# Patient Record
Sex: MALE | Race: WHITE | Employment: OTHER | URBAN - METROPOLITAN AREA
[De-identification: names, ages, dates, MRNs, and addresses within clinical notes are randomized per-mention and may not be internally consistent; named-entity substitution may affect disease eponyms.]

---

## 2017-01-18 PROBLEM — F31.60 BIPOLAR DISORDER, MIXED (HCC): Status: ACTIVE | Noted: 2017-01-18

## 2017-02-06 PROBLEM — H72.92 PERFORATION OF LEFT TYMPANIC MEMBRANE: Status: ACTIVE | Noted: 2017-02-06

## 2017-03-20 PROBLEM — N52.9 ERECTILE DYSFUNCTION: Status: ACTIVE | Noted: 2017-03-20

## 2017-08-15 ENCOUNTER — HOSPITAL ENCOUNTER (INPATIENT)
Age: 67
LOS: 6 days | Discharge: HOME | DRG: 885 | End: 2017-08-21
Attending: EMERGENCY MEDICINE | Admitting: PSYCHIATRY & NEUROLOGY
Payer: MEDICARE

## 2017-08-15 DIAGNOSIS — R45.851 SUICIDAL IDEATIONS: Primary | ICD-10-CM

## 2017-08-15 PROCEDURE — 99285 EMERGENCY DEPT VISIT HI MDM: CPT

## 2017-08-15 PROCEDURE — 1240000000 HC EMOTIONAL WELLNESS R&B

## 2017-08-16 LAB
-: ABNORMAL
AMORPHOUS: ABNORMAL
AMPHETAMINE SCREEN URINE: NEGATIVE
BACTERIA: ABNORMAL
BARBITURATE SCREEN URINE: NEGATIVE
BENZODIAZEPINE SCREEN, URINE: NEGATIVE
BILIRUBIN URINE: NEGATIVE
BUPRENORPHINE URINE: NORMAL
CANNABINOID SCREEN URINE: NEGATIVE
CASTS UA: ABNORMAL /LPF
COCAINE METABOLITE, URINE: NEGATIVE
COLOR: YELLOW
COMMENT UA: ABNORMAL
CRYSTALS, UA: ABNORMAL /HPF
EPITHELIAL CELLS UA: ABNORMAL /HPF
GLUCOSE URINE: ABNORMAL
KETONES, URINE: NEGATIVE
LEUKOCYTE ESTERASE, URINE: NEGATIVE
MDMA URINE: NORMAL
METHADONE SCREEN, URINE: NEGATIVE
METHAMPHETAMINE, URINE: NORMAL
MUCUS: ABNORMAL
NITRITE, URINE: NEGATIVE
OPIATES, URINE: NEGATIVE
OTHER OBSERVATIONS UA: ABNORMAL
OXYCODONE SCREEN URINE: NEGATIVE
PH UA: 6.5 (ref 5–8)
PHENCYCLIDINE, URINE: NEGATIVE
PROPOXYPHENE, URINE: NORMAL
PROTEIN UA: ABNORMAL
RBC UA: ABNORMAL /HPF
RENAL EPITHELIAL, UA: ABNORMAL /HPF
SPECIFIC GRAVITY UA: 1.02 (ref 1–1.03)
TEST INFORMATION: NORMAL
TRICHOMONAS: ABNORMAL
TRICYCLIC ANTIDEPRESSANTS, UR: NORMAL
TURBIDITY: CLEAR
URINE HGB: NEGATIVE
UROBILINOGEN, URINE: NORMAL
WBC UA: ABNORMAL /HPF
YEAST: ABNORMAL

## 2017-08-16 PROCEDURE — 81001 URINALYSIS AUTO W/SCOPE: CPT

## 2017-08-16 PROCEDURE — 6370000000 HC RX 637 (ALT 250 FOR IP): Performed by: PSYCHIATRY & NEUROLOGY

## 2017-08-16 PROCEDURE — 94664 DEMO&/EVAL PT USE INHALER: CPT

## 2017-08-16 PROCEDURE — 80307 DRUG TEST PRSMV CHEM ANLYZR: CPT

## 2017-08-16 PROCEDURE — 1240000000 HC EMOTIONAL WELLNESS R&B

## 2017-08-16 RX ORDER — ALBUTEROL SULFATE 90 UG/1
2 AEROSOL, METERED RESPIRATORY (INHALATION) EVERY 6 HOURS PRN
Status: DISCONTINUED | OUTPATIENT
Start: 2017-08-16 | End: 2017-08-21 | Stop reason: HOSPADM

## 2017-08-16 RX ORDER — TRAZODONE HYDROCHLORIDE 50 MG/1
50 TABLET ORAL NIGHTLY PRN
Status: DISCONTINUED | OUTPATIENT
Start: 2017-08-16 | End: 2017-08-17

## 2017-08-16 RX ORDER — NAPROXEN 500 MG/1
500 TABLET ORAL 2 TIMES DAILY PRN
Status: DISCONTINUED | OUTPATIENT
Start: 2017-08-16 | End: 2017-08-21 | Stop reason: HOSPADM

## 2017-08-16 RX ORDER — NAPROXEN 500 MG/1
500 TABLET ORAL 2 TIMES DAILY PRN
Status: ON HOLD | COMMUNITY
End: 2018-04-13 | Stop reason: HOSPADM

## 2017-08-16 RX ORDER — AMLODIPINE BESYLATE 10 MG/1
10 TABLET ORAL DAILY
Status: DISCONTINUED | OUTPATIENT
Start: 2017-08-16 | End: 2017-08-21 | Stop reason: HOSPADM

## 2017-08-16 RX ORDER — ATORVASTATIN CALCIUM 20 MG/1
40 TABLET, FILM COATED ORAL NIGHTLY
Status: DISCONTINUED | OUTPATIENT
Start: 2017-08-16 | End: 2017-08-21 | Stop reason: HOSPADM

## 2017-08-16 RX ORDER — DIVALPROEX SODIUM 500 MG/1
500 TABLET, EXTENDED RELEASE ORAL 2 TIMES DAILY
Status: DISCONTINUED | OUTPATIENT
Start: 2017-08-16 | End: 2017-08-21 | Stop reason: HOSPADM

## 2017-08-16 RX ORDER — BENZTROPINE MESYLATE 1 MG/ML
2 INJECTION INTRAMUSCULAR; INTRAVENOUS DAILY PRN
Status: DISCONTINUED | OUTPATIENT
Start: 2017-08-16 | End: 2017-08-21 | Stop reason: HOSPADM

## 2017-08-16 RX ORDER — MAGNESIUM OXIDE 420 MG
420 TABLET ORAL EVERY MORNING
Status: DISCONTINUED | OUTPATIENT
Start: 2017-08-17 | End: 2017-08-16

## 2017-08-16 RX ORDER — TRAZODONE HYDROCHLORIDE 50 MG/1
50 TABLET ORAL NIGHTLY PRN
Status: ON HOLD | COMMUNITY
End: 2017-08-21 | Stop reason: HOSPADM

## 2017-08-16 RX ORDER — ASPIRIN 81 MG/1
81 TABLET ORAL EVERY MORNING
Status: DISCONTINUED | OUTPATIENT
Start: 2017-08-17 | End: 2017-08-21 | Stop reason: HOSPADM

## 2017-08-16 RX ORDER — CARVEDILOL 25 MG/1
50 TABLET ORAL 2 TIMES DAILY WITH MEALS
Status: DISCONTINUED | OUTPATIENT
Start: 2017-08-16 | End: 2017-08-21 | Stop reason: HOSPADM

## 2017-08-16 RX ORDER — HYDROXYZINE HYDROCHLORIDE 25 MG/1
25 TABLET, FILM COATED ORAL 3 TIMES DAILY PRN
Status: DISCONTINUED | OUTPATIENT
Start: 2017-08-16 | End: 2017-08-21 | Stop reason: HOSPADM

## 2017-08-16 RX ORDER — CITALOPRAM 10 MG/1
10 TABLET ORAL EVERY MORNING
Status: DISCONTINUED | OUTPATIENT
Start: 2017-08-17 | End: 2017-08-17

## 2017-08-16 RX ORDER — ACETAMINOPHEN 325 MG/1
650 TABLET ORAL EVERY 4 HOURS PRN
Status: DISCONTINUED | OUTPATIENT
Start: 2017-08-16 | End: 2017-08-21 | Stop reason: HOSPADM

## 2017-08-16 RX ORDER — PANTOPRAZOLE SODIUM 40 MG/1
40 TABLET, DELAYED RELEASE ORAL
Status: DISCONTINUED | OUTPATIENT
Start: 2017-08-17 | End: 2017-08-19

## 2017-08-16 RX ORDER — CETIRIZINE HYDROCHLORIDE 10 MG/1
10 TABLET ORAL DAILY PRN
Status: DISCONTINUED | OUTPATIENT
Start: 2017-08-16 | End: 2017-08-21 | Stop reason: HOSPADM

## 2017-08-16 RX ADMIN — METFORMIN HYDROCHLORIDE 1000 MG: 1000 TABLET, FILM COATED ORAL at 18:13

## 2017-08-16 RX ADMIN — AMLODIPINE BESYLATE 10 MG: 10 TABLET ORAL at 17:59

## 2017-08-16 RX ADMIN — ATORVASTATIN CALCIUM 40 MG: 20 TABLET, FILM COATED ORAL at 21:41

## 2017-08-16 RX ADMIN — CARVEDILOL 50 MG: 25 TABLET, FILM COATED ORAL at 17:57

## 2017-08-16 RX ADMIN — HYDROXYZINE HYDROCHLORIDE 25 MG: 25 TABLET, FILM COATED ORAL at 21:41

## 2017-08-16 RX ADMIN — TRAZODONE HYDROCHLORIDE 50 MG: 50 TABLET ORAL at 21:41

## 2017-08-16 RX ADMIN — NAPROXEN 500 MG: 500 TABLET ORAL at 17:59

## 2017-08-16 RX ADMIN — ACETAMINOPHEN 650 MG: 325 TABLET ORAL at 21:41

## 2017-08-16 RX ADMIN — DIVALPROEX SODIUM 500 MG: 500 TABLET, EXTENDED RELEASE ORAL at 21:41

## 2017-08-16 ASSESSMENT — PAIN DESCRIPTION - FREQUENCY: FREQUENCY: INTERMITTENT

## 2017-08-16 ASSESSMENT — PAIN DESCRIPTION - DESCRIPTORS: DESCRIPTORS: ACHING;DISCOMFORT

## 2017-08-16 ASSESSMENT — SLEEP AND FATIGUE QUESTIONNAIRES
DO YOU HAVE DIFFICULTY SLEEPING: NO
DIFFICULTY ARISING: NO
DO YOU USE A SLEEP AID: YES
DIFFICULTY STAYING ASLEEP: NO
RESTFUL SLEEP: YES
DIFFICULTY FALLING ASLEEP: YES
AVERAGE NUMBER OF SLEEP HOURS: 6
SLEEP PATTERN: RESTLESSNESS

## 2017-08-16 ASSESSMENT — PAIN SCALES - GENERAL
PAINLEVEL_OUTOF10: 8
PAINLEVEL_OUTOF10: 0
PAINLEVEL_OUTOF10: 8
PAINLEVEL_OUTOF10: 7

## 2017-08-16 ASSESSMENT — PAIN DESCRIPTION - LOCATION: LOCATION: OTHER (COMMENT)

## 2017-08-16 ASSESSMENT — PATIENT HEALTH QUESTIONNAIRE - PHQ9: SUM OF ALL RESPONSES TO PHQ QUESTIONS 1-9: 7

## 2017-08-16 ASSESSMENT — LIFESTYLE VARIABLES: HISTORY_ALCOHOL_USE: NO

## 2017-08-16 ASSESSMENT — PAIN DESCRIPTION - PAIN TYPE: TYPE: ACUTE PAIN

## 2017-08-17 LAB
ABSOLUTE EOS #: 0.3 K/UL (ref 0–0.4)
ABSOLUTE LYMPH #: 2.1 K/UL (ref 1–4.8)
ABSOLUTE MONO #: 0.6 K/UL (ref 0.1–1.3)
ALBUMIN SERPL-MCNC: 4.2 G/DL (ref 3.5–5.2)
ALBUMIN/GLOBULIN RATIO: ABNORMAL (ref 1–2.5)
ALP BLD-CCNC: 66 U/L (ref 40–129)
ALT SERPL-CCNC: 23 U/L (ref 5–41)
ANION GAP SERPL CALCULATED.3IONS-SCNC: 11 MMOL/L (ref 9–17)
AST SERPL-CCNC: 23 U/L
BASOPHILS # BLD: 1 %
BASOPHILS ABSOLUTE: 0.1 K/UL (ref 0–0.2)
BILIRUB SERPL-MCNC: 0.43 MG/DL (ref 0.3–1.2)
BUN BLDV-MCNC: 20 MG/DL (ref 8–23)
BUN/CREAT BLD: ABNORMAL (ref 9–20)
CALCIUM SERPL-MCNC: 9.6 MG/DL (ref 8.6–10.4)
CHLORIDE BLD-SCNC: 100 MMOL/L (ref 98–107)
CO2: 28 MMOL/L (ref 20–31)
CREAT SERPL-MCNC: 0.52 MG/DL (ref 0.7–1.2)
DIFFERENTIAL TYPE: ABNORMAL
EOSINOPHILS RELATIVE PERCENT: 4 %
GFR AFRICAN AMERICAN: >60 ML/MIN
GFR NON-AFRICAN AMERICAN: >60 ML/MIN
GFR SERPL CREATININE-BSD FRML MDRD: ABNORMAL ML/MIN/{1.73_M2}
GFR SERPL CREATININE-BSD FRML MDRD: ABNORMAL ML/MIN/{1.73_M2}
GLUCOSE BLD-MCNC: 127 MG/DL (ref 70–99)
HCT VFR BLD CALC: 40.6 % (ref 41–53)
HEMOGLOBIN: 14.1 G/DL (ref 13.5–17.5)
LYMPHOCYTES # BLD: 30 %
MCH RBC QN AUTO: 33 PG (ref 26–34)
MCHC RBC AUTO-ENTMCNC: 34.8 G/DL (ref 31–37)
MCV RBC AUTO: 94.9 FL (ref 80–100)
MONOCYTES # BLD: 9 %
PDW BLD-RTO: 13.4 % (ref 11.5–14.9)
PLATELET # BLD: 252 K/UL (ref 150–450)
PLATELET ESTIMATE: ABNORMAL
PMV BLD AUTO: 7.4 FL (ref 6–12)
POTASSIUM SERPL-SCNC: 4.3 MMOL/L (ref 3.7–5.3)
RBC # BLD: 4.28 M/UL (ref 4.5–5.9)
RBC # BLD: ABNORMAL 10*6/UL
SEG NEUTROPHILS: 56 %
SEGMENTED NEUTROPHILS ABSOLUTE COUNT: 3.9 K/UL (ref 1.3–9.1)
SODIUM BLD-SCNC: 139 MMOL/L (ref 135–144)
TOTAL PROTEIN: 6.9 G/DL (ref 6.4–8.3)
WBC # BLD: 7 K/UL (ref 3.5–11)
WBC # BLD: ABNORMAL 10*3/UL

## 2017-08-17 PROCEDURE — 1240000000 HC EMOTIONAL WELLNESS R&B

## 2017-08-17 PROCEDURE — 6370000000 HC RX 637 (ALT 250 FOR IP): Performed by: PSYCHIATRY & NEUROLOGY

## 2017-08-17 PROCEDURE — 36415 COLL VENOUS BLD VENIPUNCTURE: CPT

## 2017-08-17 PROCEDURE — 80053 COMPREHEN METABOLIC PANEL: CPT

## 2017-08-17 PROCEDURE — 85025 COMPLETE CBC W/AUTO DIFF WBC: CPT

## 2017-08-17 RX ORDER — TRAZODONE HYDROCHLORIDE 100 MG/1
100 TABLET ORAL NIGHTLY PRN
Status: DISCONTINUED | OUTPATIENT
Start: 2017-08-17 | End: 2017-08-21 | Stop reason: HOSPADM

## 2017-08-17 RX ORDER — CITALOPRAM 20 MG/1
20 TABLET ORAL EVERY MORNING
Status: DISCONTINUED | OUTPATIENT
Start: 2017-08-18 | End: 2017-08-21 | Stop reason: HOSPADM

## 2017-08-17 RX ADMIN — CARVEDILOL 50 MG: 25 TABLET, FILM COATED ORAL at 17:32

## 2017-08-17 RX ADMIN — METFORMIN HYDROCHLORIDE 1000 MG: 1000 TABLET, FILM COATED ORAL at 10:40

## 2017-08-17 RX ADMIN — Medication 400 MG: at 10:40

## 2017-08-17 RX ADMIN — TRAZODONE HYDROCHLORIDE 100 MG: 100 TABLET ORAL at 22:37

## 2017-08-17 RX ADMIN — NAPROXEN 500 MG: 500 TABLET ORAL at 22:36

## 2017-08-17 RX ADMIN — PANTOPRAZOLE SODIUM 40 MG: 40 TABLET, DELAYED RELEASE ORAL at 06:33

## 2017-08-17 RX ADMIN — ATORVASTATIN CALCIUM 40 MG: 20 TABLET, FILM COATED ORAL at 22:36

## 2017-08-17 RX ADMIN — CITALOPRAM HYDROBROMIDE 10 MG: 10 TABLET ORAL at 10:40

## 2017-08-17 RX ADMIN — HYDROXYZINE HYDROCHLORIDE 25 MG: 25 TABLET, FILM COATED ORAL at 22:40

## 2017-08-17 RX ADMIN — DIVALPROEX SODIUM 500 MG: 500 TABLET, EXTENDED RELEASE ORAL at 10:41

## 2017-08-17 RX ADMIN — NAPROXEN 500 MG: 500 TABLET ORAL at 11:00

## 2017-08-17 RX ADMIN — DIVALPROEX SODIUM 500 MG: 500 TABLET, EXTENDED RELEASE ORAL at 22:36

## 2017-08-17 RX ADMIN — METFORMIN HYDROCHLORIDE 1000 MG: 1000 TABLET, FILM COATED ORAL at 17:32

## 2017-08-17 RX ADMIN — CARVEDILOL 50 MG: 25 TABLET, FILM COATED ORAL at 10:40

## 2017-08-17 RX ADMIN — ASPIRIN 81 MG: 81 TABLET, COATED ORAL at 10:40

## 2017-08-17 RX ADMIN — AMLODIPINE BESYLATE 10 MG: 10 TABLET ORAL at 10:40

## 2017-08-17 ASSESSMENT — PAIN DESCRIPTION - LOCATION
LOCATION: EAR
LOCATION: EAR

## 2017-08-17 ASSESSMENT — PAIN SCALES - GENERAL
PAINLEVEL_OUTOF10: 7
PAINLEVEL_OUTOF10: 6
PAINLEVEL_OUTOF10: 7
PAINLEVEL_OUTOF10: 6

## 2017-08-18 LAB
VALPROIC ACID LEVEL: 35 UG/ML (ref 50–100)
VALPROIC DATE LAST DOSE: ABNORMAL
VALPROIC DOSE AMOUNT: ABNORMAL
VALPROIC TIME LAST DOSE: 2236

## 2017-08-18 PROCEDURE — 80164 ASSAY DIPROPYLACETIC ACD TOT: CPT

## 2017-08-18 PROCEDURE — 6370000000 HC RX 637 (ALT 250 FOR IP): Performed by: PSYCHIATRY & NEUROLOGY

## 2017-08-18 PROCEDURE — 1240000000 HC EMOTIONAL WELLNESS R&B

## 2017-08-18 PROCEDURE — 36415 COLL VENOUS BLD VENIPUNCTURE: CPT

## 2017-08-18 RX ADMIN — AMLODIPINE BESYLATE 10 MG: 10 TABLET ORAL at 08:26

## 2017-08-18 RX ADMIN — METFORMIN HYDROCHLORIDE 1000 MG: 1000 TABLET, FILM COATED ORAL at 08:24

## 2017-08-18 RX ADMIN — NAPROXEN 500 MG: 500 TABLET ORAL at 22:49

## 2017-08-18 RX ADMIN — CARVEDILOL 50 MG: 25 TABLET, FILM COATED ORAL at 18:41

## 2017-08-18 RX ADMIN — ATORVASTATIN CALCIUM 40 MG: 20 TABLET, FILM COATED ORAL at 22:46

## 2017-08-18 RX ADMIN — Medication 400 MG: at 08:26

## 2017-08-18 RX ADMIN — DIVALPROEX SODIUM 500 MG: 500 TABLET, EXTENDED RELEASE ORAL at 22:46

## 2017-08-18 RX ADMIN — CARVEDILOL 50 MG: 25 TABLET, FILM COATED ORAL at 08:24

## 2017-08-18 RX ADMIN — CITALOPRAM HYDROBROMIDE 20 MG: 20 TABLET ORAL at 08:26

## 2017-08-18 RX ADMIN — PANTOPRAZOLE SODIUM 40 MG: 40 TABLET, DELAYED RELEASE ORAL at 06:29

## 2017-08-18 RX ADMIN — ASPIRIN 81 MG: 81 TABLET, COATED ORAL at 08:26

## 2017-08-18 RX ADMIN — TRAZODONE HYDROCHLORIDE 100 MG: 100 TABLET ORAL at 22:46

## 2017-08-18 RX ADMIN — DIVALPROEX SODIUM 500 MG: 500 TABLET, EXTENDED RELEASE ORAL at 08:26

## 2017-08-18 RX ADMIN — METFORMIN HYDROCHLORIDE 1000 MG: 1000 TABLET, FILM COATED ORAL at 18:41

## 2017-08-18 ASSESSMENT — PAIN SCALES - GENERAL
PAINLEVEL_OUTOF10: 6
PAINLEVEL_OUTOF10: 6

## 2017-08-18 ASSESSMENT — PAIN DESCRIPTION - PAIN TYPE: TYPE: ACUTE PAIN

## 2017-08-18 ASSESSMENT — PAIN DESCRIPTION - LOCATION: LOCATION: EAR

## 2017-08-19 PROCEDURE — 6370000000 HC RX 637 (ALT 250 FOR IP): Performed by: PSYCHIATRY & NEUROLOGY

## 2017-08-19 PROCEDURE — 1240000000 HC EMOTIONAL WELLNESS R&B

## 2017-08-19 RX ORDER — PANTOPRAZOLE SODIUM 40 MG/1
40 TABLET, DELAYED RELEASE ORAL
Status: DISCONTINUED | OUTPATIENT
Start: 2017-08-19 | End: 2017-08-21 | Stop reason: HOSPADM

## 2017-08-19 RX ADMIN — NAPROXEN 500 MG: 500 TABLET ORAL at 22:55

## 2017-08-19 RX ADMIN — DIVALPROEX SODIUM 500 MG: 500 TABLET, EXTENDED RELEASE ORAL at 22:54

## 2017-08-19 RX ADMIN — CARVEDILOL 50 MG: 25 TABLET, FILM COATED ORAL at 18:16

## 2017-08-19 RX ADMIN — NAPROXEN 500 MG: 500 TABLET ORAL at 09:53

## 2017-08-19 RX ADMIN — HYDROXYZINE HYDROCHLORIDE 25 MG: 25 TABLET, FILM COATED ORAL at 22:53

## 2017-08-19 RX ADMIN — CARVEDILOL 50 MG: 25 TABLET, FILM COATED ORAL at 09:42

## 2017-08-19 RX ADMIN — DIVALPROEX SODIUM 500 MG: 500 TABLET, EXTENDED RELEASE ORAL at 09:42

## 2017-08-19 RX ADMIN — ATORVASTATIN CALCIUM 40 MG: 20 TABLET, FILM COATED ORAL at 22:53

## 2017-08-19 RX ADMIN — ACETAMINOPHEN 650 MG: 325 TABLET ORAL at 18:20

## 2017-08-19 RX ADMIN — ASPIRIN 81 MG: 81 TABLET, COATED ORAL at 09:43

## 2017-08-19 RX ADMIN — PANTOPRAZOLE SODIUM 40 MG: 40 TABLET, DELAYED RELEASE ORAL at 09:42

## 2017-08-19 RX ADMIN — Medication 400 MG: at 09:42

## 2017-08-19 RX ADMIN — METFORMIN HYDROCHLORIDE 1000 MG: 1000 TABLET, FILM COATED ORAL at 09:42

## 2017-08-19 RX ADMIN — TRAZODONE HYDROCHLORIDE 100 MG: 100 TABLET ORAL at 22:54

## 2017-08-19 RX ADMIN — METFORMIN HYDROCHLORIDE 1000 MG: 1000 TABLET, FILM COATED ORAL at 18:16

## 2017-08-19 RX ADMIN — CITALOPRAM HYDROBROMIDE 20 MG: 20 TABLET ORAL at 09:43

## 2017-08-19 RX ADMIN — AMLODIPINE BESYLATE 10 MG: 10 TABLET ORAL at 09:42

## 2017-08-19 ASSESSMENT — PAIN DESCRIPTION - LOCATION: LOCATION: EAR

## 2017-08-19 ASSESSMENT — PAIN SCALES - GENERAL
PAINLEVEL_OUTOF10: 6
PAINLEVEL_OUTOF10: 3
PAINLEVEL_OUTOF10: 7
PAINLEVEL_OUTOF10: 6
PAINLEVEL_OUTOF10: 6

## 2017-08-19 ASSESSMENT — PAIN DESCRIPTION - ORIENTATION: ORIENTATION: LEFT

## 2017-08-20 PROCEDURE — 6370000000 HC RX 637 (ALT 250 FOR IP): Performed by: PSYCHIATRY & NEUROLOGY

## 2017-08-20 PROCEDURE — 1240000000 HC EMOTIONAL WELLNESS R&B

## 2017-08-20 RX ADMIN — DIVALPROEX SODIUM 500 MG: 500 TABLET, EXTENDED RELEASE ORAL at 09:09

## 2017-08-20 RX ADMIN — AMLODIPINE BESYLATE 10 MG: 10 TABLET ORAL at 09:09

## 2017-08-20 RX ADMIN — CARVEDILOL 50 MG: 25 TABLET, FILM COATED ORAL at 17:58

## 2017-08-20 RX ADMIN — METFORMIN HYDROCHLORIDE 1000 MG: 1000 TABLET, FILM COATED ORAL at 17:58

## 2017-08-20 RX ADMIN — PANTOPRAZOLE SODIUM 40 MG: 40 TABLET, DELAYED RELEASE ORAL at 09:09

## 2017-08-20 RX ADMIN — ACETAMINOPHEN 650 MG: 325 TABLET ORAL at 23:04

## 2017-08-20 RX ADMIN — DIVALPROEX SODIUM 500 MG: 500 TABLET, EXTENDED RELEASE ORAL at 23:01

## 2017-08-20 RX ADMIN — METFORMIN HYDROCHLORIDE 1000 MG: 1000 TABLET, FILM COATED ORAL at 09:09

## 2017-08-20 RX ADMIN — HYDROXYZINE HYDROCHLORIDE 25 MG: 25 TABLET, FILM COATED ORAL at 23:02

## 2017-08-20 RX ADMIN — NAPROXEN 500 MG: 500 TABLET ORAL at 18:01

## 2017-08-20 RX ADMIN — Medication 400 MG: at 09:09

## 2017-08-20 RX ADMIN — CARVEDILOL 50 MG: 25 TABLET, FILM COATED ORAL at 09:09

## 2017-08-20 RX ADMIN — ATORVASTATIN CALCIUM 40 MG: 20 TABLET, FILM COATED ORAL at 23:01

## 2017-08-20 RX ADMIN — HYDROXYZINE HYDROCHLORIDE 25 MG: 25 TABLET, FILM COATED ORAL at 09:09

## 2017-08-20 RX ADMIN — TRAZODONE HYDROCHLORIDE 100 MG: 100 TABLET ORAL at 23:02

## 2017-08-20 RX ADMIN — ASPIRIN 81 MG: 81 TABLET, COATED ORAL at 09:10

## 2017-08-20 RX ADMIN — CITALOPRAM HYDROBROMIDE 20 MG: 20 TABLET ORAL at 09:09

## 2017-08-20 ASSESSMENT — PAIN SCALES - GENERAL
PAINLEVEL_OUTOF10: 5
PAINLEVEL_OUTOF10: 7
PAINLEVEL_OUTOF10: 5
PAINLEVEL_OUTOF10: 0
PAINLEVEL_OUTOF10: 5

## 2017-08-20 ASSESSMENT — PAIN DESCRIPTION - ORIENTATION
ORIENTATION: LEFT
ORIENTATION: LEFT

## 2017-08-20 ASSESSMENT — PAIN DESCRIPTION - LOCATION
LOCATION: EAR;SHOULDER
LOCATION: EAR;SHOULDER

## 2017-08-20 ASSESSMENT — PAIN DESCRIPTION - PAIN TYPE: TYPE: ACUTE PAIN

## 2017-08-21 VITALS
RESPIRATION RATE: 14 BRPM | BODY MASS INDEX: 27.52 KG/M2 | SYSTOLIC BLOOD PRESSURE: 158 MMHG | OXYGEN SATURATION: 100 % | HEART RATE: 77 BPM | DIASTOLIC BLOOD PRESSURE: 91 MMHG | WEIGHT: 155.31 LBS | HEIGHT: 63 IN | TEMPERATURE: 98.1 F

## 2017-08-21 PROCEDURE — 6370000000 HC RX 637 (ALT 250 FOR IP): Performed by: PSYCHIATRY & NEUROLOGY

## 2017-08-21 RX ORDER — TRAZODONE HYDROCHLORIDE 100 MG/1
100 TABLET ORAL NIGHTLY PRN
Qty: 30 TABLET | Refills: 0 | Status: ON HOLD | OUTPATIENT
Start: 2017-08-21 | End: 2018-04-19

## 2017-08-21 RX ORDER — CITALOPRAM 20 MG/1
20 TABLET ORAL EVERY MORNING
Qty: 30 TABLET | Refills: 0 | Status: SHIPPED | OUTPATIENT
Start: 2017-08-21 | End: 2018-04-18 | Stop reason: ALTCHOICE

## 2017-08-21 RX ORDER — DIVALPROEX SODIUM 500 MG/1
500 TABLET, EXTENDED RELEASE ORAL 2 TIMES DAILY
Qty: 60 TABLET | Refills: 0 | Status: ON HOLD | OUTPATIENT
Start: 2017-08-21 | End: 2018-04-19

## 2017-08-21 RX ADMIN — METFORMIN HYDROCHLORIDE 1000 MG: 1000 TABLET, FILM COATED ORAL at 09:27

## 2017-08-21 RX ADMIN — NAPROXEN 500 MG: 500 TABLET ORAL at 09:45

## 2017-08-21 RX ADMIN — AMLODIPINE BESYLATE 10 MG: 10 TABLET ORAL at 09:27

## 2017-08-21 RX ADMIN — CARVEDILOL 50 MG: 25 TABLET, FILM COATED ORAL at 09:27

## 2017-08-21 RX ADMIN — PANTOPRAZOLE SODIUM 40 MG: 40 TABLET, DELAYED RELEASE ORAL at 09:27

## 2017-08-21 RX ADMIN — DIVALPROEX SODIUM 500 MG: 500 TABLET, EXTENDED RELEASE ORAL at 09:27

## 2017-08-21 RX ADMIN — CITALOPRAM HYDROBROMIDE 20 MG: 20 TABLET ORAL at 09:27

## 2017-08-21 RX ADMIN — ASPIRIN 81 MG: 81 TABLET, COATED ORAL at 09:27

## 2017-08-21 RX ADMIN — Medication 400 MG: at 09:27

## 2017-08-21 ASSESSMENT — PAIN SCALES - GENERAL: PAINLEVEL_OUTOF10: 6

## 2018-04-11 ENCOUNTER — APPOINTMENT (OUTPATIENT)
Dept: GENERAL RADIOLOGY | Age: 68
End: 2018-04-11
Payer: MEDICARE

## 2018-04-11 ENCOUNTER — APPOINTMENT (OUTPATIENT)
Dept: CT IMAGING | Age: 68
End: 2018-04-11
Payer: MEDICARE

## 2018-04-11 ENCOUNTER — HOSPITAL ENCOUNTER (EMERGENCY)
Age: 68
Discharge: ANOTHER HOSPITAL | End: 2018-04-12
Attending: EMERGENCY MEDICINE
Payer: MEDICARE

## 2018-04-11 ENCOUNTER — APPOINTMENT (OUTPATIENT)
Dept: MRI IMAGING | Age: 68
End: 2018-04-11
Payer: MEDICARE

## 2018-04-11 DIAGNOSIS — R07.9 CHEST PAIN, UNSPECIFIED TYPE: ICD-10-CM

## 2018-04-11 DIAGNOSIS — M48.00 CENTRAL STENOSIS OF SPINAL CANAL: Primary | ICD-10-CM

## 2018-04-11 DIAGNOSIS — R29.898 LEFT ARM WEAKNESS: ICD-10-CM

## 2018-04-11 LAB
ABSOLUTE EOS #: 0.3 K/UL (ref 0–0.4)
ABSOLUTE IMMATURE GRANULOCYTE: ABNORMAL K/UL (ref 0–0.3)
ABSOLUTE LYMPH #: 1.9 K/UL (ref 1–4.8)
ABSOLUTE MONO #: 0.7 K/UL (ref 0.1–1.3)
ANION GAP SERPL CALCULATED.3IONS-SCNC: 13 MMOL/L (ref 9–17)
BASOPHILS # BLD: 1 % (ref 0–2)
BASOPHILS ABSOLUTE: 0.1 K/UL (ref 0–0.2)
BUN BLDV-MCNC: 22 MG/DL (ref 8–23)
BUN/CREAT BLD: ABNORMAL (ref 9–20)
CALCIUM SERPL-MCNC: 9.1 MG/DL (ref 8.6–10.4)
CHLORIDE BLD-SCNC: 99 MMOL/L (ref 98–107)
CO2: 25 MMOL/L (ref 20–31)
CREAT SERPL-MCNC: 0.8 MG/DL (ref 0.7–1.2)
DIFFERENTIAL TYPE: ABNORMAL
EKG ATRIAL RATE: 77 BPM
EKG P AXIS: 44 DEGREES
EKG P-R INTERVAL: 190 MS
EKG Q-T INTERVAL: 378 MS
EKG QRS DURATION: 98 MS
EKG QTC CALCULATION (BAZETT): 427 MS
EKG R AXIS: -20 DEGREES
EKG T AXIS: 11 DEGREES
EKG VENTRICULAR RATE: 77 BPM
EOSINOPHILS RELATIVE PERCENT: 4 % (ref 0–4)
GFR AFRICAN AMERICAN: >60 ML/MIN
GFR NON-AFRICAN AMERICAN: >60 ML/MIN
GFR SERPL CREATININE-BSD FRML MDRD: ABNORMAL ML/MIN/{1.73_M2}
GFR SERPL CREATININE-BSD FRML MDRD: ABNORMAL ML/MIN/{1.73_M2}
GLUCOSE BLD-MCNC: 118 MG/DL (ref 70–99)
HCT VFR BLD CALC: 35.9 % (ref 41–53)
HEMOGLOBIN: 12.3 G/DL (ref 13.5–17.5)
IMMATURE GRANULOCYTES: ABNORMAL %
LYMPHOCYTES # BLD: 26 % (ref 24–44)
MCH RBC QN AUTO: 31.7 PG (ref 26–34)
MCHC RBC AUTO-ENTMCNC: 34.4 G/DL (ref 31–37)
MCV RBC AUTO: 92.2 FL (ref 80–100)
MONOCYTES # BLD: 10 % (ref 1–7)
NRBC AUTOMATED: ABNORMAL PER 100 WBC
PDW BLD-RTO: 13.5 % (ref 11.5–14.9)
PLATELET # BLD: 265 K/UL (ref 150–450)
PLATELET ESTIMATE: ABNORMAL
PMV BLD AUTO: 8.2 FL (ref 6–12)
POTASSIUM SERPL-SCNC: 3.7 MMOL/L (ref 3.7–5.3)
RBC # BLD: 3.89 M/UL (ref 4.5–5.9)
RBC # BLD: ABNORMAL 10*6/UL
SEG NEUTROPHILS: 59 % (ref 36–66)
SEGMENTED NEUTROPHILS ABSOLUTE COUNT: 4.4 K/UL (ref 1.3–9.1)
SODIUM BLD-SCNC: 137 MMOL/L (ref 135–144)
TROPONIN INTERP: NORMAL
TROPONIN INTERP: NORMAL
TROPONIN T: <0.03 NG/ML
TROPONIN T: <0.03 NG/ML
WBC # BLD: 7.4 K/UL (ref 3.5–11)
WBC # BLD: ABNORMAL 10*3/UL

## 2018-04-11 PROCEDURE — 93005 ELECTROCARDIOGRAM TRACING: CPT

## 2018-04-11 PROCEDURE — 84484 ASSAY OF TROPONIN QUANT: CPT

## 2018-04-11 PROCEDURE — 72125 CT NECK SPINE W/O DYE: CPT

## 2018-04-11 PROCEDURE — 99285 EMERGENCY DEPT VISIT HI MDM: CPT

## 2018-04-11 PROCEDURE — 70250 X-RAY EXAM OF SKULL: CPT

## 2018-04-11 PROCEDURE — 36415 COLL VENOUS BLD VENIPUNCTURE: CPT

## 2018-04-11 PROCEDURE — 6360000002 HC RX W HCPCS: Performed by: EMERGENCY MEDICINE

## 2018-04-11 PROCEDURE — 72141 MRI NECK SPINE W/O DYE: CPT

## 2018-04-11 PROCEDURE — 80048 BASIC METABOLIC PNL TOTAL CA: CPT

## 2018-04-11 PROCEDURE — 85025 COMPLETE CBC W/AUTO DIFF WBC: CPT

## 2018-04-11 PROCEDURE — 96374 THER/PROPH/DIAG INJ IV PUSH: CPT

## 2018-04-11 PROCEDURE — 71046 X-RAY EXAM CHEST 2 VIEWS: CPT

## 2018-04-11 RX ORDER — LORAZEPAM 2 MG/ML
1 INJECTION INTRAMUSCULAR ONCE
Status: COMPLETED | OUTPATIENT
Start: 2018-04-11 | End: 2018-04-11

## 2018-04-11 RX ADMIN — LORAZEPAM 1 MG: 2 INJECTION INTRAMUSCULAR; INTRAVENOUS at 20:37

## 2018-04-11 ASSESSMENT — ENCOUNTER SYMPTOMS
VOMITING: 0
WHEEZING: 0
SHORTNESS OF BREATH: 0
DIARRHEA: 0
CONSTIPATION: 0
ABDOMINAL PAIN: 0
BACK PAIN: 0
NAUSEA: 0
COLOR CHANGE: 0
COUGH: 0

## 2018-04-11 ASSESSMENT — PAIN DESCRIPTION - PAIN TYPE
TYPE_2: CHRONIC PAIN
TYPE: ACUTE PAIN

## 2018-04-11 ASSESSMENT — PAIN DESCRIPTION - LOCATION: LOCATION_2: SHOULDER

## 2018-04-11 ASSESSMENT — HEART SCORE: ECG: 0

## 2018-04-11 ASSESSMENT — PAIN DESCRIPTION - DESCRIPTORS
DESCRIPTORS_2: SHARP
DESCRIPTORS: TIGHTNESS

## 2018-04-11 ASSESSMENT — PAIN SCALES - GENERAL: PAINLEVEL_OUTOF10: 7

## 2018-04-12 ENCOUNTER — ANESTHESIA EVENT (OUTPATIENT)
Dept: OPERATING ROOM | Age: 68
DRG: 472 | End: 2018-04-12
Payer: MEDICARE

## 2018-04-12 ENCOUNTER — APPOINTMENT (OUTPATIENT)
Dept: MRI IMAGING | Age: 68
DRG: 552 | End: 2018-04-12
Payer: MEDICARE

## 2018-04-12 ENCOUNTER — HOSPITAL ENCOUNTER (INPATIENT)
Age: 68
LOS: 1 days | Discharge: HOME | DRG: 552 | End: 2018-04-13
Attending: EMERGENCY MEDICINE | Admitting: INTERNAL MEDICINE
Payer: MEDICARE

## 2018-04-12 VITALS
DIASTOLIC BLOOD PRESSURE: 82 MMHG | BODY MASS INDEX: 27.46 KG/M2 | HEART RATE: 71 BPM | OXYGEN SATURATION: 95 % | HEIGHT: 63 IN | TEMPERATURE: 98.2 F | RESPIRATION RATE: 16 BRPM | WEIGHT: 155 LBS | SYSTOLIC BLOOD PRESSURE: 130 MMHG

## 2018-04-12 DIAGNOSIS — M48.02 CERVICAL STENOSIS OF SPINE: Primary | ICD-10-CM

## 2018-04-12 PROBLEM — G99.2 STENOSIS OF CERVICAL SPINE WITH MYELOPATHY (HCC): Status: ACTIVE | Noted: 2018-04-12

## 2018-04-12 LAB
BLOOD BANK SPECIMEN: NORMAL
CHOLESTEROL/HDL RATIO: 2.5
CHOLESTEROL: 104 MG/DL
COLLAGEN ADENOSINE-5'-DIPHOSPHATE (ADP) TIME: 96 SEC (ref 67–112)
COLLAGEN EPINEPHRINE TIME: 143 SEC (ref 85–172)
EKG ATRIAL RATE: 65 BPM
EKG P AXIS: 36 DEGREES
EKG P-R INTERVAL: 200 MS
EKG Q-T INTERVAL: 402 MS
EKG QRS DURATION: 102 MS
EKG QTC CALCULATION (BAZETT): 418 MS
EKG R AXIS: -23 DEGREES
EKG T AXIS: -4 DEGREES
EKG VENTRICULAR RATE: 65 BPM
ESTIMATED AVERAGE GLUCOSE: 117 MG/DL
ESTIMATED AVERAGE GLUCOSE: 117 MG/DL
GLUCOSE BLD-MCNC: 215 MG/DL (ref 75–110)
GLUCOSE BLD-MCNC: 348 MG/DL (ref 75–110)
GLUCOSE BLD-MCNC: 75 MG/DL (ref 75–110)
GLUCOSE BLD-MCNC: 91 MG/DL (ref 75–110)
HBA1C MFR BLD: 5.7 % (ref 4–6)
HBA1C MFR BLD: 5.7 % (ref 4–6)
HDLC SERPL-MCNC: 42 MG/DL
INR BLD: 1
LDL CHOLESTEROL: 44 MG/DL (ref 0–130)
LV EF: 55 %
LVEF MODALITY: NORMAL
PARTIAL THROMBOPLASTIN TIME: 24.6 SEC (ref 20.5–30.5)
PLATELET FUNCTION INTERP: NORMAL
PROTHROMBIN TIME: 10.5 SEC (ref 9–12)
TRIGL SERPL-MCNC: 92 MG/DL
TROPONIN INTERP: NORMAL
TROPONIN INTERP: NORMAL
TROPONIN T: <0.03 NG/ML
TROPONIN T: <0.03 NG/ML
VLDLC SERPL CALC-MCNC: NORMAL MG/DL (ref 1–30)

## 2018-04-12 PROCEDURE — 83036 HEMOGLOBIN GLYCOSYLATED A1C: CPT

## 2018-04-12 PROCEDURE — 94762 N-INVAS EAR/PLS OXIMTRY CONT: CPT

## 2018-04-12 PROCEDURE — 84484 ASSAY OF TROPONIN QUANT: CPT

## 2018-04-12 PROCEDURE — 1200000000 HC SEMI PRIVATE

## 2018-04-12 PROCEDURE — 6360000002 HC RX W HCPCS: Performed by: EMERGENCY MEDICINE

## 2018-04-12 PROCEDURE — 85576 BLOOD PLATELET AGGREGATION: CPT

## 2018-04-12 PROCEDURE — 99285 EMERGENCY DEPT VISIT HI MDM: CPT

## 2018-04-12 PROCEDURE — 85610 PROTHROMBIN TIME: CPT

## 2018-04-12 PROCEDURE — 2580000003 HC RX 258: Performed by: STUDENT IN AN ORGANIZED HEALTH CARE EDUCATION/TRAINING PROGRAM

## 2018-04-12 PROCEDURE — 86900 BLOOD TYPING SEROLOGIC ABO: CPT

## 2018-04-12 PROCEDURE — 99223 1ST HOSP IP/OBS HIGH 75: CPT | Performed by: INTERNAL MEDICINE

## 2018-04-12 PROCEDURE — 6370000000 HC RX 637 (ALT 250 FOR IP): Performed by: INTERNAL MEDICINE

## 2018-04-12 PROCEDURE — 86901 BLOOD TYPING SEROLOGIC RH(D): CPT

## 2018-04-12 PROCEDURE — 2580000003 HC RX 258: Performed by: INTERNAL MEDICINE

## 2018-04-12 PROCEDURE — 6370000000 HC RX 637 (ALT 250 FOR IP): Performed by: STUDENT IN AN ORGANIZED HEALTH CARE EDUCATION/TRAINING PROGRAM

## 2018-04-12 PROCEDURE — 36415 COLL VENOUS BLD VENIPUNCTURE: CPT

## 2018-04-12 PROCEDURE — 85730 THROMBOPLASTIN TIME PARTIAL: CPT

## 2018-04-12 PROCEDURE — 86920 COMPATIBILITY TEST SPIN: CPT

## 2018-04-12 PROCEDURE — 72141 MRI NECK SPINE W/O DYE: CPT

## 2018-04-12 PROCEDURE — 82947 ASSAY GLUCOSE BLOOD QUANT: CPT

## 2018-04-12 PROCEDURE — 80061 LIPID PANEL: CPT

## 2018-04-12 PROCEDURE — 93005 ELECTROCARDIOGRAM TRACING: CPT

## 2018-04-12 PROCEDURE — 93306 TTE W/DOPPLER COMPLETE: CPT

## 2018-04-12 PROCEDURE — 86850 RBC ANTIBODY SCREEN: CPT

## 2018-04-12 PROCEDURE — 99222 1ST HOSP IP/OBS MODERATE 55: CPT | Performed by: NEUROLOGICAL SURGERY

## 2018-04-12 RX ORDER — CARVEDILOL 25 MG/1
25 TABLET ORAL 2 TIMES DAILY WITH MEALS
Status: DISCONTINUED | OUTPATIENT
Start: 2018-04-12 | End: 2018-04-13 | Stop reason: HOSPADM

## 2018-04-12 RX ORDER — ONDANSETRON 2 MG/ML
4 INJECTION INTRAMUSCULAR; INTRAVENOUS EVERY 6 HOURS PRN
Status: DISCONTINUED | OUTPATIENT
Start: 2018-04-12 | End: 2018-04-13 | Stop reason: HOSPADM

## 2018-04-12 RX ORDER — SODIUM CHLORIDE 0.9 % (FLUSH) 0.9 %
10 SYRINGE (ML) INJECTION PRN
Status: DISCONTINUED | OUTPATIENT
Start: 2018-04-12 | End: 2018-04-13 | Stop reason: HOSPADM

## 2018-04-12 RX ORDER — GABAPENTIN 300 MG/1
300 CAPSULE ORAL 3 TIMES DAILY
Status: DISCONTINUED | OUTPATIENT
Start: 2018-04-12 | End: 2018-04-13 | Stop reason: HOSPADM

## 2018-04-12 RX ORDER — DIVALPROEX SODIUM 500 MG/1
500 TABLET, EXTENDED RELEASE ORAL 2 TIMES DAILY
Status: DISCONTINUED | OUTPATIENT
Start: 2018-04-12 | End: 2018-04-13 | Stop reason: HOSPADM

## 2018-04-12 RX ORDER — POTASSIUM CHLORIDE 7.45 MG/ML
10 INJECTION INTRAVENOUS PRN
Status: DISCONTINUED | OUTPATIENT
Start: 2018-04-12 | End: 2018-04-13 | Stop reason: HOSPADM

## 2018-04-12 RX ORDER — AMLODIPINE BESYLATE 10 MG/1
10 TABLET ORAL DAILY
Status: DISCONTINUED | OUTPATIENT
Start: 2018-04-12 | End: 2018-04-13 | Stop reason: HOSPADM

## 2018-04-12 RX ORDER — DEXTROSE MONOHYDRATE 25 G/50ML
12.5 INJECTION, SOLUTION INTRAVENOUS PRN
Status: CANCELLED | OUTPATIENT
Start: 2018-04-12

## 2018-04-12 RX ORDER — DEXTROSE MONOHYDRATE 50 MG/ML
100 INJECTION, SOLUTION INTRAVENOUS PRN
Status: CANCELLED | OUTPATIENT
Start: 2018-04-12

## 2018-04-12 RX ORDER — NICOTINE POLACRILEX 4 MG
15 LOZENGE BUCCAL PRN
Status: CANCELLED | OUTPATIENT
Start: 2018-04-12

## 2018-04-12 RX ORDER — FAMOTIDINE 20 MG/1
20 TABLET, FILM COATED ORAL 2 TIMES DAILY
Status: DISCONTINUED | OUTPATIENT
Start: 2018-04-12 | End: 2018-04-13 | Stop reason: HOSPADM

## 2018-04-12 RX ORDER — ALBUTEROL SULFATE 90 UG/1
2 AEROSOL, METERED RESPIRATORY (INHALATION) EVERY 6 HOURS PRN
Status: DISCONTINUED | OUTPATIENT
Start: 2018-04-12 | End: 2018-04-13 | Stop reason: HOSPADM

## 2018-04-12 RX ORDER — ACETAMINOPHEN 325 MG/1
650 TABLET ORAL EVERY 4 HOURS PRN
Status: DISCONTINUED | OUTPATIENT
Start: 2018-04-12 | End: 2018-04-13 | Stop reason: HOSPADM

## 2018-04-12 RX ORDER — CITALOPRAM 20 MG/1
20 TABLET ORAL EVERY MORNING
Status: DISCONTINUED | OUTPATIENT
Start: 2018-04-12 | End: 2018-04-12

## 2018-04-12 RX ORDER — DEXAMETHASONE SODIUM PHOSPHATE 4 MG/ML
4 INJECTION, SOLUTION INTRA-ARTICULAR; INTRALESIONAL; INTRAMUSCULAR; INTRAVENOUS; SOFT TISSUE EVERY 6 HOURS
Status: DISCONTINUED | OUTPATIENT
Start: 2018-04-12 | End: 2018-04-13 | Stop reason: HOSPADM

## 2018-04-12 RX ORDER — SODIUM CHLORIDE 0.9 % (FLUSH) 0.9 %
10 SYRINGE (ML) INJECTION EVERY 12 HOURS SCHEDULED
Status: DISCONTINUED | OUTPATIENT
Start: 2018-04-12 | End: 2018-04-13 | Stop reason: HOSPADM

## 2018-04-12 RX ORDER — ATORVASTATIN CALCIUM 40 MG/1
40 TABLET, FILM COATED ORAL NIGHTLY
Status: DISCONTINUED | OUTPATIENT
Start: 2018-04-12 | End: 2018-04-13 | Stop reason: HOSPADM

## 2018-04-12 RX ORDER — CARVEDILOL 25 MG/1
50 TABLET ORAL 2 TIMES DAILY WITH MEALS
Status: DISCONTINUED | OUTPATIENT
Start: 2018-04-12 | End: 2018-04-12

## 2018-04-12 RX ORDER — CETIRIZINE HYDROCHLORIDE 10 MG/1
10 TABLET ORAL DAILY PRN
Status: DISCONTINUED | OUTPATIENT
Start: 2018-04-12 | End: 2018-04-13 | Stop reason: HOSPADM

## 2018-04-12 RX ORDER — TRAZODONE HYDROCHLORIDE 100 MG/1
100 TABLET ORAL NIGHTLY PRN
Status: DISCONTINUED | OUTPATIENT
Start: 2018-04-12 | End: 2018-04-13 | Stop reason: HOSPADM

## 2018-04-12 RX ORDER — POLYVINYL ALCOHOL 14 MG/ML
1 SOLUTION/ DROPS OPHTHALMIC PRN
Status: DISCONTINUED | OUTPATIENT
Start: 2018-04-12 | End: 2018-04-13 | Stop reason: HOSPADM

## 2018-04-12 RX ORDER — 0.9 % SODIUM CHLORIDE 0.9 %
250 INTRAVENOUS SOLUTION INTRAVENOUS ONCE
Status: DISCONTINUED | OUTPATIENT
Start: 2018-04-12 | End: 2018-04-13 | Stop reason: HOSPADM

## 2018-04-12 RX ORDER — POTASSIUM CHLORIDE 20 MEQ/1
40 TABLET, EXTENDED RELEASE ORAL PRN
Status: DISCONTINUED | OUTPATIENT
Start: 2018-04-12 | End: 2018-04-13 | Stop reason: HOSPADM

## 2018-04-12 RX ORDER — POTASSIUM CHLORIDE 20MEQ/15ML
40 LIQUID (ML) ORAL PRN
Status: DISCONTINUED | OUTPATIENT
Start: 2018-04-12 | End: 2018-04-13 | Stop reason: HOSPADM

## 2018-04-12 RX ADMIN — DEXAMETHASONE SODIUM PHOSPHATE 4 MG: 4 INJECTION, SOLUTION INTRAMUSCULAR; INTRAVENOUS at 21:59

## 2018-04-12 RX ADMIN — DESMOPRESSIN ACETATE 40 MG: 0.2 TABLET ORAL at 21:58

## 2018-04-12 RX ADMIN — Medication 10 ML: at 09:21

## 2018-04-12 RX ADMIN — FAMOTIDINE 20 MG: 20 TABLET, FILM COATED ORAL at 09:19

## 2018-04-12 RX ADMIN — GABAPENTIN 300 MG: 300 CAPSULE ORAL at 13:33

## 2018-04-12 RX ADMIN — POLYVINYL ALCOHOL 1 DROP: 14 SOLUTION/ DROPS OPHTHALMIC at 16:36

## 2018-04-12 RX ADMIN — DEXAMETHASONE SODIUM PHOSPHATE 4 MG: 4 INJECTION, SOLUTION INTRAMUSCULAR; INTRAVENOUS at 16:28

## 2018-04-12 RX ADMIN — Medication 10 ML: at 22:07

## 2018-04-12 RX ADMIN — DEXAMETHASONE SODIUM PHOSPHATE 4 MG: 4 INJECTION, SOLUTION INTRAMUSCULAR; INTRAVENOUS at 13:30

## 2018-04-12 RX ADMIN — INSULIN LISPRO 2 UNITS: 100 INJECTION, SOLUTION INTRAVENOUS; SUBCUTANEOUS at 16:29

## 2018-04-12 RX ADMIN — CARVEDILOL 25 MG: 25 TABLET, FILM COATED ORAL at 16:28

## 2018-04-12 RX ADMIN — GABAPENTIN 300 MG: 300 CAPSULE ORAL at 09:19

## 2018-04-12 RX ADMIN — FAMOTIDINE 20 MG: 20 TABLET, FILM COATED ORAL at 21:58

## 2018-04-12 RX ADMIN — DIVALPROEX SODIUM 500 MG: 500 TABLET, EXTENDED RELEASE ORAL at 21:58

## 2018-04-12 RX ADMIN — INSULIN LISPRO 3 UNITS: 100 INJECTION, SOLUTION INTRAVENOUS; SUBCUTANEOUS at 21:59

## 2018-04-12 RX ADMIN — DIVALPROEX SODIUM 500 MG: 500 TABLET, EXTENDED RELEASE ORAL at 09:19

## 2018-04-12 RX ADMIN — GABAPENTIN 300 MG: 300 CAPSULE ORAL at 21:58

## 2018-04-12 ASSESSMENT — PAIN DESCRIPTION - LOCATION
LOCATION: SHOULDER
LOCATION: SHOULDER
LOCATION: SHOULDER;NECK
LOCATION: SHOULDER

## 2018-04-12 ASSESSMENT — PAIN DESCRIPTION - ORIENTATION
ORIENTATION: LEFT

## 2018-04-12 ASSESSMENT — PAIN DESCRIPTION - FREQUENCY: FREQUENCY: CONTINUOUS

## 2018-04-12 ASSESSMENT — PAIN DESCRIPTION - DIRECTION: RADIATING_TOWARDS: ARM

## 2018-04-12 ASSESSMENT — PAIN DESCRIPTION - DESCRIPTORS
DESCRIPTORS: ACHING;STABBING
DESCRIPTORS: NUMBNESS;TINGLING;DISCOMFORT

## 2018-04-12 ASSESSMENT — PAIN SCALES - GENERAL
PAINLEVEL_OUTOF10: 7
PAINLEVEL_OUTOF10: 6
PAINLEVEL_OUTOF10: 6
PAINLEVEL_OUTOF10: 3

## 2018-04-12 ASSESSMENT — PAIN DESCRIPTION - PAIN TYPE
TYPE: ACUTE PAIN

## 2018-04-12 ASSESSMENT — LIFESTYLE VARIABLES: SMOKING_STATUS: 0

## 2018-04-12 ASSESSMENT — PAIN DESCRIPTION - ONSET: ONSET: ON-GOING

## 2018-04-13 ENCOUNTER — ANESTHESIA (OUTPATIENT)
Dept: OPERATING ROOM | Age: 68
DRG: 472 | End: 2018-04-13
Payer: MEDICARE

## 2018-04-13 VITALS
DIASTOLIC BLOOD PRESSURE: 87 MMHG | BODY MASS INDEX: 28 KG/M2 | OXYGEN SATURATION: 96 % | RESPIRATION RATE: 14 BRPM | TEMPERATURE: 97.5 F | SYSTOLIC BLOOD PRESSURE: 139 MMHG | HEIGHT: 63 IN | HEART RATE: 93 BPM | WEIGHT: 158 LBS

## 2018-04-13 LAB
ABO/RH: NORMAL
ABSOLUTE EOS #: <0.03 K/UL (ref 0–0.44)
ABSOLUTE IMMATURE GRANULOCYTE: 0.05 K/UL (ref 0–0.3)
ABSOLUTE LYMPH #: 1.1 K/UL (ref 1.1–3.7)
ABSOLUTE MONO #: 0.27 K/UL (ref 0.1–1.2)
ALBUMIN SERPL-MCNC: 3.8 G/DL (ref 3.5–5.2)
ALBUMIN/GLOBULIN RATIO: 1.4 (ref 1–2.5)
ALP BLD-CCNC: 74 U/L (ref 40–129)
ALT SERPL-CCNC: 17 U/L (ref 5–41)
ANION GAP SERPL CALCULATED.3IONS-SCNC: 12 MMOL/L (ref 9–17)
ANTIBODY SCREEN: NEGATIVE
ARM BAND NUMBER: NORMAL
AST SERPL-CCNC: 15 U/L
BASOPHILS # BLD: 0 % (ref 0–2)
BASOPHILS ABSOLUTE: <0.03 K/UL (ref 0–0.2)
BILIRUB SERPL-MCNC: 0.3 MG/DL (ref 0.3–1.2)
BILIRUBIN DIRECT: 0.1 MG/DL
BILIRUBIN, INDIRECT: 0.2 MG/DL (ref 0–1)
BLD PROD TYP BPU: NORMAL
BUN BLDV-MCNC: 23 MG/DL (ref 8–23)
BUN/CREAT BLD: ABNORMAL (ref 9–20)
CALCIUM SERPL-MCNC: 9.3 MG/DL (ref 8.6–10.4)
CHLORIDE BLD-SCNC: 100 MMOL/L (ref 98–107)
CO2: 24 MMOL/L (ref 20–31)
CREAT SERPL-MCNC: 0.57 MG/DL (ref 0.7–1.2)
CROSSMATCH RESULT: NORMAL
DIFFERENTIAL TYPE: ABNORMAL
DISPENSE STATUS BLOOD BANK: NORMAL
EOSINOPHILS RELATIVE PERCENT: 0 % (ref 1–4)
ESTIMATED AVERAGE GLUCOSE: 117 MG/DL
EXPIRATION DATE: NORMAL
GFR AFRICAN AMERICAN: >60 ML/MIN
GFR NON-AFRICAN AMERICAN: >60 ML/MIN
GFR SERPL CREATININE-BSD FRML MDRD: ABNORMAL ML/MIN/{1.73_M2}
GFR SERPL CREATININE-BSD FRML MDRD: ABNORMAL ML/MIN/{1.73_M2}
GLOBULIN: NORMAL G/DL (ref 1.5–3.8)
GLUCOSE BLD-MCNC: 202 MG/DL (ref 70–99)
GLUCOSE BLD-MCNC: 305 MG/DL (ref 75–110)
HBA1C MFR BLD: 5.7 % (ref 4–6)
HCT VFR BLD CALC: 37.4 % (ref 40.7–50.3)
HEMOGLOBIN: 13.2 G/DL (ref 13–17)
IMMATURE GRANULOCYTES: 1 %
LACTIC ACID, WHOLE BLOOD: 2 MMOL/L (ref 0.7–2.1)
LACTIC ACID: NORMAL MMOL/L
LYMPHOCYTES # BLD: 13 % (ref 24–43)
MAGNESIUM: 1.7 MG/DL (ref 1.6–2.6)
MCH RBC QN AUTO: 31.7 PG (ref 25.2–33.5)
MCHC RBC AUTO-ENTMCNC: 35.3 G/DL (ref 28.4–34.8)
MCV RBC AUTO: 89.7 FL (ref 82.6–102.9)
MONOCYTES # BLD: 3 % (ref 3–12)
NRBC AUTOMATED: 0 PER 100 WBC
PDW BLD-RTO: 12.6 % (ref 11.8–14.4)
PLATELET # BLD: 299 K/UL (ref 138–453)
PLATELET ESTIMATE: ABNORMAL
PMV BLD AUTO: 9.8 FL (ref 8.1–13.5)
POTASSIUM SERPL-SCNC: 4.1 MMOL/L (ref 3.7–5.3)
RBC # BLD: 4.17 M/UL (ref 4.21–5.77)
RBC # BLD: ABNORMAL 10*6/UL
SEG NEUTROPHILS: 83 % (ref 36–65)
SEGMENTED NEUTROPHILS ABSOLUTE COUNT: 6.77 K/UL (ref 1.5–8.1)
SODIUM BLD-SCNC: 136 MMOL/L (ref 135–144)
TOTAL PROTEIN: 6.6 G/DL (ref 6.4–8.3)
TRANSFUSION STATUS: NORMAL
UNIT DIVISION: 0
UNIT NUMBER: NORMAL
WBC # BLD: 8.2 K/UL (ref 3.5–11.3)
WBC # BLD: ABNORMAL 10*3/UL

## 2018-04-13 PROCEDURE — 83036 HEMOGLOBIN GLYCOSYLATED A1C: CPT

## 2018-04-13 PROCEDURE — 2580000003 HC RX 258: Performed by: INTERNAL MEDICINE

## 2018-04-13 PROCEDURE — 6360000002 HC RX W HCPCS: Performed by: EMERGENCY MEDICINE

## 2018-04-13 PROCEDURE — 82947 ASSAY GLUCOSE BLOOD QUANT: CPT

## 2018-04-13 PROCEDURE — 36415 COLL VENOUS BLD VENIPUNCTURE: CPT

## 2018-04-13 PROCEDURE — 99239 HOSP IP/OBS DSCHRG MGMT >30: CPT | Performed by: INTERNAL MEDICINE

## 2018-04-13 PROCEDURE — 83605 ASSAY OF LACTIC ACID: CPT

## 2018-04-13 PROCEDURE — 6370000000 HC RX 637 (ALT 250 FOR IP): Performed by: STUDENT IN AN ORGANIZED HEALTH CARE EDUCATION/TRAINING PROGRAM

## 2018-04-13 PROCEDURE — 83735 ASSAY OF MAGNESIUM: CPT

## 2018-04-13 PROCEDURE — 80048 BASIC METABOLIC PNL TOTAL CA: CPT

## 2018-04-13 PROCEDURE — 80076 HEPATIC FUNCTION PANEL: CPT

## 2018-04-13 PROCEDURE — 85025 COMPLETE CBC W/AUTO DIFF WBC: CPT

## 2018-04-13 RX ORDER — LABETALOL HYDROCHLORIDE 5 MG/ML
5 INJECTION, SOLUTION INTRAVENOUS EVERY 6 HOURS PRN
Status: DISCONTINUED | OUTPATIENT
Start: 2018-04-13 | End: 2018-04-13 | Stop reason: HOSPADM

## 2018-04-13 RX ORDER — METHYLPREDNISOLONE 4 MG/1
TABLET ORAL
Qty: 1 KIT | Refills: 0 | Status: ON HOLD | OUTPATIENT
Start: 2018-04-13 | End: 2018-04-19

## 2018-04-13 RX ORDER — HYDRALAZINE HYDROCHLORIDE 20 MG/ML
5 INJECTION INTRAMUSCULAR; INTRAVENOUS EVERY 6 HOURS PRN
Status: DISCONTINUED | OUTPATIENT
Start: 2018-04-13 | End: 2018-04-13 | Stop reason: HOSPADM

## 2018-04-13 RX ORDER — CARVEDILOL 25 MG/1
25 TABLET ORAL 2 TIMES DAILY WITH MEALS
Qty: 60 TABLET | Refills: 3 | Status: ON HOLD | OUTPATIENT
Start: 2018-04-13 | End: 2018-04-19

## 2018-04-13 RX ORDER — GABAPENTIN 300 MG/1
300 CAPSULE ORAL 3 TIMES DAILY
Qty: 90 CAPSULE | Refills: 3 | Status: ON HOLD | OUTPATIENT
Start: 2018-04-13 | End: 2018-04-19

## 2018-04-13 RX ADMIN — DIVALPROEX SODIUM 500 MG: 500 TABLET, EXTENDED RELEASE ORAL at 08:27

## 2018-04-13 RX ADMIN — ACETAMINOPHEN 650 MG: 325 TABLET ORAL at 09:45

## 2018-04-13 RX ADMIN — Medication 10 ML: at 09:46

## 2018-04-13 RX ADMIN — INSULIN LISPRO 2 UNITS: 100 INJECTION, SOLUTION INTRAVENOUS; SUBCUTANEOUS at 08:28

## 2018-04-13 RX ADMIN — FAMOTIDINE 20 MG: 20 TABLET, FILM COATED ORAL at 08:28

## 2018-04-13 RX ADMIN — GABAPENTIN 300 MG: 300 CAPSULE ORAL at 08:28

## 2018-04-13 RX ADMIN — AMLODIPINE BESYLATE 10 MG: 10 TABLET ORAL at 08:28

## 2018-04-13 RX ADMIN — DEXAMETHASONE SODIUM PHOSPHATE 4 MG: 4 INJECTION, SOLUTION INTRAMUSCULAR; INTRAVENOUS at 04:39

## 2018-04-13 RX ADMIN — CARVEDILOL 25 MG: 25 TABLET, FILM COATED ORAL at 08:28

## 2018-04-13 RX ADMIN — DEXAMETHASONE SODIUM PHOSPHATE 4 MG: 4 INJECTION, SOLUTION INTRAMUSCULAR; INTRAVENOUS at 09:46

## 2018-04-13 RX ADMIN — CETIRIZINE HYDROCHLORIDE 10 MG: 10 TABLET ORAL at 10:24

## 2018-04-13 RX ADMIN — INSULIN LISPRO 4 UNITS: 100 INJECTION, SOLUTION INTRAVENOUS; SUBCUTANEOUS at 12:33

## 2018-04-13 ASSESSMENT — PAIN SCALES - GENERAL
PAINLEVEL_OUTOF10: 6
PAINLEVEL_OUTOF10: 3
PAINLEVEL_OUTOF10: 5

## 2018-04-13 ASSESSMENT — PAIN DESCRIPTION - PAIN TYPE: TYPE: ACUTE PAIN

## 2018-04-13 ASSESSMENT — PAIN DESCRIPTION - LOCATION: LOCATION: SHOULDER

## 2018-04-13 ASSESSMENT — PAIN DESCRIPTION - ORIENTATION: ORIENTATION: LEFT

## 2018-04-18 RX ORDER — HYDROCODONE BITARTRATE AND ACETAMINOPHEN 5; 325 MG/1; MG/1
1 TABLET ORAL EVERY 6 HOURS PRN
Status: ON HOLD | COMMUNITY
End: 2018-04-19

## 2018-04-18 RX ORDER — DIPHENHYDRAMINE HCL 25 MG
25 CAPSULE ORAL 2 TIMES DAILY
Status: ON HOLD | COMMUNITY
End: 2018-04-19

## 2018-04-18 RX ORDER — AMOXICILLIN 500 MG/1
500 CAPSULE ORAL 3 TIMES DAILY
Status: ON HOLD | COMMUNITY
End: 2018-04-19

## 2018-04-19 ENCOUNTER — HOSPITAL ENCOUNTER (INPATIENT)
Age: 68
LOS: 11 days | Discharge: HOME | DRG: 472 | End: 2018-04-30
Attending: NEUROLOGICAL SURGERY | Admitting: NEUROLOGICAL SURGERY
Payer: MEDICARE

## 2018-04-19 ENCOUNTER — APPOINTMENT (OUTPATIENT)
Dept: GENERAL RADIOLOGY | Age: 68
DRG: 472 | End: 2018-04-19
Attending: NEUROLOGICAL SURGERY
Payer: MEDICARE

## 2018-04-19 ENCOUNTER — ANESTHESIA (OUTPATIENT)
Dept: OPERATING ROOM | Age: 68
DRG: 472 | End: 2018-04-19
Payer: MEDICARE

## 2018-04-19 ENCOUNTER — ANESTHESIA EVENT (OUTPATIENT)
Dept: OPERATING ROOM | Age: 68
DRG: 472 | End: 2018-04-19
Payer: MEDICARE

## 2018-04-19 VITALS — SYSTOLIC BLOOD PRESSURE: 105 MMHG | TEMPERATURE: 100.4 F | OXYGEN SATURATION: 100 % | DIASTOLIC BLOOD PRESSURE: 60 MMHG

## 2018-04-19 DIAGNOSIS — M48.02 CERVICAL SPINAL STENOSIS: Primary | ICD-10-CM

## 2018-04-19 LAB
ALLEN TEST: ABNORMAL
ALLEN TEST: ABNORMAL
CALCIUM IONIZED: 1.15 MMOL/L (ref 1.13–1.33)
CARBOXYHEMOGLOBIN: 1.4 % (ref 0–5)
CARBOXYHEMOGLOBIN: 1.5 % (ref 0–5)
CHLORIDE, WHOLE BLOOD: 106 MMOL/L (ref 98–110)
CHLORIDE, WHOLE BLOOD: 107 MMOL/L (ref 98–110)
FIO2: ABNORMAL
FIO2: ABNORMAL
GFR NON-AFRICAN AMERICAN: >60 ML/MIN
GFR SERPL CREATININE-BSD FRML MDRD: >60 ML/MIN
GFR SERPL CREATININE-BSD FRML MDRD: NORMAL ML/MIN/{1.73_M2}
GLUCOSE BLD-MCNC: 161 MG/DL (ref 74–100)
GLUCOSE BLD-MCNC: 161 MG/DL (ref 75–110)
GLUCOSE BLD-MCNC: 87 MG/DL (ref 75–110)
GLUCOSE BLD-MCNC: 93 MG/DL (ref 75–110)
GLUCOSE BLD-MCNC: 96 MG/DL (ref 75–110)
GLUCOSE BLD-MCNC: 97 MG/DL (ref 75–110)
HCO3 ARTERIAL: 26.2 MMOL/L (ref 22–27)
HCO3 ARTERIAL: 26.5 MMOL/L (ref 22–27)
HCT VFR BLD CALC: 25 %
HCT VFR BLD CALC: 25.5 %
HEMOGLOBIN: 8 GM/DL
HEMOGLOBIN: 8.2 GM/DL
METHEMOGLOBIN: ABNORMAL % (ref 0–1.5)
METHEMOGLOBIN: ABNORMAL % (ref 0–1.5)
MODE: ABNORMAL
MODE: ABNORMAL
NEGATIVE BASE EXCESS, ART: ABNORMAL MMOL/L (ref 0–2)
NEGATIVE BASE EXCESS, ART: ABNORMAL MMOL/L (ref 0–2)
NOTIFICATION TIME: ABNORMAL
NOTIFICATION TIME: ABNORMAL
NOTIFICATION: ABNORMAL
NOTIFICATION: ABNORMAL
O2 DEVICE/FLOW/%: ABNORMAL
O2 DEVICE/FLOW/%: ABNORMAL
O2 SAT, ARTERIAL: 99.5 % (ref 94–100)
O2 SAT, ARTERIAL: 99.5 % (ref 94–100)
OXYHEMOGLOBIN: ABNORMAL % (ref 95–98)
OXYHEMOGLOBIN: ABNORMAL % (ref 95–98)
PATIENT TEMP: 37
PATIENT TEMP: 38
PCO2 ARTERIAL: 32.5 MMHG (ref 32–45)
PCO2 ARTERIAL: 39.1 MMHG (ref 32–45)
PCO2, ART, TEMP ADJ: 41.1 (ref 32–45)
PCO2, ART, TEMP ADJ: ABNORMAL (ref 32–45)
PEEP/CPAP: ABNORMAL
PEEP/CPAP: ABNORMAL
PH ARTERIAL: 7.45 (ref 7.35–7.45)
PH ARTERIAL: 7.52 (ref 7.35–7.45)
PH, ART, TEMP ADJ: 7.43 (ref 7.35–7.45)
PH, ART, TEMP ADJ: ABNORMAL (ref 7.35–7.45)
PO2 ARTERIAL: 235 MMHG (ref 75–95)
PO2 ARTERIAL: 252 MMHG (ref 75–95)
PO2, ART, TEMP ADJ: 256 MMHG (ref 75–95)
PO2, ART, TEMP ADJ: ABNORMAL MMHG (ref 75–95)
POC CREATININE: 0.83 MG/DL (ref 0.51–1.19)
POSITIVE BASE EXCESS, ART: 2.7 MMOL/L (ref 0–2)
POSITIVE BASE EXCESS, ART: 3.6 MMOL/L (ref 0–2)
POTASSIUM, WHOLE BLOOD: 3.2 MMOL/L (ref 3.6–5)
POTASSIUM, WHOLE BLOOD: 3.3 MMOL/L (ref 3.6–5)
PSV: ABNORMAL
PSV: ABNORMAL
PT. POSITION: ABNORMAL
PT. POSITION: ABNORMAL
RESPIRATORY RATE: ABNORMAL
RESPIRATORY RATE: ABNORMAL
SAMPLE SITE: ABNORMAL
SAMPLE SITE: ABNORMAL
SET RATE: ABNORMAL
SET RATE: ABNORMAL
SODIUM, WHOLE BLOOD: 136 MMOL/L (ref 136–145)
SODIUM, WHOLE BLOOD: 136 MMOL/L (ref 136–145)
TEXT FOR RESPIRATORY: ABNORMAL
TEXT FOR RESPIRATORY: ABNORMAL
TOTAL HB: ABNORMAL G/DL (ref 12–16)
TOTAL HB: ABNORMAL G/DL (ref 12–16)
TOTAL RATE: ABNORMAL
TOTAL RATE: ABNORMAL
VT: ABNORMAL
VT: ABNORMAL

## 2018-04-19 PROCEDURE — 2780000010 HC IMPLANT OTHER: Performed by: NEUROLOGICAL SURGERY

## 2018-04-19 PROCEDURE — 85014 HEMATOCRIT: CPT

## 2018-04-19 PROCEDURE — 82330 ASSAY OF CALCIUM: CPT

## 2018-04-19 PROCEDURE — 3600000014 HC SURGERY LEVEL 4 ADDTL 15MIN: Performed by: NEUROLOGICAL SURGERY

## 2018-04-19 PROCEDURE — 2580000003 HC RX 258: Performed by: SPECIALIST

## 2018-04-19 PROCEDURE — 84132 ASSAY OF SERUM POTASSIUM: CPT

## 2018-04-19 PROCEDURE — 22845 INSERT SPINE FIXATION DEVICE: CPT | Performed by: NEUROLOGICAL SURGERY

## 2018-04-19 PROCEDURE — 2580000003 HC RX 258: Performed by: NEUROLOGICAL SURGERY

## 2018-04-19 PROCEDURE — 85018 HEMOGLOBIN: CPT

## 2018-04-19 PROCEDURE — 1200000000 HC SEMI PRIVATE

## 2018-04-19 PROCEDURE — 00U20KZ SUPPLEMENT DURA MATER WITH NONAUTOLOGOUS TISSUE SUBSTITUTE, OPEN APPROACH: ICD-10-PCS | Performed by: NEUROLOGICAL SURGERY

## 2018-04-19 PROCEDURE — 6370000000 HC RX 637 (ALT 250 FOR IP): Performed by: REGISTERED NURSE

## 2018-04-19 PROCEDURE — 2500000003 HC RX 250 WO HCPCS: Performed by: NEUROLOGICAL SURGERY

## 2018-04-19 PROCEDURE — 6360000002 HC RX W HCPCS: Performed by: REGISTERED NURSE

## 2018-04-19 PROCEDURE — 22551 ARTHRD ANT NTRBDY CERVICAL: CPT | Performed by: NEUROLOGICAL SURGERY

## 2018-04-19 PROCEDURE — 82805 BLOOD GASES W/O2 SATURATION: CPT

## 2018-04-19 PROCEDURE — 2500000003 HC RX 250 WO HCPCS: Performed by: SPECIALIST

## 2018-04-19 PROCEDURE — 3700000001 HC ADD 15 MINUTES (ANESTHESIA): Performed by: NEUROLOGICAL SURGERY

## 2018-04-19 PROCEDURE — 22854 INSJ BIOMECHANICAL DEVICE: CPT | Performed by: NEUROLOGICAL SURGERY

## 2018-04-19 PROCEDURE — 6360000002 HC RX W HCPCS: Performed by: SPECIALIST

## 2018-04-19 PROCEDURE — L8699 PROSTHETIC IMPLANT NOS: HCPCS | Performed by: NEUROLOGICAL SURGERY

## 2018-04-19 PROCEDURE — 82565 ASSAY OF CREATININE: CPT

## 2018-04-19 PROCEDURE — 7100000000 HC PACU RECOVERY - FIRST 15 MIN: Performed by: NEUROLOGICAL SURGERY

## 2018-04-19 PROCEDURE — 6370000000 HC RX 637 (ALT 250 FOR IP): Performed by: NEUROLOGICAL SURGERY

## 2018-04-19 PROCEDURE — 72040 X-RAY EXAM NECK SPINE 2-3 VW: CPT

## 2018-04-19 PROCEDURE — 7100000001 HC PACU RECOVERY - ADDTL 15 MIN: Performed by: NEUROLOGICAL SURGERY

## 2018-04-19 PROCEDURE — 3700000000 HC ANESTHESIA ATTENDED CARE: Performed by: NEUROLOGICAL SURGERY

## 2018-04-19 PROCEDURE — 2580000003 HC RX 258: Performed by: REGISTERED NURSE

## 2018-04-19 PROCEDURE — 63081 REMOVE VERT BODY DCMPRN CRVL: CPT | Performed by: NEUROLOGICAL SURGERY

## 2018-04-19 PROCEDURE — C1713 ANCHOR/SCREW BN/BN,TIS/BN: HCPCS | Performed by: NEUROLOGICAL SURGERY

## 2018-04-19 PROCEDURE — 87086 URINE CULTURE/COLONY COUNT: CPT

## 2018-04-19 PROCEDURE — 6360000002 HC RX W HCPCS

## 2018-04-19 PROCEDURE — 0RG20A0 FUSION OF 2 OR MORE CERVICAL VERTEBRAL JOINTS WITH INTERBODY FUSION DEVICE, ANTERIOR APPROACH, ANTERIOR COLUMN, OPEN APPROACH: ICD-10-PCS | Performed by: NEUROLOGICAL SURGERY

## 2018-04-19 PROCEDURE — 82947 ASSAY GLUCOSE BLOOD QUANT: CPT

## 2018-04-19 PROCEDURE — 2720000010 HC SURG SUPPLY STERILE: Performed by: NEUROLOGICAL SURGERY

## 2018-04-19 PROCEDURE — 0RT30ZZ RESECTION OF CERVICAL VERTEBRAL DISC, OPEN APPROACH: ICD-10-PCS | Performed by: NEUROLOGICAL SURGERY

## 2018-04-19 PROCEDURE — 63082 REMOVE VERTEBRAL BODY ADD-ON: CPT | Performed by: NEUROLOGICAL SURGERY

## 2018-04-19 PROCEDURE — 3600000004 HC SURGERY LEVEL 4 BASE: Performed by: NEUROLOGICAL SURGERY

## 2018-04-19 DEVICE — COLLAGEN DURAL REGENERATION MEMBRANE 1IN X 1IN (2.5CM X 2.5CM)
Type: IMPLANTABLE DEVICE | Status: FUNCTIONAL
Brand: DURAMATRIX-ONLAY PLUS

## 2018-04-19 DEVICE — AGENT HEMOSTATIC SURGIFLOW MATRIX KIT W/THROMBIN: Type: IMPLANTABLE DEVICE | Site: SPINE CERVICAL | Status: FUNCTIONAL

## 2018-04-19 DEVICE — ADD/SMALL VBR, Ø 14 MM, HEIGHT 39-65 MM, ANGLE 6°
Type: IMPLANTABLE DEVICE | Site: SPINE CERVICAL | Status: FUNCTIONAL
Brand: ADD™ ANTERIOR DISTRACTION DEVICE

## 2018-04-19 DEVICE — SCREW SPNL L18MM DIA4MM ANT CERV TI SELF DRL VAR ANG LOK: Type: IMPLANTABLE DEVICE | Site: SPINE CERVICAL | Status: FUNCTIONAL

## 2018-04-19 DEVICE — LOCKING SCREW
Type: IMPLANTABLE DEVICE | Site: SPINE CERVICAL | Status: FUNCTIONAL
Brand: ADDPLUS™, ADD™, VBR™, TOPAZ™

## 2018-04-19 DEVICE — SEALANT HEMSTAT 5ML HUM FIBRIN THROM 2 VI APPL DEV EVICEL: Type: IMPLANTABLE DEVICE | Site: SPINE CERVICAL | Status: FUNCTIONAL

## 2018-04-19 DEVICE — SCREW SPNL L16MM DIA4MM ANT CERV TI SELF DRL VAR ANG FULL: Type: IMPLANTABLE DEVICE | Site: SPINE CERVICAL | Status: FUNCTIONAL

## 2018-04-19 DEVICE — PLATE SPNL L45MM ANT CERV TI LOK 3 LEV SKYLINE: Type: IMPLANTABLE DEVICE | Site: SPINE CERVICAL | Status: FUNCTIONAL

## 2018-04-19 RX ORDER — ROCURONIUM BROMIDE 10 MG/ML
INJECTION, SOLUTION INTRAVENOUS PRN
Status: DISCONTINUED | OUTPATIENT
Start: 2018-04-19 | End: 2018-04-19 | Stop reason: SDUPTHER

## 2018-04-19 RX ORDER — PROPOFOL 10 MG/ML
INJECTION, EMULSION INTRAVENOUS CONTINUOUS PRN
Status: DISCONTINUED | OUTPATIENT
Start: 2018-04-19 | End: 2018-04-19 | Stop reason: SDUPTHER

## 2018-04-19 RX ORDER — ACETAMINOPHEN 500 MG
500 TABLET ORAL EVERY 4 HOURS PRN
Status: ON HOLD | COMMUNITY
End: 2018-04-30 | Stop reason: HOSPADM

## 2018-04-19 RX ORDER — DIPHENHYDRAMINE HCL 25 MG
25 TABLET ORAL EVERY 6 HOURS PRN
Status: ON HOLD | COMMUNITY
End: 2020-06-19

## 2018-04-19 RX ORDER — DIPHENHYDRAMINE HCL 25 MG
25 TABLET ORAL EVERY 6 HOURS PRN
Status: DISCONTINUED | OUTPATIENT
Start: 2018-04-19 | End: 2018-04-30 | Stop reason: HOSPADM

## 2018-04-19 RX ORDER — LIDOCAINE HYDROCHLORIDE AND EPINEPHRINE 10; 10 MG/ML; UG/ML
INJECTION, SOLUTION INFILTRATION; PERINEURAL PRN
Status: DISCONTINUED | OUTPATIENT
Start: 2018-04-19 | End: 2018-04-19 | Stop reason: HOSPADM

## 2018-04-19 RX ORDER — CARVEDILOL 25 MG/1
25 TABLET ORAL 2 TIMES DAILY WITH MEALS
Status: ON HOLD | COMMUNITY
End: 2022-03-24

## 2018-04-19 RX ORDER — SODIUM CHLORIDE, SODIUM LACTATE, POTASSIUM CHLORIDE, CALCIUM CHLORIDE 600; 310; 30; 20 MG/100ML; MG/100ML; MG/100ML; MG/100ML
INJECTION, SOLUTION INTRAVENOUS CONTINUOUS
Status: DISCONTINUED | OUTPATIENT
Start: 2018-04-19 | End: 2018-04-27

## 2018-04-19 RX ORDER — OMEPRAZOLE 20 MG/1
20 CAPSULE, DELAYED RELEASE ORAL EVERY EVENING
Status: ON HOLD | COMMUNITY
End: 2022-04-25 | Stop reason: SDUPTHER

## 2018-04-19 RX ORDER — ACETAMINOPHEN 325 MG/1
650 TABLET ORAL EVERY 4 HOURS PRN
Status: DISCONTINUED | OUTPATIENT
Start: 2018-04-19 | End: 2018-04-30 | Stop reason: HOSPADM

## 2018-04-19 RX ORDER — CEFAZOLIN SODIUM 1 G/3ML
INJECTION, POWDER, FOR SOLUTION INTRAMUSCULAR; INTRAVENOUS PRN
Status: DISCONTINUED | OUTPATIENT
Start: 2018-04-19 | End: 2018-04-19 | Stop reason: SDUPTHER

## 2018-04-19 RX ORDER — GABAPENTIN 300 MG/1
300 CAPSULE ORAL 3 TIMES DAILY
Status: ON HOLD | COMMUNITY
End: 2020-06-19

## 2018-04-19 RX ORDER — TRAZODONE HYDROCHLORIDE 100 MG/1
100 TABLET ORAL NIGHTLY
Status: DISCONTINUED | OUTPATIENT
Start: 2018-04-19 | End: 2018-04-30 | Stop reason: HOSPADM

## 2018-04-19 RX ORDER — CETIRIZINE HYDROCHLORIDE 10 MG/1
10 TABLET ORAL DAILY
Status: DISCONTINUED | OUTPATIENT
Start: 2018-04-19 | End: 2018-04-30 | Stop reason: HOSPADM

## 2018-04-19 RX ORDER — SODIUM FLUORIDE 5 MG/ML
PASTE, DENTIFRICE DENTAL
Status: ON HOLD | COMMUNITY
End: 2020-06-19

## 2018-04-19 RX ORDER — CYCLOBENZAPRINE HCL 10 MG
10 TABLET ORAL 3 TIMES DAILY PRN
Status: DISCONTINUED | OUTPATIENT
Start: 2018-04-19 | End: 2018-04-30 | Stop reason: HOSPADM

## 2018-04-19 RX ORDER — PREDNISOLONE ACETATE 10 MG/ML
1 SUSPENSION/ DROPS OPHTHALMIC 4 TIMES DAILY
Status: DISCONTINUED | OUTPATIENT
Start: 2018-04-19 | End: 2018-04-30 | Stop reason: HOSPADM

## 2018-04-19 RX ORDER — ERGOCALCIFEROL 1.25 MG/1
50000 CAPSULE ORAL WEEKLY
Status: DISCONTINUED | OUTPATIENT
Start: 2018-04-19 | End: 2018-04-30 | Stop reason: HOSPADM

## 2018-04-19 RX ORDER — FENTANYL CITRATE 50 UG/ML
INJECTION, SOLUTION INTRAMUSCULAR; INTRAVENOUS PRN
Status: DISCONTINUED | OUTPATIENT
Start: 2018-04-19 | End: 2018-04-19 | Stop reason: SDUPTHER

## 2018-04-19 RX ORDER — AMLODIPINE BESYLATE 10 MG/1
7.5 TABLET ORAL DAILY
Status: ON HOLD | COMMUNITY
End: 2022-04-18 | Stop reason: HOSPADM

## 2018-04-19 RX ORDER — LIDOCAINE HYDROCHLORIDE 10 MG/ML
INJECTION, SOLUTION EPIDURAL; INFILTRATION; INTRACAUDAL; PERINEURAL PRN
Status: DISCONTINUED | OUTPATIENT
Start: 2018-04-19 | End: 2018-04-19 | Stop reason: SDUPTHER

## 2018-04-19 RX ORDER — DIPHENHYDRAMINE HYDROCHLORIDE 50 MG/ML
12.5 INJECTION INTRAMUSCULAR; INTRAVENOUS
Status: DISCONTINUED | OUTPATIENT
Start: 2018-04-19 | End: 2018-04-19 | Stop reason: HOSPADM

## 2018-04-19 RX ORDER — ATORVASTATIN CALCIUM 80 MG/1
80 TABLET, FILM COATED ORAL DAILY
Status: DISCONTINUED | OUTPATIENT
Start: 2018-04-19 | End: 2018-04-30 | Stop reason: HOSPADM

## 2018-04-19 RX ORDER — TRAZODONE HYDROCHLORIDE 100 MG/1
150 TABLET ORAL NIGHTLY PRN
Status: ON HOLD | COMMUNITY
End: 2022-04-18 | Stop reason: HOSPADM

## 2018-04-19 RX ORDER — DOCUSATE SODIUM 100 MG/1
100 CAPSULE, LIQUID FILLED ORAL 2 TIMES DAILY
Status: DISCONTINUED | OUTPATIENT
Start: 2018-04-19 | End: 2018-04-21

## 2018-04-19 RX ORDER — DIVALPROEX SODIUM 500 MG/1
500 TABLET, EXTENDED RELEASE ORAL DAILY
Status: DISCONTINUED | OUTPATIENT
Start: 2018-04-19 | End: 2018-04-30 | Stop reason: HOSPADM

## 2018-04-19 RX ORDER — DIVALPROEX SODIUM 500 MG/1
500 TABLET, EXTENDED RELEASE ORAL EVERY MORNING
Status: ON HOLD | COMMUNITY
End: 2022-04-18 | Stop reason: HOSPADM

## 2018-04-19 RX ORDER — METHYLPREDNISOLONE 4 MG/1
4 TABLET ORAL SEE ADMIN INSTRUCTIONS
Status: ON HOLD | COMMUNITY
End: 2020-06-19 | Stop reason: ALTCHOICE

## 2018-04-19 RX ORDER — AMOXICILLIN 500 MG/1
500 CAPSULE ORAL 3 TIMES DAILY
Status: ON HOLD | COMMUNITY
End: 2020-06-19 | Stop reason: ALTCHOICE

## 2018-04-19 RX ORDER — AMLODIPINE BESYLATE 10 MG/1
10 TABLET ORAL DAILY
Status: DISCONTINUED | OUTPATIENT
Start: 2018-04-19 | End: 2018-04-30 | Stop reason: HOSPADM

## 2018-04-19 RX ORDER — ONDANSETRON 2 MG/ML
4 INJECTION INTRAMUSCULAR; INTRAVENOUS EVERY 6 HOURS PRN
Status: DISCONTINUED | OUTPATIENT
Start: 2018-04-19 | End: 2018-04-30 | Stop reason: HOSPADM

## 2018-04-19 RX ORDER — PREDNISOLONE ACETATE 10 MG/ML
1 SUSPENSION/ DROPS OPHTHALMIC 4 TIMES DAILY
Status: ON HOLD | COMMUNITY
End: 2018-04-19

## 2018-04-19 RX ORDER — ACETAMINOPHEN 500 MG
500 TABLET ORAL 4 TIMES DAILY PRN
Status: ON HOLD | COMMUNITY
End: 2018-04-19

## 2018-04-19 RX ORDER — SODIUM FLUORIDE 5 MG/ML
PASTE, DENTIFRICE DENTAL DAILY
Status: ON HOLD | COMMUNITY
End: 2018-04-19

## 2018-04-19 RX ORDER — LIDOCAINE 40 MG/G
CREAM TOPICAL PRN
Status: ON HOLD | COMMUNITY
End: 2020-06-19 | Stop reason: DRUGHIGH

## 2018-04-19 RX ORDER — SODIUM CHLORIDE 0.9 % (FLUSH) 0.9 %
10 SYRINGE (ML) INJECTION PRN
Status: DISCONTINUED | OUTPATIENT
Start: 2018-04-19 | End: 2018-04-30 | Stop reason: HOSPADM

## 2018-04-19 RX ORDER — PANTOPRAZOLE SODIUM 40 MG/1
40 TABLET, DELAYED RELEASE ORAL
Status: DISCONTINUED | OUTPATIENT
Start: 2018-04-20 | End: 2018-04-30 | Stop reason: HOSPADM

## 2018-04-19 RX ORDER — OXYCODONE HYDROCHLORIDE AND ACETAMINOPHEN 5; 325 MG/1; MG/1
1 TABLET ORAL EVERY 4 HOURS PRN
Status: DISCONTINUED | OUTPATIENT
Start: 2018-04-19 | End: 2018-04-30 | Stop reason: HOSPADM

## 2018-04-19 RX ORDER — HYDROCODONE BITARTRATE AND ACETAMINOPHEN 5; 325 MG/1; MG/1
1 TABLET ORAL EVERY 6 HOURS PRN
Status: ON HOLD | COMMUNITY
End: 2018-04-30 | Stop reason: HOSPADM

## 2018-04-19 RX ORDER — MAGNESIUM HYDROXIDE 1200 MG/15ML
LIQUID ORAL CONTINUOUS PRN
Status: DISCONTINUED | OUTPATIENT
Start: 2018-04-19 | End: 2018-04-19 | Stop reason: HOSPADM

## 2018-04-19 RX ORDER — ANALGESIC BALM 1.74; 4.06 G/29G; G/29G
OINTMENT TOPICAL DAILY
Status: ON HOLD | COMMUNITY
End: 2018-04-19

## 2018-04-19 RX ORDER — PREDNISOLONE ACETATE 10 MG/ML
1 SUSPENSION/ DROPS OPHTHALMIC 4 TIMES DAILY
Status: ON HOLD | COMMUNITY
End: 2020-06-19 | Stop reason: ALTCHOICE

## 2018-04-19 RX ORDER — CARVEDILOL 25 MG/1
25 TABLET ORAL 2 TIMES DAILY WITH MEALS
Status: DISCONTINUED | OUTPATIENT
Start: 2018-04-19 | End: 2018-04-30 | Stop reason: HOSPADM

## 2018-04-19 RX ORDER — VENLAFAXINE 100 MG/1
100 TABLET ORAL 3 TIMES DAILY
Status: DISCONTINUED | OUTPATIENT
Start: 2018-04-19 | End: 2018-04-30 | Stop reason: HOSPADM

## 2018-04-19 RX ORDER — GABAPENTIN 300 MG/1
300 CAPSULE ORAL 3 TIMES DAILY
Status: DISCONTINUED | OUTPATIENT
Start: 2018-04-19 | End: 2018-04-30 | Stop reason: HOSPADM

## 2018-04-19 RX ORDER — SODIUM CHLORIDE 0.9 % (FLUSH) 0.9 %
10 SYRINGE (ML) INJECTION EVERY 12 HOURS SCHEDULED
Status: DISCONTINUED | OUTPATIENT
Start: 2018-04-19 | End: 2018-04-30 | Stop reason: HOSPADM

## 2018-04-19 RX ORDER — ONDANSETRON 2 MG/ML
INJECTION INTRAMUSCULAR; INTRAVENOUS PRN
Status: DISCONTINUED | OUTPATIENT
Start: 2018-04-19 | End: 2018-04-19 | Stop reason: SDUPTHER

## 2018-04-19 RX ORDER — OXYCODONE HYDROCHLORIDE AND ACETAMINOPHEN 5; 325 MG/1; MG/1
2 TABLET ORAL EVERY 4 HOURS PRN
Status: DISCONTINUED | OUTPATIENT
Start: 2018-04-19 | End: 2018-04-30 | Stop reason: HOSPADM

## 2018-04-19 RX ORDER — CETIRIZINE HYDROCHLORIDE 10 MG/1
10 TABLET ORAL DAILY
Status: ON HOLD | COMMUNITY
End: 2022-05-15 | Stop reason: HOSPADM

## 2018-04-19 RX ORDER — KETOROLAC TROMETHAMINE 5 MG/ML
1 SOLUTION OPHTHALMIC 4 TIMES DAILY
Status: ON HOLD | COMMUNITY
End: 2018-04-19

## 2018-04-19 RX ORDER — VENLAFAXINE 100 MG/1
100 TABLET ORAL DAILY
Status: ON HOLD | COMMUNITY
End: 2020-06-19 | Stop reason: DRUGHIGH

## 2018-04-19 RX ORDER — SODIUM CHLORIDE, SODIUM LACTATE, POTASSIUM CHLORIDE, CALCIUM CHLORIDE 600; 310; 30; 20 MG/100ML; MG/100ML; MG/100ML; MG/100ML
INJECTION, SOLUTION INTRAVENOUS CONTINUOUS PRN
Status: DISCONTINUED | OUTPATIENT
Start: 2018-04-19 | End: 2018-04-19 | Stop reason: SDUPTHER

## 2018-04-19 RX ORDER — SODIUM CHLORIDE 9 MG/ML
INJECTION, SOLUTION INTRAVENOUS CONTINUOUS
Status: DISCONTINUED | OUTPATIENT
Start: 2018-04-19 | End: 2018-04-27

## 2018-04-19 RX ORDER — ATORVASTATIN CALCIUM 80 MG/1
40 TABLET, FILM COATED ORAL DAILY
COMMUNITY
End: 2022-06-14 | Stop reason: SDUPTHER

## 2018-04-19 RX ORDER — SILDENAFIL 100 MG/1
100 TABLET, FILM COATED ORAL PRN
Status: ON HOLD | COMMUNITY
End: 2022-04-18 | Stop reason: HOSPADM

## 2018-04-19 RX ORDER — KETOROLAC TROMETHAMINE 5 MG/ML
1 SOLUTION OPHTHALMIC 4 TIMES DAILY
Status: ON HOLD | COMMUNITY
End: 2020-06-19 | Stop reason: ALTCHOICE

## 2018-04-19 RX ORDER — KETOROLAC TROMETHAMINE 5 MG/ML
1 SOLUTION OPHTHALMIC 4 TIMES DAILY
Status: DISCONTINUED | OUTPATIENT
Start: 2018-04-19 | End: 2018-04-30 | Stop reason: HOSPADM

## 2018-04-19 RX ORDER — LIDOCAINE 40 MG/G
CREAM TOPICAL PRN
Status: ON HOLD | COMMUNITY
End: 2018-04-19

## 2018-04-19 RX ORDER — PROMETHAZINE HYDROCHLORIDE 25 MG/ML
6.25 INJECTION, SOLUTION INTRAMUSCULAR; INTRAVENOUS
Status: DISCONTINUED | OUTPATIENT
Start: 2018-04-19 | End: 2018-04-19 | Stop reason: HOSPADM

## 2018-04-19 RX ORDER — ASPIRIN 81 MG/1
81 TABLET, CHEWABLE ORAL DAILY
COMMUNITY
End: 2022-06-14 | Stop reason: SDUPTHER

## 2018-04-19 RX ORDER — KETOROLAC TROMETHAMINE 4 MG/ML
SOLUTION/ DROPS OPHTHALMIC 4 TIMES DAILY
Status: ON HOLD | COMMUNITY
End: 2018-04-19 | Stop reason: CLARIF

## 2018-04-19 RX ORDER — SENNA AND DOCUSATE SODIUM 50; 8.6 MG/1; MG/1
1 TABLET, FILM COATED ORAL DAILY
Status: DISCONTINUED | OUTPATIENT
Start: 2018-04-19 | End: 2018-04-21

## 2018-04-19 RX ORDER — PROPOFOL 10 MG/ML
INJECTION, EMULSION INTRAVENOUS PRN
Status: DISCONTINUED | OUTPATIENT
Start: 2018-04-19 | End: 2018-04-19 | Stop reason: SDUPTHER

## 2018-04-19 RX ADMIN — REMIFENTANIL HYDROCHLORIDE 0.25 MCG/KG/MIN: 1 INJECTION, POWDER, LYOPHILIZED, FOR SOLUTION INTRAVENOUS at 08:25

## 2018-04-19 RX ADMIN — ATORVASTATIN CALCIUM 80 MG: 80 TABLET, FILM COATED ORAL at 17:30

## 2018-04-19 RX ADMIN — Medication 0.5 MG: at 14:52

## 2018-04-19 RX ADMIN — TRAZODONE HYDROCHLORIDE 100 MG: 100 TABLET ORAL at 20:34

## 2018-04-19 RX ADMIN — OXYCODONE HYDROCHLORIDE AND ACETAMINOPHEN 2 TABLET: 5; 325 TABLET ORAL at 22:45

## 2018-04-19 RX ADMIN — ROCURONIUM BROMIDE 50 MG: 10 INJECTION INTRAVENOUS at 07:57

## 2018-04-19 RX ADMIN — LIDOCAINE HYDROCHLORIDE 50 MG: 10 INJECTION, SOLUTION EPIDURAL; INFILTRATION; INTRACAUDAL; PERINEURAL at 07:57

## 2018-04-19 RX ADMIN — Medication 0.5 MG: at 15:05

## 2018-04-19 RX ADMIN — CETIRIZINE HYDROCHLORIDE 10 MG: 10 TABLET ORAL at 17:30

## 2018-04-19 RX ADMIN — ROCURONIUM BROMIDE 30 MG: 10 INJECTION INTRAVENOUS at 08:39

## 2018-04-19 RX ADMIN — GABAPENTIN 300 MG: 300 CAPSULE ORAL at 17:31

## 2018-04-19 RX ADMIN — KETOROLAC TROMETHAMINE 1 DROP: 5 SOLUTION OPHTHALMIC at 17:33

## 2018-04-19 RX ADMIN — FENTANYL CITRATE 50 MCG: 50 INJECTION INTRAMUSCULAR; INTRAVENOUS at 08:30

## 2018-04-19 RX ADMIN — GABAPENTIN 300 MG: 300 CAPSULE ORAL at 20:34

## 2018-04-19 RX ADMIN — DOCUSATE SODIUM 100 MG: 100 CAPSULE ORAL at 20:34

## 2018-04-19 RX ADMIN — ONDANSETRON 4 MG: 2 INJECTION INTRAMUSCULAR; INTRAVENOUS at 13:50

## 2018-04-19 RX ADMIN — FENTANYL CITRATE 50 MCG: 50 INJECTION INTRAMUSCULAR; INTRAVENOUS at 07:57

## 2018-04-19 RX ADMIN — HYDROMORPHONE HYDROCHLORIDE 0.5 MG: 1 INJECTION, SOLUTION INTRAMUSCULAR; INTRAVENOUS; SUBCUTANEOUS at 15:05

## 2018-04-19 RX ADMIN — METFORMIN HYDROCHLORIDE 1000 MG: 500 TABLET ORAL at 17:30

## 2018-04-19 RX ADMIN — PHENYLEPHRINE HYDROCHLORIDE 40 MCG: 10 INJECTION INTRAVENOUS at 12:41

## 2018-04-19 RX ADMIN — HYDROMORPHONE HYDROCHLORIDE 0.5 MG: 1 INJECTION, SOLUTION INTRAMUSCULAR; INTRAVENOUS; SUBCUTANEOUS at 15:16

## 2018-04-19 RX ADMIN — DOCUSATE SODIUM -SENNOSIDES 1 TABLET: 50; 8.6 TABLET, COATED ORAL at 17:30

## 2018-04-19 RX ADMIN — SODIUM CHLORIDE, POTASSIUM CHLORIDE, SODIUM LACTATE AND CALCIUM CHLORIDE: 600; 310; 30; 20 INJECTION, SOLUTION INTRAVENOUS at 08:11

## 2018-04-19 RX ADMIN — CEFAZOLIN 2000 MG: 1 INJECTION, POWDER, FOR SOLUTION INTRAMUSCULAR; INTRAVENOUS at 08:28

## 2018-04-19 RX ADMIN — ERGOCALCIFEROL 50000 UNITS: 1.25 CAPSULE ORAL at 17:30

## 2018-04-19 RX ADMIN — HYDROMORPHONE HYDROCHLORIDE 0.5 MG: 1 INJECTION, SOLUTION INTRAMUSCULAR; INTRAVENOUS; SUBCUTANEOUS at 14:52

## 2018-04-19 RX ADMIN — SODIUM CHLORIDE: 9 INJECTION, SOLUTION INTRAVENOUS at 16:54

## 2018-04-19 RX ADMIN — PROPOFOL 200 MG: 10 INJECTION, EMULSION INTRAVENOUS at 07:57

## 2018-04-19 RX ADMIN — SODIUM CHLORIDE, POTASSIUM CHLORIDE, SODIUM LACTATE AND CALCIUM CHLORIDE: 600; 310; 30; 20 INJECTION, SOLUTION INTRAVENOUS at 07:52

## 2018-04-19 RX ADMIN — SODIUM CHLORIDE, POTASSIUM CHLORIDE, SODIUM LACTATE AND CALCIUM CHLORIDE: 600; 310; 30; 20 INJECTION, SOLUTION INTRAVENOUS at 08:45

## 2018-04-19 RX ADMIN — KETOROLAC TROMETHAMINE 1 DROP: 5 SOLUTION OPHTHALMIC at 20:34

## 2018-04-19 RX ADMIN — Medication 0.5 MG: at 15:16

## 2018-04-19 RX ADMIN — VENLAFAXINE 100 MG: 100 TABLET ORAL at 20:34

## 2018-04-19 RX ADMIN — Medication 2 G: at 17:32

## 2018-04-19 RX ADMIN — PROPOFOL 25 MCG/KG/MIN: 10 INJECTION, EMULSION INTRAVENOUS at 08:17

## 2018-04-19 RX ADMIN — Medication 10 ML: at 21:00

## 2018-04-19 RX ADMIN — PREDNISOLONE ACETATE 1 DROP: 10 SUSPENSION/ DROPS OPHTHALMIC at 20:34

## 2018-04-19 RX ADMIN — VENLAFAXINE 100 MG: 100 TABLET ORAL at 17:31

## 2018-04-19 RX ADMIN — DIVALPROEX SODIUM 500 MG: 500 TABLET, EXTENDED RELEASE ORAL at 17:30

## 2018-04-19 RX ADMIN — SODIUM CHLORIDE, POTASSIUM CHLORIDE, SODIUM LACTATE AND CALCIUM CHLORIDE: 600; 310; 30; 20 INJECTION, SOLUTION INTRAVENOUS at 12:02

## 2018-04-19 RX ADMIN — OXYCODONE HYDROCHLORIDE AND ACETAMINOPHEN 2 TABLET: 5; 325 TABLET ORAL at 17:37

## 2018-04-19 RX ADMIN — PREDNISOLONE ACETATE 1 DROP: 10 SUSPENSION/ DROPS OPHTHALMIC at 17:37

## 2018-04-19 RX ADMIN — HYDROMORPHONE HYDROCHLORIDE 0.5 MG: 1 INJECTION, SOLUTION INTRAMUSCULAR; INTRAVENOUS; SUBCUTANEOUS at 14:34

## 2018-04-19 RX ADMIN — LIDOCAINE HYDROCHLORIDE 50 MG: 10 INJECTION, SOLUTION EPIDURAL; INFILTRATION; INTRACAUDAL; PERINEURAL at 13:50

## 2018-04-19 RX ADMIN — Medication 0.5 MG: at 14:34

## 2018-04-19 RX ADMIN — SODIUM CHLORIDE, POTASSIUM CHLORIDE, SODIUM LACTATE AND CALCIUM CHLORIDE: 600; 310; 30; 20 INJECTION, SOLUTION INTRAVENOUS at 07:00

## 2018-04-19 ASSESSMENT — PULMONARY FUNCTION TESTS
PIF_VALUE: 20
PIF_VALUE: 21
PIF_VALUE: 0
PIF_VALUE: 16
PIF_VALUE: 16
PIF_VALUE: 20
PIF_VALUE: 21
PIF_VALUE: 21
PIF_VALUE: 22
PIF_VALUE: 21
PIF_VALUE: 22
PIF_VALUE: 21
PIF_VALUE: 21
PIF_VALUE: 22
PIF_VALUE: 17
PIF_VALUE: 20
PIF_VALUE: 21
PIF_VALUE: 21
PIF_VALUE: 22
PIF_VALUE: 21
PIF_VALUE: 17
PIF_VALUE: 20
PIF_VALUE: 21
PIF_VALUE: 21
PIF_VALUE: 20
PIF_VALUE: 17
PIF_VALUE: 22
PIF_VALUE: 20
PIF_VALUE: 16
PIF_VALUE: 1
PIF_VALUE: 21
PIF_VALUE: 22
PIF_VALUE: 21
PIF_VALUE: 21
PIF_VALUE: 20
PIF_VALUE: 21
PIF_VALUE: 22
PIF_VALUE: 21
PIF_VALUE: 18
PIF_VALUE: 21
PIF_VALUE: 21
PIF_VALUE: 22
PIF_VALUE: 3
PIF_VALUE: 21
PIF_VALUE: 27
PIF_VALUE: 20
PIF_VALUE: 22
PIF_VALUE: 22
PIF_VALUE: 21
PIF_VALUE: 21
PIF_VALUE: 18
PIF_VALUE: 20
PIF_VALUE: 22
PIF_VALUE: 20
PIF_VALUE: 21
PIF_VALUE: 24
PIF_VALUE: 21
PIF_VALUE: 20
PIF_VALUE: 21
PIF_VALUE: 22
PIF_VALUE: 22
PIF_VALUE: 3
PIF_VALUE: 22
PIF_VALUE: 20
PIF_VALUE: 21
PIF_VALUE: 18
PIF_VALUE: 21
PIF_VALUE: 21
PIF_VALUE: 22
PIF_VALUE: 21
PIF_VALUE: 21
PIF_VALUE: 18
PIF_VALUE: 21
PIF_VALUE: 22
PIF_VALUE: 21
PIF_VALUE: 3
PIF_VALUE: 21
PIF_VALUE: 18
PIF_VALUE: 21
PIF_VALUE: 21
PIF_VALUE: 22
PIF_VALUE: 21
PIF_VALUE: 21
PIF_VALUE: 22
PIF_VALUE: 16
PIF_VALUE: 22
PIF_VALUE: 22
PIF_VALUE: 21
PIF_VALUE: 20
PIF_VALUE: 1
PIF_VALUE: 21
PIF_VALUE: 21
PIF_VALUE: 22
PIF_VALUE: 21
PIF_VALUE: 22
PIF_VALUE: 21
PIF_VALUE: 22
PIF_VALUE: 21
PIF_VALUE: 18
PIF_VALUE: 21
PIF_VALUE: 21
PIF_VALUE: 17
PIF_VALUE: 22
PIF_VALUE: 21
PIF_VALUE: 20
PIF_VALUE: 16
PIF_VALUE: 16
PIF_VALUE: 21
PIF_VALUE: 18
PIF_VALUE: 20
PIF_VALUE: 21
PIF_VALUE: 20
PIF_VALUE: 21
PIF_VALUE: 17
PIF_VALUE: 21
PIF_VALUE: 20
PIF_VALUE: 21
PIF_VALUE: 22
PIF_VALUE: 22
PIF_VALUE: 21
PIF_VALUE: 25
PIF_VALUE: 21
PIF_VALUE: 17
PIF_VALUE: 21
PIF_VALUE: 20
PIF_VALUE: 18
PIF_VALUE: 21
PIF_VALUE: 21
PIF_VALUE: 18
PIF_VALUE: 21
PIF_VALUE: 21
PIF_VALUE: 20
PIF_VALUE: 21
PIF_VALUE: 20
PIF_VALUE: 21
PIF_VALUE: 18
PIF_VALUE: 21
PIF_VALUE: 18
PIF_VALUE: 21
PIF_VALUE: 21
PIF_VALUE: 18
PIF_VALUE: 21
PIF_VALUE: 22
PIF_VALUE: 16
PIF_VALUE: 21
PIF_VALUE: 3
PIF_VALUE: 21
PIF_VALUE: 17
PIF_VALUE: 20
PIF_VALUE: 21
PIF_VALUE: 19
PIF_VALUE: 21
PIF_VALUE: 18
PIF_VALUE: 20
PIF_VALUE: 18
PIF_VALUE: 21
PIF_VALUE: 17
PIF_VALUE: 22
PIF_VALUE: 22
PIF_VALUE: 21
PIF_VALUE: 3
PIF_VALUE: 21
PIF_VALUE: 19
PIF_VALUE: 21
PIF_VALUE: 16
PIF_VALUE: 21
PIF_VALUE: 22
PIF_VALUE: 20
PIF_VALUE: 20
PIF_VALUE: 21
PIF_VALUE: 20
PIF_VALUE: 21
PIF_VALUE: 21
PIF_VALUE: 20
PIF_VALUE: 19
PIF_VALUE: 20
PIF_VALUE: 20
PIF_VALUE: 22
PIF_VALUE: 21
PIF_VALUE: 21
PIF_VALUE: 22
PIF_VALUE: 21
PIF_VALUE: 1
PIF_VALUE: 21
PIF_VALUE: 16
PIF_VALUE: 20
PIF_VALUE: 21
PIF_VALUE: 22
PIF_VALUE: 21
PIF_VALUE: 21
PIF_VALUE: 22
PIF_VALUE: 20
PIF_VALUE: 18
PIF_VALUE: 22
PIF_VALUE: 21
PIF_VALUE: 21
PIF_VALUE: 18
PIF_VALUE: 21
PIF_VALUE: 18
PIF_VALUE: 17
PIF_VALUE: 21
PIF_VALUE: 16
PIF_VALUE: 21
PIF_VALUE: 21
PIF_VALUE: 0
PIF_VALUE: 22
PIF_VALUE: 22
PIF_VALUE: 21
PIF_VALUE: 21
PIF_VALUE: 20
PIF_VALUE: 21
PIF_VALUE: 17
PIF_VALUE: 21
PIF_VALUE: 21
PIF_VALUE: 22
PIF_VALUE: 21
PIF_VALUE: 19
PIF_VALUE: 17
PIF_VALUE: 21
PIF_VALUE: 21
PIF_VALUE: 17
PIF_VALUE: 20
PIF_VALUE: 18
PIF_VALUE: 21
PIF_VALUE: 22
PIF_VALUE: 21
PIF_VALUE: 20
PIF_VALUE: 19
PIF_VALUE: 21
PIF_VALUE: 21
PIF_VALUE: 20
PIF_VALUE: 20
PIF_VALUE: 21
PIF_VALUE: 21
PIF_VALUE: 18
PIF_VALUE: 21
PIF_VALUE: 1
PIF_VALUE: 21
PIF_VALUE: 4
PIF_VALUE: 5
PIF_VALUE: 22
PIF_VALUE: 16
PIF_VALUE: 21
PIF_VALUE: 21
PIF_VALUE: 16
PIF_VALUE: 20
PIF_VALUE: 18
PIF_VALUE: 20
PIF_VALUE: 3
PIF_VALUE: 16
PIF_VALUE: 21
PIF_VALUE: 1
PIF_VALUE: 20
PIF_VALUE: 11
PIF_VALUE: 21
PIF_VALUE: 22
PIF_VALUE: 21
PIF_VALUE: 22
PIF_VALUE: 18
PIF_VALUE: 21
PIF_VALUE: 21
PIF_VALUE: 5
PIF_VALUE: 22
PIF_VALUE: 21
PIF_VALUE: 22
PIF_VALUE: 21
PIF_VALUE: 0
PIF_VALUE: 22
PIF_VALUE: 21
PIF_VALUE: 16
PIF_VALUE: 21
PIF_VALUE: 22
PIF_VALUE: 20
PIF_VALUE: 21
PIF_VALUE: 20
PIF_VALUE: 1
PIF_VALUE: 22
PIF_VALUE: 22
PIF_VALUE: 21
PIF_VALUE: 22
PIF_VALUE: 17
PIF_VALUE: 3
PIF_VALUE: 20
PIF_VALUE: 21
PIF_VALUE: 20
PIF_VALUE: 18
PIF_VALUE: 21
PIF_VALUE: 20
PIF_VALUE: 21
PIF_VALUE: 21
PIF_VALUE: 20
PIF_VALUE: 18
PIF_VALUE: 21
PIF_VALUE: 18
PIF_VALUE: 21
PIF_VALUE: 22
PIF_VALUE: 4
PIF_VALUE: 21
PIF_VALUE: 16
PIF_VALUE: 18
PIF_VALUE: 21
PIF_VALUE: 20
PIF_VALUE: 21
PIF_VALUE: 11
PIF_VALUE: 30
PIF_VALUE: 2
PIF_VALUE: 21
PIF_VALUE: 21
PIF_VALUE: 17
PIF_VALUE: 21
PIF_VALUE: 21
PIF_VALUE: 16
PIF_VALUE: 21
PIF_VALUE: 21

## 2018-04-19 ASSESSMENT — ENCOUNTER SYMPTOMS
SHORTNESS OF BREATH: 0
SHORTNESS OF BREATH: 0

## 2018-04-19 ASSESSMENT — PAIN DESCRIPTION - DESCRIPTORS
DESCRIPTORS: ACHING
DESCRIPTORS: ACHING

## 2018-04-19 ASSESSMENT — PAIN SCALES - GENERAL
PAINLEVEL_OUTOF10: 6
PAINLEVEL_OUTOF10: 7
PAINLEVEL_OUTOF10: 0
PAINLEVEL_OUTOF10: 8
PAINLEVEL_OUTOF10: 8
PAINLEVEL_OUTOF10: 5
PAINLEVEL_OUTOF10: 8
PAINLEVEL_OUTOF10: 7
PAINLEVEL_OUTOF10: 7
PAINLEVEL_OUTOF10: 0
PAINLEVEL_OUTOF10: 8
PAINLEVEL_OUTOF10: 8
PAINLEVEL_OUTOF10: 0

## 2018-04-19 ASSESSMENT — LIFESTYLE VARIABLES: SMOKING_STATUS: 0

## 2018-04-19 ASSESSMENT — PAIN - FUNCTIONAL ASSESSMENT: PAIN_FUNCTIONAL_ASSESSMENT: 0-10

## 2018-04-19 ASSESSMENT — PAIN DESCRIPTION - ORIENTATION: ORIENTATION: POSTERIOR

## 2018-04-19 ASSESSMENT — PAIN DESCRIPTION - PAIN TYPE
TYPE: SURGICAL PAIN

## 2018-04-19 ASSESSMENT — PAIN DESCRIPTION - LOCATION
LOCATION: NECK

## 2018-04-19 ASSESSMENT — PAIN DESCRIPTION - FREQUENCY: FREQUENCY: CONTINUOUS

## 2018-04-19 ASSESSMENT — PAIN DESCRIPTION - ONSET: ONSET: ON-GOING

## 2018-04-20 ENCOUNTER — APPOINTMENT (OUTPATIENT)
Dept: GENERAL RADIOLOGY | Age: 68
DRG: 472 | End: 2018-04-20
Attending: NEUROLOGICAL SURGERY
Payer: MEDICARE

## 2018-04-20 LAB
ABSOLUTE EOS #: 0 K/UL (ref 0–0.4)
ABSOLUTE IMMATURE GRANULOCYTE: 0 K/UL (ref 0–0.3)
ABSOLUTE LYMPH #: 0.92 K/UL (ref 1–4.8)
ABSOLUTE MONO #: 1.84 K/UL (ref 0.1–0.8)
ANION GAP SERPL CALCULATED.3IONS-SCNC: 12 MMOL/L (ref 9–17)
BASOPHILS # BLD: 0 % (ref 0–2)
BASOPHILS ABSOLUTE: 0 K/UL (ref 0–0.2)
BUN BLDV-MCNC: 9 MG/DL (ref 8–23)
BUN/CREAT BLD: ABNORMAL (ref 9–20)
CALCIUM SERPL-MCNC: 8.1 MG/DL (ref 8.6–10.4)
CHLORIDE BLD-SCNC: 98 MMOL/L (ref 98–107)
CO2: 27 MMOL/L (ref 20–31)
CREAT SERPL-MCNC: 0.5 MG/DL (ref 0.7–1.2)
CULTURE: NO GROWTH
CULTURE: NORMAL
DIFFERENTIAL TYPE: ABNORMAL
EOSINOPHILS RELATIVE PERCENT: 0 % (ref 1–4)
GFR AFRICAN AMERICAN: >60 ML/MIN
GFR NON-AFRICAN AMERICAN: >60 ML/MIN
GFR SERPL CREATININE-BSD FRML MDRD: ABNORMAL ML/MIN/{1.73_M2}
GFR SERPL CREATININE-BSD FRML MDRD: ABNORMAL ML/MIN/{1.73_M2}
GLUCOSE BLD-MCNC: 133 MG/DL (ref 75–110)
GLUCOSE BLD-MCNC: 139 MG/DL (ref 70–99)
HCT VFR BLD CALC: 28.8 % (ref 40.7–50.3)
HEMOGLOBIN: 9.9 G/DL (ref 13–17)
IMMATURE GRANULOCYTES: 0 %
LYMPHOCYTES # BLD: 6 % (ref 24–44)
Lab: NORMAL
MCH RBC QN AUTO: 31.7 PG (ref 25.2–33.5)
MCHC RBC AUTO-ENTMCNC: 34.4 G/DL (ref 28.4–34.8)
MCV RBC AUTO: 92.3 FL (ref 82.6–102.9)
MONOCYTES # BLD: 12 % (ref 1–7)
MORPHOLOGY: NORMAL
NRBC AUTOMATED: 0 PER 100 WBC
PDW BLD-RTO: 13.2 % (ref 11.8–14.4)
PLATELET # BLD: 233 K/UL (ref 138–453)
PLATELET ESTIMATE: ABNORMAL
PMV BLD AUTO: 10.4 FL (ref 8.1–13.5)
POTASSIUM SERPL-SCNC: 3.6 MMOL/L (ref 3.7–5.3)
RBC # BLD: 3.12 M/UL (ref 4.21–5.77)
RBC # BLD: ABNORMAL 10*6/UL
SEG NEUTROPHILS: 82 % (ref 36–66)
SEGMENTED NEUTROPHILS ABSOLUTE COUNT: 12.54 K/UL (ref 1.8–7.7)
SODIUM BLD-SCNC: 137 MMOL/L (ref 135–144)
SPECIMEN DESCRIPTION: NORMAL
STATUS: NORMAL
WBC # BLD: 15.3 K/UL (ref 3.5–11.3)
WBC # BLD: ABNORMAL 10*3/UL

## 2018-04-20 PROCEDURE — 97535 SELF CARE MNGMENT TRAINING: CPT | Performed by: OCCUPATIONAL THERAPIST

## 2018-04-20 PROCEDURE — G8987 SELF CARE CURRENT STATUS: HCPCS | Performed by: OCCUPATIONAL THERAPIST

## 2018-04-20 PROCEDURE — 6360000002 HC RX W HCPCS: Performed by: NEUROLOGICAL SURGERY

## 2018-04-20 PROCEDURE — 94762 N-INVAS EAR/PLS OXIMTRY CONT: CPT

## 2018-04-20 PROCEDURE — G8979 MOBILITY GOAL STATUS: HCPCS

## 2018-04-20 PROCEDURE — 2000000003 HC NEURO ICU R&B

## 2018-04-20 PROCEDURE — 6360000002 HC RX W HCPCS: Performed by: REGISTERED NURSE

## 2018-04-20 PROCEDURE — 85025 COMPLETE CBC W/AUTO DIFF WBC: CPT

## 2018-04-20 PROCEDURE — G8978 MOBILITY CURRENT STATUS: HCPCS

## 2018-04-20 PROCEDURE — G8988 SELF CARE GOAL STATUS: HCPCS | Performed by: OCCUPATIONAL THERAPIST

## 2018-04-20 PROCEDURE — 99024 POSTOP FOLLOW-UP VISIT: CPT | Performed by: REGISTERED NURSE

## 2018-04-20 PROCEDURE — 83036 HEMOGLOBIN GLYCOSYLATED A1C: CPT

## 2018-04-20 PROCEDURE — 97166 OT EVAL MOD COMPLEX 45 MIN: CPT | Performed by: OCCUPATIONAL THERAPIST

## 2018-04-20 PROCEDURE — 72040 X-RAY EXAM NECK SPINE 2-3 VW: CPT

## 2018-04-20 PROCEDURE — 80048 BASIC METABOLIC PNL TOTAL CA: CPT

## 2018-04-20 PROCEDURE — 97162 PT EVAL MOD COMPLEX 30 MIN: CPT

## 2018-04-20 PROCEDURE — 82947 ASSAY GLUCOSE BLOOD QUANT: CPT

## 2018-04-20 PROCEDURE — 97530 THERAPEUTIC ACTIVITIES: CPT

## 2018-04-20 PROCEDURE — 6360000002 HC RX W HCPCS

## 2018-04-20 PROCEDURE — 6370000000 HC RX 637 (ALT 250 FOR IP): Performed by: REGISTERED NURSE

## 2018-04-20 PROCEDURE — 36415 COLL VENOUS BLD VENIPUNCTURE: CPT

## 2018-04-20 RX ORDER — NICOTINE POLACRILEX 4 MG
15 LOZENGE BUCCAL PRN
Status: DISCONTINUED | OUTPATIENT
Start: 2018-04-20 | End: 2018-04-30 | Stop reason: HOSPADM

## 2018-04-20 RX ORDER — BISACODYL 10 MG
10 SUPPOSITORY, RECTAL RECTAL DAILY PRN
Status: DISCONTINUED | OUTPATIENT
Start: 2018-04-20 | End: 2018-04-30 | Stop reason: HOSPADM

## 2018-04-20 RX ORDER — OXYCODONE HYDROCHLORIDE AND ACETAMINOPHEN 5; 325 MG/1; MG/1
1 TABLET ORAL EVERY 4 HOURS PRN
Status: DISCONTINUED | OUTPATIENT
Start: 2018-04-20 | End: 2018-04-20 | Stop reason: SDUPTHER

## 2018-04-20 RX ORDER — FENTANYL CITRATE 50 UG/ML
INJECTION, SOLUTION INTRAMUSCULAR; INTRAVENOUS
Status: COMPLETED
Start: 2018-04-20 | End: 2018-04-20

## 2018-04-20 RX ORDER — DEXTROSE MONOHYDRATE 25 G/50ML
12.5 INJECTION, SOLUTION INTRAVENOUS PRN
Status: DISCONTINUED | OUTPATIENT
Start: 2018-04-20 | End: 2018-04-30 | Stop reason: HOSPADM

## 2018-04-20 RX ORDER — FENTANYL CITRATE 50 UG/ML
100 INJECTION, SOLUTION INTRAMUSCULAR; INTRAVENOUS ONCE
Status: COMPLETED | OUTPATIENT
Start: 2018-04-20 | End: 2018-04-20

## 2018-04-20 RX ORDER — LIDOCAINE HYDROCHLORIDE 10 MG/ML
INJECTION, SOLUTION INFILTRATION; PERINEURAL
Status: DISPENSED
Start: 2018-04-20 | End: 2018-04-20

## 2018-04-20 RX ORDER — OXYCODONE HYDROCHLORIDE AND ACETAMINOPHEN 5; 325 MG/1; MG/1
2 TABLET ORAL EVERY 4 HOURS PRN
Status: DISCONTINUED | OUTPATIENT
Start: 2018-04-20 | End: 2018-04-20 | Stop reason: SDUPTHER

## 2018-04-20 RX ORDER — DEXTROSE MONOHYDRATE 50 MG/ML
100 INJECTION, SOLUTION INTRAVENOUS PRN
Status: DISCONTINUED | OUTPATIENT
Start: 2018-04-20 | End: 2018-04-30 | Stop reason: HOSPADM

## 2018-04-20 RX ADMIN — PREDNISOLONE ACETATE 1 DROP: 10 SUSPENSION/ DROPS OPHTHALMIC at 16:51

## 2018-04-20 RX ADMIN — OXYCODONE HYDROCHLORIDE AND ACETAMINOPHEN 2 TABLET: 5; 325 TABLET ORAL at 14:57

## 2018-04-20 RX ADMIN — FENTANYL CITRATE 100 MCG: 50 INJECTION, SOLUTION INTRAMUSCULAR; INTRAVENOUS at 07:42

## 2018-04-20 RX ADMIN — ATORVASTATIN CALCIUM 80 MG: 80 TABLET, FILM COATED ORAL at 19:56

## 2018-04-20 RX ADMIN — CARVEDILOL 25 MG: 25 TABLET, FILM COATED ORAL at 16:51

## 2018-04-20 RX ADMIN — HYDROMORPHONE HYDROCHLORIDE 0.5 MG: 1 INJECTION, SOLUTION INTRAMUSCULAR; INTRAVENOUS; SUBCUTANEOUS at 00:32

## 2018-04-20 RX ADMIN — CARVEDILOL 25 MG: 25 TABLET, FILM COATED ORAL at 09:51

## 2018-04-20 RX ADMIN — Medication 2 G: at 00:29

## 2018-04-20 RX ADMIN — METFORMIN HYDROCHLORIDE 1000 MG: 500 TABLET ORAL at 16:51

## 2018-04-20 RX ADMIN — VENLAFAXINE 100 MG: 100 TABLET ORAL at 09:50

## 2018-04-20 RX ADMIN — PANTOPRAZOLE SODIUM 40 MG: 40 TABLET, DELAYED RELEASE ORAL at 09:50

## 2018-04-20 RX ADMIN — TRAZODONE HYDROCHLORIDE 100 MG: 100 TABLET ORAL at 19:55

## 2018-04-20 RX ADMIN — KETOROLAC TROMETHAMINE 1 DROP: 5 SOLUTION OPHTHALMIC at 14:55

## 2018-04-20 RX ADMIN — PREDNISOLONE ACETATE 1 DROP: 10 SUSPENSION/ DROPS OPHTHALMIC at 09:51

## 2018-04-20 RX ADMIN — KETOROLAC TROMETHAMINE 1 DROP: 5 SOLUTION OPHTHALMIC at 19:57

## 2018-04-20 RX ADMIN — KETOROLAC TROMETHAMINE 1 DROP: 5 SOLUTION OPHTHALMIC at 09:51

## 2018-04-20 RX ADMIN — OXYCODONE HYDROCHLORIDE AND ACETAMINOPHEN 2 TABLET: 5; 325 TABLET ORAL at 20:03

## 2018-04-20 RX ADMIN — ENOXAPARIN SODIUM 40 MG: 40 INJECTION SUBCUTANEOUS at 19:56

## 2018-04-20 RX ADMIN — METFORMIN HYDROCHLORIDE 1000 MG: 500 TABLET ORAL at 09:50

## 2018-04-20 RX ADMIN — VENLAFAXINE 100 MG: 100 TABLET ORAL at 15:31

## 2018-04-20 RX ADMIN — PREDNISOLONE ACETATE 1 DROP: 10 SUSPENSION/ DROPS OPHTHALMIC at 19:57

## 2018-04-20 RX ADMIN — DOCUSATE SODIUM 100 MG: 100 CAPSULE ORAL at 09:50

## 2018-04-20 RX ADMIN — DOCUSATE SODIUM 100 MG: 100 CAPSULE ORAL at 19:56

## 2018-04-20 RX ADMIN — PREDNISOLONE ACETATE 1 DROP: 10 SUSPENSION/ DROPS OPHTHALMIC at 14:55

## 2018-04-20 RX ADMIN — GABAPENTIN 300 MG: 300 CAPSULE ORAL at 09:50

## 2018-04-20 RX ADMIN — CETIRIZINE HYDROCHLORIDE 10 MG: 10 TABLET ORAL at 09:51

## 2018-04-20 RX ADMIN — KETOROLAC TROMETHAMINE 1 DROP: 5 SOLUTION OPHTHALMIC at 16:52

## 2018-04-20 RX ADMIN — AMLODIPINE BESYLATE 10 MG: 10 TABLET ORAL at 09:51

## 2018-04-20 RX ADMIN — OXYCODONE HYDROCHLORIDE AND ACETAMINOPHEN 2 TABLET: 5; 325 TABLET ORAL at 09:52

## 2018-04-20 RX ADMIN — GABAPENTIN 300 MG: 300 CAPSULE ORAL at 19:55

## 2018-04-20 RX ADMIN — VENLAFAXINE 100 MG: 100 TABLET ORAL at 20:55

## 2018-04-20 RX ADMIN — GABAPENTIN 300 MG: 300 CAPSULE ORAL at 14:55

## 2018-04-20 RX ADMIN — DOCUSATE SODIUM -SENNOSIDES 1 TABLET: 50; 8.6 TABLET, COATED ORAL at 09:50

## 2018-04-20 RX ADMIN — DIVALPROEX SODIUM 500 MG: 500 TABLET, EXTENDED RELEASE ORAL at 09:51

## 2018-04-20 ASSESSMENT — PAIN SCALES - GENERAL
PAINLEVEL_OUTOF10: 10
PAINLEVEL_OUTOF10: 3
PAINLEVEL_OUTOF10: 10
PAINLEVEL_OUTOF10: 7
PAINLEVEL_OUTOF10: 7
PAINLEVEL_OUTOF10: 2
PAINLEVEL_OUTOF10: 7
PAINLEVEL_OUTOF10: 8

## 2018-04-20 ASSESSMENT — PAIN DESCRIPTION - LOCATION
LOCATION: NECK
LOCATION: NECK;SHOULDER
LOCATION: NECK;SHOULDER
LOCATION: NECK

## 2018-04-20 ASSESSMENT — PAIN DESCRIPTION - FREQUENCY
FREQUENCY: CONTINUOUS

## 2018-04-20 ASSESSMENT — PAIN DESCRIPTION - PAIN TYPE
TYPE: ACUTE PAIN;SURGICAL PAIN
TYPE: ACUTE PAIN;SURGICAL PAIN
TYPE: SURGICAL PAIN
TYPE: ACUTE PAIN;SURGICAL PAIN

## 2018-04-20 ASSESSMENT — PAIN DESCRIPTION - DESCRIPTORS
DESCRIPTORS: ACHING
DESCRIPTORS: ACHING;DISCOMFORT
DESCRIPTORS: ACHING;CONSTANT;DISCOMFORT

## 2018-04-20 ASSESSMENT — PAIN DESCRIPTION - ONSET: ONSET: ON-GOING

## 2018-04-20 ASSESSMENT — PAIN DESCRIPTION - ORIENTATION: ORIENTATION: ANTERIOR;POSTERIOR

## 2018-04-21 PROBLEM — D62 ACUTE BLOOD LOSS ANEMIA: Status: ACTIVE | Noted: 2018-04-21

## 2018-04-21 LAB
ABSOLUTE EOS #: 0.25 K/UL (ref 0–0.44)
ABSOLUTE IMMATURE GRANULOCYTE: 0.12 K/UL (ref 0–0.3)
ABSOLUTE LYMPH #: 1.83 K/UL (ref 1.1–3.7)
ABSOLUTE MONO #: 1.83 K/UL (ref 0.1–1.2)
ANION GAP SERPL CALCULATED.3IONS-SCNC: 10 MMOL/L (ref 9–17)
BASOPHILS # BLD: 0 % (ref 0–2)
BASOPHILS ABSOLUTE: 0.03 K/UL (ref 0–0.2)
BUN BLDV-MCNC: 16 MG/DL (ref 8–23)
BUN/CREAT BLD: ABNORMAL (ref 9–20)
CALCIUM SERPL-MCNC: 7.8 MG/DL (ref 8.6–10.4)
CHLORIDE BLD-SCNC: 101 MMOL/L (ref 98–107)
CO2: 27 MMOL/L (ref 20–31)
CREAT SERPL-MCNC: 0.51 MG/DL (ref 0.7–1.2)
DIFFERENTIAL TYPE: ABNORMAL
EOSINOPHILS RELATIVE PERCENT: 2 % (ref 1–4)
FERRITIN: 167 UG/L (ref 30–400)
GFR AFRICAN AMERICAN: >60 ML/MIN
GFR NON-AFRICAN AMERICAN: >60 ML/MIN
GFR SERPL CREATININE-BSD FRML MDRD: ABNORMAL ML/MIN/{1.73_M2}
GFR SERPL CREATININE-BSD FRML MDRD: ABNORMAL ML/MIN/{1.73_M2}
GLUCOSE BLD-MCNC: 101 MG/DL (ref 70–99)
GLUCOSE BLD-MCNC: 132 MG/DL (ref 75–110)
GLUCOSE BLD-MCNC: 138 MG/DL (ref 75–110)
GLUCOSE BLD-MCNC: 97 MG/DL (ref 75–110)
HCT VFR BLD CALC: 23.5 % (ref 40.7–50.3)
HEMOGLOBIN: 8 G/DL (ref 13–17)
IMMATURE GRANULOCYTES: 1 %
IRON SATURATION: 8 % (ref 20–55)
IRON: 16 UG/DL (ref 59–158)
LYMPHOCYTES # BLD: 11 % (ref 24–43)
MAGNESIUM: 1.3 MG/DL (ref 1.6–2.6)
MCH RBC QN AUTO: 31.7 PG (ref 25.2–33.5)
MCHC RBC AUTO-ENTMCNC: 34 G/DL (ref 28.4–34.8)
MCV RBC AUTO: 93.3 FL (ref 82.6–102.9)
MONOCYTES # BLD: 11 % (ref 3–12)
NRBC AUTOMATED: 0 PER 100 WBC
PDW BLD-RTO: 13.3 % (ref 11.8–14.4)
PLATELET # BLD: 174 K/UL (ref 138–453)
PLATELET ESTIMATE: ABNORMAL
PMV BLD AUTO: 10.4 FL (ref 8.1–13.5)
POTASSIUM SERPL-SCNC: 3.4 MMOL/L (ref 3.7–5.3)
RBC # BLD: 2.52 M/UL (ref 4.21–5.77)
RBC # BLD: ABNORMAL 10*6/UL
SEG NEUTROPHILS: 76 % (ref 36–65)
SEGMENTED NEUTROPHILS ABSOLUTE COUNT: 12.79 K/UL (ref 1.5–8.1)
SODIUM BLD-SCNC: 138 MMOL/L (ref 135–144)
TOTAL IRON BINDING CAPACITY: 209 UG/DL (ref 250–450)
UNSATURATED IRON BINDING CAPACITY: 193 UG/DL (ref 112–347)
WBC # BLD: 16.9 K/UL (ref 3.5–11.3)
WBC # BLD: ABNORMAL 10*3/UL

## 2018-04-21 PROCEDURE — 2580000003 HC RX 258: Performed by: REGISTERED NURSE

## 2018-04-21 PROCEDURE — 82947 ASSAY GLUCOSE BLOOD QUANT: CPT

## 2018-04-21 PROCEDURE — 6360000002 HC RX W HCPCS: Performed by: NEUROLOGICAL SURGERY

## 2018-04-21 PROCEDURE — 2580000003 HC RX 258: Performed by: INTERNAL MEDICINE

## 2018-04-21 PROCEDURE — 83540 ASSAY OF IRON: CPT

## 2018-04-21 PROCEDURE — 36415 COLL VENOUS BLD VENIPUNCTURE: CPT

## 2018-04-21 PROCEDURE — 6360000002 HC RX W HCPCS: Performed by: INTERNAL MEDICINE

## 2018-04-21 PROCEDURE — 2000000003 HC NEURO ICU R&B

## 2018-04-21 PROCEDURE — 6370000000 HC RX 637 (ALT 250 FOR IP): Performed by: REGISTERED NURSE

## 2018-04-21 PROCEDURE — 82728 ASSAY OF FERRITIN: CPT

## 2018-04-21 PROCEDURE — 94762 N-INVAS EAR/PLS OXIMTRY CONT: CPT

## 2018-04-21 PROCEDURE — 80048 BASIC METABOLIC PNL TOTAL CA: CPT

## 2018-04-21 PROCEDURE — 83735 ASSAY OF MAGNESIUM: CPT

## 2018-04-21 PROCEDURE — 85025 COMPLETE CBC W/AUTO DIFF WBC: CPT

## 2018-04-21 PROCEDURE — 6370000000 HC RX 637 (ALT 250 FOR IP): Performed by: INTERNAL MEDICINE

## 2018-04-21 PROCEDURE — 83550 IRON BINDING TEST: CPT

## 2018-04-21 PROCEDURE — 99232 SBSQ HOSP IP/OBS MODERATE 35: CPT | Performed by: INTERNAL MEDICINE

## 2018-04-21 RX ORDER — SENNA AND DOCUSATE SODIUM 50; 8.6 MG/1; MG/1
1 TABLET, FILM COATED ORAL 2 TIMES DAILY
Status: DISCONTINUED | OUTPATIENT
Start: 2018-04-21 | End: 2018-04-30 | Stop reason: HOSPADM

## 2018-04-21 RX ORDER — POTASSIUM CHLORIDE 20 MEQ/1
TABLET, EXTENDED RELEASE ORAL
Status: DISPENSED
Start: 2018-04-21 | End: 2018-04-22

## 2018-04-21 RX ORDER — POTASSIUM CHLORIDE 20 MEQ/1
40 TABLET, EXTENDED RELEASE ORAL 2 TIMES DAILY
Status: COMPLETED | OUTPATIENT
Start: 2018-04-21 | End: 2018-04-21

## 2018-04-21 RX ADMIN — OXYCODONE HYDROCHLORIDE AND ACETAMINOPHEN 2 TABLET: 5; 325 TABLET ORAL at 03:00

## 2018-04-21 RX ADMIN — DOCUSATE SODIUM -SENNOSIDES 1 TABLET: 50; 8.6 TABLET, COATED ORAL at 11:01

## 2018-04-21 RX ADMIN — KETOROLAC TROMETHAMINE 1 DROP: 5 SOLUTION OPHTHALMIC at 17:46

## 2018-04-21 RX ADMIN — CETIRIZINE HYDROCHLORIDE 10 MG: 10 TABLET ORAL at 11:01

## 2018-04-21 RX ADMIN — AMLODIPINE BESYLATE 10 MG: 10 TABLET ORAL at 08:48

## 2018-04-21 RX ADMIN — METFORMIN HYDROCHLORIDE 1000 MG: 500 TABLET ORAL at 08:48

## 2018-04-21 RX ADMIN — PANTOPRAZOLE SODIUM 40 MG: 40 TABLET, DELAYED RELEASE ORAL at 08:48

## 2018-04-21 RX ADMIN — DIVALPROEX SODIUM 500 MG: 500 TABLET, EXTENDED RELEASE ORAL at 08:48

## 2018-04-21 RX ADMIN — ATORVASTATIN CALCIUM 80 MG: 80 TABLET, FILM COATED ORAL at 20:41

## 2018-04-21 RX ADMIN — KETOROLAC TROMETHAMINE 1 DROP: 5 SOLUTION OPHTHALMIC at 20:43

## 2018-04-21 RX ADMIN — VENLAFAXINE 100 MG: 100 TABLET ORAL at 08:48

## 2018-04-21 RX ADMIN — PREDNISOLONE ACETATE 1 DROP: 10 SUSPENSION/ DROPS OPHTHALMIC at 20:43

## 2018-04-21 RX ADMIN — Medication 10 ML: at 08:48

## 2018-04-21 RX ADMIN — PREDNISOLONE ACETATE 1 DROP: 10 SUSPENSION/ DROPS OPHTHALMIC at 13:05

## 2018-04-21 RX ADMIN — OXYCODONE HYDROCHLORIDE AND ACETAMINOPHEN 2 TABLET: 5; 325 TABLET ORAL at 06:43

## 2018-04-21 RX ADMIN — CARVEDILOL 25 MG: 25 TABLET, FILM COATED ORAL at 08:48

## 2018-04-21 RX ADMIN — ENOXAPARIN SODIUM 40 MG: 40 INJECTION SUBCUTANEOUS at 08:48

## 2018-04-21 RX ADMIN — POTASSIUM CHLORIDE 40 MEQ: 20 TABLET, EXTENDED RELEASE ORAL at 13:11

## 2018-04-21 RX ADMIN — GABAPENTIN 300 MG: 300 CAPSULE ORAL at 13:12

## 2018-04-21 RX ADMIN — DOCUSATE SODIUM -SENNOSIDES 1 TABLET: 50; 8.6 TABLET, COATED ORAL at 20:44

## 2018-04-21 RX ADMIN — KETOROLAC TROMETHAMINE 1 DROP: 5 SOLUTION OPHTHALMIC at 08:48

## 2018-04-21 RX ADMIN — GABAPENTIN 300 MG: 300 CAPSULE ORAL at 08:48

## 2018-04-21 RX ADMIN — OXYCODONE HYDROCHLORIDE AND ACETAMINOPHEN 2 TABLET: 5; 325 TABLET ORAL at 11:02

## 2018-04-21 RX ADMIN — POTASSIUM CHLORIDE 40 MEQ: 20 TABLET, EXTENDED RELEASE ORAL at 20:42

## 2018-04-21 RX ADMIN — OXYCODONE HYDROCHLORIDE AND ACETAMINOPHEN 2 TABLET: 5; 325 TABLET ORAL at 15:31

## 2018-04-21 RX ADMIN — CALCIUM GLUCONATE 2 G: 98 INJECTION, SOLUTION INTRAVENOUS at 13:41

## 2018-04-21 RX ADMIN — PREDNISOLONE ACETATE 1 DROP: 10 SUSPENSION/ DROPS OPHTHALMIC at 08:48

## 2018-04-21 RX ADMIN — CARVEDILOL 25 MG: 25 TABLET, FILM COATED ORAL at 17:46

## 2018-04-21 RX ADMIN — VENLAFAXINE 100 MG: 100 TABLET ORAL at 15:27

## 2018-04-21 RX ADMIN — KETOROLAC TROMETHAMINE 1 DROP: 5 SOLUTION OPHTHALMIC at 13:06

## 2018-04-21 RX ADMIN — GABAPENTIN 300 MG: 300 CAPSULE ORAL at 20:41

## 2018-04-21 RX ADMIN — VENLAFAXINE 100 MG: 100 TABLET ORAL at 20:41

## 2018-04-21 RX ADMIN — TRAZODONE HYDROCHLORIDE 100 MG: 100 TABLET ORAL at 20:41

## 2018-04-21 RX ADMIN — DOCUSATE SODIUM 100 MG: 100 CAPSULE ORAL at 08:48

## 2018-04-21 RX ADMIN — PREDNISOLONE ACETATE 1 DROP: 10 SUSPENSION/ DROPS OPHTHALMIC at 17:46

## 2018-04-21 ASSESSMENT — PAIN DESCRIPTION - ORIENTATION
ORIENTATION: RIGHT

## 2018-04-21 ASSESSMENT — PAIN DESCRIPTION - LOCATION
LOCATION: NECK;THROAT
LOCATION: BACK
LOCATION: NECK
LOCATION: NECK

## 2018-04-21 ASSESSMENT — PAIN DESCRIPTION - PAIN TYPE
TYPE: ACUTE PAIN
TYPE: ACUTE PAIN
TYPE: ACUTE PAIN;SURGICAL PAIN
TYPE: ACUTE PAIN

## 2018-04-21 ASSESSMENT — PAIN SCALES - GENERAL
PAINLEVEL_OUTOF10: 2
PAINLEVEL_OUTOF10: 8
PAINLEVEL_OUTOF10: 5
PAINLEVEL_OUTOF10: 8

## 2018-04-21 ASSESSMENT — PAIN DESCRIPTION - DESCRIPTORS
DESCRIPTORS: ACHING;DISCOMFORT

## 2018-04-21 ASSESSMENT — PAIN DESCRIPTION - ONSET: ONSET: ON-GOING

## 2018-04-21 ASSESSMENT — PAIN DESCRIPTION - FREQUENCY: FREQUENCY: INTERMITTENT

## 2018-04-22 LAB
ABSOLUTE EOS #: 0.25 K/UL (ref 0–0.44)
ABSOLUTE IMMATURE GRANULOCYTE: 0.1 K/UL (ref 0–0.3)
ABSOLUTE LYMPH #: 1.85 K/UL (ref 1.1–3.7)
ABSOLUTE MONO #: 1.21 K/UL (ref 0.1–1.2)
BASOPHILS # BLD: 0 % (ref 0–2)
BASOPHILS ABSOLUTE: <0.03 K/UL (ref 0–0.2)
DIFFERENTIAL TYPE: ABNORMAL
EOSINOPHILS RELATIVE PERCENT: 2 % (ref 1–4)
ESTIMATED AVERAGE GLUCOSE: 111 MG/DL
GLUCOSE BLD-MCNC: 106 MG/DL (ref 75–110)
GLUCOSE BLD-MCNC: 112 MG/DL (ref 75–110)
GLUCOSE BLD-MCNC: 112 MG/DL (ref 75–110)
GLUCOSE BLD-MCNC: 91 MG/DL (ref 75–110)
HBA1C MFR BLD: 5.5 % (ref 4–6)
HCT VFR BLD CALC: 23.7 % (ref 40.7–50.3)
HEMOGLOBIN: 8.1 G/DL (ref 13–17)
IMMATURE GRANULOCYTES: 1 %
LYMPHOCYTES # BLD: 14 % (ref 24–43)
MCH RBC QN AUTO: 32 PG (ref 25.2–33.5)
MCHC RBC AUTO-ENTMCNC: 34.2 G/DL (ref 28.4–34.8)
MCV RBC AUTO: 93.7 FL (ref 82.6–102.9)
MONOCYTES # BLD: 9 % (ref 3–12)
NRBC AUTOMATED: 0 PER 100 WBC
PDW BLD-RTO: 13.2 % (ref 11.8–14.4)
PLATELET # BLD: 185 K/UL (ref 138–453)
PLATELET ESTIMATE: ABNORMAL
PMV BLD AUTO: 10.9 FL (ref 8.1–13.5)
RBC # BLD: 2.53 M/UL (ref 4.21–5.77)
RBC # BLD: ABNORMAL 10*6/UL
SEG NEUTROPHILS: 74 % (ref 36–65)
SEGMENTED NEUTROPHILS ABSOLUTE COUNT: 10.27 K/UL (ref 1.5–8.1)
WBC # BLD: 13.7 K/UL (ref 3.5–11.3)
WBC # BLD: ABNORMAL 10*3/UL

## 2018-04-22 PROCEDURE — 85025 COMPLETE CBC W/AUTO DIFF WBC: CPT

## 2018-04-22 PROCEDURE — 99232 SBSQ HOSP IP/OBS MODERATE 35: CPT | Performed by: INTERNAL MEDICINE

## 2018-04-22 PROCEDURE — 36415 COLL VENOUS BLD VENIPUNCTURE: CPT

## 2018-04-22 PROCEDURE — 6370000000 HC RX 637 (ALT 250 FOR IP): Performed by: REGISTERED NURSE

## 2018-04-22 PROCEDURE — 2580000003 HC RX 258: Performed by: REGISTERED NURSE

## 2018-04-22 PROCEDURE — 94762 N-INVAS EAR/PLS OXIMTRY CONT: CPT

## 2018-04-22 PROCEDURE — 2000000003 HC NEURO ICU R&B

## 2018-04-22 PROCEDURE — 6370000000 HC RX 637 (ALT 250 FOR IP): Performed by: INTERNAL MEDICINE

## 2018-04-22 PROCEDURE — 82947 ASSAY GLUCOSE BLOOD QUANT: CPT

## 2018-04-22 PROCEDURE — 6360000002 HC RX W HCPCS: Performed by: STUDENT IN AN ORGANIZED HEALTH CARE EDUCATION/TRAINING PROGRAM

## 2018-04-22 PROCEDURE — 6360000002 HC RX W HCPCS: Performed by: REGISTERED NURSE

## 2018-04-22 PROCEDURE — 6360000002 HC RX W HCPCS: Performed by: NEUROLOGICAL SURGERY

## 2018-04-22 RX ORDER — MAGNESIUM SULFATE 1 G/100ML
1 INJECTION INTRAVENOUS
Status: COMPLETED | OUTPATIENT
Start: 2018-04-22 | End: 2018-04-22

## 2018-04-22 RX ADMIN — HYDROMORPHONE HYDROCHLORIDE 0.5 MG: 1 INJECTION, SOLUTION INTRAMUSCULAR; INTRAVENOUS; SUBCUTANEOUS at 21:32

## 2018-04-22 RX ADMIN — CETIRIZINE HYDROCHLORIDE 10 MG: 10 TABLET ORAL at 09:41

## 2018-04-22 RX ADMIN — MAGNESIUM SULFATE HEPTAHYDRATE 1 G: 1 INJECTION, SOLUTION INTRAVENOUS at 10:58

## 2018-04-22 RX ADMIN — MAGNESIUM SULFATE HEPTAHYDRATE 1 G: 1 INJECTION, SOLUTION INTRAVENOUS at 13:25

## 2018-04-22 RX ADMIN — CARVEDILOL 25 MG: 25 TABLET, FILM COATED ORAL at 09:39

## 2018-04-22 RX ADMIN — PREDNISOLONE ACETATE 1 DROP: 10 SUSPENSION/ DROPS OPHTHALMIC at 12:22

## 2018-04-22 RX ADMIN — GABAPENTIN 300 MG: 300 CAPSULE ORAL at 21:11

## 2018-04-22 RX ADMIN — MAGNESIUM SULFATE HEPTAHYDRATE 1 G: 1 INJECTION, SOLUTION INTRAVENOUS at 09:49

## 2018-04-22 RX ADMIN — OXYCODONE HYDROCHLORIDE AND ACETAMINOPHEN 1 TABLET: 5; 325 TABLET ORAL at 17:03

## 2018-04-22 RX ADMIN — VENLAFAXINE 100 MG: 100 TABLET ORAL at 13:24

## 2018-04-22 RX ADMIN — TRAZODONE HYDROCHLORIDE 100 MG: 100 TABLET ORAL at 21:11

## 2018-04-22 RX ADMIN — DOCUSATE SODIUM -SENNOSIDES 1 TABLET: 50; 8.6 TABLET, COATED ORAL at 21:11

## 2018-04-22 RX ADMIN — GABAPENTIN 300 MG: 300 CAPSULE ORAL at 09:38

## 2018-04-22 RX ADMIN — GABAPENTIN 300 MG: 300 CAPSULE ORAL at 13:24

## 2018-04-22 RX ADMIN — Medication 10 ML: at 21:00

## 2018-04-22 RX ADMIN — PREDNISOLONE ACETATE 1 DROP: 10 SUSPENSION/ DROPS OPHTHALMIC at 21:12

## 2018-04-22 RX ADMIN — PREDNISOLONE ACETATE 1 DROP: 10 SUSPENSION/ DROPS OPHTHALMIC at 09:40

## 2018-04-22 RX ADMIN — DIVALPROEX SODIUM 500 MG: 500 TABLET, EXTENDED RELEASE ORAL at 09:41

## 2018-04-22 RX ADMIN — ENOXAPARIN SODIUM 40 MG: 40 INJECTION SUBCUTANEOUS at 09:39

## 2018-04-22 RX ADMIN — KETOROLAC TROMETHAMINE 1 DROP: 5 SOLUTION OPHTHALMIC at 12:22

## 2018-04-22 RX ADMIN — OXYCODONE HYDROCHLORIDE AND ACETAMINOPHEN 2 TABLET: 5; 325 TABLET ORAL at 09:54

## 2018-04-22 RX ADMIN — ATORVASTATIN CALCIUM 80 MG: 80 TABLET, FILM COATED ORAL at 21:11

## 2018-04-22 RX ADMIN — VENLAFAXINE 100 MG: 100 TABLET ORAL at 09:41

## 2018-04-22 RX ADMIN — VENLAFAXINE 100 MG: 100 TABLET ORAL at 21:11

## 2018-04-22 RX ADMIN — OXYCODONE HYDROCHLORIDE AND ACETAMINOPHEN 2 TABLET: 5; 325 TABLET ORAL at 21:17

## 2018-04-22 RX ADMIN — OXYCODONE HYDROCHLORIDE AND ACETAMINOPHEN 2 TABLET: 5; 325 TABLET ORAL at 04:40

## 2018-04-22 RX ADMIN — CYCLOBENZAPRINE HYDROCHLORIDE 10 MG: 10 TABLET, FILM COATED ORAL at 17:03

## 2018-04-22 RX ADMIN — Medication 10 ML: at 09:00

## 2018-04-22 RX ADMIN — KETOROLAC TROMETHAMINE 1 DROP: 5 SOLUTION OPHTHALMIC at 17:04

## 2018-04-22 RX ADMIN — OXYCODONE HYDROCHLORIDE AND ACETAMINOPHEN 2 TABLET: 5; 325 TABLET ORAL at 00:05

## 2018-04-22 RX ADMIN — MAGNESIUM SULFATE HEPTAHYDRATE 1 G: 1 INJECTION, SOLUTION INTRAVENOUS at 12:14

## 2018-04-22 RX ADMIN — AMLODIPINE BESYLATE 10 MG: 10 TABLET ORAL at 09:39

## 2018-04-22 RX ADMIN — DOCUSATE SODIUM -SENNOSIDES 1 TABLET: 50; 8.6 TABLET, COATED ORAL at 09:39

## 2018-04-22 RX ADMIN — PANTOPRAZOLE SODIUM 40 MG: 40 TABLET, DELAYED RELEASE ORAL at 09:38

## 2018-04-22 RX ADMIN — CARVEDILOL 25 MG: 25 TABLET, FILM COATED ORAL at 17:03

## 2018-04-22 RX ADMIN — KETOROLAC TROMETHAMINE 1 DROP: 5 SOLUTION OPHTHALMIC at 21:12

## 2018-04-22 RX ADMIN — PREDNISOLONE ACETATE 1 DROP: 10 SUSPENSION/ DROPS OPHTHALMIC at 17:04

## 2018-04-22 RX ADMIN — KETOROLAC TROMETHAMINE 1 DROP: 5 SOLUTION OPHTHALMIC at 09:40

## 2018-04-22 ASSESSMENT — PAIN SCALES - GENERAL
PAINLEVEL_OUTOF10: 8
PAINLEVEL_OUTOF10: 9
PAINLEVEL_OUTOF10: 8
PAINLEVEL_OUTOF10: 5

## 2018-04-23 ENCOUNTER — APPOINTMENT (OUTPATIENT)
Dept: CT IMAGING | Age: 68
DRG: 472 | End: 2018-04-23
Attending: NEUROLOGICAL SURGERY
Payer: MEDICARE

## 2018-04-23 LAB
-: NORMAL
ABSOLUTE EOS #: 0.1 K/UL (ref 0–0.44)
ABSOLUTE IMMATURE GRANULOCYTE: 0.08 K/UL (ref 0–0.3)
ABSOLUTE LYMPH #: 1.17 K/UL (ref 1.1–3.7)
ABSOLUTE MONO #: 1.13 K/UL (ref 0.1–1.2)
AMORPHOUS: NORMAL
BACTERIA: NORMAL
BASOPHILS # BLD: 0 % (ref 0–2)
BASOPHILS ABSOLUTE: <0.03 K/UL (ref 0–0.2)
BILIRUBIN URINE: NEGATIVE
CASTS UA: NORMAL /LPF (ref 0–8)
COLOR: YELLOW
COMMENT UA: ABNORMAL
CRYSTALS, UA: NORMAL /HPF
DIFFERENTIAL TYPE: ABNORMAL
EOSINOPHILS RELATIVE PERCENT: 1 % (ref 1–4)
EPITHELIAL CELLS UA: NORMAL /HPF (ref 0–5)
GLUCOSE BLD-MCNC: 110 MG/DL (ref 75–110)
GLUCOSE BLD-MCNC: 139 MG/DL (ref 75–110)
GLUCOSE BLD-MCNC: 88 MG/DL (ref 75–110)
GLUCOSE BLD-MCNC: 89 MG/DL (ref 75–110)
GLUCOSE URINE: NEGATIVE
HCT VFR BLD CALC: 24.2 % (ref 40.7–50.3)
HEMOGLOBIN: 8.4 G/DL (ref 13–17)
IMMATURE GRANULOCYTES: 1 %
KETONES, URINE: ABNORMAL
LEUKOCYTE ESTERASE, URINE: NEGATIVE
LYMPHOCYTES # BLD: 9 % (ref 24–43)
MCH RBC QN AUTO: 31.6 PG (ref 25.2–33.5)
MCHC RBC AUTO-ENTMCNC: 34.7 G/DL (ref 28.4–34.8)
MCV RBC AUTO: 91 FL (ref 82.6–102.9)
MONOCYTES # BLD: 9 % (ref 3–12)
MUCUS: NORMAL
NITRITE, URINE: NEGATIVE
NRBC AUTOMATED: 0 PER 100 WBC
OTHER OBSERVATIONS UA: NORMAL
PDW BLD-RTO: 13.2 % (ref 11.8–14.4)
PH UA: 7 (ref 5–8)
PLATELET # BLD: 221 K/UL (ref 138–453)
PLATELET ESTIMATE: ABNORMAL
PMV BLD AUTO: 10 FL (ref 8.1–13.5)
PROTEIN UA: ABNORMAL
RBC # BLD: 2.66 M/UL (ref 4.21–5.77)
RBC # BLD: ABNORMAL 10*6/UL
RBC UA: NORMAL /HPF (ref 0–4)
RENAL EPITHELIAL, UA: NORMAL /HPF
SEG NEUTROPHILS: 80 % (ref 36–65)
SEGMENTED NEUTROPHILS ABSOLUTE COUNT: 10.19 K/UL (ref 1.5–8.1)
SPECIFIC GRAVITY UA: 1.01 (ref 1–1.03)
TRICHOMONAS: NORMAL
TURBIDITY: CLEAR
URINE HGB: NEGATIVE
UROBILINOGEN, URINE: NORMAL
WBC # BLD: 12.7 K/UL (ref 3.5–11.3)
WBC # BLD: ABNORMAL 10*3/UL
WBC UA: NORMAL /HPF (ref 0–5)
YEAST: NORMAL

## 2018-04-23 PROCEDURE — 6360000002 HC RX W HCPCS: Performed by: EMERGENCY MEDICINE

## 2018-04-23 PROCEDURE — 94762 N-INVAS EAR/PLS OXIMTRY CONT: CPT

## 2018-04-23 PROCEDURE — 85025 COMPLETE CBC W/AUTO DIFF WBC: CPT

## 2018-04-23 PROCEDURE — 2500000003 HC RX 250 WO HCPCS: Performed by: STUDENT IN AN ORGANIZED HEALTH CARE EDUCATION/TRAINING PROGRAM

## 2018-04-23 PROCEDURE — 70450 CT HEAD/BRAIN W/O DYE: CPT

## 2018-04-23 PROCEDURE — 6360000002 HC RX W HCPCS

## 2018-04-23 PROCEDURE — G8996 SWALLOW CURRENT STATUS: HCPCS

## 2018-04-23 PROCEDURE — 99233 SBSQ HOSP IP/OBS HIGH 50: CPT | Performed by: INTERNAL MEDICINE

## 2018-04-23 PROCEDURE — 81001 URINALYSIS AUTO W/SCOPE: CPT

## 2018-04-23 PROCEDURE — 87040 BLOOD CULTURE FOR BACTERIA: CPT

## 2018-04-23 PROCEDURE — 92611 MOTION FLUOROSCOPY/SWALLOW: CPT

## 2018-04-23 PROCEDURE — 2500000003 HC RX 250 WO HCPCS: Performed by: EMERGENCY MEDICINE

## 2018-04-23 PROCEDURE — G8997 SWALLOW GOAL STATUS: HCPCS

## 2018-04-23 PROCEDURE — 97535 SELF CARE MNGMENT TRAINING: CPT

## 2018-04-23 PROCEDURE — 36415 COLL VENOUS BLD VENIPUNCTURE: CPT

## 2018-04-23 PROCEDURE — 2580000003 HC RX 258: Performed by: REGISTERED NURSE

## 2018-04-23 PROCEDURE — 92610 EVALUATE SWALLOWING FUNCTION: CPT

## 2018-04-23 PROCEDURE — 86335 IMMUNFIX E-PHORSIS/URINE/CSF: CPT

## 2018-04-23 PROCEDURE — 6370000000 HC RX 637 (ALT 250 FOR IP): Performed by: REGISTERED NURSE

## 2018-04-23 PROCEDURE — 82947 ASSAY GLUCOSE BLOOD QUANT: CPT

## 2018-04-23 PROCEDURE — 6360000002 HC RX W HCPCS: Performed by: NEUROLOGICAL SURGERY

## 2018-04-23 PROCEDURE — 6360000002 HC RX W HCPCS: Performed by: REGISTERED NURSE

## 2018-04-23 PROCEDURE — 2000000003 HC NEURO ICU R&B

## 2018-04-23 RX ORDER — LIDOCAINE HYDROCHLORIDE 10 MG/ML
20 INJECTION, SOLUTION INFILTRATION; PERINEURAL ONCE
Status: COMPLETED | OUTPATIENT
Start: 2018-04-23 | End: 2018-04-23

## 2018-04-23 RX ORDER — UREA 10 %
3 LOTION (ML) TOPICAL NIGHTLY PRN
Status: DISCONTINUED | OUTPATIENT
Start: 2018-04-23 | End: 2018-04-30 | Stop reason: HOSPADM

## 2018-04-23 RX ORDER — FENTANYL CITRATE 50 UG/ML
50 INJECTION, SOLUTION INTRAMUSCULAR; INTRAVENOUS ONCE
Status: COMPLETED | OUTPATIENT
Start: 2018-04-23 | End: 2018-04-23

## 2018-04-23 RX ORDER — DEXAMETHASONE SODIUM PHOSPHATE 4 MG/ML
4 INJECTION, SOLUTION INTRA-ARTICULAR; INTRALESIONAL; INTRAMUSCULAR; INTRAVENOUS; SOFT TISSUE EVERY 6 HOURS
Status: COMPLETED | OUTPATIENT
Start: 2018-04-23 | End: 2018-04-25

## 2018-04-23 RX ORDER — FENTANYL CITRATE 50 UG/ML
INJECTION, SOLUTION INTRAMUSCULAR; INTRAVENOUS
Status: COMPLETED
Start: 2018-04-23 | End: 2018-04-23

## 2018-04-23 RX ORDER — DEXAMETHASONE SODIUM PHOSPHATE 4 MG/ML
4 INJECTION, SOLUTION INTRA-ARTICULAR; INTRALESIONAL; INTRAMUSCULAR; INTRAVENOUS; SOFT TISSUE EVERY 6 HOURS
Status: DISCONTINUED | OUTPATIENT
Start: 2018-04-23 | End: 2018-04-23

## 2018-04-23 RX ORDER — FENTANYL CITRATE 50 UG/ML
50 INJECTION, SOLUTION INTRAMUSCULAR; INTRAVENOUS
Status: DISCONTINUED | OUTPATIENT
Start: 2018-04-23 | End: 2018-04-30 | Stop reason: HOSPADM

## 2018-04-23 RX ORDER — LABETALOL HYDROCHLORIDE 5 MG/ML
10 INJECTION, SOLUTION INTRAVENOUS
Status: DISCONTINUED | OUTPATIENT
Start: 2018-04-23 | End: 2018-04-25

## 2018-04-23 RX ORDER — FENTANYL CITRATE 50 UG/ML
25 INJECTION, SOLUTION INTRAMUSCULAR; INTRAVENOUS
Status: DISCONTINUED | OUTPATIENT
Start: 2018-04-23 | End: 2018-04-30 | Stop reason: HOSPADM

## 2018-04-23 RX ADMIN — FENTANYL CITRATE 50 MCG: 50 INJECTION, SOLUTION INTRAMUSCULAR; INTRAVENOUS at 12:58

## 2018-04-23 RX ADMIN — FENTANYL CITRATE 50 MCG: 50 INJECTION, SOLUTION INTRAMUSCULAR; INTRAVENOUS at 20:23

## 2018-04-23 RX ADMIN — PREDNISOLONE ACETATE 1 DROP: 10 SUSPENSION/ DROPS OPHTHALMIC at 12:26

## 2018-04-23 RX ADMIN — DEXAMETHASONE SODIUM PHOSPHATE 4 MG: 4 INJECTION, SOLUTION INTRAMUSCULAR; INTRAVENOUS at 12:26

## 2018-04-23 RX ADMIN — ENOXAPARIN SODIUM 40 MG: 40 INJECTION SUBCUTANEOUS at 11:28

## 2018-04-23 RX ADMIN — KETOROLAC TROMETHAMINE 1 DROP: 5 SOLUTION OPHTHALMIC at 12:26

## 2018-04-23 RX ADMIN — HYDROMORPHONE HYDROCHLORIDE 0.5 MG: 1 INJECTION, SOLUTION INTRAMUSCULAR; INTRAVENOUS; SUBCUTANEOUS at 01:38

## 2018-04-23 RX ADMIN — Medication 10 ML: at 11:38

## 2018-04-23 RX ADMIN — LIDOCAINE HYDROCHLORIDE 2 ML: 10 INJECTION, SOLUTION INFILTRATION; PERINEURAL at 17:10

## 2018-04-23 RX ADMIN — FENTANYL CITRATE 50 MCG: 50 INJECTION, SOLUTION INTRAMUSCULAR; INTRAVENOUS at 11:28

## 2018-04-23 RX ADMIN — PREDNISOLONE ACETATE 1 DROP: 10 SUSPENSION/ DROPS OPHTHALMIC at 20:25

## 2018-04-23 RX ADMIN — LABETALOL HYDROCHLORIDE 10 MG: 5 INJECTION, SOLUTION INTRAVENOUS at 10:58

## 2018-04-23 RX ADMIN — LABETALOL HYDROCHLORIDE 10 MG: 5 INJECTION, SOLUTION INTRAVENOUS at 15:56

## 2018-04-23 RX ADMIN — KETOROLAC TROMETHAMINE 1 DROP: 5 SOLUTION OPHTHALMIC at 20:26

## 2018-04-23 RX ADMIN — FENTANYL CITRATE 50 MCG: 50 INJECTION, SOLUTION INTRAMUSCULAR; INTRAVENOUS at 15:05

## 2018-04-23 RX ADMIN — KETOROLAC TROMETHAMINE 1 DROP: 5 SOLUTION OPHTHALMIC at 17:09

## 2018-04-23 RX ADMIN — DEXAMETHASONE SODIUM PHOSPHATE 4 MG: 4 INJECTION, SOLUTION INTRAMUSCULAR; INTRAVENOUS at 17:15

## 2018-04-23 RX ADMIN — PREDNISOLONE ACETATE 1 DROP: 10 SUSPENSION/ DROPS OPHTHALMIC at 17:09

## 2018-04-23 RX ADMIN — FENTANYL CITRATE 50 MCG: 50 INJECTION INTRAMUSCULAR; INTRAVENOUS at 11:28

## 2018-04-23 RX ADMIN — LABETALOL HYDROCHLORIDE 10 MG: 5 INJECTION, SOLUTION INTRAVENOUS at 18:11

## 2018-04-23 ASSESSMENT — PAIN DESCRIPTION - LOCATION: LOCATION: BACK

## 2018-04-23 ASSESSMENT — PAIN SCALES - GENERAL
PAINLEVEL_OUTOF10: 6
PAINLEVEL_OUTOF10: 7
PAINLEVEL_OUTOF10: 10
PAINLEVEL_OUTOF10: 7
PAINLEVEL_OUTOF10: 8
PAINLEVEL_OUTOF10: 7

## 2018-04-23 ASSESSMENT — PAIN DESCRIPTION - FREQUENCY: FREQUENCY: INTERMITTENT

## 2018-04-23 ASSESSMENT — PAIN DESCRIPTION - PAIN TYPE: TYPE: ACUTE PAIN

## 2018-04-23 ASSESSMENT — PAIN DESCRIPTION - DESCRIPTORS: DESCRIPTORS: ACHING;DISCOMFORT

## 2018-04-23 ASSESSMENT — PAIN DESCRIPTION - ORIENTATION: ORIENTATION: MID

## 2018-04-24 ENCOUNTER — APPOINTMENT (OUTPATIENT)
Dept: GENERAL RADIOLOGY | Age: 68
DRG: 472 | End: 2018-04-24
Attending: NEUROLOGICAL SURGERY
Payer: MEDICARE

## 2018-04-24 LAB
ABSOLUTE EOS #: <0.03 K/UL (ref 0–0.44)
ABSOLUTE EOS #: <0.03 K/UL (ref 0–0.44)
ABSOLUTE IMMATURE GRANULOCYTE: 0.08 K/UL (ref 0–0.3)
ABSOLUTE IMMATURE GRANULOCYTE: 0.11 K/UL (ref 0–0.3)
ABSOLUTE LYMPH #: 0.78 K/UL (ref 1.1–3.7)
ABSOLUTE LYMPH #: 0.87 K/UL (ref 1.1–3.7)
ABSOLUTE MONO #: 0.41 K/UL (ref 0.1–1.2)
ABSOLUTE MONO #: 0.62 K/UL (ref 0.1–1.2)
ANION GAP SERPL CALCULATED.3IONS-SCNC: 18 MMOL/L (ref 9–17)
BASOPHILS # BLD: 0 % (ref 0–2)
BASOPHILS # BLD: 0 % (ref 0–2)
BASOPHILS ABSOLUTE: <0.03 K/UL (ref 0–0.2)
BASOPHILS ABSOLUTE: <0.03 K/UL (ref 0–0.2)
BUN BLDV-MCNC: 23 MG/DL (ref 8–23)
BUN/CREAT BLD: ABNORMAL (ref 9–20)
CALCIUM SERPL-MCNC: 8.7 MG/DL (ref 8.6–10.4)
CHLORIDE BLD-SCNC: 101 MMOL/L (ref 98–107)
CO2: 20 MMOL/L (ref 20–31)
CREAT SERPL-MCNC: 0.48 MG/DL (ref 0.7–1.2)
DIFFERENTIAL TYPE: ABNORMAL
DIFFERENTIAL TYPE: ABNORMAL
EKG ATRIAL RATE: 113 BPM
EKG P AXIS: 66 DEGREES
EKG P-R INTERVAL: 146 MS
EKG Q-T INTERVAL: 336 MS
EKG QRS DURATION: 86 MS
EKG QTC CALCULATION (BAZETT): 460 MS
EKG R AXIS: 2 DEGREES
EKG T AXIS: 10 DEGREES
EKG VENTRICULAR RATE: 113 BPM
EOSINOPHILS RELATIVE PERCENT: 0 % (ref 1–4)
EOSINOPHILS RELATIVE PERCENT: 0 % (ref 1–4)
GFR AFRICAN AMERICAN: >60 ML/MIN
GFR NON-AFRICAN AMERICAN: >60 ML/MIN
GFR SERPL CREATININE-BSD FRML MDRD: ABNORMAL ML/MIN/{1.73_M2}
GFR SERPL CREATININE-BSD FRML MDRD: ABNORMAL ML/MIN/{1.73_M2}
GLUCOSE BLD-MCNC: 175 MG/DL (ref 75–110)
GLUCOSE BLD-MCNC: 183 MG/DL (ref 75–110)
GLUCOSE BLD-MCNC: 186 MG/DL (ref 75–110)
GLUCOSE BLD-MCNC: 192 MG/DL (ref 75–110)
GLUCOSE BLD-MCNC: 196 MG/DL (ref 70–99)
HCT VFR BLD CALC: 28.7 % (ref 40.7–50.3)
HCT VFR BLD CALC: 29 % (ref 40.7–50.3)
HEMOGLOBIN: 10 G/DL (ref 13–17)
HEMOGLOBIN: 9.9 G/DL (ref 13–17)
IMMATURE GRANULOCYTES: 1 %
IMMATURE GRANULOCYTES: 1 %
LYMPHOCYTES # BLD: 8 % (ref 24–43)
LYMPHOCYTES # BLD: 8 % (ref 24–43)
MCH RBC QN AUTO: 31.7 PG (ref 25.2–33.5)
MCH RBC QN AUTO: 31.8 PG (ref 25.2–33.5)
MCHC RBC AUTO-ENTMCNC: 34.5 G/DL (ref 28.4–34.8)
MCHC RBC AUTO-ENTMCNC: 34.5 G/DL (ref 28.4–34.8)
MCV RBC AUTO: 92 FL (ref 82.6–102.9)
MCV RBC AUTO: 92.4 FL (ref 82.6–102.9)
MONOCYTES # BLD: 4 % (ref 3–12)
MONOCYTES # BLD: 6 % (ref 3–12)
NRBC AUTOMATED: 0 PER 100 WBC
NRBC AUTOMATED: 0 PER 100 WBC
PDW BLD-RTO: 13 % (ref 11.8–14.4)
PDW BLD-RTO: 13.2 % (ref 11.8–14.4)
PLATELET # BLD: 268 K/UL (ref 138–453)
PLATELET # BLD: 322 K/UL (ref 138–453)
PLATELET ESTIMATE: ABNORMAL
PLATELET ESTIMATE: ABNORMAL
PMV BLD AUTO: 10.2 FL (ref 8.1–13.5)
PMV BLD AUTO: 9.3 FL (ref 8.1–13.5)
POTASSIUM SERPL-SCNC: 3.8 MMOL/L (ref 3.7–5.3)
RBC # BLD: 3.12 M/UL (ref 4.21–5.77)
RBC # BLD: 3.14 M/UL (ref 4.21–5.77)
RBC # BLD: ABNORMAL 10*6/UL
RBC # BLD: ABNORMAL 10*6/UL
SEG NEUTROPHILS: 85 % (ref 36–65)
SEG NEUTROPHILS: 87 % (ref 36–65)
SEGMENTED NEUTROPHILS ABSOLUTE COUNT: 8.46 K/UL (ref 1.5–8.1)
SEGMENTED NEUTROPHILS ABSOLUTE COUNT: 9.19 K/UL (ref 1.5–8.1)
SODIUM BLD-SCNC: 139 MMOL/L (ref 135–144)
TROPONIN INTERP: NORMAL
TROPONIN INTERP: NORMAL
TROPONIN T: <0.03 NG/ML
TROPONIN T: <0.03 NG/ML
WBC # BLD: 10.8 K/UL (ref 3.5–11.3)
WBC # BLD: 9.7 K/UL (ref 3.5–11.3)
WBC # BLD: ABNORMAL 10*3/UL
WBC # BLD: ABNORMAL 10*3/UL

## 2018-04-24 PROCEDURE — 85025 COMPLETE CBC W/AUTO DIFF WBC: CPT

## 2018-04-24 PROCEDURE — 97116 GAIT TRAINING THERAPY: CPT

## 2018-04-24 PROCEDURE — 2000000003 HC NEURO ICU R&B

## 2018-04-24 PROCEDURE — 6360000002 HC RX W HCPCS

## 2018-04-24 PROCEDURE — 6370000000 HC RX 637 (ALT 250 FOR IP): Performed by: NEUROLOGICAL SURGERY

## 2018-04-24 PROCEDURE — 80048 BASIC METABOLIC PNL TOTAL CA: CPT

## 2018-04-24 PROCEDURE — 84484 ASSAY OF TROPONIN QUANT: CPT

## 2018-04-24 PROCEDURE — 6360000002 HC RX W HCPCS: Performed by: REGISTERED NURSE

## 2018-04-24 PROCEDURE — 97530 THERAPEUTIC ACTIVITIES: CPT

## 2018-04-24 PROCEDURE — 94762 N-INVAS EAR/PLS OXIMTRY CONT: CPT

## 2018-04-24 PROCEDURE — 97110 THERAPEUTIC EXERCISES: CPT

## 2018-04-24 PROCEDURE — 2500000003 HC RX 250 WO HCPCS

## 2018-04-24 PROCEDURE — 2580000003 HC RX 258: Performed by: REGISTERED NURSE

## 2018-04-24 PROCEDURE — 6360000002 HC RX W HCPCS: Performed by: EMERGENCY MEDICINE

## 2018-04-24 PROCEDURE — 71045 X-RAY EXAM CHEST 1 VIEW: CPT

## 2018-04-24 PROCEDURE — 93005 ELECTROCARDIOGRAM TRACING: CPT

## 2018-04-24 PROCEDURE — 99232 SBSQ HOSP IP/OBS MODERATE 35: CPT | Performed by: INTERNAL MEDICINE

## 2018-04-24 PROCEDURE — 99223 1ST HOSP IP/OBS HIGH 75: CPT | Performed by: PSYCHIATRY & NEUROLOGY

## 2018-04-24 PROCEDURE — 36415 COLL VENOUS BLD VENIPUNCTURE: CPT

## 2018-04-24 PROCEDURE — 82947 ASSAY GLUCOSE BLOOD QUANT: CPT

## 2018-04-24 RX ORDER — LIDOCAINE HYDROCHLORIDE 10 MG/ML
INJECTION, SOLUTION INFILTRATION; PERINEURAL
Status: COMPLETED
Start: 2018-04-24 | End: 2018-04-24

## 2018-04-24 RX ORDER — MIDAZOLAM HYDROCHLORIDE 1 MG/ML
1 INJECTION INTRAMUSCULAR; INTRAVENOUS EVERY 4 HOURS PRN
Status: DISCONTINUED | OUTPATIENT
Start: 2018-04-24 | End: 2018-04-30 | Stop reason: HOSPADM

## 2018-04-24 RX ORDER — NITROGLYCERIN 0.4 MG/1
TABLET SUBLINGUAL
Status: DISPENSED
Start: 2018-04-24 | End: 2018-04-25

## 2018-04-24 RX ORDER — MORPHINE SULFATE 4 MG/ML
2 INJECTION, SOLUTION INTRAMUSCULAR; INTRAVENOUS ONCE
Status: COMPLETED | OUTPATIENT
Start: 2018-04-24 | End: 2018-04-24

## 2018-04-24 RX ORDER — MORPHINE SULFATE 4 MG/ML
INJECTION, SOLUTION INTRAMUSCULAR; INTRAVENOUS
Status: COMPLETED
Start: 2018-04-24 | End: 2018-04-24

## 2018-04-24 RX ORDER — HYDRALAZINE HYDROCHLORIDE 20 MG/ML
10 INJECTION INTRAMUSCULAR; INTRAVENOUS EVERY 4 HOURS PRN
Status: DISCONTINUED | OUTPATIENT
Start: 2018-04-24 | End: 2018-04-25

## 2018-04-24 RX ADMIN — KETOROLAC TROMETHAMINE 1 DROP: 5 SOLUTION OPHTHALMIC at 14:21

## 2018-04-24 RX ADMIN — LABETALOL HYDROCHLORIDE 10 MG: 5 INJECTION, SOLUTION INTRAVENOUS at 17:16

## 2018-04-24 RX ADMIN — DEXAMETHASONE SODIUM PHOSPHATE 4 MG: 4 INJECTION, SOLUTION INTRAMUSCULAR; INTRAVENOUS at 00:28

## 2018-04-24 RX ADMIN — DEXAMETHASONE SODIUM PHOSPHATE 4 MG: 4 INJECTION, SOLUTION INTRAMUSCULAR; INTRAVENOUS at 17:28

## 2018-04-24 RX ADMIN — PREDNISOLONE ACETATE 1 DROP: 10 SUSPENSION/ DROPS OPHTHALMIC at 09:08

## 2018-04-24 RX ADMIN — DEXAMETHASONE SODIUM PHOSPHATE 4 MG: 4 INJECTION, SOLUTION INTRAMUSCULAR; INTRAVENOUS at 06:30

## 2018-04-24 RX ADMIN — FENTANYL CITRATE 50 MCG: 50 INJECTION, SOLUTION INTRAMUSCULAR; INTRAVENOUS at 11:51

## 2018-04-24 RX ADMIN — MIDAZOLAM HYDROCHLORIDE 1 MG: 1 INJECTION, SOLUTION INTRAMUSCULAR; INTRAVENOUS at 21:21

## 2018-04-24 RX ADMIN — FENTANYL CITRATE 50 MCG: 50 INJECTION, SOLUTION INTRAMUSCULAR; INTRAVENOUS at 20:26

## 2018-04-24 RX ADMIN — Medication 10 ML: at 09:08

## 2018-04-24 RX ADMIN — HYDRALAZINE HYDROCHLORIDE 10 MG: 20 INJECTION INTRAMUSCULAR; INTRAVENOUS at 17:57

## 2018-04-24 RX ADMIN — MORPHINE SULFATE 2 MG: 4 INJECTION INTRAVENOUS at 17:15

## 2018-04-24 RX ADMIN — FENTANYL CITRATE 50 MCG: 50 INJECTION, SOLUTION INTRAMUSCULAR; INTRAVENOUS at 18:16

## 2018-04-24 RX ADMIN — FENTANYL CITRATE 50 MCG: 50 INJECTION, SOLUTION INTRAMUSCULAR; INTRAVENOUS at 16:05

## 2018-04-24 RX ADMIN — LIDOCAINE HYDROCHLORIDE: 10 INJECTION, SOLUTION INFILTRATION; PERINEURAL at 16:45

## 2018-04-24 RX ADMIN — KETOROLAC TROMETHAMINE 1 DROP: 5 SOLUTION OPHTHALMIC at 17:18

## 2018-04-24 RX ADMIN — PREDNISOLONE ACETATE 1 DROP: 10 SUSPENSION/ DROPS OPHTHALMIC at 14:21

## 2018-04-24 RX ADMIN — KETOROLAC TROMETHAMINE 1 DROP: 5 SOLUTION OPHTHALMIC at 09:08

## 2018-04-24 RX ADMIN — PREDNISOLONE ACETATE 1 DROP: 10 SUSPENSION/ DROPS OPHTHALMIC at 17:17

## 2018-04-24 RX ADMIN — INSULIN LISPRO 1 UNITS: 100 INJECTION, SOLUTION INTRAVENOUS; SUBCUTANEOUS at 20:27

## 2018-04-24 RX ADMIN — INSULIN LISPRO 1 UNITS: 100 INJECTION, SOLUTION INTRAVENOUS; SUBCUTANEOUS at 12:20

## 2018-04-24 RX ADMIN — PREDNISOLONE ACETATE 1 DROP: 10 SUSPENSION/ DROPS OPHTHALMIC at 20:27

## 2018-04-24 RX ADMIN — FENTANYL CITRATE 50 MCG: 50 INJECTION, SOLUTION INTRAMUSCULAR; INTRAVENOUS at 03:07

## 2018-04-24 RX ADMIN — KETOROLAC TROMETHAMINE 1 DROP: 5 SOLUTION OPHTHALMIC at 20:27

## 2018-04-24 RX ADMIN — FENTANYL CITRATE 50 MCG: 50 INJECTION, SOLUTION INTRAMUSCULAR; INTRAVENOUS at 00:18

## 2018-04-24 RX ADMIN — DEXAMETHASONE SODIUM PHOSPHATE 4 MG: 4 INJECTION, SOLUTION INTRAMUSCULAR; INTRAVENOUS at 12:23

## 2018-04-24 RX ADMIN — INSULIN LISPRO 1 UNITS: 100 INJECTION, SOLUTION INTRAVENOUS; SUBCUTANEOUS at 17:27

## 2018-04-24 RX ADMIN — MORPHINE SULFATE 2 MG: 4 INJECTION, SOLUTION INTRAMUSCULAR; INTRAVENOUS at 17:15

## 2018-04-24 ASSESSMENT — PAIN SCALES - GENERAL
PAINLEVEL_OUTOF10: 10
PAINLEVEL_OUTOF10: 3
PAINLEVEL_OUTOF10: 8
PAINLEVEL_OUTOF10: 8
PAINLEVEL_OUTOF10: 7
PAINLEVEL_OUTOF10: 1
PAINLEVEL_OUTOF10: 8
PAINLEVEL_OUTOF10: 9
PAINLEVEL_OUTOF10: 2

## 2018-04-25 ENCOUNTER — APPOINTMENT (OUTPATIENT)
Dept: GENERAL RADIOLOGY | Age: 68
DRG: 472 | End: 2018-04-25
Attending: NEUROLOGICAL SURGERY
Payer: MEDICARE

## 2018-04-25 LAB
ABSOLUTE EOS #: <0.03 K/UL (ref 0–0.44)
ABSOLUTE IMMATURE GRANULOCYTE: 0.12 K/UL (ref 0–0.3)
ABSOLUTE LYMPH #: 0.85 K/UL (ref 1.1–3.7)
ABSOLUTE MONO #: 0.66 K/UL (ref 0.1–1.2)
ANION GAP SERPL CALCULATED.3IONS-SCNC: 15 MMOL/L (ref 9–17)
BASOPHILS # BLD: 0 % (ref 0–2)
BASOPHILS ABSOLUTE: <0.03 K/UL (ref 0–0.2)
BUN BLDV-MCNC: 23 MG/DL (ref 8–23)
BUN/CREAT BLD: ABNORMAL (ref 9–20)
CALCIUM SERPL-MCNC: 8.4 MG/DL (ref 8.6–10.4)
CHLORIDE BLD-SCNC: 101 MMOL/L (ref 98–107)
CO2: 21 MMOL/L (ref 20–31)
CREAT SERPL-MCNC: 0.43 MG/DL (ref 0.7–1.2)
DIFFERENTIAL TYPE: ABNORMAL
EOSINOPHILS RELATIVE PERCENT: 0 % (ref 1–4)
GFR AFRICAN AMERICAN: >60 ML/MIN
GFR NON-AFRICAN AMERICAN: >60 ML/MIN
GFR SERPL CREATININE-BSD FRML MDRD: ABNORMAL ML/MIN/{1.73_M2}
GFR SERPL CREATININE-BSD FRML MDRD: ABNORMAL ML/MIN/{1.73_M2}
GLUCOSE BLD-MCNC: 179 MG/DL (ref 75–110)
GLUCOSE BLD-MCNC: 201 MG/DL (ref 70–99)
GLUCOSE BLD-MCNC: 204 MG/DL (ref 75–110)
GLUCOSE BLD-MCNC: 234 MG/DL (ref 75–110)
GLUCOSE BLD-MCNC: 277 MG/DL (ref 75–110)
HCT VFR BLD CALC: 29.9 % (ref 40.7–50.3)
HEMOGLOBIN: 10.3 G/DL (ref 13–17)
IMMATURE GRANULOCYTES: 1 %
LYMPHOCYTES # BLD: 8 % (ref 24–43)
MCH RBC QN AUTO: 31.9 PG (ref 25.2–33.5)
MCHC RBC AUTO-ENTMCNC: 34.4 G/DL (ref 28.4–34.8)
MCV RBC AUTO: 92.6 FL (ref 82.6–102.9)
MONOCYTES # BLD: 6 % (ref 3–12)
NRBC AUTOMATED: 0 PER 100 WBC
PDW BLD-RTO: 13.2 % (ref 11.8–14.4)
PLATELET # BLD: 298 K/UL (ref 138–453)
PLATELET ESTIMATE: ABNORMAL
PMV BLD AUTO: 9.5 FL (ref 8.1–13.5)
POTASSIUM SERPL-SCNC: 4.1 MMOL/L (ref 3.7–5.3)
RBC # BLD: 3.23 M/UL (ref 4.21–5.77)
RBC # BLD: ABNORMAL 10*6/UL
SEG NEUTROPHILS: 85 % (ref 36–65)
SEGMENTED NEUTROPHILS ABSOLUTE COUNT: 8.97 K/UL (ref 1.5–8.1)
SODIUM BLD-SCNC: 137 MMOL/L (ref 135–144)
WBC # BLD: 10.6 K/UL (ref 3.5–11.3)
WBC # BLD: ABNORMAL 10*3/UL

## 2018-04-25 PROCEDURE — 36415 COLL VENOUS BLD VENIPUNCTURE: CPT

## 2018-04-25 PROCEDURE — 85025 COMPLETE CBC W/AUTO DIFF WBC: CPT

## 2018-04-25 PROCEDURE — 82947 ASSAY GLUCOSE BLOOD QUANT: CPT

## 2018-04-25 PROCEDURE — 6360000002 HC RX W HCPCS: Performed by: EMERGENCY MEDICINE

## 2018-04-25 PROCEDURE — 97116 GAIT TRAINING THERAPY: CPT

## 2018-04-25 PROCEDURE — 97530 THERAPEUTIC ACTIVITIES: CPT

## 2018-04-25 PROCEDURE — 74230 X-RAY XM SWLNG FUNCJ C+: CPT

## 2018-04-25 PROCEDURE — 99233 SBSQ HOSP IP/OBS HIGH 50: CPT | Performed by: INTERNAL MEDICINE

## 2018-04-25 PROCEDURE — 92611 MOTION FLUOROSCOPY/SWALLOW: CPT

## 2018-04-25 PROCEDURE — 6370000000 HC RX 637 (ALT 250 FOR IP): Performed by: REGISTERED NURSE

## 2018-04-25 PROCEDURE — G8997 SWALLOW GOAL STATUS: HCPCS

## 2018-04-25 PROCEDURE — 97535 SELF CARE MNGMENT TRAINING: CPT

## 2018-04-25 PROCEDURE — 6370000000 HC RX 637 (ALT 250 FOR IP): Performed by: NEUROLOGICAL SURGERY

## 2018-04-25 PROCEDURE — 99232 SBSQ HOSP IP/OBS MODERATE 35: CPT | Performed by: PSYCHIATRY & NEUROLOGY

## 2018-04-25 PROCEDURE — 2500000003 HC RX 250 WO HCPCS

## 2018-04-25 PROCEDURE — G8996 SWALLOW CURRENT STATUS: HCPCS

## 2018-04-25 PROCEDURE — 97110 THERAPEUTIC EXERCISES: CPT

## 2018-04-25 PROCEDURE — 2000000003 HC NEURO ICU R&B

## 2018-04-25 PROCEDURE — 94762 N-INVAS EAR/PLS OXIMTRY CONT: CPT

## 2018-04-25 PROCEDURE — 6360000002 HC RX W HCPCS: Performed by: NEUROLOGICAL SURGERY

## 2018-04-25 PROCEDURE — 80048 BASIC METABOLIC PNL TOTAL CA: CPT

## 2018-04-25 RX ORDER — LABETALOL HYDROCHLORIDE 5 MG/ML
10 INJECTION, SOLUTION INTRAVENOUS
Status: DISCONTINUED | OUTPATIENT
Start: 2018-04-25 | End: 2018-04-30 | Stop reason: HOSPADM

## 2018-04-25 RX ORDER — LIDOCAINE HYDROCHLORIDE 10 MG/ML
INJECTION, SOLUTION INFILTRATION; PERINEURAL
Status: COMPLETED
Start: 2018-04-25 | End: 2018-04-25

## 2018-04-25 RX ORDER — HYDRALAZINE HYDROCHLORIDE 20 MG/ML
10 INJECTION INTRAMUSCULAR; INTRAVENOUS EVERY 4 HOURS PRN
Status: DISCONTINUED | OUTPATIENT
Start: 2018-04-25 | End: 2018-04-30 | Stop reason: HOSPADM

## 2018-04-25 RX ADMIN — PREDNISOLONE ACETATE 1 DROP: 10 SUSPENSION/ DROPS OPHTHALMIC at 13:16

## 2018-04-25 RX ADMIN — CARVEDILOL 25 MG: 25 TABLET, FILM COATED ORAL at 17:12

## 2018-04-25 RX ADMIN — DEXAMETHASONE SODIUM PHOSPHATE 4 MG: 4 INJECTION, SOLUTION INTRAMUSCULAR; INTRAVENOUS at 00:32

## 2018-04-25 RX ADMIN — ATORVASTATIN CALCIUM 80 MG: 80 TABLET, FILM COATED ORAL at 20:25

## 2018-04-25 RX ADMIN — FENTANYL CITRATE 50 MCG: 50 INJECTION, SOLUTION INTRAMUSCULAR; INTRAVENOUS at 05:49

## 2018-04-25 RX ADMIN — VENLAFAXINE 100 MG: 100 TABLET ORAL at 14:47

## 2018-04-25 RX ADMIN — FENTANYL CITRATE 50 MCG: 50 INJECTION, SOLUTION INTRAMUSCULAR; INTRAVENOUS at 13:14

## 2018-04-25 RX ADMIN — INSULIN LISPRO 2 UNITS: 100 INJECTION, SOLUTION INTRAVENOUS; SUBCUTANEOUS at 14:47

## 2018-04-25 RX ADMIN — GABAPENTIN 300 MG: 300 CAPSULE ORAL at 20:25

## 2018-04-25 RX ADMIN — LIDOCAINE HYDROCHLORIDE: 10 INJECTION, SOLUTION INFILTRATION; PERINEURAL at 08:30

## 2018-04-25 RX ADMIN — KETOROLAC TROMETHAMINE 1 DROP: 5 SOLUTION OPHTHALMIC at 09:25

## 2018-04-25 RX ADMIN — HYDRALAZINE HYDROCHLORIDE 10 MG: 20 INJECTION INTRAMUSCULAR; INTRAVENOUS at 10:04

## 2018-04-25 RX ADMIN — VENLAFAXINE 100 MG: 100 TABLET ORAL at 20:25

## 2018-04-25 RX ADMIN — PREDNISOLONE ACETATE 1 DROP: 10 SUSPENSION/ DROPS OPHTHALMIC at 09:25

## 2018-04-25 RX ADMIN — TRAZODONE HYDROCHLORIDE 100 MG: 100 TABLET ORAL at 20:25

## 2018-04-25 RX ADMIN — INSULIN LISPRO 2 UNITS: 100 INJECTION, SOLUTION INTRAVENOUS; SUBCUTANEOUS at 20:43

## 2018-04-25 RX ADMIN — DEXAMETHASONE SODIUM PHOSPHATE 4 MG: 4 INJECTION, SOLUTION INTRAMUSCULAR; INTRAVENOUS at 05:50

## 2018-04-25 RX ADMIN — FENTANYL CITRATE 50 MCG: 50 INJECTION, SOLUTION INTRAMUSCULAR; INTRAVENOUS at 08:30

## 2018-04-25 RX ADMIN — FENTANYL CITRATE 50 MCG: 50 INJECTION, SOLUTION INTRAMUSCULAR; INTRAVENOUS at 04:16

## 2018-04-25 RX ADMIN — KETOROLAC TROMETHAMINE 1 DROP: 5 SOLUTION OPHTHALMIC at 13:16

## 2018-04-25 RX ADMIN — KETOROLAC TROMETHAMINE 1 DROP: 5 SOLUTION OPHTHALMIC at 17:08

## 2018-04-25 RX ADMIN — INSULIN LISPRO 2 UNITS: 100 INJECTION, SOLUTION INTRAVENOUS; SUBCUTANEOUS at 17:42

## 2018-04-25 RX ADMIN — FENTANYL CITRATE 50 MCG: 50 INJECTION, SOLUTION INTRAMUSCULAR; INTRAVENOUS at 00:33

## 2018-04-25 RX ADMIN — PREDNISOLONE ACETATE 1 DROP: 10 SUSPENSION/ DROPS OPHTHALMIC at 20:25

## 2018-04-25 RX ADMIN — PREDNISOLONE ACETATE 1 DROP: 10 SUSPENSION/ DROPS OPHTHALMIC at 17:09

## 2018-04-25 RX ADMIN — ENOXAPARIN SODIUM 40 MG: 40 INJECTION SUBCUTANEOUS at 09:25

## 2018-04-25 RX ADMIN — GABAPENTIN 300 MG: 300 CAPSULE ORAL at 14:46

## 2018-04-25 RX ADMIN — KETOROLAC TROMETHAMINE 1 DROP: 5 SOLUTION OPHTHALMIC at 20:25

## 2018-04-25 ASSESSMENT — PAIN SCALES - GENERAL
PAINLEVEL_OUTOF10: 7
PAINLEVEL_OUTOF10: 5
PAINLEVEL_OUTOF10: 4
PAINLEVEL_OUTOF10: 8
PAINLEVEL_OUTOF10: 3
PAINLEVEL_OUTOF10: 7

## 2018-04-25 ASSESSMENT — PAIN DESCRIPTION - PAIN TYPE
TYPE: ACUTE PAIN
TYPE: ACUTE PAIN

## 2018-04-25 ASSESSMENT — PAIN DESCRIPTION - PROGRESSION: CLINICAL_PROGRESSION: NOT CHANGED

## 2018-04-25 ASSESSMENT — PAIN DESCRIPTION - ONSET: ONSET: ON-GOING

## 2018-04-25 ASSESSMENT — PAIN DESCRIPTION - ORIENTATION
ORIENTATION: RIGHT;LEFT
ORIENTATION: UPPER;MID

## 2018-04-25 ASSESSMENT — PAIN DESCRIPTION - FREQUENCY
FREQUENCY: INTERMITTENT
FREQUENCY: INTERMITTENT

## 2018-04-25 ASSESSMENT — PAIN DESCRIPTION - DESCRIPTORS
DESCRIPTORS: ACHING;DISCOMFORT
DESCRIPTORS: ACHING;DISCOMFORT

## 2018-04-25 ASSESSMENT — PAIN DESCRIPTION - LOCATION
LOCATION: BACK;NECK
LOCATION: SHOULDER

## 2018-04-25 ASSESSMENT — PAIN SCALES - WONG BAKER: WONGBAKER_NUMERICALRESPONSE: 2

## 2018-04-26 LAB
ABSOLUTE EOS #: <0.03 K/UL (ref 0–0.44)
ABSOLUTE IMMATURE GRANULOCYTE: 0.08 K/UL (ref 0–0.3)
ABSOLUTE LYMPH #: 1.71 K/UL (ref 1.1–3.7)
ABSOLUTE MONO #: 1.2 K/UL (ref 0.1–1.2)
ANION GAP SERPL CALCULATED.3IONS-SCNC: 12 MMOL/L (ref 9–17)
BASOPHILS # BLD: 0 % (ref 0–2)
BASOPHILS ABSOLUTE: <0.03 K/UL (ref 0–0.2)
BETA-2 TRANSFERRIN FLUID: NOT DETECTED
BUN BLDV-MCNC: 26 MG/DL (ref 8–23)
BUN/CREAT BLD: ABNORMAL (ref 9–20)
CALCIUM SERPL-MCNC: 8.2 MG/DL (ref 8.6–10.4)
CHLORIDE BLD-SCNC: 103 MMOL/L (ref 98–107)
CO2: 24 MMOL/L (ref 20–31)
CREAT SERPL-MCNC: 0.44 MG/DL (ref 0.7–1.2)
DIFFERENTIAL TYPE: ABNORMAL
EOSINOPHILS RELATIVE PERCENT: 0 % (ref 1–4)
GFR AFRICAN AMERICAN: >60 ML/MIN
GFR NON-AFRICAN AMERICAN: >60 ML/MIN
GFR SERPL CREATININE-BSD FRML MDRD: ABNORMAL ML/MIN/{1.73_M2}
GFR SERPL CREATININE-BSD FRML MDRD: ABNORMAL ML/MIN/{1.73_M2}
GLUCOSE BLD-MCNC: 142 MG/DL (ref 75–110)
GLUCOSE BLD-MCNC: 145 MG/DL (ref 70–99)
GLUCOSE BLD-MCNC: 153 MG/DL (ref 75–110)
GLUCOSE BLD-MCNC: 199 MG/DL (ref 75–110)
GLUCOSE BLD-MCNC: 201 MG/DL (ref 75–110)
HCT VFR BLD CALC: 26.5 % (ref 40.7–50.3)
HEMOGLOBIN: 9.2 G/DL (ref 13–17)
IMMATURE GRANULOCYTES: 1 %
LYMPHOCYTES # BLD: 19 % (ref 24–43)
MCH RBC QN AUTO: 32.1 PG (ref 25.2–33.5)
MCHC RBC AUTO-ENTMCNC: 34.7 G/DL (ref 28.4–34.8)
MCV RBC AUTO: 92.3 FL (ref 82.6–102.9)
MONOCYTES # BLD: 13 % (ref 3–12)
NRBC AUTOMATED: 0 PER 100 WBC
PDW BLD-RTO: 13.2 % (ref 11.8–14.4)
PLATELET # BLD: 309 K/UL (ref 138–453)
PLATELET ESTIMATE: ABNORMAL
PMV BLD AUTO: 8.8 FL (ref 8.1–13.5)
POTASSIUM SERPL-SCNC: 3.6 MMOL/L (ref 3.7–5.3)
RBC # BLD: 2.87 M/UL (ref 4.21–5.77)
RBC # BLD: ABNORMAL 10*6/UL
SEG NEUTROPHILS: 67 % (ref 36–65)
SEGMENTED NEUTROPHILS ABSOLUTE COUNT: 6.07 K/UL (ref 1.5–8.1)
SODIUM BLD-SCNC: 139 MMOL/L (ref 135–144)
WBC # BLD: 9.1 K/UL (ref 3.5–11.3)
WBC # BLD: ABNORMAL 10*3/UL

## 2018-04-26 PROCEDURE — 99221 1ST HOSP IP/OBS SF/LOW 40: CPT | Performed by: PHYSICAL MEDICINE & REHABILITATION

## 2018-04-26 PROCEDURE — 6370000000 HC RX 637 (ALT 250 FOR IP): Performed by: INTERNAL MEDICINE

## 2018-04-26 PROCEDURE — 6370000000 HC RX 637 (ALT 250 FOR IP): Performed by: REGISTERED NURSE

## 2018-04-26 PROCEDURE — 2580000003 HC RX 258: Performed by: REGISTERED NURSE

## 2018-04-26 PROCEDURE — 97530 THERAPEUTIC ACTIVITIES: CPT

## 2018-04-26 PROCEDURE — 6370000000 HC RX 637 (ALT 250 FOR IP): Performed by: NEUROLOGICAL SURGERY

## 2018-04-26 PROCEDURE — 85025 COMPLETE CBC W/AUTO DIFF WBC: CPT

## 2018-04-26 PROCEDURE — 82947 ASSAY GLUCOSE BLOOD QUANT: CPT

## 2018-04-26 PROCEDURE — 94762 N-INVAS EAR/PLS OXIMTRY CONT: CPT

## 2018-04-26 PROCEDURE — 99233 SBSQ HOSP IP/OBS HIGH 50: CPT | Performed by: INTERNAL MEDICINE

## 2018-04-26 PROCEDURE — 6360000002 HC RX W HCPCS: Performed by: NEUROLOGICAL SURGERY

## 2018-04-26 PROCEDURE — 80048 BASIC METABOLIC PNL TOTAL CA: CPT

## 2018-04-26 PROCEDURE — 2000000003 HC NEURO ICU R&B

## 2018-04-26 PROCEDURE — 36415 COLL VENOUS BLD VENIPUNCTURE: CPT

## 2018-04-26 RX ADMIN — ATORVASTATIN CALCIUM 80 MG: 80 TABLET, FILM COATED ORAL at 20:14

## 2018-04-26 RX ADMIN — VENLAFAXINE 100 MG: 100 TABLET ORAL at 20:14

## 2018-04-26 RX ADMIN — INSULIN LISPRO 1 UNITS: 100 INJECTION, SOLUTION INTRAVENOUS; SUBCUTANEOUS at 12:10

## 2018-04-26 RX ADMIN — DOCUSATE SODIUM -SENNOSIDES 1 TABLET: 50; 8.6 TABLET, COATED ORAL at 09:08

## 2018-04-26 RX ADMIN — Medication 10 ML: at 09:52

## 2018-04-26 RX ADMIN — KETOROLAC TROMETHAMINE 1 DROP: 5 SOLUTION OPHTHALMIC at 20:33

## 2018-04-26 RX ADMIN — OXYCODONE HYDROCHLORIDE AND ACETAMINOPHEN 1 TABLET: 5; 325 TABLET ORAL at 09:12

## 2018-04-26 RX ADMIN — PREDNISOLONE ACETATE 1 DROP: 10 SUSPENSION/ DROPS OPHTHALMIC at 20:32

## 2018-04-26 RX ADMIN — KETOROLAC TROMETHAMINE 1 DROP: 5 SOLUTION OPHTHALMIC at 13:03

## 2018-04-26 RX ADMIN — INSULIN LISPRO 2 UNITS: 100 INJECTION, SOLUTION INTRAVENOUS; SUBCUTANEOUS at 16:51

## 2018-04-26 RX ADMIN — ERGOCALCIFEROL 50000 UNITS: 1.25 CAPSULE ORAL at 09:08

## 2018-04-26 RX ADMIN — CETIRIZINE HYDROCHLORIDE 10 MG: 10 TABLET ORAL at 09:08

## 2018-04-26 RX ADMIN — Medication 10 ML: at 20:14

## 2018-04-26 RX ADMIN — GABAPENTIN 300 MG: 300 CAPSULE ORAL at 09:08

## 2018-04-26 RX ADMIN — VENLAFAXINE 100 MG: 100 TABLET ORAL at 09:08

## 2018-04-26 RX ADMIN — MAGNESIUM HYDROXIDE 30 ML: 400 SUSPENSION ORAL at 13:06

## 2018-04-26 RX ADMIN — PREDNISOLONE ACETATE 1 DROP: 10 SUSPENSION/ DROPS OPHTHALMIC at 09:34

## 2018-04-26 RX ADMIN — PREDNISOLONE ACETATE 1 DROP: 10 SUSPENSION/ DROPS OPHTHALMIC at 13:03

## 2018-04-26 RX ADMIN — GABAPENTIN 300 MG: 300 CAPSULE ORAL at 20:14

## 2018-04-26 RX ADMIN — GABAPENTIN 300 MG: 300 CAPSULE ORAL at 13:05

## 2018-04-26 RX ADMIN — KETOROLAC TROMETHAMINE 1 DROP: 5 SOLUTION OPHTHALMIC at 09:34

## 2018-04-26 RX ADMIN — INSULIN LISPRO 1 UNITS: 100 INJECTION, SOLUTION INTRAVENOUS; SUBCUTANEOUS at 08:02

## 2018-04-26 RX ADMIN — VENLAFAXINE 100 MG: 100 TABLET ORAL at 14:18

## 2018-04-26 RX ADMIN — TRAZODONE HYDROCHLORIDE 100 MG: 100 TABLET ORAL at 20:14

## 2018-04-26 RX ADMIN — ENOXAPARIN SODIUM 40 MG: 40 INJECTION SUBCUTANEOUS at 09:09

## 2018-04-26 RX ADMIN — CARVEDILOL 25 MG: 25 TABLET, FILM COATED ORAL at 17:33

## 2018-04-26 RX ADMIN — CARVEDILOL 25 MG: 25 TABLET, FILM COATED ORAL at 08:02

## 2018-04-26 RX ADMIN — INSULIN LISPRO 1 UNITS: 100 INJECTION, SOLUTION INTRAVENOUS; SUBCUTANEOUS at 20:35

## 2018-04-26 RX ADMIN — AMLODIPINE BESYLATE 10 MG: 10 TABLET ORAL at 09:08

## 2018-04-26 RX ADMIN — PANTOPRAZOLE SODIUM 40 MG: 40 TABLET, DELAYED RELEASE ORAL at 08:02

## 2018-04-26 RX ADMIN — DIVALPROEX SODIUM 500 MG: 500 TABLET, EXTENDED RELEASE ORAL at 09:29

## 2018-04-26 RX ADMIN — KETOROLAC TROMETHAMINE 1 DROP: 5 SOLUTION OPHTHALMIC at 16:51

## 2018-04-26 RX ADMIN — OXYCODONE HYDROCHLORIDE AND ACETAMINOPHEN 2 TABLET: 5; 325 TABLET ORAL at 13:24

## 2018-04-26 RX ADMIN — DOCUSATE SODIUM -SENNOSIDES 1 TABLET: 50; 8.6 TABLET, COATED ORAL at 20:14

## 2018-04-26 RX ADMIN — PREDNISOLONE ACETATE 1 DROP: 10 SUSPENSION/ DROPS OPHTHALMIC at 16:51

## 2018-04-26 RX ADMIN — OXYCODONE HYDROCHLORIDE AND ACETAMINOPHEN 1 TABLET: 5; 325 TABLET ORAL at 20:13

## 2018-04-26 ASSESSMENT — PAIN SCALES - GENERAL
PAINLEVEL_OUTOF10: 9
PAINLEVEL_OUTOF10: 5
PAINLEVEL_OUTOF10: 2
PAINLEVEL_OUTOF10: 4
PAINLEVEL_OUTOF10: 5
PAINLEVEL_OUTOF10: 3

## 2018-04-26 ASSESSMENT — PAIN DESCRIPTION - ORIENTATION
ORIENTATION: RIGHT;LEFT;MID
ORIENTATION: RIGHT;LEFT;MID

## 2018-04-26 ASSESSMENT — PAIN DESCRIPTION - LOCATION
LOCATION: SHOULDER
LOCATION: ARM;BACK;SHOULDER
LOCATION: ARM;BACK;SHOULDER
LOCATION: ARM;BACK

## 2018-04-26 ASSESSMENT — PAIN DESCRIPTION - PAIN TYPE
TYPE: ACUTE PAIN
TYPE: ACUTE PAIN

## 2018-04-27 LAB
ABSOLUTE EOS #: 0.14 K/UL (ref 0–0.44)
ABSOLUTE IMMATURE GRANULOCYTE: 0.1 K/UL (ref 0–0.3)
ABSOLUTE LYMPH #: 1.9 K/UL (ref 1.1–3.7)
ABSOLUTE MONO #: 1.03 K/UL (ref 0.1–1.2)
ANION GAP SERPL CALCULATED.3IONS-SCNC: 10 MMOL/L (ref 9–17)
BASOPHILS # BLD: 0 % (ref 0–2)
BASOPHILS ABSOLUTE: <0.03 K/UL (ref 0–0.2)
BUN BLDV-MCNC: 21 MG/DL (ref 8–23)
BUN/CREAT BLD: ABNORMAL (ref 9–20)
CALCIUM SERPL-MCNC: 8.1 MG/DL (ref 8.6–10.4)
CHLORIDE BLD-SCNC: 97 MMOL/L (ref 98–107)
CO2: 26 MMOL/L (ref 20–31)
CREAT SERPL-MCNC: 0.51 MG/DL (ref 0.7–1.2)
DIFFERENTIAL TYPE: ABNORMAL
EOSINOPHILS RELATIVE PERCENT: 2 % (ref 1–4)
GFR AFRICAN AMERICAN: >60 ML/MIN
GFR NON-AFRICAN AMERICAN: >60 ML/MIN
GFR SERPL CREATININE-BSD FRML MDRD: ABNORMAL ML/MIN/{1.73_M2}
GFR SERPL CREATININE-BSD FRML MDRD: ABNORMAL ML/MIN/{1.73_M2}
GLUCOSE BLD-MCNC: 168 MG/DL (ref 75–110)
GLUCOSE BLD-MCNC: 222 MG/DL (ref 75–110)
GLUCOSE BLD-MCNC: 230 MG/DL (ref 70–99)
GLUCOSE BLD-MCNC: 243 MG/DL (ref 75–110)
HCT VFR BLD CALC: 27.2 % (ref 40.7–50.3)
HEMOGLOBIN: 9.2 G/DL (ref 13–17)
IMMATURE GRANULOCYTES: 1 %
LYMPHOCYTES # BLD: 26 % (ref 24–43)
MCH RBC QN AUTO: 31.4 PG (ref 25.2–33.5)
MCHC RBC AUTO-ENTMCNC: 33.8 G/DL (ref 28.4–34.8)
MCV RBC AUTO: 92.8 FL (ref 82.6–102.9)
MONOCYTES # BLD: 14 % (ref 3–12)
NRBC AUTOMATED: 0 PER 100 WBC
PDW BLD-RTO: 13.2 % (ref 11.8–14.4)
PLATELET # BLD: 306 K/UL (ref 138–453)
PLATELET ESTIMATE: ABNORMAL
PMV BLD AUTO: 9.3 FL (ref 8.1–13.5)
POTASSIUM SERPL-SCNC: 4 MMOL/L (ref 3.7–5.3)
RBC # BLD: 2.93 M/UL (ref 4.21–5.77)
RBC # BLD: ABNORMAL 10*6/UL
SEG NEUTROPHILS: 57 % (ref 36–65)
SEGMENTED NEUTROPHILS ABSOLUTE COUNT: 4.27 K/UL (ref 1.5–8.1)
SODIUM BLD-SCNC: 133 MMOL/L (ref 135–144)
WBC # BLD: 7.5 K/UL (ref 3.5–11.3)
WBC # BLD: ABNORMAL 10*3/UL

## 2018-04-27 PROCEDURE — 92526 ORAL FUNCTION THERAPY: CPT

## 2018-04-27 PROCEDURE — 6360000002 HC RX W HCPCS: Performed by: NEUROLOGICAL SURGERY

## 2018-04-27 PROCEDURE — 6370000000 HC RX 637 (ALT 250 FOR IP): Performed by: INTERNAL MEDICINE

## 2018-04-27 PROCEDURE — 99232 SBSQ HOSP IP/OBS MODERATE 35: CPT | Performed by: INTERNAL MEDICINE

## 2018-04-27 PROCEDURE — 6370000000 HC RX 637 (ALT 250 FOR IP): Performed by: NEUROLOGICAL SURGERY

## 2018-04-27 PROCEDURE — 2000000003 HC NEURO ICU R&B

## 2018-04-27 PROCEDURE — 97116 GAIT TRAINING THERAPY: CPT

## 2018-04-27 PROCEDURE — 80048 BASIC METABOLIC PNL TOTAL CA: CPT

## 2018-04-27 PROCEDURE — 85025 COMPLETE CBC W/AUTO DIFF WBC: CPT

## 2018-04-27 PROCEDURE — 82947 ASSAY GLUCOSE BLOOD QUANT: CPT

## 2018-04-27 PROCEDURE — 2580000003 HC RX 258: Performed by: REGISTERED NURSE

## 2018-04-27 PROCEDURE — 94762 N-INVAS EAR/PLS OXIMTRY CONT: CPT

## 2018-04-27 PROCEDURE — 6370000000 HC RX 637 (ALT 250 FOR IP): Performed by: REGISTERED NURSE

## 2018-04-27 PROCEDURE — 36415 COLL VENOUS BLD VENIPUNCTURE: CPT

## 2018-04-27 PROCEDURE — 97530 THERAPEUTIC ACTIVITIES: CPT

## 2018-04-27 PROCEDURE — 97110 THERAPEUTIC EXERCISES: CPT

## 2018-04-27 RX ADMIN — INSULIN LISPRO 2 UNITS: 100 INJECTION, SOLUTION INTRAVENOUS; SUBCUTANEOUS at 08:17

## 2018-04-27 RX ADMIN — CARVEDILOL 25 MG: 25 TABLET, FILM COATED ORAL at 16:48

## 2018-04-27 RX ADMIN — PREDNISOLONE ACETATE 1 DROP: 10 SUSPENSION/ DROPS OPHTHALMIC at 12:18

## 2018-04-27 RX ADMIN — OXYCODONE HYDROCHLORIDE AND ACETAMINOPHEN 2 TABLET: 5; 325 TABLET ORAL at 16:48

## 2018-04-27 RX ADMIN — BISACODYL 10 MG: 10 SUPPOSITORY RECTAL at 09:49

## 2018-04-27 RX ADMIN — AMLODIPINE BESYLATE 10 MG: 10 TABLET ORAL at 08:13

## 2018-04-27 RX ADMIN — KETOROLAC TROMETHAMINE 1 DROP: 5 SOLUTION OPHTHALMIC at 08:13

## 2018-04-27 RX ADMIN — Medication 10 ML: at 08:14

## 2018-04-27 RX ADMIN — CETIRIZINE HYDROCHLORIDE 10 MG: 10 TABLET ORAL at 08:13

## 2018-04-27 RX ADMIN — PREDNISOLONE ACETATE 1 DROP: 10 SUSPENSION/ DROPS OPHTHALMIC at 22:20

## 2018-04-27 RX ADMIN — VENLAFAXINE 100 MG: 100 TABLET ORAL at 15:09

## 2018-04-27 RX ADMIN — DOCUSATE SODIUM -SENNOSIDES 1 TABLET: 50; 8.6 TABLET, COATED ORAL at 22:22

## 2018-04-27 RX ADMIN — OXYCODONE HYDROCHLORIDE AND ACETAMINOPHEN 2 TABLET: 5; 325 TABLET ORAL at 01:17

## 2018-04-27 RX ADMIN — GABAPENTIN 300 MG: 300 CAPSULE ORAL at 08:12

## 2018-04-27 RX ADMIN — VENLAFAXINE 100 MG: 100 TABLET ORAL at 22:22

## 2018-04-27 RX ADMIN — DIVALPROEX SODIUM 500 MG: 500 TABLET, EXTENDED RELEASE ORAL at 08:12

## 2018-04-27 RX ADMIN — PREDNISOLONE ACETATE 1 DROP: 10 SUSPENSION/ DROPS OPHTHALMIC at 16:48

## 2018-04-27 RX ADMIN — GABAPENTIN 300 MG: 300 CAPSULE ORAL at 15:09

## 2018-04-27 RX ADMIN — GABAPENTIN 300 MG: 300 CAPSULE ORAL at 22:22

## 2018-04-27 RX ADMIN — KETOROLAC TROMETHAMINE 1 DROP: 5 SOLUTION OPHTHALMIC at 12:18

## 2018-04-27 RX ADMIN — CARVEDILOL 25 MG: 25 TABLET, FILM COATED ORAL at 08:13

## 2018-04-27 RX ADMIN — OXYCODONE HYDROCHLORIDE AND ACETAMINOPHEN 2 TABLET: 5; 325 TABLET ORAL at 22:22

## 2018-04-27 RX ADMIN — ENOXAPARIN SODIUM 40 MG: 40 INJECTION SUBCUTANEOUS at 08:13

## 2018-04-27 RX ADMIN — KETOROLAC TROMETHAMINE 1 DROP: 5 SOLUTION OPHTHALMIC at 22:21

## 2018-04-27 RX ADMIN — PANTOPRAZOLE SODIUM 40 MG: 40 TABLET, DELAYED RELEASE ORAL at 08:12

## 2018-04-27 RX ADMIN — INSULIN LISPRO 2 UNITS: 100 INJECTION, SOLUTION INTRAVENOUS; SUBCUTANEOUS at 16:48

## 2018-04-27 RX ADMIN — DOCUSATE SODIUM -SENNOSIDES 1 TABLET: 50; 8.6 TABLET, COATED ORAL at 08:12

## 2018-04-27 RX ADMIN — VENLAFAXINE 100 MG: 100 TABLET ORAL at 08:12

## 2018-04-27 RX ADMIN — INSULIN LISPRO 1 UNITS: 100 INJECTION, SOLUTION INTRAVENOUS; SUBCUTANEOUS at 11:25

## 2018-04-27 RX ADMIN — ATORVASTATIN CALCIUM 80 MG: 80 TABLET, FILM COATED ORAL at 22:22

## 2018-04-27 RX ADMIN — PREDNISOLONE ACETATE 1 DROP: 10 SUSPENSION/ DROPS OPHTHALMIC at 08:13

## 2018-04-27 RX ADMIN — INSULIN LISPRO 1 UNITS: 100 INJECTION, SOLUTION INTRAVENOUS; SUBCUTANEOUS at 22:20

## 2018-04-27 RX ADMIN — KETOROLAC TROMETHAMINE 1 DROP: 5 SOLUTION OPHTHALMIC at 16:49

## 2018-04-27 RX ADMIN — OXYCODONE HYDROCHLORIDE AND ACETAMINOPHEN 2 TABLET: 5; 325 TABLET ORAL at 08:13

## 2018-04-27 RX ADMIN — Medication 10 ML: at 22:22

## 2018-04-27 RX ADMIN — TRAZODONE HYDROCHLORIDE 100 MG: 100 TABLET ORAL at 22:22

## 2018-04-27 RX ADMIN — OXYCODONE HYDROCHLORIDE AND ACETAMINOPHEN 2 TABLET: 5; 325 TABLET ORAL at 12:17

## 2018-04-27 ASSESSMENT — PAIN SCALES - GENERAL
PAINLEVEL_OUTOF10: 2
PAINLEVEL_OUTOF10: 3
PAINLEVEL_OUTOF10: 5
PAINLEVEL_OUTOF10: 6
PAINLEVEL_OUTOF10: 7
PAINLEVEL_OUTOF10: 7
PAINLEVEL_OUTOF10: 8
PAINLEVEL_OUTOF10: 7

## 2018-04-27 ASSESSMENT — PAIN DESCRIPTION - LOCATION
LOCATION: ARM
LOCATION: ARM
LOCATION: SHOULDER
LOCATION: ARM
LOCATION: ARM
LOCATION: SHOULDER

## 2018-04-27 ASSESSMENT — PAIN DESCRIPTION - PAIN TYPE
TYPE: ACUTE PAIN;SURGICAL PAIN
TYPE: SURGICAL PAIN;ACUTE PAIN
TYPE: SURGICAL PAIN
TYPE: ACUTE PAIN;SURGICAL PAIN

## 2018-04-27 ASSESSMENT — PAIN DESCRIPTION - DESCRIPTORS
DESCRIPTORS: ACHING

## 2018-04-27 ASSESSMENT — PAIN DESCRIPTION - ORIENTATION
ORIENTATION: LEFT
ORIENTATION: RIGHT;LEFT

## 2018-04-27 ASSESSMENT — PAIN DESCRIPTION - FREQUENCY
FREQUENCY: CONTINUOUS

## 2018-04-27 ASSESSMENT — PAIN DESCRIPTION - PROGRESSION
CLINICAL_PROGRESSION: NOT CHANGED

## 2018-04-27 ASSESSMENT — PAIN DESCRIPTION - ONSET
ONSET: ON-GOING

## 2018-04-28 LAB
ABSOLUTE EOS #: 0.32 K/UL (ref 0–0.44)
ABSOLUTE IMMATURE GRANULOCYTE: 0.15 K/UL (ref 0–0.3)
ABSOLUTE LYMPH #: 2.26 K/UL (ref 1.1–3.7)
ABSOLUTE MONO #: 0.84 K/UL (ref 0.1–1.2)
ANION GAP SERPL CALCULATED.3IONS-SCNC: 14 MMOL/L (ref 9–17)
BASOPHILS # BLD: 0 % (ref 0–2)
BASOPHILS ABSOLUTE: <0.03 K/UL (ref 0–0.2)
BUN BLDV-MCNC: 20 MG/DL (ref 8–23)
BUN/CREAT BLD: ABNORMAL (ref 9–20)
CALCIUM SERPL-MCNC: 9 MG/DL (ref 8.6–10.4)
CHLORIDE BLD-SCNC: 98 MMOL/L (ref 98–107)
CO2: 24 MMOL/L (ref 20–31)
CREAT SERPL-MCNC: 0.59 MG/DL (ref 0.7–1.2)
DIFFERENTIAL TYPE: ABNORMAL
EOSINOPHILS RELATIVE PERCENT: 4 % (ref 1–4)
GFR AFRICAN AMERICAN: >60 ML/MIN
GFR NON-AFRICAN AMERICAN: >60 ML/MIN
GFR SERPL CREATININE-BSD FRML MDRD: ABNORMAL ML/MIN/{1.73_M2}
GFR SERPL CREATININE-BSD FRML MDRD: ABNORMAL ML/MIN/{1.73_M2}
GLUCOSE BLD-MCNC: 132 MG/DL (ref 70–99)
GLUCOSE BLD-MCNC: 145 MG/DL (ref 75–110)
GLUCOSE BLD-MCNC: 167 MG/DL (ref 75–110)
GLUCOSE BLD-MCNC: 191 MG/DL (ref 75–110)
GLUCOSE BLD-MCNC: 214 MG/DL (ref 75–110)
HCT VFR BLD CALC: 30.6 % (ref 40.7–50.3)
HEMOGLOBIN: 10.1 G/DL (ref 13–17)
IMMATURE GRANULOCYTES: 2 %
LYMPHOCYTES # BLD: 30 % (ref 24–43)
MCH RBC QN AUTO: 31.5 PG (ref 25.2–33.5)
MCHC RBC AUTO-ENTMCNC: 33 G/DL (ref 28.4–34.8)
MCV RBC AUTO: 95.3 FL (ref 82.6–102.9)
MONOCYTES # BLD: 11 % (ref 3–12)
NRBC AUTOMATED: 0 PER 100 WBC
PDW BLD-RTO: 13.3 % (ref 11.8–14.4)
PLATELET # BLD: 337 K/UL (ref 138–453)
PLATELET ESTIMATE: ABNORMAL
PMV BLD AUTO: 9.5 FL (ref 8.1–13.5)
POTASSIUM SERPL-SCNC: 4.4 MMOL/L (ref 3.7–5.3)
RBC # BLD: 3.21 M/UL (ref 4.21–5.77)
RBC # BLD: ABNORMAL 10*6/UL
SEG NEUTROPHILS: 53 % (ref 36–65)
SEGMENTED NEUTROPHILS ABSOLUTE COUNT: 3.96 K/UL (ref 1.5–8.1)
SODIUM BLD-SCNC: 136 MMOL/L (ref 135–144)
WBC # BLD: 7.5 K/UL (ref 3.5–11.3)
WBC # BLD: ABNORMAL 10*3/UL

## 2018-04-28 PROCEDURE — 6370000000 HC RX 637 (ALT 250 FOR IP): Performed by: NEUROLOGICAL SURGERY

## 2018-04-28 PROCEDURE — 85025 COMPLETE CBC W/AUTO DIFF WBC: CPT

## 2018-04-28 PROCEDURE — 2580000003 HC RX 258: Performed by: REGISTERED NURSE

## 2018-04-28 PROCEDURE — 2000000003 HC NEURO ICU R&B

## 2018-04-28 PROCEDURE — 94762 N-INVAS EAR/PLS OXIMTRY CONT: CPT

## 2018-04-28 PROCEDURE — 80048 BASIC METABOLIC PNL TOTAL CA: CPT

## 2018-04-28 PROCEDURE — 6370000000 HC RX 637 (ALT 250 FOR IP): Performed by: INTERNAL MEDICINE

## 2018-04-28 PROCEDURE — 82947 ASSAY GLUCOSE BLOOD QUANT: CPT

## 2018-04-28 PROCEDURE — 6370000000 HC RX 637 (ALT 250 FOR IP): Performed by: REGISTERED NURSE

## 2018-04-28 PROCEDURE — 36415 COLL VENOUS BLD VENIPUNCTURE: CPT

## 2018-04-28 PROCEDURE — 6360000002 HC RX W HCPCS: Performed by: NEUROLOGICAL SURGERY

## 2018-04-28 RX ADMIN — PANTOPRAZOLE SODIUM 40 MG: 40 TABLET, DELAYED RELEASE ORAL at 07:57

## 2018-04-28 RX ADMIN — AMLODIPINE BESYLATE 10 MG: 10 TABLET ORAL at 07:57

## 2018-04-28 RX ADMIN — ATORVASTATIN CALCIUM 80 MG: 80 TABLET, FILM COATED ORAL at 20:15

## 2018-04-28 RX ADMIN — CARVEDILOL 25 MG: 25 TABLET, FILM COATED ORAL at 07:58

## 2018-04-28 RX ADMIN — GABAPENTIN 300 MG: 300 CAPSULE ORAL at 20:20

## 2018-04-28 RX ADMIN — DIVALPROEX SODIUM 500 MG: 500 TABLET, EXTENDED RELEASE ORAL at 07:58

## 2018-04-28 RX ADMIN — PREDNISOLONE ACETATE 1 DROP: 10 SUSPENSION/ DROPS OPHTHALMIC at 12:10

## 2018-04-28 RX ADMIN — GABAPENTIN 300 MG: 300 CAPSULE ORAL at 14:07

## 2018-04-28 RX ADMIN — INSULIN LISPRO 1 UNITS: 100 INJECTION, SOLUTION INTRAVENOUS; SUBCUTANEOUS at 20:15

## 2018-04-28 RX ADMIN — Medication 10 ML: at 07:59

## 2018-04-28 RX ADMIN — INSULIN LISPRO 1 UNITS: 100 INJECTION, SOLUTION INTRAVENOUS; SUBCUTANEOUS at 16:18

## 2018-04-28 RX ADMIN — KETOROLAC TROMETHAMINE 1 DROP: 5 SOLUTION OPHTHALMIC at 16:17

## 2018-04-28 RX ADMIN — KETOROLAC TROMETHAMINE 1 DROP: 5 SOLUTION OPHTHALMIC at 12:10

## 2018-04-28 RX ADMIN — ENOXAPARIN SODIUM 40 MG: 40 INJECTION SUBCUTANEOUS at 07:57

## 2018-04-28 RX ADMIN — CARVEDILOL 25 MG: 25 TABLET, FILM COATED ORAL at 16:17

## 2018-04-28 RX ADMIN — KETOROLAC TROMETHAMINE 1 DROP: 5 SOLUTION OPHTHALMIC at 07:58

## 2018-04-28 RX ADMIN — OXYCODONE HYDROCHLORIDE AND ACETAMINOPHEN 2 TABLET: 5; 325 TABLET ORAL at 22:43

## 2018-04-28 RX ADMIN — TRAZODONE HYDROCHLORIDE 100 MG: 100 TABLET ORAL at 20:15

## 2018-04-28 RX ADMIN — INSULIN LISPRO 1 UNITS: 100 INJECTION, SOLUTION INTRAVENOUS; SUBCUTANEOUS at 08:01

## 2018-04-28 RX ADMIN — PREDNISOLONE ACETATE 1 DROP: 10 SUSPENSION/ DROPS OPHTHALMIC at 16:17

## 2018-04-28 RX ADMIN — VENLAFAXINE 100 MG: 100 TABLET ORAL at 07:59

## 2018-04-28 RX ADMIN — VENLAFAXINE 100 MG: 100 TABLET ORAL at 14:07

## 2018-04-28 RX ADMIN — GABAPENTIN 300 MG: 300 CAPSULE ORAL at 07:58

## 2018-04-28 RX ADMIN — DOCUSATE SODIUM -SENNOSIDES 1 TABLET: 50; 8.6 TABLET, COATED ORAL at 07:57

## 2018-04-28 RX ADMIN — PREDNISOLONE ACETATE 1 DROP: 10 SUSPENSION/ DROPS OPHTHALMIC at 20:15

## 2018-04-28 RX ADMIN — PREDNISOLONE ACETATE 1 DROP: 10 SUSPENSION/ DROPS OPHTHALMIC at 07:58

## 2018-04-28 RX ADMIN — CETIRIZINE HYDROCHLORIDE 10 MG: 10 TABLET ORAL at 07:57

## 2018-04-28 RX ADMIN — VENLAFAXINE 100 MG: 100 TABLET ORAL at 20:15

## 2018-04-28 RX ADMIN — INSULIN LISPRO 2 UNITS: 100 INJECTION, SOLUTION INTRAVENOUS; SUBCUTANEOUS at 12:11

## 2018-04-28 RX ADMIN — KETOROLAC TROMETHAMINE 1 DROP: 5 SOLUTION OPHTHALMIC at 20:15

## 2018-04-28 ASSESSMENT — PAIN SCALES - GENERAL: PAINLEVEL_OUTOF10: 7

## 2018-04-29 LAB
ABSOLUTE EOS #: 0.39 K/UL (ref 0–0.44)
ABSOLUTE IMMATURE GRANULOCYTE: 0.27 K/UL (ref 0–0.3)
ABSOLUTE LYMPH #: 2.39 K/UL (ref 1.1–3.7)
ABSOLUTE MONO #: 0.79 K/UL (ref 0.1–1.2)
ANION GAP SERPL CALCULATED.3IONS-SCNC: 9 MMOL/L (ref 9–17)
BASOPHILS # BLD: 0 % (ref 0–2)
BASOPHILS ABSOLUTE: 0.03 K/UL (ref 0–0.2)
BUN BLDV-MCNC: 24 MG/DL (ref 8–23)
BUN/CREAT BLD: ABNORMAL (ref 9–20)
CALCIUM SERPL-MCNC: 8.4 MG/DL (ref 8.6–10.4)
CHLORIDE BLD-SCNC: 99 MMOL/L (ref 98–107)
CO2: 28 MMOL/L (ref 20–31)
CREAT SERPL-MCNC: 0.51 MG/DL (ref 0.7–1.2)
CULTURE: NORMAL
DIFFERENTIAL TYPE: ABNORMAL
EOSINOPHILS RELATIVE PERCENT: 5 % (ref 1–4)
GFR AFRICAN AMERICAN: >60 ML/MIN
GFR NON-AFRICAN AMERICAN: >60 ML/MIN
GFR SERPL CREATININE-BSD FRML MDRD: ABNORMAL ML/MIN/{1.73_M2}
GFR SERPL CREATININE-BSD FRML MDRD: ABNORMAL ML/MIN/{1.73_M2}
GLUCOSE BLD-MCNC: 173 MG/DL (ref 75–110)
GLUCOSE BLD-MCNC: 193 MG/DL (ref 75–110)
GLUCOSE BLD-MCNC: 194 MG/DL (ref 75–110)
GLUCOSE BLD-MCNC: 203 MG/DL (ref 70–99)
GLUCOSE BLD-MCNC: 237 MG/DL (ref 75–110)
HCT VFR BLD CALC: 26.2 % (ref 40.7–50.3)
HEMOGLOBIN: 9 G/DL (ref 13–17)
IMMATURE GRANULOCYTES: 3 %
LYMPHOCYTES # BLD: 30 % (ref 24–43)
Lab: NORMAL
Lab: NORMAL
MCH RBC QN AUTO: 32 PG (ref 25.2–33.5)
MCHC RBC AUTO-ENTMCNC: 34.4 G/DL (ref 28.4–34.8)
MCV RBC AUTO: 93.2 FL (ref 82.6–102.9)
MONOCYTES # BLD: 10 % (ref 3–12)
NRBC AUTOMATED: 0 PER 100 WBC
PDW BLD-RTO: 13.2 % (ref 11.8–14.4)
PLATELET # BLD: 304 K/UL (ref 138–453)
PLATELET ESTIMATE: ABNORMAL
PMV BLD AUTO: 9.6 FL (ref 8.1–13.5)
POTASSIUM SERPL-SCNC: 4.4 MMOL/L (ref 3.7–5.3)
RBC # BLD: 2.81 M/UL (ref 4.21–5.77)
RBC # BLD: ABNORMAL 10*6/UL
SEG NEUTROPHILS: 52 % (ref 36–65)
SEGMENTED NEUTROPHILS ABSOLUTE COUNT: 4.02 K/UL (ref 1.5–8.1)
SODIUM BLD-SCNC: 136 MMOL/L (ref 135–144)
SPECIMEN DESCRIPTION: NORMAL
SPECIMEN DESCRIPTION: NORMAL
STATUS: NORMAL
STATUS: NORMAL
WBC # BLD: 7.9 K/UL (ref 3.5–11.3)
WBC # BLD: ABNORMAL 10*3/UL

## 2018-04-29 PROCEDURE — 6370000000 HC RX 637 (ALT 250 FOR IP): Performed by: REGISTERED NURSE

## 2018-04-29 PROCEDURE — 94762 N-INVAS EAR/PLS OXIMTRY CONT: CPT

## 2018-04-29 PROCEDURE — 36415 COLL VENOUS BLD VENIPUNCTURE: CPT

## 2018-04-29 PROCEDURE — 97110 THERAPEUTIC EXERCISES: CPT

## 2018-04-29 PROCEDURE — 85025 COMPLETE CBC W/AUTO DIFF WBC: CPT

## 2018-04-29 PROCEDURE — 97116 GAIT TRAINING THERAPY: CPT

## 2018-04-29 PROCEDURE — 2580000003 HC RX 258: Performed by: REGISTERED NURSE

## 2018-04-29 PROCEDURE — 2060000000 HC ICU INTERMEDIATE R&B

## 2018-04-29 PROCEDURE — 6370000000 HC RX 637 (ALT 250 FOR IP): Performed by: NEUROLOGICAL SURGERY

## 2018-04-29 PROCEDURE — 6360000002 HC RX W HCPCS: Performed by: EMERGENCY MEDICINE

## 2018-04-29 PROCEDURE — 80048 BASIC METABOLIC PNL TOTAL CA: CPT

## 2018-04-29 PROCEDURE — 6370000000 HC RX 637 (ALT 250 FOR IP): Performed by: INTERNAL MEDICINE

## 2018-04-29 PROCEDURE — 82947 ASSAY GLUCOSE BLOOD QUANT: CPT

## 2018-04-29 PROCEDURE — 6360000002 HC RX W HCPCS: Performed by: NEUROLOGICAL SURGERY

## 2018-04-29 RX ADMIN — INSULIN LISPRO 2 UNITS: 100 INJECTION, SOLUTION INTRAVENOUS; SUBCUTANEOUS at 08:32

## 2018-04-29 RX ADMIN — KETOROLAC TROMETHAMINE 1 DROP: 5 SOLUTION OPHTHALMIC at 17:34

## 2018-04-29 RX ADMIN — AMLODIPINE BESYLATE 10 MG: 10 TABLET ORAL at 08:21

## 2018-04-29 RX ADMIN — CARVEDILOL 25 MG: 25 TABLET, FILM COATED ORAL at 17:33

## 2018-04-29 RX ADMIN — VENLAFAXINE 100 MG: 100 TABLET ORAL at 15:32

## 2018-04-29 RX ADMIN — GABAPENTIN 300 MG: 300 CAPSULE ORAL at 08:21

## 2018-04-29 RX ADMIN — PANTOPRAZOLE SODIUM 40 MG: 40 TABLET, DELAYED RELEASE ORAL at 08:21

## 2018-04-29 RX ADMIN — PREDNISOLONE ACETATE 1 DROP: 10 SUSPENSION/ DROPS OPHTHALMIC at 17:33

## 2018-04-29 RX ADMIN — DIVALPROEX SODIUM 500 MG: 500 TABLET, EXTENDED RELEASE ORAL at 08:21

## 2018-04-29 RX ADMIN — VENLAFAXINE 100 MG: 100 TABLET ORAL at 20:01

## 2018-04-29 RX ADMIN — GABAPENTIN 300 MG: 300 CAPSULE ORAL at 20:01

## 2018-04-29 RX ADMIN — VENLAFAXINE 100 MG: 100 TABLET ORAL at 08:20

## 2018-04-29 RX ADMIN — KETOROLAC TROMETHAMINE 1 DROP: 5 SOLUTION OPHTHALMIC at 12:42

## 2018-04-29 RX ADMIN — ENOXAPARIN SODIUM 40 MG: 40 INJECTION SUBCUTANEOUS at 08:21

## 2018-04-29 RX ADMIN — PREDNISOLONE ACETATE 1 DROP: 10 SUSPENSION/ DROPS OPHTHALMIC at 12:42

## 2018-04-29 RX ADMIN — GABAPENTIN 300 MG: 300 CAPSULE ORAL at 14:57

## 2018-04-29 RX ADMIN — CETIRIZINE HYDROCHLORIDE 10 MG: 10 TABLET ORAL at 08:21

## 2018-04-29 RX ADMIN — DOCUSATE SODIUM -SENNOSIDES 1 TABLET: 50; 8.6 TABLET, COATED ORAL at 08:20

## 2018-04-29 RX ADMIN — PREDNISOLONE ACETATE 1 DROP: 10 SUSPENSION/ DROPS OPHTHALMIC at 08:22

## 2018-04-29 RX ADMIN — TRAZODONE HYDROCHLORIDE 100 MG: 100 TABLET ORAL at 20:01

## 2018-04-29 RX ADMIN — Medication 10 ML: at 08:21

## 2018-04-29 RX ADMIN — KETOROLAC TROMETHAMINE 1 DROP: 5 SOLUTION OPHTHALMIC at 20:02

## 2018-04-29 RX ADMIN — KETOROLAC TROMETHAMINE 1 DROP: 5 SOLUTION OPHTHALMIC at 08:22

## 2018-04-29 RX ADMIN — CARVEDILOL 25 MG: 25 TABLET, FILM COATED ORAL at 08:21

## 2018-04-29 RX ADMIN — INSULIN LISPRO 1 UNITS: 100 INJECTION, SOLUTION INTRAVENOUS; SUBCUTANEOUS at 20:35

## 2018-04-29 RX ADMIN — FENTANYL CITRATE 50 MCG: 50 INJECTION, SOLUTION INTRAMUSCULAR; INTRAVENOUS at 08:32

## 2018-04-29 RX ADMIN — FENTANYL CITRATE 50 MCG: 50 INJECTION, SOLUTION INTRAMUSCULAR; INTRAVENOUS at 05:05

## 2018-04-29 RX ADMIN — PREDNISOLONE ACETATE 1 DROP: 10 SUSPENSION/ DROPS OPHTHALMIC at 20:01

## 2018-04-29 RX ADMIN — INSULIN LISPRO 1 UNITS: 100 INJECTION, SOLUTION INTRAVENOUS; SUBCUTANEOUS at 17:33

## 2018-04-29 RX ADMIN — ATORVASTATIN CALCIUM 80 MG: 80 TABLET, FILM COATED ORAL at 20:01

## 2018-04-29 RX ADMIN — INSULIN LISPRO 1 UNITS: 100 INJECTION, SOLUTION INTRAVENOUS; SUBCUTANEOUS at 12:47

## 2018-04-29 RX ADMIN — OXYCODONE HYDROCHLORIDE AND ACETAMINOPHEN 2 TABLET: 5; 325 TABLET ORAL at 20:06

## 2018-04-29 ASSESSMENT — PAIN DESCRIPTION - DIRECTION: RADIATING_TOWARDS: SHOULDERS

## 2018-04-29 ASSESSMENT — PAIN DESCRIPTION - ORIENTATION: ORIENTATION: MID

## 2018-04-29 ASSESSMENT — PAIN SCALES - GENERAL
PAINLEVEL_OUTOF10: 8
PAINLEVEL_OUTOF10: 3
PAINLEVEL_OUTOF10: 5
PAINLEVEL_OUTOF10: 7

## 2018-04-29 ASSESSMENT — PAIN DESCRIPTION - PAIN TYPE: TYPE: ACUTE PAIN;SURGICAL PAIN

## 2018-04-29 ASSESSMENT — PAIN DESCRIPTION - LOCATION: LOCATION: NECK;SHOULDER;BACK

## 2018-04-29 ASSESSMENT — PAIN DESCRIPTION - DESCRIPTORS: DESCRIPTORS: ACHING;TIGHTNESS

## 2018-04-29 ASSESSMENT — PAIN DESCRIPTION - ONSET: ONSET: GRADUAL

## 2018-04-29 ASSESSMENT — PAIN DESCRIPTION - FREQUENCY: FREQUENCY: INTERMITTENT

## 2018-04-30 VITALS
BODY MASS INDEX: 27.46 KG/M2 | WEIGHT: 155 LBS | OXYGEN SATURATION: 98 % | RESPIRATION RATE: 17 BRPM | HEIGHT: 63 IN | SYSTOLIC BLOOD PRESSURE: 110 MMHG | HEART RATE: 80 BPM | TEMPERATURE: 98 F | DIASTOLIC BLOOD PRESSURE: 60 MMHG

## 2018-04-30 LAB
ABSOLUTE EOS #: 0.34 K/UL (ref 0–0.4)
ABSOLUTE IMMATURE GRANULOCYTE: 0.34 K/UL (ref 0–0.3)
ABSOLUTE LYMPH #: 2.41 K/UL (ref 1–4.8)
ABSOLUTE MONO #: 0.69 K/UL (ref 0.1–0.8)
ANION GAP SERPL CALCULATED.3IONS-SCNC: 9 MMOL/L (ref 9–17)
BASOPHILS # BLD: 0 % (ref 0–2)
BASOPHILS ABSOLUTE: 0 K/UL (ref 0–0.2)
BUN BLDV-MCNC: 18 MG/DL (ref 8–23)
BUN/CREAT BLD: ABNORMAL (ref 9–20)
CALCIUM SERPL-MCNC: 8.5 MG/DL (ref 8.6–10.4)
CHLORIDE BLD-SCNC: 102 MMOL/L (ref 98–107)
CO2: 25 MMOL/L (ref 20–31)
CREAT SERPL-MCNC: 0.56 MG/DL (ref 0.7–1.2)
DIFFERENTIAL TYPE: ABNORMAL
EOSINOPHILS RELATIVE PERCENT: 4 % (ref 1–4)
GFR AFRICAN AMERICAN: >60 ML/MIN
GFR NON-AFRICAN AMERICAN: >60 ML/MIN
GFR SERPL CREATININE-BSD FRML MDRD: ABNORMAL ML/MIN/{1.73_M2}
GFR SERPL CREATININE-BSD FRML MDRD: ABNORMAL ML/MIN/{1.73_M2}
GLUCOSE BLD-MCNC: 159 MG/DL (ref 70–99)
GLUCOSE BLD-MCNC: 165 MG/DL (ref 75–110)
GLUCOSE BLD-MCNC: 262 MG/DL (ref 75–110)
HCT VFR BLD CALC: 26.1 % (ref 40.7–50.3)
HEMOGLOBIN: 9.3 G/DL (ref 13–17)
IMMATURE GRANULOCYTES: 4 %
LYMPHOCYTES # BLD: 28 % (ref 24–44)
MCH RBC QN AUTO: 31.7 PG (ref 25.2–33.5)
MCHC RBC AUTO-ENTMCNC: 35.6 G/DL (ref 28.4–34.8)
MCV RBC AUTO: 89.1 FL (ref 82.6–102.9)
MONOCYTES # BLD: 8 % (ref 1–7)
MORPHOLOGY: NORMAL
NRBC AUTOMATED: 0 PER 100 WBC
PDW BLD-RTO: 13.5 % (ref 11.8–14.4)
PLATELET # BLD: 267 K/UL (ref 138–453)
PLATELET ESTIMATE: ABNORMAL
PMV BLD AUTO: 10.2 FL (ref 8.1–13.5)
POTASSIUM SERPL-SCNC: 4.6 MMOL/L (ref 3.7–5.3)
POTASSIUM SERPL-SCNC: 5.1 MMOL/L (ref 3.7–5.3)
RBC # BLD: 2.93 M/UL (ref 4.21–5.77)
RBC # BLD: ABNORMAL 10*6/UL
SEG NEUTROPHILS: 56 % (ref 36–66)
SEGMENTED NEUTROPHILS ABSOLUTE COUNT: 4.82 K/UL (ref 1.8–7.7)
SODIUM BLD-SCNC: 136 MMOL/L (ref 135–144)
WBC # BLD: 8.6 K/UL (ref 3.5–11.3)
WBC # BLD: ABNORMAL 10*3/UL

## 2018-04-30 PROCEDURE — 84132 ASSAY OF SERUM POTASSIUM: CPT

## 2018-04-30 PROCEDURE — 82947 ASSAY GLUCOSE BLOOD QUANT: CPT

## 2018-04-30 PROCEDURE — 6370000000 HC RX 637 (ALT 250 FOR IP): Performed by: EMERGENCY MEDICINE

## 2018-04-30 PROCEDURE — 6370000000 HC RX 637 (ALT 250 FOR IP): Performed by: REGISTERED NURSE

## 2018-04-30 PROCEDURE — 6370000000 HC RX 637 (ALT 250 FOR IP): Performed by: INTERNAL MEDICINE

## 2018-04-30 PROCEDURE — 94762 N-INVAS EAR/PLS OXIMTRY CONT: CPT

## 2018-04-30 PROCEDURE — 97110 THERAPEUTIC EXERCISES: CPT

## 2018-04-30 PROCEDURE — 6360000002 HC RX W HCPCS: Performed by: NEUROLOGICAL SURGERY

## 2018-04-30 PROCEDURE — 80048 BASIC METABOLIC PNL TOTAL CA: CPT

## 2018-04-30 PROCEDURE — 6370000000 HC RX 637 (ALT 250 FOR IP): Performed by: NEUROLOGICAL SURGERY

## 2018-04-30 PROCEDURE — 97116 GAIT TRAINING THERAPY: CPT

## 2018-04-30 PROCEDURE — 36415 COLL VENOUS BLD VENIPUNCTURE: CPT

## 2018-04-30 PROCEDURE — 97530 THERAPEUTIC ACTIVITIES: CPT

## 2018-04-30 PROCEDURE — 85025 COMPLETE CBC W/AUTO DIFF WBC: CPT

## 2018-04-30 PROCEDURE — 2580000003 HC RX 258: Performed by: REGISTERED NURSE

## 2018-04-30 RX ORDER — SODIUM POLYSTYRENE SULFONATE 15 G/60ML
15 SUSPENSION ORAL; RECTAL ONCE
Status: COMPLETED | OUTPATIENT
Start: 2018-04-30 | End: 2018-04-30

## 2018-04-30 RX ORDER — ERGOCALCIFEROL (VITAMIN D2) 1250 MCG
50000 CAPSULE ORAL WEEKLY
Qty: 10 CAPSULE | Refills: 0 | Status: SHIPPED | OUTPATIENT
Start: 2018-05-03 | End: 2018-06-20 | Stop reason: SDUPTHER

## 2018-04-30 RX ORDER — SENNA AND DOCUSATE SODIUM 50; 8.6 MG/1; MG/1
1 TABLET, FILM COATED ORAL 2 TIMES DAILY
Qty: 30 TABLET | Refills: 0 | Status: ON HOLD | OUTPATIENT
Start: 2018-04-30 | End: 2020-06-19

## 2018-04-30 RX ORDER — OXYCODONE HYDROCHLORIDE AND ACETAMINOPHEN 5; 325 MG/1; MG/1
1 TABLET ORAL EVERY 6 HOURS PRN
Qty: 28 TABLET | Refills: 0 | Status: SHIPPED | OUTPATIENT
Start: 2018-04-30 | End: 2018-05-07

## 2018-04-30 RX ORDER — CYCLOBENZAPRINE HCL 10 MG
10 TABLET ORAL 3 TIMES DAILY PRN
Qty: 20 TABLET | Refills: 0 | Status: SHIPPED | OUTPATIENT
Start: 2018-04-30 | End: 2018-05-09 | Stop reason: SDUPTHER

## 2018-04-30 RX ADMIN — VENLAFAXINE 100 MG: 100 TABLET ORAL at 12:03

## 2018-04-30 RX ADMIN — INSULIN LISPRO 3 UNITS: 100 INJECTION, SOLUTION INTRAVENOUS; SUBCUTANEOUS at 12:03

## 2018-04-30 RX ADMIN — DOCUSATE SODIUM -SENNOSIDES 1 TABLET: 50; 8.6 TABLET, COATED ORAL at 08:24

## 2018-04-30 RX ADMIN — PANTOPRAZOLE SODIUM 40 MG: 40 TABLET, DELAYED RELEASE ORAL at 08:24

## 2018-04-30 RX ADMIN — AMLODIPINE BESYLATE 10 MG: 10 TABLET ORAL at 08:25

## 2018-04-30 RX ADMIN — Medication 15 G: at 12:02

## 2018-04-30 RX ADMIN — ENOXAPARIN SODIUM 40 MG: 40 INJECTION SUBCUTANEOUS at 08:25

## 2018-04-30 RX ADMIN — GABAPENTIN 300 MG: 300 CAPSULE ORAL at 08:24

## 2018-04-30 RX ADMIN — CETIRIZINE HYDROCHLORIDE 10 MG: 10 TABLET ORAL at 08:24

## 2018-04-30 RX ADMIN — INSULIN LISPRO 1 UNITS: 100 INJECTION, SOLUTION INTRAVENOUS; SUBCUTANEOUS at 08:23

## 2018-04-30 RX ADMIN — KETOROLAC TROMETHAMINE 1 DROP: 5 SOLUTION OPHTHALMIC at 08:32

## 2018-04-30 RX ADMIN — Medication 10 ML: at 08:26

## 2018-04-30 RX ADMIN — CARVEDILOL 25 MG: 25 TABLET, FILM COATED ORAL at 08:24

## 2018-04-30 RX ADMIN — VENLAFAXINE 100 MG: 100 TABLET ORAL at 08:25

## 2018-04-30 RX ADMIN — DIVALPROEX SODIUM 500 MG: 500 TABLET, EXTENDED RELEASE ORAL at 08:25

## 2018-04-30 RX ADMIN — PREDNISOLONE ACETATE 1 DROP: 10 SUSPENSION/ DROPS OPHTHALMIC at 08:23

## 2018-04-30 ASSESSMENT — PAIN SCALES - GENERAL
PAINLEVEL_OUTOF10: 0
PAINLEVEL_OUTOF10: 0

## 2018-05-09 ENCOUNTER — NURSE ONLY (OUTPATIENT)
Dept: NEUROSURGERY | Age: 68
End: 2018-05-09

## 2018-05-09 VITALS
HEIGHT: 63 IN | HEART RATE: 84 BPM | SYSTOLIC BLOOD PRESSURE: 114 MMHG | DIASTOLIC BLOOD PRESSURE: 73 MMHG | WEIGHT: 152 LBS | BODY MASS INDEX: 26.93 KG/M2

## 2018-05-09 DIAGNOSIS — M43.22 FUSION OF SPINE OF CERVICAL REGION: Primary | ICD-10-CM

## 2018-05-09 DIAGNOSIS — M48.02 CERVICAL SPINAL STENOSIS: Primary | ICD-10-CM

## 2018-05-09 RX ORDER — CYCLOBENZAPRINE HCL 10 MG
10 TABLET ORAL 3 TIMES DAILY PRN
Qty: 60 TABLET | Refills: 0 | Status: SHIPPED | OUTPATIENT
Start: 2018-05-09 | End: 2018-05-29

## 2018-05-09 RX ORDER — OXYCODONE HYDROCHLORIDE AND ACETAMINOPHEN 5; 325 MG/1; MG/1
1 TABLET ORAL EVERY 6 HOURS PRN
Qty: 56 TABLET | Refills: 0 | Status: SHIPPED | OUTPATIENT
Start: 2018-05-09 | End: 2018-05-23

## 2018-06-18 ENCOUNTER — TELEPHONE (OUTPATIENT)
Dept: NEUROSURGERY | Age: 68
End: 2018-06-18

## 2018-06-18 DIAGNOSIS — Z98.1 S/P CERVICAL SPINAL FUSION: Primary | ICD-10-CM

## 2018-06-20 ENCOUNTER — OFFICE VISIT (OUTPATIENT)
Dept: NEUROSURGERY | Age: 68
End: 2018-06-20

## 2018-06-20 ENCOUNTER — HOSPITAL ENCOUNTER (OUTPATIENT)
Age: 68
Discharge: HOME | End: 2018-06-22
Payer: MEDICARE

## 2018-06-20 ENCOUNTER — HOSPITAL ENCOUNTER (OUTPATIENT)
Age: 68
Discharge: HOME | End: 2018-06-20
Payer: MEDICARE

## 2018-06-20 ENCOUNTER — HOSPITAL ENCOUNTER (OUTPATIENT)
Dept: GENERAL RADIOLOGY | Age: 68
Discharge: HOME | End: 2018-06-22
Payer: MEDICARE

## 2018-06-20 VITALS
HEART RATE: 73 BPM | WEIGHT: 156 LBS | SYSTOLIC BLOOD PRESSURE: 141 MMHG | DIASTOLIC BLOOD PRESSURE: 90 MMHG | BODY MASS INDEX: 27.63 KG/M2

## 2018-06-20 DIAGNOSIS — Z98.1 S/P CERVICAL SPINAL FUSION: ICD-10-CM

## 2018-06-20 DIAGNOSIS — M85.88 OSTEOPENIA OF SPINE: ICD-10-CM

## 2018-06-20 DIAGNOSIS — G95.9 CERVICAL MYELOPATHY (HCC): Primary | ICD-10-CM

## 2018-06-20 DIAGNOSIS — R53.81 DEBILITY: ICD-10-CM

## 2018-06-20 DIAGNOSIS — M54.12 CERVICAL RADICULOPATHY: ICD-10-CM

## 2018-06-20 LAB — VITAMIN D 25-HYDROXY: 43.1 NG/ML (ref 30–100)

## 2018-06-20 PROCEDURE — 72040 X-RAY EXAM NECK SPINE 2-3 VW: CPT

## 2018-06-20 PROCEDURE — 82306 VITAMIN D 25 HYDROXY: CPT

## 2018-06-20 PROCEDURE — 99024 POSTOP FOLLOW-UP VISIT: CPT | Performed by: NEUROLOGICAL SURGERY

## 2018-06-20 PROCEDURE — 36415 COLL VENOUS BLD VENIPUNCTURE: CPT

## 2018-06-20 RX ORDER — ERGOCALCIFEROL (VITAMIN D2) 1250 MCG
50000 CAPSULE ORAL WEEKLY
Qty: 12 CAPSULE | Refills: 0 | Status: ON HOLD | OUTPATIENT
Start: 2018-06-20 | End: 2020-06-19

## 2018-06-20 RX ORDER — OXYCODONE HYDROCHLORIDE AND ACETAMINOPHEN 5; 325 MG/1; MG/1
1 TABLET ORAL DAILY PRN
Qty: 14 TABLET | Refills: 0 | Status: SHIPPED | OUTPATIENT
Start: 2018-06-20 | End: 2018-07-04

## 2018-08-13 ENCOUNTER — TELEPHONE (OUTPATIENT)
Dept: NEUROSURGERY | Age: 68
End: 2018-08-13

## 2018-08-13 NOTE — TELEPHONE ENCOUNTER
Jaime Clay called wanting to know when he can take his cervical collar off at last office visit (6/20/18) he was advised to continue to wear.   Surgery was in April

## 2018-09-19 ENCOUNTER — HOSPITAL ENCOUNTER (OUTPATIENT)
Age: 68
Discharge: HOME | End: 2018-09-21
Payer: MEDICARE

## 2018-09-19 ENCOUNTER — OFFICE VISIT (OUTPATIENT)
Dept: NEUROSURGERY | Age: 68
End: 2018-09-19
Payer: MEDICARE

## 2018-09-19 ENCOUNTER — HOSPITAL ENCOUNTER (OUTPATIENT)
Dept: GENERAL RADIOLOGY | Age: 68
Discharge: HOME | End: 2018-09-21
Payer: MEDICARE

## 2018-09-19 VITALS
SYSTOLIC BLOOD PRESSURE: 134 MMHG | WEIGHT: 161 LBS | DIASTOLIC BLOOD PRESSURE: 83 MMHG | BODY MASS INDEX: 28.52 KG/M2 | HEART RATE: 76 BPM

## 2018-09-19 DIAGNOSIS — M48.02 STENOSIS OF CERVICAL SPINE WITH MYELOPATHY (HCC): Primary | ICD-10-CM

## 2018-09-19 DIAGNOSIS — M54.12 CERVICAL RADICULOPATHY: ICD-10-CM

## 2018-09-19 DIAGNOSIS — G95.9 CERVICAL MYELOPATHY (HCC): ICD-10-CM

## 2018-09-19 DIAGNOSIS — G99.2 STENOSIS OF CERVICAL SPINE WITH MYELOPATHY (HCC): Primary | ICD-10-CM

## 2018-09-19 DIAGNOSIS — R53.81 DEBILITY: ICD-10-CM

## 2018-09-19 PROCEDURE — 72040 X-RAY EXAM NECK SPINE 2-3 VW: CPT

## 2018-09-19 PROCEDURE — 1036F TOBACCO NON-USER: CPT | Performed by: NEUROLOGICAL SURGERY

## 2018-09-19 PROCEDURE — 1101F PT FALLS ASSESS-DOCD LE1/YR: CPT | Performed by: NEUROLOGICAL SURGERY

## 2018-09-19 PROCEDURE — G8427 DOCREV CUR MEDS BY ELIG CLIN: HCPCS | Performed by: NEUROLOGICAL SURGERY

## 2018-09-19 PROCEDURE — G8419 CALC BMI OUT NRM PARAM NOF/U: HCPCS | Performed by: NEUROLOGICAL SURGERY

## 2018-09-19 PROCEDURE — 4040F PNEUMOC VAC/ADMIN/RCVD: CPT | Performed by: NEUROLOGICAL SURGERY

## 2018-09-19 PROCEDURE — 99213 OFFICE O/P EST LOW 20 MIN: CPT | Performed by: NEUROLOGICAL SURGERY

## 2018-09-19 PROCEDURE — 1123F ACP DISCUSS/DSCN MKR DOCD: CPT | Performed by: NEUROLOGICAL SURGERY

## 2018-09-19 PROCEDURE — 3017F COLORECTAL CA SCREEN DOC REV: CPT | Performed by: NEUROLOGICAL SURGERY

## 2018-09-19 RX ORDER — FUROSEMIDE 20 MG/1
20 TABLET ORAL DAILY
Status: ON HOLD | COMMUNITY
End: 2022-03-23

## 2018-09-19 NOTE — PROGRESS NOTES
[address]  [address] [id number]  Dept: [phone]    Patient:  Kushal Chacon  YOB: 1950  Date: 9/19/18    The patient is a 76 y.o. male who presents today for consult of the following problems:     Chief Complaint   Patient presents with    Follow-up             HPI:     Kushal Chacon is a 76 y.o. male on whom neurosurgical consultation was requested by Josh Sandoval MD for management of Cervical stenosis with myelopathy status post decompression fixation. Patient states that he has had marginal improvement in his left upper actually function but still has significant difficulty with shoulder abduction and biceps. He has really no significant neck pain is able to mobilize and function otherwise with the exception of left arm weakness. Fredi Villar       History:     Past Medical History:   Diagnosis Date    Depression 2017    ON RX    Diabetes mellitus ([address] 2013    NIDDM    Difficult intravenous access     Hyperlipidemia 2008    ON RX    Hypertension 2008    ON RX    PTSD (post-traumatic stress disorder) 01/2018    ON RX    Sleep apnea 01/2018    UNALBE TO USE TO CLEARED BY ENT (CPAP)    Teeth missing     BACK TEETH UPPER AND LOWER TO BE FITTED FOR PARTIALS AT LATER DATE    Wears glasses      Past Surgical History:   Procedure Laterality Date    CARDIAC CATHETERIZATION  02/2010    no stents     CARPAL TUNNEL RELEASE Left 01/09/2002    CERVICAL FUSION  04/19/2018    anterior cervical fusion C5-6    EYE SURGERY Left 2018    CATARACT EXTRACTION WITH IOL    HYDROCELE EXCISION  1951    MIDDLE EAR SURGERY  01/11/2018    MIDDLE EAR REBUILT AND EAR DRUM REPLACED    MOUTH SURGERY  04/16/2018    5 TEETH REMOVED    OTHER SURGICAL HISTORY  04/19/2018    : ANTERIOR CERVICAL CORRPECTOMY C5-6, SYNTHES, DEPUY, REG TABLE, SUPINE,     [place] OFFICE/OUTPT VISIT,PROCEDURE ONLY N/A 4/19/2018    ANTERIOR CERVICAL CORRPECTOMY AND FUSION C5-6 performed by Monica Brand DO at [facility]  12/1983     Family History   Problem Relation Age of Onset   [place] Dementia Mother     Coronary Art Dis Mother     Stroke Mother     Diabetes Mother     Asthma Mother     Other Mother         BRAIN ANEURISM    High Blood Pressure Mother     Heart Disease Father     Diabetes Sister     Diabetes Brother     High Blood Pressure Brother     High Cholesterol Brother     Heart Disease Brother     Diabetes Sister     Other Sister         NEUROPATHY     Current Outpatient Prescriptions on File Prior to Visit   Medication Sig Dispense Refill    ergocalciferol (DRISDOL) 04705 units capsule Take 1 capsule by mouth once a week 12 capsule 0    sennosides-docusate sodium (SENOKOT-S) 8.6-50 MG tablet Take 1 tablet by mouth 2 times daily 30 tablet 0    aspirin 81 MG chewable tablet Take 81 mg by mouth daily      divalproex (DEPAKOTE ER) 500 MG extended release tablet Take 500 mg by mouth 2 times daily       traZODone (DESYREL) 100 MG tablet Take 100 mg by mouth nightly      venlafaxine (EFFEXOR) 100 MG tablet Take 100 mg by mouth daily       cetirizine (ZYRTEC) 10 MG tablet Take 10 mg by mouth daily      diphenhydrAMINE (BENADRYL) 25 MG tablet Take 25 mg by mouth every 6 hours as needed for Itching      atorvastatin (LIPITOR) 80 MG tablet Take 80 mg by mouth daily      carvedilol (COREG) 25 MG tablet Take 25 mg by mouth 2 times daily (with meals)      amLODIPine (NORVASC) 10 MG tablet Take 10 mg by mouth daily      sildenafil (VIAGRA) 100 MG tablet Take 100 mg by mouth as needed for Erectile Dysfunction      methylPREDNISolone (MEDROL DOSEPACK) 4 MG tablet Take 4 mg by mouth See Admin Instructions Take by mouth.       sodium chloride (OCEAN, BABY AYR) 0.65 % nasal spray 1 spray by Nasal route as needed for Congestion      lidocaine (LMX) 4 % cream Apply topically as needed for Pain Apply topically Genitourinary: Negative for frequency and flank pain. Musculoskeletal: Negative for myalgias and joint swelling. Skin: Negative for rash and wound. Allergic/Immunologic: Negative for environmental allergies and food allergies. Hematological: Negative for adenopathy. Does not bruise/bleed easily. Psychiatric/Behavioral: Negative for self-injury. The patient is not nervous/anxious. Physical Exam:      /83 (Site: Right Upper Arm, Position: Sitting, Cuff Size: Small Adult)   Pulse 76   Wt 161 lb (73 kg)   BMI 28.52 kg/m²   Estimated body mass index is 28.52 kg/m² as calculated from the following:    Height as of 5/9/18: 5' 3\" (1.6 m). Weight as of this encounter: 161 lb (73 kg). General:  Dyana Silveira is a 76y.o. year old male who appears his stated age. HEENT: Normocephalic atraumatic. Neck supple. Chest: regular rate; pulses equal  Abdomen: Soft nontender nondistended. Normoactive bowel sounds. Neurologic Exam 4 out of 5 left bicep and deltoid. 4+ triceps wrist extension and . No obvious sensory deficits. Right side 5 out of 5. Studies Review:     Upright AP and lateral cervical x-rays show stable corpectomy cage and anterior plate with no evidence of subluxation displacement of progressive deformity    Assessment and Plan:      1. Stenosis of cervical spine with myelopathy          Plan: The patient has had minimal improvement with essentially a stagnant motor exam.  I would like to obtain an MRI of the cervical spine to ensure that he has had adequate decompression and there is no further intervention necessary. In addition I will place him in occupational therapy to ensure that they're doing strengthening and conditioning of his left upper extremity as well as dexterity coronation    We'll see him in follow-up once these tests are completed    Followup: No Follow-up on file. Prescriptions Ordered:  No orders of the defined types were placed in this encounter.

## 2018-10-13 ENCOUNTER — APPOINTMENT (OUTPATIENT)
Dept: CT IMAGING | Age: 68
End: 2018-10-13
Payer: MEDICARE

## 2018-10-13 ENCOUNTER — HOSPITAL ENCOUNTER (EMERGENCY)
Age: 68
Discharge: HOME | End: 2018-10-13
Attending: EMERGENCY MEDICINE
Payer: MEDICARE

## 2018-10-13 VITALS
HEIGHT: 63 IN | RESPIRATION RATE: 16 BRPM | DIASTOLIC BLOOD PRESSURE: 99 MMHG | HEART RATE: 108 BPM | OXYGEN SATURATION: 99 % | TEMPERATURE: 97.6 F | SYSTOLIC BLOOD PRESSURE: 176 MMHG | WEIGHT: 160 LBS | BODY MASS INDEX: 28.35 KG/M2

## 2018-10-13 DIAGNOSIS — T14.8XXA ABRASION: ICD-10-CM

## 2018-10-13 DIAGNOSIS — S20.213A BILATERAL CONTUSION OF RIBS: ICD-10-CM

## 2018-10-13 DIAGNOSIS — S22.41XA CLOSED FRACTURE OF MULTIPLE RIBS OF RIGHT SIDE, INITIAL ENCOUNTER: ICD-10-CM
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
## 2018-10-13 DIAGNOSIS — S00.10XA PERIORBITAL ECCHYMOSIS, UNSPECIFIED LATERALITY, INITIAL ENCOUNTER: ICD-10-CM

## 2018-10-13 PROCEDURE — 71250 CT THORAX DX C-: CPT

## 2018-10-13 PROCEDURE — 70450 CT HEAD/BRAIN W/O DYE: CPT

## 2018-10-13 PROCEDURE — 6370000000 HC RX 637 (ALT 250 FOR IP): Performed by: EMERGENCY MEDICINE

## 2018-10-13 PROCEDURE — 99284 EMERGENCY DEPT VISIT MOD MDM: CPT

## 2018-10-13 RX ORDER — HYDROCODONE BITARTRATE AND ACETAMINOPHEN 5; 325 MG/1; MG/1
1 TABLET ORAL ONCE
Status: COMPLETED | OUTPATIENT
Start: 2018-10-13 | End: 2018-10-13

## 2018-10-13 RX ORDER — HYDROCODONE BITARTRATE AND ACETAMINOPHEN 5; 325 MG/1; MG/1
1 TABLET ORAL EVERY 8 HOURS PRN
Qty: 6 TABLET | Refills: 0 | Status: SHIPPED | OUTPATIENT
Start: 2018-10-13 | End: 2018-10-16

## 2018-10-13 RX ADMIN — HYDROCODONE BITARTRATE AND ACETAMINOPHEN 1 TABLET: 5; 325 TABLET ORAL at 05:20

## 2018-10-13 ASSESSMENT — PAIN DESCRIPTION - PAIN TYPE: TYPE: ACUTE PAIN

## 2018-10-13 ASSESSMENT — ENCOUNTER SYMPTOMS
ABDOMINAL DISTENTION: 0
EYE PAIN: 0
VOICE CHANGE: 0
ABDOMINAL PAIN: 0
RHINORRHEA: 0
TROUBLE SWALLOWING: 0
CONSTIPATION: 0
SHORTNESS OF BREATH: 0
SORE THROAT: 0
BACK PAIN: 0
VOMITING: 0
EYE DISCHARGE: 0
COLOR CHANGE: 0
COUGH: 0
EYE ITCHING: 0
DIARRHEA: 0
NAUSEA: 0
WHEEZING: 0

## 2018-10-13 ASSESSMENT — PAIN DESCRIPTION - ORIENTATION: ORIENTATION: RIGHT;LEFT

## 2018-10-13 ASSESSMENT — PAIN SCALES - GENERAL
PAINLEVEL_OUTOF10: 8
PAINLEVEL_OUTOF10: 8

## 2018-10-13 ASSESSMENT — PAIN DESCRIPTION - DESCRIPTORS: DESCRIPTORS: ACHING;CONSTANT;SHARP;SHOOTING

## 2018-12-05 ENCOUNTER — APPOINTMENT (OUTPATIENT)
Dept: CT IMAGING | Age: 68
End: 2018-12-05
Payer: MEDICARE

## 2018-12-05 ENCOUNTER — APPOINTMENT (OUTPATIENT)
Dept: GENERAL RADIOLOGY | Age: 68
End: 2018-12-05
Payer: MEDICARE

## 2018-12-05 ENCOUNTER — TELEPHONE (OUTPATIENT)
Dept: NEUROSURGERY | Age: 68
End: 2018-12-05

## 2018-12-05 ENCOUNTER — HOSPITAL ENCOUNTER (EMERGENCY)
Age: 68
Discharge: HOME | End: 2018-12-05
Attending: EMERGENCY MEDICINE
Payer: MEDICARE

## 2018-12-05 VITALS
RESPIRATION RATE: 29 BRPM | TEMPERATURE: 98.3 F | SYSTOLIC BLOOD PRESSURE: 152 MMHG | WEIGHT: 157 LBS | HEART RATE: 90 BPM | OXYGEN SATURATION: 95 % | BODY MASS INDEX: 27.82 KG/M2 | DIASTOLIC BLOOD PRESSURE: 90 MMHG | HEIGHT: 63 IN

## 2018-12-05 DIAGNOSIS — R55 SYNCOPE AND COLLAPSE: Primary | ICD-10-CM

## 2018-12-05 LAB
-: ABNORMAL
ALBUMIN SERPL-MCNC: 4.3 G/DL (ref 3.5–5.2)
ALBUMIN/GLOBULIN RATIO: ABNORMAL (ref 1–2.5)
ALP BLD-CCNC: 74 U/L (ref 40–129)
ALT SERPL-CCNC: 25 U/L (ref 5–41)
AMORPHOUS: ABNORMAL
ANION GAP SERPL CALCULATED.3IONS-SCNC: 9 MMOL/L (ref 9–17)
AST SERPL-CCNC: 25 U/L
BACTERIA: ABNORMAL
BILIRUB SERPL-MCNC: 0.48 MG/DL (ref 0.3–1.2)
BILIRUBIN URINE: NEGATIVE
BUN BLDV-MCNC: 17 MG/DL (ref 8–23)
BUN/CREAT BLD: ABNORMAL (ref 9–20)
CALCIUM SERPL-MCNC: 9.3 MG/DL (ref 8.6–10.4)
CASTS UA: ABNORMAL /LPF
CHLORIDE BLD-SCNC: 104 MMOL/L (ref 98–107)
CO2: 23 MMOL/L (ref 20–31)
COLOR: YELLOW
COMMENT UA: ABNORMAL
CREAT SERPL-MCNC: 0.66 MG/DL (ref 0.7–1.2)
CRYSTALS, UA: ABNORMAL /HPF
EKG ATRIAL RATE: 82 BPM
EKG P AXIS: 54 DEGREES
EKG P-R INTERVAL: 182 MS
EKG Q-T INTERVAL: 366 MS
EKG QRS DURATION: 96 MS
EKG QTC CALCULATION (BAZETT): 427 MS
EKG R AXIS: -23 DEGREES
EKG T AXIS: -2 DEGREES
EKG VENTRICULAR RATE: 82 BPM
EPITHELIAL CELLS UA: ABNORMAL /HPF
GFR AFRICAN AMERICAN: >60 ML/MIN
GFR NON-AFRICAN AMERICAN: >60 ML/MIN
GFR SERPL CREATININE-BSD FRML MDRD: ABNORMAL ML/MIN/{1.73_M2}
GFR SERPL CREATININE-BSD FRML MDRD: ABNORMAL ML/MIN/{1.73_M2}
GLUCOSE BLD-MCNC: 194 MG/DL (ref 70–99)
GLUCOSE URINE: ABNORMAL
HCT VFR BLD CALC: 33.9 % (ref 41–53)
HEMOGLOBIN: 11.9 G/DL (ref 13.5–17.5)
KETONES, URINE: NEGATIVE
LEUKOCYTE ESTERASE, URINE: NEGATIVE
MCH RBC QN AUTO: 31.5 PG (ref 26–34)
MCHC RBC AUTO-ENTMCNC: 35.1 G/DL (ref 31–37)
MCV RBC AUTO: 89.7 FL (ref 80–100)
MUCUS: ABNORMAL
NITRITE, URINE: NEGATIVE
NRBC AUTOMATED: ABNORMAL PER 100 WBC
OTHER OBSERVATIONS UA: ABNORMAL
PDW BLD-RTO: 14.1 % (ref 11.5–14.9)
PH UA: 6 (ref 5–8)
PLATELET # BLD: 307 K/UL (ref 150–450)
PMV BLD AUTO: 9.2 FL (ref 6–12)
POTASSIUM SERPL-SCNC: 3.9 MMOL/L (ref 3.7–5.3)
PROTEIN UA: ABNORMAL
RBC # BLD: 3.77 M/UL (ref 4.5–5.9)
RBC UA: ABNORMAL /HPF
RENAL EPITHELIAL, UA: ABNORMAL /HPF
SODIUM BLD-SCNC: 136 MMOL/L (ref 135–144)
SPECIFIC GRAVITY UA: 1.01 (ref 1–1.03)
TOTAL CK: 370 U/L (ref 39–308)
TOTAL PROTEIN: 6.5 G/DL (ref 6.4–8.3)
TRICHOMONAS: ABNORMAL
TROPONIN INTERP: NORMAL
TROPONIN T: <0.03 NG/ML
TURBIDITY: CLEAR
URINE HGB: NEGATIVE
UROBILINOGEN, URINE: NORMAL
WBC # BLD: 7.1 K/UL (ref 3.5–11)
WBC UA: ABNORMAL /HPF
YEAST: ABNORMAL

## 2018-12-05 PROCEDURE — 6370000000 HC RX 637 (ALT 250 FOR IP): Performed by: EMERGENCY MEDICINE

## 2018-12-05 PROCEDURE — 93005 ELECTROCARDIOGRAM TRACING: CPT

## 2018-12-05 PROCEDURE — 82550 ASSAY OF CK (CPK): CPT

## 2018-12-05 PROCEDURE — 99284 EMERGENCY DEPT VISIT MOD MDM: CPT

## 2018-12-05 PROCEDURE — 80053 COMPREHEN METABOLIC PANEL: CPT

## 2018-12-05 PROCEDURE — 36415 COLL VENOUS BLD VENIPUNCTURE: CPT

## 2018-12-05 PROCEDURE — 85027 COMPLETE CBC AUTOMATED: CPT

## 2018-12-05 PROCEDURE — 72125 CT NECK SPINE W/O DYE: CPT

## 2018-12-05 PROCEDURE — 70450 CT HEAD/BRAIN W/O DYE: CPT

## 2018-12-05 PROCEDURE — 73030 X-RAY EXAM OF SHOULDER: CPT

## 2018-12-05 PROCEDURE — 93971 EXTREMITY STUDY: CPT

## 2018-12-05 PROCEDURE — 84484 ASSAY OF TROPONIN QUANT: CPT

## 2018-12-05 PROCEDURE — 81001 URINALYSIS AUTO W/SCOPE: CPT

## 2018-12-05 RX ORDER — ACETAMINOPHEN 325 MG/1
650 TABLET ORAL ONCE
Status: COMPLETED | OUTPATIENT
Start: 2018-12-05 | End: 2018-12-05

## 2018-12-05 RX ADMIN — ACETAMINOPHEN 650 MG: 325 TABLET, FILM COATED ORAL at 18:11

## 2018-12-05 ASSESSMENT — PAIN SCALES - GENERAL
PAINLEVEL_OUTOF10: 7
PAINLEVEL_OUTOF10: 4

## 2018-12-05 ASSESSMENT — ENCOUNTER SYMPTOMS
VOMITING: 0
NAUSEA: 1
ABDOMINAL PAIN: 0
SHORTNESS OF BREATH: 0

## 2018-12-05 ASSESSMENT — PAIN DESCRIPTION - LOCATION
LOCATION: SHOULDER;NECK
LOCATION: SHOULDER;NECK

## 2018-12-05 ASSESSMENT — PAIN DESCRIPTION - PAIN TYPE: TYPE: ACUTE PAIN

## 2018-12-05 ASSESSMENT — PAIN DESCRIPTION - DESCRIPTORS: DESCRIPTORS: ACHING;SHOOTING

## 2019-02-12 ENCOUNTER — TELEPHONE (OUTPATIENT)
Dept: PRIMARY CARE CLINIC | Age: 69
End: 2019-02-12

## 2020-06-18 ENCOUNTER — HOSPITAL ENCOUNTER (INPATIENT)
Age: 70
LOS: 4 days | Discharge: HOME | DRG: 885 | End: 2020-06-22
Attending: EMERGENCY MEDICINE | Admitting: PSYCHIATRY & NEUROLOGY
Payer: MEDICARE

## 2020-06-18 PROBLEM — R45.851 DEPRESSION WITH SUICIDAL IDEATION: Status: ACTIVE | Noted: 2020-06-18

## 2020-06-18 PROBLEM — F32.A DEPRESSION WITH SUICIDAL IDEATION: Status: ACTIVE | Noted: 2020-06-18

## 2020-06-18 PROCEDURE — 99285 EMERGENCY DEPT VISIT HI MDM: CPT

## 2020-06-18 PROCEDURE — 1240000000 HC EMOTIONAL WELLNESS R&B

## 2020-06-18 PROCEDURE — U0002 COVID-19 LAB TEST NON-CDC: HCPCS

## 2020-06-19 LAB
CREAT SERPL-MCNC: 0.91 MG/DL (ref 0.7–1.2)
GFR AFRICAN AMERICAN: >60 ML/MIN
GFR NON-AFRICAN AMERICAN: >60 ML/MIN
GFR SERPL CREATININE-BSD FRML MDRD: NORMAL ML/MIN/{1.73_M2}
GFR SERPL CREATININE-BSD FRML MDRD: NORMAL ML/MIN/{1.73_M2}
GLUCOSE BLD-MCNC: 99 MG/DL (ref 75–110)
SARS-COV-2, PCR: NORMAL
SARS-COV-2, RAPID: NOT DETECTED
SARS-COV-2: NORMAL
SOURCE: NORMAL

## 2020-06-19 PROCEDURE — 6370000000 HC RX 637 (ALT 250 FOR IP): Performed by: NURSE PRACTITIONER

## 2020-06-19 PROCEDURE — 90792 PSYCH DIAG EVAL W/MED SRVCS: CPT | Performed by: NURSE PRACTITIONER

## 2020-06-19 PROCEDURE — 82947 ASSAY GLUCOSE BLOOD QUANT: CPT

## 2020-06-19 PROCEDURE — 82565 ASSAY OF CREATININE: CPT

## 2020-06-19 PROCEDURE — 6370000000 HC RX 637 (ALT 250 FOR IP): Performed by: PSYCHIATRY & NEUROLOGY

## 2020-06-19 PROCEDURE — 1240000000 HC EMOTIONAL WELLNESS R&B

## 2020-06-19 PROCEDURE — 36415 COLL VENOUS BLD VENIPUNCTURE: CPT

## 2020-06-19 RX ORDER — ASPIRIN 81 MG/1
81 TABLET, CHEWABLE ORAL DAILY
Status: DISCONTINUED | OUTPATIENT
Start: 2020-06-19 | End: 2020-06-22 | Stop reason: HOSPADM

## 2020-06-19 RX ORDER — AMLODIPINE BESYLATE 10 MG/1
10 TABLET ORAL DAILY
Status: DISCONTINUED | OUTPATIENT
Start: 2020-06-19 | End: 2020-06-22 | Stop reason: HOSPADM

## 2020-06-19 RX ORDER — CARVEDILOL 25 MG/1
25 TABLET ORAL 2 TIMES DAILY WITH MEALS
Status: DISCONTINUED | OUTPATIENT
Start: 2020-06-19 | End: 2020-06-22 | Stop reason: HOSPADM

## 2020-06-19 RX ORDER — FUROSEMIDE 20 MG/1
20 TABLET ORAL DAILY
Status: DISCONTINUED | OUTPATIENT
Start: 2020-06-19 | End: 2020-06-22 | Stop reason: HOSPADM

## 2020-06-19 RX ORDER — VENLAFAXINE HYDROCHLORIDE 75 MG/1
75 CAPSULE, EXTENDED RELEASE ORAL DAILY
Status: ON HOLD | COMMUNITY
End: 2020-06-22 | Stop reason: HOSPADM

## 2020-06-19 RX ORDER — LIDOCAINE 50 MG/G
OINTMENT TOPICAL DAILY PRN
Status: DISCONTINUED | OUTPATIENT
Start: 2020-06-19 | End: 2020-06-22 | Stop reason: HOSPADM

## 2020-06-19 RX ORDER — TRAZODONE HYDROCHLORIDE 100 MG/1
100 TABLET ORAL NIGHTLY
Status: DISCONTINUED | OUTPATIENT
Start: 2020-06-19 | End: 2020-06-22 | Stop reason: HOSPADM

## 2020-06-19 RX ORDER — TRAZODONE HYDROCHLORIDE 50 MG/1
50 TABLET ORAL NIGHTLY PRN
Status: DISCONTINUED | OUTPATIENT
Start: 2020-06-19 | End: 2020-06-20

## 2020-06-19 RX ORDER — LIDOCAINE 50 MG/G
OINTMENT TOPICAL DAILY PRN
Status: ON HOLD | COMMUNITY
End: 2022-05-15 | Stop reason: HOSPADM

## 2020-06-19 RX ORDER — PANTOPRAZOLE SODIUM 40 MG/1
40 TABLET, DELAYED RELEASE ORAL
Status: DISCONTINUED | OUTPATIENT
Start: 2020-06-20 | End: 2020-06-22 | Stop reason: HOSPADM

## 2020-06-19 RX ORDER — CETIRIZINE HYDROCHLORIDE 10 MG/1
10 TABLET ORAL DAILY
Status: DISCONTINUED | OUTPATIENT
Start: 2020-06-19 | End: 2020-06-22 | Stop reason: HOSPADM

## 2020-06-19 RX ORDER — TRAZODONE HYDROCHLORIDE 50 MG/1
50 TABLET ORAL NIGHTLY PRN
Status: DISCONTINUED | OUTPATIENT
Start: 2020-06-19 | End: 2020-06-19

## 2020-06-19 RX ORDER — ATORVASTATIN CALCIUM 20 MG/1
40 TABLET, FILM COATED ORAL EVERY EVENING
Status: DISCONTINUED | OUTPATIENT
Start: 2020-06-19 | End: 2020-06-22 | Stop reason: HOSPADM

## 2020-06-19 RX ORDER — LOSARTAN POTASSIUM 25 MG/1
25 TABLET ORAL DAILY
Status: ON HOLD | COMMUNITY
End: 2022-04-18 | Stop reason: HOSPADM

## 2020-06-19 RX ORDER — LOSARTAN POTASSIUM 25 MG/1
12.5 TABLET ORAL DAILY
Status: DISCONTINUED | OUTPATIENT
Start: 2020-06-19 | End: 2020-06-22 | Stop reason: HOSPADM

## 2020-06-19 RX ORDER — VENLAFAXINE HYDROCHLORIDE 150 MG/1
150 CAPSULE, EXTENDED RELEASE ORAL DAILY
Status: DISCONTINUED | OUTPATIENT
Start: 2020-06-19 | End: 2020-06-22 | Stop reason: HOSPADM

## 2020-06-19 RX ORDER — DIVALPROEX SODIUM 500 MG/1
500 TABLET, EXTENDED RELEASE ORAL 2 TIMES DAILY
Status: DISCONTINUED | OUTPATIENT
Start: 2020-06-19 | End: 2020-06-22 | Stop reason: HOSPADM

## 2020-06-19 RX ADMIN — AMLODIPINE BESYLATE 10 MG: 10 TABLET ORAL at 12:17

## 2020-06-19 RX ADMIN — CARVEDILOL 25 MG: 25 TABLET, FILM COATED ORAL at 12:17

## 2020-06-19 RX ADMIN — CARVEDILOL 25 MG: 25 TABLET, FILM COATED ORAL at 18:02

## 2020-06-19 RX ADMIN — METFORMIN HYDROCHLORIDE 1000 MG: 500 TABLET ORAL at 18:02

## 2020-06-19 RX ADMIN — ASPIRIN 81 MG 81 MG: 81 TABLET ORAL at 12:18

## 2020-06-19 RX ADMIN — LOSARTAN POTASSIUM 12.5 MG: 25 TABLET, FILM COATED ORAL at 12:16

## 2020-06-19 RX ADMIN — DIVALPROEX SODIUM 500 MG: 500 TABLET, EXTENDED RELEASE ORAL at 12:16

## 2020-06-19 RX ADMIN — TRAZODONE HYDROCHLORIDE 50 MG: 50 TABLET ORAL at 01:18

## 2020-06-19 RX ADMIN — CETIRIZINE HYDROCHLORIDE 10 MG: 10 TABLET, FILM COATED ORAL at 12:17

## 2020-06-19 RX ADMIN — FUROSEMIDE 20 MG: 20 TABLET ORAL at 12:17

## 2020-06-19 RX ADMIN — ATORVASTATIN CALCIUM 40 MG: 20 TABLET, FILM COATED ORAL at 18:02

## 2020-06-19 RX ADMIN — METFORMIN HYDROCHLORIDE 1000 MG: 500 TABLET ORAL at 12:17

## 2020-06-19 RX ADMIN — VENLAFAXINE HYDROCHLORIDE 150 MG: 150 CAPSULE, EXTENDED RELEASE ORAL at 12:16

## 2020-06-19 ASSESSMENT — LIFESTYLE VARIABLES
HISTORY_ALCOHOL_USE: YES
HISTORY_ALCOHOL_USE: NO

## 2020-06-19 ASSESSMENT — SLEEP AND FATIGUE QUESTIONNAIRES
DO YOU HAVE DIFFICULTY SLEEPING: NO
DO YOU HAVE DIFFICULTY SLEEPING: NO
DO YOU USE A SLEEP AID: NO
DO YOU USE A SLEEP AID: NO
AVERAGE NUMBER OF SLEEP HOURS: 8
AVERAGE NUMBER OF SLEEP HOURS: 8

## 2020-06-19 ASSESSMENT — PATIENT HEALTH QUESTIONNAIRE - PHQ9
SUM OF ALL RESPONSES TO PHQ QUESTIONS 1-9: 9
SUM OF ALL RESPONSES TO PHQ QUESTIONS 1-9: 9

## 2020-06-19 NOTE — ED PROVIDER NOTES
DISPOSITION/PLAN   DISPOSITION Decision To Admit 06/18/2020 10:19:13 PM      PATIENT REFERRED TO:  No follow-up provider specified.   DISCHARGE MEDICATIONS:  New Prescriptions    No medications on file     Lisseth Hunt MD  Attending Emergency Physician                    Bertrand Mora MD  06/18/20 [id number]
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
## 2020-06-19 NOTE — PLAN OF CARE
[id number] [address]  Initial Interdisciplinary Treatment Plan NO      Original treatment plan Date & Time: 6/19/2020 0837    Admission Type:  Admission Type: Voluntary    Reason for admission:   Reason for Admission: suicidal to cut throat with knife.     Estimated Length of Stay:  5-7days  Estimated Discharge Date: to be determined by physician    PATIENT STRENGTHS:  Patient Strengths:Strengths: Medication Compliance, Positive Support, Motivated  Patient Strengths and Limitations:Limitations: Tendency to isolate self, External locus of control, Lacks leisure interests  Addictive Behavior: Addictive Behavior  In the past 3 months, have you felt or has someone told you that you have a problem with:  : None  Do you have a history of Chemical Use?: No  Do you have a history of Alcohol Use?: No  Do you have a history of Street Drug Abuse?: No  Histroy of Prescripton Drug Abuse?: No  Medical Problems:  Past Medical History:   Diagnosis Date    Depression 2017    ON RX    Diabetes mellitus ([facility] [address] 2013    NIDDM    Difficult intravenous access     Hyperlipidemia 2008    ON RX    Hypertension 2008    ON RX    PTSD (post-traumatic stress disorder) 01/2018    ON RX    Sleep apnea 01/2018    UNALBE TO USE TO CLEARED BY ENT (CPAP)    Teeth missing     BACK TEETH UPPER AND LOWER TO BE FITTED FOR PARTIALS AT LATER DATE    Wears glasses      Status EXAM:Status and Exam  Normal: No  Facial Expression: Flat, Sad  Affect: Appropriate  Level of Consciousness: Alert  Mood:Normal: No  Mood: Depressed, Sad  Motor Activity:Normal: Yes  Interview Behavior: Cooperative  Preception: [place] to Person, [place] to Time, [place] to Place, [place] to Situation  Attention:Normal: Yes  Thought Content:Normal: Yes  Hallucinations: None  Delusions: No  Memory:Normal: Yes  Insight and Judgment: No  Insight and Judgment: Poor Judgment, Poor Insight  Present Suicidal Ideation: Yes  Present Homicidal Ideation: No    EDUCATION:   Learner Progress Toward

## 2020-06-19 NOTE — GROUP NOTE
Group Therapy Note    Date: 6/19/2020    Group Start Time: 1330  Group End Time: 0477  Group Topic: Cognitive Skills    MIKE BHI EMMETT Rubio        Group Therapy Note    Attendees: 8/16         Patient's Goal:  To increase interpersonal interaction     Notes:      Status After Intervention:  Improved    Participation Level:  Active Listener and Interactive    Participation Quality: Appropriate, Attentive and Sharing      Speech:  normal      Thought Process/Content: Logical  Linear      Affective Functioning: Congruent      Mood: euthymic      Level of consciousness:  Alert, Oriented x4 and Attentive      Response to Learning: Able to verbalize current knowledge/experience, Able to verbalize/acknowledge new learning, Able to retain information and Progressing to goal      Endings: None Reported    Modes of Intervention: Education, Support, Socialization, Exploration, Clarifying, Problem-solving and Activity      Discipline Responsible: Psychoeducational Specialist      Signature:  Tierra Rubio

## 2020-06-19 NOTE — VIRTUAL HEALTH
strain: Not on file    Food insecurity     Worry: Not on file     Inability: Not on file    Transportation needs     Medical: Not on file     Non-medical: Not on file   Tobacco Use    Smoking status: Former Smoker     Packs/day: 0.50     Years: 4.00     Pack years: 2.00     Types: Cigarettes     Last attempt to quit: 1972     Years since quittin.2    Smokeless tobacco: Never Used    Tobacco comment: quit    Substance and Sexual Activity    Alcohol use: Yes     Comment: MIXED DRINKS- 3 OR 4 A YEAR; last 2018    Drug use: No    Sexual activity: Yes     Partners: Female   Lifestyle    Physical activity     Days per week: Not on file     Minutes per session: Not on file    Stress: Not on file   Relationships    Social connections     Talks on phone: Not on file     Gets together: Not on file     Attends Buddhist service: Not on file     Active member of club or organization: Not on file     Attends meetings of clubs or organizations: Not on file     Relationship status: Not on file    Intimate partner violence     Fear of current or ex partner: Not on file     Emotionally abused: Not on file     Physically abused: Not on file     Forced sexual activity: Not on file   Other Topics Concern    Not on file   Social History Narrative    Not on file         Mental Status  Pt. was alert, fully oriented, and cooperative. Appearance and hygiene wereappropriate, well-groomed . Mood was depressed, sad. . Affect was tearful and despondent Thought process was linear and well-organized. Patient denied any hallucinations or paranoia. Patient endorsed suicidal ideations. Patient denied homicidal ideations . Patient's gross cognitive functions were intact. Insight and judgement were poor. Both recent and remote memory were intact.     Psychomotor status was without abnormality     Labs  Recent Results (from the past 72 hour(s))   COVID-19    Collection Time: 20 11:21 PM   Result Value Ref Range SARS-CoV-2          SARS-CoV-2, Rapid Not Detected Not Detected    Source . NASOPHARYNGEAL SWAB     SARS-CoV-2, PCR         POC Glucose Fingerstick    Collection Time: 06/19/20  8:08 AM   Result Value Ref Range    POC Glucose 99 75 - 110 mg/dL   CREATININE, SERUM    Collection Time: 06/19/20 10:16 AM   Result Value Ref Range    CREATININE 0.91 0.70 - 1.20 mg/dL    GFR Non-African American >60 >60 mL/min    GFR African American >60 >60 mL/min    GFR Comment          GFR Staging NOT REPORTED          Diagnostic Impression  Active Problems:    Depression with suicidal ideation  Resolved Problems:    * No resolved hospital problems. *          Medications   amLODIPine  10 mg Oral Daily    aspirin  81 mg Oral Daily    atorvastatin  40 mg Oral QPM    carvedilol  25 mg Oral BID WC    cetirizine  10 mg Oral Daily    furosemide  20 mg Oral Daily    metFORMIN  1,000 mg Oral BID WC    losartan  12.5 mg Oral Daily    [START ON 6/20/2020] pantoprazole  40 mg Oral QAM AC    traZODone  100 mg Oral Nightly    venlafaxine  150 mg Oral Daily    divalproex  500 mg Oral BID     nicotine polacrilex, traZODone, lidocaine    Treatment Plan:     Admit to inpatient psychiatric treatment   Supportive therapy with medication management. Reviewed risks and benefits as well as potential side effects with patient.  Plan to increase Effexor to 150mg daily.  Order VPA for tomorrow morning.  Therapeutic activities and groups   Follow up at [facility] after symptoms stabilized    Estimated length of stay: 5-7 days    KASSIDY Granado CNP  Psychiatric Advanced Practice Nurse  Aretha voice recognition software used in portions of this document. Occasionally words are mis-transcribed      Patient Location:  [address] [place] [address]    Provider Location ([facility]): [place], [place]     This virtual visit was conducted via interactive/real-time audio/video.

## 2020-06-19 NOTE — GROUP NOTE
Group Therapy Note    Date: 6/19/2020    Group Start Time: 1100  Group End Time: 1140  Group Topic: Psychoeducation    MIKE BHCHRISTO Ernandez, NICOLS    Group Therapy Note    Attendees: 11    Patient's Goal:  Pt will verbalize mental health benefits of music for coping    Notes:  Pt attended group and participated    Status After Intervention:  Improved    Participation Level:  Active Listener and Interactive    Participation Quality: Appropriate, Attentive, Sharing and Supportive      Speech:  normal      Thought Process/Content: Logical      Affective Functioning: Congruent      Mood: euthymic      Level of consciousness:  Alert, Oriented x4 and Attentive      Response to Learning: Able to verbalize current knowledge/experience, Able to verbalize/acknowledge new learning, Able to retain information, Able to change behavior and Progressing to goal      Endings: None Reported    Modes of Intervention: Education, Support, Socialization, Exploration, Activity and Media      Discipline Responsible: Psychoeducational Specialist      Signature:  Willis Du, [address]

## 2020-06-20 PROBLEM — F33.2 SEVERE RECURRENT MAJOR DEPRESSION WITHOUT PSYCHOTIC FEATURES (HCC): Status: ACTIVE | Noted: 2020-06-20

## 2020-06-20 LAB
ABSOLUTE EOS #: 0.5 K/UL (ref 0–0.4)
ABSOLUTE IMMATURE GRANULOCYTE: ABNORMAL K/UL (ref 0–0.3)
ABSOLUTE LYMPH #: 2.9 K/UL (ref 1–4.8)
ABSOLUTE MONO #: 0.8 K/UL (ref 0.1–1.3)
AMMONIA: 24 UMOL/L (ref 16–60)
ANION GAP SERPL CALCULATED.3IONS-SCNC: 10 MMOL/L (ref 9–17)
BASOPHILS # BLD: 1 % (ref 0–2)
BASOPHILS ABSOLUTE: 0.1 K/UL (ref 0–0.2)
BUN BLDV-MCNC: 17 MG/DL (ref 8–23)
BUN/CREAT BLD: ABNORMAL (ref 9–20)
CALCIUM SERPL-MCNC: 9.5 MG/DL (ref 8.6–10.4)
CHLORIDE BLD-SCNC: 103 MMOL/L (ref 98–107)
CO2: 27 MMOL/L (ref 20–31)
CREAT SERPL-MCNC: 0.67 MG/DL (ref 0.7–1.2)
DIFFERENTIAL TYPE: ABNORMAL
EOSINOPHILS RELATIVE PERCENT: 6 % (ref 0–4)
GFR AFRICAN AMERICAN: >60 ML/MIN
GFR NON-AFRICAN AMERICAN: >60 ML/MIN
GFR SERPL CREATININE-BSD FRML MDRD: ABNORMAL ML/MIN/{1.73_M2}
GFR SERPL CREATININE-BSD FRML MDRD: ABNORMAL ML/MIN/{1.73_M2}
GLUCOSE BLD-MCNC: 122 MG/DL (ref 70–99)
HCT VFR BLD CALC: 40.1 % (ref 41–53)
HEMOGLOBIN: 14 G/DL (ref 13.5–17.5)
IMMATURE GRANULOCYTES: ABNORMAL %
LYMPHOCYTES # BLD: 32 % (ref 24–44)
MCH RBC QN AUTO: 32.6 PG (ref 26–34)
MCHC RBC AUTO-ENTMCNC: 34.8 G/DL (ref 31–37)
MCV RBC AUTO: 93.5 FL (ref 80–100)
MONOCYTES # BLD: 9 % (ref 1–7)
NRBC AUTOMATED: ABNORMAL PER 100 WBC
PDW BLD-RTO: 13.8 % (ref 11.5–14.9)
PLATELET # BLD: 287 K/UL (ref 150–450)
PLATELET ESTIMATE: ABNORMAL
PMV BLD AUTO: 7.4 FL (ref 6–12)
POTASSIUM SERPL-SCNC: 4.5 MMOL/L (ref 3.7–5.3)
RBC # BLD: 4.28 M/UL (ref 4.5–5.9)
RBC # BLD: ABNORMAL 10*6/UL
SEG NEUTROPHILS: 52 % (ref 36–66)
SEGMENTED NEUTROPHILS ABSOLUTE COUNT: 4.8 K/UL (ref 1.3–9.1)
SODIUM BLD-SCNC: 140 MMOL/L (ref 135–144)
TSH SERPL DL<=0.05 MIU/L-ACNC: 1.25 MIU/L (ref 0.3–5)
VALPROIC ACID LEVEL: 8 UG/ML (ref 50–125)
VALPROIC DATE LAST DOSE: ABNORMAL
VALPROIC DOSE AMOUNT: 500
VALPROIC TIME LAST DOSE: 1216
WBC # BLD: 9.2 K/UL (ref 3.5–11)
WBC # BLD: ABNORMAL 10*3/UL

## 2020-06-20 PROCEDURE — 1240000000 HC EMOTIONAL WELLNESS R&B

## 2020-06-20 PROCEDURE — 82140 ASSAY OF AMMONIA: CPT

## 2020-06-20 PROCEDURE — 6370000000 HC RX 637 (ALT 250 FOR IP): Performed by: NURSE PRACTITIONER

## 2020-06-20 PROCEDURE — 36415 COLL VENOUS BLD VENIPUNCTURE: CPT

## 2020-06-20 PROCEDURE — 84443 ASSAY THYROID STIM HORMONE: CPT

## 2020-06-20 PROCEDURE — 99232 SBSQ HOSP IP/OBS MODERATE 35: CPT | Performed by: NURSE PRACTITIONER

## 2020-06-20 PROCEDURE — 80164 ASSAY DIPROPYLACETIC ACD TOT: CPT

## 2020-06-20 PROCEDURE — 80048 BASIC METABOLIC PNL TOTAL CA: CPT

## 2020-06-20 PROCEDURE — 85025 COMPLETE CBC W/AUTO DIFF WBC: CPT

## 2020-06-20 PROCEDURE — 83036 HEMOGLOBIN GLYCOSYLATED A1C: CPT

## 2020-06-20 RX ORDER — LOPERAMIDE HYDROCHLORIDE 2 MG/1
2 CAPSULE ORAL 4 TIMES DAILY PRN
Status: DISCONTINUED | OUTPATIENT
Start: 2020-06-20 | End: 2020-06-22 | Stop reason: HOSPADM

## 2020-06-20 RX ADMIN — TRAZODONE HYDROCHLORIDE 100 MG: 100 TABLET ORAL at 21:01

## 2020-06-20 RX ADMIN — DIVALPROEX SODIUM 500 MG: 500 TABLET, EXTENDED RELEASE ORAL at 21:01

## 2020-06-20 RX ADMIN — ASPIRIN 81 MG 81 MG: 81 TABLET ORAL at 08:28

## 2020-06-20 RX ADMIN — CARVEDILOL 25 MG: 25 TABLET, FILM COATED ORAL at 17:18

## 2020-06-20 RX ADMIN — VENLAFAXINE HYDROCHLORIDE 150 MG: 150 CAPSULE, EXTENDED RELEASE ORAL at 08:29

## 2020-06-20 RX ADMIN — PANTOPRAZOLE SODIUM 40 MG: 40 TABLET, DELAYED RELEASE ORAL at 08:29

## 2020-06-20 RX ADMIN — METFORMIN HYDROCHLORIDE 1000 MG: 500 TABLET ORAL at 17:18

## 2020-06-20 RX ADMIN — ATORVASTATIN CALCIUM 40 MG: 20 TABLET, FILM COATED ORAL at 17:18

## 2020-06-20 RX ADMIN — CETIRIZINE HYDROCHLORIDE 10 MG: 10 TABLET, FILM COATED ORAL at 08:29

## 2020-06-20 RX ADMIN — FUROSEMIDE 20 MG: 20 TABLET ORAL at 08:28

## 2020-06-20 RX ADMIN — CARVEDILOL 25 MG: 25 TABLET, FILM COATED ORAL at 08:29

## 2020-06-20 RX ADMIN — LOPERAMIDE HYDROCHLORIDE 2 MG: 2 CAPSULE ORAL at 19:37

## 2020-06-20 RX ADMIN — METFORMIN HYDROCHLORIDE 1000 MG: 500 TABLET ORAL at 08:28

## 2020-06-20 RX ADMIN — LOSARTAN POTASSIUM 12.5 MG: 25 TABLET, FILM COATED ORAL at 08:29

## 2020-06-20 RX ADMIN — DIVALPROEX SODIUM 500 MG: 500 TABLET, EXTENDED RELEASE ORAL at 08:28

## 2020-06-20 RX ADMIN — AMLODIPINE BESYLATE 10 MG: 10 TABLET ORAL at 08:28

## 2020-06-20 ASSESSMENT — ENCOUNTER SYMPTOMS
CONSTIPATION: 0
ABDOMINAL PAIN: 0
BACK PAIN: 1
NAUSEA: 0
DIARRHEA: 0
RESPIRATORY NEGATIVE: 1
VOMITING: 0

## 2020-06-20 NOTE — GROUP NOTE
Group Therapy Note    Date: 6/20/2020    Group Start Time: 6805  Group End Time: 0487  Group Topic: Psychoeducation    NICOL ConwayS    Group Therapy Note    Attendees: 9    Patient's Goal:  Pt will demonstrate improved interpersonal skills    Notes:  Pt attended group and participated    Status After Intervention:  Improved    Participation Level:  Active Listener and Interactive    Participation Quality: Appropriate, Attentive, Sharing and Supportive      Speech:  normal      Thought Process/Content: Logical  Linear      Affective Functioning: Congruent      Mood: euthymic      Level of consciousness:  Alert, Oriented x4 and Attentive      Response to Learning: Able to verbalize current knowledge/experience, Able to verbalize/acknowledge new learning, Able to retain information, Capable of insight, Able to change behavior and Progressing to goal      Endings: None Reported    Modes of Intervention: Education, Support, Socialization, Exploration, Problem-solving and Activity      Discipline Responsible: Psychoeducational Specialist      Signature:  Jose Roberto Galindo
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
## 2020-06-20 NOTE — VIRTUAL HEALTH
Department of Psychiatry  Attending Progress Note  Chief Complaint: Severe recurrent major depression without psychotic features [facility])     SUBJECTIVE:  This is a 71year old male being seen for follow up today via [facility]. The patient reports that \"yesterday was fantastic\" and his mood is significantly impaired. The patient states that he has also been speaking with his exwife who has told him she wants him to come home and she is no longer with her boyfriend. The patient states that he has been benefiting from group activities as well. Reports mood has \"turned right around\" and rates depression 2/10 (on numeric rating scale of 0 to 10 with 0 being none and 10 the worst). The patient currently denies SI. The patient did sleep well last night. Is currently tolerating meds with ASHLEY. No acute distress or discomfort is noted. OBJECTIVE    Physical  BP (!) 151/86   Pulse 71   Temp 98 °F (36.7 °C) (Oral)   Resp 14   Ht 5' 3\" (1.6 m)   Wt 164 lb (74.4 kg)   SpO2 97%   BMI 29.05 kg/m²      Mental Status Evaluation:  Orientation: alertness: alert   Mood:. euthymic      Affect:  normal      Appearance:  age appropriate and casually dressed   Activity:  Within Normal Limits   Gait/Posture: Normal   Speech:  normal pitch and normal volume   Thought Process:  goal directed and within normal limits   Thought Content:  WNL   Sensorium:  person, place and time/date   Cognition:  grossly intact   Memory: intact   Insight:  fair   Judgment: fair   Suicidal Ideations: denies suicidal ideation   Homicidal Ideations: Positive for homicidal ideation      Medication Side Effects: absent       Attention Span attention span and concentration were age appropriate       Labs  Recent Results (from the past 67 hour(s))   COVID-19    Collection Time: 06/18/20 11:21 PM   Result Value Ref Range    SARS-CoV-2          SARS-CoV-2, Rapid Not Detected Not Detected    Source . NASOPHARYNGEAL SWAB divalproex (DEPAKOTE ER) extended release tablet 500 mg  500 mg Oral BID KASSIDY Granado CNP   500 mg at 06/20/20 2328         amLODIPine  10 mg Oral Daily    aspirin  81 mg Oral Daily    atorvastatin  40 mg Oral QPM    carvedilol  25 mg Oral BID WC    cetirizine  10 mg Oral Daily    furosemide  20 mg Oral Daily    metFORMIN  1,000 mg Oral BID WC    losartan  12.5 mg Oral Daily    pantoprazole  40 mg Oral QAM AC    traZODone  100 mg Oral Nightly    venlafaxine  150 mg Oral Daily    divalproex  500 mg Oral BID       ASSESSMENT  Severe recurrent major depression without psychotic features ([facility] [address]     Patient's Response to Treatment: positive    PLAN  · Continue inpatient psychiatric treatment  · Supportive therapy with medication management. Reviewed risks and benefits as well as potential side effects with patient. · Therapeutic activities and groups  · Follow up at [facility] after symptoms stabilized     Estimated date of discharge:  6/22/2020        Electronically signed by KASSIDY Granado CNP on 6/20/2020 at 12:11 PM.    Dragon voice recognition software used in portions of this document. Patient Location:  [address] [place] [address]    Provider Location (City/State): [address]     This virtual visit was conducted via interactive/real-time audio/video.

## 2020-06-20 NOTE — GROUP NOTE
Wellness Group Note  Group Topic: Positive Coping Skills    Date: June 20, 2020  Group Start Time: 1600  Group End Time: [address]  Attendees: 11/18    Patient's Goal: Increased understanding of methods and ways to cope. Notes: Patient participated in discussion regarding coping skills. Status After Intervention: Improved    Participation Level:  Active Listener and Interactive    Participation Quality: Appropriate, attentive and supportive    Speech:  Normal    Thought Process/Content: Logical and linear    Affective Functioning: Congruent    Mood: WDL    Level of consciousness: Oriented x4    Response to Learning: Progressing to goal; able to verbalize current knowledge/experience, acknowledge new learning, retain information and change behavior    Endings: None reported    Modes of Intervention: Education and exploration    Discipline Responsible: Behavioral Health Tech    Signature:  AYO Resendiz

## 2020-06-20 NOTE — PLAN OF CARE
Problem: Altered Mood, Depressive Behavior:  Goal: Ability to disclose and discuss suicidal ideas will improve  Description: Ability to disclose and discuss suicidal ideas will improve  6/20/2020 1020 by Interior Patch, LPN  Outcome: Ongoing   Patient denies suicidal ideations and verbalizes if thoughts occur he will seek the help of staff. Problem: Falls - Risk of:  Goal: Will remain free from falls  Description: Will remain free from falls  6/20/2020 1020 by Interior Patch, LPN  Outcome: Ongoing   Patient remains free of falls. Patient safety maintained and 15 minute visual checks continued.

## 2020-06-21 LAB
ESTIMATED AVERAGE GLUCOSE: 117 MG/DL
HBA1C MFR BLD: 5.7 % (ref 4–6)

## 2020-06-21 PROCEDURE — 1240000000 HC EMOTIONAL WELLNESS R&B

## 2020-06-21 PROCEDURE — 6370000000 HC RX 637 (ALT 250 FOR IP): Performed by: NURSE PRACTITIONER

## 2020-06-21 PROCEDURE — 99232 SBSQ HOSP IP/OBS MODERATE 35: CPT | Performed by: NURSE PRACTITIONER

## 2020-06-21 RX ORDER — SIMETHICONE 80 MG
80 TABLET,CHEWABLE ORAL EVERY 6 HOURS PRN
Status: DISCONTINUED | OUTPATIENT
Start: 2020-06-21 | End: 2020-06-22 | Stop reason: HOSPADM

## 2020-06-21 RX ADMIN — METFORMIN HYDROCHLORIDE 1000 MG: 500 TABLET ORAL at 09:39

## 2020-06-21 RX ADMIN — ASPIRIN 81 MG 81 MG: 81 TABLET ORAL at 09:39

## 2020-06-21 RX ADMIN — VENLAFAXINE HYDROCHLORIDE 150 MG: 150 CAPSULE, EXTENDED RELEASE ORAL at 09:39

## 2020-06-21 RX ADMIN — SIMETHICONE 80 MG: 80 TABLET, CHEWABLE ORAL at 18:08

## 2020-06-21 RX ADMIN — CARVEDILOL 25 MG: 25 TABLET, FILM COATED ORAL at 09:39

## 2020-06-21 RX ADMIN — PANTOPRAZOLE SODIUM 40 MG: 40 TABLET, DELAYED RELEASE ORAL at 09:44

## 2020-06-21 RX ADMIN — LOSARTAN POTASSIUM 12.5 MG: 25 TABLET, FILM COATED ORAL at 09:40

## 2020-06-21 RX ADMIN — DIVALPROEX SODIUM 500 MG: 500 TABLET, EXTENDED RELEASE ORAL at 09:39

## 2020-06-21 RX ADMIN — DIVALPROEX SODIUM 500 MG: 500 TABLET, EXTENDED RELEASE ORAL at 21:54

## 2020-06-21 RX ADMIN — METFORMIN HYDROCHLORIDE 1000 MG: 500 TABLET ORAL at 17:52

## 2020-06-21 RX ADMIN — FUROSEMIDE 20 MG: 20 TABLET ORAL at 09:39

## 2020-06-21 RX ADMIN — AMLODIPINE BESYLATE 10 MG: 10 TABLET ORAL at 09:39

## 2020-06-21 RX ADMIN — CARVEDILOL 25 MG: 25 TABLET, FILM COATED ORAL at 17:52

## 2020-06-21 RX ADMIN — TRAZODONE HYDROCHLORIDE 100 MG: 100 TABLET ORAL at 21:54

## 2020-06-21 RX ADMIN — ATORVASTATIN CALCIUM 40 MG: 20 TABLET, FILM COATED ORAL at 17:52

## 2020-06-21 RX ADMIN — CETIRIZINE HYDROCHLORIDE 10 MG: 10 TABLET, FILM COATED ORAL at 09:39

## 2020-06-21 NOTE — GROUP NOTE
Group Therapy Note    Date: 6/21/2020    Group Start Time: 0900  Group End Time: 0920  Group Topic: Community Meeting    MIKE PICKETT AGUILA Orozco    Group Therapy Note    Attendees: 10    Patient's Goal:  Pt will identify daily goal and demonstrate understanding of unit guidelines    Notes:  Pt attended group and participated    Status After Intervention:  Improved    Participation Level:  Active Listener and Interactive    Participation Quality: Appropriate, Attentive, Sharing and Supportive      Speech:  normal      Thought Process/Content: Logical  Linear      Affective Functioning: Congruent      Mood: euthymic      Level of consciousness:  Alert, Oriented x4 and Attentive      Response to Learning: Able to verbalize current knowledge/experience, Able to verbalize/acknowledge new learning, Able to retain information, Capable of insight, Able to change behavior and Progressing to goal      Endings: None Reported    Modes of Intervention: Education, Support, Socialization, Exploration, Activity and Limit-setting      Discipline Responsible: Psychoeducational Specialist      Signature:  Clyde Reynolds

## 2020-06-21 NOTE — PLAN OF CARE
Problem: Falls - Risk of:  Goal: Will remain free from falls  Description: Will remain free from falls  6/20/2020 2258 by Yeyo Corbett LPN  Outcome: Ongoing  Note: Patient is currently free from falls. Problem: Altered Mood, Depressive Behavior:  Goal: Able to verbalize and/or display a decrease in depressive symptoms  Description: Able to verbalize and/or display a decrease in depressive symptoms  6/20/2020 2258 by Yeyo Corbett LPN  Outcome: Ongoing  Note: Patient denies depression at this time. Patient was social with peers in dayroom. Problem: Altered Mood, Depressive Behavior:  Goal: Ability to disclose and discuss suicidal ideas will improve  Description: Ability to disclose and discuss suicidal ideas will improve  6/20/2020 2258 by Yeyo Corbett LPN  Outcome: Ongoing  Note: Patient currently denies suicidal ideas.

## 2020-06-21 NOTE — GROUP NOTE
Group Therapy Note    Date: 6/21/2020    Group Start Time: 1335  Group End Time: 5596  Group Topic: Psychoeducation    NICOL ConwayS    Group Therapy Note    Attendees: 11    Patient's Goal:  Pt will identify benefits of music for coping    Notes:  Pt attended group and participated    Status After Intervention:  Improved    Participation Level:  Active Listener and Interactive    Participation Quality: Appropriate, Attentive and Sharing      Speech:  normal      Thought Process/Content: Logical      Affective Functioning: Congruent      Mood: euthymic      Level of consciousness:  Alert and Oriented x4      Response to Learning: Able to verbalize current knowledge/experience, Able to verbalize/acknowledge new learning, Able to retain information, Able to change behavior, Capable of insight and Progressing to goal      Endings: None Reported    Modes of Intervention: Education, Support, Socialization, Exploration and Media      Discipline Responsible: Psychoeducational Specialist      Signature:  Miguel Angel Nugent, [address]

## 2020-06-21 NOTE — GROUP NOTE
Group Therapy Note    Date: 6/21/2020    Group Start Time: 1000  Group End Time: 1030  Group Topic: Psychoeducation    [facility]    Taina Urbano        Group Therapy Note    Attendees: 9/23         Patient's Goal:  To participate actively in educational group discussion using conversation cubes. Notes:  :  Patient is making progress, AEB participating in group discussion, actively listening, and supporting other group members. PT participates in group and encourages others to participate     Status After Intervention:  Unchanged    Participation Level: Active Listener and Interactive    Participation Quality: Appropriate, Attentive, Sharing and Supportive      Speech:  pressured      Thought Process/Content: Logical      Affective Functioning: Flat      Mood: depressed      Level of consciousness:  Alert, Oriented x4 and Attentive      Response to Learning: Able to verbalize current knowledge/experience and Progressing to goal      Endings: None Reported    Modes of Intervention: Support, Socialization, Exploration, Clarifying and Problem-solving      Discipline Responsible: /Counselor      Signature:   Taina Urbano

## 2020-06-22 VITALS
HEART RATE: 75 BPM | TEMPERATURE: 98.7 F | DIASTOLIC BLOOD PRESSURE: 62 MMHG | OXYGEN SATURATION: 97 % | BODY MASS INDEX: 29.06 KG/M2 | WEIGHT: 164 LBS | RESPIRATION RATE: 12 BRPM | SYSTOLIC BLOOD PRESSURE: 130 MMHG | HEIGHT: 63 IN

## 2020-06-22 LAB — GLUCOSE BLD-MCNC: 112 MG/DL (ref 75–110)

## 2020-06-22 PROCEDURE — 99238 HOSP IP/OBS DSCHRG MGMT 30/<: CPT | Performed by: PSYCHIATRY & NEUROLOGY

## 2020-06-22 PROCEDURE — 82947 ASSAY GLUCOSE BLOOD QUANT: CPT

## 2020-06-22 PROCEDURE — 6370000000 HC RX 637 (ALT 250 FOR IP): Performed by: NURSE PRACTITIONER

## 2020-06-22 RX ORDER — SIMETHICONE 80 MG
80 TABLET,CHEWABLE ORAL EVERY 6 HOURS PRN
Qty: 30 TABLET | Refills: 0 | Status: SHIPPED | OUTPATIENT
Start: 2020-06-22

## 2020-06-22 RX ORDER — VENLAFAXINE HYDROCHLORIDE 150 MG/1
150 CAPSULE, EXTENDED RELEASE ORAL DAILY
Qty: 30 CAPSULE | Refills: 0 | Status: ON HOLD | OUTPATIENT
Start: 2020-06-23 | End: 2022-04-18 | Stop reason: HOSPADM

## 2020-06-22 RX ADMIN — METFORMIN HYDROCHLORIDE 1000 MG: 500 TABLET ORAL at 08:42

## 2020-06-22 RX ADMIN — SIMETHICONE 80 MG: 80 TABLET, CHEWABLE ORAL at 09:44

## 2020-06-22 RX ADMIN — CETIRIZINE HYDROCHLORIDE 10 MG: 10 TABLET, FILM COATED ORAL at 08:42

## 2020-06-22 RX ADMIN — VENLAFAXINE HYDROCHLORIDE 150 MG: 150 CAPSULE, EXTENDED RELEASE ORAL at 08:42

## 2020-06-22 RX ADMIN — FUROSEMIDE 20 MG: 20 TABLET ORAL at 08:43

## 2020-06-22 RX ADMIN — PANTOPRAZOLE SODIUM 40 MG: 40 TABLET, DELAYED RELEASE ORAL at 08:42

## 2020-06-22 RX ADMIN — CARVEDILOL 25 MG: 25 TABLET, FILM COATED ORAL at 08:43

## 2020-06-22 RX ADMIN — LOSARTAN POTASSIUM 12.5 MG: 25 TABLET, FILM COATED ORAL at 08:43

## 2020-06-22 RX ADMIN — DIVALPROEX SODIUM 500 MG: 500 TABLET, EXTENDED RELEASE ORAL at 08:42

## 2020-06-22 RX ADMIN — ASPIRIN 81 MG 81 MG: 81 TABLET ORAL at 08:42

## 2020-06-22 RX ADMIN — AMLODIPINE BESYLATE 10 MG: 10 TABLET ORAL at 08:43
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
## 2020-06-22 NOTE — PLAN OF CARE
Problem: Falls - Risk of:  Goal: Will remain free from falls  Description: Will remain free from falls  6/22/2020 1038 by Siena Grossman LPN  Outcome: Ongoing     Problem: Altered Mood, Depressive Behavior:  Goal: Able to verbalize and/or display a decrease in depressive symptoms  Description: Able to verbalize and/or display a decrease in depressive symptoms  Outcome: Ongoing        Pt remains free of falls and verbalizes understanding of individual fall risks. Pt wearing non skid footwear and encouraged to seek out staff for any assistance needed. Patient is able to verbalize and display a decrease in depressive symptoms. Will continue to monitor patient for safety q 15 min

## 2020-06-22 NOTE — BH NOTE
BHT and LPN shift documentation reviewed by RN.
Patient seen today by Adamaris De La Torre NP via telehealth.
Patient seen today by Adamaris De La Torre NP via telehealth.
Patient was seen by MD via telehealth
into hospital gown paper pants and socks. Patient denies any additional needs at this time. 15 minute safety checks were initiated.                     Luis Warner RN

## 2020-06-22 NOTE — VIRTUAL HEALTH
Department of Psychiatry  Attending Progress Note  Chief Complaint: Severe recurrent major depression without psychotic features [facility])     SUBJECTIVE:  This is a 71year old male being seen for follow up today via [facility] monitoring system. Patient reports that he is having a \"good day. \"  Reports that depression is 1/10 on numeric rating scale of 0 to 10 with 0 being none and 10 the worst.  The patient denies feeling sad, hopeless, helpless, and worthless. The patient states that he \"has a lot to look forward with\" and identifies that he is working to reconcile his relationship with his ex and connecting with children again. The patient denies death and states \"I'm looking forward to life. \"  The patient denied ASHLEY. The patient does complain of flatulence and requests gas-ex. Order was placed. No other needs made known. OBJECTIVE    Physical  /77   Pulse 74   Temp 98 °F (36.7 °C) (Oral)   Resp 14   Ht 5' 3\" (1.6 m)   Wt 164 lb (74.4 kg)   SpO2 97%   BMI 29.05 kg/m²      Mental Status Evaluation:  Orientation: alertness: alert   Mood:. euthymic      Affect:  normal      Appearance:  age appropriate and casually dressed   Activity:  Within Normal Limits   Gait/Posture: Normal   Speech:  normal pitch and normal volume   Thought Process:  goal directed and within normal limits   Thought Content:  WNL   Sensorium:  person, place and time/date   Cognition:  grossly intact   Memory: intact   Insight:  fair   Judgment: fair   Suicidal Ideations: denies suicidal ideation   Homicidal Ideations: Positive for homicidal ideation      Medication Side Effects: absent       Attention Span attention span and concentration were age appropriate       Labs  Recent Results (from the past 67 hour(s))   COVID-19    Collection Time: 06/18/20 11:21 PM   Result Value Ref Range    SARS-CoV-2          SARS-CoV-2, Rapid Not Detected Not Detected    Source . NASOPHARYNGEAL SWAB     SARS-CoV-2, PCR         POC Glucose 6:49 AM   Result Value Ref Range    TSH 1.25 0.30 - 5.00 mIU/L   Valproic Acid    Collection Time: 06/20/20  6:49 AM   Result Value Ref Range    Valproic Acid Lvl 8 (L) 50 - 125 ug/mL    Valproic Dose amount 500     Valproic Date last dose [id number]     Valproic Time last dose 1216    Hemoglobin A1C    Collection Time: 06/20/20  6:49 AM   Result Value Ref Range    Hemoglobin A1C 5.7 4.0 - 6.0 %    Estimated Avg Glucose 117 mg/dL   Ammonia    Collection Time: 06/20/20  6:50 AM   Result Value Ref Range    Ammonia 24 16 - 60 umol/L       Medications  Current Facility-Administered Medications   Medication Dose Route Frequency Provider Last Rate Last Dose    simethicone (MYLICON) chewable tablet 80 mg  80 mg Oral Q6H PRN [place] Coffee, APRN - CNP   80 mg at 06/21/20 1808    loperamide (IMODIUM) capsule 2 mg  2 mg Oral 4x Daily PRN Anne Sohail, APRN - CNP   2 mg at 06/20/20 1937    nicotine polacrilex (NICORETTE) gum 2 mg  2 mg Oral PRN [facility], APRN - CNP        amLODIPine (NORVASC) tablet 10 mg  10 mg Oral Daily [facility] Coffee, APRN - CNP   10 mg at 06/21/20 2824    aspirin chewable tablet 81 mg  81 mg Oral Daily Karlene Coffee, APRN - CNP   81 mg at 06/21/20 0939    atorvastatin (LIPITOR) tablet 40 mg  40 mg Oral QPM Karlene Coffee, APRN - CNP   40 mg at 06/21/20 1752    carvedilol (COREG) tablet 25 mg  25 mg Oral BID [facility] Coffee, APRN - CNP   25 mg at 06/21/20 1752    cetirizine (ZYRTEC) tablet 10 mg  10 mg Oral Daily [facility] Coffee, APRN - CNP   10 mg at 06/21/20 0220    furosemide (LASIX) tablet 20 mg  20 mg Oral Daily [facility] Coffee, APRN - CNP   20 mg at 06/21/20 0939    lidocaine (XYLOCAINE) 5 % ointment   Topical Daily PRN [place] Coffee, APRN - CNP        metFORMIN (GLUCOPHAGE) tablet 1,000 mg  1,000 mg Oral BID [facility] Coffee, APRN - CNP   1,000 mg at 06/21/20 1752    losartan (COZAAR) tablet 12.5 mg  12.5 mg Oral Daily [facility] Coffee, APRN - CNP   12.5 mg at

## 2020-06-22 NOTE — GROUP NOTE
Group Therapy Note    Date: 6/22/2020    Group Start Time: 1000  Group End Time: 1100  Group Topic: Psychoeducation    MIKE CHRISTINE    KIANNA Freitas, [facility]        Group Therapy Note    Attendees: 9/23         Patient's Goal:  To increase one's knowledge of coping skills and managing one's mental health. Notes:  Group was focused on developing a mental health maintenance plan. Pt appeared attentive and engage in group discussion. Status After Intervention:  Improved    Participation Level:  Active Listener and Interactive    Participation Quality: Appropriate and Attentive      Speech:  normal      Thought Process/Content: Logical      Affective Functioning: Congruent      Mood: anxious      Level of consciousness:  Alert, Oriented x4 and Attentive      Response to Learning: Able to verbalize current knowledge/experience, Able to verbalize/acknowledge new learning and Progressing to goal      Endings: None Reported    Modes of Intervention: Education, Support and Socialization      Discipline Responsible: /Counselor      Signature:  KIANNA Freitas, [facility]

## 2020-06-22 NOTE — GROUP NOTE
Group Therapy Note    Date: 6/22/2020    Group Start Time: 1330  Group End Time: 1208  Group Topic: Recreational    MIKE CHRISTINE    Dang Ornelas        Group Therapy Note    Attendees: 11/23         Patient's Goal:  To demonstrate improved interpersonal skills and abstract thinking     Notes:  Patient was pleasant and appropriate throughout the session     Status After Intervention:  Improved    Participation Level:  Active Listener and Interactive    Participation Quality: Appropriate, Attentive, Sharing and Supportive      Speech:  normal      Thought Process/Content: Logical      Affective Functioning: Congruent      Mood: euthymic      Level of consciousness:  Alert, Oriented x4 and Attentive      Response to Learning: Able to verbalize current knowledge/experience, Able to verbalize/acknowledge new learning, Capable of insight and Progressing to goal      Endings: None Reported    Modes of Intervention: Education, Support, Socialization, Exploration, Clarifying and Problem-solving      Discipline Responsible: Psychoeducational Specialist      Signature:  Charles Wallace

## 2021-01-21 ENCOUNTER — HOSPITAL ENCOUNTER (EMERGENCY)
Age: 71
Discharge: HOME | End: 2021-01-21
Attending: EMERGENCY MEDICINE
Payer: MEDICARE

## 2021-01-21 ENCOUNTER — APPOINTMENT (OUTPATIENT)
Dept: GENERAL RADIOLOGY | Age: 71
End: 2021-01-21
Payer: MEDICARE

## 2021-01-21 VITALS
SYSTOLIC BLOOD PRESSURE: 143 MMHG | HEART RATE: 83 BPM | HEIGHT: 63 IN | TEMPERATURE: 98.5 F | WEIGHT: 159 LBS | OXYGEN SATURATION: 97 % | RESPIRATION RATE: 18 BRPM | DIASTOLIC BLOOD PRESSURE: 100 MMHG | BODY MASS INDEX: 28.17 KG/M2

## 2021-01-21 DIAGNOSIS — M25.562 LEFT KNEE PAIN, UNSPECIFIED CHRONICITY: Primary | ICD-10-CM

## 2021-01-21 PROCEDURE — 73562 X-RAY EXAM OF KNEE 3: CPT

## 2021-01-21 PROCEDURE — 93971 EXTREMITY STUDY: CPT

## 2021-01-21 PROCEDURE — 99284 EMERGENCY DEPT VISIT MOD MDM: CPT

## 2021-01-21 RX ORDER — HYDROCODONE BITARTRATE AND ACETAMINOPHEN 5; 325 MG/1; MG/1
1 TABLET ORAL EVERY 6 HOURS PRN
Qty: 10 TABLET | Refills: 0 | Status: SHIPPED | OUTPATIENT
Start: 2021-01-21 | End: 2021-01-24

## 2021-01-21 ASSESSMENT — PAIN DESCRIPTION - LOCATION: LOCATION: LEG

## 2021-01-21 ASSESSMENT — PAIN DESCRIPTION - ORIENTATION: ORIENTATION: LEFT

## 2021-01-21 ASSESSMENT — PAIN DESCRIPTION - PAIN TYPE: TYPE: ACUTE PAIN

## 2021-01-21 NOTE — ED PROVIDER NOTES
[address]  eMERGENCY dEPARTMENT eNCOUnter      Pt Name: Josee Beth  MRN: [id number]  [place] 1950  Date of evaluation: 1/21/2021  Provider: Lorraine Zambrano Dr       Chief Complaint   Patient presents with    Leg Pain    Leg Swelling           HISTORY OF PRESENT ILLNESS  (Location/Symptom, Timing/Onset, Context/Setting, Quality, Duration, Modifying Factors, Severity.)   Josee Beth is a 79 y.o. male who presents to the emergency department with complaints of leg pain and swelling. Pt states for the past 2 weeks he has noticed swelling, erythema, and pain over the posteromedial aspect of knee. Denies injury. No numbness or tingling. Pt denies hx of DVT or PE. No recent surgeries/ traveling/ immobilization. No cough, chest pain, sob, hemoptysis. Pt is not on blood thinners. No other complaints. Nursing Notes were reviewed. REVIEW OF SYSTEMS    (2-9 systems for level 4, 10 or more for level 5)     Review of Systems   Leg pain  Leg swelling  Erythema   Warmth       Except as noted above the remainder of the review of systems was reviewed and negative.        PAST MEDICAL HISTORY     Past Medical History:   Diagnosis Date    Depression 2017    ON RX    Diabetes mellitus ([facility] [address] 2013    NIDDM    Difficult intravenous access     Hyperlipidemia 2008    ON RX    Hypertension 2008    ON RX    Migraines     PTSD (post-traumatic stress disorder) 01/2018    ON RX    Sleep apnea 01/2018    UNALBE TO USE TO CLEARED BY ENT (CPAP)    Teeth missing     BACK TEETH UPPER AND LOWER TO BE FITTED FOR PARTIALS AT LATER DATE    Wears glasses      None otherwise stated in nurses notes    SURGICAL HISTORY       Past Surgical History:   Procedure Laterality Date    CARDIAC CATHETERIZATION  02/2010    no stents     CARPAL TUNNEL RELEASE Left 01/09/2002    CATARACT REMOVAL      CERVICAL FUSION  04/19/2018    anterior cervical fusion C5-6    EYE SURGERY Left 2018    CATARACT EXTRACTION WITH IOL    HYDROCELE EXCISION  1951    MIDDLE EAR SURGERY  01/11/2018    MIDDLE EAR REBUILT AND EAR DRUM REPLACED    MOUTH SURGERY  04/16/2018    5 TEETH REMOVED    OTHER SURGICAL HISTORY  04/19/2018    : ANTERIOR CERVICAL CORRPECTOMY C5-6, SYNTHES, DEPUY, REG TABLE, SUPINE,     [place] OFFICE/OUTPT VISIT,PROCEDURE ONLY N/A 4/19/2018    ANTERIOR CERVICAL CORRPECTOMY AND FUSION C5-6 performed by Christianne Carroll DO at [facility]  12/1983     None otherwise stated in nurses notes    CURRENT MEDICATIONS       Discharge Medication List as of 1/21/2021  9:07 PM      CONTINUE these medications which have NOT CHANGED    Details   venlafaxine (EFFEXOR XR) 150 MG extended release capsule Take 1 capsule by mouth daily, Disp-30 capsule, R-0Normal      simethicone (MYLICON) 80 MG chewable tablet Take 1 tablet by mouth every 6 hours as needed for Flatulence, Disp-30 tablet, R-0Normal      lidocaine (XYLOCAINE) 5 % ointment Apply topically daily as needed for Pain Apply topically as needed.   LF 4/20/20 (30 day supply), Topical, DAILY PRN, Historical Med      metFORMIN (GLUCOPHAGE) 1000 MG tablet Take 1,000 mg by mouth 2 times daily (with meals) LF 4/27/20 (90 day supply)Historical Med      losartan (COZAAR) 25 MG tablet Take 12.5 mg by mouth daily LF 5/29/20 (90 day supply)Historical Med      furosemide (LASIX) 20 MG tablet Take 20 mg by mouth daily LF 5/27/20 (90 day supply)Historical Med      aspirin 81 MG chewable tablet Take 81 mg by mouth dailyHistorical Med      divalproex (DEPAKOTE ER) 500 MG extended release tablet Take 500 mg by mouth 2 times daily LF 5/1/20 (30 day supply)Historical Med      traZODone (DESYREL) 100 MG tablet Take 100 mg by mouth nightly LF 5/1/20 (30 day supply)Historical Med      cetirizine (ZYRTEC) 10 MG tablet Take 10 mg by mouth daily LF 4/6/20 (90 day supply)Historical Med      atorvastatin (LIPITOR) 80 MG tablet Take 40 mg by mouth daily Take 1/2 tablet (40 mg) daily  LF 20 (90 day supply)Historical Med      carvedilol (COREG) 25 MG tablet Take 25 mg by mouth 2 times daily (with meals) LF 20 (90 day supply)Historical Med      amLODIPine (NORVASC) 10 MG tablet Take 10 mg by mouth daily LF 20 (90 day supply)Historical Med      sildenafil (VIAGRA) 100 MG tablet Take 100 mg by mouth as needed for Erectile Dysfunction LF 20 (30 day supply)Historical Med      omeprazole (PRILOSEC) 20 MG delayed release capsule Take 20 mg by mouth every evening LF 20 (90 day supply)Historical Med             ALLERGIES     Latex, Bee venom, Lisinopril, Niacin and related, Sulfa antibiotics, and Terazosin    FAMILY HISTORY           Problem Relation Age of Onset    Dementia Mother     Coronary Art Dis Mother     Stroke Mother     Diabetes Mother     Asthma Mother     Other Mother         BRAIN ANEURISM    High Blood Pressure Mother     Heart Disease Father     Diabetes Sister     Diabetes Brother     High Blood Pressure Brother     High Cholesterol Brother     Heart Disease Brother     Diabetes Sister     Other Sister         NEUROPATHY     Family Status   Relation Name Status    Mother YVONNE     Father Brooklyn Moya     Sister URBANO Alive    Brother Maya Joseph Alive    MGM      MGF      PGM      PGF      Brother FARZANA Alive    Sister Thaddeus Khan Alive    Sister MARY LOU Alive      None otherwise stated in nurses notes    SOCIAL HISTORY      reports that he quit smoking about 48 years ago. His smoking use included cigarettes. He has a 2.00 pack-year smoking history. He has never used smokeless tobacco. He reports current alcohol use. He reports that he does not use drugs.    lives at home with others     PHYSICAL EXAM    (up to 7 for level 4, 8 or more for level 5)     ED Triage Vitals [21 1534]   BP Temp Temp Source Pulse Resp SpO2 Height Weight   (!) 142/93 99 °F (37.2 °C) Oral 92 -- 97 % 5' 3\" (1.6 m) 159 lb (72.1 kg)       Physical Exam   Nursing note and vitals reviewed. Constitutional: Oriented to person, place, and time and well-developed, well-nourished. Head: Normocephalic and atraumatic. Ear: External ears normal.   Nose: Nose normal and midline. Eyes: Conjunctivae and EOM are normal. Pupils are equal, round, and reactive to light. Neck: Normal range of motion. Neck supple. Throat: Posterior pharynx is without erythema or exudates, airway is patent, no swelling  Cardiovascular: Normal rate, regular rhythm, normal heart sounds and intact distal pulses. Pulmonary/Chest: Effort normal and breath sounds normal. No respiratory distress. No wheezes. No rales. No chest tenderness. Abdominal: Soft. Bowel sounds are normal. No distension and no mass. There is no tenderness. There is no rebound and no guarding. Musculoskeletal: Examination of left leg reveals swelling, erythema, warmth and tenderness over the posteromedial aspect of knee/ distal femur. There are no abrasions. No streaking. No bony tenderness. No calf tenderness or swelling. FROM of leg. Brisk cap refill. Distal sensation intact. 2/2 dp and pt pulses. Ambulatory. Neurological: Alert and oriented to person, place, and time. GCS score is 15. Skin: Skin is warm and dry. No rash noted. No erythema. No pallor. Psychiatric: Mood, memory, affect and judgment normal.           DIAGNOSTIC RESULTS     EKG: All EKG's are interpreted by the Emergency Department Physician who either signs or Co-signs this chart in the absence of a cardiologist.        RADIOLOGY:   All plain film, CT, MRI, and formal ultrasound images (except ED bedside ultrasound) are read by the radiologist, see reports below, unless otherwise noted in MDM or here.    VASCULAR REPORT   Final Result      VL Lower Extremity Venous Left   Final Result      XR KNEE LEFT (3 VIEWS)   Final Result   Medial edema without acute fracture or knee effusion. Findings suggestive of hydroxyapatite deposition near the fibular head. Mild tricompartmental degenerative change. Mild anterior soft tissue swelling and thickening in the region of the   patellar tendon. Query chronic tendinosis. No results found. LABS:  Labs Reviewed - No data to display    All other labs were within normal range or not returned as of this dictation. EMERGENCY DEPARTMENT COURSE and DIFFERENTIAL DIAGNOSIS/MDM:   Vitals:    Vitals:    01/21/21 1534 01/21/21 2056   BP: (!) 142/93 (!) 143/100   Pulse: 92 83   Resp:  18   Temp: 99 °F (37.2 °C) 98.5 °F (36.9 °C)   TempSrc: Oral Oral   SpO2: 97% 97%   Weight: 159 lb (72.1 kg)    Height: 5' 3\" (1.6 m)          Patient instructed to return to the emergency room if symptoms worsen, return, or any other concern right away which is agreed by the patient    ED MEDS:  Orders Placed This Encounter   Medications    HYDROcodone-acetaminophen (NORCO) 5-325 MG per tablet     Sig: Take 1 tablet by mouth every 6 hours as needed for Pain for up to 3 days. Intended supply: 3 days. Take lowest dose possible to manage pain     Dispense:  10 tablet     Refill:  0         CONSULTS:  None    PROCEDURES:  None      FINAL IMPRESSION      1.  Left knee pain, unspecified chronicity          DISPOSITION/PLAN   DISPOSITION     PATIENT REFERRED TO:  Alina Morales  [address] [place] [address] [address] [phone]    Schedule an appointment as soon as possible for a visit       [facility] ED  [address] [address] [phone]    As needed, If symptoms worsen    Hung De Leon MD  [address] [place] [address] [phone]    Schedule an appointment as soon as possible for a visit         DISCHARGE MEDICATIONS:  Discharge Medication List as of 1/21/2021  9:07 PM      START taking these medications    Details   HYDROcodone-acetaminophen (NORCO) 5-325 MG per tablet Take 1 tablet by mouth every 6 hours as needed for Pain for up to 3 days. Intended supply: 3 days. Take lowest dose possible to manage pain, Disp-10 tablet, R-0Print               Summation      Patient Course:      Tenderness over the posteromedial aspect of left knee/ distal femur. There is erythema, warmth. No streaking. No breaks in the skin. No bony tenderness. Neurovascularly intact. Will get xray of knee, venous doppler. Xray shows no bony abnormality. Medial edema without acute fracture or knee effusion. Findings suggestive of hydroxyapatite deposition near the fibular head. Mild tricompartmental degenerative change. Mild anterior soft tissue swelling and thickening in the region of the   patellar tendon.  Query chronic tendinosis. Venous doppler is pending. Consider cellulitis if negative. Case signed out to Dr. Sheldon Velarde. ED Medications administered this visit:  Medications - No data to display    New Prescriptions from this visit:    Discharge Medication List as of 1/21/2021  9:07 PM      START taking these medications    Details   HYDROcodone-acetaminophen (NORCO) 5-325 MG per tablet Take 1 tablet by mouth every 6 hours as needed for Pain for up to 3 days. Intended supply: 3 days. Take lowest dose possible to manage pain, Disp-10 tablet, R-0Print             Follow-up:  Na Kopci     Schedule an appointment as soon as possible for a visit       [facility] ED  [address]  [address] [place] [address]  [phone]    As needed, If symptoms worsen    Bhavik Dumont MD  [address] [place] [address].  [phone]    Schedule an appointment as soon as possible for a visit           Final Impression:   1.  Left knee pain, unspecified chronicity               (Please note that portions of this note were completed with a voice recognition program.  Efforts were made to edit the dictations but occasionally words are mis-transcribed.)      (Please note that portions of this note were completed with a voice recognition program.  Efforts were made to edit the dictations but occasionally words are mis-transcribed.)    Nico Sutton [address]  01/21/21 1300 [address] Iggy Joshi PA-C  01/22/21 2544

## 2021-01-22 NOTE — ED PROVIDER NOTES
EMERGENCY DEPARTMENT ENCOUNTER   ATTENDING ATTESTATION     Pt Name: Duke Fabian  MRN: [id number]  [place] 1950  Date of evaluation: 1/21/21   Duke Fabian is a 79 y.o. male with CC: Leg Pain and Leg Swelling    MDM: 80-year-old male present for left knee pain and swelling. Patient was signed out to me pending completion of Doppler    Doppler was reviewed negative for DVT    Also discussed with the patient, discussed findings of the x-ray as well, discussed need for follow-up with PCP, also provide orthopedic follow-up as well for further evaluation of the left knee pain. Patient voiced understanding is comfortable discharge home. Patient/Guardian was informed of their diagnosis and told to follow up with PCP & orthopedic surgery in 1-3 days. Patient demonstrates understanding and agreement with the plan. They were given the opportunity to ask questions and those questions were answered to the best of our ability with the available information. Patient/Guardian told to return to the ED for any new, worsening, changing or persistent symptoms. This dictation was prepared using F[employer] voice recognition software. As a result, errors may have occurred. When identified, these errors have been corrected. While every attempt is made to correct errors in dictation, errors may still exist.          CRITICAL CARE:       EKG: All EKG's are interpreted by the Emergency Department Physician who either signs or Co-signs this chart in the absence of a cardiologist.      RADIOLOGY:All plain film, CT, MRI, and formal ultrasound images (except ED bedside ultrasound) are read by the radiologist, see reports below, unless otherwise noted in MDM or here. VASCULAR REPORT   Final Result      VL Lower Extremity Venous Left   Final Result      XR KNEE LEFT (3 VIEWS)   Final Result   Medial edema without acute fracture or knee effusion. Findings suggestive of hydroxyapatite deposition near the fibular head. Mild tricompartmental degenerative change. Mild anterior soft tissue swelling and thickening in the region of the   patellar tendon. Query chronic tendinosis. LABS: All lab results were reviewed by myself, and all abnormals are listed below. Labs Reviewed - No data to display  CONSULTS:  None  FINAL IMPRESSION      1.  Left knee pain, unspecified chronicity            PASTMEDICAL HISTORY     Past Medical History:   Diagnosis Date    Depression 2017    ON RX    Diabetes mellitus ([address] 2013    NIDDM    Difficult intravenous access     Hyperlipidemia 2008    ON RX    Hypertension 2008    ON RX    Migraines     PTSD (post-traumatic stress disorder) 01/2018    ON RX    Sleep apnea 01/2018    UNALBE TO USE TO CLEARED BY ENT (CPAP)    Teeth missing     BACK TEETH UPPER AND LOWER TO BE FITTED FOR PARTIALS AT LATER DATE    Wears glasses      SURGICAL HISTORY       Past Surgical History:   Procedure Laterality Date    CARDIAC CATHETERIZATION  02/2010    no stents     CARPAL TUNNEL RELEASE Left 01/09/2002    CATARACT REMOVAL      CERVICAL FUSION  04/19/2018    anterior cervical fusion C5-6    EYE SURGERY Left 2018    CATARACT EXTRACTION WITH IOL    HYDROCELE EXCISION  1951    MIDDLE EAR SURGERY  01/11/2018    MIDDLE EAR REBUILT AND EAR DRUM REPLACED    MOUTH SURGERY  04/16/2018    5 TEETH REMOVED    OTHER SURGICAL HISTORY  04/19/2018    : ANTERIOR CERVICAL CORRPECTOMY C5-6, SYNTHES, DEPUY, REG TABLE, SUPINE,     [place] OFFICE/OUTPT VISIT,PROCEDURE ONLY N/A 4/19/2018    ANTERIOR CERVICAL CORRPECTOMY AND FUSION C5-6 performed by Harley Leroy DO at [address] [place] [address]  12/1983     CURRENT MEDICATIONS       Discharge Medication List as of 1/21/2021  9:07 PM      CONTINUE these medications which have NOT CHANGED    Details   venlafaxine (EFFEXOR XR) 150 MG extended release capsule Take 1 capsule by mouth daily, Disp-30 capsule, R-0Normal simethicone (MYLICON) 80 MG chewable tablet Take 1 tablet by mouth every 6 hours as needed for Flatulence, Disp-30 tablet, R-0Normal      lidocaine (XYLOCAINE) 5 % ointment Apply topically daily as needed for Pain Apply topically as needed.  20 (30 day supply), Topical, DAILY PRN, Historical Med      metFORMIN (GLUCOPHAGE) 1000 MG tablet Take 1,000 mg by mouth 2 times daily (with meals)  20 ( day supply)Historical Med      losartan (COZAAR) 25 MG tablet Take 12.5 mg by mouth daily LF 20 ( day supply)Historical Med      furosemide (LASIX) 20 MG tablet Take 20 mg by mouth daily LF 20 ( day supply)Historical Med      aspirin 81 MG chewable tablet Take 81 mg by mouth dailyHistorical Med      divalproex (DEPAKOTE ER) 500 MG extended release tablet Take 500 mg by mouth 2 times daily  20 (30 day supply)Historical Med      traZODone (DESYREL) 100 MG tablet Take 100 mg by mouth nightly LF 20 (30 day supply)Historical Med      cetirizine (ZYRTEC) 10 MG tablet Take 10 mg by mouth daily LF 20 (90 day supply)Historical Med      atorvastatin (LIPITOR) 80 MG tablet Take 40 mg by mouth daily Take 1/2 tablet (40 mg) daily  LF 20 (90 day supply)Historical Med      carvedilol (COREG) 25 MG tablet Take 25 mg by mouth 2 times daily (with meals) LF 20 ( day supply)Historical Med      amLODIPine (NORVASC) 10 MG tablet Take 10 mg by mouth daily  20 (90 day supply)Historical Med      sildenafil (VIAGRA) 100 MG tablet Take 100 mg by mouth as needed for Erectile Dysfunction LF 20 (30 day supply)Historical Med      omeprazole (PRILOSEC) 20 MG delayed release capsule Take 20 mg by mouth every evening LF 20 (90 day supply)Historical Med           ALLERGIES     is allergic to latex; bee venom; lisinopril; niacin and related; sulfa antibiotics; and terazosin. FAMILY HISTORY     He indicated that his mother is . He indicated that his father is .  He indicated

## 2021-05-17 LAB
AVERAGE GLUCOSE: NORMAL
HBA1C MFR BLD: 5.8 %
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
## 2021-09-17 ENCOUNTER — HOSPITAL ENCOUNTER (EMERGENCY)
Age: 71
Discharge: HOME | End: 2021-09-17
Attending: EMERGENCY MEDICINE
Payer: MEDICARE

## 2021-09-17 VITALS
OXYGEN SATURATION: 97 % | SYSTOLIC BLOOD PRESSURE: 153 MMHG | HEIGHT: 63 IN | HEART RATE: 106 BPM | RESPIRATION RATE: 18 BRPM | BODY MASS INDEX: 30.12 KG/M2 | DIASTOLIC BLOOD PRESSURE: 93 MMHG | WEIGHT: 170 LBS | TEMPERATURE: 97.8 F

## 2021-09-17 DIAGNOSIS — R41.3 MEMORY LOSS: Primary | ICD-10-CM

## 2021-09-17 PROCEDURE — 99285 EMERGENCY DEPT VISIT HI MDM: CPT

## 2021-09-17 ASSESSMENT — PAIN DESCRIPTION - LOCATION: LOCATION: GENERALIZED

## 2021-09-17 ASSESSMENT — PAIN DESCRIPTION - PAIN TYPE: TYPE: CHRONIC PAIN

## 2021-09-17 ASSESSMENT — PAIN DESCRIPTION - FREQUENCY: FREQUENCY: CONTINUOUS

## 2021-09-17 ASSESSMENT — PAIN DESCRIPTION - DESCRIPTORS: DESCRIPTORS: DISCOMFORT

## 2021-09-17 NOTE — ED PROVIDER NOTES
[address]  eMERGENCY dEPARTMENT eNCOUnter    Pt Name: Welton Kayser  MRN: [id number]  [place] 1950  Date of evaluation: 9/17/21  CHIEF COMPLAINT       Chief Complaint   Patient presents with    Memory Loss     HISTORY OF PRESENT ILLNESS   HPI   Patient reports forgetfulness since the beginning of this year which is progressive in severity. As I began my evaluation of the patient he sates \"my girlfriend called me and told me I should go to the [facility] on Monday so I'm going to do that. \"     REVIEW OF SYSTEMS     Review of Systems  PASTMEDICAL HISTORY     Past Medical History:   Diagnosis Date    Depression 2017    ON RX    Diabetes mellitus ([address] 2013    NIDDM    Difficult intravenous access     Hyperlipidemia 2008    ON RX    Hypertension 2008    ON RX    Migraines     PTSD (post-traumatic stress disorder) 01/2018    ON RX    Sleep apnea 01/2018    UNALBE TO USE TO CLEARED BY ENT (CPAP)    Teeth missing     BACK TEETH UPPER AND LOWER TO BE FITTED FOR PARTIALS AT LATER DATE    Wears glasses      SURGICAL HISTORY       Past Surgical History:   Procedure Laterality Date    CARDIAC CATHETERIZATION  02/2010    no stents     CARPAL TUNNEL RELEASE Left 01/09/2002    CATARACT REMOVAL      CERVICAL FUSION  04/19/2018    anterior cervical fusion C5-6    EYE SURGERY Left 2018    CATARACT EXTRACTION WITH IOL    HYDROCELE EXCISION  1951    MIDDLE EAR SURGERY  01/11/2018    MIDDLE EAR REBUILT AND EAR DRUM REPLACED    MOUTH SURGERY  04/16/2018    5 TEETH REMOVED    OTHER SURGICAL HISTORY  04/19/2018    : ANTERIOR CERVICAL CORRPECTOMY C5-6, SYNTHES, DEPUY, REG TABLE, SUPINE,     [place] OFFICE/OUTPT VISIT,PROCEDURE ONLY N/A 4/19/2018    ANTERIOR CERVICAL CORRPECTOMY AND FUSION C5-6 performed by Arnold Riggs DO at [address] [place] [address]  12/1983     CURRENT MEDICATIONS       Discharge Medication List as of 9/17/2021  6:15 PM      CONTINUE these medications which have NOT CHANGED    Details   venlafaxine (EFFEXOR XR) 150 MG extended release capsule Take 1 capsule by mouth daily, Disp-30 capsule, R-0Normal      simethicone (MYLICON) 80 MG chewable tablet Take 1 tablet by mouth every 6 hours as needed for Flatulence, Disp-30 tablet, R-0Normal      lidocaine (XYLOCAINE) 5 % ointment Apply topically daily as needed for Pain Apply topically as needed.    4/20/20 (30 day supply), Topical, DAILY PRN, Historical Med      metFORMIN (GLUCOPHAGE) 1000 MG tablet Take 1,000 mg by mouth 2 times daily (with meals)  4/27/20 (90 day supply)Historical Med      losartan (COZAAR) 25 MG tablet Take 12.5 mg by mouth daily LF 5/29/20 (90 day supply)Historical Med      furosemide (LASIX) 20 MG tablet Take 20 mg by mouth daily  5/27/20 (90 day supply)Historical Med      aspirin 81 MG chewable tablet Take 81 mg by mouth dailyHistorical Med      divalproex (DEPAKOTE ER) 500 MG extended release tablet Take 500 mg by mouth 2 times daily  5/1/20 (30 day supply)Historical Med      traZODone (DESYREL) 100 MG tablet Take 100 mg by mouth nightly LF 5/1/20 (30 day supply)Historical Med      cetirizine (ZYRTEC) 10 MG tablet Take 10 mg by mouth daily  4/6/20 (90 day supply)Historical Med      atorvastatin (LIPITOR) 80 MG tablet Take 40 mg by mouth daily Take 1/2 tablet (40 mg) daily  LF 4/22/20 (90 day supply)Historical Med      carvedilol (COREG) 25 MG tablet Take 25 mg by mouth 2 times daily (with meals) LF 5/12/20 (90 day supply)Historical Med      amLODIPine (NORVASC) 10 MG tablet Take 10 mg by mouth daily LF 4/6/20 (90 day supply)Historical Med      sildenafil (VIAGRA) 100 MG tablet Take 100 mg by mouth as needed for Erectile Dysfunction LF 5/12/20 (30 day supply)Historical Med      omeprazole (PRILOSEC) 20 MG delayed release capsule Take 20 mg by mouth every evening LF 4/21/20 (90 day supply)Historical Med           ALLERGIES     is allergic to latex, bee venom, lisinopril, niacin and the emergency department:  No orders of the defined types were placed in this encounter. CONSULTS:  None    FINAL IMPRESSION      1. Memory loss          DISPOSITION/PLAN   DISPOSITION Decision To Discharge 09/17/2021 06:07:29 PM      PATIENT REFERRED TO:  No follow-up provider specified.   DISCHARGE MEDICATIONS:  Discharge Medication List as of 9/17/2021  6:15 PM        Dary Victor MD  AttendingEmerMethodist Behavioral Hospitalcy Physician                        Malcolm Dey MD  09/17/21 [id number]

## 2022-03-20 ENCOUNTER — APPOINTMENT (OUTPATIENT)
Dept: CT IMAGING | Age: 72
DRG: 552 | End: 2022-03-20
Payer: MEDICARE

## 2022-03-20 ENCOUNTER — HOSPITAL ENCOUNTER (INPATIENT)
Age: 72
LOS: 3 days | Discharge: ANOTHER HOSPITAL | DRG: 552 | End: 2022-03-23
Attending: EMERGENCY MEDICINE | Admitting: INTERNAL MEDICINE
Payer: MEDICARE

## 2022-03-20 DIAGNOSIS — Z78.9 UNABLE TO CARE FOR SELF: ICD-10-CM

## 2022-03-20 DIAGNOSIS — R29.6 FREQUENT FALLS: Primary | ICD-10-CM

## 2022-03-20 LAB
ABSOLUTE EOS #: 0.3 K/UL (ref 0–0.4)
ABSOLUTE LYMPH #: 1.1 K/UL (ref 1–4.8)
ABSOLUTE MONO #: 0.6 K/UL (ref 0.1–1.3)
ANION GAP SERPL CALCULATED.3IONS-SCNC: 10 MMOL/L (ref 9–17)
BASOPHILS # BLD: 1 % (ref 0–2)
BASOPHILS ABSOLUTE: 0 K/UL (ref 0–0.2)
BUN BLDV-MCNC: 28 MG/DL (ref 8–23)
CALCIUM SERPL-MCNC: 9.2 MG/DL (ref 8.6–10.4)
CHLORIDE BLD-SCNC: 100 MMOL/L (ref 98–107)
CO2: 24 MMOL/L (ref 20–31)
CREAT SERPL-MCNC: 0.91 MG/DL (ref 0.7–1.2)
EOSINOPHILS RELATIVE PERCENT: 5 % (ref 0–4)
GFR AFRICAN AMERICAN: >60 ML/MIN
GFR NON-AFRICAN AMERICAN: >60 ML/MIN
GFR SERPL CREATININE-BSD FRML MDRD: ABNORMAL ML/MIN/{1.73_M2}
GLUCOSE BLD-MCNC: 151 MG/DL (ref 70–99)
HCT VFR BLD CALC: 34.3 % (ref 41–53)
HEMOGLOBIN: 12.1 G/DL (ref 13.5–17.5)
INR BLD: 1
LYMPHOCYTES # BLD: 18 % (ref 24–44)
MCH RBC QN AUTO: 32.6 PG (ref 26–34)
MCHC RBC AUTO-ENTMCNC: 35.4 G/DL (ref 31–37)
MCV RBC AUTO: 92 FL (ref 80–100)
MONOCYTES # BLD: 9 % (ref 1–7)
PARTIAL THROMBOPLASTIN TIME: 31.3 SEC (ref 24–36)
PDW BLD-RTO: 13.8 % (ref 11.5–14.9)
PLATELET # BLD: 287 K/UL (ref 150–450)
PMV BLD AUTO: 7.2 FL (ref 6–12)
POTASSIUM SERPL-SCNC: 4.4 MMOL/L (ref 3.7–5.3)
PROTHROMBIN TIME: 13.1 SEC (ref 11.8–14.6)
RBC # BLD: 3.73 M/UL (ref 4.5–5.9)
SEG NEUTROPHILS: 67 % (ref 36–66)
SEGMENTED NEUTROPHILS ABSOLUTE COUNT: 4.2 K/UL (ref 1.3–9.1)
SODIUM BLD-SCNC: 134 MMOL/L (ref 135–144)
WBC # BLD: 6.3 K/UL (ref 3.5–11)

## 2022-03-20 PROCEDURE — 36415 COLL VENOUS BLD VENIPUNCTURE: CPT

## 2022-03-20 PROCEDURE — 1200000000 HC SEMI PRIVATE

## 2022-03-20 PROCEDURE — 99284 EMERGENCY DEPT VISIT MOD MDM: CPT

## 2022-03-20 PROCEDURE — 72125 CT NECK SPINE W/O DYE: CPT

## 2022-03-20 PROCEDURE — 72131 CT LUMBAR SPINE W/O DYE: CPT

## 2022-03-20 PROCEDURE — 85610 PROTHROMBIN TIME: CPT

## 2022-03-20 PROCEDURE — 85025 COMPLETE CBC W/AUTO DIFF WBC: CPT

## 2022-03-20 PROCEDURE — 80048 BASIC METABOLIC PNL TOTAL CA: CPT

## 2022-03-20 PROCEDURE — 85730 THROMBOPLASTIN TIME PARTIAL: CPT

## 2022-03-20 PROCEDURE — 6370000000 HC RX 637 (ALT 250 FOR IP): Performed by: INTERNAL MEDICINE

## 2022-03-20 PROCEDURE — 72128 CT CHEST SPINE W/O DYE: CPT

## 2022-03-20 PROCEDURE — 70450 CT HEAD/BRAIN W/O DYE: CPT

## 2022-03-20 RX ORDER — AMLODIPINE BESYLATE 5 MG/1
10 TABLET ORAL DAILY
Status: DISCONTINUED | OUTPATIENT
Start: 2022-03-21 | End: 2022-03-23 | Stop reason: HOSPADM

## 2022-03-20 RX ORDER — ACETAMINOPHEN 650 MG/1
650 SUPPOSITORY RECTAL EVERY 6 HOURS PRN
Status: DISCONTINUED | OUTPATIENT
Start: 2022-03-20 | End: 2022-03-23 | Stop reason: HOSPADM

## 2022-03-20 RX ORDER — CETIRIZINE HYDROCHLORIDE 10 MG/1
10 TABLET ORAL DAILY
Status: DISCONTINUED | OUTPATIENT
Start: 2022-03-21 | End: 2022-03-23 | Stop reason: HOSPADM

## 2022-03-20 RX ORDER — TRAZODONE HYDROCHLORIDE 50 MG/1
100 TABLET ORAL NIGHTLY
Status: DISCONTINUED | OUTPATIENT
Start: 2022-03-20 | End: 2022-03-23 | Stop reason: HOSPADM

## 2022-03-20 RX ORDER — SODIUM CHLORIDE 0.9 % (FLUSH) 0.9 %
5-40 SYRINGE (ML) INJECTION EVERY 12 HOURS SCHEDULED
Status: DISCONTINUED | OUTPATIENT
Start: 2022-03-20 | End: 2022-03-23 | Stop reason: HOSPADM

## 2022-03-20 RX ORDER — PANTOPRAZOLE SODIUM 40 MG/1
40 TABLET, DELAYED RELEASE ORAL
Status: DISCONTINUED | OUTPATIENT
Start: 2022-03-21 | End: 2022-03-23 | Stop reason: HOSPADM

## 2022-03-20 RX ORDER — VENLAFAXINE HYDROCHLORIDE 150 MG/1
150 CAPSULE, EXTENDED RELEASE ORAL DAILY
Status: DISCONTINUED | OUTPATIENT
Start: 2022-03-21 | End: 2022-03-23 | Stop reason: HOSPADM

## 2022-03-20 RX ORDER — ACETAMINOPHEN 325 MG/1
650 TABLET ORAL EVERY 6 HOURS PRN
Status: DISCONTINUED | OUTPATIENT
Start: 2022-03-20 | End: 2022-03-23 | Stop reason: HOSPADM

## 2022-03-20 RX ORDER — SODIUM CHLORIDE 0.9 % (FLUSH) 0.9 %
5-40 SYRINGE (ML) INJECTION PRN
Status: DISCONTINUED | OUTPATIENT
Start: 2022-03-20 | End: 2022-03-23 | Stop reason: HOSPADM

## 2022-03-20 RX ORDER — ASPIRIN 81 MG/1
81 TABLET, CHEWABLE ORAL DAILY
Status: DISCONTINUED | OUTPATIENT
Start: 2022-03-21 | End: 2022-03-23 | Stop reason: HOSPADM

## 2022-03-20 RX ORDER — SODIUM CHLORIDE 9 MG/ML
25 INJECTION, SOLUTION INTRAVENOUS PRN
Status: DISCONTINUED | OUTPATIENT
Start: 2022-03-20 | End: 2022-03-23 | Stop reason: HOSPADM

## 2022-03-20 RX ORDER — ATORVASTATIN CALCIUM 40 MG/1
40 TABLET, FILM COATED ORAL DAILY
Status: DISCONTINUED | OUTPATIENT
Start: 2022-03-21 | End: 2022-03-23 | Stop reason: HOSPADM

## 2022-03-20 RX ORDER — ONDANSETRON 4 MG/1
4 TABLET, ORALLY DISINTEGRATING ORAL EVERY 8 HOURS PRN
Status: DISCONTINUED | OUTPATIENT
Start: 2022-03-20 | End: 2022-03-23 | Stop reason: HOSPADM

## 2022-03-20 RX ORDER — ONDANSETRON 2 MG/ML
4 INJECTION INTRAMUSCULAR; INTRAVENOUS EVERY 6 HOURS PRN
Status: DISCONTINUED | OUTPATIENT
Start: 2022-03-20 | End: 2022-03-23 | Stop reason: HOSPADM

## 2022-03-20 RX ORDER — LOSARTAN POTASSIUM 25 MG/1
12.5 TABLET ORAL DAILY
Status: DISCONTINUED | OUTPATIENT
Start: 2022-03-21 | End: 2022-03-23 | Stop reason: HOSPADM

## 2022-03-20 RX ORDER — POLYETHYLENE GLYCOL 3350 17 G/17G
17 POWDER, FOR SOLUTION ORAL DAILY PRN
Status: DISCONTINUED | OUTPATIENT
Start: 2022-03-20 | End: 2022-03-23 | Stop reason: HOSPADM

## 2022-03-20 RX ORDER — DIVALPROEX SODIUM 500 MG/1
500 TABLET, EXTENDED RELEASE ORAL 2 TIMES DAILY
Status: DISCONTINUED | OUTPATIENT
Start: 2022-03-20 | End: 2022-03-23 | Stop reason: HOSPADM

## 2022-03-20 RX ORDER — CARVEDILOL 12.5 MG/1
25 TABLET ORAL 2 TIMES DAILY WITH MEALS
Status: DISCONTINUED | OUTPATIENT
Start: 2022-03-21 | End: 2022-03-23 | Stop reason: HOSPADM

## 2022-03-20 RX ADMIN — TRAZODONE HYDROCHLORIDE 100 MG: 50 TABLET ORAL at 23:25

## 2022-03-20 ASSESSMENT — ENCOUNTER SYMPTOMS
DIARRHEA: 0
VOMITING: 0
ABDOMINAL PAIN: 0
SORE THROAT: 0
CONSTIPATION: 0
COLOR CHANGE: 0
BACK PAIN: 1
TROUBLE SWALLOWING: 0
BLOOD IN STOOL: 0
COUGH: 0
SHORTNESS OF BREATH: 0
NAUSEA: 0

## 2022-03-20 ASSESSMENT — PAIN DESCRIPTION - LOCATION
LOCATION: BACK;NECK
LOCATION: NECK;SHOULDER

## 2022-03-20 ASSESSMENT — PAIN DESCRIPTION - PROGRESSION: CLINICAL_PROGRESSION: GRADUALLY WORSENING

## 2022-03-20 ASSESSMENT — PAIN DESCRIPTION - DESCRIPTORS: DESCRIPTORS: OTHER (COMMENT)

## 2022-03-20 ASSESSMENT — PAIN DESCRIPTION - PAIN TYPE: TYPE: ACUTE PAIN

## 2022-03-20 ASSESSMENT — PAIN DESCRIPTION - FREQUENCY: FREQUENCY: INTERMITTENT

## 2022-03-20 ASSESSMENT — PAIN SCALES - GENERAL
PAINLEVEL_OUTOF10: 5
PAINLEVEL_OUTOF10: 7

## 2022-03-20 ASSESSMENT — PAIN DESCRIPTION - ONSET: ONSET: GRADUAL

## 2022-03-20 ASSESSMENT — PAIN DESCRIPTION - ORIENTATION: ORIENTATION: RIGHT;LEFT

## 2022-03-20 NOTE — ED NOTES
Report given to Jovanni Kraft from [employer]. Report method in person   The following was reviewed with receiving RN:   Current vital signs:  BP (!) 144/85   Pulse 84   Temp 97.8 °F (36.6 °C)   Resp 20   Ht 5' 3\" (1.6 m)   Wt 160 lb (72.6 kg)   SpO2 97%   BMI 28.34 kg/m²                MEWS Score: 1     Any medication or safety alerts were reviewed. Any pending diagnostics and notifications were also reviewed, as well as any safety concerns or issues, abnormal labs, abnormal imaging, and abnormal assessment findings. Questions were answered.           Cherelle Oakley, LORENZO  03/20/22 [id number]

## 2022-03-20 NOTE — FLOWSHEET NOTE
Mode of arrival (squad #, walk in, police, etc) : [address] [place] [address] EMS        Chief complaint(s): [employer] Note (brief scenario, treatment PTA, etc). : Patient presents to ED for fall that occurred today due to patient slipping in some water in bathroom. In process of fall patient hit back of his head on some of the toilet and broke the toilet. Patient denies of loss of conscious. Patient was placed in C-Collar Per patient he had major back surgery and complications as a result of the surgery back in 2018. Patient states his strength has continuously decreased and had multiple falls over the course of the last 7 days. Patient denies any other complaints at this time. Patient also denies being on blood thinners. C= \"Have you ever felt that you should Cut down on your drinking? \"  No  A= \"Have people Annoyed you by criticizing your drinking? \"  No  G= \"Have you ever felt bad or Guilty about your drinking? \"  No  E= \"Have you ever had a drink as an Eye-opener first thing in the morning to steady your nerves or to help a hangover? \"  No      Deferred []      Reason for deferring: N/A    *If yes to two or more: probable alcohol abuse. *

## 2022-03-20 NOTE — ED PROVIDER NOTES
confusion. All other systems reviewed and are negative. Negative in 10 essential Systems except as mentioned above and in the HPI. PAST MEDICAL HISTORY     Past Medical History:   Diagnosis Date    Depression 2017    ON RX    Diabetes mellitus ([address] 2013    NIDDM    Difficult intravenous access     Hyperlipidemia 2008    ON RX    Hypertension 2008    ON RX    Migraines     PTSD (post-traumatic stress disorder) 01/2018    ON RX    Sleep apnea 01/2018    UNALBE TO USE TO CLEARED BY ENT (CPAP)    Teeth missing     BACK TEETH UPPER AND LOWER TO BE FITTED FOR PARTIALS AT LATER DATE    Wears glasses          SURGICAL HISTORY      has a past surgical history that includes Cardiac catheterization (02/2010); Carpal tunnel release (Left, 01/09/2002); Vasectomy (12/1983); Tonsillectomy and adenoidectomy (1960); Hydrocele surgery (1951); Middle ear surgery (01/11/2018); eye surgery (Left, 2018);  Mouth surgery (04/16/2018); other surgical history (04/19/2018); cervical fusion (04/19/2018); pr office/outpt visit,procedure only (N/A, 4/19/2018); and Cataract removal.      CURRENT MEDICATIONS       Previous Medications    AMLODIPINE (NORVASC) 10 MG TABLET    Take 10 mg by mouth daily LF 4/6/20 (90 day supply)    ASPIRIN 81 MG CHEWABLE TABLET    Take 81 mg by mouth daily    ATORVASTATIN (LIPITOR) 80 MG TABLET    Take 40 mg by mouth daily Take 1/2 tablet (40 mg) daily  LF 4/22/20 (90 day supply)    CARVEDILOL (COREG) 25 MG TABLET    Take 25 mg by mouth 2 times daily (with meals) LF 5/12/20 (90 day supply)    CETIRIZINE (ZYRTEC) 10 MG TABLET    Take 10 mg by mouth daily LF 4/6/20 (90 day supply)    DIVALPROEX (DEPAKOTE ER) 500 MG EXTENDED RELEASE TABLET    Take 500 mg by mouth 2 times daily LF 5/1/20 (30 day supply)    FUROSEMIDE (LASIX) 20 MG TABLET    Take 20 mg by mouth daily LF 5/27/20 (90 day supply)    LIDOCAINE (XYLOCAINE) 5 % OINTMENT    Apply topically daily as needed for Pain Apply topically as needed.  20 (30 day supply)    LOSARTAN (COZAAR) 25 MG TABLET    Take 12.5 mg by mouth daily  20 ( day supply)    METFORMIN (GLUCOPHAGE) 1000 MG TABLET    Take 1,000 mg by mouth 2 times daily (with meals)  20 (90 day supply)    OMEPRAZOLE (PRILOSEC) 20 MG DELAYED RELEASE CAPSULE    Take 20 mg by mouth every evening  20 ( day supply)    SILDENAFIL (VIAGRA) 100 MG TABLET    Take 100 mg by mouth as needed for Erectile Dysfunction  20 (30 day supply)    SIMETHICONE (MYLICON) 80 MG CHEWABLE TABLET    Take 1 tablet by mouth every 6 hours as needed for Flatulence    TRAZODONE (DESYREL) 100 MG TABLET    Take 100 mg by mouth nightly  20 (30 day supply)    VENLAFAXINE (EFFEXOR XR) 150 MG EXTENDED RELEASE CAPSULE    Take 1 capsule by mouth daily       ALLERGIES     is allergic to latex, bee venom, lisinopril, niacin and related, sulfa antibiotics, and terazosin. FAMILY HISTORY     He indicated that his mother is . He indicated that his father is . He indicated that all of his three sisters are alive. He indicated that both of his brothers are alive. He indicated that his maternal grandmother is . He indicated that his maternal grandfather is . He indicated that his paternal grandmother is . He indicated that his paternal grandfather is . family history includes Asthma in his mother; Coronary Art Dis in his mother; Dementia in his mother; Diabetes in his brother, mother, sister, and sister; Heart Disease in his brother and father; High Blood Pressure in his brother and mother; High Cholesterol in his brother; Other in his mother and sister; Stroke in his mother. SOCIAL HISTORY      reports that he quit smoking about 49 years ago. His smoking use included cigarettes. He has a 2.00 pack-year smoking history. He has never used smokeless tobacco. He reports current alcohol use. He reports current drug use.  Drug: Marijuana Laura Felix). PHYSICAL EXAM     INITIAL VITALS:  height is 5' 3\" (1.6 m) and weight is 160 lb (72.6 kg). His temperature is 97.8 °F (36.6 °C). His blood pressure is 138/89 and his pulse is 89. His respiration is 20 and oxygen saturation is 97%. Physical Exam  Vitals and nursing note reviewed. Constitutional:       General: He is not in acute distress. HENT:      Head: Normocephalic and atraumatic. Eyes:      Conjunctiva/sclera: Conjunctivae normal.      Pupils: Pupils are equal, round, and reactive to light. Neck:      Comments: Cervical collar in place  Cardiovascular:      Rate and Rhythm: Normal rate and regular rhythm. Heart sounds: Normal heart sounds. No murmur heard. Pulmonary:      Effort: Pulmonary effort is normal. No respiratory distress. Breath sounds: Normal breath sounds. Abdominal:      General: Bowel sounds are normal. There is no distension. Palpations: Abdomen is soft. Tenderness: There is no abdominal tenderness. Musculoskeletal:      Cervical back: Tenderness present. Thoracic back: Tenderness present. Lumbar back: Tenderness present. Skin:     General: Skin is warm and dry. Findings: Bruising (Scattered all extremities, mostly old. ) present. No rash. Neurological:      Mental Status: He is alert and oriented to person, place, and time. Motor: Weakness (Globally all extremities) present. Psychiatric:         Judgment: Judgment normal.           DIFFERENTIAL DIAGNOSIS/MDM:   66-year-old male presents after mechanical fall while trying to be helped to the bathroom. He is afebrile, nontoxic, normal vital signs. No acute distress. On exam he is alert, oriented x4. GCS is 15. He is not in any acute distress.     He does have tenderness throughout his cervical, thoracic and upper lumbar spine on exam.    He is weak in all extremities however this is a chronic thing for him and he does not feel any weaker than normal.    Due to his history I am going to get a head CT, cervical spine CT, thoracic and lumbar spine CTs. DIAGNOSTIC RESULTS     EKG: All EKG's are interpreted by the Emergency Department Physician who either signs or Co-signs this chart in the absence of a cardiologist.        RADIOLOGY:   I directly visualized the following  images and reviewed the radiologist interpretations:  CT LUMBAR SPINE WO CONTRAST   Final Result   Multilevel degenerative changes with no acute fracture or traumatic   malalignment of the thoracolumbar spine. RECOMMENDATIONS:   Unavailable         CT THORACIC SPINE WO CONTRAST   Final Result   Multilevel degenerative changes with no acute fracture or traumatic   malalignment of the thoracolumbar spine. RECOMMENDATIONS:   Unavailable         CT CERVICAL SPINE WO CONTRAST   Final Result   Stable cervical spine CT examination status post corpectomy and anterior   fusion from C4-C7. No acute fracture or traumatic malalignment. RECOMMENDATIONS:   Unavailable         CT HEAD WO CONTRAST   Final Result   No acute intracranial abnormality.       RECOMMENDATIONS:   Unavailable                 ED BEDSIDE ULTRASOUND:      LABS:  Labs Reviewed   CBC WITH AUTO DIFFERENTIAL - Abnormal; Notable for the following components:       Result Value    RBC 3.73 (*)     Hemoglobin 12.1 (*)     Hematocrit 34.3 (*)     Seg Neutrophils 67 (*)     Lymphocytes 18 (*)     Monocytes 9 (*)     Eosinophils % 5 (*)     All other components within normal limits   BASIC METABOLIC PANEL - Abnormal; Notable for the following components:    Glucose 151 (*)     BUN 28 (*)     Sodium 134 (*)     All other components within normal limits   PROTIME-INR   APTT         EMERGENCY DEPARTMENT COURSE:   Vitals:    Vitals:    03/20/22 1832 03/20/22 1900 03/20/22 2013   BP: 126/77 (!) 144/85 138/89   Pulse: 82 84 89   Resp: 17 20 20   Temp: 97.8 °F (36.6 °C)     SpO2: 95% 97% 97%   Weight: 160 lb (72.6 kg)     Height: 5' 3\" (1.6 m) 8:41 PM EDT  Trauma work-up here is unremarkable. His CT scans are all normal.  I do not think he needs any further work-up or evaluation from a trauma aspect. I spoke with patient and his daughter extensively at bedside about admission versus discharge. They are concerned that his ex-wife who is his primary caretaker is unable to care for him at home. He does not have a wheelchair at home like he supposed to. He only has a walker with wheels that he sits on to get pushed around the house. I offered him hospital admission for possible placement in a nursing facility and he is agreeable with this. I spoke with Dr. Seymour Carlson who accepted admission. Will consult PT, OT, social work. CRITICALCARE:      CONSULTS:  IP CONSULT TO INTERNAL MEDICINE  IP CONSULT TO SOCIAL WORK      PROCEDURES:      FINAL IMPRESSION      1. Frequent falls    2. Unable to care for self            DISPOSITION/PLAN   DISPOSITION Decision To Admit 03/20/2022 08:19:50 PM          PATIENT REFERRED TO:  No follow-up provider specified. DISCHARGE MEDICATIONS:  New Prescriptions    No medications on file       The care is provided during an unprecedented national emergency due to the novel coronavirus, COVID-19.     (Please note that portions ofthis note were completed with a voice recognition program.  Efforts were made to edit the dictations but occasionally words are mis-transcribed.)    Syed Royal DO  Attending Emergency Physician          Syed Royal DO  03/20/22 2042

## 2022-03-20 NOTE — ED TRIAGE NOTES
Patient presents to ED for fall that occurred today due to patient slipping in some water in bathroom. In process of fall patient hit back of his head on some of the toilet and broke the toilet. Patient denies of loss of conscious. Patient was placed in C-Collar Per patient he had major back surgery and complications as a result of the surgery back in 2018. Patient states his strength has continuously decreased and had multiple falls over the course of the last 7 days. Patient denies any other complaints at this time. Patient also denies being on blood thinners.

## 2022-03-21 ENCOUNTER — APPOINTMENT (OUTPATIENT)
Dept: MRI IMAGING | Age: 72
DRG: 552 | End: 2022-03-21
Payer: MEDICARE

## 2022-03-21 PROBLEM — E87.1 HYPONATREMIA: Status: ACTIVE | Noted: 2022-03-21

## 2022-03-21 PROBLEM — G95.20 CERVICAL SPINAL CORD COMPRESSION (HCC): Status: ACTIVE | Noted: 2022-03-21

## 2022-03-21 PROCEDURE — 97530 THERAPEUTIC ACTIVITIES: CPT

## 2022-03-21 PROCEDURE — 1200000000 HC SEMI PRIVATE

## 2022-03-21 PROCEDURE — 97110 THERAPEUTIC EXERCISES: CPT

## 2022-03-21 PROCEDURE — 6370000000 HC RX 637 (ALT 250 FOR IP): Performed by: INTERNAL MEDICINE

## 2022-03-21 PROCEDURE — 70551 MRI BRAIN STEM W/O DYE: CPT

## 2022-03-21 PROCEDURE — 2580000003 HC RX 258: Performed by: INTERNAL MEDICINE

## 2022-03-21 PROCEDURE — 97162 PT EVAL MOD COMPLEX 30 MIN: CPT

## 2022-03-21 PROCEDURE — 99223 1ST HOSP IP/OBS HIGH 75: CPT | Performed by: PSYCHIATRY & NEUROLOGY

## 2022-03-21 PROCEDURE — 97167 OT EVAL HIGH COMPLEX 60 MIN: CPT

## 2022-03-21 PROCEDURE — 72141 MRI NECK SPINE W/O DYE: CPT

## 2022-03-21 PROCEDURE — 99223 1ST HOSP IP/OBS HIGH 75: CPT | Performed by: INTERNAL MEDICINE

## 2022-03-21 RX ADMIN — PANTOPRAZOLE SODIUM 40 MG: 40 TABLET, DELAYED RELEASE ORAL at 05:46

## 2022-03-21 RX ADMIN — LOSARTAN POTASSIUM 12.5 MG: 25 TABLET, FILM COATED ORAL at 07:50

## 2022-03-21 RX ADMIN — SODIUM CHLORIDE, PRESERVATIVE FREE 10 ML: 5 INJECTION INTRAVENOUS at 20:02

## 2022-03-21 RX ADMIN — SODIUM CHLORIDE, PRESERVATIVE FREE 10 ML: 5 INJECTION INTRAVENOUS at 05:46

## 2022-03-21 RX ADMIN — AMLODIPINE BESYLATE 10 MG: 5 TABLET ORAL at 07:50

## 2022-03-21 RX ADMIN — VENLAFAXINE HYDROCHLORIDE 150 MG: 150 CAPSULE, EXTENDED RELEASE ORAL at 07:50

## 2022-03-21 RX ADMIN — TRAZODONE HYDROCHLORIDE 100 MG: 50 TABLET ORAL at 19:59

## 2022-03-21 RX ADMIN — ATORVASTATIN CALCIUM 40 MG: 40 TABLET, FILM COATED ORAL at 07:46

## 2022-03-21 RX ADMIN — CARVEDILOL 25 MG: 12.5 TABLET, FILM COATED ORAL at 16:40

## 2022-03-21 RX ADMIN — ASPIRIN 81 MG: 81 TABLET, CHEWABLE ORAL at 07:45

## 2022-03-21 RX ADMIN — SODIUM CHLORIDE, PRESERVATIVE FREE 10 ML: 5 INJECTION INTRAVENOUS at 07:52

## 2022-03-21 RX ADMIN — DIVALPROEX SODIUM 500 MG: 500 TABLET, EXTENDED RELEASE ORAL at 19:59

## 2022-03-21 RX ADMIN — CETIRIZINE HYDROCHLORIDE 10 MG: 10 TABLET, FILM COATED ORAL at 07:50

## 2022-03-21 ASSESSMENT — PAIN DESCRIPTION - LOCATION: LOCATION: NECK;SHOULDER

## 2022-03-21 ASSESSMENT — PAIN DESCRIPTION - ONSET: ONSET: ON-GOING

## 2022-03-21 ASSESSMENT — PAIN DESCRIPTION - PAIN TYPE
TYPE: ACUTE PAIN
TYPE: ACUTE PAIN

## 2022-03-21 ASSESSMENT — PAIN SCALES - GENERAL
PAINLEVEL_OUTOF10: 8
PAINLEVEL_OUTOF10: 8

## 2022-03-21 ASSESSMENT — PAIN DESCRIPTION - PROGRESSION: CLINICAL_PROGRESSION: GRADUALLY WORSENING

## 2022-03-21 ASSESSMENT — PAIN DESCRIPTION - DESCRIPTORS: DESCRIPTORS: SHARP

## 2022-03-21 ASSESSMENT — PAIN DESCRIPTION - FREQUENCY: FREQUENCY: CONTINUOUS

## 2022-03-21 ASSESSMENT — PAIN DESCRIPTION - ORIENTATION: ORIENTATION: RIGHT;LEFT

## 2022-03-21 NOTE — CARE COORDINATION
CASE MANAGEMENT NOTE:    Admission Date:  3/20/2022 Carl Wolfe is a 70 y.o.  male    Admitted for : Frequent falls [R29.6]  Unable to care for self [Z78.9]    Met with:  Patient    PCP:  Ismael Durant                                Insurance:  MEDICARE      Is patient alert and oriented at time of discussion:  Yes    Current Residence/ Living Arrangements:  at home dependent on family care             Current Services PTA:  No    Does patient go to outpatient dialysis: No  If yes, location and chair time: NA    Is patient agreeable to VNS: No    Freedom of choice provided:  No    List of [address] provided: No    VNS chosen:  No    DME:  straight cane, shower chair and other rollator    Home Oxygen: No    Nebulizer: No    CPAP/BIPAP: No    Supplier: N/A    Potential Assistance Needed: No    SNF needed: Yes    Freedom of choice and list provided: Yes    Pharmacy:  [facility]       Does Patient want to use MEDS to BEDS? No    Is patient currently receiving oral anticoagulation therapy? No    Is the Patient an [facility] with Readmission Risk Score greater than 14%? No  If yes, pt needs a follow up appointment made within 7 days. Family Members/Caregivers that pt would like involved in their care:    Yes    If yes, list name here:  Ex Wife Lisa Henley and son Tonio Serrano    Transportation Provider Family             Discharge Plan:  3/21 MEDICARE  From home with Ex Wife Winifred Boo Pt lives in one story home. Frequent falls on last 30 days. DME: rollator, cane,SC. VNS: none. Ptfall and hit toilet last night. PT/OT eval. MRI ordered. Neuro and Ortho consulted. SW following for SNF at discharge. Pastoral care consulted to assist with Advanced directives. Pt also stated he wished to be a [facility] . Dr Aline Manzano notified of this . YANA NEEDS TO BE SIGNED/COMPLETED.  Following for needs//JF           Electronically signed by: Cleotis Brittle, RN on 3/21/2022 at 11:44 AM

## 2022-03-21 NOTE — CONSULTS
[facility] Neurology   IN-PATIENT SERVICE      NEUROLOGY CONSULT  NOTE            Date:   3/21/2022  Patient name:  Ciara Simons  Date of admission:  3/20/2022  YOB: 1950      Chief Complaint:     Chief Complaint   Patient presents with    Fall       Reason for Consult:      Weakness of arms and legs    History of Present Illness: The patient is a 70 y.o. male who presents with Fall  . The patient was seen and examined and the chart was reviewed. Patient is left handed. This patient tells me that he is fallen 7 times in the past 1 week. He has a previously identified cervical spinal cord injury. On April 19, 2018 he underwent surgical decompression for herniated disc and osteoarthritic change. He tells me that his right side of the spinal cord was nicked. He states that he has had difficulties ever since. He apparently has had the ability to drive as recently as about 1 to 2 weeks ago. He then began having significant falls. And he presents now with almost complete inability to move arms or legs. ED evaluation rate 20 2022 6:23 PM indicates there he was being helped to the bathroom and he went to sit down\" the rug slipped out from under him. He fell down onto the toilet and his head and back hit the back of the toilet. No loss of consciousness. Is complaining mainly of pain at the lower end of his neck extending down his back. The ED note goes on to say he does have an extensive history of cervical surgery causing difficulty walking. He indicated that he did not feel like his legs were any weaker than normal after the fall. He denied dizziness or lightheadedness. Does not take blood thinners. He indicated no chest pain or difficulty breathing. No abdominal pain. When I asked him about his ability to breathe he indicated that he has a tendency to be short of breath.   When I asked about abdominal distention he indicates that recently he has had some significant abdominal distention. He does not indicate difficulty controlling urine. He did have a CT of head, C-spine, thoracic spine and lumbar spine. I have reviewed the images and agree with radiology report. The most significant issue includes a stent in the body of the report under degenerative changes that is as follows:    DEGENERATIVE CHANGES: Prominent residual cervical spondylosis projecting into   the spinal canal on the right at C5-C6, unchanged.      Impression   Stable cervical spine CT examination status post corpectomy and anterior   fusion from C4-C7.       No acute fracture or traumatic malalignment.         Past Medical History:     Past Medical History:   Diagnosis Date    Depression 2017    ON RX    Diabetes mellitus ([facility] [address] 2013    NIDDM    Difficult intravenous access     Hyperlipidemia 2008    ON RX    Hypertension 2008    ON RX    Migraines     PTSD (post-traumatic stress disorder) 01/2018    ON RX    Sleep apnea 01/2018    UNALBE TO USE TO CLEARED BY ENT (CPAP)    Teeth missing     BACK TEETH UPPER AND LOWER TO BE FITTED FOR PARTIALS AT LATER DATE    Wears glasses         Past Surgical History:     Past Surgical History:   Procedure Laterality Date    CARDIAC CATHETERIZATION  02/2010    no stents     CARPAL TUNNEL RELEASE Left 01/09/2002    CATARACT REMOVAL      CERVICAL FUSION  04/19/2018    anterior cervical fusion C5-6    EYE SURGERY Left 2018    CATARACT EXTRACTION WITH IOL    HYDROCELE EXCISION  1951    MIDDLE EAR SURGERY  01/11/2018    MIDDLE EAR REBUILT AND EAR DRUM REPLACED    MOUTH SURGERY  04/16/2018    5 TEETH REMOVED    OTHER SURGICAL HISTORY  04/19/2018    : ANTERIOR CERVICAL CORRPECTOMY C5-6, SYNTHES, DEPUY, REG TABLE, SUPINE,     IA OFFICE/OUTPT VISIT,PROCEDURE ONLY N/A 4/19/2018    ANTERIOR CERVICAL CORRPECTOMY AND FUSION C5-6 performed by Willi Paez DO at [address] [place] [address]  12/1983        Medications Prior to Admission:     Prior to Admission medications    Medication Sig Start Date End Date Taking? Authorizing Provider   venlafaxine (EFFEXOR XR) 150 MG extended release capsule Take 1 capsule by mouth daily 6/23/20   Shaw Acevedo MD   simethicone (MYLICON) 80 MG chewable tablet Take 1 tablet by mouth every 6 hours as needed for Flatulence 6/22/20   Shaw Aceevdo MD   lidocaine (XYLOCAINE) 5 % ointment Apply topically daily as needed for Pain Apply topically as needed.   LF 4/20/20 (30 day supply)    Historical Provider, MD   metFORMIN (GLUCOPHAGE) 1000 MG tablet Take 1,000 mg by mouth 2 times daily (with meals) LF 4/27/20 (90 day supply)  Patient not taking: Reported on 3/21/2022    Historical Provider, MD   losartan (COZAAR) 25 MG tablet Take 12.5 mg by mouth daily LF 5/29/20 (90 day supply)    Historical Provider, MD   furosemide (LASIX) 20 MG tablet Take 20 mg by mouth daily LF 5/27/20 (90 day supply)  Patient not taking: Reported on 3/21/2022    Historical Provider, MD   aspirin 81 MG chewable tablet Take 81 mg by mouth daily  Patient not taking: Reported on 3/21/2022    Historical Provider, MD   divalproex (DEPAKOTE ER) 500 MG extended release tablet Take 500 mg by mouth 2 times daily LF 5/1/20 (30 day supply)    Historical Provider, MD   traZODone (DESYREL) 100 MG tablet Take 100 mg by mouth nightly LF 5/1/20 (30 day supply)    Historical Provider, MD   cetirizine (ZYRTEC) 10 MG tablet Take 10 mg by mouth daily LF 4/6/20 (90 day supply)  Patient not taking: Reported on 3/21/2022    Historical Provider, MD   atorvastatin (LIPITOR) 80 MG tablet Take 40 mg by mouth daily Take 1/2 tablet (40 mg) daily  LF 4/22/20 (90 day supply)  Patient not taking: Reported on 3/21/2022    Historical Provider, MD   carvedilol (COREG) 25 MG tablet Take 25 mg by mouth 2 times daily (with meals) LF 5/12/20 (90 day supply)  Patient not taking: Reported on 3/21/2022    Historical Provider, MD   amLODIPine (NORVASC) 10 MG tablet Take 10 mg by mouth daily  4/6/20 (90 day supply)    Historical Provider, MD   sildenafil (VIAGRA) 100 MG tablet Take 100 mg by mouth as needed for Erectile Dysfunction LF 5/12/20 (30 day supply)    Historical Provider, MD   omeprazole (PRILOSEC) 20 MG delayed release capsule Take 20 mg by mouth every evening LF 4/21/20 (90 day supply)    Historical Provider, MD        Allergies:     Latex, Bee venom, Lisinopril, Niacin and related, Sulfa antibiotics, and Terazosin    Social History:     Tobacco:    reports that he quit smoking about 49 years ago. His smoking use included cigarettes. He has a 2.00 pack-year smoking history. He has never used smokeless tobacco.  Alcohol:      reports current alcohol use. Drug Use:  reports current drug use. Drug: Marijuana Fredick Copping). Family History:     Family History   Problem Relation Age of Onset   [address] Dementia Mother     Coronary Art Dis Mother     Stroke Mother     Diabetes Mother     Asthma Mother     Other Mother         BRAIN ANEURISM    High Blood Pressure Mother     Heart Disease Father     Diabetes Sister     Diabetes Brother     High Blood Pressure Brother     High Cholesterol Brother     Heart Disease Brother     Diabetes Sister     Other Sister         NEUROPATHY       Review of Systems:       Constitutional Negative for fever and chills   HEENT Negative for ear discharge, ear pain, nosebleed. Negative for photophobia, headache. Musculoskeletal Negative for joint pain, negative for myalgia, he does have cervical pain on palpation and with movement of his neck (which he does himself). He has pain in all 4 extremities because he is unable to move them and he has stiffness of the arms especially the right shoulder and of his legs. Respiratory Negative for cough, dyspnea. Negative for hemoptysis and sputum. Tendency for shortness of breath   Cardiovascular Negative for palpitations, chest pain. Negative for orthopnea, claudication.     Gastrointestinal Negative for nausea, vomiting. Negative for abdominal pain, diarrhea, blood in stool. Positive for abdominal distention   Genitourinary  Negative for dysuria, hematuria. Negative for suprapubic pain. Negative for bladder incontinence. Skin Negative for rash or itching   Hematology Negative for ecchymosis, anemia   Psychiatric Negative for anxiety, depression. Negative for suicidal ideation, hallucinations         Physical Exam:   /73   Pulse 95   Temp 99.5 °F (37.5 °C)   Resp 18   Ht 5' 3\" (1.6 m)   Wt 160 lb (72.6 kg)   SpO2 93%   BMI 28.34 kg/m²   Temp (24hrs), Av.5 °F (36.9 °C), Min:97.8 °F (36.6 °C), Max:99.5 °F (37.5 °C)    General examination:      General Appearance:  alert,  acute distress unable to move  HEENT: Normocephalic, atraumatic. No larios sign no raccoon's eyes  Neck: Marked limitation in movement of the neck which produces pain  Lungs: Shallow respirations unlabored, chest wall no deformity  Cardiovascular: normal rate, regular rhythm  Abdomen: Distended tympanic  Skin: Bruises on knees and right arm  Extremities:  peripheral pulses palpable, no cyanosis, clubbing or edema  Psych: Anxiety      Neurological examination:    Mental status   Alert and oriented x 3; following all commands; speech is fluent, no dysarthria, aphasia. Memory was 0/3 but I think he was anxious and distracted. His overall cognition is quite good. He has normal language reception expression. He is concerned about his current status. Cranial nerves   II - visual fields intact to confrontation; pupils reactive. Pupil 4mm  III, IV, VI  extraocular muscles intact; no DILIP; no nystagmus; no ptosis   V - normal facial sensation                                                               VII - normal facial symmetry                                                             VIII - intact hearing                                                                             IX, X -normal phonation. There is a statement by the nurses that he was able to eat breakfast and lunch without difficulty                           XI -Marked difficulty moving his head in any direction. I did not attempt to move his head                                                    XII - midline tongue      Motor function   arm and legs in extension. Increased tone. Causes pain to passively move at the shoulder or elbow. Causes pain to movement of hip or knee. He does have some movement of feet. There is increased tone in all 4 extremities. His greatest impairment seems to be around the right shoulder         Sensory function  decreased to touch, vibration in arms and legs in comparison of the face. The change appears to be relatively small however there is a more pronounced loss of vibratory sense right lower extremity     Cerebellar  not testable due to loss of motor function     Reflex function 1/4 symmetric throughout . Upgoing plantar response bilaterally right greater than left (judging his reflexes in the arms and at the knees was somewhat difficult and less than what I expected)   Gait                   nonambulatory             Diagnostics:      Laboratory Testing:  CBC:   Recent Labs     03/20/22  1902   WBC 6.3   HGB 12.1*        BMP:    Recent Labs     03/20/22  1902   *   K 4.4      CO2 24   BUN 28*   CREATININE 0.91   GLUCOSE 151*         Lab Results   Component Value Date    CHOL 104 04/12/2018    LDLCHOLESTEROL 44 04/12/2018    HDL 42 04/12/2018    TRIG 92 04/12/2018    ALT 25 12/05/2018    AST 25 12/05/2018    TSH 1.25 06/20/2020    INR 1.0 03/20/2022    LABA1C 5.8 05/17/2021       Lab Results   Component Value Date    VALPROATE 8 (L) 06/20/2020       Impression:      1. Acute trauma related to fall in bathroom  2. Pre-existing cervical lesion which is significant  3. Change in neurologic status apparently since admission  4.  CT scan of the neck is notable as recorded above    Plan:  Placed on monitor for pulse and oximetry   MRI of cervical spine stat   Patient may need transfer to the neurosurgical service depending on results of MRI of C-spine   MRI of brain, thoracic and lumbar spine may not be obtained at this point because of scheduling but they are relatively elective in comparison to MRI of the cervical spine       Thank you for this very interesting consultation.       Electronically signed by Maria G Posadas MD on 3/21/2022 at 2:59 PM      Maria G Posadas MD  [facility]  Neurology

## 2022-03-21 NOTE — FLOWSHEET NOTE
Writer visited with patient and his son. Patient believes he has a living will and possibly HPOA; he has asked his ex-wife to look for them. He has 2 sons who would be his next of kin and legally responsible for making decisions on his behalf if he couldn't. We talked about the importance of patient sharing his wishes with his sons so they know his wishes. Patient will be transferred to [facility]. V's and is hopeful for successful resolution so he can \"just enjoy family life again. \" He noted his  had just been up and prayed for him. Writer provided listening presence and advance directive info.      03/21/22 1945   Encounter Summary   Services provided to: Patient and family together   Referral/Consult From: Other disciplines   Continue Visiting   (3-21-22)   Complexity of Encounter Moderate   Length of Encounter 15 minutes   Spiritual Assessment Completed Yes   Spiritual/Yarsanism   Type Spiritual support   Assessment Calm; Approachable   Intervention Active listening;Explored feelings, thoughts, concerns;Explored coping resources;Sustaining presence/ Ministry of presence; Discussed illness/injury and it's impact; Discussed meaning/purpose   Outcome Expressed gratitude;Engaged in conversation;Expressed feelings/needs/concerns; Hopeful

## 2022-03-21 NOTE — PLAN OF CARE
Problem: Skin Integrity:  Goal: Will show no infection signs and symptoms  Description: Will show no infection signs and symptoms  Outcome: Ongoing  Note: No s/sx of infection noted this shift.   Goal: Absence of new skin breakdown  Description: Absence of new skin breakdown  Outcome: Ongoing     Problem: Pain:  Goal: Pain level will decrease  Description: Pain level will decrease  3/21/2022 1551 by Ge Ambriz RN  Outcome: Ongoing  Note: No pain or discomfort noted this shift.  3/21/2022 0317 by Samson Lazar RN  Outcome: Ongoing  Goal: Control of acute pain  Description: Control of acute pain  3/21/2022 1551 by Ge Ambriz RN  Outcome: Ongoing  3/21/2022 0317 by Samson Lazar RN  Outcome: Ongoing  Goal: Control of chronic pain  Description: Control of chronic pain  Outcome: Ongoing  Goal: Patient's pain/discomfort is manageable  Description: Patient's pain/discomfort is manageable  Outcome: Ongoing     Problem: Pain:  Goal: Control of acute pain  Description: Control of acute pain  3/21/2022 1551 by Ge Ambriz RN  Outcome: Ongoing  3/21/2022 0317 by Samson Lazar RN  Outcome: Ongoing     Problem: Safety:  Goal: Free from accidental physical injury  Description: Free from accidental physical injury  3/21/2022 1551 by Ge Ambriz RN  Outcome: Ongoing  3/21/2022 0317 by Samson Lazar RN  Outcome: Ongoing  Goal: Free from intentional harm  Description: Free from intentional harm  Outcome: Ongoing     Problem: Daily Care:  Goal: Daily care needs are met  Description: Daily care needs are met  3/21/2022 1551 by Ge Ambriz RN  Outcome: Ongoing  3/21/2022 0317 by Samson Lazar RN  Outcome: Ongoing     Problem: Skin Integrity:  Goal: Skin integrity will stabilize  Description: Skin integrity will stabilize  3/21/2022 1551 by Ge Ambriz RN  Outcome: Ongoing  3/21/2022 0317 by Samson Lazar RN  Outcome: Ongoing     Problem: Discharge Planning:  Goal: Patients continuum of care needs are met  Description: Patients continuum of care needs are met  3/21/2022 1551 by Kuldip May RN  Outcome: Ongoing  3/21/2022 0317 by David Echevarria RN  Outcome: Ongoing     Problem: Musculor/Skeletal Functional Status  Goal: Highest potential functional level  Outcome: Ongoing  Goal: Absence of falls  Outcome: Ongoing  Note: No falls noted this shift. Bed in lowest position, call light in reach, and increased frequency of rounds.

## 2022-03-21 NOTE — PROGRESS NOTES
MRI orders changed to STAT per Dr. Joselin Guallpa. MRI called, stating \"We cannot get to these all at one time, we do not have this in the schedule. \"   Writer explained the neurologist is currently on the floor, and has concerns that the patient may have a spinal cord injury. Writer spoke with Dr. Joselin Guallpa, he is requesting that at least the MRI of cervical spine be done STAT if they cannot get them all completed right now. Writer relayed information to MRI department.

## 2022-03-21 NOTE — PROGRESS NOTES
Physical Therapy    Facility/Department: [facility] MED SURG  Initial Assessment    NAME: Timbo Perez  : 1950  MRN: [id number]    Date of Service: 3/21/2022    Discharge Recommendations:  Further therapy recommended at discharge. The patient should be able to tolerate at least 3 hours of therapy per day over 5 days or 15 hours over 7 days. This patient may benefit from a Physical Medicine and Rehab consult. Patient would benefit from continued therapy after discharge        Assessment   Body structures, Functions, Activity limitations: Decreased functional mobility ; Decreased ROM; Decreased strength;Decreased endurance;Decreased balance; Increased pain;Decreased posture  Assessment: Pt falling frequently at home in last week. Per pt, his fucntion has gradually declining since 2022. Prior to 2022, pt was ambulating with st cane, was felix to drive also. Lately unabel to walk at all , abd gets up with assist.  Pt presetn with quadraparesis, B UE> B LE, B LE spastic. Pt required 2 person assist to dangle at EOB and tostand with elbert stedy at bed side. Pt's weakness and severe pain limiting mobility at this time. Neuro and ortho consulted too by attending physician. Treatment Diagnosis: Impaired fucntion. Specific instructions for Next Treatment: co-tx with OTR/MANUEL  Prognosis: Fair  Decision Making: Medium Complexity  REQUIRES PT FOLLOW UP: Yes  Activity Tolerance  Activity Tolerance: Patient limited by fatigue;Patient limited by endurance       Patient Diagnosis(es): The primary encounter diagnosis was Frequent falls. A diagnosis of Unable to care for self was also pertinent to this visit. has a past medical history of Depression, Diabetes mellitus ([address] Difficult intravenous access, Hyperlipidemia, Hypertension, Migraines, PTSD (post-traumatic stress disorder), Sleep apnea, Teeth missing, and Wears glasses. has a past surgical history that includes Cardiac catheterization (2010);  Carpal tunnel release (Left, 01/09/2002); Vasectomy (12/1983); Tonsillectomy and adenoidectomy (1960); Hydrocele surgery (1951); Middle ear surgery (01/11/2018); eye surgery (Left, 2018); Mouth surgery (04/16/2018); other surgical history (04/19/2018); cervical fusion (04/19/2018); pr office/outpt visit,procedure only (N/A, 4/19/2018); and Cataract removal.    Restrictions  Restrictions/Precautions  Restrictions/Precautions: Fall Risk  Implants present? : Metal implants (C-spine surgery)  Vision/Hearing  Vision: Impaired  Vision Exceptions: Wears glasses at all times  Hearing: Exceptions to [facility]  Hearing Exceptions: Left hearing aid     Subjective  General  Patient assessed for rehabilitation services?: Yes  Additional Pertinent Hx: Pt admitted to the hospital for the management of Frequent falls  Referral Date : 03/20/22  Diagnosis: Frequent falls  Follows Commands: Within Functional Limits  Subjective  Subjective: \"I Have fallen atleast 7 times in last week. \"  Pain Screening  Patient Currently in Pain: Yes  Pain Assessment  Pain Assessment: 0-10  Pain Level: 8  Pain Type: Acute pain  Pain Location: Finger (Comment which one); Neck; Shoulder  Pain Orientation: Right;Left  Pain Radiating Towards: arm/elbow/finger tips  Pain Descriptors: Sharp  Pain Frequency: Continuous  Pain Onset: On-going  Clinical Progression: Gradually worsening  Vital Signs  Patient Currently in Pain: Yes       Orientation  Orientation  Overall Orientation Status: Within Functional Limits  Social/Functional History  Social/Functional History  Lives With:  (ex-wife)  Type of Home: House  Home Layout: One level  Home Access: Stairs to enter without rails (Threshold)  Entrance Stairs - Number of Steps: one steps  Bathroom Shower/Tub: Tub/Shower unit,Shower chair with back,Curtain  Bathroom Toilet: Handicap height  Bathroom Equipment: Hand-held shower  Bathroom Accessibility: Accessible  Home Equipment: 4 wheeled walker,Reacher,Cane  Receives Help From: Family (ex-wifr helps)  ADL Assistance: Needs assistance  Homemaking Assistance:  (Assist for UBD/LBD)  Homemaking Responsibilities: No  Ambulation Assistance: Needs assistance (Jan\"22 was walking st cane, lately difficult)  Transfer Assistance: Needs assistance (Ind until Feb'22, laterly needs Assist)  Active : No (Was driving until end of Feb '22)  Patient's  Info: Ex-wife, son and daughter in law  Mode of Transportation: Car  Additional Comments: Above home situation is ex-wife's home, its not pt's home. Lately ex-wife was performing all home making chores. Cognition        Objective          AROM RLE (degrees)  RLE General AROM: AAROM WFL-spastic legs  PROM LLE (degrees)  LLE General PROM: AAROM -Spastic legs  PROM RUE (degrees)  RUE General PROM: See OT eval  PROM LUE (degrees)  LUE General PROM: See OT eval  Strength RLE  Comment: DF 3/5, Knee extension 3-/5, Knee flexion 2/5, Hip Abd/Add 2-/5, hip flexors 1+ to 2-/5  Strength LLE  Comment: DF 3-/5, Knee extension 2/5, knee flexion 2-/5, Hip Abd/Add 2 to 2+/5, Hip flexors 2-/5     Sensation  Overall Sensation Status: Impaired  Additional Comments: Numbness all brian way from neck to fingers, back to toes, bilatreally. Bed mobility  Supine to Sit: Moderate assistance;2 Person assistance  Sit to Supine: Moderate assistance;2 Person assistance  Comment: Pt needs asist for Trunk/upper torso> than B LE for supine to sit. Pt with severe posterior/left side lean initially, improved with time, sitting balance fluctuates from min to max a at EOB. Poor trunk stability notes whiel dangling. Transfers  Sit to Stand: Moderate Assistance;2 Person Assistance  Stand to sit: Moderate Assistance;2 Person Assistance  Comment: Sit to stand performed in elbert stedy, pt felix to  elbert stedy bar to assist with standing balance, slight left side lean noted with standing. Pt stood ~ 2minutes 1st attempt, 10 secs 2nd attempt.  No knee buckling noted, pt uanbel to perfrom marching in [address] [place] [address] yet. Pt needs cuesto relax and work on breathing, pt reports severe neck/back/shoulder/arm pain with mobility. Pt assisted back to supine after therapy session. Ambulation  Ambulation?: No     Balance  Posture: Poor  Sitting - Static: Poor;+  Sitting - Dynamic: Poor;-  Standing - Static: Poor  Comments: Standing with elbert stedy        Plan   Plan  Times per week: 5 to 6 x/week  Specific instructions for Next Treatment: co-tx with OTR/MANUEL  Current Treatment Recommendations: Strengthening,ROM,Balance Training,Functional Mobility Training,Transfer Training,Endurance Training,Patient/Caregiver Education & Training,Safety Education & Training,Equipment Evaluation, Education, & procurement,Positioning  Safety Devices  Type of devices: Left in bed,Gait belt,Call light within reach    G-Code       OutComes Score                                                  AM-PAC Score  AM-PAC Inpatient Mobility Raw Score : 7 (03/21/22 1015)  AM-PAC Inpatient T-Scale Score : 26.42 (03/21/22 1015)  Mobility Inpatient CMS 0-100% Score: 92.36 (03/21/22 1015)  Mobility Inpatient CMS G-Code Modifier : CM (03/21/22 1015)          Goals  Short term goals  Time Frame for Short term goals: 10 to 12 visits  Short term goal 1: Pt able to roll side to side in bed at max a  Short term goal 2: Pt able to perform supine<>sit at max a  Short term goal 3: Pt able to tolerate sitting at [facility] for 20 + minutes, sitting baalnce maintained at min A   Short term goal 4: Pt able to perfrom sit>stand from bed height at max A , mod A x 2 from chair height  Short term goal 5: Pt to improve [facility]  [facility] to 5 [facility] with intermittent breaks, and work on weight shift and marching in place  Short term goal 6: Pt to tolerate sitting up in a chiar for 2 to 3 hours for meals. (Transfers with elbert stedy  Short term goal 7: Progress pt to stand with rolling walker from Edge of bed  with 2 person assist as able.   Patient Goals   Patient goals :  \"Figure out why I am falling, I want to move better\"       Therapy Time        03/21/22 0910 03/21/22 1016   PT Individual Minutes   Time In 0910 1015   Time Out 0948 1030   Minutes [id number] [facility] Seth, PT

## 2022-03-21 NOTE — PROGRESS NOTES
SW discussed SNF for discharge plans with pt. Pt requested to stay in this area for SNF as it is close to his family. SW listed facilities in the area and pt was agreeable to [facility] [address] [facility], and [facility]. Pt requested SW call his son, Trell Martinez to discuss options. EDGARD intern Yuly left voicemail with call back number for Trell Martinez. Addendum: Pt accepted at [facility] at this time. [facility] come to complete an on-site,but pt was at [facility].

## 2022-03-21 NOTE — PROGRESS NOTES
Dr. Ernestine Miles reviewed MRI. Concern patient has cervical cord compression at C2, C3, and C4, acute on chronic, with deterioration of clinical findings. Dr. Olegario Infante notified.

## 2022-03-21 NOTE — H&P
[facility] Internal Medicine  Troy Santoro MD; Carlita Vasquez MD; Henrietta Councilman, MD; Glenda Kussmaul, MD Minette Spain, MD; MD KRIS Garcia [facility] Internal Medicine   [facility]    HISTORY AND PHYSICAL EXAMINATION            Date:   3/21/2022  Patient name:  Stacey Mcfarland  Date of admission:  3/20/2022  6:25 PM  MRN:   [id number]  Account:  [de-identified]  YOB: 1950  PCP:    Damián Epstein  Room:   2050/2050-01  Code Status:    Full Code    Chief Complaint:     Chief Complaint   Patient presents with    Fall   Generalized weakness not able to stand or walk    History Obtained From:     patient    History of Present Illness:     Stacey Mcfarland is a 70 y.o. Non- / non  male who presents with Fall   and is admitted to the hospital for the management of Frequent falls. 68-year-old gentleman who was able to walk and drive and eat well 6 weeks back, started deteriorating neurologically, mentally seems reasonable, generalized weakness of the lower and upper extremity, has history of cervical spine surgery few years back, postoperative complications, went to the rehab, was driving and doing all his ADLs 2 months back.   Brought in for multiple falls not able to cope up at home,  Neurology and neurosurgery will be needed, MRI of the brain and spine ordered    Past Medical History:     Past Medical History:   Diagnosis Date    Depression 2017    ON RX    Diabetes mellitus ([facility] [address] 2013    NIDDM    Difficult intravenous access     Hyperlipidemia 2008    ON RX    Hypertension 2008    ON RX    Migraines     PTSD (post-traumatic stress disorder) 01/2018    ON RX    Sleep apnea 01/2018    UNALBE TO USE TO CLEARED BY ENT (CPAP)    Teeth missing     BACK TEETH UPPER AND LOWER TO BE FITTED FOR PARTIALS AT LATER DATE    Wears glasses         Past Surgical History:     Past Surgical History:   Procedure Laterality Date    CARDIAC CATHETERIZATION  02/2010    no stents     CARPAL TUNNEL RELEASE Left 01/09/2002    CATARACT REMOVAL      CERVICAL FUSION  04/19/2018    anterior cervical fusion C5-6    EYE SURGERY Left 2018    CATARACT EXTRACTION WITH IOL    HYDROCELE EXCISION  1951    MIDDLE EAR SURGERY  01/11/2018    MIDDLE EAR REBUILT AND EAR DRUM REPLACED    MOUTH SURGERY  04/16/2018    5 TEETH REMOVED    OTHER SURGICAL HISTORY  04/19/2018    : ANTERIOR CERVICAL CORRPECTOMY C5-6, SYNTHES, DEPUY, REG TABLE, SUPINE,     MN OFFICE/OUTPT VISIT,PROCEDURE ONLY N/A 4/19/2018    ANTERIOR CERVICAL CORRPECTOMY AND FUSION C5-6 performed by Braden Benitez DO at [address] [place] [address]  12/1983        Medications Prior to Admission:     Prior to Admission medications    Medication Sig Start Date End Date Taking? Authorizing Provider   venlafaxine (EFFEXOR XR) 150 MG extended release capsule Take 1 capsule by mouth daily 6/23/20   Senia Galvez MD   simethicone (MYLICON) 80 MG chewable tablet Take 1 tablet by mouth every 6 hours as needed for Flatulence 6/22/20   Senia Galvez MD   lidocaine (XYLOCAINE) 5 % ointment Apply topically daily as needed for Pain Apply topically as needed.   LF 4/20/20 (30 day supply)    Historical Provider, MD   metFORMIN (GLUCOPHAGE) 1000 MG tablet Take 1,000 mg by mouth 2 times daily (with meals) LF 4/27/20 (90 day supply)  Patient not taking: Reported on 3/21/2022    Historical Provider, MD   losartan (COZAAR) 25 MG tablet Take 12.5 mg by mouth daily LF 5/29/20 (90 day supply)    Historical Provider, MD   furosemide (LASIX) 20 MG tablet Take 20 mg by mouth daily LF 5/27/20 (90 day supply)  Patient not taking: Reported on 3/21/2022    Historical Provider, MD   aspirin 81 MG chewable tablet Take 81 mg by mouth daily  Patient not taking: Reported on 3/21/2022    Historical Provider, MD   divalproex (DEPAKOTE ER) 500 MG extended release tablet Take 500 mg by mouth 2 times daily LF 5/1/20 (30 day supply)    Historical Provider, MD   traZODone (DESYREL) 100 MG tablet Take 100 mg by mouth nightly LF 5/1/20 (30 day supply)    Historical Provider, MD   cetirizine (ZYRTEC) 10 MG tablet Take 10 mg by mouth daily LF 4/6/20 (90 day supply)  Patient not taking: Reported on 3/21/2022    Historical Provider, MD   atorvastatin (LIPITOR) 80 MG tablet Take 40 mg by mouth daily Take 1/2 tablet (40 mg) daily  LF 4/22/20 (90 day supply)  Patient not taking: Reported on 3/21/2022    Historical Provider, MD   carvedilol (COREG) 25 MG tablet Take 25 mg by mouth 2 times daily (with meals) LF 5/12/20 (90 day supply)  Patient not taking: Reported on 3/21/2022    Historical Provider, MD   amLODIPine (NORVASC) 10 MG tablet Take 10 mg by mouth daily LF 4/6/20 (90 day supply)    Historical Provider, MD   sildenafil (VIAGRA) 100 MG tablet Take 100 mg by mouth as needed for Erectile Dysfunction LF 5/12/20 (30 day supply)    Historical Provider, MD   omeprazole (PRILOSEC) 20 MG delayed release capsule Take 20 mg by mouth every evening LF 4/21/20 (90 day supply)    Historical Provider, MD        Allergies:     Latex, Bee venom, Lisinopril, Niacin and related, Sulfa antibiotics, and Terazosin    Social History:     Tobacco:    reports that he quit smoking about 49 years ago. His smoking use included cigarettes. He has a 2.00 pack-year smoking history. He has never used smokeless tobacco.  Alcohol:      reports current alcohol use. Drug Use:  reports current drug use. Drug: Marijuana Dolph Matt).     Family History:     Family History   Problem Relation Age of Onset   [employer] Dementia Mother     Coronary Art Dis Mother     Stroke Mother     Diabetes Mother     Asthma Mother     Other Mother         BRAIN ANEURISM    High Blood Pressure Mother     Heart Disease Father     Diabetes Sister     Diabetes Brother     High Blood Pressure Brother     High Cholesterol Brother     Heart Disease Brother     Diabetes Sister     Other Sister         NEUROPATHY       Review of Systems:     Positive and Negative as described in HPI. CONSTITUTIONAL:  negative for fevers, chills, sweats, fatigue, weight loss  HEENT:  negative for vision, hearing changes, runny nose, throat pain  RESPIRATORY:  negative for shortness of breath, cough, congestion, wheezing  CARDIOVASCULAR:  negative for chest pain, palpitations  GASTROINTESTINAL:  negative for nausea, vomiting, diarrhea, constipation, change in bowel habits, abdominal pain   GENITOURINARY:  negative for difficulty of urination, burning with urination, frequency   INTEGUMENT:  negative for rash, skin lesions, easy bruising   HEMATOLOGIC/LYMPHATIC:  negative for swelling/edema   ALLERGIC/IMMUNOLOGIC:  negative for urticaria , itching  ENDOCRINE:  negative increase in drinking, increase in urination, hot or cold intolerance  MUSCULOSKELETAL:  negative joint pains, muscle aches, swelling of joints  NEUROLOGICAL: Bilateral upper and lower extremity tremors and weakness  BEHAVIOR/PSYCH:  negative for depression, anxiety    Physical Exam:   BP (!) 152/79   Pulse 87   Temp 98.4 °F (36.9 °C)   Resp 18   Ht 5' 3\" (1.6 m)   Wt 160 lb (72.6 kg)   SpO2 96%   BMI 28.34 kg/m²   Temp (24hrs), Av.2 °F (36.8 °C), Min:97.8 °F (36.6 °C), Max:98.4 °F (36.9 °C)    No results for input(s): POCGLU in the last 72 hours.     Intake/Output Summary (Last 24 hours) at 3/21/2022 1024  Last data filed at 3/21/2022 0556  Gross per 24 hour   Intake    Output 1100 ml   Net -1100 ml       General Appearance: alert, well appearing, and in no acute distress  Mental status: oriented to person, place, and time  Head: normocephalic, atraumatic  Eye: no icterus, redness, pupils equal and reactive, extraocular eye movements intact, conjunctiva clear  Ear: normal external ear, no discharge, hearing intact  Nose: no drainage noted  Mouth: mucous membranes moist  Neck: supple, no carotid bruits, thyroid not palpable  Lungs: Bilateral equal air entry, clear to ausculation, no wheezing, rales or rhonchi, normal effort  Cardiovascular: normal rate, regular rhythm, no murmur, gallop, rub  Abdomen: Soft, nontender, nondistended, normal bowel sounds, no hepatomegaly or splenomegaly  Neurologic: UE/LE weakness   Skin: No gross lesions, rashes, bruising or bleeding on exposed skin area  Extremities: peripheral pulses palpable, no pedal edema or calf pain with palpation  Psych: normal affect    Investigations:      Laboratory Testing:  Recent Results (from the past 24 hour(s))   CBC with Auto Differential    Collection Time: 03/20/22  7:02 PM   Result Value Ref Range    WBC 6.3 3.5 - 11.0 k/uL    RBC 3.73 (L) 4.5 - 5.9 m/uL    Hemoglobin 12.1 (L) 13.5 - 17.5 g/dL    Hematocrit 34.3 (L) 41 - 53 %    MCV 92.0 80 - 100 fL    MCH 32.6 26 - 34 pg    MCHC 35.4 31 - 37 g/dL    RDW 13.8 11.5 - 14.9 %    Platelets 341 472 - 432 k/uL    MPV 7.2 6.0 - 12.0 fL    Seg Neutrophils 67 (H) 36 - 66 %    Lymphocytes 18 (L) 24 - 44 %    Monocytes 9 (H) 1 - 7 %    Eosinophils % 5 (H) 0 - 4 %    Basophils 1 0 - 2 %    Segs Absolute 4.20 1.3 - 9.1 k/uL    Absolute Lymph # 1.10 1.0 - 4.8 k/uL    Absolute Mono # 0.60 0.1 - 1.3 k/uL    Absolute Eos # 0.30 0.0 - 0.4 k/uL    Basophils Absolute 0.00 0.0 - 0.2 k/uL   Basic Metabolic Panel    Collection Time: 03/20/22  7:02 PM   Result Value Ref Range    Glucose 151 (H) 70 - 99 mg/dL    BUN 28 (H) 8 - 23 mg/dL    CREATININE 0.91 0.70 - 1.20 mg/dL    Calcium 9.2 8.6 - 10.4 mg/dL    Sodium 134 (L) 135 - 144 mmol/L    Potassium 4.4 3.7 - 5.3 mmol/L    Chloride 100 98 - 107 mmol/L    CO2 24 20 - 31 mmol/L    Anion Gap 10 9 - 17 mmol/L    GFR Non-African American >60 >60 mL/min    GFR African American >60 >60 mL/min    GFR Comment         Protime-INR    Collection Time: 03/20/22  7:02 PM   Result Value Ref Range    Protime 13.1 11.8 - 14.6 sec    INR 1.0    APTT    Collection Time: 03/20/22  7:02 PM   Result Patient is admitted as inpatient status because of co-morbidities listed above, severity of signs and symptoms as outlined, requirement for current medical therapies and most importantly because of direct risk to patient if care not provided in a hospital setting. Expected length of stay > 48 hours. Phan Bucio MD  3/21/2022  10:24 AM    Copy sent to Dr. Naveen Alejo    Please note that this chart was generated using voice recognition Dragon dictation software. Although every effort was made to ensure the accuracy of this automated transcription, some errors in transcription may have occurred.

## 2022-03-21 NOTE — PROGRESS NOTES
Patient arrived to unit. Bed in lowest position, wheels locked and call light within reach. Patient oriented to room.

## 2022-03-21 NOTE — PROGRESS NOTES
[address] called to initiate transfer to [facility]. Transfer initiated. Writer informed that they are discharge dependent.

## 2022-03-21 NOTE — PROGRESS NOTES
[facility]: KIRSTIN JIMENEZ   Occupational Therapy Evaluation  Date: 3/21/22  Patient Name: Walter Saldivar       Room: 2781/0384-17  MRN: [id number]  Account: [de-identified]   : 1950  (75 y.o.) Gender: male     This patient would benefit from a Physical Medicine and Rehab Consult. Discharge Recommendations: The patient would benefit from an intensive level of therapy after discharge from the facility. They should be able to tolerate 3-hours of Combined OT/PT/ST over 5 days/week or at least 900 minutes of  Combined Therapy over 7 days. He may benefit for Intensive OT/PT to determine functional status and potential interventions/expected  Outcomes, but may need an assisted living setting for eventual discharge. OT Equipment Recommendations  Equipment Needed: Yes  Mobility Devices: ADL Assistive Devices  ADL Assistive Devices: Long-handled [place]    Referring Practitioner: Dr Ajith Alvarez  Diagnosis: Inability to care for self - multiple falls including hitting back of head on toilet tank  Additional Pertinent Hx: Progrssive qudraparesis with falls and weakness, inabilityto care for self    Treatment Diagnosis: Impaired ability to care for self  Past Medical History:  has a past medical history of Depression, Diabetes mellitus ([address] Difficult intravenous access, Hyperlipidemia, Hypertension, Migraines, PTSD (post-traumatic stress disorder), Sleep apnea, Teeth missing, and Wears glasses. Past Surgical History:   has a past surgical history that includes Cardiac catheterization (2010); Carpal tunnel release (Left, 2002); Vasectomy (1983); Tonsillectomy and adenoidectomy (); Hydrocele surgery (); Middle ear surgery (2018); eye surgery (Left, );  Mouth surgery (2018); other surgical history (2018); cervical fusion (2018); pr office/outpt visit,procedure only (N/A, 2018); and Cataract removal.    Restrictions  Restrictions/Precautions: Fall Risk,General Precautions  Spinal Precautions: Poor Sitting balance, Bilateral Upper  and Lower Body muscle weakness, Assist with standing in GERA Falk     Vitals  Temp: 99.5 °F (37.5 °C)  Pulse: 95  Resp: 18  BP: 122/73  Height: 5' 3\" (160 cm)  Weight: 160 lb (72.6 kg)  BMI (Calculated): 28.4  Oxygen Therapy  SpO2: 93 %  Pulse Oximeter Device Mode: Intermittent  O2 Device: None (Room air)  Level of Consciousness: Alert (0)    Subjective  Subjective: Alert and cooperative  Comments: Reports that in January 2022 he was still able to care for himself including self cares, mobiltiy and driving  Overall Orientation Status: Within Normal Limits  Vision  Vision: Impaired  Vision Exceptions: Wears glasses at all times  Hearing  Hearing: Exceptions to [facility]  Hearing Exceptions: Left hearing aid  Social/Functional History  Lives With: Other (comment) (Ex-Spouse at her house)  Type of Home: House  Home Layout: One level  Home Access: Stairs to enter without rails  Entrance Stairs - Number of Steps: one step at entrance  Bathroom Shower/Tub: Tub/Shower unit,Shower chair with back,Curtain  Bathroom Toilet: Handicap height  Bathroom Equipment: Hand-held shower  Bathroom Accessibility: Walker accessible  Home Equipment: 4 wheeled walker,Reacher,Cane  Receives Help From: Family  ADL Assistance: Needs assistance (Currently needs assistanc efor all care and mobility - was Mod-I in January)  Homemaking Assistance: Needs assistance  Homemaking Responsibilities: No (Not able to complete since Feb-2022)  Ambulation Assistance: Needs assistance (Since February has needed assist to roll sitting on 4-wheeled walker and Stand-Pivot transfers)  Transfer Assistance:  (Since February has needed assist to roll sitting on 4-wheeled walker and Stand-Pivot transfers)  Active : Yes (Was driving until end of Feb '22)  Patient's  Info: Ex-wife, son and daughter in law are currently assisting with transportation - Was driving until end of Jan/Feb '22  Mode of Transportation: Car  Occupation: Retired  Additional Comments: Above house setting is his ex-wife's home where he has been staying, its not pt's home. Lately ex-wife was performing all home making chores. Pain Assessment  Pain Level: 8  Patient's Stated Pain Goal: No pain  Pain Type: Acute pain  Pain Location: Neck,Shoulder    Objective      Cognition  Overall Cognitive Status: WFL       ADL  Feeding: Maximum assistance,Beverage management (Can Drink via a straw  and chew food placed in his mouth)  Grooming: Dependent/Total  UE Bathing: Dependent/Total  LE Bathing: Dependent/Total  UE Dressing: Dependent/Total  LE Dressing: Dependent/Total  Toileting: Dependent/Total    UE Function  Hand Dominance  Hand Dominance: Right        LUE Strength  L Hand General: 1/5     LUE Tone: Hypotonic  LUE PROM (degrees)  LUE PROM: WFL     Left Hand PROM (degrees)  Left Hand PROM: WFL     RUE Strength  R Hand General: 1/5  RUE Strength Comment: More  movement at 3rd-5th digits      RUE Tone: Hypotonic  RUE PROM (degrees)  RUE PROM: WFL     Right Hand PROM (degrees)  Right Hand PROM: [facility]       Fine Motor Skills  Coordination  Movements Are Fluid And Coordinated: No  Coordination and Movement description: Fine motor impairments,Gross motor impairments,Right UE,Left UE,Decreased speed,Decreased accuracy                           Mobility  Supine to Sit: Moderate assistance,2 Person assistance  Sit to Supine: Moderate assistance,2 Person assistance       Balance  Sitting Balance: Moderate assistance  Standing Balance: Dependent/Total (2 person)        Bed mobility  Rolling to Left: 2 Person assistance; Moderate assistance  Supine to Sit: Moderate assistance;2 Person assistance  Sit to Supine: Moderate assistance;2 Person assistance  Comment: Pt needs Moderate assist for trunk/upper torso stability more than  B LE for supine to sit.  Pt with severe posterior/left side lean initially, improved with time, sitting in care tasks  Short term goal 3: D/V understanding of available mobility, transfer and housing modifications to support access to care  Short term goal 4: Maintain P/AA/A ROM in Upper extremities    Plan  Safety Devices  Safety Devices in place: Yes  Type of devices: Call light within reach,Left in bed,Nurse notified (Quad/Paddle nurse call button placed under his left hand)     Plan  Times per week: 4-6 sessions  Times per day: Daily  Current Treatment Recommendations: [address] [place] [address] Education & Training,Patient/Caregiver Education & Training,Positioning,Self-Care / ADL  OT Education  OT Education: OT Role,Plan of Care,Precautions,ADL Adaptive Strategies,Transfer Training,Equipment    OT Equipment Recommendations  Equipment Needed: Yes  Mobility Devices: ADL Assistive Devices  ADL Assistive Devices: Long-handled Sponge,Feeding Devices,Reacher  OT Individual Minutes  Time In: 1733  Time Out: 1100  Minutes: 20    Electronically signed by Nataliia Singh OT on 3/21/22 at 2:33 PM EDT

## 2022-03-21 NOTE — PLAN OF CARE
Spoke with Junior Gracia, requested screening form. RN to have MRI spine orders corrected to without studies due to diagnosis. Explained that we may not get to him today because of the previous house patients. Including this patient, we currently have 12 IP studies. Any questions please call [phone]. Thank you.  - pas

## 2022-03-21 NOTE — PLAN OF CARE
Problem: Pain:  Goal: Pain level will decrease  Description: Pain level will decrease  Outcome: Ongoing  Goal: Control of acute pain  Description: Control of acute pain  Outcome: Ongoing     Problem: Safety:  Goal: Free from accidental physical injury  Description: Free from accidental physical injury  Outcome: Ongoing     Problem: Daily Care:  Goal: Daily care needs are met  Description: Daily care needs are met  Outcome: Ongoing     Problem: Skin Integrity:  Goal: Skin integrity will stabilize  Description: Skin integrity will stabilize  Outcome: Ongoing     Problem: Discharge Planning:  Goal: Patients continuum of care needs are met  Description: Patients continuum of care needs are met  Outcome: Ongoing

## 2022-03-21 NOTE — PROGRESS NOTES
Nutrition Assessment     Type and Reason for Visit: Positive Nutrition Screen (decreased appetite, poor dentition, wounds, wt loss)    Nutrition Recommendations/Plan: Will add 5 carbohydrate restriction and monitor intake. as length of stay. Nutrition Assessment:  Pt was admitted due to falls. He states he is eating all food provided with no difficulty and can eat anything despite missing teeth. Wounds are not nutritionally related. review of wt history shows all wts are stated. Malnutrition Assessment:  Malnutrition Status:  No malnutrition    Current Nutrition Therapies:    ADULT DIET;  Regular    Anthropometric Measures:  · Height: 5' 3\" (160 cm)  · Current Body Wt: 160 lb (72.6 kg)   · BMI: 28.4    Nutrition Diagnosis:   · No nutrition diagnosis at this time related to   as evidenced by      Nutrition Interventions:   Food and/or Nutrient Delivery:  Modify Current Diet  Nutrition Education/Counseling:  No recommendation at this time   Food/Nutrient Intake Outcomes:  Food and Nutrient Intake    Discharge Planning:    Continue current diet     Electronically signed by Kristian Hung RD, LD on 3/21/22 at 12:42 PM EDT    Contact: [phone]

## 2022-03-21 NOTE — PROGRESS NOTES
[address] called to change admitting physician to neurosurgery at [facility]. Daron

## 2022-03-22 ENCOUNTER — APPOINTMENT (OUTPATIENT)
Dept: MRI IMAGING | Age: 72
DRG: 552 | End: 2022-03-22
Payer: MEDICARE

## 2022-03-22 PROCEDURE — 6370000000 HC RX 637 (ALT 250 FOR IP): Performed by: INTERNAL MEDICINE

## 2022-03-22 PROCEDURE — 94761 N-INVAS EAR/PLS OXIMETRY MLT: CPT

## 2022-03-22 PROCEDURE — 2580000003 HC RX 258: Performed by: INTERNAL MEDICINE

## 2022-03-22 PROCEDURE — 1200000000 HC SEMI PRIVATE

## 2022-03-22 PROCEDURE — 99232 SBSQ HOSP IP/OBS MODERATE 35: CPT | Performed by: INTERNAL MEDICINE

## 2022-03-22 PROCEDURE — 72146 MRI CHEST SPINE W/O DYE: CPT

## 2022-03-22 PROCEDURE — 72148 MRI LUMBAR SPINE W/O DYE: CPT

## 2022-03-22 RX ADMIN — SODIUM CHLORIDE, PRESERVATIVE FREE 10 ML: 5 INJECTION INTRAVENOUS at 22:13

## 2022-03-22 RX ADMIN — SODIUM CHLORIDE, PRESERVATIVE FREE 10 ML: 5 INJECTION INTRAVENOUS at 09:31

## 2022-03-22 RX ADMIN — AMLODIPINE BESYLATE 10 MG: 5 TABLET ORAL at 08:06

## 2022-03-22 RX ADMIN — DIVALPROEX SODIUM 500 MG: 500 TABLET, EXTENDED RELEASE ORAL at 22:12

## 2022-03-22 RX ADMIN — VENLAFAXINE HYDROCHLORIDE 150 MG: 150 CAPSULE, EXTENDED RELEASE ORAL at 08:01

## 2022-03-22 RX ADMIN — LOSARTAN POTASSIUM 12.5 MG: 25 TABLET, FILM COATED ORAL at 08:06

## 2022-03-22 RX ADMIN — TRAZODONE HYDROCHLORIDE 100 MG: 50 TABLET ORAL at 22:11

## 2022-03-22 RX ADMIN — PANTOPRAZOLE SODIUM 40 MG: 40 TABLET, DELAYED RELEASE ORAL at 06:21

## 2022-03-22 RX ADMIN — DIVALPROEX SODIUM 500 MG: 500 TABLET, EXTENDED RELEASE ORAL at 08:06

## 2022-03-22 RX ADMIN — CETIRIZINE HYDROCHLORIDE 10 MG: 10 TABLET, FILM COATED ORAL at 08:06

## 2022-03-22 NOTE — PROGRESS NOTES
Writer perfect served Neuro to update him in regards to the hold up on the tx to [facility]. Writer let him know we still do not have a bed.  He states, \"ok\"

## 2022-03-22 NOTE — PROGRESS NOTES
Physical Therapy Cancel Note      DATE: 3/22/2022    NAME: Jessica Fuentes  MRN: [id number]   : 1950      Patient not seen this date for Physical Therapy due to:     Other: report from PT - pt is going to the [facility]       Electronically signed by Tu Haque PTA on 3/22/2022 at 2:25 PM

## 2022-03-22 NOTE — PLAN OF CARE
Problem: Skin Integrity:  Goal: Will show no infection signs and symptoms  Description: Will show no infection signs and symptoms  3/22/2022 1713 by Melva Nuñez RN  Outcome: Ongoing  3/22/2022 0407 by Myranda Phillips RN  Outcome: Ongoing  Goal: Absence of new skin breakdown  Description: Absence of new skin breakdown  3/22/2022 1713 by Melva Nuñez RN  Outcome: Ongoing  3/22/2022 0407 by Myranda Phillips RN  Outcome: Ongoing  Note: Assess the overall condition of the skin. Check on bony prominences such as the sacrum, trochanters, scapulae, elbows, heels, inner and outer malleolus, inner and outer knees, back of head). Reinforce the importance of turning, mobility, and ambulation. Problem: Pain:  Goal: Pain level will decrease  Description: Pain level will decrease  3/22/2022 1713 by Melva Nuñez RN  Outcome: Ongoing  3/22/2022 0407 by Myranda Phillips RN  Outcome: Ongoing  Goal: Control of acute pain  Description: Control of acute pain  3/22/2022 1713 by Melva Nuñez RN  Outcome: Ongoing  3/22/2022 0407 by Myranda Phillips RN  Outcome: Ongoing  Note: Assess the location, characteristics, onset, duration, frequency, quality, and severity of pain. Encourage immediate report of pain. Use appropriate pain scale to rate pain. Manage pain using nonpharmacologic/pharmacologic interventions.    Goal: Control of chronic pain  Description: Control of chronic pain  3/22/2022 1713 by Melva Nuñez RN  Outcome: Ongoing  3/22/2022 0407 by Myranda Phillips RN  Outcome: Ongoing  Goal: Patient's pain/discomfort is manageable  Description: Patient's pain/discomfort is manageable  3/22/2022 1713 by Melva Nuñez RN  Outcome: Ongoing  3/22/2022 0407 by Myranda Phillips RN  Outcome: Ongoing     Problem: Safety:  Goal: Free from accidental physical injury  Description: Free from accidental physical injury  3/22/2022 1713 by Melva Nuñez RN  Outcome: Ongoing  3/22/2022 0407 by Myranda Phillips RN  Outcome: Ongoing  Goal: Free from intentional harm  Description: Free from intentional harm  3/22/2022 1713 by Danica Tsang RN  Outcome: Ongoing  3/22/2022 0407 by Ladarius Niar RN  Outcome: Ongoing     Problem: Daily Care:  Goal: Daily care needs are met  Description: Daily care needs are met  3/22/2022 1713 by Danica Tsang RN  Outcome: Ongoing  3/22/2022 0407 by Ladarius Nair RN  Outcome: Ongoing     Problem: Skin Integrity:  Goal: Skin integrity will stabilize  Description: Skin integrity will stabilize  3/22/2022 1713 by Danica Tsang RN  Outcome: Ongoing  3/22/2022 0407 by Ladarius Nair RN  Outcome: Ongoing     Problem: Discharge Planning:  Goal: Patients continuum of care needs are met  Description: Patients continuum of care needs are met  3/22/2022 1713 by Danica Tsang RN  Outcome: Ongoing  3/22/2022 0407 by Ladarius Nair RN  Outcome: Ongoing     Problem: Musculor/Skeletal Functional Status  Goal: Highest potential functional level  3/22/2022 1713 by Danica Tsang RN  Outcome: Ongoing  3/22/2022 0407 by Ladarius Nair RN  Outcome: Ongoing  Goal: Absence of falls  3/22/2022 1713 by Danica Tsang RN  Outcome: Ongoing  3/22/2022 0407 by Ladarius Nair RN  Outcome: Ongoing

## 2022-03-22 NOTE — CARE COORDINATION
ONGOING DISCHARGE PLAN:    Patient is alert and oriented x4. Spoke with patient regarding discharge plan and patient confirms that plan is still to discharge to home with no needs  Patient is waiting on a bed for transfer to [address] continue to follow for additional discharge needs.     Electronically signed by Aga Felix RN on 3/22/2022 at 2:18 PM

## 2022-03-22 NOTE — PLAN OF CARE
Problem: Skin Integrity:  Goal: Will show no infection signs and symptoms  Description: Will show no infection signs and symptoms  3/22/2022 0407 by Jimy Daugherty RN  Outcome: Ongoing  3/21/2022 1551 by Bhupendra Du RN  Outcome: Ongoing  Note: No s/sx of infection noted this shift. Goal: Absence of new skin breakdown  Description: Absence of new skin breakdown  3/22/2022 0407 by Jimy Daugherty RN  Outcome: Ongoing  Note: Assess the overall condition of the skin. Check on bony prominences such as the sacrum, trochanters, scapulae, elbows, heels, inner and outer malleolus, inner and outer knees, back of head). Reinforce the importance of turning, mobility, and ambulation. 3/21/2022 1551 by Bhupendra Du RN  Outcome: Ongoing     Problem: Pain:  Goal: Pain level will decrease  Description: Pain level will decrease  3/22/2022 0407 by Jimy Daugherty RN  Outcome: Ongoing  3/21/2022 1551 by Bhupendra Du RN  Outcome: Ongoing  Note: No pain or discomfort noted this shift. Goal: Control of acute pain  Description: Control of acute pain  3/22/2022 0407 by Jimy Daugherty RN  Outcome: Ongoing  Note: Assess the location, characteristics, onset, duration, frequency, quality, and severity of pain. Encourage immediate report of pain. Use appropriate pain scale to rate pain. Manage pain using nonpharmacologic/pharmacologic interventions.    3/21/2022 1551 by Bhupendra Du RN  Outcome: Ongoing  Goal: Control of chronic pain  Description: Control of chronic pain  3/22/2022 0407 by Jimy Daugherty RN  Outcome: Ongoing  3/21/2022 1551 by Bhupendra Du RN  Outcome: Ongoing  Goal: Patient's pain/discomfort is manageable  Description: Patient's pain/discomfort is manageable  3/22/2022 0407 by Jimy Daugherty RN  Outcome: Ongoing  3/21/2022 1551 by Bhupendra Du RN  Outcome: Ongoing     Problem: Safety:  Goal: Free from accidental physical injury  Description: Free from accidental physical injury  3/22/2022 0407 by Melany Ng RN  Outcome: Ongoing  3/21/2022 1551 by Lorene Obrien RN  Outcome: Ongoing  Goal: Free from intentional harm  Description: Free from intentional harm  3/22/2022 0407 by Melany Ng RN  Outcome: Ongoing  3/21/2022 1551 by Lorene Obrien RN  Outcome: Ongoing     Problem: Daily Care:  Goal: Daily care needs are met  Description: Daily care needs are met  3/22/2022 0407 by Melany Ng RN  Outcome: Ongoing  3/21/2022 1551 by Lorene Obrien RN  Outcome: Ongoing     Problem: Skin Integrity:  Goal: Skin integrity will stabilize  Description: Skin integrity will stabilize  3/22/2022 0407 by Melany Ng RN  Outcome: Ongoing  3/21/2022 1551 by Lorene Obrien RN  Outcome: Ongoing     Problem: Discharge Planning:  Goal: Patients continuum of care needs are met  Description: Patients continuum of care needs are met  3/22/2022 0407 by Melany Ng RN  Outcome: Ongoing  3/21/2022 1551 by Lorene Obrien RN  Outcome: Ongoing     Problem: Musculor/Skeletal Functional Status  Goal: Highest potential functional level  3/22/2022 0407 by Melany Ng RN  Outcome: Ongoing  3/21/2022 1551 by Lorene Obrien RN  Outcome: Ongoing  Goal: Absence of falls  3/22/2022 0407 by Melany Ng RN  Outcome: Ongoing  3/21/2022 1551 by Lorene Obrien RN  Outcome: Ongoing  Note: No falls noted this shift. Bed in lowest position, call light in reach, and increased frequency of rounds.

## 2022-03-22 NOTE — DISCHARGE INSTR - COC
Continuity of Care Form    Patient Name: Villa Guillaume   :  1950  MRN:  [id number]    Admit date:  3/20/2022  Discharge date:  ***    Code Status Order: DNR-CCA   Advance Directives:      Admitting Physician:  Sidney Domínguez MD  PCP: Joanne Duran    Discharging Nurse: [facility] Unit/Room#:   Discharging Unit Phone Number: ***    Emergency Contact:   Extended Emergency Contact Information  Primary Emergency Contact: Codie Banegas [facility] Phone: [phone]  Mobile Phone: [phone]  Relation: Unknown  Secondary Emergency Contact: [address] [place] [address] Phone: [phone]  Relation: Child    Past Surgical History:  Past Surgical History:   Procedure Laterality Date    CARDIAC CATHETERIZATION  2010    no stents     CARPAL TUNNEL RELEASE Left 2002    CATARACT REMOVAL      CERVICAL FUSION  2018    anterior cervical fusion C5-6    EYE SURGERY Left 2018    CATARACT EXTRACTION [address]  2018    MIDDLE EAR REBUILT AND EAR DRUM REPLACED    MOUTH SURGERY  2018    5 TEETH REMOVED    OTHER SURGICAL HISTORY  2018    : ANTERIOR CERVICAL CORRPECTOMY C5-6, SYNTHES, DEPUY, REG TABLE, SUPINE,     SC OFFICE/OUTPT VISIT,PROCEDURE ONLY N/A 2018    ANTERIOR CERVICAL CORRPECTOMY AND FUSION C5-6 performed by Macy Lafleur DO at [facility]. 72  1983       Immunization History:   Immunization History   Administered Date(s) Administered    Influenza Vaccine, unspecified formulation 10/26/2011, 2012, 2014, 2016    Tdap (Boostrix, Adacel) 2007, 2017    Zoster Live (Zostavax) 2016       Active Problems:  Patient Active Problem List   Diagnosis Code    Hypertension I10    Hyperlipidemia E78.5    Diabetes mellitus ([address] E11.9    Contusion of right shoulder S40.011A    Bipolar disorder, mixed ([facility] F31.60    First known suicide attempt ([facility] T14. 91XA    Perforation of left tympanic membrane H72.92    Erectile dysfunction N52.9    Cervical stenosis of spinal canal M48.02    Incomplete spinal cord lesion at C5-C7 level ([facility]) S14.155A    Cervical spinal stenosis M48.02    Cervical spondylosis with radiculopathy M47.22    Acute blood loss anemia D62    Chest pain R07.9    Depression with suicidal ideation F32. A, R45.851    Severe recurrent major depression without psychotic features ([facility]) F33.2    Frequent falls R29.6    Hyponatremia E87.1    Cervical spinal cord compression ([facility]) G95.20       Isolation/Infection:   Isolation            No Isolation          Patient Infection Status       None to display            Nurse Assessment:  Last Vital Signs: BP (!) 155/89   Pulse 68   Temp 97.3 °F (36.3 °C)   Resp 18   Ht 5' 3\" (1.6 m)   Wt 160 lb (72.6 kg)   SpO2 97%   BMI 28.34 kg/m²     Last documented pain score (0-10 scale): Pain Level: 8  Last Weight:   Wt Readings from Last 1 Encounters:   03/20/22 160 lb (72.6 kg)     Mental Status:  {IP PT MENTAL STATUS:20030}    IV Access:  { YANA IV ACCESS:795508214}    Nursing Mobility/ADLs:  Walking   {[facility] DME PVNE:966715333}  Transfer  {[facility] DME GHUA:597268268}  Bathing  {[facility] DME PWKI:376415257}  Dressing  {[facility] DME JNXE:496577264}  Toileting  {[facility] DME YPSJ:450803546}  Feeding  {[facility] DME PVKB:537381068}  Med Admin  {[facility] DME KFOW:620207468}  Med Delivery   { YANA MED Delivery:661351143}    Wound Care Documentation and Therapy:  Incision 04/19/18 Neck (Active)   Number of days: 1432       Incision 04/29/18 Back Mid;Lower (Active)   Number of days: 1423       Wound 03/20/22 Arm Left (Active)   Dressing/Treatment Adhesive bandage; Other (comment) 03/21/22 0035   Number of days: 1        Elimination:  Continence:    Bowel: {YES / UV:86138}  Bladder: {YES / BT:56315}  Urinary Catheter: {Urinary Catheter:121861687}   Colostomy/Ileostomy/Ileal Conduit: {YES / HT:33526}       Date of Last BM: ***    Intake/Output Summary (Last 24 hours) at 3/22/2022 0834  Last data filed at 3/22/2022 3606  Gross per 24 hour   Intake --   Output 675 ml   Net -675 ml     I/O last 3 completed shifts:  In: -   Out: 1961 [Urine:1575]    Safety Concerns:     [address] Safety Concerns:237257869}    Impairments/Disabilities:      [address] Impairments/Disabilities:226488081}    Nutrition Therapy:  Current Nutrition Therapy:   [address] YANA Diet List:324659644}    Routes of Feeding: {CHP DME Other Feedings:099852878}  Liquids: {Slp liquid thickness:80912}  Daily Fluid Restriction: {CHP DME Yes amt example:197825969}  Last Modified Barium Swallow with Video (Video Swallowing Test): {Done Not Done XNGS:287848925}    Treatments at the Time of Hospital Discharge:   Respiratory Treatments: ***  Oxygen Therapy:  {Therapy; copd oxygen:41538}  Ventilator:    {MH CC Vent LHXR:907856006}    Rehab Therapies: Physical Therapy and Occupational Therapy  Weight Bearing Status/Restrictions: Other Medical Equipment (for information only, NOT a DME order):     Other Treatments: skilled nursing assessment per protocol medication education     Patient's personal belongings (please select all that are sent with patient):  {P DME Belongings:514007372}    RN SIGNATURE:  {Esignature:758392885}    CASE MANAGEMENT/SOCIAL WORK SECTION    Inpatient Status Date: 3/20/22    Readmission Risk Assessment Score:  Readmission Risk              Risk of Unplanned Readmission:  13           Discharging to Facility/ Agency         Dialysis Facility (if applicable)   Name:  Address:  Dialysis Schedule:  Phone:  Fax:    / signature: Electronically signed by Rowan Humphrey RN on 3/22/22 at 8:35 AM EDT    PHYSICIAN SECTION    Prognosis: {Prognosis:3099759714}    Condition at Discharge: [address] Patient Condition:277430350}    Rehab Potential (if transferring to Rehab): {Prognosis:3382406054}    Recommended Labs or Other Treatments After Discharge: ***    Physician Certification: I certify the above information and transfer of Ahmet Pan  is necessary for the continuing treatment of the diagnosis listed and that he requires {Admit to Appropriate Level of Care:95551} for {GREATER/LESS:041662748} 30 days.      Update Admission H&P: {CHP DME Changes in [facility]}    PHYSICIAN SIGNATURE:  {Esignature:176607976}

## 2022-03-22 NOTE — PROGRESS NOTES
[facility] Internal Medicine  Troy Santoro MD; Carlita Vasquez MD; Henrietta Councilman, MD; Glenda Kussmaul, MD Minette Spain, MD; MD KRIS Garcia [facility] Internal Medicine   [facility]    Progress note            Date:   3/22/2022  Patient name:  Stacey Mcfarland  Date of admission:  3/20/2022  6:25 PM  MRN:   [id number]  Account:  [de-identified]  YOB: 1950  PCP:    Damián Epstein  Room:   2050/2050-01  Code Status:    DNR-CCA    Chief Complaint:     Chief Complaint   Patient presents with    Fall   Generalized weakness not able to stand or walk    History Obtained From:     patient    History of Present Illness:     Stacey Mcfarland is a 70 y.o. Non- / non  male who presents with Fall   and is admitted to the hospital for the management of Frequent falls. 68-year-old gentleman who was able to walk and drive and eat well 6 weeks back, started deteriorating neurologically, mentally seems reasonable, generalized weakness of the lower and upper extremity, has history of cervical spine surgery few years back, postoperative complications, went to the rehab, was driving and doing all his ADLs 2 months back.   Brought in for multiple falls not able to cope up at home,  Neurology and neurosurgery will be needed, MRI of the brain and spine ordered    Past Medical History:     Past Medical History:   Diagnosis Date    Depression 2017    ON RX    Diabetes mellitus ([address] 2013    NIDDM    Difficult intravenous access     Hyperlipidemia 2008    ON RX    Hypertension 2008    ON RX    Migraines     PTSD (post-traumatic stress disorder) 01/2018    ON RX    Sleep apnea 01/2018    UNALBE TO USE TO CLEARED BY ENT (CPAP)    Teeth missing     BACK TEETH UPPER AND LOWER TO BE FITTED FOR PARTIALS AT LATER DATE    Wears glasses         Past Surgical History:     Past Surgical History:   Procedure Laterality Date    CARDIAC CATHETERIZATION 02/2010    no stents     CARPAL TUNNEL RELEASE Left 01/09/2002    CATARACT REMOVAL      CERVICAL FUSION  04/19/2018    anterior cervical fusion C5-6    EYE SURGERY Left 2018    CATARACT EXTRACTION WITH IOL    HYDROCELE EXCISION  1951    MIDDLE EAR SURGERY  01/11/2018    MIDDLE EAR REBUILT AND EAR DRUM REPLACED    MOUTH SURGERY  04/16/2018    5 TEETH REMOVED    OTHER SURGICAL HISTORY  04/19/2018    : ANTERIOR CERVICAL CORRPECTOMY C5-6, SYNTHES, DEPUY, REG TABLE, SUPINE,     VA OFFICE/OUTPT VISIT,PROCEDURE ONLY N/A 4/19/2018    ANTERIOR CERVICAL CORRPECTOMY AND FUSION C5-6 performed by Alessandro Loomis DO at [address] [place] [address]  12/1983        Medications Prior to Admission:     Prior to Admission medications    Medication Sig Start Date End Date Taking? Authorizing Provider   venlafaxine (EFFEXOR XR) 150 MG extended release capsule Take 1 capsule by mouth daily 6/23/20   Mick Fam MD   simethicone (MYLICON) 80 MG chewable tablet Take 1 tablet by mouth every 6 hours as needed for Flatulence 6/22/20   Mick Fam MD   lidocaine (XYLOCAINE) 5 % ointment Apply topically daily as needed for Pain Apply topically as needed.   LF 4/20/20 (30 day supply)    Historical Provider, MD   metFORMIN (GLUCOPHAGE) 1000 MG tablet Take 1,000 mg by mouth 2 times daily (with meals) LF 4/27/20 (90 day supply)  Patient not taking: Reported on 3/21/2022    Historical Provider, MD   losartan (COZAAR) 25 MG tablet Take 12.5 mg by mouth daily LF 5/29/20 (90 day supply)    Historical Provider, MD   furosemide (LASIX) 20 MG tablet Take 20 mg by mouth daily LF 5/27/20 (90 day supply)  Patient not taking: Reported on 3/21/2022    Historical Provider, MD   aspirin 81 MG chewable tablet Take 81 mg by mouth daily  Patient not taking: Reported on 3/21/2022    Historical Provider, MD   divalproex (DEPAKOTE ER) 500 MG extended release tablet Take 500 mg by mouth 2 times daily LF 5/1/20 (30 day supply)    Historical Provider, MD   traZODone (DESYREL) 100 MG tablet Take 100 mg by mouth nightly LF 5/1/20 (30 day supply)    Historical Provider, MD   cetirizine (ZYRTEC) 10 MG tablet Take 10 mg by mouth daily LF 4/6/20 (90 day supply)  Patient not taking: Reported on 3/21/2022    Historical Provider, MD   atorvastatin (LIPITOR) 80 MG tablet Take 40 mg by mouth daily Take 1/2 tablet (40 mg) daily  LF 4/22/20 (90 day supply)  Patient not taking: Reported on 3/21/2022    Historical Provider, MD   carvedilol (COREG) 25 MG tablet Take 25 mg by mouth 2 times daily (with meals) LF 5/12/20 (90 day supply)  Patient not taking: Reported on 3/21/2022    Historical Provider, MD   amLODIPine (NORVASC) 10 MG tablet Take 10 mg by mouth daily LF 4/6/20 (90 day supply)    Historical Provider, MD   sildenafil (VIAGRA) 100 MG tablet Take 100 mg by mouth as needed for Erectile Dysfunction LF 5/12/20 (30 day supply)    Historical Provider, MD   omeprazole (PRILOSEC) 20 MG delayed release capsule Take 20 mg by mouth every evening LF 4/21/20 (90 day supply)    Historical Provider, MD        Allergies:     Latex, Bee venom, Lisinopril, Niacin and related, Sulfa antibiotics, and Terazosin    Social History:     Tobacco:    reports that he quit smoking about 49 years ago. His smoking use included cigarettes. He has a 2.00 pack-year smoking history. He has never used smokeless tobacco.  Alcohol:      reports current alcohol use. Drug Use:  reports current drug use. Drug: Marijuana [place]).     Family History:     Family History   Problem Relation Age of Onset   Bonilla Dementia Mother     Coronary Art Dis Mother     Stroke Mother     Diabetes Mother     Asthma Mother     Other Mother         BRAIN ANEURISM    High Blood Pressure Mother     Heart Disease Father     Diabetes Sister     Diabetes Brother     High Blood Pressure Brother     High Cholesterol Brother     Heart Disease Brother     Diabetes Sister     Other Sister NEUROPATHY       Review of Systems:     Positive and Negative as described in HPI. CONSTITUTIONAL:  negative for fevers, chills, sweats, fatigue, weight loss  HEENT:  negative for vision, hearing changes, runny nose, throat pain  RESPIRATORY:  negative for shortness of breath, cough, congestion, wheezing  CARDIOVASCULAR:  negative for chest pain, palpitations  GASTROINTESTINAL:  negative for nausea, vomiting, diarrhea, constipation, change in bowel habits, abdominal pain   GENITOURINARY:  negative for difficulty of urination, burning with urination, frequency   INTEGUMENT:  negative for rash, skin lesions, easy bruising   HEMATOLOGIC/LYMPHATIC:  negative for swelling/edema   ALLERGIC/IMMUNOLOGIC:  negative for urticaria , itching  ENDOCRINE:  negative increase in drinking, increase in urination, hot or cold intolerance  MUSCULOSKELETAL:  negative joint pains, muscle aches, swelling of joints  NEUROLOGICAL: Bilateral upper and lower extremity tremors and weakness  BEHAVIOR/PSYCH:  negative for depression, anxiety    Physical Exam:   BP (!) 137/93   Pulse 84   Temp 97.9 °F (36.6 °C) (Oral)   Resp 18   Ht 5' 3\" (1.6 m)   Wt 160 lb (72.6 kg)   SpO2 96%   BMI 28.34 kg/m²   Temp (24hrs), Av.8 °F (36.6 °C), Min:97.3 °F (36.3 °C), Max:98.2 °F (36.8 °C)    No results for input(s): POCGLU in the last 72 hours.     Intake/Output Summary (Last 24 hours) at 3/22/2022 1545  Last data filed at 3/22/2022 6100  Gross per 24 hour   Intake    Output 400 ml   Net -400 ml       General Appearance: alert, well appearing, and in no acute distress  Mental status: oriented to person, place, and time  Head: normocephalic, atraumatic  Eye: no icterus, redness, pupils equal and reactive, extraocular eye movements intact, conjunctiva clear  Ear: normal external ear, no discharge, hearing intact  Nose: no drainage noted  Mouth: mucous membranes moist  Neck: supple, no carotid bruits, thyroid not palpable  Lungs: Bilateral equal air entry, clear to ausculation, no wheezing, rales or rhonchi, normal effort  Cardiovascular: normal rate, regular rhythm, no murmur, gallop, rub  Abdomen: Soft, nontender, nondistended, normal bowel sounds, no hepatomegaly or splenomegaly  Neurologic: UE/LE weakness   Skin: No gross lesions, rashes, bruising or bleeding on exposed skin area  Extremities: peripheral pulses palpable, no pedal edema or calf pain with palpation  Psych: normal affect    Investigations:      Laboratory Testing:  No results found for this or any previous visit (from the past 24 hour(s)). Imaging/Diagnostics:  CT HEAD WO CONTRAST    Result Date: 3/20/2022  No acute intracranial abnormality. RECOMMENDATIONS: Unavailable     CT CERVICAL SPINE WO CONTRAST    Result Date: 3/20/2022  Stable cervical spine CT examination status post corpectomy and anterior fusion from C4-C7. No acute fracture or traumatic malalignment. RECOMMENDATIONS: Unavailable     CT THORACIC SPINE WO CONTRAST    Result Date: 3/20/2022  Multilevel degenerative changes with no acute fracture or traumatic malalignment of the thoracolumbar spine. RECOMMENDATIONS: Unavailable     CT LUMBAR SPINE WO CONTRAST    Result Date: 3/20/2022  Multilevel degenerative changes with no acute fracture or traumatic malalignment of the thoracolumbar spine. RECOMMENDATIONS: Unavailable       Assessment :      Hospital Problems           Last Modified POA    * (Principal) Frequent falls 3/20/2022 Yes    Hypertension 3/21/2022 Yes    Hyperlipidemia 3/21/2022 Yes    Diabetes mellitus ([address] 3/21/2022 Yes    Bipolar disorder, mixed ([address] 3/21/2022 Yes    Cervical spondylosis with radiculopathy 3/21/2022 Yes    Severe recurrent major depression without psychotic features ([address] 3/21/2022 Yes    Hyponatremia 3/21/2022 Yes          Plan:     Patient status inpatient in the Med/Surge    1.  Frequent falls, upper and lower extremity weakness, MRI of the brain, lumbar spine, cervical spine, thoracic spine, get spine surgery and neurology on board,  2. Essential hypertension, continue to monitor blood pressure, continue home medications,  3. Diabetes mellitus, sugars before meals and at bedtime,  4. Hyperlipidemia, continue home medications,  5. History of severe depression bipolar, continue home medications,  6. Hyponatremia, monitor sodium,  7. DVT prophylaxis with Lovenox,  8. Full CODE STATUS,  9. PT OT    March 22,  Frequent falls, upper and lower extremity weakness,  Severe spinal stenosis, MRI reviewed,  Appreciate neurology help,  I discussed with neurosurgery at [facility],  Will be transferred soon    Consultations:   [address] CONSULT TO [address] [place] [address] [facility] CONSULT TO [facility]     Patient is admitted as inpatient status because of co-morbidities listed above, severity of signs and symptoms as outlined, requirement for current medical therapies and most importantly because of direct risk to patient if care not provided in a hospital setting. Expected length of stay > 48 hours. Sidney Domínguez MD  3/22/2022  3:45 PM    Copy sent to Dr. Joanne Duran    Please note that this chart was generated using voice recognition Dragon dictation software. Although every effort was made to ensure the accuracy of this automated transcription, some errors in transcription may have occurred.

## 2022-03-22 NOTE — PROGRESS NOTES
House Supervisor Lynette Mederos spoke with the house supervisor at [facility]. V's and they will likely be able to transfer patient tonight.

## 2022-03-22 NOTE — PROGRESS NOTES
Dr. Reno Abdi rounds, notified that [facility] does not have a bed available. He would like patient to be transferred asap.  [address] called to initiate transfer to [facility] or [address] [place] [address]

## 2022-03-23 ENCOUNTER — HOSPITAL ENCOUNTER (INPATIENT)
Age: 72
LOS: 8 days | Discharge: NURSING, REHAB OR LONG-TERM CARE | DRG: 471 | End: 2022-03-31
Attending: STUDENT IN AN ORGANIZED HEALTH CARE EDUCATION/TRAINING PROGRAM | Admitting: FAMILY MEDICINE
Payer: OTHER GOVERNMENT

## 2022-03-23 VITALS
RESPIRATION RATE: 16 BRPM | SYSTOLIC BLOOD PRESSURE: 148 MMHG | HEIGHT: 63 IN | HEART RATE: 92 BPM | WEIGHT: 160 LBS | TEMPERATURE: 98.6 F | DIASTOLIC BLOOD PRESSURE: 92 MMHG | OXYGEN SATURATION: 96 % | BODY MASS INDEX: 28.35 KG/M2

## 2022-03-23 PROBLEM — H72.92 PERFORATION OF LEFT TYMPANIC MEMBRANE: Status: RESOLVED | Noted: 2017-02-06 | Resolved: 2022-03-23

## 2022-03-23 PROBLEM — G95.20 CORD COMPRESSION (HCC): Status: ACTIVE | Noted: 2022-03-23

## 2022-03-23 LAB
ABO/RH: NORMAL
ANTIBODY SCREEN: NEGATIVE
ARM BAND NUMBER: NORMAL
EXPIRATION DATE: NORMAL
GLUCOSE BLD-MCNC: 140 MG/DL (ref 75–110)

## 2022-03-23 PROCEDURE — 2580000003 HC RX 258: Performed by: NURSE PRACTITIONER

## 2022-03-23 PROCEDURE — 36415 COLL VENOUS BLD VENIPUNCTURE: CPT

## 2022-03-23 PROCEDURE — 82947 ASSAY GLUCOSE BLOOD QUANT: CPT

## 2022-03-23 PROCEDURE — 86850 RBC ANTIBODY SCREEN: CPT

## 2022-03-23 PROCEDURE — 97110 THERAPEUTIC EXERCISES: CPT

## 2022-03-23 PROCEDURE — 86900 BLOOD TYPING SEROLOGIC ABO: CPT

## 2022-03-23 PROCEDURE — 6370000000 HC RX 637 (ALT 250 FOR IP): Performed by: INTERNAL MEDICINE

## 2022-03-23 PROCEDURE — 99231 SBSQ HOSP IP/OBS SF/LOW 25: CPT | Performed by: PSYCHIATRY & NEUROLOGY

## 2022-03-23 PROCEDURE — 86901 BLOOD TYPING SEROLOGIC RH(D): CPT

## 2022-03-23 PROCEDURE — 99222 1ST HOSP IP/OBS MODERATE 55: CPT | Performed by: NURSE PRACTITIONER

## 2022-03-23 PROCEDURE — 6370000000 HC RX 637 (ALT 250 FOR IP): Performed by: NURSE PRACTITIONER

## 2022-03-23 PROCEDURE — 1200000000 HC SEMI PRIVATE

## 2022-03-23 PROCEDURE — 99239 HOSP IP/OBS DSCHRG MGMT >30: CPT | Performed by: INTERNAL MEDICINE

## 2022-03-23 RX ORDER — DEXTROSE MONOHYDRATE 50 MG/ML
100 INJECTION, SOLUTION INTRAVENOUS PRN
Status: DISCONTINUED | OUTPATIENT
Start: 2022-03-23 | End: 2022-03-31 | Stop reason: HOSPADM

## 2022-03-23 RX ORDER — POLYETHYLENE GLYCOL 3350 17 G/17G
17 POWDER, FOR SOLUTION ORAL DAILY PRN
Status: DISCONTINUED | OUTPATIENT
Start: 2022-03-23 | End: 2022-03-31 | Stop reason: HOSPADM

## 2022-03-23 RX ORDER — POTASSIUM CHLORIDE 20 MEQ/1
40 TABLET, EXTENDED RELEASE ORAL PRN
Status: DISCONTINUED | OUTPATIENT
Start: 2022-03-23 | End: 2022-03-31 | Stop reason: HOSPADM

## 2022-03-23 RX ORDER — DEXTROSE MONOHYDRATE 25 G/50ML
12.5 INJECTION, SOLUTION INTRAVENOUS PRN
Status: DISCONTINUED | OUTPATIENT
Start: 2022-03-23 | End: 2022-03-31 | Stop reason: HOSPADM

## 2022-03-23 RX ORDER — ACETAMINOPHEN 650 MG/1
650 SUPPOSITORY RECTAL EVERY 6 HOURS PRN
Status: DISCONTINUED | OUTPATIENT
Start: 2022-03-23 | End: 2022-03-31 | Stop reason: HOSPADM

## 2022-03-23 RX ORDER — LOSARTAN POTASSIUM 25 MG/1
12.5 TABLET ORAL DAILY
Status: DISCONTINUED | OUTPATIENT
Start: 2022-03-24 | End: 2022-03-29

## 2022-03-23 RX ORDER — MAGNESIUM SULFATE 1 G/100ML
1000 INJECTION INTRAVENOUS PRN
Status: DISCONTINUED | OUTPATIENT
Start: 2022-03-23 | End: 2022-03-31 | Stop reason: HOSPADM

## 2022-03-23 RX ORDER — POTASSIUM CHLORIDE 7.45 MG/ML
10 INJECTION INTRAVENOUS PRN
Status: DISCONTINUED | OUTPATIENT
Start: 2022-03-23 | End: 2022-03-31 | Stop reason: HOSPADM

## 2022-03-23 RX ORDER — ONDANSETRON 2 MG/ML
4 INJECTION INTRAMUSCULAR; INTRAVENOUS EVERY 6 HOURS PRN
Status: DISCONTINUED | OUTPATIENT
Start: 2022-03-23 | End: 2022-03-31 | Stop reason: HOSPADM

## 2022-03-23 RX ORDER — SODIUM CHLORIDE 0.9 % (FLUSH) 0.9 %
10 SYRINGE (ML) INJECTION PRN
Status: DISCONTINUED | OUTPATIENT
Start: 2022-03-23 | End: 2022-03-31 | Stop reason: HOSPADM

## 2022-03-23 RX ORDER — ACETAMINOPHEN 325 MG/1
650 TABLET ORAL EVERY 6 HOURS PRN
Status: DISCONTINUED | OUTPATIENT
Start: 2022-03-23 | End: 2022-03-31 | Stop reason: HOSPADM

## 2022-03-23 RX ORDER — FUROSEMIDE 20 MG/1
20 TABLET ORAL DAILY
Status: ON HOLD | COMMUNITY
End: 2022-04-18 | Stop reason: HOSPADM

## 2022-03-23 RX ORDER — SODIUM CHLORIDE 9 MG/ML
25 INJECTION, SOLUTION INTRAVENOUS PRN
Status: DISCONTINUED | OUTPATIENT
Start: 2022-03-23 | End: 2022-03-31 | Stop reason: HOSPADM

## 2022-03-23 RX ORDER — VENLAFAXINE HYDROCHLORIDE 150 MG/1
150 CAPSULE, EXTENDED RELEASE ORAL DAILY
Status: DISCONTINUED | OUTPATIENT
Start: 2022-03-24 | End: 2022-03-24

## 2022-03-23 RX ORDER — NICOTINE POLACRILEX 4 MG
15 LOZENGE BUCCAL PRN
Status: DISCONTINUED | OUTPATIENT
Start: 2022-03-23 | End: 2022-03-31 | Stop reason: HOSPADM

## 2022-03-23 RX ORDER — ONDANSETRON 4 MG/1
4 TABLET, ORALLY DISINTEGRATING ORAL EVERY 8 HOURS PRN
Status: DISCONTINUED | OUTPATIENT
Start: 2022-03-23 | End: 2022-03-31 | Stop reason: HOSPADM

## 2022-03-23 RX ORDER — DIVALPROEX SODIUM 250 MG/1
750 TABLET, EXTENDED RELEASE ORAL NIGHTLY
Status: ON HOLD | COMMUNITY
End: 2022-04-18 | Stop reason: HOSPADM

## 2022-03-23 RX ORDER — SODIUM CHLORIDE 0.9 % (FLUSH) 0.9 %
5-40 SYRINGE (ML) INJECTION EVERY 12 HOURS SCHEDULED
Status: DISCONTINUED | OUTPATIENT
Start: 2022-03-23 | End: 2022-03-31 | Stop reason: HOSPADM

## 2022-03-23 RX ADMIN — TRAZODONE HYDROCHLORIDE 150 MG: 100 TABLET ORAL at 23:33

## 2022-03-23 RX ADMIN — PANTOPRAZOLE SODIUM 40 MG: 40 TABLET, DELAYED RELEASE ORAL at 05:22

## 2022-03-23 RX ADMIN — CETIRIZINE HYDROCHLORIDE 10 MG: 10 TABLET, FILM COATED ORAL at 08:40

## 2022-03-23 RX ADMIN — VENLAFAXINE HYDROCHLORIDE 150 MG: 150 CAPSULE, EXTENDED RELEASE ORAL at 08:39

## 2022-03-23 RX ADMIN — LOSARTAN POTASSIUM 12.5 MG: 25 TABLET, FILM COATED ORAL at 08:39

## 2022-03-23 RX ADMIN — AMLODIPINE BESYLATE 10 MG: 5 TABLET ORAL at 08:38

## 2022-03-23 RX ADMIN — DIVALPROEX SODIUM 500 MG: 500 TABLET, EXTENDED RELEASE ORAL at 08:40

## 2022-03-23 RX ADMIN — SODIUM CHLORIDE, PRESERVATIVE FREE 10 ML: 5 INJECTION INTRAVENOUS at 23:34

## 2022-03-23 RX ADMIN — ACETAMINOPHEN 650 MG: 325 TABLET, FILM COATED ORAL at 04:51

## 2022-03-23 ASSESSMENT — PAIN SCALES - GENERAL
PAINLEVEL_OUTOF10: 3
PAINLEVEL_OUTOF10: 7

## 2022-03-23 ASSESSMENT — PAIN DESCRIPTION - LOCATION
LOCATION: NECK;KNEE
LOCATION: NECK;SHOULDER
LOCATION: NECK;SHOULDER

## 2022-03-23 ASSESSMENT — ENCOUNTER SYMPTOMS
SHORTNESS OF BREATH: 0
COUGH: 0
BLOOD IN STOOL: 0
NAUSEA: 0
VOMITING: 0
BACK PAIN: 1
ABDOMINAL PAIN: 0
CONSTIPATION: 1
WHEEZING: 0
STRIDOR: 0
DIARRHEA: 0

## 2022-03-23 ASSESSMENT — PAIN DESCRIPTION - PAIN TYPE
TYPE: ACUTE PAIN
TYPE: ACUTE PAIN

## 2022-03-23 ASSESSMENT — PAIN DESCRIPTION - ORIENTATION: ORIENTATION: RIGHT;LEFT

## 2022-03-23 ASSESSMENT — PAIN DESCRIPTION - DESCRIPTORS: DESCRIPTORS: ACHING;THROBBING;STABBING

## 2022-03-23 ASSESSMENT — PAIN DESCRIPTION - PROGRESSION: CLINICAL_PROGRESSION: GRADUALLY WORSENING

## 2022-03-23 NOTE — PROGRESS NOTES
Writer called report to RN via telephone at [facility]. V's. All questions answered and vital signs provided as well. Awaiting  at this time.

## 2022-03-23 NOTE — PROGRESS NOTES
[address]   Physical Therapy Progress Note    Date: 3/23/22  Patient Name: Dillon Holt       Room: 0348/5441-51  MRN: [id number]   Account: [de-identified]   : 1950  (75 y.o.)   Gender: male     Discharge Recommendations   Patient would benefit from continued therapy after discharge       Referring Practitioner: Dr Rosario Rodriguez  Diagnosis: Inability to care for self - multiple falls including hitting back of head on toilet tank  Restrictions/Precautions: Fall Risk,General Precautions  Implants present? : Metal implants (C-spine surgery)  Spinal Precautions: Poor Sitting balance, Bilateral Upper  and Lower Body muscle weakness, Assist with standing in GERA Stedy   Past Medical History:  has a past medical history of Depression, Diabetes mellitus ([facility] [address] Difficult intravenous access, Hyperlipidemia, Hypertension, Migraines, PTSD (post-traumatic stress disorder), Sleep apnea, Teeth missing, and Wears glasses. Past Surgical History:   has a past surgical history that includes Cardiac catheterization (2010); Carpal tunnel release (Left, 2002); Vasectomy (1983); Tonsillectomy and adenoidectomy (); Hydrocele surgery (); Middle ear surgery (2018); eye surgery (Left, ); Mouth surgery (2018); other surgical history (2018); cervical fusion (2018); pr office/outpt visit,procedure only (N/A, 2018); and Cataract removal.  Additional Pertinent Hx: Pt admitted to the hospital for the management of Frequent falls    Overall Orientation Status: Within Functional Limits  Restrictions/Precautions  Restrictions/Precautions: Fall Risk;General Precautions  Implants present? : Metal implants (C-spine surgery)  Position Activity Restriction  Spinal Precautions: Poor Sitting balance, Bilateral Upper  and Lower Body muscle weakness, Assist with standing in GERA Stedy    Subjective: Pt reports waiting for that transfer to [facility].   Comments: Pt is pleasant and cooperative with PT.  RN Aminta Ortiz reports waiting on a bed for the transfer. Vital Signs  Patient Currently in Pain: Yes  Pain Assessment: 0-10  Pain Level: 7  Pain Type: Acute pain  Pain Location: Neck;Knee  Pain Orientation: Right;Left  Pain Radiating Towards: Neck to finger tips, knees down to toes bilaterally  Pain Descriptors: Aching; Throbbing;Stabbing  Clinical Progression: Gradually worsening  Non-Pharmaceutical Pain Intervention(s): Distraction;Repositioned  Response to Pain Intervention: Patient Satisfied                Bed Mobility: (Not at this time, awaiting transfer to [facility]. V's)       Transfers: (Not at this time, awaiting transfer to [facility]. V's)                                                                                            Other exercises?: Yes  Other exercises 1: Supine AAROM bilat LE exercises, x10-15 reps  Other exercises 2: Repositioned for improved stability and prevent bony prominence protected. Other Activities  Comment: Pt awaiting transfer to [facility]. V's for Neurosurgery. Activity Tolerance: Patient Tolerated treatment well          Assessment  Activity Tolerance: Patient Tolerated treatment well   Body structures, Functions, Activity limitations: Decreased functional mobility ; Decreased ROM; Decreased strength;Decreased endurance;Decreased balance; Increased pain;Decreased posture  Specific instructions for Next Treatment: co-tx with OTR/MANUEL  Prognosis: Fair  Discharge Recommendations: Patient would benefit from continued therapy after discharge     Type of devices: Left in bed;Gait belt;Call light within reach     Plan  Times per week: 5 to 6 x/week  Current Treatment Recommendations: Strengthening,ROM,Balance Training,Functional Mobility Training,Transfer [facility] Training,Patient/Caregiver Education & Training,Safety Education & Training,Equipment Evaluation, Education, & procurement,Positioning    Patient Education  New Education Provided:  Plan of Care  Learner:patient  Method: demonstration and explanation       Outcome: needs reinforcement     Goals  Short term goals  Time Frame for Short term goals: 10 to 12 visits  Short term goal 1: Pt able to roll side to side in bed at max a  Short term goal 2: Pt able to perform supine<>sit at max a  Short term goal 3: Pt able to tolerate sitting at EOB for 20 + minutes, sitting baalnce maintained at min A   Short term goal 4: Pt able to perform sit>stand from bed height at max A , mod A x 2 from chair height  Short term goal 5: Pt to improve standing  tolerance in elbert stedy for 3 to 5 minutes with intermittent breaks, and work on weight shift and marching in place  Short term goal 6: Pt to tolerate sitting up in a chiar for 2 to 3 hours for meals. (Transfers with elbert stedy  Short term goal 7: Progress pt to stand with rolling walker from Edge of bed  with 2 person assist as able.     PT Individual Minutes  Time In: 0940  Time Out: 5917  Minutes: 32    Electronically signed by Kylie Beal PTA on 3/23/22 at 10:39 AM EDT

## 2022-03-23 NOTE — PROGRESS NOTES
Neurology follow-up note    3/23/2022      Chief complaint inability move arms and legs    HPI    This 79-year-old left-handed man had suffered a fall at home striking the back of his head and neck against the edge of the toilet when he slipped on a rug. 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 He has a fusion from C4-C7 documented by CT. Attempts have been made to transfer him to [facility]. [facility] declined. [facility] also declined. Those institutions did not have beds available    [facility] also does not have a bed available but is trying to make an accommodation. It is my intention to have neurosurgery see this gentleman soon as possible. Patient tells me that he is improved some and that he can bend his knees now. Neuro exam    Blood pressure (!) 147/90, pulse 75, temperature 98.1 °F (36.7 °C), temperature source Oral, resp. rate 16, height 5' 3\" (1.6 m), weight 160 lb (72.6 kg), SpO2 96 %. Mental status: Awake alert oriented. Language capabilities intact. Seems to be in good spirits    CN II through XII: Pupils are equal and round. EOMI. Motor and sensory function face normal.  Hearing swallowing phonation and tongue normal.    Motor: He has limited ability to lift his arms off the bed and maintain them in the air. He can bend his legs at the knees but cannot lift his heels off the bed. This appears to be a genuine deficit. Cerebellar: No tremors or ataxia. Reflexes: Reflexes appear to be suppressed at biceps and knees. Sensory function shows decreased touch and vibration in general but in particular there is loss of vibration right upper extremity. Gait he appears nonambulatory to me. I am reluctant to attempt to get him out of bed considering the potential neck injury. Impression  1.   Patient with what appears to be cervical cord compromise due to high-grade stenoses and possible contusion or concussion. No fracture noted by imaging. 2.  Pre-existing cervical stenosis status post multilevel anterior fusion. 3.  Prior spinal cord injury noted related to prior a IDC. Recommendations  1. Continue bedrest and conservative therapy. 2.  Nursing staff continues to arrange transfer.     Electronically signed by Barbara Caro MD on 3/23/2022 at 11:24 AM

## 2022-03-23 NOTE — PLAN OF CARE
Problem: Skin Integrity:  Goal: Will show no infection signs and symptoms  Description: Will show no infection signs and symptoms  3/23/2022 0841 by Fartun Mark RN  Outcome: Ongoing  3/22/2022 1713 by Echo Vega RN  Outcome: Ongoing  Goal: Absence of new skin breakdown  Description: Absence of new skin breakdown  3/23/2022 0632 by Fartun Mark RN  Outcome: Ongoing  Note: Assess the overall condition of the skin. Check on bony prominences such as the sacrum, trochanters, scapulae, elbows, heels, inner and outer malleolus, inner and outer knees, back of head). Reinforce the importance of turning, mobility, and ambulation. 3/22/2022 1713 by Echo Vega RN  Outcome: Ongoing     Problem: Pain:  Goal: Pain level will decrease  Description: Pain level will decrease  3/23/2022 5213 by Fartun Mark RN  Outcome: Ongoing  3/22/2022 1713 by Echo Vega RN  Outcome: Ongoing  Goal: Control of acute pain  Description: Control of acute pain  3/23/2022 0632 by Fartun Mark RN  Outcome: Ongoing  Note: Assess the location, characteristics, onset, duration, frequency, quality, and severity of pain. Encourage immediate report of pain. Use appropriate pain scale to rate pain. Manage pain using nonpharmacologic/pharmacologic interventions.    3/22/2022 1713 by Echo Vega RN  Outcome: Ongoing  Goal: Control of chronic pain  Description: Control of chronic pain  3/23/2022 0632 by Fartun Mark RN  Outcome: Ongoing  3/22/2022 1713 by Echo Vega RN  Outcome: Ongoing  Goal: Patient's pain/discomfort is manageable  Description: Patient's pain/discomfort is manageable  3/23/2022 1359 by Fartun Mark RN  Outcome: Ongoing  3/22/2022 1713 by Echo Vega RN  Outcome: Ongoing     Problem: Safety:  Goal: Free from accidental physical injury  Description: Free from accidental physical injury  3/23/2022 1586 by Fartun Mark RN  Outcome: Ongoing  3/22/2022 1713 by Echo Vega RN  Outcome: Ongoing  Goal: Free from intentional harm  Description: Free from intentional harm  3/23/2022 0437 by Kianna Anthony RN  Outcome: Ongoing  3/22/2022 1713 by Kym Raines RN  Outcome: Ongoing     Problem: Daily Care:  Goal: Daily care needs are met  Description: Daily care needs are met  3/23/2022 1199 by Kianna Anthony RN  Outcome: Ongoing  3/22/2022 1713 by Kym Ranies RN  Outcome: Ongoing     Problem: Skin Integrity:  Goal: Skin integrity will stabilize  Description: Skin integrity will stabilize  3/23/2022 3855 by Kianna Anthony RN  Outcome: Ongoing  3/22/2022 1713 by Kym Raines RN  Outcome: Ongoing     Problem: Discharge Planning:  Goal: Patients continuum of care needs are met  Description: Patients continuum of care needs are met  3/23/2022 9182 by Kianna Anthony RN  Outcome: Ongoing  3/22/2022 1713 by Kym Raines RN  Outcome: Ongoing     Problem: Musculor/Skeletal Functional Status  Goal: Highest potential functional level  3/23/2022 0632 by Kianna Anthony RN  Outcome: Ongoing  3/22/2022 1713 by Kym Raines RN  Outcome: Ongoing  Goal: Absence of falls  3/23/2022 0632 by Kianna Anthony RN  Outcome: Ongoing  3/22/2022 1713 by Kym Raines RN  Outcome: Ongoing

## 2022-03-23 NOTE — CARE COORDINATION
ONGOING DISCHARGE PLAN:      Patient has Severe spinal stenosis    Waiting on a bed at [facility]     Patient needs to be seen by a Neurosurgereon    Will continue to follow for additional discharge needs.     Electronically signed by Cammie Kerns RN on 3/23/2022 at 12:31 PM

## 2022-03-23 NOTE — PLAN OF CARE
Problem: Skin Integrity:  Goal: Will show no infection signs and symptoms  Description: Will show no infection signs and symptoms  3/23/2022 1635 by Roman Silverman RN  Outcome: Ongoing  3/23/2022 3593 by Yazmin Siddiqui RN  Outcome: Ongoing  Goal: Absence of new skin breakdown  Description: Absence of new skin breakdown  3/23/2022 1635 by Roman Silverman RN  Outcome: Ongoing  3/23/2022 0632 by Yazmin Siddiqui RN  Outcome: Ongoing  Note: Assess the overall condition of the skin. Check on bony prominences such as the sacrum, trochanters, scapulae, elbows, heels, inner and outer malleolus, inner and outer knees, back of head). Reinforce the importance of turning, mobility, and ambulation. Problem: Pain:  Goal: Pain level will decrease  Description: Pain level will decrease  3/23/2022 1635 by Roman Silverman RN  Outcome: Ongoing  3/23/2022 0085 by Yazmin Siddiqui RN  Outcome: Ongoing  Goal: Control of acute pain  Description: Control of acute pain  3/23/2022 1635 by Roman Silverman RN  Outcome: Ongoing  3/23/2022 0632 by Yazmin Siddiqui RN  Outcome: Ongoing  Note: Assess the location, characteristics, onset, duration, frequency, quality, and severity of pain. Encourage immediate report of pain. Use appropriate pain scale to rate pain. Manage pain using nonpharmacologic/pharmacologic interventions.    Goal: Control of chronic pain  Description: Control of chronic pain  3/23/2022 1635 by Roman Silverman RN  Outcome: Ongoing  3/23/2022 6032 by Yazmin Siddiqui RN  Outcome: Ongoing  Goal: Patient's pain/discomfort is manageable  Description: Patient's pain/discomfort is manageable  3/23/2022 1635 by Roman Silverman RN  Outcome: Ongoing  3/23/2022 0632 by Yazmin Siddiqui RN  Outcome: Ongoing     Problem: Safety:  Goal: Free from accidental physical injury  Description: Free from accidental physical injury  3/23/2022 1635 by Roman Silverman RN  Outcome: Ongoing  3/23/2022 0632 by Yazmin Siddiqui RN  Outcome: Ongoing  Goal: Free from intentional harm  Description: Free from intentional harm  3/23/2022 1635 by Sumanth Silva RN  Outcome: Ongoing  3/23/2022 8526 by Etta Silva RN  Outcome: Ongoing     Problem: Daily Care:  Goal: Daily care needs are met  Description: Daily care needs are met  3/23/2022 1635 by Sumanth Silva RN  Outcome: Ongoing  3/23/2022 7089 by Etta Silva RN  Outcome: Ongoing     Problem: Skin Integrity:  Goal: Skin integrity will stabilize  Description: Skin integrity will stabilize  3/23/2022 1635 by Sumanth Silva RN  Outcome: Ongoing  3/23/2022 0632 by Etta Silva RN  Outcome: Ongoing     Problem: Discharge Planning:  Goal: Patients continuum of care needs are met  Description: Patients continuum of care needs are met  3/23/2022 1635 by Sumanth Silva RN  Outcome: Ongoing  3/23/2022 0632 by Etta Silva RN  Outcome: Ongoing     Problem: Musculor/Skeletal Functional Status  Goal: Highest potential functional level  3/23/2022 1635 by Sumanth Silva RN  Outcome: Ongoing  3/23/2022 0632 by Etta Silva RN  Outcome: Ongoing  Goal: Absence of falls  3/23/2022 1635 by Sumanth Silva RN  Outcome: Ongoing  3/23/2022 0632 by Etta Silva RN  Outcome: Ongoing

## 2022-03-24 ENCOUNTER — APPOINTMENT (OUTPATIENT)
Dept: GENERAL RADIOLOGY | Age: 72
DRG: 471 | End: 2022-03-24
Attending: STUDENT IN AN ORGANIZED HEALTH CARE EDUCATION/TRAINING PROGRAM
Payer: OTHER GOVERNMENT

## 2022-03-24 ENCOUNTER — ANESTHESIA EVENT (OUTPATIENT)
Dept: OPERATING ROOM | Age: 72
DRG: 471 | End: 2022-03-24
Payer: OTHER GOVERNMENT

## 2022-03-24 ENCOUNTER — ANESTHESIA (OUTPATIENT)
Dept: OPERATING ROOM | Age: 72
DRG: 471 | End: 2022-03-24
Payer: OTHER GOVERNMENT

## 2022-03-24 VITALS — TEMPERATURE: 96 F | SYSTOLIC BLOOD PRESSURE: 100 MMHG | DIASTOLIC BLOOD PRESSURE: 79 MMHG | OXYGEN SATURATION: 97 %

## 2022-03-24 PROBLEM — G99.2 STENOSIS OF CERVICAL SPINE WITH MYELOPATHY (HCC): Status: ACTIVE | Noted: 2018-04-19

## 2022-03-24 LAB
ANION GAP SERPL CALCULATED.3IONS-SCNC: 15 MMOL/L (ref 9–17)
BUN BLDV-MCNC: 29 MG/DL (ref 8–23)
CALCIUM SERPL-MCNC: 9.4 MG/DL (ref 8.6–10.4)
CHLORIDE BLD-SCNC: 100 MMOL/L (ref 98–107)
CO2: 21 MMOL/L (ref 20–31)
CREAT SERPL-MCNC: 0.84 MG/DL (ref 0.7–1.2)
GFR AFRICAN AMERICAN: >60 ML/MIN
GFR NON-AFRICAN AMERICAN: >60 ML/MIN
GFR SERPL CREATININE-BSD FRML MDRD: ABNORMAL ML/MIN/{1.73_M2}
GLUCOSE BLD-MCNC: 107 MG/DL (ref 70–99)
GLUCOSE BLD-MCNC: 109 MG/DL (ref 75–110)
GLUCOSE BLD-MCNC: 109 MG/DL (ref 75–110)
GLUCOSE BLD-MCNC: 184 MG/DL (ref 75–110)
INR BLD: 1
MAGNESIUM: 1.7 MG/DL (ref 1.6–2.6)
POTASSIUM SERPL-SCNC: 4.1 MMOL/L (ref 3.7–5.3)
POTASSIUM SERPL-SCNC: 4.1 MMOL/L (ref 3.7–5.3)
PRO-BNP: 218 PG/ML
PROTHROMBIN TIME: 10.9 SEC (ref 9.1–12.3)
SARS-COV-2, RAPID: NOT DETECTED
SODIUM BLD-SCNC: 136 MMOL/L (ref 135–144)
SPECIMEN DESCRIPTION: NORMAL

## 2022-03-24 PROCEDURE — 22614 ARTHRD PST TQ 1NTRSPC EA ADD: CPT | Performed by: NEUROLOGICAL SURGERY

## 2022-03-24 PROCEDURE — 2500000003 HC RX 250 WO HCPCS: Performed by: NURSE ANESTHETIST, CERTIFIED REGISTERED

## 2022-03-24 PROCEDURE — 3600000004 HC SURGERY LEVEL 4 BASE: Performed by: NEUROLOGICAL SURGERY

## 2022-03-24 PROCEDURE — APPSS30 APP SPLIT SHARED TIME 16-30 MINUTES: Performed by: PHYSICIAN ASSISTANT

## 2022-03-24 PROCEDURE — 7100000000 HC PACU RECOVERY - FIRST 15 MIN: Performed by: NEUROLOGICAL SURGERY

## 2022-03-24 PROCEDURE — 3600000014 HC SURGERY LEVEL 4 ADDTL 15MIN: Performed by: NEUROLOGICAL SURGERY

## 2022-03-24 PROCEDURE — 2580000003 HC RX 258: Performed by: NEUROLOGICAL SURGERY

## 2022-03-24 PROCEDURE — 2709999900 HC NON-CHARGEABLE SUPPLY: Performed by: NEUROLOGICAL SURGERY

## 2022-03-24 PROCEDURE — 36415 COLL VENOUS BLD VENIPUNCTURE: CPT

## 2022-03-24 PROCEDURE — 6360000002 HC RX W HCPCS: Performed by: NURSE ANESTHETIST, CERTIFIED REGISTERED

## 2022-03-24 PROCEDURE — C1713 ANCHOR/SCREW BN/BN,TIS/BN: HCPCS | Performed by: NEUROLOGICAL SURGERY

## 2022-03-24 PROCEDURE — 6370000000 HC RX 637 (ALT 250 FOR IP): Performed by: NURSE PRACTITIONER

## 2022-03-24 PROCEDURE — 87635 SARS-COV-2 COVID-19 AMP PRB: CPT

## 2022-03-24 PROCEDURE — 2720000010 HC SURG SUPPLY STERILE: Performed by: NEUROLOGICAL SURGERY

## 2022-03-24 PROCEDURE — 0RG2070 FUSION OF 2 OR MORE CERVICAL VERTEBRAL JOINTS WITH AUTOLOGOUS TISSUE SUBSTITUTE, ANTERIOR APPROACH, ANTERIOR COLUMN, OPEN APPROACH: ICD-10-PCS | Performed by: NEUROLOGICAL SURGERY

## 2022-03-24 PROCEDURE — 83735 ASSAY OF MAGNESIUM: CPT

## 2022-03-24 PROCEDURE — A4216 STERILE WATER/SALINE, 10 ML: HCPCS | Performed by: NURSE ANESTHETIST, CERTIFIED REGISTERED

## 2022-03-24 PROCEDURE — 6370000000 HC RX 637 (ALT 250 FOR IP): Performed by: NEUROLOGICAL SURGERY

## 2022-03-24 PROCEDURE — 63015 REMOVE SPINE LAMINA >2 CRVCL: CPT | Performed by: NEUROLOGICAL SURGERY

## 2022-03-24 PROCEDURE — 99223 1ST HOSP IP/OBS HIGH 75: CPT | Performed by: NEUROLOGICAL SURGERY

## 2022-03-24 PROCEDURE — 22600 ARTHRD PST TQ 1NTRSPC CRV: CPT | Performed by: NEUROLOGICAL SURGERY

## 2022-03-24 PROCEDURE — 2580000003 HC RX 258: Performed by: NURSE ANESTHETIST, CERTIFIED REGISTERED

## 2022-03-24 PROCEDURE — 3209999900 FLUORO FOR SURGICAL PROCEDURES

## 2022-03-24 PROCEDURE — 1200000000 HC SEMI PRIVATE

## 2022-03-24 PROCEDURE — 2580000003 HC RX 258: Performed by: NURSE PRACTITIONER

## 2022-03-24 PROCEDURE — 2500000003 HC RX 250 WO HCPCS: Performed by: NEUROLOGICAL SURGERY

## 2022-03-24 PROCEDURE — 3700000001 HC ADD 15 MINUTES (ANESTHESIA): Performed by: NEUROLOGICAL SURGERY

## 2022-03-24 PROCEDURE — 85610 PROTHROMBIN TIME: CPT

## 2022-03-24 PROCEDURE — 82947 ASSAY GLUCOSE BLOOD QUANT: CPT

## 2022-03-24 PROCEDURE — 7100000001 HC PACU RECOVERY - ADDTL 15 MIN: Performed by: NEUROLOGICAL SURGERY

## 2022-03-24 PROCEDURE — 93005 ELECTROCARDIOGRAM TRACING: CPT | Performed by: STUDENT IN AN ORGANIZED HEALTH CARE EDUCATION/TRAINING PROGRAM

## 2022-03-24 PROCEDURE — 3700000000 HC ANESTHESIA ATTENDED CARE: Performed by: NEUROLOGICAL SURGERY

## 2022-03-24 PROCEDURE — 84132 ASSAY OF SERUM POTASSIUM: CPT

## 2022-03-24 PROCEDURE — 80048 BASIC METABOLIC PNL TOTAL CA: CPT

## 2022-03-24 PROCEDURE — 99232 SBSQ HOSP IP/OBS MODERATE 35: CPT | Performed by: STUDENT IN AN ORGANIZED HEALTH CARE EDUCATION/TRAINING PROGRAM

## 2022-03-24 PROCEDURE — 22842 INSERT SPINE FIXATION DEVICE: CPT | Performed by: NEUROLOGICAL SURGERY

## 2022-03-24 PROCEDURE — 6360000002 HC RX W HCPCS: Performed by: NEUROLOGICAL SURGERY

## 2022-03-24 PROCEDURE — 83880 ASSAY OF NATRIURETIC PEPTIDE: CPT

## 2022-03-24 PROCEDURE — C9290 INJ, BUPIVACAINE LIPOSOME: HCPCS | Performed by: NEUROLOGICAL SURGERY

## 2022-03-24 DEVICE — IMPLANTABLE DEVICE: Type: IMPLANTABLE DEVICE | Site: SPINE CERVICAL | Status: FUNCTIONAL

## 2022-03-24 DEVICE — SCREW SPNL 3.5X14MM SYMPHONY: Type: IMPLANTABLE DEVICE | Site: SPINE CERVICAL | Status: FUNCTIONAL

## 2022-03-24 DEVICE — SCREW SPINAL F/SYMPHONY OCT SYSTEM: Type: IMPLANTABLE DEVICE | Site: SPINE CERVICAL | Status: FUNCTIONAL

## 2022-03-24 RX ORDER — SODIUM CHLORIDE 9 MG/ML
25 INJECTION, SOLUTION INTRAVENOUS PRN
Status: DISCONTINUED | OUTPATIENT
Start: 2022-03-24 | End: 2022-03-24 | Stop reason: HOSPADM

## 2022-03-24 RX ORDER — SODIUM CHLORIDE 9 MG/ML
INJECTION INTRAVENOUS PRN
Status: DISCONTINUED | OUTPATIENT
Start: 2022-03-24 | End: 2022-03-24 | Stop reason: SDUPTHER

## 2022-03-24 RX ORDER — LIDOCAINE HYDROCHLORIDE 10 MG/ML
INJECTION, SOLUTION EPIDURAL; INFILTRATION; INTRACAUDAL; PERINEURAL PRN
Status: DISCONTINUED | OUTPATIENT
Start: 2022-03-24 | End: 2022-03-24 | Stop reason: SDUPTHER

## 2022-03-24 RX ORDER — NEOSTIGMINE METHYLSULFATE 5 MG/5 ML
SYRINGE (ML) INTRAVENOUS PRN
Status: DISCONTINUED | OUTPATIENT
Start: 2022-03-24 | End: 2022-03-24 | Stop reason: SDUPTHER

## 2022-03-24 RX ORDER — SODIUM CHLORIDE 0.9 % (FLUSH) 0.9 %
5-40 SYRINGE (ML) INJECTION EVERY 12 HOURS SCHEDULED
Status: DISCONTINUED | OUTPATIENT
Start: 2022-03-24 | End: 2022-03-24 | Stop reason: HOSPADM

## 2022-03-24 RX ORDER — MORPHINE SULFATE 2 MG/ML
2 INJECTION, SOLUTION INTRAMUSCULAR; INTRAVENOUS
Status: DISCONTINUED | OUTPATIENT
Start: 2022-03-24 | End: 2022-03-26

## 2022-03-24 RX ORDER — ROCURONIUM BROMIDE 10 MG/ML
INJECTION, SOLUTION INTRAVENOUS PRN
Status: DISCONTINUED | OUTPATIENT
Start: 2022-03-24 | End: 2022-03-24 | Stop reason: SDUPTHER

## 2022-03-24 RX ORDER — SODIUM CHLORIDE 9 MG/ML
INJECTION INTRAVENOUS PRN
Status: DISCONTINUED | OUTPATIENT
Start: 2022-03-24 | End: 2022-03-24

## 2022-03-24 RX ORDER — FENTANYL CITRATE 50 UG/ML
INJECTION, SOLUTION INTRAMUSCULAR; INTRAVENOUS PRN
Status: DISCONTINUED | OUTPATIENT
Start: 2022-03-24 | End: 2022-03-24 | Stop reason: SDUPTHER

## 2022-03-24 RX ORDER — FUROSEMIDE 20 MG/1
20 TABLET ORAL DAILY
Status: DISCONTINUED | OUTPATIENT
Start: 2022-03-24 | End: 2022-03-31 | Stop reason: HOSPADM

## 2022-03-24 RX ORDER — DEXAMETHASONE SODIUM PHOSPHATE 10 MG/ML
INJECTION INTRAMUSCULAR; INTRAVENOUS PRN
Status: DISCONTINUED | OUTPATIENT
Start: 2022-03-24 | End: 2022-03-24 | Stop reason: SDUPTHER

## 2022-03-24 RX ORDER — ACETAMINOPHEN 650 MG
TABLET, EXTENDED RELEASE ORAL PRN
Status: DISCONTINUED | OUTPATIENT
Start: 2022-03-24 | End: 2022-03-24 | Stop reason: HOSPADM

## 2022-03-24 RX ORDER — FENTANYL CITRATE 50 UG/ML
25 INJECTION, SOLUTION INTRAMUSCULAR; INTRAVENOUS EVERY 5 MIN PRN
Status: DISCONTINUED | OUTPATIENT
Start: 2022-03-24 | End: 2022-03-24 | Stop reason: HOSPADM

## 2022-03-24 RX ORDER — OXYCODONE HYDROCHLORIDE 5 MG/1
5 TABLET ORAL EVERY 4 HOURS PRN
Status: DISCONTINUED | OUTPATIENT
Start: 2022-03-24 | End: 2022-03-26

## 2022-03-24 RX ORDER — PANTOPRAZOLE SODIUM 40 MG/1
40 TABLET, DELAYED RELEASE ORAL
Status: DISCONTINUED | OUTPATIENT
Start: 2022-03-24 | End: 2022-03-31 | Stop reason: HOSPADM

## 2022-03-24 RX ORDER — LIDOCAINE HYDROCHLORIDE AND EPINEPHRINE 10; 10 MG/ML; UG/ML
INJECTION, SOLUTION INFILTRATION; PERINEURAL PRN
Status: DISCONTINUED | OUTPATIENT
Start: 2022-03-24 | End: 2022-03-24 | Stop reason: HOSPADM

## 2022-03-24 RX ORDER — PROPOFOL 10 MG/ML
INJECTION, EMULSION INTRAVENOUS CONTINUOUS PRN
Status: DISCONTINUED | OUTPATIENT
Start: 2022-03-24 | End: 2022-03-24 | Stop reason: SDUPTHER

## 2022-03-24 RX ORDER — SENNOSIDES 8.8 MG/5ML
5 LIQUID ORAL 2 TIMES DAILY PRN
Status: DISCONTINUED | OUTPATIENT
Start: 2022-03-24 | End: 2022-03-31 | Stop reason: HOSPADM

## 2022-03-24 RX ORDER — FENTANYL CITRATE 50 UG/ML
50 INJECTION, SOLUTION INTRAMUSCULAR; INTRAVENOUS EVERY 5 MIN PRN
Status: DISCONTINUED | OUTPATIENT
Start: 2022-03-24 | End: 2022-03-24 | Stop reason: HOSPADM

## 2022-03-24 RX ORDER — SODIUM CHLORIDE 0.9 % (FLUSH) 0.9 %
5-40 SYRINGE (ML) INJECTION EVERY 12 HOURS SCHEDULED
Status: DISCONTINUED | OUTPATIENT
Start: 2022-03-24 | End: 2022-03-31 | Stop reason: HOSPADM

## 2022-03-24 RX ORDER — MORPHINE SULFATE 4 MG/ML
4 INJECTION, SOLUTION INTRAMUSCULAR; INTRAVENOUS
Status: DISCONTINUED | OUTPATIENT
Start: 2022-03-24 | End: 2022-03-26

## 2022-03-24 RX ORDER — ONDANSETRON 2 MG/ML
4 INJECTION INTRAMUSCULAR; INTRAVENOUS
Status: DISCONTINUED | OUTPATIENT
Start: 2022-03-24 | End: 2022-03-24 | Stop reason: HOSPADM

## 2022-03-24 RX ORDER — VENLAFAXINE 75 MG/1
75 TABLET ORAL
Status: DISCONTINUED | OUTPATIENT
Start: 2022-03-24 | End: 2022-03-31 | Stop reason: HOSPADM

## 2022-03-24 RX ORDER — EPHEDRINE SULFATE/0.9% NACL/PF 50 MG/5 ML
SYRINGE (ML) INTRAVENOUS PRN
Status: DISCONTINUED | OUTPATIENT
Start: 2022-03-24 | End: 2022-03-24 | Stop reason: SDUPTHER

## 2022-03-24 RX ORDER — PROPOFOL 10 MG/ML
INJECTION, EMULSION INTRAVENOUS PRN
Status: DISCONTINUED | OUTPATIENT
Start: 2022-03-24 | End: 2022-03-24 | Stop reason: SDUPTHER

## 2022-03-24 RX ORDER — OXYCODONE HYDROCHLORIDE 5 MG/1
10 TABLET ORAL EVERY 4 HOURS PRN
Status: DISCONTINUED | OUTPATIENT
Start: 2022-03-24 | End: 2022-03-26

## 2022-03-24 RX ORDER — SODIUM CHLORIDE 9 MG/ML
25 INJECTION, SOLUTION INTRAVENOUS PRN
Status: DISCONTINUED | OUTPATIENT
Start: 2022-03-24 | End: 2022-03-31 | Stop reason: HOSPADM

## 2022-03-24 RX ORDER — SODIUM CHLORIDE, SODIUM LACTATE, POTASSIUM CHLORIDE, CALCIUM CHLORIDE 600; 310; 30; 20 MG/100ML; MG/100ML; MG/100ML; MG/100ML
INJECTION, SOLUTION INTRAVENOUS CONTINUOUS PRN
Status: DISCONTINUED | OUTPATIENT
Start: 2022-03-24 | End: 2022-03-24 | Stop reason: SDUPTHER

## 2022-03-24 RX ORDER — CYCLOBENZAPRINE HCL 10 MG
10 TABLET ORAL 3 TIMES DAILY PRN
Status: DISCONTINUED | OUTPATIENT
Start: 2022-03-24 | End: 2022-03-26

## 2022-03-24 RX ORDER — MEPERIDINE HYDROCHLORIDE 50 MG/ML
12.5 INJECTION INTRAMUSCULAR; INTRAVENOUS; SUBCUTANEOUS EVERY 5 MIN PRN
Status: DISCONTINUED | OUTPATIENT
Start: 2022-03-24 | End: 2022-03-24 | Stop reason: HOSPADM

## 2022-03-24 RX ORDER — MAGNESIUM HYDROXIDE 1200 MG/15ML
LIQUID ORAL CONTINUOUS PRN
Status: COMPLETED | OUTPATIENT
Start: 2022-03-24 | End: 2022-03-24

## 2022-03-24 RX ORDER — ONDANSETRON 2 MG/ML
INJECTION INTRAMUSCULAR; INTRAVENOUS PRN
Status: DISCONTINUED | OUTPATIENT
Start: 2022-03-24 | End: 2022-03-24 | Stop reason: SDUPTHER

## 2022-03-24 RX ORDER — SODIUM CHLORIDE 0.9 % (FLUSH) 0.9 %
5-40 SYRINGE (ML) INJECTION PRN
Status: DISCONTINUED | OUTPATIENT
Start: 2022-03-24 | End: 2022-03-31 | Stop reason: HOSPADM

## 2022-03-24 RX ORDER — GLYCOPYRROLATE 1 MG/5 ML
SYRINGE (ML) INTRAVENOUS PRN
Status: DISCONTINUED | OUTPATIENT
Start: 2022-03-24 | End: 2022-03-24 | Stop reason: SDUPTHER

## 2022-03-24 RX ORDER — SODIUM CHLORIDE 0.9 % (FLUSH) 0.9 %
5-40 SYRINGE (ML) INJECTION PRN
Status: DISCONTINUED | OUTPATIENT
Start: 2022-03-24 | End: 2022-03-24 | Stop reason: HOSPADM

## 2022-03-24 RX ORDER — CARBOXYMETHYLCELLULOSE SODIUM 10 MG/ML
2 GEL OPHTHALMIC 3 TIMES DAILY
Status: DISCONTINUED | OUTPATIENT
Start: 2022-03-24 | End: 2022-03-31 | Stop reason: HOSPADM

## 2022-03-24 RX ORDER — GINSENG 100 MG
CAPSULE ORAL PRN
Status: DISCONTINUED | OUTPATIENT
Start: 2022-03-24 | End: 2022-03-24 | Stop reason: HOSPADM

## 2022-03-24 RX ORDER — DIPHENHYDRAMINE HCL 25 MG
12.5 TABLET ORAL ONCE
Status: COMPLETED | OUTPATIENT
Start: 2022-03-24 | End: 2022-03-25

## 2022-03-24 RX ORDER — DIVALPROEX SODIUM 500 MG/1
500 TABLET, EXTENDED RELEASE ORAL EVERY MORNING
Status: DISCONTINUED | OUTPATIENT
Start: 2022-03-24 | End: 2022-03-31 | Stop reason: HOSPADM

## 2022-03-24 RX ORDER — SODIUM CHLORIDE 9 MG/ML
INJECTION, SOLUTION INTRAVENOUS CONTINUOUS PRN
Status: DISCONTINUED | OUTPATIENT
Start: 2022-03-24 | End: 2022-03-24 | Stop reason: SDUPTHER

## 2022-03-24 RX ADMIN — ROCURONIUM BROMIDE 20 MG: 10 INJECTION INTRAVENOUS at 14:30

## 2022-03-24 RX ADMIN — ROCURONIUM BROMIDE 30 MG: 10 INJECTION INTRAVENOUS at 14:51

## 2022-03-24 RX ADMIN — Medication 2 MG: at 16:57

## 2022-03-24 RX ADMIN — PROPOFOL 100 MCG/KG/MIN: 10 INJECTION, EMULSION INTRAVENOUS at 13:50

## 2022-03-24 RX ADMIN — DIVALPROEX SODIUM 750 MG: 250 TABLET, FILM COATED, EXTENDED RELEASE ORAL at 02:22

## 2022-03-24 RX ADMIN — Medication 0.2 MG: at 15:09

## 2022-03-24 RX ADMIN — TRAZODONE HYDROCHLORIDE 150 MG: 100 TABLET ORAL at 21:19

## 2022-03-24 RX ADMIN — BISACODYL 5 MG: 5 TABLET, COATED ORAL at 21:16

## 2022-03-24 RX ADMIN — AMLODIPINE BESYLATE 7.5 MG: 5 TABLET ORAL at 08:43

## 2022-03-24 RX ADMIN — PROPOFOL 50 MG: 10 INJECTION, EMULSION INTRAVENOUS at 14:26

## 2022-03-24 RX ADMIN — PROPOFOL 50 MG: 10 INJECTION, EMULSION INTRAVENOUS at 13:40

## 2022-03-24 RX ADMIN — Medication 10 MG: at 15:45

## 2022-03-24 RX ADMIN — LOSARTAN POTASSIUM 12.5 MG: 25 TABLET, FILM COATED ORAL at 08:43

## 2022-03-24 RX ADMIN — LIDOCAINE HYDROCHLORIDE 50 MG: 10 INJECTION, SOLUTION EPIDURAL; INFILTRATION; INTRACAUDAL at 13:29

## 2022-03-24 RX ADMIN — ONDANSETRON 4 MG: 2 INJECTION INTRAMUSCULAR; INTRAVENOUS at 16:59

## 2022-03-24 RX ADMIN — Medication 0.6 MG: at 17:25

## 2022-03-24 RX ADMIN — Medication 10 MG: at 16:53

## 2022-03-24 RX ADMIN — PROPOFOL 50 MG: 10 INJECTION, EMULSION INTRAVENOUS at 14:58

## 2022-03-24 RX ADMIN — PHENYLEPHRINE HYDROCHLORIDE 100 MCG: 10 INJECTION INTRAVENOUS at 15:09

## 2022-03-24 RX ADMIN — SODIUM CHLORIDE, PRESERVATIVE FREE 10 ML: 5 INJECTION INTRAVENOUS at 08:44

## 2022-03-24 RX ADMIN — SODIUM CHLORIDE, POTASSIUM CHLORIDE, SODIUM LACTATE AND CALCIUM CHLORIDE: 600; 310; 30; 20 INJECTION, SOLUTION INTRAVENOUS at 12:50

## 2022-03-24 RX ADMIN — CARBOXYMETHYLCELLULOSE SODIUM 2 DROP: 10 GEL OPHTHALMIC at 20:34

## 2022-03-24 RX ADMIN — PROPOFOL 150 MG: 10 INJECTION, EMULSION INTRAVENOUS at 13:29

## 2022-03-24 RX ADMIN — SODIUM CHLORIDE: 9 INJECTION, SOLUTION INTRAVENOUS at 13:38

## 2022-03-24 RX ADMIN — CEFAZOLIN 2000 MG: 10 INJECTION, POWDER, FOR SOLUTION INTRAVENOUS at 14:30

## 2022-03-24 RX ADMIN — PROPOFOL 50 MG: 10 INJECTION, EMULSION INTRAVENOUS at 14:50

## 2022-03-24 RX ADMIN — FENTANYL CITRATE 50 MCG: 50 INJECTION, SOLUTION INTRAMUSCULAR; INTRAVENOUS at 14:51

## 2022-03-24 RX ADMIN — PHENYLEPHRINE HYDROCHLORIDE 200 MCG: 10 INJECTION INTRAVENOUS at 13:43

## 2022-03-24 RX ADMIN — SODIUM CHLORIDE, SODIUM LACTATE, POTASSIUM CHLORIDE, CALCIUM CHLORIDE: 600; 310; 30; 20 INJECTION, SOLUTION INTRAVENOUS at 13:42

## 2022-03-24 RX ADMIN — Medication 3 MG: at 17:25

## 2022-03-24 RX ADMIN — PHENYLEPHRINE HYDROCHLORIDE 25 MCG/MIN: 10 INJECTION INTRAVENOUS at 13:50

## 2022-03-24 RX ADMIN — DEXAMETHASONE SODIUM PHOSPHATE 10 MG: 10 INJECTION INTRAMUSCULAR; INTRAVENOUS at 14:51

## 2022-03-24 RX ADMIN — SODIUM CHLORIDE, POTASSIUM CHLORIDE, SODIUM LACTATE AND CALCIUM CHLORIDE: 600; 310; 30; 20 INJECTION, SOLUTION INTRAVENOUS at 13:55

## 2022-03-24 RX ADMIN — PHENYLEPHRINE HYDROCHLORIDE 200 MCG: 10 INJECTION INTRAVENOUS at 13:38

## 2022-03-24 RX ADMIN — CARBOXYMETHYLCELLULOSE SODIUM 2 DROP: 10 GEL OPHTHALMIC at 08:43

## 2022-03-24 RX ADMIN — PANTOPRAZOLE SODIUM 40 MG: 40 TABLET, DELAYED RELEASE ORAL at 08:43

## 2022-03-24 RX ADMIN — SODIUM CHLORIDE, PRESERVATIVE FREE 10 ML: 5 INJECTION INTRAVENOUS at 21:20

## 2022-03-24 RX ADMIN — SODIUM CHLORIDE, PRESERVATIVE FREE 10 ML: 5 INJECTION INTRAVENOUS at 21:21

## 2022-03-24 RX ADMIN — FENTANYL CITRATE 50 MCG: 50 INJECTION, SOLUTION INTRAMUSCULAR; INTRAVENOUS at 14:58

## 2022-03-24 RX ADMIN — OXYCODONE HYDROCHLORIDE 10 MG: 5 TABLET ORAL at 21:16

## 2022-03-24 RX ADMIN — ROCURONIUM BROMIDE 30 MG: 10 INJECTION INTRAVENOUS at 13:29

## 2022-03-24 RX ADMIN — FENTANYL CITRATE 100 MCG: 50 INJECTION, SOLUTION INTRAMUSCULAR; INTRAVENOUS at 13:29

## 2022-03-24 RX ADMIN — SODIUM CHLORIDE 9 ML: 9 INJECTION, SOLUTION INTRAMUSCULAR; INTRAVENOUS; SUBCUTANEOUS at 13:38

## 2022-03-24 RX ADMIN — DIVALPROEX SODIUM 750 MG: 250 TABLET, FILM COATED, EXTENDED RELEASE ORAL at 21:19

## 2022-03-24 RX ADMIN — CYCLOBENZAPRINE 10 MG: 10 TABLET, FILM COATED ORAL at 21:16

## 2022-03-24 RX ADMIN — PROPOFOL 50 MG: 10 INJECTION, EMULSION INTRAVENOUS at 13:33

## 2022-03-24 RX ADMIN — Medication 10 MG: at 16:56

## 2022-03-24 ASSESSMENT — PULMONARY FUNCTION TESTS
PIF_VALUE: 20
PIF_VALUE: 14
PIF_VALUE: 16
PIF_VALUE: 21
PIF_VALUE: 20
PIF_VALUE: 22
PIF_VALUE: 18
PIF_VALUE: 16
PIF_VALUE: 20
PIF_VALUE: 16
PIF_VALUE: 20
PIF_VALUE: 17
PIF_VALUE: 20
PIF_VALUE: 17
PIF_VALUE: 21
PIF_VALUE: 20
PIF_VALUE: 21
PIF_VALUE: 18
PIF_VALUE: 20
PIF_VALUE: 17
PIF_VALUE: 16
PIF_VALUE: 18
PIF_VALUE: 2
PIF_VALUE: 21
PIF_VALUE: 20
PIF_VALUE: 20
PIF_VALUE: 16
PIF_VALUE: 20
PIF_VALUE: 20
PIF_VALUE: 16
PIF_VALUE: 16
PIF_VALUE: 3
PIF_VALUE: 20
PIF_VALUE: 17
PIF_VALUE: 19
PIF_VALUE: 21
PIF_VALUE: 17
PIF_VALUE: 16
PIF_VALUE: 20
PIF_VALUE: 20
PIF_VALUE: 16
PIF_VALUE: 8
PIF_VALUE: 18
PIF_VALUE: 16
PIF_VALUE: 21
PIF_VALUE: 21
PIF_VALUE: 20
PIF_VALUE: 20
PIF_VALUE: 21
PIF_VALUE: 20
PIF_VALUE: 21
PIF_VALUE: 20
PIF_VALUE: 20
PIF_VALUE: 16
PIF_VALUE: 17
PIF_VALUE: 16
PIF_VALUE: 3
PIF_VALUE: 20
PIF_VALUE: 21
PIF_VALUE: 20
PIF_VALUE: 19
PIF_VALUE: 16
PIF_VALUE: 20
PIF_VALUE: 21
PIF_VALUE: 16
PIF_VALUE: 20
PIF_VALUE: 20
PIF_VALUE: 10
PIF_VALUE: 16
PIF_VALUE: 20
PIF_VALUE: 20
PIF_VALUE: 16
PIF_VALUE: 20
PIF_VALUE: 20
PIF_VALUE: 21
PIF_VALUE: 16
PIF_VALUE: 21
PIF_VALUE: 20
PIF_VALUE: 2
PIF_VALUE: 16
PIF_VALUE: 16
PIF_VALUE: 21
PIF_VALUE: 20
PIF_VALUE: 21
PIF_VALUE: 20
PIF_VALUE: 16
PIF_VALUE: 23
PIF_VALUE: 15
PIF_VALUE: 17
PIF_VALUE: 20
PIF_VALUE: 18
PIF_VALUE: 20
PIF_VALUE: 17
PIF_VALUE: 20
PIF_VALUE: 20
PIF_VALUE: 16
PIF_VALUE: 21
PIF_VALUE: 20
PIF_VALUE: 16
PIF_VALUE: 20
PIF_VALUE: 15
PIF_VALUE: 20
PIF_VALUE: 1
PIF_VALUE: 4
PIF_VALUE: 20
PIF_VALUE: 20
PIF_VALUE: 16
PIF_VALUE: 17
PIF_VALUE: 20
PIF_VALUE: 18
PIF_VALUE: 16
PIF_VALUE: 19
PIF_VALUE: 22
PIF_VALUE: 20
PIF_VALUE: 20
PIF_VALUE: 17
PIF_VALUE: 1
PIF_VALUE: 21
PIF_VALUE: 21
PIF_VALUE: 20
PIF_VALUE: 15
PIF_VALUE: 16
PIF_VALUE: 20
PIF_VALUE: 17
PIF_VALUE: 20
PIF_VALUE: 16
PIF_VALUE: 16
PIF_VALUE: 18
PIF_VALUE: 20
PIF_VALUE: 22
PIF_VALUE: 16
PIF_VALUE: 17
PIF_VALUE: 20
PIF_VALUE: 21
PIF_VALUE: 17
PIF_VALUE: 17
PIF_VALUE: 20
PIF_VALUE: 16
PIF_VALUE: 18
PIF_VALUE: 16
PIF_VALUE: 20
PIF_VALUE: 1
PIF_VALUE: 16
PIF_VALUE: 21
PIF_VALUE: 20
PIF_VALUE: 17
PIF_VALUE: 20
PIF_VALUE: 20
PIF_VALUE: 16
PIF_VALUE: 20
PIF_VALUE: 21
PIF_VALUE: 20
PIF_VALUE: 16
PIF_VALUE: 20
PIF_VALUE: 17
PIF_VALUE: 21
PIF_VALUE: 20
PIF_VALUE: 16
PIF_VALUE: 20
PIF_VALUE: 19
PIF_VALUE: 18
PIF_VALUE: 18
PIF_VALUE: 20
PIF_VALUE: 20
PIF_VALUE: 18
PIF_VALUE: 20
PIF_VALUE: 16
PIF_VALUE: 18
PIF_VALUE: 20
PIF_VALUE: 20
PIF_VALUE: 27
PIF_VALUE: 16
PIF_VALUE: 21
PIF_VALUE: 22
PIF_VALUE: 20
PIF_VALUE: 17
PIF_VALUE: 20
PIF_VALUE: 17
PIF_VALUE: 20
PIF_VALUE: 19
PIF_VALUE: 21
PIF_VALUE: 20
PIF_VALUE: 20
PIF_VALUE: 21
PIF_VALUE: 20
PIF_VALUE: 17
PIF_VALUE: 19
PIF_VALUE: 1
PIF_VALUE: 20
PIF_VALUE: 12
PIF_VALUE: 20
PIF_VALUE: 20
PIF_VALUE: 21
PIF_VALUE: 20
PIF_VALUE: 20
PIF_VALUE: 19
PIF_VALUE: 3
PIF_VALUE: 20
PIF_VALUE: 16
PIF_VALUE: 17
PIF_VALUE: 19
PIF_VALUE: 21
PIF_VALUE: 20
PIF_VALUE: 16
PIF_VALUE: 20
PIF_VALUE: 21
PIF_VALUE: 20
PIF_VALUE: 19
PIF_VALUE: 17
PIF_VALUE: 20
PIF_VALUE: 22
PIF_VALUE: 2
PIF_VALUE: 21

## 2022-03-24 ASSESSMENT — PAIN DESCRIPTION - ORIENTATION: ORIENTATION: POSTERIOR

## 2022-03-24 ASSESSMENT — PAIN DESCRIPTION - ONSET: ONSET: ON-GOING

## 2022-03-24 ASSESSMENT — PAIN DESCRIPTION - DIRECTION: RADIATING_TOWARDS: SHOULDERS

## 2022-03-24 ASSESSMENT — PAIN - FUNCTIONAL ASSESSMENT: PAIN_FUNCTIONAL_ASSESSMENT: 0-10

## 2022-03-24 ASSESSMENT — PAIN DESCRIPTION - LOCATION
LOCATION: NECK;SHOULDER
LOCATION: NECK

## 2022-03-24 ASSESSMENT — LIFESTYLE VARIABLES: SMOKING_STATUS: 0

## 2022-03-24 ASSESSMENT — PAIN DESCRIPTION - PAIN TYPE
TYPE: SURGICAL PAIN
TYPE: CHRONIC PAIN
TYPE: CHRONIC PAIN
TYPE: ACUTE PAIN

## 2022-03-24 ASSESSMENT — PAIN DESCRIPTION - FREQUENCY: FREQUENCY: CONTINUOUS

## 2022-03-24 ASSESSMENT — PAIN SCALES - GENERAL
PAINLEVEL_OUTOF10: 7
PAINLEVEL_OUTOF10: 9

## 2022-03-24 ASSESSMENT — PAIN DESCRIPTION - DESCRIPTORS
DESCRIPTORS: ACHING;THROBBING;STABBING
DESCRIPTORS: ACHING

## 2022-03-24 ASSESSMENT — ENCOUNTER SYMPTOMS: SHORTNESS OF BREATH: 0

## 2022-03-24 NOTE — ANESTHESIA PROCEDURE NOTES
Arterial Line:    An arterial line was placed using surface landmarks, in the OR for the following indication(s): continuous blood pressure monitoring. A 20 gauge (size), 1 and 3/4 inch (length), Arrow (type) catheter was placed, Seldinger technique used, into the left radial artery, secured by tape and Tegaderm. Anesthesia type: General    Events:  patient tolerated procedure well with no complications. 3/24/2022 1:38 PM  Anesthesiologist: Thais Nieto MD  Other anesthesia staff: Ingrid Parker RN  Performed:  Other anesthesia staff   Preanesthetic Checklist  Completed: patient identified, IV checked, site marked, risks and benefits discussed, surgical consent, monitors and equipment checked, pre-op evaluation, timeout performed, anesthesia consent given, oxygen available and patient being monitored

## 2022-03-24 NOTE — ANESTHESIA POSTPROCEDURE EVALUATION
POST- ANESTHESIA EVALUATION       Pt Name: Adrienne Carnes  MRN: [id number]  YOB: 1950  Date of evaluation: 3/24/2022  Time:  7:51 PM      /73   Pulse 78   Temp 98.1 °F (36.7 °C)   Resp 12   Ht 5' 3\" (1.6 m)   Wt 152 lb (68.9 kg)   SpO2 92%   BMI 26.93 kg/m²      Consciousness Level  Awake  Cardiopulmonary Status  Stable  Pain Adequately Treated YES  Nausea / Vomiting  NO  Adequate Hydration  YES  Anesthesia Related Complications NONE      Electronically signed by Kait Jo MD on 3/24/2022 at 7:51 PM       Department of Anesthesiology  Postprocedure Note    Patient: Adrienne Carnes  MRN: [id number]  YOB: 1950  Date of evaluation: 3/24/2022  Time:  7:51 PM     Procedure Summary     Date: 03/24/22 Room / Location: [address]    Anesthesia Start: 4339 Anesthesia Stop: 8392    Procedure: C2-5 FUSION, C2-4 LAMINECTOMY (N/A Spine Cervical) Diagnosis: (ADD-ON)    Surgeons: Aundrea Chavez DO Responsible Provider: Kait Jo MD    Anesthesia Type: general ASA Status: 3          Anesthesia Type: general    Julian Phase I: Julian Score: 8    Julian Phase II:      Last vitals: Reviewed and per EMR flowsheets.        Anesthesia Post Evaluation

## 2022-03-24 NOTE — ANESTHESIA PRE PROCEDURE
Department of Anesthesiology  Preprocedure Note       Name:  Ciara Simons   Age:  70 y.o.  :  1950                                          MRN:  [id number]         Date:  3/24/2022      Surgeon: Margarita Bruno):  Jules Mcclure DO    Procedure: POSTERIOR CERVICAL DECOMPRESSION, LAMINECTOMY, FUSION C2-5 (DEPUY) (N/A )      Medications prior to admission:   Prior to Admission medications    Medication Sig Start Date End Date Taking? Authorizing Provider   divalproex (DEPAKOTE ER) 250 MG extended release tablet Take 750 mg by mouth at bedtime   Yes Historical Provider, MD   furosemide (LASIX) 20 MG tablet Take 20 mg by mouth daily   Yes Historical Provider, MD   carboxymethylcellulose 1 % ophthalmic solution Place 2 drops into both eyes 3 times daily Pt states \"2 drops in both eyes three times a day\"    Historical Provider, MD   venlafaxine (EFFEXOR XR) 150 MG extended release capsule Take 1 capsule by mouth daily  Patient taking differently: Take 225 mg by mouth daily  20   Slim Martinez MD   simethicone (MYLICON) 80 MG chewable tablet Take 1 tablet by mouth every 6 hours as needed for Flatulence  Patient taking differently: Take 80 mg by mouth every 8 hours as needed for Flatulence  20   Slim Martinez MD   lidocaine (XYLOCAINE) 5 % ointment Apply topically daily as needed for Pain Apply topically as needed.   LF 20 (30 day supply)  Patient not taking: Reported on 3/23/2022    Historical Provider, MD   metFORMIN (GLUCOPHAGE) 1000 MG tablet Take 1,000 mg by mouth 2 times daily (with meals) LF 20 (90 day supply)  Patient not taking: Reported on 3/21/2022    Historical Provider, MD   losartan (COZAAR) 25 MG tablet Take 25 mg by mouth daily     Historical Provider, MD   aspirin 81 MG chewable tablet Take 81 mg by mouth daily  Patient not taking: Reported on 3/21/2022    Historical Provider, MD   divalproex (DEPAKOTE ER) 500 MG extended release tablet Take 500 mg by mouth every morning Historical Provider, MD   traZODone (DESYREL) 100 MG tablet Take 150 mg by mouth nightly as needed LF 5/1/20 (30 day supply)    Historical Provider, MD   cetirizine (ZYRTEC) 10 MG tablet Take 10 mg by mouth daily LF 4/6/20 (90 day supply)  Patient not taking: Reported on 3/21/2022    Historical Provider, MD   atorvastatin (LIPITOR) 80 MG tablet Take 40 mg by mouth daily Take 1/2 tablet (40 mg) daily  LF 4/22/20 (90 day supply)  Patient not taking: Reported on 3/21/2022    Historical Provider, MD   amLODIPine (NORVASC) 10 MG tablet Take 7.5 mg by mouth daily     Historical Provider, MD   sildenafil (VIAGRA) 100 MG tablet Take 100 mg by mouth as needed for Erectile Dysfunction LF 5/12/20 (30 day supply)    Historical Provider, MD   omeprazole (PRILOSEC) 20 MG delayed release capsule Take 20 mg by mouth every evening LF 4/21/20 (90 day supply)    Historical Provider, MD       Current medications:    Current Facility-Administered Medications   Medication Dose Route Frequency Provider Last Rate Last Admin    [MAR Hold] divalproex (DEPAKOTE ER) extended release tablet 750 mg  750 mg Oral Nightly Ulysses Dimmer, APRN - CNP   750 mg at 03/24/22 0222    [MAR Hold] carboxymethylcellulose PF (THERATEARS) 1 % ophthalmic gel 2 drop  2 drop Both Eyes TID Ulysses Dimmer, APRN - CNP   2 drop at 03/24/22 0843    [MAR Hold] amLODIPine (NORVASC) tablet 7.5 mg  7.5 mg Oral Daily Ulysses Dimmer, APRN - CNP   7.5 mg at 03/24/22 0843    [MAR Hold] pantoprazole (PROTONIX) tablet 40 mg  40 mg Oral QAM AC Ulysses Dimmer, APRN - CNP   40 mg at 03/24/22 0843    [MAR Hold] furosemide (LASIX) tablet 20 mg  20 mg Oral Daily Ulysses Dimmer, APRN - CNP        [MAR Hold] venlafaxine [facility]) tablet 75 mg  75 mg Oral TID  Swetha Gaines PA-C        [facility] Hold] divalproex (DEPAKOTE ER) extended release tablet 500 mg  500 mg Oral QAM Swetha Garciaon, PA-C        [facility] Hold] sodium chloride flush 0.9 % injection 5-40 mL  5-40 mL IntraVENous 2 times per day KASSIDY Kelly NP   10 mL at 03/24/22 0844    [MAR Hold] sodium chloride flush 0.9 % injection 10 mL  10 mL IntraVENous PRN KASSIDY Kelly NP       [facility] Hold] 0.9 % sodium chloride infusion  25 mL IntraVENous PRN KASSIDY Kelly NP [facility] Hold] potassium chloride (KLOR-CON M) extended release tablet 40 mEq  40 mEq Oral PRN KASSIDY Kelly NP        Or   [facility] Hold] potassium bicarb-citric acid (EFFER-K) effervescent tablet 40 mEq  40 mEq Oral PRN KASSIDY Kelly NP        Or   [facility] Hold] potassium chloride 10 mEq/100 mL IVPB (Peripheral Line)  10 mEq IntraVENous PRN KASSIDY Kelly NP [facility] Hold] magnesium sulfate 1000 mg in dextrose 5% 100 mL IVPB  1,000 mg IntraVENous PRN KASSIDY Kelly NP        [Held by provider] enoxaparin (LOVENOX) injection 40 mg  40 mg SubCUTAneous Daily KASSIDY Kelly NP        [facility] Hold] ondansetron (ZOFRAN-ODT) disintegrating tablet 4 mg  4 mg Oral Q8H PRN KASSIDY Kelly NP        Or   [facility] Hold] ondansetron [facility]) injection 4 mg  4 mg IntraVENous Q6H PRN KASSIDY Kelly NP        [facility] Hold] polyethylene glycol (GLYCOLAX) packet 17 g  17 g Oral Daily PRN KASSIDY Kelly NP        [facility] Hold] acetaminophen (TYLENOL) tablet 650 mg  650 mg Oral Q6H PRN KASSIDY Kelly NP        Or   [facility] Hold] acetaminophen (TYLENOL) suppository 650 mg  650 mg Rectal Q6H PRN KASSIDY Kelly NP        [facility] Hold] losartan (COZAAR) tablet 12.5 mg  12.5 mg Oral Daily Chase Everette, APRN - CNP   12.5 mg at 03/24/22 0843    [MAR Hold] traZODone (DESYREL) tablet 150 mg  150 mg Oral Nightly KASSIDY Navarrete CNP   150 mg at 03/23/22 2333    [MAR Hold] glucose (GLUTOSE) 40 % oral gel 15 g  15 g Oral PRN KASSIDY Friedman CNP        [MAR Hold] dextrose 50 % IV solution  12.5 g IntraVENous PRN KASSIDY Navarrete CNP        [MAR Hold] glucagon (rDNA) injection 1 mg  1 mg IntraMUSCular PRN KASSIDY Murphy CNP        [MAR Hold] dextrose 5 % solution  100 mL/hr IntraVENous PRN KASSIDY Murphy CNP           Allergies: Allergies   Allergen Reactions    Latex Itching    Bee Venom Swelling    Lisinopril Other (See Comments)     Cough      Niacin And Related Rash    Sulfa Antibiotics Rash    Terazosin Rash       Problem List:    Patient Active Problem List   Diagnosis Code    Hypertension I10    Hyperlipidemia E78.5    Diabetes mellitus ([address] E11.9    Contusion of right shoulder S40.011A    Bipolar disorder, mixed ([address] F31.60    First known suicide attempt [address] [address] T14.91XA    Erectile dysfunction N52.9    Cervical stenosis of spinal canal M48.02    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 A, R45.851    Severe recurrent major depression without psychotic features ([address] F33.2    Frequent falls R29.6    Hyponatremia E87.1    Cervical spinal cord compression (HCC) G95.20    Cord compression (HCC) G95.20       Past Medical History:        Diagnosis Date    Depression 2017    ON RX    Diabetes mellitus ([address] 2013    NIDDM    Difficult intravenous access     Hyperlipidemia 2008    ON RX    Hypertension 2008    ON RX    Migraines     PTSD (post-traumatic stress disorder) 01/2018    ON RX    Sleep apnea 01/2018    UNALBE TO USE TO CLEARED BY ENT (CPAP)    Teeth missing     BACK TEETH UPPER AND LOWER TO BE FITTED FOR PARTIALS AT LATER DATE    Wears glasses        Past Surgical History:        Procedure Laterality Date    CARDIAC CATHETERIZATION  02/2010    no stents     CARPAL TUNNEL RELEASE Left 01/09/2002    CATARACT REMOVAL      CERVICAL FUSION  04/19/2018    anterior cervical fusion C5-6    EYE SURGERY Left 2018    CATARACT EXTRACTION WITH IOL    HYDROCELE EXCISION      MIDDLE EAR SURGERY  2018    MIDDLE EAR REBUILT AND EAR DRUM REPLACED    MOUTH SURGERY  2018    5 TEETH REMOVED    OTHER SURGICAL HISTORY  2018    : ANTERIOR CERVICAL CORRPECTOMY C5-6, SYNTHES, DEPUY, REG TABLE, SUPINE,     NE OFFICE/OUTPT VISIT,PROCEDURE ONLY N/A 2018    ANTERIOR CERVICAL CORRPECTOMY AND FUSION C5-6 performed by Jules Mcclure DO at [facility]  1983       Social History:    Social History     Tobacco Use    Smoking status: Former Smoker     Packs/day: 0.50     Years: 4.00     Pack years: 2.00     Types: Cigarettes     Quit date: 1972     Years since quittin.9    Smokeless tobacco: Never Used    Tobacco comment: quit    Substance Use Topics    Alcohol use: Yes     Comment: MIXED DRINKS- 3 OR 4 A YEAR; last 2018                                Counseling given: Not Answered  Comment: quit       Vital Signs (Current):   Vitals:    22 0805 22 1120 22 1130 22 1206   BP: 139/71 (!) 143/117 (!) 153/94 (!) 155/96   Pulse: 80 80 82 77   Resp: 15 19 18 18   Temp: 98 °F (36.7 °C)   97.5 °F (36.4 °C)   TempSrc: Oral   Temporal   SpO2: 98% 96% 97% 99%   Weight:    152 lb (68.9 kg)   Height:    5' 3\" (1.6 m)                                              BP Readings from Last 3 Encounters:   22 (!) 155/96   22 (!) 148/92   21 (!) 153/93       NPO Status: Time of last liquid consumption:                         Time of last solid consumption: 1730MN                        Date of last liquid consumption: 22                        Date of last solid food consumption: 22    BMI:   Wt Readings from Last 3 Encounters:   22 152 lb (68.9 kg)   22 160 lb (72.6 kg)   21 170 lb (77.1 kg)     Body mass index is 26.93 kg/m².     CBC:   Lab Results   Component Value Date    WBC 6.3 03/20/2022    RBC 3.73 03/20/2022    HGB 12.1 03/20/2022    HCT 34.3 03/20/2022    MCV 92.0 03/20/2022    RDW 13.8 03/20/2022     03/20/2022       CMP:   Lab Results   Component Value Date     03/24/2022    K 4.1 03/24/2022    K 4.1 03/24/2022     03/24/2022    CO2 21 03/24/2022    BUN 29 03/24/2022    CREATININE 0.84 03/24/2022    GFRAA >60 03/24/2022    LABGLOM >60 03/24/2022    GLUCOSE 107 03/24/2022    PROT 6.5 12/05/2018    CALCIUM 9.4 03/24/2022    BILITOT 0.48 12/05/2018    ALKPHOS 74 12/05/2018    AST 25 12/05/2018    ALT 25 12/05/2018       POC Tests:   Recent Labs     03/24/22  1212   POCGLU 109       Coags:   Lab Results   Component Value Date    PROTIME 10.9 03/24/2022    INR 1.0 03/24/2022    APTT 31.3 03/20/2022       HCG (If Applicable): No results found for: PREGTESTUR, PREGSERUM, HCG, HCGQUANT     ABGs:   Lab Results   Component Value Date    PHART 7.445 04/19/2018    PO2ART 252.0 04/19/2018    RSP4ARE 39.1 04/19/2018    BZI6IWY 26.5 04/19/2018    U9YCCYXX 99.5 04/19/2018        Type & Screen (If Applicable):  No results found for: LABABO, LABRH      EKG 3/24/2022  Normal sinus rhythm  Voltage criteria for left ventricular hypertrophy  Abnormal ECG  When compared with ECG of 24-APR-2018 16:13,  No significant change was found      TTE 2018  Left ventricle is normal in size. Global left ventricular systolic function  is normal. Estimated ejection fraction is 55 % . Mild left ventricular hypertrophy. Grade I (mild) left ventricular diastolic dysfunction. Trivial tricuspid regurgitation.       Anesthesia Evaluation  Patient summary reviewed and Nursing notes reviewed no history of anesthetic complications:   Airway: Mallampati: II  TM distance: >3 FB   Neck ROM: limited  Mouth opening: > = 3 FB Dental:    (+) other  Comment: Recent  dental extractions:  two lower wisdom teeth, molars on left and right before wisdom tooth, one other that he doesn't recall    Pulmonary:normal exam breath sounds clear to auscultation  (+) sleep apnea (noncomliant due to ENT issues): on noncompliant,      (-) pneumonia, COPD, asthma, shortness of breath, recent URI and not a current smoker (quit 40 years ago +)                           Cardiovascular:  Exercise tolerance: good (>4 METS),   (+) hypertension:, hyperlipidemia    (-) pacemaker, valvular problems/murmurs, past MI, CAD, CABG/stent, dysrhythmias,  angina,  CHF and no pulmonary hypertension    ECG reviewed  Rhythm: regular  Rate: normal  Echocardiogram reviewed         Beta Blocker:  Dose within 24 Hrs         Neuro/Psych:   (+) seizures (last seizure 1.5 years ago, never have had neurology consult for seizures- attributes to medications he was on at the time): well controlled, neuromuscular disease (numbness of left arm):, headaches: migraine headaches, psychiatric history:   (-) TIA and CVA            ROS comment: Stenosis cervical spine with left sided weakness, spinal cord lesion at C5-C7 GI/Hepatic/Renal:   (+) GERD: well controlled,      (-) hiatal hernia, PUD, hepatitis, liver disease, no renal disease, bowel prep and no morbid obesity       Endo/Other:    (+) DiabetesType II DM, well controlled, , .    (-) hypothyroidism, hyperthyroidism, blood dyscrasia, arthritis                ROS comment: Hx THC use Abdominal:             Vascular: negative vascular ROS. Other Findings: Quadriparetic,  Left worse than right      MRI CERVICAL SPINE  FINDINGS:   BONES/ALIGNMENT: There is normal alignment of the spine. The vertebral body   heights are maintained.  The bone marrow signal appears unremarkable.       SPINAL CORD: No abnormal cord signal is seen.       SOFT TISSUES: No paraspinal mass identified.       C2-C3: There is no significant disc protrusion, spinal canal stenosis or   neural foraminal narrowing.       C3-C4: There is no significant disc protrusion, spinal canal stenosis or   neural foraminal narrowing.  Small posterior disc marginal osteophyte.       C4-C5: Moderate narrowing of the neural foramina bilaterally, left greater   than right due to uncinate spondylosis.  Central canal patent.       C5-C6: Large posterior broad-based disc marginal osteophyte causing severe   narrowing of the central canal.  It measures 4 mm in the midline.  There is   also severe narrowing of the left neural foramen and mild narrowing of the   right.       C6-C7: Moderate narrowing of the neural foramina bilaterally due to uncinate   spondylosis.  Central canal is patent.       C7-T1: Severe narrowing of the neural foramina on the right due to uncinate   spondylosis.  Central canal left neural foramina patent. Anesthesia Plan      general     ASA 3       Induction: intravenous. arterial line  MIPS: Postoperative opioids intended and Prophylactic antiemetics administered. Anesthetic plan and risks discussed with patient. Use of blood products discussed with patient whom consented to blood products. Plan discussed with CRNA.                 Bruce Stevens MD   3/24/2022

## 2022-03-24 NOTE — OP NOTE
Operative Note      Patient: Kevin Quiroga  YOB: 1950  MRN: [id number]    Date of Procedure: 3/24/2022    Pre-Op Diagnosis: Cervical stenosis with myelopathy, incomplete quadriplegia    Post-Op Diagnosis: Same     Indications for procedure. Patient with progressive weakness of the upper and lower extremities due to multiple falls. Resultant MRI showing severe stenosis at C2-3 and C3-4 and patient essentially bedbound with complete quadriplegia. Patient transferred to this hospital for intervention by neurosurgery. Clear-cut evidence of severe stenosis with baseline myelopathy and now incomplete quadriplegia warranting urgent decompression fixation. Seizure  Laminectomy to decompress the spinal cord at C2, C3, C4, partial C5  Nonsegmental fixation with lateral mass screws and rods at C2, C3, C4, C5  Posterior lateral arthrodesis at C2, C3, C4, C5  Use of morselized autograft via same incision  Use of fluoroscopy      Surgeon(s):  Jonathan Gilbert DO    Assistant:   First Assistant: Ngozi Alex RN    Anesthesia: General    Estimated Blood Loss (mL): 916     Complications: None    Specimens:   * No specimens in log *    Implants:  Implant Name Type Inv. Item Serial No.  Lot No. LRB No. Used Action   SCREW SPNL 3.5X14MM SYMPHONY - [id number]  SCREW SPNL 3.5X14MM SYMPHONY  [facility] DEPUY SYNTHES SPINE-WD  N/A 8 Implanted   SCREW SPINAL F/SYMPHONY OCT SYSTEM - [id number]  SCREW SPINAL F/SYMPHONY OCT SYSTEM  [employer] DEPUY SYNTHES SPINE-WD  N/A 8 Implanted   KEITH SPNL LORDTC 4X50 MM TI NS SYM - [id number]  KEITH SPNL LORDTC 4X50 MM TI NS SYM  [employer] DEPUY SYNTHES SPINE-WD  N/A 2 Implanted         Drains:   Closed/Suction Drain Posterior Neck Bulb  (Active)   Site Description Unable to view 03/24/22 1732   Dressing Status Clean;Dry; Intact 03/24/22 1732   Drainage Appearance Bloody 03/24/22 1732   Status To bulb suction 03/24/22 1732       External Urinary Catheter (Active)   Catheter changed  Yes was then used to drill gutters bilaterally medial to the medial facet line from C2 down to inferior C3 loosening the entirety of the posterior elements. Based on identified landmarks drill was then used to drill starting points jail up the lateral mass of C2 approximately jail in. Handrolled was then used to drill the trajectory approximate 45 degrees medialized and cephalad bilaterally. Ball-tipped was used to ensure no breach. In similar fashion starting points were drilled from C3 down to C5 bilaterally at the midpoint of the facets. Cannula was then used to drill trajectories 45 degrees and laterally and cephalad followed by ball-tipped ensure no breach. 14 mm screws were placed at all levels including C2-3-4 and 5. Heparin was appropriately used to orient the heads and rods were placed and affixed with caps and anatomic lordosis. Appropriate bending of the luther was necessary to mimic the anatomic lordosis present. C2 spinous process was stented using a Erika followed by upgoing curette to punch to remove the ligamentum flavum and en bloc removal of the lamina of C2 and C3 was performed. I then used the drill to drill gutters bilaterally at C4 and in similar fashion en bloc removal of C4 and partially of the upper portion of the C5 spinous process and lamina was removed using a punch. After completion of the laminectomy thorough irrigation hemostasis was obtained using bipolar cautery and FloSeal.  Betadine and multiple rounds of saline were used to irrigate. Of note final tightening of all caps have been performed. Posterior lateral arthrodesis of the facet joints as well as lateral to the hardware was performed using a drill followed by morselized autograft and the spinous process was burred down and placed lateral to the hardware. 7 flat CHARLENE drain was tunneled subfascial and secured with drain stitch.   Wound was closed with multiple 0 Vicryl's for the muscle followed by running oh strata fix

## 2022-03-24 NOTE — CONSULTS
[facility] Neurology   IN-PATIENT SERVICE      NEUROSURGERY CONSULT  NOTE            Date:   3/24/2022  Patient name:  Rodell Meckel  Date of admission:  3/23/2022  YOB: 1950      Chief Complaint:     Quadriplegia    Reason for Consult:      Quadriplegia    History of Present Illness:     70-year-old left-handed man had suffered a fall at home striking the back of his head and neck against the edge of the toilet when he slipped on a rug, he has a known high-grade cervical stenosis and a pre or cervical cord injury that occurred during an AICD procedure, he has a fusion from C4 C7 documented by CT,  -Patient transferred from [facility] to [facility], as direct admission,    Upon encounter patient reported recurrent falls for the past week almost 7 times, patient had progressive upper and lower extremity weakness since January, he stopped from driving patient was using walker, last being reported using the walker was end of February, for the past week patient was using a walker with assistance, patient denies losing consciousness,  -Patient admitted under internal medicine, no acute metabolic disturbances,  Patient reported urinary urgency, sometimes cannot make it to the bathroom, and he also reported lots of constipation to the point he had to disimpacted himself  -Post void residual urine 59 cc    MRI cervical spine of 3/21/2022: Severe spinal canal stenosis at C2-C3 and C3-C4, with complete effacement of the CSF at these levels, question of minimal T2 hyperintensity within the cord at C4-C5 likely represents myelomalacia    Past Medical History:     Past Medical History:   Diagnosis Date    Depression 2017    ON RX    Diabetes mellitus ([facility] [address] 2013    NIDDM    Difficult intravenous access     Hyperlipidemia 2008    ON RX    Hypertension 2008    ON RX    Migraines     PTSD (post-traumatic stress disorder) 01/2018    ON RX    Sleep apnea 01/2018    UNALBE TO USE TO CLEARED BY ENT (CPAP)    Teeth missing     BACK TEETH UPPER AND LOWER TO BE FITTED FOR PARTIALS AT LATER DATE    Wears glasses         Past Surgical History:     Past Surgical History:   Procedure Laterality Date    CARDIAC CATHETERIZATION  02/2010    no stents     CARPAL TUNNEL RELEASE Left 01/09/2002    CATARACT REMOVAL      CERVICAL FUSION  04/19/2018    anterior cervical fusion C5-6    EYE SURGERY Left 2018    CATARACT EXTRACTION WITH IOL    HYDROCELE EXCISION  1951    MIDDLE EAR SURGERY  01/11/2018    MIDDLE EAR REBUILT AND EAR DRUM REPLACED    MOUTH SURGERY  04/16/2018    5 TEETH REMOVED    OTHER SURGICAL HISTORY  04/19/2018    : ANTERIOR CERVICAL CORRPECTOMY C5-6, SYNTHES, DEPUY, REG TABLE, SUPINE,     IA OFFICE/OUTPT VISIT,PROCEDURE ONLY N/A 4/19/2018    ANTERIOR CERVICAL CORRPECTOMY AND FUSION C5-6 performed by Spencer Delvalle DO at [address] [place] [address]  12/1983        Medications Prior to Admission:     Prior to Admission medications    Medication Sig Start Date End Date Taking?  Authorizing Provider   divalproex (DEPAKOTE ER) 250 MG extended release tablet Take 750 mg by mouth at bedtime   Yes Historical Provider, MD   furosemide (LASIX) 20 MG tablet Take 20 mg by mouth daily   Yes Historical Provider, MD   carboxymethylcellulose 1 % ophthalmic solution Place 2 drops into both eyes 3 times daily Pt states \"2 drops in both eyes three times a day\"    Historical Provider, MD   venlafaxine (EFFEXOR XR) 150 MG extended release capsule Take 1 capsule by mouth daily  Patient taking differently: Take 225 mg by mouth daily  6/23/20   Aki Elder MD   simethicone (MYLICON) 80 MG chewable tablet Take 1 tablet by mouth every 6 hours as needed for Flatulence  Patient taking differently: Take 80 mg by mouth every 8 hours as needed for Flatulence  6/22/20   Aki Eldre MD   lidocaine (XYLOCAINE) 5 % ointment Apply topically daily as needed for Pain Apply topically as needed. LF 4/20/20 (30 day supply)  Patient not taking: Reported on 3/23/2022    Historical Provider, MD   metFORMIN (GLUCOPHAGE) 1000 MG tablet Take 1,000 mg by mouth 2 times daily (with meals) LF 4/27/20 (90 day supply)  Patient not taking: Reported on 3/21/2022    Historical Provider, MD   losartan (COZAAR) 25 MG tablet Take 25 mg by mouth daily     Historical Provider, MD   aspirin 81 MG chewable tablet Take 81 mg by mouth daily  Patient not taking: Reported on 3/21/2022    Historical Provider, MD   divalproex (DEPAKOTE ER) 500 MG extended release tablet Take 500 mg by mouth every morning     Historical Provider, MD   traZODone (DESYREL) 100 MG tablet Take 150 mg by mouth nightly as needed LF 5/1/20 (30 day supply)    Historical Provider, MD   cetirizine (ZYRTEC) 10 MG tablet Take 10 mg by mouth daily LF 4/6/20 (90 day supply)  Patient not taking: Reported on 3/21/2022    Historical Provider, MD   atorvastatin (LIPITOR) 80 MG tablet Take 40 mg by mouth daily Take 1/2 tablet (40 mg) daily  LF 4/22/20 (90 day supply)  Patient not taking: Reported on 3/21/2022    Historical Provider, MD   carvedilol (COREG) 25 MG tablet Take 25 mg by mouth 2 times daily (with meals) LF 5/12/20 (90 day supply)  Patient not taking: Reported on 3/21/2022    Historical Provider, MD   amLODIPine (NORVASC) 10 MG tablet Take 7.5 mg by mouth daily     Historical Provider, MD   sildenafil (VIAGRA) 100 MG tablet Take 100 mg by mouth as needed for Erectile Dysfunction LF 5/12/20 (30 day supply)    Historical Provider, MD   omeprazole (PRILOSEC) 20 MG delayed release capsule Take 20 mg by mouth every evening LF 4/21/20 (90 day supply)    Historical Provider, MD        Allergies:     Latex, Bee venom, Lisinopril, Niacin and related, Sulfa antibiotics, and Terazosin    Social History:     Tobacco:    reports that he quit smoking about 49 years ago. His smoking use included cigarettes. He has a 2.00 pack-year smoking history.  He has never used smokeless tobacco.  Alcohol:      reports current alcohol use. Drug Use:  reports current drug use. Drug: Marijuana Juanadariana Bass).     Family History:     Family History   Problem Relation Age of Onset   Dino Crawley Dementia Mother     Coronary Art Dis Mother     Stroke Mother     Diabetes Mother     Asthma Mother     Other Mother         BRAIN ANEURISM    High Blood Pressure Mother     Heart Disease Father     Diabetes Sister     Diabetes Brother     High Blood Pressure Brother     High Cholesterol Brother     Heart Disease Brother     Diabetes Sister     Other Sister         NEUROPATHY       Review of Systems:       Constitutional Negative for fever and chills   HEENT Negative for ear discharge, ear pain, nosebleed   Eyes Negative for photophobia, pain and discharge   Respiratory Negative for hemoptysis and sputum   Cardiovascular Negative for orthopnea, claudication and PND   Gastrointestinal Negative for abdominal pain, diarrhea, blood in stool   Musculoskeletal Negative for joint pain, negative for myalgia   Skin Negative for rash or itching   hematology Negative for ecchymosis, anemia   Psychiatric Negative for suicidal ideation, anxiety, depression, hallucinations       Physical Exam:   BP (!) 154/89   Pulse 82   Temp 98.6 °F (37 °C) (Oral)   Resp 21   Ht 5' 3\" (1.6 m)   Wt 152 lb 6.4 oz (69.1 kg)   BMI 27.00 kg/m²   Temp (24hrs), Av.4 °F (36.9 °C), Min:98.1 °F (36.7 °C), Max:98.6 °F (37 °C)        General examination:      General Appearance:  alert, well appearing, and in no acute distress  HEENT: Normocephalic, atraumatic, moist mucus membranes  Neck: supple, no carotid bruits, (-) nuchal rigidity  Lungs:  Respirations unlabored, chest wall no deformity, BS normal  Cardiovascular: normal rate, regular rhythm  Abdomen: Soft, nontender, nondistended, normal bowel sounds  Skin: No gross lesions, rashes, bruising or bleeding on exposed skin area  Extremities:  peripheral pulses palpable, no cyanosis, clubbing or edema  Psych: normal affect      Neurological examination:      Mental status   Alert and oriented x 3; following all commands;   speech is fluent, no dysarthria, aphasia. Cranial nerves   II - visual fields intact to confrontation; pupils reactive  III, IV, VI - extraocular muscles intact; no DILIP; no nystagmus; no ptosis   V - normal facial sensation                                                               VII - normal facial symmetry                                                             VIII - intact hearing                                                                             IX, X - symmetrical palate elevation                                               XI - symmetrical shoulder shrug                                                       XII - midline tongue without atrophy or fasciculation     Motor function   biceps 3/5R, 2/5L  Triceps 3/5 bilaterally   2/5 bilaterally  Deltoid 2/5 bilaterally    Hip flexion 3/5 bilaterally  Knee flexion 3/5 bilaterally  Dorsiflexion 1/5 bilaterally  Plantarflexion 1/5 bilaterally     Sensory function Intact to touch, pin, vibration, proprioception throughout     Cerebellar Intact finger-nose-finger testing. Intact heel-shin testing. No dysdiadochokinesia present. No tremors                        Reflex function 2/4 symmetric throughout . Downgoing plantar response bilaterally. (-)Cho's sign bilaterally      Gait                   not assessed           Diagnostics:      Laboratory Testing:  CBC: No results for input(s): WBC, HGB, PLT in the last 72 hours. BMP:  No results for input(s): NA, K, CL, CO2, BUN, CREATININE, GLUCOSE in the last 72 hours.       Lab Results   Component Value Date    CHOL 104 04/12/2018    LDLCHOLESTEROL 44 04/12/2018    HDL 42 04/12/2018    TRIG 92 04/12/2018    ALT 25 12/05/2018    AST 25 12/05/2018    TSH 1.25 06/20/2020    INR 1.0 03/20/2022    LABA1C 5.8 05/17/2021       Lab Results   Component Value Date    VALPROATE 8 (L) 06/20/2020         Imaging/Diagnostics:  CT HEAD WO CONTRAST    Result Date: 3/20/2022  EXAMINATION: CT OF THE HEAD WITHOUT CONTRAST  3/20/2022 7:11 pm TECHNIQUE: CT of the head was performed without the administration of intravenous contrast. Dose modulation, iterative reconstruction, and/or weight based adjustment of the mA/kV was utilized to reduce the radiation dose to as low as reasonably achievable. COMPARISON: 12/05/2018 HISTORY: ORDERING SYSTEM PROVIDED HISTORY: Fall TECHNOLOGIST PROVIDED HISTORY: Fall Decision Support Exception - unselect if not a suspected or confirmed emergency medical condition->Emergency Medical Condition (MA) Reason for Exam: fall, hit head on toilet FINDINGS: BRAIN/VENTRICLES: There is no acute intracranial hemorrhage, mass effect or midline shift. No abnormal extra-axial fluid collection. The gray-white differentiation is maintained without evidence of an acute infarct. There is no evidence of hydrocephalus. Unchanged mild prominence of pituitary gland. ORBITS: The visualized portion of the orbits demonstrate no acute abnormality. SINUSES: The visualized paranasal sinuses and mastoid air cells demonstrate no acute abnormality. SOFT TISSUES/SKULL:  No acute abnormality of the visualized skull or soft tissues. No acute intracranial abnormality. RECOMMENDATIONS: Unavailable     CT CERVICAL SPINE WO CONTRAST    Result Date: 3/20/2022  EXAMINATION: CT OF THE CERVICAL SPINE WITHOUT CONTRAST 3/20/2022 7:14 pm TECHNIQUE: CT of the cervical spine was performed without the administration of intravenous contrast. Multiplanar reformatted images are provided for review. Dose modulation, iterative reconstruction, and/or weight based adjustment of the mA/kV was utilized to reduce the radiation dose to as low as reasonably achievable.  COMPARISON: 12/05/2018 HISTORY: ORDERING SYSTEM PROVIDED HISTORY: Fall TECHNOLOGIST PROVIDED HISTORY: Fall Decision Support Exception - unselect if not a suspected or confirmed emergency medical condition->Emergency Medical Condition (MA) Reason for Exam: fall, back pain FINDINGS: BONES/ALIGNMENT: Stable corpectomy and anterior fusion from C4-C7. Vertebral body alignment is normal.  Facet joints are normally aligned. Ankylosis of the left facet at C4-C5. There is no acute fracture or traumatic malalignment. DEGENERATIVE CHANGES: Prominent residual cervical spondylosis projecting into the spinal canal on the right at C5-C6, unchanged. SOFT TISSUES: There is no prevertebral soft tissue swelling. Stable cervical spine CT examination status post corpectomy and anterior fusion from C4-C7. No acute fracture or traumatic malalignment. RECOMMENDATIONS: Unavailable     CT THORACIC SPINE WO CONTRAST    Result Date: 3/20/2022  EXAMINATION: CT OF THE THORACIC SPINE WITHOUT CONTRAST; CT OF THE LUMBAR SPINE WITHOUT CONTRAST 3/20/2022 TECHNIQUE: CT of the thoracic spine was performed without the administration of intravenous contrast. Multiplanar reformatted images are provided for review. Dose modulation, iterative reconstruction, and/or weight based adjustment of the mA/kV was utilized to reduce the radiation dose to as low as reasonably achievable.; CT of the lumbar spine was performed without the administration of intravenous contrast. Multiplanar reformatted images are provided for review. Adjustment of mA and/or kV according to patient size was utilized. Dose modulation, iterative reconstruction, and/or weight based adjustment of the mA/kV was utilized to reduce the radiation dose to as low as reasonably achievable.  COMPARISON: CT chest 03/13/2018 HISTORY: ORDERING SYSTEM PROVIDED HISTORY: Fall TECHNOLOGIST PROVIDED HISTORY: Fall Reason for Exam: fall, back pain; ORDERING SYSTEM PROVIDED HISTORY: Fall TECHNOLOGIST PROVIDED HISTORY: Fall Decision Support Exception - unselect if not a suspected or confirmed emergency medical condition->Emergency Medical Condition (MA) Reason for Exam: fall, back pain FINDINGS: BONES/ALIGNMENT: Thoracic and lumbar vertebral body heights and alignment are normal.  There is no acute fracture or traumatic malalignment. DEGENERATIVE CHANGES: There are multilevel degenerative changes throughout the thoracic and lumbar spine. SOFT TISSUES: No paraspinal mass is seen. Lower cervical corpectomy and fusion. Large left renal cyst incompletely visualized on this study. Multilevel degenerative changes with no acute fracture or traumatic malalignment of the thoracolumbar spine. RECOMMENDATIONS: Unavailable     CT LUMBAR SPINE WO CONTRAST    Result Date: 3/20/2022  EXAMINATION: CT OF THE THORACIC SPINE WITHOUT CONTRAST; CT OF THE LUMBAR SPINE WITHOUT CONTRAST 3/20/2022 TECHNIQUE: CT of the thoracic spine was performed without the administration of intravenous contrast. Multiplanar reformatted images are provided for review. Dose modulation, iterative reconstruction, and/or weight based adjustment of the mA/kV was utilized to reduce the radiation dose to as low as reasonably achievable.; CT of the lumbar spine was performed without the administration of intravenous contrast. Multiplanar reformatted images are provided for review. Adjustment of mA and/or kV according to patient size was utilized. Dose modulation, iterative reconstruction, and/or weight based adjustment of the mA/kV was utilized to reduce the radiation dose to as low as reasonably achievable.  COMPARISON: CT chest 03/13/2018 HISTORY: ORDERING SYSTEM PROVIDED HISTORY: Fall TECHNOLOGIST PROVIDED HISTORY: Fall Reason for Exam: fall, back pain; ORDERING SYSTEM PROVIDED HISTORY: Fall TECHNOLOGIST PROVIDED HISTORY: Fall Decision Support Exception - unselect if not a suspected or confirmed emergency medical condition->Emergency Medical Condition (MA) Reason for Exam: fall, back pain FINDINGS: BONES/ALIGNMENT: Thoracic and lumbar vertebral body heights and alignment are normal.  There is no acute fracture or traumatic malalignment. DEGENERATIVE CHANGES: There are multilevel degenerative changes throughout the thoracic and lumbar spine. SOFT TISSUES: No paraspinal mass is seen. Lower cervical corpectomy and fusion. Large left renal cyst incompletely visualized on this study. Multilevel degenerative changes with no acute fracture or traumatic malalignment of the thoracolumbar spine. RECOMMENDATIONS: Unavailable     MRI CERVICAL SPINE WO CONTRAST    Result Date: 3/21/2022  EXAMINATION: MRI OF THE CERVICAL SPINE WITHOUT CONTRAST 3/21/2022 3:38 pm TECHNIQUE: Multiplanar multisequence MRI of the cervical spine was performed without the administration of intravenous contrast. COMPARISON: None. HISTORY: ORDERING SYSTEM PROVIDED HISTORY: spinal stenosis TECHNOLOGIST PROVIDED HISTORY: spinal stenosis Reason for Exam: Pt having several recent falls due to weakness. Subsequent evaluation. FINDINGS: Is a motion BONES/ALIGNMENT: Postsurgical changes are seen with anterior fusion hardware involving the C4 through C7 levels with partial corpectomy of C5 through C6. The remaining vertebral body heights appear grossly maintained. No significant spondylolisthesis seen. SPINAL CORD: There is question of minimal T2 hyperintensity within the cord at C4-C5 (sagittal T2 series 5, image 7). No abnormal cord signal otherwise seen. SOFT TISSUES: No gross evidence of a paraspinal mass. C2-C3: There is a disc bulge, which appears scratch head there is a disc bulge with a central disc protrusion, which appears to indent on the ventral cord as well as buckling of the ligamentum flavum. There appears to be complete effacement of the CSF. There appears to be severe spinal canal stenosis. Uncovertebral joint and facet arthrosis contributes to moderate right neural foraminal narrowing. No significant left neural foraminal narrowing.  C3-C4: There is a disc bulge with a central disc protrusion, which indents on the ventral aspect of the cervical cord. There is buckling of the ligamentum flavum. There appears to be complete effacement of the CSF. Severe spinal canal stenosis. Uncovertebral joint and facet arthrosis contributes to moderate to severe right and moderate left neural foraminal narrowing. C4-C5: The spinal canal appears surgically decompressed. Uncovertebral joint and facet arthrosis appears to contribute to mild right and moderate left neural foraminal narrowing. C5-C6: There is a right paracentral posterior osteophyte, which appears to contact the right ventral cervical cord. The spinal canal appears surgically decompressed. Uncovertebral joint and facet arthrosis contributes to mild right and moderate left neural foraminal narrowing. C6-C7: There is a posterior osteophyte which contacts the ventral cervical cord. No significant spinal canal stenosis. Uncovertebral joint and facet arthrosis contributes to moderate right and mild left neural foraminal narrowing. C7-T1: There is a posterior disc osteophyte complex indenting on the ventral thecal sac. No significant spinal canal stenosis. Uncovertebral joint and facet arthrosis appears to contribute to severe bilateral neural foraminal narrowing. 1. Patient motion limits evaluation. 2. There is severe spinal canal stenosis at C2-C3 and C3-C4 with complete effacement of the CSF at these levels. 3. No significant spinal canal stenosis at the remaining levels. 4. Multilevel neural foraminal narrowing as above. 5. There is question of minimal T2 hyperintensity within the cord at C4-C5, which likely represents myelomalacia.      MRI THORACIC SPINE WO CONTRAST    Result Date: 3/22/2022  EXAMINATION: MRI OF THE THORACIC SPINE WITHOUT CONTRAST  3/22/2022 9:37 am TECHNIQUE: Multiplanar multisequence MRI of the thoracic spine was performed without the administration of intravenous contrast. COMPARISON: CT thoracic spine 03/20/2022 HISTORY: ORDERING SYSTEM PROVIDED HISTORY: spinal stenosis TECHNOLOGIST PROVIDED HISTORY: spinal stenosis Reason for Exam: Frequent falls FINDINGS: BONES/ALIGNMENT: There is normal alignment of the thoracic spine. There is no acute fracture or listhesis. Bone marrow signal intensity is normal. There is mild multilevel disc desiccation and disc space narrowing within the midthoracic spine. Multilevel anterior osteophyte formation is present. SPINAL CORD: The thoracic spinal cord is normal in size and signal intensities. SOFT TISSUES: There is no paraspinal mass identified. DEGENERATIVE CHANGES: T2-T3: There is a disc bulge present. No significant spinal canal stenosis or neural foraminal narrowing is present. T4-T5: There is a disc bulge present. There is no significant spinal canal stenosis or neural foraminal narrowing. T7-T8: There is a 1 mm right paracentral disc protrusion. There is no significant spinal canal stenosis or neural foraminal narrowing. T8-T9: There is a right paracentral disc protrusion and thickening of the ligamentum flavum mildly narrowing the spinal canal.  There is no neural foraminal narrowing. T9-T10: There is a 2 mm central disc protrusion mildly narrowing the spinal canal.  There is no neural foraminal narrowing. T10-T11: There is a 3 mm left paracentral disc protrusion mildly narrowing the spinal canal.  There is flattening of the ventral surface of the spinal cord. Mild multilevel degenerative changes of the thoracic spine, as described above with mild spinal canal stenosis from T8-9 to T10-11, most pronounced at T10-11.      MRI LUMBAR SPINE WO CONTRAST    Result Date: 3/22/2022  EXAMINATION: MRI OF THE LUMBAR SPINE WITHOUT CONTRAST, 3/22/2022 9:41 am TECHNIQUE: Multiplanar multisequence MRI of the lumbar spine was performed without the administration of intravenous contrast. COMPARISON: 03/20/2022 HISTORY: ORDERING SYSTEM PROVIDED HISTORY: spinal stenosis TECHNOLOGIST PROVIDED COMPARISON: None. HISTORY: ORDERING SYSTEM PROVIDED HISTORY: UE/LE weakness TECHNOLOGIST PROVIDED HISTORY: UE/LE weakness Reason for Exam: Pt having several recent falls due to weakness. Initial evaluation. FINDINGS: Motion degrades images limiting evaluation. INTRACRANIAL STRUCTURES/VENTRICLES: There is no acute infarct. No mass effect or midline shift. No evidence of an acute intracranial hemorrhage. Areas of T2 FLAIR hyperintensity are seen in the periventricular and subcortical white matter, which are nonspecific, but may represent chronic microvascular ischemic change. There is mild global parenchymal volume loss. Otherwise, the ventricles and sulci are normal in size and configuration. The sellar/suprasellar regions appear unremarkable. The normal signal voids within the major intracranial vessels appear maintained. ORBITS: The visualized portion of the orbits demonstrate no acute abnormality. SINUSES: The visualized paranasal sinuses and mastoid air cells demonstrate no acute abnormality. BONES/SOFT TISSUES: The bone marrow signal intensity appears normal. The soft tissues demonstrate no acute abnormality. 1. No acute intracranial abnormality. No acute infarct. 2. Mild global parenchymal volume loss with minimal chronic microvascular ischemic changes.            Impression:      78-year-old male with progressive upper and lower extremity weakness, frequent falls recently, using a walker with assistance, [facility] after a fall incident, patient have multiple bruises on his knees and lower extremities, previous history of Fusion from C4 C7    MRI cervical spine of 3/21/2022: Severe spinal canal stenosis at C2-C3 and C3-C4, with complete effacement of the CSF at these levels, question of minimal T2 hyperintensity within the cord at C4-C5 likely represents myelomalacia    Plan:     - Possible procedure tomorrow  - Okay for DVT prophylaxis  - Hold on aspirin  - N.p.o. after midnight  - Internal medicine cleared the patient for surgery  - Type and cross ordered  - Rapid Covid ordered          Electronically signed by Jemima Montes MD on 3/24/2022 at 1:10 AM      Electronically signed by   Jemima Montes MD  3/24/2022  1:10 AM

## 2022-03-24 NOTE — PROGRESS NOTES
[facility]  Office: [address], DO, Kari Hilliard, DO, Clifford Fernandez, DO, Disha Middleton, DO, Dixon Rodríguez MD, Ron Lazaro MD, Jane Caldwell MD, Debra Francis MD, Aleksandr Reich MD, Reji Marie MD, Yvette Chance MD, Moiz Mujica DO, Louann Casiano DO, Bonita Cardenas MD,  Adan Coronel DO, Jihan Virgen MD, Bc Caban MD, Papi Winchester MD, Rojas Blank DO, Momo Sin MD, Leyla Fonseca MD, Gabo Soliman, [facility], [facility], CNP, Martínez Fernandez, CNP, Johnathan Castellanos, CNS, Lorenzo Austin, CNP, Claude Leep, CNP, Vikash Arellano, CNP, Durga Banda, CNP, Jos Busby, CNP, Jonathon Genao PA-C, Flako Benítez, DNP, Tabitha Madrigal, DNP, Ramona Guajardo, CNP, Jose Benitez, CNP, Akbar Davis, CNP         [facility], located within [facility]    Progress Note    3/24/2022    8:48 AM    Name:   Bethany Posada  MRN:     [id number]     Acct:      [de-identified]   Room:   8813/8846-48   Day:  1  Admit Date:  3/23/2022  7:06 PM    PCP:   Kelton Young  Code Status:  DNR-CCA    Subjective:     C/C: Falls, weakness    Interval History Status: not changed. Pt seen and evaluated this morning. He is resting in bed comfortably. He denying new complaints. He does admit to rather significant weakness in bilateral upper and lower extremities. He is scheduled to go the OR this afternoon with neurosurgery. Brief History:     70year-old male who initially presented to [facility] after multiple falls, generalized upper/lower extremity weakness. Notably, he has a history of cervical spine disease as he underwent decompression on 4/19/18. MRI of the cervical spine suggesting severe spinal canal stenosis at C2-C3 and C3-C4 with complete effacement of the CSF at these levels. Transferred to [facility] [address] for neurosurgery evaluation.      Review of Systems:     Constitutional:  negative for chills, fevers, sweats  Respiratory:  negative for cough, dyspnea on exertion, shortness of breath, wheezing  Cardiovascular:  negative for chest pain, chest pressure/discomfort, lower extremity edema, palpitations  Gastrointestinal:  negative for abdominal pain, constipation, diarrhea, nausea, vomiting  Neurological:  negative for dizziness, headache    Medications: Allergies: Allergies   Allergen Reactions    Latex Itching    Bee Venom Swelling    Lisinopril Other (See Comments)     Cough      Niacin And Related Rash    Sulfa Antibiotics Rash    Terazosin Rash       Current Meds:   Scheduled Meds:    divalproex  750 mg Oral Nightly    carboxymethylcellulose PF  2 drop Both Eyes TID    amLODIPine  7.5 mg Oral Daily    pantoprazole  40 mg Oral QAM AC    furosemide  20 mg Oral Daily    sodium chloride flush  5-40 mL IntraVENous 2 times per day    [Held by provider] enoxaparin  40 mg SubCUTAneous Daily    losartan  12.5 mg Oral Daily    traZODone  150 mg Oral Nightly    venlafaxine  150 mg Oral Daily    insulin lispro  0-6 Units SubCUTAneous TID WC    insulin lispro  0-3 Units SubCUTAneous Nightly     Continuous Infusions:    sodium chloride      dextrose       PRN Meds: sodium chloride flush, sodium chloride, potassium chloride **OR** potassium alternative oral replacement **OR** potassium chloride, magnesium sulfate, ondansetron **OR** ondansetron, polyethylene glycol, acetaminophen **OR** acetaminophen, glucose, dextrose, glucagon (rDNA), dextrose    Data:     Past Medical History:   has a past medical history of Depression, Diabetes mellitus ([address] Difficult intravenous access, Hyperlipidemia, Hypertension, Migraines, PTSD (post-traumatic stress disorder), Sleep apnea, Teeth missing, and Wears glasses. Social History:   reports that he quit smoking about 49 years ago. His smoking use included cigarettes. He has a 2.00 pack-year smoking history. He has never used smokeless tobacco. He reports current alcohol use. He reports current drug use.  Drug: Marijuana ([place]). Family History:   Family History   Problem Relation Age of Onset   Gloriajean Seeds Dementia Mother     Coronary Art Dis Mother     Stroke Mother     Diabetes Mother     Asthma Mother     Other Mother         BRAIN ANEURISM    High Blood Pressure Mother     Heart Disease Father     Diabetes Sister     Diabetes Brother     High Blood Pressure Brother     High Cholesterol Brother     Heart Disease Brother     Diabetes Sister     Other Sister         NEUROPATHY       Vitals:  /71   Pulse 80   Temp 98 °F (36.7 °C) (Oral)   Resp 15   Ht 5' 3\" (1.6 m)   Wt 152 lb 6.4 oz (69.1 kg)   SpO2 98%   BMI 27.00 kg/m²   Temp (24hrs), Av.4 °F (36.9 °C), Min:98 °F (36.7 °C), Max:98.6 °F (37 °C)    Recent Labs     22  0739   POCGLU 140* 109       I/O (24Hr): Intake/Output Summary (Last 24 hours) at 3/24/2022 0848  Last data filed at 3/24/2022 0644  Gross per 24 hour   Intake --   Output 575 ml   Net -575 ml       Labs:  Hematology:  Recent Labs     22  0157   INR 1.0     Chemistry:  Recent Labs     22  0157      K 4.1  4.1      CO2 21   GLUCOSE 107*   BUN 29*   CREATININE 0.84   MG 1.7   ANIONGAP 15   LABGLOM >60   GFRAA >60   CALCIUM 9.4   PROBNP 218     Recent Labs     22  0739   POCGLU 140* 109     ABG:  Lab Results   Component Value Date    PHART 7.445 2018    JKN5GTQ 39.1 2018    PO2ART 252.0 2018    ZPB2CRN 26.5 2018    NBEA NOT REPORTED 2018    PBEA 2.7 2018    R0XDQHZR 99.5 2018    FIO2 97% 2018     Lab Results   Component Value Date/Time    SPECIAL UPPER RT ARM 6ML 2018 07:39 PM     Lab Results   Component Value Date/Time    CULTURE NO GROWTH 6 DAYS 2018 07:39 PM    CULTURE  2018 07:39 PM     [address], [address] [place] [address] [phone]       Radiology:  CT HEAD WO CONTRAST    Result Date: 3/20/2022  No acute intracranial abnormality. clear to auscultation bilaterally, normal effort  Heart:  regular rate and rhythm, no murmur  Abdomen:  soft, nontender, nondistended, normal bowel sounds, no masses, hepatomegaly, splenomegaly  Extremities:  no edema, redness, tenderness in the calves  Skin:  no gross lesions, rashes, induration    Assessment:        Hospital Problems           Last Modified POA    * (Principal) Cord compression ([address] 3/23/2022 Yes    Hypertension 3/23/2022 Yes    Hyperlipidemia 3/23/2022 Yes    Diabetes mellitus ([address] 3/23/2022 Yes    Bipolar disorder, mixed ([address] 3/23/2022 Yes    Severe recurrent major depression without psychotic features ([address] 3/23/2022 Yes    Cervical spinal cord compression ([address] 3/21/2022 Yes          Plan:        #Severe spinal canal stenosis  - consult to neurosurgery, plan for OR this afternoon  - pain control  - cleared medically for surgery as low risk for major adverse cardiac event     #Type 2 DM  - controlled with diet. No longer taking metformin. #HTN  - resume home regimen, continue to monitor    #Bipolar disorder/depressive disorder   - continue depakote, effexor as ordered      On this date 03/24/22 I have spent 20 mins  in direct patient care, reviewing notes, test results and diagnosis and plan of care discussions with the patient, as well as coordination of care.   Plan of care made with MD Kimberly Nicholas PA-C  3/24/2022  8:48 AM

## 2022-03-24 NOTE — H&P
[facility]  Office: [address], DO, Jimbo Andrade, DO, Bro Giles, DO, Gregorio Middleton, DO, Edel Alfaro MD, Charmaine Levine MD, Regan Foster MD, Brenda Charlton MD, Renu Carvalho MD, Marian Mello MD, Pelon Cornelius MD, Jeannie Mcgregor, DO, Meredith Mills, DO, Dottie Kerns MD,  Chriss Mas DO, Vik Calero MD, Damaso Villarreal MD, Erika Estrada MD, Varsha Mejia DO, Juanito Diallo MD, Ashley Guillaume MD, Chidi Freeman, CNP, [facility], CNP, Dave Gustafson, CNP, Carmen Stinson, CNS, Kimberli Gold, CNP, Mariluz Merlos, CNP, Arabella Campbell, CNP, Izabela Garcia, CNP, Herman Wylie, CNP, [place] PA-C, Syeda Byers, JENNIFER, Kailyn Oakley, JENNIFER, Julissa Bennett, CNP, Oralia Mensah, CNP, Sammie Diaz, CNP         [facility]   [address] [facility]    HISTORY AND PHYSICAL EXAMINATION            Date:   3/23/2022  Patient name:  Ninfa Scott  Date of admission:  3/23/2022  7:06 PM  MRN:   [id number]  Account:  [de-identified]  YOB: 1950  PCP:    Jae Felipe  Room:   5983/1391-77  Code Status:    Prior    Chief Complaint:     Generalized weakness with fall  Direct admission from [facility] for cervical cord compromise due to high-grade stenosis   Patient was sent to higher level of care for neurosurgical evaluation. History Obtained From:     patient, electronic medical record    History of Present Illness:     Ninfa Scott is a 70 y.o. Non- / non  male who presents with mechanical Fall and is admitted to the hospital for the management of Cord compression [facility]). Patient has a long standing history of cervical spinal cord injury as he under went decompression 4/19/18 in which he reports the right side of the spinal cord was \" nicked\"     Patient presented to the [facility] Emergency room after sustained fall.   Patient then stated he had onset of pain without new weakness, but with remote generalized upper and lower extremity weakness progressively worsening since January  which has stopped him from driving. In addition, he has suffered 7 falls with in the past week. This recent fall patient was being assisted to the bathroom and as he was sitting on the stool the rug slipped and he fell down on the toilet and fellback striking his back, head and neck on the toilet shattering the tank of the toilet . He denied any LOC, but with acute onset of pain at the distal cervical region extending down the back. The work up at the [facility] showed no acute metabolic disturbances, hemogram with a mild anemia with a hgb of 12.1/hct 34.3  Imaging showed:   CT HEAD WO CONTRAST  · No acute intracranial abnormality.      CT CERVICAL SPINE WO CONTRAST  · Stable cervical spine CT examination status post corpectomy and anterior fusion from C4-C7. No acute fracture or traumatic malalignment.      CT THORACIC SPINE WO CONTRAST  · Multilevel degenerative changes with no acute fracture or traumatic malalignment of the thoracolumbar spine.      CT LUMBAR SPINE WO CONTRAST  · Multilevel degenerative changes with no acute fracture or traumatic malalignment of the thoracolumbar spine. MRI CERVICAL SPINE WO CONTRAST  ·  Patient motion limits evaluation. ·  There is severe spinal canal stenosis at C2-C3 and C3-C4 with complete effacement of the CSF at these levels. · No significant spinal canal stenosis at the remaining levels. ·  Multilevel neural foraminal narrowing as above. 5. There is question of minimal T2 hyperintensity within the cord at C4-C5, which likely represents myelomalacia. MRI THORACIC SPINE WO CONTRAST  · Mild multilevel degenerative changes of the thoracic spine, as described above with mild spinal canal stenosis from T8-9 to T10-11, most pronounced at T10-11. MRI LUMBAR SPINE WO CONTRAST  ·  Moderate spinal canal stenosis from L2-L3 to L4-L5, as described above.    ·  Mild spinal canal stenosis at L1-L2, as described above. ·  Multilevel neural foraminal narrowing, most severe at L2-L3. MRI BRAIN WO CONTRAST    Result Date: 3/21/2022  ·  No acute intracranial abnormality. No acute infarct. · Mild global parenchymal volume loss with minimal chronic microvascular ischemic changes. Past Medical History:     Past Medical History:   Diagnosis Date    Depression 2017    ON RX    Diabetes mellitus ([address] 2013    NIDDM    Difficult intravenous access     Hyperlipidemia 2008    ON RX    Hypertension 2008    ON RX    Migraines     PTSD (post-traumatic stress disorder) 01/2018    ON RX    Sleep apnea 01/2018    UNALBE TO USE TO CLEARED BY ENT (CPAP)    Teeth missing     BACK TEETH UPPER AND LOWER TO BE FITTED FOR PARTIALS AT LATER DATE    Wears glasses         Past Surgical History:     Past Surgical History:   Procedure Laterality Date    CARDIAC CATHETERIZATION  02/2010    no stents     CARPAL TUNNEL RELEASE Left 01/09/2002    CATARACT REMOVAL      CERVICAL FUSION  04/19/2018    anterior cervical fusion C5-6    EYE SURGERY Left 2018    CATARACT EXTRACTION WITH IOL    HYDROCELE EXCISION  1951    MIDDLE EAR SURGERY  01/11/2018    MIDDLE EAR REBUILT AND EAR DRUM REPLACED    MOUTH SURGERY  04/16/2018    5 TEETH REMOVED    OTHER SURGICAL HISTORY  04/19/2018    : ANTERIOR CERVICAL CORRPECTOMY C5-6, SYNTHES, DEPUY, REG TABLE, SUPINE,     RI OFFICE/OUTPT VISIT,PROCEDURE ONLY N/A 4/19/2018    ANTERIOR CERVICAL CORRPECTOMY AND FUSION C5-6 performed by Kalyn Benson DO at [address] [place] [address]  12/1983        Medications Prior to Admission:     Prior to Admission medications    Medication Sig Start Date End Date Taking?  Authorizing Provider   carboxymethylcellulose 1 % ophthalmic solution Place 2 drops into both eyes 3 times daily Pt states \"2 drops in both eyes three times a day\"    Historical Provider, MD   venlafaxine (EFFEXOR XR) 150 MG extended release capsule Take 1 capsule by mouth daily 6/23/20   Jie Ybarra MD   simethicone (MYLICON) 80 MG chewable tablet Take 1 tablet by mouth every 6 hours as needed for Flatulence 6/22/20   Jie Ybarra MD   lidocaine (XYLOCAINE) 5 % ointment Apply topically daily as needed for Pain Apply topically as needed.   LF 4/20/20 (30 day supply)    Historical Provider, MD   metFORMIN (GLUCOPHAGE) 1000 MG tablet Take 1,000 mg by mouth 2 times daily (with meals) LF 4/27/20 (90 day supply)  Patient not taking: Reported on 3/21/2022    Historical Provider, MD   losartan (COZAAR) 25 MG tablet Take 12.5 mg by mouth daily LF 5/29/20 (90 day supply)    Historical Provider, MD   furosemide (LASIX) 20 MG tablet Take 20 mg by mouth daily LF 5/27/20 (90 day supply)  Patient not taking: Reported on 3/21/2022    Historical Provider, MD   aspirin 81 MG chewable tablet Take 81 mg by mouth daily  Patient not taking: Reported on 3/21/2022    Historical Provider, MD   divalproex (DEPAKOTE ER) 500 MG extended release tablet Take 500 mg by mouth 2 times daily LF 5/1/20 (30 day supply)    Historical Provider, MD   traZODone (DESYREL) 100 MG tablet Take 150 mg by mouth nightly as needed LF 5/1/20 (30 day supply)    Historical Provider, MD   cetirizine (ZYRTEC) 10 MG tablet Take 10 mg by mouth daily LF 4/6/20 (90 day supply)  Patient not taking: Reported on 3/21/2022    Historical Provider, MD   atorvastatin (LIPITOR) 80 MG tablet Take 40 mg by mouth daily Take 1/2 tablet (40 mg) daily  LF 4/22/20 (90 day supply)  Patient not taking: Reported on 3/21/2022    Historical Provider, MD   carvedilol (COREG) 25 MG tablet Take 25 mg by mouth 2 times daily (with meals) LF 5/12/20 (90 day supply)  Patient not taking: Reported on 3/21/2022    Historical Provider, MD   amLODIPine (NORVASC) 10 MG tablet Take 7.5 mg by mouth daily LF 4/6/20 (90 day supply)    Historical Provider, MD   sildenafil (VIAGRA) 100 MG tablet Take 100 mg by mouth as needed for Erectile Dysfunction LF 5/12/20 (30 day supply)    Historical Provider, MD   omeprazole (PRILOSEC) 20 MG delayed release capsule Take 20 mg by mouth every evening LF 4/21/20 (90 day supply)    Historical Provider, MD        Allergies:     Latex, Bee venom, Lisinopril, Niacin and related, Sulfa antibiotics, and Terazosin    Social History:     Tobacco:    reports that he quit smoking about 49 years ago. His smoking use included cigarettes. He has a 2.00 pack-year smoking history. He has never used smokeless tobacco.  Alcohol:      reports current alcohol use. Drug Use:  reports current drug use. Drug: Marijuana [facility]). Family History:     Family History   Problem Relation Age of Onset   [de-identified] Dementia Mother     Coronary Art Dis Mother     Stroke Mother     Diabetes Mother     Asthma Mother     Other Mother         BRAIN ANEURISM    High Blood Pressure Mother     Heart Disease Father     Diabetes Sister     Diabetes Brother     High Blood Pressure Brother     High Cholesterol Brother     Heart Disease Brother     Diabetes Sister     Other Sister         NEUROPATHY       Review of Systems:     Positive and Negative as described in HPI. Review of Systems   Constitutional: Negative for chills, diaphoresis and fever. HENT: Negative for congestion and hearing loss. Respiratory: Negative for cough, shortness of breath, wheezing and stridor. Cardiovascular: Negative for chest pain, palpitations and leg swelling. Gastrointestinal: Positive for constipation. Negative for abdominal pain, blood in stool, diarrhea, nausea and vomiting. Genitourinary: Positive for difficulty urinating. Negative for dysuria and frequency. Musculoskeletal: Positive for back pain, gait problem, neck pain and neck stiffness. Negative for myalgias. Skin: Negative for rash. Neurological: Positive for weakness. Negative for dizziness, seizures and headaches. Psychiatric/Behavioral: The patient is not nervous/anxious. Physical Exam:   There were no vitals taken for this visit. Temp (24hrs), Av.4 °F (36.9 °C), Min:98.1 °F (36.7 °C), Max:98.6 °F (37 °C)    No results for input(s): POCGLU in the last 72 hours. No intake or output data in the 24 hours ending 22    Physical Exam  Vitals and nursing note reviewed. Constitutional:       General: He is not in acute distress. Appearance: He is well-developed. He is not diaphoretic. HENT:      Head: Normocephalic and atraumatic. Right Ear: Hearing normal.      Left Ear: Hearing normal.      Nose: Nose normal. No rhinorrhea. Eyes:      General: Lids are normal.      Extraocular Movements:      Right eye: Normal extraocular motion. Left eye: Normal extraocular motion. Conjunctiva/sclera: Conjunctivae normal.      Right eye: Right conjunctiva is not injected. Left eye: Left conjunctiva is not injected. Pupils: Pupils are equal, round, and reactive to light. Pupils are equal.      Right eye: Pupil is reactive. Left eye: Pupil is reactive. Neck:      Thyroid: No thyromegaly. Vascular: No carotid bruit. Trachea: Trachea and phonation normal. No tracheal deviation. Cardiovascular:      Rate and Rhythm: Normal rate and regular rhythm. Pulses: Normal pulses. Heart sounds: Normal heart sounds. No murmur heard. Pulmonary:      Effort: Pulmonary effort is normal. No respiratory distress. Breath sounds: Normal breath sounds. No stridor. No decreased breath sounds, wheezing, rhonchi or rales. Abdominal:      General: Bowel sounds are normal. There is no distension. Palpations: Abdomen is soft. There is no mass. Tenderness: There is no abdominal tenderness. There is no guarding. Genitourinary:     Comments: Condom cath, bladder scan to be done   Musculoskeletal:         General: No tenderness. Cervical back: Neck supple.       Comments: LUE with limited ability to raise off bet   RUE weakness  BLE weakness    Feet:      Comments: Scabbed abrasions to toes   Ppp   Skin:     General: Skin is warm and dry. Findings: Abrasion and ecchymosis present. No erythema, lesion or rash. Neurological:      Mental Status: He is alert and oriented to person, place, and time. He is not disoriented. GCS: GCS eye subscore is 4. GCS verbal subscore is 5. GCS motor subscore is 6. Cranial Nerves: No cranial nerve deficit, dysarthria or facial asymmetry. Sensory: No sensory deficit. Psychiatric:         Attention and Perception: Attention normal.         Mood and Affect: Mood normal.         Speech: Speech normal.         Behavior: Behavior normal. Behavior is cooperative. Investigations:      Laboratory Testing:  No results found for this or any previous visit (from the past 24 hour(s)). Imaging/Diagnostics:  CT HEAD WO CONTRAST    Result Date: 3/20/2022  No acute intracranial abnormality. RECOMMENDATIONS: Unavailable     CT CERVICAL SPINE WO CONTRAST    Result Date: 3/20/2022  Stable cervical spine CT examination status post corpectomy and anterior fusion from C4-C7. No acute fracture or traumatic malalignment. RECOMMENDATIONS: Unavailable     CT THORACIC SPINE WO CONTRAST    Result Date: 3/20/2022  Multilevel degenerative changes with no acute fracture or traumatic malalignment of the thoracolumbar spine. RECOMMENDATIONS: Unavailable     CT LUMBAR SPINE WO CONTRAST    Result Date: 3/20/2022  Multilevel degenerative changes with no acute fracture or traumatic malalignment of the thoracolumbar spine. RECOMMENDATIONS: Unavailable     MRI CERVICAL SPINE WO CONTRAST    Result Date: 3/21/2022  1. Patient motion limits evaluation. 2. There is severe spinal canal stenosis at C2-C3 and C3-C4 with complete effacement of the CSF at these levels. 3. No significant spinal canal stenosis at the remaining levels. 4. Multilevel neural foraminal narrowing as above.  5. There is question of minimal T2 hyperintensity within the cord at C4-C5, which likely represents myelomalacia. MRI THORACIC SPINE WO CONTRAST    Result Date: 3/22/2022  Mild multilevel degenerative changes of the thoracic spine, as described above with mild spinal canal stenosis from T8-9 to T10-11, most pronounced at T10-11. MRI LUMBAR SPINE WO CONTRAST    Result Date: 3/22/2022  1. Moderate spinal canal stenosis from L2-L3 to L4-L5, as described above. 2. Mild spinal canal stenosis at L1-L2, as described above. 3. Multilevel neural foraminal narrowing, most severe at L2-L3. MRI BRAIN WO CONTRAST    Result Date: 3/21/2022  1. No acute intracranial abnormality. No acute infarct. 2. Mild global parenchymal volume loss with minimal chronic microvascular ischemic changes. Assessment :      Hospital Problems           Last Modified POA    * (Principal) Cord compression ([address] 3/23/2022 Yes    Hypertension 3/23/2022 Yes    Hyperlipidemia 3/23/2022 Yes    Diabetes mellitus ([address] 3/23/2022 Yes    Bipolar disorder, mixed ([address] 3/23/2022 Yes    Severe recurrent major depression without psychotic features ([address] 3/23/2022 Yes    Cervical spinal cord compression ([address] 3/21/2022 Yes          Plan:     Patient status inpatient in the Progressive Unit/Step down    1. Neurosurgery consultation  2. In the event patient will need surgical intervention, will obtain EKG, CXR, if not already done. 3. Resume Norvasc and cozaar. Monitor electrolytes and blood pressures. 4. Patient has not been taking metformin for DM, awaiting accurate medication list, will start ISS and hypoglycemia protocol. Resume oral antidiabetics once the decision +/- for surgery is made. 5. Bipolar/depression- monitor mood, medication review once list updated. 6. Code status- DNRCC-A/ LIMITED- wants intubation, wants pressor support. \  7. GI prophylaxis with diet  8. DVT prophylaxis with lovenox, hold if surgery decision is made for following day. Consultations:   IP CONSULT TO NEUROSURGERY     Patient is admitted as inpatient status because of co-morbidities listed above, severity of signs and symptoms as outlined, requirement for current medical therapies and most importantly because of direct risk to patient if care not provided in a hospital setting. Expected length of stay > 48 hours.     KASSIDY Ortiz - [facility]  3/23/2022  8:23 PM    Copy sent to Dr. Leta Quintero

## 2022-03-25 ENCOUNTER — APPOINTMENT (OUTPATIENT)
Dept: GENERAL RADIOLOGY | Age: 72
DRG: 471 | End: 2022-03-25
Attending: STUDENT IN AN ORGANIZED HEALTH CARE EDUCATION/TRAINING PROGRAM
Payer: OTHER GOVERNMENT

## 2022-03-25 LAB
EKG ATRIAL RATE: 81 BPM
EKG P AXIS: 31 DEGREES
EKG P-R INTERVAL: 176 MS
EKG Q-T INTERVAL: 374 MS
EKG QRS DURATION: 94 MS
EKG QTC CALCULATION (BAZETT): 434 MS
EKG R AXIS: -21 DEGREES
EKG T AXIS: 2 DEGREES
EKG VENTRICULAR RATE: 81 BPM

## 2022-03-25 PROCEDURE — 97162 PT EVAL MOD COMPLEX 30 MIN: CPT

## 2022-03-25 PROCEDURE — 6360000002 HC RX W HCPCS: Performed by: NURSE PRACTITIONER

## 2022-03-25 PROCEDURE — 99232 SBSQ HOSP IP/OBS MODERATE 35: CPT | Performed by: INTERNAL MEDICINE

## 2022-03-25 PROCEDURE — APPSS15 APP SPLIT SHARED TIME 0-15 MINUTES: Performed by: NURSE PRACTITIONER

## 2022-03-25 PROCEDURE — 99222 1ST HOSP IP/OBS MODERATE 55: CPT | Performed by: STUDENT IN AN ORGANIZED HEALTH CARE EDUCATION/TRAINING PROGRAM

## 2022-03-25 PROCEDURE — 97530 THERAPEUTIC ACTIVITIES: CPT

## 2022-03-25 PROCEDURE — 1200000000 HC SEMI PRIVATE

## 2022-03-25 PROCEDURE — 6370000000 HC RX 637 (ALT 250 FOR IP): Performed by: NURSE PRACTITIONER

## 2022-03-25 PROCEDURE — 2580000003 HC RX 258: Performed by: NURSE PRACTITIONER

## 2022-03-25 PROCEDURE — 51701 INSERT BLADDER CATHETER: CPT

## 2022-03-25 PROCEDURE — 72040 X-RAY EXAM NECK SPINE 2-3 VW: CPT

## 2022-03-25 PROCEDURE — 6370000000 HC RX 637 (ALT 250 FOR IP): Performed by: STUDENT IN AN ORGANIZED HEALTH CARE EDUCATION/TRAINING PROGRAM

## 2022-03-25 PROCEDURE — 51798 US URINE CAPACITY MEASURE: CPT

## 2022-03-25 RX ORDER — BACLOFEN 10 MG/1
10 TABLET ORAL 3 TIMES DAILY
Status: DISCONTINUED | OUTPATIENT
Start: 2022-03-25 | End: 2022-03-26

## 2022-03-25 RX ORDER — BETHANECHOL CHLORIDE 25 MG/1
25 TABLET ORAL 3 TIMES DAILY
Status: DISCONTINUED | OUTPATIENT
Start: 2022-03-25 | End: 2022-03-29

## 2022-03-25 RX ADMIN — DIVALPROEX SODIUM 750 MG: 250 TABLET, FILM COATED, EXTENDED RELEASE ORAL at 20:51

## 2022-03-25 RX ADMIN — DIPHENHYDRAMINE HCL 12.5 MG: 25 TABLET ORAL at 00:47

## 2022-03-25 RX ADMIN — OXYCODONE HYDROCHLORIDE 10 MG: 5 TABLET ORAL at 16:56

## 2022-03-25 RX ADMIN — OXYCODONE HYDROCHLORIDE 10 MG: 5 TABLET ORAL at 01:42

## 2022-03-25 RX ADMIN — OXYCODONE HYDROCHLORIDE 10 MG: 5 TABLET ORAL at 20:51

## 2022-03-25 RX ADMIN — VENLAFAXINE 75 MG: 75 TABLET ORAL at 11:11

## 2022-03-25 RX ADMIN — OXYCODONE HYDROCHLORIDE 10 MG: 5 TABLET ORAL at 06:11

## 2022-03-25 RX ADMIN — BISACODYL 5 MG: 5 TABLET, COATED ORAL at 11:07

## 2022-03-25 RX ADMIN — FUROSEMIDE 20 MG: 20 TABLET ORAL at 11:06

## 2022-03-25 RX ADMIN — BETHANECHOL CHLORIDE 25 MG: 25 TABLET ORAL at 14:12

## 2022-03-25 RX ADMIN — BACLOFEN 10 MG: 10 TABLET ORAL at 11:26

## 2022-03-25 RX ADMIN — LOSARTAN POTASSIUM 12.5 MG: 25 TABLET, FILM COATED ORAL at 11:05

## 2022-03-25 RX ADMIN — CARBOXYMETHYLCELLULOSE SODIUM 2 DROP: 10 GEL OPHTHALMIC at 14:12

## 2022-03-25 RX ADMIN — MORPHINE SULFATE 4 MG: 4 INJECTION INTRAVENOUS at 03:45

## 2022-03-25 RX ADMIN — CARBOXYMETHYLCELLULOSE SODIUM 2 DROP: 10 GEL OPHTHALMIC at 20:51

## 2022-03-25 RX ADMIN — DIVALPROEX SODIUM 500 MG: 250 TABLET, FILM COATED, EXTENDED RELEASE ORAL at 11:06

## 2022-03-25 RX ADMIN — SODIUM CHLORIDE, PRESERVATIVE FREE 10 ML: 5 INJECTION INTRAVENOUS at 11:28

## 2022-03-25 RX ADMIN — CYCLOBENZAPRINE 10 MG: 10 TABLET, FILM COATED ORAL at 20:51

## 2022-03-25 RX ADMIN — CYCLOBENZAPRINE 10 MG: 10 TABLET, FILM COATED ORAL at 11:26

## 2022-03-25 RX ADMIN — BETHANECHOL CHLORIDE 25 MG: 25 TABLET ORAL at 20:51

## 2022-03-25 RX ADMIN — MORPHINE SULFATE 4 MG: 4 INJECTION INTRAVENOUS at 18:04

## 2022-03-25 RX ADMIN — TRAZODONE HYDROCHLORIDE 150 MG: 100 TABLET ORAL at 20:51

## 2022-03-25 RX ADMIN — Medication 2000 MG: at 06:11

## 2022-03-25 RX ADMIN — SODIUM CHLORIDE, PRESERVATIVE FREE 10 ML: 5 INJECTION INTRAVENOUS at 20:53

## 2022-03-25 RX ADMIN — VENLAFAXINE 75 MG: 75 TABLET ORAL at 17:10

## 2022-03-25 RX ADMIN — AMLODIPINE BESYLATE 7.5 MG: 5 TABLET ORAL at 11:07

## 2022-03-25 RX ADMIN — CARBOXYMETHYLCELLULOSE SODIUM 2 DROP: 10 GEL OPHTHALMIC at 11:07

## 2022-03-25 RX ADMIN — PANTOPRAZOLE SODIUM 40 MG: 40 TABLET, DELAYED RELEASE ORAL at 11:07

## 2022-03-25 RX ADMIN — BETHANECHOL CHLORIDE 25 MG: 25 TABLET ORAL at 11:26

## 2022-03-25 RX ADMIN — BACLOFEN 10 MG: 10 TABLET ORAL at 14:12

## 2022-03-25 RX ADMIN — Medication 2000 MG: at 00:13

## 2022-03-25 RX ADMIN — BACLOFEN 10 MG: 10 TABLET ORAL at 20:51

## 2022-03-25 ASSESSMENT — ENCOUNTER SYMPTOMS
SHORTNESS OF BREATH: 0
DOUBLE VISION: 0
VOMITING: 0
NAUSEA: 0
BLURRED VISION: 0
ABDOMINAL PAIN: 0
SHORTNESS OF BREATH: 1
COUGH: 0

## 2022-03-25 ASSESSMENT — PAIN DESCRIPTION - PAIN TYPE
TYPE: SURGICAL PAIN

## 2022-03-25 ASSESSMENT — PAIN DESCRIPTION - ORIENTATION
ORIENTATION: POSTERIOR
ORIENTATION: LOWER
ORIENTATION: POSTERIOR

## 2022-03-25 ASSESSMENT — PAIN DESCRIPTION - DESCRIPTORS
DESCRIPTORS: ACHING;DISCOMFORT
DESCRIPTORS: ACHING;DISCOMFORT
DESCRIPTORS: ACHING;DISCOMFORT;SORE

## 2022-03-25 ASSESSMENT — PAIN SCALES - GENERAL
PAINLEVEL_OUTOF10: 10
PAINLEVEL_OUTOF10: 9

## 2022-03-25 ASSESSMENT — PAIN DESCRIPTION - LOCATION
LOCATION: NECK;BACK
LOCATION: NECK
LOCATION: NECK;BACK

## 2022-03-25 ASSESSMENT — PAIN DESCRIPTION - PROGRESSION
CLINICAL_PROGRESSION: GRADUALLY IMPROVING
CLINICAL_PROGRESSION: GRADUALLY IMPROVING

## 2022-03-25 ASSESSMENT — PAIN DESCRIPTION - ONSET: ONSET: ON-GOING

## 2022-03-25 ASSESSMENT — PAIN DESCRIPTION - FREQUENCY
FREQUENCY: CONTINUOUS
FREQUENCY: CONTINUOUS

## 2022-03-25 NOTE — FLOWSHEET NOTE
Assessment: Patient is a 70 y.o. male who was admitted to the hospital due to \"cervial cord compression. \" Patient was in hospital bed, with visitor at bedside, when  visited. Intervention:  visited patient per initial rounding visits.  introduced herself to patient and family and inquired about his well-being. Patient was in good spirits and shared that his  has already visited him in the hospital. Patient indicated that he will be staying in the hospital for several more days and will then move to a rehab facility. Patient and family indicated no needs and thanked  for visit. Outcome: Patient was receptive of 's visit and support. Plan: Chaplains can make follow-up visit, per request. Nereida Bridges can be reached 24/7 via [facility]. Veronica Bell     03/25/22 4344   Encounter Summary   Services provided to: Patient and family together   Referral/Consult From: Devon Jim[facility]nez [facility] of [facility]  ([facility])   [employer] Completed  Zeus Moya has already visited, per patient)   Continue Visiting   (3/24/2022)   Complexity of Encounter Low   Length of Encounter 15 minutes   Spiritual Assessment Completed Yes   Routine   Type Initial   Spiritual/Hindu   Type Spiritual support   Assessment Approachable; Hopeful;Coping   Intervention Active listening;Nurtured hope;Sustaining presence/ Ministry of presence; Discussed illness/injury and it's impact   Outcome Expressed gratitude;Coping;Receptive

## 2022-03-25 NOTE — PROGRESS NOTES
Physical Therapy    Facility/Department: [address] STEP DOWN  Initial Assessment    NAME: Ninfa Scott  : 1950  MRN: [id number]  s/p C2-C5 laminectomy and fixation 3/24  Date of Service: 3/25/2022    Discharge Recommendations: Further therapy recommended at discharge. The patient should be able to tolerate at least 3 hours of therapy per day over 5 days or 15 hours over 7 days. This patient may benefit from a Physical Medicine and Rehab consult. Assessment   Body structures, Functions, Activity limitations: Decreased functional mobility ; Decreased ROM; Decreased strength;Decreased endurance;Decreased balance; Increased pain;Decreased posture  Assessment: The pt required Max A x2 for bed mobility and Mod-Max A to maintain sitting EOB this date, limited by significant pain. The pt is very cooperative and motivated to participate in therapy and would benefit from intense PT to progress toward prior level of independence. Prognosis: Fair  Decision Making: Medium Complexity  PT Education: Goals;PT Role;Plan of Care; Functional Mobility Training  REQUIRES PT FOLLOW UP: Yes  Activity Tolerance  Activity Tolerance: Patient limited by pain       Patient Diagnosis(es): There were no encounter diagnoses. has a past medical history of Depression, Diabetes mellitus ([facility] [address] Difficult intravenous access, Hyperlipidemia, Hypertension, Migraines, PTSD (post-traumatic stress disorder), Sleep apnea, Teeth missing, and Wears glasses. has a past surgical history that includes Cardiac catheterization (2010); Carpal tunnel release (Left, 2002); Vasectomy (1983); Tonsillectomy and adenoidectomy (); Hydrocele surgery (); Middle ear surgery (2018); eye surgery (Left, ); Mouth surgery (2018); other surgical history (2018); cervical fusion (2018); pr office/outpt visit,procedure only (N/A, 2018);  Cataract removal; laminectomy (2022); and cervical fusion (N/A, 3/24/2022). Restrictions  Restrictions/Precautions  Restrictions/Precautions: Fall Risk,General Precautions  Required Braces or Orthoses?: Yes  Required Braces or Orthoses  Cervical: c-collar  Position Activity Restriction  Other position/activity restrictions: up with assist, s/p C2-C5 laminectomy and fixation 3/24  Vision/Hearing  Vision: Impaired  Vision Exceptions: Wears glasses at all times  Hearing: Exceptions to [facility]  Hearing Exceptions: Left hearing aid     Subjective  General  Patient assessed for rehabilitation services?: Yes  Response To Previous Treatment: Not applicable  Family / Caregiver Present: No  Follows Commands: Within Functional Limits  Subjective  Subjective: RN and pt agreeable to PT. Pt supine in bed upon arrival, pleasant and cooperative throughout. Pain Screening  Patient Currently in Pain: Yes  Pain Assessment  Pain Assessment: 0-10  Pain Level: 10  Pain Type: Surgical pain  Pain Location: Neck  Pain Orientation: Posterior  Pain Descriptors: Aching;Discomfort; Sore  Pain Frequency: Continuous  Non-Pharmaceutical Pain Intervention(s): Ambulation/Increased Activity;Repositioned  Response to Pain Intervention: Patient Satisfied  Vital Signs  Patient Currently in Pain: Yes       Orientation  Orientation  Overall Orientation Status: Within Functional Limits  Social/Functional History  Social/Functional History  Lives With: Significant other  Type of Home: House  Home Layout: One level  Home Access: Stairs to enter without rails  Entrance Stairs - Number of Steps: one step at entrance  Bathroom Shower/Tub: Tub/Shower unit,Shower chair with back,Curtain  Bathroom Toilet: Handicap height  Bathroom Equipment: Hand-held shower  Bathroom Accessibility: Walker accessible  Home Equipment: 4 wheeled walker,Reacher,Cane  Receives Help From: Family  ADL Assistance: Independent (in January 2022)  Homemaking Assistance: Independent  Homemaking Responsibilities: Yes (January 2022)  Ambulation Assistance: Independent  Transfer Assistance: Independent  Active : Yes  Patient's  Info: Family is currently assisting with transportation - Was driving until end of Jan 2022  Mode of Transportation: Car  Occupation: Retired  Leisure & Hobbies: Maintenance  Additional Comments: Information obtained from prior PT evaluation and confirmed with pt. Pt reports requiring assistance for IADLs, ADLs, ambulation, transfers and driving beginning in Feb 2022.   Cognition   Cognition  Overall Cognitive Status: WFL    Objective          Joint Mobility  Spine: WFL- with cervical collar on, pt is very stiff d/t pain  ROM RLE: PROM WFL, pt demo extensor tone; AAROM hip flexion ~15 deg, knee flexion ~20 deg; ankle WFL  ROM LLE: PROM WFL, pt demo extensor tone; AAROM hip flexion ~15 deg, knee flexion ~20 deg; ankle WFL  ROM RUE: PROM to 20 deg shoulder flexion, AAROM to 15 deg shoulder flexion  ROM LUE: PROM to 10 deg shoulder flexion  Strength RLE  Strength RLE: Exception  Comment: hip flexion 1/5, knee flexion 2-/5, knee extension 2+/5, DF/PF 3+/5  Strength LLE  Strength LLE: Exception  Comment: hip flexion 1/5, knee flexion 2-/5, knee extension 2+/5, DF/PF 3+/5  Strength RUE  Strength RUE: Exception  Comment: shoulder flexion 2+/5, ER/IR 1/5, pronation/supination 0/5, elbow flexion/extension 2-/5 (pt demo increased tone throughout strength assessment)  Strength LUE  Strength LUE: Exception  Comment: shoulder flexion 2+/5, ER/IR 1/5, pronation/supination 0/5, elbow flexion/extension 1/5 (pt demo increased tone throughout strength assessment)  Tone RLE  RLE Tone: Hypertonic  Tone LLE  LLE Tone: Hypertonic  Sensation  Overall Sensation Status: Impaired  Additional Comments: Pt reports numbness beginning posteriorly on the neck and extending distally into all extremities  Bed mobility  Supine to Sit: Maximum assistance;2 Person assistance  Sit to Supine: Maximum assistance;2 Person assistance  Scooting: Maximal assistance  Comment: HOB elevated ~70 degrees, c-collar donned prior to mobility, increased time required  Transfers  Comment: unable to attempt due to poor sitting balance/tolerance  Ambulation  Ambulation?: No  Stairs/Curb  Stairs?: No     Balance  Posture: Fair  Sitting - Static: Poor  Sitting - Dynamic: Poor  Comments: Pt sat EOB ~15 minutes with Mod-Max A throoughout with RUE on bedrail        Plan   Plan  Times per week: 5 to 6 x/week  Current Treatment Recommendations: Strengthening,ROM,Balance Training,Transfer Training,Endurance Training,Patient/Caregiver Education & Training,Safety Education & Training,Equipment Evaluation, Education, & procurement,Positioning,Gait Training,Neuromuscular Re-education,Wheelchair Mobility Training,Functional Mobility Training  Safety Devices  Type of devices: Left in bed,Call light within reach,Nurse notified,Gait belt  Restraints  Initially in place: No             AM-PAC Score  AM-PAC Inpatient Mobility Raw Score : 8 (03/25/22 [employer])  AM-PAC Inpatient T-Scale Score : 28.52 (03/25/22 [employer])  Mobility Inpatient CMS 0-100% Score: 86.62 (03/25/22 [employer])  Mobility Inpatient CMS G-Code Modifier : CM (03/25/22 [employer])          Goals  Short term goals  Time Frame for Short term goals: 14 visits  Short term goal 1: Perform bed mobility with Max A x1  Short term goal 2: Perform sit to stand with Mod A and appropriate AD  Short term goal 3: Ambulate 50ft with appropriate AD and Mod A  Short term goal 4: Propel wheelchair 200ft with SBA  Short term goal 5: Demo Fair- dynamic standing balance to decrease risk of falls       Therapy Time   Individual Concurrent Group Co-treatment   Time In 1321         Time Out 1403         Minutes 42         Timed Code Treatment Minutes: 28 Minutes       Antonina Swann PT

## 2022-03-25 NOTE — CARE COORDINATION
Case Management Initial Discharge Plan  Orion Mcdermott,             Met with:patient to discuss discharge plans. Information verified: address, contacts, phone number, , insurance Yes  Insurance Provider: [facility]    Emergency Contact/Next of Kin name & number: Loyda Byrnes, ex-wife [phone]  Randee Ro [phone]  Who are involved in patient's support system? Ex-wife and son    PCP: Yaniv Cotton  Date of last visit:      Discharge Planning    Living Arrangements:  Spouse/Significant Other     Home has 1 stories  2 stairs to climb to get into front door      Patient able to perform ADL's:Dependent on ex-wife    Current Services (outpatient & in home) None  DME equipment: rollator, cane  DME provider:     Is patient receiving oral anticoagulation therapy? No    If indicated:   Physician managing anticoagulation treatment:   Where does patient obtain lab work for ATC treatment? Potential Assistance Needed: SNF  Patient agreeable to home care: Yes  Freedom of choice provided: Will provide home care list if able to return home    Prior SNF/Rehab Placement and Facility: No  Agreeable to SNF/Rehab: Yes  [place] of choice provided: yes, First choice is [facility]. If unable to go to [facility]. [employer] [facility] [place] is 2nd choice. Annabella Andrade Referral was made while at [facility] and Gustav Bumpers made referral again today to provide updates  third choice is [address] Evaluation: no    Expected Discharge date:  22    Patient expects to be discharged to:   Rehab vs SNF    If home: is the family and/or caregiver wiling & able to provide support at home?yes  Who will be providing this support?  Ex-wife ( now is fiance again)    Follow Up Appointment: Best Day/ Time: Monday AM    Transportation provider: ambulance  Transportation arrangements needed for discharge: Yes    Readmission Risk              Risk of Unplanned Readmission:  17             Does patient have a readmission risk score greater than 14?: Yes  If yes, follow-up appointment must be made within 7 days of discharge. Goals of Care:       Educated pt and ex wife on transitional options, provided freedom of choice and are agreeable with plan      Discharge Plan:!st choice is [facility] rehab. If no bed is available agreeable to 2)[facility] and 3)[facility].           Electronically signed by AKIRA Chan on 3/25/22 at 4:25 PM EDT

## 2022-03-25 NOTE — CONSULTS
Physical Medicine & Rehabilitation  Consult Note      Admitting Physician:  Christo Vance DO    Primary Care Provider:  Suri Ruiz     Reason for Consult:  Acute Inpatient Rehabilitation    Chief Complaint:  Fall    History of Present Illness:  Referring Provider is requesting an evaluation for appropriate placement upon discharge from acute care. History from chart review and patient. Ariana Azar is a 70 y.o. male with history of HTN, HLD, diabetes, GIA, migraines, PTSD, and depression admitted to [facility] on 3/23/2022. He initially presented to [facility] after a fall with subsequent worsening limb weakness and additional falls. MRI brain showed no acute intracranial abnormality. MRI cervical spine showed severe spinal stenosis at C2-C3 and C3-C4 with complete effacement of the CSF. MRI thoracic and lumbar spine were relatively unremarkable except for moderate spinal stenosis from L2-L3 to L4-L5. He was transferred to [facility] for neurosurgical evaluation. He underwent laminectomy and fusion at C2-C5 on 3/24/22 (Dr. Gulshan Crystal). Hospital course has been complicated by urinary retention. He reports continued neck pain with pain in the limbs as well. He also notes numbness/tingling and weakness in all limbs, headache, and urinary retention. Review of Systems:  Review of Systems   Constitutional: Negative for fever. HENT: Negative for hearing loss. Eyes: Negative for blurred vision and double vision. Respiratory: Positive for shortness of breath. Cardiovascular: Positive for chest pain. Gastrointestinal: Negative for abdominal pain. Genitourinary:        +urinary retention   Musculoskeletal: Positive for neck pain. Skin: Negative for rash. Neurological: Positive for sensory change, weakness and headaches.       Premorbid function:  Independent (prior to 1/2022)    Current function:    PT:  Restrictions/Precautions: Fall Risk,General Precautions  Other position/activity restrictions: up with assist, s/p C2-C5 laminectomy and fixation 3/24  Required Braces or Orthoses  Cervical: c-collar   Transfers  Comment: unable to attempt due to poor sitting balance/tolerance       Transfers  Comment: unable to attempt due to poor sitting balance/tolerance  Ambulation  Ambulation?: No       Bed mobility  Supine to Sit: Maximum assistance,2 Person assistance  Sit to Supine: Maximum assistance,2 Person assistance  Scooting: Maximal assistance  Comment: HOB elevated ~70 degrees, c-collar donned prior to mobility, increased time required       OT:                                            SLP:      Past Medical History:        Diagnosis Date    Depression 2017    ON RX    Diabetes mellitus ([facility] [address] 2013    NIDDM    Difficult intravenous access     Hyperlipidemia 2008    ON RX    Hypertension 2008    ON RX    Migraines     PTSD (post-traumatic stress disorder) 01/2018    ON RX    Sleep apnea 01/2018    UNALBE TO USE TO CLEARED BY ENT (CPAP)    Teeth missing     BACK TEETH UPPER AND LOWER TO BE FITTED FOR PARTIALS AT LATER DATE    Wears glasses        Past Surgical History:        Procedure Laterality Date    CARDIAC CATHETERIZATION  02/2010    no stents     CARPAL TUNNEL RELEASE Left 01/09/2002    CATARACT REMOVAL      CERVICAL FUSION  04/19/2018    anterior cervical fusion C5-6    CERVICAL FUSION N/A 3/24/2022    C2-5 FUSION, C2-4 LAMINECTOMY performed by Jenna Townsend DO at [address] Left 2018    CATARACT EXTRACTION WITH IOL    HYDROCELE EXCISION  1951    LAMINECTOMY  03/24/2022    C2-5 FUSION, C2-4 LAMINECTOMY    MIDDLE EAR SURGERY  01/11/2018    MIDDLE EAR REBUILT AND EAR DRUM REPLACED    MOUTH SURGERY  04/16/2018    5 TEETH REMOVED    OTHER SURGICAL HISTORY  04/19/2018    : ANTERIOR CERVICAL CORRPECTOMY C5-6, SYNTHES, DEPUY, REG TABLE, SUPINE,     CO OFFICE/OUTPT VISIT,PROCEDURE ONLY N/A 4/19/2018    ANTERIOR CERVICAL CORRPECTOMY AND FUSION C5-6 performed by Arnold Riggs DO at [facility]  12/1983       Allergies:     Allergies   Allergen Reactions    Latex Itching    Bee Venom Swelling    Lisinopril Other (See Comments)     Cough      Niacin And Related Rash    Sulfa Antibiotics Rash    Terazosin Rash        Current Medications:   Current Facility-Administered Medications: baclofen (LIORESAL) tablet 10 mg, 10 mg, Oral, TID  bethanechol (URECHOLINE) tablet 25 mg, 25 mg, Oral, TID  divalproex (DEPAKOTE ER) extended release tablet 750 mg, 750 mg, Oral, Nightly  carboxymethylcellulose PF (THERATEARS) 1 % ophthalmic gel 2 drop, 2 drop, Both Eyes, TID  amLODIPine (NORVASC) tablet 7.5 mg, 7.5 mg, Oral, Daily  pantoprazole (PROTONIX) tablet 40 mg, 40 mg, Oral, QAM AC  furosemide (LASIX) tablet 20 mg, 20 mg, Oral, Daily  venlafaxine (EFFEXOR) tablet 75 mg, 75 mg, Oral, TID WC  divalproex (DEPAKOTE ER) extended release tablet 500 mg, 500 mg, Oral, QAM  sodium chloride flush 0.9 % injection 5-40 mL, 5-40 mL, IntraVENous, 2 times per day  sodium chloride flush 0.9 % injection 5-40 mL, 5-40 mL, IntraVENous, PRN  0.9 % sodium chloride infusion, 25 mL, IntraVENous, PRN  oxyCODONE (ROXICODONE) immediate release tablet 5 mg, 5 mg, Oral, Q4H PRN **OR** oxyCODONE (ROXICODONE) immediate release tablet 10 mg, 10 mg, Oral, Q4H PRN  morphine (PF) injection 2 mg, 2 mg, IntraVENous, Q2H PRN **OR** morphine injection 4 mg, 4 mg, IntraVENous, Q2H PRN  cyclobenzaprine (FLEXERIL) tablet 10 mg, 10 mg, Oral, TID PRN  bisacodyl (DULCOLAX) EC tablet 5 mg, 5 mg, Oral, Daily  senna (SENOKOT) 8.8 MG/5ML syrup 8.8 mg, 5 mL, Oral, BID PRN  sodium chloride flush 0.9 % injection 5-40 mL, 5-40 mL, IntraVENous, 2 times per day  sodium chloride flush 0.9 % injection 10 mL, 10 mL, IntraVENous, PRN  0.9 % sodium chloride infusion, 25 mL, IntraVENous, PRN  potassium chloride (KLOR-CON M) extended release tablet 40 mEq, 40 mEq, Oral, PRN **OR** potassium bicarb-citric acid (EFFER-K) effervescent tablet 40 mEq, 40 mEq, Oral, PRN **OR** potassium chloride 10 mEq/100 mL IVPB (Peripheral Line), 10 mEq, IntraVENous, PRN  magnesium sulfate 1000 mg in dextrose 5% 100 mL IVPB, 1,000 mg, IntraVENous, PRN  enoxaparin (LOVENOX) injection 40 mg, 40 mg, SubCUTAneous, Daily  ondansetron (ZOFRAN-ODT) disintegrating tablet 4 mg, 4 mg, Oral, Q8H PRN **OR** ondansetron (ZOFRAN) injection 4 mg, 4 mg, IntraVENous, Q6H PRN  polyethylene glycol (GLYCOLAX) packet 17 g, 17 g, Oral, Daily PRN  acetaminophen (TYLENOL) tablet 650 mg, 650 mg, Oral, Q6H PRN **OR** acetaminophen (TYLENOL) suppository 650 mg, 650 mg, Rectal, Q6H PRN  losartan (COZAAR) tablet 12.5 mg, 12.5 mg, Oral, Daily  traZODone (DESYREL) tablet 150 mg, 150 mg, Oral, Nightly  glucose (GLUTOSE) 40 % oral gel 15 g, 15 g, Oral, PRN  dextrose 50 % IV solution, 12.5 g, IntraVENous, PRN  glucagon (rDNA) injection 1 mg, 1 mg, IntraMUSCular, PRN  dextrose 5 % solution, 100 mL/hr, IntraVENous, PRN    Family History:       Problem Relation Age of Onset    Dementia Mother     Coronary Art Dis Mother     Stroke Mother     Diabetes Mother     Asthma Mother     Other Mother         BRAIN ANEURISM    High Blood Pressure Mother     Heart Disease Father     Diabetes Sister     Diabetes Brother     High Blood Pressure Brother     High Cholesterol Brother     Heart Disease Brother     Diabetes Sister     Other Sister         NEUROPATHY       Social History:  Lives With: Significant other  Type of Home: House  Home Layout: One level  Home Access: Stairs to enter without rails  Entrance Stairs - Number of Steps: one step at entrance  Bathroom Shower/Tub: Tub/Shower unit,Shower chair with back,Curtain  Bathroom Toilet: Handicap height  Bathroom Equipment: Hand-held shower  Bathroom Accessibility: Walker accessible  Home Equipment: 4 wheeled walker,Reacher,Cane  Receives Help From: Family  ADL Assistance: Independent (in 2022)  Homemaking Assistance: [address] Responsibilities: Yes (2022)  Ambulation Assistance: Independent  Transfer Assistance: Independent  Active : Yes  Patient's  Info: Family is currently assisting with transportation - Was driving until end of 2022  Mode of Transportation: Car  Occupation: Retired  Leisure & Hobbies: Maintenance  Additional Comments: Information obtained from prior PT evaluation and confirmed with pt. Pt reports requiring assistance for IADLs, ADLs, ambulation, transfers and driving beginning in 2022. Social History     Socioeconomic History    Marital status:      Spouse name: None    Number of children: None    Years of education: None    Highest education level: None   Occupational History    None   Tobacco Use    Smoking status: Former Smoker     Packs/day: 0.50     Years: 4.00     Pack years: 2.00     Types: Cigarettes     Quit date: 1972     Years since quittin.9    Smokeless tobacco: Never Used    Tobacco comment: quit    Vaping Use    Vaping Use: Never used   Substance and Sexual Activity    Alcohol use: Yes     Comment: MIXED DRINKS- 3 OR 4 A YEAR; last 2018    Drug use: Yes     Types: Marijuana Dariela Bridgett)    Sexual activity: Yes     Partners: Female   Other Topics Concern    None   Social History Narrative    None     Social Determinants of Health     Financial Resource Strain:     Difficulty of Paying Living Expenses: Not on file   Food Insecurity:     Worried About Running Out of Food in the Last Year: Not on file    Judah of Food in the Last Year: Not on file   Transportation Needs:     Lack of Transportation (Medical): Not on file    Lack of Transportation (Non-Medical):  Not on file   Physical Activity:     Days of Exercise per Week: Not on file    Minutes of Exercise per Session: Not on file   Stress:     Feeling of Stress : Not on file   Social Connections:     Frequency of Communication with Friends and Family: Not on file    Frequency of Social Gatherings with Friends and Family: Not on file    Attends Oriental orthodox Services: Not on file    Active Member of Clubs or Organizations: Not on file    Attends Club or Organization Meetings: Not on file    Marital Status: Not on file   Intimate Partner Violence:     Fear of Current or Ex-Partner: Not on file    Emotionally Abused: Not on file    Physically Abused: Not on file    Sexually Abused: Not on file   Housing Stability:     Unable to Pay for Housing in the Last Year: Not on file    Number of [place] in the Last Year: Not on file    Unstable Housing in the Last Year: Not on file       Physical Exam:  /88   Pulse 103   Temp 98.2 °F (36.8 °C) (Oral)   Resp 18   Ht 5' 3\" (1.6 m)   Wt 152 lb (68.9 kg)   SpO2 95%   BMI 26.93 kg/m²     GEN: Well developed, well nourished, no acute distress  HEENT: NCAT. EOM grossly intact. Hearing grossly intact. Mucous membranes pink and moist.  RESP: Normal breath sounds with no wheezing, rales, or rhonchi. Respirations WNL and unlabored. CV:  Tachycardic, regular rhythm. No murmurs, rubs, or gallops. ABD: Soft, non-distended, BS+ and equal.  NEURO: Alert but appears tired. Seems to have some intermittent confusion. Speech fluent. Has difficulty with bilateral shoulder shrug due to neck pain. Sensation to light touch decreased in all limbs. MSK:  Muscle bulk is normal bilaterally. Able to flex the right elbow partially against gravity; unable to flex the left elbow against gravity. Able to  with bilateral hands, right stronger than left. Able to dorsiflex the bilateral ankles partially against gravity. LIMBS: Edema present in bilateral upper limbs. SKIN: Warm and dry with good turgor. PSYCH: Mood WNL. Affect WNL. Appropriately interactive. Diagnostics:    CBC: No results for input(s): WBC, RBC, HGB, HCT, MCV, RDW, PLT in the last 72 hours.   BMP:   Recent Labs     03/24/22  0157      K 4.1  4.1      CO2 21   BUN 29*   CREATININE 0.84   GLUCOSE 107*      HbA1c:   Lab Results   Component Value Date    LABA1C 5.8 05/17/2021     BNP: No results for input(s): BNP in the last 72 hours. PT/INR:   Recent Labs     03/24/22  0157   PROTIME 10.9   INR 1.0     APTT: No results for input(s): APTT in the last 72 hours. CARDIAC ENZYMES: No results for input(s): CKMB, CKMBINDEX, TROPONINT in the last 72 hours. Invalid input(s): CKTOTAL;3  FASTING LIPID PANEL:  Lab Results   Component Value Date    CHOL 104 04/12/2018    HDL 42 04/12/2018    TRIG 92 04/12/2018     LIVER PROFILE: No results for input(s): AST, ALT, ALB, BILIDIR, BILITOT, ALKPHOS in the last 72 hours. Radiology:  XR CERVICAL SPINE (2-3 VIEWS)   Final Result   Immediate postoperative changes of laminectomy with posterior fusion at C2   through C4. FLUORO FOR SURGICAL PROCEDURES   Final Result            Impression:    1. Cervical stenosis with myelopathy s/p C2-C5 laminectomy and fusion on 3/24  2. Neck pain  3. Tetraparesis  4. Urinary retention/neurogenic bladder  5. HTN  6. HLD  7. Diabetes  8. GIA  9. Migraines  10. PTSD  11. Depression    Recommendations:    1. Diagnosis:  Cervical stenosis with myelopathy s/p C2-C5 laminectomy and fusion  2. Therapy: Has PT needs, anticipate OT needs but evaluation not completed yet  3. Medical Necessity: As above  4. Support: Lives with significant other  5. Rehab Recommendation:  Will follow up OT evaluation. Will need clarification regarding how much support his significant other can provide. Will follow progress with PT.    6. DVT Prophylaxis: Lovenox    It was my pleasure to evaluate John Dorsey today. Please call with questions.     Rosemary Cowan MD

## 2022-03-25 NOTE — PROGRESS NOTES
[facility]  Office: [address], DO, Minda Smoker, DO, Ricardo [facility], DO, Lucia Smith Blood, DO, Colonel Melinda MD, Kristina Garcia MD, Franci Falk MD, Susanna Harris MD, Gaby aSmpson MD, Jeff Farooq MD, Mariaelena Bernabe MD, Kiersten Jett, DO, Shiva Orourke, DO, Sandrine Damon MD,  Magen Carlson, DO, Apoorva Agustin MD, Keya Ambriz MD, Maria Luisa Brice MD, Rox Maher, DO, Shaista Gifford MD, Lupe Swan MD, Duane Campbell, [facility], [facility] HospitalSIERRA Corbin, CNP, Margarette Chery, CNP, Indiana Banuelos, CNS, Melissa Solomon, CNP, Chacho Jennings, CNP, Philippe Dockery, CNP, Moe Shepard, CNP, Adina Hobbs, CNP, Yuni Medeiros PA-C, Dorene Murguia, [facility], Adrienne [facility]ila [facility], Corine Leon, CNP, Lashonda Grande, CNP, Ning Carrillo, [address]    Progress Note    3/25/2022    11:42 AM    Name:   Ilan Vazquez  MRN:     [id number]     Acct:      [de-identified]   Room:   [address] [place], [place] [address] Day:  2  Admit Date:  3/23/2022  7:06 PM    PCP:   Nafisa Arshad  Code Status:  DNR-CCA    Subjective:     C/C:   Neck pain  Leg weakness  Cervical Stenosis  Quadriplegia     Interval History Status:   POD 1  Lying flat  Looks comfortable  Rating pain at 9/10    Data Base Updates:  [id number] High      ECG  Normal sinus rhythm   Voltage criteria for left ventricular hypertrophy   Abnormal ECG   When compared with ECG of 24-APR-2018 16:13,   No significant change was found    Brief History:     As documented in the medical record:  \"76 year-old male who initially presented to [facility] after multiple falls, generalized upper/lower extremity weakness. Notably, he has a history of cervical spine disease as he underwent decompression on 4/19/18. MRI of the cervical spine suggesting severe spinal canal stenosis at C2-C3 and C3-C4 with complete effacement of the CSF at these levels. Transferred to [place] [address] for neurosurgery evaluation.  \"    The intitial assessment and plan included:  \"  Hospital Problems            Last Modified POA     * (Principal) Cord compression ([address] 3/23/2022 Yes     Hypertension 3/23/2022 Yes     Hyperlipidemia 3/23/2022 Yes     Diabetes mellitus ([address] 3/23/2022 Yes     Bipolar disorder, mixed ([address] 3/23/2022 Yes     Severe recurrent major depression without psychotic features ([address] 3/23/2022 Yes     Cervical spinal cord compression ([address] 3/21/2022 Yes          Plan:  #Severe spinal canal stenosis  - consult to neurosurgery, plan for OR this afternoon  - pain control  - cleared medically for surgery as low risk for major adverse cardiac event     #Type 2 DM  - controlled with diet. No longer taking metformin. #HTN  - resume home regimen, continue to monitor     #Bipolar disorder/depressive disorder   - continue depakote, effexor as ordered\"     On 3/24 the patient underwent:  Laminectomy to decompress the spinal cord at C2, C3, C4, partial C5  Nonsegmental fixation with lateral mass screws and rods at C2, C3, C4, C5  Posterior lateral arthrodesis at C2, C3, C4, C5  Use of morselized autograft via same incision  Use of fluoroscopy      Past Medical History:   has a past medical history of Depression, Diabetes mellitus ([address] Difficult intravenous access, Hyperlipidemia, Hypertension, Migraines, PTSD (post-traumatic stress disorder), Sleep apnea, Teeth missing, and Wears glasses. Social History:   reports that he quit smoking about 49 years ago. His smoking use included cigarettes. He has a 2.00 pack-year smoking history. He has never used smokeless tobacco. He reports current alcohol use. He reports current drug use. Drug: Marijuana [place]).      Family History:   Family History   Problem Relation Age of Onset   Bonilla Dementia Mother     Coronary Art Dis Mother     Stroke Mother     Diabetes Mother     Asthma Mother     Other Mother         BRAIN ANEURISM    High Blood Pressure Mother     Heart Disease Father     Diabetes Sister     Diabetes Brother  High Blood Pressure Brother     High Cholesterol Brother     Heart Disease Brother     Diabetes Sister     Other Sister         NEUROPATHY       Review of Systems:     Review of Systems   Constitutional: Positive for activity change (Decreased) and appetite change (Diminished). Respiratory: Negative for cough and shortness of breath. Cardiovascular: Negative for chest pain and palpitations. Gastrointestinal: Negative for abdominal pain, nausea and vomiting. Genitourinary: Negative for difficulty urinating (No history of neurogenic bladder,), flank pain and hematuria. Musculoskeletal: Positive for gait problem (states he has been non-ambulatory since January ) and neck pain (Incisional pain rated at 9/10). Physical Examination:        Vitals  BP (!) 140/94   Pulse 108   Temp 98.4 °F (36.9 °C) (Oral)   Resp 21   Ht 5' 3\" (1.6 m)   Wt 152 lb (68.9 kg)   SpO2 95%   BMI 26.93 kg/m²   Temp (24hrs), Av.6 °F (35.3 °C), Min:95.2 °F (35.1 °C), Max:98.6 °F (37 °C)    Recent Labs     22  2151 22  0739 22  1212 22  1808   POCGLU 140* 109 109 184*       Physical Exam  Vitals reviewed. Constitutional:       General: He is not in acute distress. Appearance: He is not diaphoretic. HENT:      Head: Normocephalic. Nose: Nose normal.   Eyes:      General: No scleral icterus. Conjunctiva/sclera: Conjunctivae normal.   Neck:      Trachea: No tracheal deviation. Cardiovascular:      Rate and Rhythm: Normal rate and regular rhythm. Pulmonary:      Effort: Pulmonary effort is normal. No respiratory distress. Breath sounds: Normal breath sounds. No wheezing or rales. Chest:      Chest wall: No tenderness. Abdominal:      General: There is no distension. Palpations: Abdomen is soft. Tenderness: There is no abdominal tenderness. Genitourinary:     Comments: External catheter in place  Musculoskeletal:         General: No tenderness. Cervical back: Neck supple. Skin:     General: Skin is warm and dry. Neurological:      Mental Status: He is alert and oriented to person, place, and time. Medications: Allergies: Allergies   Allergen Reactions    Latex Itching    Bee Venom Swelling    Lisinopril Other (See Comments)     Cough      Niacin And Related Rash    Sulfa Antibiotics Rash    Terazosin Rash       Current Meds:   Scheduled Meds:    baclofen  10 mg Oral TID    bethanechol  25 mg Oral TID    divalproex  750 mg Oral Nightly    carboxymethylcellulose PF  2 drop Both Eyes TID    amLODIPine  7.5 mg Oral Daily    pantoprazole  40 mg Oral QAM AC    furosemide  20 mg Oral Daily    venlafaxine  75 mg Oral TID WC    divalproex  500 mg Oral QAM    ceFAZolin  2,000 mg IntraVENous Q8H    sodium chloride flush  5-40 mL IntraVENous 2 times per day    bisacodyl  5 mg Oral Daily    sodium chloride flush  5-40 mL IntraVENous 2 times per day    enoxaparin  40 mg SubCUTAneous Daily    losartan  12.5 mg Oral Daily    traZODone  150 mg Oral Nightly     Continuous Infusions:    sodium chloride      sodium chloride      dextrose       PRN Meds: sodium chloride flush, sodium chloride, oxyCODONE **OR** oxyCODONE, morphine **OR** morphine, cyclobenzaprine, senna, sodium chloride flush, sodium chloride, potassium chloride **OR** potassium alternative oral replacement **OR** potassium chloride, magnesium sulfate, ondansetron **OR** ondansetron, polyethylene glycol, acetaminophen **OR** acetaminophen, glucose, dextrose, glucagon (rDNA), dextrose    Data:     I/O (24Hr):     Intake/Output Summary (Last 24 hours) at 3/25/2022 1142  Last data filed at 3/25/2022 3723  Gross per 24 hour   Intake 1600 ml   Output 1440 ml   Net 160 ml       Labs:  Hematology:  Recent Labs     03/24/22  0157   INR 1.0     Chemistry:  Recent Labs     03/24/22 0157      K 4.1  4.1      CO2 21   GLUCOSE 107*   BUN 29*   CREATININE 0.84   MG 1.7 ANIONGAP 15   LABGLOM >60   GFRAA >60   CALCIUM 9.4   PROBNP 218     Recent Labs     03/23/22  2151 03/24/22  0739 03/24/22  1212 03/24/22  1808   POCGLU 140* 109 109 184*     ABG:  Lab Results   Component Value Date    PHART 7.445 04/19/2018    IYF4VHU 39.1 04/19/2018    PO2ART 252.0 04/19/2018    ZQG3GIO 26.5 04/19/2018    NBEA NOT REPORTED 04/19/2018    PBEA 2.7 04/19/2018    S6FIXVDD 99.5 04/19/2018    FIO2 97% 04/19/2018     Lab Results   Component Value Date/Time    SPECIAL UPPER RT ARM 6ML 04/23/2018 07:39 PM     Lab Results   Component Value Date/Time    CULTURE NO GROWTH 6 DAYS 04/23/2018 07:39 PM    CULTURE  04/23/2018 07:39 PM     [employer] [address] [facility], [address] [place] [address] [phone]       Radiology:  CT HEAD WO CONTRAST    Result Date: 3/20/2022  No acute intracranial abnormality. RECOMMENDATIONS: Unavailable     CT CERVICAL SPINE WO CONTRAST    Result Date: 3/20/2022  Stable cervical spine CT examination status post corpectomy and anterior fusion from C4-C7. No acute fracture or traumatic malalignment. RECOMMENDATIONS: Unavailable     CT THORACIC SPINE WO CONTRAST    Result Date: 3/20/2022  Multilevel degenerative changes with no acute fracture or traumatic malalignment of the thoracolumbar spine. RECOMMENDATIONS: Unavailable     CT LUMBAR SPINE WO CONTRAST    Result Date: 3/20/2022  Multilevel degenerative changes with no acute fracture or traumatic malalignment of the thoracolumbar spine. RECOMMENDATIONS: Unavailable     MRI CERVICAL SPINE WO CONTRAST    Result Date: 3/21/2022  1. Patient motion limits evaluation. 2. There is severe spinal canal stenosis at C2-C3 and C3-C4 with complete effacement of the CSF at these levels. 3. No significant spinal canal stenosis at the remaining levels. 4. Multilevel neural foraminal narrowing as above. 5. There is question of minimal T2 hyperintensity within the cord at C4-C5, which likely represents myelomalacia.      MRI THORACIC SPINE WO CONTRAST    Result Date: 3/22/2022  Mild multilevel degenerative changes of the thoracic spine, as described above with mild spinal canal stenosis from T8-9 to T10-11, most pronounced at T10-11. MRI LUMBAR SPINE WO CONTRAST    Result Date: 3/22/2022  1. Moderate spinal canal stenosis from L2-L3 to L4-L5, as described above. 2. Mild spinal canal stenosis at L1-L2, as described above. 3. Multilevel neural foraminal narrowing, most severe at L2-L3. MRI BRAIN WO CONTRAST    Result Date: 3/21/2022  1. No acute intracranial abnormality. No acute infarct. 2. Mild global parenchymal volume loss with minimal chronic microvascular ischemic changes.        Assessment:        Primary Problem  Cord compression [facility])    Active Hospital Problems    Diagnosis Date Noted    Quadriplegia, C1-C4 incomplete ([address] [G82.52]     Cord compression ([address] [G95.20] 03/23/2022    Cervical spinal cord compression (HCC) [G95.20] 03/21/2022    Severe recurrent major depression without psychotic features ([address] [F33.2] 06/20/2020    Stenosis of cervical spine with myelopathy ([address] [M48.02, G99.2] 04/19/2018    Bipolar disorder, mixed ([address] [F31.60] 01/18/2017    Hypertension [I10]     Hyperlipidemia [E78.5]     Diabetes mellitus ([address] [E11.9]          Plan:        Neurosurgical evaluation and treatment  Pain management  PT/OT  Glycemic contol - Monitor and control blood sugars  Blood Pressure - Monitor and control  Observe for urine retention  DVT prophylaxis    IP CONSULT TO NEUROSURGERY  IP CONSULT TO PHYSICAL MEDICINE Hank Purvis DO  3/25/2022 thanks [address] [place] [address] Road  11:42 AM

## 2022-03-25 NOTE — PROGRESS NOTES
Neurosurgery DARCY/Resident    Daily Progress Note   CC:No chief complaint on file. 3/25/2022  5:46 AM    Chart reviewed. No acute events overnight. No new complaints. Post op day 1 from posterior cervical decompression fusion C2-5, had to be straight cathed over night, afebrile.  IS at bedside    Vitals:    03/24/22 1800 03/24/22 1815 03/24/22 1830 03/24/22 1853   BP: 118/67 133/79 139/83 126/73   Pulse: 73 84 83 78   Resp: 14 21 21 12   Temp:   98.1 °F (36.7 °C) 98.1 °F (36.7 °C)   TempSrc:    Oral   SpO2: 97% 97% 94% 92%   Weight:       Height:           PE:   AOx3   Motor   L deltoid 1/5; R deltoid 1/5  L biceps 2/5; R biceps 4/5  L triceps 4-/5; R triceps 4/5  L wrist extension 2/5; R wrist extension 4/5  4 HG right and 2HG left      right arm spastic   L iliopsoas 4+/5 , R iliopsoas 4-/5  L quadriceps 4-/5; R quadriceps 4+/5  L Dorsiflexion 4/5; R dorsiflexion 4/5  L Plantarflexion 4/5; R plantarflexion 4/5      Sensation: paresthesias BUE and BLE     Drain output:195/24 hours   Incision: clean dry intact       Lab Results   Component Value Date    WBC 6.3 03/20/2022    HGB 12.1 (L) 03/20/2022    HCT 34.3 (L) 03/20/2022     03/20/2022    CHOL 104 04/12/2018    TRIG 92 04/12/2018    HDL 42 04/12/2018    ALT 25 12/05/2018    AST 25 12/05/2018     03/24/2022    K 4.1 03/24/2022    K 4.1 03/24/2022     03/24/2022    CREATININE 0.84 03/24/2022    BUN 29 (H) 03/24/2022    CO2 21 03/24/2022    TSH 1.25 06/20/2020    INR 1.0 03/24/2022    LABA1C 5.8 05/17/2021    .6 (H) 12/08/2016       Radiology   Pending cervical xray       A/P  70 y.o. male who presents with cervical stenosis with myelopathy and incomplete quadriplegia  POD#1 s/p C2-5 laminectomy and fixation       - Neuro checks per floor protocol  - Maintain drain at this time   - encourage IS  - PT and OT- PMR consulted  - bladder scan every 6 hours and straight cath >300  - maintain CHARLENE at this time   - baclofen for spasticity   - urecholine for urinary retention   - ok for DVT prophylaxis         Please contact neurosurgery with any changes in patients neurologic status.        Epifanio Mendez CNP  3/25/22  5:46 AM

## 2022-03-26 ENCOUNTER — APPOINTMENT (OUTPATIENT)
Dept: CT IMAGING | Age: 72
DRG: 471 | End: 2022-03-26
Attending: STUDENT IN AN ORGANIZED HEALTH CARE EDUCATION/TRAINING PROGRAM
Payer: OTHER GOVERNMENT

## 2022-03-26 LAB
-: NORMAL
ALBUMIN SERPL-MCNC: 3.5 G/DL (ref 3.5–5.2)
ALBUMIN/GLOBULIN RATIO: 1.3 (ref 1–2.5)
ALP BLD-CCNC: 118 U/L (ref 40–129)
ALT SERPL-CCNC: 17 U/L (ref 5–41)
ANION GAP SERPL CALCULATED.3IONS-SCNC: 13 MMOL/L (ref 9–17)
AST SERPL-CCNC: 17 U/L
BILIRUB SERPL-MCNC: 0.31 MG/DL (ref 0.3–1.2)
BILIRUBIN URINE: NEGATIVE
BUN BLDV-MCNC: 36 MG/DL (ref 8–23)
CALCIUM SERPL-MCNC: 9.9 MG/DL (ref 8.6–10.4)
CASTS UA: NORMAL /LPF (ref 0–8)
CHLORIDE BLD-SCNC: 100 MMOL/L (ref 98–107)
CO2: 26 MMOL/L (ref 20–31)
COLOR: YELLOW
CREAT SERPL-MCNC: 0.71 MG/DL (ref 0.7–1.2)
EPITHELIAL CELLS UA: NORMAL /HPF (ref 0–5)
GFR AFRICAN AMERICAN: >60 ML/MIN
GFR NON-AFRICAN AMERICAN: >60 ML/MIN
GFR SERPL CREATININE-BSD FRML MDRD: ABNORMAL ML/MIN/{1.73_M2}
GLUCOSE BLD-MCNC: 116 MG/DL (ref 70–99)
GLUCOSE BLD-MCNC: 135 MG/DL (ref 75–110)
GLUCOSE BLD-MCNC: 91 MG/DL (ref 75–110)
GLUCOSE URINE: NEGATIVE
HCT VFR BLD CALC: 31.4 % (ref 40.7–50.3)
HEMOGLOBIN: 10.7 G/DL (ref 13–17)
KETONES, URINE: ABNORMAL
LEUKOCYTE ESTERASE, URINE: NEGATIVE
MCH RBC QN AUTO: 31.8 PG (ref 25.2–33.5)
MCHC RBC AUTO-ENTMCNC: 34.1 G/DL (ref 28.4–34.8)
MCV RBC AUTO: 93.5 FL (ref 82.6–102.9)
NITRITE, URINE: NEGATIVE
NRBC AUTOMATED: 0 PER 100 WBC
PDW BLD-RTO: 12.6 % (ref 11.8–14.4)
PH UA: 6 (ref 5–8)
PLATELET # BLD: 299 K/UL (ref 138–453)
PMV BLD AUTO: 9.8 FL (ref 8.1–13.5)
POTASSIUM SERPL-SCNC: 4.9 MMOL/L (ref 3.7–5.3)
PROTEIN UA: ABNORMAL
RBC # BLD: 3.36 M/UL (ref 4.21–5.77)
RBC UA: NORMAL /HPF (ref 0–4)
SODIUM BLD-SCNC: 139 MMOL/L (ref 135–144)
SPECIFIC GRAVITY UA: 1.02 (ref 1–1.03)
TOTAL PROTEIN: 6.3 G/DL (ref 6.4–8.3)
TURBIDITY: CLEAR
URINE HGB: NEGATIVE
UROBILINOGEN, URINE: NORMAL
VALPROIC ACID % FREE: 12.6 % (ref 5–18.4)
VALPROIC ACID LEVEL: 42 UG/ML (ref 50–125)
VALPROIC ACID, FREE: 5.3 UG/ML (ref 7–23)
WBC # BLD: 12.3 K/UL (ref 3.5–11.3)
WBC UA: NORMAL /HPF (ref 0–5)

## 2022-03-26 PROCEDURE — 6360000002 HC RX W HCPCS: Performed by: NURSE PRACTITIONER

## 2022-03-26 PROCEDURE — 81001 URINALYSIS AUTO W/SCOPE: CPT

## 2022-03-26 PROCEDURE — 51701 INSERT BLADDER CATHETER: CPT

## 2022-03-26 PROCEDURE — 1200000000 HC SEMI PRIVATE

## 2022-03-26 PROCEDURE — 51798 US URINE CAPACITY MEASURE: CPT

## 2022-03-26 PROCEDURE — 85027 COMPLETE CBC AUTOMATED: CPT

## 2022-03-26 PROCEDURE — 70450 CT HEAD/BRAIN W/O DYE: CPT

## 2022-03-26 PROCEDURE — 80053 COMPREHEN METABOLIC PANEL: CPT

## 2022-03-26 PROCEDURE — 36415 COLL VENOUS BLD VENIPUNCTURE: CPT

## 2022-03-26 PROCEDURE — 80165 DIPROPYLACETIC ACID FREE: CPT

## 2022-03-26 PROCEDURE — 2500000003 HC RX 250 WO HCPCS: Performed by: STUDENT IN AN ORGANIZED HEALTH CARE EDUCATION/TRAINING PROGRAM

## 2022-03-26 PROCEDURE — 6370000000 HC RX 637 (ALT 250 FOR IP): Performed by: NURSE PRACTITIONER

## 2022-03-26 PROCEDURE — 99232 SBSQ HOSP IP/OBS MODERATE 35: CPT | Performed by: INTERNAL MEDICINE

## 2022-03-26 PROCEDURE — 80164 ASSAY DIPROPYLACETIC ACD TOT: CPT

## 2022-03-26 PROCEDURE — 2580000003 HC RX 258: Performed by: NURSE PRACTITIONER

## 2022-03-26 PROCEDURE — 2580000003 HC RX 258: Performed by: INTERNAL MEDICINE

## 2022-03-26 PROCEDURE — 87040 BLOOD CULTURE FOR BACTERIA: CPT

## 2022-03-26 PROCEDURE — 82947 ASSAY GLUCOSE BLOOD QUANT: CPT

## 2022-03-26 RX ORDER — SODIUM CHLORIDE 9 MG/ML
INJECTION, SOLUTION INTRAVENOUS CONTINUOUS
Status: DISCONTINUED | OUTPATIENT
Start: 2022-03-26 | End: 2022-03-29

## 2022-03-26 RX ORDER — OXYCODONE HYDROCHLORIDE 5 MG/1
10 TABLET ORAL EVERY 6 HOURS PRN
Status: DISCONTINUED | OUTPATIENT
Start: 2022-03-26 | End: 2022-03-26

## 2022-03-26 RX ORDER — MORPHINE SULFATE 4 MG/ML
4 INJECTION, SOLUTION INTRAMUSCULAR; INTRAVENOUS ONCE
Status: COMPLETED | OUTPATIENT
Start: 2022-03-26 | End: 2022-03-26

## 2022-03-26 RX ORDER — HYDRALAZINE HYDROCHLORIDE 20 MG/ML
10 INJECTION INTRAMUSCULAR; INTRAVENOUS EVERY 6 HOURS PRN
Status: DISCONTINUED | OUTPATIENT
Start: 2022-03-26 | End: 2022-03-31

## 2022-03-26 RX ORDER — BACLOFEN 10 MG/1
10 TABLET ORAL 3 TIMES DAILY PRN
Status: DISCONTINUED | OUTPATIENT
Start: 2022-03-26 | End: 2022-03-27

## 2022-03-26 RX ORDER — NALOXONE HYDROCHLORIDE 0.4 MG/ML
0.4 INJECTION, SOLUTION INTRAMUSCULAR; INTRAVENOUS; SUBCUTANEOUS PRN
Status: DISCONTINUED | OUTPATIENT
Start: 2022-03-26 | End: 2022-03-31 | Stop reason: HOSPADM

## 2022-03-26 RX ORDER — LABETALOL HYDROCHLORIDE 5 MG/ML
10 INJECTION, SOLUTION INTRAVENOUS EVERY 6 HOURS PRN
Status: DISCONTINUED | OUTPATIENT
Start: 2022-03-26 | End: 2022-03-28

## 2022-03-26 RX ORDER — SODIUM CHLORIDE, SODIUM LACTATE, POTASSIUM CHLORIDE, CALCIUM CHLORIDE 600; 310; 30; 20 MG/100ML; MG/100ML; MG/100ML; MG/100ML
INJECTION, SOLUTION INTRAVENOUS CONTINUOUS
Status: DISCONTINUED | OUTPATIENT
Start: 2022-03-26 | End: 2022-03-29

## 2022-03-26 RX ORDER — OXYCODONE HYDROCHLORIDE 5 MG/1
5 TABLET ORAL EVERY 6 HOURS PRN
Status: DISCONTINUED | OUTPATIENT
Start: 2022-03-26 | End: 2022-03-31 | Stop reason: HOSPADM

## 2022-03-26 RX ADMIN — SODIUM CHLORIDE, PRESERVATIVE FREE 10 ML: 5 INJECTION INTRAVENOUS at 09:39

## 2022-03-26 RX ADMIN — SODIUM CHLORIDE, PRESERVATIVE FREE 10 ML: 5 INJECTION INTRAVENOUS at 21:49

## 2022-03-26 RX ADMIN — Medication 10 MG: at 14:41

## 2022-03-26 RX ADMIN — MORPHINE SULFATE 4 MG: 4 INJECTION INTRAVENOUS at 21:45

## 2022-03-26 RX ADMIN — SODIUM CHLORIDE, PRESERVATIVE FREE 10 ML: 5 INJECTION INTRAVENOUS at 21:48

## 2022-03-26 RX ADMIN — ENOXAPARIN SODIUM 40 MG: 40 INJECTION SUBCUTANEOUS at 09:37

## 2022-03-26 RX ADMIN — SODIUM CHLORIDE, PRESERVATIVE FREE 10 ML: 5 INJECTION INTRAVENOUS at 09:51

## 2022-03-26 RX ADMIN — CARBOXYMETHYLCELLULOSE SODIUM 2 DROP: 10 GEL OPHTHALMIC at 14:42

## 2022-03-26 RX ADMIN — CARBOXYMETHYLCELLULOSE SODIUM 2 DROP: 10 GEL OPHTHALMIC at 09:38

## 2022-03-26 RX ADMIN — ACETAMINOPHEN 650 MG: 650 SUPPOSITORY RECTAL at 21:03

## 2022-03-26 RX ADMIN — SODIUM CHLORIDE: 9 INJECTION, SOLUTION INTRAVENOUS at 13:57

## 2022-03-26 RX ADMIN — CARBOXYMETHYLCELLULOSE SODIUM 2 DROP: 10 GEL OPHTHALMIC at 21:51

## 2022-03-26 RX ADMIN — ACETAMINOPHEN 650 MG: 650 SUPPOSITORY RECTAL at 15:51

## 2022-03-26 ASSESSMENT — PAIN SCALES - GENERAL
PAINLEVEL_OUTOF10: 0
PAINLEVEL_OUTOF10: 8
PAINLEVEL_OUTOF10: 0

## 2022-03-26 ASSESSMENT — ENCOUNTER SYMPTOMS
SHORTNESS OF BREATH: 0
VOMITING: 0
NAUSEA: 0
ABDOMINAL PAIN: 0
COUGH: 0

## 2022-03-26 ASSESSMENT — PAIN SCALES - WONG BAKER: WONGBAKER_NUMERICALRESPONSE: 0

## 2022-03-26 NOTE — PROGRESS NOTES
Physical Therapy        Physical Therapy Cancel Note      DATE: 3/26/2022    NAME: Bhavin Cartagena  MRN: [id number]   : 1950      Patient not seen this date for Physical Therapy due to: Other: Pt off the floor for testing.       Electronically signed by Sulma Raphael PT on 3/26/2022 at 4:20 PM

## 2022-03-26 NOTE — PLAN OF CARE
Problem: Pain:  Goal: Pain level will decrease  Description: Pain level will decrease  Outcome: Ongoing     Problem: Skin Integrity:  Goal: Will show no infection signs and symptoms  Description: Will show no infection signs and symptoms  Outcome: Ongoing     Problem: Falls - Risk of:  Goal: Will remain free from falls  Description: Will remain free from falls  Outcome: Ongoing     Problem: Falls - Risk of:  Goal: Absence of physical injury  Description: Absence of physical injury  Outcome: Ongoing

## 2022-03-26 NOTE — FLOWSHEET NOTE
[facility] DEPARTMENT - [address]  PROGRESS NOTE    Shift date: 3/26/22  Shift day: Saturday   Shift # 2    Room # 8331/8591-98   Name: Carla Perry            Age: 70 y.o. Gender: male          Uatsdin: [address] [employer]:     Referral: Routine Visit    Admit Date & Time: 3/23/2022  7:06 PM    PATIENT/EVENT DESCRIPTION:  Carla Perry is a 70 y.o. male         SPIRITUAL ASSESSMENT/INTERVENTION:  Ave Warner welcomed  in room. She engaged in conversation and expressed feelings and concerns which  validated.  offered prayer for spiritual comfort which was accepted. She expressed gratitude for prayer and  visit. SPIRITUAL CARE FOLLOW-UP PLAN:  Chaplains will remain available to offer spiritual and emotional support as needed. Electronically signed by Tamara Sahu Resident, on 3/26/2022 at 2:58 PM.  [facility]  [phone]       03/26/22 2348   Encounter Summary   Services provided to: Significant other   Referral/Consult From: Family   Complexity of Encounter Moderate   Length of Encounter 30 minutes   Spiritual Assessment Completed Yes   Spiritual/Samaritan   Type Spiritual support   Assessment Tearful; Anxious; Coping   Intervention Active listening;Nurtured hope;Prayer;Sustaining presence/ Ministry of presence   Outcome Comfort;Expressed gratitude

## 2022-03-26 NOTE — PROGRESS NOTES
[facility]  Office: [address], DO, Gildardo Boyle, DO, Noel Jones, DO, Ann Landon Blood, DO, Newton Ceballos MD, Mignon Meckel, MD, Cyndi Mcmullen MD, Yamileth Schuler MD, Oswaldo Cadena MD, Vanesa Ramirez MD, Joshua Max MD, Arpita Infante, DO, Dora Tierney, DO, Jeff Ballesteros MD,  Stanton Roy, DO, Lopez Sethi MD, Aby Gore MD, Carolin Kim MD, Suzi Lopez DO, Cammie Heath MD, Itzel Lazcano MD, Abraham Ordonez, [facility], [facility], CNP, Toma Hui, CNP, Jef Kapoor, CNS, Michelet Borjas, CNP, Ele Titus, CNP, Jesse Escobedo, CNP, Ronit Griffiths, CNP, Jeannie Benavidez, CNP, Jose G Rouse PA-C, Cassi Doyle, DNP, Rosalba Pal, DNP, Sue Diaz, CNP, Caryle Poet, CNP, Germain Mo, [address]    Progress Note    3/26/2022    1:25 PM    Name:   Nandini Barton  MRN:     [id number]     Acct:      [de-identified]   Room:   76/6497-39   Day:  3  Admit Date:  3/23/2022  7:06 PM    PCP:   Maritza Calero  Code Status:  DNR-CCA    Subjective:     C/C:   Neck pain  Leg weakness  Cervical Stenosis  Quadriplegia     Interval History Status:   POD 2  Lying flat  Looks comfortable  Somnolent  Reports that his pain is better controlled    Data Base Updates:  Afebrile    O2 sats satisfactory    [id number] High      ECG  Normal sinus rhythm   Voltage criteria for left ventricular hypertrophy   Abnormal ECG   When compared with ECG of 24-APR-2018 16:13,   No significant change was found    Brief History:     As documented in the medical record:  \"76 year-old male who initially presented to [facility] after multiple falls, generalized upper/lower extremity weakness. Notably, he has a history of cervical spine disease as he underwent decompression on 4/19/18. MRI of the cervical spine suggesting severe spinal canal stenosis at C2-C3 and C3-C4 with complete effacement of the CSF at these levels.  Transferred to [place] [address] for neurosurgery evaluation. \"    The intitial assessment and plan included:  \"  Hospital Problems            Last Modified POA     * (Principal) Cord compression ([address] 3/23/2022 Yes     Hypertension 3/23/2022 Yes     Hyperlipidemia 3/23/2022 Yes     Diabetes mellitus ([address] 3/23/2022 Yes     Bipolar disorder, mixed ([address] 3/23/2022 Yes     Severe recurrent major depression without psychotic features ([address] 3/23/2022 Yes     Cervical spinal cord compression ([address] 3/21/2022 Yes          Plan:  #Severe spinal canal stenosis  - consult to neurosurgery, plan for OR this afternoon  - pain control  - cleared medically for surgery as low risk for major adverse cardiac event     #Type 2 DM  - controlled with diet. No longer taking metformin. #HTN  - resume home regimen, continue to monitor     #Bipolar disorder/depressive disorder   - continue depakote, effexor as ordered\"     On 3/24 the patient underwent:  Laminectomy to decompress the spinal cord at C2, C3, C4, partial C5  Nonsegmental fixation with lateral mass screws and rods at C2, C3, C4, C5  Posterior lateral arthrodesis at C2, C3, C4, C5  Use of morselized autograft via same incision  Use of fluoroscopy      Past Medical History:   has a past medical history of Depression, Diabetes mellitus ([address] Difficult intravenous access, Hyperlipidemia, Hypertension, Migraines, PTSD (post-traumatic stress disorder), Sleep apnea, Teeth missing, and Wears glasses. Social History:   reports that he quit smoking about 49 years ago. His smoking use included cigarettes. He has a 2.00 pack-year smoking history. He has never used smokeless tobacco. He reports current alcohol use. He reports current drug use. Drug: Marijuana [employer] [place]).      Family History:   Family History   Problem Relation Age of Onset    Dementia Mother     Coronary Art Dis Mother     Stroke Mother     Diabetes Mother     Asthma Mother     Other Mother         BRAIN ANEURISM    High Blood Pressure Mother     Heart Disease Father     Diabetes Sister     Diabetes Brother     High Blood Pressure Brother     High Cholesterol Brother     Heart Disease Brother     Diabetes Sister     Other Sister         NEUROPATHY       Review of Systems:     Review of Systems   Constitutional: Positive for activity change (Decreased). Respiratory: Negative for cough and shortness of breath. Cardiovascular: Negative for chest pain and palpitations. Gastrointestinal: Negative for abdominal pain, nausea and vomiting. Genitourinary: Negative for difficulty urinating (No history of neurogenic bladder,), flank pain and hematuria. Musculoskeletal: Positive for gait problem (states he has been non-ambulatory since January ) and neck pain (Incisional pain better controlled). Neurological: Positive for weakness (Chronic weakness of the legs). Physical Examination:        Vitals  BP (!) 154/98   Pulse 109   Temp 98.6 °F (37 °C) (Axillary)   Resp 18   Ht 5' 3\" (1.6 m)   Wt 152 lb (68.9 kg)   SpO2 95%   BMI 26.93 kg/m²   Temp (24hrs), Av.1 °F (36.7 °C), Min:97.5 °F (36.4 °C), Max:98.6 °F (37 °C)    Recent Labs     22  0739 22  1212 22  1808 22  0336   POCGLU 109 109 184* 135*       Physical Exam  Vitals reviewed. Constitutional:       General: He is not in acute distress. Appearance: He is not diaphoretic. HENT:      Head: Normocephalic. Nose: Nose normal.   Eyes:      General: No scleral icterus. Conjunctiva/sclera: Conjunctivae normal.   Neck:      Trachea: No tracheal deviation. Cardiovascular:      Rate and Rhythm: Normal rate and regular rhythm. Pulmonary:      Effort: Pulmonary effort is normal. No respiratory distress. Breath sounds: Normal breath sounds. No wheezing or rales. Chest:      Chest wall: No tenderness. Abdominal:      General: There is no distension. Palpations: Abdomen is soft. Tenderness: There is no abdominal tenderness. Musculoskeletal:         General: No tenderness. Cervical back: Neck supple. Skin:     General: Skin is warm and dry. Neurological:      Comments: Somnolent  Having some difficulty with historical data  Moving extremities x4   decreased  Leg paresis unchanged  Speech fluent   equal          Medications: Allergies: Allergies   Allergen Reactions    Latex Itching    Bee Venom Swelling    Lisinopril Other (See Comments)     Cough      Niacin And Related Rash    Sulfa Antibiotics Rash    Terazosin Rash       Current Meds:   Scheduled Meds:    baclofen  10 mg Oral TID    bethanechol  25 mg Oral TID    divalproex  750 mg Oral Nightly    carboxymethylcellulose PF  2 drop Both Eyes TID    amLODIPine  7.5 mg Oral Daily    pantoprazole  40 mg Oral QAM AC    furosemide  20 mg Oral Daily    venlafaxine  75 mg Oral TID WC    divalproex  500 mg Oral QAM    sodium chloride flush  5-40 mL IntraVENous 2 times per day    bisacodyl  5 mg Oral Daily    sodium chloride flush  5-40 mL IntraVENous 2 times per day    enoxaparin  40 mg SubCUTAneous Daily    losartan  12.5 mg Oral Daily    traZODone  150 mg Oral Nightly     Continuous Infusions:    sodium chloride      sodium chloride      dextrose       PRN Meds: oxyCODONE **OR** oxyCODONE, sodium chloride flush, sodium chloride, senna, sodium chloride flush, sodium chloride, potassium chloride **OR** potassium alternative oral replacement **OR** potassium chloride, magnesium sulfate, ondansetron **OR** ondansetron, polyethylene glycol, acetaminophen **OR** acetaminophen, glucose, dextrose, glucagon (rDNA), dextrose    Data:     I/O (24Hr):     Intake/Output Summary (Last 24 hours) at 3/26/2022 1325  Last data filed at 3/26/2022 0329  Gross per 24 hour   Intake --   Output 960 ml   Net -960 ml       Labs:  Hematology:  Recent Labs     03/24/22 0157   INR 1.0     Chemistry:  Recent Labs     03/24/22 0157 03/26/22  0932    139   K 4.1 4.1 4.9    100   CO2 21 26   GLUCOSE 107* 116*   BUN 29* 36*   CREATININE 0.84 0.71   MG 1.7  --    ANIONGAP 15 13   LABGLOM >60 >60   GFRAA >60 >60   CALCIUM 9.4 9.9   PROBNP 218  --      Recent Labs     03/23/22  2151 03/24/22  0739 03/24/22  1212 03/24/22  1808 03/26/22  0336 03/26/22  0932   PROT  --   --   --   --   --  6.3*   LABALBU  --   --   --   --   --  3.5   AST  --   --   --   --   --  17   ALT  --   --   --   --   --  17   ALKPHOS  --   --   --   --   --  118   BILITOT  --   --   --   --   --  0.31   POCGLU 140* 109 109 184* 135*  --      ABG:  Lab Results   Component Value Date    PHART 7.445 04/19/2018    WJJ3FUV 39.1 04/19/2018    PO2ART 252.0 04/19/2018    JPF2YNJ 26.5 04/19/2018    NBEA NOT REPORTED 04/19/2018    PBEA 2.7 04/19/2018    H2INETPJ 99.5 04/19/2018    FIO2 97% 04/19/2018     Lab Results   Component Value Date/Time    SPECIAL UPPER RT ARM 6ML 04/23/2018 07:39 PM     Lab Results   Component Value Date/Time    CULTURE NO GROWTH 6 DAYS 04/23/2018 07:39 PM    CULTURE  04/23/2018 07:39 PM     [employer] [address] [place] [address] [phone]       Radiology:  CT HEAD WO CONTRAST    Result Date: 3/20/2022  No acute intracranial abnormality. RECOMMENDATIONS: Unavailable     CT CERVICAL SPINE WO CONTRAST    Result Date: 3/20/2022  Stable cervical spine CT examination status post corpectomy and anterior fusion from C4-C7. No acute fracture or traumatic malalignment. RECOMMENDATIONS: Unavailable     CT THORACIC SPINE WO CONTRAST    Result Date: 3/20/2022  Multilevel degenerative changes with no acute fracture or traumatic malalignment of the thoracolumbar spine. RECOMMENDATIONS: Unavailable     CT LUMBAR SPINE WO CONTRAST    Result Date: 3/20/2022  Multilevel degenerative changes with no acute fracture or traumatic malalignment of the thoracolumbar spine. RECOMMENDATIONS: Unavailable     MRI CERVICAL SPINE WO CONTRAST    Result Date: 3/21/2022  1.  Patient motion limits evaluation. 2. There is severe spinal canal stenosis at C2-C3 and C3-C4 with complete effacement of the CSF at these levels. 3. No significant spinal canal stenosis at the remaining levels. 4. Multilevel neural foraminal narrowing as above. 5. There is question of minimal T2 hyperintensity within the cord at C4-C5, which likely represents myelomalacia. MRI THORACIC SPINE WO CONTRAST    Result Date: 3/22/2022  Mild multilevel degenerative changes of the thoracic spine, as described above with mild spinal canal stenosis from T8-9 to T10-11, most pronounced at T10-11. MRI LUMBAR SPINE WO CONTRAST    Result Date: 3/22/2022  1. Moderate spinal canal stenosis from L2-L3 to L4-L5, as described above. 2. Mild spinal canal stenosis at L1-L2, as described above. 3. Multilevel neural foraminal narrowing, most severe at L2-L3. MRI BRAIN WO CONTRAST    Result Date: 3/21/2022  1. No acute intracranial abnormality. No acute infarct. 2. Mild global parenchymal volume loss with minimal chronic microvascular ischemic changes.        Assessment:        Primary Problem  Cord compression [facility])    Active Hospital Problems    Diagnosis Date Noted    Quadriplegia, C1-C4 incomplete ([address] [G82.52]     Cord compression ([address] [G95.20] 03/23/2022    Cervical spinal cord compression (HCC) [G95.20] 03/21/2022    Severe recurrent major depression without psychotic features ([address] [F33.2] 06/20/2020    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) [M48.02, G99.2] 04/19/2018    Bipolar disorder, mixed ([address] [F31.60] 01/18/2017    Hypertension [I10]     Hyperlipidemia [E78.5]     Diabetes mellitus ([address] [E11.9]          Plan:        Neurosurgical evaluation and treatment  Pain management  Fentanyl DC'd  Morphine DC'd  Demerol DC'd  Percocet decreased to 1 every 6 hours as needed  Flexeril DC'd  Avoid further Benadryl  Baclofen decreased to 3 times daily as needed  PT/OT  Glycemic contol - Monitor and control blood sugars  Blood Pressure - Monitor and control  Observe for urine retention  DVT prophylaxis    IP CONSULT TO NEUROSURGERY  IP CONSULT TO PHYSICAL MEDICINE Hank Purvis DO  3/26/2022 thanks Vibha  1:25 PM

## 2022-03-26 NOTE — PROGRESS NOTES
Neurosurgery Resident  Daily Progress Note    3/26/2022  6:18 AM    Chart reviewed. No acute events overnight. No new complaints. Somnolent after pain meds and muscle relaxer. Vitals reviewed, afebrile. Vitals:    03/25/22 2045 03/25/22 2051 03/25/22 2335 03/26/22 0329   BP: (!) 131/95  117/71 124/64   Pulse: 100  86 83   Resp: 19  13 15   Temp: 98.1 °F (36.7 °C)  98.1 °F (36.7 °C) 97.5 °F (36.4 °C)   TempSrc: Oral  Axillary Axillary   SpO2:  95% 96% 95%   Weight:       Height:           PE:   Gen: Somnolent, NAD  Cardiovascular: Regular rate, no dependent edema, distal pulses 2+  Respiratory: Chest symmetric, no accessory muscle use, normal respirations   Neuro:  Unable to participate in motor exam, just waking and somnolent  Drain in place 40 out overnight  Dressing clean dry intact      Lab Results   Component Value Date    WBC 6.3 03/20/2022    HGB 12.1 (L) 03/20/2022    HCT 34.3 (L) 03/20/2022     03/20/2022    CHOL 104 04/12/2018    TRIG 92 04/12/2018    HDL 42 04/12/2018    ALT 25 12/05/2018    AST 25 12/05/2018     03/24/2022    K 4.1 03/24/2022    K 4.1 03/24/2022     03/24/2022    CREATININE 0.84 03/24/2022    BUN 29 (H) 03/24/2022    CO2 21 03/24/2022    TSH 1.25 06/20/2020    INR 1.0 03/24/2022    LABA1C 5.8 05/17/2021       A/P  70 y.o. male who presents with cervical stenosis with myelopathy and incomplete quadriplegia  POD#2 s/p C2-5 laminectomy and fixation         - Neuro checks per floor protocol   - Drain pulled 3/26/22   - CMP ordered   - Ok to reinforce dressing per nursing as needed  - Encourage IS  - PT and OT- PMR consulted  - Bladder scan every 6 hours and straight cath >300  - Baclofen for spasticity, hold baclofen and flexeril   - Urecholine for urinary retention   - Medicine team on  - Ok for DVT prophylaxis       Please contact neurosurgery with any changes in patients neurologic status.       Demond Bottom, DO   6:18 AM

## 2022-03-26 NOTE — PLAN OF CARE
Problem: Pain:  Goal: Pain level will decrease  Description: Pain level will decrease  3/26/2022 1755 by Mariela Sauceda RN  Outcome: Ongoing  3/26/2022 0539 by Darío Restrepo RN  Outcome: Ongoing  Goal: Control of acute pain  Description: Control of acute pain  Outcome: Ongoing  Goal: Control of chronic pain  Description: Control of chronic pain  Outcome: Ongoing     Problem: Skin Integrity:  Goal: Will show no infection signs and symptoms  Description: Will show no infection signs and symptoms  3/26/2022 1755 by Mariela Sauceda RN  Outcome: Ongoing  3/26/2022 0539 by Darío Restrepo RN  Outcome: Ongoing  Goal: Absence of new skin breakdown  Description: Absence of new skin breakdown  Outcome: Ongoing     Problem: Falls - Risk of:  Goal: Will remain free from falls  Description: Will remain free from falls  3/26/2022 1755 by Mariela Sauceda RN  Outcome: Ongoing  3/26/2022 0539 by Darío Restrepo RN  Outcome: Ongoing  Goal: Absence of physical injury  Description: Absence of physical injury  3/26/2022 1755 by Mariela Sauceda RN  Outcome: Ongoing  3/26/2022 0539 by Darío Restrepo RN  Outcome: Ongoing

## 2022-03-27 ENCOUNTER — APPOINTMENT (OUTPATIENT)
Dept: GENERAL RADIOLOGY | Age: 72
DRG: 471 | End: 2022-03-27
Attending: STUDENT IN AN ORGANIZED HEALTH CARE EDUCATION/TRAINING PROGRAM
Payer: OTHER GOVERNMENT

## 2022-03-27 PROBLEM — G93.40 ENCEPHALOPATHY: Status: ACTIVE | Noted: 2022-03-27

## 2022-03-27 LAB
AMMONIA: 39 UMOL/L (ref 16–60)
FOLATE: >20 NG/ML
LACTIC ACID, WHOLE BLOOD: 0.8 MMOL/L (ref 0.7–2.1)
PROCALCITONIN: 0.12 NG/ML
TSH SERPL DL<=0.05 MIU/L-ACNC: 0.67 MIU/L (ref 0.3–5)
VITAMIN B-12: 545 PG/ML (ref 232–1245)
VITAMIN D 25-HYDROXY: 12.2 NG/ML

## 2022-03-27 PROCEDURE — 6370000000 HC RX 637 (ALT 250 FOR IP): Performed by: NURSE PRACTITIONER

## 2022-03-27 PROCEDURE — 99232 SBSQ HOSP IP/OBS MODERATE 35: CPT | Performed by: INTERNAL MEDICINE

## 2022-03-27 PROCEDURE — 2580000003 HC RX 258: Performed by: INTERNAL MEDICINE

## 2022-03-27 PROCEDURE — 51798 US URINE CAPACITY MEASURE: CPT

## 2022-03-27 PROCEDURE — 82607 VITAMIN B-12: CPT

## 2022-03-27 PROCEDURE — 99221 1ST HOSP IP/OBS SF/LOW 40: CPT | Performed by: NURSE PRACTITIONER

## 2022-03-27 PROCEDURE — 2580000003 HC RX 258: Performed by: NURSE PRACTITIONER

## 2022-03-27 PROCEDURE — 71045 X-RAY EXAM CHEST 1 VIEW: CPT

## 2022-03-27 PROCEDURE — 84443 ASSAY THYROID STIM HORMONE: CPT

## 2022-03-27 PROCEDURE — 82306 VITAMIN D 25 HYDROXY: CPT

## 2022-03-27 PROCEDURE — 6360000002 HC RX W HCPCS: Performed by: NURSE PRACTITIONER

## 2022-03-27 PROCEDURE — 1200000000 HC SEMI PRIVATE

## 2022-03-27 PROCEDURE — 6370000000 HC RX 637 (ALT 250 FOR IP): Performed by: NEUROLOGICAL SURGERY

## 2022-03-27 PROCEDURE — 83605 ASSAY OF LACTIC ACID: CPT

## 2022-03-27 PROCEDURE — 84145 PROCALCITONIN (PCT): CPT

## 2022-03-27 PROCEDURE — 82140 ASSAY OF AMMONIA: CPT

## 2022-03-27 PROCEDURE — 36415 COLL VENOUS BLD VENIPUNCTURE: CPT

## 2022-03-27 PROCEDURE — 82746 ASSAY OF FOLIC ACID SERUM: CPT

## 2022-03-27 RX ADMIN — SODIUM CHLORIDE: 9 INJECTION, SOLUTION INTRAVENOUS at 09:49

## 2022-03-27 RX ADMIN — DIVALPROEX SODIUM 750 MG: 250 TABLET, FILM COATED, EXTENDED RELEASE ORAL at 21:04

## 2022-03-27 RX ADMIN — TRAZODONE HYDROCHLORIDE 150 MG: 100 TABLET ORAL at 02:10

## 2022-03-27 RX ADMIN — SODIUM CHLORIDE: 9 INJECTION, SOLUTION INTRAVENOUS at 00:09

## 2022-03-27 RX ADMIN — CARBOXYMETHYLCELLULOSE SODIUM 2 DROP: 10 GEL OPHTHALMIC at 09:00

## 2022-03-27 RX ADMIN — CARBOXYMETHYLCELLULOSE SODIUM 2 DROP: 10 GEL OPHTHALMIC at 13:40

## 2022-03-27 RX ADMIN — ACETAMINOPHEN 650 MG: 650 SUPPOSITORY RECTAL at 13:40

## 2022-03-27 RX ADMIN — TRAZODONE HYDROCHLORIDE 150 MG: 100 TABLET ORAL at 21:04

## 2022-03-27 RX ADMIN — BETHANECHOL CHLORIDE 25 MG: 25 TABLET ORAL at 21:04

## 2022-03-27 RX ADMIN — OXYCODONE HYDROCHLORIDE 5 MG: 5 TABLET ORAL at 21:03

## 2022-03-27 RX ADMIN — DIVALPROEX SODIUM 750 MG: 250 TABLET, FILM COATED, EXTENDED RELEASE ORAL at 02:10

## 2022-03-27 RX ADMIN — ENOXAPARIN SODIUM 40 MG: 40 INJECTION SUBCUTANEOUS at 09:00

## 2022-03-27 RX ADMIN — CARBOXYMETHYLCELLULOSE SODIUM 2 DROP: 10 GEL OPHTHALMIC at 21:04

## 2022-03-27 RX ADMIN — SODIUM CHLORIDE, PRESERVATIVE FREE 10 ML: 5 INJECTION INTRAVENOUS at 08:59

## 2022-03-27 ASSESSMENT — ENCOUNTER SYMPTOMS
SHORTNESS OF BREATH: 0
COUGH: 0
VOMITING: 0
NAUSEA: 0
APNEA: 1

## 2022-03-27 ASSESSMENT — PAIN SCALES - GENERAL
PAINLEVEL_OUTOF10: 10
PAINLEVEL_OUTOF10: 5
PAINLEVEL_OUTOF10: 10
PAINLEVEL_OUTOF10: 2
PAINLEVEL_OUTOF10: 2

## 2022-03-27 ASSESSMENT — PAIN SCALES - WONG BAKER
WONGBAKER_NUMERICALRESPONSE: 0
WONGBAKER_NUMERICALRESPONSE: 2

## 2022-03-27 NOTE — PROGRESS NOTES
Neurosurgery Resident  Daily Progress Note    3/27/2022      Chart reviewed. No acute events overnight. No new complaints. Vitals reviewed, 101.1 overnight. Tachy yesterday responded to labetalol. Vitals:    03/27/22 0400   BP: 124/76   Pulse: 99   Resp: 19   Temp: 97.9 °F (36.6 °C)   SpO2: 98%       PE:   Gen: Somnolent, NAD, A&Ox1  Cardiovascular: Regular rate, no dependent edema, distal pulses 2+  Respiratory: Chest symmetric, no accessory muscle use, normal respirations   Neuro:  Unable to participate in motor exam, just waking and somnolent, tender pain with shoulder ROM R>L  Dressing clean dry intact      Recent Labs     03/26/22  0932 03/26/22  1415   WBC  --  12.3*   HGB  --  10.7*   HCT  --  31.4*   PLT  --  299     --    K 4.9  --    BUN 36*  --    CREATININE 0.71  --    GLUCOSE 116*  --         A/P  70 y.o. male who presents with cervical stenosis with myelopathy and incomplete quadriplegia  POD#3 s/p C2-5 laminectomy and fixation   - Altered mental status        - Neuro checks per floor protocol   - Neurology consulted   - Dopplers LE ordered   - UA negative, CXR reviewed  - Bladder scan every 6 hours and straight cath >300  - Baclofen for spasticity, hold baclofen and flexeril   - Hold IV pain meds  - Urecholine for urinary retention    - Medicine team primary   - Drain pulled 3/26/22   - Ok to reinforce dressing per nursing as needed  - Encourage [address] for DVT prophylaxis    - PT and OT- PMR consulted      Please contact neurosurgery with any changes in patients neurologic status.       Amrita Renee DO

## 2022-03-27 NOTE — PLAN OF CARE
Problem: Pain:  Goal: Control of acute pain  Description: Control of acute pain  3/27/2022 0232 by Tam Sanchez RN  Outcome: Ongoing  3/26/2022 1755 by Nathaniel Puri RN  Outcome: Ongoing     Problem: Pain:  Goal: Pain level will decrease  Description: Pain level will decrease  3/27/2022 0232 by Tam Sanchez RN  Outcome: Ongoing  3/26/2022 1755 by Nathaniel Puri RN  Outcome: Ongoing  Goal: Control of acute pain  Description: Control of acute pain  3/27/2022 0232 by Tam Sanchez RN  Outcome: Ongoing  3/26/2022 1755 by Nathaniel Puri RN  Outcome: Ongoing  Goal: Control of chronic pain  Description: Control of chronic pain  3/27/2022 0232 by Tam Sanchez RN  Outcome: Ongoing  3/26/2022 1755 by Nathaniel Puri RN  Outcome: Ongoing     Problem: Skin Integrity:  Goal: Will show no infection signs and symptoms  Description: Will show no infection signs and symptoms  3/27/2022 0232 by Tam Sanchez RN  Outcome: Ongoing  3/26/2022 1755 by Nathaniel Puri RN  Outcome: Ongoing  Goal: Absence of new skin breakdown  Description: Absence of new skin breakdown  3/27/2022 0232 by Tam Sanchez RN  Outcome: Ongoing  3/26/2022 1755 by Nathaniel Puri RN  Outcome: Ongoing     Problem: Falls - Risk of:  Goal: Will remain free from falls  Description: Will remain free from falls  3/27/2022 0232 by Tam Sanchez RN  Outcome: Ongoing  3/26/2022 1755 by Nathaniel Puri RN  Outcome: Ongoing  Goal: Absence of physical injury  Description: Absence of physical injury  3/27/2022 0232 by Tam Sanchez RN  Outcome: Ongoing  3/26/2022 1755 by Nathaniel Puri RN  Outcome: Ongoing   Febrile at start of shift, prn tylenol and ice packs intitated; well tolerated now afebrile with temp below 98.0. Pt aaox1, able to verbalize needs with assistance. Family currently at bedside. During first med pass, pt too lethargic to swallow, on call md informed.  After 2am, pt became more alert able to only give trazadone and depakote. Depakote was hard to swallow, no other med given behind that. AM med protonix not given, pt pocketing pill, removed. No seizures during shift. Bladder scan less than 200ml each time. Frequent incont urine episodes, [place] well tolerated. No BM during shift. Throughout the shift pt turned/reposition, bed in the lowest and call light within reach.

## 2022-03-27 NOTE — CONSULTS
NEUROLOGY INPATIENT CONSULT NOTE          3/27/2022      Reason for consult: AMS  Requesting physician: Nicholas Thomas     Chief complaint: Worsening generalized weakness, recurrent falls      HPI: I had the pleasure of seeing your patient in neurology consultation. As you would recall Yue Carrera is a  70 y.o. male with H/O prior anterior cervical corpectomy & fusion C4-7 (4/19/2018), HTN, HLD, DM, migraine headaches, bipolar disorder, severe depression, who was admitted as a transfer from [facility] on 3/23/2022 with cervical spinal stenosis. Patient initially presented to [facility] on 3/20/2022 after he fell in the bathroom. Wife reported that patient went to use the restroom when he slipped on the rug and hit his head and back on the toilet seat. Patient reported that he was independent in his ADLs and was driving to almost 2 months back. After that he noticed gradual generalized weakness in his all 4 limbs. For the past 6 weeks weakness worsened resulting in multiple falls. Per wife patient had 7 falls in the last 1 week and he has been unable to ambulate for the past 2 to 3 days. Patient has history of prior anterior cervical corpectomy & fusion C4-7 (4/19/2018). Patient was seen by neurologist Dr. Leda Landeros there on 3/21. CT head done on 3/20 was negative. MRI brain done on 3/21 was negative. MRI of C-spine showed severe cervical spinal stenosis with myelopathy. Patient was transferred to [facility] on 3/23 for neurosurgical intervention. Patient was admitted under IM service. Patient underwent C2-5 laminectomy & posterior fixation (3/24). Neurology was consulted on 3/27/2022 due to postprocedure confusion. Patient has history of bipolar disorder and severe depression with suicidal attempt; he was taking Depakote  + 750 mg, Effexor  mg daily and trazodone 100 mg nightly. He was also on baby aspirin and Lipitor 80 mg nightly at home.     No current facility-administered medications on file prior to encounter. Current Outpatient Medications on File Prior to Encounter   Medication Sig Dispense Refill    divalproex (DEPAKOTE ER) 250 MG extended release tablet Take 750 mg by mouth at bedtime      furosemide (LASIX) 20 MG tablet Take 20 mg by mouth daily      carboxymethylcellulose 1 % ophthalmic solution Place 2 drops into both eyes 3 times daily Pt states \"2 drops in both eyes three times a day\"      venlafaxine (EFFEXOR XR) 150 MG extended release capsule Take 1 capsule by mouth daily (Patient taking differently: Take 225 mg by mouth daily ) 30 capsule 0    simethicone (MYLICON) 80 MG chewable tablet Take 1 tablet by mouth every 6 hours as needed for Flatulence (Patient taking differently: Take 80 mg by mouth every 8 hours as needed for Flatulence ) 30 tablet 0    lidocaine (XYLOCAINE) 5 % ointment Apply topically daily as needed for Pain Apply topically as needed.   LF 4/20/20 (30 day supply) (Patient not taking: Reported on 3/23/2022)      metFORMIN (GLUCOPHAGE) 1000 MG tablet Take 1,000 mg by mouth 2 times daily (with meals) LF 4/27/20 (90 day supply) (Patient not taking: Reported on 3/21/2022)      losartan (COZAAR) 25 MG tablet Take 25 mg by mouth daily       aspirin 81 MG chewable tablet Take 81 mg by mouth daily (Patient not taking: Reported on 3/21/2022)      divalproex (DEPAKOTE ER) 500 MG extended release tablet Take 500 mg by mouth every morning       traZODone (DESYREL) 100 MG tablet Take 150 mg by mouth nightly as needed LF 5/1/20 (30 day supply)      cetirizine (ZYRTEC) 10 MG tablet Take 10 mg by mouth daily LF 4/6/20 (90 day supply) (Patient not taking: Reported on 3/21/2022)      atorvastatin (LIPITOR) 80 MG tablet Take 40 mg by mouth daily Take 1/2 tablet (40 mg) daily  LF 4/22/20 (90 day supply) (Patient not taking: Reported on 3/21/2022)      amLODIPine (NORVASC) 10 MG tablet Take 7.5 mg by mouth daily       sildenafil (VIAGRA) 100 MG tablet Take 100 mg by mouth as needed for Erectile Dysfunction LF 5/12/20 (30 day supply)      omeprazole (PRILOSEC) 20 MG delayed release capsule Take 20 mg by mouth every evening LF 4/21/20 (90 day supply)         Allergies: William Hillman is allergic to latex, bee venom, lisinopril, niacin and related, sulfa antibiotics, and terazosin. Past Medical History:   Diagnosis Date    Depression 2017    ON RX    Diabetes mellitus ([address] 2013    NIDDM    Difficult intravenous access     Hyperlipidemia 2008    ON RX    Hypertension 2008    ON RX    Migraines     PTSD (post-traumatic stress disorder) 01/2018    ON RX    Sleep apnea 01/2018    UNALBE TO USE TO CLEARED BY ENT (CPAP)    Teeth missing     BACK TEETH UPPER AND LOWER TO BE FITTED FOR PARTIALS AT LATER DATE    Wears glasses        Past Surgical History:   Procedure Laterality Date    CARDIAC CATHETERIZATION  02/2010    no stents     CARPAL TUNNEL RELEASE Left 01/09/2002    CATARACT REMOVAL      CERVICAL FUSION  04/19/2018    anterior cervical fusion C5-6    CERVICAL FUSION N/A 3/24/2022    C2-5 FUSION, C2-4 LAMINECTOMY performed by Shane King DO at [address] Left 2018    CATARACT EXTRACTION WITH IOL   [facility]    LAMINECTOMY  03/24/2022    C2-5 FUSION, C2-4 LAMINECTOMY    MIDDLE EAR SURGERY  01/11/2018    MIDDLE EAR REBUILT AND EAR DRUM REPLACED    MOUTH SURGERY  04/16/2018    5 TEETH REMOVED    OTHER SURGICAL HISTORY  04/19/2018    : ANTERIOR CERVICAL CORRPECTOMY C5-6, SYNTHES, DEPUY, REG TABLE, SUPINE,     DC OFFICE/OUTPT VISIT,PROCEDURE ONLY N/A 4/19/2018    ANTERIOR CERVICAL CORRPECTOMY AND FUSION C5-6 performed by Shane King DO at [address] [place] [address]  12/1983       Social History: William Hillman  reports that he quit smoking about 49 years ago. His smoking use included cigarettes. He has a 2.00 pack-year smoking history.  He has never used smokeless tobacco. He reports current alcohol use. He reports current drug use. Drug: Marijuana Fronie Gail). Family History   Problem Relation Age of Onset   [facility] Dementia Mother     Coronary Art Dis Mother     Stroke Mother     Diabetes Mother     Asthma Mother     Other Mother         BRAIN ANEURISM    High Blood Pressure Mother     Heart Disease Father     Diabetes Sister     Diabetes Brother     High Blood Pressure Brother     High Cholesterol Brother     Heart Disease Brother     Diabetes Sister     Other Sister         NEUROPATHY       ROS:  Constitutional  + generalized weakness, recurrent falls   HEENT  Negative for ear discharge, ear pain, nosebleed    Eyes  Negative for photophobia, pain and discharge    Respiratory  Negative for hemoptysis and sputum    Cardiovascular  Negative for orthopnea, claudication and PND    Gastrointestinal  Negative for abdominal pain, diarrhea, blood in stool    Musculoskeletal  Negative for joint pain, negative for myalgia    Skin  Negative for rash or itching    Endo/heme/allergies  Negative for polydipsia, environmental allergy    Psychiatric/behavioral  Negative for suicidal ideation.  Patient is not anxious        Medications:     bethanechol  25 mg Oral TID    divalproex  750 mg Oral Nightly    carboxymethylcellulose PF  2 drop Both Eyes TID    amLODIPine  7.5 mg Oral Daily    pantoprazole  40 mg Oral QAM AC    furosemide  20 mg Oral Daily    venlafaxine  75 mg Oral TID WC    divalproex  500 mg Oral QAM    sodium chloride flush  5-40 mL IntraVENous 2 times per day    bisacodyl  5 mg Oral Daily    sodium chloride flush  5-40 mL IntraVENous 2 times per day    enoxaparin  40 mg SubCUTAneous Daily    losartan  12.5 mg Oral Daily    traZODone  150 mg Oral Nightly     PRN Meds include: oxyCODONE **OR** [DISCONTINUED] oxyCODONE, naloxone, labetalol, hydrALAZINE, sodium chloride flush, sodium chloride, senna, sodium chloride flush, sodium chloride, potassium Value Date    TRIG 92 04/12/2018     Lab Results   Component Value Date    HDL 42 04/12/2018     Lab Results   Component Value Date    LDLCHOLESTEROL 44 04/12/2018     Lab Results   Component Value Date    VLDL NOT REPORTED 04/12/2018     Lab Results   Component Value Date    CHOLHDLRATIO 2.5 04/12/2018     Vit B12  Folate  Vit D    Ammonia        39  TSH                 0.67    Lactic Acid       0.8  Procalcitonin    0.12 (H)        Diagnostic data reviewed:  CT HEAD (3/20/2022 & 3/26/2022): No acute intracranial abnormality     CT C-SPINE (3/20/2022): Stable cervical spine CT examination s/p corpectomy & anterior fusion C4-C7. CT THORACOLUMBAR SPINE (3/20/2022): Multilevel DDD    BRAIN MRI (3/21/2022): Mild global parenchymal volume loss with minimal chronic microangiopathy     MRI BRAIN W/WO:    MRI C-SPINE (3/21/2022):   C2-C4: severe spinal canal stenosis with complete effacement of CSF at these levels. Multilevel neural foraminal narrowing. Question of minimal T2 hyperintensity within the cord at C4-C5, likely myelomalacia. MRI T-SPINE (3/21/2022): Mild multilevel DDD of the thoracic spine. T8-T11: mild spinal canal stenosis, most pronounced at T10-T11. MRI L-SPINE (3/21/2022):   L2-L5: moderate spinal canal stenosis. L1-L2: mild spinal canal stenosis. Multilevel neural foraminal narrowing, most severe at L2-L3. Impression: Mr. Rebekah Lobo is a 70 y.o. male    Severe cervical stenosis with myelopathy & incomplete quadriplegia; s/p C2-5 laminectomy & posterior fixation (3/24), as per NS team. Prior H/O anterior cervical corpectomy & fusion C4-7 (4/19/2018)    AMS post-procedure. Pt with waxing & waning mentation; most probably metabolic due to pain & medication effects.  Ammonia, TSH, lactic acid - WNL; procalcitonin 0.12 (slightly high)    Baclofen, morphine, Flexeril were held by NS team    Bipolar mixed, severe depression with prior suicidal attempt (2017); is on Depakote  + 750 mg, Effexor 75 mg TID & trazodone 150 mg HS    Comorbid conditions - HTN, HLD, DM, migraine headaches, PTSD, sleep apnea            Plan:   MRI brain w/wo - ordered    Continue PT/OT    Will follow with you. Thank you for the consult    Please note that this note was generated using a voice recognition dictation software. Although every effort was made to ensure the accuracy of this automated transcription, some errors in transcription may have occurred.

## 2022-03-27 NOTE — PROGRESS NOTES
[facility]  Office: [address], DO, Levelhayile Ebbs, DO, Mike List, DO, Mckenna Stoll Blood, DO, Stanton Greer MD, Kenney Carrizales MD, Eneida Hebert MD, Danica Bowie MD, Matilde Stephenson MD, Hina Rich MD, Lalita Pal MD, Kayode Villegas, DO, Katie Lezama, DO, Gaviota Marquez MD,  Alexander Crowder, DO, Meghna Petersen MD, Raenette Felty, MD, Tammi Cox MD, Bismark Lee DO, Timoteo Patel MD, Stephany Man MD, Shelbi Sarabia, CNP, [facility] Center[facility]RA Corbin, CNP, Reba Almanzar, CNP, Kevin Valencia, CNS, Ajay Armstrong, CNP, Kelly Hoyos, CNP, Kyung Roy, CNP, Denny Benz, CNP, Jazmin García, CNP, RO Anderson-C, Kortney Montaño, DNP, Juancarlos Guy, DNP, Toribio Asters, CNP, Salvador Spray, CNP, Rmoie Limb, CNP         [address]    Progress Note    3/27/2022    9:47 AM    Name:   Abdirahman Medina  MRN:     [id number]     Acct:      [de-identified]   Room:   [id number]   Day:  4  Admit Date:  3/23/2022  7:06 PM    PCP:   Timmy Castellanos  Code Status:  DNR-CCA    Subjective:     C/C:   Neck pain  Leg weakness  Cervical Stenosis  Quadriplegia     Interval History Status:   POD 3  Confused  Not orientated  Poor intake  Restless at times    Data Base Updates:  T-max 101.5     O2 sats satisfactory     CT brain  Impression:    No acute findings in the head/brain. Urine culture pending    Blood cultures pending    Valproic Acid Lvl42 Low ug/mL   Valproic Acid, Demetrio Massey. 3 Low ug/mL   Valproic Acid % Free12.6         Brief History:     As documented in the medical record:  \"76 year-old male who initially presented to [facility] after multiple falls, generalized upper/lower extremity weakness. Notably, he has a history of cervical spine disease as he underwent decompression on 4/19/18. MRI of the cervical spine suggesting severe spinal canal stenosis at C2-C3 and C3-C4 with complete effacement of the CSF at these levels.  Transferred to [facility] for neurosurgery evaluation. \"    The intitial assessment and plan included:  \"  Hospital Problems            Last Modified POA     * (Principal) Cord compression ([address] 3/23/2022 Yes     Hypertension 3/23/2022 Yes     Hyperlipidemia 3/23/2022 Yes     Diabetes mellitus ([address] 3/23/2022 Yes     Bipolar disorder, mixed ([address] 3/23/2022 Yes     Severe recurrent major depression without psychotic features ([address] 3/23/2022 Yes     Cervical spinal cord compression ([address] 3/21/2022 Yes          Plan:  #Severe spinal canal stenosis  - consult to neurosurgery, plan for OR this afternoon  - pain control  - cleared medically for surgery as low risk for major adverse cardiac event     #Type 2 DM  - controlled with diet. No longer taking metformin. #HTN  - resume home regimen, continue to monitor     #Bipolar disorder/depressive disorder   - continue depakote, effexor as ordered\"     On 3/24 the patient underwent:  Laminectomy to decompress the spinal cord at C2, C3, C4, partial C5  Nonsegmental fixation with lateral mass screws and rods at C2, C3, C4, C5  Posterior lateral arthrodesis at C2, C3, C4, C5  Use of morselized autograft via same incision  Use of fluoroscopy      Past Medical History:   has a past medical history of Depression, Diabetes mellitus ([address] Difficult intravenous access, Hyperlipidemia, Hypertension, Migraines, PTSD (post-traumatic stress disorder), Sleep apnea, Teeth missing, and Wears glasses. Social History:   reports that he quit smoking about 49 years ago. His smoking use included cigarettes. He has a 2.00 pack-year smoking history. He has never used smokeless tobacco. He reports current alcohol use. He reports current drug use. Drug: Marijuana Fronie Gail).      Family History:   Family History   Problem Relation Age of Onset    Dementia Mother     Coronary Art Dis Mother     Stroke Mother     Diabetes Mother     Asthma Mother     Other Mother         BRAIN ANEURISM    High Blood Pressure Mother     Heart Disease Father     Diabetes Sister    Radha Gong Diabetes Brother     High Blood Pressure Brother     High Cholesterol Brother     Heart Disease Brother     Diabetes Sister     Other Sister         NEUROPATHY       Review of Systems:     Limited, patient confused  Review of Systems   Respiratory: Positive for apnea (Apneic spells reported postoperatively). Negative for cough and shortness of breath. Cardiovascular: Negative for chest pain. Gastrointestinal: Negative for nausea and vomiting. Musculoskeletal: Positive for neck pain. Physical Examination:        Vitals  BP (!) 156/81   Pulse 100   Temp 99.8 °F (37.7 °C) (Axillary)   Resp 15   Ht 5' 3\" (1.6 m)   Wt 152 lb 6.4 oz (69.1 kg)   SpO2 93%   BMI 27.00 kg/m²   Temp (24hrs), Av.9 °F (37.7 °C), Min:97.9 °F (36.6 °C), Max:101.5 °F (38.6 °C)    Recent Labs     22  1212 22  1808 22  0336 22  1512   POCGLU 109 184* 135* 91       Physical Exam  Vitals reviewed. Constitutional:       General: He is not in acute distress. Appearance: He is not diaphoretic. HENT:      Head: Normocephalic. Nose: Nose normal.   Eyes:      General: No scleral icterus. Conjunctiva/sclera: Conjunctivae normal.   Neck:      Trachea: No tracheal deviation. Cardiovascular:      Rate and Rhythm: Normal rate and regular rhythm. Pulmonary:      Effort: Pulmonary effort is normal. No respiratory distress. Breath sounds: Normal breath sounds. No wheezing or rales. Chest:      Chest wall: No tenderness. Abdominal:      General: There is no distension. Palpations: Abdomen is soft. Tenderness: There is no abdominal tenderness. Musculoskeletal:         General: No tenderness. Cervical back: Neck supple. Skin:     General: Skin is warm and dry.       Comments: Wound is dressed, dry and clean    Neurological:      Comments: Somnolent  Still having some difficulty with historical data  Moving extremities x4   decreased  Arm / Leg paresis unchanged  Having difficulty following commands         Medications: Allergies: Allergies   Allergen Reactions    Latex Itching    Bee Venom Swelling    Lisinopril Other (See Comments)     Cough      Niacin And Related Rash    Sulfa Antibiotics Rash    Terazosin Rash       Current Meds:   Scheduled Meds:    bethanechol  25 mg Oral TID    divalproex  750 mg Oral Nightly    carboxymethylcellulose PF  2 drop Both Eyes TID    amLODIPine  7.5 mg Oral Daily    pantoprazole  40 mg Oral QAM AC    furosemide  20 mg Oral Daily    venlafaxine  75 mg Oral TID WC    divalproex  500 mg Oral QAM    sodium chloride flush  5-40 mL IntraVENous 2 times per day    bisacodyl  5 mg Oral Daily    sodium chloride flush  5-40 mL IntraVENous 2 times per day    enoxaparin  40 mg SubCUTAneous Daily    losartan  12.5 mg Oral Daily    traZODone  150 mg Oral Nightly     Continuous Infusions:    sodium chloride 100 mL/hr at 03/27/22 0009    lactated ringers      sodium chloride      sodium chloride      dextrose       PRN Meds: oxyCODONE **OR** [DISCONTINUED] oxyCODONE, naloxone, labetalol, hydrALAZINE, sodium chloride flush, sodium chloride, senna, sodium chloride flush, sodium chloride, potassium chloride **OR** potassium alternative oral replacement **OR** potassium chloride, magnesium sulfate, ondansetron **OR** ondansetron, polyethylene glycol, acetaminophen **OR** acetaminophen, glucose, dextrose, glucagon (rDNA), dextrose    Data:     I/O (24Hr):     Intake/Output Summary (Last 24 hours) at 3/27/2022 0947  Last data filed at 3/27/2022 0600  Gross per 24 hour   Intake 1559.48 ml   Output 125 ml   Net 1434.48 ml       Labs:  Hematology:  Recent Labs     03/26/22  1415   WBC 12.3*   RBC 3.36*   HGB 10.7*   HCT 31.4*   MCV 93.5   MCH 31.8   MCHC 34.1   RDW 12.6      MPV 9.8     Chemistry:  Recent Labs     03/26/22  0932      K 4.9      CO2 26   GLUCOSE 116*   BUN 36* CREATININE 0.71   ANIONGAP 13   LABGLOM >60   GFRAA >60   CALCIUM 9.9     Recent Labs     03/24/22  1212 03/24/22  1808 03/26/22  0336 03/26/22  0932 03/26/22  1512   PROT  --   --   --  6.3*  --    LABALBU  --   --   --  3.5  --    AST  --   --   --  17  --    ALT  --   --   --  17  --    ALKPHOS  --   --   --  118  --    BILITOT  --   --   --  0.31  --    POCGLU 109 184* 135*  --  91     ABG:  Lab Results   Component Value Date    PHART 7.445 04/19/2018    ZFY0OGV 39.1 04/19/2018    PO2ART 252.0 04/19/2018    PHN7HIJ 26.5 04/19/2018    NBEA NOT REPORTED 04/19/2018    PBEA 2.7 04/19/2018    F7HVIPYP 99.5 04/19/2018    FIO2 97% 04/19/2018     Lab Results   Component Value Date/Time    SPECIAL R HAND 5ML 03/26/2022 06:53 PM     Lab Results   Component Value Date/Time    CULTURE NO GROWTH 12 HOURS 03/26/2022 06:53 PM       Radiology:  CT HEAD WO CONTRAST    Result Date: 3/20/2022  No acute intracranial abnormality. RECOMMENDATIONS: Unavailable     CT CERVICAL SPINE WO CONTRAST    Result Date: 3/20/2022  Stable cervical spine CT examination status post corpectomy and anterior fusion from C4-C7. No acute fracture or traumatic malalignment. RECOMMENDATIONS: Unavailable     CT THORACIC SPINE WO CONTRAST    Result Date: 3/20/2022  Multilevel degenerative changes with no acute fracture or traumatic malalignment of the thoracolumbar spine. RECOMMENDATIONS: Unavailable     CT LUMBAR SPINE WO CONTRAST    Result Date: 3/20/2022  Multilevel degenerative changes with no acute fracture or traumatic malalignment of the thoracolumbar spine. RECOMMENDATIONS: Unavailable     MRI CERVICAL SPINE WO CONTRAST    Result Date: 3/21/2022  1. Patient motion limits evaluation. 2. There is severe spinal canal stenosis at C2-C3 and C3-C4 with complete effacement of the CSF at these levels. 3. No significant spinal canal stenosis at the remaining levels. 4. Multilevel neural foraminal narrowing as above.  5. There is question of minimal T2 hyperintensity within the cord at C4-C5, which likely represents myelomalacia. MRI THORACIC SPINE WO CONTRAST    Result Date: 3/22/2022  Mild multilevel degenerative changes of the thoracic spine, as described above with mild spinal canal stenosis from T8-9 to T10-11, most pronounced at T10-11. MRI LUMBAR SPINE WO CONTRAST    Result Date: 3/22/2022  1. Moderate spinal canal stenosis from L2-L3 to L4-L5, as described above. 2. Mild spinal canal stenosis at L1-L2, as described above. 3. Multilevel neural foraminal narrowing, most severe at L2-L3. MRI BRAIN WO CONTRAST    Result Date: 3/21/2022  1. No acute intracranial abnormality. No acute infarct. 2. Mild global parenchymal volume loss with minimal chronic microvascular ischemic changes.        Assessment:        Primary Problem  Cord compression [facility])    Active Hospital Problems    Diagnosis Date Noted    Encephalopathy [G93.40] 03/27/2022    Quadriplegia, C1-C4 incomplete ([address] [G82.52]     Cord compression ([address] [G95.20] 03/23/2022    Cervical spinal cord compression (HCC) [G95.20] 03/21/2022    Severe recurrent major depression without psychotic features ([address] [F33.2] 06/20/2020    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) [M48.02, G99.2] 04/19/2018    Bipolar disorder, mixed ([address] [F31.60] 01/18/2017    Hypertension [I10]     Hyperlipidemia [E78.5]     Diabetes mellitus ([address] [E11.9]          Plan:        Encephalopathy  Neurology consult  Antibiotics per C&S Results   Neurosurgical evaluation and treatment  Pain management  PT/OT  Glycemic contol - Monitor and control blood sugars  Blood Pressure - Monitor and control  Observe for urine retention  DVT prophylaxis  The patient's status and plan have been discussed with the patient and son Alexis Waldron at the bedside     IP CONSULT TO NEUROSURGERY  IP CONSULT  [facility] TO [id number] [facility],   3/27/2022 calista Ross  9:47 AM

## 2022-03-27 NOTE — PROGRESS NOTES
Arnaldo Huddleston wants mepilex removed from skin tears. Gamal Harris states \" they need air to heal.\" Pt and Gamal Harris educated. At the request of Gamal Harris mepilex was removed from left lower arm. Dr. Lu notified.

## 2022-03-28 ENCOUNTER — APPOINTMENT (OUTPATIENT)
Dept: MRI IMAGING | Age: 72
DRG: 471 | End: 2022-03-28
Attending: STUDENT IN AN ORGANIZED HEALTH CARE EDUCATION/TRAINING PROGRAM
Payer: OTHER GOVERNMENT
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
## 2022-03-28 PROBLEM — J98.11 ATELECTASIS: Status: ACTIVE | Noted: 2022-03-28

## 2022-03-28 PROBLEM — J18.9 HOSPITAL-ACQUIRED PNEUMONIA: Status: ACTIVE | Noted: 2022-03-28

## 2022-03-28 LAB — GLUCOSE BLD-MCNC: 160 MG/DL (ref 75–110)

## 2022-03-28 PROCEDURE — 70551 MRI BRAIN STEM W/O DYE: CPT

## 2022-03-28 PROCEDURE — 93970 EXTREMITY STUDY: CPT

## 2022-03-28 PROCEDURE — 82947 ASSAY GLUCOSE BLOOD QUANT: CPT

## 2022-03-28 PROCEDURE — 1200000000 HC SEMI PRIVATE

## 2022-03-28 PROCEDURE — 6370000000 HC RX 637 (ALT 250 FOR IP): Performed by: NURSE PRACTITIONER

## 2022-03-28 PROCEDURE — 2580000003 HC RX 258: Performed by: NURSE PRACTITIONER

## 2022-03-28 PROCEDURE — 99232 SBSQ HOSP IP/OBS MODERATE 35: CPT | Performed by: PSYCHIATRY & NEUROLOGY

## 2022-03-28 PROCEDURE — APPSS15 APP SPLIT SHARED TIME 0-15 MINUTES: Performed by: NURSE PRACTITIONER

## 2022-03-28 PROCEDURE — 87086 URINE CULTURE/COLONY COUNT: CPT

## 2022-03-28 PROCEDURE — 2500000003 HC RX 250 WO HCPCS: Performed by: INTERNAL MEDICINE

## 2022-03-28 PROCEDURE — 76937 US GUIDE VASCULAR ACCESS: CPT

## 2022-03-28 PROCEDURE — 99232 SBSQ HOSP IP/OBS MODERATE 35: CPT | Performed by: PHYSICAL MEDICINE & REHABILITATION

## 2022-03-28 PROCEDURE — 99232 SBSQ HOSP IP/OBS MODERATE 35: CPT | Performed by: INTERNAL MEDICINE

## 2022-03-28 PROCEDURE — APPSS30 APP SPLIT SHARED TIME 16-30 MINUTES: Performed by: NURSE PRACTITIONER

## 2022-03-28 PROCEDURE — 6370000000 HC RX 637 (ALT 250 FOR IP): Performed by: INTERNAL MEDICINE

## 2022-03-28 PROCEDURE — 51701 INSERT BLADDER CATHETER: CPT

## 2022-03-28 PROCEDURE — 6360000002 HC RX W HCPCS: Performed by: STUDENT IN AN ORGANIZED HEALTH CARE EDUCATION/TRAINING PROGRAM

## 2022-03-28 PROCEDURE — 51798 US URINE CAPACITY MEASURE: CPT

## 2022-03-28 PROCEDURE — 94761 N-INVAS EAR/PLS OXIMETRY MLT: CPT

## 2022-03-28 PROCEDURE — 6360000002 HC RX W HCPCS: Performed by: INTERNAL MEDICINE

## 2022-03-28 PROCEDURE — 6360000002 HC RX W HCPCS: Performed by: NURSE PRACTITIONER

## 2022-03-28 PROCEDURE — 6370000000 HC RX 637 (ALT 250 FOR IP): Performed by: NEUROLOGICAL SURGERY

## 2022-03-28 PROCEDURE — 99232 SBSQ HOSP IP/OBS MODERATE 35: CPT | Performed by: NURSE PRACTITIONER

## 2022-03-28 RX ORDER — CLONIDINE 0.1 MG/24H
1 PATCH, EXTENDED RELEASE TRANSDERMAL WEEKLY
Status: DISCONTINUED | OUTPATIENT
Start: 2022-03-28 | End: 2022-03-29

## 2022-03-28 RX ORDER — KETOROLAC TROMETHAMINE 30 MG/ML
15 INJECTION, SOLUTION INTRAMUSCULAR; INTRAVENOUS EVERY 6 HOURS PRN
Status: DISCONTINUED | OUTPATIENT
Start: 2022-03-28 | End: 2022-03-31 | Stop reason: HOSPADM

## 2022-03-28 RX ORDER — ATROPINE SULFATE 0.4 MG/ML
0.4 AMPUL (ML) INJECTION EVERY 5 MIN PRN
Status: DISCONTINUED | OUTPATIENT
Start: 2022-03-28 | End: 2022-03-31 | Stop reason: HOSPADM

## 2022-03-28 RX ADMIN — NALOXONE HYDROCHLORIDE 0.2 MG: 0.4 INJECTION, SOLUTION INTRAMUSCULAR; INTRAVENOUS; SUBCUTANEOUS at 12:25

## 2022-03-28 RX ADMIN — CARBOXYMETHYLCELLULOSE SODIUM 2 DROP: 10 GEL OPHTHALMIC at 14:27

## 2022-03-28 RX ADMIN — ACETAMINOPHEN 650 MG: 650 SUPPOSITORY RECTAL at 00:18

## 2022-03-28 RX ADMIN — KETOROLAC TROMETHAMINE 15 MG: 30 INJECTION, SOLUTION INTRAMUSCULAR at 17:11

## 2022-03-28 RX ADMIN — ENOXAPARIN SODIUM 40 MG: 40 INJECTION SUBCUTANEOUS at 09:51

## 2022-03-28 RX ADMIN — CEFEPIME HYDROCHLORIDE 1000 MG: 1 INJECTION, POWDER, FOR SOLUTION INTRAMUSCULAR; INTRAVENOUS at 09:50

## 2022-03-28 RX ADMIN — PANTOPRAZOLE SODIUM 40 MG: 40 TABLET, DELAYED RELEASE ORAL at 05:45

## 2022-03-28 RX ADMIN — SODIUM CHLORIDE, PRESERVATIVE FREE 10 ML: 5 INJECTION INTRAVENOUS at 09:52

## 2022-03-28 RX ADMIN — OXYCODONE HYDROCHLORIDE 5 MG: 5 TABLET ORAL at 05:45

## 2022-03-28 RX ADMIN — CARBOXYMETHYLCELLULOSE SODIUM 2 DROP: 10 GEL OPHTHALMIC at 20:27

## 2022-03-28 RX ADMIN — CEFEPIME HYDROCHLORIDE 1000 MG: 1 INJECTION, POWDER, FOR SOLUTION INTRAMUSCULAR; INTRAVENOUS at 20:18

## 2022-03-28 RX ADMIN — KETOROLAC TROMETHAMINE 15 MG: 30 INJECTION, SOLUTION INTRAMUSCULAR at 23:57

## 2022-03-28 RX ADMIN — ACETAMINOPHEN 650 MG: 650 SUPPOSITORY RECTAL at 17:13

## 2022-03-28 RX ADMIN — HYDRALAZINE HYDROCHLORIDE 10 MG: 20 INJECTION INTRAMUSCULAR; INTRAVENOUS at 14:09

## 2022-03-28 RX ADMIN — CARBOXYMETHYLCELLULOSE SODIUM 2 DROP: 10 GEL OPHTHALMIC at 09:50

## 2022-03-28 RX ADMIN — NALOXONE HYDROCHLORIDE 0.2 MG: 0.4 INJECTION, SOLUTION INTRAMUSCULAR; INTRAVENOUS; SUBCUTANEOUS at 12:19

## 2022-03-28 ASSESSMENT — PAIN SCALES - PAIN ASSESSMENT IN ADVANCED DEMENTIA (PAINAD)
NEGVOCALIZATION: 0
TOTALSCORE: 0
BREATHING: 0
CONSOLABILITY: 1
BODYLANGUAGE: 0
BREATHING: 0
NEGVOCALIZATION: 1
CONSOLABILITY: 0
FACIALEXPRESSION: 1
FACIALEXPRESSION: 0
BODYLANGUAGE: 1
TOTALSCORE: 4

## 2022-03-28 ASSESSMENT — PAIN SCALES - GENERAL
PAINLEVEL_OUTOF10: 4
PAINLEVEL_OUTOF10: 4
PAINLEVEL_OUTOF10: 8
PAINLEVEL_OUTOF10: 7
PAINLEVEL_OUTOF10: 0

## 2022-03-28 ASSESSMENT — ENCOUNTER SYMPTOMS
VOMITING: 0
APNEA: 1
COUGH: 0
SHORTNESS OF BREATH: 0
NAUSEA: 0

## 2022-03-28 NOTE — PROGRESS NOTES
Rcv'd request from [address] [place] [address], [facility] Kemckayla Martin CM, with status update for appropriateness for ARU. Notified Dr Kandace Hennessy will see pt today for determination. Requested pt's available support on d/c. Notified [address] [place] [address] that per Dr Kandace Hennessy pt was lethargic today, and [address] [place] [address] states pt got some pain meds which made him lethargic. Also, notified [address] [place] [address] that per Dr Kandace Hennessy pt's fiance is very supportive and able to provide 24/7 on d/c. Dr Kandace Hennessy will f/u tomorrow.

## 2022-03-28 NOTE — PLAN OF CARE
Problem: Pain:  Goal: Pain level will decrease  Description: Pain level will decrease  3/28/2022 0213 by Berneice Brittle, RN  Outcome: Ongoing  3/27/2022 1829 by Kylie Mix RN  Outcome: Ongoing  Goal: Control of acute pain  Description: Control of acute pain  3/28/2022 0213 by Berneice Brittle, RN  Outcome: Ongoing  3/27/2022 1829 by Kylie Mix RN  Outcome: Ongoing  Goal: Control of chronic pain  Description: Control of chronic pain  3/28/2022 0213 by Berneice Brittle, RN  Outcome: Ongoing  3/27/2022 1829 by Kylie Mix RN  Outcome: Ongoing     Problem: Skin Integrity:  Goal: Will show no infection signs and symptoms  Description: Will show no infection signs and symptoms  3/28/2022 0213 by Berneice Brittle, RN  Outcome: Ongoing  3/27/2022 1829 by Kylie Mix RN  Outcome: Ongoing  Goal: Absence of new skin breakdown  Description: Absence of new skin breakdown  3/28/2022 0213 by Berneice Brittle, RN  Outcome: Ongoing  3/27/2022 1829 by Kylie Mix RN  Outcome: Ongoing     Problem: Falls - Risk of:  Goal: Will remain free from falls  Description: Will remain free from falls  3/27/2022 1829 by Kylie Mix RN  Outcome: Ongoing  Goal: Absence of physical injury  Description: Absence of physical injury  3/27/2022 1829 by Kylie Mix RN  Outcome: Ongoing

## 2022-03-28 NOTE — PROGRESS NOTES
No narcotics or sedating medications are to be given per physicians. If nurse assessment indicates or patient requesintg pain medications, please call on-call resident to ask for further instructions prior to administration of narcotics/sedating medication. Passed on to [employer].

## 2022-03-28 NOTE — PROGRESS NOTES
Neurology Nurse Practitioner Progress Note      INTERVAL HISTORY: This is a 70 y.o.  male admitted 3/23/2022 for cervical spinal stenosis. This is a follow-up neurology progress note. The patient was examined and the chart was reviewed. Discussed with the RN. There were no acute events overnight. Patient very somnolent today; brief eye opening on loud verbal stimuli and withdraws to painful stimuli. MRI brain - negative. Sinus sriram & tachycardia; cardiology was consulted. HPI: Kevin Quiroga is a 70 y.o. male with H/O prior anterior cervical corpectomy & fusion C4-7 (4/19/2018), HTN, HLD, DM, migraine headaches, bipolar disorder, severe depression, who was admitted as a transfer from [facility] on 3/23/2022 with cervical spinal stenosis. Patient initially presented to [facility] on 3/20/2022 after he fell in the bathroom. Wife reported that patient went to use the restroom when he slipped on the rug and hit his head and back on the toilet seat. Patient reported that he was independent in his ADLs and was driving to almost 2 months back. After that he noticed gradual generalized weakness in his all 4 limbs. For the past 6 weeks weakness worsened resulting in multiple falls. Per wife patient had 7 falls in the last 1 week and he has been unable to ambulate for the past 2 to 3 days. Patient has history of prior anterior cervical corpectomy & fusion C4-7 (4/19/2018). Patient was seen by neurologist Dr. Dinesh Mojica there on 3/21. CT head done on 3/20 was negative. MRI brain done on 3/21 was negative. MRI of C-spine showed severe cervical spinal stenosis with myelopathy. Patient was transferred to [facility] on 3/23 for neurosurgical intervention. Patient was admitted under IM service.     Patient underwent C2-5 laminectomy & posterior fixation (3/24). Neurology was consulted on 3/27/2022 due to postprocedure confusion.   Patient has history of bipolar disorder and severe depression with suicidal attempt; he was taking Depakote  + 750 mg, Effexor  mg daily and trazodone 100 mg nightly. He was also on baby aspirin and Lipitor 80 mg nightly at home.      cefepime  1,000 mg IntraVENous Q12H    bethanechol  25 mg Oral TID    divalproex  750 mg Oral Nightly    carboxymethylcellulose PF  2 drop Both Eyes TID    amLODIPine  7.5 mg Oral Daily    pantoprazole  40 mg Oral QAM AC    furosemide  20 mg Oral Daily    venlafaxine  75 mg Oral TID WC    divalproex  500 mg Oral QAM    sodium chloride flush  5-40 mL IntraVENous 2 times per day    bisacodyl  5 mg Oral Daily    sodium chloride flush  5-40 mL IntraVENous 2 times per day    enoxaparin  40 mg SubCUTAneous Daily    losartan  12.5 mg Oral Daily    [Held by provider] traZODone  150 mg Oral Nightly       Past Medical History:   Diagnosis Date    Depression 2017    ON RX    Diabetes mellitus ([address] 2013    NIDDM    Difficult intravenous access     Hyperlipidemia 2008    ON RX    Hypertension 2008    ON RX    Migraines     PTSD (post-traumatic stress disorder) 01/2018    ON RX    Sleep apnea 01/2018    UNALBE TO USE TO CLEARED BY ENT (CPAP)    Teeth missing     BACK TEETH UPPER AND LOWER TO BE FITTED FOR PARTIALS AT LATER DATE    Wears glasses        Past Surgical History:   Procedure Laterality Date    CARDIAC CATHETERIZATION  02/2010    no stents     CARPAL TUNNEL RELEASE Left 01/09/2002    CATARACT REMOVAL      CERVICAL FUSION  04/19/2018    anterior cervical fusion C5-6    CERVICAL FUSION N/A 3/24/2022    C2-5 FUSION, C2-4 LAMINECTOMY performed by Marianne García DO at [address] [facility] [facility] Left 2018    CATARACT EXTRACTION WITH IOL    HYDROCELE EXCISION  1951    LAMINECTOMY  03/24/2022    C2-5 FUSION, C2-4 LAMINECTOMY    MIDDLE EAR SURGERY  01/11/2018    MIDDLE EAR REBUILT AND EAR DRUM REPLACED    MOUTH SURGERY  04/16/2018    5 TEETH REMOVED    OTHER SURGICAL HISTORY  04/19/2018    : ANTERIOR CERVICAL CORRPECTOMY C5-6, SYNTHES, DEPUY, REG TABLE, SUPINE,     SC OFFICE/OUTPT VISIT,PROCEDURE ONLY N/A 4/19/2018    ANTERIOR CERVICAL CORRPECTOMY AND FUSION C5-6 performed by Marbin Crump DO at [address] [place] [address]  12/1983       PHYSICAL EXAM:      Blood pressure (!) 158/82, pulse 80, temperature 98.5 °F (36.9 °C), temperature source Oral, resp. rate 8, height 5' 3\" (1.6 m), weight 153 lb 6.4 oz (69.6 kg), SpO2 95 %.       ROS: Unable to perform due to patient's somnolence        Limited Neurological Examination:  GENERAL  Very lethargic & somnolent    MENTAL STATUS: Patient very somnolent today; brief eye opening on loud verbal stimuli and withdrew to painful stimuli   CRANIAL NERVES: II, III,IV,VI -  Pupils - round, reactive  VII    -    Face appeared symmetrical  VIII   -     Intact hearing   MOTOR FUNCTION:  Strength: Significant weakness with incomplete quadriplegia   Normal bulk; increased tone    SENSORY FUNCTION:  Mild withdrawal to painful stimuli, BLEs > BUEs   CEREBELLAR FUNCTION:  No visible tremors   REFLEX FUNCTION:  Deferred    STATION and GAIT  Not tested       DATA    Lab Results   Component Value Date    WBC 12.3 (H) 03/26/2022    RBC 3.36 (L) 03/26/2022    HGB 10.7 (L) 03/26/2022    HCT 31.4 (L) 03/26/2022     03/26/2022    ALT 17 03/26/2022    AST 17 03/26/2022     03/26/2022    K 4.9 03/26/2022    MG 1.7 03/24/2022     03/26/2022    AMMONIA 39 03/27/2022    CREATININE 0.71 03/26/2022    BUN 36 (H) 03/26/2022    CO2 26 03/26/2022    TSH 0.67 03/27/2022    INR 1.0 03/24/2022    QIVWBOMV99 545 03/27/2022    FOLATE >20.0 03/27/2022    LABA1C 5.8 05/17/2021     Vit D, 25-OH    12.2 (L)    Lab Results   Component Value Date    CHOL 104 04/12/2018     Lab Results   Component Value Date    TRIG 92 04/12/2018     Lab Results   Component Value Date    HDL 42 04/12/2018     Lab Results   Component Value Date    LDLCHOLESTEROL 44 04/12/2018 Lab Results   Component Value Date    VLDL NOT REPORTED 04/12/2018     Lab Results   Component Value Date    CHOLHDLRATIO 2.5 04/12/2018     Lactic Acid       0.8  Procalcitonin    0.12 (H)        DIAGNOSTIC DATA:  CT HEAD (3/20/2022 & 3/26/2022): No acute intracranial abnormality      CT C-SPINE (3/20/2022): Stable cervical spine CT examination s/p corpectomy & anterior fusion C4-C7.      CT THORACOLUMBAR SPINE (3/20/2022): Multilevel DDD     BRAIN MRI (3/21/2022): Mild global parenchymal volume loss with minimal chronic microangiopathy     MRI BRAIN (3/28/2022): No acute intracranial abnormality        MRI C-SPINE (3/21/2022):   C2-C4: severe spinal canal stenosis with complete effacement of CSF at these levels. Multilevel neural foraminal narrowing. Question of minimal T2 hyperintensity within the cord at C4-C5, likely myelomalacia.         MRI T-SPINE (3/21/2022): Mild multilevel DDD of the thoracic spine. T8-T11: mild spinal canal stenosis, most pronounced at T10-T11.         MRI L-SPINE (3/21/2022):   L2-L5: moderate spinal canal stenosis. L1-L2: mild spinal canal stenosis. Multilevel neural foraminal narrowing, most severe at L2-L3.                            IMPRESSION & PLAN: 70 y.o.  male admitted with  Severe cervical stenosis with myelopathy & incomplete quadriplegia; s/p C2-5 laminectomy & posterior fixation (3/24), as per NS team. Prior H/O anterior cervical corpectomy & fusion C4-7 (4/19/2018)     AMS post-procedure. Pt with waxing & waning mentation; metabolic vs infectious vs pain & medication effects. Ammonia, TSH, lactic acid - WNL; procalcitonin 0.12 (slightly high). He received Roxicodone 5 mg @ 2103 & 7287. Pt was extremely lethargic and somnolent today, opened his eyes briefly to deep painful stimuli.   MRI brain - negative     Trazodone & narcotics were held by NS team     Sinus sriram & tachycardia; cardiology was consulted    Febrile; been started on Maxipime    Bipolar mixed, severe depression with prior suicidal attempt (2017); is on Depakote  + 750 mg & Effexor 75 mg TID     Comorbid conditions - HTN, HLD, DM, migraine headaches, PTSD, sleep apnea    Continue PT/OT    Will follow    Please note that this note was generated using a voice recognition dictation software. Although every effort was made to ensure the accuracy of this automated transcription, some errors in transcription may have occurred.

## 2022-03-28 NOTE — PLAN OF CARE
Patient post spinal surgery for spinal canal stenosis. Cardiology consulted for episodes of bradycardia and tachycardia. Patient required narcan earlier due to low GCS after sedatives and analgesics. Currently appears to be in significant pain. HR noted 38 bpm (sinus sriram) - 120-130s (sinus tachycardia). Blood pressures elevated. - sinus bradycardia likely triggered by increased vagal tone in setting of recent spinal surgery and significant pain. Asymptomatic during the episodes. Blood pressures stable. - apply pacing pads (pacing only for sustained symptomatic bradycardia episodes <40 bpm)  - prn atropine at bedside for symptomatic bradycardia  - continuous telemetry  - pain control per primary team  - hold AV ruby blocking agents. Hold trazodone for now.      Aurora Sawyer MD  Cardiology

## 2022-03-28 NOTE — PROGRESS NOTES
Physical Therapy        Physical Therapy Cancel Note      DATE: 3/28/2022    NAME: Leandro Sahu  MRN: [id number]   : 1950      Patient not seen this date for Physical Therapy due to:    Patient is not appropriate for PT evaluation/treatment at this time d/t MRI, pending dopplers, not medically appropriate this PM. PT will check back 3/29/22.        Electronically signed by Guerita Rogers PT on 3/28/2022 at 3:15 PM

## 2022-03-28 NOTE — PROGRESS NOTES
Occupational [address]  Occupational Therapy Not Seen Note    DATE: 3/28/2022    NAME: John Dorsey  MRN: [id number]   : 1950      Patient not seen this date for Occupational Therapy due to:    Patient is not appropriate for active participation in OT evaluation/treatment at this time d/t increased lethargy.  Requested to check back in PM.    Next Scheduled Treatment: Check in PM or 3/29     Electronically signed by Yared Granados OT on 3/28/2022 at 8:00 AM

## 2022-03-28 NOTE — PROGRESS NOTES
Occupational [address]  Occupational Therapy Not Seen Note    DATE: 3/28/2022    NAME: Castillo Washington  MRN: [id number]   : 1950      Patient not seen this date for Occupational Therapy due to:    Testing: Pt off floor for MRI. Additionally, dopplers ordered to rule out DVT. Will await results prior to EOB/OOB activity with occupational therapy.     Next Scheduled Treatment: Check 3/29     Electronically signed by Mau Ortiz OT on 3/28/2022 at 1:00 PM

## 2022-03-28 NOTE — PLAN OF CARE
NEUROSURGERY TO SIGN OFF     Please contact Neurosurgery with any questions or acute changes in neurological exam    PATIENT TO FOLLOW UP IN CLINIC:  Follow-up with Neurosurgery  [facility]  [address] [facility] [address])  [address]  Call [phone] for an appointment.     Patient can follow up in 2 weeks for post op wound check  He is ok to be discharged to rehab vs SNF from neurosurgery standpoint    Electronically signed by KASSIDY Joy NP on 3/28/2022 at 3:27 PM

## 2022-03-28 NOTE — PROGRESS NOTES
Writer returned call from [employer] ([phone]). Gavin Alcantar would like to be called if family leaves bedside so that pt is not left alone, regardless of the time.  Danelle Dumont, night shift nurse, was updated on Suze's request.

## 2022-03-28 NOTE — PROGRESS NOTES
Physical Medicine & Rehabilitation  Progress Note        Admitting Physician: Robert Lau DO    Primary Care Provider: Miguel Washington     Chief Complaint: Fall    Brief History: This is a follow up to the initial consult on MrBrennen Barrett is a 70 y.o. right handed male who was admitted to [facility] on 3/23/2022 with No chief complaint on file. Patient with history of worsening limb weakness and falls. He was treated for cervical stenosis with laminectomy and fusion C2-5 performed by Dr. Ravi Aj on 3/24/22 with post-op complication of urine retention. Being treated with prn intermittent straight cath and urecholine. Neurosurgery approved resuming DVT prophylaxis. Patient is having lethargy being attributed to toxic metabolic encephalopathy. MRI brain is pending and BLE Doppler is pending. Subjective: The patient is resting comfortably. The patient's mobility is diminished. His Brad Eagle is present and reports that he has this response to opioid pain medications and typically only uses Naprosyn at home with occasional dose of 25 mg Tramadol for pain.  He is currently unarousable for exam.       ROS:  LATONYA due to lethargy    Rehabilitation:       PT:  Restrictions/Precautions: Fall Risk,General Precautions  Other position/activity restrictions: up with assist, s/p C2-C5 laminectomy and fixation 3/24  Required Braces or Orthoses  Cervical: c-collar   Transfers  Comment: unable to attempt due to poor sitting balance/tolerance       Transfers  Comment: unable to attempt due to poor sitting balance/tolerance  Ambulation  Ambulation?: No               OT:                         Bed mobility  Supine to Sit: Maximum assistance,2 Person assistance  Sit to Supine: Maximum assistance,2 Person assistance  Scooting: Maximal assistance  Comment: HOB elevated ~70 degrees, c-collar donned prior to mobility, increased time required                    SLP:      Objective:  /72   Pulse 97   Temp 98.5 °F (36.9 °C) (Oral)   Resp 20   Ht 5' 3\" (1.6 m)   Wt 153 lb 6.4 oz (69.6 kg)   SpO2 90%   BMI 27.17 kg/m²       GEN: well developed, well nourished, in NAD  HEENT: NCAT, PERRL, LATONYA EOM, mucous membranes pink and moist  CV: RRR, no murmurs, rubs or gallops  PULM: CTAB, no rales or rhonchi. Respirations WNL and unlabored  ABD: soft, NT, ND, BS+ and equal  NEURO: Lethargic and unable to rouse to sternal rub. MSK: LATONYA ROM or strength  EXTREMITIES:  No edema BLEs  SKIN: warm dry and intact with good turgor  PSYCH: LATONYA.      Current Medications:   Current Facility-Administered Medications: cefepime (MAXIPIME) 1,000 mg in sterile water 10 mL IV syringe, 1,000 mg, IntraVENous, Q12H  [Held by provider] oxyCODONE (ROXICODONE) immediate release tablet 5 mg, 5 mg, Oral, Q6H PRN **OR** [DISCONTINUED] oxyCODONE (ROXICODONE) immediate release tablet 10 mg, 10 mg, Oral, Q6H PRN  0.9 % sodium chloride infusion, , IntraVENous, Continuous  naloxone (NARCAN) injection 0.4 mg, 0.4 mg, IntraVENous, PRN  lactated ringers infusion, , IntraVENous, Continuous  labetalol (NORMODYNE;TRANDATE) injection 10 mg, 10 mg, IntraVENous, Q6H PRN  hydrALAZINE (APRESOLINE) injection 10 mg, 10 mg, IntraVENous, Q6H PRN  bethanechol (URECHOLINE) tablet 25 mg, 25 mg, Oral, TID  divalproex (DEPAKOTE ER) extended release tablet 750 mg, 750 mg, Oral, Nightly  carboxymethylcellulose PF (THERATEARS) 1 % ophthalmic gel 2 drop, 2 drop, Both Eyes, TID  amLODIPine (NORVASC) tablet 7.5 mg, 7.5 mg, Oral, Daily  pantoprazole (PROTONIX) tablet 40 mg, 40 mg, Oral, QAM AC  furosemide (LASIX) tablet 20 mg, 20 mg, Oral, Daily  venlafaxine (EFFEXOR) tablet 75 mg, 75 mg, Oral, TID WC  divalproex (DEPAKOTE ER) extended release tablet 500 mg, 500 mg, Oral, QAM  sodium chloride flush 0.9 % injection 5-40 mL, 5-40 mL, IntraVENous, 2 times per day  sodium chloride flush 0.9 % injection 5-40 mL, 5-40 mL, IntraVENous, PRN  0.9 % sodium chloride infusion, 25 mL, IntraVENous, PRN  bisacodyl (DULCOLAX) EC tablet 5 mg, 5 mg, Oral, Daily  senna (SENOKOT) 8.8 MG/5ML syrup 8.8 mg, 5 mL, Oral, BID PRN  sodium chloride flush 0.9 % injection 5-40 mL, 5-40 mL, IntraVENous, 2 times per day  sodium chloride flush 0.9 % injection 10 mL, 10 mL, IntraVENous, PRN  0.9 % sodium chloride infusion, 25 mL, IntraVENous, PRN  potassium chloride (KLOR-CON M) extended release tablet 40 mEq, 40 mEq, Oral, PRN **OR** potassium bicarb-citric acid (EFFER-K) effervescent tablet 40 mEq, 40 mEq, Oral, PRN **OR** potassium chloride 10 mEq/100 mL IVPB (Peripheral Line), 10 mEq, IntraVENous, PRN  magnesium sulfate 1000 mg in dextrose 5% 100 mL IVPB, 1,000 mg, IntraVENous, PRN  enoxaparin (LOVENOX) injection 40 mg, 40 mg, SubCUTAneous, Daily  ondansetron (ZOFRAN-ODT) disintegrating tablet 4 mg, 4 mg, Oral, Q8H PRN **OR** ondansetron (ZOFRAN) injection 4 mg, 4 mg, IntraVENous, Q6H PRN  polyethylene glycol (GLYCOLAX) packet 17 g, 17 g, Oral, Daily PRN  acetaminophen (TYLENOL) tablet 650 mg, 650 mg, Oral, Q6H PRN **OR** acetaminophen (TYLENOL) suppository 650 mg, 650 mg, Rectal, Q6H PRN  losartan (COZAAR) tablet 12.5 mg, 12.5 mg, Oral, Daily  [Held by provider] traZODone (DESYREL) tablet 150 mg, 150 mg, Oral, Nightly  glucose (GLUTOSE) 40 % oral gel 15 g, 15 g, Oral, PRN  dextrose 50 % IV solution, 12.5 g, IntraVENous, PRN  glucagon (rDNA) injection 1 mg, 1 mg, IntraMUSCular, PRN  dextrose 5 % solution, 100 mL/hr, IntraVENous, PRN      Diagnostics:     CBC: Recent Labs     03/26/22  1415   WBC 12.3*   RBC 3.36*   HGB 10.7*   HCT 31.4*   MCV 93.5   RDW 12.6        BMP:    Recent Labs     03/26/22  0932   GLUCOSE 116*   BUN 36*   CREATININE 0.71   CALCIUM 9.9      K 4.9      CO2 26   ANIONGAP 13   LABGLOM >60   GFRAA >60   GFR          HbA1c:   Lab Results   Component Value Date    LABA1C 5.8 05/17/2021     BNP: No results for input(s): BNP in the last 72 hours.   PT/INR: No results for input(s): PROTIME, INR in the last 72 hours. APTT: No results for input(s): APTT in the last 72 hours. CARDIAC ENZYMES: No results for input(s): CKMB, CKMBINDEX, TROPONINT, TROPHS, TROPII in the last 72 hours. Invalid input(s): CKTOTAL;3   FASTING LIPID PANEL:  Lab Results   Component Value Date    CHOL 104 04/12/2018    HDL 42 04/12/2018    TRIG 92 04/12/2018     LIVER PROFILE:   Recent Labs     03/26/22  0932   AST 17   ALT 17   BILITOT 0.31   ALKPHOS 118        Radiology:    MRI BRAIN  - ordered and pending 3/28    CXR 3/27/22  FINDINGS:   A single frontal view of the chest was performed. Elena Knuckles is no acute skeletal   abnormality.  Cervical fusion hardware is noted.  The heart size is stable,   and at the upper limits of normal.  The mediastinal contours are within   normal limits, with stable tortuosity of the intrathoracic aorta.  There is   left basilar airspace consolidation.  There is mild right basilar   atelectasis.  The lungs are otherwise clear.  There is no evidence of a   pneumothorax.           Impression   1. Left basilar airspace consolidation, representing either aspiration or   pneumonia. 2. Mild right basilar atelectasis. Impression/Plan:    1. Cervical stenosis with myelopathy s/p C2-C5 laminectomy and fusion on 3/24  2. Neck pain  3. Tetraparesis  4. Urinary retention/neurogenic bladder  5. HTN/HLD  6. Diabetes  7. GIA  8. Migraines  9. PTSD/Depression      Recommendation:  1. Will follow workup and progress with therapies. Kitty Dominique reports she is available 24/7 and that patient was high functioning prior to admission.    2. DVT Prophylaxis: on Lovenox        Electronically signed by Grace Arellano MD on 3/28/2022 at 9:49 AM       This note is created with the assistance of a speech recognition program.  While intending to generate a document that actually reflects the content of the visit, the document can still have some errors including those of syntax and sound a like substitutions which may escape proof reading. In such instances, actual meaning can be extrapolated by contextual diversion.

## 2022-03-28 NOTE — PROGRESS NOTES
[facility]  Office: [address], DO, Jimbo Andrade, DO, Bro Giles, DO, Gregorio Anila Blood, DO, Edel Alfaro MD, Charmaine Levine MD, Regan Foster MD, Brenda Charlton MD, Renu Carvalho MD, Marian Mello MD, Pelon Cornelius MD, Jeannie Mcgregor, DO, Media Sensor, DO, Dottie Kerns MD,  Chriss Mas, DO, Vik Calero MD, Damaso Villarreal MD, Erika Estrada MD, Varsha Mejia DO, Juanito Diallo MD, Ashley Guillaume MD, Chidi Freeman, CNP, [facility] Hospital[facility]RA Corbin, CNP, Dave Gustafson, CNP, Carmen Stinson, CNS, Kimberli Gold, CNP, Mariluz Merlos, CNP, Arabella Campbell, CNP, Izabela Garcia, CNP, Herman Wylie, CNP, [place] PA-C, Syeda Byers, JENNIFER, Kailyn Oakley, [facility], Julissa Bennett, CNP, Oralia Mensah, CNP, Sammie Diaz, CNP         [address]    Progress Note    3/28/2022    3:07 PM    Name:   Ninfa Scott  MRN:     [id number]     Acct:      [de-identified]   Room:   [facility]/1454-26   Day:  5  Admit Date:  3/23/2022  7:06 PM    PCP:   Jae Felipe  Code Status:  DNR-CCA    Subjective:     C/C:   Neck pain  Leg weakness  Cervical Stenosis  Quadriplegia     Interval History Status:   POD 4  The patient had been making good progress through the yesterday afternoon  He ate dinner and was alert and cooperative  He did receive Oxycodone 2 times last night  The patient is again somnolent and quite confused this morning  He required Narcan prior to the MRI today    Data Base Updates:  T-max 103    O2 sats satisfactory     CT brain  Impression:    No acute findings in the head/brain. Urine culture pending    Blood cultures pending    Valproic Acid Lvl42 Low ug/mL   Valproic Acid, Kiera Oliveiraer. 3 Low ug/mL   Valproic Acid % Free12.6         Brief History:     As documented in the medical record:  \"76 year-old male who initially presented to [facility] after multiple falls, generalized upper/lower extremity weakness.  Notably, he has a history of cervical spine disease as he underwent decompression on 4/19/18. MRI of the cervical spine suggesting severe spinal canal stenosis at C2-C3 and C3-C4 with complete effacement of the CSF at these levels. Transferred to [place] [id number] for neurosurgery evaluation. \"    The intitial assessment and plan included:  \"  Hospital Problems            Last Modified POA     * (Principal) Cord compression ([address] 3/23/2022 Yes     Hypertension 3/23/2022 Yes     Hyperlipidemia 3/23/2022 Yes     Diabetes mellitus ([address] 3/23/2022 Yes     Bipolar disorder, mixed ([address] 3/23/2022 Yes     Severe recurrent major depression without psychotic features ([address] 3/23/2022 Yes     Cervical spinal cord compression ([address] 3/21/2022 Yes          Plan:  #Severe spinal canal stenosis  - consult to neurosurgery, plan for OR this afternoon  - pain control  - cleared medically for surgery as low risk for major adverse cardiac event     #Type 2 DM  - controlled with diet. No longer taking metformin. #HTN  - resume home regimen, continue to monitor     #Bipolar disorder/depressive disorder   - continue depakote, effexor as ordered\"     On 3/24 the patient underwent:  Laminectomy to decompress the spinal cord at C2, C3, C4, partial C5  Nonsegmental fixation with lateral mass screws and rods at C2, C3, C4, C5  Posterior lateral arthrodesis at C2, C3, C4, C5  Use of morselized autograft via same incision  Use of fluoroscopy    Postoperatively the patient has become confused  Somnolent at times, restless and agitated at times  Neurology has been consulted    3/28:  MRI:  Impression:  No acute intracranial abnormality. The patient has had intermittent fever  Chest x-ray revealed:  Impression:    1. Left basilar airspace consolidation, representing either aspiration or   pneumonia. 2. Mild right basilar atelectasis.      Database has included:  [id number]     Vit D, 25-Myfygym88.2 Low      Vitamin B-61159nh/mL   Folate>20.0         Past Medical History:   has a past medical history of Depression, Diabetes mellitus [address] Difficult intravenous access, Hyperlipidemia, Hypertension, Migraines, PTSD (post-traumatic stress disorder), Sleep apnea, Teeth missing, and Wears glasses. Social History:   reports that he quit smoking about 49 years ago. His smoking use included cigarettes. He has a 2.00 pack-year smoking history. He has never used smokeless tobacco. He reports current alcohol use. He reports current drug use. Drug: Marijuana Norval Remak). Family History:   Family History   Problem Relation Age of Onset   Bonilla Dementia Mother     Coronary Art Dis Mother     Stroke Mother     Diabetes Mother     Asthma Mother     Other Mother         BRAIN ANEURISM    High Blood Pressure Mother     Heart Disease Father     Diabetes Sister     Diabetes Brother     High Blood Pressure Brother     High Cholesterol Brother     Heart Disease Brother     Diabetes Sister     Other Sister         NEUROPATHY       Review of Systems:     Limited, patient confused  Review of Systems   Respiratory: Positive for apnea (Apneic spells reported postoperatively). Negative for cough and shortness of breath. Cardiovascular: Negative for chest pain. Gastrointestinal: Negative for nausea and vomiting. Musculoskeletal: Positive for neck pain. Physical Examination:        Vitals  BP (!) 174/102   Pulse 80   Temp 98.5 °F (36.9 °C) (Oral)   Resp 8   Ht 5' 3\" (1.6 m)   Wt 153 lb 6.4 oz (69.6 kg)   SpO2 95%   BMI 27.17 kg/m²   Temp (24hrs), Av °F (37.8 °C), Min:98.4 °F (36.9 °C), Max:103 °F (39.4 °C)    Recent Labs     22  0336 22  1512 22  1420   POCGLU 135* 91 160*       Physical Exam  Vitals reviewed. Constitutional:       General: He is not in acute distress. Appearance: He is not diaphoretic. HENT:      Head: Normocephalic. Nose: Nose normal.   Eyes:      General: No scleral icterus.      Conjunctiva/sclera: Conjunctivae normal.   Neck:      Trachea: No tracheal deviation. Cardiovascular:      Rate and Rhythm: Normal rate and regular rhythm. Pulmonary:      Effort: Pulmonary effort is normal. No respiratory distress. Breath sounds: Normal breath sounds. No wheezing or rales. Chest:      Chest wall: No tenderness. Abdominal:      General: There is no distension. Palpations: Abdomen is soft. Tenderness: There is no abdominal tenderness. Musculoskeletal:         General: No tenderness. Cervical back: Neck supple. Skin:     General: Skin is warm and dry. Comments: Wound is dressed, dry and clean    Neurological:      Comments: Somnolent  Still having some difficulty with historical data  Moving extremities x4   decreased  Arm / Leg paresis unchanged  Having difficulty following commands         Medications: Allergies:     Allergies   Allergen Reactions    Latex Itching    Bee Venom Swelling    Lisinopril Other (See Comments)     Cough      Niacin And Related Rash    Sulfa Antibiotics Rash    Terazosin Rash       Current Meds:   Scheduled Meds:    cefepime  1,000 mg IntraVENous Q12H    bethanechol  25 mg Oral TID    divalproex  750 mg Oral Nightly    carboxymethylcellulose PF  2 drop Both Eyes TID    amLODIPine  7.5 mg Oral Daily    pantoprazole  40 mg Oral QAM AC    furosemide  20 mg Oral Daily    venlafaxine  75 mg Oral TID WC    divalproex  500 mg Oral QAM    sodium chloride flush  5-40 mL IntraVENous 2 times per day    bisacodyl  5 mg Oral Daily    sodium chloride flush  5-40 mL IntraVENous 2 times per day    enoxaparin  40 mg SubCUTAneous Daily    losartan  12.5 mg Oral Daily    [Held by provider] traZODone  150 mg Oral Nightly     Continuous Infusions:    sodium chloride 75 mL/hr at 03/28/22 1423    lactated ringers      sodium chloride      sodium chloride      dextrose       PRN Meds: [Held by provider] oxyCODONE **OR** [DISCONTINUED] oxyCODONE, naloxone, labetalol, hydrALAZINE, sodium chloride flush, sodium chloride, senna, sodium chloride flush, sodium chloride, potassium chloride **OR** potassium alternative oral replacement **OR** potassium chloride, magnesium sulfate, ondansetron **OR** ondansetron, polyethylene glycol, acetaminophen **OR** acetaminophen, glucose, dextrose, glucagon (rDNA), dextrose    Data:     I/O (24Hr): No intake or output data in the 24 hours ending 03/28/22 1507    Labs:  Hematology:  Recent Labs     03/26/22  1415   WBC 12.3*   RBC 3.36*   HGB 10.7*   HCT 31.4*   MCV 93.5   MCH 31.8   MCHC 34.1   RDW 12.6      MPV 9.8     Chemistry:  Recent Labs     03/26/22  0932 03/27/22  1707     --    K 4.9  --      --    CO2 26  --    GLUCOSE 116*  --    BUN 36*  --    CREATININE 0.71  --    ANIONGAP 13  --    LABGLOM >60  --    GFRAA >60  --    CALCIUM 9.9  --    LACTACIDWB  --  0.8     Recent Labs     03/26/22  0336 03/26/22  0932 03/26/22  1512 03/27/22  1707 03/28/22  1420   PROT  --  6.3*  --   --   --    LABALBU  --  3.5  --   --   --    TSH  --   --   --  0.67  --    AST  --  17  --   --   --    ALT  --  17  --   --   --    ALKPHOS  --  118  --   --   --    BILITOT  --  0.31  --   --   --    AMMONIA  --   --   --  39  --    POCGLU 135*  --  91  --  160*     ABG:  Lab Results   Component Value Date    PHART 7.445 04/19/2018    DFF4ONQ 39.1 04/19/2018    PO2ART 252.0 04/19/2018    UUD5FTO 26.5 04/19/2018    NBEA NOT REPORTED 04/19/2018    PBEA 2.7 04/19/2018    Y5YNGIQH 99.5 04/19/2018    FIO2 97% 04/19/2018     Lab Results   Component Value Date/Time    SPECIAL R HAND 5ML 03/26/2022 06:53 PM     Lab Results   Component Value Date/Time    CULTURE NO GROWTH 1 DAY 03/26/2022 06:53 PM       Radiology:  CT HEAD WO CONTRAST    Result Date: 3/20/2022  No acute intracranial abnormality. RECOMMENDATIONS: Unavailable     CT CERVICAL SPINE WO CONTRAST    Result Date: 3/20/2022  Stable cervical spine CT examination status post corpectomy and anterior fusion from C4-C7.  No acute fracture or traumatic malalignment. RECOMMENDATIONS: Unavailable     CT THORACIC SPINE WO CONTRAST    Result Date: 3/20/2022  Multilevel degenerative changes with no acute fracture or traumatic malalignment of the thoracolumbar spine. RECOMMENDATIONS: Unavailable     CT LUMBAR SPINE WO CONTRAST    Result Date: 3/20/2022  Multilevel degenerative changes with no acute fracture or traumatic malalignment of the thoracolumbar spine. RECOMMENDATIONS: Unavailable     MRI CERVICAL SPINE WO CONTRAST    Result Date: 3/21/2022  1. Patient motion limits evaluation. 2. There is severe spinal canal stenosis at C2-C3 and C3-C4 with complete effacement of the CSF at these levels. 3. No significant spinal canal stenosis at the remaining levels. 4. Multilevel neural foraminal narrowing as above. 5. There is question of minimal T2 hyperintensity within the cord at C4-C5, which likely represents myelomalacia. MRI THORACIC SPINE WO CONTRAST    Result Date: 3/22/2022  Mild multilevel degenerative changes of the thoracic spine, as described above with mild spinal canal stenosis from T8-9 to T10-11, most pronounced at T10-11. MRI LUMBAR SPINE WO CONTRAST    Result Date: 3/22/2022  1. Moderate spinal canal stenosis from L2-L3 to L4-L5, as described above. 2. Mild spinal canal stenosis at L1-L2, as described above. 3. Multilevel neural foraminal narrowing, most severe at L2-L3. MRI BRAIN WO CONTRAST    Result Date: 3/21/2022  1. No acute intracranial abnormality. No acute infarct. 2. Mild global parenchymal volume loss with minimal chronic microvascular ischemic changes.        Assessment:        Primary Problem  Cord compression [facility])    Active Hospital Problems    Diagnosis Date Noted    Hospital-acquired pneumonia [J18.9, Y95] 03/28/2022    Atelectasis [J98.11] 03/28/2022    Encephalopathy [G93.40] 03/27/2022    Quadriplegia, C1-C4 incomplete ([address] [G82.52]     Cord compression ([address] [G95.20] 03/23/2022    Cervical spinal cord compression ([facility] [G95.20] 03/21/2022    Severe recurrent major depression without psychotic features ([facility] [F33.2] 06/20/2020    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) [M48.02, G99.2] 04/19/2018    Bipolar disorder, mixed ([facility] [F31.60] 01/18/2017    Hypertension [I10]     Hyperlipidemia [E78.5]     Diabetes mellitus ([facility] [E11.9]          Plan:        Cefepime initiated to cover hospital-acquired PN  Incentive spirometry for atelectasis  Encephalopathy  Pain management  Avoid opioids   Neurology evaluation in progress  Neurosurgical evaluation and treatment  PT/OT  Glycemic contol - Monitor and control blood sugars  Blood Pressure - Monitor and control  Observe for urine retention  DVT prophylaxis  The patient's status and plan have been discussed with the patient and son Ayan Major and Tenzin Amie at the bedside     IP CONSULT TO NEUROSURGERY  IP CONSULT  [facility]  IP CONSULT TO [address] [place] [address]  3/28/2022 thanks [facility]  3:07 PM

## 2022-03-28 NOTE — PLAN OF CARE
Neurosurgery    Patient continues to be very somnolent   He was given Roxicodone 2100 and 0545 this morning  He has been minimally responsive- did open eyes once to name being call Jordon Nick, nonverbal and minimal withdrawal the painful stimulation    0.4mg Narcan given around 1215 this afternoon  Patient starting to become a little more responsive and facial grimacing to painful stimuli and opening eyes to tactile stimulation    Will continue to monitor    Electronically signed by KASSIDY Littlejohn NP on 3/28/2022 at 12:36 PM

## 2022-03-28 NOTE — PROGRESS NOTES
Neurosurgery DARCY/Resident    Daily Progress Note   CC:No chief complaint on file.     3/28/2022  7:36 AM    Patient somnolent this morning and was given Roxicodone at 2100 and 545 this morning, family at bedside, neurology consulted due to altered mental status likely toxic metabolic encephalopathy   Last bladder scan 0200 this morning showing 56 ml  Last straight cath documented was 3/25   Afebrile   Vitals:    03/28/22 0005 03/28/22 0122 03/28/22 0400 03/28/22 0433   BP:   (!) 151/77    Pulse:  105 106    Resp:  30 15    Temp: 102.6 °F (39.2 °C) 99.8 °F (37.7 °C) 98.4 °F (36.9 °C)    TempSrc: Axillary Oral Oral    SpO2:  95% 95%    Weight:   153 lb 6.4 oz (69.6 kg) 153 lb 6.4 oz (69.6 kg)   Height:           PE:   Somnolent  PERRL  Minimal movement to painful stimulation  Did not open eyes to voice or painful stimulation   Unable to participate in exam due to mental status     Incision clean dry intact       Lab Results   Component Value Date    WBC 12.3 (H) 03/26/2022    HGB 10.7 (L) 03/26/2022    HCT 31.4 (L) 03/26/2022     03/26/2022    CHOL 104 04/12/2018    TRIG 92 04/12/2018    HDL 42 04/12/2018    ALT 17 03/26/2022    AST 17 03/26/2022     03/26/2022    K 4.9 03/26/2022     03/26/2022    CREATININE 0.71 03/26/2022    BUN 36 (H) 03/26/2022    CO2 26 03/26/2022    TSH 0.67 03/27/2022    INR 1.0 03/24/2022    LABA1C 5.8 05/17/2021    .6 (H) 12/08/2016         A/P  70 y.o. male who presents with cervical stenosis with myelopathy and incomplete quadriplegia  POD#4 s/p C2-5 laminectomy and fixation   Altered mental status     - Lovenox for DVT prophylaxis  - hold all sedating medications   - continue bladder scan every 6 hours and document  - encourage IS   - continue urecholine   - Ok to begin prophylactic anticoagulation from neurosurgery stand point.   - Neuro checks per floor protocol  - PT and PRM consulted will likely need rehab upon discharge       Please contact neurosurgery with any changes in patients neurologic status.        Lionel Hill, CNP  3/28/22  7:36 AM

## 2022-03-28 NOTE — PROGRESS NOTES
[id number]  Speech Language Pathology    Date: 3/28/2022  Patient Name: Nandini Barton  YOB: 1950   AGE: 70 y.o. MRN: [id number]        Patient Not Available for Speech Therapy     Due to:  [] Testing  [] Hemodialysis  [] Cancelled by RN  [] Surgery   [] Intubation/Sedation/Pain Medication  [] Medical instability  [x] Other: ST attempted BSSE x3. Pt lethargic in AM, at MRI in PM x1, and not appropriate per RN x1 in PM. ST to continue to follow.      Next scheduled treatment: 3/29  Completed by: Anthony Parrish, SLP

## 2022-03-28 NOTE — CARE COORDINATION
[facility] Quality Flow/Interdisciplinary Rounds Progress Note    Quality Flow Rounds held on March 28, 2022 at [address] Attending:  Bedside Nurse, ,  and Nursing Unit Leadership    Barriers to Discharge: lethargy     Anticipated Discharge Date:  Expected Discharge Date: 03/26/22    Anticipated Discharge Disposition: IP Rehab . Readmission Risk              Risk of Unplanned Readmission:  20           Discussed patient goal for the day, patient clinical progression, and barriers to discharge. The following Goal(s) of the Day/Commitment(s) have been identified:  Discharge order     Need updated PT/OT notes. Most likely d/c to IP Rehab at [facility]. Patient does not have insurance.      Candido Chase RN  March 28, 2022

## 2022-03-29 ENCOUNTER — APPOINTMENT (OUTPATIENT)
Dept: GENERAL RADIOLOGY | Age: 72
DRG: 471 | End: 2022-03-29
Attending: STUDENT IN AN ORGANIZED HEALTH CARE EDUCATION/TRAINING PROGRAM
Payer: OTHER GOVERNMENT

## 2022-03-29 LAB
ABSOLUTE EOS #: <0.03 K/UL (ref 0–0.44)
ABSOLUTE IMMATURE GRANULOCYTE: 0.03 K/UL (ref 0–0.3)
ABSOLUTE LYMPH #: 0.99 K/UL (ref 1.1–3.7)
ABSOLUTE MONO #: 0.82 K/UL (ref 0.1–1.2)
ALBUMIN SERPL-MCNC: 2.5 G/DL (ref 3.5–5.2)
ALBUMIN/GLOBULIN RATIO: 0.8 (ref 1–2.5)
ALP BLD-CCNC: 95 U/L (ref 40–129)
ALT SERPL-CCNC: 21 U/L (ref 5–41)
ANION GAP SERPL CALCULATED.3IONS-SCNC: 13 MMOL/L (ref 9–17)
AST SERPL-CCNC: 22 U/L
BASOPHILS # BLD: 0 % (ref 0–2)
BASOPHILS ABSOLUTE: <0.03 K/UL (ref 0–0.2)
BILIRUB SERPL-MCNC: 0.41 MG/DL (ref 0.3–1.2)
BUN BLDV-MCNC: 30 MG/DL (ref 8–23)
CALCIUM SERPL-MCNC: 8.9 MG/DL (ref 8.6–10.4)
CHLORIDE BLD-SCNC: 104 MMOL/L (ref 98–107)
CO2: 24 MMOL/L (ref 20–31)
CREAT SERPL-MCNC: 0.59 MG/DL (ref 0.7–1.2)
DIRECT EXAM: NORMAL
DIRECT EXAM: NORMAL
EOSINOPHILS RELATIVE PERCENT: 0 % (ref 1–4)
GFR AFRICAN AMERICAN: >60 ML/MIN
GFR NON-AFRICAN AMERICAN: >60 ML/MIN
GFR SERPL CREATININE-BSD FRML MDRD: ABNORMAL ML/MIN/{1.73_M2}
GLUCOSE BLD-MCNC: 150 MG/DL (ref 70–99)
HCT VFR BLD CALC: 29.4 % (ref 40.7–50.3)
HEMOGLOBIN: 10.2 G/DL (ref 13–17)
IMMATURE GRANULOCYTES: 0 %
LYMPHOCYTES # BLD: 12 % (ref 24–43)
MCH RBC QN AUTO: 32.7 PG (ref 25.2–33.5)
MCHC RBC AUTO-ENTMCNC: 34.7 G/DL (ref 28.4–34.8)
MCV RBC AUTO: 94.2 FL (ref 82.6–102.9)
MONOCYTES # BLD: 10 % (ref 3–12)
NRBC AUTOMATED: 0 PER 100 WBC
PDW BLD-RTO: 12.3 % (ref 11.8–14.4)
PLATELET # BLD: 318 K/UL (ref 138–453)
PMV BLD AUTO: 9.5 FL (ref 8.1–13.5)
POTASSIUM SERPL-SCNC: 3.7 MMOL/L (ref 3.7–5.3)
RBC # BLD: 3.12 M/UL (ref 4.21–5.77)
SEG NEUTROPHILS: 76 % (ref 36–65)
SEGMENTED NEUTROPHILS ABSOLUTE COUNT: 6.08 K/UL (ref 1.5–8.1)
SODIUM BLD-SCNC: 141 MMOL/L (ref 135–144)
SPECIMEN DESCRIPTION: NORMAL
TOTAL PROTEIN: 5.8 G/DL (ref 6.4–8.3)
WBC # BLD: 8 K/UL (ref 3.5–11.3)

## 2022-03-29 PROCEDURE — 99232 SBSQ HOSP IP/OBS MODERATE 35: CPT | Performed by: NURSE PRACTITIONER

## 2022-03-29 PROCEDURE — 80053 COMPREHEN METABOLIC PANEL: CPT

## 2022-03-29 PROCEDURE — 51798 US URINE CAPACITY MEASURE: CPT

## 2022-03-29 PROCEDURE — 6360000002 HC RX W HCPCS: Performed by: NURSE PRACTITIONER

## 2022-03-29 PROCEDURE — 71045 X-RAY EXAM CHEST 1 VIEW: CPT

## 2022-03-29 PROCEDURE — 36415 COLL VENOUS BLD VENIPUNCTURE: CPT

## 2022-03-29 PROCEDURE — 87070 CULTURE OTHR SPECIMN AEROBIC: CPT

## 2022-03-29 PROCEDURE — 1200000000 HC SEMI PRIVATE

## 2022-03-29 PROCEDURE — APPSS30 APP SPLIT SHARED TIME 16-30 MINUTES: Performed by: NURSE PRACTITIONER

## 2022-03-29 PROCEDURE — 97530 THERAPEUTIC ACTIVITIES: CPT

## 2022-03-29 PROCEDURE — 87205 SMEAR GRAM STAIN: CPT

## 2022-03-29 PROCEDURE — 2580000003 HC RX 258: Performed by: STUDENT IN AN ORGANIZED HEALTH CARE EDUCATION/TRAINING PROGRAM

## 2022-03-29 PROCEDURE — 6370000000 HC RX 637 (ALT 250 FOR IP): Performed by: NURSE PRACTITIONER

## 2022-03-29 PROCEDURE — 97110 THERAPEUTIC EXERCISES: CPT

## 2022-03-29 PROCEDURE — 6360000002 HC RX W HCPCS: Performed by: INTERNAL MEDICINE

## 2022-03-29 PROCEDURE — 99232 SBSQ HOSP IP/OBS MODERATE 35: CPT | Performed by: PHYSICAL MEDICINE & REHABILITATION

## 2022-03-29 PROCEDURE — 92610 EVALUATE SWALLOWING FUNCTION: CPT

## 2022-03-29 PROCEDURE — 97167 OT EVAL HIGH COMPLEX 60 MIN: CPT

## 2022-03-29 PROCEDURE — 85027 COMPLETE CBC AUTOMATED: CPT

## 2022-03-29 PROCEDURE — 99232 SBSQ HOSP IP/OBS MODERATE 35: CPT | Performed by: PSYCHIATRY & NEUROLOGY

## 2022-03-29 PROCEDURE — 97535 SELF CARE MNGMENT TRAINING: CPT

## 2022-03-29 PROCEDURE — 6370000000 HC RX 637 (ALT 250 FOR IP): Performed by: FAMILY MEDICINE

## 2022-03-29 PROCEDURE — 2500000003 HC RX 250 WO HCPCS: Performed by: INTERNAL MEDICINE

## 2022-03-29 PROCEDURE — 2580000003 HC RX 258: Performed by: NURSE PRACTITIONER

## 2022-03-29 RX ORDER — AMLODIPINE BESYLATE 10 MG/1
10 TABLET ORAL DAILY
Status: DISCONTINUED | OUTPATIENT
Start: 2022-03-30 | End: 2022-03-31 | Stop reason: HOSPADM

## 2022-03-29 RX ORDER — DOXYCYCLINE HYCLATE 100 MG
100 TABLET ORAL EVERY 12 HOURS SCHEDULED
Status: DISCONTINUED | OUTPATIENT
Start: 2022-03-29 | End: 2022-03-31 | Stop reason: HOSPADM

## 2022-03-29 RX ORDER — LOSARTAN POTASSIUM 25 MG/1
25 TABLET ORAL DAILY
Status: CANCELLED | OUTPATIENT
Start: 2022-03-30

## 2022-03-29 RX ORDER — AMLODIPINE BESYLATE 2.5 MG/1
2.5 TABLET ORAL ONCE
Status: COMPLETED | OUTPATIENT
Start: 2022-03-29 | End: 2022-03-29

## 2022-03-29 RX ORDER — LOSARTAN POTASSIUM 50 MG/1
50 TABLET ORAL DAILY
Status: DISCONTINUED | OUTPATIENT
Start: 2022-03-30 | End: 2022-03-31

## 2022-03-29 RX ORDER — AMLODIPINE BESYLATE 10 MG/1
10 TABLET ORAL DAILY
Status: CANCELLED | OUTPATIENT
Start: 2022-03-30

## 2022-03-29 RX ADMIN — LOSARTAN POTASSIUM 12.5 MG: 25 TABLET, FILM COATED ORAL at 09:57

## 2022-03-29 RX ADMIN — SODIUM CHLORIDE, PRESERVATIVE FREE 10 ML: 5 INJECTION INTRAVENOUS at 09:56

## 2022-03-29 RX ADMIN — SODIUM CHLORIDE: 9 INJECTION, SOLUTION INTRAVENOUS at 02:24

## 2022-03-29 RX ADMIN — CARBOXYMETHYLCELLULOSE SODIUM 2 DROP: 10 GEL OPHTHALMIC at 14:07

## 2022-03-29 RX ADMIN — DOXYCYCLINE HYCLATE 100 MG: 100 TABLET, COATED ORAL at 20:39

## 2022-03-29 RX ADMIN — ACETAMINOPHEN 650 MG: 325 TABLET ORAL at 20:39

## 2022-03-29 RX ADMIN — FUROSEMIDE 20 MG: 20 TABLET ORAL at 09:55

## 2022-03-29 RX ADMIN — KETOROLAC TROMETHAMINE 15 MG: 30 INJECTION, SOLUTION INTRAMUSCULAR at 06:01

## 2022-03-29 RX ADMIN — CEFEPIME HYDROCHLORIDE 1000 MG: 1 INJECTION, POWDER, FOR SOLUTION INTRAMUSCULAR; INTRAVENOUS at 20:40

## 2022-03-29 RX ADMIN — VENLAFAXINE 75 MG: 75 TABLET ORAL at 14:05

## 2022-03-29 RX ADMIN — DIVALPROEX SODIUM 500 MG: 250 TABLET, FILM COATED, EXTENDED RELEASE ORAL at 09:55

## 2022-03-29 RX ADMIN — ENOXAPARIN SODIUM 40 MG: 40 INJECTION SUBCUTANEOUS at 09:56

## 2022-03-29 RX ADMIN — ACETAMINOPHEN 650 MG: 325 TABLET ORAL at 12:30

## 2022-03-29 RX ADMIN — VENLAFAXINE 75 MG: 75 TABLET ORAL at 18:29

## 2022-03-29 RX ADMIN — CARBOXYMETHYLCELLULOSE SODIUM 2 DROP: 10 GEL OPHTHALMIC at 09:56

## 2022-03-29 RX ADMIN — CARBOXYMETHYLCELLULOSE SODIUM 2 DROP: 10 GEL OPHTHALMIC at 20:38

## 2022-03-29 RX ADMIN — CEFEPIME HYDROCHLORIDE 1000 MG: 1 INJECTION, POWDER, FOR SOLUTION INTRAMUSCULAR; INTRAVENOUS at 08:49

## 2022-03-29 RX ADMIN — SODIUM CHLORIDE, PRESERVATIVE FREE 10 ML: 5 INJECTION INTRAVENOUS at 20:45

## 2022-03-29 RX ADMIN — DIVALPROEX SODIUM 750 MG: 250 TABLET, FILM COATED, EXTENDED RELEASE ORAL at 20:38

## 2022-03-29 RX ADMIN — KETOROLAC TROMETHAMINE 15 MG: 30 INJECTION, SOLUTION INTRAMUSCULAR at 20:39

## 2022-03-29 RX ADMIN — AMLODIPINE BESYLATE 7.5 MG: 5 TABLET ORAL at 09:57

## 2022-03-29 RX ADMIN — BISACODYL 5 MG: 5 TABLET, COATED ORAL at 09:56

## 2022-03-29 RX ADMIN — PANTOPRAZOLE SODIUM 40 MG: 40 TABLET, DELAYED RELEASE ORAL at 09:55

## 2022-03-29 RX ADMIN — BETHANECHOL CHLORIDE 25 MG: 25 TABLET ORAL at 09:56

## 2022-03-29 RX ADMIN — VENLAFAXINE 75 MG: 75 TABLET ORAL at 09:58

## 2022-03-29 RX ADMIN — AMLODIPINE BESYLATE 2.5 MG: 2.5 TABLET ORAL at 12:30

## 2022-03-29 ASSESSMENT — PAIN DESCRIPTION - ORIENTATION
ORIENTATION: POSTERIOR
ORIENTATION: POSTERIOR

## 2022-03-29 ASSESSMENT — ENCOUNTER SYMPTOMS: TACHYPNEA: 1

## 2022-03-29 ASSESSMENT — PAIN SCALES - GENERAL
PAINLEVEL_OUTOF10: 5
PAINLEVEL_OUTOF10: 5
PAINLEVEL_OUTOF10: 0
PAINLEVEL_OUTOF10: 5
PAINLEVEL_OUTOF10: 5
PAINLEVEL_OUTOF10: 3

## 2022-03-29 ASSESSMENT — PAIN DESCRIPTION - DESCRIPTORS
DESCRIPTORS: SORE
DESCRIPTORS: SHARP;ACHING

## 2022-03-29 ASSESSMENT — PAIN - FUNCTIONAL ASSESSMENT
PAIN_FUNCTIONAL_ASSESSMENT: PREVENTS OR INTERFERES SOME ACTIVE ACTIVITIES AND ADLS
PAIN_FUNCTIONAL_ASSESSMENT: PREVENTS OR INTERFERES SOME ACTIVE ACTIVITIES AND ADLS

## 2022-03-29 ASSESSMENT — PAIN DESCRIPTION - LOCATION
LOCATION: BACK;SHOULDER
LOCATION: BACK;SHOULDER;NECK

## 2022-03-29 ASSESSMENT — PAIN DESCRIPTION - PAIN TYPE
TYPE: SURGICAL PAIN
TYPE: SURGICAL PAIN

## 2022-03-29 ASSESSMENT — PAIN DESCRIPTION - FREQUENCY
FREQUENCY: INTERMITTENT
FREQUENCY: INTERMITTENT

## 2022-03-29 ASSESSMENT — PAIN DESCRIPTION - PROGRESSION
CLINICAL_PROGRESSION: GRADUALLY IMPROVING
CLINICAL_PROGRESSION: GRADUALLY IMPROVING

## 2022-03-29 ASSESSMENT — PAIN SCALES - WONG BAKER: WONGBAKER_NUMERICALRESPONSE: 0

## 2022-03-29 ASSESSMENT — PAIN DESCRIPTION - ONSET
ONSET: ON-GOING
ONSET: ON-GOING

## 2022-03-29 NOTE — PROGRESS NOTES
Spoke with Dc Lanza CM, to request OT note so Dr Cynthia Villanueva can make determination for ARU. Also requested when pt will be medically ready.

## 2022-03-29 NOTE — PLAN OF CARE
Problem: Pain:  Goal: Pain level will decrease  Description: Pain level will decrease  3/29/2022 0547 by Jessie Alvarez RN  Outcome: Ongoing  3/28/2022 1737 by Bishop Kate RN  Outcome: Ongoing     Problem: Pain:  Goal: Control of acute pain  Description: Control of acute pain  3/29/2022 0547 by Jessie Alvarez RN  Outcome: Ongoing  3/28/2022 1737 by Bishop Kate RN  Outcome: Ongoing     Problem: Skin Integrity:  Goal: Will show no infection signs and symptoms  Description: Will show no infection signs and symptoms  3/29/2022 0547 by Jessie Alvarez RN  Outcome: Ongoing  3/28/2022 1737 by Bishop Kate RN  Outcome: Ongoing     Problem: Skin Integrity:  Goal: Absence of new skin breakdown  Description: Absence of new skin breakdown  3/29/2022 0547 by Jessie Alvarez RN  Outcome: Ongoing  3/28/2022 1737 by Bishop Kate RN  Outcome: Ongoing     Problem: Falls - Risk of:  Goal: Will remain free from falls  Description: Will remain free from falls  3/28/2022 1737 by Bishop Kate RN  Outcome: Ongoing

## 2022-03-29 NOTE — PROGRESS NOTES
Physical Therapy  Facility/Department: [address] STEP DOWN  Daily Treatment Note  NAME: Yue Carrera  : 1950  MRN: [id number]    Date of Service: 3/29/2022    Discharge Recommendations:  Further therapy recommended at discharge. The patient should be able to tolerate at least 3 hours of therapy per day over 5 days or 15 hours over 7 days. This patient may benefit from a Physical Medicine and Rehab consult. PT Equipment Recommendations  Equipment Needed: No  Other: CTA, pt currently requires significant physical assistance for all aspects of mobility    Assessment   Body structures, Functions, Activity limitations: Decreased functional mobility ; Decreased ROM; Decreased strength;Decreased endurance;Decreased balance; Increased pain;Decreased posture  Assessment: The pt required Max A for bed mobility and Nikki to modA to maintain sitting EOB this date, limited by pain. Able to stand with modA and use of elbert stedy. The pt is very cooperative and motivated to participate in therapy and would benefit from intense PT to progress toward prior level of independence. Prognosis: Fair  PT Education: Goals;PT Role;Plan of Care; Functional Mobility Training;Transfer Training;General Safety  REQUIRES PT FOLLOW UP: Yes  Activity Tolerance  Activity Tolerance: Patient limited by pain; Patient limited by endurance     Patient Diagnosis(es): There were no encounter diagnoses. has a past medical history of Depression, Diabetes mellitus ([facility] [address] Difficult intravenous access, Hyperlipidemia, Hypertension, Migraines, PTSD (post-traumatic stress disorder), Sleep apnea, Teeth missing, and Wears glasses. has a past surgical history that includes Cardiac catheterization (2010); Carpal tunnel release (Left, 2002); Vasectomy (1983); Tonsillectomy and adenoidectomy (); Hydrocele surgery (); Middle ear surgery (2018); eye surgery (Left, 2018);  Mouth surgery (2018); other surgical history (2018); cervical fusion (04/19/2018); pr office/outpt visit,procedure only (N/A, 4/19/2018); Cataract removal; laminectomy (03/24/2022); and cervical fusion (N/A, 3/24/2022). Restrictions  Restrictions/Precautions  Restrictions/Precautions: Fall Risk,General Precautions  Required Braces or Orthoses?: Yes  Required Braces or Orthoses  Cervical: c-collar  Position Activity Restriction  Other position/activity restrictions: up with assist, s/p C2-C5 laminectomy and fixation 3/24  Subjective   General  Response To Previous Treatment: Patient with no complaints from previous session. Family / Caregiver Present: No  Subjective  Subjective: RN and pt agreeable to PT. Pt supine in bed upon arrival, pleasant and cooperative throughout. General Comment  Comments: Pt left in recliner with call light within reach, alarm activated and family present in room  Pain Screening  Patient Currently in Pain: Yes  Pain Assessment  Pain Assessment: 0-10  Pain Level: 5  Pain Type: Surgical pain  Pain Location: Back; Shoulder;Neck  Pain Orientation: Posterior  Pain Descriptors: Sore  Pain Frequency: Intermittent  Pain Onset: On-going  Clinical Progression: Gradually improving  Functional Pain Assessment: Prevents or interferes some active activities and ADLs  Non-Pharmaceutical Pain Intervention(s): Ambulation/Increased Activity;Repositioned  Response to Pain Intervention: Patient Satisfied  Vital Signs  Patient Currently in Pain: Yes       Orientation  Orientation  Overall Orientation Status: Within Functional Limits  Cognition      Objective   Bed mobility  Supine to Sit: Maximum assistance  Sit to Supine:  (pt left seated in recliner)  Scooting: Moderate assistance  Comment: HOB elevated, c-collar donned prior to mobility, increased time required to complete  Transfers  Sit to Stand:  Moderate Assistance  Stand to sit: Moderate Assistance  Bed to Chair: Dependent/Total  Comment: pt tranfered to recliner with elbert stedy  Ambulation  Ambulation?: No     Balance  Posture: Fair  Sitting - Static: Fair;-  Sitting - Dynamic: Poor;+  Standing - Static: Poor;+  Standing - Dynamic: Poor  Comments: Pt sat EOB ~15 minutes with Min A/modA  throughout with B UE on EOB then progressed to B UEs on SS  Exercises  Quad Sets: 10x bilat supine  Heelslides: 10x bilat supine AAROM  Gluteal Sets: 10x bilat supine  Hip Abduction: 10x bilat supine AAROM  Ankle Pumps: 10x bilat supine  Core Strengthening: EOB ~15 mins with Brandon to modA for seated balance    AM-PAC Score  AM-PAC Inpatient Mobility Raw Score : 9 (03/29/22 1253)  AM-PAC Inpatient T-Scale Score : 30.55 (03/29/22 1253)  Mobility Inpatient CMS 0-100% Score: 81.38 (03/29/22 1253)  Mobility Inpatient CMS G-Code Modifier : CM (03/29/22 1253)    Goals  Short term goals  Time Frame for Short term goals: 14 visits  Short term goal 1: Perform bed mobility with Max A x1  Short term goal 2: Perform sit to stand with Mod A and appropriate AD  Short term goal 3: Ambulate 50ft with appropriate AD and Mod A  Short term goal 4: Propel wheelchair 200ft with SBA  Short term goal 5: Demo Fair- dynamic standing balance to decrease risk of falls    Plan    Plan  Times per week: 5 to 6 x/week  Times per day: Daily  Current Treatment Recommendations: Strengthening,ROM,Balance Training,Transfer Training,Endurance Training,Patient/Caregiver Education & Training,Safety Education & Training,Equipment Evaluation, Education, & procurement,Positioning,Gait Training,Neuromuscular Re-education,Wheelchair Mobility Training,Functional Mobility Training  Safety Devices  Type of devices: Call light within reach,Nurse notified,Gait belt,Left in 5min Media alarm in place  Restraints  Initially in place: No     Therapy Time   Individual Concurrent Group Co-treatment   Time In 1130         Time Out 1213         Minutes 43         Timed Code Treatment Minutes: [address] [place], [place]

## 2022-03-29 NOTE — PROGRESS NOTES
Speech Language Pathology  Facility/Department: [facility] 1C STEP DOWN   CLINICAL BEDSIDE SWALLOW EVALUATION    NAME: Abdirahman Medina  : 1950  MRN: [id number]    ADMISSION DATE: 3/23/2022  ADMITTING DIAGNOSIS: has Hypertension; Hyperlipidemia; Diabetes mellitus ([address] Contusion of right shoulder; Bipolar disorder, mixed ([address] First known suicide attempt [facility]); Erectile dysfunction; Cervical stenosis of spinal canal; Incomplete spinal cord lesion at C5-C7 level ([address] Stenosis of cervical spine with myelopathy ([address] Cervical spondylosis with radiculopathy; Acute blood loss anemia; Chest pain; Depression with suicidal ideation; Severe recurrent major depression without psychotic features ([address] Frequent falls; Hyponatremia; Cervical spinal cord compression ([address] Cord compression ([address] Quadriplegia, C1-C4 incomplete ([address] Encephalopathy; Hospital-acquired pneumonia; and Atelectasis on their problem list.    Recent Chest Xray/CT of Chest: 3/29/2022    Impression   Persistent opacity left base, likely infiltrate with bibasilar atelectasis. Date of Eval: 3/29/2022  Evaluating Therapist: DEBORA Reyes    Current Diet level:  Current Diet : NPO  Current Liquid Diet : NPO    Primary Complaint  Abdirahman Medina is a 70 y.o. Non- / non  male who presents with mechanical Fall and is admitted to the hospital for the management of Cord compression [facility]). Patient has a long standing history of cervical spinal cord injury as he under went decompression 18 in which he reports the right side of the spinal cord was \" nicked\"      Patient presented to the [address] [place] [address] Emergency room after sustained fall. Patient then stated he had onset of pain without new weakness, but with remote generalized upper and lower extremity weakness progressively worsening since January  which has stopped him from driving. In addition, he has suffered 7 falls with in the past week.    This recent fall patient was being assisted to the bathroom and as he was sitting on the stool the rug slipped and he fell down on the toilet and fellback striking his back, head and neck on the toilet shattering the tank of the toilet . He denied any LOC, but with acute onset of pain at the distal cervical region extending down the back.      The work up at the [facility] showed no acute metabolic disturbances, hemogram with a mild anemia with a hgb of 12.1/hct 34.3    Pain:  Pain Assessment  Pain Assessment: 0-10  Pain Level: 0    Reason for Referral  Sidney Cowan was referred for a bedside swallow evaluation to assess the efficiency of his swallow function, identify signs and symptoms of aspiration and make recommendations regarding safe dietary consistencies, effective compensatory strategies, and safe eating environment. Impression  Patient presents with probable safe swallow for Easy to Chew diet with thin liquids as evidenced by no overt s/s of aspiration noted with consistencies tested. Recommend small sips and bites, only feed when alert and awake and upright at 90 degrees for all PO intake. Recommend close monitoring for overt/clinical s/s of aspiration and D/C PO intake and complete Modified Barium Swallow Study should they occur. Results and recommendations reported to RN. Dysphagia Diagnosis: Swallow function appears grossly intact  Dysphagia Outcome Severity Scale: Level 6: Within functional limits/Modified independence     Treatment Plan  Requires SLP Intervention: Yes  Duration/Frequency of Treatment: 1-2x per week  D/C Recommendations: Further therapy recommended at discharge. Recommended Diet and Intervention  Diet Solids Recommendation: Easy to Chew  Liquid Consistency Recommendation:  Thin  Recommended Form of Meds: PO  Therapeutic Interventions: Diet tolerance monitoring;Patient/Family education    Compensatory Swallowing Strategies  Compensatory Swallowing Strategies: Upright as possible for all oral intake;Small bites/sips    Treatment/Goals  Dysphagia Goals: The patient will tolerate recommended diet without observed clinical signs of aspiration    General  Chart Reviewed: Yes  Behavior/Cognition: Alert; Cooperative  Temperature Spikes Noted: No  Respiratory Status: O2 via nasual cannula  O2 Device: Nasal cannula  Liters of Oxygen: 2 L  Communication Observation: Functional  Follows Directions: Complex  Dentition: Some missing teeth  Patient Positioning: Upright in bed  Baseline Vocal Quality: Normal  Consistencies Administered: Dysphagia Pureed (Dysphagia I); Dysphagia Soft and Bite-Sized (Dysphagia III); Reg solid; Thin - straw    Vision/Hearing  Vision  Vision: Impaired  Vision Exceptions: Wears glasses at all times  Hearing  Hearing: Exceptions to [facility]  Hearing Exceptions: Hard of hearing/hearing concerns    Oral Motor Deficits  Oral/Motor  Oral Motor: Within functional limits    Oral Phase Dysfunction  Oral Phase  Oral Phase: WFL  Oral Phase  Oral Phase - Comment: +min-mod extended mastication w/ soft and regular solid (pt missing multiple back teeth), however only slight oral residual w/ soft and regular solids.  Mastication and bolus transit appear WFL     Indicators of Pharyngeal Phase Dysfunction   Pharyngeal Phase  Pharyngeal Phase: WFL  Pharyngeal Phase   Pharyngeal: latent cough x1/8 trials of thin liuqids via straw (after multiple large consecutive sips), no overt s/s of aspiration w/ small single sips or other consistencies tested    Prognosis  Prognosis  Prognosis for safe diet advancement: fair  Individuals consulted  Consulted and agree with results and recommendations: Patient;RN;Family member  Family member consulted: pt's son    Education  Patient Education: yes  Patient Education Response: Verbalizes understanding       Therapy Time  SLP Individual Minutes  Time In: 3706  Time Out: 9547  Minutes: DEBORA Lepe  3/29/2022 10:55 AM

## 2022-03-29 NOTE — PROGRESS NOTES
Occupational Therapy   Occupational Therapy Initial Assessment    Date: 3/29/2022   Patient Name: Rebekah Lobo  MRN: [id number]     : 1950    Date of Service: 3/29/2022  Further therapy recommended at discharge. The patient should be able to tolerate at least 3 hours of therapy per day over 5 days or 15 hours over 7 days. This patient may benefit from a Physical Medicine and Rehab consult. Discharge Recommendations:  Patient would benefit from continued therapy after discharge     Assessment   Performance deficits / Impairments: Decreased functional mobility ; Decreased ADL status; Decreased ROM; Decreased strength;Decreased endurance;Decreased safe awareness;Decreased balance;Decreased coordination;Decreased posture;Decreased cognition;Decreased fine motor control  Assessment: Pt has significant deficits at this time with his ability to independently / safely complete daily tasks, balance and mobility, endurance, and fluctuating cognition. At this time, the Pt has decreased ability to return to [facility] and prior living situation. He will benefit from continued participation in acute and post-acute OT services to improve independence / to improve functional activity participation. Prognosis: Fair;Good  Decision Making: High Complexity  OT Education: OT Role;Plan of Care;Precautions; ADL Adaptive Strategies;Transfer Training;Energy Conservation  Barriers to Learning: Fair- return by Pt. REQUIRES OT FOLLOW UP: Yes  Activity Tolerance  Activity Tolerance: Patient limited by fatigue;Patient limited by pain;Treatment limited secondary to decreased cognition  Safety Devices  Safety Devices in place: Yes  Type of devices: Call light within reach; Left in bed;Nurse notified; All fall risk precautions in place; Bed alarm in place (Quad/Paddle nurse call button placed under his left hand)  Restraints  Initially in place: No         Patient Diagnosis(es): There were no encounter diagnoses.    has a past medical history of Depression, Diabetes mellitus ([facility] [address] Difficult intravenous access, Hyperlipidemia, Hypertension, Migraines, PTSD (post-traumatic stress disorder), Sleep apnea, Teeth missing, and Wears glasses. has a past surgical history that includes Cardiac catheterization (02/2010); Carpal tunnel release (Left, 01/09/2002); Vasectomy (12/1983); Tonsillectomy and adenoidectomy (1960); Hydrocele surgery (1951); Middle ear surgery (01/11/2018); eye surgery (Left, 2018); Mouth surgery (04/16/2018); other surgical history (04/19/2018); cervical fusion (04/19/2018); pr office/outpt visit,procedure only (N/A, 4/19/2018); Cataract removal; laminectomy (03/24/2022); and cervical fusion (N/A, 3/24/2022). Restrictions  Restrictions/Precautions  Restrictions/Precautions: Fall Risk,General Precautions  Required Braces or Orthoses?: Yes  Implants present? : Metal implants  Required Braces or Orthoses  Cervical: c-collar (Aspen collar (remove in bed / when eating))  Position Activity Restriction  Spinal Precautions: No Twisting,No Lifting,No Bending  Other position/activity restrictions: up with assist ;; s/p C2-C5 laminectomy and fixation 3/24    Subjective   General  Patient assessed for rehabilitation services?: Yes  Additional Pertinent Hx: Progerssive quadraparesis with falls and weakness, inabilityto care for self  Family / Caregiver Present: No  Referring Practitioner: Dr Rama Calixto  Diagnosis: Inability to care for self - multiple falls including hitting back of head on toilet tank  Subjective  Subjective: Alert and cooperative  General Comment  Comments: Reports that in January 2022 he was still able to care for himself including self cares, mobiltiy and driving  Pain Assessment  Pain Assessment: 0-10  Pain Level: 3  Guerrero-Baker Pain Rating: No hurt  Patient's Stated Pain Goal: No pain  Pain Type: Surgical pain  Pain Location: Back; Shoulder  Pain Orientation: Posterior  Pain Descriptors: Didier Jake; Aching  Pain Frequency: Intermittent  Pain Onset: On-going  Clinical Progression: Gradually improving  Functional Pain Assessment: Prevents or interferes some active activities and ADLs  Non-Pharmaceutical Pain Intervention(s): Rest  Vital Signs  Temp: 97.8 °F (36.6 °C)  Temp Source: Oral  Pulse: 74  Heart Rate Source: Monitor  Resp: 25  BP: 136/74  BP Location: Left upper arm  MAP (mmHg): 92  Level of Consciousness: Responds to Voice (1)  Oxygen Therapy  SpO2: 95 %  Pulse Oximeter Device Mode: Continuous  Pulse Oximeter Device Location: Finger  O2 Device: Nasal cannula  O2 Flow Rate (L/min): 2 L/min     Social/Functional History  Social/Functional History  Lives With: Significant other  Type of Home: House  Home Layout: One level  Home Access: Stairs to enter without rails  Entrance Stairs - Number of Steps: one step at entrance  Bathroom Shower/Tub: Tub/Shower unit,Shower chair with back,Curtain  Bathroom Toilet: Handicap height  Bathroom Equipment: Hand-held shower  Bathroom Accessibility: Walker accessible  Home Equipment: 4 wheeled walker,Reacher,Cane  Receives Help From: Family  ADL Assistance: Independent (Prior to Jan 2022 was Ind with ADLs / IADLs. Progressive weakness / function since, most recently (per Pt report) required Max to TD for all tasks from wife.)  [address]: Independent (Prior to Jan 2022 was Ind with ADLs / IADLs. Progressive weakness / function since, most recently (per Pt report) required Max to TD for all tasks from wife.)  Homemaking Responsibilities: Yes (Prior to January 2022)  Ambulation Assistance: Independent (No DME (prior to Jan 2022))  Transfer Assistance: Independent  Active : Yes  Patient's  Info: Family is currently assisting with transportation - Was driving until end of Jan 2022  Mode of Transportation: Car  Occupation: Retired  Leisure & Hobbies: Maintenance  Additional Comments: Information obtained from prior PT evaluation and confirmed with pt.   Pt reports requiring assistance for IADLs, ADLs, ambulation, transfers and driving beginning in Feb 2022. Objective   Vision: Impaired  Vision Exceptions: Wears glasses at all times  Hearing: Exceptions to [facility]  Hearing Exceptions: Hard of hearing/hearing concerns        Standing Balance  Comment: LATONYA d/t significant increase in time / effort required to participate in 2 sessions to complete OT assessment this date. Functional Mobility  Functional Mobility Comments: LATONYA d/t significant increase in time / effort required to participate in 2 sessions to complete OT assessment this date. Toilet Transfers  Toilet Transfer: Unable to assess    ADL  Feeding: Maximum assistance; Increased time to complete;Verbal cueing;Dependent/Total (Max Assist with Right UE / Right Hand (for part of self-feeding, Pt became tired with RUE). Then required TD feeding (STNA asked to take over, Eval was stopped and re-started at later time). )  Grooming: Maximum assistance; Increased time to complete;Verbal cueing;Dependent/Total (Clinical judgement based on Pt's participation with Self-feeding tasks (using Right UE / Right hand only). Unable to participate with Left dominant hand.)  UE Dressing: Dependent/Total  LE Dressing: Dependent/Total  Toileting: Dependent/Total  Additional Comments: Pt required Max VCs for task initiation / sequencing / redirection to task / coaxing for participation and encouragement. He required significant increase in time / effort for all task participation, frequent rest breaks.      Tone RUE  RUE Tone: Hypotonic  Tone LUE  LUE Tone: Hypotonic    Coordination  Coordination and Movement description: Fine motor impairments;Gross motor impairments;Right UE;Left UE;Decreased speed;Decreased accuracy     Bed mobility  Rolling to Left: Maximum assistance  Rolling to Right: Maximum assistance  Comment: LATONYA further beyond Bed Mobility (in bed) d/t significant increase in time / effort required to participate in 2 sessions to complete OT assessment this date. Transfers  Sit to stand: Unable to assess  Stand to sit: Unable to assess  Transfer Comments: LATONYA d/t significant increase in time / effort required to participate in 2 sessions to complete OT assessment this date. Cognition  Overall Cognitive Status: Exceptions  Arousal/Alertness: Delayed responses to stimuli  Following Commands: Follows one step commands with increased time; Follows one step commands with repetition  Attention Span: Difficulty attending to directions; Attends with cues to redirect  Memory: Decreased recall of biographical Information;Decreased recall of precautions;Decreased recall of recent events;Decreased short term memory  Safety Judgement: Decreased awareness of need for assistance;Decreased awareness of need for safety  Problem Solving: Decreased awareness of errors;Assistance required to implement solutions;Assistance required to generate solutions;Assistance required to correct errors made;Assistance required to identify errors made  Insights: Decreased awareness of deficits  Initiation: Requires cues for all  Sequencing: Requires cues for all     Sensation  Overall Sensation Status: Impaired  Additional Comments: Pt reports numbness beginning posteriorly on the neck and extending distally into all extremities      LUE AROM (degrees)  LUE AROM : Exceptions  LUE General AROM: Decreased AROM Left Shoulder, Elbow, Wrist.  Left Hand AROM (degrees)  Left Hand General AROM: Pt is Left hand dominant, has WFL Left hand AROM Grasp / Release but poor ability to integrate Left Hand d/t deficits in Left UE. Instead uses Right hand as dominant. RUE AROM (degrees)  RUE General AROM: Decreased AROM Left Shoulder, Elbow. WFL AROM Right Wrist and Hand.   Right Hand AROM (degrees)  Right Hand General AROM: [facility]     Plan   Plan  Times per week: 4-6x/week (IPR)  Current Treatment Recommendations: Strengthening,Functional Mobility Vertis Phi Management,Safety Education & Training,Patient/Caregiver Education & Training,Positioning,Self-Care / ADL,Cognitive/Perceptual Training,Cognitive Reorientation,Balance Training,Neuromuscular Re-education    AM-PAC Score  AM-PAC Inpatient Daily Activity Raw Score: 8 (03/29/22 1652)  AM-PAC Inpatient ADL T-Scale Score : 22.86 (03/29/22 1652)  ADL Inpatient CMS 0-100% Score: 85.69 (03/29/22 1652)  ADL Inpatient CMS G-Code Modifier : CM (03/29/22 1652)    Goals  Short term goals  Time Frame for Short term goals: By Discharge  Short term goal 1: Pt and Caregiver will verbalize / demo Good Integration / Emilie Cotton with BUE HEP (while maintaining cervical precautions). Short term goal 2: Pt will participate in Self-feeding / Self-grooming tasks (w/ RUE & R Hand) with Min Assist with 25% spilling. Short term goal 3: Pt will maintain Fair+ Dynamic Sitting Balance (8-10 minutes) while participating in UB / LB ADLs. Short term goal 4: Pt will participate in [address] / ADL Transfers with CGA with Correct Use of AD / DME.        Therapy Time   Individual Concurrent Group Co-treatment   Time In [id number]       Time Out 1447 1614       Minutes 27 30       Timed Code Treatment Minutes: 40 Minutes (ADL)       JOEL Tello, OTR/L

## 2022-03-29 NOTE — PROGRESS NOTES
NEUROLOGY INPATIENT PROGRESS NOTE    3/29/2022         Current Exam:     Chart reviewed. Discussed with RN. Patient doing well this morning, is fully oriented and visiting with his son. He has no acute complaints; patient son agrees patient's mentation is back to baseline. Opioids have been discontinued and patient's pain is being managed by Toradol. Brief History:    Ariana Azar is a  70 y.o. male with H/O prior anterior cervical corpectomy and fusion C4-7 (4/19/2018), HTN, HLD, DM, migraine headaches, bipolar disorder, severe depression, who was admitted as a transfer from [facility] on 3/23/2022 with cervical spinal stenosis. He initially presented to OSH after he fell at home, having slipped on a rug and hit his head and back on toilet seat. Following this fall he had generalized weakness in all 4 limbs. Wife reported 7 falls in the past 1 week PTA and patient has been unable to ambulate for the past 2-3 days. He was seen by neurologist Dr. Harshad Garcia at [facility] on 3/21. CT head was negative, MRI brain done was negative. MRI cervical spine showed severe cervical spinal canal stenosis with myelopathy and patient was transferred to [facility] on 3/23 for neurosurgical intervention. He underwent C2-5 laminectomy and posterior fixation on 3/24. Neurology was consulted on 3/27 due to postprocedure confusion. No current facility-administered medications on file prior to encounter.      Current Outpatient Medications on File Prior to Encounter   Medication Sig Dispense Refill    divalproex (DEPAKOTE ER) 250 MG extended release tablet Take 750 mg by mouth at bedtime      furosemide (LASIX) 20 MG tablet Take 20 mg by mouth daily      carboxymethylcellulose 1 % ophthalmic solution Place 2 drops into both eyes 3 times daily Pt states \"2 drops in both eyes three times a day\"      venlafaxine (EFFEXOR XR) 150 MG extended release capsule Take 1 capsule by mouth daily (Patient taking differently: Take 225 mg by mouth daily ) 30 capsule 0    simethicone (MYLICON) 80 MG chewable tablet Take 1 tablet by mouth every 6 hours as needed for Flatulence (Patient taking differently: Take 80 mg by mouth every 8 hours as needed for Flatulence ) 30 tablet 0    lidocaine (XYLOCAINE) 5 % ointment Apply topically daily as needed for Pain Apply topically as needed. LF 4/20/20 (30 day supply) (Patient not taking: Reported on 3/23/2022)      metFORMIN (GLUCOPHAGE) 1000 MG tablet Take 1,000 mg by mouth 2 times daily (with meals) LF 4/27/20 (90 day supply) (Patient not taking: Reported on 3/21/2022)      losartan (COZAAR) 25 MG tablet Take 25 mg by mouth daily       aspirin 81 MG chewable tablet Take 81 mg by mouth daily (Patient not taking: Reported on 3/21/2022)      divalproex (DEPAKOTE ER) 500 MG extended release tablet Take 500 mg by mouth every morning       traZODone (DESYREL) 100 MG tablet Take 150 mg by mouth nightly as needed LF 5/1/20 (30 day supply)      cetirizine (ZYRTEC) 10 MG tablet Take 10 mg by mouth daily LF 4/6/20 (90 day supply) (Patient not taking: Reported on 3/21/2022)      atorvastatin (LIPITOR) 80 MG tablet Take 40 mg by mouth daily Take 1/2 tablet (40 mg) daily  LF 4/22/20 (90 day supply) (Patient not taking: Reported on 3/21/2022)      amLODIPine (NORVASC) 10 MG tablet Take 7.5 mg by mouth daily       sildenafil (VIAGRA) 100 MG tablet Take 100 mg by mouth as needed for Erectile Dysfunction LF 5/12/20 (30 day supply)      omeprazole (PRILOSEC) 20 MG delayed release capsule Take 20 mg by mouth every evening LF 4/21/20 (90 day supply)         Allergies: Dotty Dubose is allergic to latex, bee venom, lisinopril, niacin and related, sulfa antibiotics, and terazosin.     Past Medical History:   Diagnosis Date    Depression 2017    ON RX    Diabetes mellitus ([facility] [address] 2013    NIDDM    Difficult intravenous access     Hyperlipidemia 2008    ON RX    Hypertension 2008    ON RX    BP (!) 149/79   Pulse 83   Temp 98.5 °F (36.9 °C) (Axillary)   Resp 23   Ht 5' 3\" (1.6 m)   Wt 153 lb 6.4 oz (69.6 kg)   SpO2 97%   BMI 27.17 kg/m²     Blood pressure range: Systolic (85OJD), API:316 , Min:149 , SOQ:140   ; Diastolic (81FEZ), MMN:26, Min:66, Max:102      Review of Systems:  Constitutional  Negative for fever and chills    HEENT  Negative for ear discharge, ear pain, nosebleed    Eyes  Negative for photophobia, pain and discharge    Respiratory  Negative for hemoptysis and sputum    Cardiovascular  Negative for orthopnea, claudication and PND    Gastrointestinal  Negative for abdominal pain, diarrhea, blood in stool    Musculoskeletal  Negative for joint pain, negative for myalgia. + generalized weakness   Skin  Negative for rash or itching    Endo/heme/allergies  Negative for polydipsia, environmental allergy    Psychiatric/behavioral  Negative for suicidal ideation.  Patient is not anxious        NEUROLOGIC EXAMINATION  GENERAL  Appears comfortable and in no distress   HEENT  NC/ AT   NECK  Supple   MENTAL STATUS:  Alert, oriented, intact memory, no confusion, normal speech, normal language, no hallucination or delusion   CRANIAL NERVES: II     -      Visual fields intact to confrontation  III,IV,VI -  EOMs full, no afferent defect, no DILIP, no ptosis  V     -     Normal facial sensation  VII    -     Normal facial symmetry  VIII   -     Intact hearing  IX,X -     Symmetrical palate  XI    -     Symmetrical shoulder shrug  XII   -     Midline tongue, no atrophy    MOTOR FUNCTION: Antigravity to BUE and BLE with effort, normal bulk, normal tone and no involuntary movements, no tremor   SENSORY FUNCTION:  Normal touch, normal pin   CEREBELLAR FUNCTION:  Fine tremor to right hand at rest   REFLEX FUNCTION:  Symmetric, no perverted reflex, no Babinski sign   STATION and GAIT  Not tested       Data:    Lab Results:   CBC:   Recent Labs     03/26/22  1415 03/29/22  0623   WBC 12.3* 8.0   HGB 10.7* 10.2*    318     BMP:    Recent Labs     03/26/22  0932 03/29/22  0623    141   K 4.9 3.7    104   CO2 26 24   BUN 36* 30*   CREATININE 0.71 0.59*   GLUCOSE 116* 150*         Lab Results   Component Value Date    CHOL 104 04/12/2018    LDLCHOLESTEROL 44 04/12/2018    HDL 42 04/12/2018    TRIG 92 04/12/2018    ALT 21 03/29/2022    AST 22 03/29/2022    TSH 0.67 03/27/2022    INR 1.0 03/24/2022    LABA1C 5.8 05/17/2021    FWPYNPMM38 545 03/27/2022           Diagnostic data reviewed:  CT HEAD (3/20/2022 & 3/26/2022): No acute intracranial abnormality      CT C-SPINE (3/20/2022):   Stable cervical spine CT examination s/p corpectomy & anterior fusion C4-C7.      CT THORACOLUMBAR SPINE (3/20/2022): Multilevel DDD     BRAIN MRI (3/21/2022): Mild global parenchymal volume loss with minimal chronic microangiopathy     MRI BRAIN (3/28/2022): No acute intracranial abnormality         MRI C-SPINE (3/21/2022):   C2-C4: severe spinal canal stenosis with complete effacement of CSF at these levels. Multilevel neural foraminal narrowing. Question of minimal T2 hyperintensity within the cord at C4-C5, likely myelomalacia.         MRI T-SPINE (3/21/2022):   Mild multilevel DDD of the thoracic spine. T8-T11: mild spinal canal stenosis, most pronounced at T10-T11.         MRI L-SPINE (3/21/2022):   L2-L5: moderate spinal canal stenosis. L1-L2: mild spinal canal stenosis.    Multilevel neural foraminal narrowing, most severe at L2-L3.                  Impression:  -Severe cervical canal stenosis with myelopathy and incomplete quadriplegia; status post C2-5 laminectomy and posterior fixation (3/24)  -History of prior anterior cervical corpectomy and fusion C4-7 (2018)  -Acute toxic metabolic encephalopathy post procedure; now resolved with the discontinuation of opioid pain medication    Plan:  -Continue nonopioid pain medication as able  -Cardiology was consulted for sinus bradycardia and tachycardia  -Continue on home doses Depakote and Effexor  -PT/OT  -Neurology to sign off. Patient mentation has improved and he is back to baseline. Please call with any questions. Please note that this note was generated using a voice recognition dictation software. Although every effort was made to ensure the accuracy of this automated transcription, some errors in transcription may have occurred.

## 2022-03-29 NOTE — PROGRESS NOTES
[facility]  Office: [address], DO, Navya Neal, DO, Jemima Manifold, DO, Mode Jones Blood, DO, Sancho Menard MD, Kvng Desir MD, Brett Serna MD, Garry Izquierdo MD, Gerardo Rey MD, Danielle De Leon MD, Tanner Navarrete MD, Zane Gómez, DO, Magaly Corea, DO, Tova Chadwick MD,  Jesús Main, DO, Norris Holter, MD, Faith Kaur MD, Romy Cardoso MD, Brianna Putnam DO, Mert Charles MD, Kelsey Linda MD, Luci Tran, CNP, [facility] Shaquille, CNP, Shante Johnson, CNP, Anibal Vidales, CNS, Mikhail Monk, CNP, Mich Rainey, CNP, Larry Bermudez, CNP, Mari Whitlock, CNP, Singh Jackson, CNP, Cheko Hernandez PA-C, Henrik Mukherjee DNP, Sommer Sargent, JENNIFER, Ana Chance, CNP, Alicia Corey, CNP, Avery Lee, CNP         [address]    Progress Note    3/29/2022    6:28 AM    Name:   Slime Rivera  MRN:     [id number]     Acct:      [de-identified]   Room:   229/6302-15   Day:  6  Admit Date:  3/23/2022  7:06 PM    PCP:   Ann Wu  Code Status:  DNR-CCA    Subjective:     C/C: Generalized weakness with frequent falls, found to have cord compromise secondary to high-grade stenosis/incomplete quadriplegia    Interval History Status: worsened. Patient seen and evaluated in room on simple mask for oxygen requirements. Placed on antibiotics for possible hospital-acquired pneumonia on 3/28/2022. Patient endorsing thirst and shortness of breath. Brief History: This is a 70-year-old man who initially presented to [facility] emergency department after a fall. Incidentally found to have cord compromise secondary to high-grade stenosis. Was transferred to [facility] for neurosurgical evaluation and intervention. MRI at our facility showed severe stenosis at C2-C5.   Patient underwent C2-5 posterior fixation with fusion and decompression with neurosurgery      Review of Systems:     Constitutional: history of Depression, Diabetes mellitus ([facility] [address] Difficult intravenous access, Hyperlipidemia, Hypertension, Migraines, PTSD (post-traumatic stress disorder), Sleep apnea, Teeth missing, and Wears glasses. Social History:   reports that he quit smoking about 49 years ago. His smoking use included cigarettes. He has a 2.00 pack-year smoking history. He has never used smokeless tobacco. He reports current alcohol use. He reports current drug use. Drug: Marijuana Dusty Divine). Family History:   Family History   Problem Relation Age of Onset   Xenia Alberto Dementia Mother     Coronary Art Dis Mother     Stroke Mother     Diabetes Mother     Asthma Mother     Other Mother         BRAIN ANEURISM    High Blood Pressure Mother     Heart Disease Father     Diabetes Sister     Diabetes Brother     High Blood Pressure Brother     High Cholesterol Brother     Heart Disease Brother     Diabetes Sister     Other Sister         NEUROPATHY       Vitals:  BP (!) 149/79   Pulse 83   Temp 98.5 °F (36.9 °C) (Axillary)   Resp 23   Ht 5' 3\" (1.6 m)   Wt 153 lb 6.4 oz (69.6 kg)   SpO2 97%   BMI 27.17 kg/m²   Temp (24hrs), Av °F (37.2 °C), Min:98.3 °F (36.8 °C), Max:100.5 °F (38.1 °C)    Recent Labs     22  1512 22  1420   POCGLU 91 160*       I/O (24Hr):     Intake/Output Summary (Last 24 hours) at 3/29/2022 0628  Last data filed at 3/29/2022 0500  Gross per 24 hour   Intake 2237.11 ml   Output 25 ml   Net 2212.11 ml       Labs:  Hematology:  Recent Labs     22  1415   WBC 12.3*   RBC 3.36*   HGB 10.7*   HCT 31.4*   MCV 93.5   MCH 31.8   MCHC 34.1   RDW 12.6      MPV 9.8     Chemistry:  Recent Labs     22  0932 22  1707     --    K 4.9  --      --    CO2 26  --    GLUCOSE 116*  --    BUN 36*  --    CREATININE 0.71  --    ANIONGAP 13  --    LABGLOM >60  --    GFRAA >60  --    CALCIUM 9.9  --    LACTACIDWB  --  0.8     Recent Labs     22  0932 22  1512 22  1708 03/28/22  1420   PROT 6.3*  --   --   --    LABALBU 3.5  --   --   --    TSH  --   --  0.67  --    AST 17  --   --   --    ALT 17  --   --   --    ALKPHOS 118  --   --   --    BILITOT 0.31  --   --   --    AMMONIA  --   --  39  --    POCGLU  --  91  --  160*     ABG:  Lab Results   Component Value Date    PHART 7.445 04/19/2018    JEG9CVW 39.1 04/19/2018    PO2ART 252.0 04/19/2018    FFY2TBQ 26.5 04/19/2018    NBEA NOT REPORTED 04/19/2018    PBEA 2.7 04/19/2018    V5ZQNFPF 99.5 04/19/2018    FIO2 97% 04/19/2018     Lab Results   Component Value Date/Time    SPECIAL R HAND 5ML 03/26/2022 06:53 PM     Lab Results   Component Value Date/Time    CULTURE NO GROWTH 2 DAYS 03/26/2022 06:53 PM       Radiology:  XR CERVICAL SPINE (2-3 VIEWS)    Result Date: 3/25/2022  Immediate postoperative changes of laminectomy with posterior fusion at C2 through C4.     CT HEAD WO CONTRAST    Result Date: 3/26/2022  No acute findings in the head/brain. MRI THORACIC SPINE WO CONTRAST    Result Date: 3/22/2022  Mild multilevel degenerative changes of the thoracic spine, as described above with mild spinal canal stenosis from T8-9 to T10-11, most pronounced at T10-11. MRI LUMBAR SPINE WO CONTRAST    Result Date: 3/22/2022  1. Moderate spinal canal stenosis from L2-L3 to L4-L5, as described above. 2. Mild spinal canal stenosis at L1-L2, as described above. 3. Multilevel neural foraminal narrowing, most severe at L2-L3. XR CHEST PORTABLE    Result Date: 3/27/2022  1. Left basilar airspace consolidation, representing either aspiration or pneumonia. 2. Mild right basilar atelectasis. MRI BRAIN WO CONTRAST    Result Date: 3/28/2022  No acute intracranial abnormality.        Physical Examination:        General appearance:  alert, cooperative and no distress  Mental Status:  oriented to person, place and time and normal affect  Lungs:  clear to auscultation bilaterally, normal effort  Heart:  regular rate and rhythm, no murmur  Abdomen:  soft, nontender, nondistended, normal bowel sounds, no masses, hepatomegaly, splenomegaly  Extremities:  no edema, redness, tenderness in the calves, muscle strength 1/5 LUE, 3/5 RUE, 3/5 LLE, 1/5 RLE with significant decrease on extension. Sensation intact in all extremity  Skin:  no gross lesions, rashes, induration    Assessment:        Hospital Problems           Last Modified POA    * (Principal) Cord compression ([address] 3/23/2022 Yes    Hypertension 3/23/2022 Yes    Hyperlipidemia 3/23/2022 Yes    Diabetes mellitus ([address] 3/23/2022 Yes    Bipolar disorder, mixed ([address] 3/23/2022 Yes    Stenosis of cervical spine with myelopathy ([address] 3/24/2022 Yes    Severe recurrent major depression without psychotic features ([address] 3/23/2022 Yes    Cervical spinal cord compression ([address] 3/21/2022 Yes    Quadriplegia, C1-C4 incomplete ([address] 3/24/2022 Yes    Encephalopathy 3/27/2022 Yes    Hospital-acquired pneumonia 3/28/2022 Yes    Atelectasis 3/28/2022 Yes          Plan:        1. Cord compression: Postop day 5 from C2-5 laminectomy and fixation. Neurosurgery signed off. Neurology following. Continue PT, OT. Will need SNF at discharge. Roxicodone currently on hold secondary to episode yesterday where patient had to be administered Narcan. neurosurgery signed off on 3/28 with plan to follow-up in 2 weeks for postop wound check, okay for discharge from their standpoint    2. Hospital-acquired pneumonia: Left lower lobe infiltrate. Patient started on cefepime. Continue additional oxygen as needed. follow sputum culture    3. Hypoxia: Continue low-flow nasal cannula as needed, patient did not tolerate CPAP    4. Sinus bradycardia: Cardiology consulted, pacing pads were initially placed, atropine at bedside. Continuous telemetry. Awaiting further evaluation. Stop urecholine    5. Primary hypertension: Fluctuating, currently running high, could be secondary to pain.   Will uptitrate amlodipine from 7.5 mg to 10 mg p.o. daily and monitor for response. Additional 2.5 mg to be administered today    6. Chronic diastolic CHF: On Lasix    7. DMT2: Diet controlled in the outpatient setting    8. Bipolar disorder: Currently stable on Effexor, Depakote    9. GI/DVT prophylaxis: Protonix, Lovenox      On this date 03/29/22 I have spent 24 mins  in direct patient care, reviewing notes, test results and diagnosis and plan of care discussions with the patient, as well as coordination of care.   Plan of care made with MD Kortney Al, APRN - NP  3/29/2022  6:28 AM

## 2022-03-29 NOTE — PROGRESS NOTES
[facility]  Documentation Note                Admission Dx: Cervical spinal cord compression ([facility] [address] [G95.20]  Cord compression ([facility] [address] [G95.20]    Past Medical History:   has a past medical history of Depression, Diabetes mellitus ([facility] [address] Difficult intravenous access, Hyperlipidemia, Hypertension, Migraines, PTSD (post-traumatic stress disorder), Sleep apnea, Teeth missing, and Wears glasses. Previous Testing:     ECHO 4/12/18: EF 55%, mild LVH, grade I DD, trivial TR. Previous office/hospital visit:   None? ?      Tommie Lu [facility]

## 2022-03-29 NOTE — PLAN OF CARE
Problem: Pain:  Goal: Pain level will decrease  Description: Pain level will decrease  3/29/2022 1714 by Sanjay Roldan RN  Outcome: Ongoing  3/29/2022 0547 by Darío Restrepo RN  Outcome: Ongoing  Goal: Control of acute pain  Description: Control of acute pain  3/29/2022 1714 by Sanjay Roldan RN  Outcome: Ongoing  3/29/2022 0547 by Darío Restrepo RN  Outcome: Ongoing  Goal: Control of chronic pain  Description: Control of chronic pain  Outcome: Ongoing     Problem: Skin Integrity:  Goal: Will show no infection signs and symptoms  Description: Will show no infection signs and symptoms  3/29/2022 1714 by Sanjay Roldan RN  Outcome: Ongoing  3/29/2022 0547 by Darío Restrepo RN  Outcome: Ongoing  Goal: Absence of new skin breakdown  Description: Absence of new skin breakdown  3/29/2022 1714 by Sanjay Roldan RN  Outcome: Ongoing  3/29/2022 0547 by Darío Restrepo RN  Outcome: Ongoing     Problem: Falls - Risk of:  Goal: Will remain free from falls  Description: Will remain free from falls  Outcome: Ongoing  Goal: Absence of physical injury  Description: Absence of physical injury  Outcome: Ongoing

## 2022-03-29 NOTE — PROGRESS NOTES
Patient having periods of apnea. Orders received for Cpap. Respiratory placed patient on Cpap and patient ripped it off after 5 minutes stating get this off of me. Returned the patient to 4 liters per nasal cannula. Continue to monitor.

## 2022-03-29 NOTE — PROGRESS NOTES
Physical Medicine & Rehabilitation  Progress Note        Admitting Physician: Shell Neves MD    Primary Care Provider: Vale Lawrence     Chief Complaint: Fall    Brief History: This is a follow up to the initial consult on Mr. Michelle Pierre is a 70 y.o. right handed male who was admitted to [facility] on 3/23/2022 with No chief complaint on file. Patient with history of worsening limb weakness and falls. He was treated for cervical stenosis with laminectomy and fusion C2-5 performed by Dr. Scott Carolina on 3/24/22 with post-op complication of urine retention. Being treated with prn intermittent straight cath and urecholine. Neurosurgery approved resuming DVT prophylaxis. Lethargy improved after receiving Narcan yesterday. Subjective: The patient is resting comfortably. The patient's mobility is unchanged. He is much improved from yesterday regarding orientation and alertness. ROS:  Constitutional: negative for anorexia, chills, fatigue, fevers, sweats and weight loss  Eyes: negative for redness and visual disturbance  Ears, nose, mouth, throat, and face: negative for earaches, epistaxis, sore throat and tinnitus  Respiratory: negative for cough and shortness of breath  Cardiovascular: negative for chest pain, dyspnea and palpitations  Gastrointestinal: negative for abdominal pain, change in bowel habits, constipation, nausea and vomiting  Genitourinary:negative for dysuria, frequency, hesitancy and urinary incontinence  Integument/breast: negative for pruritus and rash  Musculoskeletal:negative for stiff joints  Neurological: negative for dizziness, headaches   Behavioral/Psych: negative for decreased appetite, depression     Rehabilitation:   Progressing in therapies.     PT:  Restrictions/Precautions: Fall Risk,General Precautions  Implants present? : Metal implants  Other position/activity restrictions: up with assist ;; s/p C2-C5 laminectomy and fixation 3/24  Required Braces or Orthoses  Cervical: c-collar (Aspen collar (remove in bed / when eating))   Transfers  Sit to Stand: Moderate Assistance  Stand to sit: Moderate Assistance  Bed to Chair: Dependent/Total  Comment: pt tranfered to recliner with elbert nguyen       Transfers  Sit to Stand: Moderate Assistance  Stand to sit: Moderate Assistance  Bed to Chair: Dependent/Total  Comment: pt tranfered to recliner with elbert nguyen  Ambulation  Ambulation?: No               OT:  ADL  Feeding: Maximum assistance,Increased time to complete,Verbal cueing,Dependent/Total (Max Assist with Right UE / Right Hand (for part of self-feeding, Pt became tired with RUE). Then required TD feeding (ST[facility] asked to take over, Eval was stopped and re-started at later time). )  Grooming: Maximum assistance,Increased time to complete,Verbal cueing,Dependent/Total (Clinical judgement based on Pt's participation with Self-feeding tasks (using Right UE / Right hand only). Unable to participate with Left dominant hand.)  UE Dressing: Dependent/Total  LE Dressing: Dependent/Total  Toileting: Dependent/Total  Additional Comments: Pt required Max VCs for task initiation / sequencing / redirection to task / coaxing for participation and encouragement. He required significant increase in time / effort for all task participation, frequent rest breaks. Standing Balance  Comment: LATONYA d/t significant increase in time / effort required to participate in 2 sessions to complete OT assessment this date. Functional Mobility  Functional Mobility Comments: LATONYA d/t significant increase in time / effort required to participate in 2 sessions to complete OT assessment this date. Bed mobility  Rolling to Left: Maximum assistance  Rolling to Right: Maximum assistance  Supine to Sit: Maximum assistance  Sit to Supine:  (pt left seated in recliner)  Scooting:  Moderate assistance  Comment: LATONYA further beyond Bed Mobility (in bed) d/t significant increase in time / effort required to participate in 2 sessions to complete OT assessment this date. Transfers  Sit to stand: Unable to assess  Stand to sit: Unable to assess  Transfer Comments: LATONYA d/t significant increase in time / effort required to participate in 2 sessions to complete OT assessment this date. Toilet Transfers  Toilet Transfer: Unable to assess             SLP:  Patient presents with probable safe swallow for [employer] with thin liquids as evidenced by no overt s/s of aspiration noted with consistencies tested. Recommend small sips and bites, only feed when alert and awake and upright at 90 degrees for all PO intake. Recommend close monitoring for overt/clinical s/s of aspiration and D/C PO intake and complete Modified Barium Swallow Study should they occur. Results and recommendations reported to RN. Dysphagia Diagnosis: Swallow function appears grossly intact  Dysphagia Outcome Severity Scale: Level 6: Within functional limits/Modified independence     Objective:  /74   Pulse 74   Temp 97.8 °F (36.6 °C) (Oral)   Resp 25   Ht 5' 3\" (1.6 m)   Wt 153 lb 6.4 oz (69.6 kg)   SpO2 95%   BMI 27.17 kg/m²       GEN: well developed, well nourished, in NAD  HEENT: NCAT, PERRL, EOMI, mucous membranes pink and moist  CV: RRR, no murmurs, rubs or gallops  PULM: CTAB, no rales or rhonchi. Respirations WNL and unlabored  ABD: soft, NT, ND, BS+ and equal  NEURO: A&O x3. Sensation intact to light touch. DTRs 2+  MSK: Functional ROM all extremities but limited by weakness . Strength 3/5 key muscles BUE and BLEs. EXTREMITIES: No calf tenderness to palpation bilaterally. No edema BLEs  SKIN: warm dry and intact with good turgor  PSYCH: appropriately interactive. Affect WNL.      Current Medications:   Current Facility-Administered Medications: [START ON 3/30/2022] amLODIPine (NORVASC) tablet 10 mg, 10 mg, Oral, Daily  doxycycline hyclate (VIBRA-TABS) tablet 100 mg, 100 mg, Oral, 2 times per day  [START ON 3/30/2022] losartan (COZAAR) tablet 50 mg, 50 mg, Oral, Daily  cefepime (MAXIPIME) 1,000 mg in sterile water 10 mL IV syringe, 1,000 mg, IntraVENous, Q12H  atropine injection 0.4 mg, 0.4 mg, IntraVENous, Q5 Min PRN  ketorolac (TORADOL) injection 15 mg, 15 mg, IntraVENous, Q6H PRN  [Held by provider] oxyCODONE (ROXICODONE) immediate release tablet 5 mg, 5 mg, Oral, Q6H PRN **OR** [DISCONTINUED] oxyCODONE (ROXICODONE) immediate release tablet 10 mg, 10 mg, Oral, Q6H PRN  naloxone (NARCAN) injection 0.4 mg, 0.4 mg, IntraVENous, PRN  hydrALAZINE (APRESOLINE) injection 10 mg, 10 mg, IntraVENous, Q6H PRN  divalproex (DEPAKOTE ER) extended release tablet 750 mg, 750 mg, Oral, Nightly  carboxymethylcellulose PF (THERATEARS) 1 % ophthalmic gel 2 drop, 2 drop, Both Eyes, TID  pantoprazole (PROTONIX) tablet 40 mg, 40 mg, Oral, QAM AC  furosemide (LASIX) tablet 20 mg, 20 mg, Oral, Daily  venlafaxine (EFFEXOR) tablet 75 mg, 75 mg, Oral, TID WC  divalproex (DEPAKOTE ER) extended release tablet 500 mg, 500 mg, Oral, QAM  sodium chloride flush 0.9 % injection 5-40 mL, 5-40 mL, IntraVENous, 2 times per day  sodium chloride flush 0.9 % injection 5-40 mL, 5-40 mL, IntraVENous, PRN  0.9 % sodium chloride infusion, 25 mL, IntraVENous, PRN  bisacodyl (DULCOLAX) EC tablet 5 mg, 5 mg, Oral, Daily  senna (SENOKOT) 8.8 MG/5ML syrup 8.8 mg, 5 mL, Oral, BID PRN  sodium chloride flush 0.9 % injection 5-40 mL, 5-40 mL, IntraVENous, 2 times per day  sodium chloride flush 0.9 % injection 10 mL, 10 mL, IntraVENous, PRN  0.9 % sodium chloride infusion, 25 mL, IntraVENous, PRN  potassium chloride (KLOR-CON M) extended release tablet 40 mEq, 40 mEq, Oral, PRN **OR** potassium bicarb-citric acid (EFFER-K) effervescent tablet 40 mEq, 40 mEq, Oral, PRN **OR** potassium chloride 10 mEq/100 mL IVPB (Peripheral Line), 10 mEq, IntraVENous, PRN  magnesium sulfate 1000 mg in dextrose 5% 100 mL IVPB, 1,000 mg, IntraVENous, PRN  enoxaparin (LOVENOX) injection 40 mg, 40 mg, SubCUTAneous, Daily  ondansetron (ZOFRAN-ODT) disintegrating tablet 4 mg, 4 mg, Oral, Q8H PRN **OR** ondansetron (ZOFRAN) injection 4 mg, 4 mg, IntraVENous, Q6H PRN  polyethylene glycol (GLYCOLAX) packet 17 g, 17 g, Oral, Daily PRN  acetaminophen (TYLENOL) tablet 650 mg, 650 mg, Oral, Q6H PRN **OR** acetaminophen (TYLENOL) suppository 650 mg, 650 mg, Rectal, Q6H PRN  [Held by provider] traZODone (DESYREL) tablet 150 mg, 150 mg, Oral, Nightly  glucose (GLUTOSE) 40 % oral gel 15 g, 15 g, Oral, PRN  dextrose 50 % IV solution, 12.5 g, IntraVENous, PRN  glucagon (rDNA) injection 1 mg, 1 mg, IntraMUSCular, PRN  dextrose 5 % solution, 100 mL/hr, IntraVENous, PRN      Diagnostics:     CBC:   Recent Labs     03/29/22  0623   WBC 8.0   RBC 3.12*   HGB 10.2*   HCT 29.4*   MCV 94.2   RDW 12.3        BMP:    Recent Labs     03/29/22  0623   GLUCOSE 150*   BUN 30*   CREATININE 0.59*   CALCIUM 8.9      K 3.7      CO2 24   ANIONGAP 13   LABGLOM >60   GFRAA >60   GFR          HbA1c:   Lab Results   Component Value Date    LABA1C 5.8 05/17/2021     BNP: No results for input(s): BNP in the last 72 hours. PT/INR: No results for input(s): PROTIME, INR in the last 72 hours. APTT: No results for input(s): APTT in the last 72 hours. CARDIAC ENZYMES: No results for input(s): CKMB, CKMBINDEX, TROPONINT, TROPHS, TROPII in the last 72 hours. Invalid input(s): CKTOTAL;3   FASTING LIPID PANEL:  Lab Results   Component Value Date    CHOL 104 04/12/2018    HDL 42 04/12/2018    TRIG 92 04/12/2018     LIVER PROFILE:   Recent Labs     03/29/22  0623   AST 22   ALT 21   BILITOT 0.41   ALKPHOS 95        Radiology:    DOPPLER BLE 3/28/22  Summary        Age indeterminate superficial vein thrombosis of the left lower extremity    involving the short saphenous vein.      MRI BRAIN 3/28/22  FINDINGS:   INTRACRANIAL STRUCTURES/VENTRICLES: The sellar and suprasellar structures,   optic chiasm, corpus callosum, pineal gland, tectum, and midline brainstem   structures are unremarkable.  The craniocervical junction is unremarkable. There is no acute hemorrhage, mass effect, or midline shift. Deisy Scrivener is   satisfactory overall gray-white matter differentiation.  The ventricular   structures are symmetric and unremarkable.  The infratentorial structures   including the cerebellopontine angles and internal auditory canals are   unremarkable.  There is no abnormal restricted diffusion.  There is no   abnormal blooming artifact on susceptibility weighted imaging.       ORBITS: The visualized portion of the orbits demonstrate no acute abnormality.       SINUSES: The visualized paranasal sinuses and mastoid air cells demonstrate   no acute abnormality.       BONES/SOFT TISSUES: The bone marrow signal intensity appears normal. The soft   tissues demonstrate no acute abnormality.           Impression   No acute intracranial abnormality. Impression/Plan:    1. Cervical stenosis with myelopathy s/p C2-C5 laminectomy and fusion on 3/24  2. Neck pain  3. Tetraparesis  4. Urinary retention/neurogenic bladder  5. HTN/HLD  6. Diabetes  7. GIA  8. Migraines  9. PTSD/Depression      Recommendation:  1. The patient will benefit from acute inpatient rehabilitation once medically stable per primary and consulting services. Anticipate he will be able to tolerate 3 hours of therapy per day or 900 minutes per week in rehabilitation. The patient requires multidisciplinary rehabilitation treatment including medical management by a PM&R physician, 24 hour rehabilitation nursing, Physical/Occupational therapy, rehabilitation social work, and nutrition services. Patient and family also require education in post-hospital precautions and home exercise routine, adaptive techniques and deficit compensation strategies, strengthening and conditioning, equipment prescription and instructions in use.    2. DVT Prophylaxis: on Lovenox        Electronically signed by Juliocesar Youngblood MD on 3/29/2022 at 10:53 PM       This note is created with the assistance of a speech recognition program.  While intending to generate a document that actually reflects the content of the visit, the document can still have some errors including those of syntax and sound a like substitutions which may escape proof reading. In such instances, actual meaning can be extrapolated by contextual diversion.

## 2022-03-29 NOTE — CONSULTS
[facility]   Consult Note         Today's Date: 3/29/2022  Patient Name: Rodell Meckel  Date of admission: 3/23/2022  7:06 PM  Patient's age: 70 y.o., 1950  Admission Dx: Cervical spinal cord compression ([facility] [address] [G95.20]  Cord compression [facility]) [G95.20]  Requesting Physician: Tamara Xiong MD  History Obtained From:  Patient, Electronic Medical Records    Chief Complaint: Weakness and mechanical fall    History Of Present Illness:  Rodell Meckel is a 70 y.o. male who presented after a fall, mechanical fall and incomplete quadriplegia, and found to have cervical spinal cord compression. Patient underwent laminectomy C2-C5 on 3/24/2022 with improvement in symptoms. However postoperatively patient had episode of bradycardia and tachycardia with heart rate ranging from 38-1 20s to 130s    Reason for Consult: Cardiology consulted for episodes of sinus bradycardia and sinus tachycardia. Patient denies any significant cardiac history. ECHO 4/12/18: EF 55%, mild LVH, grade I DD, trivial TR. On my evaluation, patient afebrile, VSS,     TSH 0.67   Hemoglobin A1C 5.8 in 2021    Past Medical History:     Lasix 20 daily   Diabetes-on Metformin   Hypertension-on Cozaar and Norvasc   On aspirin and Lipitor     has a past medical history of Depression, Diabetes mellitus ([facility] [address] Difficult intravenous access, Hyperlipidemia, Hypertension, Migraines, PTSD (post-traumatic stress disorder), Sleep apnea, Teeth missing, and Wears glasses. Past Surgical History:   has a past surgical history that includes Cardiac catheterization (02/2010); Carpal tunnel release (Left, 01/09/2002); Vasectomy (12/1983); Tonsillectomy and adenoidectomy (1960); Hydrocele surgery (1951); Middle ear surgery (01/11/2018); eye surgery (Left, 2018); Mouth surgery (04/16/2018); other surgical history (04/19/2018); cervical fusion (04/19/2018); pr office/outpt visit,procedure only (N/A, 4/19/2018);  Cataract removal; laminectomy (03/24/2022); and cervical fusion (N/A, 3/24/2022). Home Medications:    Prior to Admission medications    Medication Sig Start Date End Date Taking? Authorizing Provider   divalproex (DEPAKOTE ER) 250 MG extended release tablet Take 750 mg by mouth at bedtime   Yes Historical Provider, MD   furosemide (LASIX) 20 MG tablet Take 20 mg by mouth daily   Yes Historical Provider, MD   carboxymethylcellulose 1 % ophthalmic solution Place 2 drops into both eyes 3 times daily Pt states \"2 drops in both eyes three times a day\"    Historical Provider, MD   venlafaxine (EFFEXOR XR) 150 MG extended release capsule Take 1 capsule by mouth daily  Patient taking differently: Take 225 mg by mouth daily  6/23/20   Henrene Kanner, MD   simethicone (MYLICON) 80 MG chewable tablet Take 1 tablet by mouth every 6 hours as needed for Flatulence  Patient taking differently: Take 80 mg by mouth every 8 hours as needed for Flatulence  6/22/20   Henrene Kanner, MD   lidocaine (XYLOCAINE) 5 % ointment Apply topically daily as needed for Pain Apply topically as needed.   LF 4/20/20 (30 day supply)  Patient not taking: Reported on 3/23/2022    Historical Provider, MD   metFORMIN (GLUCOPHAGE) 1000 MG tablet Take 1,000 mg by mouth 2 times daily (with meals) LF 4/27/20 (90 day supply)  Patient not taking: Reported on 3/21/2022    Historical Provider, MD   losartan (COZAAR) 25 MG tablet Take 25 mg by mouth daily     Historical Provider, MD   aspirin 81 MG chewable tablet Take 81 mg by mouth daily  Patient not taking: Reported on 3/21/2022    Historical Provider, MD   divalproex (DEPAKOTE ER) 500 MG extended release tablet Take 500 mg by mouth every morning     Historical Provider, MD   traZODone (DESYREL) 100 MG tablet Take 150 mg by mouth nightly as needed LF 5/1/20 (30 day supply)    Historical Provider, MD   cetirizine (ZYRTEC) 10 MG tablet Take 10 mg by mouth daily LF 4/6/20 (90 day supply)  Patient not taking: Reported on 3/21/2022 Historical Provider, MD   atorvastatin (LIPITOR) 80 MG tablet Take 40 mg by mouth daily Take 1/2 tablet (40 mg) daily  LF 4/22/20 (90 day supply)  Patient not taking: Reported on 3/21/2022    Historical Provider, MD   amLODIPine (NORVASC) 10 MG tablet Take 7.5 mg by mouth daily     Historical Provider, MD   sildenafil (VIAGRA) 100 MG tablet Take 100 mg by mouth as needed for Erectile Dysfunction LF 5/12/20 (30 day supply)    Historical Provider, MD   omeprazole (PRILOSEC) 20 MG delayed release capsule Take 20 mg by mouth every evening LF 4/21/20 (90 day supply)    Historical Provider, MD       cefepime, 1,000 mg, IntraVENous, Q12H    cloNIDine, 1 patch, TransDERmal, Weekly    bethanechol, 25 mg, Oral, TID    divalproex, 750 mg, Oral, Nightly    carboxymethylcellulose PF, 2 drop, Both Eyes, TID    amLODIPine, 7.5 mg, Oral, Daily    pantoprazole, 40 mg, Oral, QAM AC    furosemide, 20 mg, Oral, Daily    venlafaxine, 75 mg, Oral, TID WC    divalproex, 500 mg, Oral, QAM    sodium chloride flush, 5-40 mL, IntraVENous, 2 times per day    bisacodyl, 5 mg, Oral, Daily    sodium chloride flush, 5-40 mL, IntraVENous, 2 times per day    enoxaparin, 40 mg, SubCUTAneous, Daily    losartan, 12.5 mg, Oral, Daily    [Held by provider] traZODone, 150 mg, Oral, Nightly    Allergies:  Latex, Bee venom, Lisinopril, Niacin and related, Sulfa antibiotics, and Terazosin    Social History:   reports that he quit smoking about 49 years ago. His smoking use included cigarettes. He has a 2.00 pack-year smoking history. He has never used smokeless tobacco. He reports current alcohol use. He reports current drug use. Drug: Marijuana Elfida Castaneda). Family History: family history includes Asthma in his mother; Coronary Art Dis in his mother; Dementia in his mother; Diabetes in his brother, mother, sister, and sister;  Heart Disease in his brother and father; High Blood Pressure in his brother and mother; High Cholesterol in his brother; Other in his mother and sister; Stroke in his mother. Review of Systems:    Constitutional: Negative for appetite change. Negative for fever. HENT: Negative for congestion. Eyes: Negative for photophobia and itching. Respiratory: Negative for apnea and shortness of breath. Cardiovascular: Negative for chest pain. Gastrointestinal: Negative for abdominal pain. Endocrine: Negative for polydipsia. Polyphagia: :   Genitourinary: Negative for dysuria. Musculoskeletal: Negative for back pain. Skin: Negative for color change. Allergic/Immunologic: Negative for immunocompromised state. Neurological: Negative for dizziness. Hematological: Negative for adenopathy. Psychiatric/Behavioral: Negative for agitation. Physical Exam:      BP (!) 174/87   Pulse 79   Temp 98.9 °F (37.2 °C)   Resp 14   Ht 5' 3\" (1.6 m)   Wt 153 lb 6.4 oz (69.6 kg)   SpO2 97%   BMI 27.17 kg/m²     General:  AAO x 3, speaking in full sentences, no accessory muscle use. On nasal cannula and saturating well  HEENT: Atraumatic, normocephalic, no throat congestion, moist mucosa. Eyes:   Pupils equal, round and reactive to light, EOMI. Neck:   Supple  Chest:   Clear on auscultation. no rales or ronchi. Bibasilar fine crackles noted. Cardiac:  S1 S2 RR, no gallops, murmur or rubs. Abdomen: Soft, non-tender, no masses or organomegaly, BS present. :   No suprapubic or flank tenderness. Neuro:  AAO x 3, improved strength in all 4 extremities. SKIN:  No rashes, good skin turgor. Extremities:  No edema.       Cardiac Investigations during this admission:  EKG: Normal sinus rhythm    Echo: 2018 as above    Labs:   CBC:   Recent Labs     03/26/22  1415 03/29/22  0623   WBC 12.3* 8.0   HGB 10.7* 10.2*   HCT 31.4* 29.4*    318     BMP:   Recent Labs     03/29/22  0623      K 3.7   CO2 24   BUN 30*   CREATININE 0.59*   LABGLOM >60   GLUCOSE 150*     BNP: No results for input(s): BNP in the last 72 hours.  PT/INR: No results for input(s): PROTIME, INR in the last 72 hours. APTT:No results for input(s): APTT in the last 72 hours. CARDIAC ENZYMES:No results for input(s): CKTOTAL, CKMB, CKMBINDEX, TROPONINI in the last 72 hours. FASTING LIPID PANEL:  Lab Results   Component Value Date    HDL 42 04/12/2018    TRIG 92 04/12/2018     LIVER PROFILE:  Recent Labs     03/29/22  0623   AST 22   ALT 21   LABALBU 2.5*       Other Current Problems  Patient Active Problem List   Diagnosis    Hypertension    Hyperlipidemia    Diabetes mellitus ([address]    Contusion of right shoulder    Bipolar disorder, mixed ([address]   [place] First known suicide attempt ([address]    Erectile dysfunction    Cervical stenosis of spinal canal    Incomplete spinal cord lesion at C5-C7 level ([address]    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)    Cervical spondylosis with radiculopathy    Acute blood loss anemia    Chest pain    Depression with suicidal ideation    Severe recurrent major depression without psychotic features ([address]    Frequent falls    Hyponatremia    Cervical spinal cord compression (HCC)    Cord compression (HCC)    Quadriplegia, C1-C4 incomplete (HCC)    Encephalopathy    Hospital-acquired pneumonia    Atelectasis       Impression:  · Sinus tachycardia and sinus bradycardia-resolved  · Cervical cord compression s/p laminectomy  · Hypertension  · Hyperlipidemia  · Diabetes  · Metabolic encephalopathy-resolved  · ? Pneumonia  · Depression      Recommendations:    · No more episodes of bradycardia or tachycardia overnight. · Continue Norvasc and losartan for hypertension  · Continue to hold trazodone  · Rest of the management per primary. · We will follow along. Will discuss with attending physician. Recommendations above are pending approval by attending physician.     Francheska Dickey  Internal Medicine/ Cardiology Resident  [facility]  3/29/2022 10:00 AM          Attestation signed by Attending Physician Statement:    I have discussed the care of  Orion Pollen , including pertinent history and exam findings, with the Cardiology fellow/resident. I have seen and examined the patient and the key elements of all parts of the encounter have been performed by me. I agree with the assessment, plan and orders as documented by the fellow/resident, after I modified exam findings and plan of treatments, and the final version is my approved version of the assessment. Additional Comments: S/p C spine decompression getting pain meds, cardiology consulted for transient bradycardia yesterday. Now HR normal. BP elevated. Increase losartan to 50mg po qday. Continue norvasc. Dc clonidine. IV hydralazine 10mg Q6H RPN for SBP >160.     Corwin Jhaveri MD

## 2022-03-30 LAB
ANION GAP SERPL CALCULATED.3IONS-SCNC: 11 MMOL/L (ref 9–17)
BUN BLDV-MCNC: 24 MG/DL (ref 8–23)
CALCIUM SERPL-MCNC: 9 MG/DL (ref 8.6–10.4)
CHLORIDE BLD-SCNC: 107 MMOL/L (ref 98–107)
CO2: 26 MMOL/L (ref 20–31)
CREAT SERPL-MCNC: 0.46 MG/DL (ref 0.7–1.2)
CULTURE: NO GROWTH
DIRECT EXAM: NORMAL
DIRECT EXAM: NORMAL
GFR AFRICAN AMERICAN: >60 ML/MIN
GFR NON-AFRICAN AMERICAN: >60 ML/MIN
GFR SERPL CREATININE-BSD FRML MDRD: ABNORMAL ML/MIN/{1.73_M2}
GLUCOSE BLD-MCNC: 129 MG/DL (ref 70–99)
HCT VFR BLD CALC: 29.9 % (ref 40.7–50.3)
HEMOGLOBIN: 10.1 G/DL (ref 13–17)
MCH RBC QN AUTO: 31.8 PG (ref 25.2–33.5)
MCHC RBC AUTO-ENTMCNC: 33.8 G/DL (ref 28.4–34.8)
MCV RBC AUTO: 94 FL (ref 82.6–102.9)
NRBC AUTOMATED: 0 PER 100 WBC
PDW BLD-RTO: 11.9 % (ref 11.8–14.4)
PLATELET # BLD: 328 K/UL (ref 138–453)
PMV BLD AUTO: 9.6 FL (ref 8.1–13.5)
POTASSIUM SERPL-SCNC: 3.7 MMOL/L (ref 3.7–5.3)
RBC # BLD: 3.18 M/UL (ref 4.21–5.77)
SODIUM BLD-SCNC: 144 MMOL/L (ref 135–144)
SPECIMEN DESCRIPTION: NORMAL
SPECIMEN DESCRIPTION: NORMAL
WBC # BLD: 7.1 K/UL (ref 3.5–11.3)

## 2022-03-30 PROCEDURE — 6370000000 HC RX 637 (ALT 250 FOR IP): Performed by: NURSE PRACTITIONER

## 2022-03-30 PROCEDURE — 93005 ELECTROCARDIOGRAM TRACING: CPT | Performed by: CLINICAL NURSE SPECIALIST

## 2022-03-30 PROCEDURE — 36415 COLL VENOUS BLD VENIPUNCTURE: CPT

## 2022-03-30 PROCEDURE — 2580000003 HC RX 258: Performed by: NURSE PRACTITIONER

## 2022-03-30 PROCEDURE — 85027 COMPLETE CBC AUTOMATED: CPT

## 2022-03-30 PROCEDURE — 87070 CULTURE OTHR SPECIMN AEROBIC: CPT

## 2022-03-30 PROCEDURE — 6360000002 HC RX W HCPCS: Performed by: NURSE PRACTITIONER

## 2022-03-30 PROCEDURE — APPSS30 APP SPLIT SHARED TIME 16-30 MINUTES: Performed by: NURSE PRACTITIONER

## 2022-03-30 PROCEDURE — 87205 SMEAR GRAM STAIN: CPT

## 2022-03-30 PROCEDURE — 1200000000 HC SEMI PRIVATE

## 2022-03-30 PROCEDURE — 97110 THERAPEUTIC EXERCISES: CPT

## 2022-03-30 PROCEDURE — 97535 SELF CARE MNGMENT TRAINING: CPT

## 2022-03-30 PROCEDURE — 6370000000 HC RX 637 (ALT 250 FOR IP): Performed by: FAMILY MEDICINE

## 2022-03-30 PROCEDURE — 97530 THERAPEUTIC ACTIVITIES: CPT

## 2022-03-30 PROCEDURE — 2500000003 HC RX 250 WO HCPCS: Performed by: INTERNAL MEDICINE

## 2022-03-30 PROCEDURE — 6370000000 HC RX 637 (ALT 250 FOR IP): Performed by: INTERNAL MEDICINE

## 2022-03-30 PROCEDURE — 99232 SBSQ HOSP IP/OBS MODERATE 35: CPT | Performed by: NURSE PRACTITIONER

## 2022-03-30 PROCEDURE — 2580000003 HC RX 258: Performed by: INTERNAL MEDICINE

## 2022-03-30 PROCEDURE — 6360000002 HC RX W HCPCS: Performed by: INTERNAL MEDICINE

## 2022-03-30 PROCEDURE — 80048 BASIC METABOLIC PNL TOTAL CA: CPT

## 2022-03-30 PROCEDURE — 6360000002 HC RX W HCPCS: Performed by: STUDENT IN AN ORGANIZED HEALTH CARE EDUCATION/TRAINING PROGRAM

## 2022-03-30 RX ORDER — CYCLOBENZAPRINE HCL 10 MG
5 TABLET ORAL 3 TIMES DAILY PRN
Status: DISCONTINUED | OUTPATIENT
Start: 2022-03-30 | End: 2022-03-31 | Stop reason: HOSPADM

## 2022-03-30 RX ORDER — POTASSIUM CHLORIDE 20 MEQ/1
40 TABLET, EXTENDED RELEASE ORAL ONCE
Status: COMPLETED | OUTPATIENT
Start: 2022-03-30 | End: 2022-03-30

## 2022-03-30 RX ADMIN — DOXYCYCLINE HYCLATE 100 MG: 100 TABLET, COATED ORAL at 08:36

## 2022-03-30 RX ADMIN — ENOXAPARIN SODIUM 40 MG: 40 INJECTION SUBCUTANEOUS at 08:36

## 2022-03-30 RX ADMIN — CYCLOBENZAPRINE 5 MG: 10 TABLET, FILM COATED ORAL at 11:54

## 2022-03-30 RX ADMIN — BISACODYL 5 MG: 5 TABLET, COATED ORAL at 08:37

## 2022-03-30 RX ADMIN — PANTOPRAZOLE SODIUM 40 MG: 40 TABLET, DELAYED RELEASE ORAL at 08:36

## 2022-03-30 RX ADMIN — CARBOXYMETHYLCELLULOSE SODIUM 2 DROP: 10 GEL OPHTHALMIC at 19:53

## 2022-03-30 RX ADMIN — SODIUM CHLORIDE, PRESERVATIVE FREE 10 ML: 5 INJECTION INTRAVENOUS at 08:35

## 2022-03-30 RX ADMIN — POTASSIUM CHLORIDE 40 MEQ: 1500 TABLET, EXTENDED RELEASE ORAL at 11:49

## 2022-03-30 RX ADMIN — DIVALPROEX SODIUM 500 MG: 250 TABLET, FILM COATED, EXTENDED RELEASE ORAL at 08:37

## 2022-03-30 RX ADMIN — VENLAFAXINE 75 MG: 75 TABLET ORAL at 08:37

## 2022-03-30 RX ADMIN — CEFEPIME HYDROCHLORIDE 1000 MG: 1 INJECTION, POWDER, FOR SOLUTION INTRAMUSCULAR; INTRAVENOUS at 19:53

## 2022-03-30 RX ADMIN — ACETAMINOPHEN 650 MG: 325 TABLET ORAL at 19:54

## 2022-03-30 RX ADMIN — DIVALPROEX SODIUM 750 MG: 250 TABLET, FILM COATED, EXTENDED RELEASE ORAL at 19:53

## 2022-03-30 RX ADMIN — FUROSEMIDE 20 MG: 20 TABLET ORAL at 08:36

## 2022-03-30 RX ADMIN — KETOROLAC TROMETHAMINE 15 MG: 30 INJECTION, SOLUTION INTRAMUSCULAR at 19:54

## 2022-03-30 RX ADMIN — VENLAFAXINE 75 MG: 75 TABLET ORAL at 17:41

## 2022-03-30 RX ADMIN — CARBOXYMETHYLCELLULOSE SODIUM 2 DROP: 10 GEL OPHTHALMIC at 08:37

## 2022-03-30 RX ADMIN — LOSARTAN POTASSIUM 50 MG: 50 TABLET, FILM COATED ORAL at 08:36

## 2022-03-30 RX ADMIN — ACETAMINOPHEN 650 MG: 325 TABLET ORAL at 15:02

## 2022-03-30 RX ADMIN — DOXYCYCLINE HYCLATE 100 MG: 100 TABLET, COATED ORAL at 19:53

## 2022-03-30 RX ADMIN — VENLAFAXINE 75 MG: 75 TABLET ORAL at 11:49

## 2022-03-30 RX ADMIN — HYDRALAZINE HYDROCHLORIDE 10 MG: 20 INJECTION INTRAMUSCULAR; INTRAVENOUS at 04:09

## 2022-03-30 RX ADMIN — POLYETHYLENE GLYCOL 3350 17 G: 17 POWDER, FOR SOLUTION ORAL at 08:45

## 2022-03-30 RX ADMIN — SODIUM CHLORIDE, PRESERVATIVE FREE 10 ML: 5 INJECTION INTRAVENOUS at 19:54

## 2022-03-30 RX ADMIN — AMLODIPINE BESYLATE 10 MG: 10 TABLET ORAL at 08:37

## 2022-03-30 RX ADMIN — CARBOXYMETHYLCELLULOSE SODIUM 2 DROP: 10 GEL OPHTHALMIC at 13:53

## 2022-03-30 RX ADMIN — CEFEPIME HYDROCHLORIDE 1000 MG: 1 INJECTION, POWDER, FOR SOLUTION INTRAMUSCULAR; INTRAVENOUS at 08:36

## 2022-03-30 RX ADMIN — ACETAMINOPHEN 650 MG: 325 TABLET ORAL at 08:45

## 2022-03-30 ASSESSMENT — PAIN SCALES - GENERAL
PAINLEVEL_OUTOF10: 0
PAINLEVEL_OUTOF10: 5
PAINLEVEL_OUTOF10: 3
PAINLEVEL_OUTOF10: 5

## 2022-03-30 ASSESSMENT — PAIN DESCRIPTION - LOCATION: LOCATION: NECK

## 2022-03-30 ASSESSMENT — PAIN DESCRIPTION - ORIENTATION: ORIENTATION: MID

## 2022-03-30 ASSESSMENT — PAIN DESCRIPTION - PAIN TYPE: TYPE: SURGICAL PAIN

## 2022-03-30 NOTE — PLAN OF CARE
Problem: Pain:  Goal: Pain level will decrease  Description: Pain level will decrease  3/30/2022 1627 by Naomy Calles RN  Outcome: Ongoing  3/30/2022 0451 by Tammi Merino RN  Outcome: Ongoing  Goal: Control of acute pain  Description: Control of acute pain  3/30/2022 1627 by Naomy Calles RN  Outcome: Ongoing  3/30/2022 0451 by Tammi Merino RN  Outcome: Ongoing  Goal: Control of chronic pain  Description: Control of chronic pain  3/30/2022 1627 by Naomy Calles RN  Outcome: Ongoing  3/30/2022 0451 by Tammi Merino RN  Outcome: Ongoing     Problem: Skin Integrity:  Goal: Will show no infection signs and symptoms  Description: Will show no infection signs and symptoms  3/30/2022 1627 by Naomy Calles RN  Outcome: Ongoing  3/30/2022 0451 by Tammi Merino RN  Outcome: Ongoing  Goal: Absence of new skin breakdown  Description: Absence of new skin breakdown  3/30/2022 1627 by Naomy Calles RN  Outcome: Ongoing  3/30/2022 0451 by Tammi Merino RN  Outcome: Ongoing     Problem: Falls - Risk of:  Goal: Will remain free from falls  Description: Will remain free from falls  3/30/2022 1627 by Naomy Calles RN  Outcome: Ongoing  3/30/2022 0451 by Tammi Merino RN  Outcome: Ongoing  Goal: Absence of physical injury  Description: Absence of physical injury  3/30/2022 1627 by Naomy Calles RN  Outcome: Ongoing  3/30/2022 0451 by Tammi Merino RN  Outcome: Ongoing

## 2022-03-30 NOTE — PROGRESS NOTES
Physical Therapy  Facility/Department: [address] STEP DOWN  Daily Treatment Note  NAME: Beatriz Lawrence  : 1950  MRN: [id number]    Date of Service: 3/30/2022    Discharge Recommendations:  Further therapy recommended at discharge. The patient should be able to tolerate at least 3 hours of therapy per day over 5 days or 15 hours over 7 days. This patient may benefit from a Physical Medicine and Rehab consult. PT Equipment Recommendations  Equipment Needed: No  Other: CTA, pt currently requires significant physical assistance for all aspects of mobility    Assessment   Body structures, Functions, Activity limitations: Decreased functional mobility ; Decreased ROM; Decreased strength;Decreased endurance;Decreased balance; Increased pain;Decreased posture  Assessment: The pt required Max A for bed mobility and Nikki to modA to maintain sitting EOB this date, limited by fatigue and decreased endurance. Able to stand with modAx1-2 and use of elbert stedy. The pt is very cooperative and motivated to participate in therapy and would benefit from intense PT to progress toward prior level of independence. Prognosis: Fair  PT Education: Goals;PT Role;Plan of Care; Functional Mobility Training;Transfer Training;General Safety  REQUIRES PT FOLLOW UP: Yes  Activity Tolerance  Activity Tolerance: Patient limited by endurance; Patient limited by fatigue     Patient Diagnosis(es): There were no encounter diagnoses. has a past medical history of Depression, Diabetes mellitus ([address] Difficult intravenous access, Hyperlipidemia, Hypertension, Migraines, PTSD (post-traumatic stress disorder), Sleep apnea, Teeth missing, and Wears glasses. has a past surgical history that includes Cardiac catheterization (2010); Carpal tunnel release (Left, 2002); Vasectomy (1983); Tonsillectomy and adenoidectomy (); Hydrocele surgery (); Middle ear surgery (2018); eye surgery (Left, 2018);  Mouth surgery (2018); other surgical history (04/19/2018); cervical fusion (04/19/2018); pr office/outpt visit,procedure only (N/A, 4/19/2018); Cataract removal; laminectomy (03/24/2022); and cervical fusion (N/A, 3/24/2022). Restrictions  Restrictions/Precautions  Restrictions/Precautions: Fall Risk,General Precautions  Required Braces or Orthoses?: Yes  Implants present? : Metal implants  Required Braces or Orthoses  Cervical: c-collar (Aspen collar (remove in bed / when eating))  Position Activity Restriction  Spinal Precautions: No Twisting,No Lifting,No Bending  Other position/activity restrictions: up with assist ;; s/p C2-C5 laminectomy and fixation 3/24  Subjective   General  Response To Previous Treatment: Patient with no complaints from previous session. Family / Caregiver Present: No  Subjective  Subjective: RN and pt agreeable to PT. Pt supine in bed upon arrival, pleasant and cooperative throughout. C-collar donned prior to mobility  General Comment  Comments: Pt left in recliner with call light within reach, alarm activated and MANUEL present in room  Pain Screening  Patient Currently in Pain: Denies  Vital Signs  Patient Currently in Pain: Denies       Orientation  Orientation  Overall Orientation Status: Within Functional Limits  Cognition      Objective   Bed mobility  Rolling to Left: Maximum assistance  Rolling to Right: Maximum assistance  Supine to Sit: Maximum assistance  Sit to Supine:  (pt left in recliner)  Scooting: Moderate assistance  Comment: HOB elevated, increased time to complete  Transfers  Sit to Stand:  Moderate Assistance;2 Person Assistance;Minimal Assistance ([facility] x2 from EOB to elbert stedy, Nikki x2 from elevated seat of elbert stedy)  Stand to sit: Moderate Assistance  Bed to Chair: Dependent/Total (with elbert stedy)  Comment: pt tranfered to recliner with elbert stedy  Ambulation  Ambulation?: No     Balance  Posture: Fair  Sitting - Static: Fair;-  Sitting - Dynamic: Poor;+  Standing - Static: Poor;+  Standing - Dynamic: Poor  Comments: Pt sat EOB ~5 minutes with Min A/modA  throughout with B UE on EOB then progressed to B UEs on SS  Exercises  Quad Sets: 10x bilat supine  Gluteal Sets: 10x bilat supine  Hip Abduction: 10x bilat supine AAROM  Knee Long Arc Quad: 10x bilat seated EOB  Ankle Pumps: 10x bilat supine  Core Strengthening: EOB ~5 mins     AM-PAC Score  AM-PAC Inpatient Mobility Raw Score : 9 (03/30/22 1509)  AM-PAC Inpatient T-Scale Score : 30.55 (03/30/22 1509)  Mobility Inpatient CMS 0-100% Score: 81.38 (03/30/22 1509)  Mobility Inpatient CMS G-Code Modifier : CM (03/30/22 1509)    Goals  Short term goals  Time Frame for Short term goals: 14 visits  Short term goal 1: Perform bed mobility with Max A x1  Short term goal 2: Perform sit to stand with Mod A and appropriate AD  Short term goal 3: Ambulate 50ft with appropriate AD and Mod A  Short term goal 4: Propel wheelchair 200ft with SBA  Short term goal 5: Demo Fair- dynamic standing balance to decrease risk of falls    Plan    Plan  Times per week: 5 to 6 x/week  Times per day: Daily  Current Treatment Recommendations: Strengthening,ROM,Balance Training,Transfer Training,Endurance Training,Patient/Caregiver Education & Training,Safety Education & Training,Equipment Evaluation, Education, & procurement,Positioning,Gait Training,Neuromuscular Re-education,Wheelchair Mobility Training,Functional Mobility Training  Safety Devices  Type of devices: Call light within reach,Nurse notified,Gait belt,Left in [facility] alarm in place  Restraints  Initially in place: No     Therapy Time   Individual Concurrent Group Co-treatment   Time In 1403         Time Out 1428         Minutes 25         Timed Code Treatment Minutes: [address]

## 2022-03-30 NOTE — PROGRESS NOTES
Comprehensive Nutrition Assessment    Type and Reason for Visit:  Initial (Length of stay)    Nutrition Recommendations/Plan:    - Send Ensure Enlive ONS BID   - Encourage PO intake as tolerated    Nutrition Assessment:  Pt initially admitted d/t fall. Pt states he was eating well at home, but intake is decreased since admission. Reports eating ~25% of meals. C/o some difficulty swallowing foods and states he needs to take small bites of solids followed by liquids so he doesn't get \"choked up\". Noted BSSE completed yesterday and SLP recommending Easy to Chew diet with thin liquids. Pt states he likes Ensure ONS and would like to receive. Malnutrition Assessment:  Malnutrition Status: Meets criteria for Moderate malnutrition    Context:  Acute Illness     Findings of the 6 clinical characteristics of malnutrition:  Energy Intake:  1 - 75% or less of estimated energy requirements for 7 or more days  Weight Loss:  1 - 5% over 1 month     Body Fat Loss:  No significant body fat loss     Muscle Mass Loss:  No significant muscle mass loss    Fluid Accumulation:  1 - Mild Extremities   Strength:  Not Performed    Estimated Daily Nutrient Needs:  Energy (kcal):  9908-1698 kcals/day; Weight Used for Energy Requirements:  Current     Protein (g):  80 gm/day; Weight Used for Protein Requirements:  Ideal (1.4)        Fluid (ml/day):  25-28 mL/kg = 7013-1820 mL/day or per MD; Method Used for Fluid Requirements:  ml/Kg      Nutrition Related Findings:  meds/labs reviewed      Wounds:  Surgical Incision,Skin Tears       Current Nutrition Therapies:    ADULT DIET; Easy to Chew      Anthropometric Measures:  · Height: 5' 3\" (160 cm)  · Current Body Weight: 153 lb 7 oz (69.6 kg)   · Admission Body Weight: 152 lb 5.4 oz (69.1 kg)    · Usual Body Weight: 160 lb 12.8 oz (72.9 kg) (3/16/22 per EHR)     · Ideal Body Weight: 124 lbs; % Ideal Body Weight 123.7 %   · BMI: 27.2  · BMI Categories: Overweight (BMI 25.0-29. 9) Nutrition Diagnosis:   · Inadequate oral intake related to  (appetite, pt reports of swallowing difficulty) as evidenced by intake 26-50%      Nutrition Interventions:   Food and/or Nutrient Delivery:  Continue Current Diet,Start Oral Nutrition Supplement  Nutrition Education/Counseling:  No recommendation at this time   Coordination of Nutrition Care:  Continue to monitor while inpatient    Goals:  Meet at least 75% of estimated nutrition needs with PO       Nutrition Monitoring and Evaluation:   Behavioral-Environmental Outcomes:  None Identified   Food/Nutrient Intake Outcomes:  Food and Nutrient Intake,Supplement Intake  Physical Signs/Symptoms Outcomes:  Weight,Nutrition Focused Physical Findings,Biochemical Data,Skin,Chewing or Swallowing     Discharge Planning:     Too soon to determine     Electronically signed by Judy Jiménez, MS, RD, LD on 3/30/22 at 11:57 AM EDT    Contact: [phone]

## 2022-03-30 NOTE — PROGRESS NOTES
[facility]  Progress Note                   Date:   3/30/2022  Patient name: Rodell Meckel  Date of admission:  3/23/2022  7:06 PM  MRN:   [id number]  YOB: 1950  PCP: Natalie Ramírez    Reason for Admission: Cervical spinal cord compression ([facility] [G95.20]  Cord compression ([facility] [G95.20]    Subjective:       Clinical Changes /Abnormalities: Seen & examined alone in room. No acute CV issues/concerns overnight. Labs, vitals, & tele reviewed. Remains SR. Review of Systems    Medications:   Scheduled Meds:   amLODIPine  10 mg Oral Daily    doxycycline hyclate  100 mg Oral 2 times per day    losartan  50 mg Oral Daily    cefepime  1,000 mg IntraVENous Q12H    divalproex  750 mg Oral Nightly    carboxymethylcellulose PF  2 drop Both Eyes TID    pantoprazole  40 mg Oral QAM AC    furosemide  20 mg Oral Daily    venlafaxine  75 mg Oral TID WC    divalproex  500 mg Oral QAM    sodium chloride flush  5-40 mL IntraVENous 2 times per day    bisacodyl  5 mg Oral Daily    sodium chloride flush  5-40 mL IntraVENous 2 times per day    enoxaparin  40 mg SubCUTAneous Daily    [Held by provider] traZODone  150 mg Oral Nightly     Continuous Infusions:   sodium chloride      sodium chloride      dextrose       CBC:   Recent Labs     03/29/22  0623 03/30/22  0525   WBC 8.0 7.1   HGB 10.2* 10.1*    328     BMP:    Recent Labs     03/29/22  0623 03/30/22  0525    144   K 3.7 3.7    107   CO2 24 26   BUN 30* 24*   CREATININE 0.59* 0.46*   GLUCOSE 150* 129*     Hepatic:  Recent Labs     03/29/22  0623   AST 22   ALT 21   BILITOT 0.41   ALKPHOS 95     Troponin: No results for input(s): TROPHS in the last 72 hours. BNP: No results for input(s): BNP in the last 72 hours. Lipids: No results for input(s): CHOL, HDL in the last 72 hours. Invalid input(s): LDLCALCU  INR: No results for input(s): INR in the last 72 hours.     Objective:   Vitals: BP (!) 149/52   Pulse 73 Temp 98.2 °F (36.8 °C)   Resp 20   Ht 5' 3\" (1.6 m)   Wt 153 lb 6.4 oz (69.6 kg)   SpO2 91%   BMI 27.17 kg/m²   General appearance: alert and cooperative with exam  HEENT: Head: Normocephalic, no lesions, without obvious abnormality. Neck:no JVD, trachea midline, no adenopathy  Lungs: Clear to auscultation, dim bases, no distress  Heart: Regular rate and rhythm, s1/s2 auscultated, no murmurs, Tele SR  Abdomen: soft, non-tender, bowel sounds active  Extremities: no edema  Neurologic: not done    ECHO 4/12/18: EF 55%, mild LVH, grade I DD, trivial TR. Assessment / Acute Cardiac Problems:   · Sinus tachycardia and sinus bradycardia post-op, now resolved  · Cervical cord compression s/p laminectomy  · Hypertension  · Hyperlipidemia  · Diabetes  · Metabolic encephalopathy-resolved  · ? Pneumonia  · Depression      Patient Active Problem List:     Hypertension     Hyperlipidemia     Diabetes mellitus ([address]     Contusion of right shoulder     Bipolar disorder, mixed ([address]     First known suicide attempt [facility])     Erectile dysfunction     Cervical stenosis of spinal canal     Incomplete spinal cord lesion at C5-C7 level ([address]     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)     Cervical spondylosis with radiculopathy     Acute blood loss anemia     Chest pain     Depression with suicidal ideation     Severe recurrent major depression without psychotic features ([address]     Frequent falls     Hyponatremia     Cervical spinal cord compression (HCC)     Cord compression (HCC)     Quadriplegia, C1-C4 incomplete (HCC)     Encephalopathy     Hospital-acquired pneumonia     Atelectasis      Plan of Treatment:   1. Stable overnight. Tele reviewed and HR remains stable. 2. Keep K >4 and Mg >2  3. BP stable. Continue PO Norvasc, & Losartan. IV Hydralazine for SBP >160 and if consistently elevated would add PO Hydralazine. BP presently running 140's. 4. No further CV input at this time. Will sign off.  Please call with further questions/concerns.      Electronically signed by KASSIDY Crowder CNP on 3/30/2022 at [address].  [phone]

## 2022-03-30 NOTE — PROGRESS NOTES
RN received notification from lab that they would not be able to get a culture from respiratory sample. Notified primary team and obtained new order and sample. New respiratory sample collected and sent to lab.

## 2022-03-30 NOTE — PLAN OF CARE
Problem: Pain:  Goal: Pain level will decrease  Description: Pain level will decrease  3/30/2022 0451 by Suzan Bermudez RN  Outcome: Ongoing  3/29/2022 1714 by Madeleine Weldon RN  Outcome: Ongoing  Goal: Control of acute pain  Description: Control of acute pain  3/30/2022 0451 by Suzan Bermudez RN  Outcome: Ongoing  3/29/2022 1714 by Madeleine Weldon RN  Outcome: Ongoing  Goal: Control of chronic pain  Description: Control of chronic pain  3/30/2022 0451 by Suzan Bermudez RN  Outcome: Ongoing  3/29/2022 1714 by Madeleine Weldon RN  Outcome: Ongoing     Problem: Skin Integrity:  Goal: Will show no infection signs and symptoms  Description: Will show no infection signs and symptoms  3/30/2022 0451 by Suzan Bermudez RN  Outcome: Ongoing  3/29/2022 1714 by Madeleine Weldon RN  Outcome: Ongoing  Goal: Absence of new skin breakdown  Description: Absence of new skin breakdown  3/30/2022 0451 by Suzan Bermudez RN  Outcome: Ongoing  3/29/2022 1714 by Madeleine Weldon RN  Outcome: Ongoing     Problem: Falls - Risk of:  Goal: Will remain free from falls  Description: Will remain free from falls  3/30/2022 0451 by Suzan Bermudez RN  Outcome: Ongoing  3/29/2022 1714 by Madeleine Weldon RN  Outcome: Ongoing  Goal: Absence of physical injury  Description: Absence of physical injury  3/30/2022 0451 by Suzan Bermudez RN  Outcome: Ongoing  3/29/2022 1714 by Madeleine Weldon RN  Outcome: Ongoing

## 2022-03-30 NOTE — PROGRESS NOTES
Notified ADORE Cole CM, that per Dr Bran Gonzalez pt is approp for ARU. Also requested when pt will be medically ready.

## 2022-03-30 NOTE — PROGRESS NOTES
[facility]  Office: [address], DO, Kosta Sanchez, DO, Nasim Chavira, DO, Eileen Griffitheber Blood, DO, Piyush Farrell MD, Trina Borges MD, Jhonny Lamas MD, Daniela Richey MD, Aamir Lovelace MD, Margaret Aiken MD, Paige Cuenca MD, Jerald Cisse, DO, Irlanda Pepper, DO, Whitney Lynn MD,  Wm No, DO, Elana Melendez MD, Chastity Mahajan MD, Mejia Cunningham MD, Alexx Silvestre, DO, Mary Lou Elder MD, Lucia Lynn MD, Suzan Yu, CNP, [facility], CNP, Jovany Gómez, CNP, Thomas Lainez, CNS, Noah Barthel, CNP, Rica Valadez, CNP, John Caldera, CNP, Ashley Pedro, CNP, Aylin Jean, CNP, Bud Antoine PA-C, [facility], DNP, Kelly Cheney, [facility], Zoë Feng, CNP, Darshan Ortiz, CNP, Vinny Garcia, CNP         denise Degroot [place] [address]    Progress Note    3/30/2022    4:36 AM    Name:   Giovanna Martinez  MRN:     [id number]     Acct:      [de-identified]   Room:   71/8222-52   Day:  7  Admit Date:  3/23/2022  7:06 PM    PCP:   Bon Aguirre  Code Status:  DNR-CCA    Subjective:     C/C: Generalized weakness with frequent falls, found to have cord compromise secondary to high-grade stenosis/incomplete quadriplegia    Interval History Status: Improved    Patient evaluated in room, looks much improved when compared to yesterday. Tolerating 2 L low flow nasal cannula. Sitting up at bedside, fiancé in room at time of assessment. Alert and oriented. States he is feeling better. Venous duplex completed and final read available showing a left lower extremity saphenous vein DVT    Brief History: This is a 70-year-old man who initially presented to [facility] emergency department after a fall. Incidentally found to have cord compromise secondary to high-grade stenosis. Was transferred to [facility] for neurosurgical evaluation and intervention. MRI at our facility showed severe stenosis at C2-C5.   Patient underwent C2-5 posterior fixation with fusion and decompression with neurosurgery      Review of Systems:     Constitutional:  negative for chills, fevers, sweats  Respiratory:   +cough/throat clearing, dyspnea on exertion, shortness of breath, wheezing  Cardiovascular:  negative for chest pain, chest pressure/discomfort, lower extremity edema, palpitations  Gastrointestinal:  negative for abdominal pain, constipation, diarrhea, nausea, vomiting  Neurological:  negative for dizziness, headache    Medications: Allergies:     Allergies   Allergen Reactions    Latex Itching    Bee Venom Swelling    Lisinopril Other (See Comments)     Cough      Niacin And Related Rash    Sulfa Antibiotics Rash    Terazosin Rash       Current Meds:   Scheduled Meds:    amLODIPine  10 mg Oral Daily    doxycycline hyclate  100 mg Oral 2 times per day    losartan  50 mg Oral Daily    cefepime  1,000 mg IntraVENous Q12H    divalproex  750 mg Oral Nightly    carboxymethylcellulose PF  2 drop Both Eyes TID    pantoprazole  40 mg Oral QAM AC    furosemide  20 mg Oral Daily    venlafaxine  75 mg Oral TID WC    divalproex  500 mg Oral QAM    sodium chloride flush  5-40 mL IntraVENous 2 times per day    bisacodyl  5 mg Oral Daily    sodium chloride flush  5-40 mL IntraVENous 2 times per day    enoxaparin  40 mg SubCUTAneous Daily    [Held by provider] traZODone  150 mg Oral Nightly     Continuous Infusions:    sodium chloride      sodium chloride      dextrose       PRN Meds: atropine, ketorolac, [Held by provider] oxyCODONE **OR** [DISCONTINUED] oxyCODONE, naloxone, hydrALAZINE, sodium chloride flush, sodium chloride, senna, sodium chloride flush, sodium chloride, potassium chloride **OR** potassium alternative oral replacement **OR** potassium chloride, magnesium sulfate, ondansetron **OR** ondansetron, polyethylene glycol, acetaminophen **OR** acetaminophen, glucose, dextrose, glucagon (rDNA), dextrose    Data: Past Medical History:   has a past medical history of Depression, Diabetes mellitus ([address] Difficult intravenous access, Hyperlipidemia, Hypertension, Migraines, PTSD (post-traumatic stress disorder), Sleep apnea, Teeth missing, and Wears glasses. Social History:   reports that he quit smoking about 49 years ago. His smoking use included cigarettes. He has a 2.00 pack-year smoking history. He has never used smokeless tobacco. He reports current alcohol use. He reports current drug use. Drug: Marijuana [place]). Family History:   Family History   Problem Relation Age of Onset   [facility] Dementia Mother     Coronary Art Dis Mother     Stroke Mother     Diabetes Mother     Asthma Mother     Other Mother         BRAIN ANEURISM    High Blood Pressure Mother     Heart Disease Father     Diabetes Sister     Diabetes Brother     High Blood Pressure Brother     High Cholesterol Brother     Heart Disease Brother     Diabetes Sister     Other Sister         NEUROPATHY       Vitals:  BP (!) 141/83   Pulse 73   Temp 98.2 °F (36.8 °C)   Resp 20   Ht 5' 3\" (1.6 m)   Wt 153 lb 6.4 oz (69.6 kg)   SpO2 91%   BMI 27.17 kg/m²   Temp (24hrs), Av.2 °F (36.8 °C), Min:97.7 °F (36.5 °C), Max:98.9 °F (37.2 °C)    Recent Labs     22  1420   POCGLU 160*       I/O (24Hr):     Intake/Output Summary (Last 24 hours) at 3/30/2022 0436  Last data filed at 3/29/2022 1500  Gross per 24 hour   Intake 2477.11 ml   Output --   Net 2477.11 ml       Labs:  Hematology:  Recent Labs     22  0623   WBC 8.0   RBC 3.12*   HGB 10.2*   HCT 29.4*   MCV 94.2   MCH 32.7   MCHC 34.7   RDW 12.3      MPV 9.5     Chemistry:  Recent Labs     22  1707 22  0623   NA  --  141   K  --  3.7   CL  --  104   CO2  --  24   GLUCOSE  --  150*   BUN  --  30*   CREATININE  --  0.59*   ANIONGAP  --  13   LABGLOM  --  >60   GFRAA  --  >60   CALCIUM  --  8.9   LACTACIDWB 0.8  --      Recent Labs     22  1707 22  1422 03/29/22  0623   PROT  --   --  5.8*   LABALBU  --   --  2.5*   TSH 0.67  --   --    AST  --   --  22   ALT  --   --  21   ALKPHOS  --   --  95   BILITOT  --   --  0.41   AMMONIA 39  --   --    POCGLU  --  160*  --      ABG:  Lab Results   Component Value Date    PHART 7.445 04/19/2018    HVV9WGZ 39.1 04/19/2018    PO2ART 252.0 04/19/2018    IJZ4JSQ 26.5 04/19/2018    NBEA NOT REPORTED 04/19/2018    PBEA 2.7 04/19/2018    O6QIGKQO 99.5 04/19/2018    FIO2 97% 04/19/2018     Lab Results   Component Value Date/Time    SPECIAL R HAND 5ML 03/26/2022 06:53 PM     Lab Results   Component Value Date/Time    CULTURE NO GROWTH 03/28/2022 06:06 PM       Radiology:  XR CERVICAL SPINE (2-3 VIEWS)    Result Date: 3/25/2022  Immediate postoperative changes of laminectomy with posterior fusion at C2 through C4.     CT HEAD WO CONTRAST    Result Date: 3/26/2022  No acute findings in the head/brain. MRI THORACIC SPINE WO CONTRAST    Result Date: 3/22/2022  Mild multilevel degenerative changes of the thoracic spine, as described above with mild spinal canal stenosis from T8-9 to T10-11, most pronounced at T10-11. MRI LUMBAR SPINE WO CONTRAST    Result Date: 3/22/2022  1. Moderate spinal canal stenosis from L2-L3 to L4-L5, as described above. 2. Mild spinal canal stenosis at L1-L2, as described above. 3. Multilevel neural foraminal narrowing, most severe at L2-L3. XR CHEST PORTABLE    Result Date: 3/27/2022  1. Left basilar airspace consolidation, representing either aspiration or pneumonia. 2. Mild right basilar atelectasis. MRI BRAIN WO CONTRAST    Result Date: 3/28/2022  No acute intracranial abnormality.        Physical Examination:        General appearance:  alert, cooperative and no distress  Mental Status:  oriented to person, place and time and normal affect  Lungs:  clear to auscultation bilaterally, normal effort  Heart:  regular rate and rhythm, no murmur  Abdomen:  soft, nontender, nondistended, normal bowel sounds  Extremities:  no edema, redness, tenderness in the calves, muscle strength 2/5 LUE, 3/5 RUE, 4/5 LLE, 3/5 RLE Sensation intact in all extremity  Skin:  no gross lesions, rashes, induration    Assessment:        Hospital Problems           Last Modified POA    * (Principal) Cord compression ([address] 3/23/2022 Yes    Hypertension 3/23/2022 Yes    Hyperlipidemia 3/23/2022 Yes    Diabetes mellitus ([address] 3/23/2022 Yes    Bipolar disorder, mixed ([address] 3/23/2022 Yes    Stenosis of cervical spine with myelopathy ([address] 3/24/2022 Yes    Severe recurrent major depression without psychotic features ([address] 3/23/2022 Yes    Cervical spinal cord compression ([address] 3/21/2022 Yes    Quadriplegia, C1-C4 incomplete ([address] 3/24/2022 Yes    Encephalopathy 3/27/2022 Yes    Hospital-acquired pneumonia 3/28/2022 Yes    Atelectasis 3/28/2022 Yes          Plan:        1. Cord compression: Postop day 5 from C2-5 laminectomy and fixation. Neurosurgery signed off. Neurology following. Continue PT, OT. Will need SNF at discharge. Roxicodone currently on hold secondary to episode yesterday where patient had to be administered Narcan. neurosurgery signed off on 3/28 with plan to follow-up in 2 weeks for postop wound check, okay for discharge from their standpoint    2. Hospital-acquired pneumonia: Left lower lobe infiltrate. Patient started on cefepime/doxy. Continue additional oxygen as needed. follow sputum culture    3. Hypoxia: Improving, continue low-flow O2 as needed    4. Sinus bradycardia: Cardiology consulted, pacing pads were initially placed, atropine at bedside. Continuous telemetry. Awaiting further evaluation. Stop urecholine    5. Primary hypertension: Fluctuating, currently running high, could be secondary to pain. Will uptitrate amlodipine from 7.5 mg to 10 mg p.o. daily and monitor for response. Additional 2.5 mg to be administered today    6. Chronic diastolic CHF: On Lasix    7.  DMT2: Diet controlled in the outpatient setting    8. Bipolar disorder: Currently stable on Effexor, Depakote    9. GI/DVT prophylaxis: Protonix, Lovenox     10. Stable for discharge from medical standpoint, awaiting placement IPR versus SNF      On this date 03/30/22 I have spent 24 mins  in direct patient care, reviewing notes, test results and diagnosis and plan of care discussions with the patient, as well as coordination of care.   Plan of care made with MD Sandy Nunez APRN - NP  3/30/2022  4:36 AM

## 2022-03-30 NOTE — PROGRESS NOTES
Occupational Therapy  Facility/Department: [address] STEP DOWN  Daily Treatment Note  NAME: Ayaan Daugherty  : 1950  MRN: [id number]    Date of Service: 3/30/2022    Discharge Recommendations:  Further therapy recommended at discharge. The patient should be able to tolerate at least 3 hours of therapy per day over 5 days or 15 hours over 7 days. This patient may benefit from a Physical Medicine and Rehab consult. Assessment   Performance deficits / Impairments: Decreased functional mobility ; Decreased ADL status; Decreased ROM; Decreased strength;Decreased endurance;Decreased safe awareness;Decreased balance;Decreased coordination;Decreased posture;Decreased cognition;Decreased fine motor control  Prognosis: Fair  OT Education: OT Role;Transfer Training  Patient Education: proper hand placement; adaptive utensils; adaptive feeding techniques  Barriers to Learning: pt demo F carry over  REQUIRES OT FOLLOW UP: Yes  Activity Tolerance  Activity Tolerance: Patient limited by fatigue;Patient Tolerated treatment well  Safety Devices  Safety Devices in place: Yes  Type of devices: Gait belt;Patient at risk for falls; Left in chair;Chair alarm in place;Call light within reach;Nurse notified  Restraints  Initially in place: No         Patient Diagnosis(es): There were no encounter diagnoses. has a past medical history of Depression, Diabetes mellitus ([facility] Difficult intravenous access, Hyperlipidemia, Hypertension, Migraines, PTSD (post-traumatic stress disorder), Sleep apnea, Teeth missing, and Wears glasses. has a past surgical history that includes Cardiac catheterization (2010); Carpal tunnel release (Left, 2002); Vasectomy (1983); Tonsillectomy and adenoidectomy (); Hydrocele surgery (); Middle ear surgery (2018); eye surgery (Left, );  Mouth surgery (2018); other surgical history (2018); cervical fusion (2018); pr office/outpt visit,procedure only (N/A, 4/19/2018); Cataract removal; laminectomy (03/24/2022); and cervical fusion (N/A, 3/24/2022). Restrictions  Restrictions/Precautions  Restrictions/Precautions: Fall Risk,General Precautions  Required Braces or Orthoses?: Yes  Implants present? : Metal implants  Required Braces or Orthoses  Cervical: c-collar (on when OOB)  Position Activity Restriction  Spinal Precautions: No Twisting,No Lifting,No Bending  Other position/activity restrictions: up with assist ;; s/p C2-C5 laminectomy and fixation 3/24  Subjective   General  Chart Reviewed: Yes  Patient assessed for rehabilitation services?: Yes  Additional Pertinent Hx: Progerssive quadraparesis with falls and weakness, inabilityto care for self  Family / Caregiver Present: No  Referring Practitioner: Dr Ludwin Templeton  Diagnosis: Inability to care for self - multiple falls including hitting back of head on toilet tank  Subjective  Subjective: Alert and cooperative  General Comment  Comments: Pt and RN agreeable to therapy this day  Pain Assessment  Pain Assessment: 0-10  Pain Level: 0  Pre Treatment Pain Screening  Comments / Details: Pt seated EOB at start of session pleasant and cooperative with PTA present. At session end pt retired to seated in chair with chair alarm on, call light in reach and all needs met. RN notified. Vital Signs  Patient Currently in Pain: Denies        Objective    ADL  Feeding: Moderate assistance;Setup;Verbal cueing; Increased time to complete;Bringing food to mouth assist;Dependent/Total (self feeding)  Additional Comments: Self feeding facilitated seated in chair utilizing built up utensil and RUE to grasp utensil and bring food to mouth. Pt able to grasp spoon req Mod A to bring spoon to mouth with [place] assist req increased time. After approx 3 scoops of food pt becoming dependent sec to fatigue req MANUEL assist. Dependent to grasp cup and bring to mouth for beverage management.  Pt limited per decreased strength, decreased ROM and decreased [facility].        Balance  Sitting Balance: Moderate assistance (Pt tolerated approx 5 min static sitting unsupported at EOB)  Standing Balance: Moderate assistance (Mod x2)  Standing Balance  Time: approx 30 sec total  Activity: static standing in SS  Comment: 2/2 static stands attempted  Functional Mobility  Functional Mobility Comments: LATONYA sec to P standing balance  Bed mobility  Comment: pt seated EOB at start of session and retired to chair at session end  Transfers  Sit to stand: 2 Person assistance; Moderate assistance  Stand to sit: 2 Person assistance; Moderate assistance  Transfer Comments: Mod x2 for inital stand from low surfaces area increasing to Min A x2 from raised surface area utilizing SS                       Cognition  Overall Cognitive Status: [facility]                                         Plan   Plan  Times per week: 4-5x/week (IPR)  Current Treatment Recommendations: Strengthening,Functional Mobility Training,Endurance Training,Pain Management,Safety Education & Training,Patient/Caregiver Education & Training,Positioning,Self-Care / ADL,Cognitive/Perceptual Training,Cognitive Reorientation,Balance Training,Neuromuscular Re-education                           AM-PAC Score        AM-PAC Inpatient Daily Activity Raw Score: 9 (03/30/22 1552)  AM-PAC Inpatient ADL T-Scale Score : 25.33 (03/30/22 1552)  ADL Inpatient CMS 0-100% Score: 79.59 (03/30/22 1552)  ADL Inpatient CMS G-Code Modifier : CL (03/30/22 1552)    Goals  Short term goals  Time Frame for Short term goals: By Discharge  Short term goal 1: Pt and Caregiver will verbalize / demo Good Integration / Josie Lemming with BUE HEP (while maintaining cervical precautions). Short term goal 2: Pt will participate in Self-feeding / Self-grooming tasks (w/ RUE & R Hand) with Min Assist with 25% spilling. Short term goal 3: Pt will maintain Fair+ Dynamic Sitting Balance (8-10 minutes) while participating in UB / LB ADLs.   Short term goal 4: Pt will participate in [address] / ADL Transfers with CGA with Correct Use of AD / DME.        Therapy Time   Individual Concurrent Group Co-treatment   Time In 1427         Time Out 1456         Minutes 29         Timed Code Treatment Minutes: 23 Minutes (co tx with PTA)       KALEB Aponte/DINORAH

## 2022-03-31 ENCOUNTER — HOSPITAL ENCOUNTER (INPATIENT)
Age: 72
LOS: 15 days | Discharge: ANOTHER HOSPITAL | DRG: 559 | End: 2022-04-15
Attending: PHYSICAL MEDICINE & REHABILITATION | Admitting: PHYSICAL MEDICINE & REHABILITATION
Payer: MEDICARE

## 2022-03-31 VITALS
DIASTOLIC BLOOD PRESSURE: 87 MMHG | TEMPERATURE: 97.7 F | SYSTOLIC BLOOD PRESSURE: 143 MMHG | OXYGEN SATURATION: 95 % | WEIGHT: 153.4 LBS | RESPIRATION RATE: 26 BRPM | BODY MASS INDEX: 27.18 KG/M2 | HEART RATE: 85 BPM | HEIGHT: 63 IN

## 2022-03-31 PROBLEM — M48.02 CERVICAL STENOSIS OF SPINE: Status: ACTIVE | Noted: 2022-03-31

## 2022-03-31 LAB
ANION GAP SERPL CALCULATED.3IONS-SCNC: 10 MMOL/L (ref 9–17)
BUN BLDV-MCNC: 16 MG/DL (ref 8–23)
CALCIUM SERPL-MCNC: 9 MG/DL (ref 8.6–10.4)
CHLORIDE BLD-SCNC: 101 MMOL/L (ref 98–107)
CO2: 24 MMOL/L (ref 20–31)
CREAT SERPL-MCNC: 0.4 MG/DL (ref 0.7–1.2)
CULTURE: NORMAL
CULTURE: NORMAL
EKG ATRIAL RATE: 67 BPM
EKG P AXIS: 34 DEGREES
EKG P-R INTERVAL: 162 MS
EKG Q-T INTERVAL: 418 MS
EKG QRS DURATION: 100 MS
EKG QTC CALCULATION (BAZETT): 441 MS
EKG R AXIS: -23 DEGREES
EKG T AXIS: -7 DEGREES
EKG VENTRICULAR RATE: 67 BPM
GFR AFRICAN AMERICAN: >60 ML/MIN
GFR NON-AFRICAN AMERICAN: >60 ML/MIN
GFR SERPL CREATININE-BSD FRML MDRD: ABNORMAL ML/MIN/{1.73_M2}
GLUCOSE BLD-MCNC: 108 MG/DL (ref 70–99)
HCT VFR BLD CALC: 32 % (ref 40.7–50.3)
HEMOGLOBIN: 11 G/DL (ref 13–17)
LV EF: 55 %
LVEF MODALITY: NORMAL
Lab: NORMAL
Lab: NORMAL
MAGNESIUM: 1.9 MG/DL (ref 1.6–2.6)
MCH RBC QN AUTO: 32.6 PG (ref 25.2–33.5)
MCHC RBC AUTO-ENTMCNC: 34.4 G/DL (ref 28.4–34.8)
MCV RBC AUTO: 95 FL (ref 82.6–102.9)
NRBC AUTOMATED: 0 PER 100 WBC
PDW BLD-RTO: 12 % (ref 11.8–14.4)
PLATELET # BLD: 378 K/UL (ref 138–453)
PMV BLD AUTO: 9.2 FL (ref 8.1–13.5)
POTASSIUM SERPL-SCNC: 3.8 MMOL/L (ref 3.7–5.3)
RBC # BLD: 3.37 M/UL (ref 4.21–5.77)
SODIUM BLD-SCNC: 135 MMOL/L (ref 135–144)
SPECIMEN DESCRIPTION: NORMAL
SPECIMEN DESCRIPTION: NORMAL
TROPONIN, HIGH SENSITIVITY: 53 NG/L (ref 0–22)
TROPONIN, HIGH SENSITIVITY: 53 NG/L (ref 0–22)
WBC # BLD: 7.6 K/UL (ref 3.5–11.3)

## 2022-03-31 PROCEDURE — 6370000000 HC RX 637 (ALT 250 FOR IP): Performed by: NURSE PRACTITIONER

## 2022-03-31 PROCEDURE — 2580000003 HC RX 258: Performed by: NURSE PRACTITIONER

## 2022-03-31 PROCEDURE — 93306 TTE W/DOPPLER COMPLETE: CPT

## 2022-03-31 PROCEDURE — APPSS30 APP SPLIT SHARED TIME 16-30 MINUTES: Performed by: NURSE PRACTITIONER

## 2022-03-31 PROCEDURE — 1180000000 HC REHAB R&B

## 2022-03-31 PROCEDURE — 2500000003 HC RX 250 WO HCPCS: Performed by: INTERNAL MEDICINE

## 2022-03-31 PROCEDURE — 51798 US URINE CAPACITY MEASURE: CPT

## 2022-03-31 PROCEDURE — 84484 ASSAY OF TROPONIN QUANT: CPT

## 2022-03-31 PROCEDURE — 92526 ORAL FUNCTION THERAPY: CPT

## 2022-03-31 PROCEDURE — 6370000000 HC RX 637 (ALT 250 FOR IP): Performed by: INTERNAL MEDICINE

## 2022-03-31 PROCEDURE — 80048 BASIC METABOLIC PNL TOTAL CA: CPT

## 2022-03-31 PROCEDURE — 97530 THERAPEUTIC ACTIVITIES: CPT

## 2022-03-31 PROCEDURE — 6360000002 HC RX W HCPCS: Performed by: NURSE PRACTITIONER

## 2022-03-31 PROCEDURE — 6360000002 HC RX W HCPCS: Performed by: STUDENT IN AN ORGANIZED HEALTH CARE EDUCATION/TRAINING PROGRAM

## 2022-03-31 PROCEDURE — 36415 COLL VENOUS BLD VENIPUNCTURE: CPT

## 2022-03-31 PROCEDURE — 83735 ASSAY OF MAGNESIUM: CPT

## 2022-03-31 PROCEDURE — 97110 THERAPEUTIC EXERCISES: CPT

## 2022-03-31 PROCEDURE — 99232 SBSQ HOSP IP/OBS MODERATE 35: CPT | Performed by: FAMILY MEDICINE

## 2022-03-31 PROCEDURE — 6370000000 HC RX 637 (ALT 250 FOR IP): Performed by: FAMILY MEDICINE

## 2022-03-31 PROCEDURE — 99231 SBSQ HOSP IP/OBS SF/LOW 25: CPT | Performed by: PHYSICAL MEDICINE & REHABILITATION

## 2022-03-31 PROCEDURE — 93010 ELECTROCARDIOGRAM REPORT: CPT | Performed by: INTERNAL MEDICINE

## 2022-03-31 PROCEDURE — 85027 COMPLETE CBC AUTOMATED: CPT

## 2022-03-31 PROCEDURE — 6360000002 HC RX W HCPCS: Performed by: INTERNAL MEDICINE

## 2022-03-31 RX ORDER — KETOROLAC TROMETHAMINE 30 MG/ML
15 INJECTION, SOLUTION INTRAMUSCULAR; INTRAVENOUS EVERY 6 HOURS PRN
Status: CANCELLED | OUTPATIENT
Start: 2022-03-31 | End: 2022-04-02

## 2022-03-31 RX ORDER — ACETAMINOPHEN 650 MG/1
650 SUPPOSITORY RECTAL EVERY 6 HOURS PRN
Status: DISCONTINUED | OUTPATIENT
Start: 2022-03-31 | End: 2022-04-01

## 2022-03-31 RX ORDER — DIVALPROEX SODIUM 500 MG/1
500 TABLET, EXTENDED RELEASE ORAL EVERY MORNING
Status: DISCONTINUED | OUTPATIENT
Start: 2022-04-01 | End: 2022-04-10

## 2022-03-31 RX ORDER — ONDANSETRON 4 MG/1
4 TABLET, ORALLY DISINTEGRATING ORAL EVERY 8 HOURS PRN
Status: DISCONTINUED | OUTPATIENT
Start: 2022-03-31 | End: 2022-04-15 | Stop reason: HOSPADM

## 2022-03-31 RX ORDER — FUROSEMIDE 20 MG/1
20 TABLET ORAL DAILY
Status: CANCELLED | OUTPATIENT
Start: 2022-03-31

## 2022-03-31 RX ORDER — VENLAFAXINE 75 MG/1
75 TABLET ORAL
Status: CANCELLED | OUTPATIENT
Start: 2022-03-31

## 2022-03-31 RX ORDER — CYCLOBENZAPRINE HCL 10 MG
5 TABLET ORAL 3 TIMES DAILY PRN
Status: CANCELLED | OUTPATIENT
Start: 2022-03-31

## 2022-03-31 RX ORDER — SENNOSIDES 8.8 MG/5ML
5 LIQUID ORAL 2 TIMES DAILY PRN
Status: CANCELLED | OUTPATIENT
Start: 2022-03-31

## 2022-03-31 RX ORDER — SENNA PLUS 8.6 MG/1
2 TABLET ORAL DAILY PRN
Status: DISCONTINUED | OUTPATIENT
Start: 2022-03-31 | End: 2022-04-01

## 2022-03-31 RX ORDER — POLYETHYLENE GLYCOL 3350 17 G/17G
17 POWDER, FOR SOLUTION ORAL DAILY PRN
Status: CANCELLED | OUTPATIENT
Start: 2022-03-31

## 2022-03-31 RX ORDER — DOXYCYCLINE HYCLATE 100 MG
100 TABLET ORAL EVERY 12 HOURS SCHEDULED
Status: CANCELLED | OUTPATIENT
Start: 2022-03-31 | End: 2022-04-04

## 2022-03-31 RX ORDER — POLYETHYLENE GLYCOL 3350 17 G/17G
17 POWDER, FOR SOLUTION ORAL DAILY PRN
Status: DISCONTINUED | OUTPATIENT
Start: 2022-03-31 | End: 2022-04-08

## 2022-03-31 RX ORDER — NICOTINE POLACRILEX 4 MG
15 LOZENGE BUCCAL PRN
Status: CANCELLED | OUTPATIENT
Start: 2022-03-31

## 2022-03-31 RX ORDER — PANTOPRAZOLE SODIUM 40 MG/1
40 TABLET, DELAYED RELEASE ORAL
Status: DISCONTINUED | OUTPATIENT
Start: 2022-04-01 | End: 2022-04-15 | Stop reason: HOSPADM

## 2022-03-31 RX ORDER — AMLODIPINE BESYLATE 10 MG/1
10 TABLET ORAL DAILY
Status: DISCONTINUED | OUTPATIENT
Start: 2022-03-31 | End: 2022-04-12

## 2022-03-31 RX ORDER — FUROSEMIDE 20 MG/1
20 TABLET ORAL DAILY
Status: DISCONTINUED | OUTPATIENT
Start: 2022-03-31 | End: 2022-04-12

## 2022-03-31 RX ORDER — POLYETHYLENE GLYCOL 3350 17 G/17G
17 POWDER, FOR SOLUTION ORAL DAILY
Status: DISCONTINUED | OUTPATIENT
Start: 2022-04-01 | End: 2022-04-15 | Stop reason: HOSPADM

## 2022-03-31 RX ORDER — ONDANSETRON 2 MG/ML
4 INJECTION INTRAMUSCULAR; INTRAVENOUS EVERY 6 HOURS PRN
Status: DISCONTINUED | OUTPATIENT
Start: 2022-03-31 | End: 2022-04-15 | Stop reason: HOSPADM

## 2022-03-31 RX ORDER — ONDANSETRON 4 MG/1
4 TABLET, ORALLY DISINTEGRATING ORAL EVERY 8 HOURS PRN
Status: CANCELLED | OUTPATIENT
Start: 2022-03-31

## 2022-03-31 RX ORDER — VENLAFAXINE 75 MG/1
75 TABLET ORAL
Status: DISCONTINUED | OUTPATIENT
Start: 2022-04-01 | End: 2022-04-09

## 2022-03-31 RX ORDER — LOSARTAN POTASSIUM 50 MG/1
100 TABLET ORAL DAILY
Status: DISCONTINUED | OUTPATIENT
Start: 2022-04-01 | End: 2022-03-31 | Stop reason: HOSPADM

## 2022-03-31 RX ORDER — NICOTINE POLACRILEX 4 MG
15 LOZENGE BUCCAL PRN
Status: DISCONTINUED | OUTPATIENT
Start: 2022-03-31 | End: 2022-04-15 | Stop reason: HOSPADM

## 2022-03-31 RX ORDER — PANTOPRAZOLE SODIUM 40 MG/1
40 TABLET, DELAYED RELEASE ORAL
Status: CANCELLED | OUTPATIENT
Start: 2022-04-01

## 2022-03-31 RX ORDER — DIVALPROEX SODIUM 500 MG/1
500 TABLET, EXTENDED RELEASE ORAL EVERY MORNING
Status: CANCELLED | OUTPATIENT
Start: 2022-03-31

## 2022-03-31 RX ORDER — BISACODYL 10 MG
10 SUPPOSITORY, RECTAL RECTAL DAILY PRN
Status: DISCONTINUED | OUTPATIENT
Start: 2022-03-31 | End: 2022-04-07

## 2022-03-31 RX ORDER — ONDANSETRON 2 MG/ML
4 INJECTION INTRAMUSCULAR; INTRAVENOUS EVERY 6 HOURS PRN
Status: CANCELLED | OUTPATIENT
Start: 2022-03-31

## 2022-03-31 RX ORDER — ACETAMINOPHEN 325 MG/1
650 TABLET ORAL EVERY 4 HOURS PRN
Status: DISCONTINUED | OUTPATIENT
Start: 2022-03-31 | End: 2022-04-15 | Stop reason: HOSPADM

## 2022-03-31 RX ORDER — ACETAMINOPHEN 325 MG/1
650 TABLET ORAL EVERY 6 HOURS PRN
Status: CANCELLED | OUTPATIENT
Start: 2022-03-31

## 2022-03-31 RX ORDER — AMLODIPINE BESYLATE 10 MG/1
10 TABLET ORAL DAILY
Status: CANCELLED | OUTPATIENT
Start: 2022-03-31

## 2022-03-31 RX ORDER — ACETAMINOPHEN 325 MG/1
650 TABLET ORAL EVERY 6 HOURS PRN
Status: DISCONTINUED | OUTPATIENT
Start: 2022-03-31 | End: 2022-04-01

## 2022-03-31 RX ORDER — CYCLOBENZAPRINE HCL 10 MG
5 TABLET ORAL 3 TIMES DAILY PRN
Status: DISCONTINUED | OUTPATIENT
Start: 2022-03-31 | End: 2022-04-12

## 2022-03-31 RX ORDER — DOXYCYCLINE 100 MG/1
100 CAPSULE ORAL EVERY 12 HOURS SCHEDULED
Status: COMPLETED | OUTPATIENT
Start: 2022-03-31 | End: 2022-04-04

## 2022-03-31 RX ORDER — LOSARTAN POTASSIUM 50 MG/1
100 TABLET ORAL DAILY
Status: CANCELLED | OUTPATIENT
Start: 2022-04-01

## 2022-03-31 RX ORDER — LOSARTAN POTASSIUM 100 MG/1
100 TABLET ORAL DAILY
Status: DISCONTINUED | OUTPATIENT
Start: 2022-04-01 | End: 2022-04-10

## 2022-03-31 RX ORDER — ACETAMINOPHEN 650 MG/1
650 SUPPOSITORY RECTAL EVERY 6 HOURS PRN
Status: CANCELLED | OUTPATIENT
Start: 2022-03-31

## 2022-03-31 RX ADMIN — KETOROLAC TROMETHAMINE 15 MG: 30 INJECTION, SOLUTION INTRAMUSCULAR at 05:25

## 2022-03-31 RX ADMIN — CEFEPIME HYDROCHLORIDE 1000 MG: 2 INJECTION, POWDER, FOR SOLUTION INTRAVENOUS at 23:21

## 2022-03-31 RX ADMIN — LOSARTAN POTASSIUM 50 MG: 50 TABLET, FILM COATED ORAL at 09:04

## 2022-03-31 RX ADMIN — AMLODIPINE BESYLATE 10 MG: 10 TABLET ORAL at 09:04

## 2022-03-31 RX ADMIN — VENLAFAXINE 75 MG: 75 TABLET ORAL at 18:30

## 2022-03-31 RX ADMIN — BISACODYL 5 MG: 5 TABLET, COATED ORAL at 09:04

## 2022-03-31 RX ADMIN — ACETAMINOPHEN 650 MG: 325 TABLET ORAL at 09:05

## 2022-03-31 RX ADMIN — DIVALPROEX SODIUM 500 MG: 250 TABLET, FILM COATED, EXTENDED RELEASE ORAL at 09:09

## 2022-03-31 RX ADMIN — ACETAMINOPHEN 650 MG: 325 TABLET ORAL at 22:24

## 2022-03-31 RX ADMIN — VENLAFAXINE 75 MG: 75 TABLET ORAL at 12:48

## 2022-03-31 RX ADMIN — CYCLOBENZAPRINE 5 MG: 10 TABLET, FILM COATED ORAL at 22:31

## 2022-03-31 RX ADMIN — SODIUM CHLORIDE, PRESERVATIVE FREE 10 ML: 5 INJECTION INTRAVENOUS at 09:10

## 2022-03-31 RX ADMIN — PANTOPRAZOLE SODIUM 40 MG: 40 TABLET, DELAYED RELEASE ORAL at 09:04

## 2022-03-31 RX ADMIN — FUROSEMIDE 20 MG: 20 TABLET ORAL at 09:04

## 2022-03-31 RX ADMIN — POLYETHYLENE GLYCOL 3350 17 G: 17 POWDER, FOR SOLUTION ORAL at 09:04

## 2022-03-31 RX ADMIN — CEFEPIME HYDROCHLORIDE 1000 MG: 1 INJECTION, POWDER, FOR SOLUTION INTRAMUSCULAR; INTRAVENOUS at 09:04

## 2022-03-31 RX ADMIN — VENLAFAXINE 75 MG: 75 TABLET ORAL at 09:09

## 2022-03-31 RX ADMIN — CYCLOBENZAPRINE 5 MG: 10 TABLET, FILM COATED ORAL at 09:04

## 2022-03-31 RX ADMIN — DIVALPROEX SODIUM 750 MG: 250 TABLET, FILM COATED, EXTENDED RELEASE ORAL at 22:25

## 2022-03-31 RX ADMIN — CARBOXYMETHYLCELLULOSE SODIUM 2 DROP: 10 GEL OPHTHALMIC at 17:06

## 2022-03-31 RX ADMIN — DOXYCYCLINE HYCLATE 100 MG: 100 TABLET, COATED ORAL at 09:04

## 2022-03-31 RX ADMIN — DOXYCYCLINE 100 MG: 100 CAPSULE ORAL at 22:24

## 2022-03-31 RX ADMIN — ENOXAPARIN SODIUM 40 MG: 40 INJECTION SUBCUTANEOUS at 09:11

## 2022-03-31 RX ADMIN — HYDRALAZINE HYDROCHLORIDE 10 MG: 20 INJECTION INTRAMUSCULAR; INTRAVENOUS at 00:42

## 2022-03-31 ASSESSMENT — PAIN SCALES - GENERAL
PAINLEVEL_OUTOF10: 3
PAINLEVEL_OUTOF10: 3
PAINLEVEL_OUTOF10: 2
PAINLEVEL_OUTOF10: 0
PAINLEVEL_OUTOF10: 4
PAINLEVEL_OUTOF10: 3
PAINLEVEL_OUTOF10: 3
PAINLEVEL_OUTOF10: 5

## 2022-03-31 ASSESSMENT — PAIN DESCRIPTION - ORIENTATION: ORIENTATION: POSTERIOR

## 2022-03-31 ASSESSMENT — PAIN DESCRIPTION - DESCRIPTORS: DESCRIPTORS: DISCOMFORT;SORE

## 2022-03-31 ASSESSMENT — PAIN SCALES - WONG BAKER: WONGBAKER_NUMERICALRESPONSE: 2

## 2022-03-31 ASSESSMENT — PAIN DESCRIPTION - PAIN TYPE: TYPE: SURGICAL PAIN

## 2022-03-31 ASSESSMENT — PAIN DESCRIPTION - LOCATION
LOCATION: BACK;NECK

## 2022-03-31 ASSESSMENT — PAIN DESCRIPTION - FREQUENCY: FREQUENCY: INTERMITTENT

## 2022-03-31 ASSESSMENT — PAIN DESCRIPTION - ONSET: ONSET: ON-GOING

## 2022-03-31 ASSESSMENT — PAIN DESCRIPTION - PROGRESSION: CLINICAL_PROGRESSION: GRADUALLY IMPROVING

## 2022-03-31 ASSESSMENT — PAIN - FUNCTIONAL ASSESSMENT: PAIN_FUNCTIONAL_ASSESSMENT: PREVENTS OR INTERFERES SOME ACTIVE ACTIVITIES AND ADLS

## 2022-03-31 NOTE — PROGRESS NOTES
[facility]  Progress Note                   Date:   3/31/2022  Patient name: Evelin Santillan  Date of admission:  3/23/2022  7:06 PM  MRN:   [id number]  YOB: 1950  PCP: Noelle Foster    Reason for Admission: Cervical spinal cord compression ([facility] [G95.20]  Cord compression ([facility] [G95.20]    Subjective:       Clinical Changes /Abnormalities: Asked to revisit d/t trop of 53. Seen & examined alone in room. Spoke with primary NP this am, unsure why trop was ordered. Pt. Denies any chest pain or SOB. Labs, vitals, & tele reviewed. Remains SR. Review of Systems    Medications:   Scheduled Meds:   amLODIPine  10 mg Oral Daily    doxycycline hyclate  100 mg Oral 2 times per day    losartan  50 mg Oral Daily    cefepime  1,000 mg IntraVENous Q12H    divalproex  750 mg Oral Nightly    carboxymethylcellulose PF  2 drop Both Eyes TID    pantoprazole  40 mg Oral QAM AC    furosemide  20 mg Oral Daily    venlafaxine  75 mg Oral TID WC    divalproex  500 mg Oral QAM    sodium chloride flush  5-40 mL IntraVENous 2 times per day    bisacodyl  5 mg Oral Daily    sodium chloride flush  5-40 mL IntraVENous 2 times per day    enoxaparin  40 mg SubCUTAneous Daily     Continuous Infusions:   sodium chloride      sodium chloride      dextrose       CBC:   Recent Labs     03/29/22  0623 03/30/22  0525 03/31/22  0245   WBC 8.0 7.1 7.6   HGB 10.2* 10.1* 11.0*    328 378     BMP:    Recent Labs     03/29/22  0623 03/30/22  0525 03/31/22  0245    144 135   K 3.7 3.7 3.8    107 101   CO2 24 26 24   BUN 30* 24* 16   CREATININE 0.59* 0.46* 0.40*   GLUCOSE 150* 129* 108*     Hepatic:  Recent Labs     03/29/22  0623   AST 22   ALT 21   BILITOT 0.41   ALKPHOS 95     Troponin:   Recent Labs     03/31/22  0245   TROPHS 53*     BNP: No results for input(s): BNP in the last 72 hours. Lipids: No results for input(s): CHOL, HDL in the last 72 hours.     Invalid input(s): LDLCALCU  INR: No results for input(s): INR in the last 72 hours. Objective:   Vitals: BP (!) 173/93   Pulse 73   Temp 97.3 °F (36.3 °C) (Oral)   Resp 21   Ht 5' 3\" (1.6 m)   Wt 153 lb 6.4 oz (69.6 kg)   SpO2 95%   BMI 27.17 kg/m²   General appearance: alert and cooperative with exam  HEENT: Head: Normocephalic, no lesions, without obvious abnormality. Neck:no JVD, trachea midline, no adenopathy  Lungs: Clear to auscultation, dim bases, no distress  Heart: Regular rate and rhythm, s1/s2 auscultated, no murmurs, Tele SR  Abdomen: soft, non-tender, bowel sounds active  Extremities: no edema  Neurologic: not done    ECHO 4/12/18: EF 55%, mild LVH, grade I DD, trivial TR. Assessment / Acute Cardiac Problems:   · Sinus tachycardia and sinus bradycardia post-op, now resolved  · Cervical cord compression s/p laminectomy  · Hypertension  · Hyperlipidemia  · Diabetes  · Metabolic encephalopathy-resolved  · ? Pneumonia  · Depression      Patient Active Problem List:     Hypertension     Hyperlipidemia     Diabetes mellitus ([address]     Contusion of right shoulder     Bipolar disorder, mixed ([address]     First known suicide attempt [facility])     Erectile dysfunction     Cervical stenosis of spinal canal     Incomplete spinal cord lesion at C5-C7 level ([address]     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)     Cervical spondylosis with radiculopathy     Acute blood loss anemia     Chest pain     Depression with suicidal ideation     Severe recurrent major depression without psychotic features ([address]     Frequent falls     Hyponatremia     Cervical spinal cord compression (HCC)     Cord compression (HCC)     Quadriplegia, C1-C4 incomplete (HCC)     Encephalopathy     Hospital-acquired pneumonia     Atelectasis      Plan of Treatment:   1. Stable overnight. Tele reviewed and HR remains stable. 2. Keep K >4 and Mg >2  3. BP stable. Continue PO Norvasc, & Losartan. Will increase Losartan  4.  Minimal trop elevation without ECG changes or chest pain. No concern for ischemia. No further work-up at this time  5. No further CV input at this time. Will sign off. Please call with further questions/concerns.      Electronically signed by KASSIDY Zamora CNP on 3/31/2022 at 9:03 AM  [address]  [phone]

## 2022-03-31 NOTE — PLAN OF CARE
Problem: Pain:  Goal: Pain level will decrease  Description: Pain level will decrease  3/31/2022 1342 by Ludwin Salinas RN  Outcome: Ongoing  3/31/2022 0440 by Daysi Fierro RN  Outcome: Ongoing  Goal: Control of acute pain  Description: Control of acute pain  3/31/2022 1342 by Ludwin Salinas RN  Outcome: Ongoing  3/31/2022 0440 by Daysi Fierro RN  Outcome: Ongoing  Goal: Control of chronic pain  Description: Control of chronic pain  3/31/2022 1342 by Ludwin Salinas RN  Outcome: Ongoing  3/31/2022 0440 by Daysi Fierro RN  Outcome: Ongoing     Problem: Skin Integrity:  Goal: Will show no infection signs and symptoms  Description: Will show no infection signs and symptoms  3/31/2022 1342 by Ludwin Salinas RN  Outcome: Ongoing  3/31/2022 0440 by Daysi Fierro RN  Outcome: Ongoing  Goal: Absence of new skin breakdown  Description: Absence of new skin breakdown  3/31/2022 1342 by Ludwin Salinas RN  Outcome: Ongoing  3/31/2022 0440 by Daysi Fierro RN  Outcome: Ongoing     Problem: Falls - Risk of:  Goal: Will remain free from falls  Description: Will remain free from falls  3/31/2022 1342 by Ludwin Salinas RN  Outcome: Ongoing  3/31/2022 0440 by Daysi Fierro RN  Outcome: Ongoing  Goal: Absence of physical injury  Description: Absence of physical injury  3/31/2022 1342 by Ludwin Salinas RN  Outcome: Ongoing  3/31/2022 0440 by Daysi Fierro RN  Outcome: Ongoing

## 2022-03-31 NOTE — PLAN OF CARE
Called  [facility] [place], spoke to [place], reported given.      Room # 1878  CEXABD @ updated to 1900 per cm  Report # [phone]

## 2022-03-31 NOTE — DISCHARGE SUMMARY
[facility]  Office: [address], DO, Catarino Dang, DO, Raz Verduzco, DO, Lillian Dumont Emi, DO, Teddy Magallanes MD, Shahid Amezquita MD, Zenaida Nazario MD, Rohit Ro MD, Isabel Leal MD, Esha Rowland MD, Norberto Marcus MD, Luis Marte, DO, Kath Morales, DO, Killian Coleman MD,  Roosevelt Ray DO, Carola Lee MD, Milagro Nelson MD, Ely Kumari MD, Nikunj Bethea DO, Nirav Bergman MD, Lisbet Stanley MD, Juanis Gurrola, [facility], [facility], CNP, Larissa Marinelli, CNP, Kaleigh Peralta, [facility], Doroteo Lucas, CNP, Ana Luisa Whaley, CNP, Joseph Wolfe, CNP, Tita Oneal, CNP, Renetta Gómez, CNP, Yovana Ridley PA-C, Flaquita Cole, [facility], Richy Yu, [facility], Cielo Beaver, CNP, Alexsander Marrero, CNP, Nedra Davis, CNP         [address] [place] [address]    Discharge Summary     Patient ID: Yue Carrera  :  1950   MRN: [id number]     ACCOUNT:  [de-identified]   Patient's PCP: Diane Villareal Date: 3/23/2022   Discharge Date: 3/31/2022     Length of Stay: 8  Code Status:  DNR-CCA  Admitting Physician: Rohit Ro MD  Discharge Physician: Rohit Ro MD     Active Discharge Diagnoses:     Hospital Problem Lists:  Principal Problem:    Cord compression [facility])  Active Problems:    Hypertension    Hyperlipidemia    Diabetes mellitus ([facility]    Bipolar disorder, mixed ([facility]    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 *      Admission Condition:  poor     Discharged Condition: stable    Hospital Stay:     HPI:    This is a 66-year-old man who initially presented to [facility] emergency department after a fall.   Incidentally found to have cord compromise secondary to high-grade stenosis. Was transferred to [address] [place] [address] for neurosurgical evaluation and intervention. MRI at our facility showed severe stenosis at C2-C5. Patient underwent C2-5 posterior fixation with fusion and decompression with neurosurgery     During the admission patient was managed as follow    Cord compression: Postop day 7 from C2-5 laminectomy and fixation. Neurosurgery signed off. Neurology following. Continue PT, OT. Will need IPR at discharge. neurosurgery signed off on 3/28 with plan to follow-up in 2 weeks for postop wound check, okay for discharge from their standpoint     Hospital-acquired pneumonia: Left lower lobe infiltrate. Patient started on cefepime day 4/doxy day 3. Continue additional oxygen as needed. follow sputum culture     Hypoxia: Improving, continue low-flow O2 as needed     Sinus bradycardia: Resolved, beta-blocker discontinued altogether.  follow up on elevated trop     Primary hypertension: stable on current medications      Chronic diastolic CHF: On Lasix     DMT2: Diet controlled in the outpatient setting     Bipolar disorder: Currently stable on Effexor, Depakote, trazodone discontinued     GI/DVT prophylaxis: Protonix, Lovenox      Stable for discharge from medical standpoint, awaiting cardiology eval and placement IPR        Significant therapeutic interventions: as above    Significant Diagnostic Studies:   Labs / Micro:  CBC:   Lab Results   Component Value Date    WBC 7.6 03/31/2022    RBC 3.37 03/31/2022    HGB 11.0 03/31/2022    HCT 32.0 03/31/2022    MCV 95.0 03/31/2022    MCH 32.6 03/31/2022    MCHC 34.4 03/31/2022    RDW 12.0 03/31/2022     03/31/2022     BMP:    Lab Results   Component Value Date    GLUCOSE 108 03/31/2022     03/31/2022    K 3.8 03/31/2022     03/31/2022    CO2 24 03/31/2022    ANIONGAP 10 03/31/2022    BUN 16 03/31/2022    CREATININE 0.40 03/31/2022    BUNCRER NOT REPORTED 06/20/2020    CALCIUM 9.0 03/31/2022    LABGLOM >60 03/31/2022    GFRAA >60 03/31/2022    GFR      03/31/2022     HFP:    Lab Results   Component Value Date    PROT 5.8 03/29/2022     CMP:    Lab Results   Component Value Date    GLUCOSE 108 03/31/2022     03/31/2022    K 3.8 03/31/2022     03/31/2022    CO2 24 03/31/2022    BUN 16 03/31/2022    CREATININE 0.40 03/31/2022    ANIONGAP 10 03/31/2022    ALKPHOS 95 03/29/2022    ALT 21 03/29/2022    AST 22 03/29/2022    BILITOT 0.41 03/29/2022    LABALBU 2.5 03/29/2022    ALBUMIN 0.8 03/29/2022    LABGLOM >60 03/31/2022    GFRAA >60 03/31/2022    GFR      03/31/2022    PROT 5.8 03/29/2022    CALCIUM 9.0 03/31/2022     PT/INR:    Lab Results   Component Value Date    PROTIME 10.9 03/24/2022    INR 1.0 03/24/2022     PTT:   Lab Results   Component Value Date    APTT 31.3 03/20/2022     FLP:    Lab Results   Component Value Date    CHOL 104 04/12/2018    TRIG 92 04/12/2018    HDL 42 04/12/2018     U/A:    Lab Results   Component Value Date    COLORU Yellow 03/26/2022    TURBIDITY Clear 03/26/2022    SPECGRAV 1.019 03/26/2022    HGBUR NEGATIVE 03/26/2022    PHUR 6.0 03/26/2022    PROTEINU 3+ 03/26/2022    GLUCOSEU NEGATIVE 03/26/2022    KETUA SMALL 03/26/2022    BILIRUBINUR NEGATIVE 03/26/2022    UROBILINOGEN Normal 03/26/2022    NITRU NEGATIVE 03/26/2022    LEUKOCYTESUR NEGATIVE 03/26/2022     TSH:    Lab Results   Component Value Date    TSH 0.67 03/27/2022        Radiology:  XR CERVICAL SPINE (2-3 VIEWS)    Result Date: 3/25/2022  Immediate postoperative changes of laminectomy with posterior fusion at C2 through C4.     CT HEAD WO CONTRAST    Result Date: 3/26/2022  No acute findings in the head/brain. XR CHEST PORTABLE    Result Date: 3/29/2022  1. Left basilar airspace consolidation, representing either aspiration or pneumonia. 2. Mild right basilar atelectasis.      XR CHEST PORTABLE    Result Date: 3/29/2022  Persistent opacity left base, likely infiltrate with bibasilar atelectasis. MRI BRAIN WO CONTRAST    Result Date: 3/28/2022  No acute intracranial abnormality. Consultations:    Consults:     Final Specialist Recommendations/Findings:   IP CONSULT TO NEUROSURGERY  IP CONSULT TO PHYSICAL MEDICINE REHAB  IP CONSULT TO IV TEAM  IP CONSULT TO PSYCHIATRY  IP CONSULT TO CARDIOLOGY  IP CONSULT TO CARDIOLOGY      The patient was seen and examined on day of discharge and this discharge summary is in conjunction with any daily progress note from day of discharge.     Discharge plan:     Disposition: IP rehab    Physician Follow Up:     KASSIDY Porter - CNP  [address] [place] [address],  [address] [place] [address]  MOB [address]  [phone]    In 2 weeks  Neurosurgery please follow up in 2 weeks for post op wound check    [facility] [address] [place] [address] [address] [phone]    In 1 week  for follow up after hospitalization       Requiring Further Evaluation/Follow Up POST HOSPITALIZATION/Incidental Findings:     Diet: diabetic diet    Activity: As tolerated    Instructions to Patient:     Discharge Medications:      Medication List      STOP taking these medications    carvedilol 25 MG tablet  Commonly known as: COREG        ASK your doctor about these medications    amLODIPine 10 MG tablet  Commonly known as: NORVASC     aspirin 81 MG chewable tablet     atorvastatin 80 MG tablet  Commonly known as: LIPITOR     carboxymethylcellulose 1 % ophthalmic solution     cetirizine 10 MG tablet  Commonly known as: ZYRTEC     * divalproex 250 MG extended release tablet  Commonly known as: DEPAKOTE ER     * divalproex 500 MG extended release tablet  Commonly known as: DEPAKOTE ER     furosemide 20 MG tablet  Commonly known as: LASIX  Ask about: Which instructions should I use?     lidocaine 5 % ointment  Commonly known as: XYLOCAINE     losartan 25 MG tablet  Commonly known as: COZAAR     metFORMIN 1000 MG tablet  Commonly known as: GLUCOPHAGE     omeprazole 20 MG delayed release capsule  Commonly known as: PRILOSEC     simethicone 80 MG chewable tablet  Commonly known as: MYLICON  Take 1 tablet by mouth every 6 hours as needed for Flatulence     traZODone 100 MG tablet  Commonly known as: DESYREL     venlafaxine 150 MG extended release capsule  Commonly known as: EFFEXOR XR  Take 1 capsule by mouth daily     Viagra 100 MG tablet  Generic drug: sildenafil         * This list has 2 medication(s) that are the same as other medications prescribed for you. Read the directions carefully, and ask your doctor or other care provider to review them with you. No discharge procedures on file. Time Spent on discharge is  35 mins in patient examination, evaluation, counseling as well as medication reconciliation, prescriptions for required medications, discharge plan and follow up. Electronically signed by   Rehana Bucio MD  3/31/2022  3:08 PM      Thank you Dr. Kamran Clements for the opportunity to be involved in this patient's care.

## 2022-03-31 NOTE — PROGRESS NOTES
Physical Therapy  Facility/Department: [address] STEP DOWN  Daily Treatment Note  NAME: Enrrique Laurent  : 1950  MRN: [id number]    Date of Service: 3/31/2022    Discharge Recommendations:  Further therapy recommended at discharge. The patient should be able to tolerate at least 3 hours of therapy per day over 5 days or 15 hours over 7 days. This patient may benefit from a Physical Medicine and Rehab consult. PT Equipment Recommendations  Equipment Needed: No  Other: CTA, pt currently requires significant physical assistance for all aspects of mobility    Assessment   Body structures, Functions, Activity limitations: Decreased functional mobility ; Decreased ROM; Decreased strength;Decreased endurance;Decreased balance; Increased pain;Decreased posture  Assessment: The pt required Max A for bed mobility and Nikki to maintain sitting EOB this date, limited by genralized weakness and decreased endurance. Able to stand with modA and use of elbert stedy. The pt is very cooperative and motivated to participate in therapy and would benefit from intense PT to progress toward prior level of independence. Prognosis: Fair  PT Education: Goals;PT Role;Plan of Care; Functional Mobility Training;Transfer Training;General Safety  REQUIRES PT FOLLOW UP: Yes  Activity Tolerance  Activity Tolerance: Patient limited by endurance     Patient Diagnosis(es): There were no encounter diagnoses. has a past medical history of Depression, Diabetes mellitus ([facility] Difficult intravenous access, Hyperlipidemia, Hypertension, Migraines, PTSD (post-traumatic stress disorder), Sleep apnea, Teeth missing, and Wears glasses. has a past surgical history that includes Cardiac catheterization (2010); Carpal tunnel release (Left, 2002); Vasectomy (1983); Tonsillectomy and adenoidectomy (); Hydrocele surgery (); Middle ear surgery (2018); eye surgery (Left, 2018);  Mouth surgery (2018); other surgical history (04/19/2018); cervical fusion (04/19/2018); pr office/outpt visit,procedure only (N/A, 4/19/2018); Cataract removal; laminectomy (03/24/2022); and cervical fusion (N/A, 3/24/2022). Restrictions  Restrictions/Precautions  Restrictions/Precautions: Fall Risk,General Precautions  Required Braces or Orthoses?: Yes  Implants present? : Metal implants  Required Braces or Orthoses  Cervical: c-collar (on when OOB)  Position Activity Restriction  Spinal Precautions: No Twisting,No Lifting,No Bending  Other position/activity restrictions: up with assist ;; s/p C2-C5 laminectomy and fixation 3/24  Subjective   General  Response To Previous Treatment: Patient with no complaints from previous session. Family / Caregiver Present: No  Subjective  Subjective: RN and pt agreeable to PT. Pt supine in bed upon arrival, pleasant and cooperative throughout. General Comment: c-collar donned prior to mobility  Comments: Pt left in recliner with call light within reach, alarm activated and RN students present in room  Pain Screening  Patient Currently in Pain: Yes  Pain Assessment  Pain Assessment: Faces  Guerrero-Baker Pain Rating: Hurts a little bit  Pain Type: Surgical pain  Pain Location: Back;Neck  Pain Orientation: Posterior  Pain Descriptors: Discomfort; Sore  Pain Frequency: Intermittent  Pain Onset: On-going  Clinical Progression: Gradually improving  Functional Pain Assessment: Prevents or interferes some active activities and ADLs  Non-Pharmaceutical Pain Intervention(s): Ambulation/Increased Activity;Repositioned; Therapeutic presence  Response to Pain Intervention: Patient Satisfied  Vital Signs  Patient Currently in Pain: Yes       Orientation  Orientation  Overall Orientation Status: Within Functional Limits  Cognition      Objective   Bed mobility  Rolling to Right: Maximum assistance  Supine to Sit: Maximum assistance  Sit to Supine:  (pt left seated in recliner)  Scooting:  Moderate assistance  Comment: pt with slightly more active movement noted in L UE and B LEs this date, still unable to use UEs to assist with bed mobility  Transfers  Sit to Stand: Moderate Assistance (pt with improved  strength for grasping SS from previous date)  Stand to sit: Minimal Assistance  Bed to Chair: Dependent/Total (with elbert nguyen)  Comment: pt less anxious with mobilty, very motivated to transfer to recliner  Ambulation  Ambulation?: No     Balance  Posture: Fair  Sitting - Static: Fair  Sitting - Dynamic: Fair;-  Standing - Static: Poor;+  Standing - Dynamic: Poor;+  Comments: Pt sat EOB ~9 minutes with Min A  throughout with B UE on EOB then progressed to B UEs on SS. Standing balance assessed with elbert nguyen.  Pt able to tolerate standing ~1 min x2 trials with Brandon for balance  Exercises  Quad Sets: 10x bilat supine  Gluteal Sets: 10x bilat supine  Hip Abduction: 10x bilat supine AAROM  Knee Long Arc Quad: 10x bilat seated EOB  Ankle Pumps: 10x bilat supine  Core Strengthening: EOB ~9 mins     AM-PAC Score  AM-PAC Inpatient Mobility Raw Score : 9 (03/31/22 1243)  AM-PAC Inpatient T-Scale Score : 30.55 (03/31/22 1243)  Mobility Inpatient CMS 0-100% Score: 81.38 (03/31/22 1243)  Mobility Inpatient CMS G-Code Modifier : CM (03/31/22 1243)    Goals  Short term goals  Time Frame for Short term goals: 14 visits  Short term goal 1: Perform bed mobility with Max A x1  Short term goal 2: Perform sit to stand with Mod A and appropriate AD  Short term goal 3: Ambulate 50ft with appropriate AD and Mod A  Short term goal 4: Propel wheelchair 200ft with SBA  Short term goal 5: Demo Fair- dynamic standing balance to decrease risk of falls    Plan    Plan  Times per week: 5 to 6 x/week  Times per day: Daily  Current Treatment Recommendations: Strengthening,ROM,Balance Training,Transfer Training,Endurance Training,Patient/Caregiver Education & Training,Safety Education & Training,Equipment Evaluation, Education, & procurement,Positioning,Gait Training,Neuromuscular Re-education,Wheelchair Mobility Training,Functional Mobility Training  Safety Devices  Type of devices: Call light within reach,Nurse notified,Gait belt,Left in chair,Chair alarm in place  Restraints  Initially in place: No     Therapy Time   Individual Concurrent Group Co-treatment   Time In 7171         Time Out 1211         Minutes 26         Timed Code Treatment Minutes: [place] [address], [place]

## 2022-03-31 NOTE — PROGRESS NOTES
[address] [place] [address]  Individualized Disclosure Statement      Scope of Service    [address] [place] [address] provides 24 hour individualized service to patients with functional limitations due to, but not limited to, stroke, brain injury, spinal cord injury, major multiple trauma, hip fracture, amputation, and neurological disorders. [address] [place] [address] provides rehabilitative nursing and medical services as well as physical, occupational, speech, and recreation therapies, as applicable, deemed necessary by our Board Certified Physical Medicine Physicians, Dr Bisi Baker, Dr Yohan Iniguez, and Dr Vic Juárez. [address] [place] [address] is fully accredited by the Commission on Accreditation of Rehabilitation Facilities (CARF) and the Joint Commission (TJC) as a comprehensive provider of rehabilitation services. Patients admitted to [address] [place] [address] receive a minimum of 5 days of therapy services totaling at least 15 hours per week. Your treatment program will consist of, at minimum, physical therapy 7.5 hours per week, as applicable, occupational therapy 7.5 hours per week, as applicable, speech therapy 1.5 hours per week, as applicable, and recreational therapy, 1.5 hours per week, as applicable. Your estimated length of stay is 7-10 days. [address] [place] [address] maintains contracts with most insurance plans. Depending on the type of coverage, your insurance may impose limits on the coverage for rehabilitation care. Coverage is based on the premise that you are able to fully participate in the rehabilitation program and show continued progress. Please verify your own insurance information.   A copy of this Individualized Disclosure Statement will be provided to the patient and/or family on the patient's admission date. Insurance Coverage    Your insurance coverage has been verified as follows:      Primary Insurance:  [employer]    Deductible: n/a   Coverage:  n/a    Secondary Insurance:  n/a    Note:  Secondary insurance policies often cover co-payment amounts, but to ensure payment please contact your insurance company. Please call [facility] Patient Accounts @ [phone] for all billing questions.                                                                                   Revised 3/29/22

## 2022-03-31 NOTE — DISCHARGE SUMMARY
[facility] Internal Medicine  Anne Blank MD; Bro Nieto MD; Makayla Oreilly MD; MD Veto Sheffield MD; Anoop Sheriff MD      [facility]  [facility]    Discharge Summary     Patient ID: Rodrick Gaines  :  1950   MRN: [id number]     ACCOUNT:  [de-identified]   Patient's PCP: Brittany Whaley Date: 3/20/2022   Discharge Date: 3/23/22    Length of Stay: 3  Code Status:  DNR-CCA  Admitting Physician: Anoop Sheriff MD  Discharge Physician: Anoop Sheriff MD     Active Discharge Diagnoses:     Hospital Problem Lists:  Principal Problem:    Frequent falls  Active Problems:    Hypertension    Hyperlipidemia    Diabetes mellitus ([address]    Bipolar disorder, mixed ([address]    Cervical spondylosis with radiculopathy    Severe recurrent major depression without psychotic features ([address]    Hyponatremia  Resolved Problems:    * No resolved hospital problems. *      Admission Condition:  serious     Discharged Condition: stable    Hospital Stay:     Hospital Course:  Rodrick Gaines is a 70 y.o. male who was admitted for the management of   Frequent falls , presented to ER with Fall    70year-old gentleman who was able to walk and drive and eat well 6 weeks back, started deteriorating neurologically, mentally seems reasonable, generalized weakness of the lower and upper extremity, has history of cervical spine surgery few years back, postoperative complications, went to the rehab, was driving and doing all his ADLs 2 months back. Brought in for multiple falls not able to cope up at home,  Neurology and neurosurgery will be needed, MRI of the brain and spine ordered      Significant therapeutic interventions:   1. Frequent falls, upper and lower extremity weakness, MRI of the brain, lumbar spine, cervical spine, thoracic spine, get spine surgery and neurology on board,  2.  Essential hypertension, continue to monitor blood pressure, continue home medications,  3. Diabetes mellitus, sugars before meals and at bedtime,  4. Hyperlipidemia, continue home medications,  5. History of severe depression bipolar, continue home medications,  6. Hyponatremia, monitor sodium,  7. DVT prophylaxis with Lovenox,  8.  Full CODE STATUS,  9. PT OT     March 22,  Frequent falls, upper and lower extremity weakness,  Severe spinal stenosis, MRI reviewed,  Appreciate neurology help,  I discussed with neurosurgery at [facility],  Accepted and getting transferred   For further work n management   Significant Diagnostic Studies:   Labs / Micro:  CBC:   Lab Results   Component Value Date    WBC 7.6 03/31/2022    RBC 3.37 03/31/2022    HGB 11.0 03/31/2022    HCT 32.0 03/31/2022    MCV 95.0 03/31/2022    MCH 32.6 03/31/2022    MCHC 34.4 03/31/2022    RDW 12.0 03/31/2022     03/31/2022     BMP:    Lab Results   Component Value Date    GLUCOSE 108 03/31/2022     03/31/2022    K 3.8 03/31/2022     03/31/2022    CO2 24 03/31/2022    ANIONGAP 10 03/31/2022    BUN 16 03/31/2022    CREATININE 0.40 03/31/2022    BUNCRER NOT REPORTED 06/20/2020    CALCIUM 9.0 03/31/2022    LABGLOM >60 03/31/2022    GFRAA >60 03/31/2022    GFR      03/31/2022     HFP:    Lab Results   Component Value Date    PROT 5.8 03/29/2022     CMP:    Lab Results   Component Value Date    GLUCOSE 108 03/31/2022     03/31/2022    K 3.8 03/31/2022     03/31/2022    CO2 24 03/31/2022    BUN 16 03/31/2022    CREATININE 0.40 03/31/2022    ANIONGAP 10 03/31/2022    ALKPHOS 95 03/29/2022    ALT 21 03/29/2022    AST 22 03/29/2022    BILITOT 0.41 03/29/2022    LABALBU 2.5 03/29/2022    ALBUMIN 0.8 03/29/2022    LABGLOM >60 03/31/2022    GFRAA >60 03/31/2022    GFR      03/31/2022    PROT 5.8 03/29/2022    CALCIUM 9.0 03/31/2022     PT/INR:    Lab Results   Component Value Date    PROTIME 10.9 03/24/2022    INR 1.0 03/24/2022     PTT:   Lab Results   Component Value Date    APTT 31.3 03/20/2022     FLP:    Lab Results   Component Value Date    CHOL 104 04/12/2018    TRIG 92 04/12/2018    HDL 42 04/12/2018     U/A:    Lab Results   Component Value Date    COLORU Yellow 03/26/2022    TURBIDITY Clear 03/26/2022    SPECGRAV 1.019 03/26/2022    HGBUR NEGATIVE 03/26/2022    PHUR 6.0 03/26/2022    PROTEINU 3+ 03/26/2022    GLUCOSEU NEGATIVE 03/26/2022    KETUA SMALL 03/26/2022    BILIRUBINUR NEGATIVE 03/26/2022    UROBILINOGEN Normal 03/26/2022    NITRU NEGATIVE 03/26/2022    LEUKOCYTESUR NEGATIVE 03/26/2022     TSH:    Lab Results   Component Value Date    TSH 0.67 03/27/2022          Radiology:  XR CERVICAL SPINE (2-3 VIEWS)    Result Date: 3/25/2022  Immediate postoperative changes of laminectomy with posterior fusion at C2 through C4.     CT HEAD WO CONTRAST    Result Date: 3/26/2022  No acute findings in the head/brain. XR CHEST PORTABLE    Result Date: 3/29/2022  1. Left basilar airspace consolidation, representing either aspiration or pneumonia. 2. Mild right basilar atelectasis. XR CHEST PORTABLE    Result Date: 3/29/2022  Persistent opacity left base, likely infiltrate with bibasilar atelectasis. MRI BRAIN WO CONTRAST    Result Date: 3/28/2022  No acute intracranial abnormality. Consultations:    Consults:     Final Specialist Recommendations/Findings:   IP CONSULT TO INTERNAL MEDICINE  IP CONSULT TO SOCIAL WORK  IP CONSULT TO SOCIAL WORK  IP CONSULT TO ORTHOPEDIC SURGERY  IP CONSULT TO NEUROLOGY  IP CONSULT TO SPIRITUAL SERVICES      The patient was seen and examined on day of discharge and this discharge summary is in conjunction with any daily progress note from day of discharge. Discharge plan:     Disposition: To [facility]    Physician Follow Up:   Ana Ocasio  In 7 days  No follow-up provider specified.      Requiring Further Evaluation/Follow Up POST HOSPITALIZATION/Incidental Findings:     Diet: cardiac diet    Activity: As tolerated    Instructions to Patient:     Discharge Medications:      Medication List      ASK your doctor about these medications    amLODIPine 10 MG tablet  Commonly known as: NORVASC     aspirin 81 MG chewable tablet     atorvastatin 80 MG tablet  Commonly known as: LIPITOR     carboxymethylcellulose 1 % ophthalmic solution     carvedilol 25 MG tablet  Commonly known as: COREG     cetirizine 10 MG tablet  Commonly known as: ZYRTEC     divalproex 500 MG extended release tablet  Commonly known as: DEPAKOTE ER     lidocaine 5 % ointment  Commonly known as: XYLOCAINE     losartan 25 MG tablet  Commonly known as: COZAAR     metFORMIN 1000 MG tablet  Commonly known as: GLUCOPHAGE     omeprazole 20 MG delayed release capsule  Commonly known as: PRILOSEC     simethicone 80 MG chewable tablet  Commonly known as: MYLICON  Take 1 tablet by mouth every 6 hours as needed for Flatulence     traZODone 100 MG tablet  Commonly known as: DESYREL     venlafaxine 150 MG extended release capsule  Commonly known as: EFFEXOR XR  Take 1 capsule by mouth daily     Viagra 100 MG tablet  Generic drug: sildenafil            No discharge procedures on file. Time Spent on discharge is  34 mins in patient examination, evaluation, counseling as well as medication reconciliation, prescriptions for required medications, discharge plan and follow up. Electronically signed by   Phan Bucio MD  3/31/2022  11:29 AM      Thank you Dr. Naveen Alejo for the opportunity to be involved in this patient's care. Please note that this chart was generated using voice recognition Dragon dictation software. Although every effort was made to ensure the accuracy of this automated transcription, some errors in transcription may have occurred.

## 2022-03-31 NOTE — PROGRESS NOTES
Speech Language Pathology  Speech Language Pathology  [address] [facility]    Dysphagia Treatment Note/Discharge    Date: 3/31/2022  Patients Name: Yue Carrera  MRN: [id number]  Diagnosis:   Patient Active Problem List   Diagnosis Code    Hypertension I10    Hyperlipidemia E78.5    Diabetes mellitus ([address] E11.9    Contusion of right shoulder S40.011A    Bipolar disorder, mixed ([address] F31.60    First known suicide attempt ([address] T14.91XA    Erectile dysfunction N52.9    Cervical stenosis of spinal canal M48.02    Incomplete spinal cord lesion at C5-C7 level ([address] S14.155A    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) M48.02, G99.2    Cervical spondylosis with radiculopathy M47.22    Acute blood loss anemia D62    Chest pain R07.9    Depression with suicidal ideation F32. A, R45.851    Severe recurrent major depression without psychotic features ([address] F33.2    Frequent falls R29.6    Hyponatremia E87.1    Cervical spinal cord compression (HCC) G95.20    Cord compression (HCC) G95.20    Quadriplegia, C1-C4 incomplete (HCC) G82.52    Encephalopathy G93.40    Hospital-acquired pneumonia J18.9, Y95    Atelectasis J98.11       Pain: pt denies    Dysphagia Treatment  Treatment time: [id number]    Subjective: [x] Alert [x] Cooperative     [] Confused     [] Agitated    [] Lethargic    Objective/Assessment:    Pt. Seen for diet tolerance monitoring. Pt reports he is tolerating easy to chew diet with thin liquids. States cannot eat dry foods such as crackers/cheerios without milk or sauce etc to moisten. Pt observed with soft solid x2 trials with no overt s/s of aspiration. Pt independently takes small bites. Pt declines regular dry solid trial. Pt tolerates thin liquid by straw x4 with no overt s/s of aspiration. Education provided regarding tips to moisten foods, adding extra sauce and gravy, selecting soft/moist foods.     Recommend continue current diet of [employer] with thin liquids as evidenced by no overt s/s of aspiration noted with consistencies tested. Pt likely at baseline diet. Recommend small sips and bites, only feed when alert and awake and upright at 90 degrees for all PO intake. Recommend close monitoring for overt/clinical s/s of aspiration and D/C PO intake and complete Modified Barium Swallow Study should they occur. ST to discharge at this time, pt reports no further swallow concerns. Plan:  [] Continue ST services    [x] Discharge from ST:        Discharge recommendations: []  Further therapy recommended at discharge. The patient should be able to tolerate at least 3 hours of therapy per day over 5 days or 15 hours over 7 days. [] Further therapy recommended at discharge. [x] No therapy recommended at discharge.          Treatment completed by: Cortes Erickson, SLP, M.S. Georgie Lyman

## 2022-03-31 NOTE — PROGRESS NOTES
Physical Medicine & Rehabilitation  Progress Note        Admitting Physician: Yuly Grossman MD    Primary Care Provider: Patty Gill     Chief Complaint: Fall    Brief History: This is a follow up to the initial consult on MrBrennen Carney is a 70 y.o. right handed male who was admitted to [facility] on 3/23/2022 with No chief complaint on file. Patient with history of worsening limb weakness and falls. He was treated for cervical stenosis with laminectomy and fusion C2-5 performed by Dr. Noble Toro on 3/24/22 with post-op complication of urine retention. Being treated with prn intermittent straight cath and urecholine. Neurosurgery approved resuming DVT prophylaxis. Subjective: The patient is resting comfortably. The patient's mobility is improving. He denies any new issues with pain and feels he is slowly improving with strength. ROS:  Constitutional: negative for anorexia, chills, fatigue, fevers, sweats and weight loss  Eyes: negative for redness and visual disturbance  Ears, nose, mouth, throat, and face: negative for earaches, epistaxis, sore throat and tinnitus  Respiratory: negative for cough and shortness of breath  Cardiovascular: negative for chest pain, dyspnea and palpitations  Gastrointestinal: negative for abdominal pain, change in bowel habits, constipation, nausea and vomiting  Genitourinary:negative for dysuria, frequency, hesitancy and urinary incontinence  Integument/breast: negative for pruritus and rash  Musculoskeletal:negative for stiff joints  Neurological: negative for dizziness, headaches   Behavioral/Psych: negative for decreased appetite, depression     Rehabilitation:   Progressing in therapies.     PT:  Restrictions/Precautions: Fall Risk,General Precautions  Implants present? : Metal implants  Other position/activity restrictions: up with assist ;; s/p C2-C5 laminectomy and fixation 3/24  Required Braces or Orthoses  Cervical: c-collar (on when OOB)   Transfers  Sit to Stand: Moderate Assistance,2 Person Assistance,Minimal Assistance ([address] x2 from EOB to [address] x2 from elevated seat of elbert nguyen)  Stand to sit: Moderate Assistance  Bed to Chair: Dependent/Total (with elbert stedy)  Comment: pt tranfered to recliner with elbert nguyen       Transfers  Sit to Stand: Moderate Assistance,2 Person Assistance,Minimal Assistance ([address] x2 from EOB to [address] x2 from elevated seat of elbert nguyen)  Stand to sit: Moderate Assistance  Bed to Chair: Dependent/Total (with elbert stedy)  Comment: pt tranfered to recliner with elbert nguyen  Ambulation  Ambulation?: No               OT:  ADL  Feeding: Moderate assistance,Setup,Verbal cueing,Increased time to complete,Bringing food to mouth assist,Dependent/Total (self feeding)  Grooming: Maximum assistance,Increased time to complete,Verbal cueing,Dependent/Total (Clinical judgement based on Pt's participation with Self-feeding tasks (using Right UE / Right hand only). Unable to participate with Left dominant hand.)  UE Dressing: Dependent/Total  LE Dressing: Dependent/Total  Toileting: Dependent/Total  Additional Comments: Self feeding facilitated seated in chair utilizing built up utensil and RUE to grasp utensil and bring food to mouth. Pt able to grasp spoon req Mod A to bring spoon to mouth with [place] assist req increased time. After approx 3 scoops of food pt becoming dependent sec to fatigue req MANUEL assist. Dependent to grasp cup and bring to mouth for beverage management. Pt limited per decreased strength, decreased ROM and decreased [address]. Balance  Sitting Balance: Moderate assistance (Pt tolerated approx 5 min static sitting unsupported at EOB)  Standing Balance:  Moderate assistance (Mod x2)   Standing Balance  Time: approx 30 sec total  Activity: static standing in SS  Comment: 2/2 static stands attempted  Functional Mobility  Functional Mobility Comments: LATONYA sec to P standing balance     Bed mobility  Rolling to Left: Maximum assistance  Rolling to Right: Maximum assistance  Supine to Sit: Maximum assistance  Sit to Supine:  (pt left in recliner)  Scooting: Moderate assistance  Comment: pt seated EOB at start of session and retired to chair at session end  Transfers  Sit to stand: 2 Person assistance,Moderate assistance  Stand to sit: 2 Person assistance,Moderate assistance  Transfer Comments: Mod x2 for inital stand from low surfaces area increasing to Min A x2 from raised surface area utilizing [employer] Transfers  Toilet Transfer: Unable to assess             SLP:  Subjective: [x]? Alert     [x]? Cooperative     []? Confused     []? Agitated    []? Lethargic     Objective/Assessment:     Pt. Seen for diet tolerance monitoring. Pt reports he is tolerating easy to chew diet with thin liquids. States cannot eat dry foods such as crackers/cheerios without milk or sauce etc to moisten.     Pt observed with soft solid x2 trials with no overt s/s of aspiration. Pt independently takes small bites. Pt declines regular dry solid trial. Pt tolerates thin liquid by straw x4 with no overt s/s of aspiration. Education provided regarding tips to moisten foods, adding extra sauce and gravy, selecting soft/moist foods.     Recommend continue current diet of [employer] with thin liquids as evidenced by no overt s/s of aspiration noted with consistencies tested. Pt likely at baseline diet. Recommend small sips and bites, only feed when alert and awake and upright at 90 degrees for all PO intake. Recommend close monitoring for overt/clinical s/s of aspiration and D/C PO intake and complete Modified Barium Swallow Study should they occur. ST to discharge at this time, pt reports no further swallow concerns.     Objective:  BP (!) 173/93   Pulse 73   Temp 97.3 °F (36.3 °C) (Oral)   Resp 21   Ht 5' 3\" (1.6 m)   Wt 153 lb 6.4 oz (69.6 kg)   SpO2 95%   BMI 27.17 kg/m²       GEN: well developed, well nourished, in NAD  HEENT: NCAT, PERRL, EOMI, mucous membranes pink and moist. Wearing cervical collar in chair  CV: RRR, no murmurs, rubs or gallops  PULM: CTAB, no rales or rhonchi. Respirations WNL and unlabored  ABD: soft, NT, ND, BS+ and equal  NEURO: A&O x3. Sensation intact to light touch. MSK: Functional ROM all extremities but limited by weakness . Strength 4/5 key muscles LUE and LLE. 4-/5 key muscles RUE and RLE. EXTREMITIES: No calf tenderness to palpation bilaterally. No edema BLEs  SKIN: warm dry and intact with good turgor  PSYCH: appropriately interactive. Affect WNL.      Current Medications:   Current Facility-Administered Medications: [START ON 4/1/2022] losartan (COZAAR) tablet 100 mg, 100 mg, Oral, Daily  cyclobenzaprine (FLEXERIL) tablet 5 mg, 5 mg, Oral, TID PRN  amLODIPine (NORVASC) tablet 10 mg, 10 mg, Oral, Daily  doxycycline hyclate (VIBRA-TABS) tablet 100 mg, 100 mg, Oral, 2 times per day  cefepime (MAXIPIME) 1,000 mg in sterile water 10 mL IV syringe, 1,000 mg, IntraVENous, Q12H  atropine injection 0.4 mg, 0.4 mg, IntraVENous, Q5 Min PRN  ketorolac (TORADOL) injection 15 mg, 15 mg, IntraVENous, Q6H PRN  [Held by provider] oxyCODONE (ROXICODONE) immediate release tablet 5 mg, 5 mg, Oral, Q6H PRN **OR** [DISCONTINUED] oxyCODONE (ROXICODONE) immediate release tablet 10 mg, 10 mg, Oral, Q6H PRN  naloxone (NARCAN) injection 0.4 mg, 0.4 mg, IntraVENous, PRN  divalproex (DEPAKOTE ER) extended release tablet 750 mg, 750 mg, Oral, Nightly  carboxymethylcellulose PF (THERATEARS) 1 % ophthalmic gel 2 drop, 2 drop, Both Eyes, TID  pantoprazole (PROTONIX) tablet 40 mg, 40 mg, Oral, QAM AC  furosemide (LASIX) tablet 20 mg, 20 mg, Oral, Daily  venlafaxine (EFFEXOR) tablet 75 mg, 75 mg, Oral, TID WC  divalproex (DEPAKOTE ER) extended release tablet 500 mg, 500 mg, Oral, QAM  sodium chloride flush 0.9 % injection 5-40 mL, 5-40 mL, IntraVENous, 2 times per day  sodium chloride flush 0.9 % injection 5-40 mL, 5-40 mL, IntraVENous, PRN  0.9 % sodium chloride infusion, 25 mL, IntraVENous, PRN  bisacodyl (DULCOLAX) EC tablet 5 mg, 5 mg, Oral, Daily  senna (SENOKOT) 8.8 MG/5ML syrup 8.8 mg, 5 mL, Oral, BID PRN  sodium chloride flush 0.9 % injection 5-40 mL, 5-40 mL, IntraVENous, 2 times per day  sodium chloride flush 0.9 % injection 10 mL, 10 mL, IntraVENous, PRN  0.9 % sodium chloride infusion, 25 mL, IntraVENous, PRN  potassium chloride (KLOR-CON M) extended release tablet 40 mEq, 40 mEq, Oral, PRN **OR** potassium bicarb-citric acid (EFFER-K) effervescent tablet 40 mEq, 40 mEq, Oral, PRN **OR** potassium chloride 10 mEq/100 mL IVPB (Peripheral Line), 10 mEq, IntraVENous, PRN  magnesium sulfate 1000 mg in dextrose 5% 100 mL IVPB, 1,000 mg, IntraVENous, PRN  enoxaparin (LOVENOX) injection 40 mg, 40 mg, SubCUTAneous, Daily  ondansetron (ZOFRAN-ODT) disintegrating tablet 4 mg, 4 mg, Oral, Q8H PRN **OR** ondansetron (ZOFRAN) injection 4 mg, 4 mg, IntraVENous, Q6H PRN  polyethylene glycol (GLYCOLAX) packet 17 g, 17 g, Oral, Daily PRN  acetaminophen (TYLENOL) tablet 650 mg, 650 mg, Oral, Q6H PRN **OR** acetaminophen (TYLENOL) suppository 650 mg, 650 mg, Rectal, Q6H PRN  glucose (GLUTOSE) 40 % oral gel 15 g, 15 g, Oral, PRN  dextrose 50 % IV solution, 12.5 g, IntraVENous, PRN  glucagon (rDNA) injection 1 mg, 1 mg, IntraMUSCular, PRN  dextrose 5 % solution, 100 mL/hr, IntraVENous, PRN      Diagnostics:     CBC:   Recent Labs     03/29/22  0623 03/30/22  0525 03/31/22  0245   WBC 8.0 7.1 7.6   RBC 3.12* 3.18* 3.37*   HGB 10.2* 10.1* 11.0*   HCT 29.4* 29.9* 32.0*   MCV 94.2 94.0 95.0   RDW 12.3 11.9 12.0    328 378     BMP:    Recent Labs     03/29/22  0623 03/30/22  0525 03/31/22  0245   GLUCOSE 150* 129* 108*   BUN 30* 24* 16   CREATININE 0.59* 0.46* 0.40*   CALCIUM 8.9 9.0 9.0    144 135   K 3.7 3.7 3.8    107 101   CO2 24 26 24   ANIONGAP 13 11 10   LABGLOM >60 >60 >60   GFRAA >60 >60 >60   GFR                    HbA1c:   Lab Results   Component Value Date    LABA1C 5.8 05/17/2021     BNP: No results for input(s): BNP in the last 72 hours. PT/INR: No results for input(s): PROTIME, INR in the last 72 hours. APTT: No results for input(s): APTT in the last 72 hours. CARDIAC ENZYMES:   Recent Labs     03/31/22  0245 03/31/22  0836   TROPHS 53* 53*      FASTING LIPID PANEL:  Lab Results   Component Value Date    CHOL 104 04/12/2018    HDL 42 04/12/2018    TRIG 92 04/12/2018     LIVER PROFILE:   Recent Labs     03/29/22  0623   AST 22   ALT 21   BILITOT 0.41   ALKPHOS 95        Radiology:      Impression/Plan:    1. Cervical stenosis with myelopathy s/p C2-C5 laminectomy and fusion on 3/24  2. Neck pain  3. Tetraparesis  4. Urinary retention/neurogenic bladder  5. HTN/HLD  6. Diabetes  7. GIA  8. Migraines  9. PTSD/Depression      Recommendation:  1. The patient will benefit from acute inpatient rehabilitation once medically stable per primary and consulting services. Anticipate he will be able to tolerate 3 hours of therapy per day or 900 minutes per week in rehabilitation. The patient requires multidisciplinary rehabilitation treatment including medical management by a PM&R physician, 24 hour rehabilitation nursing, Physical/Occupational therapy, rehabilitation social work, and nutrition services. Patient and family also require education in post-hospital precautions and home exercise routine, adaptive techniques and deficit compensation strategies, strengthening and conditioning, equipment prescription and instructions in use. 2. DVT Prophylaxis: on Lovenox        Electronically signed by Meliza Jack MD on 3/31/2022 at 10:29 AM       This note is created with the assistance of a speech recognition program.  While intending to generate a document that actually reflects the content of the visit, the document can still have some errors including those of syntax and sound a like substitutions which may escape proof reading.   In such instances, actual meaning can be extrapolated by contextual diversion.

## 2022-03-31 NOTE — PROGRESS NOTES
[facility] [facility]   Acute Inpatient Rehab Preadmission Assessment    Patient Name: Beatriz Lawrence        MRN: [id number]    : 1950  (70 y.o.)  Gender: male     Admitted from:   [facility]  [x][facility]   [][facility]   [][facility]    []Outside Admission - Location:                                 [x]Initial         []Updated    Date of Onset / Admission to the acute hospital:  3/23/22    Inpatient Rehabilitation Admitting Diagnosis:  Cervical Stenosis with Myelopathy      Did patient have surgery/procedures? []No  [x]Yes:      3/24/22 C2-5 Fusion, C2-4 Laminectomy      Physicians: Jerica Farrell    Risk for clinical complications: Moderate to High    Co-morbidities:      1. Neck pain  2. Tetraparesis  3. Urinary retention/neurogenic bladder  4. HTN  5. HLD  6. Diabetes  7. GIA  8. Migraines  9. PTSD  10.  Depression      Financial Information  Primary insurance:  []Medicare [] Medicare HMO  []Commercial insurance    []Medicaid   [] Medicaid HMO []Workers Compensation   [x]Personal Pay    Secondary Insurance:  []Medicare [] Medicare HMO  []Commercial insurance    []Medicaid  []Medicaid HMO  []Workers Compensation [x]None    Precautions:   []Cardiac Precautions:    []Total hip precautions:    []Weight Bearing status:  [x]Safety Precautions/Concerns:  Fall Risk, General Precautions, Spinal Precautions: No Twisting,No Lifting,No Bending, Required Braces or Orthoses:  Cervical, c-collar (Aspen collar (remove in bed / when eating)  [x]Visually impaired:  Wears glasses at all times   [x]Hard of Hearing: Hard of hearing/hearing concerns    Isolation Precautions:         []Yes  [x]No  If Yes:  [] Droplet  []Contact []Airborne     []VRE     []MRSA     []C-diff         [] TB       [] ESBL [] MDRO          [] Other:        Physiatrist:  []   Dr. Rivas Kaur     [x]   Dr. Cassi Boogie  []   Dr. Christina Boles  []   Dr. Marti Patel    Patients Occupation: Retired    Reviewed Lab and Diagnostic reports from Current Admission: Yes    Patients Prior Functional  Level: Prior Function  Receives Help From: Family  ADL Assistance: Independent (Prior to Jan 2022 was Ind with ADLs / IADLs. Progressive weakness / function since, most recently (per Pt report) required Max to TD for all tasks from wife.)  [address]: Independent (Prior to Jan 2022 was Ind with ADLs / IADLs. Progressive weakness / function since, most recently (per Pt report) required Max to TD for all tasks from wife.)  Ambulation Assistance: Independent (No DME (prior to Jan 2022))  Transfer Assistance: Independent  Additional Comments: Information obtained from prior PT evaluation and confirmed with pt. Pt reports requiring assistance for IADLs, ADLs, ambulation, transfers and driving beginning in Feb 2022. History of current illness, per PM&R Consult:  Booker Bahena is a 70 y.o. male with history of HTN, HLD, diabetes, GIA, migraines, PTSD, and depression admitted to [facility] on 3/23/2022.       He initially presented to [address] [place] [address] after a fall with subsequent worsening limb weakness and additional falls. MRI brain showed no acute intracranial abnormality. MRI cervical spine showed severe spinal stenosis at C2-C3 and C3-C4 with complete effacement of the CSF. MRI thoracic and lumbar spine were relatively unremarkable except for moderate spinal stenosis from L2-L3 to L4-L5. He was transferred to [facility] for neurosurgical evaluation. He underwent laminectomy and fusion at C2-C5 on 3/24/22 (Dr. Russell Tobin). Hospital course has been complicated by urinary retention.     He reports continued neck pain with pain in the limbs as well. He also notes numbness/tingling and weakness in all limbs, headache, and urinary retention.     Prognosis: Fair    Current functional status for upper extremity ADLs:  UE Dressing: Dependent/Total    Current functional status for lower extremity ADLs:  LE Dressing: Dependent/Total    Current functional status for bed, chair, wheelchair transfers:  Transfers  Sit to Stand: Moderate Assistance (pt with improved  strength for grasping SS from previous date)  Stand to sit: Minimal Assistance  Bed to Chair: Dependent/Total (with elbert wendy)  Comment: pt less anxious with mobilty, very motivated to transfer to recliner    Current functional status for toilet transfers:   Toilet Transfers  Toilet Transfer: Unable to assess    Current functional status for locomotion:  Ambulation  Ambulation?: No    Current functional status for comprehension: Complete independence    Current functional status for expression: Complete independence    Current functional status for social interaction: Complete independence    Current functional status for problem solving: Complete independence    Current functional status for memory: Complete independence    Current Deficits R/T Impairment: Impaired Functional Mobility and Decreased ADLs    Required Therapy:   [x] Physical Therapy  [x] Occupational Therapy   [] Speech Therapy, as appropriate    Additional Services:    [x]     [] Recreational Therapy, as appropriate    [x] Nutrition    [] Dialysis  [] Cultural Needs Identified  [] Special Equipment Needs  [x] Other:  O2 as ordered    Rehab Justification:  Needs 3 hrs therapy per day or 15 hours per week:  Yes  Identified Rehab Nursing needs: Yes  Intense Interdisciplinary need:  Yes  Need for 24 hr physician supervision:  Yes  Measurable improved quality of life:  Yes  Willingness to participate:  Yes  Medical Necessity:  Yes  Patient able to tolerate care proposed:  Yes    Expected Discharge Destination/Functional Level:  Home with assist  Expected length of time to achieve that level of improvement: 1-2 weeks  Expected Post Discharge Treatments: Home with possible Home Care    Other information relevant to patient's care needs:  N/A    Acute Inpatient Rehabilitation Disclosure Statement will be provided to patient upon admission to ARU with patient's verbalization of understanding. I have reviewed and concur with the findings and results of the pre-admission screening assessment completed by the Inpatient Rehabilitation Admissions Coordinator.

## 2022-03-31 NOTE — PROGRESS NOTES
Spoke with ADORE Ramirez CM, who states pt is medically ready for ARU. Writer requested d/c readmit be completed with continuation of DVT prophylaxis and report be called to [id number]. Admission Assessment completed and Dr Milly Holm notified.

## 2022-03-31 NOTE — PLAN OF CARE
Problem: Pain:  Goal: Pain level will decrease  Description: Pain level will decrease  3/31/2022 0440 by Amanda Foster RN  Outcome: Ongoing  3/30/2022 1627 by Troy Bautista RN  Outcome: Ongoing  Goal: Control of acute pain  Description: Control of acute pain  3/31/2022 0440 by Amanda Foster RN  Outcome: Ongoing  3/30/2022 1627 by Troy Bautista RN  Outcome: Ongoing  Goal: Control of chronic pain  Description: Control of chronic pain  3/31/2022 0440 by Amanda Foster RN  Outcome: Ongoing  3/30/2022 1627 by Troy Bautista RN  Outcome: Ongoing     Problem: Skin Integrity:  Goal: Will show no infection signs and symptoms  Description: Will show no infection signs and symptoms  3/31/2022 0440 by Amanda Foster RN  Outcome: Ongoing  3/30/2022 1627 by Troy Bautista RN  Outcome: Ongoing  Goal: Absence of new skin breakdown  Description: Absence of new skin breakdown  3/31/2022 0440 by Amanda Foster RN  Outcome: Ongoing  3/30/2022 1627 by Troy Bautista RN  Outcome: Ongoing     Problem: Falls - Risk of:  Goal: Will remain free from falls  Description: Will remain free from falls  3/31/2022 0440 by Amanda Foster RN  Outcome: Ongoing  3/30/2022 1627 by Troy Bautista RN  Outcome: Ongoing  Goal: Absence of physical injury  Description: Absence of physical injury  3/31/2022 0440 by Amanda Foster RN  Outcome: Ongoing  3/30/2022 1627 by Troy Bautista RN  Outcome: Ongoing

## 2022-03-31 NOTE — CARE COORDINATION
Discharge 1 [facility] Case Management Department  Written by: Rajiv Tsang RN    Patient Name: Welton Kayser  Attending Provider: Willis Wallis MD  Admit Date: 3/23/2022  7:06 PM  MRN: [id number]  Account: [de-identified]                     : 1950  Discharge Date:  22        Disposition: IP Rehab- [facility] ARU via stretcher w/ 3L O2.  Patient's fiance notified of discharge plan      Room # 641 [id number] @   Report # [phone]      Rajiv Tsang RN

## 2022-03-31 NOTE — PLAN OF CARE
PATIENT REFUSES TO WEAR BIPAP     [x] Risks and benefits explained to patient   [x] Patient refuses to wear Bipap stating  don't like your machine  [x] Patient verbalizes understanding of information presented.

## 2022-03-31 NOTE — PLAN OF CARE
IV discontinued, report called to Chano fabian. Patient's significant other at the bedside and stated that she packed all patient belongs. Patient awaiting transport to [facility], which is scheduled for 7pm.     1910 transport arrived. Patient d/c.   Problem: Pain:  Goal: Pain level will decrease  Description: Pain level will decrease  3/31/2022 1841 by Morris Clements RN  Outcome: Completed  3/31/2022 1342 by Morris Clements RN  Outcome: Ongoing  Goal: Control of acute pain  Description: Control of acute pain  3/31/2022 1841 by Morris Clements RN  Outcome: Completed  3/31/2022 1342 by Morris Clements RN  Outcome: Ongoing  Goal: Control of chronic pain  Description: Control of chronic pain  3/31/2022 1841 by Morris Clements RN  Outcome: Completed  3/31/2022 1342 by Morris Clements RN  Outcome: Ongoing     Problem: Skin Integrity:  Goal: Will show no infection signs and symptoms  Description: Will show no infection signs and symptoms  3/31/2022 1841 by Morris Clements RN  Outcome: Completed  3/31/2022 1342 by Morris Clements RN  Outcome: Ongoing  Goal: Absence of new skin breakdown  Description: Absence of new skin breakdown  3/31/2022 1841 by Morris Clements RN  Outcome: Completed  3/31/2022 1342 by Morris Clements RN  Outcome: Ongoing     Problem: Falls - Risk of:  Goal: Will remain free from falls  Description: Will remain free from falls  3/31/2022 1841 by Morirs Clements RN  Outcome: Completed  3/31/2022 1342 by Morris Clements RN  Outcome: Ongoing  Goal: Absence of physical injury  Description: Absence of physical injury  3/31/2022 1841 by Morris Clements RN  Outcome: Completed  3/31/2022 1342 by Morris Clements RN  Outcome: Ongoing

## 2022-04-01 LAB
ABSOLUTE EOS #: 0.2 K/UL (ref 0–0.4)
ABSOLUTE LYMPH #: 1.5 K/UL (ref 1–4.8)
ABSOLUTE MONO #: 0.8 K/UL (ref 0.1–1.3)
ANION GAP SERPL CALCULATED.3IONS-SCNC: 10 MMOL/L (ref 9–17)
BASOPHILS # BLD: 0 % (ref 0–2)
BASOPHILS ABSOLUTE: 0 K/UL (ref 0–0.2)
BUN BLDV-MCNC: 15 MG/DL (ref 8–23)
CALCIUM SERPL-MCNC: 9.1 MG/DL (ref 8.6–10.4)
CHLORIDE BLD-SCNC: 97 MMOL/L (ref 98–107)
CO2: 27 MMOL/L (ref 20–31)
CREAT SERPL-MCNC: 0.52 MG/DL (ref 0.7–1.2)
EOSINOPHILS RELATIVE PERCENT: 3 % (ref 0–4)
GFR AFRICAN AMERICAN: >60 ML/MIN
GFR NON-AFRICAN AMERICAN: >60 ML/MIN
GFR SERPL CREATININE-BSD FRML MDRD: ABNORMAL ML/MIN/{1.73_M2}
GLUCOSE BLD-MCNC: 101 MG/DL (ref 75–110)
GLUCOSE BLD-MCNC: 105 MG/DL (ref 70–99)
GLUCOSE BLD-MCNC: 105 MG/DL (ref 75–110)
GLUCOSE BLD-MCNC: 209 MG/DL (ref 75–110)
HCT VFR BLD CALC: 31.6 % (ref 41–53)
HEMOGLOBIN: 11 G/DL (ref 13.5–17.5)
LYMPHOCYTES # BLD: 19 % (ref 24–44)
MCH RBC QN AUTO: 32.2 PG (ref 26–34)
MCHC RBC AUTO-ENTMCNC: 34.6 G/DL (ref 31–37)
MCV RBC AUTO: 92.8 FL (ref 80–100)
MONOCYTES # BLD: 11 % (ref 1–7)
PDW BLD-RTO: 12.8 % (ref 11.5–14.9)
PLATELET # BLD: 462 K/UL (ref 150–450)
PMV BLD AUTO: 7 FL (ref 6–12)
POTASSIUM SERPL-SCNC: 3.9 MMOL/L (ref 3.7–5.3)
RBC # BLD: 3.41 M/UL (ref 4.5–5.9)
SEG NEUTROPHILS: 67 % (ref 36–66)
SEGMENTED NEUTROPHILS ABSOLUTE COUNT: 5.1 K/UL (ref 1.3–9.1)
SODIUM BLD-SCNC: 134 MMOL/L (ref 135–144)
WBC # BLD: 7.7 K/UL (ref 3.5–11)

## 2022-04-01 PROCEDURE — 97163 PT EVAL HIGH COMPLEX 45 MIN: CPT

## 2022-04-01 PROCEDURE — 51798 US URINE CAPACITY MEASURE: CPT

## 2022-04-01 PROCEDURE — 1180000000 HC REHAB R&B

## 2022-04-01 PROCEDURE — 6370000000 HC RX 637 (ALT 250 FOR IP): Performed by: PHYSICAL MEDICINE & REHABILITATION

## 2022-04-01 PROCEDURE — 80048 BASIC METABOLIC PNL TOTAL CA: CPT

## 2022-04-01 PROCEDURE — 97530 THERAPEUTIC ACTIVITIES: CPT

## 2022-04-01 PROCEDURE — 97110 THERAPEUTIC EXERCISES: CPT

## 2022-04-01 PROCEDURE — 97535 SELF CARE MNGMENT TRAINING: CPT

## 2022-04-01 PROCEDURE — 6370000000 HC RX 637 (ALT 250 FOR IP): Performed by: NURSE PRACTITIONER

## 2022-04-01 PROCEDURE — 6360000002 HC RX W HCPCS: Performed by: NURSE PRACTITIONER

## 2022-04-01 PROCEDURE — 99222 1ST HOSP IP/OBS MODERATE 55: CPT | Performed by: INTERNAL MEDICINE

## 2022-04-01 PROCEDURE — 85025 COMPLETE CBC W/AUTO DIFF WBC: CPT

## 2022-04-01 PROCEDURE — 6370000000 HC RX 637 (ALT 250 FOR IP): Performed by: INTERNAL MEDICINE

## 2022-04-01 PROCEDURE — 2580000003 HC RX 258: Performed by: NURSE PRACTITIONER

## 2022-04-01 PROCEDURE — 82947 ASSAY GLUCOSE BLOOD QUANT: CPT

## 2022-04-01 PROCEDURE — 97167 OT EVAL HIGH COMPLEX 60 MIN: CPT

## 2022-04-01 PROCEDURE — 99223 1ST HOSP IP/OBS HIGH 75: CPT | Performed by: PHYSICAL MEDICINE & REHABILITATION

## 2022-04-01 PROCEDURE — 36415 COLL VENOUS BLD VENIPUNCTURE: CPT

## 2022-04-01 RX ORDER — FINASTERIDE 5 MG/1
5 TABLET, FILM COATED ORAL DAILY
COMMUNITY
End: 2022-08-01 | Stop reason: ALTCHOICE

## 2022-04-01 RX ORDER — CARBOXYMETHYLCELLULOSE SODIUM 10 MG/ML
2 GEL OPHTHALMIC 3 TIMES DAILY
Status: DISCONTINUED | OUTPATIENT
Start: 2022-04-01 | End: 2022-04-15 | Stop reason: HOSPADM

## 2022-04-01 RX ORDER — FINASTERIDE 5 MG/1
5 TABLET, FILM COATED ORAL DAILY
Status: DISCONTINUED | OUTPATIENT
Start: 2022-04-01 | End: 2022-04-15 | Stop reason: HOSPADM

## 2022-04-01 RX ORDER — SENNA PLUS 8.6 MG/1
2 TABLET ORAL NIGHTLY
Status: DISCONTINUED | OUTPATIENT
Start: 2022-04-01 | End: 2022-04-08

## 2022-04-01 RX ADMIN — SENNOSIDES 17.2 MG: 8.6 TABLET, COATED ORAL at 20:54

## 2022-04-01 RX ADMIN — CARBOXYMETHYLCELLULOSE SODIUM 2 DROP: 10 GEL OPHTHALMIC at 20:48

## 2022-04-01 RX ADMIN — DOXYCYCLINE 100 MG: 100 CAPSULE ORAL at 10:02

## 2022-04-01 RX ADMIN — PANTOPRAZOLE SODIUM 40 MG: 40 TABLET, DELAYED RELEASE ORAL at 06:03

## 2022-04-01 RX ADMIN — CARBOXYMETHYLCELLULOSE SODIUM 2 DROP: 10 GEL OPHTHALMIC at 17:40

## 2022-04-01 RX ADMIN — VENLAFAXINE 75 MG: 75 TABLET ORAL at 12:27

## 2022-04-01 RX ADMIN — ACETAMINOPHEN 650 MG: 325 TABLET ORAL at 06:03

## 2022-04-01 RX ADMIN — ACETAMINOPHEN 650 MG: 325 TABLET ORAL at 20:47

## 2022-04-01 RX ADMIN — CEFEPIME HYDROCHLORIDE 1000 MG: 2 INJECTION, POWDER, FOR SOLUTION INTRAVENOUS at 09:28

## 2022-04-01 RX ADMIN — VENLAFAXINE 75 MG: 75 TABLET ORAL at 10:08

## 2022-04-01 RX ADMIN — DOXYCYCLINE 100 MG: 100 CAPSULE ORAL at 20:47

## 2022-04-01 RX ADMIN — VENLAFAXINE 75 MG: 75 TABLET ORAL at 17:41

## 2022-04-01 RX ADMIN — FINASTERIDE 5 MG: 5 TABLET, FILM COATED ORAL at 17:40

## 2022-04-01 RX ADMIN — AMLODIPINE BESYLATE 10 MG: 10 TABLET ORAL at 10:02

## 2022-04-01 RX ADMIN — LOSARTAN POTASSIUM 100 MG: 100 TABLET, FILM COATED ORAL at 10:02

## 2022-04-01 RX ADMIN — ENOXAPARIN SODIUM 40 MG: 100 INJECTION SUBCUTANEOUS at 10:02

## 2022-04-01 RX ADMIN — CEFEPIME HYDROCHLORIDE 1000 MG: 2 INJECTION, POWDER, FOR SOLUTION INTRAVENOUS at 21:40

## 2022-04-01 RX ADMIN — DIVALPROEX SODIUM 500 MG: 500 TABLET, EXTENDED RELEASE ORAL at 10:07

## 2022-04-01 RX ADMIN — DIVALPROEX SODIUM 750 MG: 250 TABLET, FILM COATED, EXTENDED RELEASE ORAL at 20:47

## 2022-04-01 RX ADMIN — FUROSEMIDE 20 MG: 20 TABLET ORAL at 10:02

## 2022-04-01 ASSESSMENT — PAIN DESCRIPTION - PROGRESSION
CLINICAL_PROGRESSION: NOT CHANGED

## 2022-04-01 ASSESSMENT — PAIN SCALES - GENERAL
PAINLEVEL_OUTOF10: 6
PAINLEVEL_OUTOF10: 6
PAINLEVEL_OUTOF10: 2
PAINLEVEL_OUTOF10: 3
PAINLEVEL_OUTOF10: 5

## 2022-04-01 ASSESSMENT — PAIN DESCRIPTION - ORIENTATION
ORIENTATION: POSTERIOR

## 2022-04-01 ASSESSMENT — PAIN - FUNCTIONAL ASSESSMENT
PAIN_FUNCTIONAL_ASSESSMENT: PREVENTS OR INTERFERES WITH MANY ACTIVE NOT PASSIVE ACTIVITIES
PAIN_FUNCTIONAL_ASSESSMENT: PREVENTS OR INTERFERES WITH MANY ACTIVE NOT PASSIVE ACTIVITIES

## 2022-04-01 ASSESSMENT — PAIN DESCRIPTION - DIRECTION
RADIATING_TOWARDS: SHOULDERS

## 2022-04-01 ASSESSMENT — PAIN DESCRIPTION - PAIN TYPE
TYPE: ACUTE PAIN;SURGICAL PAIN

## 2022-04-01 ASSESSMENT — PAIN DESCRIPTION - ONSET
ONSET: ON-GOING
ONSET: ON-GOING

## 2022-04-01 ASSESSMENT — PAIN DESCRIPTION - FREQUENCY
FREQUENCY: CONTINUOUS

## 2022-04-01 ASSESSMENT — PAIN DESCRIPTION - LOCATION
LOCATION: NECK

## 2022-04-01 ASSESSMENT — PAIN DESCRIPTION - DESCRIPTORS
DESCRIPTORS: ACHING;DISCOMFORT;BURNING
DESCRIPTORS: ACHING;DISCOMFORT;BURNING

## 2022-04-01 ASSESSMENT — PAIN SCALES - WONG BAKER: WONGBAKER_NUMERICALRESPONSE: 2

## 2022-04-01 NOTE — CARE COORDINATION
Rodolfo Castillo, RN   Registered Nurse   Case Management   Progress Notes       Signed   Date of Service:  3/31/2022  2:21 PM         Related encounter: Admission (Discharged) from 3/23/2022 [address]  Acute Inpatient Rehab Preadmission Assessment     Patient Name: Natacha Magana        MRN:   [id number]    : 1950  (70 y.o.)  Gender: male      Admitted from:   []?[facility]  [x]? [address] [facility] [address]   []? [facility] [address]   []? [facility]   []? Outside Admission - Location:                                 [x]? Initial         []? Updated     Date of Onset / Admission to the acute hospital:  3/23/22     Inpatient Rehabilitation Admitting Diagnosis:  Cervical Stenosis with Myelopathy        Did patient have surgery/procedures? []? No  [x]? Yes:       3/24/22 C2-5 Fusion, C2-4 Laminectomy        Physicians: Paige Mohan     Risk for clinical complications: Moderate to High     Co-morbidities:       1. Neck pain  2. Tetraparesis  3. Urinary retention/neurogenic bladder  4. HTN  5. HLD  6. Diabetes  7. GIA  8. Migraines  9. PTSD  10. Depression        Financial Information  Primary insurance:  []? Medicare     []? Medicare HMO      []? [employer]    []? Medicaid      []? Medicaid HMO       []? Workers Compensation        [x]? Personal Pay     Secondary Insurance:  []? Medicare     []? Medicare HMO      []? [employer]    []? Medicaid      []? Medicaid HMO        []? Workers Compensation      [x]? None     Precautions:   []? Cardiac Precautions:            []? Total hip precautions:           []? Weight Bearing status:  [x]? Safety Precautions/Concerns:  Fall Risk, General Precautions, Spinal Precautions: No Twisting,No Lifting,No Bending, Required Braces or Orthoses:  Cervical, c-collar (Aspen collar (remove in bed / when eating)  [x]? Visually impaired:  Wears glasses at all times         [x]? Hard of Hearing: Hard of hearing/hearing concerns     Isolation Precautions:         []? Yes              [x]? No  If Yes:   []? Droplet  []? Contact           []? Airborne     []? VRE     []? MRSA        []? C-diff         []? TB             []? ESBL         []? MDRO          []? Other:                        Physiatrist:  []? Dr. Catalina Piedra     [x]? Dr. Nancy Dodson  []? Dr. Marcus Whitehead  []? Dr. Darcie Comer     Patients Occupation: Retired     Reviewed Lab and Diagnostic reports from Current Admission: Yes     Patients Prior Functional  Level: Prior Function  Receives Help From: Family  ADL Assistance: Independent (Prior to Jan 2022 was Ind with ADLs / IADLs. Progressive weakness / function since, most recently (per Pt report) required Max to TD for all tasks from wife.)  [address]: Independent (Prior to Jan 2022 was Ind with ADLs / IADLs. Progressive weakness / function since, most recently (per Pt report) required Max to TD for all tasks from wife.)  Ambulation Assistance: Independent (No DME (prior to Jan 2022))  Transfer Assistance: Independent  Additional Comments: Information obtained from prior PT evaluation and confirmed with pt. Pt reports requiring assistance for IADLs, ADLs, ambulation, transfers and driving beginning in Feb 2022.     History of current illness, per PM&R Consult:  Ganesh Portillo a 70 y. o. male with history of HTN, HLD, diabetes, GIA, migraines, PTSD, and depression admitted to [facility] 3/23/2022.       He initially presented to [facility] after a fall with subsequent worsening limb weakness and additional falls.  MRI brain showed no acute intracranial abnormality.  MRI cervical spine showed severe spinal stenosis at C2-C3 and C3-C4 with complete effacement of the CSF.  MRI thoracic and lumbar spine were relatively unremarkable except for moderate spinal stenosis from L2-L3 to L4-L5.  He was transferred to [facility] for neurosurgical evaluation. Patricia Colindres underwent laminectomy and fusion at C2-C5 on 3/24/22 (Dr. Michael Molina).  Hospital course has been complicated by urinary retention.     He reports continued neck pain with pain in the limbs as well.  He also notes numbness/tingling and weakness in all limbs, headache, and urinary retention.     Prognosis: Fair     Current functional status for upper extremity ADLs:  UE Dressing: Dependent/Total     Current functional status for lower extremity ADLs:  LE Dressing: Dependent/Total     Current functional status for bed, chair, wheelchair transfers:  Transfers  Sit to Stand: Moderate Assistance (pt with improved  strength for grasping SS from previous date)  Stand to sit: Minimal Assistance  Bed to Chair: Dependent/Total (with elbert nguyen)  Comment: pt less anxious with mobilty, very motivated to transfer to recliner     Current functional status for toilet transfers: Toilet Transfers  Toilet Transfer: Unable to assess     Current functional status for locomotion:  Ambulation  Ambulation?: No     Current functional status for comprehension: Complete independence     Current functional status for expression: Complete independence     Current functional status for social interaction: Complete independence     Current functional status for problem solving: Complete independence     Current functional status for memory: Complete independence     Current Deficits R/T Impairment: Impaired Functional Mobility and Decreased ADLs     Required Therapy:   [x]? Physical Therapy  [x]? Occupational Therapy   []? Speech Therapy, as appropriate     Additional Services:    [x]?     []? Recreational Therapy, as appropriate    [x]? Nutrition    []? Dialysis  []? Cultural Needs Identified  []? Special Equipment Needs  [x]?  Other:  O2 as ordered     Rehab Justification:  Needs 3 hrs therapy per day or 15 hours per week:  Yes  Identified Rehab Nursing needs: Yes  Intense Interdisciplinary need:  Yes  Need for 24 hr physician supervision:  Yes  Measurable improved quality of life: Yes  Willingness to participate:  Yes  Medical Necessity:  Yes  Patient able to tolerate care proposed:   Yes     Expected Discharge Destination/Functional Level:  Home with assist  Expected length of time to achieve that level of improvement: 1-2 weeks  Expected Post Discharge Treatments: Home with possible Home Care     Other information relevant to patient's care needs:  N/A     Acute Inpatient Rehabilitation Disclosure Statement will be provided to patient upon admission to ARU with patient's verbalization of understanding.       I have reviewed and concur with the findings and results of the pre-admission screening assessment completed by the Inpatient Rehabilitation Admissions Coordinator.                  Cosigned by: Hernán Kiser MD at 3/31/2022  2:51 PM

## 2022-04-01 NOTE — PROGRESS NOTES
Patient admitted to [address] [place] [address]. Dr. Vishal Cao is the attending provider and patient is seeing [facility] for internal medicine during hospital stay. VS and height/weight obtained, head to toe assessment completed, call light within reach.

## 2022-04-01 NOTE — H&P
Physical Medicine & Rehabilitation History and Physical  [facility] Acute Rehabilitation Unit     Primary care provider: Toyin Kohler     Chief Complaint and Reason for Rehabilitation Admission:   Fall with weakness cervical cord compromise due to high-grade stenosis    History of Present Illness:  Sara Vale  is a 70 y.o.  male admitted to the [address] [place] [address] unit on 3/31/2022.         77-year-old male with mechanical fall admitted to [facility] for cord compression-he has long history of cervical spine cord injury and underwent decompression 4/19/2018 in which she reports right-sided spinal cord was nicked. He presented to ER after a fall. He had sudden onset of pain with new weakness is remote generalized upper lower extremity weakness which progressively worsened since January. He stopped driving. He has had 7 falls. He recently fell backwards striking his head he denies any loss of consciousness.   Postop retention-intermittent caths and Urecholine    Cardio - troponin elevation, no chest pain no concern for ischemia    Neuro cervical canal stenosis with an incomplete quadriplegia status post C2 5 laminectomy and posterior fusion on 3/24, history of anterior cervical corpectomy and fusion C4-7 in 2018 had acute toxic metabolic encephalopathy post procedure now resolved discontinuation of opioid medications, not opioid pain medication of her sinus bradycardia and tachycardia, continue with Depakote and Effexor    Surgery-somnolent 22nd Roxicodone given Narcan and improved okay for Lovenox for DVT prophylaxis, hold sedating medications continue Urecholine, status post C2-5 laminectomy and fixation due to cervical stenosis with myelopathy incomplete quadriplegia    Radiology  XR CERVICAL SPINE (2-3 VIEWS)    Result Date: 3/25/2022  Immediate postoperative changes of laminectomy with posterior fusion at C2 through C4.     CT HEAD WO CONTRAST    Result Date: 3/26/2022  No acute findings in the head/brain. CT HEAD WO CONTRAST    Result Date: 3/20/2022  No acute intracranial abnormality. RECOMMENDATIONS: Unavailable     CT CERVICAL SPINE WO CONTRAST    Result Date: 3/20/2022  Stable cervical spine CT examination status post corpectomy and anterior fusion from C4-C7. No acute fracture or traumatic malalignment. RECOMMENDATIONS: Unavailable     CT THORACIC SPINE WO CONTRAST    Result Date: 3/20/2022  Multilevel degenerative changes with no acute fracture or traumatic malalignment of the thoracolumbar spine. RECOMMENDATIONS: Unavailable     CT LUMBAR SPINE WO CONTRAST    Result Date: 3/20/2022  Multilevel degenerative changes with no acute fracture or traumatic malalignment of the thoracolumbar spine. RECOMMENDATIONS: Unavailable     MRI CERVICAL SPINE WO CONTRAST    Result Date: 3/21/2022  1. Patient motion limits evaluation. 2. There is severe spinal canal stenosis at C2-C3 and C3-C4 with complete effacement of the CSF at these levels. 3. No significant spinal canal stenosis at the remaining levels. 4. Multilevel neural foraminal narrowing as above. 5. There is question of minimal T2 hyperintensity within the cord at C4-C5, which likely represents myelomalacia. MRI THORACIC SPINE WO CONTRAST    Result Date: 3/22/2022  Mild multilevel degenerative changes of the thoracic spine, as described above with mild spinal canal stenosis from T8-9 to T10-11, most pronounced at T10-11. MRI LUMBAR SPINE WO CONTRAST    Result Date: 3/22/2022  1. Moderate spinal canal stenosis from L2-L3 to L4-L5, as described above. 2. Mild spinal canal stenosis at L1-L2, as described above. 3. Multilevel neural foraminal narrowing, most severe at L2-L3. XR CHEST PORTABLE    Result Date: 3/29/2022  1. Left basilar airspace consolidation, representing either aspiration or pneumonia. 2. Mild right basilar atelectasis.      XR CHEST PORTABLE    Result Date: 3/29/2022  Persistent opacity left base, likely infiltrate with bibasilar atelectasis. MRI BRAIN WO CONTRAST    Result Date: 3/28/2022  No acute intracranial abnormality. MRI BRAIN WO CONTRAST    Result Date: 3/21/2022  1. No acute intracranial abnormality. No acute infarct. 2. Mild global parenchymal volume loss with minimal chronic microvascular ischemic changes. is currently requiring assistance for self-care activities and mobility prompting this admission. Premorbid function:  Modified independent  Current Function:  PT:  Restrictions/Precautions: Fall Risk,General Precautions  Implants present? : Metal implants (c-spine surgery)  Other position/activity restrictions: up with assist ;; s/p C2-C5 laminectomy and fixation 3/24, Collar on while out of bed. Ok to remove while resting In bed eating and drinking in bed  Required Braces or Orthoses  Cervical: c-collar (C-collar while out of bed. Ok to remove while resting In bed eating and drinking in bed)         Bed mobility  Rolling to Right: Maximum assistance  Supine to Sit: Maximum assistance  Sit to Supine:  (pt left seated in recliner)  Scooting: Moderate assistance  Comment: pt with slightly more active movement noted in L UE and B LEs this date, still unable to use UEs to assist with bed mobility  Transfers  Sit to Stand: Moderate Assistance (pt with improved  strength for grasping SS from previous date)  Stand to sit: Minimal Assistance  Bed to Chair: Dependent/Total (with elbert nguyen)  Comment: pt less anxious with mobilty, very motivated to transfer to recliner  Ambulation  Ambulation?: No    OT:   ADL  Feeding: Moderate assistance;Setup;Verbal cueing; Increased time to complete;Bringing food to mouth assist;Dependent/Total (self feeding)  Additional Comments: Self feeding facilitated seated in chair utilizing built up utensil and RUE to grasp utensil and bring food to mouth.  Pt able to grasp spoon req Mod A to bring spoon to mouth with [place] assist req increased time. After approx 3 scoops of food pt becoming dependent sec to fatigue req MANUEL assist. Dependent to grasp cup and bring to mouth for beverage management. Pt limited per decreased strength, decreased ROM and decreased [address]. Balance    ST:    Pt. Seen for diet tolerance monitoring. Pt reports he is tolerating easy to chew diet with thin liquids. States cannot eat dry foods such as crackers/cheerios without milk or sauce etc to moisten.     Pt observed with soft solid x2 trials with no overt s/s of aspiration. Pt independently takes small bites. Pt declines regular dry solid trial. Pt tolerates thin liquid by straw x4 with no overt s/s of aspiration. Education provided regarding tips to moisten foods, adding extra sauce and gravy, selecting soft/moist foods.     Recommend continue current diet of Easy [employer] with thin liquids as evidenced by no overt s/s of aspiration noted with consistencies tested. Pt likely at baseline diet. Recommend small sips and bites, only feed when alert and awake and upright at 90 degrees for all PO intake. Recommend close monitoring for overt/clinical s/s of aspiration and D/C PO intake and complete Modified Barium Swallow Study should they occur. ST to discharge at this time, pt reports no further swallow concerns.       Past Medical History:      Diagnosis Date    Depression 2017    ON RX    Diabetes mellitus ([facility] [address] 2013    NIDDM    Difficult intravenous access     Hyperlipidemia 2008    ON RX    Hypertension 2008    ON RX    Migraines     PTSD (post-traumatic stress disorder) 01/2018    ON RX    Sleep apnea 01/2018    UNALBE TO USE TO CLEARED BY ENT (CPAP)    Teeth missing     BACK TEETH UPPER AND LOWER TO BE FITTED FOR PARTIALS AT LATER DATE    Wears glasses        Past Surgical History:      Procedure Laterality Date    CARDIAC CATHETERIZATION  02/2010    no stents     CARPAL TUNNEL RELEASE Left 01/09/2002    CATARACT REMOVAL      CERVICAL FUSION  04/19/2018    anterior cervical fusion C5-6    CERVICAL FUSION N/A 3/24/2022    C2-5 FUSION, C2-4 LAMINECTOMY performed by Apurva Belcher DO at [address] Left 2018    CATARACT EXTRACTION WITH IOL    HYDROCELE EXCISION  1951    LAMINECTOMY  03/24/2022    C2-5 FUSION, C2-4 LAMINECTOMY    MIDDLE EAR SURGERY  01/11/2018    MIDDLE EAR REBUILT AND EAR DRUM REPLACED    MOUTH SURGERY  04/16/2018    5 TEETH REMOVED    OTHER SURGICAL HISTORY  04/19/2018    : ANTERIOR CERVICAL CORRPECTOMY C5-6, SYNTHES, DEPUY, REG TABLE, SUPINE,     AL OFFICE/OUTPT VISIT,PROCEDURE ONLY N/A 4/19/2018    ANTERIOR CERVICAL CORRPECTOMY AND FUSION C5-6 performed by Apurva Belcher DO at [facility]  12/1983       Allergies:    Latex, Bee venom, Lisinopril, Niacin and related, Sulfa antibiotics, and Terazosin    Medications   Scheduled Meds:   enoxaparin  40 mg SubCUTAneous Daily    amLODIPine  10 mg Oral Daily    cefepime  1,000 mg IntraVENous Q12H    divalproex  500 mg Oral QAM    divalproex  750 mg Oral Nightly    doxycycline monohydrate  100 mg Oral 2 times per day    furosemide  20 mg Oral Daily    losartan  100 mg Oral Daily    pantoprazole  40 mg Oral QAM AC    venlafaxine  75 mg Oral TID WC    polyethylene glycol  17 g Oral Daily     Continuous Infusions:  PRN Meds:.acetaminophen **OR** acetaminophen, ondansetron **OR** ondansetron, polyethylene glycol, glucagon (rDNA), glucose, cyclobenzaprine, acetaminophen, senna, bisacodyl       Diagnostics:       CBC:   Recent Labs     03/30/22  0525 03/31/22 0245 04/01/22  0618   WBC 7.1 7.6 7.7   RBC 3.18* 3.37* 3.41*   HGB 10.1* 11.0* 11.0*   HCT 29.9* 32.0* 31.6*   MCV 94.0 95.0 92.8   RDW 11.9 12.0 12.8    378 462*     BMP:   Recent Labs     03/30/22  0525 03/31/22  0245 04/01/22  0618    135 134*   K 3.7 3.8 3.9    101 97*   CO2 26 24 27   BUN 24* 16 15   CREATININE 0.46* 0.40* 0.52*     BNP: No results for input(s): BNP in the last 72 hours. PT/INR: No results for input(s): PROTIME, INR in the last 72 hours. APTT: No results for input(s): APTT in the last 72 hours. CARDIAC ENZYMES: No results for input(s): CKMB, CKMBINDEX, TROPONINT in the last 72 hours. Invalid input(s): CKTOTAL;3  FASTING LIPID PANEL:  Lab Results   Component Value Date    CHOL 104 04/12/2018    HDL 42 04/12/2018    TRIG 92 04/12/2018     LIVER PROFILE: No results for input(s): AST, ALT, ALB, BILIDIR, BILITOT, ALKPHOS in the last 72 hours. I/O (24Hr): No intake or output data in the 24 hours ending 04/01/22 1102    Glu last 24 hour  Recent Labs     04/01/22  0623   POCGLU 105       No results for input(s): CLARITYU, COLORU, PHUR, SPECGRAV, PROTEINU, RBCUA, BLOODU, BACTERIA, NITRU, WBCUA, LEUKOCYTESUR, YEAST, Raquel Maw in the last 72 hours. Social History:  Social/Functional History  Social/Functional History  Lives With: Significant other  Type of Home: House  Home Layout: One level  Home Access: Stairs to enter without rails  Entrance Stairs - Number of Steps: one step at entrance  Bathroom Shower/Tub: Tub/Shower unit,Shower chair with back,Curtain  Bathroom Toilet: Handicap height  Bathroom Equipment: Hand-held shower  Bathroom Accessibility: Walker accessible  Home Equipment: 4 wheeled [address] Help From: Family  ADL Assistance: Independent (Prior to Jan 2022 was Ind with ADLs / IADLs. Progressive weakness / function since, most recently (per Pt report) required Max to TD for all tasks from wife.)  [address]: Independent (Prior to Jan 2022 was Ind with ADLs / IADLs.   Progressive weakness / function since, most recently (per Pt report) required Max to TD for all tasks from wife.)  Homemaking Responsibilities: Yes (Prior to January 2022)  Ambulation Assistance: Independent (No DME (prior to Jan 2022))  Transfer Assistance: Independent  Active : Yes  Patient's  Info: Family is currently assisting with transportation - Was driving until end of Jan 2022  Mode of Transportation: Car  Occupation: Retired  Leisure & Hobbies: Maintenance  Additional Comments: Information obtained from prior PT evaluation and confirmed with pt. Pt reports requiring assistance for IADLs, ADLs, ambulation, transfers and driving beginning in Feb 2022. Family History:       Problem Relation Age of Onset   Bonilla Dementia Mother     Coronary Art Dis Mother     Stroke Mother     Diabetes Mother     Asthma Mother     Other Mother         BRAIN ANEURISM    High Blood Pressure Mother     Heart Disease Father     Diabetes Sister     Diabetes Brother     High Blood Pressure Brother     High Cholesterol Brother     Heart Disease Brother     Diabetes Sister     Other Sister         NEUROPATHY       Review of Systems:  CONSTITUTIONAL:  Denies fevers, chills, sweats or fatigue. EYES:  Denies diplopia, blind spots, blurring. HEENT:  Denies hearing loss, trouble chewing or swallowing. RESPIRATORY:  No wheezing, coughing, shortness of breath. CARDIOVASCULAR:  Denies chest pain, palpitations, lightheadedness. GASTROINTESTINAL:  Denies heartburn, nausea, constipation, diarrhea, abdominal pain. GENITOURINARY:  No urgency, frequency, incontinence, dysuria. ENDOCRINE:  Denies hot or cold intolerance. MUSCULOSKELETAL:  Denies focal joint pain, back pain, neck pain. NEUROLOGICAL:  Denies focal weakness, numbness, tingling, balance loss, headache. BEHAVIOR/PSYCH:  Denies depression, anxiety, memory loss, insomnia. SKIN:  No ulcers, rash, bruises. Physical Exam:  /80   Pulse 87   Temp 98.7 °F (37.1 °C)   Resp 28   Ht 5' 3\" (1.6 m)   Wt 142 lb (64.4 kg)   SpO2 98%   BMI 25.15 kg/m²   HEENT:  Symmetrical facial features. EOMI. Visual fields intact. Hearing intact. Speech fluent, no dystarthria. Comprehension intact. Object naming intact.   Repetition intact. Basic cognition intact. Knew year, president location, follows commands  NECK:    Carotid bruit negative. Posterior cervical incision clean with staples in place  THORAX:  Symmetrical.    LUNGS:  Clear to ausculation. HEART:  Regular. No murmurs of gallops. ABDOMEN:  Non-distended. Normal bowel sounds. No guarding, tenderness, mass. BACK:  No ulcers or deformity. EXTREMITIES:  .  No calf tenderness, edema. Feet warm. NEUROMUSCULAR:  Sensation intact, no extinction. R      L                       R    L  Shoulder     2     0           Hip F   4+   4+  EF               4     3           KF       4+     4+  EE              4     3            KE       4+     4+  WF             5      5           ADF     5     5  WE             5     5           APF      5     5              4     4         Balance impaired. SKIN:  Intact. Principal Diagnosis/plan:  Ambulatory and ADL dysfunction secondary to incomplete quadriplegia status post C2 5 laminectomy and posterior fusion on 3/24 due to severe cervical canal stenosis    He will benefit from intensive interdisciplinary therapies and rehab nursing care and is appropriate for inpatient rehabilitation. The post admission physician evaluation (CLOVIS) is consistent with the pre-admission assessment. See above findings to reflect the elements required in the CLOVIS. Patient's admitting condition is consistent with the findings of the preadmission assessment by the rehabilitation admissions coordinator. Other Diagnoses/plan:    1. Ambulatory and ADL dysfunction due to incomplete quadriplegia (status post C2 5 laminectomy and posterior fusion on 3/24 due to severe cervical canal stenosis. Possible central cord-Continue PT/OT/speech/nursing to work on transfers, ambulation, ADLs, steps, family training, diet, cognition, home goal in approximately 3 to 4 weeks at min assist possibly wheelchair level prognosis fair  2.  Complete quadriplegia status post C2-5 laminectomy monitor incision continue neurochecks, exam as above, patient notes no change  3. Hospital-acquired pneumonia-complete antibiotics oxygen as needed, follow sputum culture, complete doxycycline and cefepime - IM  follow-up  4. Bradycardia resolved with discontinuation of beta-blockers  5. Pain -Avoid narcotics as patient sensitive require Narcan at [facility], Flexeril  6. Neurogenic bladder-continue bladder protocol intermittent caths, timed voids, keep volume under 300  7. Neurogenic bowel-monitor, continue to bowel program, but have her after meals and at nighttime, MiraLAX, Senokot as needed suppository-monitor for daily need  8. Hypertension hyperlipidemia/CHF-Norvasc, Lasix, Cozaar  9. Sleep apnea  10. Post traumatic stress disorder/depression/questionable bipolar disorder -Effexor Depakote  11. History of migraines  12. Diabetes  13. DVT prophylaxis low, EPC, teds  14. Internal medicine for medical management  15. DNR Comfort Care arrest    DVT Prophylaxis:  low molecular weight heparin, SCD's while in bed and NERY's while in bed    Estimated Length of Stay:  4 weeks. Prognosis  fair    Goals    Home at Minimal Assist   24 hour      Neto Kaur MD       This note is created with the assistance of a speech recognition program.  While intending to generate a document that actually reflects the content of the visit, the document can still have some errors including those of syntax and sound a like substitutions which may escape proof reading.   In such instances, actual meaning can be extrapolated by contextual diversion

## 2022-04-01 NOTE — PLAN OF CARE
Nutrition Problem #1: Inadequate oral intake  Intervention: Food and/or Nutrient Delivery: Continue Current Diet,Modify Oral Nutrition Supplement  Nutritional Goals: PO intake % of meals and supplements

## 2022-04-01 NOTE — CONSULTS
Comprehensive Nutrition Assessment    Type and Reason for Visit:  Initial,Consult,Positive Nutrition Screen (Poor appetite and intake, wt loss, difficulty chewing and/or swallowing. Consult to Evaluate and Treat.)    Nutrition Recommendations/Plan:  Continue current diet. Provide Ensure Enlive with meals. Request actual wt measurement. Nutrition Assessment:  Pt admitted to rehab due to fall with weakness, cervical cord compromise due to high-grade stenosis. Pt states that he has lost wt over the past several weeks. PO intake has averaged about 40% of meals. States he needs to be fed due to difficulty feeding self. Did not care for lunch today; alternate provided. Likes Ensure and would like to receive a vanilla or strawberry one with each meal.    Malnutrition Assessment:  Malnutrition Status:  Insufficient data    Context:  Acute Illness     Findings of the 6 clinical characteristics of malnutrition:  Energy Intake:  1 - 75% or less of estimated energy requirements for 7 or more days  Weight Loss:      Unable to assess  Body Fat Loss:  Unable to assess     Muscle Mass Loss:  Unable to assess    Fluid Accumulation:  1 - Mild Extremities   Strength:  Not Performed    Estimated Daily Nutrient Needs:  Energy (kcal):  9279-1854 based on [place] with 1.2-1.3 factor; Weight Used for Energy Requirements:  Admission     Protein (g):  73-85 based on 1.3-1.5 gm per kg; Weight Used for Protein Requirements:  Ideal          Nutrition Related Findings:  Edema: Trace Generalized, RUE; +1 LUE, RLE, LLE. A1C: 5.8 (5/12/21); Glucose: 105, Na: 134. Hx: DM.       Wounds:  Multiple,Surgical Incision,Skin Tears (Abrasions)       Current Nutrition Therapies:    ADULT DIET; Easy to Chew  ADULT ORAL NUTRITION SUPPLEMENT; Lunch, Dinner, Breakfast; Standard High Calorie/High Protein Oral Supplement    Anthropometric Measures:  · Height: 5' 3\" (160 cm)  · Current Body Weight: 141 lb 15.6 oz (64.4 kg)   · Admission Body Weight:    64.4kg  · Usual Body Weight: 158 lb 15.2 oz (72.1 kg) (1/21/22)     · Ideal Body Weight: 124 lbs; % Ideal Body Weight 114.5 %   · BMI: 25.2  · BMI Categories: Overweight (BMI 25.0-29. 9)       Nutrition Diagnosis:   · Inadequate oral intake related to swallowing difficulty,biting/chewing (masticatory) difficulty,altered taste perception (decreased appetite) as evidenced by intake 26-50%,poor intake prior to admission    Nutrition Interventions:   Food and/or Nutrient Delivery:  Continue Current Diet,Modify Oral Nutrition Supplement  Nutrition Education/Counseling:  No recommendation at this time   Coordination of Nutrition Care:  Continue to monitor while inpatient    Goals:  PO intake % of meals and supplements       Nutrition Monitoring and Evaluation:   Behavioral-Environmental Outcomes:  None Identified   Food/Nutrient Intake Outcomes:  Food and Nutrient Intake,Supplement Intake  Physical Signs/Symptoms Outcomes:  Biochemical Data,Chewing or Swallowing,GI Status,Fluid Status or Edema,Skin,Weight     Discharge Planning: Too soon to determine     Some areas of assessment may be incomplete due to standard COVID-19 Precautions. Darshan Ortiz R.D., L.D.   Phone: [phone]

## 2022-04-01 NOTE — PROGRESS NOTES
[facility]    Acute Rehabilitation OT Evaluation  Date: 22  Patient Name: Harry Guevara       Room: 8922/4490-07  MRN: [id number]  Account: [de-identified]   : 1950  (75 y.o.) Gender: male     Referring Practitioner: Puma Germain MD  Diagnosis: Cervical stenosis with myelopathy s/p C2-C5 laminectomy and fusion   Additional Pertinent Hx: Harry Guevara is a 70 y.o. male with history of HTN, HLD, diabetes, GIA, migraines, PTSD, and depression admitted to [facility] on 3/23/2022. He initially presented to [address] [place] [address] after a fall with subsequent worsening limb weakness and additional falls. MRI brain showed no acute intracranial abnormality. MRI cervical spine showed severe spinal stenosis at C2-C3 and C3-C4 with complete effacement of the CSF. MRI thoracic and lumbar spine were relatively unremarkable except for moderate spinal stenosis from L2-L3 to L4-L5. He was transferred to [facility] for neurosurgical evaluation. He underwent laminectomy and fusion at C2-C5 on 3/24/22 (Dr. Wing Pardo). Hospital course has been complicated by urinary retention. He reports continued neck pain with pain in the limbs as well. He also notes numbness/tingling and weakness in all limbs. Pt admitted to rehab unit on 3/31/22    Treatment Diagnosis: Impaired self care status   Past Medical History:  has a past medical history of Depression, Diabetes mellitus ([address] Difficult intravenous access, Hyperlipidemia, Hypertension, Migraines, PTSD (post-traumatic stress disorder), Sleep apnea, Teeth missing, and Wears glasses. Past Surgical History:   has a past surgical history that includes Cardiac catheterization (2010); Carpal tunnel release (Left, 2002); Vasectomy (1983); Tonsillectomy and adenoidectomy (); Hydrocele surgery (); Middle ear surgery (2018); eye surgery (Left, );  Mouth surgery (2018); other surgical history (2018); cervical fusion (2018); pr office/outpt visit,procedure only (N/A, 4/19/2018); Cataract removal; laminectomy (03/24/2022); and cervical fusion (N/A, 3/24/2022). Restrictions  Restrictions/Precautions: Fall Risk,General Precautions  Implants present? : Metal implants (c-spine surgery)  Other position/activity restrictions: up with assist; s/p C2-C5 laminectomy and fixation 3/24, Collar on while out of bed. Ok to remove while resting In bed eating and drinking in bed  Required Braces or Orthoses?: Yes    Vitals  Temp: 98.2 °F (36.8 °C)  Pulse: 85  Resp: 18  BP: 139/80  Height: 5' 3\" (160 cm)  Weight: 142 lb (64.4 kg)  BMI (Calculated): 25.2  Oxygen Therapy  SpO2: 98 %  O2 Flow Rate (L/min): 3 L/min  Level of Consciousness: Alert (0)    Subjective  Subjective: \"I just want to be able to get back to my basic needs. \" Pt stated in regards to ARU goals  Comments: Pt agreeable to therapy on this date  Pain Level: 6  Pain Location: Neck  Pain Orientation: Posterior  Orientation  Overall Orientation Status: Within Functional Limits  Vision  Vision: Impaired  Vision Exceptions: Wears glasses at all times  Hearing  Hearing: Exceptions to [facility]  Hearing Exceptions: Hard of hearing/hearing concerns  Social/Functional History  Lives With: Significant other  Type of Home: House  Home Layout: One level  Home Access: Stairs to enter without rails  Entrance Stairs - Number of Steps: one step at entrance  Bathroom Shower/Tub: Tub/Shower unit,Shower chair with back,Curtain  Bathroom Toilet: Handicap height  Bathroom Equipment: Hand-held shower  Bathroom Accessibility: Walker accessible  Home Equipment: 4 wheeled [address] Help From: Family  ADL Assistance: Independent (Lately needed Mitali Willis in Jan 2020)  Homemaking Assistance: Independent (Prior to Jan 2022 was Ind with ADLs / IADLs.   Progressive weakness / function since, most recently (per Pt report) required Max to TD for all tasks from wife.)  Homemaking Responsibilities: Yes (Prior to January 2022)  Ambulation Assistance: Independent (Jan\"22 was walking st cane, lately difficult)  Transfer Assistance: Independent  Active : Yes  Patient's  Info: Family is currently assisting with transportation - Was driving until end of Jan 2022  Mode of Transportation: Car,SUV  Occupation: Retired  Leisure & Hobbies: Maintenance  Additional Comments: Significant other able to assist as needed at discharge. Pain Assessment  Pain Assessment: 0-10  Pain Level: 6  Pain Type: Acute pain,Surgical pain  Pain Location: Neck  Pain Orientation: Posterior  Pain Radiating Towards: Shoulders  Pain Descriptors: Aching,Discomfort,Burning  Pain Frequency: Continuous  Clinical Progression: Not changed    Objective          Sensation  Overall Sensation Status: Impaired  Additional Comments: Numbness all the way from neck to fingers, back to toes, bilaterally.      UE Function  Hand Dominance  Hand Dominance: Left        LUE Strength  Gross LUE Strength: Exceptions to [facility]  L Shoulder Flex: 1+/5  L Elbow Flex: 1+/5  L Hand General: 2/5  LUE Strength Comment: Limited movement noted in LUE     LUE Tone: Hypotonic  LUE PROM (degrees)  LUE General PROM: ~90 degrees shoulder flexion due to pain; Elbow WFL  LUE AROM (degrees)  LUE AROM : Exceptions  LUE General AROM: ~10 degrees shoulder flexion; ~10 degrees elbow; able to shoulder shrug  Left Hand PROM (degrees)  Left Hand PROM: WFL  Left Hand AROM (degrees)  Left Hand General AROM: Pt able to complete grasp/release; Limited finger opposition  RUE Strength  Gross RUE Strength: Exceptions to [facility]  R Shoulder Flex: 2-/5  R Elbow Flex: 2-/5  R Hand General: 2+/5  RUE Strength Comment: Pt demonstrated increased strength in RUE      RUE Tone: Hypotonic  RUE PROM (degrees)  RUE General PROM: ~45 degrees shoulder flexion due to pain; elbow ~95 degrees;   RUE AROM (degrees)  RUE AROM : Exceptions  RUE General AROM: ~30 degrees shoulder flexion; Elbow flexion ~50 degrees  Right Hand PROM (degrees)  Right Hand PROM: WFL  Right Hand AROM (degrees)  Right Hand General AROM: Pt able to complete grasp/release; finger oppositino with icnreased time and difficutly                        Fine Motor Skills  Coordination  Movements Are Fluid And Coordinated: No  Coordination and Movement description: Fine motor impairments,Gross motor impairments,Right UE,Left UE,Decreased speed,Decreased accuracy                           Mobility  Supine to Sit: Maximum assistance  Sit to Supine: Maximum assistance,2 Person assistance     Balance  Sitting Balance: Maximum assistance  Standing Balance: Dependent/Total (in elbert stedy)  Standing Balance  Time: 10 seconds x 2  Activity: Functional transfers with SS  Comment: 2 person A with elbert stedy. Bed mobility  Bridging: Moderate assistance  Rolling to Left: Maximum assistance  Rolling to Right: Maximum assistance  Supine to Sit: Maximum assistance  Sit to Supine: Maximum assistance;2 Person assistance  Scooting: Maximal assistance  Comment: C-collar donned prior to bed mobility. Pt completed bed mobility with trunk and BLE assistance. Pt tolerated sitting EOB ~10 minutes in AM with max A for sitting balance due to heavy posterior lean. PM: Bed mobility completed with Max A x 1 and mod A x 1 due to increased pain and fatigue to sitting EOB. Pt tolerated sitting EOB ~ 2 minutes before pt reported increased pain requesting to return to supine position. max A x 2 to return to supine position. Pt completed rolling side to side while supine in bed to asssit with brief change. Transfers  Sit to stand: 2 Person assistance,Moderate assistance  Stand to sit: 2 Person assistance,Moderate assistance  Transfer Comments: Transfers completed with 2 person A with use of elbert stedy from bed height. Min A x 2 from elbert stedy height. Verbal cues for pursed lip breathing to assist with pain management.    Functional Activity Tolerance  Functional Activity Score: 88  Discharge Goal: Partial/moderate assistance      Goals  Patient Goals   Patient goals : \"I just want to be able to get back to my basic needs. \"  Short term goals  Time Frame for Short term goals: By 10 days  Short term goal 1: Pt will complete upper body dressing/bathing with max A with use of AE as needed while maintaining cervical precautions  Short term goal 2: Pt will complete self feeding task with max A with adaptive strategies and good safety  Short term goal 3: Pt will tolerate sitting EOB 5+ minutes unsupported with mod A during functional activity to increase independence with self care and mobility  Short term goal 4: Pt will complete functional transfers during self care tasks with mod A and good safety  Short term goal 5: Pt will participate in 30+ minutes of therapeutic exercises/functional activities to increase safety and independence with self care and mobility  Long term goals  Time Frame for Long term goals : By discharge  Long term goal 1: Pt will complete self feeding task with mod A with use of adaptive strategies   Long term goal 2: Pt will complete upper body bathing/dressing with mod A and good safety while maintaining cervical precautions  Long term goal 3: Pt will tolerate sitting EOB 10+ minutes unsupported with CGA and good safety during functional activity of choice  Long term goal 4: Pt will complete functional transfer with min A and good safety during self care tasks  Long term goal 5: Pt will demonstrated increased BUE gross motor/fine motor to increase participation in self care tasks  Long term goals 6: Pt/family with verbalize/demosntrate Good understanding of cervical precautions during self care tasks  Long term goal 7: Pt/family will demonstrate Good understanding of BUE exercises to increase functional use of BUE during self care tasks    Assessment  Performance deficits / Impairments: Decreased ADL status,Decreased functional mobility ,Decreased ROM,Decreased strength,Decreased endurance,Decreased sensation,Decreased balance,Decreased high-level IADLs,Decreased fine motor control,Decreased coordination,Decreased posture  Treatment Diagnosis: Impaired self care status  Prognosis: Fair  Decision Making: High Complexity  REQUIRES OT FOLLOW UP: Yes  Discharge Recommendations: Patient would benefit from continued therapy after discharge,Continue to assess pending progress  Plan  Times per week: 900 minutes/week for combined therapy of OT/PT due to decreaed tolerance to activity.   Times per day: Twice a day  Current Treatment Recommendations: Self-Care / ADL,Strengthening,ROM,Balance Training,Functional Mobility Training,Endurance Training,Pain Management,Safety Education & Training,Patient/Caregiver Education & Training,Equipment Evaluation, Education, & procurement,Positioning       Equipment Recommendations  Equipment Needed:  (TBD)     04/01/22 1245 04/01/22 1339 04/01/22 1405   OT Individual Minutes   Time In  --  2442  --    Time Out  --  1412  --    Minutes  --  25  --    OT Co-Treatment Minutes   Time In   ([phone] [id number])  --    (1405)   Time Out   (925)  --    (2893)   Time Code Minutes    Timed Code Treatment Minutes 10 Minutes 25 Minutes 13 Minutes     Electronically signed by Becky Saenz OT on 4/1/22 at 4:42 PM EDT

## 2022-04-01 NOTE — PROGRESS NOTES
Physical Therapy  [address]  Acute Rehabilitation Physical Therapy Progress Note    Date: 22  Patient Name: Harry Guevara       Room: 7152/5186-98  MRN: [id number]   Account: [de-identified]   : 1950  (75 y.o.) Gender: male     Referring Practitioner: Puma Germain MD     Past Medical History:  has a past medical history of Depression, Diabetes mellitus ([facility] [address] Difficult intravenous access, Hyperlipidemia, Hypertension, Migraines, PTSD (post-traumatic stress disorder), Sleep apnea, Teeth missing, and Wears glasses. Past Surgical History:   has a past surgical history that includes Cardiac catheterization (2010); Carpal tunnel release (Left, 2002); Vasectomy (1983); Tonsillectomy and adenoidectomy (); Hydrocele surgery (); Middle ear surgery (2018); eye surgery (Left, ); Mouth surgery (2018); other surgical history (2018); cervical fusion (2018); pr office/outpt visit,procedure only (N/A, 2018); Cataract removal; laminectomy (2022); and cervical fusion (N/A, 3/24/2022). Additional Pertinent Hx: Janessa Jansen is a 70 y.o. male with history of HTN, HLD, diabetes, GIA, migraines, PTSD, and depression admitted to [facility] on 3/23/2022. He initially presented to [facility] after a fall with subsequent worsening limb weakness and additional falls. MRI brain showed no acute intracranial abnormality. MRI cervical spine showed severe spinal stenosis at C2-C3 and C3-C4 with complete effacement of the CSF. MRI thoracic and lumbar spine were relatively unremarkable except for moderate spinal stenosis from L2-L3 to L4-L5. He was transferred to [facility] for neurosurgical evaluation. He underwent laminectomy and fusion at C2-C5 on 3/24/22 (Dr. Wing Pardo). Hospital course has been complicated by urinary retention. He reports continued neck pain with pain in the limbs as well.   He also notes numbness/tingling and weakness in all limbs. Pt admitted to rehab unit on 3/31/22    Overall Orientation Status: Within Functional Limits  Restrictions/Precautions  Restrictions/Precautions: Fall Risk;General Precautions  Required Braces or Orthoses?: Yes  Implants present? : Metal implants (c-spine surgery)  Required Braces or Orthoses  Cervical: c-collar (C-collar while out of bed. Ok to remove while resting In bed eating and drinking in bed)  Position Activity Restriction  Spinal Precautions: No Twisting; No Lifting; No Bending  Spinal Precautions: Poor Sitting balance, Bilateral Upper  and Lower Body muscle weakness, Assist with standing in GERA Stedy  Other position/activity restrictions: up with assist; s/p C2-C5 laminectomy and fixation 3/24, Collar on while out of bed. Ok to remove while resting In bed eating and drinking in bed    Subjective: Pt is in bed upon arrival. OT is in room working with pt. Pt reports increase pain in luis shoulders and posterior neck. Pt's wife arrives during tx. Comments: Pt is left in bed with all needs addressed and pressure call light in reach. Vital Signs  BP Location: Left upper arm  Level of Consciousness: Alert (0)  Patient Currently in Pain: Yes  Guerrero-Escobar Pain Rating: Hurts a little bit  Clinical Progression: Not changed  Response to Pain Intervention: Patient Satisfied                Bed Mobility:   Bed Mobility  Rolling: Maximal assistance  Supine to Sit: Maximal assistance (x2)  Sit to Supine: Maximal assistance (x2)  Scooting: Maximal assistance  Comment: Pt is able to advance BLE's with CGA, however once BLE's hooked on EOB pt requires Max A x1 for trunk support and Mod A x1 for BLE's and scooting. Pt is only able to tolerate sitting on EOB x~2min due to increase pain. Pt is briefly able to sit EOB with SBA ~5 sec, pt demostrating a right posterior lean. Pt requires Max A x1 for all other sitting balance. Pt is requesting to return to supine due to increase pain.  Pt is able to roll x1 in each direction with Max A x1. BAY Baez assist with breif change and [place] care. Stairs/Curb  Stairs?: No                                        BALANCE Posture: Poor  Sitting - Static: Poor;+  Sitting - Dynamic: Poor  Comments: Sitting balance assessed at EOB. EXERCISES Exercises  Quad Sets: 10x bilat supine  Heelslides: 10x bilat supine AAROM  Gluteal Sets: 10x bilat supine  Hip Abduction: 10x bilat supine AAROM  Ankle Pumps: 10x bilat supine  Core Strengthening: EOB ~2min    Edu/instructed to complete supine there ex as tolerated. Pt V good understanding at this time. Activity Tolerance: Patient limited by fatigue,Patient limited by endurance,Patient limited by pain  PT Equipment Recommendations  Equipment Needed: No  Other: TBD       Patient Education  New Education Provided:    Learner:patient  Method: demonstration and explanation       Outcome: needs reinforcement     Current Treatment Recommendations: Kimberli White Mobility Training,Transfer Training,Endurance Training,Wheelchair Mobility Training,Stair training,Gait Training,Neuromuscular Re-education,Home Exercise Program,Safety Education & Training,Patient/Caregiver Education & Training,Equipment Evaluation, Education, & procurement    Conditions Requiring Skilled Therapeutic Intervention  Body structures, Functions, Activity limitations: Decreased functional mobility ; Decreased strength;Decreased safe awareness;Decreased endurance;Decreased balance; Increased pain;Decreased posture;Decreased coordination;Decreased fine motor control;Decreased sensation;Decreased ROM  Assessment: Pt has frequent falls at home, gradually declining in fucntion since Feb\"2022. Pt underwent C2-C5 fusion/laminectomy, presetns wuth quadraparesis, B UE>B LE. Pt requires 2 person assist for safe bed mobility  and dependnet for transfer at his time.  WIll conitnue POC to improve stregnth and balance to faciliate

## 2022-04-01 NOTE — PROGRESS NOTES
Physical Therapy    Facility/Department: [facility]  Initial Assessment    NAME: Evelin Santillan  : 1950  MRN: [id number]    Date of Service: 2022    Discharge Recommendations:  Patient would benefit from continued therapy after discharge,Home with assist PRN   PT Equipment Recommendations  Other: TBD    Assessment   Body structures, Functions, Activity limitations: Decreased functional mobility ; Decreased strength;Decreased safe awareness;Decreased endurance;Decreased balance; Increased pain;Decreased posture;Decreased coordination;Decreased fine motor control;Decreased sensation;Decreased ROM  Assessment: Pt has frequent falls at home, gradually declining in fucntion since . Pt underwent C2-C5 fusion/laminectomy, presetns wuth quadraparesis, B UE>B LE. Pt requires 2 person assist for safe bed mobility  and dependnet for transfer at his time. WIll conitnue POC to improve stregnth and balance to faciliate independence. Treatment Diagnosis: Impaired function. Prognosis: Fair  Decision Making: Medium Complexity  PT Education: Goals;PT Role;Plan of Care; Functional Mobility Training;Transfer Training;General Safety;Precautions; Energy Conservation  REQUIRES PT FOLLOW UP: Yes  Activity Tolerance  Activity Tolerance: Patient limited by fatigue;Patient limited by endurance; Patient limited by pain       Patient Diagnosis(es): There were no encounter diagnoses. has a past medical history of Depression, Diabetes mellitus ([address] Difficult intravenous access, Hyperlipidemia, Hypertension, Migraines, PTSD (post-traumatic stress disorder), Sleep apnea, Teeth missing, and Wears glasses. has a past surgical history that includes Cardiac catheterization (2010); Carpal tunnel release (Left, 2002); Vasectomy (1983); Tonsillectomy and adenoidectomy (); Hydrocele surgery (); Middle ear surgery (2018); eye surgery (Left, 2018);  Mouth surgery (2018); other surgical history (04/19/2018); cervical fusion (04/19/2018); pr office/outpt visit,procedure only (N/A, 4/19/2018); Cataract removal; laminectomy (03/24/2022); and cervical fusion (N/A, 3/24/2022). Restrictions  Restrictions/Precautions  Restrictions/Precautions: Fall Risk,General Precautions  Required Braces or Orthoses?: Yes  Implants present? : Metal implants (c-spine surgery)  Required Braces or Orthoses  Cervical: c-collar (C-collar while out of bed. Ok to remove while resting In bed eating and drinking in bed)  Position Activity Restriction  Other position/activity restrictions: up with assist ;; s/p C2-C5 laminectomy and fixation 3/24, Collar on while out of bed. Ok to remove while resting In bed eating and drinking in bed  Vision/Hearing  Vision: Impaired  Vision Exceptions: Wears glasses at all times  Hearing: Exceptions to [facility]  Hearing Exceptions: Hard of hearing/hearing concerns     Subjective  General  Patient assessed for rehabilitation services?: Yes  Additional Pertinent Hx: Stefania Jerez is a 70 y.o. male with history of HTN, HLD, diabetes, GIA, migraines, PTSD, and depression admitted to [facility] on 3/23/2022. He initially presented to  [facility] [place] after a fall with subsequent worsening limb weakness and additional falls. MRI brain showed no acute intracranial abnormality. MRI cervical spine showed severe spinal stenosis at C2-C3 and C3-C4 with complete effacement of the CSF. MRI thoracic and lumbar spine were relatively unremarkable except for moderate spinal stenosis from L2-L3 to L4-L5. He was transferred to [facility] for neurosurgical evaluation. He underwent laminectomy and fusion at C2-C5 on 3/24/22 (Dr. Esthela Farnsworth). Hospital course has been complicated by urinary retention. He reports continued neck pain with pain in the limbs as well. He also notes numbness/tingling and weakness in all limbs.  Pt admitted to rehab unit on 3/31/22  Referring Practitioner: Dr Leatha Epley  Referral Date : 03/31/22  Diagnosis: Cervical stenosis, with myelopathy s/p C2-C5 laminectomy and fusion   Follows Commands: Within Functional Limits  Pain Screening  Patient Currently in Pain: Yes  Pain Assessment  Pain Assessment: 0-10  Pain Level: 6  Pain Type: Acute pain;Surgical pain  Pain Location: Neck  Pain Orientation: Posterior  Pain Radiating Towards: Shoulders  Pain Descriptors: Aching;Discomfort;Burning  Pain Frequency: Continuous  Pain Onset: On-going  Clinical Progression: Not changed  Functional Pain Assessment: Prevents or interferes with many active not passive activities  Vital Signs  Patient Currently in Pain: Yes       Orientation  Orientation  Overall Orientation Status: Within Functional Limits  Social/Functional History  Social/Functional History  Lives With: Significant other  Type of Home: House  Home Layout: One level  Home Access: Stairs to enter without rails  Entrance Stairs - Number of Steps: one step at entrance  Bathroom Shower/Tub: Tub/Shower unit,Shower chair with back,Curtain  Bathroom Toilet: Handicap height  Bathroom Equipment: Hand-held shower  Bathroom Accessibility: Walker accessible  Home Equipment: 4 wheeled walker,Reacher,Cane  Receives Help From: Family  ADL Assistance: Independent (Lately needed Sophie Shields in Jan 2020)  Ambulation Assistance: Independent (Jan\"22 was walking st cane, lately difficult)  Transfer Assistance: Independent  Active : Yes  Patient's  Info: Family is currently assisting with transportation - Was driving until end of Jan 2022  Mode of Transportation: Car,SUV  Occupation: Retired  Leisure & Hobbies: Maintenance  Additional Comments: Significant other able to assist as needed at discharge.   Cognition        Objective          AROM RLE (degrees)  RLE General AROM: AAROM WFL  AROM LLE (degrees)  LLE General AROM: AAROM-WFL  AROM RUE (degrees)  RUE General AROM: See OT eval.  AROM LUE (degrees)  LUE General AROM: See OT eval  Strength RLE  Comment: Hip flexion 2+/5, knee flexion/extension 2+/5, DF 1+/5, PF 2/5  Strength LLE  Comment: Hip flexion 2-/5, knee flexion/extensin 2-/5, DF 2+/5, PF 3/5     Sensation  Overall Sensation Status: Impaired  Additional Comments: Numbness all [address] way from neck to fingers, back to toes, bilatreally. Bed mobility  Bridging: Moderate assistance  Rolling to Left: Maximum assistance  Rolling to Right: Maximum assistance  Supine to Sit: Maximum assistance  Sit to Supine: Maximum assistance;2 Person assistance  Scooting: Maximal assistance  Comment: Pt reports increased pain with mobility, c/o of dizzyness, /86, sao2 94% at room air. Pt with severes posterior lean, needs max A for static balance, dangles at EOB ~ 10 minutes, C-collar donned in supine position. OT/PT co- eval for ADL/mobility due to acuity of pt/level of care needed. Pt abelt o bridge at mod A to pull his pants up in supine. Transfers  Sit to Stand: Moderate Assistance;2 Person Assistance  Stand to sit: Moderate Assistance;2 Person Assistance  Bed to Chair: Dependent/Total (elbert nguyen used)  Comment: Sit<>stand at mod A x 2 from bed eight,min A x 2 from [address] [address]. Pt needs cues for breathing/realxation, pt having increased pain with mobility. Sao2 > 90% at all timesat room iar. Pt positioned in a recliner with pillows to support B UE for comfort, and B LE elevated. Ambulation  Ambulation?: No     Balance  Posture: Poor  Sitting - Static: Poor;+  Sitting - Dynamic: Poor  Standing - Static: Poor;+ April nguyen)  Other exercises  Other exercises?: Yes  Other exercises 1: Supine AAROM bilat LE exercises, x10-15 reps  Other exercises 2: Dangle at EOB ~ 10 minutes, 2 person assit for safety fue to poor sitting balance. Plan   Plan  Times per week: 900 minutes/week for combined therapy of PT/OT due to decreaed tolerance to activity.   Current Treatment Recommendations: Nelson Medina Mobility Training,Transfer Training,Endurance Training,Wheelchair Mobility Training,Stair training,Gait Training,Neuromuscular Re-education,Home Exercise Program,Safety Education & Training,Patient/Caregiver Education & Training,Equipment Evaluation, Education, & procurement  Safety Devices  Type of devices: Gait belt,Left in bed,Call light within reach,Nurse notified    G-Code       OutComes Score                                                  AM-PAC Score             Goals  Short term goals  Time Frame for Short term goals: 10 visits  Short term goal 1: Perform rolling in bed min/mod A   Short term goal 2: Perform supine to sit min/mod A   Short term goal 3: Perform sit to supine max A   Short term goal 4: Perform sit<> stand mod A , and pivot transfers at mod A  Short term goal 5: Progress to ambulation HHA/Gait belt assist or appropriate device, mod A x 2 20 to 40 ft  Short term goal 6: Propel w/c with B LE, distance of 50 ft or > , min/mod A  Short term goal 7: Demo Fair- dynamic standing balance to decrease risk of falls  Long term goals  Time Frame for Long term goals : By DC  Long term goal 1: Pt able to perform bed mobility at [address] term goal 2: Pt able to perform transfers at min A   Long term goal 3: Pt able to ambulate with or without device, distance of 100 ft atleast, mod A, level surface. Long term goal 4: Pt able to perform 2 to 4 steps at [place] x 2  Long term goal 5: Pt able to propel w/c on level surface,  distance of 150 ft, SBA, level surfaces  Long term goal 6: Family training for safe mobility to return home  Long term goal 7: Improve sitting/standing balance to good/fair to reduce fall risk. Long term goal 8: Pt able to ambulate 60 to 80 ft for 2MWT,to improve overall fucntion. Patient Goals   Patient goals :  Move better       Therapy Time     04/01/22 0819 04/01/22 0833   PT Individual Minutes   Time In 2504  --    Time Out 9664  --    Minutes 15  --    PT Co-Treatment Minutes   Time In  --  3440   Time Out --  1964   Minutes  --  52   Time Code Minutes   Timed Code Treatment Minutes  --  [facility],

## 2022-04-02 LAB
GLUCOSE BLD-MCNC: 156 MG/DL (ref 75–110)
GLUCOSE BLD-MCNC: 88 MG/DL (ref 75–110)

## 2022-04-02 PROCEDURE — 51798 US URINE CAPACITY MEASURE: CPT

## 2022-04-02 PROCEDURE — 2580000003 HC RX 258: Performed by: NURSE PRACTITIONER

## 2022-04-02 PROCEDURE — 97530 THERAPEUTIC ACTIVITIES: CPT

## 2022-04-02 PROCEDURE — 6370000000 HC RX 637 (ALT 250 FOR IP): Performed by: PHYSICAL MEDICINE & REHABILITATION

## 2022-04-02 PROCEDURE — 6370000000 HC RX 637 (ALT 250 FOR IP): Performed by: NURSE PRACTITIONER

## 2022-04-02 PROCEDURE — 6370000000 HC RX 637 (ALT 250 FOR IP): Performed by: INTERNAL MEDICINE

## 2022-04-02 PROCEDURE — 6360000002 HC RX W HCPCS: Performed by: NURSE PRACTITIONER

## 2022-04-02 PROCEDURE — 97535 SELF CARE MNGMENT TRAINING: CPT

## 2022-04-02 PROCEDURE — 1180000000 HC REHAB R&B

## 2022-04-02 PROCEDURE — 82947 ASSAY GLUCOSE BLOOD QUANT: CPT

## 2022-04-02 PROCEDURE — 97110 THERAPEUTIC EXERCISES: CPT

## 2022-04-02 PROCEDURE — 99232 SBSQ HOSP IP/OBS MODERATE 35: CPT | Performed by: INTERNAL MEDICINE

## 2022-04-02 RX ADMIN — FINASTERIDE 5 MG: 5 TABLET, FILM COATED ORAL at 08:40

## 2022-04-02 RX ADMIN — DOXYCYCLINE 100 MG: 100 CAPSULE ORAL at 08:38

## 2022-04-02 RX ADMIN — VENLAFAXINE 75 MG: 75 TABLET ORAL at 18:08

## 2022-04-02 RX ADMIN — LOSARTAN POTASSIUM 100 MG: 100 TABLET, FILM COATED ORAL at 08:40

## 2022-04-02 RX ADMIN — DOXYCYCLINE 100 MG: 100 CAPSULE ORAL at 20:25

## 2022-04-02 RX ADMIN — ACETAMINOPHEN 650 MG: 325 TABLET ORAL at 20:25

## 2022-04-02 RX ADMIN — ENOXAPARIN SODIUM 40 MG: 100 INJECTION SUBCUTANEOUS at 08:42

## 2022-04-02 RX ADMIN — VENLAFAXINE 75 MG: 75 TABLET ORAL at 08:40

## 2022-04-02 RX ADMIN — DIVALPROEX SODIUM 750 MG: 250 TABLET, FILM COATED, EXTENDED RELEASE ORAL at 20:25

## 2022-04-02 RX ADMIN — FUROSEMIDE 20 MG: 20 TABLET ORAL at 08:40

## 2022-04-02 RX ADMIN — CARBOXYMETHYLCELLULOSE SODIUM 2 DROP: 10 GEL OPHTHALMIC at 08:42

## 2022-04-02 RX ADMIN — CARBOXYMETHYLCELLULOSE SODIUM 2 DROP: 10 GEL OPHTHALMIC at 14:57

## 2022-04-02 RX ADMIN — CYCLOBENZAPRINE 5 MG: 10 TABLET, FILM COATED ORAL at 20:27

## 2022-04-02 RX ADMIN — ACETAMINOPHEN 650 MG: 325 TABLET ORAL at 08:38

## 2022-04-02 RX ADMIN — CARBOXYMETHYLCELLULOSE SODIUM 2 DROP: 10 GEL OPHTHALMIC at 20:25

## 2022-04-02 RX ADMIN — CEFEPIME HYDROCHLORIDE 1000 MG: 2 INJECTION, POWDER, FOR SOLUTION INTRAVENOUS at 21:02

## 2022-04-02 RX ADMIN — AMLODIPINE BESYLATE 10 MG: 10 TABLET ORAL at 08:39

## 2022-04-02 RX ADMIN — CEFEPIME HYDROCHLORIDE 1000 MG: 2 INJECTION, POWDER, FOR SOLUTION INTRAVENOUS at 08:31

## 2022-04-02 RX ADMIN — DIVALPROEX SODIUM 500 MG: 500 TABLET, EXTENDED RELEASE ORAL at 08:40

## 2022-04-02 RX ADMIN — VENLAFAXINE 75 MG: 75 TABLET ORAL at 12:54

## 2022-04-02 RX ADMIN — PANTOPRAZOLE SODIUM 40 MG: 40 TABLET, DELAYED RELEASE ORAL at 06:19

## 2022-04-02 ASSESSMENT — PAIN SCALES - GENERAL
PAINLEVEL_OUTOF10: 0
PAINLEVEL_OUTOF10: 5
PAINLEVEL_OUTOF10: 8
PAINLEVEL_OUTOF10: 6

## 2022-04-02 ASSESSMENT — PAIN DESCRIPTION - LOCATION
LOCATION: SHOULDER;NECK
LOCATION: SHOULDER
LOCATION: HEAD

## 2022-04-02 ASSESSMENT — PAIN DESCRIPTION - DESCRIPTORS: DESCRIPTORS: ACHING

## 2022-04-02 ASSESSMENT — PAIN SCALES - WONG BAKER: WONGBAKER_NUMERICALRESPONSE: 8;10

## 2022-04-02 ASSESSMENT — PAIN DESCRIPTION - PAIN TYPE
TYPE: ACUTE PAIN

## 2022-04-02 ASSESSMENT — PAIN DESCRIPTION - ORIENTATION
ORIENTATION: RIGHT
ORIENTATION: LEFT;RIGHT
ORIENTATION: LEFT;RIGHT

## 2022-04-02 ASSESSMENT — PAIN DESCRIPTION - FREQUENCY: FREQUENCY: CONTINUOUS

## 2022-04-02 NOTE — PROGRESS NOTES
Physical Therapy  [address]  Acute Rehabilitation Physical Therapy Progress Note    Date: 22  Patient Name: Rebekah Lobo       Room: 5940/9631-63  MRN: [id number]   Account: [de-identified]   : 1950  (75 y.o.) Gender: male     Referring Practitioner: Irma Muñiz MD  Diagnosis: Cervical stenosis with myelopathy s/p C2-C5 laminectomy and fusion   Past Medical History:  has a past medical history of Depression, Diabetes mellitus ([address] Difficult intravenous access, Hyperlipidemia, Hypertension, Migraines, PTSD (post-traumatic stress disorder), Sleep apnea, Teeth missing, and Wears glasses. Past Surgical History:   has a past surgical history that includes Cardiac catheterization (2010); Carpal tunnel release (Left, 2002); Vasectomy (1983); Tonsillectomy and adenoidectomy (); Hydrocele surgery (); Middle ear surgery (2018); eye surgery (Left, ); Mouth surgery (2018); other surgical history (2018); cervical fusion (2018); pr office/outpt visit,procedure only (N/A, 2018); Cataract removal; laminectomy (2022); and cervical fusion (N/A, 3/24/2022). Additional Pertinent Hx: Zay Bryant is a 70 y.o. male with history of HTN, HLD, diabetes, GIA, migraines, PTSD, and depression admitted to [facility] on 3/23/2022. He initially presented to [facility] after a fall with subsequent worsening limb weakness and additional falls. MRI brain showed no acute intracranial abnormality. MRI cervical spine showed severe spinal stenosis at C2-C3 and C3-C4 with complete effacement of the CSF. MRI thoracic and lumbar spine were relatively unremarkable except for moderate spinal stenosis from L2-L3 to L4-L5. He was transferred to [facility] for neurosurgical evaluation. He underwent laminectomy and fusion at C2-C5 on 3/24/22 (Dr. Hannah Ordonez). Hospital course has been complicated by urinary retention.  He reports continued neck pain with pain in the limbs as well. He also notes numbness/tingling and weakness in all limbs. Pt admitted to rehab unit on 3/31/22    Overall Orientation Status: Within Functional Limits  Restrictions/Precautions  Restrictions/Precautions: Fall Risk;General Precautions  Required Braces or Orthoses?: Yes  Implants present? : Metal implants  Required Braces or Orthoses  Cervical: c-collar  Position Activity Restriction  Spinal Precautions: No Twisting; No Lifting; No Bending  Spinal Precautions: Poor Sitting balance, Bilateral Upper  and Lower Body muscle weakness, Assist with standing in GERA Stedy  Other position/activity restrictions: up with assist; s/p C2-C5 laminectomy and fixation 3/24, Collar on while out of bed. Ok to remove while resting In bed eating and drinking in bed    Subjective: Pt is in bed upon arrival. P is agreeable to PT. Comments: C-collar donned during tx. Vital Signs  Level of Consciousness: Alert (0)  Patient Currently in Pain: Yes  Pain Assessment: Faces  Guerrero-Baker Pain Rating: Hurts whole lot;Hurts worst  Pain Type: Acute pain  Pain Location: Shoulder;Neck  Pain Orientation: Left;Right  Non-Pharmaceutical Pain Intervention(s): Ambulation/Increased Activity; Distraction;Repositioned;Relaxation techniques  Response to Pain Intervention: Patient Satisfied     Oxygen Therapy  O2 Device: None (Room air)          Bed Mobility:   Bed Mobility  Rolling: Maximal assistance  Supine to Sit: Maximal assistance (x2)  Sit to Supine: Maximal assistance (x2)  Scooting: Maximal assistance  Comment: Pt requires Max A x2 to complete sup <> sit transfers. Pt is able to sit EOB x~15min this date with Max A x1 for sitting balance. Rolling completed with Max A x1. RN to assist with brief change and [place] care. Transfers:  Sit to Stand: Moderate Assistance;2 Person Assistance  Stand to sit: Moderate Assistance;2 Person Assistance   Comments: STS transfer x1 in SS.  Pt requires cues for proper breathing/relaxation technique. Pillow placed between pt's knees and SS. Pt reports nausea and increase pain with stand, requesting to return to supine and remove C-collar. Pt is only able to tolerate sitting in SS x~30 sec this date. Pt is returned to bed with pressure call light in reach and all other needs addressed. Stairs/Curb  Stairs?: No                            BALANCE Posture: Poor  Sitting - Static: Poor;+  Sitting - Dynamic: Poor  Standing - Static: Poor;+ Jose Alejandro Clore stedy)  Comments: Sitting balance assessed at EOB. EXERCISES Exercises  Quad Sets: 10x bilat supine  Heelslides: 10x bilat supine AAROM  Gluteal Sets: 10x bilat supine  Hip Abduction: 10x bilat supine AAROM  Knee Long Arc Quad: 10x bilat seated EOB  Ankle Pumps: 10x bilat supine  Core Strengthening: EOB ~15min  Other exercises?: Yes  Other exercises 1: Supine AAROM bilat LE exercises, x10 reps  Other exercises 2: Dangle at EOB ~ 15 minutes, 2 person assit for safety fue to poor sitting balance. Other exercises 3: Rolling x1 in each direction. MAx A x1  Other exercises 4: Sitting LAQ's. Reps: ~5 each. Pt is limited due to pain. Other Activities  Comment: rest breaks PRN. Increase time required to complete all tasks.          Activity Tolerance: Patient limited by fatigue,Patient limited by endurance,Patient limited by pain  PT Equipment Recommendations  Equipment Needed: No  Other: TBD       Patient Education  New Education Provided:    Learner:patient  Method: demonstration and explanation       Outcome: needs reinforcement     Current Treatment Recommendations: Strengthening,ROM,Balance Training,Functional Mobility Training,Transfer Training,Endurance Training,Wheelchair Mobility Training,Stair training,Gait Training,Neuromuscular Re-education,Home Exercise Program,Safety Education & Training,Patient/Caregiver Education & Training,Equipment Evaluation, Education, & procurement    Conditions Requiring Skilled Therapeutic Intervention  Body structures, Functions, Activity limitations: Decreased functional mobility ; Decreased strength;Decreased safe awareness;Decreased endurance;Decreased balance; Increased pain;Decreased posture;Decreased coordination;Decreased fine motor control;Decreased sensation;Decreased ROM  Assessment: Pt has frequent falls at home, gradually declining in fucntion since Feb\"2022. Pt underwent C2-C5 fusion/laminectomy, presetns wuth quadraparesis, B UE>B LE. Pt requires 2 person assist for safe bed mobility  and dependnet for transfer at his time. WIll conitnue POC to improve stregnth and balance to faciliate independence. Treatment Diagnosis: Impaired function. Prognosis: Fair  Decision Making: Medium Complexity  Barriers to Learning: Pain  REQUIRES PT FOLLOW UP: Yes  Discharge Recommendations: Patient would benefit from continued therapy after discharge;Home with assist PRN    Goals  Short term goals  Time Frame for Short term goals: 10 visits  Short term goal 1: Perform rolling in bed min/mod A   Short term goal 2: Perform supine to sit min/mod A   Short term goal 3: Perform sit to supine max A   Short term goal 4: Perform sit<> stand mod A , and pivot transfers at mod A  Short term goal 5: Progress to ambulation HHA/Gait belt assist or appropriate device, mod A x 2 20 to 40 ft  Short term goal 6: Propel w/c with B LE, distance of 50 ft or > , min/mod A  Short term goal 7: Demo Fair- dynamic standing balance to decrease risk of falls  Long term goals  Time Frame for Long term goals : By DC  Long term goal 1: Pt able to perform bed mobility at [address] term goal 2: Pt able to perform transfers at min A   Long term goal 3: Pt able to ambulate with or without device, distance of 100 ft atleast, mod A, level surface.   Long term goal 4: Pt able to perform 2 to 4 steps at [place] x 2  Long term goal 5: Pt able to propel w/c on level surface,  distance of 150 ft, SBA, level surfaces  Long term goal 6: Family training for safe mobility to return home  Long term goal 7: Improve sitting/standing balance to good/fair to reduce fall risk. Long term goal 8: Pt able to ambulate 60 to 80 ft for 2MWT,to improve overall fucntion.        04/02/22 0906   PT Individual Minutes   Time In 0906   Time Out 1001   Minutes 55       Electronically signed by Estelle Chang PTA on 4/2/22 at 3:31 PM EDT

## 2022-04-02 NOTE — PROGRESS NOTES
activity. Times per day: Twice a day  Current Treatment Recommendations: Self-Care / ADL,Strengthening,ROM,Balance Training,Functional Mobility Training,Endurance Training,Pain Management,Safety Education & Training,Patient/Caregiver Education & Training,Equipment Evaluation, Education, & procurement,Positioning  Patient Goals   Patient goals : \"I just want to be able to get back to my basic needs. \"  Short term goals  Time Frame for Short term goals: By 10 days  Short term goal 1: Pt will complete upper body dressing/bathing with max A with use of AE as needed while maintaining cervical precautions  Short term goal 2: Pt will complete self feeding task with max A with adaptive strategies and good safety  Short term goal 3: Pt will tolerate sitting EOB 5+ minutes unsupported with mod A during functional activity to increase independence with self care and mobility  Short term goal 4: Pt will complete functional transfers during self care tasks with mod A and good safety  Short term goal 5: Pt will participate in 30+ minutes of therapeutic exercises/functional activities to increase safety and independence with self care and mobility  Long term goals  Time Frame for Long term goals : By discharge  Long term goal 1: Pt will complete self feeding task with mod A with use of adaptive strategies   Long term goal 2: Pt will complete upper body bathing/dressing with mod A and good safety while maintaining cervical precautions  Long term goal 3: Pt will tolerate sitting EOB 10+ minutes unsupported with CGA and good safety during functional activity of choice  Long term goal 4: Pt will complete functional transfer with min A and good safety during self care tasks  Long term goal 5: Pt will demonstrated increased BUE gross motor/fine motor to increase participation in self care tasks  Long term goals 6: Pt/family with verbalize/demosntrate Good understanding of cervical precautions during self care tasks  Long term goal 7: Pt/family will demonstrate Good understanding of BUE exercises to increase functional use of BUE during self care tasks        04/02/22 1000 04/02/22 1537   OT Individual Minutes   Time In 1000 1408   Time Out 1036 1432   Minutes 36 24     Electronically signed by ASHLEY Littlejohn on 4/2/22 at 3:38 PM EDT

## 2022-04-02 NOTE — PLAN OF CARE
Problem: Skin Integrity:  Goal: Will show no infection signs and symptoms  Description: Will show no infection signs and symptoms  Outcome: Ongoing  Goal: Absence of new skin breakdown  Description: Absence of new skin breakdown  4/2/2022 1033 by Rosalinda Rivera LPN  Outcome: Ongoing  4/2/2022 0336 by Mark Sampson LPN  Outcome: Ongoing  Note: Skin assessment completed this shift. Nutrition and Hydration status assessed with adequate intake. Ry Score as charted. Bilateral heels remain elevated on pillows throughout the shift. Patient tolerates repositioning by staff at least every 2 hours. Patient verbalizes understanding of pressure ulcer prevention measures. Skin integrity maintained. No new skin breakdown noted. Skin to high risk pressure areas including coccyx and heels are clear. Nereida / Incontinence care provided as needed throughout the shift. Zinc Oxide paste applied to buttocks with brief changes. Problem: Falls - Risk of:  Goal: Will remain free from falls  Description: Will remain free from falls  Outcome: Ongoing  Goal: Absence of physical injury  Description: Absence of physical injury  Outcome: Ongoing     Problem: Pain:  Goal: Pain level will decrease  Description: Pain level will decrease  4/2/2022 1033 by Rosalinda Rivera LPN  Outcome: Ongoing  4/2/2022 0336 by Mark Sampson LPN  Outcome: Ongoing  Note: Pain assessment completed. Pt. able to rest.  Patient medicated with    tyelnol         for pain this shift as ordered. Patient relates a tolerable level of discomfort with the current medication. Pt. Repositions with assistance Nonverbal cues indicate pain relief. Pt. Rests quietly with eyes closed after pain medication administration. Respirations easy and unlabored. Appears free from distress.      Goal: Control of acute pain  Description: Control of acute pain  Outcome: Ongoing  Goal: Control of chronic pain  Description: Control of chronic pain  Outcome: Ongoing Problem: Musculor/Skeletal Functional Status  Goal: Highest potential functional level  Outcome: Ongoing  Goal: Absence of falls  4/2/2022 1033 by Jarocho Leroy LPN  Outcome: Ongoing  4/2/2022 0336 by Cat Aldridge LPN  Outcome: Ongoing  Note: No falls or injuries sustained at this time. No attempts to get out of bed without nursing assistance. Call light within reach and pt. uses appropriately for assistance. Siderails up x 2. Nonskid footwear remains on. Bed in low and locked position. Hourly nursing rounds made. Pt. Alert and oriented, aware of limitations, and exhibits good safety judgement. Pt. uses assistive devices appropriately. Pt. understands individual fall risk factors. Pt. reoriented to surroundings and reminded to use call light with each nurse/patient interaction. Bed alarm / Personal alarm remains engaged throughout the shift as a precaution. Problem: Nutrition  Goal: Optimal nutrition therapy  4/2/2022 1033 by Jarocho Leroy LPN  Outcome: Ongoing  4/2/2022 0336 by Cat Aldridge LPN  Outcome: Ongoing  Note: Please check patient's intake and output.

## 2022-04-02 NOTE — FLOWSHEET NOTE
04/01/22 2013   Encounter Summary   Services provided to: Patient   Referral/Consult From: Rounding   Complexity of Encounter Low   Length of Encounter 15 minutes   Spiritual/Yazdanism   Type Spiritual support   Assessment Sleeping   Intervention Prayer

## 2022-04-02 NOTE — PLAN OF CARE
Problem: Skin Integrity:  Goal: Absence of new skin breakdown  Description: Absence of new skin breakdown  4/2/2022 0336 by Rojelio Washburn LPN  Outcome: Ongoing  Note: Skin assessment completed this shift. Nutrition and Hydration status assessed with adequate intake. Ry Score as charted. Bilateral heels remain elevated on pillows throughout the shift. Patient tolerates repositioning by staff at least every 2 hours. Patient verbalizes understanding of pressure ulcer prevention measures. Skin integrity maintained. No new skin breakdown noted. Skin to high risk pressure areas including coccyx and heels are clear. Nereida / Incontinence care provided as needed throughout the shift. Zinc Oxide paste applied to buttocks with brief changes. Problem: Pain:  Goal: Pain level will decrease  Description: Pain level will decrease  4/2/2022 0336 by Rojelio Washburn LPN  Outcome: Ongoing  Note: Pain assessment completed. Pt. able to rest.  Patient medicated with    tyelnol         for pain this shift as ordered. Patient relates a tolerable level of discomfort with the current medication. Pt. Repositions with assistance Nonverbal cues indicate pain relief. Pt. Rests quietly with eyes closed after pain medication administration. Respirations easy and unlabored. Appears free from distress. Problem: Musculor/Skeletal Functional Status  Goal: Absence of falls  4/2/2022 0336 by Rojelio Washburn LPN  Outcome: Ongoing  Note: No falls or injuries sustained at this time. No attempts to get out of bed without nursing assistance. Call light within reach and pt. uses appropriately for assistance. Siderails up x 2. Nonskid footwear remains on. Bed in low and locked position. Hourly nursing rounds made. Pt. Alert and oriented, aware of limitations, and exhibits good safety judgement. Pt. uses assistive devices appropriately. Pt. understands individual fall risk factors.   Pt. reoriented to surroundings and reminded to use call light with each nurse/patient interaction. Bed alarm / Personal alarm remains engaged throughout the shift as a precaution. Problem: Nutrition  Goal: Optimal nutrition therapy  4/2/2022 0336 by Shi Gant LPN  Outcome: Ongoing  Note: Please check patient's intake and output.

## 2022-04-02 NOTE — CONSULTS
[facility] Internal Medicine    CONSULTATION / HISTORY AND PHYSICAL EXAMINATION            Date:   4/1/2022  Patient name:  Yue Carrera  Date of admission:  3/31/2022  8:38 PM  MRN:   [id number]  Account:  [de-identified]  YOB: 1950  PCP:    Kayli Matamoros  Room:   2414/6125-29  Code Status:    DNR-CCA    Physician Requesting Consult: Mendel Hall, MD    Reason for Consult:  medical management    Chief Complaint:     No chief complaint on file.       History Obtained From:     Patient medical record nursing staff    History of Present Illness:   Patient past medical multiple medical problems include hypertension, heart failure with preserved ejection fraction, diabetes, bipolar disorder,   glaucoma, patient was originally admitted at [place] where he was transferred from outlying facility, he developed, generalized weakness after a fall, patient underwent MRI of cervical spine suggestive of severe spinal canal stenosis at the level of C2-C4 level, patient underwent C2-C5 posterior decompression surgery, he feels that weakness in his extremities is slightly better  Patient during his hospital stay, developed bradycardia which improved after discontinuation of beta-blocker, cardiology was also consulted  He developed hospital-acquired pneumonia, getting treated with cefepime and doxycycline  Patient denying any complaints of cough, shortness of breath, chest pain,    Past Medical History:     Past Medical History:   Diagnosis Date    Depression 2017    ON RX    Diabetes mellitus ([facility] [address] 2013    NIDDM    Difficult intravenous access     Hyperlipidemia 2008    ON RX    Hypertension 2008    ON RX    Migraines     PTSD (post-traumatic stress disorder) 01/2018    ON RX    Sleep apnea 01/2018    UNALBE TO USE TO CLEARED BY ENT (CPAP)    Teeth missing     BACK TEETH UPPER AND LOWER TO BE FITTED FOR PARTIALS AT LATER DATE    Wears glasses         Past Surgical History:     Past Surgical History:   Procedure Laterality Date    CARDIAC CATHETERIZATION  02/2010    no stents     CARPAL TUNNEL RELEASE Left 01/09/2002    CATARACT REMOVAL      CERVICAL FUSION  04/19/2018    anterior cervical fusion C5-6    CERVICAL FUSION N/A 3/24/2022    C2-5 FUSION, C2-4 LAMINECTOMY performed by Isadora Christine DO at [facility] Left 2018    CATARACT EXTRACTION WITH IOL    HYDROCELE EXCISION  1951    LAMINECTOMY  03/24/2022    C2-5 FUSION, C2-4 LAMINECTOMY    MIDDLE EAR SURGERY  01/11/2018    MIDDLE EAR REBUILT AND EAR DRUM REPLACED    MOUTH SURGERY  04/16/2018    5 TEETH REMOVED    OTHER SURGICAL HISTORY  04/19/2018    : ANTERIOR CERVICAL CORRPECTOMY C5-6, SYNTHES, DEPUY, REG TABLE, SUPINE,     AR OFFICE/OUTPT VISIT,PROCEDURE ONLY N/A 4/19/2018    ANTERIOR CERVICAL CORRPECTOMY AND FUSION C5-6 performed by Isadora Christine DO at [address] [place] [address]  12/1983        Medications Prior to Admission:     Prior to Admission medications    Medication Sig Start Date End Date Taking?  Authorizing Provider   finasteride (PROSCAR) 5 MG tablet Take 5 mg by mouth daily   Yes Historical Provider, MD   carboxymethylcellulose 1 % ophthalmic solution Place 2 drops into both eyes 3 times daily Pt states \"2 drops in both eyes three times a day\"    Historical Provider, MD   divalproex (DEPAKOTE ER) 250 MG extended release tablet Take 750 mg by mouth at bedtime    Historical Provider, MD   furosemide (LASIX) 20 MG tablet Take 20 mg by mouth daily    Historical Provider, MD   venlafaxine (EFFEXOR XR) 150 MG extended release capsule Take 1 capsule by mouth daily  Patient taking differently: Take 225 mg by mouth daily  6/23/20   Alva Patrick MD   simethicone (MYLICON) 80 MG chewable tablet Take 1 tablet by mouth every 6 hours as needed for Flatulence  Patient taking differently: Take 80 mg by mouth every 8 hours as needed for Flatulence  6/22/20 use.  Drug Use:  reports current drug use. Drug: Marijuana Satya Magana). Family History:     Family History   Problem Relation Age of Onset   Bonilla Dementia Mother     Coronary Art Dis Mother     Stroke Mother     Diabetes Mother     Asthma Mother     Other Mother         BRAIN ANEURISM    High Blood Pressure Mother     Heart Disease Father     Diabetes Sister     Diabetes Brother     High Blood Pressure Brother     High Cholesterol Brother     Heart Disease Brother     Diabetes Sister     Other Sister         NEUROPATHY       Review of Systems:     Positive and Negative as described in HPI. CONSTITUTIONAL:  negative for fevers, chills, sweats, fatigue, weight loss  HEENT:  negative for vision, hearing changes, runny nose, throat pain  RESPIRATORY:  negative for shortness of breath, cough, congestion, wheezing. CARDIOVASCULAR:  negative for chest pain, palpitations.   GASTROINTESTINAL:  negative for nausea, vomiting, diarrhea, constipation, change in bowel habits, abdominal pain   GENITOURINARY:  negative for difficulty of urination, burning with urination, frequency   INTEGUMENT:  negative for rash, skin lesions, easy bruising   HEMATOLOGIC/LYMPHATIC:  negative for swelling/edema   ALLERGIC/IMMUNOLOGIC:  negative for urticaria , itching  ENDOCRINE:  negative increase in drinking, increase in urination, hot or cold intolerance  MUSCULOSKELETAL:  negative joint pains, muscle aches, swelling of joints  NEUROLOGICAL: Weakness in all 4 extremities  BEHAVIOR/PSYCH:      Physical Exam:     BP (!) 143/87   Pulse 80   Temp 98.6 °F (37 °C)   Resp 18   Ht 5' 3\" (1.6 m)   Wt 142 lb (64.4 kg)   SpO2 94%   BMI 25.15 kg/m²   Temp (24hrs), Av.4 °F (36.9 °C), Min:98.2 °F (36.8 °C), Max:98.6 °F (37 °C)    Recent Labs     22  0623 22  1118 22  1550   POCGLU 105 101 209*       Intake/Output Summary (Last 24 hours) at 2022  Last data filed at 2022 1748  Gross per 24 hour   Intake -- Output 480 ml   Net -480 ml       General Appearance:  alert, well appearing, and in no acute distress  Mental status: oriented to person, place, and time with normal affect  Head:  normocephalic, atraumatic. Eye: no icterus, redness, pupils equal and reactive, extraocular eye movements intact, conjunctiva clear  Ear: normal external ear, no discharge, hearing intact  Nose:  no drainage noted  Mouth: mucous membranes moist  Neck: supple, no carotid bruits, thyroid not palpable , staples present posterior neck, healing  Lungs: Bilateral equal air entry, clear to ausculation, no wheezing, rales or rhonchi, normal effort  Cardiovascular: normal rate, regular rhythm, no murmur, gallop, rub. Abdomen: Soft, nontender, nondistended, normal bowel sounds, no hepatomegaly or splenomegaly  Neurologic: Weakness in all 4 extremities, power in all 4 extremities 4/5 skin: No gross lesions, rashes, bruising or bleeding on exposed skin area  Extremities:  peripheral pulses palpable, no pedal edema or calf pain with palpation  Psych:      Investigations:      Laboratory Testing:  Recent Results (from the past 24 hour(s))   Basic Metabolic Panel w/ Reflex to MG    Collection Time: 04/01/22  6:18 AM   Result Value Ref Range    Glucose 105 (H) 70 - 99 mg/dL    BUN 15 8 - 23 mg/dL    CREATININE 0.52 (L) 0.70 - 1.20 mg/dL    Calcium 9.1 8.6 - 10.4 mg/dL    Sodium 134 (L) 135 - 144 mmol/L    Potassium 3.9 3.7 - 5.3 mmol/L    Chloride 97 (L) 98 - 107 mmol/L    CO2 27 20 - 31 mmol/L    Anion Gap 10 9 - 17 mmol/L    GFR Non-African American >60 >60 mL/min    GFR African American >60 >60 mL/min    GFR Comment         CBC auto differential    Collection Time: 04/01/22  6:18 AM   Result Value Ref Range    WBC 7.7 3.5 - 11.0 k/uL    RBC 3.41 (L) 4.5 - 5.9 m/uL    Hemoglobin 11.0 (L) 13.5 - 17.5 g/dL    Hematocrit 31.6 (L) 41 - 53 %    MCV 92.8 80 - 100 fL    MCH 32.2 26 - 34 pg    MCHC 34.6 31 - 37 g/dL    RDW 12.8 11.5 - 14.9 %    Platelets 462 (H) 150 - 450 k/uL    MPV 7.0 6.0 - 12.0 fL    Seg Neutrophils 67 (H) 36 - 66 %    Lymphocytes 19 (L) 24 - 44 %    Monocytes 11 (H) 1 - 7 %    Eosinophils % 3 0 - 4 %    Basophils 0 0 - 2 %    Segs Absolute 5.10 1.3 - 9.1 k/uL    Absolute Lymph # 1.50 1.0 - 4.8 k/uL    Absolute Mono # 0.80 0.1 - 1.3 k/uL    Absolute Eos # 0.20 0.0 - 0.4 k/uL    Basophils Absolute 0.00 0.0 - 0.2 k/uL   POC Glucose Fingerstick    Collection Time: 04/01/22  6:23 AM   Result Value Ref Range    POC Glucose 105 75 - 110 mg/dL   POC Glucose Fingerstick    Collection Time: 04/01/22 11:18 AM   Result Value Ref Range    POC Glucose 101 75 - 110 mg/dL   POC Glucose Fingerstick    Collection Time: 04/01/22  3:50 PM   Result Value Ref Range    POC Glucose 209 (H) 75 - 110 mg/dL           Consultations:   IP CONSULT TO DIETITIAN  IP CONSULT TO SOCIAL WORK  IP CONSULT TO INTERNAL MEDICINE  Assessment :      Primary Problem  Cervical stenosis of spine    Active Hospital Problems    Diagnosis Date Noted    Cervical stenosis of spine [M48.02] 03/31/2022       Plan:     1. Quadriparesis, due to cervical cord compression after fall s/p C2-C5 decompression surgery. 2. Hypertension, controlled restart home dose of Norvasc, losartan, will avoid beta-blockers since patient developed bradycardia in [facility]  3. Heart failure preserved ejection fraction, compensated , on Lasix  4. GERD, restarted Protonix  5. On DVT prophylaxis Lovenox  6. BPH, started on finasteride  7. History of glaucoma, restarted home medications of eyedrop  8. Patient has neurological better, will have intermittent catheterization  9. Diabetes, controlled          Mendez Oleary MD  4/1/2022  8:47 PM    Copy sent to Dr. Tigist Soler    Please note that this chart was generated using voice recognition Dragon dictation software. Although every effort was made to ensure the accuracy of this automated transcription, some errors in transcription may have occurred.

## 2022-04-02 NOTE — PLAN OF CARE
Individualized Plan of [facility]    Rehabilitation physician: Dr. Karie Rey Date: 3/31/2022     Rehabilitation Diagnosis: Cervical stenosis of spine [M48.02]     Rehabilitation impairments: self care, mobility, motor dysfunction, bowel/bladder management, pain management and safety    Factors facilitating achievement of predicted outcomes: Family support, Caregiver support, Motivated, Cooperative, Pleasant and Knowledge about rehab  Barriers to the achievement of predicted outcomes: Pain, Decreased endurance, Decreased sensation, Decreased proprioception, Upper extremity weakness and Lower extremity weakness    Patient Goals: Improve independence with mobility, Improvement of mobility at a wheelchair level, Increase overall strength and endurance, Increase balance, Increase endurance, Increase independence with activities of daily living, Improve cognition, Increase self-awareness, Increase safety awareness, Increase community integration, Increase socialization, Functional communication with caregivers, Integrate appropriate pain management plan, Assure adequate nutritional option for discharge, Continence of bowel and bladder and Provide appropriate patient and family education      NURSING:  Nursing goals for Yiselasa Amna while on the rehabilitation unit will include:  Continence of bowel and bladder, Adequate number of bowel movements, Urinate with no urinary retention >300ml in bladder, Complete bladder protocol with brewster removal, Maintain O2 SATs at an acceptable level during stay, Effective pain management while on the rehabilitation unit, Establish adequate pain control plan for discharge, Absence of skin breakdown while on the rehabilitation unit, Improved skin integrity via assessments including wound measurements, Avoidance of any hospital acquired infections, No signs/symptoms of infection at the wound site, Freedom from injury during hospitalization and Complete education with patient/family with understanding demonstrated regarding disease process and resultant impairment     In order to achieve these goals, nursing interventions may include bowel/bladder training, education for medical assistive devices, medication education, O2 saturation management, energy conservation, stress management techniques, fall prevention, alarms protocol, seating and positioning, skin/wound care, pressure relief instruction, dressing changes, infection protection, DVT prophylaxis, assistance with safe transfers , and/or assistance with bathroom activities and hygiene. PHYSICAL THERAPY:  Goals:        Short term goals  Time Frame for Short term goals: 10 visits  Short term goal 1: Perform rolling in bed min/mod A   Short term goal 2: Perform supine to sit min/mod A   Short term goal 3: Perform sit to supine max A   Short term goal 4: Perform sit<> stand mod A , and pivot transfers at mod A  Short term goal 5: Progress to ambulation HHA/Gait belt assist or appropriate device, mod A x 2 20 to 40 ft  Short term goal 6: Propel w/c with B LE, distance of 50 ft or > , min/mod A  Short term goal 7: Demo Fair- dynamic standing balance to decrease risk of falls  Long term goals  Time Frame for Long term goals : By DC  Long term goal 1: Pt able to perform bed mobility at [address] term goal 2: Pt able to perform transfers at min A   Long term goal 3: Pt able to ambulate with or without device, distance of 100 ft atleast, mod A, level surface. Long term goal 4: Pt able to perform 2 to 4 steps at [place] x 2  Long term goal 5: Pt able to propel w/c on level surface,  distance of 150 ft, SBA, level surfaces  Long term goal 6: Family training for safe mobility to return home  Long term goal 7: Improve sitting/standing balance to good/fair to reduce fall risk. Long term goal 8: Pt able to ambulate 60 to 80 ft for 2MWT,to improve overall fucntion.     Plan of Care:   Due to impaired functional endurance and/or medical issues, the patient is to be seen for a combined total of at least a  900 minutes over 7 days of Physical, Occupational and/or Speech Therapy. Anticipated interventions may include therapeutic exercises, gait training, neuromuscular re-ed, transfer training, community reintegration, bed mobility, w/c mobility and training.       OCCUPATIONAL THERAPY:  Goals:             Short term goals  Time Frame for Short term goals: By 10 days  Short term goal 1: Pt will complete upper body dressing/bathing with max A with use of AE as needed while maintaining cervical precautions  Short term goal 2: Pt will complete self feeding task with max A with adaptive strategies and good safety  Short term goal 3: Pt will tolerate sitting EOB 5+ minutes unsupported with mod A during functional activity to increase independence with self care and mobility  Short term goal 4: Pt will complete functional transfers during self care tasks with mod A and good safety  Short term goal 5: Pt will participate in 30+ minutes of therapeutic exercises/functional activities to increase safety and independence with self care and mobility  Long term goals  Time Frame for Long term goals : By discharge  Long term goal 1: Pt will complete self feeding task with mod A with use of adaptive strategies   Long term goal 2: Pt will complete upper body bathing/dressing with mod A and good safety while maintaining cervical precautions  Long term goal 3: Pt will tolerate sitting EOB 10+ minutes unsupported with CGA and good safety during functional activity of choice  Long term goal 4: Pt will complete functional transfer with min A and good safety during self care tasks  Long term goal 5: Pt will demonstrated increased BUE gross motor/fine motor to increase participation in self care tasks  Long term goals 6: Pt/family with verbalize/demosntrate Good understanding of cervical precautions during self care tasks  Long term goal 7: Pt/family will demonstrate Good understanding of BUE exercises to increase functional use of BUE during self care tasks      Plan Of Care:  Due to impaired functional endurance and/or medical issues, the patient is to be seen for a combined total of at least a  900 minutes over 7 days of Physical, Occupational and/or Speech Therapy. Anticipated interventions may include ADL and IADL retraining, strengthening, safety education and training, patient/caregiver education and training, equipment evaluation/ training/procurement, neuromuscular reeducation, wheelchair mobility training. CASE MANAGEMENT:  Goals:   Assist patient/family with discharge planning, patient/family counseling,  and coordination with insurance during the inpatient rehabilitation stay. Other members of the multidisciplinary rehabilitation team that will be involved in the patient's plan of care include recreational therapy, dietary, respiratory therapy, and neuropsychology. Medical issues being managed closely and that require 24 hour availability of a physician:  Swallowing precautions, Bowel/Bladder function, Weight bearing precautions, Wound care, Pain management, Infection protection, DVT prophylaxis, Fall precautions, Fluid/Electrolyte balance, Nutritional status, Respiratory needs, Anemia and History of heart disease                                           Physician anticipated functional outcomes: Improved independence with functional measures   Estimated length of stay for this admission 2-3  Medical Prognosis: Fair  Anticipated disposition: Home. The potential to achieve the above medical and rehabilitative goals is fair. This plan of care has been developed with the assistance and input of the multidisciplinary rehabilitation team.  The plan was reviewed with the patient on 4/2/2022.   The patient has had the opportunity to provide input to the therapy team.    I have reviewed this Individualized Plan of Care and agree with its contents. Above documentation has been expanded, modified, adjusted to reflect the findings of my evaluations and goals for the patient.     Physician:  Kevin Johnson MD

## 2022-04-02 NOTE — PROGRESS NOTES
Physical Medicine & Rehabilitation  Progress Note    4/2/2022 12:47 PM     Subjective:    No complaints except the high room temperature, poor oral intake per RN , denies pain. on B/B pg . Low PVR. ROS:  Denies fevers, chills, sweats. No chest pain, palpitations, lightheadedness. Denies coughing, wheezing or shortness of breath. Denies abdominal pain, nausea, diarrhea or constipation. No new areas of joint pain. Denies new areas of numbness , positive for all extremities weakness. Denies new anxiety or depression issues. No new skin problems. Rehabilitation:   .  PT    Bed Mobility  Rolling: Maximal assistance  Supine to Sit: Maximal assistance (x2)  Sit to Supine: Maximal assistance (x2)  Scooting: Maximal assistance  Comment: Pt is able to advance BLE's with CGA, however once BLE's hooked on EOB pt requires Max A x1 for trunk support and Mod A x1 for BLE's and scooting. Pt is only able to tolerate sitting on EOB x~2min due to increase pain. Pt is briefly able to sit EOB with SBA ~5 sec, pt demostrating a right posterior lean. Pt requires Max A x1 for all other sitting balance. Pt is requesting to return to supine due to increase pain. Pt is able to roll x1 in each direction with Max A x1. OTR Sasha assist with breif change and [place] care.         Stairs/Curb  Stairs?: No     BALANCE Posture: Poor  Sitting - Static: Poor;+  Sitting - Dynamic: Poor  Comments: Sitting balance assessed at EOB.      EXERCISES Exercises  Quad Sets: 10x bilat supine  Heelslides: 10x bilat supine AAROM  Gluteal Sets: 10x bilat supine  Hip Abduction: 10x bilat supine AAROM  Ankle Pumps: 10x bilat supine  Core Strengthening: EOB ~2min    Edu/instructed to complete supine there ex as tolerated. Pt V good understanding at this time.    Activity Tolerance: Patient limited by fatigue,Patient limited by endurance,Patient limited by pain  PT Equipment Recommendations  Equipment Needed: No  Other: TBD       OT    Sensation  Overall Sensation Status: Impaired  Additional Comments: Numbness all the way from neck to fingers, back to toes, bilaterally.      UE Function  Hand Dominance  Hand Dominance: Left  LUE Strength  Gross LUE Strength: Exceptions to [facility]  L Shoulder Flex: 1+/5  L Elbow Flex: 1+/5  L Hand General: 2/5  LUE Strength Comment: Limited movement noted in LUE  LUE Tone: Hypotonic  LUE PROM (degrees)  LUE General PROM: ~90 degrees shoulder flexion due to pain; Elbow WFL  LUE AROM (degrees)  LUE AROM : Exceptions  LUE General AROM: ~10 degrees shoulder flexion; ~10 degrees elbow; able to shoulder shrug  Left Hand PROM (degrees)  Left Hand PROM: WFL  Left Hand AROM (degrees)  Left Hand General AROM: Pt able to complete grasp/release; Limited finger opposition  RUE Strength  Gross RUE Strength: Exceptions to [facility]  R Shoulder Flex: 2-/5  R Elbow Flex: 2-/5  R Hand General: 2+/5  RUE Strength Comment: Pt demonstrated increased strength in RUE   RUE Tone: Hypotonic  RUE PROM (degrees)  RUE General PROM: ~45 degrees shoulder flexion due to pain; elbow ~95 degrees;   RUE AROM (degrees)  RUE AROM : Exceptions  RUE General AROM: ~30 degrees shoulder flexion; Elbow flexion ~50 degrees  Right Hand PROM (degrees)  Right Hand PROM: WFL  Right Hand AROM (degrees)  Right Hand General AROM: Pt able to complete grasp/release; finger oppositino with icnreased time and difficutly        Fine Motor Skills  Coordination  Movements Are Fluid And Coordinated: No  Coordination and Movement description: Fine motor impairments,Gross motor impairments,Right UE,Left UE,Decreased speed,Decreased accuracy          Mobility  Supine to Sit: Maximum assistance  Sit to Supine: Maximum assistance,2 Person assistance  Balance  Sitting Balance: Maximum assistance  Standing Balance: Dependent/Total (in elbert wendy)  Standing Balance  Time: 10 seconds x 2  Activity: Functional transfers with SS  Comment: 2 person A with elbert nguyen. Bed mobility  Bridging:  Moderate assistance  Rolling to Left: Maximum assistance  Rolling to Right: Maximum assistance  Supine to Sit: Maximum assistance  Sit to Supine: Maximum assistance;2 Person assistance  Scooting: Maximal assistance  Comment: C-collar donned prior to bed mobility. Pt completed bed mobility with trunk and BLE assistance. Pt tolerated sitting EOB ~10 minutes in AM with max A for sitting balance due to heavy posterior lean. PM: Bed mobility completed with Max A x 1 and mod A x 1 due to increased pain and fatigue to sitting EOB. Pt tolerated sitting EOB ~ 2 minutes before pt reported increased pain requesting to return to supine position. max A x 2 to return to supine position. Pt completed rolling side to side while supine in bed to asssit with brief change. Transfers  Sit to stand: 2 Person assistance,Moderate assistance  Stand to sit: 2 Person assistance,Moderate assistance  Transfer Comments: Transfers completed with 2 person A with use of elbert stedy from bed height. Min A x 2 from elbert stedy height. Verbal cues for pursed lip breathing to assist with pain management. Functional Activity Tolerance  Functional Activity Tolerance: Tolerates 10 - 20 min exercise with multiple rests   Activity Tolerance: Patient limited by fatigue,Patient limited by pain        ADL  ADL  Feeding: Dependent/Total  Grooming: Dependent/Total  UE Bathing: Dependent/Total  LE Bathing: Dependent/Total  UE Dressing: Dependent/Total  LE Dressing: Dependent/Total  Toileting: Dependent/Total  Additional Comments: Bathing completed supine in bed on this date. OT facitlitated hand over hand A during upper body bathing/lower body bathing with pt able to minimaly participate in activity. Total assist with all bathing tasks. Pants threaded while supine in bed with pt able to assist with briding to semi pull pants up. Pant fully pulled in back while standing with elbert stedy and 2-3 person A. C-collar donning in bed prior to mobility.  upper body dressing completed while sitting in elbert stedy with pt requiring assistance with threading and pulling over head. Pt currently limited due to decreased strength, ROM, balance, activity tolerance, and pain limiting independence with self care tasks.       OT scores      Eating  Assistance Needed: Dependent  CARE Score: 1  Discharge Goal: Partial/moderate assistance  Oral Hygiene  Assistance Needed: Dependent  CARE Score: 1  Discharge Goal: Partial/moderate assistance  Toileting Hygiene  Assistance Needed: Dependent  CARE Score: 1  Discharge Goal: Partial/moderate assistance  Shower/Bathe Self  Assistance Needed: Dependent  CARE Score: 1  Discharge Goal: Partial/moderate assistance  Upper Body Dressing  Assistance Needed: Dependent  CARE Score: 1  Discharge Goal: Partial/moderate assistance  Lower Body Dressing  Assistance Needed: Dependent  CARE Score: 1  Discharge Goal: Partial/moderate assistance  Putting On/Taking Off Footwear  Assistance Needed: Dependent  CARE Score: 1  Discharge Goal: Partial/moderate assistance  Toilet Transfer  Reason if not Attempted: Not attempted due to medical condition or safety concerns  CARE Score: 88  Discharge Goal: Partial/moderate assistance        Goals  Patient Goals   Patient goals : \"I just want to be able to get back to my basic needs. \"  Short term goals  Time Frame for Short term goals: By 10 days  Short term goal 1: Pt will complete upper body dressing/bathing with max A with use of AE as needed while maintaining cervical precautions  Short term goal 2: Pt will complete self feeding task with max A with adaptive strategies and good safety  Short term goal 3: Pt will tolerate sitting EOB 5+ minutes unsupported with mod A during functional activity to increase independence with self care and mobility  Short term goal 4: Pt will complete functional transfers during self care tasks with mod A and good safety  Short term goal 5: Pt will participate in 30+ minutes of therapeutic exercises/functional activities to increase safety and independence with self care and mobility  Long term goals  Time Frame for Long term goals : By discharge  Long term goal 1: Pt will complete self feeding task with mod A with use of adaptive strategies   Long term goal 2: Pt will complete upper body bathing/dressing with mod A and good safety while maintaining cervical precautions  Long term goal 3: Pt will tolerate sitting EOB 10+ minutes unsupported with CGA and good safety during functional activity of choice  Long term goal 4: Pt will complete functional transfer with min A and good safety during self care tasks  Long term goal 5: Pt will demonstrated increased BUE gross motor/fine motor to increase participation in self care tasks  Long term goals 6: Pt/family with verbalize/demosntrate Good understanding of cervical precautions during self care tasks  Long term goal 7: Pt/family will demonstrate Good understanding of BUE exercises to increase functional use of BUE during self care tasks     Assessment  Performance deficits / Impairments: Decreased ADL status,Decreased functional mobility ,Decreased ROM,Decreased strength,Decreased endurance,Decreased sensation,Decreased balance,Decreased high-level IADLs,Decreased fine motor control,Decreased coordination,Decreased posture  Treatment Diagnosis: Impaired self care status  Prognosis: Fair  Decision Making: High Complexity  REQUIRES OT FOLLOW UP: Yes  Discharge Recommendations: Patient would benefit from continued therapy after discharge,Continue to assess pending progress  Plan  Times per week: 900 minutes/week for combined therapy of OT/PT due to decreaed tolerance to activity.   Times per day: Twice a day  Current Treatment Recommendations: Self-Care / ADL,Strengthening,ROM,Balance Training,Functional Mobility Training,Endurance Training,Pain Management,Safety Education & Training,Patient/Caregiver Education & Training,Equipment Evaluation, Education, & procurement,Positioning     Equipment Recommendations  Equipment Needed:  (TBD)       04/01/22 0833 04/01/22 1339 04/01/22 1405   OT Individual Minutes   Time In  --  7145  --    Time Out  --  1404  --    Minutes  --  25  --    OT Co-Treatment Minutes   Time In    (833)  --     (1405)   Time Out    (925)  --     (1433)   Time Code Minutes    Timed Code Treatment Minutes 10 Minutes 25 Minutes 13 Minutes          Objective:  BP (!) 152/96   Pulse 80   Temp 97.9 °F (36.6 °C) (Oral)   Resp 18   Ht 5' 3\" (1.6 m)   Wt 142 lb (64.4 kg)   SpO2 96%   BMI 25.15 kg/m²   Alert, no distress. Good speech and language function. Lungs clear. Heart regular. Abdomen non-distended, non-tender. No calf tenderness or edema. EXTREMITIES:  .  No calf tenderness, edema. Feet warm. NEUROMUSCULAR:  Sensation intact, no extinction.                          R      L                       R    L  Shoulder     2     0           Hip F   4+   4+  EF               4     3           KF       4+     4+  EE              4     3            KE       4+     4+  WF             5      5           ADF     5     5  WE             5     5           APF      5     5              4     4    Diagnostics:   Recent Results (from the past 72 hour(s))   EKG 12 Lead    Collection Time: 03/30/22 11:33 PM   Result Value Ref Range    Ventricular Rate 67 BPM    Atrial Rate 67 BPM    P-R Interval 162 ms    QRS Duration 100 ms    Q-T Interval 418 ms    QTc Calculation (Bazett) 441 ms    P Axis 34 degrees    R Axis -23 degrees    T Axis -7 degrees   CBC    Collection Time: 03/31/22  2:45 AM   Result Value Ref Range    WBC 7.6 3.5 - 11.3 k/uL    RBC 3.37 (L) 4.21 - 5.77 m/uL    Hemoglobin 11.0 (L) 13.0 - 17.0 g/dL    Hematocrit 32.0 (L) 40.7 - 50.3 %    MCV 95.0 82.6 - 102.9 fL    MCH 32.6 25.2 - 33.5 pg    MCHC 34.4 28.4 - 34.8 g/dL    RDW 12.0 11.8 - 14.4 %    Platelets 227 503 - 585 k/uL    MPV 9.2 8.1 - 13.5 fL    NRBC Automated 0.0 0.0 per 100 WBC Basic Metabolic Panel w/ Reflex to MG    Collection Time: 03/31/22  2:45 AM   Result Value Ref Range    Glucose 108 (H) 70 - 99 mg/dL    BUN 16 8 - 23 mg/dL    CREATININE 0.40 (L) 0.70 - 1.20 mg/dL    Calcium 9.0 8.6 - 10.4 mg/dL    Sodium 135 135 - 144 mmol/L    Potassium 3.8 3.7 - 5.3 mmol/L    Chloride 101 98 - 107 mmol/L    CO2 24 20 - 31 mmol/L    Anion Gap 10 9 - 17 mmol/L    GFR Non-African American >60 >60 mL/min    GFR African American >60 >60 mL/min    GFR Comment         Magnesium    Collection Time: 03/31/22  2:45 AM   Result Value Ref Range    Magnesium 1.9 1.6 - 2.6 mg/dL   Troponin    Collection Time: 03/31/22  2:45 AM   Result Value Ref Range    Troponin, High Sensitivity 53 (HH) 0 - 22 ng/L   Troponin    Collection Time: 03/31/22  8:36 AM   Result Value Ref Range    Troponin, High Sensitivity 53 (HH) 0 - 22 ng/L   Basic Metabolic Panel w/ Reflex to MG    Collection Time: 04/01/22  6:18 AM   Result Value Ref Range    Glucose 105 (H) 70 - 99 mg/dL    BUN 15 8 - 23 mg/dL    CREATININE 0.52 (L) 0.70 - 1.20 mg/dL    Calcium 9.1 8.6 - 10.4 mg/dL    Sodium 134 (L) 135 - 144 mmol/L    Potassium 3.9 3.7 - 5.3 mmol/L    Chloride 97 (L) 98 - 107 mmol/L    CO2 27 20 - 31 mmol/L    Anion Gap 10 9 - 17 mmol/L    GFR Non-African American >60 >60 mL/min    GFR African American >60 >60 mL/min    GFR Comment         CBC auto differential    Collection Time: 04/01/22  6:18 AM   Result Value Ref Range    WBC 7.7 3.5 - 11.0 k/uL    RBC 3.41 (L) 4.5 - 5.9 m/uL    Hemoglobin 11.0 (L) 13.5 - 17.5 g/dL    Hematocrit 31.6 (L) 41 - 53 %    MCV 92.8 80 - 100 fL    MCH 32.2 26 - 34 pg    MCHC 34.6 31 - 37 g/dL    RDW 12.8 11.5 - 14.9 %    Platelets 317 (H) 842 - 450 k/uL    MPV 7.0 6.0 - 12.0 fL    Seg Neutrophils 67 (H) 36 - 66 %    Lymphocytes 19 (L) 24 - 44 %    Monocytes 11 (H) 1 - 7 %    Eosinophils % 3 0 - 4 %    Basophils 0 0 - 2 %    Segs Absolute 5.10 1.3 - 9.1 k/uL    Absolute Lymph # 1.50 1.0 - 4.8 k/uL    Absolute Mono # 0.80 0.1 - 1.3 k/uL    Absolute Eos # 0.20 0.0 - 0.4 k/uL    Basophils Absolute 0.00 0.0 - 0.2 k/uL   POC Glucose Fingerstick    Collection Time: 04/01/22  6:23 AM   Result Value Ref Range    POC Glucose 105 75 - 110 mg/dL   POC Glucose Fingerstick    Collection Time: 04/01/22 11:18 AM   Result Value Ref Range    POC Glucose 101 75 - 110 mg/dL   POC Glucose Fingerstick    Collection Time: 04/01/22  3:50 PM   Result Value Ref Range    POC Glucose 209 (H) 75 - 110 mg/dL       Impression/Plan:      1. Ambulatory and ADL dysfunction due to incomplete quadriplegia (status post C2 5 laminectomy and posterior fusion on 3/24 due to severe cervical canal stenosis. Possible central cord-Continue PT/OT/speech/nursing to work on transfers, ambulation, ADLs, steps, family training, diet, cognition, home goal in approximately 3 to 4 weeks at min assist possibly wheelchair level prognosis fair  2. Complete quadriplegia status post C2-5 laminectomy monitor incision continue neurochecks, exam as above, patient notes no change  3. Hospital-acquired pneumonia-complete antibiotics oxygen as needed, follow sputum culture, complete doxycycline and cefepime, last dose of cefepime tomorrow - IM  follow-up  4. Bradycardia resolved with discontinuation of beta-blockers  5. Pain - denies Avoid narcotics as patient sensitive require Narcan at [facility]. [facility], Flexeril  6. Neurogenic bladder-continue bladder protocol intermittent caths, timed voids, keep volume under 300, Low PVR  7. Neurogenic bowel-monitor, continue to bowel program, but have her after meals and at nighttime, Miralax, Senokot as needed suppository-monitor for daily need  8. Hypertension hyperlipidemia/CHF-Norvasc, Lasix, Cozaar  9. Sleep apnea  10. Post traumatic stress disorder/depression/questionable bipolar disorder -Effexor Depakote  11. History of migraines  12. Diabetes  13. Poor oral intake , Dietary consult  14. DVT prophylaxis, Lovenox EPC, teds  15.  Internal medicine for medical management  16.  DNR Comfort Care arrest    Kieran Funes MD

## 2022-04-03 LAB — GLUCOSE BLD-MCNC: 103 MG/DL (ref 75–110)

## 2022-04-03 PROCEDURE — 97535 SELF CARE MNGMENT TRAINING: CPT

## 2022-04-03 PROCEDURE — 2580000003 HC RX 258: Performed by: NURSE PRACTITIONER

## 2022-04-03 PROCEDURE — 99232 SBSQ HOSP IP/OBS MODERATE 35: CPT | Performed by: INTERNAL MEDICINE

## 2022-04-03 PROCEDURE — 6370000000 HC RX 637 (ALT 250 FOR IP): Performed by: NURSE PRACTITIONER

## 2022-04-03 PROCEDURE — 6370000000 HC RX 637 (ALT 250 FOR IP): Performed by: INTERNAL MEDICINE

## 2022-04-03 PROCEDURE — 97110 THERAPEUTIC EXERCISES: CPT

## 2022-04-03 PROCEDURE — 97530 THERAPEUTIC ACTIVITIES: CPT

## 2022-04-03 PROCEDURE — 82947 ASSAY GLUCOSE BLOOD QUANT: CPT

## 2022-04-03 PROCEDURE — 6370000000 HC RX 637 (ALT 250 FOR IP): Performed by: PHYSICAL MEDICINE & REHABILITATION

## 2022-04-03 PROCEDURE — 1180000000 HC REHAB R&B

## 2022-04-03 PROCEDURE — 6360000002 HC RX W HCPCS: Performed by: NURSE PRACTITIONER

## 2022-04-03 RX ORDER — HYDROCHLOROTHIAZIDE 25 MG/1
25 TABLET ORAL DAILY
Status: DISCONTINUED | OUTPATIENT
Start: 2022-04-03 | End: 2022-04-10

## 2022-04-03 RX ADMIN — VENLAFAXINE 75 MG: 75 TABLET ORAL at 07:23

## 2022-04-03 RX ADMIN — CARBOXYMETHYLCELLULOSE SODIUM 2 DROP: 10 GEL OPHTHALMIC at 13:04

## 2022-04-03 RX ADMIN — ENOXAPARIN SODIUM 40 MG: 100 INJECTION SUBCUTANEOUS at 07:21

## 2022-04-03 RX ADMIN — CEFEPIME HYDROCHLORIDE 1000 MG: 2 INJECTION, POWDER, FOR SOLUTION INTRAVENOUS at 10:06

## 2022-04-03 RX ADMIN — DIVALPROEX SODIUM 750 MG: 250 TABLET, FILM COATED, EXTENDED RELEASE ORAL at 21:27

## 2022-04-03 RX ADMIN — VENLAFAXINE 75 MG: 75 TABLET ORAL at 13:03

## 2022-04-03 RX ADMIN — ACETAMINOPHEN 650 MG: 325 TABLET ORAL at 02:00

## 2022-04-03 RX ADMIN — VENLAFAXINE 75 MG: 75 TABLET ORAL at 16:44

## 2022-04-03 RX ADMIN — CARBOXYMETHYLCELLULOSE SODIUM 2 DROP: 10 GEL OPHTHALMIC at 21:27

## 2022-04-03 RX ADMIN — PANTOPRAZOLE SODIUM 40 MG: 40 TABLET, DELAYED RELEASE ORAL at 06:19

## 2022-04-03 RX ADMIN — FUROSEMIDE 20 MG: 20 TABLET ORAL at 07:21

## 2022-04-03 RX ADMIN — DIVALPROEX SODIUM 500 MG: 500 TABLET, EXTENDED RELEASE ORAL at 07:23

## 2022-04-03 RX ADMIN — ACETAMINOPHEN 650 MG: 325 TABLET ORAL at 21:26

## 2022-04-03 RX ADMIN — CARBOXYMETHYLCELLULOSE SODIUM 2 DROP: 10 GEL OPHTHALMIC at 07:23

## 2022-04-03 RX ADMIN — DOXYCYCLINE 100 MG: 100 CAPSULE ORAL at 07:21

## 2022-04-03 RX ADMIN — DOXYCYCLINE 100 MG: 100 CAPSULE ORAL at 21:26

## 2022-04-03 RX ADMIN — LOSARTAN POTASSIUM 100 MG: 100 TABLET, FILM COATED ORAL at 07:21

## 2022-04-03 RX ADMIN — AMLODIPINE BESYLATE 10 MG: 10 TABLET ORAL at 07:21

## 2022-04-03 RX ADMIN — FINASTERIDE 5 MG: 5 TABLET, FILM COATED ORAL at 07:21

## 2022-04-03 RX ADMIN — HYDROCHLOROTHIAZIDE 25 MG: 25 TABLET ORAL at 16:44

## 2022-04-03 ASSESSMENT — PAIN DESCRIPTION - FREQUENCY
FREQUENCY: INTERMITTENT
FREQUENCY: INTERMITTENT

## 2022-04-03 ASSESSMENT — PAIN SCALES - GENERAL
PAINLEVEL_OUTOF10: 5
PAINLEVEL_OUTOF10: 6
PAINLEVEL_OUTOF10: 8
PAINLEVEL_OUTOF10: 8

## 2022-04-03 ASSESSMENT — PAIN DESCRIPTION - ORIENTATION
ORIENTATION: POSTERIOR

## 2022-04-03 ASSESSMENT — PAIN DESCRIPTION - DESCRIPTORS
DESCRIPTORS: PRESSURE
DESCRIPTORS: PRESSURE

## 2022-04-03 ASSESSMENT — PAIN DESCRIPTION - PAIN TYPE
TYPE: SURGICAL PAIN
TYPE: ACUTE PAIN

## 2022-04-03 ASSESSMENT — PAIN DESCRIPTION - ONSET
ONSET: ON-GOING
ONSET: ON-GOING

## 2022-04-03 ASSESSMENT — PAIN DESCRIPTION - PROGRESSION
CLINICAL_PROGRESSION: NOT CHANGED

## 2022-04-03 ASSESSMENT — PAIN - FUNCTIONAL ASSESSMENT: PAIN_FUNCTIONAL_ASSESSMENT: PREVENTS OR INTERFERES WITH MANY ACTIVE NOT PASSIVE ACTIVITIES

## 2022-04-03 ASSESSMENT — PAIN DESCRIPTION - LOCATION
LOCATION: HEAD
LOCATION: HEAD;NECK;SHOULDER
LOCATION: HEAD;NECK
LOCATION: HEAD;NECK;SHOULDER

## 2022-04-03 NOTE — PROGRESS NOTES
[facility] resident for Dr. Leopoldo Santillan  notified via [address] [place] [address] regarding bone stimulator per pt's wife. Orders received. Wife notified. Stimulator ordered and will be delivered to unit.

## 2022-04-03 NOTE — PLAN OF CARE
Problem: Skin Integrity:  Goal: Absence of new skin breakdown  Description: Absence of new skin breakdown  4/2/2022 0336 by Rosales Baugh LPN  Outcome: Ongoing  Note: Skin assessment completed this shift. Nutrition and Hydration status assessed with adequate intake. Ry Score as charted. Bilateral heels remain elevated on pillows throughout the shift. Patient tolerates repositioning by staff at least every 2 hours. Patient verbalizes understanding of pressure ulcer prevention measures. Skin integrity maintained. No new skin breakdown noted. Skin to high risk pressure areas including coccyx and heels are clear. Nereida / Incontinence care provided as needed throughout the shift. Zinc Oxide paste applied to buttocks with brief changes. Problem: Pain:  Goal: Pain level will decrease  Description: Pain level will decrease  4/2/2022 0336 by Rosales Baugh LPN  Outcome: Ongoing  Note: Pain assessment completed. Pt. able to rest.  Patient medicated with    tyelnol         for pain this shift as ordered. Patient relates a tolerable level of discomfort with the current medication. Pt. Repositions with assistance Nonverbal cues indicate pain relief. Pt. Rests quietly with eyes closed after pain medication administration. Respirations easy and unlabored. Appears free from distress. Problem: Musculor/Skeletal Functional Status  Goal: Absence of falls  4/2/2022 0336 by Rosales Baugh LPN  Outcome: Ongoing  Note: No falls or injuries sustained at this time. No attempts to get out of bed without nursing assistance. Call light within reach and pt. uses appropriately for assistance. Siderails up x 2. Nonskid footwear remains on. Bed in low and locked position. Hourly nursing rounds made. Pt. Alert and oriented, aware of limitations, and exhibits good safety judgement. Pt. uses assistive devices appropriately. Pt. understands individual fall risk factors.   Pt. reoriented to surroundings and reminded to use call light with each nurse/patient interaction. Bed alarm / Personal alarm remains engaged throughout the shift as a precaution. Problem: Nutrition  Goal: Optimal nutrition therapy  4/2/2022 0336 by Jenny Loera LPN  Outcome: Ongoing  Note: Please check patient's intake and output.

## 2022-04-03 NOTE — PROGRESS NOTES
[facility]    CONSULTATION / HISTORY AND PHYSICAL EXAMINATION            Date:   4/3/2022  Patient name:  Welton Kayser  Date of admission:  3/31/2022  8:38 PM  MRN:   [id number]  Account:  [de-identified]  YOB: 1950  PCP:    Anna Oreilly  Room:   8450/3596-44  Code Status:    DNR-CCA    Physician Requesting Consult: Candy Mckeon MD    Reason for Consult:  medical management    Chief Complaint:     No chief complaint on file.       History Obtained From:     Patient medical record nursing staff    History of Present Illness:   Patient past medical multiple medical problems include hypertension, heart failure with preserved ejection fraction, diabetes, bipolar disorder,   glaucoma, patient was originally admitted at [facility] where he was transferred from outlying facility, he developed, generalized weakness after a fall, patient underwent MRI of cervical spine suggestive of severe spinal canal stenosis at the level of C2-C4 level, patient underwent C2-C5 posterior decompression surgery, he feels that weakness in his extremities is slightly better  Patient during his hospital stay, developed bradycardia which improved after discontinuation of beta-blocker, cardiology was also consulted  He developed hospital-acquired pneumonia, getting treated with cefepime and doxycycline  Patient denying any complaints of cough, shortness of breath, chest pain,    Past Medical History:     Past Medical History:   Diagnosis Date    Depression 2017    ON RX    Diabetes mellitus ([address] 2013    NIDDM    Difficult intravenous access     Hyperlipidemia 2008    ON RX    Hypertension 2008    ON RX    Migraines     PTSD (post-traumatic stress disorder) 01/2018    ON RX    Sleep apnea 01/2018    UNALBE TO USE TO CLEARED BY ENT (CPAP)    Teeth missing     BACK TEETH UPPER AND LOWER TO BE FITTED FOR PARTIALS AT LATER DATE    Wears glasses         Past Surgical History:     Past Surgical History:   Procedure Laterality Date    CARDIAC CATHETERIZATION  02/2010    no stents     CARPAL TUNNEL RELEASE Left 01/09/2002    CATARACT REMOVAL      CERVICAL FUSION  04/19/2018    anterior cervical fusion C5-6    CERVICAL FUSION N/A 3/24/2022    C2-5 FUSION, C2-4 LAMINECTOMY performed by Prashant Sheridan DO at [address] Left 2018    CATARACT EXTRACTION WITH IOL    HYDROCELE EXCISION  1951    LAMINECTOMY  03/24/2022    C2-5 FUSION, C2-4 LAMINECTOMY    MIDDLE EAR SURGERY  01/11/2018    MIDDLE EAR REBUILT AND EAR DRUM REPLACED    MOUTH SURGERY  04/16/2018    5 TEETH REMOVED    OTHER SURGICAL HISTORY  04/19/2018    : ANTERIOR CERVICAL CORRPECTOMY C5-6, SYNTHES, DEPUY, REG TABLE, SUPINE,     CA OFFICE/OUTPT VISIT,PROCEDURE ONLY N/A 4/19/2018    ANTERIOR CERVICAL CORRPECTOMY AND FUSION C5-6 performed by Prashant Sheridan DO at [facility]  12/1983        Medications Prior to Admission:     Prior to Admission medications    Medication Sig Start Date End Date Taking?  Authorizing Provider   finasteride (PROSCAR) 5 MG tablet Take 5 mg by mouth daily   Yes Historical Provider, MD   carboxymethylcellulose 1 % ophthalmic solution Place 2 drops into both eyes 3 times daily Pt states \"2 drops in both eyes three times a day\"    Historical Provider, MD   divalproex (DEPAKOTE ER) 250 MG extended release tablet Take 750 mg by mouth at bedtime    Historical Provider, MD   furosemide (LASIX) 20 MG tablet Take 20 mg by mouth daily    Historical Provider, MD   venlafaxine (EFFEXOR XR) 150 MG extended release capsule Take 1 capsule by mouth daily  Patient taking differently: Take 225 mg by mouth daily  6/23/20   Mauricio Fournier MD   simethicone (MYLICON) 80 MG chewable tablet Take 1 tablet by mouth every 6 hours as needed for Flatulence  Patient taking differently: Take 80 mg by mouth every 8 hours as needed for Flatulence  6/22/20 Isac Felix MD   lidocaine (XYLOCAINE) 5 % ointment Apply topically daily as needed for Pain Apply topically as needed. LF 4/20/20 (30 day supply)  Patient not taking: Reported on 3/23/2022    Historical Provider, MD   metFORMIN (GLUCOPHAGE) 1000 MG tablet Take 1,000 mg by mouth 2 times daily (with meals) LF 4/27/20 (90 day supply)  Patient not taking: Reported on 3/21/2022    Historical Provider, MD   losartan (COZAAR) 25 MG tablet Take 25 mg by mouth daily     Historical Provider, MD   aspirin 81 MG chewable tablet Take 81 mg by mouth daily  Patient not taking: Reported on 3/21/2022    Historical Provider, MD   divalproex (DEPAKOTE ER) 500 MG extended release tablet Take 500 mg by mouth every morning     Historical Provider, MD   traZODone (DESYREL) 100 MG tablet Take 150 mg by mouth nightly as needed LF 5/1/20 (30 day supply)    Historical Provider, MD   cetirizine (ZYRTEC) 10 MG tablet Take 10 mg by mouth daily LF 4/6/20 (90 day supply)  Patient not taking: Reported on 3/21/2022    Historical Provider, MD   atorvastatin (LIPITOR) 80 MG tablet Take 40 mg by mouth daily Take 1/2 tablet (40 mg) daily  LF 4/22/20 (90 day supply)  Patient not taking: Reported on 3/21/2022    Historical Provider, MD   amLODIPine (NORVASC) 10 MG tablet Take 7.5 mg by mouth daily     Historical Provider, MD   sildenafil (VIAGRA) 100 MG tablet Take 100 mg by mouth as needed for Erectile Dysfunction LF 5/12/20 (30 day supply)    Historical Provider, MD   omeprazole (PRILOSEC) 20 MG delayed release capsule Take 20 mg by mouth every evening LF 4/21/20 (90 day supply)    Historical Provider, MD        Allergies:     Latex, Bee venom, Lisinopril, Niacin and related, Sulfa antibiotics, and Terazosin    Social History:     Tobacco:    reports that he quit smoking about 49 years ago. His smoking use included cigarettes. He has a 2.00 pack-year smoking history.  He has never used smokeless tobacco.  Alcohol:      reports current alcohol use.  Drug Use:  reports current drug use. Drug: Marijuana Marystefano Suly). Family History:     Family History   Problem Relation Age of Onset   Blase Liming Dementia Mother     Coronary Art Dis Mother     Stroke Mother     Diabetes Mother     Asthma Mother     Other Mother         BRAIN ANEURISM    High Blood Pressure Mother     Heart Disease Father     Diabetes Sister     Diabetes Brother     High Blood Pressure Brother     High Cholesterol Brother     Heart Disease Brother     Diabetes Sister     Other Sister         NEUROPATHY       Review of Systems:     Positive and Negative as described in HPI. CONSTITUTIONAL:  negative for fevers, chills, sweats, fatigue, weight loss  HEENT:  negative for vision, hearing changes, runny nose, throat pain  RESPIRATORY:  negative for shortness of breath, cough, congestion, wheezing. CARDIOVASCULAR:  negative for chest pain, palpitations.   GASTROINTESTINAL:  negative for nausea, vomiting, diarrhea, constipation, change in bowel habits, abdominal pain   GENITOURINARY:  negative for difficulty of urination, burning with urination, frequency   INTEGUMENT:  negative for rash, skin lesions, easy bruising   HEMATOLOGIC/LYMPHATIC:  negative for swelling/edema   ALLERGIC/IMMUNOLOGIC:  negative for urticaria , itching  ENDOCRINE:  negative increase in drinking, increase in urination, hot or cold intolerance  MUSCULOSKELETAL:  negative joint pains, muscle aches, swelling of joints  NEUROLOGICAL: Weakness in all 4 extremities  BEHAVIOR/PSYCH:      Physical Exam:     BP (!) 145/91   Pulse 81   Temp 98.2 °F (36.8 °C)   Resp 16   Ht 5' 3\" (1.6 m)   Wt 142 lb (64.4 kg)   SpO2 96%   BMI 25.15 kg/m²   Temp (24hrs), Av.3 °F (36.8 °C), Min:98.2 °F (36.8 °C), Max:98.4 °F (36.9 °C)    Recent Labs     22  1550 22  1625 22  0549   POCGLU 209* 88 156* 103       Intake/Output Summary (Last 24 hours) at 4/3/2022 1547  Last data filed at 2022 2100  Gross per 24 hour   Intake --   Output 189 ml   Net -189 ml       General Appearance:  alert, well appearing, and in no acute distress  Mental status: oriented to person, place, and time with normal affect  Head:  normocephalic, atraumatic. Eye: no icterus, redness, pupils equal and reactive, extraocular eye movements intact, conjunctiva clear  Ear: normal external ear, no discharge, hearing intact  Nose:  no drainage noted  Mouth: mucous membranes moist  Neck: supple, no carotid bruits, thyroid not palpable , staples present posterior neck, healing  Lungs: Bilateral equal air entry, clear to ausculation, no wheezing, rales or rhonchi, normal effort  Cardiovascular: normal rate, regular rhythm, no murmur, gallop, rub. Abdomen: Soft, nontender, nondistended, normal bowel sounds, no hepatomegaly or splenomegaly  Neurologic: Weakness in all 4 extremities, power in all 4 extremities 4/5 skin: No gross lesions, rashes, bruising or bleeding on exposed skin area  Extremities:  peripheral pulses palpable, no pedal edema or calf pain with palpation  Psych: Investigations:      Laboratory Testing:  Recent Results (from the past 24 hour(s))   POC Glucose Fingerstick    Collection Time: 04/02/22  4:25 PM   Result Value Ref Range    POC Glucose 88 75 - 110 mg/dL   POC Glucose Fingerstick    Collection Time: 04/02/22  8:22 PM   Result Value Ref Range    POC Glucose 156 (H) 75 - 110 mg/dL   POC Glucose Fingerstick    Collection Time: 04/03/22  5:49 AM   Result Value Ref Range    POC Glucose 103 75 - 110 mg/dL           Consultations:   IP CONSULT TO DIETITIAN  IP CONSULT TO SOCIAL WORK  IP CONSULT TO INTERNAL MEDICINE  IP CONSULT TO DIETITIAN  Assessment :      Primary Problem  Cervical stenosis of spine    Active Hospital Problems    Diagnosis Date Noted    Cervical stenosis of spine [M48.02] 03/31/2022       Plan:     1. Quadriparesis, due to cervical cord compression after fall s/p C2-C5 decompression surgery. 2. Hypertension, controlled restart home dose of Norvasc, losartan, will avoid beta-blockers since patient developed bradycardia in [place]  3. Heart failure preserved ejection fraction, compensated , on Lasix  4. GERD, restarted Protonix  5. On DVT prophylaxis Lovenox  6. BPH, started on finasteride  7. History of glaucoma, restarted home medications of eyedrop  8. Patient has neurological better, will have intermittent catheterization  9. Diabetes, controlled  4/2  Patient blood pressure is controlled  No new complaints  4/3   Patient feels that his strength is getting better  Starting patient on hydrochlorothiazide with readings of elevated blood pressures, advised low-salt diet    Regan Lo MD  4/3/2022  3:47 PM    Copy sent to Dr. Ann Wu    Please note that this chart was generated using voice recognition Dragon dictation software. Although every effort was made to ensure the accuracy of this automated transcription, some errors in transcription may have occurred.

## 2022-04-03 NOTE — PROGRESS NOTES
Physical Medicine & Rehabilitation  Progress Note    4/3/2022 11:48 AM     Subjective:    No complaints except the high room temperature, poor oral intake per RN , denies pain. on B/B pg . Low PVR. ROS:  Denies fevers, chills, sweats. No chest pain, palpitations, lightheadedness. Denies coughing, wheezing or shortness of breath. Denies abdominal pain, nausea, diarrhea or constipation. No new areas of joint pain. Denies new areas of numbness , positive for all extremities weakness. Denies new anxiety or depression issues. No new skin problems. Rehabilitation:   .  PT    Bed Mobility  Rolling: Maximal assistance  Supine to Sit: Maximal assistance (x2)  Sit to Supine: Maximal assistance (x2)  Scooting: Maximal assistance  Comment: Pt requires Max A x2 to complete sup <> sit transfers. Pt is able to sit EOB x~15min this date with Max A x1 for sitting balance. Rolling completed with Max A x1. RN to assist with brief change and [place] care.         Transfers:  Sit to Stand: Moderate Assistance;2 Person Assistance  Stand to sit: Moderate Assistance;2 Person Assistance   Comments: STS transfer x1 in SS. Pt requires cues for proper breathing/relaxation technique. Pillow placed between pt's knees and SS. Pt reports nausea and increase pain with stand, requesting to return to supine and remove C-collar. Pt is only able to tolerate sitting in SS x~30 sec this date. Pt is returned to bed with pressure call light in reach and all other needs addressed.    Stairs/Curb  Stairs?: No     BALANCE Posture: Poor  Sitting - Static: Poor;+  Sitting - Dynamic: Poor  Standing - Static: Poor;+ Lorrin Neither stedy)  Comments: Sitting balance assessed at EOB.      EXERCISES Exercises  Quad Sets: 10x bilat supine  Heelslides: 10x bilat supine AAROM  Gluteal Sets: 10x bilat supine  Hip Abduction: 10x bilat supine AAROM  Knee Long Arc Quad: 10x bilat seated EOB  Ankle Pumps: 10x bilat supine  Core Strengthening: EOB ~15min  Other exercises?: Yes  Other exercises 1: Supine AAROM bilat LE exercises, x10 reps  Other exercises 2: Dangle at EOB ~ 15 minutes, 2 person assit for safety fue to poor sitting balance. Other exercises 3: Rolling x1 in each direction. MAx A x1  Other exercises 4: Sitting LAQ's. Reps: ~5 each. Pt is limited due to pain. Other Activities  Comment: rest breaks PRN. Increase time required to complete all tasks. Activity Tolerance: Patient limited by fatigue,Patient limited by endurance,Patient limited by pain  PT Equipment Recommendations  Equipment Needed: No  Other: TBD     Patient Education  New Education Provided:    Learner:patient  Method: demonstration and explanation       Outcome: needs reinforcement      Current Treatment Recommendations: Maida Dozier Mobility Training,Transfer Training,Endurance Training,Wheelchair Mobility Training,Stair training,Gait Training,Neuromuscular Re-education,Home Exercise Program,Safety Education & Training,Patient/Caregiver Education & Training,Equipment Evaluation, Education, & procurement     Conditions Requiring Skilled Therapeutic Intervention  Body structures, Functions, Activity limitations: Decreased functional mobility ; Decreased strength;Decreased safe awareness;Decreased endurance;Decreased balance; Increased pain;Decreased posture;Decreased coordination;Decreased fine motor control;Decreased sensation;Decreased ROM  Assessment: Pt has frequent falls at home, gradually declining in fucntion since Feb\"2022. Pt underwent C2-C5 fusion/laminectomy, presetns wuth quadraparesis, B UE>B LE. Pt requires 2 person assist for safe bed mobility  and dependnet for transfer at his time. WIll conitnue POC to improve stregnth and balance to faciliate independence. Treatment Diagnosis: Impaired function.   Prognosis: Fair  Decision Making: Medium Complexity  Barriers to Learning: Pain  REQUIRES PT FOLLOW UP: Yes  Discharge Recommendations: Patient would benefit from continued therapy after discharge;Home with assist PRN       OT      Cognition  Overall Cognitive Status: WFL  Perception  Overall Perceptual Status: WFL  Type of ROM/Therapeutic Exercise  Type of ROM/Therapeutic Exercise: AARGREGORIA  Comment: PALLAVI, pt reports pain at max range of elbow. ROM of wrist and fingers completed. Edema of LUE noted, pt reports this has been present for roughly one year. ADL  Feeding: Dependent/Total  Grooming: Dependent/Total  UE Bathing: Dependent/Total  LE Bathing: Dependent/Total  UE Dressing: Dependent/Total  LE Dressing: Dependent/Total  Toileting: Dependent/Total  Additional Comments: Bathing comleted supine in bed this date. Washing face, arms, B axillaries, feet and lower legs. Pt attempted to wash face with [place] assist from this writer but was unable to due to increased pain in R shoulder.      Assessment  Performance deficits / Impairments: Decreased ADL status; Decreased functional mobility ; Decreased ROM; Decreased strength;Decreased endurance;Decreased sensation;Decreased balance;Decreased high-level IADLs;Decreased fine motor control;Decreased coordination;Decreased posture  Assessment: Pt reports his speach is sometimes slow due to requring increased time to process. Prognosis: Fair     Patient Education:  Patient Goals   Patient goals : \"I just want to be able to get back to my basic needs. \"  Learner:patient  Method: demonstration and explanation       Outcome: acknowledged understanding       Plan  Plan  Times per week: 900 minutes/week for combined therapy of OT/PT due to decreaed tolerance to activity.   Times per day: Twice a day  Current Treatment Recommendations: Self-Care / ADL,Strengthening,ROM,Balance Training,Functional Mobility Training,Endurance Training,Pain Management,Safety Education & Training,Patient/Caregiver Education & Training,Equipment Evaluation, Education, & procurement,Positioning  Patient Goals   Patient goals : \"I just want to be able to get back to my basic needs. \"  Short term goals  Time Frame for Short term goals: By 10 days  Short term goal 1: Pt will complete upper body dressing/bathing with max A with use of AE as needed while maintaining cervical precautions  Short term goal 2: Pt will complete self feeding task with max A with adaptive strategies and good safety  Short term goal 3: Pt will tolerate sitting EOB 5+ minutes unsupported with mod A during functional activity to increase independence with self care and mobility  Short term goal 4: Pt will complete functional transfers during self care tasks with mod A and good safety  Short term goal 5: Pt will participate in 30+ minutes of therapeutic exercises/functional activities to increase safety and independence with self care and mobility  Long term goals  Time Frame for Long term goals : By discharge  Long term goal 1: Pt will complete self feeding task with mod A with use of adaptive strategies   Long term goal 2: Pt will complete upper body bathing/dressing with mod A and good safety while maintaining cervical precautions  Long term goal 3: Pt will tolerate sitting EOB 10+ minutes unsupported with CGA and good safety during functional activity of choice  Long term goal 4: Pt will complete functional transfer with min A and good safety during self care tasks  Long term goal 5: Pt will demonstrated increased BUE gross motor/fine motor to increase participation in self care tasks  Long term goals 6: Pt/family with verbalize/demosntrate Good understanding of cervical precautions during self care tasks  Long term goal 7: Pt/family will demonstrate Good understanding of BUE exercises to increase functional use of BUE during self care tasks    Objective:  BP (!) 145/91   Pulse 81   Temp 98.2 °F (36.8 °C)   Resp 16   Ht 5' 3\" (1.6 m)   Wt 142 lb (64.4 kg)   SpO2 96%   BMI 25.15 kg/m²   Alert, no distress. Good speech and language function. Lungs clear. Heart regular.   Abdomen non-distended, non-tender. No calf tenderness or edema. EXTREMITIES:  .  No calf tenderness, edema. Feet warm. NEUROMUSCULAR:  Sensation intact, no extinction.                          R      L                       R    L  Shoulder     2     0           Hip F   4+   4+  EF               4     3           KF       4+     4+  EE              4     3            KE       4+     4+  WF             5      5           ADF     5     5  WE             5     5           APF      5     5              4     4    Diagnostics:   Recent Results (from the past 72 hour(s))   Basic Metabolic Panel w/ Reflex to MG    Collection Time: 04/01/22  6:18 AM   Result Value Ref Range    Glucose 105 (H) 70 - 99 mg/dL    BUN 15 8 - 23 mg/dL    CREATININE 0.52 (L) 0.70 - 1.20 mg/dL    Calcium 9.1 8.6 - 10.4 mg/dL    Sodium 134 (L) 135 - 144 mmol/L    Potassium 3.9 3.7 - 5.3 mmol/L    Chloride 97 (L) 98 - 107 mmol/L    CO2 27 20 - 31 mmol/L    Anion Gap 10 9 - 17 mmol/L    GFR Non-African American >60 >60 mL/min    GFR African American >60 >60 mL/min    GFR Comment         CBC auto differential    Collection Time: 04/01/22  6:18 AM   Result Value Ref Range    WBC 7.7 3.5 - 11.0 k/uL    RBC 3.41 (L) 4.5 - 5.9 m/uL    Hemoglobin 11.0 (L) 13.5 - 17.5 g/dL    Hematocrit 31.6 (L) 41 - 53 %    MCV 92.8 80 - 100 fL    MCH 32.2 26 - 34 pg    MCHC 34.6 31 - 37 g/dL    RDW 12.8 11.5 - 14.9 %    Platelets 046 (H) 665 - 450 k/uL    MPV 7.0 6.0 - 12.0 fL    Seg Neutrophils 67 (H) 36 - 66 %    Lymphocytes 19 (L) 24 - 44 %    Monocytes 11 (H) 1 - 7 %    Eosinophils % 3 0 - 4 %    Basophils 0 0 - 2 %    Segs Absolute 5.10 1.3 - 9.1 k/uL    Absolute Lymph # 1.50 1.0 - 4.8 k/uL    Absolute Mono # 0.80 0.1 - 1.3 k/uL    Absolute Eos # 0.20 0.0 - 0.4 k/uL    Basophils Absolute 0.00 0.0 - 0.2 k/uL   POC Glucose Fingerstick    Collection Time: 04/01/22  6:23 AM   Result Value Ref Range    POC Glucose 105 75 - 110 mg/dL   POC Glucose Fingerstick    Collection Time: 04/01/22 11:18 AM   Result Value Ref Range    POC Glucose 101 75 - 110 mg/dL   POC Glucose Fingerstick    Collection Time: 04/01/22  3:50 PM   Result Value Ref Range    POC Glucose 209 (H) 75 - 110 mg/dL   POC Glucose Fingerstick    Collection Time: 04/02/22  4:25 PM   Result Value Ref Range    POC Glucose 88 75 - 110 mg/dL   POC Glucose Fingerstick    Collection Time: 04/02/22  8:22 PM   Result Value Ref Range    POC Glucose 156 (H) 75 - 110 mg/dL   POC Glucose Fingerstick    Collection Time: 04/03/22  5:49 AM   Result Value Ref Range    POC Glucose 103 75 - 110 mg/dL       Impression/Plan:      1. Ambulatory and ADL dysfunction due to incomplete quadriplegia (status post C2 5 laminectomy and posterior fusion on 3/24 due to severe cervical canal stenosis. Possible central cord-Continue PT/OT/speech/nursing to work on transfers, ambulation, ADLs, steps, family training, diet, cognition, home goal in approximately 3 to 4 weeks at min assist possibly wheelchair level prognosis fair  2. Complete quadriplegia status post C2-5 laminectomy monitor incision continue neurochecks, exam as above, patient notes no change  3. Hospital-acquired pneumonia-complete antibiotics oxygen as needed, follow sputum culture, complete doxycycline and cefepime, last dose of cefepime tomorrow - IM  follow-up  4. Bradycardia resolved with discontinuation of beta-blockers  5. Pain - denies Avoid narcotics as patient sensitive require Narcan at [facility], Flexeril  6. Neurogenic bladder-continue bladder protocol intermittent caths, timed voids, keep volume under 300, Low PVR  7. Neurogenic bowel-monitor, continue to bowel program, but have her after meals and at nighttime, Miralax, Senokot as needed suppository-monitor for daily need  8. Hypertension hyperlipidemia/CHF-Norvasc, Lasix, Cozaar  9. Sleep apnea  10. Post traumatic stress disorder/depression/questionable bipolar disorder -Effexor Depakote  11.  History of migraines  12. Diabetes  13. Poor oral intake , Dietary consult  14. DVT prophylaxis, Lovenox EPC, teds  15. Internal medicine for medical management  16.  DNR Comfort Care arrest    Supriya Smith MD

## 2022-04-03 NOTE — PLAN OF CARE
Problem: Skin Integrity:  Goal: Will show no infection signs and symptoms  Description: Will show no infection signs and symptoms  Outcome: Ongoing  Goal: Absence of new skin breakdown  Description: Absence of new skin breakdown  Outcome: Ongoing     Problem: Falls - Risk of:  Goal: Will remain free from falls  Description: Will remain free from falls  Outcome: Ongoing  Goal: Absence of physical injury  Description: Absence of physical injury  Outcome: Ongoing     Problem: Pain:  Goal: Pain level will decrease  Description: Pain level will decrease  Outcome: Ongoing  Goal: Control of acute pain  Description: Control of acute pain  Outcome: Ongoing  Goal: Control of chronic pain  Description: Control of chronic pain  Outcome: Ongoing     Problem: Musculor/Skeletal Functional Status  Goal: Highest potential functional level  Outcome: Ongoing  Goal: Absence of falls  Outcome: Ongoing     Problem: Nutrition  Goal: Optimal nutrition therapy  Outcome: Ongoing

## 2022-04-03 NOTE — PROGRESS NOTES
Physical Therapy  [address] [facility]   Acute Rehabilitation Physical Therapy Progress Note    Date: 4/3/22  Patient Name: Rodell Meckel       Room: 3000/1922-42  MRN: [id number]   Account: [de-identified]   : 1950  (75 y.o.) Gender: male     Referring Practitioner: Johnathan Cardozo MD  Diagnosis: Cervical stenosis with myelopathy s/p C2-C5 laminectomy and fusion   Past Medical History:  has a past medical history of Depression, Diabetes mellitus ([address] Difficult intravenous access, Hyperlipidemia, Hypertension, Migraines, PTSD (post-traumatic stress disorder), Sleep apnea, Teeth missing, and Wears glasses. Past Surgical History:   has a past surgical history that includes Cardiac catheterization (2010); Carpal tunnel release (Left, 2002); Vasectomy (1983); Tonsillectomy and adenoidectomy (); Hydrocele surgery (); Middle ear surgery (2018); eye surgery (Left, ); Mouth surgery (2018); other surgical history (2018); cervical fusion (2018); pr office/outpt visit,procedure only (N/A, 2018); Cataract removal; laminectomy (2022); and cervical fusion (N/A, 3/24/2022). Additional Pertinent Hx: Greer Gong is a 70 y.o. male with history of HTN, HLD, diabetes, GIA, migraines, PTSD, and depression admitted to [facility] on 3/23/2022. He initially presented to [address] [place] [address] after a fall with subsequent worsening limb weakness and additional falls. MRI brain showed no acute intracranial abnormality. MRI cervical spine showed severe spinal stenosis at C2-C3 and C3-C4 with complete effacement of the CSF. MRI thoracic and lumbar spine were relatively unremarkable except for moderate spinal stenosis from L2-L3 to L4-L5. He was transferred to [place] [facility] for neurosurgical evaluation. He underwent laminectomy and fusion at C2-C5 on 3/24/22 (Dr. Kiersten Coyne). Hospital course has been complicated by urinary retention.  He reports continued neck pain with pain in the limbs as well. He also notes numbness/tingling and weakness in all limbs. Pt admitted to rehab unit on 3/31/22    Overall Orientation Status: Within Functional Limits  Restrictions/Precautions  Restrictions/Precautions: Fall Risk;General Precautions  Required Braces or Orthoses?: Yes  Implants present? : Metal implants  Required Braces or Orthoses  Cervical: c-collar  Position Activity Restriction  Spinal Precautions: No Twisting; No Lifting; No Bending  Spinal Precautions: Poor Sitting balance, Bilateral Upper  and Lower Body muscle weakness, Assist with standing in GERA Stedy  Other position/activity restrictions: up with assist; s/p C2-C5 laminectomy and fixation 3/24, Collar on while out of bed. Ok to remove while resting In bed eating and drinking in bed    Subjective: Pt is in bed upon arrival. P is agreeable to PT/OT. Comments: C-collar donned during tx. Vital Signs  BP Location: Left upper arm  Level of Consciousness: Alert (0)  Patient Currently in Pain: Yes  Pain Assessment: 0-10  Pain Level: 8  Pain Type: Acute pain  Pain Location: Head;Neck; Shoulder  Pain Orientation: Posterior  Clinical Progression: Not changed     Oxygen Therapy  O2 Device: None (Room air)          Bed Mobility:   Bed Mobility  Bridging: Moderate assistance  Rolling: Maximal assistance  Supine to Sit: Maximal assistance; Moderate assistance (x2)  Sit to Supine: Maximal assistance (x2)  Scooting: Maximal assistance  Comment: Completed in hospital bed. Pt requires assistance with BLE's and trunk this date. Mod A x1 (BLEs) and Max a X1(trunk). Pt is able to sit EOB x~ 5 min. Pt continues to require SBA -Max A x1 for sitting balance. Pt requires Max Ax2 to scoot to EOB with hip/trunk assistance. Transfers:  Sit to Stand:  Moderate Assistance;2 Person Assistance;Maximum Assistance  Stand to sit: Moderate Assistance;2 Person Assistance  Bed to Chair: Dependent/Total (gera stedy used)   Comments: STS transfer x1 from bed and x1 from w/c. Pt requires Mod-Max A x2 to complete, SS utilized. Pt tolerates ~2min standing/sitting in SS. Attempted to use early mob chair however, pt requested to return to bed due to increase pain. Will continue to attempt as able. Stairs/Curb  Stairs?: No          BALANCE Posture: Poor  Sitting - Static: Poor;+  Sitting - Dynamic: Poor  Standing - Static: Poor;+ (Carrie nguyen)  Standing - Dynamic: Poor;+  Comments: Sitting balance assessed at EOB. EXERCISES Exercises  Quad Sets: 10x bilat supine  Heelslides: 10x bilat supine AAROM  Gluteal Sets: 10x bilat supine  Hip Abduction: 10x bilat supine AAROM  Knee Long Arc Quad: 10x bilat seated EOB  Knee Short Arc Quad: 10x bilat supine  Ankle Pumps: 10x bilat supine  Core Strengthening: EOB ~5min  Other exercises?: Yes  Other exercises 1: Supine AAROM bilat LE exercises, x10 reps  Other exercises 2: Dangle at EOB ~ 5 minutes, 2 person assit for safety fue to poor sitting balance. Pt is able to sit upsupported breifly with BUE support and SBA. Other exercises 3: Rolling x1 in each direction. MAx A x1  Other exercises 4: Sitting in w/c, LAQ's and AP's. Reps: 10 each. Other exercises 5: Static stands 2x2min each. Other Activities  Comment: rest breaks PRN. Increase time required to complete all tasks.          Activity Tolerance: Patient limited by fatigue,Patient limited by endurance,Patient limited by pain  PT Equipment Recommendations  Equipment Needed: No  Other: TBD       Patient Education  New Education Provided:    Learner:patient  Method: demonstration and explanation       Outcome: needs reinforcement     Current Treatment Recommendations: Lizzie Silvan Mobility Training,Transfer Training,Endurance Training,Wheelchair Mobility Training,Stair training,Gait Training,Neuromuscular Re-education,Home Exercise Program,Safety Education & Training,Patient/Caregiver Education & Training,Equipment Evaluation, Education, & procurement    Conditions Requiring Skilled Therapeutic Intervention  Body structures, Functions, Activity limitations: Decreased functional mobility ; Decreased strength;Decreased safe awareness;Decreased endurance;Decreased balance; Increased pain;Decreased posture;Decreased coordination;Decreased fine motor control;Decreased sensation;Decreased ROM  Assessment: Pt has frequent falls at home, gradually declining in fucntion since Feb\"2022. Pt underwent C2-C5 fusion/laminectomy, presetns wuth quadraparesis, B UE>B LE. Pt requires 2 person assist for safe bed mobility  and dependnet for transfer at his time. WIll conitnue POC to improve stregnth and balance to faciliate independence. Treatment Diagnosis: Impaired function. Prognosis: Fair  Decision Making: Medium Complexity  Barriers to Learning: Pain  REQUIRES PT FOLLOW UP: Yes  Discharge Recommendations: Patient would benefit from continued therapy after discharge;Home with assist PRN    Goals  Short term goals  Time Frame for Short term goals: 10 visits  Short term goal 1: Perform rolling in bed min/mod A   Short term goal 2: Perform supine to sit min/mod A   Short term goal 3: Perform sit to supine max A   Short term goal 4: Perform sit<> stand mod A , and pivot transfers at mod A  Short term goal 5: Progress to ambulation HHA/Gait belt assist or appropriate device, mod A x 2 20 to 40 ft  Short term goal 6: Propel w/c with B LE, distance of 50 ft or > , min/mod A  Short term goal 7: Demo Fair- dynamic standing balance to decrease risk of falls  Long term goals  Time Frame for Long term goals : By DC  Long term goal 1: Pt able to perform bed mobility at [address] term goal 2: Pt able to perform transfers at min A   Long term goal 3: Pt able to ambulate with or without device, distance of 100 ft atleast, mod A, level surface.   Long term goal 4: Pt able to perform 2 to 4 steps at [place] x 2  Long term goal 5: Pt able to propel w/c on level surface, distance of 150 ft, SBA, level surfaces  Long term goal 6: Family training for safe mobility to return home  Long term goal 7: Improve sitting/standing balance to good/fair to reduce fall risk. Long term goal 8: Pt able to ambulate 60 to 80 ft for 2MWT,to improve overall fucntion.        04/03/22 0902 04/03/22 1433   PT Individual Minutes   Time In  --  9158   Time Out  --  1510   Minutes  --  37   PT Co-Treatment Minutes   Time In 0902  --    Time Out 1004  --    Minutes 62  --        Electronically signed by Jada Brown PTA on 4/3/22 at 3:17 PM EDT

## 2022-04-03 NOTE — PROGRESS NOTES
[facility]    CONSULTATION / HISTORY AND PHYSICAL EXAMINATION            Date:   4/2/2022  Patient name:  Rodell Meckel  Date of admission:  3/31/2022  8:38 PM  MRN:   [id number]  Account:  [de-identified]  YOB: 1950  PCP:    Natalie Ramírez  Room:   4044/2734-78  Code Status:    DNR-CCA    Physician Requesting Consult: Johnathan Cardozo MD    Reason for Consult:  medical management    Chief Complaint:     No chief complaint on file.       History Obtained From:     Patient medical record nursing staff    History of Present Illness:   Patient past medical multiple medical problems include hypertension, heart failure with preserved ejection fraction, diabetes, bipolar disorder,   glaucoma, patient was originally admitted at [facility] where he was transferred from outlying facility, he developed, generalized weakness after a fall, patient underwent MRI of cervical spine suggestive of severe spinal canal stenosis at the level of C2-C4 level, patient underwent C2-C5 posterior decompression surgery, he feels that weakness in his extremities is slightly better  Patient during his hospital stay, developed bradycardia which improved after discontinuation of beta-blocker, cardiology was also consulted  He developed hospital-acquired pneumonia, getting treated with cefepime and doxycycline  Patient denying any complaints of cough, shortness of breath, chest pain,    Past Medical History:     Past Medical History:   Diagnosis Date    Depression 2017    ON RX    Diabetes mellitus ([facility] [address] 2013    NIDDM    Difficult intravenous access     Hyperlipidemia 2008    ON RX    Hypertension 2008    ON RX    Migraines     PTSD (post-traumatic stress disorder) 01/2018    ON RX    Sleep apnea 01/2018    UNALBE TO USE TO CLEARED BY ENT (CPAP)    Teeth missing     BACK TEETH UPPER AND LOWER TO BE FITTED FOR PARTIALS AT LATER DATE    Wears glasses         Past Surgical History:     Past Surgical History:   Procedure Laterality Date    CARDIAC CATHETERIZATION  02/2010    no stents     CARPAL TUNNEL RELEASE Left 01/09/2002    CATARACT REMOVAL      CERVICAL FUSION  04/19/2018    anterior cervical fusion C5-6    CERVICAL FUSION N/A 3/24/2022    C2-5 FUSION, C2-4 LAMINECTOMY performed by Trevin Padgett DO at [address] Left 2018    CATARACT EXTRACTION WITH IOL    HYDROCELE EXCISION  1951    LAMINECTOMY  03/24/2022    C2-5 FUSION, C2-4 LAMINECTOMY    MIDDLE EAR SURGERY  01/11/2018    MIDDLE EAR REBUILT AND EAR DRUM REPLACED    MOUTH SURGERY  04/16/2018    5 TEETH REMOVED    OTHER SURGICAL HISTORY  04/19/2018    : ANTERIOR CERVICAL CORRPECTOMY C5-6, SYNTHES, DEPUY, REG TABLE, SUPINE,     UT OFFICE/OUTPT VISIT,PROCEDURE ONLY N/A 4/19/2018    ANTERIOR CERVICAL CORRPECTOMY AND FUSION C5-6 performed by Trevin Padgett DO at [address]  12/1983        Medications Prior to Admission:     Prior to Admission medications    Medication Sig Start Date End Date Taking?  Authorizing Provider   finasteride (PROSCAR) 5 MG tablet Take 5 mg by mouth daily   Yes Historical Provider, MD   carboxymethylcellulose 1 % ophthalmic solution Place 2 drops into both eyes 3 times daily Pt states \"2 drops in both eyes three times a day\"    Historical Provider, MD   divalproex (DEPAKOTE ER) 250 MG extended release tablet Take 750 mg by mouth at bedtime    Historical Provider, MD   furosemide (LASIX) 20 MG tablet Take 20 mg by mouth daily    Historical Provider, MD   venlafaxine (EFFEXOR XR) 150 MG extended release capsule Take 1 capsule by mouth daily  Patient taking differently: Take 225 mg by mouth daily  6/23/20   Elham Escobar MD   simethicone (MYLICON) 80 MG chewable tablet Take 1 tablet by mouth every 6 hours as needed for Flatulence  Patient taking differently: Take 80 mg by mouth every 8 hours as needed for Flatulence  6/22/20 Alex Rashid MD   lidocaine (XYLOCAINE) 5 % ointment Apply topically daily as needed for Pain Apply topically as needed. LF 4/20/20 (30 day supply)  Patient not taking: Reported on 3/23/2022    Historical Provider, MD   metFORMIN (GLUCOPHAGE) 1000 MG tablet Take 1,000 mg by mouth 2 times daily (with meals) LF 4/27/20 (90 day supply)  Patient not taking: Reported on 3/21/2022    Historical Provider, MD   losartan (COZAAR) 25 MG tablet Take 25 mg by mouth daily     Historical Provider, MD   aspirin 81 MG chewable tablet Take 81 mg by mouth daily  Patient not taking: Reported on 3/21/2022    Historical Provider, MD   divalproex (DEPAKOTE ER) 500 MG extended release tablet Take 500 mg by mouth every morning     Historical Provider, MD   traZODone (DESYREL) 100 MG tablet Take 150 mg by mouth nightly as needed LF 5/1/20 (30 day supply)    Historical Provider, MD   cetirizine (ZYRTEC) 10 MG tablet Take 10 mg by mouth daily LF 4/6/20 (90 day supply)  Patient not taking: Reported on 3/21/2022    Historical Provider, MD   atorvastatin (LIPITOR) 80 MG tablet Take 40 mg by mouth daily Take 1/2 tablet (40 mg) daily  LF 4/22/20 (90 day supply)  Patient not taking: Reported on 3/21/2022    Historical Provider, MD   amLODIPine (NORVASC) 10 MG tablet Take 7.5 mg by mouth daily     Historical Provider, MD   sildenafil (VIAGRA) 100 MG tablet Take 100 mg by mouth as needed for Erectile Dysfunction LF 5/12/20 (30 day supply)    Historical Provider, MD   omeprazole (PRILOSEC) 20 MG delayed release capsule Take 20 mg by mouth every evening LF 4/21/20 (90 day supply)    Historical Provider, MD        Allergies:     Latex, Bee venom, Lisinopril, Niacin and related, Sulfa antibiotics, and Terazosin    Social History:     Tobacco:    reports that he quit smoking about 49 years ago. His smoking use included cigarettes. He has a 2.00 pack-year smoking history.  He has never used smokeless tobacco.  Alcohol:      reports current alcohol use.  Drug Use:  reports current drug use. Drug: Marijuana Fronie Gail). Family History:     Family History   Problem Relation Age of Onset   Bonilla Dementia Mother     Coronary Art Dis Mother     Stroke Mother     Diabetes Mother     Asthma Mother     Other Mother         BRAIN ANEURISM    High Blood Pressure Mother     Heart Disease Father     Diabetes Sister     Diabetes Brother     High Blood Pressure Brother     High Cholesterol Brother     Heart Disease Brother     Diabetes Sister     Other Sister         NEUROPATHY       Review of Systems:     Positive and Negative as described in HPI. CONSTITUTIONAL:  negative for fevers, chills, sweats, fatigue, weight loss  HEENT:  negative for vision, hearing changes, runny nose, throat pain  RESPIRATORY:  negative for shortness of breath, cough, congestion, wheezing. CARDIOVASCULAR:  negative for chest pain, palpitations.   GASTROINTESTINAL:  negative for nausea, vomiting, diarrhea, constipation, change in bowel habits, abdominal pain   GENITOURINARY:  negative for difficulty of urination, burning with urination, frequency   INTEGUMENT:  negative for rash, skin lesions, easy bruising   HEMATOLOGIC/LYMPHATIC:  negative for swelling/edema   ALLERGIC/IMMUNOLOGIC:  negative for urticaria , itching  ENDOCRINE:  negative increase in drinking, increase in urination, hot or cold intolerance  MUSCULOSKELETAL:  negative joint pains, muscle aches, swelling of joints  NEUROLOGICAL: Weakness in all 4 extremities  BEHAVIOR/PSYCH:      Physical Exam:     /88   Pulse 97   Temp 98.4 °F (36.9 °C) (Oral)   Resp 18   Ht 5' 3\" (1.6 m)   Wt 142 lb (64.4 kg)   SpO2 95%   BMI 25.15 kg/m²   Temp (24hrs), Av.2 °F (36.8 °C), Min:97.9 °F (36.6 °C), Max:98.4 °F (36.9 °C)    Recent Labs     22  0623 22  1118 22  1550 22  1625   POCGLU 105 101 209* 88       Intake/Output Summary (Last 24 hours) at 2022  Last data filed at 2022 1244  Gross per 24 hour   Intake 280 ml   Output 110 ml   Net 170 ml       General Appearance:  alert, well appearing, and in no acute distress  Mental status: oriented to person, place, and time with normal affect  Head:  normocephalic, atraumatic. Eye: no icterus, redness, pupils equal and reactive, extraocular eye movements intact, conjunctiva clear  Ear: normal external ear, no discharge, hearing intact  Nose:  no drainage noted  Mouth: mucous membranes moist  Neck: supple, no carotid bruits, thyroid not palpable , staples present posterior neck, healing  Lungs: Bilateral equal air entry, clear to ausculation, no wheezing, rales or rhonchi, normal effort  Cardiovascular: normal rate, regular rhythm, no murmur, gallop, rub. Abdomen: Soft, nontender, nondistended, normal bowel sounds, no hepatomegaly or splenomegaly  Neurologic: Weakness in all 4 extremities, power in all 4 extremities 4/5 skin: No gross lesions, rashes, bruising or bleeding on exposed skin area  Extremities:  peripheral pulses palpable, no pedal edema or calf pain with palpation  Psych: Investigations:      Laboratory Testing:  Recent Results (from the past 24 hour(s))   POC Glucose Fingerstick    Collection Time: 04/02/22  4:25 PM   Result Value Ref Range    POC Glucose 88 75 - 110 mg/dL           Consultations:   IP CONSULT TO DIETITIAN  IP CONSULT TO SOCIAL WORK  IP CONSULT TO INTERNAL MEDICINE  IP CONSULT TO DIETITIAN  Assessment :      Primary Problem  Cervical stenosis of spine    Active Hospital Problems    Diagnosis Date Noted    Cervical stenosis of spine [M48.02] 03/31/2022       Plan:     1. Quadriparesis, due to cervical cord compression after fall s/p C2-C5 decompression surgery. 2. Hypertension, controlled restart home dose of Norvasc, losartan, will avoid beta-blockers since patient developed bradycardia in [place]  3. Heart failure preserved ejection fraction, compensated , on Lasix  4. GERD, restarted Protonix  5.  On

## 2022-04-03 NOTE — PROGRESS NOTES
[facility]: KIRSTIN JIMENEZ   ACUTE REHABILITATION OCCUPATIONAL THERAPY  DAILY NOTE    Date: 4/3/22  Patient Name: Welton Kayser      Room: 8625/0318-07    MRN: [id number]   : 1950  (70 y.o.)  Gender: male   Referring Practitioner: Candy Mckeon MD  Diagnosis: Cervical stenosis with myelopathy s/p C2-C5 laminectomy and fusion   Additional Pertinent Hx: Welton Kayser is a 70 y.o. male with history of HTN, HLD, diabetes, GIA, migraines, PTSD, and depression admitted to [facility] on 3/23/2022. He initially presented to [facility] after a fall with subsequent worsening limb weakness and additional falls. MRI brain showed no acute intracranial abnormality. MRI cervical spine showed severe spinal stenosis at C2-C3 and C3-C4 with complete effacement of the CSF. MRI thoracic and lumbar spine were relatively unremarkable except for moderate spinal stenosis from L2-L3 to L4-L5. He was transferred to [facility] for neurosurgical evaluation. He underwent laminectomy and fusion at C2-C5 on 3/24/22 (Dr. Yanely Hernandez). Hospital course has been complicated by urinary retention. He reports continued neck pain with pain in the limbs as well. He also notes numbness/tingling and weakness in all limbs. Pt admitted to rehab unit on 3/31/22    Restrictions  Restrictions/Precautions: Fall Risk,General Precautions  Implants present? : Metal implants  Spinal Precautions: Poor Sitting balance, Bilateral Upper  and Lower Body muscle weakness, Assist with standing in GERA Stedy  Other position/activity restrictions: up with assist; s/p C2-C5 laminectomy and fixation 3/24, Collar on while out of bed. Ok to remove while resting In bed eating and drinking in bed  Required Braces or Orthoses  Cervical: c-collar  Required Braces or Orthoses?: Yes  Equipment Used: Wheelchair,Bed,Other (ss)    Subjective  Comments: Pt is pleasant and agreeable. Patient Currently in Pain: Yes  Pain Level: 8  Pain Location: Head;Neck; Shoulder  Pain Orientation: Posterior  Restrictions/Precautions: Fall Risk;General Precautions  Overall Orientation Status: Within Functional Limits     Pain Assessment  Pain Assessment: 0-10  Pain Level: 8  Pain Type: Acute pain  Pain Location: Head,Neck,Shoulder  Pain Orientation: Posterior    Objective  Cognition  Overall Cognitive Status: WFL  Perception  Overall Perceptual Status: WFL  Balance  Sitting Balance: Maximum assistance (max-SBA )  Standing Balance: Dependent/Total (in elbert stedy )  Bed mobility  Bridging: Moderate assistance  Rolling to Left: Maximum assistance  Rolling to Right: Maximum assistance  Supine to Sit: Maximum assistance  Sit to Supine: Maximum assistance;2 Person assistance  Scooting: Maximal assistance  Transfers  Sit to stand: 2 Person assistance; Moderate assistance  Stand to sit: 2 Person assistance; Moderate assistance  Transfer Comments: utilizing [employer]   Standing Balance  Time: 2 min x2   Activity: functional transfers in [facility]          ADL  LE Bathing: Dependent/Total  Additional Comments: Writer provided assist this date with brief change. Completing supin in bed rolling side to side           Assessment  Co-tx completed this date with PTA. Pt sat EOB for 5 min. Transferred with elbert nguyen to . Seated in wc for 10 min before requesting to return to bed due to needing to have BM. Performance deficits / Impairments: Decreased ADL status; Decreased functional mobility ; Decreased ROM; Decreased strength;Decreased endurance;Decreased sensation;Decreased balance;Decreased high-level IADLs;Decreased fine motor control;Decreased coordination;Decreased posture  Prognosis: Fair  Discharge Recommendations: Patient would benefit from continued therapy after discharge;Continue to assess pending progress  Activity Tolerance: Patient limited by fatigue;Patient limited by pain  Safety Devices in place: Yes          Patient Education:  Patient Goals   Patient goals : \"I just want to be able to get back to my basic needs. \"  Learner:patient  Method: demonstration and explanation       Outcome: acknowledged understanding   OT Education  OT Education: OT Role;Plan of Care;Transfer Training;IADL Safety;Orientation  Patient Education: Edcuation provided to pt and pts wife this date. Plan  Plan  Times per week: 900 minutes/week for combined therapy of OT/PT due to decreaed tolerance to activity. Times per day: Twice a day  Current Treatment Recommendations: Self-Care / ADL,Strengthening,ROM,Balance Training,Functional Mobility Training,Endurance Training,Pain Management,Safety Education & Training,Patient/Caregiver Education & Training,Equipment Evaluation, Education, & procurement,Positioning  Patient Goals   Patient goals : \"I just want to be able to get back to my basic needs. \"  Short term goals  Time Frame for Short term goals: By 10 days  Short term goal 1: Pt will complete upper body dressing/bathing with max A with use of AE as needed while maintaining cervical precautions  Short term goal 2: Pt will complete self feeding task with max A with adaptive strategies and good safety  Short term goal 3: Pt will tolerate sitting EOB 5+ minutes unsupported with mod A during functional activity to increase independence with self care and mobility  Short term goal 4: Pt will complete functional transfers during self care tasks with mod A and good safety  Short term goal 5: Pt will participate in 30+ minutes of therapeutic exercises/functional activities to increase safety and independence with self care and mobility  Long term goals  Time Frame for Long term goals : By discharge  Long term goal 1: Pt will complete self feeding task with mod A with use of adaptive strategies   Long term goal 2: Pt will complete upper body bathing/dressing with mod A and good safety while maintaining cervical precautions  Long term goal 3: Pt will tolerate sitting EOB 10+ minutes unsupported with CGA and good safety during functional activity of choice  Long term goal 4: Pt will complete functional transfer with min A and good safety during self care tasks  Long term goal 5: Pt will demonstrated increased BUE gross motor/fine motor to increase participation in self care tasks  Long term goals 6: Pt/family with verbalize/demosntrate Good understanding of cervical precautions during self care tasks  Long term goal 7: Pt/family will demonstrate Good understanding of BUE exercises to increase functional use of BUE during self care tasks  Timed Code Treatment Minutes: 31 Minutes    Electronically signed by [facility]   04/03/22 1535 04/03/22 1541   OT Individual Minutes   Time In  --  2026   Time Out  --  1428   Minutes  --  25   OT Co-Treatment Minutes   Time In   (902)  --    Time Out   (1004)  --    Time Code Minutes    Timed Code Treatment Minutes 31 Minutes  --    ASHLEY Neri on 4/3/22 at 3:43 PM EDT

## 2022-04-04 PROCEDURE — 97530 THERAPEUTIC ACTIVITIES: CPT

## 2022-04-04 PROCEDURE — 1180000000 HC REHAB R&B

## 2022-04-04 PROCEDURE — 6370000000 HC RX 637 (ALT 250 FOR IP): Performed by: PHYSICAL MEDICINE & REHABILITATION

## 2022-04-04 PROCEDURE — 6370000000 HC RX 637 (ALT 250 FOR IP): Performed by: STUDENT IN AN ORGANIZED HEALTH CARE EDUCATION/TRAINING PROGRAM

## 2022-04-04 PROCEDURE — 99232 SBSQ HOSP IP/OBS MODERATE 35: CPT | Performed by: INTERNAL MEDICINE

## 2022-04-04 PROCEDURE — 6360000002 HC RX W HCPCS: Performed by: NURSE PRACTITIONER

## 2022-04-04 PROCEDURE — 99232 SBSQ HOSP IP/OBS MODERATE 35: CPT | Performed by: STUDENT IN AN ORGANIZED HEALTH CARE EDUCATION/TRAINING PROGRAM

## 2022-04-04 PROCEDURE — 6370000000 HC RX 637 (ALT 250 FOR IP): Performed by: NURSE PRACTITIONER

## 2022-04-04 PROCEDURE — 97110 THERAPEUTIC EXERCISES: CPT

## 2022-04-04 PROCEDURE — 97535 SELF CARE MNGMENT TRAINING: CPT

## 2022-04-04 PROCEDURE — 6370000000 HC RX 637 (ALT 250 FOR IP): Performed by: INTERNAL MEDICINE

## 2022-04-04 RX ORDER — LIDOCAINE 4 G/G
2 PATCH TOPICAL DAILY
Status: DISCONTINUED | OUTPATIENT
Start: 2022-04-04 | End: 2022-04-15 | Stop reason: HOSPADM

## 2022-04-04 RX ADMIN — VENLAFAXINE 75 MG: 75 TABLET ORAL at 17:58

## 2022-04-04 RX ADMIN — HYDROCHLOROTHIAZIDE 25 MG: 25 TABLET ORAL at 08:23

## 2022-04-04 RX ADMIN — PANTOPRAZOLE SODIUM 40 MG: 40 TABLET, DELAYED RELEASE ORAL at 06:36

## 2022-04-04 RX ADMIN — LOSARTAN POTASSIUM 100 MG: 100 TABLET, FILM COATED ORAL at 08:23

## 2022-04-04 RX ADMIN — CARBOXYMETHYLCELLULOSE SODIUM 2 DROP: 10 GEL OPHTHALMIC at 14:36

## 2022-04-04 RX ADMIN — FUROSEMIDE 20 MG: 20 TABLET ORAL at 08:24

## 2022-04-04 RX ADMIN — VENLAFAXINE 75 MG: 75 TABLET ORAL at 12:30

## 2022-04-04 RX ADMIN — ACETAMINOPHEN 650 MG: 325 TABLET ORAL at 18:02

## 2022-04-04 RX ADMIN — FINASTERIDE 5 MG: 5 TABLET, FILM COATED ORAL at 08:24

## 2022-04-04 RX ADMIN — VENLAFAXINE 75 MG: 75 TABLET ORAL at 08:25

## 2022-04-04 RX ADMIN — CARBOXYMETHYLCELLULOSE SODIUM 2 DROP: 10 GEL OPHTHALMIC at 20:37

## 2022-04-04 RX ADMIN — DIVALPROEX SODIUM 500 MG: 500 TABLET, EXTENDED RELEASE ORAL at 08:26

## 2022-04-04 RX ADMIN — DOXYCYCLINE 100 MG: 100 CAPSULE ORAL at 08:24

## 2022-04-04 RX ADMIN — ENOXAPARIN SODIUM 40 MG: 100 INJECTION SUBCUTANEOUS at 08:24

## 2022-04-04 RX ADMIN — AMLODIPINE BESYLATE 10 MG: 10 TABLET ORAL at 08:24

## 2022-04-04 RX ADMIN — DIVALPROEX SODIUM 750 MG: 250 TABLET, FILM COATED, EXTENDED RELEASE ORAL at 20:37

## 2022-04-04 RX ADMIN — CARBOXYMETHYLCELLULOSE SODIUM 2 DROP: 10 GEL OPHTHALMIC at 08:26

## 2022-04-04 RX ADMIN — ACETAMINOPHEN 650 MG: 325 TABLET ORAL at 08:24

## 2022-04-04 RX ADMIN — ACETAMINOPHEN 650 MG: 325 TABLET ORAL at 02:40

## 2022-04-04 ASSESSMENT — PAIN DESCRIPTION - ORIENTATION
ORIENTATION: POSTERIOR
ORIENTATION: RIGHT;LEFT;POSTERIOR

## 2022-04-04 ASSESSMENT — PAIN DESCRIPTION - LOCATION
LOCATION: HEAD;NECK
LOCATION: HEAD;NECK;SHOULDER

## 2022-04-04 ASSESSMENT — PAIN DESCRIPTION - PROGRESSION: CLINICAL_PROGRESSION: NOT CHANGED

## 2022-04-04 ASSESSMENT — PAIN SCALES - GENERAL
PAINLEVEL_OUTOF10: 0
PAINLEVEL_OUTOF10: 4
PAINLEVEL_OUTOF10: 5
PAINLEVEL_OUTOF10: 2
PAINLEVEL_OUTOF10: 5

## 2022-04-04 ASSESSMENT — PAIN DESCRIPTION - DESCRIPTORS
DESCRIPTORS: PRESSURE
DESCRIPTORS: ACHING;SORE

## 2022-04-04 ASSESSMENT — PAIN DESCRIPTION - FREQUENCY: FREQUENCY: INTERMITTENT

## 2022-04-04 ASSESSMENT — PAIN DESCRIPTION - ONSET: ONSET: ON-GOING

## 2022-04-04 ASSESSMENT — PAIN SCALES - WONG BAKER: WONGBAKER_NUMERICALRESPONSE: 6

## 2022-04-04 ASSESSMENT — PAIN DESCRIPTION - PAIN TYPE
TYPE: ACUTE PAIN;SURGICAL PAIN
TYPE: SURGICAL PAIN

## 2022-04-04 ASSESSMENT — PAIN DESCRIPTION - DIRECTION: RADIATING_TOWARDS: SHOULDERS

## 2022-04-04 NOTE — PROGRESS NOTES
Comprehensive Nutrition Assessment    Type and Reason for Visit:  Reassess    Nutrition Recommendations/Plan:  Continue current diet and oral nutrition supplements. Nutrition Assessment:  Pt states appetite remains decreased and taste altered. Willing to try soup from cafeteria tonight along with dinner. States he is hesitant to request increase in diet texture due to fear of swallowing difficulty. May want to try cheese puffs- will check with SLP. otherwise, current diet and oral nutrition supplements to continue. Pt does drink the Ensure provided. Malnutrition Assessment:  Malnutrition Status:  Insufficient data    Context:  Acute Illness     Findings of the 6 clinical characteristics of malnutrition:  Energy Intake:  1 - 75% or less of estimated energy requirements for 7 or more days  Weight Loss: Body Fat Loss:  Unable to assess     Muscle Mass Loss:  Unable to assess    Fluid Accumulation:  1 - Mild Extremities   Strength:  Not Performed    Estimated Daily Nutrient Needs:  Energy (kcal):  2788-5781 based on [place] with 1.2-1.3 factor; Weight Used for Energy Requirements:  Admission     Protein (g):  73-85 based on 1.3-1.5 gm per kg; Weight Used for Protein Requirements:  Ideal          Nutrition Related Findings:  Edema: nonpitting LUE (hand). POC Glucose:  since 4/1. Other labs and meds reviewed. Wounds:  Multiple,Skin Tears,Surgical Incision       Current Nutrition Therapies:    ADULT DIET; Easy to Chew  ADULT ORAL NUTRITION SUPPLEMENT; Lunch, Dinner, Breakfast; Standard High Calorie/High Protein Oral Supplement    Anthropometric Measures:  · Height: 5' 3\" (160 cm)  · Current Body Weight: 141 lb 15.6 oz (64.4 kg)   · Admission Body Weight:      · Usual Body Weight: 158 lb 15.2 oz (72.1 kg) (1/21/22)     · Ideal Body Weight: 124 lbs; % Ideal Body Weight 114.5 %   · BMI: 25.2  · BMI Categories: Overweight (BMI 25.0-29. 9)       Nutrition Diagnosis:   · Inadequate oral intake related to swallowing difficulty,biting/chewing (masticatory) difficulty,altered taste perception (decreased appetite) as evidenced by intake 26-50%,poor intake prior to admission    Nutrition Interventions:   Food and/or Nutrient Delivery:  Continue Current Diet,Continue Oral Nutrition Supplement  Nutrition Education/Counseling:  No recommendation at this time   Coordination of Nutrition Care:  Continue to monitor while inpatient    Goals:  PO intake % of meals and supplements       Nutrition Monitoring and Evaluation:   Behavioral-Environmental Outcomes:  None Identified   Food/Nutrient Intake Outcomes:  Food and Nutrient Intake,Supplement Intake  Physical Signs/Symptoms Outcomes:  Biochemical Data,Chewing or Swallowing,GI Status,Fluid Status or Edema,Skin,Weight     Discharge Planning: Too soon to determine     Some areas of assessment may be incomplete due to standard COVID-19 Precautions. Jose Benitez R.D., L.D.   Phone: [phone]

## 2022-04-04 NOTE — PATIENT CARE CONFERENCE
[address]   ACUTE REHABILITATION  TEAM CONFERENCE NOTE  Date: 22  Patient Name: Bhavin Cartagena       Room: 1466/2152-21  MRN: [id number]       : 1950  (75 y.o.)     Gender: male   Referring Practitioner: Dr Tres Alfaro   Cervical stenosis of spine [M48.02]  Diagnosis: Cervical stenosis with myelopathy s/p C2-C5 laminectomy and fusion      NURSING  Bladder  Incontinent Less Than Daily  Bowel   Always Continent  Date of Last BM:   Intervention    Bladder Program     Wounds/Incisions/Ulcers: Incision healing well  Medication Education Program: Patient able to manage medications and being educated by nursing  Pain: Patient's pain is currently controlled with -  Lidocaine patches/Tylenol    Fall Risk:  Falling star program initiated    PHYSICAL THERAPY     Pain Level 6 with faces Scale. Reporting lots of dizziness with up out of bed and nausea. RN gave nausea medication  Patient Observation  Observations: pt verbally states he is confused, pt oriented to place/time and person    Bed Mobility  Rolling: Rolling Right; Moderate assistance (2 person assist.)  Supine to Sit: Moderate assistance (x2)  Scootin Person assistance; Moderate assistance    Transfers:  Sit to Stand: Moderate Assistance;2 Person Assistance  Stand to sit: Moderate Assistance;2 Person Assistance  Bed to Chair: Dependent/Total (carrie stedy used)  Comment: rest breaks PRN. Increase time required to complete all tasks. BALANCE Posture: Poor  Sitting - Static: Poor;+  Sitting - Dynamic: Poor  Standing - Static: Poor;+ (Carrie stedy)  Standing - Dynamic: Poor;+  Comments: Sitting balance assessed at EOB. Standing Balance  Time: AM: <30 sec x2; PM: <30 sec x2   Dangle at EOB ~ 3 minutes, 2 person assit for safety fue to poor sitting balance. Pt is able to sit upsupported breifly with BUE support and SBA. Pt has frequent falls at home, gradually declining in fucntion since .  Pt underwent C2-C5 fusion/laminectomy, presents with quadraparesis, B UE>B LE. Pt requires 2 person assist for safe bed mobility  and dependent for transfer at his time. WIll continue POC to improve stregnth and balance to faciliate independence. Goals  Time Frame for Short term goals: 10 visits  Short term goal 1: Perform rolling in bed min/mod A   Short term goal 2: Perform supine to sit min/mod A   Short term goal 3: Perform sit to supine max A   Short term goal 4: Perform sit<> stand mod A , and pivot transfers at mod A  Short term goal 5: Progress to ambulation HHA/Gait belt assist or appropriate device, mod A x 2 20 to 40 ft  Short term goal 6: Propel w/c with B LE, distance of 50 ft or > , min/mod A  Short term goal 7: Demo Fair- dynamic standing balance to decrease risk of falls      OCCUPATIONAL THERAPY  SELF CARE      Eating            Dependent/Total   Oral Hygiene            Dependent/Total   Shower/Bathe Self             UE Bathing: Dependent/Total  LE Bathing: Dependent/Total   Upper Body Dressing            Dependent/Total   Lower Body Dressing            Putting On/Taking Off Footwear             Dependent/Total   Toilet Transfer               Not safe to attempt as this time  Toileting Hygiene            Dependent/Total    Bed mobility  Rolling to Left: Moderate assistance;2 Person assistance  Rolling to Right: Moderate assistance;2 Person assistance  Supine to Sit: Moderate assistance;2 Person assistance  Sit to Supine: Dependent/Total;2 Person assistance  Scootin Person assistance; Moderate assistance  Comment: pt attempts to reach for bed rails, A req for completion, pt attempting to move BLE's over EOB-assist for completion        Balance  Sitting Balance:  Moderate assistance (mod A initially, CGA/SBA for 2-3 min EOB)  Standing Balance: Dependent/Total (mod A x2 with elbert nguyen)  Standing Balance  Time: AM: <30 sec x2; PM: <30 sec x2  Activity: AM/PM: transfers with elbert nguyen  Comment: Ax2 with elbert nguyen, A to place BUE's on front bar    Equipment Recommendations  Equipment Needed:  (TBD)  Assessment: Pt reports his speach is sometimes slow due to requring increased time to process. Short term goals  Time Frame for Short term goals: By 10 days  Short term goal 1: Pt will complete upper body dressing/bathing with max A with use of AE as needed while maintaining cervical precautions  Short term goal 2: Pt will complete self feeding task with max A with adaptive strategies and good safety  Short term goal 3: Pt will tolerate sitting EOB 5+ minutes unsupported with mod A during functional activity to increase independence with self care and mobility  Short term goal 4: Pt will complete functional transfers during self care tasks with mod A and good safety  Short term goal 5: Pt will participate in 30+ minutes of therapeutic exercises/functional activities to increase safety and independence with self care and mobility      SPEECH THERAPY      NUTRITION  Weight: 142 lb (64.4 kg) / Body mass index is 25.15 kg/m². Diet Rx: Easy to Chew, [employer] with meals. Appetite remains poor and taste is altered. Intake at meals is 0-50% although pt does drink the supplements. Will continue to monitor and encourage intake. Please see nutrition note for details. SOCIAL WORK ASSESSMENT  Assessment:Significant other  Pre-Admission Status:  Lives With: Significant other  Type of Home: House  Home Layout: One level  Home Access: Stairs to enter without rails  Entrance Stairs - Number of Steps: one step at entrance  Bathroom Shower/Tub: Tub/Shower unit,Shower chair with back,Curtain  Bathroom Toilet: Handicap height  Bathroom Equipment: Hand-held shower  Bathroom Accessibility: Walker accessible  Home Equipment: 4 wheeled [address] Help From: Family  ADL Assistance: Independent (Lately needed Ji Casillas in Jan 2020)  Homemaking Assistance: Independent (Prior to Jan 2022 was Ind with ADLs / IADLs. Progressive weakness / function since, most recently (per Pt report) required Max to TD for all tasks from wife.)  Homemaking Responsibilities: Yes (Prior to January 2022)  Ambulation Assistance: Independent (Jan\"22 was walking st cane, lately difficult)  Transfer Assistance: Independent  Active : Yes  Patient's  Info: Family is currently assisting with transportation - Was driving until end of Jan 2022  Mode of Transportation: Car,SUV  Occupation: Retired  Leisure & Hobbies: Maintenance  Additional Comments: Significant other able to assist as needed at discharge. Family Education: Need to make contact with family to initiate education    Percentage Risk for Readmission: Low 0 - 18%   Readmission Risk              Risk of Unplanned Readmission:  15       %    Critical Items: None       Problem / Barrier Intervention / Plan  Results   Impaired function related to B UE>B LE weakenss Strengthening, sitting balance functional mobility training    Pain -Neck-B shoulders MD working on pain meds. Altered ability to care for self Training and education for patient and patient's caregiver regarding modified care techniques including assistive equipment and durable medical equipment to maximize safety and participation with self-care                          Total Self Care Score    Total Mobility Score  Admission Score:  7      Admission Score:  17  Goal:  21/42         Goal:  34/90   `  Discharge Plan   Estimated Discharge Date: 4/21/22  Home evaluation needed?  Home Evaluation Indication (NO, Requires ReEval, YES/Date): No home evaluation need indicated for patient at this time  Overnight or Day Pass: No  Factors facilitating achievement of predicted outcomes: Family support, Motivated, Cooperative and Pleasant  Barriers to the achievement of predicted outcomes: Pain, Decreased endurance, Upper extremity weakness, Lower extremity weakness, Skin Care and Medication managment    Functional Goals at discharge:  Predicted Outcome: Home with familyPATIENT'S LEVEL OF ASSISTANCE: Minimal Assistance and Moderate Assistance  Discharge therapy goals:  PT: Long term goals  Time Frame for Long term goals : By DC  Long term goal 1: Pt able to perform bed mobility at [address] term goal 2: Pt able to perform transfers at min A   Long term goal 3: Pt able to ambulate with or without device, distance of 100 ft atleast, mod A, level surface. Long term goal 4: Pt able to perform 2 to 4 steps at [place] x 2  Long term goal 5: Pt able to propel w/c on level surface,  distance of 150 ft, SBA, level surfaces  Long term goal 6: Family training for safe mobility to return home  Long term goal 7: Improve sitting/standing balance to good/fair to reduce fall risk. Long term goal 8: Pt able to ambulate 60 to 80 ft for 2MWT,to improve overall fucntion.   OT:Long term goals  Time Frame for Long term goals : By discharge  Long term goal 1: Pt will complete self feeding task with mod A with use of adaptive strategies   Long term goal 2: Pt will complete upper body bathing/dressing with mod A and good safety while maintaining cervical precautions  Long term goal 3: Pt will tolerate sitting EOB 10+ minutes unsupported with CGA and good safety during functional activity of choice  Long term goal 4: Pt will complete functional transfer with min A and good safety during self care tasks  Long term goal 5: Pt will demonstrated increased BUE gross motor/fine motor to increase participation in self care tasks  Long term goals 6: Pt/family with verbalize/demosntrate Good understanding of cervical precautions during self care tasks  Long term goal 7: Pt/family will demonstrate Good understanding of BUE exercises to increase functional use of BUE during self care tasks  ST:     Participating Team Members:  /:  Jyoti Humphrey RN  Occupational Therapist: Jacqueline Jewell OT    Physical Therapist: Ama Tariq PT  Speech Therapist:  N/A  Nurse: Matt White RN    Dietary/Nutrition: Rodney Manuel RD, LD  Pastoral Care: Candy Hood  CMG: Efrain Pierce RN    I approve the established interdisciplinary plan of care as documented within the medical record of Evelin Santillan.     Delfino Bledsoe MD

## 2022-04-04 NOTE — PROGRESS NOTES
pain in the limbs as well. He also notes numbness/tingling and weakness in all limbs. Pt admitted to rehab unit on 3/31/22    Overall Orientation Status: Within Functional Limits  Restrictions/Precautions  Restrictions/Precautions: Fall Risk;General Precautions  Required Braces or Orthoses?: Yes  Implants present? : Metal implants  Required Braces or Orthoses  Cervical: c-collar  Position Activity Restriction  Spinal Precautions: No Twisting; No Lifting; No Bending  Spinal Precautions: Poor Sitting balance, Bilateral Upper  and Lower Body muscle weakness, Assist with standing in GERA Stedy  Other position/activity restrictions: up with assist; s/p C2-C5 laminectomy and fixation 3/24, Collar on while out of bed. Ok to remove while resting In bed eating and drinking in bed    Subjective: Pt is in bed upon arrival. P is agreeable to PT/OT. Comments: C-collar donned during tx. Patient not seen in PM due to needing to use the restroom. Pain Level 6 with faces Scale. Reporting lots of dizziness with up out of bed and nausea. RN gave nausea medication        Patient Observation  Observations: pt verbally states he is confused, pt oriented to place/time and person       Bed Mobility:   Rolling: Rolling Right; Moderate assistance (2 person assist.)  Supine to Sit: Moderate assistance (x2)  Scootin Person assistance; Moderate assistance      Transfers:  Sit to Stand: Moderate Assistance;2 Person Assistance  Stand to sit: Moderate Assistance;2 Person Assistance  Bed to Chair: Dependent/Total (gera stedy used)            Stairs/Curb  Stairs?: No              BALANCE Posture: Poor  Sitting - Static: Poor;+  Sitting - Dynamic: Poor  Standing - Static: Poor;+ (Gera stedy)  Standing - Dynamic: Poor;+  Comments: Sitting balance assessed at EOB. EXERCISES    Other exercises?: Yes  Other exercises 2: Dangle at EOB ~ 3 minutes, 2 person assit for safety fue to poor sitting balance.  Pt is able to sit upsupported breifly with BUE support and SBA. Other exercises 3: Rolling x1 in each direction. Mod x2  Other exercises 4: Sitting in w/c, LAQ's and AP's. Reps: 10 each. Other exercises 5: Static stands 2x2min each. in [place]. Other Activities  Comment: rest breaks PRN. Increase time required to complete all tasks. Activity Tolerance: Patient limited by fatigue,Patient limited by endurance,Patient limited by pain  PT Equipment Recommendations  Equipment Needed: No  Other: TBD         Current Treatment Recommendations: Strengthening,ROM,Balance Training,Functional Mobility Training,Transfer Training,Endurance Training,Wheelchair Mobility Training,Stair training,Gait Training,Neuromuscular Re-education,Home Exercise Program,Safety Education & Training,Patient/Caregiver Education & Training,Equipment Evaluation, Education, & procurement    Conditions Requiring Skilled Therapeutic Intervention  Body structures, Functions, Activity limitations: Decreased functional mobility ; Decreased strength;Decreased safe awareness;Decreased endurance;Decreased balance; Increased pain;Decreased posture;Decreased coordination;Decreased fine motor control;Decreased sensation;Decreased ROM  Treatment Diagnosis: Impaired function.   Prognosis: Fair  Decision Making: Medium Complexity  Barriers to Learning: Pain  REQUIRES PT FOLLOW UP: Yes  Discharge Recommendations: Patient would benefit from continued therapy after discharge;Home with assist PRN    Goals  Short term goals  Time Frame for Short term goals: 10 visits  Short term goal 1: Perform rolling in bed min/mod A   Short term goal 2: Perform supine to sit min/mod A   Short term goal 3: Perform sit to supine max A   Short term goal 4: Perform sit<> stand mod A , and pivot transfers at mod A  Short term goal 5: Progress to ambulation HHA/Gait belt assist or appropriate device, mod A x 2 20 to 40 ft  Short term goal 6: Propel w/c with B LE, distance of 50 ft or > , min/mod A  Short term goal 7: Demo Fair- dynamic standing balance to decrease risk of falls  Long term goals  Time Frame for Long term goals : By DC  Long term goal 1: Pt able to perform bed mobility at [address] term goal 2: Pt able to perform transfers at min A   Long term goal 3: Pt able to ambulate with or without device, distance of 100 ft atleast, mod A, level surface. Long term goal 4: Pt able to perform 2 to 4 steps at [place] x 2  Long term goal 5: Pt able to propel w/c on level surface,  distance of 150 ft, SBA, level surfaces  Long term goal 6: Family training for safe mobility to return home  Long term goal 7: Improve sitting/standing balance to good/fair to reduce fall risk. Long term goal 8: Pt able to ambulate 60 to 80 ft for 2MWT,to improve overall fucntion.        04/04/22 0940 04/04/22 1731   PT Individual Minutes   Time In 1004  --    Time Out 1030  --    Minutes 26  --    PT Co-Treatment Minutes   Time In 0940  --    Time Out 1004  --    Minutes 24  --    Minute Variance   Variance  --  45  (Bowel Movement.)       Electronically signed by Rock Delgado PTA on 4/4/22 at 5:58 PM EDT

## 2022-04-04 NOTE — PROGRESS NOTES
Physical Medicine & Rehabilitation  Progress Note      Subjective:      Leandro Sahu is a 70 y.o. male with cervical stenosis with myelopathy s/p C2-C5 laminectomy and fusion. He reports doing okay today. He notes ongoing pain in the neck and bilateral shoulders. Discussed trialing lidocaine patches. He denies any other acute concerns. He has not been requiring intermittent cathing. ROS:  Denies fevers, chills, sweats. No chest pain, palpitations, lightheadedness. Denies coughing, wheezing or shortness of breath. Denies abdominal pain, nausea, diarrhea or constipation. No new areas of joint pain. Denies new areas of numbness or weakness. Denies new anxiety or depression issues. No new skin problems. Rehabilitation:   Progressing in therapies. PT:  Restrictions/Precautions: Fall Risk,General Precautions  Implants present? : Metal implants  Spinal Precautions: Poor Sitting balance, Bilateral Upper  and Lower Body muscle weakness, Assist with standing in ELBERT Stedy  Other position/activity restrictions: up with assist; s/p C2-C5 laminectomy and fixation 3/24, Collar on while out of bed. Ok to remove while resting In bed eating and drinking in bed  Required Braces or Orthoses  Cervical: c-collar   Transfers  Sit to Stand: Moderate Assistance,2 Person Assistance  Stand to sit: Moderate Assistance,2 Person Assistance  Bed to Chair: Dependent/Total (elbert stedy used)  Comment: STS transfer x1 from bed and x1 from w/c. Pt requires Mod-Max A x2 to complete, SS utilized. Pt tolerates ~2min standing/sitting in SS. Attempted to use early mob chair however, pt requested to return to bed due to increase pain. Will continue to attempt as able. Transfers  Sit to Stand: Moderate Assistance,2 Person Assistance  Stand to sit: Moderate Assistance,2 Person Assistance  Bed to Chair: Dependent/Total (elbert stedy used)  Comment: STS transfer x1 from bed and x1 from w/c.  Pt requires Mod-Max A x2 to complete, SS utilized. Pt tolerates ~2min standing/sitting in SS. Attempted to use early mob chair however, pt requested to return to bed due to increase pain. Will continue to attempt as able. Ambulation  Ambulation?: No               OT:  ADL  Feeding: Dependent/Total  Grooming: Dependent/Total  UE Bathing: Dependent/Total  LE Bathing: Dependent/Total  UE Dressing: Dependent/Total  LE Dressing: Dependent/Total  Toileting: Dependent/Total  Additional Comments: Writer provided assist this date with brief change. Completing supin in bed rolling side to side, pt attempts to lift BUE's for threading gown, unable to hold BUE's in space, A req, pt able to lift/hold BLE's while TEDs/footies applied, pt reaching for bed rails with rolling in attempts to increase assist, dep to don c-collar prior to sitting EOB         Balance  Sitting Balance: Moderate assistance (mod A initially, CGA/SBA for 2-3 min EOB)  Standing Balance: Dependent/Total (mod A x2 with elbert nguyen)   Standing Balance  Time: AM: <30 sec x2; PM: <30 sec x2  Activity: AM/PM: transfers with elbert nguyen  Comment: Ax2 with AXEL guo to place BUE's on front bar        Bed mobility  Bridging:  Moderate assistance  Rolling to Left: Moderate assistance,2 Person assistance  Rolling to Right: Moderate assistance,2 Person assistance  Supine to Sit: Moderate assistance,2 Person assistance  Sit to Supine: Dependent/Total,2 Person assistance  Scootin Person assistance,Moderate assistance  Comment: pt attempts to reach for bed rails, A req for completion, pt attempting to move BLE's over EOB-assist for completion  Transfers  Sit to stand: 2 Person assistance,Moderate assistance  Stand to sit: Moderate assistance,2 Person assistance  Transfer Comments: with AXEL guo x2            Wheelchair Bed Transfers  Wheelchair/Bed - Technique:  (utilizing  )  Equipment Used: Wheelchair,Bed,Other (ss)  Level of Asssistance: Dependent/Total,2 Person assistance  Wheelchair Transfers Comments: completign transfer to and from  with ss     SPEECH:      Current Medications:   Current Facility-Administered Medications: lidocaine 4 % external patch 2 patch, 2 patch, TransDERmal, Daily  hydroCHLOROthiazide (HYDRODIURIL) tablet 25 mg, 25 mg, Oral, Daily  senna (SENOKOT) tablet 17.2 mg, 2 tablet, Oral, Nightly  finasteride (PROSCAR) tablet 5 mg, 5 mg, Oral, Daily  carboxymethylcellulose (THERATEARS) 1 % ophthalmic gel 2 drop, 2 drop, Both Eyes, TID  enoxaparin (LOVENOX) injection 40 mg, 40 mg, SubCUTAneous, Daily  ondansetron (ZOFRAN-ODT) disintegrating tablet 4 mg, 4 mg, Oral, Q8H PRN **OR** ondansetron (ZOFRAN) injection 4 mg, 4 mg, IntraVENous, Q6H PRN  polyethylene glycol (GLYCOLAX) packet 17 g, 17 g, Oral, Daily PRN  glucagon (rDNA) injection 1 mg, 1 mg, IntraMUSCular, PRN  glucose (GLUTOSE) 40 % oral gel 15 g, 15 g, Oral, PRN  amLODIPine (NORVASC) tablet 10 mg, 10 mg, Oral, Daily  cyclobenzaprine (FLEXERIL) tablet 5 mg, 5 mg, Oral, TID PRN  divalproex (DEPAKOTE ER) extended release tablet 500 mg, 500 mg, Oral, QAM  divalproex (DEPAKOTE ER) extended release tablet 750 mg, 750 mg, Oral, Nightly  furosemide (LASIX) tablet 20 mg, 20 mg, Oral, Daily  losartan (COZAAR) tablet 100 mg, 100 mg, Oral, Daily  pantoprazole (PROTONIX) tablet 40 mg, 40 mg, Oral, QAM AC  venlafaxine (EFFEXOR) tablet 75 mg, 75 mg, Oral, TID   acetaminophen (TYLENOL) tablet 650 mg, 650 mg, Oral, Q4H PRN  polyethylene glycol (GLYCOLAX) packet 17 g, 17 g, Oral, Daily  bisacodyl (DULCOLAX) suppository 10 mg, 10 mg, Rectal, Daily PRN      Objective:  BP (!) 141/80   Pulse 81   Temp 97.9 °F (36.6 °C) (Oral)   Resp 16   Ht 5' 3\" (1.6 m)   Wt 142 lb (64.4 kg)   SpO2 98%   BMI 25.15 kg/m²       GEN: Well developed, well nourished, no acute distress  HEENT: NCAT. EOM grossly intact. Hearing grossly intact. Mucous membranes pink and moist.  Cervical collar in place.   RESP: Normal breath sounds with no wheezing, rales, or after discontinuation of beta blockers  6. CHF:  On lasix  7. HTN:  On amlodipine  8. HLD:  Not currently on medication  9. Diabetes:  Not currently on medication  10. GIA  11. Depression, PTSD:  On effexor, depakote  12. DVT prophylaxis:  Lovenox  13.  Internal Medicine for medical management      Electronically signed by Emma Jones MD on 4/5/2022 at 1:37 AM

## 2022-04-04 NOTE — PROGRESS NOTES
[facility]    ACUTE REHABILITATION OCCUPATIONAL THERAPY  DAILY NOTE    Date: 22  Patient Name: Hardik Woo      Room: 3947/1349-35    MRN: [id number]   : 1950  (70 y.o.)  Gender: male   Referring Practitioner: Mahesh Cox MD  Diagnosis: Cervical stenosis with myelopathy s/p C2-C5 laminectomy and fusion   Additional Pertinent Hx: Hardik Woo is a 70 y.o. male with history of HTN, HLD, diabetes, GIA, migraines, PTSD, and depression admitted to [facility] on 3/23/2022. He initially presented to [facility] after a fall with subsequent worsening limb weakness and additional falls. MRI brain showed no acute intracranial abnormality. MRI cervical spine showed severe spinal stenosis at C2-C3 and C3-C4 with complete effacement of the CSF. MRI thoracic and lumbar spine were relatively unremarkable except for moderate spinal stenosis from L2-L3 to L4-L5. He was transferred to [facility] for neurosurgical evaluation. He underwent laminectomy and fusion at C2-C5 on 3/24/22 (Dr. Michael Molina). Hospital course has been complicated by urinary retention. He reports continued neck pain with pain in the limbs as well. He also notes numbness/tingling and weakness in all limbs. Pt admitted to rehab unit on 3/31/22    Restrictions  Restrictions/Precautions: Fall Risk,General Precautions  Implants present? : Metal implants  Spinal Precautions: Poor Sitting balance, Bilateral Upper  and Lower Body muscle weakness, Assist with standing in GERA Stedy  Other position/activity restrictions: up with assist; s/p C2-C5 laminectomy and fixation 3/24, Collar on while out of bed.  Ok to remove while resting In bed eating and drinking in bed  Required Braces or Orthoses  Cervical: c-collar  Required Braces or Orthoses?: Yes  Equipment Used: Wheelchair,Bed,Other (ss)    Subjective  Subjective: \"just a little lightheaded\"  Comments: pt reports dizziness, light-headedness and nausea while seated EOB, NSG made aware  Patient Currently in Pain: Yes  Pain Level: 2  Pain Location: Head;Neck; Shoulder  Pain Orientation: Right;Left;Posterior  Restrictions/Precautions: Fall Risk;General Precautions  Overall Orientation Status: Within Functional Limits (A/Ox4)  Patient Observation  Observations: pt verbally states he is confused, pt oriented to place/time and person  Pain Assessment  Pain Assessment: 0-10  Pain Level: 2  Pain Type: Acute pain,Surgical pain  Pain Location: Head,Neck,Shoulder  Pain Orientation: Right,Left,Posterior  Pain Radiating Towards: shoulders  Pain Descriptors: Aching,Sore    Objective     Balance  Sitting Balance: Moderate assistance (mod A initially, CGA/SBA for 2-3 min EOB)  Standing Balance: Dependent/Total (mod A x2 with elbert nguyen)  Bed mobility  Rolling to Left: Moderate assistance;2 Person assistance  Rolling to Right: Moderate assistance;2 Person assistance  Supine to Sit: Moderate assistance;2 Person assistance  Sit to Supine: Dependent/Total;2 Person assistance  Scootin Person assistance; Moderate assistance  Comment: pt attempts to reach for bed rails, A req for completion, pt attempting to move BLE's over EOB-assist for completion  Transfers  Sit to stand: 2 Person assistance; Moderate assistance  Stand to sit: Moderate assistance;2 Person assistance  Transfer Comments: with AXEL guo x2  Standing Balance  Time: AM: <30 sec x2; PM: <30 sec x2  Activity: AM/PM: transfers with elbert nguyen  Comment: Ax2 with AXEL guo to place BUE's on front bar        Type of ROM/Therapeutic Exercise  Type of ROM/Therapeutic Exercise: AAROM  Comment: AAROM ex's with BUE's to increase strength and promote functional return, pt reports pain and discomfort with various motions, limited ROM noted in BUE's due to pain (all ex's within cervical precautions)  Exercises  Scapular Protraction: x  Scapular Retraction: x  Shoulder Flexion: x  Shoulder Extension: x  Horizontal ABduction: x  Horizontal ADduction: x  Elbow Flexion: x  Elbow Extension: x  Grasp/Release: gross , 10 reps per hand  Other: tip pinch ex's with BUE's to address coordination and dexterity for improved FM skills                       ADL  Feeding: Dependent/Total  Grooming: Dependent/Total  UE Bathing: Dependent/Total  LE Bathing: Dependent/Total  UE Dressing: Dependent/Total  LE Dressing: Dependent/Total  Toileting: Dependent/Total  Additional Comments: Writer provided assist this date with brief change. Completing supin in bed rolling side to side, pt attempts to lift BUE's for threading gown, unable to hold BUE's in space, A req, pt able to lift/hold BLE's while TEDs/footies applied, pt reaching for bed rails with rolling in attempts to increase assist, dep to don c-collar prior to sitting EOB          Assessment  Performance deficits / Impairments: Decreased ADL status; Decreased functional mobility ; Decreased ROM; Decreased strength;Decreased endurance;Decreased sensation;Decreased balance;Decreased high-level IADLs;Decreased fine motor control;Decreased coordination;Decreased posture  Prognosis: Fair  Discharge Recommendations: Patient would benefit from continued therapy after discharge;Continue to assess pending progress  Activity Tolerance: Patient limited by pain; Patient limited by fatigue;Patient Tolerated treatment well (pt nauseated and dizzy)  Activity Tolerance: pt falling asleep in PM session req mod cuing to remain awake/attend to task  Safety Devices in place: Yes  Type of devices: Left in chair (taken to PT, left in bed in PM )  Equipment Recommendations  Equipment Needed:  (TBD)       Patient Education: OT POC, strengthening/increasing ROM, activity promotion, bed mobility, ADL tasks/increasing indep and assist  Patient Goals   Patient goals : \"I just want to be able to get back to my basic needs. \"  Learner:patient  Method: demonstration and explanation       Outcome: needs reinforcement        Plan  Plan  Times per week: 900 minutes/week for combined therapy of OT/PT due to decreaed tolerance to activity. Times per day: Twice a day  Current Treatment Recommendations: Self-Care / ADL,Strengthening,ROM,Balance Training,Functional Mobility Training,Endurance Training,Pain Management,Safety Education & Training,Patient/Caregiver Education & Training,Equipment Evaluation, Education, & procurement,Positioning  Patient Goals   Patient goals : \"I just want to be able to get back to my basic needs. \"  Short term goals  Time Frame for Short term goals: By 10 days  Short term goal 1: Pt will complete upper body dressing/bathing with max A with use of AE as needed while maintaining cervical precautions  Short term goal 2: Pt will complete self feeding task with max A with adaptive strategies and good safety  Short term goal 3: Pt will tolerate sitting EOB 5+ minutes unsupported with mod A during functional activity to increase independence with self care and mobility  Short term goal 4: Pt will complete functional transfers during self care tasks with mod A and good safety  Short term goal 5: Pt will participate in 30+ minutes of therapeutic exercises/functional activities to increase safety and independence with self care and mobility  Long term goals  Time Frame for Long term goals : By discharge  Long term goal 1: Pt will complete self feeding task with mod A with use of adaptive strategies   Long term goal 2: Pt will complete upper body bathing/dressing with mod A and good safety while maintaining cervical precautions  Long term goal 3: Pt will tolerate sitting EOB 10+ minutes unsupported with CGA and good safety during functional activity of choice  Long term goal 4: Pt will complete functional transfer with min A and good safety during self care tasks  Long term goal 5: Pt will demonstrated increased BUE gross motor/fine motor to increase participation in self care tasks  Long term goals 6: Pt/family with verbalize/demosntrate Good understanding of cervical precautions during self care tasks  Long term goal 7: Pt/family will demonstrate Good understanding of BUE exercises to increase functional use of BUE during self care tasks        04/04/22 0905 04/04/22 1335 04/04/22 1433   OT Individual Minutes   Time In 0920 1335 1433   Time Out 0939 1345 1500   Minutes 19 10 27   OT Co-Treatment Minutes   Time In   (9369)  --   --    Time Out   (1004)  --   --      Electronically signed by Madi LOZADA on 4/4/22 at 3:35 PM EDT

## 2022-04-04 NOTE — PLAN OF CARE
Nutrition Problem #1: Inadequate oral intake  Intervention: Food and/or Nutrient Delivery: Continue Current Diet,Continue Oral Nutrition Supplement  Nutritional Goals: PO intake % of meals and supplements

## 2022-04-04 NOTE — PROGRESS NOTES
[address] [facility]   ACUTE REHABILITATION OCCUPATIONAL THERAPY  DAILY NOTE     Date: 22  Patient Name: Nandini Barton                       Room: 4845/1947-61    MRN:   [id number]            : 1950  (70 y.o.)    [id number]:  BATOOL Menezes requesting Occupational therapist to discuss plan of care with patients wife as wife has some concerns at this time. This writer called patients wife with patient approval from [employer] phone. Pts wife demonstrated concerns as to why patient has been using bed pan for bowel movements instead of using the toilet. Occupational therapist explained safety concerns with pts diagnosis and current level of ability with sitting unsupported on the toilet. This writer explained that OT/PT are working on sitting balance to progress to sitting on toilet. This write explained that therapy will address options for toileting to determine the safest option as safety is our top priority. Phone call got dropped and therapist was unable to complete conversation with wife at this time. Patient and son in room at this time. Patient and son educated on safety concerns at this time with current level of function and bathroom set-up. Patient and son verbalized understanding. Therapist explained toileting options that therapy such as using PVP raised toilet seat on wheels to trial prior in room and safety concerns with alternative options as elbert nguyen is unable to get close enough to PVC toilet seat and patient would have to be able to safely sit back far enough to complete transfer as well as reach far enough forwards to transfer off. Patient and son verbalized understanding. Therapy to trial options over next therapy session. All questions answered at this time. Therapist notified patient and son that if wife calls back and has more questions/concerns to let nursing know and this writer will assist as able.         22 1612   OT Individual Minutes   Time In [address]   Minutes 16   Time Code Minutes    Timed Code Treatment Minutes 16 Minutes

## 2022-04-04 NOTE — PLAN OF CARE
Problem: Skin Integrity:  Goal: Will show no infection signs and symptoms  Outcome: Ongoing     Problem: Skin Integrity:  Goal: Absence of new skin breakdown  Outcome: Ongoing     Problem: Falls - Risk of:  Goal: Will remain free from falls  Outcome: Ongoing     Problem: Falls - Risk of:  Goal: Absence of physical injury  Outcome: Ongoing   No falls or injuries sustained at this time. No attempts to get out of bed without nursing assistance. Call light within reach and pt. uses appropriately for assistance. Siderails up x 2. Nonskid footwear remains on. Bed in low and locked position. Hourly nursing rounds made. Pt. Alert and oriented, aware of limitations, and exhibits good safety judgement. Pt. uses assistive devices appropriately. Pt. understands individual fall risk factors.

## 2022-04-04 NOTE — DISCHARGE INSTR - COC
Continuity of Care Form    Patient Name: Orion Mcdermott   :  1950  MRN:  [id number]    Admit date:  3/31/2022  Discharge date:  ***    Code Status Order: DNR-CCA   Advance Directives:      Admitting Physician:  Marlon Durham MD  PCP: Yaniv Cotton    Discharging Nurse: [facility]/Room#: 6100/3703-90  Discharging Unit Phone Number: ***    Emergency Contact:   Extended Emergency Contact Information  Primary Emergency Contact: Zain Sagecharly [facility] Phone: [phone]  Mobile Phone: [phone]  Relation: Unknown  Secondary Emergency Contact: [facility] Phone: [phone]  Relation: Child    Past Surgical History:  Past Surgical History:   Procedure Laterality Date    CARDIAC CATHETERIZATION  2010    no stents     CARPAL TUNNEL RELEASE Left 2002    CATARACT REMOVAL      CERVICAL FUSION  2018    anterior cervical fusion C5-6    CERVICAL FUSION N/A 3/24/2022    C2-5 FUSION, C2-4 LAMINECTOMY performed by Otilio Washburn DO at [facility]. [facility] 82 Left 2018    CATARACT EXTRACTION [address]  2022    C2-5 FUSION, C2-4 LAMINECTOMY    MIDDLE EAR SURGERY  2018    MIDDLE EAR REBUILT AND EAR DRUM REPLACED    MOUTH SURGERY  2018    5 TEETH REMOVED    OTHER SURGICAL HISTORY  2018    : ANTERIOR CERVICAL CORRPECTOMY C5-6, SYNTHES, DEPUY, REG TABLE, SUPINE,     [place] OFFICE/OUTPT VISIT,PROCEDURE ONLY N/A 2018    ANTERIOR CERVICAL CORRPECTOMY AND FUSION C5-6 performed by Otilio Washburn DO at [facility].   1983       Immunization History:   Immunization History   Administered Date(s) Administered    Influenza Vaccine, unspecified formulation 10/26/2011, 2012, 2014, 2016    Tdap (Boostrix, Adacel) 2007, 2017    Zoster Live (Zostavax) 2016       Active Problems:  Patient Active Problem List   Diagnosis Code    Hypertension I10 Hyperlipidemia E78.5    Diabetes mellitus ([facility] [address] E11.9    Contusion of right shoulder S40.011A    Bipolar disorder, mixed ([facility] [address] F31.60    First known suicide attempt ([facility] [address] T14.91XA    Erectile dysfunction N52.9    Cervical stenosis of spinal canal M48.02    Incomplete spinal cord lesion at C5-C7 level ([facility] [address] S14.155A    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) M48.02, G99.2    Cervical spondylosis with radiculopathy M47.22    Acute blood loss anemia D62    Chest pain R07.9    Depression with suicidal ideation F32. A, R45.851    Severe recurrent major depression without psychotic features (HCC) F33.2    Frequent falls R29.6    Hyponatremia E87.1    Cervical spinal cord compression (HCC) G95.20    Cord compression (HCC) G95.20    Quadriplegia, C1-C4 incomplete (HCC) G82.52    Encephalopathy G93.40    Hospital-acquired pneumonia J18.9, Y95    Atelectasis J98.11    Cervical stenosis of spine M48.02       Isolation/Infection:   Isolation            No Isolation          Patient Infection Status       None to display            Nurse Assessment:  Last Vital Signs: /84   Pulse 79   Temp 97.9 °F (36.6 °C) (Oral)   Resp 18   Ht 5' 3\" (1.6 m)   Wt 142 lb (64.4 kg)   SpO2 98%   BMI 25.15 kg/m²     Last documented pain score (0-10 scale): Pain Level: 4  Last Weight:   Wt Readings from Last 1 Encounters:   03/31/22 142 lb (64.4 kg)     Mental Status:  {IP PT MENTAL STATUS:49734}    IV Access:  { YANA IV ACCESS:153170973}    Nursing Mobility/ADLs:  Walking   {P DME AIAX:554255054}  Transfer  {P DME BFVD:978975738}  Bathing  {P DME XPBD:057231913}  Dressing  {CHP DME VTCE:444936551}  Toileting  {P DME XDBY:681416034}  Feeding  {[facility] DME TTQY:978514018}  Med Admin  {[facility] DME JTYL:881761126}  Med Delivery   { YANA MED Delivery:117139687}    Wound Care Documentation and Therapy:  Incision 04/19/18 Neck (Active)   Number of days: 1445       Incision 04/29/18 Back Mid;Lower (Active)   Number of days: 1064 Wound 22 Arm Left (Active)   Wound Etiology Skin Tear 22   Dressing Status Other (Comment) 22   Dressing/Treatment Open to air 22   Drainage Amount None 22   Odor None 22   Nereida-wound Assessment Fragile 22   Number of days: 14        Elimination:  Continence: Bowel: {YES / L}  Bladder: {YES / YC:61890}  Urinary Catheter: {Urinary Catheter:923411061}   Colostomy/Ileostomy/Ileal Conduit: {YES / HQ:08892}       Date of Last BM: ***    Intake/Output Summary (Last 24 hours) at 2022 1107  Last data filed at 4/3/2022 2002  Gross per 24 hour   Intake 120 ml   Output 200 ml   Net -80 ml     I/O last 3 completed shifts:   In: 120 [P.O.:120]  Out: 389 [Urine:389]    Safety Concerns:     [employer] Safety Concerns:221226322}    Impairments/Disabilities:      [employer] Impairments/Disabilities:052283804}    Nutrition Therapy:  Current Nutrition Therapy:   [employer] Diet List:594578168}    Routes of Feeding: {CHP DME Other Feedings:477813004}  Liquids: {Slp liquid thickness:69989}  Daily Fluid Restriction: {CHP DME Yes amt example:438009572}  Last Modified Barium Swallow with Video (Video Swallowing Test): {Done Not Done ZQOD:275187149}    Treatments at the Time of Hospital Discharge:   Respiratory Treatments: ***  Oxygen Therapy:  {Therapy; copd oxygen:15478}  Ventilator:    { CC Vent MGGM:928588044}    Rehab Therapies: {THERAPEUTIC INTERVENTION:8392886367}  Weight Bearing Status/Restrictions: [employer]  Weight Bearin}  Other Medical Equipment (for information only, NOT a DME order):  {EQUIPMENT:395850867}  Other Treatments: ***    Patient's personal belongings (please select all that are sent with patient):  {CHP DME Belongings:207434040}    RN SIGNATURE:  {Esignature:461262122}    CASE MANAGEMENT/SOCIAL WORK SECTION    Inpatient Status Date: ***    Readmission Risk Assessment Score:  Readmission Risk              Risk of Unplanned Readmission:  14           Discharging to Facility/ Agency   Name:   Address:  Phone:  Fax:    Dialysis Facility (if applicable)   Name:  Address:  Dialysis Schedule:  Phone:  Fax:    / signature: {Esignature:640052809}    PHYSICIAN SECTION    Prognosis: {Prognosis:9690903766}    Condition at Discharge: Martin Carey Patient Condition:351536441}    Rehab Potential (if transferring to Rehab): {Prognosis:7835844950}    Recommended Labs or Other Treatments After Discharge: ***    Physician Certification: I certify the above information and transfer of Ninfa Scott  is necessary for the continuing treatment of the diagnosis listed and that he requires {Admit to Appropriate Level of Care:25289} for {GREATER/LESS:567315127} 30 days.      Update Admission H&P: {CHP DME Changes in TNR}    PHYSICIAN SIGNATURE:  {Esignature:225442707}

## 2022-04-04 NOTE — CARE COORDINATION
Writer attempts initial assessment with patient, however pt is sleeping soundly. Case management to check back tomorrow.     Electronically signed by Joey Jean PT on 4/4/2022 at 3:22 PM

## 2022-04-05 PROCEDURE — 92610 EVALUATE SWALLOWING FUNCTION: CPT

## 2022-04-05 PROCEDURE — 1180000000 HC REHAB R&B

## 2022-04-05 PROCEDURE — 6370000000 HC RX 637 (ALT 250 FOR IP): Performed by: NURSE PRACTITIONER

## 2022-04-05 PROCEDURE — 97110 THERAPEUTIC EXERCISES: CPT

## 2022-04-05 PROCEDURE — 97535 SELF CARE MNGMENT TRAINING: CPT

## 2022-04-05 PROCEDURE — 6370000000 HC RX 637 (ALT 250 FOR IP): Performed by: PHYSICAL MEDICINE & REHABILITATION

## 2022-04-05 PROCEDURE — 99232 SBSQ HOSP IP/OBS MODERATE 35: CPT | Performed by: STUDENT IN AN ORGANIZED HEALTH CARE EDUCATION/TRAINING PROGRAM

## 2022-04-05 PROCEDURE — 99232 SBSQ HOSP IP/OBS MODERATE 35: CPT | Performed by: INTERNAL MEDICINE

## 2022-04-05 PROCEDURE — 97167 OT EVAL HIGH COMPLEX 60 MIN: CPT

## 2022-04-05 PROCEDURE — 6360000002 HC RX W HCPCS: Performed by: NURSE PRACTITIONER

## 2022-04-05 PROCEDURE — 6370000000 HC RX 637 (ALT 250 FOR IP): Performed by: INTERNAL MEDICINE

## 2022-04-05 PROCEDURE — 6370000000 HC RX 637 (ALT 250 FOR IP): Performed by: STUDENT IN AN ORGANIZED HEALTH CARE EDUCATION/TRAINING PROGRAM

## 2022-04-05 PROCEDURE — 97530 THERAPEUTIC ACTIVITIES: CPT

## 2022-04-05 RX ADMIN — SENNOSIDES 17.2 MG: 8.6 TABLET, COATED ORAL at 22:31

## 2022-04-05 RX ADMIN — HYDROCHLOROTHIAZIDE 25 MG: 25 TABLET ORAL at 07:13

## 2022-04-05 RX ADMIN — ACETAMINOPHEN 650 MG: 325 TABLET ORAL at 22:30

## 2022-04-05 RX ADMIN — DIVALPROEX SODIUM 750 MG: 250 TABLET, FILM COATED, EXTENDED RELEASE ORAL at 22:30

## 2022-04-05 RX ADMIN — ONDANSETRON 4 MG: 4 TABLET, ORALLY DISINTEGRATING ORAL at 09:46

## 2022-04-05 RX ADMIN — VENLAFAXINE 75 MG: 75 TABLET ORAL at 17:01

## 2022-04-05 RX ADMIN — POLYETHYLENE GLYCOL 3350 17 G: 17 POWDER, FOR SOLUTION ORAL at 07:22

## 2022-04-05 RX ADMIN — PANTOPRAZOLE SODIUM 40 MG: 40 TABLET, DELAYED RELEASE ORAL at 05:29

## 2022-04-05 RX ADMIN — AMLODIPINE BESYLATE 10 MG: 10 TABLET ORAL at 07:12

## 2022-04-05 RX ADMIN — CARBOXYMETHYLCELLULOSE SODIUM 2 DROP: 10 GEL OPHTHALMIC at 07:14

## 2022-04-05 RX ADMIN — DIVALPROEX SODIUM 500 MG: 500 TABLET, EXTENDED RELEASE ORAL at 07:14

## 2022-04-05 RX ADMIN — LOSARTAN POTASSIUM 100 MG: 100 TABLET, FILM COATED ORAL at 07:12

## 2022-04-05 RX ADMIN — CARBOXYMETHYLCELLULOSE SODIUM 2 DROP: 10 GEL OPHTHALMIC at 22:06

## 2022-04-05 RX ADMIN — ENOXAPARIN SODIUM 40 MG: 100 INJECTION SUBCUTANEOUS at 07:12

## 2022-04-05 RX ADMIN — VENLAFAXINE 75 MG: 75 TABLET ORAL at 07:14

## 2022-04-05 RX ADMIN — FUROSEMIDE 20 MG: 20 TABLET ORAL at 07:13

## 2022-04-05 RX ADMIN — VENLAFAXINE 75 MG: 75 TABLET ORAL at 11:42

## 2022-04-05 RX ADMIN — FINASTERIDE 5 MG: 5 TABLET, FILM COATED ORAL at 07:12

## 2022-04-05 ASSESSMENT — PAIN SCALES - GENERAL
PAINLEVEL_OUTOF10: 5
PAINLEVEL_OUTOF10: 3
PAINLEVEL_OUTOF10: 2
PAINLEVEL_OUTOF10: 5
PAINLEVEL_OUTOF10: 5

## 2022-04-05 ASSESSMENT — PAIN DESCRIPTION - DESCRIPTORS: DESCRIPTORS: ACHING;SORE

## 2022-04-05 ASSESSMENT — PAIN DESCRIPTION - PAIN TYPE
TYPE: CHRONIC PAIN
TYPE: ACUTE PAIN
TYPE: CHRONIC PAIN

## 2022-04-05 ASSESSMENT — PAIN DESCRIPTION - LOCATION
LOCATION: NECK;SHOULDER

## 2022-04-05 ASSESSMENT — PAIN DESCRIPTION - ORIENTATION: ORIENTATION: RIGHT;POSTERIOR;LEFT

## 2022-04-05 ASSESSMENT — PAIN - FUNCTIONAL ASSESSMENT: PAIN_FUNCTIONAL_ASSESSMENT: 0-10

## 2022-04-05 NOTE — FLOWSHEET NOTE
Patient friendly and engaging and explained his  has been to see him in rehab; [email or web address]     04/05/22 1538   Encounter Summary   Services provided to: Patient   Referral/Consult From: [facility], [address]   (already notified)   Continue Visiting   (4/5/22)   Complexity of Encounter Moderate   Length of Encounter 15 minutes   Spiritual Assessment Completed Yes   Spiritual/Judaism   Type Spiritual support   Assessment Approachable; Hopeful;Coping;Helplessness   Intervention Active listening;Explored feelings, thoughts, concerns;Prayer;Sustaining presence/ Ministry of presence; Discussed illness/injury and it's impact; Discussed belief system/Rastafarian practices/edel   Outcome Expressed gratitude;Engaged in conversation;Expressed feelings/needs/concerns;Coping; Hopeful;Receptive

## 2022-04-05 NOTE — PROGRESS NOTES
Comprehensive Nutrition Assessment    Type and Reason for Visit:  Reassess    Nutrition Recommendations/Plan:  Continue current diet. Provide Ensure Enlive x 2 per meal.     Nutrition Assessment:  Refer to SLP note; pt hesitant to advance diet. Easy to [employer] to continue. PO intake appears to remain less than 50% of meals. However, pt is drinking Ensure and has requested increase to 2 supplements per meal, which will be provided. Malnutrition Assessment:  Malnutrition Status:  Insufficient data    Context:  Acute Illness     Findings of the 6 clinical characteristics of malnutrition:  Energy Intake:  1 - 75% or less of estimated energy requirements for 7 or more days  Weight Loss:      Unable to assess  Body Fat Loss:  Unable to assess     Muscle Mass Loss:  Unable to assess    Fluid Accumulation:  1 - Mild Extremities   Strength:  Not Performed    Estimated Daily Nutrient Needs:  Energy (kcal):  4731-3456 based on [place] with 1.2-1.3 factor; Weight Used for Energy Requirements:  Admission     Protein (g):  73-85 based on 1.3-1.5 gm per kg; Weight Used for Protein Requirements:  Ideal          Nutrition Related Findings:  Edema: Trace RUE, +1 LUE, RLE, LLE. Wounds:  Multiple,Skin Tears,Surgical Incision       Current Nutrition Therapies:    ADULT DIET; Easy to Chew  ADULT ORAL NUTRITION SUPPLEMENT; Lunch, Dinner, Breakfast; Standard High Calorie/High Protein Oral Supplement    Anthropometric Measures:  · Height: 5' 3\" (160 cm)  · Current Body Weight: 141 lb 15.6 oz (64.4 kg) (wt method not specified)  · Usual Body Weight: 158 lb 15.2 oz (72.1 kg) (1/21/22)     · Ideal Body Weight: 124 lbs; % Ideal Body Weight 114.5 %   · BMI: 25.2  · BMI Categories: Overweight (BMI 25.0-29. 9)       Nutrition Diagnosis:   · Inadequate oral intake related to swallowing difficulty,biting/chewing (masticatory) difficulty,altered taste perception (decreased appetite) as evidenced by intake 26-50%,poor intake prior to admission    Nutrition Interventions:   Food and/or Nutrient Delivery:  Continue Current Diet,Modify Oral Nutrition Supplement  Nutrition Education/Counseling:  No recommendation at this time   Coordination of Nutrition Care:  Continue to monitor while inpatient    Goals:  PO intake % of meals and supplements       Nutrition Monitoring and Evaluation:   Behavioral-Environmental Outcomes:  None Identified   Food/Nutrient Intake Outcomes:  Food and Nutrient Intake,Supplement Intake  Physical Signs/Symptoms Outcomes:  Biochemical Data,Chewing or Swallowing,GI Status,Fluid Status or Edema,Skin,Weight     Discharge Planning:    Continue current diet,Continue Oral Nutrition Supplement     Some areas of assessment may be incomplete due to standard COVID-19 Precautions. Jhoan Rich R.D., L.D.   Phone: [phone]

## 2022-04-05 NOTE — PROGRESS NOTES
Physical Medicine & Rehabilitation  Progress Note      Subjective:      Enrrique Laurent is a 70 y.o. male with cervical stenosis with myelopathy s/p C2-C5 laminectomy and fusion. He reports doing fine today. He thinks that the lidocaine patches are helping somewhat with shoulder pain. Will continue to monitor. He notes dizziness and nausea with sitting upright. He states that he has been wearing NERY hose - will trial abdominal binder to see if this helps. He also notes chronic dysphagia with regular textures/solids - discussed speech therapy evaluation to see if there is any therapy interventions that may help. He denies any other acute concerns. ROS:  Denies fevers, chills, sweats.  +dizziness; No chest pain, palpitations  Denies coughing, wheezing or shortness of breath. +nausea; Denies abdominal pain, diarrhea or constipation  No new areas of joint pain. Denies new areas of numbness or weakness. Denies new anxiety or depression issues. No new skin problems. Rehabilitation:   Progressing in therapies. PT:  Restrictions/Precautions: Fall Risk,General Precautions  Implants present? : Metal implants  Spinal Precautions: Poor Sitting balance, Bilateral Upper  and Lower Body muscle weakness, Assist with standing in ELBERT Ramdy  Other position/activity restrictions: up with assist; s/p C2-C5 laminectomy and fixation 3/24, Collar on while out of bed. Ok to remove while resting In bed eating and drinking in bed  Required Braces or Orthoses  Cervical: c-collar   Transfers  Sit to Stand: Moderate Assistance,2 Person Assistance  Stand to sit: Moderate Assistance,2 Person Assistance  Bed to Chair: Dependent/Total (elbert stedy used)  Comment: STS transfer x1 from bed and x1 from w/c. Pt requires Mod-Max A x2 to complete, SS utilized. Pt tolerates ~2min standing/sitting in SS. Attempted to use early mob chair however, pt requested to return to bed due to increase pain. Will continue to attempt as able. Transfers  Sit to Stand: Moderate Assistance,2 Person Assistance  Stand to sit: Moderate Assistance,2 Person Assistance  Bed to Chair: Dependent/Total (elbert stedy used)  Comment: STS transfer x1 from bed and x1 from w/c. Pt requires Mod-Max A x2 to complete, SS utilized. Pt tolerates ~2min standing/sitting in SS. Attempted to use early mob chair however, pt requested to return to bed due to increase pain. Will continue to attempt as able. Ambulation  Ambulation?: No               OT:  ADL  Feeding: Dependent/Total  Grooming: Dependent/Total  UE Bathing: Dependent/Total  LE Bathing: Dependent/Total  UE Dressing: Dependent/Total  LE Dressing: Dependent/Total  Toileting: Dependent/Total  Additional Comments: Writer provided assist this date with brief change. Completing supin in bed rolling side to side, pt attempts to lift BUE's for threading gown, unable to hold BUE's in space, A req, pt able to lift/hold BLE's while TEDs/footies applied, pt reaching for bed rails with rolling in attempts to increase assist, dep to don c-collar prior to sitting EOB, all tasks pt is dep/total-session to increase assist from pt and promote functional use of extremities, pt attempts to wash face req [place] to reach-unable to complete motions but did give good effort for engagement         Balance  Sitting Balance: Moderate assistance (mod A initially, CGA/SBA for 2-3 min EOB)  Standing Balance: Dependent/Total (mod A x2 with elbert nguyen)   Standing Balance  Time: AM: <30 sec x2; PM: <30 sec x2  Activity: AM/PM: transfers with elbert nguyen  Comment: Ax2 with AXEL guo to place BUE's on front bar        Bed mobility  Bridging:  Moderate assistance  Rolling to Left: Moderate assistance (Ax1)  Rolling to Right: Moderate assistance (Ax1)  Supine to Sit: Moderate assistance,2 Person assistance  Sit to Supine: Dependent/Total,2 Person assistance  Scootin Person assistance,Moderate assistance  Comment: A for completion of rolling and reaching for bed rail, assist to flex B knee's when rolling to ease with task, A x1 this date  Transfers  Sit to stand: 2 Person assistance,Moderate assistance  Stand to sit: Moderate assistance,2 Person assistance  Transfer Comments: with AXEL guo x2            Wheelchair Bed Transfers  Wheelchair/Bed - Technique:  (utilizing ss )  Equipment Used: Wheelchair,Bed,Other (ss)  Level of Asssistance: Dependent/Total,2 Person assistance  Wheelchair Transfers Comments: completign transfer to and from  with ss     SPEECH:      Current Medications:   Current Facility-Administered Medications: lidocaine 4 % external patch 2 patch, 2 patch, TransDERmal, Daily  hydroCHLOROthiazide (HYDRODIURIL) tablet 25 mg, 25 mg, Oral, Daily  senna (SENOKOT) tablet 17.2 mg, 2 tablet, Oral, Nightly  finasteride (PROSCAR) tablet 5 mg, 5 mg, Oral, Daily  carboxymethylcellulose (THERATEARS) 1 % ophthalmic gel 2 drop, 2 drop, Both Eyes, TID  enoxaparin (LOVENOX) injection 40 mg, 40 mg, SubCUTAneous, Daily  ondansetron (ZOFRAN-ODT) disintegrating tablet 4 mg, 4 mg, Oral, Q8H PRN **OR** ondansetron (ZOFRAN) injection 4 mg, 4 mg, IntraVENous, Q6H PRN  polyethylene glycol (GLYCOLAX) packet 17 g, 17 g, Oral, Daily PRN  glucagon (rDNA) injection 1 mg, 1 mg, IntraMUSCular, PRN  glucose (GLUTOSE) 40 % oral gel 15 g, 15 g, Oral, PRN  amLODIPine (NORVASC) tablet 10 mg, 10 mg, Oral, Daily  cyclobenzaprine (FLEXERIL) tablet 5 mg, 5 mg, Oral, TID PRN  divalproex (DEPAKOTE ER) extended release tablet 500 mg, 500 mg, Oral, QAM  divalproex (DEPAKOTE ER) extended release tablet 750 mg, 750 mg, Oral, Nightly  furosemide (LASIX) tablet 20 mg, 20 mg, Oral, Daily  losartan (COZAAR) tablet 100 mg, 100 mg, Oral, Daily  pantoprazole (PROTONIX) tablet 40 mg, 40 mg, Oral, QAM AC  venlafaxine (EFFEXOR) tablet 75 mg, 75 mg, Oral, TID   acetaminophen (TYLENOL) tablet 650 mg, 650 mg, Oral, Q4H PRN  polyethylene glycol (GLYCOLAX) packet 17 g, 17 g, Oral, Daily  bisacodyl (DULCOLAX) suppository 10 mg, 10 mg, Rectal, Daily PRN      Objective:  BP (!) 164/103   Pulse 93   Temp 98.4 °F (36.9 °C)   Resp 18   Ht 5' 3\" (1.6 m)   Wt 142 lb (64.4 kg)   SpO2 94%   BMI 25.15 kg/m²       GEN: Well developed, well nourished, no acute distress  HEENT: NCAT. EOM grossly intact. Hearing grossly intact. Mucous membranes pink and moist.  RESP: Normal breath sounds with no wheezing, rales, or rhonchi. Respirations WNL and unlabored. CV: Regular rate and rhythm. No murmurs, rubs, or gallops. ABD: Soft, non-distended, BS+ and equal.  NEURO: Alert. Speech fluent. MSK:  Muscle bulk is normal bilaterally. Strength 3+/5 with right . Strength 3/5 with left . Strength 4+/5 with bilateral ankle dorsiflexion and plantarflexion. LIMBS: Edema present in left upper limb. SKIN: Warm and dry with good turgor. PSYCH: Mood WNL. Affect WNL. Appropriately interactive. Diagnostics:     CBC: No results for input(s): WBC, RBC, HGB, HCT, MCV, RDW, PLT in the last 72 hours. BMP: No results for input(s): NA, K, CL, CO2, PHOS, BUN, CREATININE, CA, GLUCOSE in the last 72 hours. BNP: No results for input(s): BNP in the last 72 hours. PT/INR: No results for input(s): PROTIME, INR in the last 72 hours. APTT: No results for input(s): APTT in the last 72 hours. CARDIAC ENZYMES: No results for input(s): CKMB, CKMBINDEX, TROPONINT in the last 72 hours. Invalid input(s): CKTOTAL;3  FASTING LIPID PANEL:  Lab Results   Component Value Date    CHOL 104 04/12/2018    HDL 42 04/12/2018    TRIG 92 04/12/2018     LIVER PROFILE: No results for input(s): AST, ALT, ALB, BILIDIR, BILITOT, ALKPHOS in the last 72 hours. Impression/Plan:   Impaired ADLs, gait, and mobility due to:    1. Cervical stenosis with myelopathy:  S/p C2-C5 laminectomy and fusion on 3/24. PT/OT  for gait, mobility, strengthening, endurance, ADLs, and self care. C-collar when out of bed.   Pain control with as-needed tylenol, as-needed flexeril. Added lidocaine patches on 4/4. Avoid narcotics, as he did require narcan in acute care. Follow up with neurosurgery. 2. Neurogenic bladder:  Has not been requiring intermittent catheterization but can continue this as needed. 3. Neurogenic bowel:  Miralax daily, senokot nightly, dulcolax prn.  4. Dizziness, nausea with sitting upright:  Likely related to cervical spinal cord injury (possible orthostatic hypotension, autonomic dysfunction). NERY hose when out of bed. Will trial abdominal binder as well. Will try to obtain orthostatic vital signs. 5. Subjective dysphagia:  Chronic. SLP to evaluate for any therapy interventions that may be beneficial.  6. Hospital-acquired pneumonia:  Completed course of cefepime and doxycycline. 7. Bradycardia:  Resolved after discontinuation of beta blockers  8. CHF:  On lasix  9. HTN:  On amlodipine  10. HLD:  Not currently on medication  11. Diabetes:  Not currently on medication  12. GIA  13. Depression, PTSD:  On effexor, depakote  14. DVT prophylaxis:  Lovenox  15.  Internal Medicine for medical management      Electronically signed by Rehana Lira MD on 4/5/2022 at 11:52 PM

## 2022-04-05 NOTE — PROGRESS NOTES
[facility]   ACUTE REHABILITATION OCCUPATIONAL THERAPY  DAILY NOTE    Date: 22  Patient Name: Booker Houston      Room: 7122/4400-09    MRN: [id number]   : 1950  (70 y.o.)  Gender: male   Referring Practitioner: Albert Tan MD  Diagnosis: Cervical stenosis with myelopathy s/p C2-C5 laminectomy and fusion   Additional Pertinent Hx: Booker Bahena is a 70 y.o. male with history of HTN, HLD, diabetes, GIA, migraines, PTSD, and depression admitted to [facility] on 3/23/2022. He initially presented to [address] [place] [address] after a fall with subsequent worsening limb weakness and additional falls. MRI brain showed no acute intracranial abnormality. MRI cervical spine showed severe spinal stenosis at C2-C3 and C3-C4 with complete effacement of the CSF. MRI thoracic and lumbar spine were relatively unremarkable except for moderate spinal stenosis from L2-L3 to L4-L5. He was transferred to [place] [address] for neurosurgical evaluation. He underwent laminectomy and fusion at C2-C5 on 3/24/22 (Dr. Russell Tobin). Hospital course has been complicated by urinary retention. He reports continued neck pain with pain in the limbs as well. He also notes numbness/tingling and weakness in all limbs. Pt admitted to rehab unit on 3/31/22    Restrictions  Restrictions/Precautions: Fall Risk,General Precautions  Implants present? : Metal implants  Spinal Precautions: Poor Sitting balance, Bilateral Upper  and Lower Body muscle weakness, Assist with standing in GERA Stedy  Other position/activity restrictions: up with assist; s/p C2-C5 laminectomy and fixation 3/24, Collar on while out of bed.  Ok to remove while resting In bed eating and drinking in bed  Required Braces or Orthoses  Cervical: c-collar  Required Braces or Orthoses?: Yes  Equipment Used: Wheelchair,Bed,Other (ss)    Subjective  Subjective: \"well I'm not really sure because I don't know if I can go home from here\"  Comments: pt states in regards to DC planning discussion with this writer, pt voiced concerns about going home from here versus SNF due to complexity of injury and current level of assist pt is req at this time, pt voiced concerns for his wife who will care for him  Patient Currently in Pain: Yes  Pain Level: 3  Pain Location: Neck; Shoulder  Restrictions/Precautions: Fall Risk;General Precautions  Overall Orientation Status: Within Functional Limits  Patient Observation  Observations: pt gives good efforts within tolerance and ability at this time  Pain Assessment  Pain Assessment: 0-10  Pain Level: 3  Pain Type: Chronic pain  Pain Location: Neck,Shoulder  Pain Orientation: Right,Left,Posterior  Pain Radiating Towards: shoulders  Pain Descriptors: Aching,Sore    Objective  Cognition  Overall Cognitive Status: WFL  Perception  Overall Perceptual Status: WFL     Bed mobility  Rolling to Left: Moderate assistance (Ax1)  Rolling to Right: Moderate assistance (Ax1)  Comment: A for completion of rolling and reaching for bed rail, assist to flex B knee's when rolling to ease with task, A x1 this date                 Additional Activities: PM: pt positioned upright in bed with head supported by bed/pillows, pt reached for cones with RUE then passing to LUE to drop in bed at his side, pt completed task with RUE then switching to reach with LUE, assist req to lift/support LUE for reaching/retrieval, reaching completed within cervical precautions, task to address gross motor movements, ROM and increasing functional use of BUE's; FM task to place small round balls into targeted container, medical gloves donned on both hands to increase /pinch due to smoothness of balls and decreased sensation in B hands, increased time req for retrieval/pinch in both hands, A req to reach target and support UE's during task, all parts of task placed in place of sight to limit neck movement/rotation while sitting upright in bed, RB's req due to BUE fatigue and neck pain, task to increase FM skills and overall functional use of B hands/UE's                 ADL  Feeding: Dependent/Total  Grooming: Dependent/Total  UE Bathing: Dependent/Total  LE Bathing: Dependent/Total  UE Dressing: Dependent/Total  LE Dressing: Dependent/Total  Toileting: Dependent/Total  Additional Comments: Writer provided assist this date with brief change. Completing supin in bed rolling side to side, pt attempts to lift BUE's for threading gown, unable to hold BUE's in space, A req, pt able to lift/hold BLE's while TEDs/footies applied, pt reaching for bed rails with rolling in attempts to increase assist, dep to don c-collar prior to sitting EOB, all tasks pt is dep/total-session to increase assist from pt and promote functional use of extremities, pt attempts to wash face req [place] to reach-unable to complete motions but did give good effort for engagement          Assessment  Performance deficits / Impairments: Decreased ADL status; Decreased functional mobility ; Decreased ROM; Decreased strength;Decreased endurance;Decreased sensation;Decreased balance;Decreased high-level IADLs;Decreased fine motor control;Decreased coordination;Decreased posture  Prognosis: Fair  Discharge Recommendations: Patient would benefit from continued therapy after discharge;Continue to assess pending progress  Activity Tolerance: Patient limited by pain; Patient limited by fatigue (gives good efforts, limited due to pain/discomfort/ability)  Activity Tolerance:  limited tolerance seated EOB this morning due to dizziness and nausea, pt able to tolerate about 2 min then returning to supine  Safety Devices in place: Yes  Type of devices: Left in chair (with PT)  Equipment Recommendations  Equipment Needed:  (TBD)       Patient Education: activity promotion, ADL tasks/engagement, ROM/functional movements, bed mobility, sitting tolerance/balance   Patient Goals   Patient goals : \"I just want to be able to get back to my basic needs.\"  Learner:patient  Method: demonstration and explanation       Outcome: needs reinforcement        Plan  Plan  Times per week: 900 minutes/week for combined therapy of OT/PT due to decreaed tolerance to activity. Times per day: Twice a day  Current Treatment Recommendations: Self-Care / ADL,Strengthening,ROM,Balance Training,Functional Mobility Training,Endurance Training,Pain Management,Safety Education & Training,Patient/Caregiver Education & Training,Equipment Evaluation, Education, & procurement,Positioning  Patient Goals   Patient goals : \"I just want to be able to get back to my basic needs. \"  Short term goals  Time Frame for Short term goals: By 10 days  Short term goal 1: Pt will complete upper body dressing/bathing with max A with use of AE as needed while maintaining cervical precautions  Short term goal 2: Pt will complete self feeding task with max A with adaptive strategies and good safety  Short term goal 3: Pt will tolerate sitting EOB 5+ minutes unsupported with mod A during functional activity to increase independence with self care and mobility  Short term goal 4: Pt will complete functional transfers during self care tasks with mod A and good safety  Short term goal 5: Pt will participate in 30+ minutes of therapeutic exercises/functional activities to increase safety and independence with self care and mobility  Long term goals  Time Frame for Long term goals : By discharge  Long term goal 1: Pt will complete self feeding task with mod A with use of adaptive strategies   Long term goal 2: Pt will complete upper body bathing/dressing with mod A and good safety while maintaining cervical precautions  Long term goal 3: Pt will tolerate sitting EOB 10+ minutes unsupported with CGA and good safety during functional activity of choice  Long term goal 4: Pt will complete functional transfer with min A and good safety during self care tasks  Long term goal 5: Pt will demonstrated increased BUE gross motor/fine motor to increase participation in self care tasks  Long term goals 6: Pt/family with verbalize/demosntrate Good understanding of cervical precautions during self care tasks  Long term goal 7: Pt/family will demonstrate Good understanding of BUE exercises to increase functional use of BUE during self care tasks        04/05/22 0901 04/05/22 1305   OT Individual Minutes   Time In 0901 1305   Time Out 0939 1335   Minutes 38 30   OT Co-Treatment Minutes   Time In   (8074)  --    Time Out   (0950)  (unable to tolerate co-tx this morning due to nausea/dizziness)   --      Electronically signed by Stephanie LOZADA on 4/5/22 at 1:56 PM EDT

## 2022-04-05 NOTE — PROGRESS NOTES
Physical Therapy  [facility]: KIRSTIN JIMENEZ  Acute Rehabilitation Physical Therapy Progress Note    Date: 22  Patient Name: Cesar Austin       Room: 4059/5329-17  MRN: [id number]   Account: [de-identified]   : 1950  (75 y.o.) Gender: male     Referring Practitioner: Randi Langley MD  Diagnosis: Cervical stenosis with myelopathy s/p C2-C5 laminectomy and fusion   Past Medical History:  has a past medical history of Depression, Diabetes mellitus ([address] Difficult intravenous access, Hyperlipidemia, Hypertension, Migraines, PTSD (post-traumatic stress disorder), Sleep apnea, Teeth missing, and Wears glasses. Past Surgical History:   has a past surgical history that includes Cardiac catheterization (2010); Carpal tunnel release (Left, 2002); Vasectomy (1983); Tonsillectomy and adenoidectomy (); Hydrocele surgery (); Middle ear surgery (2018); eye surgery (Left, ); Mouth surgery (2018); other surgical history (2018); cervical fusion (2018); pr office/outpt visit,procedure only (N/A, 2018); Cataract removal; laminectomy (2022); and cervical fusion (N/A, 3/24/2022). Additional Pertinent Hx: Lio Linn is a 70 y.o. male with history of HTN, HLD, diabetes, GIA, migraines, PTSD, and depression admitted to [facility] on 3/23/2022. He initially presented to [facility] after a fall with subsequent worsening limb weakness and additional falls. MRI brain showed no acute intracranial abnormality. MRI cervical spine showed severe spinal stenosis at C2-C3 and C3-C4 with complete effacement of the CSF. MRI thoracic and lumbar spine were relatively unremarkable except for moderate spinal stenosis from L2-L3 to L4-L5. He was transferred to [facility] for neurosurgical evaluation. He underwent laminectomy and fusion at C2-C5 on 3/24/22 (Dr. Roe Urrutia). Hospital course has been complicated by urinary retention.  He reports continued neck pain with pain in the limbs as well. He also notes numbness/tingling and weakness in all limbs. Pt admitted to rehab unit on 3/31/22    Overall Orientation Status: Within Functional Limits  Restrictions/Precautions  Restrictions/Precautions: Fall Risk;General Precautions  Required Braces or Orthoses?: Yes  Implants present? : Metal implants  Required Braces or Orthoses  Cervical: c-collar  Position Activity Restriction  Spinal Precautions: No Twisting; No Lifting; No Bending  Spinal Precautions: Poor Sitting balance, Bilateral Upper  and Lower Body muscle weakness, Assist with standing in ELBERT Stedy  Other position/activity restrictions: up with assist; s/p C2-C5 laminectomy and fixation 3/24, Collar on while out of bed. Ok to remove while resting In bed eating and drinking in bed    Subjective: Patient in supine in bed upon my arrival and just finishing up with OT. Cotx  for 10 mins. with Nohemy Dries . Patient became very nausious when brought to EOB and needed to return to supine in bed. Comments: C-collar donned during tx. Vital Signs  Patient Currently in Pain: Yes  Pain Assessment: 0-10  Pain Level: 5  Pain Type: Chronic pain  Pain Location: Neck; Shoulder  Pain Orientation: Right;Posterior; Left                Bed Mobility:   Bed Mobility  Rolling: Maximal assistance  Supine to Sit: Moderate assistance (x2)  Sit to Supine: Moderate assistance (2 person;  elbert wendy )  Scootin Person assistance; Moderate assistance  Bed mobility  Scootin Person assistance; Moderate assistance    Transfers:  Sit to Stand: Moderate Assistance;2 Person Assistance  Stand to sit: Moderate Assistance;2 Person Assistance  Bed to Chair: Dependent/Total (elbert stedy used)                       Stairs/Curb  Stairs?: No            BALANCE Posture: Poor  Sitting - Static: Poor;+  Sitting - Dynamic: Poor  Standing - Static: Poor;+ (Elbert stedy)  Standing - Dynamic: Poor;+  Comments: Sitting balance assessed at EOB.      EXERCISES    Other exercises?: Yes  Other exercises 2: Dangle at EOB ~ 3 minutes, 2 person assit for safety fue to poor sitting balance. Pt is able to sit upsupported breifly with BUE support and SBA. Other exercises 3: Rolling x1 in each direction. Mod x2  Other exercises 4: bed sliding transfer to new rehabl recliner to work on being upright and out of bed. Other exercises 5: Static stands 2x2min each. in Ritesh Sous. Other exercises 6: supine bilat. x 10 reps AAROM  Other Activities  Comment: rest breaks PRN. Increase time required to complete all tasks. Activity Tolerance: Patient limited by fatigue,Patient limited by endurance,Patient limited by pain  PT Equipment Recommendations  Equipment Needed: No  Other: TBD         Current Treatment Recommendations: Strengthening,ROM,Balance Training,Functional Mobility Training,Transfer Training,Endurance Training,Wheelchair Mobility Training,Stair training,Gait Training,Neuromuscular Re-education,Home Exercise Program,Safety Education & Training,Patient/Caregiver Education & Training,Equipment Evaluation, Education, & procurement    Conditions Requiring Skilled Therapeutic Intervention  Body structures, Functions, Activity limitations: Decreased functional mobility ; Decreased strength;Decreased safe awareness;Decreased endurance;Decreased balance; Increased pain;Decreased posture;Decreased coordination;Decreased fine motor control;Decreased sensation;Decreased ROM  Treatment Diagnosis: Impaired function.   Prognosis: Fair  Decision Making: Medium Complexity  Barriers to Learning: Pain  REQUIRES PT FOLLOW UP: Yes  Discharge Recommendations: Patient would benefit from continued therapy after discharge;Home with assist PRN    Goals  Short term goals  Time Frame for Short term goals: 10 visits  Short term goal 1: Perform rolling in bed min/mod A   Short term goal 2: Perform supine to sit min/mod A   Short term goal 3: Perform sit to supine max A   Short term goal 4: Perform sit<> stand mod A , and pivot transfers at mod A  Short term goal 5: Progress to ambulation HHA/Gait belt assist or appropriate device, mod A x 2 20 to 40 ft  Short term goal 6: Propel w/c with B LE, distance of 50 ft or > , min/mod A  Short term goal 7: Demo Fair- dynamic standing balance to decrease risk of falls  Long term goals  Time Frame for Long term goals : By DC  Long term goal 1: Pt able to perform bed mobility at [address] term goal 2: Pt able to perform transfers at min A   Long term goal 3: Pt able to ambulate with or without device, distance of 100 ft atleast, mod A, level surface. Long term goal 4: Pt able to perform 2 to 4 steps at [place] x 2  Long term goal 5: Pt able to propel w/c on level surface,  distance of 150 ft, SBA, level surfaces  Long term goal 6: Family training for safe mobility to return home  Long term goal 7: Improve sitting/standing balance to good/fair to reduce fall risk. Long term goal 8: Pt able to ambulate 60 to 80 ft for 2MWT,to improve overall fucntion.        04/05/22 0940 04/05/22 1400   PT Individual Minutes   Time In 0950 1400   Time Out 1030 1430   Minutes 40 30   PT Co-Treatment Minutes   Time In 0940  --    Time Out 0922  --    Minutes 10  --        Electronically signed by Leonora Collins PTA on 4/5/22 at 6:05 PM EDT

## 2022-04-05 NOTE — PLAN OF CARE
Problem: Skin Integrity:  Goal: Will show no infection signs and symptoms  Description: Will show no infection signs and symptoms  4/5/2022 0626 by Miri Sandhu RN  Outcome: Ongoing  4/4/2022 1842 by Deanna Hanson RN  Outcome: Ongoing  Goal: Absence of new skin breakdown  Description: Absence of new skin breakdown  4/5/2022 0626 by Miri Sandhu RN  Outcome: Ongoing  4/4/2022 1842 by Deanna Hanson RN  Outcome: Ongoing     Problem: Falls - Risk of:  Goal: Will remain free from falls  Description: Will remain free from falls  4/5/2022 0626 by Miri Sandhu RN  Outcome: Ongoing  4/4/2022 1842 by Deanna Hanson RN  Outcome: Ongoing  Goal: Absence of physical injury  Description: Absence of physical injury  4/5/2022 0626 by Miri Sandhu RN  Outcome: Ongoing  4/4/2022 1842 by Deanna Hanson RN  Outcome: Ongoing     Problem: Pain:  Goal: Pain level will decrease  Description: Pain level will decrease  4/5/2022 0626 by Miri Sandhu RN  Outcome: Ongoing  4/4/2022 1842 by Deanna Hanson RN  Outcome: Ongoing  Goal: Control of acute pain  Description: Control of acute pain  4/5/2022 0626 by Miri Sandhu RN  Outcome: Ongoing  4/4/2022 1842 by Deanna Hanson RN  Outcome: Ongoing  Goal: Control of chronic pain  Description: Control of chronic pain  4/5/2022 0626 by Miri Sandhu RN  Outcome: Ongoing  4/4/2022 1842 by Deanna Hanson RN  Outcome: Ongoing     Problem: Musculor/Skeletal Functional Status  Goal: Highest potential functional level  4/5/2022 0626 by Miri Sandhu RN  Outcome: Ongoing  4/4/2022 1842 by Deanna Hanson RN  Outcome: Ongoing  Goal: Absence of falls  4/5/2022 0626 by Miri Sandhu RN  Outcome: Ongoing  4/4/2022 1842 by Deanna Hanson RN  Outcome: Ongoing     Problem: Nutrition  Goal: Optimal nutrition therapy  4/5/2022 0626 by Miri Sandhu RN  Outcome: Ongoing  4/4/2022 1842 by Deanna Hanson RN  Outcome: Ongoing

## 2022-04-05 NOTE — PROGRESS NOTES
[facility] Internal Medicine    CONSULTATION / HISTORY AND PHYSICAL EXAMINATION            Date:   4/5/2022  Patient name:  Rosalina Velazquez  Date of admission:  3/31/2022  8:38 PM  MRN:   [id number]  Account:  [de-identified]  YOB: 1950  PCP:    Matthew Falk  Room:   1251/0391-58  Code Status:    DNR-CCA    Physician Requesting Consult: Christiane Sanz MD    Reason for Consult:  medical management    Chief Complaint:     No chief complaint on file.       History Obtained From:     Patient medical record nursing staff    History of Present Illness:   Patient past medical multiple medical problems include hypertension, heart failure with preserved ejection fraction, diabetes, bipolar disorder,   glaucoma, patient was originally admitted at [facility] where he was transferred from outlying facility, he developed, generalized weakness after a fall, patient underwent MRI of cervical spine suggestive of severe spinal canal stenosis at the level of C2-C4 level, patient underwent C2-C5 posterior decompression surgery, he feels that weakness in his extremities is slightly better  Patient during his hospital stay, developed bradycardia which improved after discontinuation of beta-blocker, cardiology was also consulted  He developed hospital-acquired pneumonia, getting treated with cefepime and doxycycline  Patient denying any complaints of cough, shortness of breath, chest pain,    Past Medical History:     Past Medical History:   Diagnosis Date    Depression 2017    ON RX    Diabetes mellitus ([address] 2013    NIDDM    Difficult intravenous access     Hyperlipidemia 2008    ON RX    Hypertension 2008    ON RX    Migraines     PTSD (post-traumatic stress disorder) 01/2018    ON RX    Sleep apnea 01/2018    UNALBE TO USE TO CLEARED BY ENT (CPAP)    Teeth missing     BACK TEETH UPPER AND LOWER TO BE FITTED FOR PARTIALS AT LATER DATE    Wears glasses         Past Surgical History:     Past Surgical History:   Procedure Laterality Date    CARDIAC CATHETERIZATION  02/2010    no stents     CARPAL TUNNEL RELEASE Left 01/09/2002    CATARACT REMOVAL      CERVICAL FUSION  04/19/2018    anterior cervical fusion C5-6    CERVICAL FUSION N/A 3/24/2022    C2-5 FUSION, C2-4 LAMINECTOMY performed by Spencer Delvalle DO at [address] Left 2018    CATARACT EXTRACTION WITH IOL    HYDROCELE EXCISION  1951    LAMINECTOMY  03/24/2022    C2-5 FUSION, C2-4 LAMINECTOMY    MIDDLE EAR SURGERY  01/11/2018    MIDDLE EAR REBUILT AND EAR DRUM REPLACED    MOUTH SURGERY  04/16/2018    5 TEETH REMOVED    OTHER SURGICAL HISTORY  04/19/2018    : ANTERIOR CERVICAL CORRPECTOMY C5-6, SYNTHES, DEPUY, REG TABLE, SUPINE,     MA OFFICE/OUTPT VISIT,PROCEDURE ONLY N/A 4/19/2018    ANTERIOR CERVICAL CORRPECTOMY AND FUSION C5-6 performed by Spencer Delvalle DO at [address]  12/1983        Medications Prior to Admission:     Prior to Admission medications    Medication Sig Start Date End Date Taking?  Authorizing Provider   finasteride (PROSCAR) 5 MG tablet Take 5 mg by mouth daily   Yes Historical Provider, MD   carboxymethylcellulose 1 % ophthalmic solution Place 2 drops into both eyes 3 times daily Pt states \"2 drops in both eyes three times a day\"    Historical Provider, MD   divalproex (DEPAKOTE ER) 250 MG extended release tablet Take 750 mg by mouth at bedtime    Historical Provider, MD   furosemide (LASIX) 20 MG tablet Take 20 mg by mouth daily    Historical Provider, MD   venlafaxine (EFFEXOR XR) 150 MG extended release capsule Take 1 capsule by mouth daily  Patient taking differently: Take 225 mg by mouth daily  6/23/20   Aki Elder MD   simethicone (MYLICON) 80 MG chewable tablet Take 1 tablet by mouth every 6 hours as needed for Flatulence  Patient taking differently: Take 80 mg by mouth every 8 hours as needed for Flatulence  6/22/20 Melissa Andrea MD   lidocaine (XYLOCAINE) 5 % ointment Apply topically daily as needed for Pain Apply topically as needed. LF 4/20/20 (30 day supply)  Patient not taking: Reported on 3/23/2022    Historical Provider, MD   metFORMIN (GLUCOPHAGE) 1000 MG tablet Take 1,000 mg by mouth 2 times daily (with meals) LF 4/27/20 (90 day supply)  Patient not taking: Reported on 3/21/2022    Historical Provider, MD   losartan (COZAAR) 25 MG tablet Take 25 mg by mouth daily     Historical Provider, MD   aspirin 81 MG chewable tablet Take 81 mg by mouth daily  Patient not taking: Reported on 3/21/2022    Historical Provider, MD   divalproex (DEPAKOTE ER) 500 MG extended release tablet Take 500 mg by mouth every morning     Historical Provider, MD   traZODone (DESYREL) 100 MG tablet Take 150 mg by mouth nightly as needed LF 5/1/20 (30 day supply)    Historical Provider, MD   cetirizine (ZYRTEC) 10 MG tablet Take 10 mg by mouth daily LF 4/6/20 (90 day supply)  Patient not taking: Reported on 3/21/2022    Historical Provider, MD   atorvastatin (LIPITOR) 80 MG tablet Take 40 mg by mouth daily Take 1/2 tablet (40 mg) daily  LF 4/22/20 (90 day supply)  Patient not taking: Reported on 3/21/2022    Historical Provider, MD   amLODIPine (NORVASC) 10 MG tablet Take 7.5 mg by mouth daily     Historical Provider, MD   sildenafil (VIAGRA) 100 MG tablet Take 100 mg by mouth as needed for Erectile Dysfunction LF 5/12/20 (30 day supply)    Historical Provider, MD   omeprazole (PRILOSEC) 20 MG delayed release capsule Take 20 mg by mouth every evening LF 4/21/20 (90 day supply)    Historical Provider, MD        Allergies:     Latex, Bee venom, Lisinopril, Niacin and related, Sulfa antibiotics, and Terazosin    Social History:     Tobacco:    reports that he quit smoking about 49 years ago. His smoking use included cigarettes. He has a 2.00 pack-year smoking history.  He has never used smokeless tobacco.  Alcohol:      reports current alcohol use.  Drug Use:  reports current drug use. Drug: Marijuana Elfida Castaneda). Family History:     Family History   Problem Relation Age of Onset   Celia Dave Dementia Mother     Coronary Art Dis Mother     Stroke Mother     Diabetes Mother     Asthma Mother     Other Mother         BRAIN ANEURISM    High Blood Pressure Mother     Heart Disease Father     Diabetes Sister     Diabetes Brother     High Blood Pressure Brother     High Cholesterol Brother     Heart Disease Brother     Diabetes Sister     Other Sister         NEUROPATHY       Review of Systems:     Positive and Negative as described in HPI. CONSTITUTIONAL:  negative for fevers, chills, sweats, fatigue, weight loss  HEENT:  negative for vision, hearing changes, runny nose, throat pain  RESPIRATORY:  negative for shortness of breath, cough, congestion, wheezing. CARDIOVASCULAR:  negative for chest pain, palpitations.   GASTROINTESTINAL:  negative for nausea, vomiting, diarrhea, constipation, change in bowel habits, abdominal pain   GENITOURINARY:  negative for difficulty of urination, burning with urination, frequency   INTEGUMENT:  negative for rash, skin lesions, easy bruising   HEMATOLOGIC/LYMPHATIC:  negative for swelling/edema   ALLERGIC/IMMUNOLOGIC:  negative for urticaria , itching  ENDOCRINE:  negative increase in drinking, increase in urination, hot or cold intolerance  MUSCULOSKELETAL:  negative joint pains, muscle aches, swelling of joints  NEUROLOGICAL: Weakness in all 4 extremities  BEHAVIOR/PSYCH:      Physical Exam:     BP (!) 130/94   Pulse 75   Temp 98.8 °F (37.1 °C) (Oral)   Resp 16   Ht 5' 3\" (1.6 m)   Wt 142 lb (64.4 kg)   SpO2 95%   BMI 25.15 kg/m²   Temp (24hrs), Av.8 °F (37.1 °C), Min:98.8 °F (37.1 °C), Max:98.8 °F (37.1 °C)    Recent Labs     22  0549   POCGLU 156* 103       Intake/Output Summary (Last 24 hours) at 2022 1740  Last data filed at 2022 0617  Gross per 24 hour   Intake 360 ml Output 425 ml   Net -65 ml       General Appearance:  alert, well appearing, and in no acute distress  Mental status: oriented to person, place, and time with normal affect  Head:  normocephalic, atraumatic. Eye: no icterus, redness, pupils equal and reactive, extraocular eye movements intact, conjunctiva clear  Ear: normal external ear, no discharge, hearing intact  Nose:  no drainage noted  Mouth: mucous membranes moist  Neck: supple, no carotid bruits, thyroid not palpable , staples present posterior neck, healing  Lungs: Bilateral equal air entry, clear to ausculation, no wheezing, rales or rhonchi, normal effort  Cardiovascular: normal rate, regular rhythm, no murmur, gallop, rub. Abdomen: Soft, nontender, nondistended, normal bowel sounds, no hepatomegaly or splenomegaly  Neurologic: Weakness in all 4 extremities, power in all 4 extremities 4/5 skin: No gross lesions, rashes, bruising or bleeding on exposed skin area  Extremities:  peripheral pulses palpable, no pedal edema or calf pain with palpation  Psych: Investigations:      Laboratory Testing:  No results found for this or any previous visit (from the past 24 hour(s)). Consultations:   IP CONSULT TO DIETITIAN  IP CONSULT TO SOCIAL WORK  IP CONSULT TO INTERNAL MEDICINE  IP CONSULT TO DIETITIAN  Assessment :      Primary Problem  Cervical stenosis of spine    Active Hospital Problems    Diagnosis Date Noted    Cervical stenosis of spine [M48.02] 03/31/2022       Plan:     1. Quadriparesis, due to cervical cord compression after fall s/p C2-C5 decompression surgery. 2. Hypertension, controlled restart home dose of Norvasc, losartan, will avoid beta-blockers since patient developed bradycardia in [place]  3. Heart failure preserved ejection fraction, compensated , on Lasix  4. GERD, restarted Protonix  5. On DVT prophylaxis Lovenox  6. BPH, started on finasteride  7.  History of glaucoma, restarted home medications of eyedrop  8. Patient has neurological better, will have intermittent catheterization  9. Diabetes, controlled  4/2  Patient blood pressure is controlled  No new complaints  4/3   Patient feels that his strength is getting better  Starting patient on hydrochlorothiazide with readings of elevated blood pressures, advised low-salt diet    4/4  BP running high   Low salt diet   CHF, compensated  , on lasix, watch for electrolytes   DM, sugars   C2-5 surgery , quadriparesis  PT/OT        4/5  , Blood pressure still running on the higher side,  Low-salt and low-fat diet,  We will adjust the medications,  Congestive heart failure compensated, on Lasix, monitor electrolytes,  Diabetes blood sugars running well,  C2-C5 surgery, quadriparesis, working with physical therapy, not able to walk yet,  Anemia of chronic disease, last hemoglobin was 11,    Tereza Stockton MD  4/5/2022  5:40 PM    Copy sent to Dr. Yolande Mcmillan    Please note that this chart was generated using voice recognition Dragon dictation software. Although every effort was made to ensure the accuracy of this automated transcription, some errors in transcription may have occurred.

## 2022-04-05 NOTE — PROGRESS NOTES
[facility] Internal Medicine    CONSULTATION / HISTORY AND PHYSICAL EXAMINATION            Date:   4/4/2022  Patient name:  Natacha Magana  Date of admission:  3/31/2022  8:38 PM  MRN:   [id number]  Account:  [de-identified]  YOB: 1950  PCP:    Angela Mandujano  Room:   8045/0966-89  Code Status:    DNR-CCA    Physician Requesting Consult: Tiarra Campbell MD    Reason for Consult:  medical management    Chief Complaint:     No chief complaint on file.       History Obtained From:     Patient medical record nursing staff    History of Present Illness:   Patient past medical multiple medical problems include hypertension, heart failure with preserved ejection fraction, diabetes, bipolar disorder,   glaucoma, patient was originally admitted at [facility] where he was transferred from outlying facility, he developed, generalized weakness after a fall, patient underwent MRI of cervical spine suggestive of severe spinal canal stenosis at the level of C2-C4 level, patient underwent C2-C5 posterior decompression surgery, he feels that weakness in his extremities is slightly better  Patient during his hospital stay, developed bradycardia which improved after discontinuation of beta-blocker, cardiology was also consulted  He developed hospital-acquired pneumonia, getting treated with cefepime and doxycycline  Patient denying any complaints of cough, shortness of breath, chest pain,    Past Medical History:     Past Medical History:   Diagnosis Date    Depression 2017    ON RX    Diabetes mellitus ([address] 2013    NIDDM    Difficult intravenous access     Hyperlipidemia 2008    ON RX    Hypertension 2008    ON RX    Migraines     PTSD (post-traumatic stress disorder) 01/2018    ON RX    Sleep apnea 01/2018    UNALBE TO USE TO CLEARED BY ENT (CPAP)    Teeth missing     BACK TEETH UPPER AND LOWER TO BE FITTED FOR PARTIALS AT LATER DATE    Wears glasses         Past Surgical History:     Past Surgical History:   Procedure Laterality Date    CARDIAC CATHETERIZATION  02/2010    no stents     CARPAL TUNNEL RELEASE Left 01/09/2002    CATARACT REMOVAL      CERVICAL FUSION  04/19/2018    anterior cervical fusion C5-6    CERVICAL FUSION N/A 3/24/2022    C2-5 FUSION, C2-4 LAMINECTOMY performed by Kalyn Benson DO at [address] Left 2018    CATARACT EXTRACTION WITH IOL    HYDROCELE EXCISION  1951    LAMINECTOMY  03/24/2022    C2-5 FUSION, C2-4 LAMINECTOMY    MIDDLE EAR SURGERY  01/11/2018    MIDDLE EAR REBUILT AND EAR DRUM REPLACED    MOUTH SURGERY  04/16/2018    5 TEETH REMOVED    OTHER SURGICAL HISTORY  04/19/2018    : ANTERIOR CERVICAL CORRPECTOMY C5-6, SYNTHES, [place]Y, REG TABLE, SUPINE,     [place] OFFICE/OUTPT VISIT,PROCEDURE ONLY N/A 4/19/2018    ANTERIOR CERVICAL CORRPECTOMY AND FUSION C5-6 performed by Kalyn Benson DO at [address] [facility]  12/1983        Medications Prior to Admission:     Prior to Admission medications    Medication Sig Start Date End Date Taking?  Authorizing Provider   finasteride (PROSCAR) 5 MG tablet Take 5 mg by mouth daily   Yes Historical Provider, MD   carboxymethylcellulose 1 % ophthalmic solution Place 2 drops into both eyes 3 times daily Pt states \"2 drops in both eyes three times a day\"    Historical Provider, MD   divalproex (DEPAKOTE ER) 250 MG extended release tablet Take 750 mg by mouth at bedtime    Historical Provider, MD   furosemide (LASIX) 20 MG tablet Take 20 mg by mouth daily    Historical Provider, MD   venlafaxine (EFFEXOR XR) 150 MG extended release capsule Take 1 capsule by mouth daily  Patient taking differently: Take 225 mg by mouth daily  6/23/20   Rich Gomez MD   simethicone (MYLICON) 80 MG chewable tablet Take 1 tablet by mouth every 6 hours as needed for Flatulence  Patient taking differently: Take 80 mg by mouth every 8 hours as needed for Flatulence  6/22/20 Floyd Singh MD   lidocaine (XYLOCAINE) 5 % ointment Apply topically daily as needed for Pain Apply topically as needed. LF 4/20/20 (30 day supply)  Patient not taking: Reported on 3/23/2022    Historical Provider, MD   metFORMIN (GLUCOPHAGE) 1000 MG tablet Take 1,000 mg by mouth 2 times daily (with meals) LF 4/27/20 (90 day supply)  Patient not taking: Reported on 3/21/2022    Historical Provider, MD   losartan (COZAAR) 25 MG tablet Take 25 mg by mouth daily     Historical Provider, MD   aspirin 81 MG chewable tablet Take 81 mg by mouth daily  Patient not taking: Reported on 3/21/2022    Historical Provider, MD   divalproex (DEPAKOTE ER) 500 MG extended release tablet Take 500 mg by mouth every morning     Historical Provider, MD   traZODone (DESYREL) 100 MG tablet Take 150 mg by mouth nightly as needed LF 5/1/20 (30 day supply)    Historical Provider, MD   cetirizine (ZYRTEC) 10 MG tablet Take 10 mg by mouth daily LF 4/6/20 (90 day supply)  Patient not taking: Reported on 3/21/2022    Historical Provider, MD   atorvastatin (LIPITOR) 80 MG tablet Take 40 mg by mouth daily Take 1/2 tablet (40 mg) daily  LF 4/22/20 (90 day supply)  Patient not taking: Reported on 3/21/2022    Historical Provider, MD   amLODIPine (NORVASC) 10 MG tablet Take 7.5 mg by mouth daily     Historical Provider, MD   sildenafil (VIAGRA) 100 MG tablet Take 100 mg by mouth as needed for Erectile Dysfunction LF 5/12/20 (30 day supply)    Historical Provider, MD   omeprazole (PRILOSEC) 20 MG delayed release capsule Take 20 mg by mouth every evening LF 4/21/20 (90 day supply)    Historical Provider, MD        Allergies:     Latex, Bee venom, Lisinopril, Niacin and related, Sulfa antibiotics, and Terazosin    Social History:     Tobacco:    reports that he quit smoking about 49 years ago. His smoking use included cigarettes. He has a 2.00 pack-year smoking history.  He has never used smokeless tobacco.  Alcohol:      reports current alcohol use.  Drug Use:  reports current drug use. Drug: Marijuana Aissatou November). Family History:     Family History   Problem Relation Age of Onset   John Self Dementia Mother     Coronary Art Dis Mother     Stroke Mother     Diabetes Mother     Asthma Mother     Other Mother         BRAIN ANEURISM    High Blood Pressure Mother     Heart Disease Father     Diabetes Sister     Diabetes Brother     High Blood Pressure Brother     High Cholesterol Brother     Heart Disease Brother     Diabetes Sister     Other Sister         NEUROPATHY       Review of Systems:     Positive and Negative as described in HPI. CONSTITUTIONAL:  negative for fevers, chills, sweats, fatigue, weight loss  HEENT:  negative for vision, hearing changes, runny nose, throat pain  RESPIRATORY:  negative for shortness of breath, cough, congestion, wheezing. CARDIOVASCULAR:  negative for chest pain, palpitations.   GASTROINTESTINAL:  negative for nausea, vomiting, diarrhea, constipation, change in bowel habits, abdominal pain   GENITOURINARY:  negative for difficulty of urination, burning with urination, frequency   INTEGUMENT:  negative for rash, skin lesions, easy bruising   HEMATOLOGIC/LYMPHATIC:  negative for swelling/edema   ALLERGIC/IMMUNOLOGIC:  negative for urticaria , itching  ENDOCRINE:  negative increase in drinking, increase in urination, hot or cold intolerance  MUSCULOSKELETAL:  negative joint pains, muscle aches, swelling of joints  NEUROLOGICAL: Weakness in all 4 extremities  BEHAVIOR/PSYCH:      Physical Exam:     BP (!) 141/80   Pulse 81   Temp 97.9 °F (36.6 °C) (Oral)   Resp 16   Ht 5' 3\" (1.6 m)   Wt 142 lb (64.4 kg)   SpO2 98%   BMI 25.15 kg/m²   Temp (24hrs), Av.9 °F (36.6 °C), Min:97.9 °F (36.6 °C), Max:97.9 °F (36.6 °C)    Recent Labs     22  1625 22  0549   POCGLU 88 156* 103       Intake/Output Summary (Last 24 hours) at 2022  Last data filed at 2022  Gross per 24 hour Intake 560 ml   Output 450 ml   Net 110 ml       General Appearance:  alert, well appearing, and in no acute distress  Mental status: oriented to person, place, and time with normal affect  Head:  normocephalic, atraumatic. Eye: no icterus, redness, pupils equal and reactive, extraocular eye movements intact, conjunctiva clear  Ear: normal external ear, no discharge, hearing intact  Nose:  no drainage noted  Mouth: mucous membranes moist  Neck: supple, no carotid bruits, thyroid not palpable , staples present posterior neck, healing  Lungs: Bilateral equal air entry, clear to ausculation, no wheezing, rales or rhonchi, normal effort  Cardiovascular: normal rate, regular rhythm, no murmur, gallop, rub. Abdomen: Soft, nontender, nondistended, normal bowel sounds, no hepatomegaly or splenomegaly  Neurologic: Weakness in all 4 extremities, power in all 4 extremities 4/5 skin: No gross lesions, rashes, bruising or bleeding on exposed skin area  Extremities:  peripheral pulses palpable, no pedal edema or calf pain with palpation  Psych: Investigations:      Laboratory Testing:  No results found for this or any previous visit (from the past 24 hour(s)). Consultations:   IP CONSULT TO DIETITIAN  IP CONSULT TO SOCIAL WORK  IP CONSULT TO INTERNAL MEDICINE  IP CONSULT TO DIETITIAN  Assessment :      Primary Problem  Cervical stenosis of spine    Active Hospital Problems    Diagnosis Date Noted    Cervical stenosis of spine [M48.02] 03/31/2022       Plan:     1. Quadriparesis, due to cervical cord compression after fall s/p C2-C5 decompression surgery. 2. Hypertension, controlled restart home dose of Norvasc, losartan, will avoid beta-blockers since patient developed bradycardia in [place]  3. Heart failure preserved ejection fraction, compensated , on Lasix  4. GERD, restarted Protonix  5. On DVT prophylaxis Lovenox  6. BPH, started on finasteride  7.  History of glaucoma, restarted home medications of eyedrop  8. Patient has neurological better, will have intermittent catheterization  9. Diabetes, controlled  4/2  Patient blood pressure is controlled  No new complaints  4/3   Patient feels that his strength is getting better  Starting patient on hydrochlorothiazide with readings of elevated blood pressures, advised low-salt diet    4/4  BP running high   Low salt diet   CHF, compensated  , on lasix, watch for electrolytes   DM, sugars   C2-5 surgery , quadriparesis  PT/OT        Nyoka Hodgkins, MD  4/4/2022  10:10 PM    Copy sent to Dr. Anna Oreilly    Please note that this chart was generated using voice recognition Dragon dictation software. Although every effort was made to ensure the accuracy of this automated transcription, some errors in transcription may have occurred.

## 2022-04-05 NOTE — PROGRESS NOTES
Speech Language Pathology  Facility/Department: [address] [place] [address]   CLINICAL BEDSIDE SWALLOW EVALUATION    NAME: Nandini Barton  : 1950  MRN: [id number]    ADMISSION DATE: 3/31/2022  ADMITTING DIAGNOSIS: has Hypertension; Hyperlipidemia; Diabetes mellitus ([address] Contusion of right shoulder; Bipolar disorder, mixed ([address] First known suicide attempt [facility]); Erectile dysfunction; Cervical stenosis of spinal canal; Incomplete spinal cord lesion at C5-C7 level ([address] Stenosis of cervical spine with myelopathy ([address] Cervical spondylosis with radiculopathy; Acute blood loss anemia; Chest pain; Depression with suicidal ideation; Severe recurrent major depression without psychotic features ([address] Frequent falls; Hyponatremia; Cervical spinal cord compression ([address] Cord compression ([address] Quadriplegia, C1-C4 incomplete ([address] Encephalopathy; Hospital-acquired pneumonia; Atelectasis; and Cervical stenosis of spine on their problem list.    Recent Chest Xray/CT of Chest:   - CXR-      Persistent opacity left base, likely infiltrate with bibasilar atelectasis. Date of Eval: 2022  Evaluating Therapist: DEBORA Garcia    Current Diet level:  Current Diet :  (EASY TO CHEW)  Current Liquid Diet : Thin      Primary Complaint   Per PM&R H&P: Nandini Barton  is a 70 y.o.  male admitted to the [address] [place] [address] unit on 3/31/2022.          77-year-old male with mechanical fall admitted to [facility] for cord compression-he has long history of cervical spine cord injury and underwent decompression 2018 in which she reports right-sided spinal cord was nicked. He presented to ER after a fall. He had sudden onset of pain with new weakness is remote generalized upper lower extremity weakness which progressively worsened since January. He stopped driving. He has had 7 falls. He recently fell backwards striking his head he denies any loss of consciousness.   Postop retention-intermittent caths and Urecholine    Pain:  Pain Assessment  Pain Assessment: 0-10  Pain Level: 5    Reason for Referral  Carla Perry was referred for a bedside swallow evaluation to assess the efficiency of his swallow function, identify signs and symptoms of aspiration and make recommendations regarding safe dietary consistencies, effective compensatory strategies, and safe eating environment. Impression  Dysphagia Diagnosis: Swallow function appears grossly intact  Dysphagia Impression : Pt. reports he tries to stick to softer foods that he knows he can chew easily d/t lack of back dentition and foods that he feels will not get stuck in his throat. He reports he had a bad experience following neck surgery 3-4 years ago and would prefer to remain on softer foods. Pt. requested to remain on easy to chew diet at this time, rather than advance to regular. Dysphagia Outcome Severity Scale: Level 6: Within functional limits/Modified independence     Treatment Plan  Requires SLP Intervention: No             Recommended Diet and Intervention  Diet Solids Recommendation: Easy to Chew  Liquid Consistency Recommendation: Thin             Compensatory Swallowing Strategies  Compensatory Swallowing Strategies: Upright as possible for all oral intake;Small bites/sips;Eat/Feed slowly      General  Behavior/Cognition: Alert; Cooperative  Respiratory Status: Room air  Breath Sounds: Clear  O2 Device: None (Room air)  Communication Observation: Functional  Follows Directions: Complex  Dentition: Some missing teeth  Patient Positioning: Upright in chair  Baseline Vocal Quality: Normal  Consistencies Administered: Reg solid; Thin - straw      Pain Level: 5    Vision/Hearing  Vision  Vision: Impaired  Vision Exceptions: Wears glasses at all times  Hearing  Hearing: Exceptions to [facility]  Hearing Exceptions: Hard of hearing/hearing concerns    Oral Motor Deficits  Oral/Motor  Oral Motor:  Within functional limits    Oral Phase Dysfunction  Oral Phase  Oral Phase: Exceptions  Oral Phase  Oral Phase - Comment: Extended mastication of dry solid (saltine cracker), pt. refused rehan cracker d/t \"The consistency is too hard to chew\". Indicators of Pharyngeal Phase Dysfunction   Pharyngeal Phase  Pharyngeal Phase: WNL   No overt s/s aspiration demonstrated. Education  Patient Education Response: Verbalizes understanding             Therapy Time  SLP Individual Minutes  Time In: 1130  Time Out: [address]  Minutes: 10          Rula AARON A.CCC/SLP    4/5/2022 11:52 AM

## 2022-04-05 NOTE — PLAN OF CARE
Problem: Skin Integrity:  Goal: Will show no infection signs and symptoms  Description: Will show no infection signs and symptoms  4/5/2022 0814 by Julieta Carter RN  Outcome: Ongoing  4/5/2022 0626 by Saba Solorio RN  Outcome: Ongoing  4/4/2022 1842 by Ron Merritt RN  Outcome: Ongoing  Goal: Absence of new skin breakdown  Description: Absence of new skin breakdown  4/5/2022 0814 by Julieta Carter RN  Outcome: Ongoing  4/5/2022 0626 by Saba Solorio RN  Outcome: Ongoing  4/4/2022 1842 by Ron Merritt RN  Outcome: Ongoing     Problem: Falls - Risk of:  Goal: Will remain free from falls  Description: Will remain free from falls  4/5/2022 0814 by Julieta Carter RN  Outcome: Ongoing  4/5/2022 0626 by Saba Solorio RN  Outcome: Ongoing  4/4/2022 1842 by Ron Merritt RN  Outcome: Ongoing  Goal: Absence of physical injury  Description: Absence of physical injury  4/5/2022 0814 by Julieta Carter RN  Outcome: Ongoing  4/5/2022 0626 by Saba Solorio RN  Outcome: Ongoing  4/4/2022 1842 by Ron Merritt RN  Outcome: Ongoing     Problem: Pain:  Goal: Pain level will decrease  Description: Pain level will decrease  4/5/2022 0814 by Julieta Carter RN  Outcome: Ongoing  4/5/2022 0626 by Saba Solorio RN  Outcome: Ongoing  4/4/2022 1842 by Ron Merritt RN  Outcome: Ongoing  Goal: Control of acute pain  Description: Control of acute pain  4/5/2022 0814 by Julieta Carter RN  Outcome: Ongoing  4/5/2022 0626 by Saba Solorio RN  Outcome: Ongoing  4/4/2022 1842 by Ron Merritt RN  Outcome: Ongoing  Goal: Control of chronic pain  Description: Control of chronic pain  4/5/2022 0814 by Julieta Carter RN  Outcome: Ongoing  4/5/2022 0626 by Saba Solorio RN  Outcome: Ongoing  4/4/2022 1842 by Ron Merritt RN  Outcome: Ongoing     Problem: Musculor/Skeletal Functional Status  Goal: Highest potential functional level  4/5/2022 0814 by Julieta Carter RN  Outcome: Ongoing  4/5/2022 0626 by Saba Solorio RN  Outcome:

## 2022-04-06 PROCEDURE — 99232 SBSQ HOSP IP/OBS MODERATE 35: CPT | Performed by: INTERNAL MEDICINE

## 2022-04-06 PROCEDURE — 6370000000 HC RX 637 (ALT 250 FOR IP): Performed by: STUDENT IN AN ORGANIZED HEALTH CARE EDUCATION/TRAINING PROGRAM

## 2022-04-06 PROCEDURE — 6370000000 HC RX 637 (ALT 250 FOR IP): Performed by: PHYSICAL MEDICINE & REHABILITATION

## 2022-04-06 PROCEDURE — 97110 THERAPEUTIC EXERCISES: CPT

## 2022-04-06 PROCEDURE — 6360000002 HC RX W HCPCS: Performed by: NURSE PRACTITIONER

## 2022-04-06 PROCEDURE — 97535 SELF CARE MNGMENT TRAINING: CPT

## 2022-04-06 PROCEDURE — 1180000000 HC REHAB R&B

## 2022-04-06 PROCEDURE — 6370000000 HC RX 637 (ALT 250 FOR IP): Performed by: NURSE PRACTITIONER

## 2022-04-06 PROCEDURE — 6370000000 HC RX 637 (ALT 250 FOR IP): Performed by: INTERNAL MEDICINE

## 2022-04-06 PROCEDURE — 97530 THERAPEUTIC ACTIVITIES: CPT

## 2022-04-06 PROCEDURE — 99231 SBSQ HOSP IP/OBS SF/LOW 25: CPT | Performed by: STUDENT IN AN ORGANIZED HEALTH CARE EDUCATION/TRAINING PROGRAM

## 2022-04-06 RX ADMIN — DIVALPROEX SODIUM 750 MG: 250 TABLET, FILM COATED, EXTENDED RELEASE ORAL at 22:48

## 2022-04-06 RX ADMIN — FUROSEMIDE 20 MG: 20 TABLET ORAL at 08:16

## 2022-04-06 RX ADMIN — VENLAFAXINE 75 MG: 75 TABLET ORAL at 18:14

## 2022-04-06 RX ADMIN — CARBOXYMETHYLCELLULOSE SODIUM 2 DROP: 10 GEL OPHTHALMIC at 22:48

## 2022-04-06 RX ADMIN — CARBOXYMETHYLCELLULOSE SODIUM 2 DROP: 10 GEL OPHTHALMIC at 14:17

## 2022-04-06 RX ADMIN — VENLAFAXINE 75 MG: 75 TABLET ORAL at 08:18

## 2022-04-06 RX ADMIN — CARBOXYMETHYLCELLULOSE SODIUM 2 DROP: 10 GEL OPHTHALMIC at 08:18

## 2022-04-06 RX ADMIN — PANTOPRAZOLE SODIUM 40 MG: 40 TABLET, DELAYED RELEASE ORAL at 06:24

## 2022-04-06 RX ADMIN — VENLAFAXINE 75 MG: 75 TABLET ORAL at 13:08

## 2022-04-06 RX ADMIN — DIVALPROEX SODIUM 500 MG: 500 TABLET, EXTENDED RELEASE ORAL at 08:18

## 2022-04-06 RX ADMIN — LOSARTAN POTASSIUM 100 MG: 100 TABLET, FILM COATED ORAL at 08:16

## 2022-04-06 RX ADMIN — HYDROCHLOROTHIAZIDE 25 MG: 25 TABLET ORAL at 08:16

## 2022-04-06 RX ADMIN — SENNOSIDES 17.2 MG: 8.6 TABLET, COATED ORAL at 22:48

## 2022-04-06 RX ADMIN — POLYETHYLENE GLYCOL 3350 17 G: 17 POWDER, FOR SOLUTION ORAL at 08:16

## 2022-04-06 RX ADMIN — ENOXAPARIN SODIUM 40 MG: 100 INJECTION SUBCUTANEOUS at 08:17

## 2022-04-06 RX ADMIN — FINASTERIDE 5 MG: 5 TABLET, FILM COATED ORAL at 08:17

## 2022-04-06 RX ADMIN — AMLODIPINE BESYLATE 10 MG: 10 TABLET ORAL at 08:16

## 2022-04-06 RX ADMIN — ACETAMINOPHEN 650 MG: 325 TABLET ORAL at 22:48

## 2022-04-06 ASSESSMENT — PAIN DESCRIPTION - PAIN TYPE
TYPE: CHRONIC PAIN
TYPE: ACUTE PAIN;SURGICAL PAIN

## 2022-04-06 ASSESSMENT — PAIN SCALES - GENERAL
PAINLEVEL_OUTOF10: 2
PAINLEVEL_OUTOF10: 5
PAINLEVEL_OUTOF10: 0
PAINLEVEL_OUTOF10: 5

## 2022-04-06 ASSESSMENT — PAIN DESCRIPTION - LOCATION
LOCATION: NECK;SHOULDER
LOCATION: NECK;SHOULDER

## 2022-04-06 ASSESSMENT — PAIN DESCRIPTION - ORIENTATION
ORIENTATION: RIGHT;LEFT;POSTERIOR
ORIENTATION: RIGHT;LEFT;POSTERIOR

## 2022-04-06 ASSESSMENT — PAIN DESCRIPTION - PROGRESSION: CLINICAL_PROGRESSION: NOT CHANGED

## 2022-04-06 ASSESSMENT — PAIN DESCRIPTION - DESCRIPTORS: DESCRIPTORS: ACHING;SORE

## 2022-04-06 NOTE — PROGRESS NOTES
Physical Therapy  [address] [facility]   Acute Rehabilitation Physical Therapy Progress Note    Date: 22  Patient Name: Welton Kayser       Room: 6905/3688-60  MRN: [id number]   Account: [de-identified]   : 1950  (75 y.o.) Gender: male     Referring Practitioner: Candy Mckeon MD  Diagnosis: Cervical stenosis with myelopathy s/p C2-C5 laminectomy and fusion   Past Medical History:  has a past medical history of Depression, Diabetes mellitus ([address] Difficult intravenous access, Hyperlipidemia, Hypertension, Migraines, PTSD (post-traumatic stress disorder), Sleep apnea, Teeth missing, and Wears glasses. Past Surgical History:   has a past surgical history that includes Cardiac catheterization (2010); Carpal tunnel release (Left, 2002); Vasectomy (1983); Tonsillectomy and adenoidectomy (); Hydrocele surgery (); Middle ear surgery (2018); eye surgery (Left, ); Mouth surgery (2018); other surgical history (2018); cervical fusion (2018); pr office/outpt visit,procedure only (N/A, 2018); Cataract removal; laminectomy (2022); and cervical fusion (N/A, 3/24/2022). Additional Pertinent Hx: Mary Arellano is a 70 y.o. male with history of HTN, HLD, diabetes, GIA, migraines, PTSD, and depression admitted to [facility] on 3/23/2022. He initially presented to [facility] after a fall with subsequent worsening limb weakness and additional falls. MRI brain showed no acute intracranial abnormality. MRI cervical spine showed severe spinal stenosis at C2-C3 and C3-C4 with complete effacement of the CSF. MRI thoracic and lumbar spine were relatively unremarkable except for moderate spinal stenosis from L2-L3 to L4-L5. He was transferred to [facility] for neurosurgical evaluation. He underwent laminectomy and fusion at C2-C5 on 3/24/22 (Dr. Yanely Hernandez). Hospital course has been complicated by urinary retention.  He reports continued neck pain with pain in the limbs as well. He also notes numbness/tingling and weakness in all limbs. Pt admitted to rehab unit on 3/31/22    Overall Orientation Status: Within Functional Limits  Restrictions/Precautions  Restrictions/Precautions: Fall Risk;General Precautions  Required Braces or Orthoses?: Yes  Implants present? : Metal implants  Required Braces or Orthoses  Cervical: c-collar  Position Activity Restriction  Spinal Precautions: No Twisting; No Lifting; No Bending  Spinal Precautions: Poor Sitting balance, Bilateral Upper  and Lower Body muscle weakness, Assist with standing in GERA Stedy  Other position/activity restrictions: up with assist; s/p C2-C5 laminectomy and fixation 3/24, Collar on while out of bed. Ok to remove while resting In bed eating and drinking in bed    Subjective: Pt  is agreeable to PT. Pt is in bed upon arrival.   Comments: C-collar donned during tx. Vital Signs  Level of Consciousness: Alert (0)  Patient Currently in Pain: Yes  Pain Assessment: 0-10  Pain Level: 2 (increases to \"5/10\" with activity. )  Pain Type: Acute pain;Surgical pain  Pain Location: Neck; Shoulder  Pain Orientation: Right;Left;Posterior  Clinical Progression: Not changed  Response to Pain Intervention: Patient Satisfied     Oxygen Therapy  O2 Device: None (Room air)  Patient Observation  Observations: pt gives good efforts within tolerance and ability at this time       Bed Mobility:   Bed Mobility  Bridging: Moderate assistance  Rolling: Maximal assistance; Moderate assistance  Supine to Sit: Moderate assistance (x2)  Sit to Supine: Moderate assistance (2 person;  gera nguyen )  Scootin Person assistance; Moderate assistance  Comment: Bed mobility completed in hospital bed. Pt is agreeable to sit EOB however limited by nausea and increase pain this date. Pt's sitting tolerance is ~3min x2 with a supine rest break between each attempt. Pt continues to require SBA-Max A x1 for sitting balance.  PM: Pt is agreeable to sit EOB, tolerating 10min with CGA-Max A x1 for sitting balance. Transfers:   Comments: Defered due to pt's tolerance/request. Writer offered bed to early mob chair with a supine lateral transfer however pt declined this AM due to pain/nausea. Stairs/Curb  Stairs?: No                                       BALANCE Posture: Poor  Sitting - Static: Poor;+  Sitting - Dynamic: Poor  Standing - Static: Poor;+ (Carrie nguyen)  Standing - Dynamic: Poor;+  Comments: Sitting balance assessed at EOB. EXERCISES Exercises  Quad Sets: 10x bilat supine  Heelslides: 10x bilat supine AAROM (AM/PM)  Gluteal Sets: 10x bilat supine (AM/PM)  Hip Abduction: 10x bilat supine AAROM (AM/PM)  Knee Long Arc Quad: 10x bilat seated EOB AROM  Knee Short Arc Quad: 10x bilat supine  Ankle Pumps: 10x bilat supine/sitting (AM/PM)  Core Strengthening: AM: EOB 2x~3min. PT is limited by nausea this date. PM: EOB x10 min  Comments: Cues to stay awake. Pt is limited by fatigue this date as well. (AM)  Other exercises?: Yes  Other exercises 1: Supine AAROM bilat LE exercises, x10 reps  Other exercises 2: Dangle at EOB 2x~ 3 minutes, 2 person assit for safety fue to poor sitting balance. Pt is able to sit upsupported breifly with BUE support and SBA. PM: EOB x~10min. SBA-Max A x1 for sitting balance. (Pt is limited by nausea. (AM) )  Other exercises 3: Rolling x1 in each direction. Mod/Max A x1 (AM/PM)  Other exercises 6: sidelying: clamshells. Reps: 10 each. Other Activities  Comment: rest breaks PRN.          Activity Tolerance: Patient limited by fatigue,Patient limited by endurance,Patient limited by pain  PT Equipment Recommendations  Equipment Needed: No  Other: TBD       Patient Education  New Education Provided:    Learner:patient  Method: demonstration and explanation       Outcome: needs reinforcement     Current Treatment Recommendations: Lizzie Hall Mobility Training,Transfer Training,Endurance Training,Wheelchair Mobility Training,Stair training,Gait Training,Neuromuscular Re-education,Home Exercise Program,Safety Education & Training,Patient/Caregiver Education & Training,Equipment Evaluation, Education, & procurement    Conditions Requiring Skilled Therapeutic Intervention  Body structures, Functions, Activity limitations: Decreased functional mobility ; Decreased strength;Decreased safe awareness;Decreased endurance;Decreased balance; Increased pain;Decreased posture;Decreased coordination;Decreased fine motor control;Decreased sensation;Decreased ROM  Assessment: Pt has frequent falls at home, gradually declining in fucntion since Feb\"2022. Pt underwent C2-C5 fusion/laminectomy, presents with quadraparesis, B UE>B LE. Pt requires 2 person assist for safe bed mobility  and dependent for transfer at his time. WIll continue POC to improve stregnth and balance to faciliate independence. Treatment Diagnosis: Impaired function.   Prognosis: Fair  Decision Making: Medium Complexity  Barriers to Learning: Pain  REQUIRES PT FOLLOW UP: Yes  Discharge Recommendations: Patient would benefit from continued therapy after discharge;Home with assist PRN    Goals  Short term goals  Time Frame for Short term goals: 10 visits  Short term goal 1: Perform rolling in bed min/mod A   Short term goal 2: Perform supine to sit min/mod A   Short term goal 3: Perform sit to supine max A   Short term goal 4: Perform sit<> stand mod A , and pivot transfers at mod A  Short term goal 5: Progress to ambulation HHA/Gait belt assist or appropriate device, mod A x 2 20 to 40 ft  Short term goal 6: Propel w/c with B LE, distance of 50 ft or > , min/mod A  Short term goal 7: Demo Fair- dynamic standing balance to decrease risk of falls  Long term goals  Time Frame for Long term goals : By DC  Long term goal 1: Pt able to perform bed mobility at [address] term goal 2: Pt able to perform transfers at min A   Long term goal 3: Pt able to ambulate with or without device, distance of 100 ft atleast, mod A, level surface. Long term goal 4: Pt able to perform 2 to 4 steps at [place] x 2  Long term goal 5: Pt able to propel w/c on level surface,  distance of 150 ft, SBA, level surfaces  Long term goal 6: Family training for safe mobility to return home  Long term goal 7: Improve sitting/standing balance to good/fair to reduce fall risk. Long term goal 8: Pt able to ambulate 60 to 80 ft for 2MWT,to improve overall fucntion.        04/06/22 1010 04/06/22 1432   PT Individual Minutes   Time In 1010 1432   Time Out [phone]   Minutes 45 34       Electronically signed by Nikos Templeton PTA on 4/6/22 at 3:31 PM EDT

## 2022-04-06 NOTE — CARE COORDINATION
CASE MANAGEMENT NOTE:    Admission Date:  3/31/2022 Sara Vale is a 70 y.o.  male    Admitted for : Cervical stenosis of spine [M48.02]    Met with:  Family    PCP: Jon Ribeiro MD                              Insurance:  Medicare and [employer]    Is patient alert and oriented at time of discussion:  Yes    Current Residence/ Living Arrangements: From a 1-story home dependent on family care Since January 2022 (Assist from Significant other Chaz Amos primarily). Pt was independent prior to Jan 2022. Does patient have 24 hour assistance at home:  Yes    Current Services PTA:  Not currently but recently received PT/OT therapies at [facility] on [facility] ~ 2 months ago. Does patient go to outpatient dialysis: No  If yes, location and chair time:     Is patient agreeable to VNS/Outpatient therapy Yes    [place] of choice provided:  Yes    List of Home Care Agencies/Outpatient therapy provided: No    VNS/Outpatient therapy chosen:  Yes; [facility]; [address], [facility], [address]    DME: 4 wheeled walker, reacher, cane    Home Oxygen: No    Nebulizer: No    CPAP/BIPAP: No    Supplier: N/A    Handicap Placard: Yes    Potential Assistance Needed: Yes    Pharmacy:  [facility]; [address], [facility], [address]    Pt expresses uncertainty about the clinic Pharmacy accepting [place] prescriptions. Does Patient want to use MEDS to BEDS? No    Is patient currently receiving oral anticoagulation therapy? Yes    Family Members/Caregivers that pt would like involved in their care:    Yes    If yes, list name here:  Luci Leonardo (Significant other) & Robert Morales (Son)    Transportation Provider:  Family             Discharge Plan:  MEDICARE; From home with SERENA covington. Pt lives in one story home. DME: rollator, cane,SC. VNS: none. Recently had OP PT/OT therapies at [facility] ~ 2 months ago and would like to return if OP therapy is recommended after D/C from ARU.  Following for needs; will order DME as needed.               Electronically signed by: Drew Acharya PT on 4/6/2022 at 8:36 AM

## 2022-04-06 NOTE — PROGRESS NOTES
Comprehensive Nutrition Assessment    Type and Reason for Visit:  Reassess    Nutrition Recommendations/Plan:  Continue current diet and oral nutrition supplements. 5 eight ounce servings of Ensure Enlive provides: 1750 kcal and 100 gm protein. Nutrition Assessment:  Pt states decreased taste acuity continues. PO intake of meals varies from 0-50%. Pt is trying to drink 1-2 Ensures per meal. Will continue to offer diet and supplements as ordered. Explained to pt suggested goal of 5 Ensure Enlive per day with other foods as tolerated. Malnutrition Assessment:  Malnutrition Status:  Insufficient data    Context:  Acute Illness     Findings of the 6 clinical characteristics of malnutrition:  Energy Intake:  1 - 75% or less of estimated energy requirements for 7 or more days  Weight Loss:      Unable to assess  Body Fat Loss:  Unable to assess     Muscle Mass Loss:  Unable to assess    Fluid Accumulation:  1 - Mild Extremities   Strength:  Not Performed    Estimated Daily Nutrient Needs:  Energy (kcal):  1704-6621 based on [place] with 1.2-1.3 factor; Weight Used for Energy Requirements:  Admission     Protein (g):  73-85 based on 1.3-1.5 gm per kg; Weight Used for Protein Requirements:  Ideal          Nutrition Related Findings:  Edema: Trace RUE, +1 LUE, RLE, LLE. Wounds:  Multiple,Skin Tears,Surgical Incision       Current Nutrition Therapies:    ADULT ORAL NUTRITION SUPPLEMENT; Lunch, Dinner, Breakfast; Standard High Calorie/High Protein Oral Supplement  ADULT DIET; Easy to Chew    Anthropometric Measures:  · Height: 5' 3\" (160 cm)  · Current Body Weight: 141 lb 15.6 oz (64.4 kg)   · Admission Body Weight:      · Usual Body Weight: 158 lb 15.2 oz (72.1 kg) (1/21/22)     · Ideal Body Weight: 124 lbs; % Ideal Body Weight 114.5 %   · BMI: 25.2  · BMI Categories: Overweight (BMI 25.0-29. 9)       Nutrition Diagnosis:   · Inadequate oral intake related to swallowing difficulty,biting/chewing (masticatory) difficulty,altered taste perception (decreased appetite) as evidenced by intake 26-50%,poor intake prior to admission    Nutrition Interventions:   Food and/or Nutrient Delivery:  Continue Current Diet,Continue Oral Nutrition Supplement  Nutrition Education/Counseling:  No recommendation at this time   Coordination of Nutrition Care:  Continue to monitor while inpatient    Goals:  PO intake % of meals and supplements       Nutrition Monitoring and Evaluation:   Behavioral-Environmental Outcomes:  None Identified   Food/Nutrient Intake Outcomes:  Food and Nutrient Intake,Supplement Intake  Physical Signs/Symptoms Outcomes:  Biochemical Data,Chewing or Swallowing,GI Status,Fluid Status or Edema,Skin,Weight     Discharge Planning:    Continue current diet,Continue Oral Nutrition Supplement     Some areas of assessment may be incomplete due to standard COVID-19 Precautions. Timmy Núñez R.D., L.D.   Phone: [phone]

## 2022-04-06 NOTE — PROGRESS NOTES
[facility]   ACUTE REHABILITATION OCCUPATIONAL THERAPY  DAILY NOTE    Date: 22  Patient Name: Nandini Barton      Room: 4430/8996-25    MRN: [id number]   : 1950  (70 y.o.)  Gender: male   Referring Practitioner: Lokesh Wilson MD  Diagnosis: Cervical stenosis with myelopathy s/p C2-C5 laminectomy and fusion   Additional Pertinent Hx: Nandini Barton is a 70 y.o. male with history of HTN, HLD, diabetes, GIA, migraines, PTSD, and depression admitted to [facility] on 3/23/2022. He initially presented to [facility] after a fall with subsequent worsening limb weakness and additional falls. MRI brain showed no acute intracranial abnormality. MRI cervical spine showed severe spinal stenosis at C2-C3 and C3-C4 with complete effacement of the CSF. MRI thoracic and lumbar spine were relatively unremarkable except for moderate spinal stenosis from L2-L3 to L4-L5. He was transferred to [facility] for neurosurgical evaluation. He underwent laminectomy and fusion at C2-C5 on 3/24/22 (Dr. Liudmila Park). Hospital course has been complicated by urinary retention. He reports continued neck pain with pain in the limbs as well. He also notes numbness/tingling and weakness in all limbs. Pt admitted to rehab unit on 3/31/22    Restrictions  Restrictions/Precautions: Fall Risk,General Precautions  Implants present? : Metal implants  Spinal Precautions: Poor Sitting balance, Bilateral Upper  and Lower Body muscle weakness, Assist with standing in GERA Stedy  Other position/activity restrictions: up with assist; s/p C2-C5 laminectomy and fixation 3/24, Collar on while out of bed.  Ok to remove while resting In bed eating and drinking in bed  Required Braces or Orthoses  Cervical: c-collar  Required Braces or Orthoses?: Yes  Equipment Used: Wheelchair,Bed,Other (ss)    Subjective  Subjective: \"I can usually try but don't want to\"  Comments: pt jokes in regards to engaging in ADL tasks within tolerance/ability, pt in good spirits and is playful throughout  Patient Currently in Pain: Yes  Pain Level: 5  Pain Location: Neck; Shoulder  Pain Orientation: Right;Left;Posterior  Restrictions/Precautions: Fall Risk;General Precautions  Overall Orientation Status: Within Functional Limits  Patient Observation  Observations: pt gives good efforts within tolerance and ability at this time  Pain Assessment  Pain Assessment: 0-10  Pain Level: 5  Pain Type: Chronic pain  Pain Location: Neck,Shoulder  Pain Orientation: Right,Left,Posterior  Pain Radiating Towards: shoulders  Pain Descriptors: Aching,Sore    Objective  Cognition  Overall Cognitive Status: WFL  Perception  Overall Perceptual Status: WFL     Bed mobility  Rolling to Left: Moderate assistance (A x1)  Rolling to Right: Moderate assistance (Ax1)  Comment: A for completion of rolling and to reach/grab bed rail for increased assist/support while laying on side, A to flex B knee's when rolling to ease with task, A x1 this date for rolling                                   ADL  Feeding: Dependent/Total  Grooming: Dependent/Total  UE Bathing: Dependent/Total  LE Bathing: Dependent/Total  UE Dressing: Dependent/Total  LE Dressing: Dependent/Total  Toileting: Dependent/Total  Additional Comments: Writer provided assist this date with brief change.  Completing supine in bed rolling side to side, pt attempts to lift BUE's for bathing, it attempts to wash abdomen/chest but unsuccessful due to limited ROM and function, unable to hold BUE's in space, A req, pt able to lift/hold BLE's while TEDs/footies applied, pt reaching for bed rails with rolling in attempts to increase assist, all tasks pt is dep/total-session to increase assist from pt and promote functional use of extremities, pt attempts to wash face req [place] to reach-unable to complete motions but did give good effort for engagement          Assessment  Performance deficits / Impairments: Decreased ADL status; Decreased functional mobility ; Decreased ROM; Decreased strength;Decreased endurance;Decreased sensation;Decreased balance;Decreased high-level IADLs;Decreased fine motor control;Decreased coordination;Decreased posture  Prognosis: Fair  Discharge Recommendations: Patient would benefit from continued therapy after discharge;Continue to assess pending progress  Activity Tolerance: Patient limited by pain; Patient limited by fatigue  Activity Tolerance: gives good efforts  Safety Devices in place: Yes  Type of devices: Left in bed;Call light within reach;Nurse notified  Equipment Recommendations  Equipment Needed:  (TBD)       Patient Education: bed mobility, ADL tasks-increasing indep, OT POC   Patient Goals   Patient goals : \"I just want to be able to get back to my basic needs. \"  Learner:patient  Method: demonstration and explanation       Outcome: needs reinforcement        Plan  Plan  Times per week: 900 minutes/week for combined therapy of OT/PT due to decreaed tolerance to activity. Times per day: Twice a day  Current Treatment Recommendations: Self-Care / ADL,Strengthening,ROM,Balance Training,Functional Mobility Training,Endurance Training,Pain Management,Safety Education & Training,Patient/Caregiver Education & Training,Equipment Evaluation, Education, & procurement,Positioning  Patient Goals   Patient goals : \"I just want to be able to get back to my basic needs. \"  Short term goals  Time Frame for Short term goals: By 10 days  Short term goal 1: Pt will complete upper body dressing/bathing with max A with use of AE as needed while maintaining cervical precautions  Short term goal 2: Pt will complete self feeding task with max A with adaptive strategies and good safety  Short term goal 3: Pt will tolerate sitting EOB 5+ minutes unsupported with mod A during functional activity to increase independence with self care and mobility  Short term goal 4: Pt will complete functional transfers during self care tasks with mod A and good safety  Short term goal 5: Pt will participate in 30+ minutes of therapeutic exercises/functional activities to increase safety and independence with self care and mobility  Long term goals  Time Frame for Long term goals : By discharge  Long term goal 1: Pt will complete self feeding task with mod A with use of adaptive strategies   Long term goal 2: Pt will complete upper body bathing/dressing with mod A and good safety while maintaining cervical precautions  Long term goal 3: Pt will tolerate sitting EOB 10+ minutes unsupported with CGA and good safety during functional activity of choice  Long term goal 4: Pt will complete functional transfer with min A and good safety during self care tasks  Long term goal 5: Pt will demonstrated increased BUE gross motor/fine motor to increase participation in self care tasks  Long term goals 6: Pt/family with verbalize/demosntrate Good understanding of cervical precautions during self care tasks  Long term goal 7: Pt/family will demonstrate Good understanding of BUE exercises to increase functional use of BUE during self care tasks        04/06/22 0919   OT Individual Minutes   Time In 0919   Time Out 1000   Minutes 41   Minute Variance   Variance 49   Reason   (attempts x2-pt having BM/unable to participate )     Electronically signed by Olu Frank on 4/6/22 at 3:44 PM EDT

## 2022-04-06 NOTE — PROGRESS NOTES
[facility]   OCCUPATIONAL THERAPY MISSED TREATMENT NOTE   ACUTE REHAB  Date: 22  Patient Name: Yvonne Dyer       Room: 1619/0227-64  MRN: [id number]   Account #: [de-identified]    : 1950  (75 y.o.)  Gender: male   Referring Practitioner: Briana Driscoll MD  Diagnosis: Cervical stenosis with myelopathy s/p C2-C5 laminectomy and fusion              REASON FOR MISSED TREATMENT:  1897- upon arrival pt resting in bed and reports \"I'm trying to get it all out!\", pt having BM in brief, this writer asking if he would like to use bedpan, pt declines and says \"I'm already going\", pt left with call light when finished. 1350-upon second arrival, pt states he is \"still going\", pt requesting more time to finish BM, this writer notified NSG that pt will need cleaned up when finished with BM, pt left in bed with call light.           Scotty LOZADA

## 2022-04-06 NOTE — PLAN OF CARE
Problem: Skin Integrity:  Goal: Absence of new skin breakdown  Description: Absence of new skin breakdown  Outcome: Ongoing  Note: Skin assessment completed this shift. Nutrition and Hydration status assessed with adequate intake. Ry Score as charted. Patient able to reposition self for comfort and to prevent breakdown. Patient verbalizes understanding of pressure ulcer prevention measures. Skin integrity maintained. No new skin breakdown noted. Skin to high risk pressure areas including coccyx and heels are clear. Nere[place] / Incontinence care provided as needed throughout the shift. Zinc Oxide paste applied to buttocks with brief changes. Problem: Falls - Risk of:  Goal: Will remain free from falls  Description: Will remain free from falls  Outcome: Ongoing  Note: No falls or injuries sustained at this time. No attempts to get out of bed without nursing assistance. Call light within reach and pt. uses appropriately for assistance. Siderails up x 2. Nonskid footwear remains on. Bed in low and locked position. Hourly nursing rounds made. Pt. Alert and oriented, aware of limitations, and exhibits good safety judgement. Pt. uses assistive devices appropriately. Pt. understands individual fall risk factors. Pt. reoriented to surroundings and reminded to use call light with each nurse/patient interaction. Pt. room located close to nurse's station. Bed alarm / Personal alarm remains engaged throughout the shift as a precaution. Problem: Pain:  Goal: Pain level will decrease  Description: Pain level will decrease  Outcome: Ongoing  Note: Pain assessment completed. Pt. able to rest.  Patient medicated with  Tylenol for pain this shift as ordered. Patient relates a tolerable level of discomfort with the current medication. Pt. Repositions with assistance for comfort. Nonverbal cues indicate pain relief. Pt. Rests quietly with eyes closed after pain medication administration. Respirations easy and unlabored. Appears free from distress.

## 2022-04-06 NOTE — PROGRESS NOTES
Physical Medicine & Rehabilitation  Progress Note      Subjective:      Yue Carrera is a 70 y.o. male with cervical stenosis with myelopathy s/p C2-C5 laminectomy and fusion. He reports doing fine today. He continues to note improvement in shoulder pain with lidocaine patches. Discussed obtaining orthostatic vital signs in lying and sitting due to dizziness and nausea when upright. He denies any other acute concerns. ROS:  Denies fevers, chills, sweats.  +dizziness; No chest pain, palpitations  Denies coughing, wheezing or shortness of breath. +nausea; Denies abdominal pain, diarrhea or constipation  No new areas of joint pain. Denies new areas of numbness or weakness. Denies new anxiety or depression issues. No new skin problems. Rehabilitation:   Progressing in therapies. PT:  Restrictions/Precautions: Fall Risk,General Precautions  Implants present? : Metal implants  Spinal Precautions: Poor Sitting balance, Bilateral Upper  and Lower Body muscle weakness, Assist with standing in GERA Stedy  Other position/activity restrictions: up with assist; s/p C2-C5 laminectomy and fixation 3/24, Collar on while out of bed. Ok to remove while resting In bed eating and drinking in bed  Required Braces or Orthoses  Cervical: c-collar   Transfers  Sit to Stand: Moderate Assistance,2 Person Assistance  Stand to sit: Moderate Assistance,2 Person Assistance  Bed to Chair: Dependent/Total (gera stedy used)  Comment: Defered due to pt's tolerance/request.        Transfers  Sit to Stand:  Moderate Assistance,2 Person Assistance  Stand to sit: Moderate Assistance,2 Person Assistance  Bed to Chair: Dependent/Total (gera stedy used)  Comment: Defered due to pt's tolerance/request.   Ambulation  Ambulation?: No               OT:  ADL  Feeding: Dependent/Total  Grooming: Dependent/Total  UE Bathing: Dependent/Total  LE Bathing: Dependent/Total  UE Dressing: Dependent/Total  LE Dressing: Dependent/Total  Toileting: Dependent/Total  Additional Comments: Writer provided assist this date with brief change. Completing supine in bed rolling side to side, pt attempts to lift BUE's for bathing, it attempts to wash abdomen/chest but unsuccessful due to limited ROM and function, unable to hold BUE's in space, A req, pt able to lift/hold BLE's while TEDs/footies applied, pt reaching for bed rails with rolling in attempts to increase assist, all tasks pt is dep/total-session to increase assist from pt and promote functional use of extremities, pt attempts to wash face req [place] to reach-unable to complete motions but did give good effort for engagement         Balance  Sitting Balance: Moderate assistance (mod A initially, CGA/SBA for 2-3 min EOB)  Standing Balance: Dependent/Total (mod A x2 with elbert nguyen)   Standing Balance  Time: AM: <30 sec x2; PM: <30 sec x2  Activity: AM/PM: transfers with elbert nguyen  Comment: Ax2 with AXEL guo to place BUE's on front bar        Bed mobility  Bridging:  Moderate assistance  Rolling to Left: Moderate assistance (A x1)  Rolling to Right: Moderate assistance (Ax1)  Supine to Sit: Moderate assistance,2 Person assistance  Sit to Supine: Dependent/Total,2 Person assistance  Scootin Person assistance,Moderate assistance  Comment: A for completion of rolling and to reach/grab bed rail for increased assist/support while laying on side, A to flex B knee's when rolling to ease with task, A x1 this date for rolling  Transfers  Sit to stand: 2 Person assistance,Moderate assistance  Stand to sit: Moderate assistance,2 Person assistance  Transfer Comments: with AXEL guo x2            Wheelchair Bed Transfers  Wheelchair/Bed - Technique:  (utilizing ss )  Equipment Used: Wheelchair,Bed,Other (ss)  Level of Asssistance: Dependent/Total,2 Person assistance  Wheelchair Transfers Comments: completign transfer to and from  with ss     SPEECH:      Current Medications:   Current Facility-Administered Medications: lidocaine 4 % external patch 2 patch, 2 patch, TransDERmal, Daily  hydroCHLOROthiazide (HYDRODIURIL) tablet 25 mg, 25 mg, Oral, Daily  senna (SENOKOT) tablet 17.2 mg, 2 tablet, Oral, Nightly  finasteride (PROSCAR) tablet 5 mg, 5 mg, Oral, Daily  carboxymethylcellulose (THERATEARS) 1 % ophthalmic gel 2 drop, 2 drop, Both Eyes, TID  enoxaparin (LOVENOX) injection 40 mg, 40 mg, SubCUTAneous, Daily  ondansetron (ZOFRAN-ODT) disintegrating tablet 4 mg, 4 mg, Oral, Q8H PRN **OR** ondansetron (ZOFRAN) injection 4 mg, 4 mg, IntraVENous, Q6H PRN  polyethylene glycol (GLYCOLAX) packet 17 g, 17 g, Oral, Daily PRN  glucagon (rDNA) injection 1 mg, 1 mg, IntraMUSCular, PRN  glucose (GLUTOSE) 40 % oral gel 15 g, 15 g, Oral, PRN  amLODIPine (NORVASC) tablet 10 mg, 10 mg, Oral, Daily  cyclobenzaprine (FLEXERIL) tablet 5 mg, 5 mg, Oral, TID PRN  divalproex (DEPAKOTE ER) extended release tablet 500 mg, 500 mg, Oral, QAM  divalproex (DEPAKOTE ER) extended release tablet 750 mg, 750 mg, Oral, Nightly  furosemide (LASIX) tablet 20 mg, 20 mg, Oral, Daily  losartan (COZAAR) tablet 100 mg, 100 mg, Oral, Daily  pantoprazole (PROTONIX) tablet 40 mg, 40 mg, Oral, QAM AC  venlafaxine (EFFEXOR) tablet 75 mg, 75 mg, Oral, TID WC  acetaminophen (TYLENOL) tablet 650 mg, 650 mg, Oral, Q4H PRN  polyethylene glycol (GLYCOLAX) packet 17 g, 17 g, Oral, Daily  bisacodyl (DULCOLAX) suppository 10 mg, 10 mg, Rectal, Daily PRN      Objective:  BP (!) 127/90   Pulse 90   Temp 98.2 °F (36.8 °C) (Oral)   Resp 18   Ht 5' 3\" (1.6 m)   Wt 142 lb (64.4 kg)   SpO2 95%   BMI 25.15 kg/m²       GEN: Well developed, well nourished, no acute distress  HEENT: NCAT. EOM grossly intact. Hearing grossly intact. Mucous membranes pink and moist.  RESP: Normal breath sounds with no wheezing, rales, or rhonchi. Respirations WNL and unlabored. CV: Regular rate and rhythm. No murmurs, rubs, or gallops.   ABD: Soft, non-distended, BS+ and equal.  NEURO: Alert. Speech fluent. MSK:  Muscle bulk is normal bilaterally. Strength 3/5 with right elbow flexion and 4/5 with right . Not able to flex the left elbow. Strength 4/5 with left . Strength 4+/5 with bilateral ankle dorsiflexion and plantarflexion. LIMBS: Edema present in left upper limb. SKIN: Warm and dry with good turgor. PSYCH: Mood WNL. Affect WNL. Appropriately interactive. Diagnostics:     CBC: No results for input(s): WBC, RBC, HGB, HCT, MCV, RDW, PLT in the last 72 hours. BMP: No results for input(s): NA, K, CL, CO2, PHOS, BUN, CREATININE, CA, GLUCOSE in the last 72 hours. BNP: No results for input(s): BNP in the last 72 hours. PT/INR: No results for input(s): PROTIME, INR in the last 72 hours. APTT: No results for input(s): APTT in the last 72 hours. CARDIAC ENZYMES: No results for input(s): CKMB, CKMBINDEX, TROPONINT in the last 72 hours. Invalid input(s): CKTOTAL;3  FASTING LIPID PANEL:  Lab Results   Component Value Date    CHOL 104 04/12/2018    HDL 42 04/12/2018    TRIG 92 04/12/2018     LIVER PROFILE: No results for input(s): AST, ALT, ALB, BILIDIR, BILITOT, ALKPHOS in the last 72 hours. Impression/Plan:   Impaired ADLs, gait, and mobility due to:    1. Cervical stenosis with myelopathy:  S/p C2-C5 laminectomy and fusion on 3/24. PT/OT  for gait, mobility, strengthening, endurance, ADLs, and self care. C-collar when out of bed. Pain control with as-needed tylenol, as-needed flexeril. Added lidocaine patches on 4/4. Avoid narcotics, as he did require narcan in acute care. Follow up with neurosurgery. 2. Neurogenic bladder:  Has not been requiring intermittent catheterization but can continue this as needed. 3. Neurogenic bowel:  Miralax daily, senokot nightly, dulcolax prn.  4. Dizziness, nausea with sitting upright:  Likely related to cervical spinal cord injury (possible orthostatic hypotension, autonomic dysfunction). NERY hose when out of bed.   Can trial abdominal binder as well. Trying to obtain orthostatic vital signs. 5. Subjective dysphagia:  Chronic. SLP did not recommend any change in diet or further therapy. 6. Hospital-acquired pneumonia:  Completed course of cefepime and doxycycline. 7. Bradycardia:  Resolved after discontinuation of beta blockers  8. CHF:  On lasix  9. HTN:  On amlodipine  10. HLD:  Not currently on medication  11. Diabetes:  Not currently on medication  12. GIA  13. Depression, PTSD:  On effexor, depakote  14. DVT prophylaxis:  Lovenox  15.  Internal Medicine for medical management      Electronically signed by Melissa Riggs MD on 4/7/2022 at 12:52 AM

## 2022-04-06 NOTE — CARE COORDINATION
Patient Health Questionnaire-9 (PHQ-9)    Over the last 2 weeks, how often have you been bothered by any of the following problems? 1. Little Interest or pleasure in doing things? [] Not at all  [x] Several Days  [] More than half the day  []  Nearly every day    2. Feeling down, depressed or hopeless? [] Not at all  [x] Several Days  [] More than half the day  []  Nearly every day    3. Trouble falling or staying asleep, or sleeping too much? [x] Not at all  [] Several Days  [] More than half the day  []  Nearly every day    4. Feeling tired or having little energy? [] Not at all  [x] Several Days  [] More than half the day  []  Nearly every day    5. Poor apettite or overeating? [x] Not at all  [] Several Days  [] More than half the day  []  Nearly every day    6. Feeling bad about yourself-or that you are a failure or have let yourself or your family down? [x] Not at all  [] Several Days  [] More than half the day  []  Nearly every day    7. Trouble concentrating on things, such as reading the newspaper or watching television? [x] Not at all  [] Several Days  [] More than half the day  []  Nearly every day    8. Moving or speaking so slowly that other people could have noticed? Or the opposite-being so fidgety or restless that you have been moving around a lot more than usual?   [x] Not at all  [] Several Days  [] More than half the day  []  Nearly every day    9. Thoughts that you would be better off dead or of hurting yourself in some way? [x] Not at all  [] Several Days  [] More than half the day  []  Nearly every day    Total Score: 3    If you checked off any problems, how difficult have these problems made it for you to do your work, take care of things at home, or get along with other people?    [x] Not difficult at all  [] Somewhat Difficult  [] Very Difficult  []  Extremely Difficult    Electronically signed by Emily Morel PT on 4/6/2022 at 8:52 AM

## 2022-04-07 PROCEDURE — 6370000000 HC RX 637 (ALT 250 FOR IP): Performed by: PHYSICAL MEDICINE & REHABILITATION

## 2022-04-07 PROCEDURE — 6370000000 HC RX 637 (ALT 250 FOR IP): Performed by: NURSE PRACTITIONER

## 2022-04-07 PROCEDURE — 6370000000 HC RX 637 (ALT 250 FOR IP): Performed by: INTERNAL MEDICINE

## 2022-04-07 PROCEDURE — 97110 THERAPEUTIC EXERCISES: CPT

## 2022-04-07 PROCEDURE — 97530 THERAPEUTIC ACTIVITIES: CPT

## 2022-04-07 PROCEDURE — 6370000000 HC RX 637 (ALT 250 FOR IP): Performed by: STUDENT IN AN ORGANIZED HEALTH CARE EDUCATION/TRAINING PROGRAM

## 2022-04-07 PROCEDURE — 6360000002 HC RX W HCPCS: Performed by: NURSE PRACTITIONER

## 2022-04-07 PROCEDURE — 1180000000 HC REHAB R&B

## 2022-04-07 PROCEDURE — 99232 SBSQ HOSP IP/OBS MODERATE 35: CPT | Performed by: INTERNAL MEDICINE

## 2022-04-07 PROCEDURE — 99232 SBSQ HOSP IP/OBS MODERATE 35: CPT | Performed by: STUDENT IN AN ORGANIZED HEALTH CARE EDUCATION/TRAINING PROGRAM

## 2022-04-07 RX ORDER — BISACODYL 10 MG
10 SUPPOSITORY, RECTAL RECTAL EVERY EVENING
Status: DISCONTINUED | OUTPATIENT
Start: 2022-04-07 | End: 2022-04-15 | Stop reason: HOSPADM

## 2022-04-07 RX ADMIN — LOSARTAN POTASSIUM 100 MG: 100 TABLET, FILM COATED ORAL at 09:42

## 2022-04-07 RX ADMIN — CARBOXYMETHYLCELLULOSE SODIUM 2 DROP: 10 GEL OPHTHALMIC at 20:39

## 2022-04-07 RX ADMIN — VENLAFAXINE 75 MG: 75 TABLET ORAL at 17:16

## 2022-04-07 RX ADMIN — BISACODYL 10 MG: 10 SUPPOSITORY RECTAL at 17:16

## 2022-04-07 RX ADMIN — CARBOXYMETHYLCELLULOSE SODIUM 2 DROP: 10 GEL OPHTHALMIC at 13:50

## 2022-04-07 RX ADMIN — ONDANSETRON 4 MG: 4 TABLET, ORALLY DISINTEGRATING ORAL at 13:49

## 2022-04-07 RX ADMIN — CARBOXYMETHYLCELLULOSE SODIUM 2 DROP: 10 GEL OPHTHALMIC at 09:31

## 2022-04-07 RX ADMIN — VENLAFAXINE 75 MG: 75 TABLET ORAL at 12:57

## 2022-04-07 RX ADMIN — ACETAMINOPHEN 650 MG: 325 TABLET ORAL at 06:35

## 2022-04-07 RX ADMIN — POLYETHYLENE GLYCOL 3350 17 G: 17 POWDER, FOR SOLUTION ORAL at 09:29

## 2022-04-07 RX ADMIN — FUROSEMIDE 20 MG: 20 TABLET ORAL at 09:28

## 2022-04-07 RX ADMIN — ENOXAPARIN SODIUM 40 MG: 100 INJECTION SUBCUTANEOUS at 09:30

## 2022-04-07 RX ADMIN — AMLODIPINE BESYLATE 10 MG: 10 TABLET ORAL at 09:28

## 2022-04-07 RX ADMIN — FINASTERIDE 5 MG: 5 TABLET, FILM COATED ORAL at 09:29

## 2022-04-07 RX ADMIN — ACETAMINOPHEN 650 MG: 325 TABLET ORAL at 09:42

## 2022-04-07 RX ADMIN — VENLAFAXINE 75 MG: 75 TABLET ORAL at 09:31

## 2022-04-07 RX ADMIN — DIVALPROEX SODIUM 750 MG: 250 TABLET, FILM COATED, EXTENDED RELEASE ORAL at 20:40

## 2022-04-07 RX ADMIN — HYDROCHLOROTHIAZIDE 25 MG: 25 TABLET ORAL at 09:28

## 2022-04-07 RX ADMIN — DIVALPROEX SODIUM 500 MG: 500 TABLET, EXTENDED RELEASE ORAL at 09:32

## 2022-04-07 RX ADMIN — PANTOPRAZOLE SODIUM 40 MG: 40 TABLET, DELAYED RELEASE ORAL at 06:32

## 2022-04-07 ASSESSMENT — PAIN SCALES - GENERAL
PAINLEVEL_OUTOF10: 4
PAINLEVEL_OUTOF10: 4
PAINLEVEL_OUTOF10: 2
PAINLEVEL_OUTOF10: 5

## 2022-04-07 ASSESSMENT — PAIN DESCRIPTION - PAIN TYPE
TYPE: CHRONIC PAIN
TYPE: CHRONIC PAIN

## 2022-04-07 ASSESSMENT — PAIN DESCRIPTION - LOCATION
LOCATION: NECK;SHOULDER
LOCATION: NECK

## 2022-04-07 ASSESSMENT — PAIN DESCRIPTION - DESCRIPTORS: DESCRIPTORS: ACHING;SORE

## 2022-04-07 ASSESSMENT — PAIN DESCRIPTION - PROGRESSION: CLINICAL_PROGRESSION: NOT CHANGED

## 2022-04-07 ASSESSMENT — PAIN DESCRIPTION - ORIENTATION: ORIENTATION: RIGHT;LEFT;POSTERIOR

## 2022-04-07 NOTE — PROGRESS NOTES
Physical Medicine & Rehabilitation  Progress Note      Subjective:      Benjamin Carney is a 70 y.o. male with cervical stenosis with myelopathy s/p C2-C5 laminectomy and fusion. He reports doing fine today. He does note feeling constipated and reports having to miss some of this therapy earlier today. Discussed starting bowel program with nightly suppository. He denies any other acute concerns. Notified by nurse later than patient is requesting to speak with a psychiatrist.    ROS:  Denies fevers, chills, sweats.  +dizziness; No chest pain, palpitations  Denies coughing, wheezing or shortness of breath. +nausea, constipation  No new areas of joint pain. Denies new areas of numbness or weakness. Denies new anxiety or depression issues. No new skin problems. Rehabilitation:   Progressing in therapies. PT:  Restrictions/Precautions: Fall Risk,General Precautions  Implants present? : Metal implants  Spinal Precautions: Poor Sitting balance, Bilateral Upper  and Lower Body muscle weakness, Assist with standing in GERA Stedy  Other position/activity restrictions: up with assist; s/p C2-C5 laminectomy and fixation 3/24, Collar on while out of bed.  Ok to remove while resting In bed eating and drinking in bed  Required Braces or Orthoses  Cervical: c-collar  Other: Abdominal Binder (per Dr. Felicia Rosas 4/7/2022)   Transfers  Sit to Stand: 2 Person [facility] (Rosita Laughter)  Stand to sit: Dependent/Total [place] L[place])  Bed to Chair: Dependent/Total [place] L[place].)  Comment: Defered due to pt's tolerance/request.        Transfers  Sit to Stand: 2 Person [facility] Rosita Laughter)  Stand to sit: Dependent/Total [place] L[place])  Bed to Chair: Dependent/Total [place] L[place].)  Comment: Defered due to pt's tolerance/request.   Ambulation  Ambulation?: No               OT:  ADL  Feeding: Dependent/Total  Grooming: Dependent/Total  UE Bathing: Dependent/Total  LE Bathing: Dependent/Total  UE Dressing:  (TA to don gown)  LE Dressing: Dependent/Total (TA to don footies)  Toileting: Dependent/Total  Additional Comments: TA to don c-collar         Balance  Sitting Balance: Minimal assistance (min A x2 seated EOB)  Standing Balance: Dependent/Total (mod A x2 with elbert stuartisabella)   Standing Balance  Time: <30 sec x2  Activity: transfers with elbertaxel stuartisabella  Comment: Ax2 with AXEL guo to place BUE's on front bar        Bed mobility  Bridging:  Moderate assistance  Rolling to Left: Moderate assistance (A x1)  Rolling to Right: Maximum assistance,Moderate assistance (Ax2)  Supine to Sit: Maximum assistance,Moderate assistance (Ax2)  Sit to Supine: Dependent/Total,2 Person assistance  Scootin Person assistance,Moderate assistance  Comment: A for completion of rolling and reaching for bed rail, AXEL to move/lift BLE's  Transfers  Sit to stand: 2 Person assistance,Moderate assistance  Stand to sit: Moderate assistance,2 Person assistance  Transfer Comments: with AXEL guo x2            Wheelchair Bed Transfers  Wheelchair/Bed - Technique:  (utilizing ss )  Equipment Used: Wheelchair,Bed,Other (ss)  Level of Asssistance: Dependent/Total,2 Person assistance  Wheelchair Transfers Comments: completign transfer to and from  with ss     SPEECH:      Current Medications:   Current Facility-Administered Medications: bisacodyl (DULCOLAX) suppository 10 mg, 10 mg, Rectal, QPM  lidocaine 4 % external patch 2 patch, 2 patch, TransDERmal, Daily  hydroCHLOROthiazide (HYDRODIURIL) tablet 25 mg, 25 mg, Oral, Daily  senna (SENOKOT) tablet 17.2 mg, 2 tablet, Oral, Nightly  finasteride (PROSCAR) tablet 5 mg, 5 mg, Oral, Daily  carboxymethylcellulose (THERATEARS) 1 % ophthalmic gel 2 drop, 2 drop, Both Eyes, TID  enoxaparin (LOVENOX) injection 40 mg, 40 mg, SubCUTAneous, Daily  ondansetron (ZOFRAN-ODT) disintegrating tablet 4 mg, 4 mg, Oral, Q8H PRN **OR** ondansetron (ZOFRAN) injection 4 mg, 4 mg, IntraVENous, Q6H PRN  polyethylene glycol (GLYCOLAX) packet 17 g, 17 g, Oral, Daily PRN  glucagon (rDNA) injection 1 mg, 1 mg, IntraMUSCular, PRN  glucose (GLUTOSE) 40 % oral gel 15 g, 15 g, Oral, PRN  amLODIPine (NORVASC) tablet 10 mg, 10 mg, Oral, Daily  cyclobenzaprine (FLEXERIL) tablet 5 mg, 5 mg, Oral, TID PRN  divalproex (DEPAKOTE ER) extended release tablet 500 mg, 500 mg, Oral, QAM  divalproex (DEPAKOTE ER) extended release tablet 750 mg, 750 mg, Oral, Nightly  furosemide (LASIX) tablet 20 mg, 20 mg, Oral, Daily  losartan (COZAAR) tablet 100 mg, 100 mg, Oral, Daily  pantoprazole (PROTONIX) tablet 40 mg, 40 mg, Oral, QAM AC  venlafaxine (EFFEXOR) tablet 75 mg, 75 mg, Oral, TID WC  acetaminophen (TYLENOL) tablet 650 mg, 650 mg, Oral, Q4H PRN  polyethylene glycol (GLYCOLAX) packet 17 g, 17 g, Oral, Daily      Objective:  /84   Pulse 95   Temp 97.3 °F (36.3 °C) (Oral)   Resp 18   Ht 5' 3\" (1.6 m)   Wt 142 lb (64.4 kg)   SpO2 97%   BMI 25.15 kg/m²       GEN: Well developed, well nourished, no acute distress  HEENT: NCAT. EOM grossly intact. Hearing grossly intact. Mucous membranes pink and moist.  RESP: Normal breath sounds with no wheezing, rales, or rhonchi. Respirations WNL and unlabored. CV: Regular rate and rhythm. No murmurs, rubs, or gallops. ABD: Soft, non-distended, BS+ and equal.  NEURO: Alert. Speech fluent. Sensation to light touch decreased in all limbs - improving. MSK:  Muscle bulk is normal bilaterally. Strength 5/5 with right elbow flexion, wrist extension. Strength 4/5 with right elbow extension, . Not able to flex the left elbow against gravity. Strength 4/5 with left . Strength 4+/5 with bilateral ankle dorsiflexion and plantarflexion. LIMBS: Edema present in left upper limb. SKIN: Warm and dry with good turgor. PSYCH: Mood WNL. Affect WNL. Appropriately interactive.     Diagnostics:     CBC: No results for input(s): WBC, RBC, HGB, HCT, MCV, RDW, PLT in the last 72 hours.  BMP: No results for input(s): NA, K, CL, CO2, PHOS, BUN, CREATININE, CA, GLUCOSE in the last 72 hours. BNP: No results for input(s): BNP in the last 72 hours. PT/INR: No results for input(s): PROTIME, INR in the last 72 hours. APTT: No results for input(s): APTT in the last 72 hours. CARDIAC ENZYMES: No results for input(s): CKMB, CKMBINDEX, TROPONINT in the last 72 hours. Invalid input(s): CKTOTAL;3  FASTING LIPID PANEL:  Lab Results   Component Value Date    CHOL 104 04/12/2018    HDL 42 04/12/2018    TRIG 92 04/12/2018     LIVER PROFILE: No results for input(s): AST, ALT, ALB, BILIDIR, BILITOT, ALKPHOS in the last 72 hours. Impression/Plan:   Impaired ADLs, gait, and mobility due to:    1. Cervical stenosis with myelopathy:  S/p C2-C5 laminectomy and fusion on 3/24. PT/OT  for gait, mobility, strengthening, endurance, ADLs, and self care. C-collar when out of bed. Pain control with as-needed tylenol, as-needed flexeril. Added lidocaine patches on 4/4. Avoid narcotics, as he did require narcan in acute care. Reached out to neurosurgery regarding appointment tomorrow - we can remove staples here and follow up will be rescheduled after discharge. 2. Neurogenic bladder:  Has not been requiring intermittent catheterization but can continue this as needed. 3. Neurogenic bowel:  Miralax daily, senokot nightly, dulcolax prn - trialing a bowel program with suppository nightly starting 4/7.  4. Dizziness, nausea with sitting upright:  Likely related to cervical spinal cord injury (possible orthostatic hypotension, autonomic dysfunction). NERY hose when out of bed. Can trial abdominal binder as well. Positive orthostatic vital signs. .  5. Subjective dysphagia:  Chronic. SLP did not recommend any change in diet or further therapy. 6. Hospital-acquired pneumonia:  Completed course of cefepime and doxycycline. 7. Bradycardia:  Resolved after discontinuation of beta blockers  8.  CHF:  On lasix  9. HTN:  On amlodipine  10. HLD:  Not currently on medication  11. Diabetes:  Not currently on medication  12. GIA  13. Depression, PTSD:  On effexor, depakote. Psychiatry consulted per patient request.  14. DVT prophylaxis:  Lovenox  15.  Internal Medicine for medical management      Electronically signed by Robbin Kumar MD on 4/8/2022 at 1:48 AM

## 2022-04-07 NOTE — PROGRESS NOTES
Physical Therapy  [address] [facility]   Acute Rehabilitation Physical Therapy Progress Note    Date: 22  Patient Name: Bhavin Cartagena       Room: 6921/0056-08  MRN: [id number]   Account: [de-identified]   : 1950  (75 y.o.) Gender: male     Referring Practitioner: Aba Covington MD  Diagnosis: Cervical stenosis with myelopathy s/p C2-C5 laminectomy and fusion   Past Medical History:  has a past medical history of Depression, Diabetes mellitus ([address] Difficult intravenous access, Hyperlipidemia, Hypertension, Migraines, PTSD (post-traumatic stress disorder), Sleep apnea, Teeth missing, and Wears glasses. Past Surgical History:   has a past surgical history that includes Cardiac catheterization (2010); Carpal tunnel release (Left, 2002); Vasectomy (1983); Tonsillectomy and adenoidectomy (); Hydrocele surgery (); Middle ear surgery (2018); eye surgery (Left, ); Mouth surgery (2018); other surgical history (2018); cervical fusion (2018); pr office/outpt visit,procedure only (N/A, 2018); Cataract removal; laminectomy (2022); and cervical fusion (N/A, 3/24/2022). Additional Pertinent Hx: Ras Jones is a 70 y.o. male with history of HTN, HLD, diabetes, GIA, migraines, PTSD, and depression admitted to [facility] on 3/23/2022. He initially presented to [facility] after a fall with subsequent worsening limb weakness and additional falls. MRI brain showed no acute intracranial abnormality. MRI cervical spine showed severe spinal stenosis at C2-C3 and C3-C4 with complete effacement of the CSF. MRI thoracic and lumbar spine were relatively unremarkable except for moderate spinal stenosis from L2-L3 to L4-L5. He was transferred to [facility] for neurosurgical evaluation. He underwent laminectomy and fusion at C2-C5 on 3/24/22 (Dr. Poonam Rodriguez). Hospital course has been complicated by urinary retention.  He reports continued neck pain with pain in the limbs as well. He also notes numbness/tingling and weakness in all limbs. Pt admitted to rehab unit on 3/31/22    Overall Orientation Status: Within Functional Limits  Restrictions/Precautions  Restrictions/Precautions: Fall Risk;General Precautions  Required Braces or Orthoses?: Yes  Implants present? : Metal implants  Required Braces or Orthoses  Cervical: c-collar  Position Activity Restriction  Spinal Precautions: No Twisting; No Lifting; No Bending  Spinal Precautions: Poor Sitting balance, Bilateral Upper  and Lower Body muscle weakness, Assist with standing in GERA Stedy  Other position/activity restrictions: up with assist; s/p C2-C5 laminectomy and fixation 3/24, Collar on while out of bed. Ok to remove while resting In bed eating and drinking in bed    Subjective: Patient and significant other informed writer that they woul like a psychiatrist consult. Surpervising PT and Dr. Katya Flor MD informed. Comments: C-collar donned during tx. Vital Signs  Blood Pressure Lyin/68  Blood Pressure Sittin/58  Blood Pressure Standin/55  Level of Consciousness: Alert (0)  Patient Currently in Pain: Yes  Pain Assessment: 0-10  Pain Level: 4  Pain Type: Chronic pain  Pain Location: Neck  Clinical Progression: Not changed  Response to Pain Intervention: Patient Satisfied     Oxygen Therapy  O2 Device: None (Room air)  Patient Observation  Observations: pt gives good efforts within tolerance and ability at this time       Bed Mobility:   Bed Mobility  Bridging: Moderate assistance  Rolling: Maximal assistance; Moderate assistance  Supine to Sit: Moderate assistance (x2)  Sit to Supine: Moderate assistance (2 person;  gera nguyen )  Scootin Person assistance; Moderate assistance  Bed mobility  Bridging: Moderate assistance  Scootin Person assistance; Moderate assistance    Transfers:  Sit to Stand: 2 Person Assistance;Maximum Assistance ([facility])  Stand to sit: Dependent/Total [place] [employer] Deya)  Bed to Chair: Dependent/Total Elwyn Lawman.)                       Stairs/Curb  Stairs?: No                        BALANCE Posture: Poor  Sitting - Static: Poor;+  Sitting - Dynamic: Poor  Standing - Static: Poor;+ Candy nguyen)  Standing - Dynamic: Poor;+  Comments: Sitting balance assessed at EOB. EXERCISES    Other exercises?: Yes  Other exercises 1: Sit to Stand Alejandrowalana Lopez)  Max x 2 to complete orthostatic BP's;   Worked on standing tolerance. Poor standing tolerance. Other Activities  Comment: rest breaks PRN. Activity Tolerance: Patient limited by fatigue,Patient limited by endurance,Patient limited by pain  PT Equipment Recommendations  Equipment Needed: No  Other: TBD         Current Treatment Recommendations: Strengthening,ROM,Balance Training,Functional Mobility Training,Transfer Training,Endurance Training,Wheelchair Mobility Training,Stair training,Gait Training,Neuromuscular Re-education,Home Exercise Program,Safety Education & Training,Patient/Caregiver Education & Training,Equipment Evaluation, Education, & procurement    Conditions Requiring Skilled Therapeutic Intervention  Body structures, Functions, Activity limitations: Decreased functional mobility ; Decreased strength;Decreased safe awareness;Decreased endurance;Decreased balance; Increased pain;Decreased posture;Decreased coordination;Decreased fine motor control;Decreased sensation;Decreased ROM  Assessment: Pt has frequent falls at home, gradually declining in fucntion since Feb\"2022. Pt underwent C2-C5 fusion/laminectomy, presents with quadraparesis, B UE>B LE. Pt requires 2 person assist for safe bed mobility  and dependent for transfer at his time. WIll continue POC to improve stregnth and balance to faciliate independence. Treatment Diagnosis: Impaired function.   Prognosis: Fair  Decision Making: Medium Complexity  Barriers to Learning: Pain  REQUIRES PT FOLLOW UP: Yes  Discharge Recommendations: Patient would benefit from continued therapy after discharge;Home with assist PRN    Goals  Short term goals  Time Frame for Short term goals: 10 visits  Short term goal 1: Perform rolling in bed min/mod A   Short term goal 2: Perform supine to sit min/mod A   Short term goal 3: Perform sit to supine max A   Short term goal 4: Perform sit<> stand mod A , and pivot transfers at mod A  Short term goal 5: Progress to ambulation HHA/Gait belt assist or appropriate device, mod A x 2 20 to 40 ft  Short term goal 6: Propel w/c with B LE, distance of 50 ft or > , min/mod A  Short term goal 7: Demo Fair- dynamic standing balance to decrease risk of falls  Long term goals  Time Frame for Long term goals : By DC  Long term goal 1: Pt able to perform bed mobility at [address] term goal 2: Pt able to perform transfers at min A   Long term goal 3: Pt able to ambulate with or without device, distance of 100 ft atleast, mod A, level surface. Long term goal 4: Pt able to perform 2 to 4 steps at [place] x 2  Long term goal 5: Pt able to propel w/c on level surface,  distance of 150 ft, SBA, level surfaces  Long term goal 6: Family training for safe mobility to return home  Long term goal 7: Improve sitting/standing balance to good/fair to reduce fall risk. Long term goal 8: Pt able to ambulate 60 to 80 ft for 2MWT,to improve overall fucntion.        04/07/22 0940 04/07/22 1445   PT Individual Minutes   Time In  --  1445   Time Out  --  1530   Minutes  --  45   Minute Variance   Variance 45  --    Reason   (on bed pan for BM.)  --        Electronically signed by Lam Vásquez PTA on 4/7/22 at 5:10 PM EDT

## 2022-04-07 NOTE — PROGRESS NOTES
[address] [facility]    ACUTE REHABILITATION OCCUPATIONAL THERAPY  DAILY NOTE    Date: 22  Patient Name: Abdirahman Medina      Room: 9025/2304-64    MRN: [id number]   : 1950  (70 y.o.)  Gender: male   Referring Practitioner: Nidia Doran MD  Diagnosis: Cervical stenosis with myelopathy s/p C2-C5 laminectomy and fusion   Additional Pertinent Hx: Abdirahman Medina is a 70 y.o. male with history of HTN, HLD, diabetes, GIA, migraines, PTSD, and depression admitted to [facility] on 3/23/2022. He initially presented to [facility] after a fall with subsequent worsening limb weakness and additional falls. MRI brain showed no acute intracranial abnormality. MRI cervical spine showed severe spinal stenosis at C2-C3 and C3-C4 with complete effacement of the CSF. MRI thoracic and lumbar spine were relatively unremarkable except for moderate spinal stenosis from L2-L3 to L4-L5. He was transferred to [facility] for neurosurgical evaluation. He underwent laminectomy and fusion at C2-C5 on 3/24/22 (Dr. Lars Rodríguez). Hospital course has been complicated by urinary retention. He reports continued neck pain with pain in the limbs as well. He also notes numbness/tingling and weakness in all limbs. Pt admitted to rehab unit on 3/31/22    Restrictions  Restrictions/Precautions: Fall Risk,General Precautions  Implants present? : Metal implants  Spinal Precautions: Poor Sitting balance, Bilateral Upper  and Lower Body muscle weakness, Assist with standing in GERA Stedy  Other position/activity restrictions: up with assist; s/p C2-C5 laminectomy and fixation 3/24, Collar on while out of bed.  Ok to remove while resting In bed eating and drinking in bed  Required Braces or Orthoses  Cervical: c-collar  Other: Abdominal Binder (per Dr. Marcial Perez 2022)  Required Braces or Orthoses?: Yes  Equipment Used: Wheelchair,Bed,Other (ss)    Subjective  Subjective: \"Just dizzy and woozy\"  Comments: pt reports dizziness with sitting EOB and in w/c, NSG made aware, abdominal binder placed  Patient Currently in Pain: Yes  Pain Level: 5  Pain Location: Neck; Shoulder  Pain Orientation: Right;Left;Posterior  Restrictions/Precautions: Fall Risk;General Precautions  Overall Orientation Status: Within Functional Limits  Patient Observation  Observations: pt gives good efforts within tolerance and ability at this time  Pain Assessment  Pain Assessment: 0-10  Pain Level: 5  Pain Type: Chronic pain  Pain Location: Neck,Shoulder  Pain Orientation: Right,Left,Posterior  Pain Radiating Towards: shoulders  Pain Descriptors: Aching,Sore    Objective  Cognition  Overall Cognitive Status: WFL  Perception  Overall Perceptual Status: WFL  Balance  Sitting Balance: Minimal assistance (min A x2 seated EOB)  Standing Balance: Dependent/Total (mod A x2 with elbert nguyen)  Bed mobility  Rolling to Right: Maximum assistance; Moderate assistance (Ax2)  Supine to Sit: Maximum assistance; Moderate assistance (Ax2)  Scootin Person assistance; Moderate assistance (hips EOB)  Comment: A for completion of rolling and reaching for bed rail, AXEL to move/lift BLE's  Standing Balance  Time: <30 sec x2  Activity: transfers with elbert nguyen  Comment: Ax2 with AXEL guo to place BUE's on front bar        Type of ROM/Therapeutic Exercise  Type of ROM/Therapeutic Exercise: AAROM  Comment: AAROM ex's with BUE's to increase strength and promote functional return, pt reports pain and discomfort with various motions, limited ROM noted in BUE's due to pain  Exercises  Scapular Protraction: x  Scapular Retraction: x  Elbow Flexion: x  Elbow Extension: x  Grasp/Release: gross , 10 reps per hand                       ADL  UE Dressing:  (TA to don gown)  LE Dressing: Dependent/Total (TA to don footies)  Additional Comments: TA to don c-collar          Assessment  Performance deficits / Impairments: Decreased ADL status; Decreased functional mobility ; Decreased ROM; Decreased strength;Decreased endurance;Decreased sensation;Decreased balance;Decreased high-level IADLs;Decreased fine motor control;Decreased coordination;Decreased posture  Prognosis: Fair  Discharge Recommendations: Patient would benefit from continued therapy after discharge;Continue to assess pending progress  Activity Tolerance: Patient limited by pain; Patient limited by fatigue  Safety Devices in place: Yes  Type of devices: Nurse notified; Left in chair;Call light within reach  Equipment Recommendations  Equipment Needed:  (TBD)       Patient Education: OT POC, therapy participation, ROM, bed mobility, increasing activity tolerance   Patient Goals   Patient goals : \"I just want to be able to get back to my basic needs. \"  Learner:patient  Method: demonstration and explanation       Outcome: needs reinforcement        Plan  Plan  Times per week: 900 minutes/week for combined therapy of OT/PT due to decreaed tolerance to activity. Times per day: Twice a day  Current Treatment Recommendations: Self-Care / ADL,Strengthening,ROM,Balance Training,Functional Mobility Training,Endurance Training,Pain Management,Safety Education & Training,Patient/Caregiver Education & Training,Equipment Evaluation, Education, & procurement,Positioning  Patient Goals   Patient goals : \"I just want to be able to get back to my basic needs. \"  Short term goals  Time Frame for Short term goals: By 10 days  Short term goal 1: Pt will complete upper body dressing/bathing with max A with use of AE as needed while maintaining cervical precautions  Short term goal 2: Pt will complete self feeding task with max A with adaptive strategies and good safety  Short term goal 3: Pt will tolerate sitting EOB 5+ minutes unsupported with mod A during functional activity to increase independence with self care and mobility  Short term goal 4: Pt will complete functional transfers during self care tasks with mod A and good safety  Short term goal 5: Pt will participate in 30+ minutes of therapeutic exercises/functional activities to increase safety and independence with self care and mobility  Long term goals  Time Frame for Long term goals : By discharge  Long term goal 1: Pt will complete self feeding task with mod A with use of adaptive strategies   Long term goal 2: Pt will complete upper body bathing/dressing with mod A and good safety while maintaining cervical precautions  Long term goal 3: Pt will tolerate sitting EOB 10+ minutes unsupported with CGA and good safety during functional activity of choice  Long term goal 4: Pt will complete functional transfer with min A and good safety during self care tasks  Long term goal 5: Pt will demonstrated increased BUE gross motor/fine motor to increase participation in self care tasks  Long term goals 6: Pt/family with verbalize/demosntrate Good understanding of cervical precautions during self care tasks  Long term goal 7: Pt/family will demonstrate Good understanding of BUE exercises to increase functional use of BUE during self care tasks        04/07/22 1539   OT Individual Minutes   Time In 1333   Time Out 1400   Minutes 27   Minute Variance   Variance 63   Reason   (using bedpan several times)     Electronically signed by Bjorn LOZADA on 4/7/22 at 3:41 PM EDT

## 2022-04-07 NOTE — PROGRESS NOTES
[facility]    CONSULTATION / HISTORY AND PHYSICAL EXAMINATION            Date:   4/6/2022  Patient name:  Ayaan Daugherty  Date of admission:  3/31/2022  8:38 PM  MRN:   [id number]  Account:  [de-identified]  YOB: 1950  PCP:    Manju Vides  Room:   3622/7337-06  Code Status:    DNR-CCA    Physician Requesting Consult: Blaine Wood MD    Reason for Consult:  medical management    Chief Complaint:     No chief complaint on file.       History Obtained From:     Patient medical record nursing staff    History of Present Illness:   Patient past medical multiple medical problems include hypertension, heart failure with preserved ejection fraction, diabetes, bipolar disorder,   glaucoma, patient was originally admitted at [facility] where he was transferred from outlying facility, he developed, generalized weakness after a fall, patient underwent MRI of cervical spine suggestive of severe spinal canal stenosis at the level of C2-C4 level, patient underwent C2-C5 posterior decompression surgery, he feels that weakness in his extremities is slightly better  Patient during his hospital stay, developed bradycardia which improved after discontinuation of beta-blocker, cardiology was also consulted  He developed hospital-acquired pneumonia, getting treated with cefepime and doxycycline  Patient denying any complaints of cough, shortness of breath, chest pain,    Past Medical History:     Past Medical History:   Diagnosis Date    Depression 2017    ON RX    Diabetes mellitus ([address] 2013    NIDDM    Difficult intravenous access     Hyperlipidemia 2008    ON RX    Hypertension 2008    ON RX    Migraines     PTSD (post-traumatic stress disorder) 01/2018    ON RX    Sleep apnea 01/2018    UNALBE TO USE TO CLEARED BY ENT (CPAP)    Teeth missing     BACK TEETH UPPER AND LOWER TO BE FITTED FOR PARTIALS AT LATER DATE    Wears glasses         Past Surgical History:     Past Surgical History:   Procedure Laterality Date    CARDIAC CATHETERIZATION  02/2010    no stents     CARPAL TUNNEL RELEASE Left 01/09/2002    CATARACT REMOVAL      CERVICAL FUSION  04/19/2018    anterior cervical fusion C5-6    CERVICAL FUSION N/A 3/24/2022    C2-5 FUSION, C2-4 LAMINECTOMY performed by Isadora Christine DO at [address] Left 2018    CATARACT EXTRACTION WITH IOL    HYDROCELE EXCISION  1951    LAMINECTOMY  03/24/2022    C2-5 FUSION, C2-4 LAMINECTOMY    MIDDLE EAR SURGERY  01/11/2018    MIDDLE EAR REBUILT AND EAR DRUM REPLACED    MOUTH SURGERY  04/16/2018    5 TEETH REMOVED    OTHER SURGICAL HISTORY  04/19/2018    : ANTERIOR CERVICAL CORRPECTOMY C5-6, SYNTHES, DEPUY, REG TABLE, SUPINE,     OR OFFICE/OUTPT VISIT,PROCEDURE ONLY N/A 4/19/2018    ANTERIOR CERVICAL CORRPECTOMY AND FUSION C5-6 performed by Isadora Christine DO at [facility]  12/1983        Medications Prior to Admission:     Prior to Admission medications    Medication Sig Start Date End Date Taking?  Authorizing Provider   finasteride (PROSCAR) 5 MG tablet Take 5 mg by mouth daily   Yes Historical Provider, MD   carboxymethylcellulose 1 % ophthalmic solution Place 2 drops into both eyes 3 times daily Pt states \"2 drops in both eyes three times a day\"    Historical Provider, MD   divalproex (DEPAKOTE ER) 250 MG extended release tablet Take 750 mg by mouth at bedtime    Historical Provider, MD   furosemide (LASIX) 20 MG tablet Take 20 mg by mouth daily    Historical Provider, MD   venlafaxine (EFFEXOR XR) 150 MG extended release capsule Take 1 capsule by mouth daily  Patient taking differently: Take 225 mg by mouth daily  6/23/20   Alva Patrick MD   simethicone (MYLICON) 80 MG chewable tablet Take 1 tablet by mouth every 6 hours as needed for Flatulence  Patient taking differently: Take 80 mg by mouth every 8 hours as needed for Flatulence  6/22/20 Shamar Conner MD   lidocaine (XYLOCAINE) 5 % ointment Apply topically daily as needed for Pain Apply topically as needed. LF 4/20/20 (30 day supply)  Patient not taking: Reported on 3/23/2022    Historical Provider, MD   metFORMIN (GLUCOPHAGE) 1000 MG tablet Take 1,000 mg by mouth 2 times daily (with meals) LF 4/27/20 (90 day supply)  Patient not taking: Reported on 3/21/2022    Historical Provider, MD   losartan (COZAAR) 25 MG tablet Take 25 mg by mouth daily     Historical Provider, MD   aspirin 81 MG chewable tablet Take 81 mg by mouth daily  Patient not taking: Reported on 3/21/2022    Historical Provider, MD   divalproex (DEPAKOTE ER) 500 MG extended release tablet Take 500 mg by mouth every morning     Historical Provider, MD   traZODone (DESYREL) 100 MG tablet Take 150 mg by mouth nightly as needed LF 5/1/20 (30 day supply)    Historical Provider, MD   cetirizine (ZYRTEC) 10 MG tablet Take 10 mg by mouth daily LF 4/6/20 (90 day supply)  Patient not taking: Reported on 3/21/2022    Historical Provider, MD   atorvastatin (LIPITOR) 80 MG tablet Take 40 mg by mouth daily Take 1/2 tablet (40 mg) daily  LF 4/22/20 (90 day supply)  Patient not taking: Reported on 3/21/2022    Historical Provider, MD   amLODIPine (NORVASC) 10 MG tablet Take 7.5 mg by mouth daily     Historical Provider, MD   sildenafil (VIAGRA) 100 MG tablet Take 100 mg by mouth as needed for Erectile Dysfunction LF 5/12/20 (30 day supply)    Historical Provider, MD   omeprazole (PRILOSEC) 20 MG delayed release capsule Take 20 mg by mouth every evening LF 4/21/20 (90 day supply)    Historical Provider, MD        Allergies:     Latex, Bee venom, Lisinopril, Niacin and related, Sulfa antibiotics, and Terazosin    Social History:     Tobacco:    reports that he quit smoking about 50 years ago. His smoking use included cigarettes. He has a 2.00 pack-year smoking history.  He has never used smokeless tobacco.  Alcohol:      reports current alcohol use.  Drug Use:  reports current drug use. Drug: Marijuana Birdie Wise). Family History:     Family History   Problem Relation Age of Onset   Regi Oconnor Dementia Mother     Coronary Art Dis Mother     Stroke Mother     Diabetes Mother     Asthma Mother     Other Mother         BRAIN ANEURISM    High Blood Pressure Mother     Heart Disease Father     Diabetes Sister     Diabetes Brother     High Blood Pressure Brother     High Cholesterol Brother     Heart Disease Brother     Diabetes Sister     Other Sister         NEUROPATHY       Review of Systems:     Positive and Negative as described in HPI. CONSTITUTIONAL:  negative for fevers, chills, sweats, fatigue, weight loss  HEENT:  negative for vision, hearing changes, runny nose, throat pain  RESPIRATORY:  negative for shortness of breath, cough, congestion, wheezing. CARDIOVASCULAR:  negative for chest pain, palpitations. GASTROINTESTINAL:  negative for nausea, vomiting, diarrhea, constipation, change in bowel habits, abdominal pain   GENITOURINARY:  negative for difficulty of urination, burning with urination, frequency   INTEGUMENT:  negative for rash, skin lesions, easy bruising   HEMATOLOGIC/LYMPHATIC:  negative for swelling/edema   ALLERGIC/IMMUNOLOGIC:  negative for urticaria , itching  ENDOCRINE:  negative increase in drinking, increase in urination, hot or cold intolerance  MUSCULOSKELETAL:  negative joint pains, muscle aches, swelling of joints  NEUROLOGICAL: Weakness in all 4 extremities  BEHAVIOR/PSYCH:      Physical Exam:     BP (!) 127/90   Pulse 90   Temp 98.2 °F (36.8 °C) (Oral)   Resp 18   Ht 5' 3\" (1.6 m)   Wt 142 lb (64.4 kg)   SpO2 95%   BMI 25.15 kg/m²   Temp (24hrs), Av.2 °F (36.8 °C), Min:98.1 °F (36.7 °C), Max:98.2 °F (36.8 °C)    No results for input(s): POCGLU in the last 72 hours.     Intake/Output Summary (Last 24 hours) at 2022 6752  Last data filed at 2022 1415  Gross per 24 hour   Intake 480 ml   Output 350 ml Net 130 ml       General Appearance:  alert, well appearing, and in no acute distress  Mental status: oriented to person, place, and time with normal affect  Head:  normocephalic, atraumatic. Eye: no icterus, redness, pupils equal and reactive, extraocular eye movements intact, conjunctiva clear  Ear: normal external ear, no discharge, hearing intact  Nose:  no drainage noted  Mouth: mucous membranes moist  Neck: supple, no carotid bruits, thyroid not palpable , staples present posterior neck, healing  Lungs: Bilateral equal air entry, clear to ausculation, no wheezing, rales or rhonchi, normal effort  Cardiovascular: normal rate, regular rhythm, no murmur, gallop, rub. Abdomen: Soft, nontender, nondistended, normal bowel sounds, no hepatomegaly or splenomegaly  Neurologic: Weakness in all 4 extremities, power in all 4 extremities 4/5 skin: No gross lesions, rashes, bruising or bleeding on exposed skin area  Extremities:  peripheral pulses palpable, no pedal edema or calf pain with palpation  Psych: Investigations:      Laboratory Testing:  No results found for this or any previous visit (from the past 24 hour(s)). Consultations:   IP CONSULT TO DIETITIAN  IP CONSULT TO SOCIAL WORK  IP CONSULT TO INTERNAL MEDICINE  IP CONSULT TO DIETITIAN  Assessment :      Primary Problem  Cervical stenosis of spine    Active Hospital Problems    Diagnosis Date Noted    Cervical stenosis of spine [M48.02] 03/31/2022       Plan:     1. Quadriparesis, due to cervical cord compression after fall s/p C2-C5 decompression surgery. 2. Hypertension, controlled restart home dose of Norvasc, losartan, will avoid beta-blockers since patient developed bradycardia in [place]  3. Heart failure preserved ejection fraction, compensated , on Lasix  4. GERD, restarted Protonix  5. On DVT prophylaxis Lovenox  6. BPH, started on finasteride  7. History of glaucoma, restarted home medications of eyedrop  8.  Patient has neurological better, will have intermittent catheterization  9. Diabetes, controlled  4/2  Patient blood pressure is controlled  No new complaints  4/3   Patient feels that his strength is getting better  Starting patient on hydrochlorothiazide with readings of elevated blood pressures, advised low-salt diet    4/4  BP running high   Low salt diet   CHF, compensated  , on lasix, watch for electrolytes   DM, sugars   C2-5 surgery , quadriparesis  PT/OT        4/5  , Blood pressure still running on the higher side,  Low-salt and low-fat diet,  We will adjust the medications,  Congestive heart failure compensated, on Lasix, monitor electrolytes,  Diabetes blood sugars running well,  C2-C5 surgery, quadriparesis, working with physical therapy, not able to walk yet,  Anemia of chronic disease, last hemoglobin was 11,    4/6  Severe spinal stenosis, status post surgery, bilateral upper and lower extremity weakness at this time,  Congestive heart failure, compensated, on Lasix,  Diabetes mellitus, sugars running well,  Anemia of chronic disease, hemoglobin stable,  Labs, radiology, medications reviewed,  PT OT    Mirza Schmitz MD  4/6/2022  9:58 PM    Copy sent to Dr. Patty Gill    Please note that this chart was generated using voice recognition Dragon dictation software. Although every effort was made to ensure the accuracy of this automated transcription, some errors in transcription may have occurred.

## 2022-04-07 NOTE — PLAN OF CARE
Problem: Falls - Risk of:  Goal: Will remain free from falls  Description: Will remain free from falls  Outcome: Met This Shift  Goal: Absence of physical injury  Description: Absence of physical injury  Outcome: Met This Shift     Problem: Skin Integrity:  Goal: Will show no infection signs and symptoms  Description: Will show no infection signs and symptoms  Outcome: Ongoing  Goal: Absence of new skin breakdown  Description: Absence of new skin breakdown  Outcome: Ongoing     Problem: Pain:  Goal: Pain level will decrease  Description: Pain level will decrease  Outcome: Ongoing  Goal: Control of acute pain  Description: Control of acute pain  Outcome: Ongoing  Goal: Control of chronic pain  Description: Control of chronic pain  Outcome: Ongoing     Problem: Musculor/Skeletal Functional Status  Goal: Highest potential functional level  Outcome: Ongoing  Goal: Absence of falls  Outcome: Ongoing     Problem: Nutrition  Goal: Optimal nutrition therapy  Outcome: Ongoing

## 2022-04-07 NOTE — PROGRESS NOTES
[facility]   OCCUPATIONAL THERAPY MISSED TREATMENT NOTE   ACUTE REHAB  Date: 22  Patient Name: Nandini Barton       Room: 5488/4653-26  MRN: [id number]   Account #: [de-identified]    : 1950  (70 y.o.)  Gender: male   Referring Practitioner: Lokesh Wilson MD  Diagnosis: Cervical stenosis with myelopathy s/p C2-C5 laminectomy and fusion              REASON FOR MISSED TREATMENT:  Patient unable to participate   -       3104: pt using bedpan, NSG in room, will re-attempt shortly. 0920: pt done using bedpan, pt resting in bed with NSG and spouse present in room, after a few minutes of initiating OT session, pt states he urgently needs bedpan again, this writer assisted NSG to place pt on bedpan, will re-attempt shortly. 0945: NSG reports pt is not done using bedpan and req additional time.          Dominga LOZADA

## 2022-04-08 ENCOUNTER — TELEPHONE (OUTPATIENT)
Dept: NEUROSURGERY | Age: 72
End: 2022-04-08

## 2022-04-08 DIAGNOSIS — Z98.1 S/P CERVICAL SPINAL FUSION: ICD-10-CM

## 2022-04-08 DIAGNOSIS — M48.02 STENOSIS OF CERVICAL SPINE WITH MYELOPATHY (HCC): Primary | ICD-10-CM

## 2022-04-08 DIAGNOSIS — G99.2 STENOSIS OF CERVICAL SPINE WITH MYELOPATHY (HCC): Primary | ICD-10-CM

## 2022-04-08 LAB
ABSOLUTE BANDS #: 0.1 K/UL (ref 0–1)
ABSOLUTE EOS #: 0 K/UL (ref 0–0.4)
ABSOLUTE LYMPH #: 1.92 K/UL (ref 1–4.8)
ABSOLUTE MONO #: 1.11 K/UL (ref 0.1–1.3)
ANION GAP SERPL CALCULATED.3IONS-SCNC: 10 MMOL/L (ref 9–17)
BANDS: 1 % (ref 0–10)
BASOPHILS # BLD: 0 % (ref 0–2)
BASOPHILS ABSOLUTE: 0 K/UL (ref 0–0.2)
BUN BLDV-MCNC: 33 MG/DL (ref 8–23)
CALCIUM SERPL-MCNC: 9.7 MG/DL (ref 8.6–10.4)
CHLORIDE BLD-SCNC: 89 MMOL/L (ref 98–107)
CO2: 31 MMOL/L (ref 20–31)
CREAT SERPL-MCNC: 0.76 MG/DL (ref 0.7–1.2)
EOSINOPHILS RELATIVE PERCENT: 0 % (ref 0–4)
GFR AFRICAN AMERICAN: >60 ML/MIN
GFR NON-AFRICAN AMERICAN: >60 ML/MIN
GFR SERPL CREATININE-BSD FRML MDRD: ABNORMAL ML/MIN/{1.73_M2}
GLUCOSE BLD-MCNC: 94 MG/DL (ref 70–99)
HCT VFR BLD CALC: 35.2 % (ref 41–53)
HEMOGLOBIN: 12.5 G/DL (ref 13.5–17.5)
LYMPHOCYTES # BLD: 19 % (ref 24–44)
MCH RBC QN AUTO: 32.6 PG (ref 26–34)
MCHC RBC AUTO-ENTMCNC: 35.4 G/DL (ref 31–37)
MCV RBC AUTO: 91.9 FL (ref 80–100)
METAMYELOCYTES ABSOLUTE COUNT: 0.1 K/UL
METAMYELOCYTES: 1 %
MONOCYTES # BLD: 11 % (ref 1–7)
MORPHOLOGY: ABNORMAL
PDW BLD-RTO: 13.2 % (ref 11.5–14.9)
PLATELET # BLD: 482 K/UL (ref 150–450)
PMV BLD AUTO: 7.6 FL (ref 6–12)
POTASSIUM SERPL-SCNC: 4.5 MMOL/L (ref 3.7–5.3)
RBC # BLD: 3.84 M/UL (ref 4.5–5.9)
SEG NEUTROPHILS: 68 % (ref 36–66)
SEGMENTED NEUTROPHILS ABSOLUTE COUNT: 6.87 K/UL (ref 1.3–9.1)
SODIUM BLD-SCNC: 130 MMOL/L (ref 135–144)
WBC # BLD: 10.1 K/UL (ref 3.5–11)

## 2022-04-08 PROCEDURE — 1180000000 HC REHAB R&B

## 2022-04-08 PROCEDURE — 97535 SELF CARE MNGMENT TRAINING: CPT

## 2022-04-08 PROCEDURE — 85025 COMPLETE CBC W/AUTO DIFF WBC: CPT

## 2022-04-08 PROCEDURE — 97530 THERAPEUTIC ACTIVITIES: CPT

## 2022-04-08 PROCEDURE — 97110 THERAPEUTIC EXERCISES: CPT

## 2022-04-08 PROCEDURE — 6370000000 HC RX 637 (ALT 250 FOR IP): Performed by: NURSE PRACTITIONER

## 2022-04-08 PROCEDURE — 6370000000 HC RX 637 (ALT 250 FOR IP): Performed by: INTERNAL MEDICINE

## 2022-04-08 PROCEDURE — 6370000000 HC RX 637 (ALT 250 FOR IP): Performed by: PHYSICAL MEDICINE & REHABILITATION

## 2022-04-08 PROCEDURE — 6360000002 HC RX W HCPCS: Performed by: NURSE PRACTITIONER

## 2022-04-08 PROCEDURE — 99232 SBSQ HOSP IP/OBS MODERATE 35: CPT | Performed by: INTERNAL MEDICINE

## 2022-04-08 PROCEDURE — 99232 SBSQ HOSP IP/OBS MODERATE 35: CPT | Performed by: STUDENT IN AN ORGANIZED HEALTH CARE EDUCATION/TRAINING PROGRAM

## 2022-04-08 PROCEDURE — 6370000000 HC RX 637 (ALT 250 FOR IP): Performed by: STUDENT IN AN ORGANIZED HEALTH CARE EDUCATION/TRAINING PROGRAM

## 2022-04-08 PROCEDURE — 80048 BASIC METABOLIC PNL TOTAL CA: CPT

## 2022-04-08 PROCEDURE — 90792 PSYCH DIAG EVAL W/MED SRVCS: CPT | Performed by: PSYCHIATRY & NEUROLOGY

## 2022-04-08 PROCEDURE — 36415 COLL VENOUS BLD VENIPUNCTURE: CPT

## 2022-04-08 RX ORDER — SENNA PLUS 8.6 MG/1
2 TABLET ORAL NIGHTLY PRN
Status: DISCONTINUED | OUTPATIENT
Start: 2022-04-08 | End: 2022-04-15 | Stop reason: HOSPADM

## 2022-04-08 RX ADMIN — PANTOPRAZOLE SODIUM 40 MG: 40 TABLET, DELAYED RELEASE ORAL at 06:16

## 2022-04-08 RX ADMIN — ACETAMINOPHEN 650 MG: 325 TABLET ORAL at 22:27

## 2022-04-08 RX ADMIN — ACETAMINOPHEN 650 MG: 325 TABLET ORAL at 09:27

## 2022-04-08 RX ADMIN — HYDROCHLOROTHIAZIDE 25 MG: 25 TABLET ORAL at 09:27

## 2022-04-08 RX ADMIN — CARBOXYMETHYLCELLULOSE SODIUM 2 DROP: 10 GEL OPHTHALMIC at 14:11

## 2022-04-08 RX ADMIN — ENOXAPARIN SODIUM 40 MG: 100 INJECTION SUBCUTANEOUS at 09:27

## 2022-04-08 RX ADMIN — VENLAFAXINE 75 MG: 75 TABLET ORAL at 17:10

## 2022-04-08 RX ADMIN — AMLODIPINE BESYLATE 10 MG: 10 TABLET ORAL at 09:27

## 2022-04-08 RX ADMIN — CARBOXYMETHYLCELLULOSE SODIUM 2 DROP: 10 GEL OPHTHALMIC at 22:00

## 2022-04-08 RX ADMIN — DIVALPROEX SODIUM 750 MG: 250 TABLET, FILM COATED, EXTENDED RELEASE ORAL at 22:27

## 2022-04-08 RX ADMIN — FINASTERIDE 5 MG: 5 TABLET, FILM COATED ORAL at 09:27

## 2022-04-08 RX ADMIN — FUROSEMIDE 20 MG: 20 TABLET ORAL at 09:27

## 2022-04-08 RX ADMIN — CYCLOBENZAPRINE 5 MG: 10 TABLET, FILM COATED ORAL at 12:24

## 2022-04-08 RX ADMIN — CARBOXYMETHYLCELLULOSE SODIUM 2 DROP: 10 GEL OPHTHALMIC at 09:27

## 2022-04-08 RX ADMIN — VENLAFAXINE 75 MG: 75 TABLET ORAL at 12:24

## 2022-04-08 RX ADMIN — VENLAFAXINE 75 MG: 75 TABLET ORAL at 09:28

## 2022-04-08 RX ADMIN — DIVALPROEX SODIUM 500 MG: 500 TABLET, EXTENDED RELEASE ORAL at 09:28

## 2022-04-08 RX ADMIN — LOSARTAN POTASSIUM 100 MG: 100 TABLET, FILM COATED ORAL at 09:27

## 2022-04-08 RX ADMIN — BISACODYL 10 MG: 10 SUPPOSITORY RECTAL at 17:56

## 2022-04-08 ASSESSMENT — PAIN DESCRIPTION - ORIENTATION: ORIENTATION: RIGHT;LEFT;POSTERIOR

## 2022-04-08 ASSESSMENT — PAIN DESCRIPTION - PROGRESSION: CLINICAL_PROGRESSION: NOT CHANGED

## 2022-04-08 ASSESSMENT — PAIN DESCRIPTION - LOCATION
LOCATION: NECK
LOCATION: NECK

## 2022-04-08 ASSESSMENT — PAIN SCALES - GENERAL
PAINLEVEL_OUTOF10: 2
PAINLEVEL_OUTOF10: 2
PAINLEVEL_OUTOF10: 6

## 2022-04-08 ASSESSMENT — PAIN DESCRIPTION - PAIN TYPE
TYPE: CHRONIC PAIN;SURGICAL PAIN
TYPE: ACUTE PAIN;SURGICAL PAIN

## 2022-04-08 ASSESSMENT — PAIN - FUNCTIONAL ASSESSMENT: PAIN_FUNCTIONAL_ASSESSMENT: PREVENTS OR INTERFERES SOME ACTIVE ACTIVITIES AND ADLS

## 2022-04-08 ASSESSMENT — PAIN DESCRIPTION - ONSET: ONSET: ON-GOING

## 2022-04-08 ASSESSMENT — PAIN DESCRIPTION - FREQUENCY: FREQUENCY: INTERMITTENT

## 2022-04-08 ASSESSMENT — PAIN DESCRIPTION - DESCRIPTORS: DESCRIPTORS: ACHING

## 2022-04-08 ASSESSMENT — PAIN DESCRIPTION - DIRECTION: RADIATING_TOWARDS: SHOULDERS

## 2022-04-08 NOTE — PROGRESS NOTES
[facility] [place] [facility]    CONSULTATION / HISTORY AND PHYSICAL EXAMINATION            Date:   4/7/2022  Patient name:  Natacha Magana  Date of admission:  3/31/2022  8:38 PM  MRN:   [id number]  Account:  [de-identified]  YOB: 1950  PCP:    Angela Mandujano  Room:   8591/9643-08  Code Status:    DNR-CCA    Physician Requesting Consult: Tiarra Campbell MD    Reason for Consult:  medical management    Chief Complaint:     No chief complaint on file.       History Obtained From:     Patient medical record nursing staff    History of Present Illness:   Patient past medical multiple medical problems include hypertension, heart failure with preserved ejection fraction, diabetes, bipolar disorder,   glaucoma, patient was originally admitted at [place] where he was transferred from outlying facility, he developed, generalized weakness after a fall, patient underwent MRI of cervical spine suggestive of severe spinal canal stenosis at the level of C2-C4 level, patient underwent C2-C5 posterior decompression surgery, he feels that weakness in his extremities is slightly better  Patient during his hospital stay, developed bradycardia which improved after discontinuation of beta-blocker, cardiology was also consulted  He developed hospital-acquired pneumonia, getting treated with cefepime and doxycycline  Patient denying any complaints of cough, shortness of breath, chest pain,    Past Medical History:     Past Medical History:   Diagnosis Date    Depression 2017    ON RX    Diabetes mellitus ([address] 2013    NIDDM    Difficult intravenous access     Hyperlipidemia 2008    ON RX    Hypertension 2008    ON RX    Migraines     PTSD (post-traumatic stress disorder) 01/2018    ON RX    Sleep apnea 01/2018    UNALBE TO USE TO CLEARED BY ENT (CPAP)    Teeth missing     BACK TEETH UPPER AND LOWER TO BE FITTED FOR PARTIALS AT LATER DATE    Wears glasses         Past Surgical History:     Past Surgical History:   Procedure Laterality Date    CARDIAC CATHETERIZATION  02/2010    no stents     CARPAL TUNNEL RELEASE Left 01/09/2002    CATARACT REMOVAL      CERVICAL FUSION  04/19/2018    anterior cervical fusion C5-6    CERVICAL FUSION N/A 3/24/2022    C2-5 FUSION, C2-4 LAMINECTOMY performed by Otilio Washburn DO at [facility]. [address] Left 2018    CATARACT EXTRACTION WITH IOL    HYDROCELE EXCISION  1951    LAMINECTOMY  03/24/2022    C2-5 FUSION, C2-4 LAMINECTOMY    MIDDLE EAR SURGERY  01/11/2018    MIDDLE EAR REBUILT AND EAR DRUM REPLACED    MOUTH SURGERY  04/16/2018    5 TEETH REMOVED    OTHER SURGICAL HISTORY  04/19/2018    : ANTERIOR CERVICAL CORRPECTOMY C5-6, SYNTHES, DEPUY, REG TABLE, SUPINE,     [place] OFFICE/OUTPT VISIT,PROCEDURE ONLY N/A 4/19/2018    ANTERIOR CERVICAL CORRPECTOMY AND FUSION C5-6 performed by Crittercism DO Maddison at [facility]. 72  12/1983        Medications Prior to Admission:     Prior to Admission medications    Medication Sig Start Date End Date Taking?  Authorizing Provider   finasteride (PROSCAR) 5 MG tablet Take 5 mg by mouth daily   Yes Historical Provider, MD   carboxymethylcellulose 1 % ophthalmic solution Place 2 drops into both eyes 3 times daily Pt states \"2 drops in both eyes three times a day\"    Historical Provider, MD   divalproex (DEPAKOTE ER) 250 MG extended release tablet Take 750 mg by mouth at bedtime    Historical Provider, MD   furosemide (LASIX) 20 MG tablet Take 20 mg by mouth daily    Historical Provider, MD   venlafaxine (EFFEXOR XR) 150 MG extended release capsule Take 1 capsule by mouth daily  Patient taking differently: Take 225 mg by mouth daily  6/23/20   Abilio Jaramillo MD   simethicone (MYLICON) 80 MG chewable tablet Take 1 tablet by mouth every 6 hours as needed for Flatulence  Patient taking differently: Take 80 mg by mouth every 8 hours as needed for Flatulence  6/22/20 Hansa Love MD   lidocaine (XYLOCAINE) 5 % ointment Apply topically daily as needed for Pain Apply topically as needed. LF 4/20/20 (30 day supply)  Patient not taking: Reported on 3/23/2022    Historical Provider, MD   metFORMIN (GLUCOPHAGE) 1000 MG tablet Take 1,000 mg by mouth 2 times daily (with meals) LF 4/27/20 (90 day supply)  Patient not taking: Reported on 3/21/2022    Historical Provider, MD   losartan (COZAAR) 25 MG tablet Take 25 mg by mouth daily     Historical Provider, MD   aspirin 81 MG chewable tablet Take 81 mg by mouth daily  Patient not taking: Reported on 3/21/2022    Historical Provider, MD   divalproex (DEPAKOTE ER) 500 MG extended release tablet Take 500 mg by mouth every morning     Historical Provider, MD   traZODone (DESYREL) 100 MG tablet Take 150 mg by mouth nightly as needed LF 5/1/20 (30 day supply)    Historical Provider, MD   cetirizine (ZYRTEC) 10 MG tablet Take 10 mg by mouth daily LF 4/6/20 (90 day supply)  Patient not taking: Reported on 3/21/2022    Historical Provider, MD   atorvastatin (LIPITOR) 80 MG tablet Take 40 mg by mouth daily Take 1/2 tablet (40 mg) daily  LF 4/22/20 (90 day supply)  Patient not taking: Reported on 3/21/2022    Historical Provider, MD   amLODIPine (NORVASC) 10 MG tablet Take 7.5 mg by mouth daily     Historical Provider, MD   sildenafil (VIAGRA) 100 MG tablet Take 100 mg by mouth as needed for Erectile Dysfunction LF 5/12/20 (30 day supply)    Historical Provider, MD   omeprazole (PRILOSEC) 20 MG delayed release capsule Take 20 mg by mouth every evening LF 4/21/20 (90 day supply)    Historical Provider, MD        Allergies:     Latex, Bee venom, Lisinopril, Niacin and related, Sulfa antibiotics, and Terazosin    Social History:     Tobacco:    reports that he quit smoking about 50 years ago. His smoking use included cigarettes. He has a 2.00 pack-year smoking history.  He has never used smokeless tobacco.  Alcohol:      reports current alcohol use.  Drug Use:  reports current drug use. Drug: Marijuana Garon Kettle). Family History:     Family History   Problem Relation Age of Onset   Floydene Ada Dementia Mother     Coronary Art Dis Mother     Stroke Mother     Diabetes Mother     Asthma Mother     Other Mother         BRAIN ANEURISM    High Blood Pressure Mother     Heart Disease Father     Diabetes Sister     Diabetes Brother     High Blood Pressure Brother     High Cholesterol Brother     Heart Disease Brother     Diabetes Sister     Other Sister         NEUROPATHY       Review of Systems:     Positive and Negative as described in HPI. CONSTITUTIONAL:  negative for fevers, chills, sweats, fatigue, weight loss  HEENT:  negative for vision, hearing changes, runny nose, throat pain  RESPIRATORY:  negative for shortness of breath, cough, congestion, wheezing. CARDIOVASCULAR:  negative for chest pain, palpitations. GASTROINTESTINAL:  negative for nausea, vomiting, diarrhea, constipation, change in bowel habits, abdominal pain   GENITOURINARY:  negative for difficulty of urination, burning with urination, frequency   INTEGUMENT:  negative for rash, skin lesions, easy bruising   HEMATOLOGIC/LYMPHATIC:  negative for swelling/edema   ALLERGIC/IMMUNOLOGIC:  negative for urticaria , itching  ENDOCRINE:  negative increase in drinking, increase in urination, hot or cold intolerance  MUSCULOSKELETAL:  negative joint pains, muscle aches, swelling of joints  NEUROLOGICAL: Weakness in all 4 extremities  BEHAVIOR/PSYCH:      Physical Exam:     /84   Pulse 95   Temp 97.3 °F (36.3 °C) (Oral)   Resp 18   Ht 5' 3\" (1.6 m)   Wt 142 lb (64.4 kg)   SpO2 97%   BMI 25.15 kg/m²   Temp (24hrs), Av.6 °F (36.4 °C), Min:97.3 °F (36.3 °C), Max:97.9 °F (36.6 °C)    No results for input(s): POCGLU in the last 72 hours.     Intake/Output Summary (Last 24 hours) at 2022 2303  Last data filed at 2022  Gross per 24 hour   Intake 480 ml   Output 450 ml   Net 30 ml       General Appearance:  alert, well appearing, and in no acute distress  Mental status: oriented to person, place, and time with normal affect  Head:  normocephalic, atraumatic. Eye: no icterus, redness, pupils equal and reactive, extraocular eye movements intact, conjunctiva clear  Ear: normal external ear, no discharge, hearing intact  Nose:  no drainage noted  Mouth: mucous membranes moist  Neck: supple, no carotid bruits, thyroid not palpable , staples present posterior neck, healing  Lungs: Bilateral equal air entry, clear to ausculation, no wheezing, rales or rhonchi, normal effort  Cardiovascular: normal rate, regular rhythm, no murmur, gallop, rub. Abdomen: Soft, nontender, nondistended, normal bowel sounds, no hepatomegaly or splenomegaly  Neurologic: Weakness in all 4 extremities, power in all 4 extremities 4/5 skin: No gross lesions, rashes, bruising or bleeding on exposed skin area  Extremities:  peripheral pulses palpable, no pedal edema or calf pain with palpation  Psych: Investigations:      Laboratory Testing:  No results found for this or any previous visit (from the past 24 hour(s)). Consultations:   IP CONSULT TO DIETITIAN  IP CONSULT TO SOCIAL WORK  IP CONSULT TO INTERNAL MEDICINE  IP CONSULT TO DIETITIAN  IP CONSULT TO PSYCHIATRY  Assessment :      Primary Problem  Cervical stenosis of spine    Active Hospital Problems    Diagnosis Date Noted    Cervical stenosis of spine [M48.02] 03/31/2022       Plan:     1. Quadriparesis, due to cervical cord compression after fall s/p C2-C5 decompression surgery. 2. Hypertension, controlled restart home dose of Norvasc, losartan, will avoid beta-blockers since patient developed bradycardia in [place]  3. Heart failure preserved ejection fraction, compensated , on Lasix  4. GERD, restarted Protonix  5. On DVT prophylaxis Lovenox  6. BPH, started on finasteride  7.  History of glaucoma, restarted home medications of eyedrop  8. Patient has neurological better, will have intermittent catheterization  9. Diabetes, controlled  4/2  Patient blood pressure is controlled  No new complaints  4/3   Patient feels that his strength is getting better  Starting patient on hydrochlorothiazide with readings of elevated blood pressures, advised low-salt diet    4/4  BP running high   Low salt diet   CHF, compensated  , on lasix, watch for electrolytes   DM, sugars   C2-5 surgery , quadriparesis  PT/OT        4/5  , Blood pressure still running on the higher side,  Low-salt and low-fat diet,  We will adjust the medications,  Congestive heart failure compensated, on Lasix, monitor electrolytes,  Diabetes blood sugars running well,  C2-C5 surgery, quadriparesis, working with physical therapy, not able to walk yet,  Anemia of chronic disease, last hemoglobin was 11,    4/6  Severe spinal stenosis, status post surgery, bilateral upper and lower extremity weakness at this time,  Congestive heart failure, compensated, on Lasix,  Diabetes mellitus, sugars running well,  Anemia of chronic disease, hemoglobin stable,  Labs, radiology, medications reviewed,  PT OT    4/7  Severe spinal stenosis, status post surgery, bilateral upper and lower extremity weakness   Congestive heart failure, compensated, on Lasix,  Diabetes mellitus, sugars running well,  Anemia of chronic disease, hemoglobin stable,  Labs, radiology, medications reviewed,  Tolerating PT/OT   Improving slowly     Sumit Omalley MD  4/7/2022  11:06 PM    Copy sent to Dr. Leilani Rosas    Please note that this chart was generated using voice recognition Dragon dictation software. Although every effort was made to ensure the accuracy of this automated transcription, some errors in transcription may have occurred.

## 2022-04-08 NOTE — PROGRESS NOTES
Physical Medicine & Rehabilitation  Progress Note      Subjective:      Bethany Posada is a 70 y.o. male with cervical stenosis with myelopathy s/p C2-C5 laminectomy and fusion. He reports doing fine today. Significant other expresses concern about patient not eating well and asks about an appetite stimulant. Patient states that he does feel hungry, but significant other states that he only eats a few bites of his meals. Discussed with dietician, who states that patient is drinking ensure. Patient denies any other acute concerns    ROS:  Denies fevers, chills, sweats.  +dizziness; No chest pain, palpitations  Denies coughing, wheezing or shortness of breath. +nausea  No new areas of joint pain. Denies new areas of numbness or weakness. Denies new anxiety or depression issues. No new skin problems. Rehabilitation:   Progressing in therapies. PT:  Restrictions/Precautions: Fall Risk,General Precautions  Implants present? : Metal implants  Spinal Precautions: Poor Sitting balance, Bilateral Upper  and Lower Body muscle weakness, Assist with standing in GERA Stedy  Other position/activity restrictions: up with assist; s/p C2-C5 laminectomy and fixation 3/24, Collar on while out of bed.  Ok to remove while resting In bed eating and drinking in bed  Required Braces or Orthoses  Cervical: c-collar  Other: Abdominal Binder (per Dr. Horne Rang 4/7/2022)   Transfers  Sit to Stand: 2 Person [address] [facility] (Dolores Felipe)  Stand to sit: Dependent/Total Dolores Mikes)  Bed to Chair: Dependent/Total Dolores Mikes.)  Comment: Defered due to pt's tolerance/request.        Transfers  Sit to Stand: 2 Person [address] [facility] Dolores Mikes)  Stand to sit: Dependent/Total Dolores Mikes)  Bed to Chair: Dependent/Total Dolores Mikes.)  Comment: Defered due to pt's tolerance/request.   Ambulation  Ambulation?: No               OT:  ADL  Feeding: Dependent/Total  Grooming: Setup,Minimal assistance (holding RUE up for pt to complete oral care. )  UE Bathing: Dependent/Total  LE Bathing: Dependent/Total  UE Dressing:  (TA to don gown)  LE Dressing: Dependent/Total (TA for footies. Declines pants)  Toileting: Dependent/Total (brief change supine in bed)  Additional Comments: MANUEL facilitated pt LB bathing and breif change supine in bed. Then HOB raised and setup and and RUE support provided to complete oral care. Built up handle provided for toothbrush with good effect noted. Balance  Sitting Balance: Maximum assistance  Standing Balance: Dependent/Total   Standing Balance  Time: <30 sec x2  Activity: transfers with elbert nguyen  Comment: Ax2 with AXEL guo to place BUE's on front bar        Bed mobility  Bridging:  Moderate assistance  Rolling to Left: Maximum assistance  Rolling to Right: Maximum assistance  Supine to Sit: Moderate assistance,2 Person assistance (A X2)  Sit to Supine: Dependent/Total,2 Person assistance  Scootin Person assistance,Moderate assistance  Comment: A for completion of rolling and reaching for bed rail, A to move/lift BLE's  Transfers  Sit to stand: 2 Person assistance,Maximum assistance  Stand to sit: 2 Person assistance,Maximum assistance  Transfer Comments: with AXEL guo x2            Wheelchair Bed Transfers  Wheelchair/Bed - Technique:  (utilizing ss )  Equipment Used: Wheelchair,Bed,Other (ss)  Level of Asssistance: Dependent/Total,2 Person assistance  Wheelchair Transfers Comments: completign transfer to and from  with ss     SPEECH:      Current Medications:   Current Facility-Administered Medications: venlafaxine (EFFEXOR XR) extended release capsule 225 mg, 225 mg, Oral, Daily with breakfast  senna (SENOKOT) tablet 17.2 mg, 2 tablet, Oral, Nightly PRN  bisacodyl (DULCOLAX) suppository 10 mg, 10 mg, Rectal, QPM  lidocaine 4 % external patch 2 patch, 2 patch, TransDERmal, Daily  hydroCHLOROthiazide (HYDRODIURIL) tablet 25 mg, 25 mg, Oral, Daily  finasteride (PROSCAR) tablet 5 mg, 5 mg, Oral, Daily  carboxymethylcellulose (THERATEARS) 1 % ophthalmic gel 2 drop, 2 drop, Both Eyes, TID  enoxaparin (LOVENOX) injection 40 mg, 40 mg, SubCUTAneous, Daily  ondansetron (ZOFRAN-ODT) disintegrating tablet 4 mg, 4 mg, Oral, Q8H PRN **OR** ondansetron (ZOFRAN) injection 4 mg, 4 mg, IntraVENous, Q6H PRN  glucagon (rDNA) injection 1 mg, 1 mg, IntraMUSCular, PRN  glucose (GLUTOSE) 40 % oral gel 15 g, 15 g, Oral, PRN  amLODIPine (NORVASC) tablet 10 mg, 10 mg, Oral, Daily  cyclobenzaprine (FLEXERIL) tablet 5 mg, 5 mg, Oral, TID PRN  [Held by provider] divalproex (DEPAKOTE ER) extended release tablet 500 mg, 500 mg, Oral, QAM  divalproex (DEPAKOTE ER) extended release tablet 750 mg, 750 mg, Oral, Nightly  furosemide (LASIX) tablet 20 mg, 20 mg, Oral, Daily  losartan (COZAAR) tablet 100 mg, 100 mg, Oral, Daily  pantoprazole (PROTONIX) tablet 40 mg, 40 mg, Oral, QAM AC  acetaminophen (TYLENOL) tablet 650 mg, 650 mg, Oral, Q4H PRN  polyethylene glycol (GLYCOLAX) packet 17 g, 17 g, Oral, Daily      Objective:  BP (!) 124/52   Pulse 80   Temp 98.6 °F (37 °C) (Oral)   Resp 16   Ht 5' 3\" (1.6 m)   Wt 142 lb (64.4 kg)   SpO2 94%   BMI 25.15 kg/m²       GEN: Well developed, well nourished, no acute distress  HEENT: NCAT. EOM grossly intact. Hearing grossly intact. Mucous membranes pink and moist.  RESP: Normal breath sounds with no wheezing, rales, or rhonchi. Respirations WNL and unlabored. CV: Regular rate and rhythm. No murmurs, rubs, or gallops. ABD: Soft, non-distended, BS+ and equal.  NEURO: Alert. Speech fluent. Sensation to light touch decreased in all limbs - improving. MSK:  Muscle bulk is normal bilaterally. Strength 4+/5 with right elbow flexion, wrist extension. Strength 4/5 with right elbow extension, . Strength 1/5 with left elbow flexion and wrist extension. Strength 4/5 with left . Strength 4+/5 with bilateral ankle dorsiflexion and plantarflexion.   LIMBS: Edema present in left upper limb. SKIN: Warm and dry with good turgor. PSYCH: Mood WNL. Affect WNL. Appropriately interactive. Diagnostics:     CBC:   Recent Labs     04/08/22  0613   WBC 10.1   RBC 3.84*   HGB 12.5*   HCT 35.2*   MCV 91.9   RDW 13.2   *     BMP:   Recent Labs     04/08/22  0613   *   K 4.5   CL 89*   CO2 31   BUN 33*   CREATININE 0.76   GLUCOSE 94     BNP: No results for input(s): BNP in the last 72 hours. PT/INR: No results for input(s): PROTIME, INR in the last 72 hours. APTT: No results for input(s): APTT in the last 72 hours. CARDIAC ENZYMES: No results for input(s): CKMB, CKMBINDEX, TROPONINT in the last 72 hours. Invalid input(s): CKTOTAL;3  FASTING LIPID PANEL:  Lab Results   Component Value Date    CHOL 104 04/12/2018    HDL 42 04/12/2018    TRIG 92 04/12/2018     LIVER PROFILE: No results for input(s): AST, ALT, ALB, BILIDIR, BILITOT, ALKPHOS in the last 72 hours. Impression/Plan:   Impaired ADLs, gait, and mobility due to:    1. Cervical stenosis with myelopathy:  S/p C2-C5 laminectomy and fusion on 3/24. PT/OT  for gait, mobility, strengthening, endurance, ADLs, and self care. C-collar when out of bed. Pain control with as-needed tylenol, as-needed flexeril. Added lidocaine patches on 4/4. Avoid narcotics, as he did require narcan in acute care. Reached out to neurosurgery on 4/7 regarding appointment today - we can remove staples here and follow up will be rescheduled after discharge. Staples removed today. 2. Neurogenic bladder:  Has not been requiring intermittent catheterization but can continue this as needed. 3. Neurogenic bowel:  Miralax daily, senokot nightly, dulcolax prn - trialing a bowel program with suppository nightly starting 4/7.  4. Dizziness, nausea with sitting upright:  Likely related to cervical spinal cord injury (possible orthostatic hypotension, autonomic dysfunction). NERY hose when out of bed. Can trial abdominal binder as well. Positive orthostatic vital signs. 5. Subjective dysphagia:  Chronic. SLP did not recommend any change in diet or further therapy. 6. Poor oral intake:  Discussed with dietician. On oral nutrition supplements. Consider appetite stimulant. 7. Hospital-acquired pneumonia:  Completed course of cefepime and doxycycline. 8. Bradycardia:  Resolved after discontinuation of beta blockers  9. CHF:  On lasix  10. HTN:  On amlodipine, lasix, losartan, hydrochlorothiazide  11. HLD:  Not currently on medication  12. Diabetes:  Not currently on medication  13. GIA  14. Depression, PTSD:  On effexor, depakote. Psychiatry consulted per patient request - recommended holding morning depakote, changing effexor to 225mg extended release dose in the morning  15. DVT prophylaxis:  Lovenox  16.  Internal Medicine for medical management      Electronically signed by Martha Lopez MD on 4/9/2022 at 1:08 AM

## 2022-04-08 NOTE — CONSULTS
Department of Psychiatry   Psychiatric Assessment      Thank you very much for allowing us to participate in the care of this patient. Reason for Consult: Depression    HISTORY OF PRESENT ILLNESS:      Patient is a 44-year-old male with extensive history of depression admitted to rehab following surgery. Patient reports that his mood has been somewhat down for last several months now. He discussed along with his wife at length about several surgeries he had and about the medication complications that dealt with. Reports some feelings of helplessness and hopelessness lately. Reports that his sleep and appetite have been better. Notes that Effexor has been significantly helpful with his mood. Unclear what Depakote has been doing. Discussed about the possible side effects of ataxia on Depakote. Discussed at length about possible manic episodes however could not elicit any hypomanic or manic episodes in the past.  Denies any suicidal or homicidal ideation plan or intent today. PSYCHIATRIC HISTORY:      Currently following up with PCP. Denies any suicidal thoughts in the past.  Reports that he was admitted to a psychiatric unit for possible detox from sumatriptan and Flexeril many years ago. [facility] Psychiatric Review of Systems      ·    Obsessions and Compulsions: Denies    ·    Ruby or Hypomania: Denies  ·    Hallucinations: Denies  ·    Panic Attacks:  Denies  ·    Delusions:  Denies  ·    Phobias:  Denies  ·    Trauma: Denies    Prior to Admission medications    Medication Sig Start Date End Date Taking?  Authorizing Provider   finasteride (PROSCAR) 5 MG tablet Take 5 mg by mouth daily   Yes Historical Provider, MD   carboxymethylcellulose 1 % ophthalmic solution Place 2 drops into both eyes 3 times daily Pt states \"2 drops in both eyes three times a day\"    Historical Provider, MD   divalproex (DEPAKOTE ER) 250 MG extended release tablet Take 750 mg by mouth at bedtime    Historical Provider, MD   furosemide (LASIX) 20 MG tablet Take 20 mg by mouth daily    Historical Provider, MD   venlafaxine (EFFEXOR XR) 150 MG extended release capsule Take 1 capsule by mouth daily  Patient taking differently: Take 225 mg by mouth daily  6/23/20   Rich Gomez MD   simethicone (MYLICON) 80 MG chewable tablet Take 1 tablet by mouth every 6 hours as needed for Flatulence  Patient taking differently: Take 80 mg by mouth every 8 hours as needed for Flatulence  6/22/20   Rich Gomez MD   lidocaine (XYLOCAINE) 5 % ointment Apply topically daily as needed for Pain Apply topically as needed.   LF 4/20/20 (30 day supply)  Patient not taking: Reported on 3/23/2022    Historical Provider, MD   metFORMIN (GLUCOPHAGE) 1000 MG tablet Take 1,000 mg by mouth 2 times daily (with meals) LF 4/27/20 (90 day supply)  Patient not taking: Reported on 3/21/2022    Historical Provider, MD   losartan (COZAAR) 25 MG tablet Take 25 mg by mouth daily     Historical Provider, MD   aspirin 81 MG chewable tablet Take 81 mg by mouth daily  Patient not taking: Reported on 3/21/2022    Historical Provider, MD   divalproex (DEPAKOTE ER) 500 MG extended release tablet Take 500 mg by mouth every morning     Historical Provider, MD   traZODone (DESYREL) 100 MG tablet Take 150 mg by mouth nightly as needed LF 5/1/20 (30 day supply)    Historical Provider, MD   cetirizine (ZYRTEC) 10 MG tablet Take 10 mg by mouth daily LF 4/6/20 (90 day supply)  Patient not taking: Reported on 3/21/2022    Historical Provider, MD   atorvastatin (LIPITOR) 80 MG tablet Take 40 mg by mouth daily Take 1/2 tablet (40 mg) daily  LF 4/22/20 (90 day supply)  Patient not taking: Reported on 3/21/2022    Historical Provider, MD   amLODIPine (NORVASC) 10 MG tablet Take 7.5 mg by mouth daily     Historical Provider, MD   sildenafil (VIAGRA) 100 MG tablet Take 100 mg by mouth as needed for Erectile Dysfunction LF 5/12/20 (30 day supply)    Historical Provider, MD   omeprazole (PRILOSEC) 20 MG delayed release capsule Take 20 mg by mouth every evening LF 4/21/20 (90 day supply)    Historical Provider, MD        Medications:    Current Facility-Administered Medications: senna (SENOKOT) tablet 17.2 mg, 2 tablet, Oral, Nightly PRN  bisacodyl (DULCOLAX) suppository 10 mg, 10 mg, Rectal, QPM  lidocaine 4 % external patch 2 patch, 2 patch, TransDERmal, Daily  hydroCHLOROthiazide (HYDRODIURIL) tablet 25 mg, 25 mg, Oral, Daily  finasteride (PROSCAR) tablet 5 mg, 5 mg, Oral, Daily  carboxymethylcellulose (THERATEARS) 1 % ophthalmic gel 2 drop, 2 drop, Both Eyes, TID  enoxaparin (LOVENOX) injection 40 mg, 40 mg, SubCUTAneous, Daily  ondansetron (ZOFRAN-ODT) disintegrating tablet 4 mg, 4 mg, Oral, Q8H PRN **OR** ondansetron (ZOFRAN) injection 4 mg, 4 mg, IntraVENous, Q6H PRN  glucagon (rDNA) injection 1 mg, 1 mg, IntraMUSCular, PRN  glucose (GLUTOSE) 40 % oral gel 15 g, 15 g, Oral, PRN  amLODIPine (NORVASC) tablet 10 mg, 10 mg, Oral, Daily  cyclobenzaprine (FLEXERIL) tablet 5 mg, 5 mg, Oral, TID PRN  divalproex (DEPAKOTE ER) extended release tablet 500 mg, 500 mg, Oral, QAM  divalproex (DEPAKOTE ER) extended release tablet 750 mg, 750 mg, Oral, Nightly  furosemide (LASIX) tablet 20 mg, 20 mg, Oral, Daily  losartan (COZAAR) tablet 100 mg, 100 mg, Oral, Daily  pantoprazole (PROTONIX) tablet 40 mg, 40 mg, Oral, QAM AC  venlafaxine (EFFEXOR) tablet 75 mg, 75 mg, Oral, TID WC  acetaminophen (TYLENOL) tablet 650 mg, 650 mg, Oral, Q4H PRN  polyethylene glycol (GLYCOLAX) packet 17 g, 17 g, Oral, Daily     Past Medical History:        Diagnosis Date    Depression 2017    ON RX    Diabetes mellitus ([facility] [address] 2013    NIDDM    Difficult intravenous access     Hyperlipidemia 2008    ON RX    Hypertension 2008    ON RX    Migraines     PTSD (post-traumatic stress disorder) 01/2018    ON RX    Sleep apnea 01/2018    UNALBE TO USE TO CLEARED BY ENT (CPAP)    Teeth missing     BACK TEETH UPPER AND LOWER TO BE FITTED FOR PARTIALS AT LATER DATE    Wears glasses        Past Surgical History:        Procedure Laterality Date    CARDIAC CATHETERIZATION  02/2010    no stents     CARPAL TUNNEL RELEASE Left 01/09/2002    CATARACT REMOVAL      CERVICAL FUSION  04/19/2018    anterior cervical fusion C5-6    CERVICAL FUSION N/A 3/24/2022    C2-5 FUSION, C2-4 LAMINECTOMY performed by Sharyle Bays, DO at [address] Left 2018    CATARACT EXTRACTION WITH IOL    HYDROCELE EXCISION  1951    LAMINECTOMY  03/24/2022    C2-5 FUSION, C2-4 LAMINECTOMY    MIDDLE EAR SURGERY  01/11/2018    MIDDLE EAR REBUILT AND EAR DRUM REPLACED    MOUTH SURGERY  04/16/2018    5 TEETH REMOVED    OTHER SURGICAL HISTORY  04/19/2018    : ANTERIOR CERVICAL CORRPECTOMY C5-6, SYNTHES, DEPUY, REG TABLE, SUPINE,     [place] OFFICE/OUTPT VISIT,PROCEDURE ONLY N/A 4/19/2018    ANTERIOR CERVICAL CORRPECTOMY AND FUSION C5-6 performed by Sharyle Bays, DO at [facility]  12/1983       Allergies: Latex, Bee venom, Lisinopril, Niacin and related, Sulfa antibiotics, and Terazosin      Social History:    Patient reports that he was born and raised around [facility]. Currently has good support from family members.   SUBSTANCE USE HISTORY: None at this time but reports previously abusing Flexeril and sumatriptan    Family Medical and Psychiatric History:           Problem Relation Age of Onset    Dementia Mother     Coronary Art Dis Mother     Stroke Mother     Diabetes Mother     Asthma Mother     Other Mother         BRAIN ANEURISM    High Blood Pressure Mother     Heart Disease Father     Diabetes Sister     Diabetes Brother     High Blood Pressure Brother     High Cholesterol Brother     Heart Disease Brother     Diabetes Sister    Bonilla Other Sister         NEUROPATHY       Physical  /87   Pulse 86   Temp 98.1 °F (36.7 °C)   Resp 16   Ht 5' 3\" (1.6 m)   Wt 142 lb (64.4 kg) SpO2 95%   BMI 25.15 kg/m²     Mental Status Examination:  Level of consciousness:  Within normal limits  Appearance: hospital attire, lying in bed, fair grooming  Behavior/Motor:  no abnormalities noted  Attitude toward examiner:  cooperative, attentive and good eye contact  Speech:  Spontaneous, normal rate and volume  Mood: Anxious  Affect: mood congruent  Thought processes:  Linear, goal directed, coherent  Thought content: Denies suicidal ideations   Denies homicidal ideations    Denies hallucinations   Denies delusions  Cognition:  Oriented to self, situation, location, date  Concentration clinically adequate  Memory age appropriate  Insight & Judgment:  fair    DSM-5 DIAGNOSIS:  MDD recurrent moderate    Stressors     Severity of stressors is moderate  Source of stressors include: Other psychosocial and environmental stressors    PLAN:    We will recommend holding off on the morning dose of Depakote-as we did not observe any clear manic or hypomanic episodes in the past.  Concerns of ataxia on Depakote. Recommend changing Effexor from 75 mg 3 times a day to 225 mg extended release dose in the morning. We will continue to follow with the above med changes are made. Thank you very much for allowing us to participate in the care of this patient.       Electronically signed by Ahmet Richards MD on 4/8/22 at 2:21 PM EDT

## 2022-04-08 NOTE — PROGRESS NOTES
Physical Therapy  [facility]: KIRSTIN JIMENEZ  Acute Rehabilitation Physical Therapy Progress Note    Date: 22  Patient Name: Nandini Barton       Room: 8871/4194-81  MRN: [id number]   Account: [de-identified]   : 1950  (75 y.o.) Gender: male     Referring Practitioner: Lokesh Wilson MD  Diagnosis: Cervical stenosis with myelopathy s/p C2-C5 laminectomy and fusion   Past Medical History:  has a past medical history of Depression, Diabetes mellitus ([address] Difficult intravenous access, Hyperlipidemia, Hypertension, Migraines, PTSD (post-traumatic stress disorder), Sleep apnea, Teeth missing, and Wears glasses. Past Surgical History:   has a past surgical history that includes Cardiac catheterization (2010); Carpal tunnel release (Left, 2002); Vasectomy (1983); Tonsillectomy and adenoidectomy (); Hydrocele surgery (); Middle ear surgery (2018); eye surgery (Left, ); Mouth surgery (2018); other surgical history (2018); cervical fusion (2018); pr office/outpt visit,procedure only (N/A, 2018); Cataract removal; laminectomy (2022); and cervical fusion (N/A, 3/24/2022). Additional Pertinent Hx: Geetha Hall is a 70 y.o. male with history of HTN, HLD, diabetes, GIA, migraines, PTSD, and depression admitted to [facility] on 3/23/2022. He initially presented to [facility] after a fall with subsequent worsening limb weakness and additional falls. MRI brain showed no acute intracranial abnormality. MRI cervical spine showed severe spinal stenosis at C2-C3 and C3-C4 with complete effacement of the CSF. MRI thoracic and lumbar spine were relatively unremarkable except for moderate spinal stenosis from L2-L3 to L4-L5. He was transferred to [facility] for neurosurgical evaluation. He underwent laminectomy and fusion at C2-C5 on 3/24/22 (Dr. Liudmila Park). Hospital course has been complicated by urinary retention.  He reports continued neck pain with pain in the limbs as well. He also notes numbness/tingling and weakness in all limbs. Pt admitted to rehab unit on 3/31/22    Overall Orientation Status: Within Functional Limits  Restrictions/Precautions  Restrictions/Precautions: Fall Risk;General Precautions  Required Braces or Orthoses?: Yes  Implants present? : Metal implants  Required Braces or Orthoses  Cervical: c-collar  Position Activity Restriction  Spinal Precautions: No Twisting; No Lifting; No Bending  Spinal Precautions: Poor Sitting balance, Bilateral Upper  and Lower Body muscle weakness, Assist with standing in ELBERT Yody  Other position/activity restrictions: up with assist; s/p C2-C5 laminectomy and fixation 3/24, Collar on while out of bed. Ok to remove while resting In bed eating and drinking in bed    Subjective: Patient and significant other informed writer that they woul like a psychiatrist consult. Surpervising PT and Dr. Becca Hopper MD informed. Comments: C-collar donned during tx. Vital Signs  Pulse: 92  BP: 108/66 (Seated upright in wheelchair.)  Patient Currently in Pain: Yes  Pain Assessment: 0-10  Pain Level: 2  Pain Type: Chronic pain;Surgical pain  Pain Location: Neck     Oxygen Therapy  SpO2: 95 %  O2 Device: None (Room air)          Bed Mobility:   Bed Mobility  Bridging: Moderate assistance  Rolling: Maximal assistance  Supine to Sit: Moderate assistance (x2)  Sit to Supine: Moderate assistance (2 person;  elbert nguyen )  Scootin Person assistance; Moderate assistance  Bed mobility  Bridging: Moderate assistance  Scootin Person assistance; Moderate assistance    Transfers:  Sit to Stand: 2 Person Assistance;Maximum Assistance (Cathryne Deems)  Stand to sit: Dependent/Total Cathryne Deems)  Bed to Chair: Dependent/Total Cathryne Deems.)                       Stairs/Curb  Stairs?: No                    BALANCE Posture: Poor  Sitting - Static: Poor;+  Sitting - Dynamic: Poor  Standing - Static: Poor;+ Goldie nguyen)  Standing - Dynamic: Poor;+  Comments: Sitting balance assessed at EOB. EXERCISES    Other exercises?: Yes  Other exercises 1: Sit to Stand Lyndacarolina Harrington)  Max x 2 to complete orthostatic BP's;   Worked on standing tolerance. Poor standing tolerance. Other exercises 2: Mirror for posture feeedback. Other exercises 3: Seated ankle movement as tolerated for circulation in seated position. Other Activities  Comment: rest breaks PRN. Activity Tolerance: Patient limited by fatigue,Patient limited by endurance,Patient limited by pain  PT Equipment Recommendations  Equipment Needed: No  Other: TBD         Current Treatment Recommendations: Strengthening,ROM,Balance Training,Functional Mobility Training,Transfer Training,Endurance Training,Wheelchair Mobility Training,Stair training,Gait Training,Neuromuscular Re-education,Home Exercise Program,Safety Education & Training,Patient/Caregiver Education & Training,Equipment Evaluation, Education, & procurement    Conditions Requiring Skilled Therapeutic Intervention  Body structures, Functions, Activity limitations: Decreased functional mobility ; Decreased strength;Decreased safe awareness;Decreased endurance;Decreased balance; Increased pain;Decreased posture;Decreased coordination;Decreased fine motor control;Decreased sensation;Decreased ROM  Treatment Diagnosis: Impaired function.   Prognosis: Fair  Decision Making: Medium Complexity  Barriers to Learning: Pain  REQUIRES PT FOLLOW UP: Yes  Discharge Recommendations: Patient would benefit from continued therapy after discharge;Home with assist PRN    Goals  Short term goals  Time Frame for Short term goals: 10 visits  Short term goal 1: Perform rolling in bed min/mod A   Short term goal 2: Perform supine to sit min/mod A   Short term goal 3: Perform sit to supine max A   Short term goal 4: Perform sit<> stand mod A , and pivot transfers at mod A  Short term goal 5: Progress to ambulation HHA/Gait belt assist or appropriate device, mod A x 2 20 to 40 ft  Short term goal 6: Propel w/c with B LE, distance of 50 ft or > , min/mod A  Short term goal 7: Demo Fair- dynamic standing balance to decrease risk of falls  Long term goals  Time Frame for Long term goals : By DC  Long term goal 1: Pt able to perform bed mobility at [address] term goal 2: Pt able to perform transfers at min A   Long term goal 3: Pt able to ambulate with or without device, distance of 100 ft atleast, mod A, level surface. Long term goal 4: Pt able to perform 2 to 4 steps at [place] x 2  Long term goal 5: Pt able to propel w/c on level surface,  distance of 150 ft, SBA, level surfaces  Long term goal 6: Family training for safe mobility to return home  Long term goal 7: Improve sitting/standing balance to good/fair to reduce fall risk. Long term goal 8: Pt able to ambulate 60 to 80 ft for 2MWT,to improve overall fucntion.        04/08/22 0945 04/08/22 1515   PT Individual Minutes   Time In 1007  --    Time Out 1045  --    Minutes 38  --    PT Co-Treatment Minutes   Time In 0945  --    Time Out 1007  --    Minutes 22  --    Minute Variance   Variance  --  45   Reason  --    (Eating dinner)       Electronically signed by Yvonne Marx PTA on 4/8/22 at 6:18 PM EDT

## 2022-04-08 NOTE — PROGRESS NOTES
[facility]    ACUTE REHABILITATION OCCUPATIONAL THERAPY  DAILY NOTE    Date: 22  Patient Name: Kevin Quiroga      Room: 8292/3426-63    MRN: [id number]   : 1950  (70 y.o.)  Gender: male   Referring Practitioner: Raymundo Weinberg MD  Diagnosis: Cervical stenosis with myelopathy s/p C2-C5 laminectomy and fusion   Additional Pertinent Hx: Kevin Quiroga is a 70 y.o. male with history of HTN, HLD, diabetes, GIA, migraines, PTSD, and depression admitted to [facility] on 3/23/2022. He initially presented to [address] [place] [address] after a fall with subsequent worsening limb weakness and additional falls. MRI brain showed no acute intracranial abnormality. MRI cervical spine showed severe spinal stenosis at C2-C3 and C3-C4 with complete effacement of the CSF. MRI thoracic and lumbar spine were relatively unremarkable except for moderate spinal stenosis from L2-L3 to L4-L5. He was transferred to [facility] for neurosurgical evaluation. He underwent laminectomy and fusion at C2-C5 on 3/24/22 (Dr. Natalya Fung). Hospital course has been complicated by urinary retention. He reports continued neck pain with pain in the limbs as well. He also notes numbness/tingling and weakness in all limbs. Pt admitted to rehab unit on 3/31/22    Restrictions  Restrictions/Precautions: Fall Risk,General Precautions  Implants present? : Metal implants  Spinal Precautions: Poor Sitting balance, Bilateral Upper  and Lower Body muscle weakness, Assist with standing in GERA Stedy  Other position/activity restrictions: up with assist; s/p C2-C5 laminectomy and fixation 3/24, Collar on while out of bed.  Ok to remove while resting In bed eating and drinking in bed  Required Braces or Orthoses  Cervical: c-collar  Other: Abdominal Binder (per Dr. Alberto Martin 2022)  Required Braces or Orthoses?: Yes  Equipment Used: Wheelchair,Bed,Other (ss)    Subjective  Subjective: \"I'm feeling good\"  Patient Currently in Pain: Denies  Restrictions/Precautions: Fall Risk;General Precautions  Overall Orientation Status: Within Functional Limits          Objective  Cognition  Overall Cognitive Status: WFL  Perception  Overall Perceptual Status: WFL  Balance  Sitting Balance: Maximum assistance  Standing Balance: Dependent/Total  Bed mobility  Rolling to Left: Maximum assistance  Rolling to Right: Maximum assistance  Supine to Sit: Moderate assistance;2 Person assistance (A X2)  Scootin Person assistance; Moderate assistance  Comment: A for completion of rolling and reaching for bed rail, A to move/lift BLE's  Transfers  Sit to stand: 2 Person assistance;Maximum assistance  Stand to sit: 2 Person assistance;Maximum assistance  Transfer Comments: with AXEL guo x2  Standing Balance  Time: <30 sec x2  Activity: transfers with elbert nguyen  Comment: Ax2 with AXEL guo to place BUE's on front bar        Type of ROM/Therapeutic Exercise  Type of ROM/Therapeutic Exercise: AAROM (RUE X15 reps, LUE X10 reps)  Comment: AAROM ex's with BUE's to increase strength and promote functional return, pt reports pain and discomfort with various motions, limited ROM noted in BUE's due to pain  Exercises  Shoulder Flexion: x  Shoulder Extension: x  Shoulder ABduction: x  Shoulder ADduction: x  Horizontal ABduction: x  Horizontal ADduction: x  Elbow Flexion: x  Elbow Extension: x                       ADL  Grooming: Setup;Minimal assistance (holding RUE up for pt to complete oral care. )  LE Dressing: Dependent/Total (TA for footies. Declines pants)  Toileting: Dependent/Total (brief change supine in bed)  Additional Comments: MANUEL facilitated pt LB bathing and breif change supine in bed. Then HOB raised and setup and and RUE support provided to complete oral care. Built up handle provided for toothbrush with good effect noted. Assessment  Performance deficits / Impairments: Decreased ADL status; Decreased functional mobility ; Decreased ROM;Decreased strength;Decreased endurance;Decreased sensation;Decreased balance;Decreased high-level IADLs;Decreased fine motor control;Decreased coordination;Decreased posture  Prognosis: Fair  Discharge Recommendations: Patient would benefit from continued therapy after discharge;Continue to assess pending progress  Activity Tolerance: Patient Tolerated treatment well  Safety Devices in place: Yes  Type of devices: Left in bed;Call light within reach;Nurse notified  Restraints  Initially in place: No          Patient Education: OT POC, safety and sequencing with functional transfers, modified techniques and AE for ADLs. Patient Goals   Patient goals : \"I just want to be able to get back to my basic needs. \"  Learner:patient  Method: explanation       Outcome: acknowledged understanding of         Plan  Plan  Times per week: 900 minutes/week for combined therapy of OT/PT due to decreaed tolerance to activity. Times per day: Twice a day  Current Treatment Recommendations: Self-Care / ADL,Strengthening,ROM,Balance Training,Functional Mobility Training,Endurance Training,Pain Management,Safety Education & Training,Patient/Caregiver Education & Training,Equipment Evaluation, Education, & procurement,Positioning  Patient Goals   Patient goals : \"I just want to be able to get back to my basic needs. \"  Short term goals  Time Frame for Short term goals: By 10 days  Short term goal 1: Pt will complete upper body dressing/bathing with max A with use of AE as needed while maintaining cervical precautions  Short term goal 2: Pt will complete self feeding task with max A with adaptive strategies and good safety  Short term goal 3: Pt will tolerate sitting EOB 5+ minutes unsupported with mod A during functional activity to increase independence with self care and mobility  Short term goal 4: Pt will complete functional transfers during self care tasks with mod A and good safety  Short term goal 5: Pt will participate in 30+ minutes of therapeutic exercises/functional activities to increase safety and independence with self care and mobility  Long term goals  Time Frame for Long term goals : By discharge  Long term goal 1: Pt will complete self feeding task with mod A with use of adaptive strategies   Long term goal 2: Pt will complete upper body bathing/dressing with mod A and good safety while maintaining cervical precautions  Long term goal 3: Pt will tolerate sitting EOB 10+ minutes unsupported with CGA and good safety during functional activity of choice  Long term goal 4: Pt will complete functional transfer with min A and good safety during self care tasks  Long term goal 5: Pt will demonstrated increased BUE gross motor/fine motor to increase participation in self care tasks  Long term goals 6: Pt/family with verbalize/demosntrate Good understanding of cervical precautions during self care tasks  Long term goal 7: Pt/family will demonstrate Good understanding of BUE exercises to increase functional use of BUE during self care tasks  Timed Code Treatment Minutes: 58 Minutes     04/08/22 1539 04/08/22 1540 04/08/22 1541   OT Individual Minutes   Time In [facility]  --    Time Out 0932 0944  --    Minutes 24 3  --    OT Co-Treatment Minutes   Time In  --   --    (0945)   Time Out  --   --    (1007)   Time Code Minutes    Timed Code Treatment Minutes  --   --   --       04/08/22 1551   OT Individual Minutes   Time In 3333   Time Out 1358   Minutes 20   OT Co-Treatment Minutes   Time In  --    Time Out  --    Time Code Minutes    Timed Code Treatment Minutes 58 Minutes     Electronically signed by KIERRA Mann on 4/8/22 at 3:52 PM EDT

## 2022-04-08 NOTE — TELEPHONE ENCOUNTER
Patient is at [address] and was unable to make his scheduled 2 week po appointment. The rehab facility stated they will remove patient staples.

## 2022-04-08 NOTE — CARE COORDINATION
Patient was scheduled for staple removal today at 2:30 pm which was brought to our attention. Spoke with Dr. Josefina Lyon who spoke with neurosurgery last night. Per Dr. Josefina Lyon, we are able to remove suture/bernardo here. Dr Josefina Lyon will write an order.

## 2022-04-08 NOTE — PLAN OF CARE
Problem: Skin Integrity:  Goal: Will show no infection signs and symptoms  Description: Will show no infection signs and symptoms  4/8/2022 0406 by Morelia Williamson RN  Outcome: Ongoing     Problem: Skin Integrity:  Goal: Absence of new skin breakdown  Description: Absence of new skin breakdown  4/8/2022 0406 by Morelia Williamson RN  Outcome: Ongoing  Note: Patient turned and repositioned every 2 hours and as needed for comfort. Skin kept clean and dry. No new skin breakdown noted. Problem: Falls - Risk of:  Goal: Will remain free from falls  Description: Will remain free from falls  4/8/2022 0406 by Morelia Williamson RN  Outcome: Ongoing  Note: Bed remains locked and in lowest position, call light within reach, bed alarm on. Patient remains free of falls at this time. RN will continue to monitor.       Problem: Falls - Risk of:  Goal: Absence of physical injury  Description: Absence of physical injury  4/8/2022 0406 by Morelia Williamson RN  Outcome: Ongoing     Problem: Pain:  Goal: Pain level will decrease  Description: Pain level will decrease  4/8/2022 0406 by Morelia Williamson RN  Outcome: Ongoing     Problem: Pain:  Goal: Control of acute pain  Description: Control of acute pain  4/8/2022 0406 by Morelia Williamson RN  Outcome: Ongoing     Problem: Pain:  Goal: Control of chronic pain  Description: Control of chronic pain  4/8/2022 0406 by Morelia Williamson RN  Outcome: Ongoing     Problem: Musculor/Skeletal Functional Status  Goal: Highest potential functional level  4/8/2022 0406 by Morelia iWlliamson RN  Outcome: Ongoing     Problem: Musculor/Skeletal Functional Status  Goal: Absence of falls  4/8/2022 0406 by Morelia Williamson RN  Outcome: Ongoing     Problem: Nutrition  Goal: Optimal nutrition therapy  4/8/2022 0406 by Morelia Williamson RN  Outcome: Ongoing

## 2022-04-08 NOTE — TELEPHONE ENCOUNTER
Acute rehab to remove staples. 8 week postop visit scheduled with Dr. Rena Harrington, orders have been placed for upright x-rays to complete just prior to that visit.

## 2022-04-09 PROCEDURE — 1180000000 HC REHAB R&B

## 2022-04-09 PROCEDURE — 6370000000 HC RX 637 (ALT 250 FOR IP): Performed by: PHYSICAL MEDICINE & REHABILITATION

## 2022-04-09 PROCEDURE — 99232 SBSQ HOSP IP/OBS MODERATE 35: CPT | Performed by: STUDENT IN AN ORGANIZED HEALTH CARE EDUCATION/TRAINING PROGRAM

## 2022-04-09 PROCEDURE — 6370000000 HC RX 637 (ALT 250 FOR IP): Performed by: STUDENT IN AN ORGANIZED HEALTH CARE EDUCATION/TRAINING PROGRAM

## 2022-04-09 PROCEDURE — 97110 THERAPEUTIC EXERCISES: CPT

## 2022-04-09 PROCEDURE — 99232 SBSQ HOSP IP/OBS MODERATE 35: CPT | Performed by: INTERNAL MEDICINE

## 2022-04-09 PROCEDURE — 97530 THERAPEUTIC ACTIVITIES: CPT

## 2022-04-09 PROCEDURE — 6360000002 HC RX W HCPCS: Performed by: NURSE PRACTITIONER

## 2022-04-09 PROCEDURE — 6370000000 HC RX 637 (ALT 250 FOR IP): Performed by: INTERNAL MEDICINE

## 2022-04-09 PROCEDURE — 6370000000 HC RX 637 (ALT 250 FOR IP): Performed by: NURSE PRACTITIONER

## 2022-04-09 PROCEDURE — 97535 SELF CARE MNGMENT TRAINING: CPT

## 2022-04-09 RX ORDER — VENLAFAXINE HYDROCHLORIDE 75 MG/1
225 CAPSULE, EXTENDED RELEASE ORAL
Status: DISCONTINUED | OUTPATIENT
Start: 2022-04-09 | End: 2022-04-10

## 2022-04-09 RX ADMIN — CARBOXYMETHYLCELLULOSE SODIUM 2 DROP: 10 GEL OPHTHALMIC at 21:36

## 2022-04-09 RX ADMIN — CARBOXYMETHYLCELLULOSE SODIUM 2 DROP: 10 GEL OPHTHALMIC at 13:52

## 2022-04-09 RX ADMIN — ONDANSETRON 4 MG: 4 TABLET, ORALLY DISINTEGRATING ORAL at 09:43

## 2022-04-09 RX ADMIN — ACETAMINOPHEN 650 MG: 325 TABLET ORAL at 08:47

## 2022-04-09 RX ADMIN — DIVALPROEX SODIUM 750 MG: 250 TABLET, FILM COATED, EXTENDED RELEASE ORAL at 21:35

## 2022-04-09 RX ADMIN — LOSARTAN POTASSIUM 100 MG: 100 TABLET, FILM COATED ORAL at 08:44

## 2022-04-09 RX ADMIN — ACETAMINOPHEN 650 MG: 325 TABLET ORAL at 21:34

## 2022-04-09 RX ADMIN — POLYETHYLENE GLYCOL 3350 17 G: 17 POWDER, FOR SOLUTION ORAL at 08:44

## 2022-04-09 RX ADMIN — FINASTERIDE 5 MG: 5 TABLET, FILM COATED ORAL at 08:44

## 2022-04-09 RX ADMIN — PANTOPRAZOLE SODIUM 40 MG: 40 TABLET, DELAYED RELEASE ORAL at 06:30

## 2022-04-09 RX ADMIN — VENLAFAXINE HYDROCHLORIDE 225 MG: 75 CAPSULE, EXTENDED RELEASE ORAL at 08:44

## 2022-04-09 RX ADMIN — HYDROCHLOROTHIAZIDE 25 MG: 25 TABLET ORAL at 08:44

## 2022-04-09 RX ADMIN — AMLODIPINE BESYLATE 10 MG: 10 TABLET ORAL at 08:44

## 2022-04-09 RX ADMIN — FUROSEMIDE 20 MG: 20 TABLET ORAL at 08:44

## 2022-04-09 RX ADMIN — ENOXAPARIN SODIUM 40 MG: 100 INJECTION SUBCUTANEOUS at 08:44

## 2022-04-09 ASSESSMENT — PAIN DESCRIPTION - FREQUENCY
FREQUENCY: INTERMITTENT
FREQUENCY: INTERMITTENT

## 2022-04-09 ASSESSMENT — PAIN DESCRIPTION - ONSET
ONSET: PROGRESSIVE
ONSET: ON-GOING

## 2022-04-09 ASSESSMENT — PAIN DESCRIPTION - ORIENTATION
ORIENTATION: UPPER
ORIENTATION: UPPER
ORIENTATION: POSTERIOR
ORIENTATION: UPPER
ORIENTATION: RIGHT;LEFT;LOWER;MID;POSTERIOR

## 2022-04-09 ASSESSMENT — PAIN DESCRIPTION - DESCRIPTORS
DESCRIPTORS: ACHING;SORE
DESCRIPTORS: ACHING
DESCRIPTORS: ACHING

## 2022-04-09 ASSESSMENT — PAIN SCALES - GENERAL
PAINLEVEL_OUTOF10: 4
PAINLEVEL_OUTOF10: 5
PAINLEVEL_OUTOF10: 5
PAINLEVEL_OUTOF10: 4
PAINLEVEL_OUTOF10: 5
PAINLEVEL_OUTOF10: 6

## 2022-04-09 ASSESSMENT — PAIN - FUNCTIONAL ASSESSMENT
PAIN_FUNCTIONAL_ASSESSMENT: PREVENTS OR INTERFERES WITH MANY ACTIVE NOT PASSIVE ACTIVITIES
PAIN_FUNCTIONAL_ASSESSMENT: PREVENTS OR INTERFERES SOME ACTIVE ACTIVITIES AND ADLS

## 2022-04-09 ASSESSMENT — PAIN DESCRIPTION - PAIN TYPE
TYPE: ACUTE PAIN;CHRONIC PAIN
TYPE: ACUTE PAIN;SURGICAL PAIN
TYPE: ACUTE PAIN;SURGICAL PAIN

## 2022-04-09 ASSESSMENT — PAIN DESCRIPTION - LOCATION
LOCATION: NECK;BACK
LOCATION: BACK;NECK;SHOULDER
LOCATION: HEAD
LOCATION: BACK
LOCATION: BACK

## 2022-04-09 ASSESSMENT — PAIN DESCRIPTION - PROGRESSION
CLINICAL_PROGRESSION: GRADUALLY WORSENING
CLINICAL_PROGRESSION: GRADUALLY WORSENING

## 2022-04-09 ASSESSMENT — PAIN DESCRIPTION - DIRECTION
RADIATING_TOWARDS: SHOULDERS
RADIATING_TOWARDS: SHOULDERS

## 2022-04-09 NOTE — PROGRESS NOTES
Physical Therapy  Facility/Department: [facility]  Daily Treatment Note  NAME: Rodell Meckel  : 1950  MRN: [id number]    Date of Service: 2022    Discharge Recommendations:  Patient would benefit from continued therapy after discharge,Home with assist PRN   PT Equipment Recommendations  Equipment Needed: No  Other: TBD    Assessment   Body structures, Functions, Activity limitations: Decreased functional mobility ; Decreased strength;Decreased safe awareness;Decreased endurance;Decreased balance; Increased pain;Decreased posture;Decreased coordination;Decreased fine motor control;Decreased sensation;Decreased ROM  Assessment: pt did not tolerate PT well today due to increased lethargy  Treatment Diagnosis: Impaired function. Specific instructions for Next Treatment: co-tx with OTR/MANUEL  Prognosis: Fair  Decision Making: Medium Complexity  Barriers to Learning: Pain  REQUIRES PT FOLLOW UP: Yes  Activity Tolerance  Activity Tolerance: Treatment limited secondary to medical complications (free text)  Activity Tolerance: pt very lethargic in AM/PM and c/o nausea in AM      has a past medical history of Depression, Diabetes mellitus ([facility] [address] Difficult intravenous access, Hyperlipidemia, Hypertension, Migraines, PTSD (post-traumatic stress disorder), Sleep apnea, Teeth missing, and Wears glasses. has a past surgical history that includes Cardiac catheterization (2010); Carpal tunnel release (Left, 2002); Vasectomy (1983); Tonsillectomy and adenoidectomy (); Hydrocele surgery (); Middle ear surgery (2018); eye surgery (Left, ); Mouth surgery (2018); other surgical history (2018); cervical fusion (2018); pr office/outpt visit,procedure only (N/A, 2018); Cataract removal; laminectomy (2022); and cervical fusion (N/A, 3/24/2022).     Restrictions  Restrictions/Precautions  Restrictions/Precautions: Fall Risk,General Precautions  Required Braces or min/mod A   Short term goal 3: Perform sit to supine max A   Short term goal 4: Perform sit<> stand mod A , and pivot transfers at mod A  Short term goal 5: Progress to ambulation HHA/Gait belt assist or appropriate device, mod A x 2 20 to 40 ft  Short term goal 6: Propel w/c with B LE, distance of 50 ft or > , min/mod A  Short term goal 7: Demo Fair- dynamic standing balance to decrease risk of falls  Long term goals  Time Frame for Long term goals : By DC  Long term goal 1: Pt able to perform bed mobility at [address] term goal 2: Pt able to perform transfers at min A   Long term goal 3: Pt able to ambulate with or without device, distance of 100 ft atleast, mod A, level surface. Long term goal 4: Pt able to perform 2 to 4 steps at [place] x 2  Long term goal 5: Pt able to propel w/c on level surface,  distance of 150 ft, SBA, level surfaces  Long term goal 6: Family training for safe mobility to return home  Long term goal 7: Improve sitting/standing balance to good/fair to reduce fall risk. Long term goal 8: Pt able to ambulate 60 to 80 ft for 2MWT,to improve overall fucntion. Patient Goals   Patient goals : Move better    Plan    Plan  Times per week: 900 minutes/week for combined therapy of PT/OT due to decreaed tolerance to activity.   Times per day: Daily  Specific instructions for Next Treatment: co-tx with OTR/MANUEL  Current Treatment Recommendations: Strengthening,ROM,Balance Training,Functional Mobility Training,Transfer Training,Endurance Training,Wheelchair Mobility Training,Stair training,Gait Training,Neuromuscular Re-education,Home Exercise Program,Safety Education & Training,Patient/Caregiver Education & Training,Equipment Evaluation, Education, & procurement  Safety Devices  Type of devices: Gait belt,Left in bed,Call light within reach,Nurse notified  Restraints  Initially in place: No     Therapy Time   Individual Concurrent Group Co-treatment   Time In [id number]   Time Out [id number] Minutes 14     20      Variance: 30 (pt unable to stay awake in PM)    Eugene Phillips, PT

## 2022-04-09 NOTE — PLAN OF CARE
Problem: Skin Integrity:  Goal: Absence of new skin breakdown  Description: Absence of new skin breakdown  Outcome: Ongoing  Note: Skin assessment completed this shift. Nutrition and Hydration status assessed with adequate intake. Ry Score as charted. Pressure Relief Overlay remains intact and inflated to patient's bed throughout the shift. Bilateral heels remain elevated on pillows throughout the shift. Patient tolerates repositioning by staff at least every 2 hours. Patient verbalizes understanding of pressure ulcer prevention measures. Skin integrity maintained. No new skin breakdown noted. Skin to high risk pressure areas including coccyx and heels are clear. Nereida / Incontinence care provided as needed throughout the shift. Zinc Oxide paste applied to buttocks with brief changes. Problem: Falls - Risk of:  Goal: Will remain free from falls  Description: Will remain free from falls  Outcome: Ongoing  Note: No falls or injuries sustained at this time. No attempts to get out of bed without nursing assistance. Call light within reach and pt. uses appropriately for assistance. Siderails up x 2. Nonskid footwear remains on. Bed in low and locked position. Hourly nursing rounds made. Pt. Alert and oriented, aware of limitations, and exhibits good safety judgement. Pt. uses assistive devices appropriately. Pt. understands individual fall risk factors. Pt. reoriented to surroundings and reminded to use call light with each nurse/patient interaction. Bed alarm / Personal alarm remains engaged throughout the shift as a precaution. Problem: Pain:  Goal: Control of acute pain  Description: Control of acute pain  Outcome: Ongoing  Note: Pain assessment completed. Pt. able to rest.  Patient medicated with        tylenol     for pain this shift as ordered. Patient relates a tolerable level of discomfort with the current medication. Pt. Repositions with assistance .   Nonverbal cues indicate pain relief. Pt. Rests quietly with eyes closed after pain medication administration. Respirations easy and unlabored. Appears free from distress.         Problem: Nutrition  Goal: Optimal nutrition therapy  Outcome: Ongoing  Note: Please check patient's intake and output

## 2022-04-09 NOTE — PROGRESS NOTES
[facility] Internal Medicine    CONSULTATION / HISTORY AND PHYSICAL EXAMINATION            Date:   4/9/2022  Patient name:  Ninfa Scott  Date of admission:  3/31/2022  8:38 PM  MRN:   [id number]  Account:  [de-identified]  YOB: 1950  PCP:    Jae Felipe  Room:   9928/6343-77  Code Status:    DNR-CCA    Physician Requesting Consult: Renetta Mandel MD    Reason for Consult:  medical management    Chief Complaint:     No chief complaint on file.       History Obtained From:     Patient medical record nursing staff    History of Present Illness:   Patient past medical multiple medical problems include hypertension, heart failure with preserved ejection fraction, diabetes, bipolar disorder,   glaucoma, patient was originally admitted at [place] where he was transferred from outlying facility, he developed, generalized weakness after a fall, patient underwent MRI of cervical spine suggestive of severe spinal canal stenosis at the level of C2-C4 level, patient underwent C2-C5 posterior decompression surgery, he feels that weakness in his extremities is slightly better  Patient during his hospital stay, developed bradycardia which improved after discontinuation of beta-blocker, cardiology was also consulted  He developed hospital-acquired pneumonia, getting treated with cefepime and doxycycline  Patient denying any complaints of cough, shortness of breath, chest pain,    Past Medical History:     Past Medical History:   Diagnosis Date    Depression 2017    ON RX    Diabetes mellitus ([address] 2013    NIDDM    Difficult intravenous access     Hyperlipidemia 2008    ON RX    Hypertension 2008    ON RX    Migraines     PTSD (post-traumatic stress disorder) 01/2018    ON RX    Sleep apnea 01/2018    UNALBE TO USE TO CLEARED BY ENT (CPAP)    Teeth missing     BACK TEETH UPPER AND LOWER TO BE FITTED FOR PARTIALS AT LATER DATE    Wears glasses         Past Surgical History:     Past Surgical History:   Procedure Laterality Date    CARDIAC CATHETERIZATION  02/2010    no stents     CARPAL TUNNEL RELEASE Left 01/09/2002    CATARACT REMOVAL      CERVICAL FUSION  04/19/2018    anterior cervical fusion C5-6    CERVICAL FUSION N/A 3/24/2022    C2-5 FUSION, C2-4 LAMINECTOMY performed by Sharyle Bays, DO at [address] Left 2018    CATARACT EXTRACTION WITH IOL    HYDROCELE EXCISION  1951    LAMINECTOMY  03/24/2022    C2-5 FUSION, C2-4 LAMINECTOMY    MIDDLE EAR SURGERY  01/11/2018    MIDDLE EAR REBUILT AND EAR DRUM REPLACED    MOUTH SURGERY  04/16/2018    5 TEETH REMOVED    OTHER SURGICAL HISTORY  04/19/2018    : ANTERIOR CERVICAL CORRPECTOMY C5-6, SYNTHES, DEPUY, REG TABLE, SUPINE,     NV OFFICE/OUTPT VISIT,PROCEDURE ONLY N/A 4/19/2018    ANTERIOR CERVICAL CORRPECTOMY AND FUSION C5-6 performed by Sharyle Bays, DO at [facility]  12/1983        Medications Prior to Admission:     Prior to Admission medications    Medication Sig Start Date End Date Taking?  Authorizing Provider   finasteride (PROSCAR) 5 MG tablet Take 5 mg by mouth daily   Yes Historical Provider, MD   carboxymethylcellulose 1 % ophthalmic solution Place 2 drops into both eyes 3 times daily Pt states \"2 drops in both eyes three times a day\"    Historical Provider, MD   divalproex (DEPAKOTE ER) 250 MG extended release tablet Take 750 mg by mouth at bedtime    Historical Provider, MD   furosemide (LASIX) 20 MG tablet Take 20 mg by mouth daily    Historical Provider, MD   venlafaxine (EFFEXOR XR) 150 MG extended release capsule Take 1 capsule by mouth daily  Patient taking differently: Take 225 mg by mouth daily  6/23/20   Melissa Andrea MD   simethicone (MYLICON) 80 MG chewable tablet Take 1 tablet by mouth every 6 hours as needed for Flatulence  Patient taking differently: Take 80 mg by mouth every 8 hours as needed for Flatulence  6/22/20 Erskin Oppenheim, MD   lidocaine (XYLOCAINE) 5 % ointment Apply topically daily as needed for Pain Apply topically as needed. LF 4/20/20 (30 day supply)  Patient not taking: Reported on 3/23/2022    Historical Provider, MD   metFORMIN (GLUCOPHAGE) 1000 MG tablet Take 1,000 mg by mouth 2 times daily (with meals) LF 4/27/20 (90 day supply)  Patient not taking: Reported on 3/21/2022    Historical Provider, MD   losartan (COZAAR) 25 MG tablet Take 25 mg by mouth daily     Historical Provider, MD   aspirin 81 MG chewable tablet Take 81 mg by mouth daily  Patient not taking: Reported on 3/21/2022    Historical Provider, MD   divalproex (DEPAKOTE ER) 500 MG extended release tablet Take 500 mg by mouth every morning     Historical Provider, MD   traZODone (DESYREL) 100 MG tablet Take 150 mg by mouth nightly as needed LF 5/1/20 (30 day supply)    Historical Provider, MD   cetirizine (ZYRTEC) 10 MG tablet Take 10 mg by mouth daily LF 4/6/20 (90 day supply)  Patient not taking: Reported on 3/21/2022    Historical Provider, MD   atorvastatin (LIPITOR) 80 MG tablet Take 40 mg by mouth daily Take 1/2 tablet (40 mg) daily  LF 4/22/20 (90 day supply)  Patient not taking: Reported on 3/21/2022    Historical Provider, MD   amLODIPine (NORVASC) 10 MG tablet Take 7.5 mg by mouth daily     Historical Provider, MD   sildenafil (VIAGRA) 100 MG tablet Take 100 mg by mouth as needed for Erectile Dysfunction LF 5/12/20 (30 day supply)    Historical Provider, MD   omeprazole (PRILOSEC) 20 MG delayed release capsule Take 20 mg by mouth every evening LF 4/21/20 (90 day supply)    Historical Provider, MD        Allergies:     Latex, Bee venom, Lisinopril, Niacin and related, Sulfa antibiotics, and Terazosin    Social History:     Tobacco:    reports that he quit smoking about 50 years ago. His smoking use included cigarettes. He has a 2.00 pack-year smoking history.  He has never used smokeless tobacco.  Alcohol:      reports current alcohol use.  Drug Use:  reports current drug use. Drug: Marijuana Dauna Other). Family History:     Family History   Problem Relation Age of Onset   [place] Favorite Dementia Mother     Coronary Art Dis Mother     Stroke Mother     Diabetes Mother     Asthma Mother     Other Mother         BRAIN ANEURISM    High Blood Pressure Mother     Heart Disease Father     Diabetes Sister     Diabetes Brother     High Blood Pressure Brother     High Cholesterol Brother     Heart Disease Brother     Diabetes Sister     Other Sister         NEUROPATHY       Review of Systems:     Positive and Negative as described in HPI. CONSTITUTIONAL:  negative for fevers, chills, sweats, fatigue, weight loss  HEENT:  negative for vision, hearing changes, runny nose, throat pain  RESPIRATORY:  negative for shortness of breath, cough, congestion, wheezing. CARDIOVASCULAR:  negative for chest pain, palpitations. GASTROINTESTINAL:  negative for nausea, vomiting, diarrhea, constipation, change in bowel habits, abdominal pain   GENITOURINARY:  negative for difficulty of urination, burning with urination, frequency   INTEGUMENT:  negative for rash, skin lesions, easy bruising   HEMATOLOGIC/LYMPHATIC:  negative for swelling/edema   ALLERGIC/IMMUNOLOGIC:  negative for urticaria , itching  ENDOCRINE:  negative increase in drinking, increase in urination, hot or cold intolerance  MUSCULOSKELETAL:  negative joint pains, muscle aches, swelling of joints  NEUROLOGICAL: Weakness in all 4 extremities  BEHAVIOR/PSYCH:      Physical Exam:     /72   Pulse 91   Temp 97.3 °F (36.3 °C) (Oral)   Resp 16   Ht 5' 3\" (1.6 m)   Wt 142 lb (64.4 kg)   SpO2 95%   BMI 25.15 kg/m²   Temp (24hrs), Av.9 °F (36.6 °C), Min:97.3 °F (36.3 °C), Max:98.6 °F (37 °C)    No results for input(s): POCGLU in the last 72 hours.     Intake/Output Summary (Last 24 hours) at 2022 1932  Last data filed at 2022 1132  Gross per 24 hour   Intake 100 ml   Output 750 ml   Net -650 ml       General Appearance:  alert, well appearing, and in no acute distress  Mental status: oriented to person, place, and time with normal affect  Head:  normocephalic, atraumatic. Eye: no icterus, redness, pupils equal and reactive, extraocular eye movements intact, conjunctiva clear  Ear: normal external ear, no discharge, hearing intact  Nose:  no drainage noted  Mouth: mucous membranes moist  Neck: supple, no carotid bruits, thyroid not palpable , staples present posterior neck, healing  Lungs: Bilateral equal air entry, clear to ausculation, no wheezing, rales or rhonchi, normal effort  Cardiovascular: normal rate, regular rhythm, no murmur, gallop, rub. Abdomen: Soft, nontender, nondistended, normal bowel sounds, no hepatomegaly or splenomegaly  Neurologic: Weakness in all 4 extremities, power in all 4 extremities 4/5 skin: No gross lesions, rashes, bruising or bleeding on exposed skin area  Extremities:  peripheral pulses palpable, no pedal edema or calf pain with palpation  Psych: Investigations:      Laboratory Testing:  No results found for this or any previous visit (from the past 24 hour(s)). Consultations:   IP CONSULT TO DIETITIAN  IP CONSULT TO SOCIAL WORK  IP CONSULT TO INTERNAL MEDICINE  IP CONSULT TO DIETITIAN  IP CONSULT TO PSYCHIATRY  IP CONSULT TO PSYCHIATRY  Assessment :      Primary Problem  Cervical stenosis of spine    Active Hospital Problems    Diagnosis Date Noted    Cervical stenosis of spine [M48.02] 03/31/2022       Plan:     1. Quadriparesis, due to cervical cord compression after fall s/p C2-C5 decompression surgery. 2. Hypertension, controlled restart home dose of Norvasc, losartan, will avoid beta-blockers since patient developed bradycardia in [place]  3. Heart failure preserved ejection fraction, compensated , on Lasix  4. GERD, restarted Protonix  5. On DVT prophylaxis Lovenox  6. BPH, started on finasteride  7.  History of glaucoma, restarted home medications of eyedrop  8. Patient has neurological better, will have intermittent catheterization  9. Diabetes, controlled              Aracely Ahumada MD  4/9/2022  7:32 PM    Copy sent to Dr. Leta Quintero    Please note that this chart was generated using voice recognition Dragon dictation software. Although every effort was made to ensure the accuracy of this automated transcription, some errors in transcription may have occurred.

## 2022-04-09 NOTE — PROGRESS NOTES
Physical Medicine & Rehabilitation  Progress Note      Subjective:      Radha Abreu is a 70 y.o. male with cervical stenosis with myelopathy s/p C2-C5 laminectomy and fusion. He reports doing okay today. He appears more tired today than previous days. Occupational therapist also reports that he is falling asleep during therapy sessions. Patient is unsure about starting medication for appetite stimulation. He denies any other acute concerns. ROS:  Denies fevers, chills, sweats.  +dizziness; No chest pain, palpitations  Denies coughing, wheezing or shortness of breath. +nausea  No new areas of joint pain. Denies new areas of numbness or weakness. Denies new anxiety or depression issues. No new skin problems. Rehabilitation:   Progressing in therapies. PT:  Restrictions/Precautions: Fall Risk,General Precautions  Implants present? : Metal implants  Spinal Precautions: Poor Sitting balance, Bilateral Upper  and Lower Body muscle weakness, Assist with standing in GERA Stedy  Other position/activity restrictions: up with assist; s/p C2-C5 laminectomy and fixation 3/24, Collar on while out of bed.  Ok to remove while resting In bed eating and drinking in bed  Required Braces or Orthoses  Cervical: c-collar  Other: Abdominal Binder (per Dr. Asmita Haney 4/7/2022)   Transfers  Sit to Stand: 2 Person [address] [facility] (Ignacio Schulte)  Stand to sit: Dependent/Total [place])  Bed to Chair: Dependent/Total [place].)  Comment: pt defered transfers in AM due to nausea and unable to participate in PM due to inability to stay awake       Transfers  Sit to Stand: 2 Person [address] [facility] [place])  Stand to sit: Dependent/Total [place])  Bed to Chair: Dependent/Total [place].)  Comment: pt defered transfers in AM due to nausea and unable to participate in PM due to inability to stay awake  Ambulation  Ambulation?: No               OT:  ADL  Feeding: Dependent/Total  Grooming: Minimal assistance,Setup,Increased time to complete (A to support RUE for washing face, A for thoroughness)  UE Bathing: Dependent/Total  LE Bathing: Dependent/Total  UE Dressing: Dependent/Total (hospital gown)  LE Dressing: Dependent/Total  Toileting: Dependent/Total  Additional Comments: MANUEL facilitated pt in bathing tasks this date, TA for all tasks with session to increase pt participation and engagement, A to lift/support RUE for washing face and washing abdomen, assist for thoroughness and completion, for LB bathing pt able to lift BLE's for washing/drying and donning TEDs/footies with TA         Balance  Sitting Balance: Maximum assistance (CGA-Max A seated EOB unsupported)  Standing Balance: Dependent/Total   Standing Balance  Time: <30 sec x2  Activity: transfers with elbert nguyen  Comment: Ax2 with AXEL guo to place BUE's on front bar        Bed mobility  Bridging:  Moderate assistance  Rolling to Left: Maximum assistance  Rolling to Right: Maximum assistance  Supine to Sit: Maximum assistance,2 Person assistance  Sit to Supine: Maximum assistance,2 Person assistance  Scooting: Maximal assistance,2 Person assistance  Comment: A for completion with rolling/reaching for bedrail, A to move BLE's  Transfers  Sit to stand: 2 Person assistance,Maximum assistance  Stand to sit: 2 Person assistance,Maximum assistance  Transfer Comments: with AXEL guo x2            Wheelchair Bed Transfers  Wheelchair/Bed - Technique:  (utilizing ss )  Equipment Used: Wheelchair,Bed,Other (ss)  Level of Asssistance: Dependent/Total,2 Person assistance  Wheelchair Transfers Comments: completign transfer to and from  with ss     SPEECH:      Current Medications:   Current Facility-Administered Medications: venlafaxine (EFFEXOR) tablet 75 mg, 75 mg, Oral, TID   senna (SENOKOT) tablet 17.2 mg, 2 tablet, Oral, Nightly PRN  bisacodyl (DULCOLAX) suppository 10 mg, 10 mg, Rectal, QPM  lidocaine 4 % external patch 2 patch, 2 patch, TransDERmal, Daily  hydroCHLOROthiazide (HYDRODIURIL) tablet 25 mg, 25 mg, Oral, Daily  finasteride (PROSCAR) tablet 5 mg, 5 mg, Oral, Daily  carboxymethylcellulose (THERATEARS) 1 % ophthalmic gel 2 drop, 2 drop, Both Eyes, TID  enoxaparin (LOVENOX) injection 40 mg, 40 mg, SubCUTAneous, Daily  ondansetron (ZOFRAN-ODT) disintegrating tablet 4 mg, 4 mg, Oral, Q8H PRN **OR** ondansetron (ZOFRAN) injection 4 mg, 4 mg, IntraVENous, Q6H PRN  glucagon (rDNA) injection 1 mg, 1 mg, IntraMUSCular, PRN  glucose (GLUTOSE) 40 % oral gel 15 g, 15 g, Oral, PRN  amLODIPine (NORVASC) tablet 10 mg, 10 mg, Oral, Daily  cyclobenzaprine (FLEXERIL) tablet 5 mg, 5 mg, Oral, TID PRN  [Held by provider] divalproex (DEPAKOTE ER) extended release tablet 500 mg, 500 mg, Oral, QAM  divalproex (DEPAKOTE ER) extended release tablet 750 mg, 750 mg, Oral, Nightly  furosemide (LASIX) tablet 20 mg, 20 mg, Oral, Daily  losartan (COZAAR) tablet 100 mg, 100 mg, Oral, Daily  pantoprazole (PROTONIX) tablet 40 mg, 40 mg, Oral, QAM AC  acetaminophen (TYLENOL) tablet 650 mg, 650 mg, Oral, Q4H PRN  polyethylene glycol (GLYCOLAX) packet 17 g, 17 g, Oral, Daily      Objective:  /72   Pulse 91   Temp 97.3 °F (36.3 °C) (Oral)   Resp 16   Ht 5' 3\" (1.6 m)   Wt 142 lb (64.4 kg)   SpO2 95%   BMI 25.15 kg/m²       GEN: Well developed, well nourished, no acute distress  HEENT: NCAT. EOM grossly intact. Hearing grossly intact. Mucous membranes pink and moist.  RESP: Normal breath sounds with no wheezing, rales, or rhonchi. Respirations WNL and unlabored. CV: Regular rate and rhythm. No murmurs, rubs, or gallops. ABD: Soft, non-distended, BS+ and equal.  NEURO:  Drowsy but arousable. Has difficulty staying awake to answer questions but is able to provide some history. MSK:  Muscle bulk is normal bilaterally. Strength 4+/5 with right elbow flexion and extension. Strength 4/5 right . Strength 1/5 with left elbow flexion.   Strength 4/5 with left . Able to dorsiflex and plantarflex the bilateral ankles. SKIN: Warm and dry with good turgor. PSYCH: Mood WNL. Flat affect. Diagnostics:     CBC:   Recent Labs     04/08/22  0613   WBC 10.1   RBC 3.84*   HGB 12.5*   HCT 35.2*   MCV 91.9   RDW 13.2   *     BMP:   Recent Labs     04/08/22  0613   *   K 4.5   CL 89*   CO2 31   BUN 33*   CREATININE 0.76   GLUCOSE 94     BNP: No results for input(s): BNP in the last 72 hours. PT/INR: No results for input(s): PROTIME, INR in the last 72 hours. APTT: No results for input(s): APTT in the last 72 hours. CARDIAC ENZYMES: No results for input(s): CKMB, CKMBINDEX, TROPONINT in the last 72 hours. Invalid input(s): CKTOTAL;3  FASTING LIPID PANEL:  Lab Results   Component Value Date    CHOL 104 04/12/2018    HDL 42 04/12/2018    TRIG 92 04/12/2018     LIVER PROFILE: No results for input(s): AST, ALT, ALB, BILIDIR, BILITOT, ALKPHOS in the last 72 hours. Impression/Plan:   Impaired ADLs, gait, and mobility due to:    1. Cervical stenosis with myelopathy:  S/p C2-C5 laminectomy and fusion on 3/24. PT/OT  for gait, mobility, strengthening, endurance, ADLs, and self care. C-collar when out of bed. Pain control with as-needed tylenol, as-needed flexeril. Added lidocaine patches on 4/4. Avoid narcotics, as he did require narcan in acute care. Reached out to neurosurgery on 4/7 regarding appointment on 4/8 - we can remove staples here and follow up will be rescheduled after discharge. Staples removed on 4/8.  2. Neurogenic bladder:  Has not been requiring intermittent catheterization but can continue this as needed. 3. Neurogenic bowel:  Miralax daily, senokot nightly, dulcolax prn - trialing a bowel program with suppository nightly starting 4/7.  4. Dizziness, nausea with sitting upright:  Likely related to cervical spinal cord injury (possible orthostatic hypotension, autonomic dysfunction). NERY hose when out of bed.   Can trial abdominal binder as well. Positive orthostatic vital signs. 5. Subjective dysphagia:  Chronic. SLP did not recommend any change in diet or further therapy. 6. Poor oral intake:  Discussed with dietician. On oral nutrition supplements. Consider appetite stimulant. 7. Hospital-acquired pneumonia:  Completed course of cefepime and doxycycline. 8. Bradycardia:  Resolved after discontinuation of beta blockers  9. CHF:  On lasix  10. HTN:  On amlodipine, lasix, losartan, hydrochlorothiazide  11. HLD:  Not currently on medication  12. Diabetes:  Not currently on medication  13. GIA  14. Depression, PTSD:  On effexor, depakote. Psychiatry consulted per patient request - recommended holding morning depakote, changing effexor to 225mg extended release dose in the morning - patient more fatigued today, will change effexor dose back to 75mg TID with meals. 15. DVT prophylaxis:  Lovenox  16.  Internal Medicine for medical management      Electronically signed by Emma Jones MD on 4/10/2022 at 12:30 AM

## 2022-04-09 NOTE — PROGRESS NOTES
Formerly [facility], later [facility] Medicine    CONSULTATION / HISTORY AND PHYSICAL EXAMINATION            Date:   4/8/2022  Patient name:  Rosalina Velazquez  Date of admission:  3/31/2022  8:38 PM  MRN:   [id number]  Account:  [de-identified]  YOB: 1950  PCP:    Matthew Falk  Room:   0767/8095-10  Code Status:    DNR-CCA    Physician Requesting Consult: Christiane Sanz MD    Reason for Consult:  medical management    Chief Complaint:     No chief complaint on file.       History Obtained From:     Patient medical record nursing staff    History of Present Illness:   Patient past medical multiple medical problems include hypertension, heart failure with preserved ejection fraction, diabetes, bipolar disorder,   glaucoma, patient was originally admitted at [place] where he was transferred from outlying facility, he developed, generalized weakness after a fall, patient underwent MRI of cervical spine suggestive of severe spinal canal stenosis at the level of C2-C4 level, patient underwent C2-C5 posterior decompression surgery, he feels that weakness in his extremities is slightly better  Patient during his hospital stay, developed bradycardia which improved after discontinuation of beta-blocker, cardiology was also consulted  He developed hospital-acquired pneumonia, getting treated with cefepime and doxycycline  Patient denying any complaints of cough, shortness of breath, chest pain,    Past Medical History:     Past Medical History:   Diagnosis Date    Depression 2017    ON RX    Diabetes mellitus ([facility] [address] 2013    NIDDM    Difficult intravenous access     Hyperlipidemia 2008    ON RX    Hypertension 2008    ON RX    Migraines     PTSD (post-traumatic stress disorder) 01/2018    ON RX    Sleep apnea 01/2018    UNALBE TO USE TO CLEARED BY ENT (CPAP)    Teeth missing     BACK TEETH UPPER AND LOWER TO BE FITTED FOR PARTIALS AT LATER DATE    Wears glasses         Past Surgical History:     Past Surgical History:   Procedure Laterality Date    CARDIAC CATHETERIZATION  02/2010    no stents     CARPAL TUNNEL RELEASE Left 01/09/2002    CATARACT REMOVAL      CERVICAL FUSION  04/19/2018    anterior cervical fusion C5-6    CERVICAL FUSION N/A 3/24/2022    C2-5 FUSION, C2-4 LAMINECTOMY performed by Jenna Townsend DO at [address] Left 2018    CATARACT EXTRACTION WITH IOL    HYDROCELE EXCISION  1951    LAMINECTOMY  03/24/2022    C2-5 FUSION, C2-4 LAMINECTOMY    MIDDLE EAR SURGERY  01/11/2018    MIDDLE EAR REBUILT AND EAR DRUM REPLACED    MOUTH SURGERY  04/16/2018    5 TEETH REMOVED    OTHER SURGICAL HISTORY  04/19/2018    : ANTERIOR CERVICAL CORRPECTOMY C5-6, SYNTHES, DEPUY, REG TABLE, SUPINE,     RI OFFICE/OUTPT VISIT,PROCEDURE ONLY N/A 4/19/2018    ANTERIOR CERVICAL CORRPECTOMY AND FUSION C5-6 performed by Jenna Townsend DO at [address]  12/1983        Medications Prior to Admission:     Prior to Admission medications    Medication Sig Start Date End Date Taking?  Authorizing Provider   finasteride (PROSCAR) 5 MG tablet Take 5 mg by mouth daily   Yes Historical Provider, MD   carboxymethylcellulose 1 % ophthalmic solution Place 2 drops into both eyes 3 times daily Pt states \"2 drops in both eyes three times a day\"    Historical Provider, MD   divalproex (DEPAKOTE ER) 250 MG extended release tablet Take 750 mg by mouth at bedtime    Historical Provider, MD   furosemide (LASIX) 20 MG tablet Take 20 mg by mouth daily    Historical Provider, MD   venlafaxine (EFFEXOR XR) 150 MG extended release capsule Take 1 capsule by mouth daily  Patient taking differently: Take 225 mg by mouth daily  6/23/20   Alex Rashid MD   simethicone (MYLICON) 80 MG chewable tablet Take 1 tablet by mouth every 6 hours as needed for Flatulence  Patient taking differently: Take 80 mg by mouth every 8 hours as needed for Flatulence  6/22/20 Pavan Aguayo MD   lidocaine (XYLOCAINE) 5 % ointment Apply topically daily as needed for Pain Apply topically as needed. LF 4/20/20 (30 day supply)  Patient not taking: Reported on 3/23/2022    Historical Provider, MD   metFORMIN (GLUCOPHAGE) 1000 MG tablet Take 1,000 mg by mouth 2 times daily (with meals) LF 4/27/20 (90 day supply)  Patient not taking: Reported on 3/21/2022    Historical Provider, MD   losartan (COZAAR) 25 MG tablet Take 25 mg by mouth daily     Historical Provider, MD   aspirin 81 MG chewable tablet Take 81 mg by mouth daily  Patient not taking: Reported on 3/21/2022    Historical Provider, MD   divalproex (DEPAKOTE ER) 500 MG extended release tablet Take 500 mg by mouth every morning     Historical Provider, MD   traZODone (DESYREL) 100 MG tablet Take 150 mg by mouth nightly as needed LF 5/1/20 (30 day supply)    Historical Provider, MD   cetirizine (ZYRTEC) 10 MG tablet Take 10 mg by mouth daily LF 4/6/20 (90 day supply)  Patient not taking: Reported on 3/21/2022    Historical Provider, MD   atorvastatin (LIPITOR) 80 MG tablet Take 40 mg by mouth daily Take 1/2 tablet (40 mg) daily  LF 4/22/20 (90 day supply)  Patient not taking: Reported on 3/21/2022    Historical Provider, MD   amLODIPine (NORVASC) 10 MG tablet Take 7.5 mg by mouth daily     Historical Provider, MD   sildenafil (VIAGRA) 100 MG tablet Take 100 mg by mouth as needed for Erectile Dysfunction LF 5/12/20 (30 day supply)    Historical Provider, MD   omeprazole (PRILOSEC) 20 MG delayed release capsule Take 20 mg by mouth every evening LF 4/21/20 (90 day supply)    Historical Provider, MD        Allergies:     Latex, Bee venom, Lisinopril, Niacin and related, Sulfa antibiotics, and Terazosin    Social History:     Tobacco:    reports that he quit smoking about 50 years ago. His smoking use included cigarettes. He has a 2.00 pack-year smoking history.  He has never used smokeless tobacco.  Alcohol:      reports current alcohol use.  Drug Use:  reports current drug use. Drug: Marijuana Birdie Wise). Family History:     Family History   Problem Relation Age of Onset   Regi Oconnor Dementia Mother     Coronary Art Dis Mother     Stroke Mother     Diabetes Mother     Asthma Mother     Other Mother         BRAIN ANEURISM    High Blood Pressure Mother     Heart Disease Father     Diabetes Sister     Diabetes Brother     High Blood Pressure Brother     High Cholesterol Brother     Heart Disease Brother     Diabetes Sister     Other Sister         NEUROPATHY       Review of Systems:     Positive and Negative as described in HPI. CONSTITUTIONAL:  negative for fevers, chills, sweats, fatigue, weight loss  HEENT:  negative for vision, hearing changes, runny nose, throat pain  RESPIRATORY:  negative for shortness of breath, cough, congestion, wheezing. CARDIOVASCULAR:  negative for chest pain, palpitations. GASTROINTESTINAL:  negative for nausea, vomiting, diarrhea, constipation, change in bowel habits, abdominal pain   GENITOURINARY:  negative for difficulty of urination, burning with urination, frequency   INTEGUMENT:  negative for rash, skin lesions, easy bruising   HEMATOLOGIC/LYMPHATIC:  negative for swelling/edema   ALLERGIC/IMMUNOLOGIC:  negative for urticaria , itching  ENDOCRINE:  negative increase in drinking, increase in urination, hot or cold intolerance  MUSCULOSKELETAL:  negative joint pains, muscle aches, swelling of joints  NEUROLOGICAL: Weakness in all 4 extremities  BEHAVIOR/PSYCH:      Physical Exam:     BP (!) 124/52   Pulse 80   Temp 98.6 °F (37 °C) (Oral)   Resp 16   Ht 5' 3\" (1.6 m)   Wt 142 lb (64.4 kg)   SpO2 94%   BMI 25.15 kg/m²   Temp (24hrs), Av.4 °F (36.9 °C), Min:98.1 °F (36.7 °C), Max:98.6 °F (37 °C)    No results for input(s): POCGLU in the last 72 hours.     Intake/Output Summary (Last 24 hours) at 2022 2232  Last data filed at 2022 1831  Gross per 24 hour   Intake 720 ml   Output 350 ml   Net 370 ml       General Appearance:  alert, well appearing, and in no acute distress  Mental status: oriented to person, place, and time with normal affect  Head:  normocephalic, atraumatic. Eye: no icterus, redness, pupils equal and reactive, extraocular eye movements intact, conjunctiva clear  Ear: normal external ear, no discharge, hearing intact  Nose:  no drainage noted  Mouth: mucous membranes moist  Neck: supple, no carotid bruits, thyroid not palpable , staples present posterior neck, healing  Lungs: Bilateral equal air entry, clear to ausculation, no wheezing, rales or rhonchi, normal effort  Cardiovascular: normal rate, regular rhythm, no murmur, gallop, rub. Abdomen: Soft, nontender, nondistended, normal bowel sounds, no hepatomegaly or splenomegaly  Neurologic: Weakness in all 4 extremities, power in all 4 extremities 4/5 skin: No gross lesions, rashes, bruising or bleeding on exposed skin area  Extremities:  peripheral pulses palpable, no pedal edema or calf pain with palpation  Psych:      Investigations:      Laboratory Testing:  Recent Results (from the past 24 hour(s))   Basic Metabolic Panel w/ Reflex to MG    Collection Time: 04/08/22  6:13 AM   Result Value Ref Range    Glucose 94 70 - 99 mg/dL    BUN 33 (H) 8 - 23 mg/dL    CREATININE 0.76 0.70 - 1.20 mg/dL    Calcium 9.7 8.6 - 10.4 mg/dL    Sodium 130 (L) 135 - 144 mmol/L    Potassium 4.5 3.7 - 5.3 mmol/L    Chloride 89 (L) 98 - 107 mmol/L    CO2 31 20 - 31 mmol/L    Anion Gap 10 9 - 17 mmol/L    GFR Non-African American >60 >60 mL/min    GFR African American >60 >60 mL/min    GFR Comment         CBC auto differential    Collection Time: 04/08/22  6:13 AM   Result Value Ref Range    WBC 10.1 3.5 - 11.0 k/uL    RBC 3.84 (L) 4.5 - 5.9 m/uL    Hemoglobin 12.5 (L) 13.5 - 17.5 g/dL    Hematocrit 35.2 (L) 41 - 53 %    MCV 91.9 80 - 100 fL    MCH 32.6 26 - 34 pg    MCHC 35.4 31 - 37 g/dL    RDW 13.2 11.5 - 14.9 %    Platelets 249 (H) 154 - 450 k/uL MPV 7.6 6.0 - 12.0 fL    Seg Neutrophils 68 (H) 36 - 66 %    Lymphocytes 19 (L) 24 - 44 %    Monocytes 11 (H) 1 - 7 %    Eosinophils % 0 0 - 4 %    Basophils 0 0 - 2 %    Bands 1 0 - 10 %    Metamyelocytes 1 (H) 0 %    Segs Absolute 6.87 1.3 - 9.1 k/uL    Absolute Lymph # 1.92 1.0 - 4.8 k/uL    Absolute Mono # 1.11 0.1 - 1.3 k/uL    Absolute Eos # 0.00 0.0 - 0.4 k/uL    Basophils Absolute 0.00 0.0 - 0.2 k/uL    Absolute Bands # 0.10 0.0 - 1.0 k/uL    Metamyelocytes Absolute 0.10 (H) 0 k/uL    Morphology FEW GIANT PLATELETS            Consultations:   IP CONSULT TO DIETITIAN  IP CONSULT TO SOCIAL WORK  IP CONSULT TO INTERNAL MEDICINE  IP CONSULT TO DIETITIAN  IP CONSULT TO PSYCHIATRY  Assessment :      Primary Problem  Cervical stenosis of spine    Active Hospital Problems    Diagnosis Date Noted    Cervical stenosis of spine [M48.02] 03/31/2022       Plan:     1. Quadriparesis, due to cervical cord compression after fall s/p C2-C5 decompression surgery. 2. Hypertension, controlled restart home dose of Norvasc, losartan, will avoid beta-blockers since patient developed bradycardia in [facility]  3. Heart failure preserved ejection fraction, compensated , on Lasix  4. GERD, restarted Protonix  5. On DVT prophylaxis Lovenox  6. BPH, started on finasteride  7. History of glaucoma, restarted home medications of eyedrop  8. Patient has neurological better, will have intermittent catheterization  9.  Diabetes, controlled  4/2  Patient blood pressure is controlled  No new complaints  4/3   Patient feels that his strength is getting better  Starting patient on hydrochlorothiazide with readings of elevated blood pressures, advised low-salt diet    4/4  BP running high   Low salt diet   CHF, compensated  , on lasix, watch for electrolytes   DM, sugars   C2-5 surgery , quadriparesis  PT/OT        4/5  , Blood pressure still running on the higher side,  Low-salt and low-fat diet,  We will adjust the medications,  Congestive heart failure compensated, on Lasix, monitor electrolytes,  Diabetes blood sugars running well,  C2-C5 surgery, quadriparesis, working with physical therapy, not able to walk yet,  Anemia of chronic disease, last hemoglobin was 11,    4/6  Severe spinal stenosis, status post surgery, bilateral upper and lower extremity weakness at this time,  Congestive heart failure, compensated, on Lasix,  Diabetes mellitus, sugars running well,  Anemia of chronic disease, hemoglobin stable,  Labs, radiology, medications reviewed,  PT OT    4/7  Severe spinal stenosis, status post surgery, bilateral upper and lower extremity weakness   Congestive heart failure, compensated, on Lasix,  Diabetes mellitus, sugars running well,  Anemia of chronic disease, hemoglobin stable,  Labs, radiology, medications reviewed,  Tolerating PT/OT   Improving slowly     4/8  Severe spinal stenosis status post surgery,  Bilateral lower extremity and upper extremity weakness,  Working with physical therapy,  Congestive heart failure compensated on Lasix,  Diabetes mellitus sugars running well,  Anemia of chronic disease, hemoglobin 12,  Labs, radiology, medications reviewed,      Vishal Alford MD  4/8/2022  10:32 PM    Copy sent to Dr. Eileen Waldrop    Please note that this chart was generated using voice recognition Dragon dictation software. Although every effort was made to ensure the accuracy of this automated transcription, some errors in transcription may have occurred.

## 2022-04-09 NOTE — PROGRESS NOTES
[address] [facility]    ACUTE REHABILITATION OCCUPATIONAL THERAPY  DAILY NOTE    Date: 22  Patient Name: Benjamin Carney      Room: 0699/7113-01    MRN: [id number]   : 1950  (70 y.o.)  Gender: male   Referring Practitioner: Ej Bains MD  Diagnosis: Cervical stenosis with myelopathy s/p C2-C5 laminectomy and fusion   Additional Pertinent Hx: Benjamin Carney is a 70 y.o. male with history of HTN, HLD, diabetes, GIA, migraines, PTSD, and depression admitted to [facility] on 3/23/2022. He initially presented to [facility] after a fall with subsequent worsening limb weakness and additional falls. MRI brain showed no acute intracranial abnormality. MRI cervical spine showed severe spinal stenosis at C2-C3 and C3-C4 with complete effacement of the CSF. MRI thoracic and lumbar spine were relatively unremarkable except for moderate spinal stenosis from L2-L3 to L4-L5. He was transferred to [facility] for neurosurgical evaluation. He underwent laminectomy and fusion at C2-C5 on 3/24/22 (Dr. Noble Toro). Hospital course has been complicated by urinary retention. He reports continued neck pain with pain in the limbs as well. He also notes numbness/tingling and weakness in all limbs. Pt admitted to rehab unit on 3/31/22    Restrictions  Restrictions/Precautions: Fall Risk,General Precautions  Implants present? : Metal implants  Spinal Precautions: Poor Sitting balance, Bilateral Upper  and Lower Body muscle weakness, Assist with standing in GERA Stedy  Other position/activity restrictions: up with assist; s/p C2-C5 laminectomy and fixation 3/24, Collar on while out of bed.  Ok to remove while resting In bed eating and drinking in bed  Required Braces or Orthoses  Cervical: c-collar  Other: Abdominal Binder (per Dr. Felicia Rosas 2022)  Required Braces or Orthoses?: Yes  Equipment Used: Wheelchair,Bed,Other (ss)    Subjective  Subjective: \"I'm so tired\"  Comments: pt having difficulty waking up this morning, several V/T cues provided to keep pt awake, pt observed falling asleep often during session  Patient Currently in Pain: Yes  Pain Level: 5  Pain Location: Back;Neck; Shoulder  Pain Orientation: Right;Left;Lower;Mid;Posterior  Restrictions/Precautions: Fall Risk;General Precautions  Overall Orientation Status: Within Functional Limits  Patient Observation  Observations: pt fatigued, falling asleep often throughout session  Pain Assessment  Pain Assessment: 0-10  Pain Level: 5  Pain Type: Acute pain,Chronic pain  Pain Location: Back,Neck,Shoulder  Pain Orientation: Right,Left,Lower,Mid,Posterior  Pain Radiating Towards: shoulders  Pain Descriptors: Aching,Sore    Objective  Cognition  Overall Cognitive Status: WFL  Perception  Overall Perceptual Status: WFL  Balance  Sitting Balance: Maximum assistance (CGA-Max A seated EOB unsupported)  Bed mobility  Rolling to Left: Moderate assistance (A x1)  Rolling to Right: Moderate assistance (Ax1)  Supine to Sit: Maximum assistance;2 Person assistance  Sit to Supine: Maximum assistance;2 Person assistance  Scooting: Maximal assistance;2 Person assistance (pt able to flex BLE's and assist with moving HOB)  Comment: A for completion with rolling/reaching for bedrail, A to move BLE's                 Additional Activities: this writer attempted to engage pt during OT session, pt reclined in w/c sleeping upon arrival, w/c positioning provided, pt falling asleep throughout session req max cuing to rouse, pt unable to remain awake for engagement this date, NSG made aware                 ADL  Feeding: Dependent/Total  Grooming: Minimal assistance;Setup; Increased time to complete (A to support RUE for washing face, A for thoroughness)  UE Bathing: Dependent/Total  LE Bathing: Dependent/Total  UE Dressing: Dependent/Total (hospital gown)  LE Dressing: Dependent/Total  Toileting: Dependent/Total  Additional Comments: MANUEL facilitated pt in bathing tasks this date, TA for all tasks with session to increase pt participation and engagement, A to lift/support RUE for washing face and washing abdomen, assist for thoroughness and completion, for LB bathing pt able to lift BLE's for washing/drying and donning TEDs/footies with TA  Positioning  Wheelchair Postion Comment: this writer attempted to use pillows for comfort with BUE's while seated in w/c, pt initially stated pillows were comfortable but then asking this writer to remove due to making him to warm, leg rests elevated to highest level for comfort and foot pedals pushed aside, pt reports foot pedals were uncomfortable on bottoms of feet, NSG made aware       Assessment  Performance deficits / Impairments: Decreased ADL status; Decreased functional mobility ; Decreased ROM; Decreased strength;Decreased endurance;Decreased sensation;Decreased balance;Decreased high-level IADLs;Decreased fine motor control;Decreased coordination;Decreased posture  Prognosis: Fair  Discharge Recommendations: Patient would benefit from continued therapy after discharge;Continue to assess pending progress  Activity Tolerance: Patient limited by fatigue;Treatment limited secondary to medical complications (free text) (nauseated while sitting EOB)  Activity Tolerance: heavy fatigue in PM, unable to engage in OT, NSG made aware  Safety Devices in place: Yes  Type of devices: Left in bed (with PT, seated in w/c in PM session )  Equipment Recommendations  Equipment Needed:  (TBD)       Patient Education: OT POC, bed mobility, sitting balance, ADL tasks-increasing indep, therapy engagement   Patient Goals   Patient goals : \"I just want to be able to get back to my basic needs. \"  Learner:patient  Method: demonstration and explanation       Outcome: needs reinforcement        Plan  Plan  Times per week: 900 minutes/week for combined therapy of OT/PT due to decreaed tolerance to activity.   Times per day: Twice a day  Current Treatment Recommendations: Self-Care / ADL,Strengthening,ROM,Balance Training,Functional Mobility Training,Endurance Training,Pain Management,Safety Education & Training,Patient/Caregiver Education & Training,Equipment Evaluation, Education, & procurement,Positioning  Patient Goals   Patient goals : \"I just want to be able to get back to my basic needs. \"  Short term goals  Time Frame for Short term goals: By 10 days  Short term goal 1: Pt will complete upper body dressing/bathing with max A with use of AE as needed while maintaining cervical precautions  Short term goal 2: Pt will complete self feeding task with max A with adaptive strategies and good safety  Short term goal 3: Pt will tolerate sitting EOB 5+ minutes unsupported with mod A during functional activity to increase independence with self care and mobility  Short term goal 4: Pt will complete functional transfers during self care tasks with mod A and good safety  Short term goal 5: Pt will participate in 30+ minutes of therapeutic exercises/functional activities to increase safety and independence with self care and mobility  Long term goals  Time Frame for Long term goals : By discharge  Long term goal 1: Pt will complete self feeding task with mod A with use of adaptive strategies   Long term goal 2: Pt will complete upper body bathing/dressing with mod A and good safety while maintaining cervical precautions  Long term goal 3: Pt will tolerate sitting EOB 10+ minutes unsupported with CGA and good safety during functional activity of choice  Long term goal 4: Pt will complete functional transfer with min A and good safety during self care tasks  Long term goal 5: Pt will demonstrated increased BUE gross motor/fine motor to increase participation in self care tasks  Long term goals 6: Pt/family with verbalize/demosntrate Good understanding of cervical precautions during self care tasks  Long term goal 7: Pt/family will demonstrate Good understanding of BUE exercises to increase functional use of BUE during self care tasks        04/09/22 0911 04/09/22 1350   OT Individual Minutes   Time In 0911 1350   Time Out 0930 1400   Minutes 19 10   OT Co-Treatment Minutes   Time In   (0930)  --    Time Out   (9103)  --      Electronically signed by Ann LOZADA on 4/9/22 at 3:11 PM EDT

## 2022-04-09 NOTE — PROGRESS NOTES
Pt states \"I've tired of being in this chair, please put me back to bed. This makes me too sore. \" Pt assisted back to bed x 2 assist and elbert oneil.

## 2022-04-09 NOTE — PROGRESS NOTES
Perfect served psychiatrist the following text;    pt is extremely lethargic throughout day and wife states patient is seeing things and hearing things at 17\":00 while she is here. Pt unable to stay awake to participate with pt or ot today after medication change recommendations. Awaiting response.

## 2022-04-09 NOTE — PROGRESS NOTES
Perfect served on call charge nurse psychiatry the following message; Lethargy after psychiatric medication changes made by Dr Michelle Carbone.  #[phone] Room #0367 Needs callback

## 2022-04-10 PROCEDURE — 6370000000 HC RX 637 (ALT 250 FOR IP): Performed by: PHYSICAL MEDICINE & REHABILITATION

## 2022-04-10 PROCEDURE — 6360000002 HC RX W HCPCS: Performed by: NURSE PRACTITIONER

## 2022-04-10 PROCEDURE — 99232 SBSQ HOSP IP/OBS MODERATE 35: CPT | Performed by: PSYCHIATRY & NEUROLOGY

## 2022-04-10 PROCEDURE — 1180000000 HC REHAB R&B

## 2022-04-10 PROCEDURE — 97110 THERAPEUTIC EXERCISES: CPT

## 2022-04-10 PROCEDURE — 97535 SELF CARE MNGMENT TRAINING: CPT

## 2022-04-10 PROCEDURE — 6370000000 HC RX 637 (ALT 250 FOR IP): Performed by: STUDENT IN AN ORGANIZED HEALTH CARE EDUCATION/TRAINING PROGRAM

## 2022-04-10 PROCEDURE — 6370000000 HC RX 637 (ALT 250 FOR IP): Performed by: PSYCHIATRY & NEUROLOGY

## 2022-04-10 PROCEDURE — 99232 SBSQ HOSP IP/OBS MODERATE 35: CPT | Performed by: INTERNAL MEDICINE

## 2022-04-10 PROCEDURE — 6370000000 HC RX 637 (ALT 250 FOR IP): Performed by: INTERNAL MEDICINE

## 2022-04-10 PROCEDURE — 6370000000 HC RX 637 (ALT 250 FOR IP): Performed by: NURSE PRACTITIONER

## 2022-04-10 PROCEDURE — 97530 THERAPEUTIC ACTIVITIES: CPT

## 2022-04-10 PROCEDURE — 99232 SBSQ HOSP IP/OBS MODERATE 35: CPT | Performed by: STUDENT IN AN ORGANIZED HEALTH CARE EDUCATION/TRAINING PROGRAM

## 2022-04-10 RX ORDER — VENLAFAXINE 75 MG/1
75 TABLET ORAL
Status: DISCONTINUED | OUTPATIENT
Start: 2022-04-10 | End: 2022-04-15 | Stop reason: HOSPADM

## 2022-04-10 RX ORDER — LOSARTAN POTASSIUM 100 MG/1
100 TABLET ORAL NIGHTLY
Status: DISCONTINUED | OUTPATIENT
Start: 2022-04-11 | End: 2022-04-12

## 2022-04-10 RX ORDER — DIVALPROEX SODIUM 500 MG/1
500 TABLET, EXTENDED RELEASE ORAL NIGHTLY
Status: DISCONTINUED | OUTPATIENT
Start: 2022-04-10 | End: 2022-04-15 | Stop reason: HOSPADM

## 2022-04-10 RX ADMIN — VENLAFAXINE 75 MG: 75 TABLET ORAL at 12:27

## 2022-04-10 RX ADMIN — CARBOXYMETHYLCELLULOSE SODIUM 2 DROP: 10 GEL OPHTHALMIC at 20:33

## 2022-04-10 RX ADMIN — ACETAMINOPHEN 650 MG: 325 TABLET ORAL at 20:33

## 2022-04-10 RX ADMIN — DIVALPROEX SODIUM 500 MG: 500 TABLET, EXTENDED RELEASE ORAL at 20:33

## 2022-04-10 RX ADMIN — POLYETHYLENE GLYCOL 3350 17 G: 17 POWDER, FOR SOLUTION ORAL at 08:39

## 2022-04-10 RX ADMIN — FUROSEMIDE 20 MG: 20 TABLET ORAL at 08:39

## 2022-04-10 RX ADMIN — FINASTERIDE 5 MG: 5 TABLET, FILM COATED ORAL at 08:39

## 2022-04-10 RX ADMIN — LOSARTAN POTASSIUM 100 MG: 100 TABLET, FILM COATED ORAL at 08:39

## 2022-04-10 RX ADMIN — CARBOXYMETHYLCELLULOSE SODIUM 2 DROP: 10 GEL OPHTHALMIC at 08:41

## 2022-04-10 RX ADMIN — ENOXAPARIN SODIUM 40 MG: 100 INJECTION SUBCUTANEOUS at 08:39

## 2022-04-10 RX ADMIN — BISACODYL 10 MG: 10 SUPPOSITORY RECTAL at 17:07

## 2022-04-10 RX ADMIN — PANTOPRAZOLE SODIUM 40 MG: 40 TABLET, DELAYED RELEASE ORAL at 06:42

## 2022-04-10 RX ADMIN — HYDROCHLOROTHIAZIDE 25 MG: 25 TABLET ORAL at 08:39

## 2022-04-10 RX ADMIN — VENLAFAXINE 75 MG: 75 TABLET ORAL at 08:39

## 2022-04-10 RX ADMIN — AMLODIPINE BESYLATE 10 MG: 10 TABLET ORAL at 08:39

## 2022-04-10 RX ADMIN — VENLAFAXINE 75 MG: 75 TABLET ORAL at 17:07

## 2022-04-10 ASSESSMENT — PAIN SCALES - GENERAL
PAINLEVEL_OUTOF10: 5
PAINLEVEL_OUTOF10: 7
PAINLEVEL_OUTOF10: 6

## 2022-04-10 ASSESSMENT — PAIN DESCRIPTION - PROGRESSION
CLINICAL_PROGRESSION: GRADUALLY WORSENING
CLINICAL_PROGRESSION: NOT CHANGED

## 2022-04-10 ASSESSMENT — PAIN DESCRIPTION - ONSET
ONSET: ON-GOING
ONSET: ON-GOING

## 2022-04-10 ASSESSMENT — PAIN DESCRIPTION - DESCRIPTORS
DESCRIPTORS: DISCOMFORT;ACHING
DESCRIPTORS: ACHING
DESCRIPTORS: ACHING;DISCOMFORT

## 2022-04-10 ASSESSMENT — PAIN DESCRIPTION - PAIN TYPE
TYPE: ACUTE PAIN;SURGICAL PAIN
TYPE: ACUTE PAIN;SURGICAL PAIN
TYPE: ACUTE PAIN

## 2022-04-10 ASSESSMENT — PAIN DESCRIPTION - DIRECTION: RADIATING_TOWARDS: SHOULDER

## 2022-04-10 ASSESSMENT — PAIN DESCRIPTION - FREQUENCY
FREQUENCY: INTERMITTENT
FREQUENCY: INTERMITTENT

## 2022-04-10 ASSESSMENT — PAIN - FUNCTIONAL ASSESSMENT: PAIN_FUNCTIONAL_ASSESSMENT: PREVENTS OR INTERFERES SOME ACTIVE ACTIVITIES AND ADLS

## 2022-04-10 ASSESSMENT — PAIN DESCRIPTION - LOCATION
LOCATION: NECK;CHEST
LOCATION: CHEST
LOCATION: NECK;CHEST;BACK

## 2022-04-10 NOTE — PROGRESS NOTES
Daily Progress Note  Tesfaye Choe MD  4/10/2022  CHIEF COMPLAINT: Depression    Reviewed patient's current plan of care and vital signs with nursing staff. Sleep:  8 hours last night      SUBJECTIVE:    Psychiatry was called back to address recent medication changes made by Dr. Kevin Bradford. Spoke with patient, his wife, and his nurse. Reportedly after patient was switched to Effexor 225 mg once daily in the morning of the extended release, patient became sedated yesterday. Nurse reports to the side that it is not very different from how the patient had been but the patient's wife is quite convinced that his sedation was related to the medication change. They have already switch back to 75 mg 3 times daily of the instant release. Of note the nurse reports that he is actually more alert and awake since decreasing Depakote. He has been participating in his physical therapy and Occupational Therapy better. Discussed continued decrease of the Depakote. Nurse reports there is no agitation or aggression from the patient. Clarified the patient has no history of seizure with wife. Depakote appears to have been added at some point for agitation or mood stability but there is no history of bipolar disorder and patient was experiencing ataxia.   After discussion of risk benefits and alternatives mutually agreed to decrease Depakote to 500 mg at bedtime    Mental Status Exam  Level of consciousness:  Within normal limits  Appearance: Hospital attire, seated in chair, with good grooming and hygiene   Behavior/Motor: No abnormalities noted  Attitude toward examiner:  Cooperative, attentive, good eye contact  Speech:  spontaneous, normal rate, normal volume and well articulated  Mood: \"Okay\"  Affect: Fair  Thought processes:  linear, goal directed and coherent  Thought content:  denies homicidal ideation  Suicidal Ideation: Denies suicidal ideation  Delusions:  no evidence of delusions  Perceptual Disturbance:  denies any perceptual disturbance  Cognition: Self and general circumstance   memory: age appropriate  Insight & Judgement: improving  Medication side effects:  denies       Data   height is 5' 3\" (1.6 m) and weight is 142 lb (64.4 kg). His temperature is 97.3 °F (36.3 °C). His blood pressure is 152/94 (abnormal) and his pulse is 76. His respiration is 18 and oxygen saturation is 100%. Labs:   Admission on 03/31/2022   Component Date Value Ref Range Status    Glucose 04/01/2022 105* 70 - 99 mg/dL Final    BUN 04/01/2022 15  8 - 23 mg/dL Final    CREATININE 04/01/2022 0.52* 0.70 - 1.20 mg/dL Final    Calcium 04/01/2022 9.1  8.6 - 10.4 mg/dL Final    Sodium 04/01/2022 134* 135 - 144 mmol/L Final    Potassium 04/01/2022 3.9  3.7 - 5.3 mmol/L Final    Chloride 04/01/2022 97* 98 - 107 mmol/L Final    CO2 04/01/2022 27  20 - 31 mmol/L Final    Anion Gap 04/01/2022 10  9 - 17 mmol/L Final    GFR Non- 04/01/2022 >60  >60 mL/min Final    GFR  04/01/2022 >60  >60 mL/min Final    GFR Comment 04/01/2022        Final    Comment: Average GFR for 79or more years old:   76 mL/min/1.73sq m  Chronic Kidney Disease:   <60 mL/min/1.73sq m  Kidney failure:   <15 mL/min/1.73sq m              eGFR calculated using average adult body mass.  Additional eGFR calculator available at:        [email or web address]            WBC 04/01/2022 7.7  3.5 - 11.0 k/uL Final    RBC 04/01/2022 3.41* 4.5 - 5.9 m/uL Final    Hemoglobin 04/01/2022 11.0* 13.5 - 17.5 g/dL Final    Hematocrit 04/01/2022 31.6* 41 - 53 % Final    MCV 04/01/2022 92.8  80 - 100 fL Final    MCH 04/01/2022 32.2  26 - 34 pg Final    MCHC 04/01/2022 34.6  31 - 37 g/dL Final    RDW 04/01/2022 12.8  11.5 - 14.9 % Final    Platelets 63/28/1155 462* 150 - 450 k/uL Final    MPV 04/01/2022 7.0  6.0 - 12.0 fL Final    Seg Neutrophils 04/01/2022 67* 36 - 66 % Final    Lymphocytes 04/01/2022 19* 24 - 44 % Final    Monocytes 04/01/2022 11* 1 - 7 % Final    Eosinophils % 04/01/2022 3  0 - 4 % Final    Basophils 04/01/2022 0  0 - 2 % Final    Segs Absolute 04/01/2022 5.10  1.3 - 9.1 k/uL Final    Absolute Lymph # 04/01/2022 1.50  1.0 - 4.8 k/uL Final    Absolute Mono # 04/01/2022 0.80  0.1 - 1.3 k/uL Final    Absolute Eos # 04/01/2022 0.20  0.0 - 0.4 k/uL Final    Basophils Absolute 04/01/2022 0.00  0.0 - 0.2 k/uL Final    POC Glucose 04/01/2022 105  75 - 110 mg/dL Final    POC Glucose 04/01/2022 101  75 - 110 mg/dL Final    POC Glucose 04/01/2022 209* 75 - 110 mg/dL Final    POC Glucose 04/02/2022 88  75 - 110 mg/dL Final    POC Glucose 04/02/2022 156* 75 - 110 mg/dL Final    POC Glucose 04/03/2022 103  75 - 110 mg/dL Final    Glucose 04/08/2022 94  70 - 99 mg/dL Final    BUN 04/08/2022 33* 8 - 23 mg/dL Final    CREATININE 04/08/2022 0.76  0.70 - 1.20 mg/dL Final    Calcium 04/08/2022 9.7  8.6 - 10.4 mg/dL Final    Sodium 04/08/2022 130* 135 - 144 mmol/L Final    Potassium 04/08/2022 4.5  3.7 - 5.3 mmol/L Final    Chloride 04/08/2022 89* 98 - 107 mmol/L Final    CO2 04/08/2022 31  20 - 31 mmol/L Final    Anion Gap 04/08/2022 10  9 - 17 mmol/L Final    GFR Non- 04/08/2022 >60  >60 mL/min Final    GFR  04/08/2022 >60  >60 mL/min Final    GFR Comment 04/08/2022        Final    Comment: Average GFR for 79or more years old:   76 mL/min/1.73sq m  Chronic Kidney Disease:   <60 mL/min/1.73sq m  Kidney failure:   <15 mL/min/1.73sq m              eGFR calculated using average adult body mass.  Additional eGFR calculator available at:        [email or web address]            WBC 04/08/2022 10.1  3.5 - 11.0 k/uL Final    RBC 04/08/2022 3.84* 4.5 - 5.9 m/uL Final    Hemoglobin 04/08/2022 12.5* 13.5 - 17.5 g/dL Final    Hematocrit 04/08/2022 35.2* 41 - 53 % Final    MCV 04/08/2022 91.9  80 - 100 fL Final    MCH 04/08/2022 32.6  26 - 34 pg Final    MCHC 04/08/2022 35.4  31 - 37 g/dL Final    RDW 04/08/2022 13.2  11.5 - 14.9 % Final    Platelets 26/35/3756 482* 150 - 450 k/uL Final    MPV 04/08/2022 7.6  6.0 - 12.0 fL Final    Seg Neutrophils 04/08/2022 68* 36 - 66 % Final    Lymphocytes 04/08/2022 19* 24 - 44 % Final    Monocytes 04/08/2022 11* 1 - 7 % Final    Eosinophils % 04/08/2022 0  0 - 4 % Final    Basophils 04/08/2022 0  0 - 2 % Final    Bands 04/08/2022 1  0 - 10 % Final    Metamyelocytes 04/08/2022 1* 0 % Final    Segs Absolute 04/08/2022 6.87  1.3 - 9.1 k/uL Final    Absolute Lymph # 04/08/2022 1.92  1.0 - 4.8 k/uL Final    Absolute Mono # 04/08/2022 1.11  0.1 - 1.3 k/uL Final    Absolute Eos # 04/08/2022 0.00  0.0 - 0.4 k/uL Final    Basophils Absolute 04/08/2022 0.00  0.0 - 0.2 k/uL Final    Absolute Bands # 04/08/2022 0.10  0.0 - 1.0 k/uL Final    Metamyelocytes Absolute 04/08/2022 0.10* 0 k/uL Final    Morphology 04/08/2022 FEW GIANT PLATELETS   Final            Medications  Current Facility-Administered Medications: venlafaxine (EFFEXOR) tablet 75 mg, 75 mg, Oral, TID WC  senna (SENOKOT) tablet 17.2 mg, 2 tablet, Oral, Nightly PRN  bisacodyl (DULCOLAX) suppository 10 mg, 10 mg, Rectal, QPM  lidocaine 4 % external patch 2 patch, 2 patch, TransDERmal, Daily  hydroCHLOROthiazide (HYDRODIURIL) tablet 25 mg, 25 mg, Oral, Daily  finasteride (PROSCAR) tablet 5 mg, 5 mg, Oral, Daily  carboxymethylcellulose (THERATEARS) 1 % ophthalmic gel 2 drop, 2 drop, Both Eyes, TID  enoxaparin (LOVENOX) injection 40 mg, 40 mg, SubCUTAneous, Daily  ondansetron (ZOFRAN-ODT) disintegrating tablet 4 mg, 4 mg, Oral, Q8H PRN **OR** ondansetron (ZOFRAN) injection 4 mg, 4 mg, IntraVENous, Q6H PRN  glucagon (rDNA) injection 1 mg, 1 mg, IntraMUSCular, PRN  glucose (GLUTOSE) 40 % oral gel 15 g, 15 g, Oral, PRN  amLODIPine (NORVASC) tablet 10 mg, 10 mg, Oral, Daily  cyclobenzaprine (FLEXERIL) tablet 5 mg, 5 mg, Oral, TID PRN  [Held by provider] divalproex (DEPAKOTE ER) extended release tablet 500 mg, 500 mg, Oral, QAM  divalproex (DEPAKOTE ER) extended release tablet 750 mg, 750 mg, Oral, Nightly  furosemide (LASIX) tablet 20 mg, 20 mg, Oral, Daily  losartan (COZAAR) tablet 100 mg, 100 mg, Oral, Daily  pantoprazole (PROTONIX) tablet 40 mg, 40 mg, Oral, QAM AC  acetaminophen (TYLENOL) tablet 650 mg, 650 mg, Oral, Q4H PRN  polyethylene glycol (GLYCOLAX) packet 17 g, 17 g, Oral, Daily    ASSESSMENT  Cervical stenosis of spine   MDD recurrent moderate     PLAN  Patient s symptoms   are improving  Decrease Depakote to 500 mg by mouth at bedtime  Electronically signed by Trevor Zuñiga MD on 4/10/22 at 1:33 PM EDT    **This report has been created using voice recognition software. It may contain minor errors which are inherent in voice recognition technology. **

## 2022-04-10 NOTE — PROGRESS NOTES
[address] [facility]    ACUTE REHABILITATION OCCUPATIONAL THERAPY  DAILY NOTE    Date: 4/10/22  Patient Name: Evelin Santillan      Room: 9097/1404-11    MRN: [id number]   : 1950  (70 y.o.)  Gender: male   Referring Practitioner: Allen Bowman MD  Diagnosis: Cervical stenosis with myelopathy s/p C2-C5 laminectomy and fusion   Additional Pertinent Hx: Evelin Santillan is a 70 y.o. male with history of HTN, HLD, diabetes, GIA, migraines, PTSD, and depression admitted to [facility] on 3/23/2022. He initially presented to [facility] after a fall with subsequent worsening limb weakness and additional falls. MRI brain showed no acute intracranial abnormality. MRI cervical spine showed severe spinal stenosis at C2-C3 and C3-C4 with complete effacement of the CSF. MRI thoracic and lumbar spine were relatively unremarkable except for moderate spinal stenosis from L2-L3 to L4-L5. He was transferred to [facility] for neurosurgical evaluation. He underwent laminectomy and fusion at C2-C5 on 3/24/22 (Dr. Malcolm Barker). Hospital course has been complicated by urinary retention. He reports continued neck pain with pain in the limbs as well. He also notes numbness/tingling and weakness in all limbs. Pt admitted to rehab unit on 3/31/22    Restrictions  Restrictions/Precautions: Fall Risk,General Precautions  Implants present? : Metal implants  Spinal Precautions: Poor Sitting balance, Bilateral Upper  and Lower Body muscle weakness, Assist with standing in GERA Stedy  Other position/activity restrictions: up with assist; s/p C2-C5 laminectomy and fixation 3/24, Collar on while out of bed.  Ok to remove while resting In bed eating and drinking in bed  Required Braces or Orthoses  Cervical: c-collar  Other: Abdominal Binder (per Dr. Rodriguez Litter 2022)  Required Braces or Orthoses?: Yes  Equipment Used: Wheelchair,Bed,Other (ss)    Subjective  Subjective: \"I need to lay down now\"  Comments: pt suddenly reports while seated EOB, pt returned to supine with assist  Patient Currently in Pain: Yes  Pain Level: 6  Pain Location: Chest  Restrictions/Precautions: Fall Risk;General Precautions  Overall Orientation Status: Within Functional Limits  Patient Observation  Observations: pt fatigued, falling asleep often throughout session in AM but more alert noted in PM  Pain Assessment  Pain Assessment: 0-10  Pain Level: 6  Pain Type: Acute pain  Pain Location: Chest  Pain Orientation: Right,Left,Lower,Mid,Posterior  Pain Radiating Towards: across breastbone  Pain Descriptors: Aching,Discomfort    Objective  Cognition  Overall Cognitive Status: WFL  Perception  Overall Perceptual Status: WFL  Balance  Sitting Balance: Maximum assistance (max/mod A seated EOB)  Bed mobility  Rolling to Left: Maximum assistance;2 Person assistance  Rolling to Right: Maximum assistance;2 Person assistance  Supine to Sit: Maximum assistance;2 Person assistance  Sit to Supine: Maximum assistance;2 Person assistance  Scooting: Maximal assistance;2 Person assistance           Type of ROM/Therapeutic Exercise  Type of ROM/Therapeutic Exercise: AAROM (BUE's, 10 reps, all within cervical precautions )  Comment: AAROM ex's with BUE's to increase strength and promote functional return, pt reports pain and discomfort with various motions, limited ROM noted in BUE's due to pain but increased tolerance noted  Exercises  Scapular Protraction: x  Scapular Retraction: x  Shoulder Flexion: x  Shoulder Extension: x  Shoulder ABduction: x  Shoulder ADduction: x  Horizontal ABduction: x  Horizontal ADduction: x  Elbow Flexion: x  Elbow Extension: x  Grasp/Release: whole hand graps, 10 reps each hand  Other: thumb to pad ex's to touch each digit, difficulty reaching pinky finger, 10 reps each hand with increased time for completion       Additional Activities: while seated EOB with support, sitting BP 75/58 with , and 85/69 with 131 HR, pt returned to supine per pt request due to dizziness and nausea, once supine and given time pt reports feeling better once laying down with HOB elevated slightly                 ADL  Grooming: Maximum assistance (washing face with assist to support RUE)  LE Dressing: Dependent/Total  Toileting: Dependent/Total          Assessment  Performance deficits / Impairments: Decreased ADL status; Decreased functional mobility ; Decreased ROM; Decreased strength;Decreased endurance;Decreased sensation;Decreased balance;Decreased high-level IADLs;Decreased fine motor control;Decreased coordination;Decreased posture  Discharge Recommendations: Patient would benefit from continued therapy after discharge;Continue to assess pending progress  Activity Tolerance: Patient limited by fatigue;Patient limited by pain;Treatment limited secondary to medical complications (free text) (decreasing BP with elevated HR, low tolerance to sitting)  Safety Devices in place: Yes  Type of devices: Nurse notified; Left in bed;Call light within reach (spouse present in room)  Equipment Recommendations  Equipment Needed:  (TBD)       Patient Education: ROM, activity promotion, bed mobility, DC Planning, increasing task tolerance/sitting balance   Patient Goals   Patient goals : \"I just want to be able to get back to my basic needs. \"  Learner:patient and significant other  Method: demonstration and explanation       Outcome: needs reinforcement        Plan  Plan  Times per week: 900 minutes/week for combined therapy of OT/PT due to decreaed tolerance to activity. Times per day: Twice a day  Current Treatment Recommendations: Self-Care / ADL,Strengthening,ROM,Balance Training,Functional Mobility Training,Endurance Training,Pain Management,Safety Education & Training,Patient/Caregiver Education & Training,Equipment Evaluation, Education, & procurement,Positioning  Patient Goals   Patient goals : \"I just want to be able to get back to my basic needs. \"  Short term goals  Time Frame for Short term goals: By 10 days  Short term goal 1: Pt will complete upper body dressing/bathing with max A with use of AE as needed while maintaining cervical precautions  Short term goal 2: Pt will complete self feeding task with max A with adaptive strategies and good safety  Short term goal 3: Pt will tolerate sitting EOB 5+ minutes unsupported with mod A during functional activity to increase independence with self care and mobility  Short term goal 4: Pt will complete functional transfers during self care tasks with mod A and good safety  Short term goal 5: Pt will participate in 30+ minutes of therapeutic exercises/functional activities to increase safety and independence with self care and mobility  Long term goals  Time Frame for Long term goals : By discharge  Long term goal 1: Pt will complete self feeding task with mod A with use of adaptive strategies   Long term goal 2: Pt will complete upper body bathing/dressing with mod A and good safety while maintaining cervical precautions  Long term goal 3: Pt will tolerate sitting EOB 10+ minutes unsupported with CGA and good safety during functional activity of choice  Long term goal 4: Pt will complete functional transfer with min A and good safety during self care tasks  Long term goal 5: Pt will demonstrated increased BUE gross motor/fine motor to increase participation in self care tasks  Long term goals 6: Pt/family with verbalize/demosntrate Good understanding of cervical precautions during self care tasks  Long term goal 7: Pt/family will demonstrate Good understanding of BUE exercises to increase functional use of BUE during self care tasks                                                                  04/10/22 0910 04/10/22 1340   OT Individual Minutes   Time In 0910 1340   Time Out 0928 1405   Minutes 18 25   OT Co-Treatment Minutes   Time In 0929  --    Time Out 0955  --    Minutes 26  --    Minute Variance   Variance  --  17   Reason  --    (using bed pan with NSG )     Electronically signed by Lian LOZADA on 4/10/22 at 4:18 PM EDT

## 2022-04-10 NOTE — PROGRESS NOTES
Physical Medicine & Rehabilitation  Progress Note      Subjective:      Radha Abreu is a 70 y.o. male with cervical stenosis with myelopathy s/p C2-C5 laminectomy and fusion. He reports doing fine today. He states that he had dizziness with sitting up in therapy this morning and also had pain anteriorly across his chest with some shortness of breath. He and his fiance think that it was related to anxiety. He notes that pain is now resolved. Will monitor. Discussed orthostatic vital signs with IM in the setting of intermittent supine hypertension - plan is to trial midodrine before therapies. Patient is much more awake today than yesterday. Fiance states that she has been able to help him eat a little bit more. Discussed holding off on appetite stimulant at this time due to potential side effects. He asks that his diet be changed to regular solids, so that he has more choices. He denies any other acute concerns. ROS:  Denies fevers, chills, sweats.  +dizziness, chest pain  +shortness of breath; Denies coughing, wheezing  +nausea  No new areas of joint pain. Denies new areas of numbness or weakness. Denies new anxiety or depression issues. No new skin problems. Rehabilitation:   Progressing in therapies. PT:  Restrictions/Precautions: Fall Risk,General Precautions  Implants present? : Metal implants  Spinal Precautions: Poor Sitting balance, Bilateral Upper  and Lower Body muscle weakness, Assist with standing in GERA Stedy  Other position/activity restrictions: up with assist; s/p C2-C5 laminectomy and fixation 3/24, Collar on while out of bed.  Ok to remove while resting In bed eating and drinking in bed  Required Braces or Orthoses  Cervical: c-collar  Other: Abdominal Binder (per Dr. Asmita Haney 4/7/2022)   Transfers  Sit to Stand: 2 Person [facility] ([employer])  Stand to sit: Dependent/Total [employer])  Bed to Chair: Dependent/Total [employer].)  Comment: pt defered transfers in AM due to nausea and unable to participate in PM due to inability to stay awake       Transfers  Sit to Stand: 2 Person [facility] [employer])  Stand to sit: Dependent/Total [employer])  Bed to Chair: Dependent/Total [employer].)  Comment: pt defered transfers in AM due to nausea and unable to participate in PM due to inability to stay awake  Ambulation  Ambulation?: No               OT:  ADL  Feeding: Dependent/Total  Grooming: Minimal assistance,Setup,Increased time to complete (A to support RUE for washing face, A for thoroughness)  UE Bathing: Dependent/Total  LE Bathing: Dependent/Total  UE Dressing: Dependent/Total (hospital gown)  LE Dressing: Dependent/Total  Toileting: Dependent/Total  Additional Comments: MANUEL facilitated pt in bathing tasks this date, TA for all tasks with session to increase pt participation and engagement, A to lift/support RUE for washing face and washing abdomen, assist for thoroughness and completion, for LB bathing pt able to lift BLE's for washing/drying and donning TEDs/footies with TA         Balance  Sitting Balance: Maximum assistance (CGA-Max A seated EOB unsupported)  Standing Balance: Dependent/Total   Standing Balance  Time: <30 sec x2  Activity: transfers with elbert nguyen  Comment: Ax2 with AXEL guo to place BUE's on front bar        Bed mobility  Bridging:  Moderate assistance  Rolling to Left: Maximum assistance  Rolling to Right: Maximum assistance  Supine to Sit: Maximum assistance,2 Person assistance  Sit to Supine: Maximum assistance,2 Person assistance  Scooting: Maximal assistance,2 Person assistance  Comment: A for completion with rolling/reaching for bedrail, A to move BLE's  Transfers  Sit to stand: 2 Person assistance,Maximum assistance  Stand to sit: 2 Person assistance,Maximum assistance  Transfer Comments: with AXEL guo x2            Wheelchair Bed Transfers  Wheelchair/Bed - Technique:  (utilizing ss )  Equipment Used: Wheelchair,Bed,Other (ss)  Level of Asssistance: Dependent/Total,2 Person assistance  Wheelchair Transfers Comments: completign transfer to and from  with ss     SPEECH:      Current Medications:   Current Facility-Administered Medications: venlafaxine (EFFEXOR) tablet 75 mg, 75 mg, Oral, TID   divalproex (DEPAKOTE ER) extended release tablet 500 mg, 500 mg, Oral, Nightly  senna (SENOKOT) tablet 17.2 mg, 2 tablet, Oral, Nightly PRN  bisacodyl (DULCOLAX) suppository 10 mg, 10 mg, Rectal, QPM  lidocaine 4 % external patch 2 patch, 2 patch, TransDERmal, Daily  hydroCHLOROthiazide (HYDRODIURIL) tablet 25 mg, 25 mg, Oral, Daily  finasteride (PROSCAR) tablet 5 mg, 5 mg, Oral, Daily  carboxymethylcellulose (THERATEARS) 1 % ophthalmic gel 2 drop, 2 drop, Both Eyes, TID  enoxaparin (LOVENOX) injection 40 mg, 40 mg, SubCUTAneous, Daily  ondansetron (ZOFRAN-ODT) disintegrating tablet 4 mg, 4 mg, Oral, Q8H PRN **OR** ondansetron (ZOFRAN) injection 4 mg, 4 mg, IntraVENous, Q6H PRN  glucagon (rDNA) injection 1 mg, 1 mg, IntraMUSCular, PRN  glucose (GLUTOSE) 40 % oral gel 15 g, 15 g, Oral, PRN  amLODIPine (NORVASC) tablet 10 mg, 10 mg, Oral, Daily  cyclobenzaprine (FLEXERIL) tablet 5 mg, 5 mg, Oral, TID PRN  furosemide (LASIX) tablet 20 mg, 20 mg, Oral, Daily  losartan (COZAAR) tablet 100 mg, 100 mg, Oral, Daily  pantoprazole (PROTONIX) tablet 40 mg, 40 mg, Oral, QAM AC  acetaminophen (TYLENOL) tablet 650 mg, 650 mg, Oral, Q4H PRN  polyethylene glycol (GLYCOLAX) packet 17 g, 17 g, Oral, Daily      Objective:  BP (!) 152/94   Pulse 76   Temp 97.3 °F (36.3 °C)   Resp 18   Ht 5' 3\" (1.6 m)   Wt 142 lb (64.4 kg)   SpO2 100%   BMI 25.15 kg/m²       GEN: Well developed, well nourished, no acute distress  HEENT: NCAT. EOM grossly intact. Hearing grossly intact. Mucous membranes pink and moist.  RESP: Normal breath sounds with no wheezing, rales, or rhonchi. Respirations WNL and unlabored. CV: Regular rate and rhythm. No murmurs, rubs, or gallops. ABD: Soft, non-distended, BS+ and equal.  NEURO:  Alert. Speech fluent but slowed. MSK:  Muscle bulk is normal bilaterally. Strength 4+/5 with right elbow flexion, wrist extension, and elbow extension. Strength 1/5 with left elbow flexion. Strength 4/5 with left . Strength 4+/5 with bilateral ankle dorsiflexion and plantarflexion. Able to lift the bilateral lower limbs off of bed partially against gravity. SKIN: Warm and dry with good turgor. PSYCH: Mood WNL. Affect WNL. Appropriately interactive. Diagnostics:     CBC:   Recent Labs     04/08/22 0613   WBC 10.1   RBC 3.84*   HGB 12.5*   HCT 35.2*   MCV 91.9   RDW 13.2   *     BMP:   Recent Labs     04/08/22 0613   *   K 4.5   CL 89*   CO2 31   BUN 33*   CREATININE 0.76   GLUCOSE 94     BNP: No results for input(s): BNP in the last 72 hours. PT/INR: No results for input(s): PROTIME, INR in the last 72 hours. APTT: No results for input(s): APTT in the last 72 hours. CARDIAC ENZYMES: No results for input(s): CKMB, CKMBINDEX, TROPONINT in the last 72 hours. Invalid input(s): CKTOTAL;3  FASTING LIPID PANEL:  Lab Results   Component Value Date    CHOL 104 04/12/2018    HDL 42 04/12/2018    TRIG 92 04/12/2018     LIVER PROFILE: No results for input(s): AST, ALT, ALB, BILIDIR, BILITOT, ALKPHOS in the last 72 hours. Impression/Plan:   Impaired ADLs, gait, and mobility due to:    1. Cervical stenosis with myelopathy:  S/p C2-C5 laminectomy and fusion on 3/24. PT/OT  for gait, mobility, strengthening, endurance, ADLs, and self care. C-collar when out of bed. Pain control with as-needed tylenol, as-needed flexeril. Added lidocaine patches on 4/4. Avoid narcotics, as he did require narcan in acute care. Staples removed 4/8 per neurosurgery. Follow up with neurosurgery as outpatient.   2. Neurogenic bladder:  Has not been requiring intermittent catheterization but can continue this as needed. 3. Neurogenic bowel:  Miralax daily, senokot nightly prn. Bowel program with suppository nightly started 4/7.  4. Dizziness, nausea with sitting upright:  Likely related to cervical spinal cord injury (orthostatic hypotension, possible autonomic dysfunction). NERY hose and abdominal binder when sitting or out of bed. Discussed orthostatic vital signs in the setting of intermittent supine hypertension with IM - plan is to trial midodrine prior to therapies. 5. Subjective dysphagia:  Chronic. SLP did not recommend any change in diet or further therapy. 6. Poor oral intake:  Discussed with dietician. On oral nutrition supplements. Will hold off on appetite stimulant for now due to potential side effects. Patient asks to be switched to regular solids so he can have more meal/food choices. 7. Hospital-acquired pneumonia:  Completed course of cefepime and doxycycline. 8. Bradycardia:  Resolved after discontinuation of beta blockers  9. CHF:  On lasix  10. HTN:  On amlodipine, lasix, losartan, hydrochlorothiazide  11. HLD:  Not currently on medication  12. Diabetes:  Not currently on medication  13. GIA  14. Depression, PTSD:  On effexor, depakote. Psychiatry consulted per patient request - recommended holding morning depakote, changing effexor to 225mg extended release dose in the morning - patient was more fatigued on 4/10 after changing effexor formulation, changed effexor dose back to 75mg TID with meals for 4/10. Psychiatry recommending continued weaning of depakote due to ataxia. 15. DVT prophylaxis:  Lovenox  16.  Internal Medicine for medical management      Electronically signed by Mali Castrejon MD on 4/10/2022 at 3:28 PM

## 2022-04-11 PROCEDURE — 97535 SELF CARE MNGMENT TRAINING: CPT

## 2022-04-11 PROCEDURE — 6370000000 HC RX 637 (ALT 250 FOR IP): Performed by: INTERNAL MEDICINE

## 2022-04-11 PROCEDURE — 6370000000 HC RX 637 (ALT 250 FOR IP): Performed by: STUDENT IN AN ORGANIZED HEALTH CARE EDUCATION/TRAINING PROGRAM

## 2022-04-11 PROCEDURE — 97530 THERAPEUTIC ACTIVITIES: CPT

## 2022-04-11 PROCEDURE — 6370000000 HC RX 637 (ALT 250 FOR IP): Performed by: NURSE PRACTITIONER

## 2022-04-11 PROCEDURE — 6360000002 HC RX W HCPCS: Performed by: NURSE PRACTITIONER

## 2022-04-11 PROCEDURE — 97110 THERAPEUTIC EXERCISES: CPT

## 2022-04-11 PROCEDURE — 6370000000 HC RX 637 (ALT 250 FOR IP): Performed by: PSYCHIATRY & NEUROLOGY

## 2022-04-11 PROCEDURE — 1180000000 HC REHAB R&B

## 2022-04-11 PROCEDURE — 6370000000 HC RX 637 (ALT 250 FOR IP): Performed by: PHYSICAL MEDICINE & REHABILITATION

## 2022-04-11 PROCEDURE — 99232 SBSQ HOSP IP/OBS MODERATE 35: CPT | Performed by: INTERNAL MEDICINE

## 2022-04-11 PROCEDURE — 99232 SBSQ HOSP IP/OBS MODERATE 35: CPT | Performed by: PHYSICAL MEDICINE & REHABILITATION

## 2022-04-11 RX ADMIN — CARBOXYMETHYLCELLULOSE SODIUM 2 DROP: 10 GEL OPHTHALMIC at 20:54

## 2022-04-11 RX ADMIN — PANTOPRAZOLE SODIUM 40 MG: 40 TABLET, DELAYED RELEASE ORAL at 06:14

## 2022-04-11 RX ADMIN — VENLAFAXINE 75 MG: 75 TABLET ORAL at 13:04

## 2022-04-11 RX ADMIN — FUROSEMIDE 20 MG: 20 TABLET ORAL at 08:43

## 2022-04-11 RX ADMIN — DIVALPROEX SODIUM 500 MG: 500 TABLET, EXTENDED RELEASE ORAL at 20:54

## 2022-04-11 RX ADMIN — LOSARTAN POTASSIUM 100 MG: 100 TABLET, FILM COATED ORAL at 20:54

## 2022-04-11 RX ADMIN — AMLODIPINE BESYLATE 10 MG: 10 TABLET ORAL at 08:43

## 2022-04-11 RX ADMIN — POLYETHYLENE GLYCOL 3350 17 G: 17 POWDER, FOR SOLUTION ORAL at 08:44

## 2022-04-11 RX ADMIN — VENLAFAXINE 75 MG: 75 TABLET ORAL at 18:07

## 2022-04-11 RX ADMIN — ENOXAPARIN SODIUM 40 MG: 100 INJECTION SUBCUTANEOUS at 08:43

## 2022-04-11 RX ADMIN — CARBOXYMETHYLCELLULOSE SODIUM 2 DROP: 10 GEL OPHTHALMIC at 15:36

## 2022-04-11 RX ADMIN — FINASTERIDE 5 MG: 5 TABLET, FILM COATED ORAL at 08:43

## 2022-04-11 RX ADMIN — CARBOXYMETHYLCELLULOSE SODIUM 2 DROP: 10 GEL OPHTHALMIC at 08:45

## 2022-04-11 RX ADMIN — BISACODYL 10 MG: 10 SUPPOSITORY RECTAL at 18:31

## 2022-04-11 RX ADMIN — ONDANSETRON 4 MG: 4 TABLET, ORALLY DISINTEGRATING ORAL at 10:07

## 2022-04-11 RX ADMIN — VENLAFAXINE 75 MG: 75 TABLET ORAL at 08:45

## 2022-04-11 RX ADMIN — ACETAMINOPHEN 650 MG: 325 TABLET ORAL at 20:54

## 2022-04-11 ASSESSMENT — PAIN DESCRIPTION - LOCATION
LOCATION: SHOULDER
LOCATION: NECK;SHOULDER
LOCATION: NECK;SHOULDER

## 2022-04-11 ASSESSMENT — PAIN SCALES - WONG BAKER: WONGBAKER_NUMERICALRESPONSE: 6

## 2022-04-11 ASSESSMENT — PAIN SCALES - GENERAL
PAINLEVEL_OUTOF10: 7
PAINLEVEL_OUTOF10: 6
PAINLEVEL_OUTOF10: 5
PAINLEVEL_OUTOF10: 6

## 2022-04-11 ASSESSMENT — PAIN DESCRIPTION - PAIN TYPE
TYPE: ACUTE PAIN;CHRONIC PAIN
TYPE: ACUTE PAIN;CHRONIC PAIN;SURGICAL PAIN

## 2022-04-11 ASSESSMENT — PAIN DESCRIPTION - DESCRIPTORS
DESCRIPTORS: SHARP
DESCRIPTORS: ACHING;SORE;THROBBING

## 2022-04-11 ASSESSMENT — PAIN DESCRIPTION - ORIENTATION
ORIENTATION: RIGHT
ORIENTATION: LEFT;RIGHT;ANTERIOR
ORIENTATION: RIGHT;LEFT;POSTERIOR

## 2022-04-11 NOTE — PROGRESS NOTES
[facility] Internal Medicine    CONSULTATION / HISTORY AND PHYSICAL EXAMINATION            Date:   4/10/2022  Patient name:  Harry Guevara  Date of admission:  3/31/2022  8:38 PM  MRN:   [id number]  Account:  [de-identified]  YOB: 1950  PCP:    Tona Nichols  Room:   4904/0064-91  Code Status:    DNR-CCA    Physician Requesting Consult: Puma Germain MD    Reason for Consult:  medical management    Chief Complaint:     No chief complaint on file.       History Obtained From:     Patient medical record nursing staff    History of Present Illness:   Patient past medical multiple medical problems include hypertension, heart failure with preserved ejection fraction, diabetes, bipolar disorder,   glaucoma, patient was originally admitted at [place] where he was transferred from outlying facility, he developed, generalized weakness after a fall, patient underwent MRI of cervical spine suggestive of severe spinal canal stenosis at the level of C2-C4 level, patient underwent C2-C5 posterior decompression surgery, he feels that weakness in his extremities is slightly better  Patient during his hospital stay, developed bradycardia which improved after discontinuation of beta-blocker, cardiology was also consulted  He developed hospital-acquired pneumonia, getting treated with cefepime and doxycycline  Patient denying any complaints of cough, shortness of breath, chest pain,    Past Medical History:     Past Medical History:   Diagnosis Date    Depression 2017    ON RX    Diabetes mellitus ([facility] [address] 2013    NIDDM    Difficult intravenous access     Hyperlipidemia 2008    ON RX    Hypertension 2008    ON RX    Migraines     PTSD (post-traumatic stress disorder) 01/2018    ON RX    Sleep apnea 01/2018    UNALBE TO USE TO CLEARED BY ENT (CPAP)    Teeth missing     BACK TEETH UPPER AND LOWER TO BE FITTED FOR PARTIALS AT LATER DATE    Wears glasses         Past Surgical History:     Past Surgical History:   Procedure Laterality Date    CARDIAC CATHETERIZATION  02/2010    no stents     CARPAL TUNNEL RELEASE Left 01/09/2002    CATARACT REMOVAL      CERVICAL FUSION  04/19/2018    anterior cervical fusion C5-6    CERVICAL FUSION N/A 3/24/2022    C2-5 FUSION, C2-4 LAMINECTOMY performed by Adriana Tinsley DO at [address] Left 2018    CATARACT EXTRACTION WITH IOL    HYDROCELE EXCISION  1951    LAMINECTOMY  03/24/2022    C2-5 FUSION, C2-4 LAMINECTOMY    MIDDLE EAR SURGERY  01/11/2018    MIDDLE EAR REBUILT AND EAR DRUM REPLACED    MOUTH SURGERY  04/16/2018    5 TEETH REMOVED    OTHER SURGICAL HISTORY  04/19/2018    : ANTERIOR CERVICAL CORRPECTOMY C5-6, SYNTHES, DEPUY, REG TABLE, SUPINE,     NH OFFICE/OUTPT VISIT,PROCEDURE ONLY N/A 4/19/2018    ANTERIOR CERVICAL CORRPECTOMY AND FUSION C5-6 performed by Adriana Tinsley DO at [address] [facility]  12/1983        Medications Prior to Admission:     Prior to Admission medications    Medication Sig Start Date End Date Taking?  Authorizing Provider   finasteride (PROSCAR) 5 MG tablet Take 5 mg by mouth daily   Yes Historical Provider, MD   carboxymethylcellulose 1 % ophthalmic solution Place 2 drops into both eyes 3 times daily Pt states \"2 drops in both eyes three times a day\"    Historical Provider, MD   divalproex (DEPAKOTE ER) 250 MG extended release tablet Take 750 mg by mouth at bedtime    Historical Provider, MD   furosemide (LASIX) 20 MG tablet Take 20 mg by mouth daily    Historical Provider, MD   venlafaxine (EFFEXOR XR) 150 MG extended release capsule Take 1 capsule by mouth daily  Patient taking differently: Take 225 mg by mouth daily  6/23/20   Azar Beth MD   simethicone (MYLICON) 80 MG chewable tablet Take 1 tablet by mouth every 6 hours as needed for Flatulence  Patient taking differently: Take 80 mg by mouth every 8 hours as needed for Flatulence  6/22/20 Erskin Oppenheim, MD   lidocaine (XYLOCAINE) 5 % ointment Apply topically daily as needed for Pain Apply topically as needed. LF 4/20/20 (30 day supply)  Patient not taking: Reported on 3/23/2022    Historical Provider, MD   metFORMIN (GLUCOPHAGE) 1000 MG tablet Take 1,000 mg by mouth 2 times daily (with meals) LF 4/27/20 (90 day supply)  Patient not taking: Reported on 3/21/2022    Historical Provider, MD   losartan (COZAAR) 25 MG tablet Take 25 mg by mouth daily     Historical Provider, MD   aspirin 81 MG chewable tablet Take 81 mg by mouth daily  Patient not taking: Reported on 3/21/2022    Historical Provider, MD   divalproex (DEPAKOTE ER) 500 MG extended release tablet Take 500 mg by mouth every morning     Historical Provider, MD   traZODone (DESYREL) 100 MG tablet Take 150 mg by mouth nightly as needed LF 5/1/20 (30 day supply)    Historical Provider, MD   cetirizine (ZYRTEC) 10 MG tablet Take 10 mg by mouth daily LF 4/6/20 (90 day supply)  Patient not taking: Reported on 3/21/2022    Historical Provider, MD   atorvastatin (LIPITOR) 80 MG tablet Take 40 mg by mouth daily Take 1/2 tablet (40 mg) daily  LF 4/22/20 (90 day supply)  Patient not taking: Reported on 3/21/2022    Historical Provider, MD   amLODIPine (NORVASC) 10 MG tablet Take 7.5 mg by mouth daily     Historical Provider, MD   sildenafil (VIAGRA) 100 MG tablet Take 100 mg by mouth as needed for Erectile Dysfunction LF 5/12/20 (30 day supply)    Historical Provider, MD   omeprazole (PRILOSEC) 20 MG delayed release capsule Take 20 mg by mouth every evening LF 4/21/20 (90 day supply)    Historical Provider, MD        Allergies:     Latex, Bee venom, Lisinopril, Niacin and related, Sulfa antibiotics, and Terazosin    Social History:     Tobacco:    reports that he quit smoking about 50 years ago. His smoking use included cigarettes. He has a 2.00 pack-year smoking history.  He has never used smokeless tobacco.  Alcohol:      reports current alcohol use.  Drug Use:  reports current drug use. Drug: Marijuana Dung Jones. Family History:     Family History   Problem Relation Age of Onset   Theron Rudder Dementia Mother     Coronary Art Dis Mother     Stroke Mother     Diabetes Mother     Asthma Mother     Other Mother         BRAIN ANEURISM    High Blood Pressure Mother     Heart Disease Father     Diabetes Sister     Diabetes Brother     High Blood Pressure Brother     High Cholesterol Brother     Heart Disease Brother     Diabetes Sister     Other Sister         NEUROPATHY       Review of Systems:     Positive and Negative as described in HPI. CONSTITUTIONAL:  negative for fevers, chills, sweats, fatigue, weight loss  HEENT:  negative for vision, hearing changes, runny nose, throat pain  RESPIRATORY:  negative for shortness of breath, cough, congestion, wheezing. CARDIOVASCULAR:  negative for chest pain, palpitations. GASTROINTESTINAL:  negative for nausea, vomiting, diarrhea, constipation, change in bowel habits, abdominal pain   GENITOURINARY:  negative for difficulty of urination, burning with urination, frequency   INTEGUMENT:  negative for rash, skin lesions, easy bruising   HEMATOLOGIC/LYMPHATIC:  negative for swelling/edema   ALLERGIC/IMMUNOLOGIC:  negative for urticaria , itching  ENDOCRINE:  negative increase in drinking, increase in urination, hot or cold intolerance  MUSCULOSKELETAL:  negative joint pains, muscle aches, swelling of joints  NEUROLOGICAL: Weakness in all 4 extremities  BEHAVIOR/PSYCH:      Physical Exam:     /78   Pulse 95   Temp 98.6 °F (37 °C) (Oral)   Resp 18   Ht 5' 3\" (1.6 m)   Wt 142 lb (64.4 kg)   SpO2 95%   BMI 25.15 kg/m²   Temp (24hrs), Av °F (36.7 °C), Min:97.3 °F (36.3 °C), Max:98.6 °F (37 °C)    No results for input(s): POCGLU in the last 72 hours.     Intake/Output Summary (Last 24 hours) at 4/10/2022 2106  Last data filed at 4/10/2022 0326  Gross per 24 hour   Intake 120 ml   Output 150 ml   Net -30 ml       General Appearance:  alert, well appearing, and in no acute distress  Mental status: oriented to person, place, and time with normal affect  Head:  normocephalic, atraumatic. Eye: no icterus, redness, pupils equal and reactive, extraocular eye movements intact, conjunctiva clear  Ear: normal external ear, no discharge, hearing intact  Nose:  no drainage noted  Mouth: mucous membranes moist  Neck: supple, no carotid bruits, thyroid not palpable , staples present posterior neck, healing  Lungs: Bilateral equal air entry, clear to ausculation, no wheezing, rales or rhonchi, normal effort  Cardiovascular: normal rate, regular rhythm, no murmur, gallop, rub. Abdomen: Soft, nontender, nondistended, normal bowel sounds, no hepatomegaly or splenomegaly  Neurologic: Weakness in all 4 extremities, power in all 4 extremities 4/5 skin: No gross lesions, rashes, bruising or bleeding on exposed skin area  Extremities:  peripheral pulses palpable, no pedal edema or calf pain with palpation  Psych: Investigations:      Laboratory Testing:  No results found for this or any previous visit (from the past 24 hour(s)). Consultations:   IP CONSULT TO DIETITIAN  IP CONSULT TO SOCIAL WORK  IP CONSULT TO INTERNAL MEDICINE  IP CONSULT TO DIETITIAN  IP CONSULT TO PSYCHIATRY  IP CONSULT TO PSYCHIATRY  Assessment :      Primary Problem  Cervical stenosis of spine    Active Hospital Problems    Diagnosis Date Noted    Cervical stenosis of spine [M48.02] 03/31/2022       Plan:     1. Quadriparesis, due to cervical cord compression after fall s/p C2-C5 decompression surgery. 2. Hypertension, controlled restart home dose of Norvasc, losartan, will avoid beta-blockers since patient developed bradycardia in [place]  3. Heart failure preserved ejection fraction, compensated , on Lasix  4. GERD, restarted Protonix  5. On DVT prophylaxis Lovenox  6. BPH, started on finasteride  7.  History of glaucoma, restarted home medications of eyedrop  8. Patient has neurological better, will have intermittent catheterization  9. Diabetes, controlled      Dc hctz  Change all bp meds for night time  Day time hypotension with supine hypertension        Martin Gavin MD  4/10/2022  9:04 PM    Copy sent to Dr. Denis Holt    Please note that this chart was generated using voice recognition Dragon dictation software. Although every effort was made to ensure the accuracy of this automated transcription, some errors in transcription may have occurred.

## 2022-04-11 NOTE — PROGRESS NOTES
Physical Therapy  [address]  Acute Rehabilitation Physical Therapy Progress Note    Date: 22  Patient Name: Angel Barrett       Room: 45/4276-25  MRN: [id number]   Account: [de-identified]   : 1950  (75 y.o.) Gender: male     Referring Practitioner: Lela De La Garza MD  Diagnosis: Cervical stenosis with myelopathy s/p C2-C5 laminectomy and fusion   Past Medical History:  has a past medical history of Depression, Diabetes mellitus ([address] Difficult intravenous access, Hyperlipidemia, Hypertension, Migraines, PTSD (post-traumatic stress disorder), Sleep apnea, Teeth missing, and Wears glasses. Past Surgical History:   has a past surgical history that includes Cardiac catheterization (2010); Carpal tunnel release (Left, 2002); Vasectomy (1983); Tonsillectomy and adenoidectomy (); Hydrocele surgery (); Middle ear surgery (2018); eye surgery (Left, ); Mouth surgery (2018); other surgical history (2018); cervical fusion (2018); pr office/outpt visit,procedure only (N/A, 2018); Cataract removal; laminectomy (2022); and cervical fusion (N/A, 3/24/2022). Additional Pertinent Hx: Eric Hill is a 70 y.o. male with history of HTN, HLD, diabetes, GIA, migraines, PTSD, and depression admitted to [facility] on 3/23/2022. He [facility] to [facility] after a fall with subsequent worsening limb weakness and additional falls. MRI brain showed no acute intracranial abnormality. MRI cervical spine showed severe spinal stenosis at C2-C3 and C3-C4 with complete effacement of the CSF. MRI thoracic and lumbar spine were relatively unremarkable except for moderate spinal stenosis from L2-L3 to L4-L5. He was transferred to [facility] for neurosurgical evaluation. He underwent laminectomy and fusion at C2-C5 on 3/24/22 (Dr. Ravi Aj). Hospital course has been complicated by urinary retention.  He reports continued neck pain with pain in the limbs as well. He also notes numbness/tingling and weakness in all limbs. Pt admitted to rehab unit on 3/31/22    Overall Orientation Status: Within Functional Limits  Restrictions/Precautions  Restrictions/Precautions: Fall Risk;General Precautions  Required Braces or Orthoses?: Yes  Implants present? : Metal implants  Required Braces or Orthoses  Cervical: c-collar  Other: Abdominal Binder (Per Dr Felicia Rosas 22)  Position Activity Restriction  Spinal Precautions: No Twisting; No Lifting; No Bending  Spinal Precautions: Poor Sitting balance, Bilateral Upper  and Lower Body muscle weakness, Assist with standing in GERA Stedy  Other position/activity restrictions: up with assist; s/p C2-C5 laminectomy and fixation 3/24, Collar on while out of bed. Ok to remove while resting In bed eating and drinking in bed       Comments:      Vital Signs  Pulse: 97  BP: 112/70  BP Location: Right upper arm  Patient Position: Supine  Blood Pressure Sittin/99  Pulse Sittin PER MINUTE  Blood Pressure Standin/55  Pulse Standin PER MINUTE  Level of Consciousness: Alert (0)  Patient Currently in Pain: Yes  Pain Assessment: 0-10  Pain Level: 6  Guerrero-Baker Pain Rating: Hurts even more  Pain Type: Acute pain;Chronic pain  Pain Location: Neck; Shoulder  Pain Orientation: Left;Right; Anterior  Non-Pharmaceutical Pain Intervention(s): Repositioned  Response to Pain Intervention: Patient Satisfied     Oxygen Therapy  O2 Device: None (Room air)  Patient Observation  Observations: increased alertness noted, orthostatic BP's taken, supine /70 HR 97, sitting #1 /99 , 2# sitting BP 95/65 , standing in gera stedy BP 99/55        Bed Mobility:   Bed Mobility  Bridging: Moderate assistance  Rolling: Maximal assistance  Supine to Sit: Moderate assistance (x2)  Sit to Supine: Moderate assistance (2 person)  Scooting: Dependent/Total;2 Person assistance  Bed mobility  Bridging:  Moderate assistance  Scooting: Dependent/Total;2 Person assistance    Transfers:  Sit to Stand: 2 Person Assistance;Maximum Assistance (Versie Constable)  Stand to sit: Dependent/Total Versie Constable)  Bed to Chair: Dependent/Total Versie Constable.)                       Stairs/Curb  Stairs?: No            BALANCE Posture: Poor  Sitting - Static: Fair;Poor  Sitting - Dynamic: Fair;Poor  Standing - Static: Poor;+ (Elbert wendy)  Standing - Dynamic: Poor;+  Comments: sitting balance fluxuates between CGA and progresses to modA with fatigue    EXERCISES Exercises  Comments:  (AM)  Other exercises?: Yes  Other exercises 1: A:M and P:M- Pt assisted with B LE AAROM 15 resp, quad sets and glut sets-10 to 12 sets each. Other exercises 2: Dangling at EOB -A:M tolerates only 5 to 7 minutes. Other exercises 3: Sit to stand with elbert nguyen, max A x 2 -stood for 5 to 7 secs only. Other exercises 4: Seated balloon tap from [facility] with R UE only. Other exercises 5: Static stands 2x2min each. in Versie Constable. Other exercises 6: sidelying: clamshells. Reps: 10 each. Other exercises 7: AAROM with 1lb wand bilat. UE's  Other exercises 8: Seated bilat. LE 2x10 reps seated in wheelchair. Other Activities  Comment: rest breaks PRN. Activity Tolerance: Patient limited by fatigue,Patient limited by pain,Patient limited by endurance,Other (Orthostatic BP)  Activity Tolerance: pt very lethargic in AM/PM and c/o nausea in AM  PT Equipment Recommendations  Equipment Needed: No  Other: TBD  Other Comments  Comments: Discussed withpt's SERENA covington and pt, regarding recommendation of ramp at entrance. Chelly Earl needed education in rehab progression/anticiapted goals at discharge, seemed to have fair understanding after conversation. Pt's brother present and seems understanding of the recommendations.         Current Treatment Recommendations: Strengthening,ROM,Balance Training,Functional Mobility Training,Transfer Training,Endurance Training,Wheelchair Mobility Training,Stair training,Gait Training,Neuromuscular Re-education,Home Exercise Program,Safety Education & Training,Patient/Caregiver Education & Training,Equipment Evaluation, Education, & procurement    Conditions Requiring Skilled Therapeutic Intervention  Body structures, Functions, Activity limitations: Decreased functional mobility ; Decreased strength;Decreased safe awareness;Decreased endurance;Decreased balance; Increased pain;Decreased posture;Decreased coordination;Decreased fine motor control;Decreased sensation;Decreased ROM  Treatment Diagnosis: Impaired function. Prognosis: Fair  Decision Making: Medium Complexity  Barriers to Learning: Pain  REQUIRES PT FOLLOW UP: Yes  Discharge Recommendations: Patient would benefit from continued therapy after discharge;Home with assist PRN    Goals  Short term goals  Time Frame for Short term goals: 10 visits  Short term goal 1: Perform rolling in bed min/mod A   Short term goal 2: Perform supine to sit min/mod A   Short term goal 3: Perform sit to supine max A   Short term goal 4: Perform sit<> stand mod A , and pivot transfers at mod A  Short term goal 5: Progress to ambulation HHA/Gait belt assist or appropriate device, mod A x 2 20 to 40 ft  Short term goal 6: Propel w/c with B LE, distance of 50 ft or > , min/mod A  Short term goal 7: Demo Fair- dynamic standing balance to decrease risk of falls  Long term goals  Time Frame for Long term goals : By DC  Long term goal 1: Pt able to perform bed mobility at [address] term goal 2: Pt able to perform transfers at min A   Long term goal 3: Pt able to ambulate with or without device, distance of 100 ft atleast, mod A, level surface.   Long term goal 4: Pt able to perform 2 to 4 steps at [place] x 2  Long term goal 5: Pt able to propel w/c on level surface,  distance of 150 ft, SBA, level surfaces  Long term goal 6: Family training for safe mobility to return home  Long term goal 7: Improve sitting/standing balance to good/fair to reduce fall risk. Long term goal 8: Pt able to ambulate 60 to 80 ft for 2MWT,to improve overall fucntion.        04/11/22 0935 04/11/22 1430   PT Individual Minutes   Time In 1005 1440   Time Out 1035 1515   Minutes 30 35   PT Concurrent Minutes   Time In  --  1430   Time Out  --  1440   Minutes  --  10   PT Co-Treatment Minutes   Time In 0935  --    Time Out 1005  --    Minutes 30  --        Electronically signed by Rock Kingsley PTA on 4/11/22 at 5:03 PM EDT

## 2022-04-11 NOTE — PROGRESS NOTES
[address]   ACUTE REHABILITATION OCCUPATIONAL THERAPY  DAILY NOTE    Date: 22  Patient Name: Angel Barrett      Room: 7988/8150-17    MRN: [id number]   : 1950  (75 y.o.)  Gender: male   Referring Practitioner: Lela De La Garza MD  Diagnosis: Cervical stenosis with myelopathy s/p C2-C5 laminectomy and fusion   Additional Pertinent Hx: Angel Barrett is a 70 y.o. male with history of HTN, HLD, diabetes, GIA, migraines, PTSD, and depression admitted to [facility] on 3/23/2022. He initially presented to [facility] after a fall with subsequent worsening limb weakness and additional falls. MRI brain showed no acute intracranial abnormality. MRI cervical spine showed severe spinal stenosis at C2-C3 and C3-C4 with complete effacement of the CSF. MRI thoracic and lumbar spine were relatively unremarkable except for moderate spinal stenosis from L2-L3 to L4-L5. He was transferred to [place] [id number] for neurosurgical evaluation. He underwent laminectomy and fusion at C2-C5 on 3/24/22 (Dr. Ravi Aj). Hospital course has been complicated by urinary retention. He reports continued neck pain with pain in the limbs as well. He also notes numbness/tingling and weakness in all limbs. Pt admitted to rehab unit on 3/31/22    Restrictions  Restrictions/Precautions: Fall Risk,General Precautions  Implants present? : Metal implants  Spinal Precautions: Poor Sitting balance, Bilateral Upper  and Lower Body muscle weakness, Assist with standing in GERA Stedy  Other position/activity restrictions: up with assist; s/p C2-C5 laminectomy and fixation 3/24, Collar on while out of bed.  Ok to remove while resting In bed eating and drinking in bed  Required Braces or Orthoses  Cervical: c-collar  Other: Abdominal Binder  Required Braces or Orthoses?: Yes  Equipment Used: Wheelchair,Bed,Other (ss)    Subjective  Subjective: \"oh I need to lay down\"  Comments: pt requesting to return to supine while sitting EOB, this writer and PTA Lyndon Tovar provided education to increase sitting tolerance and up in chair to increase indep and therapy engagement, pt receptive and agreeable with encouragement (Cotx with PTA to sit EOB/increase tolerance and transfers)  Patient Currently in Pain: Yes  Pain Level: 6  Pain Location: Neck; Shoulder  Pain Orientation: Right;Left;Posterior  Restrictions/Precautions: Fall Risk;General Precautions  Overall Orientation Status: Within Functional Limits  Patient Observation  Observations: increased alertness noted, orthostatic BP's taken, supine /70 HR 97, sitting #1 /99 , 2# sitting BP 95/65 , standing in elbert stedy BP 99/55   Pain Assessment  Pain Assessment: 0-10  Pain Level: 6  Pain Type: Acute pain,Chronic pain,Surgical pain  Pain Location: Neck,Shoulder  Pain Orientation: Right,Left,Posterior  Pain Radiating Towards: across breastbone  Pain Descriptors: Aching,Sore,Throbbing    Objective  Cognition  Overall Cognitive Status: WFL  Perception  Overall Perceptual Status: WFL  Balance  Sitting Balance: Maximum assistance (SBA with good positioning EOB, max A with fatigue)  Standing Balance: Dependent/Total (mod A x2 with elbert stuartisabella)  Bed mobility  Rolling to Left: Moderate assistance;2 Person assistance  Rolling to Right: Moderate assistance;2 Person assistance  Supine to Sit: Moderate assistance;2 Person assistance (mod A x2 in AM, max A x1 in PM)  Scooting: Dependent/Total;2 Person assistance (hips EOB)  Comment: PM session to address bed mobility and increase indep, pt req A for knee flexion when rolling, good initiation req, A to lift LUE when reaching for bed rail, fair grasp noted in B hands when grasping bed rails  Transfers  Sit to stand: Maximum assistance (max/mod A x2)  Stand to sit: Maximum assistance (max/mod A x2)  Transfer Comments: with elbert nguyen, A x2, assist varies with fatigue  Standing Balance  Time: AM/PM: <30 sec x4  Activity: transfers with elbert wendy  Comment: Ax2 with AXEL guo to place BUE's on front bar  Functional Mobility  Functional Mobility Comments: LATONYA due to limited ROM in BUE's  Toilet Transfers  Toilet Transfers Comments: LATONYA due to safety concerns at this time                             ADL  Feeding: Dependent/Total  Grooming: Maximum assistance  UE Bathing: Dependent/Total  LE Bathing: Dependent/Total  UE Dressing: Dependent/Total  LE Dressing: Dependent/Total  Toileting: Dependent/Total  Additional Comments: MANUEL facilitated pt in bathing tasks this date, TA for all tasks with session to increase pt participation and engagement, A to lift/support RUE for washing face and washing abdomen, assist for thoroughness and completion, for LB bathing pt able to lift BLE's for washing/drying and donning TEDs/footies with TA, pt gives good efforts within tolerance and ability at this time          Assessment  Performance deficits / Impairments: Decreased ADL status; Decreased functional mobility ; Decreased ROM; Decreased strength;Decreased endurance;Decreased sensation;Decreased balance;Decreased high-level IADLs;Decreased fine motor control;Decreased coordination;Decreased posture  Prognosis: Fair  Discharge Recommendations: Patient would benefit from continued therapy after discharge;Continue to assess pending progress  Activity Tolerance: Patient Tolerated treatment well;Treatment limited secondary to medical complications (free text) (dizziness and nausea sitting EOB, agreeable to sit in w/c)  Activity Tolerance: increased alertness this date  Safety Devices in place: Yes  Type of devices: Left in chair (taken to PT)  Equipment Recommendations  Equipment Needed:  (TBD)       Patient Education: activity promotion, bed mobility, sitting balance/tolerance, increasing indep/tolerance to tasks   Patient Goals   Patient goals : \"I just want to be able to get back to my basic needs. \"  Learner:patient  Method: demonstration and explanation Outcome: needs reinforcement        Plan  Plan  Times per week: 900 minutes/week for combined therapy of OT/PT due to decreaed tolerance to activity. Times per day: Twice a day  Current Treatment Recommendations: Self-Care / ADL,Strengthening,ROM,Balance Training,Functional Mobility Training,Endurance Training,Pain Management,Safety Education & Training,Patient/Caregiver Education & Training,Equipment Evaluation, Education, & procurement,Positioning  Patient Goals   Patient goals : \"I just want to be able to get back to my basic needs. \"  Short term goals  Time Frame for Short term goals: By 10 days  Short term goal 1: Pt will complete upper body dressing/bathing with max A with use of AE as needed while maintaining cervical precautions  Short term goal 2: Pt will complete self feeding task with max A with adaptive strategies and good safety  Short term goal 3: Pt will tolerate sitting EOB 5+ minutes unsupported with mod A during functional activity to increase independence with self care and mobility  Short term goal 4: Pt will complete functional transfers during self care tasks with mod A and good safety  Short term goal 5: Pt will participate in 30+ minutes of therapeutic exercises/functional activities to increase safety and independence with self care and mobility  Long term goals  Time Frame for Long term goals : By discharge  Long term goal 1: Pt will complete self feeding task with mod A with use of adaptive strategies   Long term goal 2: Pt will complete upper body bathing/dressing with mod A and good safety while maintaining cervical precautions  Long term goal 3: Pt will tolerate sitting EOB 10+ minutes unsupported with CGA and good safety during functional activity of choice  Long term goal 4: Pt will complete functional transfer with min A and good safety during self care tasks  Long term goal 5: Pt will demonstrated increased BUE gross motor/fine motor to increase participation in self care tasks  Long term goals 6: Pt/family with verbalize/demosntrate Good understanding of cervical precautions during self care tasks  Long term goal 7: Pt/family will demonstrate Good understanding of BUE exercises to increase functional use of BUE during self care tasks        04/11/22 1335 04/11/22 1517   OT Individual Minutes   Time In 1335 0903   Time Out 1402 0934   Minutes 27 31   OT Co-Treatment Minutes   Time In  --    (1326)   Time Out  --    (1005)     Electronically signed by Lian LOZADA on 4/11/22 at 3:20 PM EDT

## 2022-04-11 NOTE — PROGRESS NOTES
Physical Medicine & Rehabilitation  Progress Note    4/11/2022 1:49 PM     CC: Ambulatory and ADL dysfunction due to cervical stenosis with myelopathy status post C2-C5 laminectomy and fusion    Subjective:   No complaints today    ROS:  Denies fevers, chills, sweats. No chest pain, palpitations, lightheadedness. Denies coughing, wheezing or shortness of breath. Denies abdominal pain, nausea, diarrhea or constipation. No new areas of joint pain. Denies new areas of numbness or weakness. Denies new anxiety or depression issues. No new skin problems. Rehabilitation:   PT:  Restrictions/Precautions: Fall Risk,General Precautions  Implants present? : Metal implants  Spinal Precautions: Poor Sitting balance, Bilateral Upper  and Lower Body muscle weakness, Assist with standing in GERA Stedy  Other position/activity restrictions: up with assist; s/p C2-C5 laminectomy and fixation 3/24, Collar on while out of bed.  Ok to remove while resting In bed eating and drinking in bed  Required Braces or Orthoses  Cervical: c-collar  Other: Abdominal Binder   Transfers  Sit to Stand: 2 Person [facility] (Kelsey Seats)  Stand to sit: Dependent/Total Kelsey Seats)  Bed to Chair: Dependent/Total Kelsey Seats.)  Comment: pt defered transfers in AM due to nausea and unable to participate in PM due to inability to stay awake             OT:  ADL  Feeding: Dependent/Total  Grooming: Maximum assistance  UE Bathing: Dependent/Total  LE Bathing: Dependent/Total  UE Dressing: Dependent/Total  LE Dressing: Dependent/Total  Toileting: Dependent/Total  Additional Comments: MANUEL facilitated pt in bathing tasks this date, TA for all tasks with session to increase pt participation and engagement, A to lift/support RUE for washing face and washing abdomen, assist for thoroughness and completion, for LB bathing pt able to lift BLE's for washing/drying and donning TEDs/footies with TA, pt gives good efforts within tolerance and ability at this time         Balance  Sitting Balance: Maximum assistance (max/mod A seated EOB)  Standing Balance: Dependent/Total   Standing Balance  Time: <30 sec x2  Activity: transfers with elbert nguyen  Comment: Ax2 with AXEL guo to place BUE's on front bar        Bed mobility  Bridging: Moderate assistance  Rolling to Left: Maximum assistance,2 Person assistance  Rolling to Right: Maximum assistance,2 Person assistance  Supine to Sit: Maximum assistance,2 Person assistance  Sit to Supine: Maximum assistance,2 Person assistance  Scooting: Dependent/Total,2 Person assistance  Comment: A:M: PT/MANUEL co-txs for bed mobility/dangling due to pt's acuity. Pt reports dizzyness,  while seated EOB with support, sitting BP 75/58 with , and 85/69 with 131 HR, pt returned to supine per pt request due to dizziness and nausea, once supine and given time pt reports feeling better once laying down with HOB elevated slightly  Transfers  Sit to stand: 2 Person assistance,Maximum assistance  Stand to sit: 2 Person assistance,Maximum assistance  Transfer Comments: with AXEL guo x2            Wheelchair Bed Transfers  Wheelchair/Bed - Technique:  (utilizing ss )  Equipment Used: Wheelchair,Bed,Other (ss)  Level of Asssistance: Dependent/Total,2 Person assistance  Wheelchair Transfers Comments: completign transfer to and from  with ss       ST:            Objective:  /70   Pulse 97   Temp 97.9 °F (36.6 °C) (Oral)   Resp 14   Ht 5' 3\" (1.6 m)   Wt 142 lb (64.4 kg)   SpO2 96%   BMI 25.15 kg/m²  I Body mass index is 25.15 kg/m². I   Wt Readings from Last 1 Encounters:   22 142 lb (64.4 kg)      Temp (24hrs), Av.3 °F (36.8 °C), Min:97.9 °F (36.6 °C), Max:98.6 °F (37 °C)         GEN: well developed, well nourished, no acute distress  HEENT: Normocephalic atraumatic, EOMI, mucous membranes pink and moist  CV: RRR, no murmurs, rubs or gallops  PULM: CTAB, no rales or rhonchi.  Respirations WNL and unlabored  ABD: soft, NT, ND, +BS and equal  NEURO: A&O x3. Sensation intact to light touch. MSK: Decreased range of motion of bilateral shoulder 1/5 left elbow extension 4+5 right elbow flexion and wrist extension and 4/5 left .,  4+/5 bilateral dorsiflexion plantarflexion able to lift legs antigravity  EXTREMITIES: No calf tenderness to palpation bilaterally. No edema BLEs  SKIN: warm dry and intact with good turgor, incision clean  PSYCH: appropriately interactive. Affect WNL. Good spirits        Medications   Scheduled Meds:   venlafaxine  75 mg Oral TID WC    divalproex  500 mg Oral Nightly    losartan  100 mg Oral Nightly    bisacodyl  10 mg Rectal QPM    lidocaine  2 patch TransDERmal Daily    finasteride  5 mg Oral Daily    carboxymethylcellulose  2 drop Both Eyes TID    enoxaparin  40 mg SubCUTAneous Daily    amLODIPine  10 mg Oral Daily    furosemide  20 mg Oral Daily    pantoprazole  40 mg Oral QAM AC    polyethylene glycol  17 g Oral Daily     Continuous Infusions:  PRN Meds:.senna, ondansetron **OR** ondansetron, glucagon (rDNA), glucose, cyclobenzaprine, acetaminophen     Diagnostics:     CBC: No results for input(s): WBC, RBC, HGB, HCT, MCV, RDW, PLT in the last 72 hours. BMP: No results for input(s): NA, K, CL, CO2, PHOS, BUN, CREATININE, CA in the last 72 hours. BNP: No results for input(s): BNP in the last 72 hours. PT/INR: No results for input(s): PROTIME, INR in the last 72 hours. APTT: No results for input(s): APTT in the last 72 hours. CARDIAC ENZYMES: No results for input(s): CKMB, CKMBINDEX, TROPONINT in the last 72 hours. Invalid input(s): CKTOTAL;3  FASTING LIPID PANEL:  Lab Results   Component Value Date    CHOL 104 04/12/2018    HDL 42 04/12/2018    TRIG 92 04/12/2018     LIVER PROFILE: No results for input(s): AST, ALT, ALB, BILIDIR, BILITOT, ALKPHOS in the last 72 hours. I/O (24Hr):     Intake/Output Summary (Last 24 hours) at 4/11/2022 1349  Last data filed at 4/11/2022 0121  Gross per 24 hour   Intake --   Output 300 ml   Net -300 ml       Glu last 24 hour  No results for input(s): POCGLU in the last 72 hours. No results for input(s): CLARITYU, COLORU, PHUR, SPECGRAV, PROTEINU, RBCUA, BLOODU, BACTERIA, NITRU, WBCUA, LEUKOCYTESUR, YEAST, GLUCOSEU, BILIRUBINUR in the last 72 hours. Impression/Plan:    1. Cervical stenosis with myelopathy:  S/p C2-C5 laminectomy and fusion on 3/24. PT/OT  for gait, mobility, strengthening, endurance, ADLs, and self care. C-collar when out of bed. Pain control with as-needed tylenol, as-needed flexeril. Added lidocaine patches on 4/4. Avoid narcotics, as he did require narcan in acute care. Staples removed 4/8 per neurosurgery. Follow up with neurosurgery as outpatient. Discharge plan 4/21,  2. Neurogenic bladder:  Has not been requiring intermittent catheterization but can continue this as needed. 3. Neurogenic bowel:  Miralax daily, senokot nightly prn. Bowel program with suppository nightly started 4/7.  4. Dizziness, nausea with sitting upright:  Likely related to cervical spinal cord injury (orthostatic hypotension, possible autonomic dysfunction). NERY hose and abdominal binder when sitting or out of bed. Dr. Stephen Samuels on 4/10 Discussed orthostatic vital signs in the setting of intermittent supine hypertension with IM - plan is to trial midodrine prior to therapies.-Not see order-defer to IM  5. Subjective dysphagia:  Chronic. SLP did not recommend any change in diet or further therapy. 6. Poor oral intake:  Discussed with dietician. On oral nutrition supplements. Will hold off on appetite stimulant for now due to potential side effects. Patient asks to be switched to regular solids so he can have more meal/food choices. Change 4/10 noted  7. Hospital-acquired pneumonia:  Completed course of cefepime and doxycycline. 8. Bradycardia:  Resolved after discontinuation of beta blockers  9.  CHF:  On lasix  10. HTN:  On amlodipine, lasix, losartan, hydrochlorothiazide  11. HLD:  Not currently on medication  12. Diabetes:  Not currently on medication  13. GIA  14. Depression, PTSD:  On effexor, depakote. Psychiatry consulted per patient request - recommended holding morning depakote, changing effexor to 225mg extended release dose in the morning - patient was more fatigued on 4/10 after changing effexor formulation, changed effexor dose back to 75mg TID with meals for 4/10. Psychiatry recommending continued weaning of depakote due to ataxia. 15. DVT prophylaxis:  Lovenox  16. Internal Medicine for medical management        Jose Rafael Davidson MD       This note is created with the assistance of a speech recognition program.  While intending to generate a document that actually reflects the content of the visit, the document can still have some errors including those of syntax and sound a like substitutions which may escape proof reading.   In such instances, actual meaning can be extrapolated by contextual diversion

## 2022-04-11 NOTE — PROGRESS NOTES
Comprehensive Nutrition Assessment    Type and Reason for Visit:  Reassess    Nutrition Recommendations/Plan:  Continue current diet and oral nutrition supplements. Nutrition Assessment:  Physician spoke with pt 4/10 and decision made to hold off on appetite stimulant at this time due to potential side effects. Nurse fed pt lunch today - only took a bite or 2 and sip of drink and said he was full. Wife to be in later and may be bringing additional food. Overall PO intake average of 50% or less of meals and variable supplement intake. Pt previously was trying to increase oral nutrition supplements to at least 5 per day. Malnutrition Assessment:  Malnutrition Status:  Insufficient data    Context:  Acute Illness     Findings of the 6 clinical characteristics of malnutrition:  Energy Intake:  1 - 75% or less of estimated energy requirements for 7 or more days  Weight Loss:      Unable to assess  Body Fat Loss:  Unable to assess     Muscle Mass Loss:  Unable to assess    Fluid Accumulation:  1 - Mild Extremities   Strength:  Not Performed    Estimated Daily Nutrient Needs:  Energy (kcal):  5571-5032 based on [employer] with 1.2-1.3 factor; Weight Used for Energy Requirements:  Admission     Protein (g):  73-85 based on 1.3-1.5 gm per kg; Weight Used for Protein Requirements:  Ideal          Nutrition Related Findings:  Edema: trace generalized, RUE; + 1 RLE, LLE. Labs and meds reviewed. Wounds:  Multiple,Skin Tears,Surgical Incision       Current Nutrition Therapies:    ADULT ORAL NUTRITION SUPPLEMENT; Lunch, Dinner, Breakfast; Standard High Calorie/High Protein Oral Supplement  ADULT DIET;  Regular    Anthropometric Measures:  · Height: 5' 3\" (160 cm)  · Current Body Weight: 141 lb 15.6 oz (64.4 kg)   · Admission Body Weight:      · Usual Body Weight: 158 lb 15.2 oz (72.1 kg) (1/21/22)     · Ideal Body Weight: 124 lbs; % Ideal Body Weight 114.5 %   · BMI: 25.2  · BMI Categories: Overweight (BMI 25.0-29. 9)       Nutrition Diagnosis:   · Inadequate oral intake related to swallowing difficulty,biting/chewing (masticatory) difficulty,altered taste perception (decreased appetite) as evidenced by intake 26-50%,poor intake prior to admission    Nutrition Interventions:   Food and/or Nutrient Delivery:  Continue Current Diet,Continue Oral Nutrition Supplement  Nutrition Education/Counseling:  No recommendation at this time   Coordination of Nutrition Care:  Continue to monitor while inpatient    Goals:  PO intake % of meals and supplements       Nutrition Monitoring and Evaluation:   Behavioral-Environmental Outcomes:  None Identified   Food/Nutrient Intake Outcomes:  Food and Nutrient Intake,Supplement Intake  Physical Signs/Symptoms Outcomes:  Biochemical Data,Chewing or Swallowing,GI Status,Fluid Status or Edema,Skin,Weight     Discharge Planning:    Continue current diet,Continue Oral Nutrition Supplement     Some areas of assessment may be incomplete due to standard COVID-19 Precautions. Sada Lloyd R.D., EDOUARD.   Phone: [phone]

## 2022-04-11 NOTE — PATIENT CARE CONFERENCE
[facility] Acute Inpatient Rehabilitation  TEAM CONFERENCE NOTE  Date: 22  Patient Name: [facility]       Room: 2316/9985-33  MRN: [id number]       : 1950  (75 y.o.)     Gender: male   Referring Practitioner: Dr Shania Martinez   Cervical stenosis of spine [M48.02]  Diagnosis: Cervical stenosis with myelopathy s/p C2-C5 laminectomy and fusion      NURSING  Bladder  Incontinent Less Than Daily  Bowel   Occasionally Incontinent  Date of Last BM:   Intervention    Both Bowel & Bladder Program     Wounds/Incisions/Ulcers: Incision healing well  Medication Education Program: Patient able to manage medications and being educated by nursing  Pain: Patient's pain is currently controlled with -  Lidocaine/tylenol    Fall Risk:  Falling star program initiated    PHYSICAL THERAPY    Vital Signs  Pulse: 97  BP: 112/70  BP Location: Right upper arm  Patient Position: Supine  Blood Pressure Sittin/99  Pulse Sittin PER MINUTE  Blood Pressure Standin/55  Pulse Standin PER MINUTE      Bed Mobility  Bridging: Moderate assistance  Rolling: Maximal assistance  Supine to Sit: Moderate assistance (x2)  Sit to Supine: Moderate assistance (2 person)  Scooting: Dependent/Total;2 Person assistance      Transfers:  Sit to Stand: 2 Person Assistance;Maximum Assistance (Blondie Brooking)  Stand to sit: Dependent/Total Blondie Brooking)  Bed to Chair: Dependent/Total Blondie Brooking.)  Comment: rest breaks PRN. BALANCE Posture: Poor  Sitting - Static: Fair;Poor  Sitting - Dynamic: Fair;Poor  Standing - Static: Poor;+ (Carrie steisabella)  Standing - Dynamic: Poor;+  Poor tolerance for sitting at EOB/EOM  Comments: sitting balance fluxuates between CGA and progresses to modA with fatigue               Pt presents with quadraparesis, B UE > B LE weakness, decreasd core strength. Pain, dizzyness and nausea limiting activity at this time.   Pt with poor tolerance to upright psoiton yet, unabel to assisst pt to transfer to recliner today, due to orthostaic BP and pain.      Goals  Time Frame for Short term goals: 10 visits  Short term goal 1: Perform rolling in bed min/mod A   Short term goal 2: Perform supine to sit min/mod A   Short term goal 3: Perform sit to supine max A   Short term goal 4: Perform sit<> stand mod A , and pivot transfers at mod A  Short term goal 5: Progress to ambulation HHA/Gait belt assist or appropriate device, mod A x 2 20 to 40 ft  Short term goal 6: Propel w/c with B LE, distance of 50 ft or > , min/mod A  Short term goal 7: Demo Fair- dynamic standing balance to decrease risk of falls      OCCUPATIONAL THERAPY  SELF CARE      Eating            Dependent/Total   Oral Hygiene            Maximum assistance   Shower/Bathe Self             UE Bathing: Dependent/Total  LE Bathing: Dependent/Total   Upper Body Dressing            Dependent/Total   Lower Body Dressing            Putting On/Taking Off Footwear             Dependent/Total   Toilet Transfer             Toilet Transfers Comments: LATONYA due to safety concerns at this time   Toileting Hygiene            Dependent/Total    Bed mobility  Rolling to Left: Moderate assistance;2 Person assistance  Rolling to Right: Moderate assistance;2 Person assistance  Supine to Sit: Moderate assistance;2 Person assistance (mod A x2 in AM, max A x1 in PM)  Sit to Supine: Maximum assistance;2 Person assistance  Scooting: Dependent/Total;2 Person assistance (hips EOB)  Comment: PM session to address bed mobility and increase indep, pt req A for knee flexion when rolling, good initiation req, A to lift LUE when reaching for bed rail, fair grasp noted in B hands when grasping bed rails        Balance  Sitting Balance: Maximum assistance (SBA with good positioning EOB, max A with fatigue)  Standing Balance: Dependent/Total (mod A x2 with elbert nguyen)  Standing Balance  Time: AM/PM: <30 sec x4  Activity: transfers with elbert nguyen  Comment: Ax2 with AXEL guo to place BUE's on front bar    Equipment Recommendations  Equipment Needed:  (TBD)  Assessment: Pt reports his speach is sometimes slow due to requring increased time to process. Short term goals  Time Frame for Short term goals: By 10 days  Short term goal 1: Pt will complete upper body dressing/bathing with max A with use of AE as needed while maintaining cervical precautions  Short term goal 2: Pt will complete self feeding task with max A with adaptive strategies and good safety  Short term goal 3: Pt will tolerate sitting EOB 5+ minutes unsupported with mod A during functional activity to increase independence with self care and mobility  Short term goal 4: Pt will complete functional transfers during self care tasks with mod A and good safety  Short term goal 5: Pt will participate in 30+ minutes of therapeutic exercises/functional activities to increase safety and independence with self care and mobility      SPEECH THERAPY    Orientation Log (O-Log) Score:   Cognitive Log (Cog-Log) Score:   Short Term Goal:    NUTRITION  Weight: 142 lb (64.4 kg) / Body mass index is 25.15 kg/m². Diet Rx: Regular. 2 Ensure Enlive per meal. PO intake has been poor, especially ofsolid foods with intake 0-50% of meals. Diet was liberalized to regular (as tolerated) in hopes of encouraging increased intake with more variety. Pt was trying to drink 5 Enlive per day, but does not appear to be at this goal. Please see nutrition note for details.     SOCIAL WORK ASSESSMENT  Assessment: Home with significant other  Pre-Admission Status:  Lives With: Significant other  Type of Home: House  Home Layout: One level  Home Access: Stairs to enter without rails  Entrance Stairs - Number of Steps: one step at entrance  Bathroom Shower/Tub: Tub/Shower unit,Shower chair with back,Curtain  Bathroom Toilet: Handicap height  Bathroom Equipment: Hand-held shower  Bathroom Accessibility: Walker accessible  Home Equipment: 4 wheeled Ernesto Gaffney Help From: Family  ADL Assistance: Independent (Lately needed assit, Independent in Jan 2020)  Homemaking Assistance: Independent (Prior to Jan 2022 was Ind with ADLs / IADLs. Progressive weakness / function since, most recently (per Pt report) required Max to TD for all tasks from wife.)  Homemaking Responsibilities: Yes (Prior to January 2022)  Ambulation Assistance: Independent (Jan\"22 was walking st cane, lately difficult)  Transfer Assistance: Independent  Active : Yes  Patient's  Info: Family is currently assisting with transportation - Was driving until end of Jan 2022  Mode of Transportation: Car,SUV  Occupation: Retired  Leisure & Hobbies: Maintenance  Additional Comments: Significant other able to assist as needed at discharge. Family Education: Need to make contact with family to initiate education    Percentage Risk for Readmission: Low 0 - 18%   Readmission Risk              Risk of Unplanned Readmission:  16       %    Critical Items: None       Problem / Barrier Intervention / Plan  Results   Impaired function related to B UE>B LE weakenss Strengthening, sitting balance functional mobility training    Pain -Neck-B shoulders MD working on pain meds. Altered ability to care for self Training and education for patient and patient's caregiver regarding modified care techniques including assistive equipment and durable medical equipment to maximize safety and participation with self-care                          Total Self Care Score    Total Mobility Score  Admission Score:  7      Admission Score:  17  Goal:  21/42         Goal:  34/90   `  Discharge Plan   Estimated Discharge Date: 4/19/22  Home evaluation needed?  Home Evaluation Indication (NO, Requires ReEval, YES/Date): No home evaluation need indicated for patient at this time  Overnight or Day Pass: No  Factors facilitating achievement of predicted outcomes: Family support, Motivated and Cooperative  Barriers to the achievement of predicted outcomes: Anxiety, Decreased endurance, Upper extremity weakness, Lower extremity weakness, Medical complications and Skin Care    Functional Goals at discharge:  Predicted Outcome: Home with familyPATIENT'S LEVEL OF ASSISTANCE: build tolerance  Discharge therapy goals:  PT: Long term goals  Time Frame for Long term goals : By DC  Long term goal 1: Pt able to perform bed mobility at [address] term goal 2: Pt able to perform transfers at min A   Long term goal 3: Pt able to ambulate with or without device, distance of 100 ft atleast, mod A, level surface. Long term goal 4: Pt able to perform 2 to 4 steps at [place] x 2  Long term goal 5: Pt able to propel w/c on level surface,  distance of 150 ft, SBA, level surfaces  Long term goal 6: Family training for safe mobility to return home  Long term goal 7: Improve sitting/standing balance to good/fair to reduce fall risk. Long term goal 8: Pt able to ambulate 60 to 80 ft for 2MWT,to improve overall fucntion.   OT:Long term goals  Time Frame for Long term goals : By discharge  Long term goal 1: Pt will complete self feeding task with mod A with use of adaptive strategies   Long term goal 2: Pt will complete upper body bathing/dressing with mod A and good safety while maintaining cervical precautions  Long term goal 3: Pt will tolerate sitting EOB 10+ minutes unsupported with CGA and good safety during functional activity of choice  Long term goal 4: Pt will complete functional transfer with min A and good safety during self care tasks  Long term goal 5: Pt will demonstrated increased BUE gross motor/fine motor to increase participation in self care tasks  Long term goals 6: Pt/family with verbalize/demosntrate Good understanding of cervical precautions during self care tasks  Long term goal 7: Pt/family will demonstrate Good understanding of BUE exercises to increase functional use of BUE during self care tasks  ST:     Participating Team Members:  Social Worker/:  Pati Porter RN  Occupational Therapist: Soledad Ng OT    Physical Therapist: Gene Zamora PT  Speech Therapist:  N/A  Nurse: Luisa Elder RN    Dietary/Nutrition: Lisa Palacios RD, LD  Pastoral Care: David Rick  CMG: Erica Yañez RN    I approve the established interdisciplinary plan of care as documented within the medical record of Sara Vale. Jose Rafael Davidson MD

## 2022-04-11 NOTE — PLAN OF CARE
Problem: Skin Integrity:  Goal: Absence of new skin breakdown  Description: Absence of new skin breakdown  Outcome: Ongoing  Note: There are no new skin issues so far this shift. Will continue to assist patient with repositioning at least every two hours. Problem: Falls - Risk of:  Goal: Will remain free from falls  Description: Will remain free from falls  Outcome: Ongoing  Note: Patient remains free from falls so far this shift. Will continue to round at least hourly, place bed in lowest position with call light within reach, and alarm activated. Problem: Pain:  Goal: Control of acute pain  Description: Control of acute pain  Outcome: Ongoing  Note: Patient denies pain at this time. Will continue to assess.

## 2022-04-11 NOTE — PROGRESS NOTES
[facility]    CONSULTATION / HISTORY AND PHYSICAL EXAMINATION            Date:   4/11/2022  Patient name:  Castillo Washington  Date of admission:  3/31/2022  8:38 PM  MRN:   [id number]  Account:  [de-identified]  YOB: 1950  PCP:    Myranda Montano  Room:   7173/7536-56  Code Status:    DNR-CCA    Physician Requesting Consult: Meli Meyer MD    Reason for Consult:  medical management    Chief Complaint:     No chief complaint on file.       History Obtained From:     Patient medical record nursing staff    History of Present Illness:   Patient past medical multiple medical problems include hypertension, heart failure with preserved ejection fraction, diabetes, bipolar disorder,   glaucoma, patient was originally admitted at [facility] where he was transferred from outlying facility, he developed, generalized weakness after a fall, patient underwent MRI of cervical spine suggestive of severe spinal canal stenosis at the level of C2-C4 level, patient underwent C2-C5 posterior decompression surgery, he feels that weakness in his extremities is slightly better  Patient during his hospital stay, developed bradycardia which improved after discontinuation of beta-blocker, cardiology was also consulted  He developed hospital-acquired pneumonia, getting treated with cefepime and doxycycline  Patient denying any complaints of cough, shortness of breath, chest pain,    Past Medical History:     Past Medical History:   Diagnosis Date    Depression 2017    ON RX    Diabetes mellitus ([address] 2013    NIDDM    Difficult intravenous access     Hyperlipidemia 2008    ON RX    Hypertension 2008    ON RX    Migraines     PTSD (post-traumatic stress disorder) 01/2018    ON RX    Sleep apnea 01/2018    UNALBE TO USE TO CLEARED BY ENT (CPAP)    Teeth missing     BACK TEETH UPPER AND LOWER TO BE FITTED FOR PARTIALS AT LATER DATE    Wears glasses         Past Surgical History:     Past Surgical History:   Procedure Laterality Date    CARDIAC CATHETERIZATION  02/2010    no stents     CARPAL TUNNEL RELEASE Left 01/09/2002    CATARACT REMOVAL      CERVICAL FUSION  04/19/2018    anterior cervical fusion C5-6    CERVICAL FUSION N/A 3/24/2022    C2-5 FUSION, C2-4 LAMINECTOMY performed by Madisyn Orr DO at [address] Left 2018    CATARACT EXTRACTION WITH IOL    HYDROCELE EXCISION  1951    LAMINECTOMY  03/24/2022    C2-5 FUSION, C2-4 LAMINECTOMY    MIDDLE EAR SURGERY  01/11/2018    MIDDLE EAR REBUILT AND EAR DRUM REPLACED    MOUTH SURGERY  04/16/2018    5 TEETH REMOVED    OTHER SURGICAL HISTORY  04/19/2018    : ANTERIOR CERVICAL CORRPECTOMY C5-6, SYNTHES, DEPUY, REG TABLE, SUPINE,     [place] OFFICE/OUTPT VISIT,PROCEDURE ONLY N/A 4/19/2018    ANTERIOR CERVICAL CORRPECTOMY AND FUSION C5-6 performed by Madisyn Orr DO at [facility]  12/1983        Medications Prior to Admission:     Prior to Admission medications    Medication Sig Start Date End Date Taking?  Authorizing Provider   finasteride (PROSCAR) 5 MG tablet Take 5 mg by mouth daily   Yes Historical Provider, MD   carboxymethylcellulose 1 % ophthalmic solution Place 2 drops into both eyes 3 times daily Pt states \"2 drops in both eyes three times a day\"    Historical Provider, MD   divalproex (DEPAKOTE ER) 250 MG extended release tablet Take 750 mg by mouth at bedtime    Historical Provider, MD   furosemide (LASIX) 20 MG tablet Take 20 mg by mouth daily    Historical Provider, MD   venlafaxine (EFFEXOR XR) 150 MG extended release capsule Take 1 capsule by mouth daily  Patient taking differently: Take 225 mg by mouth daily  6/23/20   Tessa Palacios MD   simethicone (MYLICON) 80 MG chewable tablet Take 1 tablet by mouth every 6 hours as needed for Flatulence  Patient taking differently: Take 80 mg by mouth every 8 hours as needed for Flatulence  6/22/20 Yas Hannah MD   lidocaine (XYLOCAINE) 5 % ointment Apply topically daily as needed for Pain Apply topically as needed. LF 4/20/20 (30 day supply)  Patient not taking: Reported on 3/23/2022    Historical Provider, MD   metFORMIN (GLUCOPHAGE) 1000 MG tablet Take 1,000 mg by mouth 2 times daily (with meals) LF 4/27/20 (90 day supply)  Patient not taking: Reported on 3/21/2022    Historical Provider, MD   losartan (COZAAR) 25 MG tablet Take 25 mg by mouth daily     Historical Provider, MD   aspirin 81 MG chewable tablet Take 81 mg by mouth daily  Patient not taking: Reported on 3/21/2022    Historical Provider, MD   divalproex (DEPAKOTE ER) 500 MG extended release tablet Take 500 mg by mouth every morning     Historical Provider, MD   traZODone (DESYREL) 100 MG tablet Take 150 mg by mouth nightly as needed LF 5/1/20 (30 day supply)    Historical Provider, MD   cetirizine (ZYRTEC) 10 MG tablet Take 10 mg by mouth daily LF 4/6/20 (90 day supply)  Patient not taking: Reported on 3/21/2022    Historical Provider, MD   atorvastatin (LIPITOR) 80 MG tablet Take 40 mg by mouth daily Take 1/2 tablet (40 mg) daily  LF 4/22/20 (90 day supply)  Patient not taking: Reported on 3/21/2022    Historical Provider, MD   amLODIPine (NORVASC) 10 MG tablet Take 7.5 mg by mouth daily     Historical Provider, MD   sildenafil (VIAGRA) 100 MG tablet Take 100 mg by mouth as needed for Erectile Dysfunction LF 5/12/20 (30 day supply)    Historical Provider, MD   omeprazole (PRILOSEC) 20 MG delayed release capsule Take 20 mg by mouth every evening LF 4/21/20 (90 day supply)    Historical Provider, MD        Allergies:     Latex, Bee venom, Lisinopril, Niacin and related, Sulfa antibiotics, and Terazosin    Social History:     Tobacco:    reports that he quit smoking about 50 years ago. His smoking use included cigarettes. He has a 2.00 pack-year smoking history.  He has never used smokeless tobacco.  Alcohol:      reports current alcohol use.  Drug Use:  reports current drug use. Drug: Marijuana Lashon Littlejohn). Family History:     Family History   Problem Relation Age of Onset   Zannie Cowden Dementia Mother     Coronary Art Dis Mother     Stroke Mother     Diabetes Mother     Asthma Mother     Other Mother         BRAIN ANEURISM    High Blood Pressure Mother     Heart Disease Father     Diabetes Sister     Diabetes Brother     High Blood Pressure Brother     High Cholesterol Brother     Heart Disease Brother     Diabetes Sister     Other Sister         NEUROPATHY       Review of Systems:     Positive and Negative as described in HPI. CONSTITUTIONAL:  negative for fevers, chills, sweats, fatigue, weight loss  HEENT:  negative for vision, hearing changes, runny nose, throat pain  RESPIRATORY:  negative for shortness of breath, cough, congestion, wheezing. CARDIOVASCULAR:  negative for chest pain, palpitations. GASTROINTESTINAL:  negative for nausea, vomiting, diarrhea, constipation, change in bowel habits, abdominal pain   GENITOURINARY:  negative for difficulty of urination, burning with urination, frequency   INTEGUMENT:  negative for rash, skin lesions, easy bruising   HEMATOLOGIC/LYMPHATIC:  negative for swelling/edema   ALLERGIC/IMMUNOLOGIC:  negative for urticaria , itching  ENDOCRINE:  negative increase in drinking, increase in urination, hot or cold intolerance  MUSCULOSKELETAL:  negative joint pains, muscle aches, swelling of joints  NEUROLOGICAL: Weakness in all 4 extremities  BEHAVIOR/PSYCH:      Physical Exam:     /70   Pulse 97   Temp 97.9 °F (36.6 °C) (Oral)   Resp 14   Ht 5' 3\" (1.6 m)   Wt 142 lb (64.4 kg)   SpO2 96%   BMI 25.15 kg/m²   Temp (24hrs), Av.3 °F (36.8 °C), Min:97.9 °F (36.6 °C), Max:98.6 °F (37 °C)    No results for input(s): POCGLU in the last 72 hours.     Intake/Output Summary (Last 24 hours) at 2022 1156  Last data filed at 2022 0121  Gross per 24 hour   Intake --   Output 300 ml   Net -300 ml       General Appearance:  alert, well appearing, and in no acute distress  Mental status: oriented to person, place, and time with normal affect  Head:  normocephalic, atraumatic. Eye: no icterus, redness, pupils equal and reactive, extraocular eye movements intact, conjunctiva clear  Ear: normal external ear, no discharge, hearing intact  Nose:  no drainage noted  Mouth: mucous membranes moist  Neck: supple, no carotid bruits, thyroid not palpable , staples present posterior neck, healing  Lungs: Bilateral equal air entry, clear to ausculation, no wheezing, rales or rhonchi, normal effort  Cardiovascular: normal rate, regular rhythm, no murmur, gallop, rub. Abdomen: Soft, nontender, nondistended, normal bowel sounds, no hepatomegaly or splenomegaly  Neurologic: Weakness in all 4 extremities, power in all 4 extremities 4/5 skin: No gross lesions, rashes, bruising or bleeding on exposed skin area  Extremities:  peripheral pulses palpable, no pedal edema or calf pain with palpation  Psych: Investigations:      Laboratory Testing:  No results found for this or any previous visit (from the past 24 hour(s)). Consultations:   IP CONSULT TO DIETITIAN  IP CONSULT TO SOCIAL WORK  IP CONSULT TO INTERNAL MEDICINE  IP CONSULT TO DIETITIAN  IP CONSULT TO PSYCHIATRY  IP CONSULT TO PSYCHIATRY  Assessment :      Primary Problem  Cervical stenosis of spine    Active Hospital Problems    Diagnosis Date Noted    Cervical stenosis of spine [M48.02] 03/31/2022       Plan:     1. Quadriparesis, due to cervical cord compression after fall s/p C2-C5 decompression surgery. 2. Hypertension, controlled restart home dose of Norvasc, losartan, will avoid beta-blockers since patient developed bradycardia in [place]  3. Heart failure preserved ejection fraction, compensated , on Lasix  4. GERD, restarted Protonix  5. On DVT prophylaxis Lovenox  6. BPH, started on finasteride  7.  History of glaucoma, restarted home medications of eyedrop  8. Patient has neurological better, will have intermittent catheterization  9. Diabetes, controlled      Dc hctz  Change all bp meds for night time  Day time hypotension with supine hypertension      4/11/22    BP stable   Off HCTZ  Vitals:    04/10/22 0819 04/10/22 1815 04/11/22 0530 04/11/22 0940   BP: (!) 152/94 119/78 132/88 112/70   Pulse: 76 95 74 97   Resp: 18 18 14    Temp: 97.3 °F (36.3 °C) 98.6 °F (37 °C) 97.9 °F (36.6 °C)    TempSrc:  Oral Oral    SpO2: 100% 95% 96%    Weight:       Height:     ·     ·  1. Ethan Paul MD  4/11/2022  11:56 AM    Copy sent to Dr. Nelli Alcantar    Please note that this chart was generated using voice recognition Dragon dictation software. Although every effort was made to ensure the accuracy of this automated transcription, some errors in transcription may have occurred.

## 2022-04-12 PROCEDURE — 1180000000 HC REHAB R&B

## 2022-04-12 PROCEDURE — 6370000000 HC RX 637 (ALT 250 FOR IP): Performed by: STUDENT IN AN ORGANIZED HEALTH CARE EDUCATION/TRAINING PROGRAM

## 2022-04-12 PROCEDURE — 6370000000 HC RX 637 (ALT 250 FOR IP): Performed by: PSYCHIATRY & NEUROLOGY

## 2022-04-12 PROCEDURE — 6370000000 HC RX 637 (ALT 250 FOR IP): Performed by: PHYSICAL MEDICINE & REHABILITATION

## 2022-04-12 PROCEDURE — 97530 THERAPEUTIC ACTIVITIES: CPT

## 2022-04-12 PROCEDURE — 99233 SBSQ HOSP IP/OBS HIGH 50: CPT | Performed by: INTERNAL MEDICINE

## 2022-04-12 PROCEDURE — 51798 US URINE CAPACITY MEASURE: CPT

## 2022-04-12 PROCEDURE — 6370000000 HC RX 637 (ALT 250 FOR IP): Performed by: INTERNAL MEDICINE

## 2022-04-12 PROCEDURE — 6370000000 HC RX 637 (ALT 250 FOR IP): Performed by: NURSE PRACTITIONER

## 2022-04-12 PROCEDURE — 97535 SELF CARE MNGMENT TRAINING: CPT

## 2022-04-12 PROCEDURE — 6360000002 HC RX W HCPCS: Performed by: NURSE PRACTITIONER

## 2022-04-12 PROCEDURE — 99232 SBSQ HOSP IP/OBS MODERATE 35: CPT | Performed by: PHYSICAL MEDICINE & REHABILITATION

## 2022-04-12 RX ORDER — HYDRALAZINE HYDROCHLORIDE 25 MG/1
25 TABLET, FILM COATED ORAL 3 TIMES DAILY
Status: DISCONTINUED | OUTPATIENT
Start: 2022-04-12 | End: 2022-04-15 | Stop reason: HOSPADM

## 2022-04-12 RX ADMIN — CARBOXYMETHYLCELLULOSE SODIUM 2 DROP: 10 GEL OPHTHALMIC at 20:44

## 2022-04-12 RX ADMIN — ENOXAPARIN SODIUM 40 MG: 100 INJECTION SUBCUTANEOUS at 07:27

## 2022-04-12 RX ADMIN — FINASTERIDE 5 MG: 5 TABLET, FILM COATED ORAL at 07:28

## 2022-04-12 RX ADMIN — FUROSEMIDE 20 MG: 20 TABLET ORAL at 07:28

## 2022-04-12 RX ADMIN — PANTOPRAZOLE SODIUM 40 MG: 40 TABLET, DELAYED RELEASE ORAL at 05:45

## 2022-04-12 RX ADMIN — CARBOXYMETHYLCELLULOSE SODIUM 2 DROP: 10 GEL OPHTHALMIC at 07:29

## 2022-04-12 RX ADMIN — VENLAFAXINE 75 MG: 75 TABLET ORAL at 07:28

## 2022-04-12 RX ADMIN — ACETAMINOPHEN 650 MG: 325 TABLET ORAL at 20:42

## 2022-04-12 RX ADMIN — ACETAMINOPHEN 650 MG: 325 TABLET ORAL at 05:45

## 2022-04-12 RX ADMIN — POLYETHYLENE GLYCOL 3350 17 G: 17 POWDER, FOR SOLUTION ORAL at 07:28

## 2022-04-12 RX ADMIN — VENLAFAXINE 75 MG: 75 TABLET ORAL at 17:43

## 2022-04-12 RX ADMIN — DIVALPROEX SODIUM 500 MG: 500 TABLET, EXTENDED RELEASE ORAL at 20:43

## 2022-04-12 RX ADMIN — VENLAFAXINE 75 MG: 75 TABLET ORAL at 12:39

## 2022-04-12 RX ADMIN — AMLODIPINE BESYLATE 10 MG: 10 TABLET ORAL at 07:28

## 2022-04-12 ASSESSMENT — PAIN DESCRIPTION - LOCATION
LOCATION: NECK;SHOULDER
LOCATION: KNEE
LOCATION: NECK;SHOULDER
LOCATION: NECK;SHOULDER

## 2022-04-12 ASSESSMENT — PAIN SCALES - GENERAL
PAINLEVEL_OUTOF10: 6
PAINLEVEL_OUTOF10: 4
PAINLEVEL_OUTOF10: 8
PAINLEVEL_OUTOF10: 4
PAINLEVEL_OUTOF10: 3
PAINLEVEL_OUTOF10: 6

## 2022-04-12 ASSESSMENT — PAIN DESCRIPTION - PAIN TYPE
TYPE: ACUTE PAIN;CHRONIC PAIN
TYPE: ACUTE PAIN;CHRONIC PAIN

## 2022-04-12 ASSESSMENT — PAIN SCALES - WONG BAKER: WONGBAKER_NUMERICALRESPONSE: 6

## 2022-04-12 ASSESSMENT — PAIN DESCRIPTION - DESCRIPTORS
DESCRIPTORS: ACHING
DESCRIPTORS: ACHING;SHARP
DESCRIPTORS: ACHING;SORE

## 2022-04-12 ASSESSMENT — PAIN DESCRIPTION - PROGRESSION: CLINICAL_PROGRESSION: NOT CHANGED

## 2022-04-12 ASSESSMENT — PAIN DESCRIPTION - ORIENTATION
ORIENTATION: RIGHT;LEFT;POSTERIOR
ORIENTATION: RIGHT

## 2022-04-12 NOTE — CARE COORDINATION
Writer spoke to the pt per the pt request. Pt stated he does have medicare part a and b. Writer stated its doesn't show up in the system. it appears that the pt is willing to go to a snf but is afraid if he says so he will be \"kicked out\" sooner then he is suppose to dh stated further he feels be out already.

## 2022-04-12 NOTE — PLAN OF CARE
Problem: Skin Integrity:  Goal: Will show no infection signs and symptoms  Description: Will show no infection signs and symptoms  4/12/2022 0925 by Jenna Castillo RN  Outcome: Ongoing  4/12/2022 0314 by Loki Cantrell RN  Outcome: Ongoing  Goal: Absence of new skin breakdown  Description: Absence of new skin breakdown  4/12/2022 0925 by Jenna Castillo RN  Outcome: Ongoing  4/12/2022 0314 by Loki Cantrell RN  Outcome: Ongoing     Problem: Falls - Risk of:  Goal: Will remain free from falls  Description: Will remain free from falls  4/12/2022 0925 by Jenna Castillo RN  Outcome: Ongoing  4/12/2022 0314 by Loki Cantrell RN  Outcome: Ongoing  Goal: Absence of physical injury  Description: Absence of physical injury  4/12/2022 0925 by Jenna Castillo RN  Outcome: Ongoing  4/12/2022 0314 by Loki Cantrell RN  Outcome: Ongoing     Problem: Pain:  Goal: Pain level will decrease  Description: Pain level will decrease  4/12/2022 0925 by Jenna Castillo RN  Outcome: Ongoing  4/12/2022 0314 by Loki Cantrell RN  Outcome: Ongoing  Goal: Control of acute pain  Description: Control of acute pain  4/12/2022 0925 by Jenna Castillo RN  Outcome: Ongoing  4/12/2022 0314 by Loki Cantrell RN  Outcome: Ongoing  Goal: Control of chronic pain  Description: Control of chronic pain  4/12/2022 0925 by Jenna Castillo RN  Outcome: Ongoing  4/12/2022 0314 by Loki Cantrell RN  Outcome: Ongoing     Problem: Musculor/Skeletal Functional Status  Goal: Highest potential functional level  4/12/2022 0925 by Jenna Castillo RN  Outcome: Ongoing  4/12/2022 0314 by Loki Cantrell RN  Outcome: Ongoing  Goal: Absence of falls  4/12/2022 0925 by Jenna Castillo RN  Outcome: Ongoing  4/12/2022 0314 by Loki Cantrell RN  Outcome: Ongoing     Problem: Nutrition  Goal: Optimal nutrition therapy  4/12/2022 0925 by Jenna Castillo RN  Outcome: Ongoing  4/12/2022 0314 by Loki Cantrell RN  Outcome: Ongoing

## 2022-04-12 NOTE — PLAN OF CARE
Problem: Skin Integrity:  Goal: Will show no infection signs and symptoms  Description: Will show no infection signs and symptoms  Outcome: Ongoing     Problem: Skin Integrity:  Goal: Absence of new skin breakdown  Description: Absence of new skin breakdown  4/12/2022 0314 by Jose R Rao RN  Outcome: Ongoing     Problem: Falls - Risk of:  Goal: Will remain free from falls  Description: Will remain free from falls  4/12/2022 0314 by Jose R Rao RN  Outcome: Ongoing     Problem: Falls - Risk of:  Goal: Absence of physical injury  Description: Absence of physical injury  Outcome: Ongoing     Problem: Pain:  Goal: Pain level will decrease  Description: Pain level will decrease  Outcome: Ongoing     Problem: Pain:  Goal: Control of acute pain  Description: Control of acute pain  4/12/2022 0314 by Jose R Rao RN  Outcome: Ongoing     Problem: Pain:  Goal: Control of chronic pain  Description: Control of chronic pain  Outcome: Ongoing     Problem: Musculor/Skeletal Functional Status  Goal: Highest potential functional level  Outcome: Ongoing     Problem: Musculor/Skeletal Functional Status  Goal: Absence of falls  Outcome: Ongoing     Problem: Nutrition  Goal: Optimal nutrition therapy  4/12/2022 0314 by Jose R Rao RN  Outcome: Ongoing

## 2022-04-12 NOTE — PROGRESS NOTES
Significant other, Faviola Founds called with concerns. Voice sounded impaired. Words were slurred, voice raised, and would not let writer finish complete sentences. She is concerned that dirty briefs were being left by [facility]. Stated that she can't \"keep doing this\". Writer reassured her that briefs were removed from trash can next to bed and placed in trash can under sink. She then stated that she received a \"bill\" in the mail and that the Pt was going to be \"kicked out\". Writer stated that writer does not handle billing but could have someone call her regarding the bill to which she stated no. She then raised her voice to writer. Writer stated that house supervisor was present. House supervisor spoke with Brown Osorio while Karen Hernandez went to check on Pt. Pt stated that s/o gets upset about his condition. Writer reassured pt that the situation is ok. S/O reassured that Pt is doing fine and that writer will pass on her concerns to the oncoming shift.

## 2022-04-12 NOTE — PROGRESS NOTES
[address] [facility]    ACUTE REHABILITATION OCCUPATIONAL THERAPY  DAILY NOTE    Date: 22  Patient Name: Harry Guevara      Room: 9726/8286-73    MRN: [id number]   : 1950  (75 y.o.)  Gender: male   Referring Practitioner: Puma Germain MD  Diagnosis: Cervical stenosis with myelopathy s/p C2-C5 laminectomy and fusion   Additional Pertinent Hx: Harry Guevara is a 70 y.o. male with history of HTN, HLD, diabetes, GIA, migraines, PTSD, and depression admitted to [facility] on 3/23/2022. He initially presented to [facility] after a fall with subsequent worsening limb weakness and additional falls. MRI brain showed no acute intracranial abnormality. MRI cervical spine showed severe spinal stenosis at C2-C3 and C3-C4 with complete effacement of the CSF. MRI thoracic and lumbar spine were relatively unremarkable except for moderate spinal stenosis from L2-L3 to L4-L5. He was transferred to [facility] for neurosurgical evaluation. He underwent laminectomy and fusion at C2-C5 on 3/24/22 (Dr. Wing Pardo). Hospital course has been complicated by urinary retention. He reports continued neck pain with pain in the limbs as well. He also notes numbness/tingling and weakness in all limbs. Pt admitted to rehab unit on 3/31/22    Restrictions  Restrictions/Precautions: Fall Risk,General Precautions  Implants present? : Metal implants  Spinal Precautions: Poor Sitting balance, Bilateral Upper  and Lower Body muscle weakness, Assist with standing in GERA Stedy  Other position/activity restrictions: up with assist; s/p C2-C5 laminectomy and fixation 3/24, Collar on while out of bed.  Ok to remove while resting In bed eating and drinking in bed  Required Braces or Orthoses  Cervical: c-collar  Other: Abdominal Binder  Required Braces or Orthoses?: Yes  Equipment Used: Wheelchair,Bed,Other (ss)    Subjective  Subjective: \"I was a little confused last night\"  Comments: LORENZO Campbell aware of pt concern of confusion during night  Patient Currently in Pain: Yes  Pain Level: 6  Pain Location: Neck; Shoulder  Pain Orientation: Right;Left;Posterior  Restrictions/Precautions: Fall Risk;General Precautions  Overall Orientation Status: Within Functional Limits  Patient Observation  Observations: pt alert this date, slight increase in RUE ROM for tasks/initiation, continues to req A and [place] for tasks completion and engagement, while seated in elbert stedy BP on /130 with , pt taken on RUE BP 67/35, pt returned to supine with /85, pt reports feeling dizzy and \"woozy\", RN Marny Hamman made aware of episode  Pain Assessment  Pain Assessment: 0-10  Pain Level: 6  Pain Type: Acute pain,Chronic pain  Pain Location: Neck,Shoulder  Pain Orientation: Right,Left,Posterior  Pain Radiating Towards: across breastbone  Pain Descriptors: Aching,Sore    Objective  Cognition  Overall Cognitive Status: WFL  Perception  Overall Perceptual Status: WFL  Balance  Sitting Balance: Minimal assistance (min-SBA, once positioned pt sat EOB with SBA)  Standing Balance: Dependent/Total (mod A x2 with elbert stedy)  Bed mobility  Rolling to Left: Moderate assistance (mod Ax1)  Rolling to Right: Moderate assistance (Ax1)  Supine to Sit: Moderate assistance;2 Person assistance (good initiation for BLE's, A for completion over EOB)  Sit to Supine: Dependent/Total;2 Person assistance (due to sudden return to supine)  Scooting: Dependent/Total;2 Person assistance  Transfers  Sit to stand: Maximum assistance;2 Person assistance  Stand to sit: Maximum assistance;2 Person assistance  Transfer Comments: with elbert wendy, Ax2  Standing Balance  Time: <30 sec x2  Activity: standing with elbert stedy to increase tolerance  Comment: Ax2 with elbert steisabella, A to place BUE's on front bar, decreased tolerance noted for standing/sitting on paddles with elbert stedy  Functional Mobility  Functional Mobility Comments: LATONYA due to limited ROM in BUE's ADL  Feeding: Dependent/Total  Grooming: Maximum assistance  UE Bathing: Dependent/Total  LE Bathing: Dependent/Total  UE Dressing: Dependent/Total  LE Dressing: Dependent/Total  Toileting: Dependent/Total  Additional Comments: MANUEL facilitated pt in bathing tasks this date, TA for all tasks with session to increase pt participation and engagement, A to lift/support RUE for washing face and washing abdomen, assist for thoroughness and completion, for LB bathing pt able to lift BLE's for washing/drying and donning TEDs/footies with TA, pt gives good efforts within tolerance and ability at this time, slight improvement with RUE ROM and initiation with tasks, continues to req [facility] and A for completion/engagement          Assessment  Performance deficits / Impairments: Decreased ADL status; Decreased functional mobility ; Decreased ROM; Decreased strength;Decreased endurance;Decreased sensation;Decreased balance;Decreased high-level IADLs;Decreased fine motor control;Decreased coordination;Decreased posture  Prognosis: Fair  Discharge Recommendations: Patient would benefit from continued therapy after discharge;Continue to assess pending progress  Activity Tolerance: Patient Tolerated treatment well;Patient limited by fatigue;Patient limited by pain  Activity Tolerance: increased alertness this date  Safety Devices in place: Yes  Type of devices: Nurse notified; Left in chair;Patient at risk for falls;Call light within reach  Equipment Recommendations  Equipment Needed:  (TBD)       Patient Education: DC Planning, transfer safety, activity promotion, increasing task tolerance/sitting and standing tolerance   Patient Goals   Patient goals : \"I just want to be able to get back to my basic needs. \"  Learner:patient  Method: demonstration and explanation       Outcome: needs reinforcement        Plan  Plan  Times per week: 900 minutes/week for combined therapy of OT/PT due to decreaed tolerance to activity.   Times per day: Twice a day  Current Treatment Recommendations: Self-Care / ADL,Strengthening,ROM,Balance Training,Functional Mobility Training,Endurance Training,Pain Management,Safety Education & Training,Patient/Caregiver Education & Training,Equipment Evaluation, Education, & procurement,Positioning  Patient Goals   Patient goals : \"I just want to be able to get back to my basic needs. \"  Short term goals  Time Frame for Short term goals: By 10 days  Short term goal 1: Pt will complete upper body dressing/bathing with max A with use of AE as needed while maintaining cervical precautions  Short term goal 2: Pt will complete self feeding task with max A with adaptive strategies and good safety  Short term goal 3: Pt will tolerate sitting EOB 5+ minutes unsupported with mod A during functional activity to increase independence with self care and mobility  Short term goal 4: Pt will complete functional transfers during self care tasks with mod A and good safety  Short term goal 5: Pt will participate in 30+ minutes of therapeutic exercises/functional activities to increase safety and independence with self care and mobility  Long term goals  Time Frame for Long term goals : By discharge  Long term goal 1: Pt will complete self feeding task with mod A with use of adaptive strategies   Long term goal 2: Pt will complete upper body bathing/dressing with mod A and good safety while maintaining cervical precautions  Long term goal 3: Pt will tolerate sitting EOB 10+ minutes unsupported with CGA and good safety during functional activity of choice  Long term goal 4: Pt will complete functional transfer with min A and good safety during self care tasks  Long term goal 5: Pt will demonstrated increased BUE gross motor/fine motor to increase participation in self care tasks  Long term goals 6: Pt/family with verbalize/demosntrate Good understanding of cervical precautions during self care tasks  Long term goal 7: Pt/family will demonstrate Good understanding of BUE exercises to increase functional use of BUE during self care tasks        04/12/22 0906 04/12/22 0945   OT Individual Minutes   Time In 0906  --    Time Out 0944  --    Minutes 38  --    OT Co-Treatment Minutes   Time In  --    (0945)   Time Out  --    (1005)     Electronically signed by Avery LOZADA on 4/12/22 at 3:53 PM EDT

## 2022-04-12 NOTE — PROGRESS NOTES
[facility]    CONSULTATION / HISTORY AND PHYSICAL EXAMINATION            Date:   4/12/2022  Patient name:  Rosalina Velazquez  Date of admission:  3/31/2022  8:38 PM  MRN:   [id number]  Account:  [de-identified]  YOB: 1950  PCP:    Matthew Falk  Room:   9439/1525-89  Code Status:    DNR-CCA    Physician Requesting Consult: Christiane Sanz MD    Reason for Consult:  medical management    Chief Complaint:     No chief complaint on file.       History Obtained From:     Patient medical record nursing staff    History of Present Illness:   Patient past medical multiple medical problems include hypertension, heart failure with preserved ejection fraction, diabetes, bipolar disorder,   glaucoma, patient was originally admitted at [place] where he was transferred from outlying facility, he developed, generalized weakness after a fall, patient underwent MRI of cervical spine suggestive of severe spinal canal stenosis at the level of C2-C4 level, patient underwent C2-C5 posterior decompression surgery, he feels that weakness in his extremities is slightly better  Patient during his hospital stay, developed bradycardia which improved after discontinuation of beta-blocker, cardiology was also consulted  He developed hospital-acquired pneumonia, getting treated with cefepime and doxycycline  Patient denying any complaints of cough, shortness of breath, chest pain,    Past Medical History:     Past Medical History:   Diagnosis Date    Depression 2017    ON RX    Diabetes mellitus ([facility] [address] 2013    NIDDM    Difficult intravenous access     Hyperlipidemia 2008    ON RX    Hypertension 2008    ON RX    Migraines     PTSD (post-traumatic stress disorder) 01/2018    ON RX    Sleep apnea 01/2018    UNALBE TO USE TO CLEARED BY ENT (CPAP)    Teeth missing     BACK TEETH UPPER AND LOWER TO BE FITTED FOR PARTIALS AT LATER DATE    Wears glasses         Past Surgical History:     Past Surgical History:   Procedure Laterality Date    CARDIAC CATHETERIZATION  02/2010    no stents     CARPAL TUNNEL RELEASE Left 01/09/2002    CATARACT REMOVAL      CERVICAL FUSION  04/19/2018    anterior cervical fusion C5-6    CERVICAL FUSION N/A 3/24/2022    C2-5 FUSION, C2-4 LAMINECTOMY performed by [employer] DO [place] at [address] Left 2018    CATARACT EXTRACTION WITH IOL    HYDROCELE EXCISION  1951    LAMINECTOMY  03/24/2022    C2-5 FUSION, C2-4 LAMINECTOMY    MIDDLE EAR SURGERY  01/11/2018    MIDDLE EAR REBUILT AND EAR DRUM REPLACED    MOUTH SURGERY  04/16/2018    5 TEETH REMOVED    OTHER SURGICAL HISTORY  04/19/2018    : ANTERIOR CERVICAL CORRPECTOMY C5-6, SYNTHES, DEPUY, REG TABLE, SUPINE,     NE OFFICE/OUTPT VISIT,PROCEDURE ONLY N/A 4/19/2018    ANTERIOR CERVICAL CORRPECTOMY AND FUSION C5-6 performed by [employer] DO Maddison at [address]  12/1983        Medications Prior to Admission:     Prior to Admission medications    Medication Sig Start Date End Date Taking?  Authorizing Provider   finasteride (PROSCAR) 5 MG tablet Take 5 mg by mouth daily   Yes Historical Provider, MD   carboxymethylcellulose 1 % ophthalmic solution Place 2 drops into both eyes 3 times daily Pt states \"2 drops in both eyes three times a day\"    Historical Provider, MD   divalproex (DEPAKOTE ER) 250 MG extended release tablet Take 750 mg by mouth at bedtime    Historical Provider, MD   furosemide (LASIX) 20 MG tablet Take 20 mg by mouth daily    Historical Provider, MD   venlafaxine (EFFEXOR XR) 150 MG extended release capsule Take 1 capsule by mouth daily  Patient taking differently: Take 225 mg by mouth daily  6/23/20   Abilio Jaramillo MD   simethicone (MYLICON) 80 MG chewable tablet Take 1 tablet by mouth every 6 hours as needed for Flatulence  Patient taking differently: Take 80 mg by mouth every 8 hours as needed for Flatulence  6/22/20 Claudette Saleem MD   lidocaine (XYLOCAINE) 5 % ointment Apply topically daily as needed for Pain Apply topically as needed. LF 4/20/20 (30 day supply)  Patient not taking: Reported on 3/23/2022    Historical Provider, MD   metFORMIN (GLUCOPHAGE) 1000 MG tablet Take 1,000 mg by mouth 2 times daily (with meals) LF 4/27/20 (90 day supply)  Patient not taking: Reported on 3/21/2022    Historical Provider, MD   losartan (COZAAR) 25 MG tablet Take 25 mg by mouth daily     Historical Provider, MD   aspirin 81 MG chewable tablet Take 81 mg by mouth daily  Patient not taking: Reported on 3/21/2022    Historical Provider, MD   divalproex (DEPAKOTE ER) 500 MG extended release tablet Take 500 mg by mouth every morning     Historical Provider, MD   traZODone (DESYREL) 100 MG tablet Take 150 mg by mouth nightly as needed LF 5/1/20 (30 day supply)    Historical Provider, MD   cetirizine (ZYRTEC) 10 MG tablet Take 10 mg by mouth daily LF 4/6/20 (90 day supply)  Patient not taking: Reported on 3/21/2022    Historical Provider, MD   atorvastatin (LIPITOR) 80 MG tablet Take 40 mg by mouth daily Take 1/2 tablet (40 mg) daily  LF 4/22/20 (90 day supply)  Patient not taking: Reported on 3/21/2022    Historical Provider, MD   amLODIPine (NORVASC) 10 MG tablet Take 7.5 mg by mouth daily     Historical Provider, MD   sildenafil (VIAGRA) 100 MG tablet Take 100 mg by mouth as needed for Erectile Dysfunction LF 5/12/20 (30 day supply)    Historical Provider, MD   omeprazole (PRILOSEC) 20 MG delayed release capsule Take 20 mg by mouth every evening LF 4/21/20 (90 day supply)    Historical Provider, MD        Allergies:     Latex, Bee venom, Lisinopril, Niacin and related, Sulfa antibiotics, and Terazosin    Social History:     Tobacco:    reports that he quit smoking about 50 years ago. His smoking use included cigarettes. He has a 2.00 pack-year smoking history.  He has never used smokeless tobacco.  Alcohol:      reports current alcohol use.  Drug Use:  reports current drug use. Drug: Marijuana Garon Kettle). Family History:     Family History   Problem Relation Age of Onset   [facility] Dementia Mother     Coronary Art Dis Mother     Stroke Mother     Diabetes Mother     Asthma Mother     Other Mother         BRAIN ANEURISM    High Blood Pressure Mother     Heart Disease Father     Diabetes Sister     Diabetes Brother     High Blood Pressure Brother     High Cholesterol Brother     Heart Disease Brother     Diabetes Sister     Other Sister         NEUROPATHY       Review of Systems:     Positive and Negative as described in HPI. CONSTITUTIONAL:  negative for fevers, chills, sweats, fatigue, weight loss  HEENT:  negative for vision, hearing changes, runny nose, throat pain  RESPIRATORY:  negative for shortness of breath, cough, congestion, wheezing. CARDIOVASCULAR:  negative for chest pain, palpitations. GASTROINTESTINAL:  negative for nausea, vomiting, diarrhea, constipation, change in bowel habits, abdominal pain   GENITOURINARY:  negative for difficulty of urination, burning with urination, frequency   INTEGUMENT:  negative for rash, skin lesions, easy bruising   HEMATOLOGIC/LYMPHATIC:  negative for swelling/edema   ALLERGIC/IMMUNOLOGIC:  negative for urticaria , itching  ENDOCRINE:  negative increase in drinking, increase in urination, hot or cold intolerance  MUSCULOSKELETAL:  negative joint pains, muscle aches, swelling of joints  NEUROLOGICAL: Weakness in all 4 extremities  BEHAVIOR/PSYCH:      Physical Exam:     BP (!) 146/89   Pulse 71   Temp 97.6 °F (36.4 °C) (Oral)   Resp 12   Ht 5' 3\" (1.6 m)   Wt 142 lb (64.4 kg)   SpO2 96%   BMI 25.15 kg/m²   Temp (24hrs), Av.9 °F (36.6 °C), Min:97.6 °F (36.4 °C), Max:98.2 °F (36.8 °C)    No results for input(s): POCGLU in the last 72 hours.     Intake/Output Summary (Last 24 hours) at 2022 1704  Last data filed at 2022 0913  Gross per 24 hour   Intake 358 ml   Output 150 ml Net 208 ml       General Appearance:  alert, well appearing, and in no acute distress  Mental status: oriented to person, place, and time with normal affect  Head:  normocephalic, atraumatic. Eye: no icterus, redness, pupils equal and reactive, extraocular eye movements intact, conjunctiva clear  Ear: normal external ear, no discharge, hearing intact  Nose:  no drainage noted  Mouth: mucous membranes moist  Neck: supple, no carotid bruits, thyroid not palpable , staples present posterior neck, healing  Lungs: Bilateral equal air entry, clear to ausculation, no wheezing, rales or rhonchi, normal effort  Cardiovascular: normal rate, regular rhythm, no murmur, gallop, rub. Abdomen: Soft, nontender, nondistended, normal bowel sounds, no hepatomegaly or splenomegaly  Neurologic: Weakness in all 4 extremities, power in all 4 extremities 4/5 skin: No gross lesions, rashes, bruising or bleeding on exposed skin area  Extremities:  peripheral pulses palpable, no pedal edema or calf pain with palpation  Psych: Investigations:      Laboratory Testing:  No results found for this or any previous visit (from the past 24 hour(s)). Consultations:   IP CONSULT TO DIETITIAN  IP CONSULT TO SOCIAL WORK  IP CONSULT TO INTERNAL MEDICINE  IP CONSULT TO DIETITIAN  IP CONSULT TO PSYCHIATRY  IP CONSULT TO PSYCHIATRY  Assessment :      Primary Problem  Cervical stenosis of spine    Active Hospital Problems    Diagnosis Date Noted    Cervical stenosis of spine [M48.02] 03/31/2022       Plan:     1. Quadriparesis, due to cervical cord compression after fall s/p C2-C5 decompression surgery. 2. Hypertension, controlled restart home dose of Norvasc, losartan, will avoid beta-blockers since patient developed bradycardia in [employer] [employer]  3. Heart failure preserved ejection fraction, compensated , on Lasix  4. GERD, restarted Protonix  5. On DVT prophylaxis Lovenox  6. BPH, started on finasteride  7.  History of glaucoma, restarted home medications of eyedrop  8. Patient has neurological better, will have intermittent catheterization  9. Diabetes, controlled      Dc hctz  Change all bp meds for night time  Day time hypotension with supine hypertension      4/12/22    Medications: Allergies: Allergies   Allergen Reactions    Latex Itching    Bee Venom Swelling    Lisinopril Other (See Comments)     Cough      Niacin And Related Rash    Sulfa Antibiotics Rash    Terazosin Rash       Current Meds:   Scheduled Meds:    venlafaxine  75 mg Oral TID WC    divalproex  500 mg Oral Nightly    losartan  100 mg Oral Nightly    bisacodyl  10 mg Rectal QPM    lidocaine  2 patch TransDERmal Daily    finasteride  5 mg Oral Daily    carboxymethylcellulose  2 drop Both Eyes TID    enoxaparin  40 mg SubCUTAneous Daily    furosemide  20 mg Oral Daily    pantoprazole  40 mg Oral QAM AC    polyethylene glycol  17 g Oral Daily     Continuous Infusions:   PRN Meds: senna, ondansetron **OR** ondansetron, glucagon (rDNA), glucose, cyclobenzaprine, acetaminophen    BP Readings from Last 3 Encounters:   04/12/22 (!) 146/89   03/31/22 (!) 143/87   03/24/22 100/79 ·     · On standing BP goes down to as low as 70 and patient feels nauseos and dizzy . 1. Reviewed ekg , echo , chest xray   2. Has borderline LVH  3. Systolic and diastolic fx wnl   4. Will discontinue lasix and losartan . 5. Treat HTN only if systolic BP above 718  6. And monitor . 7. Check orthostatic bp bid   8. Willy Mcdonald MD  4/12/2022  5:04 PM    Copy sent to Dr. Leilani Rosas    Please note that this chart was generated using voice recognition Dragon dictation software. Although every effort was made to ensure the accuracy of this automated transcription, some errors in transcription may have occurred.

## 2022-04-12 NOTE — PROGRESS NOTES
Physical Therapy  [address] [facility]   Acute Rehabilitation Physical Therapy Progress Note    Date: 22  Patient Name: Kevin Quiroga       Room: 9998/5761-58  MRN: [id number]   Account: [de-identified]   : 1950  (75 y.o.) Gender: male     Referring Practitioner: Raymundo Weinberg MD  Diagnosis: Cervical stenosis with myelopathy s/p C2-C5 laminectomy and fusion   Past Medical History:  has a past medical history of Depression, Diabetes mellitus ([address] Difficult intravenous access, Hyperlipidemia, Hypertension, Migraines, PTSD (post-traumatic stress disorder), Sleep apnea, Teeth missing, and Wears glasses. Past Surgical History:   has a past surgical history that includes Cardiac catheterization (2010); Carpal tunnel release (Left, 2002); Vasectomy (1983); Tonsillectomy and adenoidectomy (); Hydrocele surgery (); Middle ear surgery (2018); eye surgery (Left, ); Mouth surgery (2018); other surgical history (2018); cervical fusion (2018); pr office/outpt visit,procedure only (N/A, 2018); Cataract removal; laminectomy (2022); and cervical fusion (N/A, 3/24/2022). Additional Pertinent Hx: Shelli Edwards is a 70 y.o. male with history of HTN, HLD, diabetes, GIA, migraines, PTSD, and depression admitted to [facility] on 3/23/2022. He initially presented to [facility] after a fall with subsequent worsening limb weakness and additional falls. MRI brain showed no acute intracranial abnormality. MRI cervical spine showed severe spinal stenosis at C2-C3 and C3-C4 with complete effacement of the CSF. MRI thoracic and lumbar spine were relatively unremarkable except for moderate spinal stenosis from L2-L3 to L4-L5. He was transferred to [facility] for neurosurgical evaluation. He underwent laminectomy and fusion at C2-C5 on 3/24/22 (Dr. Natalya Fung). Hospital course has been complicated by urinary retention.  He reports continued neck pain with Fair;Poor  Sitting - Dynamic: Fair;Poor  Standing - Static: Poor;+ (Elbert stedy)  Standing - Dynamic: Poor;+  Comments: sitting balance fluxuates between CGA and progresses to modA with fatigue    EXERCISES    Other exercises?: Yes  Other exercises 1: Repositioned to L side lying with pillows due to not feeling well. Other exercises 2: Dangled at EOB and 1 sit to  [facility] to monitor Orthostatic BP. Other exercises 3: Sit to stand with elbert stedy, max A x 2 -stood for 5 to 7 secs only. Other Activities  Comment: rest breaks PRN. Activity Tolerance: Patient limited by fatigue,Patient limited by pain,Patient limited by endurance,Other (Orthostatic BP)  Activity Tolerance: pt very lethargic in AM/PM and c/o nausea in AM  PT Equipment Recommendations  Equipment Needed: No  Other: TBD         Current Treatment Recommendations: Strengthening,ROM,Balance Training,Functional Mobility Training,Transfer Training,Endurance Training,Wheelchair Mobility Training,Stair training,Gait Training,Neuromuscular Re-education,Home Exercise Program,Safety Education & Training,Patient/Caregiver Education & Training,Equipment Evaluation, Education, & procurement    Conditions Requiring Skilled Therapeutic Intervention  Body structures, Functions, Activity limitations: Decreased functional mobility ; Decreased strength;Decreased safe awareness;Decreased endurance;Decreased balance; Increased pain;Decreased posture;Decreased coordination;Decreased fine motor control;Decreased sensation;Decreased ROM  Treatment Diagnosis: Impaired function.   Prognosis: Fair  Decision Making: Medium Complexity  Barriers to Learning: Pain  REQUIRES PT FOLLOW UP: Yes  Discharge Recommendations: Patient would benefit from continued therapy after discharge;Home with assist PRN    Goals  Short term goals  Time Frame for Short term goals: 10 visits  Short term goal 1: Perform rolling in bed min/mod A   Short term goal 2: Perform supine to sit min/mod A Short term goal 3: Perform sit to supine max A   Short term goal 4: Perform sit<> stand mod A , and pivot transfers at mod A  Short term goal 5: Progress to ambulation HHA/Gait belt assist or appropriate device, mod A x 2 20 to 40 ft  Short term goal 6: Propel w/c with B LE, distance of 50 ft or > , min/mod A  Short term goal 7: Demo Fair- dynamic standing balance to decrease risk of falls  Long term goals  Time Frame for Long term goals : By DC  Long term goal 1: Pt able to perform bed mobility at [address] term goal 2: Pt able to perform transfers at min A   Long term goal 3: Pt able to ambulate with or without device, distance of 100 ft atleast, mod A, level surface. Long term goal 4: Pt able to perform 2 to 4 steps at [place] x 2  Long term goal 5: Pt able to propel w/c on level surface,  distance of 150 ft, SBA, level surfaces  Long term goal 6: Family training for safe mobility to return home  Long term goal 7: Improve sitting/standing balance to good/fair to reduce fall risk. Long term goal 8: Pt able to ambulate 60 to 80 ft for 2MWT,to improve overall fucntion.        04/12/22 0945   PT Individual Minutes   Time In 1005   Time Out 1020   Minutes 15   PT Co-Treatment Minutes   Time In 0945   Time Out 1005   Minutes 20       Electronically signed by Leonora Collins PTA on 4/12/22 at 5:08 PM EDT

## 2022-04-12 NOTE — PROGRESS NOTES
Physical Medicine & Rehabilitation  Progress Note    4/12/2022 6:16 PM     CC: Ambulatory and ADL dysfunction due to cervical stenosis with myelopathy status post C2-C5 laminectomy and fusion    Subjective:   No complaints today. Continues with orthostatic blood pressures    ROS:  Denies fevers, chills, sweats. No chest pain, palpitations, lightheadedness. Denies coughing, wheezing or shortness of breath. Denies abdominal pain, nausea, diarrhea or constipation. No new areas of joint pain. Denies new areas of numbness or weakness. Denies new anxiety or depression issues. No new skin problems. Rehabilitation:   PT:  Restrictions/Precautions: Fall Risk,General Precautions  Implants present? : Metal implants  Spinal Precautions: Poor Sitting balance, Bilateral Upper  and Lower Body muscle weakness, Assist with standing in GERA Stedy  Other position/activity restrictions: up with assist; s/p C2-C5 laminectomy and fixation 3/24, Collar on while out of bed.  Ok to remove while resting In bed eating and drinking in bed  Required Braces or Orthoses  Cervical: c-collar  Other: Abdominal Binder   Transfers  Sit to Stand: 2 Person [facility] (Jayjay Pea)  Stand to sit: Dependent/Total Jayjay Pea)  Bed to Chair: Dependent/Total Jayjay Pea.)  Comment: pt defered transfers in AM due to nausea and unable to participate in PM due to inability to stay awake             OT:  ADL  Feeding: Dependent/Total  Grooming: Maximum assistance  UE Bathing: Dependent/Total  LE Bathing: Dependent/Total  UE Dressing: Dependent/Total  LE Dressing: Dependent/Total  Toileting: Dependent/Total  Additional Comments: MANUEL facilitated pt in bathing tasks this date, TA for all tasks with session to increase pt participation and engagement, A to lift/support RUE for washing face and washing abdomen, assist for thoroughness and completion, for LB bathing pt able to lift BLE's for washing/drying and donning TEDs/footies with TA, pt gives good efforts within tolerance and ability at this time, slight improvement with RUE ROM and initiation with tasks, continues to req ALE[facility] and A for completion/engagement         Balance  Sitting Balance: Minimal assistance (min-SBA, once positioned pt sat EOB with SBA)  Standing Balance: Dependent/Total (mod A x2 with elbert nguyen)   Standing Balance  Time: <30 sec x2  Activity: standing with elbert nguyen to increase tolerance  Comment: Ax2 with elbert nguyen, A to place BUE's on front bar, decreased tolerance noted for standing/sitting on paddles with elbertdenise nguyen  Functional Mobility  Functional Mobility Comments: LATONYA due to limited ROM in BUE's     Bed mobility  Bridging:  Moderate assistance  Rolling to Left: Moderate assistance (mod Ax1)  Rolling to Right: Moderate assistance (Ax1)  Supine to Sit: Moderate assistance,2 Person assistance (good initiation for BLE's, A for completion over EOB)  Sit to Supine: Dependent/Total,2 Person assistance (due to sudden return to supine)  Scooting: Dependent/Total,2 Person assistance  Comment: PM session to address bed mobility and increase indep, pt req A for knee flexion when rolling, good initiation req, A to lift LUE when reaching for bed rail, fair grasp noted in B hands when grasping bed rails  Transfers  Sit to stand: Maximum assistance,2 Person assistance  Stand to sit: Maximum assistance,2 Person assistance  Transfer Comments: with elbert nguyen, Ax2   Toilet Transfers  Toilet Transfers Comments: LATONYA due to safety concerns at this time        Wheelchair Bed Transfers  Wheelchair/Bed - Technique:  (utilizing ss )  Equipment Used: Wheelchair,Bed,Other (ss)  Level of Asssistance: Dependent/Total,2 Person assistance  Wheelchair Transfers Comments: completign transfer to and from  with ss       ST:            Objective:  BP (!) 146/89   Pulse 71   Temp 97.6 °F (36.4 °C) (Oral)   Resp 12   Ht 5' 3\" (1.6 m)   Wt 142 lb (64.4 kg)   SpO2 96%   BMI 25.15 kg/m²  I Body ENZYMES: No results for input(s): CKMB, CKMBINDEX, TROPONINT in the last 72 hours. Invalid input(s): CKTOTAL;3  FASTING LIPID PANEL:  Lab Results   Component Value Date    CHOL 104 04/12/2018    HDL 42 04/12/2018    TRIG 92 04/12/2018     LIVER PROFILE: No results for input(s): AST, ALT, ALB, BILIDIR, BILITOT, ALKPHOS in the last 72 hours. I/O (24Hr): Intake/Output Summary (Last 24 hours) at 4/12/2022 1816  Last data filed at 4/12/2022 0913  Gross per 24 hour   Intake 358 ml   Output 150 ml   Net 208 ml       Glu last 24 hour  No results for input(s): POCGLU in the last 72 hours. No results for input(s): CLARITYU, COLORU, PHUR, SPECGRAV, PROTEINU, RBCUA, BLOODU, BACTERIA, NITRU, WBCUA, LEUKOCYTESUR, YEAST, GLUCOSEU, BILIRUBINUR in the last 72 hours. Impression/Plan:    1. Cervical stenosis with myelopathy:  S/p C2-C5 laminectomy and fusion on 3/24. PT/OT  for gait, mobility, strengthening, endurance, ADLs, and self care. C-collar when out of bed. Reviewed cervical collar precautions are as per neurosurgery, discharge plan 4/19? Clarify support, discussed with patient need to sit upright for long periods of time to increase tolerance  2. Pain control with as-needed tylenol, as-needed flexeril. Added lidocaine patches on 4/4. Avoid narcotics, as he did require narcan in acute care. Staples removed 4/8 per neurosurgery. Follow up with neurosurgery as outpatient. Discharge plan 4/21,  3. Neurogenic bladder:  Has not been requiring intermittent catheterization but can continue this as needed. Continue check PVR and follow-up bladder protocol-discussed with nursing  4. Neurogenic bowel:  Miralax daily, senokot nightly prn. Bowel program with suppository nightly started 4/7.  5. Dizziness, nausea with sitting upright:  Likely related to cervical spinal cord injury (orthostatic hypotension, possible autonomic dysfunction). NERY hose and abdominal binder when sitting or out of bed.   Dr. Hortensia De Leon on 4/10 Discussed orthostatic vital signs in the setting of intermittent supine hypertension with IM -internal medicine note seen today-discontinued Lasix and losartan, continue to monitor orthostatics, will also DC Flexeril-minimal use by patient  6. Subjective dysphagia:  Chronic. SLP did not recommend any change in diet-on regular diet  7. Poor oral intake:  Discussed with dietician. On oral nutrition supplements. Will hold off on appetite stimulant for now due to potential side effects. Patient asks to be switched to regular solids so he can have more meal/food choices. Change 4/10 noted as noted on regular diet  8. Hospital-acquired pneumonia:  Completed course of cefepime and doxycycline. 9. Bradycardia:  Resolved after discontinuation of beta blockers  10. CHF:  On lasix  11. HTN:  On amlodipine, lasix, losartan, hydrochlorothiazide  12. HLD:  Not currently on medication  13. Diabetes:  Not currently on medication  14. GIA  15. Depression, PTSD:  On effexor, depakote. Psychiatry consulted per patient request - recommended holding morning depakote, changing effexor to 225mg extended release dose in the morning - patient was more fatigued on 4/10 after changing effexor formulation, changed effexor dose back to 75mg TID with meals for 4/10. Psychiatry recommending continued weaning of depakote due to ataxia. -Depakote down to 500 mg per psychiatry  16. DVT prophylaxis:  Lovenox  17. Internal Medicine for medical management        Clarice Myers. Richardson Liao MD       This note is created with the assistance of a speech recognition program.  While intending to generate a document that actually reflects the content of the visit, the document can still have some errors including those of syntax and sound a like substitutions which may escape proof reading.   In such instances, actual meaning can be extrapolated by contextual diversion

## 2022-04-13 LAB
ABSOLUTE BANDS #: 0.15 K/UL (ref 0–1)
ABSOLUTE EOS #: 0.15 K/UL (ref 0–0.4)
ABSOLUTE LYMPH #: 1.41 K/UL (ref 1–4.8)
ABSOLUTE MONO #: 0.96 K/UL (ref 0.1–1.3)
ANION GAP SERPL CALCULATED.3IONS-SCNC: 12 MMOL/L (ref 9–17)
ATYPICAL LYMPHOCYTE ABSOLUTE COUNT: 0.3 K/UL
ATYPICAL LYMPHOCYTES: 4 %
BANDS: 2 % (ref 0–10)
BASOPHILS # BLD: 1 % (ref 0–2)
BASOPHILS ABSOLUTE: 0.07 K/UL (ref 0–0.2)
BUN BLDV-MCNC: 30 MG/DL (ref 8–23)
CALCIUM SERPL-MCNC: 9.6 MG/DL (ref 8.6–10.4)
CHLORIDE BLD-SCNC: 89 MMOL/L (ref 98–107)
CO2: 30 MMOL/L (ref 20–31)
CREAT SERPL-MCNC: 0.66 MG/DL (ref 0.7–1.2)
EOSINOPHILS RELATIVE PERCENT: 2 % (ref 0–4)
GFR AFRICAN AMERICAN: >60 ML/MIN
GFR NON-AFRICAN AMERICAN: >60 ML/MIN
GFR SERPL CREATININE-BSD FRML MDRD: ABNORMAL ML/MIN/{1.73_M2}
GLUCOSE BLD-MCNC: 95 MG/DL (ref 70–99)
HCT VFR BLD CALC: 36.2 % (ref 41–53)
HEMOGLOBIN: 12.9 G/DL (ref 13.5–17.5)
LYMPHOCYTES # BLD: 19 % (ref 24–44)
MCH RBC QN AUTO: 32.5 PG (ref 26–34)
MCHC RBC AUTO-ENTMCNC: 35.6 G/DL (ref 31–37)
MCV RBC AUTO: 91.2 FL (ref 80–100)
MONOCYTES # BLD: 13 % (ref 1–7)
MORPHOLOGY: NORMAL
PDW BLD-RTO: 12.9 % (ref 11.5–14.9)
PLATELET # BLD: 364 K/UL (ref 150–450)
PMV BLD AUTO: 7.4 FL (ref 6–12)
POTASSIUM SERPL-SCNC: 4.5 MMOL/L (ref 3.7–5.3)
RBC # BLD: 3.97 M/UL (ref 4.5–5.9)
SEG NEUTROPHILS: 59 % (ref 36–66)
SEGMENTED NEUTROPHILS ABSOLUTE COUNT: 4.36 K/UL (ref 1.3–9.1)
SODIUM BLD-SCNC: 131 MMOL/L (ref 135–144)
WBC # BLD: 7.4 K/UL (ref 3.5–11)

## 2022-04-13 PROCEDURE — 99232 SBSQ HOSP IP/OBS MODERATE 35: CPT | Performed by: PHYSICAL MEDICINE & REHABILITATION

## 2022-04-13 PROCEDURE — 97110 THERAPEUTIC EXERCISES: CPT

## 2022-04-13 PROCEDURE — 6370000000 HC RX 637 (ALT 250 FOR IP): Performed by: STUDENT IN AN ORGANIZED HEALTH CARE EDUCATION/TRAINING PROGRAM

## 2022-04-13 PROCEDURE — 6370000000 HC RX 637 (ALT 250 FOR IP): Performed by: INTERNAL MEDICINE

## 2022-04-13 PROCEDURE — 6370000000 HC RX 637 (ALT 250 FOR IP): Performed by: NURSE PRACTITIONER

## 2022-04-13 PROCEDURE — 80048 BASIC METABOLIC PNL TOTAL CA: CPT

## 2022-04-13 PROCEDURE — 85025 COMPLETE CBC W/AUTO DIFF WBC: CPT

## 2022-04-13 PROCEDURE — 6370000000 HC RX 637 (ALT 250 FOR IP): Performed by: PSYCHIATRY & NEUROLOGY

## 2022-04-13 PROCEDURE — 6370000000 HC RX 637 (ALT 250 FOR IP): Performed by: PHYSICAL MEDICINE & REHABILITATION

## 2022-04-13 PROCEDURE — 97530 THERAPEUTIC ACTIVITIES: CPT

## 2022-04-13 PROCEDURE — 1180000000 HC REHAB R&B

## 2022-04-13 PROCEDURE — 97535 SELF CARE MNGMENT TRAINING: CPT

## 2022-04-13 PROCEDURE — 99231 SBSQ HOSP IP/OBS SF/LOW 25: CPT | Performed by: INTERNAL MEDICINE

## 2022-04-13 PROCEDURE — 6360000002 HC RX W HCPCS: Performed by: NURSE PRACTITIONER

## 2022-04-13 PROCEDURE — 97112 NEUROMUSCULAR REEDUCATION: CPT

## 2022-04-13 PROCEDURE — 36415 COLL VENOUS BLD VENIPUNCTURE: CPT

## 2022-04-13 PROCEDURE — 51798 US URINE CAPACITY MEASURE: CPT

## 2022-04-13 RX ADMIN — FINASTERIDE 5 MG: 5 TABLET, FILM COATED ORAL at 09:02

## 2022-04-13 RX ADMIN — HYDRALAZINE HYDROCHLORIDE 25 MG: 25 TABLET, FILM COATED ORAL at 20:47

## 2022-04-13 RX ADMIN — DIVALPROEX SODIUM 500 MG: 500 TABLET, EXTENDED RELEASE ORAL at 20:47

## 2022-04-13 RX ADMIN — CARBOXYMETHYLCELLULOSE SODIUM 2 DROP: 10 GEL OPHTHALMIC at 14:00

## 2022-04-13 RX ADMIN — CARBOXYMETHYLCELLULOSE SODIUM 2 DROP: 10 GEL OPHTHALMIC at 20:48

## 2022-04-13 RX ADMIN — PANTOPRAZOLE SODIUM 40 MG: 40 TABLET, DELAYED RELEASE ORAL at 05:57

## 2022-04-13 RX ADMIN — VENLAFAXINE 75 MG: 75 TABLET ORAL at 12:10

## 2022-04-13 RX ADMIN — VENLAFAXINE 75 MG: 75 TABLET ORAL at 18:40

## 2022-04-13 RX ADMIN — BISACODYL 10 MG: 10 SUPPOSITORY RECTAL at 18:34

## 2022-04-13 RX ADMIN — VENLAFAXINE 75 MG: 75 TABLET ORAL at 09:00

## 2022-04-13 RX ADMIN — CARBOXYMETHYLCELLULOSE SODIUM 2 DROP: 10 GEL OPHTHALMIC at 09:07

## 2022-04-13 RX ADMIN — ENOXAPARIN SODIUM 40 MG: 100 INJECTION SUBCUTANEOUS at 09:02

## 2022-04-13 RX ADMIN — ACETAMINOPHEN 650 MG: 325 TABLET ORAL at 20:47

## 2022-04-13 ASSESSMENT — PAIN SCALES - GENERAL
PAINLEVEL_OUTOF10: 6
PAINLEVEL_OUTOF10: 2
PAINLEVEL_OUTOF10: 4

## 2022-04-13 ASSESSMENT — PAIN DESCRIPTION - LOCATION
LOCATION: NECK;SHOULDER;CHEST
LOCATION: SHOULDER

## 2022-04-13 ASSESSMENT — PAIN DESCRIPTION - ORIENTATION
ORIENTATION: RIGHT
ORIENTATION: RIGHT

## 2022-04-13 NOTE — PROGRESS NOTES
Physical Medicine & Rehabilitation  Progress Note    4/13/2022 5:42 PM     CC: Ambulatory and ADL dysfunction due to cervical stenosis with myelopathy status post C2-C5 laminectomy and fusion    Subjective:   No complaints today. ROS:  Denies fevers, chills, sweats. No chest pain, palpitations, lightheadedness. Denies coughing, wheezing or shortness of breath. Denies abdominal pain, nausea, diarrhea or constipation. No new areas of joint pain. Denies new areas of numbness or weakness. Denies new anxiety or depression issues. No new skin problems. Rehabilitation:   PT:  Restrictions/Precautions: Fall Risk,General Precautions  Implants present? : Metal implants  Spinal Precautions: Poor Sitting balance, Bilateral Upper  and Lower Body muscle weakness, Assist with standing in GERA Stedy  Other position/activity restrictions: up with assist; s/p C2-C5 laminectomy and fixation 3/24, Collar on while out of bed. Ok to remove while resting In bed eating and drinking in bed  Required Braces or Orthoses  Cervical: c-collar  Other: Abdominal Binder   Transfers  Sit to Stand: 2 Person [address] [place] [address] (Lewis Murrell)  Stand to sit: Dependent/Total Lewis Murrell)  Bed to Chair: Dependent/Total Lewis Murrell.)  Comment: Initial  SS ~20 sec. Pt reports feeling \"light headed\" returned to sitting on EOB. PT requested to use bed pan while sitting on EOB. PT's sitting balance ranges from SBA to CGA with BUE support. PT requires Min A without UE support. PT agreeable to attempt STS again. Pt continues to report feeling \"light headed\" and reports \"chest pain\" PT states this occured yesterday with stands. Pt states his chest feels \"heavy\" and rates it \"6/10\". PT also notes to have anxiety. PM: Pt is agreeable to attempt STS in SS. Pt's BP sitting is 119/84 ans standing in SS is 89/64. Pt requested to return to sitting, however only able to tolerate sitting for ~2-3min. PT request to return to supine. OT:  ADL  Feeding: Dependent/Total  Grooming: Maximum assistance  UE Bathing: Dependent/Total  LE Bathing: Dependent/Total  UE Dressing: Dependent/Total  LE Dressing: Dependent/Total  Toileting: Dependent/Total  Additional Comments: MANUEL facilitated pt in bathing tasks this date,  A to lift/support RUE for washing face and washing abdomen, assist for thoroughness and completion, for LB bathing pt able to lift BLE's for washing/drying and donning TEDs/footies with TA, pt gives good efforts within tolerance and ability at this time, pt completed bathe and dress from bed, pt briefs changed, pt was assisted to don shirt, pants, teds and socks and neck brace in am,  pm feeding supine with HOB up, trialed with 1 pillow under R elbow, pudding cup placed in L hand and R hand grasping spoon with enlarged handle. shld ROM limiting performance, trialed OT hold RUE then trialed place 2 double folded pillows under RUE elbow and 1 under L elbow. with this set up, pt able to eat 10 bites indep, needed A to scoop out last 2 bites. showed nsg this set up and encouraged pt to request this set up for 1 food item each meal to practice. Balance  Sitting Balance: Moderate assistance (sitting EOB, then with elbert stedy in front and holding with RUE was SBA.)  Standing Balance: Dependent/Total   Standing Balance  Time: <30 sec x4  Activity: standing with elbert stedy to increase tolerance  Comment: Ax2 with elbert stedy, A to place BUE's on front bar, decreased tolerance noted for standing/sitting on paddles with elbert stedy, became dizzy  Functional Mobility  Functional Mobility Comments: LATONYA due to limited ROM in BUE's     Bed mobility  Bridging:  Moderate assistance  Rolling to Left: Moderate assistance  Rolling to Right: Moderate assistance  Supine to Sit: Moderate assistance,2 Person assistance  Sit to Supine: Dependent/Total,2 Person assistance  Scooting: Dependent/Total,2 Person assistance  Comment: PM session to address bed mobility and increase indep, pt req A for knee flexion when rolling, good initiation req, A to lift LUE when reaching for bed rail, fair grasp noted in B hands when grasping bed rails  Transfers  Sit to stand: Maximum assistance,2 Person assistance  Stand to sit: Maximum assistance,2 Person assistance  Transfer Comments: with elbert nguyen, Ax2   Toilet Transfers  Toilet Transfer: Unable to assess  Toilet Transfers Comments: am, pt on bedpan when OT arrived and was assisted off, larger softer edge bedpan was obtained, during co treat pt unable to evon transfer to toilet (dizzy)  but was assisted to sit on bedpan seated at EOB, which pt appreciated but was unable to go. Wheelchair Bed Transfers  Wheelchair/Bed - Technique:  (utilizing ss )  Equipment Used: Wheelchair,Bed,Other (ss)  Level of Asssistance: Dependent/Total,2 Person assistance  Wheelchair Transfers Comments: completign transfer to and from  with ss       ST:            Objective:  /75   Pulse 92   Temp 97.7 °F (36.5 °C) (Oral)   Resp 16   Ht 5' 3\" (1.6 m)   Wt 142 lb (64.4 kg)   SpO2 95%   BMI 25.15 kg/m²  I Body mass index is 25.15 kg/m². I   Wt Readings from Last 1 Encounters:   22 142 lb (64.4 kg)      Temp (24hrs), Av.7 °F (36.5 °C), Min:97.7 °F (36.5 °C), Max:97.7 °F (36.5 °C)         GEN: well developed, well nourished, no acute distress  HEENT: Normocephalic atraumatic, EOMI, mucous membranes pink and moist  CV: RRR, no murmurs, rubs or gallops  PULM: CTAB, no rales or rhonchi. Respirations WNL and unlabored  ABD: soft, NT, ND, +BS and equal  NEURO: A&O x3. Sensation intact to light touch. MSK: Decreased range of motion of bilateral shoulder. ,  4+/5 bilateral dorsiflexion plantarflexion able to lift legs antigravity                                        Shoulder elbow flexion elbow extension wrist flexion wrist extension   Left upper extremity         1                  0 -1                 4 4                   4               4  Right upper extremity      2-3               4                      4                       4                   4                4    EXTREMITIES: No calf tenderness to palpation bilaterally. No edema BLEs  SKIN: warm dry and intact with good turgor, incision clean  PSYCH: appropriately interactive. Affect WNL. Good spirits        Medications   Scheduled Meds:   hydrALAZINE  25 mg Oral TID    venlafaxine  75 mg Oral TID WC    divalproex  500 mg Oral Nightly    bisacodyl  10 mg Rectal QPM    lidocaine  2 patch TransDERmal Daily    finasteride  5 mg Oral Daily    carboxymethylcellulose  2 drop Both Eyes TID    enoxaparin  40 mg SubCUTAneous Daily    pantoprazole  40 mg Oral QAM AC    polyethylene glycol  17 g Oral Daily     Continuous Infusions:  PRN Meds:.senna, ondansetron **OR** ondansetron, glucagon (rDNA), glucose, acetaminophen     Diagnostics:     CBC:   Recent Labs     04/13/22  0641   WBC 7.4   RBC 3.97*   HGB 12.9*   HCT 36.2*   MCV 91.2   RDW 12.9        BMP:   Recent Labs     04/13/22  0641   *   K 4.5   CL 89*   CO2 30   BUN 30*   CREATININE 0.66*     BNP: No results for input(s): BNP in the last 72 hours. PT/INR: No results for input(s): PROTIME, INR in the last 72 hours. APTT: No results for input(s): APTT in the last 72 hours. CARDIAC ENZYMES: No results for input(s): CKMB, CKMBINDEX, TROPONINT in the last 72 hours. Invalid input(s): CKTOTAL;3  FASTING LIPID PANEL:  Lab Results   Component Value Date    CHOL 104 04/12/2018    HDL 42 04/12/2018    TRIG 92 04/12/2018     LIVER PROFILE: No results for input(s): AST, ALT, ALB, BILIDIR, BILITOT, ALKPHOS in the last 72 hours. I/O (24Hr): Intake/Output Summary (Last 24 hours) at 4/13/2022 1742  Last data filed at 4/13/2022 1554  Gross per 24 hour   Intake --   Output 951 ml   Net -951 ml       Glu last 24 hour  No results for input(s): POCGLU in the last 72 hours.     No results for input(s): CLARITYU, COLORU, PHUR, SPECGRAV, PROTEINU, RBCUA, BLOODU, BACTERIA, NITRU, WBCUA, LEUKOCYTESUR, YEAST, GLUCOSEU, BILIRUBINUR in the last 72 hours. Impression/Plan:    1. Cervical stenosis with myelopathy:  S/p C2-C5 laminectomy and fusion on 3/24. PT/OT  for gait, mobility, strengthening, endurance, ADLs, and self care. C-collar when out of bed. Reviewed cervical collar precautions are as per neurosurgery, discharge plan 4/19? Clarify support, discussed with patient need to sit upright for long periods of time to increase tolerance-attempting to participate better  2. Pain control with as-needed tylenol, as-needed flexeril. Added lidocaine patches on 4/4. Avoid narcotics, as he did require narcan in acute care. Staples removed 4/8 per neurosurgery. Follow up with neurosurgery as outpatient. Discharge plan 4/21,  3. Neurogenic bladder:  Has not been requiring intermittent catheterization but can continue this as needed. Continue check PVR-noted 04/13 and follow-up bladder protocol-discussed with nursing  4. Neurogenic bowel:  Miralax daily, senokot nightly prn. Bowel program with suppository nightly started 4/7.  5. Dizziness, nausea with sitting upright:  Likely related to cervical spinal cord injury (orthostatic hypotension, possible autonomic dysfunction). NERY hose and abdominal binder when sitting or out of bed. Dr. Ronald Ramírez on 4/10 Discussed orthostatic vital signs in the setting of intermittent supine hypertension with IM -internal medicine note seen today-discontinued Lasix and losartan, continue to monitor orthostatics, will also DC Flexeril-minimal use by patient  6. Subjective dysphagia:  Chronic. SLP did not recommend any change in diet-on regular diet  7. Poor oral intake:  Discussed with dietician. On oral nutrition supplements. Will hold off on appetite stimulant for now due to potential side effects.   Patient asks to be switched to regular solids so he can have more meal/food choices. Change 4/10 noted as noted on regular diet  8. Hospital-acquired pneumonia:  Completed course of cefepime and doxycycline. 9. Bradycardia and orthostatic:  Resolved after discontinuation of beta blockers. Appears to have improved  10. CHF: Off lasix  11. Hyponatremia-sodium 131-stable  12. HTN:   Hydralazine  13. HLD:  Not currently on medication  14. Diabetes:  Not currently on medication  15. GIA  16. Depression, PTSD:  On effexor, depakote. Psychiatry consulted per patient request - recommended holding morning depakote, changing effexor to 225mg extended release dose in the morning - patient was more fatigued on 4/10 after changing effexor formulation, changed effexor dose back to 75mg TID with meals for 4/10. Psychiatry recommending continued weaning of depakote due to ataxia. -Depakote down to 500 mg per psychiatry  17. DVT prophylaxis:  Lovenox  18. Internal Medicine for medical management        Louvenia Francisco. Sammi Gray MD       This note is created with the assistance of a speech recognition program.  While intending to generate a document that actually reflects the content of the visit, the document can still have some errors including those of syntax and sound a like substitutions which may escape proof reading.   In such instances, actual meaning can be extrapolated by contextual diversion

## 2022-04-13 NOTE — PLAN OF CARE
Problem: Skin Integrity:  Goal: Absence of new skin breakdown  Description: Absence of new skin breakdown  Outcome: Ongoing     Problem: Falls - Risk of:  Goal: Will remain free from falls  Description: Will remain free from falls  Outcome: Ongoing     Problem: Falls - Risk of:  Goal: Absence of physical injury  Description: Absence of physical injury  Outcome: Ongoing     Problem: Pain:  Goal: Pain level will decrease  Description: Pain level will decrease  Outcome: Ongoing     Problem: Pain:  Goal: Control of acute pain  Description: Control of acute pain  Outcome: Ongoing     Problem: Pain:  Goal: Control of chronic pain  Description: Control of chronic pain  Outcome: Ongoing     Problem: Musculor/Skeletal Functional Status  Goal: Highest potential functional level  Outcome: Ongoing     Problem: Musculor/Skeletal Functional Status  Goal: Absence of falls  Outcome: Ongoing     Problem: Nutrition  Goal: Optimal nutrition therapy  Outcome: Ongoing

## 2022-04-13 NOTE — CARE COORDINATION
Received a call from [facility]. States she verified patient this patient does have Medicare coverage for Part A and B, not D.      Notified Both Vanessa FRANKLIN and Melinda FORD

## 2022-04-13 NOTE — PROGRESS NOTES
[address]   ACUTE REHABILITATION OCCUPATIONAL THERAPY  DAILY NOTE    Date: 22  Patient Name: Ayaan Daugherty      Room: 6445/4115-28    MRN: [id number]   : 1950  (75 y.o.)  Gender: male   Referring Practitioner: Blaine Wood MD  Diagnosis: Cervical stenosis with myelopathy s/p C2-C5 laminectomy and fusion        Restrictions  Restrictions/Precautions: Fall Risk,General Precautions  Implants present? : Metal implants  Spinal Precautions: Poor Sitting balance, Bilateral Upper  and Lower Body muscle weakness, Assist with standing in ELBERT Stedy  Other position/activity restrictions: up with assist; s/p C2-C5 laminectomy and fixation 3/24, Collar on while out of bed. Ok to remove while resting In bed eating and drinking in bed  Required Braces or Orthoses  Cervical: c-collar  Other: Abdominal Binder  Required Braces or Orthoses?: Yes  Equipment Used: Wheelchair,Bed,Other (ss)    Subjective  Subjective: \"The left one is not very good at all. \" re  LUE- pt accurately describing UE deficits. Comments: pt requested and was assisted to adapt t shirt necks to larger to accomodate neck restrictions. Objective  Cognition  Overall Cognitive Status: WFL  Balance  Sitting Balance:  Moderate assistance (sitting EOB, then with elbert stedy in front and holding with RUE was SBA.)  Standing Balance: Dependent/Total  Bed mobility  Rolling to Left: Moderate assistance  Rolling to Right: Moderate assistance  Supine to Sit: Moderate assistance;2 Person assistance  Sit to Supine: Dependent/Total;2 Person assistance  Scooting: Dependent/Total;2 Person assistance  Standing Balance  Time: <30 sec x4  Activity: standing with elbert stedy to increase tolerance  Comment: Ax2 with elbert stedy, A to place BUE's on front bar, decreased tolerance noted for standing/sitting on paddles with elbert stedy, became dizzy  Toilet Transfers  Toilet Transfer: Unable to assess  Toilet Transfers Comments: am, pt on bedpan when OT arrived and was assisted off, larger softer edge bedpan was obtained, during co treat pt unable to evon transfer to toilet (dizzy)  but was assisted to sit on bedpan seated at EOB, which pt appreciated but was unable to go. Gross Motor: no AROM either shld  Fine Motor: able to use L hand to hold cup of pudding when it was placed in hand. able to use RUE for some tasks - has limited , pinch ie. able to grasp washcloth in fist and release. able to grasp enlarged handle silverware and release. Type of ROM/Therapeutic Exercise  Type of ROM/Therapeutic Exercise: GABINO  Comment: AAROM ex's with BUE's shlds, AROM elbow distally: to increase strength and promote functional return, pt reports pain and discomfort with various motions, limited ROM noted in BUE's due to pain  Exercises  Shoulder Flexion: x  Elbow Flexion: x  Elbow Extension: x  Finger Flexion: x  Finger Extension: x                       ADL  Feeding: Dependent/Total  Grooming: Maximum assistance  UE Bathing: Dependent/Total  LE Bathing: Dependent/Total  UE Dressing: Dependent/Total  LE Dressing: Dependent/Total  Toileting: Dependent/Total  Additional Comments: MANUEL facilitated pt in bathing tasks this date,  A to lift/support RUE for washing face and washing abdomen, assist for thoroughness and completion, for LB bathing pt able to lift BLE's for washing/drying and donning TEDs/footies with TA, pt gives good efforts within tolerance and ability at this time, pt completed bathe and dress from bed, pt briefs changed, pt was assisted to don shirt, pants, teds and socks and neck brace in am,  pm feeding supine with HOB up, trialed with 1 pillow under R elbow, pudding cup placed in L hand and R hand grasping spoon with enlarged handle. shld ROM limiting performance, trialed OT hold RUE then trialed place 2 double folded pillows under RUE elbow and 1 under L elbow.    with this set up, pt able to eat 10 bites indep, needed A to scoop out last 2 bites.  showed nsg this set up and encouraged pt to request this set up for 1 food item each meal to practice. Assessment     Activity Tolerance: Patient Tolerated treatment well;Patient limited by fatigue;Patient limited by pain  Activity Tolerance: am pt attempted use of bedpan supine and seated without success. pm treatment initiated then restarted due to pt incontinent BM in briefs. pt reports nsg manually assisted pt to remove impacted BM.   abdominal binder on during cotreat when mobility attempted. 04/13/22 1226 04/13/22 1531 04/13/22 1536   OT Individual Minutes   Time In [id number]   Time Out 1005 1354 1443   Minutes 61 9 16   OT Co-Treatment Minutes   Time In   (10:06 to 10:46)  --   --           Patient Education:  Patient Goals   Patient goals : \"I just want to be able to get back to my basic needs. \"  Learner:patient  Method: demonstration and explanation       Outcome: acknowledged understanding  and demonstrated understanding   OT Education  Patient Education: ed pt to bring in tank tops and shorts as pt has not been getting dressed due to feeling hot. Ed re self feeding set up. Plan  Plan  Times per week: 900 minutes/week for combined therapy of OT/PT due to decreaed tolerance to activity. Times per day: Twice a day  Current Treatment Recommendations: Self-Care / ADL,Strengthening,ROM,Balance Training,Functional Mobility Training,Endurance Training,Pain Management,Safety Education & Training,Patient/Caregiver Education & Training,Equipment Evaluation, Education, & procurement,Positioning  Patient Goals   Patient goals : \"I just want to be able to get back to my basic needs. \"  Short term goals  Time Frame for Short term goals: By 10 days  Short term goal 1: Pt will complete upper body dressing/bathing with max A with use of AE as needed while maintaining cervical precautions  Short term goal 2: Pt will complete self feeding task with max A with adaptive strategies and good safety  Short term goal 3: Pt will tolerate sitting EOB 5+ minutes unsupported with mod A during functional activity to increase independence with self care and mobility  Short term goal 4: Pt will complete functional transfers during self care tasks with mod A and good safety  Short term goal 5: Pt will participate in 30+ minutes of therapeutic exercises/functional activities to increase safety and independence with self care and mobility  Long term goals  Time Frame for Long term goals : By discharge  Long term goal 1: Pt will complete self feeding task with mod A with use of adaptive strategies   Long term goal 2: Pt will complete upper body bathing/dressing with mod A and good safety while maintaining cervical precautions  Long term goal 3: Pt will tolerate sitting EOB 10+ minutes unsupported with CGA and good safety during functional activity of choice  Long term goal 4: Pt will complete functional transfer with min A and good safety during self care tasks  Long term goal 5: Pt will demonstrated increased BUE gross motor/fine motor to increase participation in self care tasks  Long term goals 6: Pt/family with verbalize/demosntrate Good understanding of cervical precautions during self care tasks  Long term goal 7: Pt/family will demonstrate Good understanding of BUE exercises to increase functional use of BUE during self care tasks       Electronically signed by Stacia Beavers OT on 4/13/22 at 3:49 PM EDT

## 2022-04-13 NOTE — PLAN OF CARE
Problem: Skin Integrity:  Goal: Will show no infection signs and symptoms  Description: Will show no infection signs and symptoms  Outcome: Ongoing  Goal: Absence of new skin breakdown  Description: Absence of new skin breakdown  4/13/2022 1306 by Wade Mallory LPN  Outcome: Ongoing  4/13/2022 0223 by Gretta Rock RN  Outcome: Ongoing     Problem: Falls - Risk of:  Goal: Will remain free from falls  Description: Will remain free from falls  4/13/2022 1306 by Wade Mallory LPN  Outcome: Ongoing  4/13/2022 0223 by Gretta Rock RN  Outcome: Ongoing  Goal: Absence of physical injury  Description: Absence of physical injury  4/13/2022 1306 by Wade Mallory LPN  Outcome: Ongoing  4/13/2022 0223 by Gretta Rock RN  Outcome: Ongoing     Problem: Pain:  Goal: Pain level will decrease  Description: Pain level will decrease  4/13/2022 1306 by Wade Mallory LPN  Outcome: Ongoing  4/13/2022 0223 by Gretta Rock RN  Outcome: Ongoing  Goal: Control of acute pain  Description: Control of acute pain  4/13/2022 1306 by Wade Mallory LPN  Outcome: Ongoing  4/13/2022 0223 by Gretta Rock RN  Outcome: Ongoing  Goal: Control of chronic pain  Description: Control of chronic pain  4/13/2022 1306 by Wade Mallory LPN  Outcome: Ongoing  4/13/2022 0223 by Gretta Rock RN  Outcome: Ongoing     Problem: Musculor/Skeletal Functional Status  Goal: Highest potential functional level  4/13/2022 1306 by Wade Mallory LPN  Outcome: Ongoing  4/13/2022 0223 by Gertta Rock RN  Outcome: Ongoing  Goal: Absence of falls  4/13/2022 1306 by Wade Mallory LPN  Outcome: Ongoing  4/13/2022 0223 by Gretta Rock RN  Outcome: Ongoing     Problem: Nutrition  Goal: Optimal nutrition therapy  4/13/2022 1306 by Wade Mallory LPN  Outcome: Ongoing  4/13/2022 0223 by Gretta Rock RN  Outcome: Ongoing

## 2022-04-13 NOTE — PROGRESS NOTES
[facility] Internal Medicine    CONSULTATION / HISTORY AND PHYSICAL EXAMINATION            Date:   4/13/2022  Patient name:  Yvonne Dyer  Date of admission:  3/31/2022  8:38 PM  MRN:   [id number]  Account:  [de-identified]  YOB: 1950  PCP:    Linn Olivares  Room:   2413/7728-67  Code Status:    DNR-CCA    Physician Requesting Consult: Briana Driscoll MD    Reason for Consult:  medical management    Chief Complaint:     No chief complaint on file.       History Obtained From:     Patient medical record nursing staff    History of Present Illness:   Patient past medical multiple medical problems include hypertension, heart failure with preserved ejection fraction, diabetes, bipolar disorder,   glaucoma, patient was originally admitted at [place] where he was transferred from outlying facility, he developed, generalized weakness after a fall, patient underwent MRI of cervical spine suggestive of severe spinal canal stenosis at the level of C2-C4 level, patient underwent C2-C5 posterior decompression surgery, he feels that weakness in his extremities is slightly better  Patient during his hospital stay, developed bradycardia which improved after discontinuation of beta-blocker, cardiology was also consulted  He developed hospital-acquired pneumonia, getting treated with cefepime and doxycycline  Patient denying any complaints of cough, shortness of breath, chest pain,    Past Medical History:     Past Medical History:   Diagnosis Date    Depression 2017    ON RX    Diabetes mellitus ([facility] [address] 2013    NIDDM    Difficult intravenous access     Hyperlipidemia 2008    ON RX    Hypertension 2008    ON RX    Migraines     PTSD (post-traumatic stress disorder) 01/2018    ON RX    Sleep apnea 01/2018    UNALBE TO USE TO CLEARED BY ENT (CPAP)    Teeth missing     BACK TEETH UPPER AND LOWER TO BE FITTED FOR PARTIALS AT LATER DATE    Wears glasses         Past Surgical History:     Past Surgical History:   Procedure Laterality Date    CARDIAC CATHETERIZATION  02/2010    no stents     CARPAL TUNNEL RELEASE Left 01/09/2002    CATARACT REMOVAL      CERVICAL FUSION  04/19/2018    anterior cervical fusion C5-6    CERVICAL FUSION N/A 3/24/2022    C2-5 FUSION, C2-4 LAMINECTOMY performed by Nas Bergman DO at [address] Left 2018    CATARACT EXTRACTION WITH IOL    HYDROCELE EXCISION  1951    LAMINECTOMY  03/24/2022    C2-5 FUSION, C2-4 LAMINECTOMY    MIDDLE EAR SURGERY  01/11/2018    MIDDLE EAR REBUILT AND EAR DRUM REPLACED    MOUTH SURGERY  04/16/2018    5 TEETH REMOVED    OTHER SURGICAL HISTORY  04/19/2018    : ANTERIOR CERVICAL CORRPECTOMY C5-6, SYNTHES, DEPUY, REG TABLE, SUPINE,     LA OFFICE/OUTPT VISIT,PROCEDURE ONLY N/A 4/19/2018    ANTERIOR CERVICAL CORRPECTOMY AND FUSION C5-6 performed by Nas Bergman DO at [address] [facility]  12/1983        Medications Prior to Admission:     Prior to Admission medications    Medication Sig Start Date End Date Taking?  Authorizing Provider   finasteride (PROSCAR) 5 MG tablet Take 5 mg by mouth daily   Yes Historical Provider, MD   carboxymethylcellulose 1 % ophthalmic solution Place 2 drops into both eyes 3 times daily Pt states \"2 drops in both eyes three times a day\"    Historical Provider, MD   divalproex (DEPAKOTE ER) 250 MG extended release tablet Take 750 mg by mouth at bedtime    Historical Provider, MD   furosemide (LASIX) 20 MG tablet Take 20 mg by mouth daily    Historical Provider, MD   venlafaxine (EFFEXOR XR) 150 MG extended release capsule Take 1 capsule by mouth daily  Patient taking differently: Take 225 mg by mouth daily  6/23/20   Jie Ybarra MD   simethicone (MYLICON) 80 MG chewable tablet Take 1 tablet by mouth every 6 hours as needed for Flatulence  Patient taking differently: Take 80 mg by mouth every 8 hours as needed for Flatulence  6/22/20 Ulysses Manriquez MD   lidocaine (XYLOCAINE) 5 % ointment Apply topically daily as needed for Pain Apply topically as needed. LF 4/20/20 (30 day supply)  Patient not taking: Reported on 3/23/2022    Historical Provider, MD   metFORMIN (GLUCOPHAGE) 1000 MG tablet Take 1,000 mg by mouth 2 times daily (with meals) LF 4/27/20 (90 day supply)  Patient not taking: Reported on 3/21/2022    Historical Provider, MD   losartan (COZAAR) 25 MG tablet Take 25 mg by mouth daily     Historical Provider, MD   aspirin 81 MG chewable tablet Take 81 mg by mouth daily  Patient not taking: Reported on 3/21/2022    Historical Provider, MD   divalproex (DEPAKOTE ER) 500 MG extended release tablet Take 500 mg by mouth every morning     Historical Provider, MD   traZODone (DESYREL) 100 MG tablet Take 150 mg by mouth nightly as needed LF 5/1/20 (30 day supply)    Historical Provider, MD   cetirizine (ZYRTEC) 10 MG tablet Take 10 mg by mouth daily LF 4/6/20 (90 day supply)  Patient not taking: Reported on 3/21/2022    Historical Provider, MD   atorvastatin (LIPITOR) 80 MG tablet Take 40 mg by mouth daily Take 1/2 tablet (40 mg) daily  LF 4/22/20 (90 day supply)  Patient not taking: Reported on 3/21/2022    Historical Provider, MD   amLODIPine (NORVASC) 10 MG tablet Take 7.5 mg by mouth daily     Historical Provider, MD   sildenafil (VIAGRA) 100 MG tablet Take 100 mg by mouth as needed for Erectile Dysfunction LF 5/12/20 (30 day supply)    Historical Provider, MD   omeprazole (PRILOSEC) 20 MG delayed release capsule Take 20 mg by mouth every evening LF 4/21/20 (90 day supply)    Historical Provider, MD        Allergies:     Latex, Bee venom, Lisinopril, Niacin and related, Sulfa antibiotics, and Terazosin    Social History:     Tobacco:    reports that he quit smoking about 50 years ago. His smoking use included cigarettes. He has a 2.00 pack-year smoking history.  He has never used smokeless tobacco.  Alcohol:      reports current alcohol use.  Drug Use:  reports current drug use. Drug: Marijuana Ilan Relic). Family History:     Family History   Problem Relation Age of Onset   Breanna García Dementia Mother     Coronary Art Dis Mother     Stroke Mother     Diabetes Mother     Asthma Mother     Other Mother         BRAIN ANEURISM    High Blood Pressure Mother     Heart Disease Father     Diabetes Sister     Diabetes Brother     High Blood Pressure Brother     High Cholesterol Brother     Heart Disease Brother     Diabetes Sister     Other Sister         NEUROPATHY       Review of Systems:     Positive and Negative as described in HPI. CONSTITUTIONAL:  negative for fevers, chills, sweats, fatigue, weight loss  HEENT:  negative for vision, hearing changes, runny nose, throat pain  RESPIRATORY:  negative for shortness of breath, cough, congestion, wheezing. CARDIOVASCULAR:  negative for chest pain, palpitations. GASTROINTESTINAL:  negative for nausea, vomiting, diarrhea, constipation, change in bowel habits, abdominal pain   GENITOURINARY:  negative for difficulty of urination, burning with urination, frequency   INTEGUMENT:  negative for rash, skin lesions, easy bruising   HEMATOLOGIC/LYMPHATIC:  negative for swelling/edema   ALLERGIC/IMMUNOLOGIC:  negative for urticaria , itching  ENDOCRINE:  negative increase in drinking, increase in urination, hot or cold intolerance  MUSCULOSKELETAL:  negative joint pains, muscle aches, swelling of joints  NEUROLOGICAL: Weakness in all 4 extremities  BEHAVIOR/PSYCH:      Physical Exam:     /75   Pulse 92   Temp 97.7 °F (36.5 °C) (Oral)   Resp 16   Ht 5' 3\" (1.6 m)   Wt 142 lb (64.4 kg)   SpO2 95%   BMI 25.15 kg/m²   Temp (24hrs), Av.7 °F (36.5 °C), Min:97.7 °F (36.5 °C), Max:97.7 °F (36.5 °C)    No results for input(s): POCGLU in the last 72 hours.     Intake/Output Summary (Last 24 hours) at 2022 1648  Last data filed at 2022 1554  Gross per 24 hour   Intake --   Output 951 ml   Net MPV 7.4 6.0 - 12.0 fL    Seg Neutrophils 59 36 - 66 %    Lymphocytes 19 (L) 24 - 44 %    Atypical Lymphocytes 4 %    Monocytes 13 (H) 1 - 7 %    Eosinophils % 2 0 - 4 %    Basophils 1 0 - 2 %    Bands 2 0 - 10 %    Segs Absolute 4.36 1.3 - 9.1 k/uL    Absolute Lymph # 1.41 1.0 - 4.8 k/uL    Atypical Lymphocytes Absolute 0.30 k/uL    Absolute Mono # 0.96 0.1 - 1.3 k/uL    Absolute Eos # 0.15 0.0 - 0.4 k/uL    Basophils Absolute 0.07 0.0 - 0.2 k/uL    Absolute Bands # 0.15 0.0 - 1.0 k/uL    Morphology Normal            Consultations:   IP CONSULT TO DIETITIAN  IP CONSULT TO SOCIAL WORK  IP CONSULT TO INTERNAL MEDICINE  IP CONSULT TO DIETITIAN  IP CONSULT TO PSYCHIATRY  IP CONSULT TO PSYCHIATRY  Assessment :      Primary Problem  Cervical stenosis of spine    Active Hospital Problems    Diagnosis Date Noted    Cervical stenosis of spine [M48.02] 03/31/2022       Plan:     1. Quadriparesis, due to cervical cord compression after fall s/p C2-C5 decompression surgery. 2. Hypertension, controlled restart home dose of Norvasc, losartan, will avoid beta-blockers since patient developed bradycardia in [place]  3. Heart failure preserved ejection fraction, compensated , on Lasix  4. GERD, restarted Protonix  5. On DVT prophylaxis Lovenox  6. BPH, started on finasteride  7. History of glaucoma, restarted home medications of eyedrop  8. Patient has neurological better, will have intermittent catheterization  9. Diabetes, controlled      Dc hctz  Change all bp meds for night time  Day time hypotension with supine hypertension      4/12/22    Medications: Allergies:     Allergies   Allergen Reactions    Latex Itching    Bee Venom Swelling    Lisinopril Other (See Comments)     Cough      Niacin And Related Rash    Sulfa Antibiotics Rash    Terazosin Rash       Current Meds:   Scheduled Meds:    hydrALAZINE  25 mg Oral TID    venlafaxine  75 mg Oral TID WC    divalproex  500 mg Oral Nightly    bisacodyl 10 mg Rectal QPM    lidocaine  2 patch TransDERmal Daily    finasteride  5 mg Oral Daily    carboxymethylcellulose  2 drop Both Eyes TID    enoxaparin  40 mg SubCUTAneous Daily    pantoprazole  40 mg Oral QAM AC    polyethylene glycol  17 g Oral Daily     Continuous Infusions:   PRN Meds: senna, ondansetron **OR** ondansetron, glucagon (rDNA), glucose, acetaminophen    BP Readings from Last 3 Encounters:   04/12/22 127/75   03/31/22 (!) 143/87   03/24/22 100/79     · On standing BP goes down to as low as 70 and patient feels nauseos and dizzy . 1. Reviewed ekg , echo , chest xray   2. Has borderline LVH  3. Systolic and diastolic fx wnl   4. Will discontinue lasix and losartan . 5. Treat HTN only if systolic BP above 444  6. And monitor . 7. Check orthostatic bp bid   8.         4/13/22   BP Readings from Last 3 Encounters:   04/12/22 127/75   03/31/22 (!) 143/87   03/24/22 100/79     Improved . orthostasis resolved              Howard Mcgregor MD  4/13/2022  4:48 PM    Copy sent to Dr. Suri Ruiz    Please note that this chart was generated using voice recognition Dragon dictation software. Although every effort was made to ensure the accuracy of this automated transcription, some errors in transcription may have occurred.

## 2022-04-14 PROBLEM — E44.0 MODERATE MALNUTRITION (HCC): Status: ACTIVE | Noted: 2022-04-14

## 2022-04-14 PROCEDURE — 97530 THERAPEUTIC ACTIVITIES: CPT

## 2022-04-14 PROCEDURE — 6360000002 HC RX W HCPCS: Performed by: NURSE PRACTITIONER

## 2022-04-14 PROCEDURE — 6370000000 HC RX 637 (ALT 250 FOR IP): Performed by: PHYSICAL MEDICINE & REHABILITATION

## 2022-04-14 PROCEDURE — 6370000000 HC RX 637 (ALT 250 FOR IP): Performed by: STUDENT IN AN ORGANIZED HEALTH CARE EDUCATION/TRAINING PROGRAM

## 2022-04-14 PROCEDURE — 1180000000 HC REHAB R&B

## 2022-04-14 PROCEDURE — 97110 THERAPEUTIC EXERCISES: CPT

## 2022-04-14 PROCEDURE — 99232 SBSQ HOSP IP/OBS MODERATE 35: CPT | Performed by: PHYSICAL MEDICINE & REHABILITATION

## 2022-04-14 PROCEDURE — 6370000000 HC RX 637 (ALT 250 FOR IP): Performed by: INTERNAL MEDICINE

## 2022-04-14 PROCEDURE — 6370000000 HC RX 637 (ALT 250 FOR IP): Performed by: PSYCHIATRY & NEUROLOGY

## 2022-04-14 PROCEDURE — 6370000000 HC RX 637 (ALT 250 FOR IP): Performed by: NURSE PRACTITIONER

## 2022-04-14 PROCEDURE — 97535 SELF CARE MNGMENT TRAINING: CPT

## 2022-04-14 PROCEDURE — 99231 SBSQ HOSP IP/OBS SF/LOW 25: CPT | Performed by: INTERNAL MEDICINE

## 2022-04-14 RX ADMIN — CARBOXYMETHYLCELLULOSE SODIUM 2 DROP: 10 GEL OPHTHALMIC at 13:13

## 2022-04-14 RX ADMIN — VENLAFAXINE 75 MG: 75 TABLET ORAL at 07:20

## 2022-04-14 RX ADMIN — CARBOXYMETHYLCELLULOSE SODIUM 2 DROP: 10 GEL OPHTHALMIC at 20:53

## 2022-04-14 RX ADMIN — CARBOXYMETHYLCELLULOSE SODIUM 2 DROP: 10 GEL OPHTHALMIC at 07:20

## 2022-04-14 RX ADMIN — DIVALPROEX SODIUM 500 MG: 500 TABLET, EXTENDED RELEASE ORAL at 20:53

## 2022-04-14 RX ADMIN — VENLAFAXINE 75 MG: 75 TABLET ORAL at 17:16

## 2022-04-14 RX ADMIN — ACETAMINOPHEN 650 MG: 325 TABLET ORAL at 20:52

## 2022-04-14 RX ADMIN — PANTOPRAZOLE SODIUM 40 MG: 40 TABLET, DELAYED RELEASE ORAL at 05:39

## 2022-04-14 RX ADMIN — HYDRALAZINE HYDROCHLORIDE 25 MG: 25 TABLET, FILM COATED ORAL at 20:53

## 2022-04-14 RX ADMIN — ENOXAPARIN SODIUM 40 MG: 100 INJECTION SUBCUTANEOUS at 07:18

## 2022-04-14 RX ADMIN — BISACODYL 10 MG: 10 SUPPOSITORY RECTAL at 18:34

## 2022-04-14 RX ADMIN — FINASTERIDE 5 MG: 5 TABLET, FILM COATED ORAL at 07:18

## 2022-04-14 RX ADMIN — VENLAFAXINE 75 MG: 75 TABLET ORAL at 11:14

## 2022-04-14 ASSESSMENT — PAIN DESCRIPTION - LOCATION: LOCATION: NECK;SHOULDER

## 2022-04-14 ASSESSMENT — PAIN SCALES - GENERAL
PAINLEVEL_OUTOF10: 4
PAINLEVEL_OUTOF10: 4

## 2022-04-14 ASSESSMENT — PAIN DESCRIPTION - ORIENTATION: ORIENTATION: RIGHT;LEFT;POSTERIOR

## 2022-04-14 ASSESSMENT — PAIN SCALES - WONG BAKER: WONGBAKER_NUMERICALRESPONSE: 6

## 2022-04-14 ASSESSMENT — PAIN DESCRIPTION - DESCRIPTORS: DESCRIPTORS: SORE

## 2022-04-14 ASSESSMENT — PAIN DESCRIPTION - PAIN TYPE: TYPE: CHRONIC PAIN

## 2022-04-14 NOTE — PLAN OF CARE
Nutrition Problem #1: Moderate malnutrition  Intervention: Food and/or Nutrient Delivery: Continue Current Diet,Continue Oral Nutrition Supplement  Nutritional Goals: PO intake % of meals and supplements

## 2022-04-14 NOTE — PROGRESS NOTES
· Moderate malnutrition related to swallowing difficulty,biting/chewing (masticatory) difficulty,altered taste perception (decreased appetite) as evidenced by poor intake prior to admission,weight loss 7.5% in 3 months (Previous intake less than 75%)    Nutrition Interventions:   Food and/or Nutrient Delivery:  Continue Current Diet,Continue Oral Nutrition Supplement  Nutrition Education/Counseling:  No recommendation at this time   Coordination of Nutrition Care:  Continue to monitor while inpatient    Goals:  PO intake % of meals and supplements       Nutrition Monitoring and Evaluation:   Behavioral-Environmental Outcomes:  None Identified   Food/Nutrient Intake Outcomes:  Food and Nutrient Intake,Supplement Intake  Physical Signs/Symptoms Outcomes:  Biochemical Data,Chewing or Swallowing,GI Status,Fluid Status or Edema,Skin,Weight     Discharge Planning:    Continue current diet,Continue Oral Nutrition Supplement     Some areas of assessment may be incomplete due to standard COVID-19 Precautions. Alexsander Marrero R.D., EDOUARD.   Phone: [phone]

## 2022-04-14 NOTE — PROGRESS NOTES
[place] [address] Internal Medicine    Progress Note  Patient assessed for rehabilitation services?: Yes  4/14/2022    1:13 PM    Name:   John Dorsey  MRN:     [id number]     Acct:      [de-identified]   Room:   0/1-26  IP Day:  15  Admit Date:  3/31/2022  8:38 PM    PCP:   Eileen Waldrop  Code Status:  DNR-CCA    Subjective:     C/C: Quadriparesis  Principal Problem:    Cervical stenosis of spine  Resolved Problems:    * No resolved hospital problems. *    4/14/22  No acute events overnight. Continues to feel better. Weakness is improving. Working with physical therapy. Getting out of bed to the chair. Orthostatics improved   Blood pressure elevated but acceptable, currently receiving hydralazine only for systolic blood pressure greater than 160     Significant last 24 hr data reviewed ;   Vitals:    04/13/22 2047 04/14/22 0533 04/14/22 0715 04/14/22 1245   BP: (!) 160/96 (!) 158/96 (!) 158/88    Pulse:  78     Resp:  16     Temp:  98.4 °F (36.9 °C)     TempSrc:       SpO2:  97%     Weight:    141 lb 3.2 oz (64 kg)   Height:          No results found for this or any previous visit (from the past 24 hour(s)). No results for input(s): POCGLU in the last 72 hours. No results found. On admission         Review of Systems:     Constitutional: Weakness  Respiratory:  negative for cough, dyspnea on exertion, hemoptysis, shortness of breath, wheezing  Cardiovascular:  negative for chest pain, chest pressure/discomfort, lower extremity edema, palpitations  Gastrointestinal:  negative for abdominal pain, constipation, diarrhea, nausea, vomiting  Neurological:  negative for dizziness, headache  Data:     Past Medical History:  no change     Social History:  no change    Family History: @no change    Vitals:      I/O (24Hr):     Intake/Output Summary (Last 24 hours) at 4/14/2022 1313  Last data filed at 4/14/2022 1127  Gross per 24 hour   Intake --   Output 400 ml   Net -400 ml Labs:    Radiology:    Medications: Allergies:      Current Meds:   Scheduled Meds:    hydrALAZINE  25 mg Oral TID    venlafaxine  75 mg Oral TID WC    divalproex  500 mg Oral Nightly    bisacodyl  10 mg Rectal QPM    lidocaine  2 patch TransDERmal Daily    finasteride  5 mg Oral Daily    carboxymethylcellulose  2 drop Both Eyes TID    enoxaparin  40 mg SubCUTAneous Daily    pantoprazole  40 mg Oral QAM AC    polyethylene glycol  17 g Oral Daily     Continuous Infusions:   PRN Meds: senna, ondansetron **OR** ondansetron, glucagon (rDNA), glucose, acetaminophen      Physical Examination:        BP (!) 158/88   Pulse 78   Temp 98.4 °F (36.9 °C)   Resp 16   Ht 5' 3\" (1.6 m)   Wt 141 lb 3.2 oz (64 kg)   SpO2 97%   BMI 25.01 kg/m²   Temp (24hrs), Av.6 °F (37 °C), Min:98.4 °F (36.9 °C), Max:98.8 °F (37.1 °C)    No results for input(s): POCGLU in the last 72 hours. Intake/Output Summary (Last 24 hours) at 2022 1313  Last data filed at 2022 1127  Gross per 24 hour   Intake --   Output 400 ml   Net -400 ml       General Appearance:  alert, well appearing, and in no acute distress  Mental status:   Head:  normocephalic, atraumatic. Eye: no icterus, redness, pupils equal and reactive, extraocular eye movements intact, conjunctiva clear  Ear: normal external ear, no discharge, hearing intact  Nose:  no drainage noted  Mouth: mucous membranes moist  Neck: supple, no carotid bruits, thyroid not palpable  Lungs: Bilateral equal air entry, clear to ausculation, no wheezing, rales or rhonchi, normal effort  Cardiovascular: normal rate, regular rhythm, no murmur, gallop, rub.   Abdomen: Soft, nontender, nondistended, normal bowel sounds, no hepatomegaly or splenomegaly  Neurologic: There are no new focal motor or sensory deficits,   Skin: No gross lesions, rashes, bruising or bleeding on exposed skin area  Extremities:  peripheral pulses palpable, no pedal edema or calf pain with palpation  Psych:             Assessment:        Primary Problem  Cervical stenosis of spine    Principal Problem:    Cervical stenosis of spine  Resolved Problems:    * No resolved hospital problems. *       Plan:          4/14/22    · Orthostasis has improved. Continue hydralazine with parameters. Continue to check orthostatic vitals twice daily   Check cortisol level tomorrow a.m.   . Hospital Problems           Last Modified POA    * (Principal) Cervical stenosis of spine 3/31/2022 Yes                         Thanks for consulting us . Will monitor vitals and clinical course , and  Optimize therapy  as needed .            Shannan Lopez MD

## 2022-04-14 NOTE — PROGRESS NOTES
Physical Therapy  [address]  Acute Rehabilitation Physical Therapy Progress Note    Date: 22  Patient Name: Camila Rodriguez. Room: [facility]5/2635-  MRN: [id number]   Account: [de-identified]   : 1950  (75 y.o.) Gender: male     Referring Practitioner: Rodolfo mAador MD  Diagnosis: Cervical stenosis with myelopathy s/p C2-C5 laminectomy and fusion   Past Medical History:  has a past medical history of Depression, Diabetes mellitus ([address] Difficult intravenous access, Hyperlipidemia, Hypertension, Migraines, PTSD (post-traumatic stress disorder), Sleep apnea, Teeth missing, and Wears glasses. Past Surgical History:   has a past surgical history that includes Cardiac catheterization (2010); Carpal tunnel release (Left, 2002); Vasectomy (1983); Tonsillectomy and adenoidectomy (); Hydrocele surgery (); Middle ear surgery (2018); eye surgery (Left, ); Mouth surgery (2018); other surgical history (2018); cervical fusion (2018); pr office/outpt visit,procedure only (N/A, 2018); Cataract removal; laminectomy (2022); and cervical fusion (N/A, 3/24/2022). Additional Pertinent Hx: Rehana Lau is a 70 y.o. male with history of HTN, HLD, diabetes, GIA, migraines, PTSD, and depression admitted to [facility] on 3/23/2022. He [facility] to [facility] after a fall with subsequent worsening limb weakness and additional falls. MRI brain showed no acute intracranial abnormality. MRI cervical spine showed severe spinal stenosis at C2-C3 and C3-C4 with complete effacement of the CSF. MRI thoracic and lumbar spine were relatively unremarkable except for moderate spinal stenosis from L2-L3 to L4-L5. He was transferred to [facility] for neurosurgical evaluation. He underwent laminectomy and fusion at C2-C5 on 3/24/22 (Dr. Wade Hayes). Hospital course has been complicated by urinary retention.  He reports continued neck pain with pain in the limbs as well. He also notes numbness/tingling and weakness in all limbs. Pt admitted to rehab unit on 3/31/22    Overall Orientation Status: Within Functional Limits  Restrictions/Precautions  Restrictions/Precautions: Fall Risk;General Precautions  Required Braces or Orthoses?: Yes  Implants present? : Metal implants  Required Braces or Orthoses  Cervical: c-collar  Position Activity Restriction  Spinal Precautions: No Twisting; No Lifting; No Bending  Spinal Precautions: Poor Sitting balance, Bilateral Upper  and Lower Body muscle weakness, Assist with standing in GERA Ramisabella  Other position/activity restrictions: up with assist; s/p C2-C5 laminectomy and fixation 3/24, Collar on while out of bed. Ok to remove while resting In bed eating and drinking in bed    Subjective: Patient reporting \"light headed\" and dizzy when sitting or standing in [place]. Comments: AM session Cotx with Ray Sexton. Vital Signs  Patient Currently in Pain: Yes  Guerrero-Escobar Pain Rating: Hurts even more  Response to Pain Intervention: Patient Satisfied                Bed Mobility:   Bridging: Moderate assistance  Rolling: Maximal assistance  Supine to Sit: Moderate assistance (x2)  Sit to Supine: Moderate assistance (2 person)  Scooting: Dependent/Total;2 Person assistance      Transfers:  Sit to Stand: 2 Person Assistance;Maximum Assistance ([facility])  Stand to sit: Dependent/Total [place])  Bed to Chair: Dependent/Total [place].)                       Stairs/Curb  Stairs?: No              BALANCE Posture: Poor  Sitting - Static: Fair;Poor  Sitting - Dynamic: Fair;Poor  Standing - Static: Poor;+ (Gera nguyen)  Standing - Dynamic: Poor;+  Comments: sitting balance fluxuates between CGA and progresses to modA with fatigue    EXERCISES    Other exercises?: Yes  Other exercises 1: Repositioned to L side lying with pillows due to not feeling well.     Other exercises 2: Dangled at EOB and 1 sit to  [facility] to monitor Orthostatic BP. Other exercises 3: Sit to stand with elbert nguyen, max A x 2 -stood for 5 to 7 secs only. Other exercises 4: Seated balloon tap from [facility] with R UE only. Other exercises 5: Static stands 1x20 sec and 1x~3min in AM. 1x2min in PM and . in [place]. Other exercises 6: UBE  5 mins FWD  Other exercises 7: AROM with 1lb wand bilat. UE's  Other Activities  Comment: rest breaks PRN. Activity Tolerance: Patient limited by fatigue,Patient limited by pain,Patient limited by endurance,Other (Orthostatic BP)  Activity Tolerance: pt very lethargic in AM/PM and c/o nausea in AM  PT Equipment Recommendations  Equipment Needed: No  Other: TBD         Current Treatment Recommendations: Strengthening,ROM,Balance Training,Functional Mobility Training,Transfer Training,Endurance Training,Wheelchair Mobility Training,Stair training,Gait Training,Neuromuscular Re-education,Home Exercise Program,Safety Education & Training,Patient/Caregiver Education & Training,Equipment Evaluation, Education, & procurement    Conditions Requiring Skilled Therapeutic Intervention  Body structures, Functions, Activity limitations: Decreased functional mobility ; Decreased strength;Decreased safe awareness;Decreased endurance;Decreased balance; Increased pain;Decreased posture;Decreased coordination;Decreased fine motor control;Decreased sensation;Decreased ROM  Treatment Diagnosis: Impaired function.   Prognosis: Fair  Decision Making: Medium Complexity  Barriers to Learning: Pain  REQUIRES PT FOLLOW UP: Yes  Discharge Recommendations: Patient would benefit from continued therapy after discharge;Home with assist PRN    Goals  Short term goals  Time Frame for Short term goals: 10 visits  Short term goal 1: Perform rolling in bed min/mod A   Short term goal 2: Perform supine to sit min/mod A   Short term goal 3: Perform sit to supine max A   Short term goal 4: Perform sit<> stand mod A , and pivot transfers at mod A  Short term

## 2022-04-14 NOTE — FLOWSHEET NOTE
Patient worried and asked for prayer regarding his family being able to care for him;     04/13/22 2044   Encounter Summary   Services provided to: Patient   Referral/Consult From: [facility]   (4/13/22)   Complexity of Encounter Moderate   Length of Encounter 15 minutes   Spiritual Assessment Completed Yes   Spiritual/Zoroastrianism   Type Spiritual support   Assessment Approachable; Anxious; Hopeful;Helplessness;Coping   Intervention Active listening;Explored feelings, thoughts, concerns;Prayer;Sustaining presence/ Ministry of presence; Discussed illness/injury and it's impact   Outcome Expressed gratitude;Engaged in conversation;Expressed feelings/needs/concerns;Coping; Hopeful;Receptive

## 2022-04-14 NOTE — PROGRESS NOTES
[address] [facility]    ACUTE REHABILITATION OCCUPATIONAL THERAPY  DAILY NOTE    Date: 22  Patient Name: Michelle Pierre      Room: 3553/4728-83    MRN: [id number]   : 1950  (75 y.o.)  Gender: male   Referring Practitioner: Radha Amos MD  Diagnosis: Cervical stenosis with myelopathy s/p C2-C5 laminectomy and fusion   Additional Pertinent Hx: Michelle Pierre is a 70 y.o. male with history of HTN, HLD, diabetes, GIA, migraines, PTSD, and depression admitted to [facility] on 3/23/2022. He initially presented to [facility] after a fall with subsequent worsening limb weakness and additional falls. MRI brain showed no acute intracranial abnormality. MRI cervical spine showed severe spinal stenosis at C2-C3 and C3-C4 with complete effacement of the CSF. MRI thoracic and lumbar spine were relatively unremarkable except for moderate spinal stenosis from L2-L3 to L4-L5. He was transferred to [facility] for neurosurgical evaluation. He underwent laminectomy and fusion at C2-C5 on 3/24/22 (Dr. Scott Carolina). Hospital course has been complicated by urinary retention. He reports continued neck pain with pain in the limbs as well. He also notes numbness/tingling and weakness in all limbs. Pt admitted to rehab unit on 3/31/22    Restrictions  Restrictions/Precautions: Fall Risk,General Precautions  Implants present? : Metal implants  Spinal Precautions: Poor Sitting balance, Bilateral Upper  and Lower Body muscle weakness, Assist with standing in GERA Stedy  Other position/activity restrictions: up with assist; s/p C2-C5 laminectomy and fixation 3/24, Collar on while out of bed.  Ok to remove while resting In bed eating and drinking in bed  Required Braces or Orthoses  Cervical: c-collar  Other: Abdominal Binder  Required Braces or Orthoses?: Yes  Equipment Used: Wheelchair,Bed,Other (ss)    Subjective  Subjective: \"my neck is sore\"  Comments: pt reports continued neck/shoulder pain   Patient Currently in Pain: Yes  Pain Level: 4  Pain Location: Neck; Shoulder  Pain Orientation: Right;Left;Posterior  Restrictions/Precautions: Fall Risk;General Precautions  Overall Orientation Status: Within Functional Limits  Patient Observation  Observations: slight increase in RUE ROM for tasks/initiation, continues to req A and [place] for tasks completion and engagement, supine /82 HR 94, seated EOB /82 , standing fully upright in elbert stedy with A x2 /116, seated on paddles in elbert stedy /72 , fair tolerance noted with pt reporting slight dizziness while seated EOB  Pain Assessment  Pain Assessment: 0-10  Pain Level: 4  Pain Type: Chronic pain  Pain Location: Neck,Shoulder  Pain Orientation: Right,Left,Posterior  Pain Radiating Towards: across breastbone  Pain Descriptors: Sore    Objective  Cognition  Overall Cognitive Status: WFL  Perception  Overall Perceptual Status: WFL  Balance  Sitting Balance: Minimal assistance (min-SBA once positioned)  Standing Balance: Dependent/Total (mod A x2 in elbert stedy)  Bed mobility  Rolling to Left: Moderate assistance  Rolling to Right: Moderate assistance  Supine to Sit: Moderate assistance;2 Person assistance (mod A x1, CGA x1 for trunk control)  Scooting: Dependent/Total;2 Person assistance (hips EOB using bed pad)  Transfers  Sit to stand:  Moderate assistance;2 Person assistance  Stand to sit: Moderate assistance;2 Person assistance  Transfer Comments: with Sergo guo  Standing Balance  Time: AM: 30 sec, 1-2 min; PM: <30 sec 4, <1min  Activity: AM: standing with elbert nguyen to increase tolerance; PM: toilet transfer/toileting tasks using elbert nguyen, transfers with elbert nguyen  Comment: Ax2 with AXEL guo to place BUE's on front bar, fair tolerance noted  Toilet Transfers  Toilet - Technique:  (with elbert nguyen)  Equipment Used:  (elbert nguyen)  Toilet Transfer: Dependent/Total;2 Person assistance  Toilet Transfers Comments: pt able to sit on toilet with elbert nguyen positioned in front of him and BLE's on plaform, SBA req for safety, pt holding side bars with BUE's for increased support, A x2 with elbert nguyen req                             ADL  Feeding: Dependent/Total  Grooming: Maximum assistance  UE Bathing: Dependent/Total  LE Bathing: Dependent/Total  UE Dressing: Dependent/Total  LE Dressing: Dependent/Total  Toileting: Dependent/Total (with elbert nguyen in PM session, A x2, TA for hygiene post BM)  Additional Comments: MANUEL facilitated bathing tasks this date, pt dep for all tasks req [facility] and assist for completion but gives good efforts to initiate task within tolerance and ability, ADL tasks to increase participation and engagement for improved task performance          Assessment  Performance deficits / Impairments: Decreased ADL status; Decreased functional mobility ; Decreased ROM; Decreased strength;Decreased endurance;Decreased sensation;Decreased balance;Decreased high-level IADLs;Decreased fine motor control;Decreased coordination;Decreased posture  Prognosis: Fair  Discharge Recommendations: Patient would benefit from continued therapy after discharge;Continue to assess pending progress  Activity Tolerance: Patient Tolerated treatment well;Treatment limited secondary to medical complications (pt reports dizziness but able to tolerate with increased time)  Safety Devices in place: Yes  Type of devices: Left in chair (taken with PT)  Equipment Recommendations  Equipment Needed:  (TBD)       Patient Education: transfer safety, activity promotion, OT POC, toilet transfer, increasing activity, sitting balance/tolerance   Patient Goals   Patient goals : \"I just want to be able to get back to my basic needs. \"  Learner:patient  Method: demonstration and explanation       Outcome: needs reinforcement        Plan  Plan  Times per week: 900 minutes/week for combined therapy of OT/PT due to decreaed tolerance to activity.   Times per day: Twice a day  Current Treatment Recommendations: Self-Care / ADL,Strengthening,ROM,Balance Training,Functional Mobility Training,Endurance Training,Pain Management,Safety Education & Training,Patient/Caregiver Education & Training,Equipment Evaluation, Education, & procurement,Positioning  Patient Goals   Patient goals : \"I just want to be able to get back to my basic needs. \"  Short term goals  Time Frame for Short term goals: By 10 days  Short term goal 1: Pt will complete upper body dressing/bathing with max A with use of AE as needed while maintaining cervical precautions  Short term goal 2: Pt will complete self feeding task with max A with adaptive strategies and good safety  Short term goal 3: Pt will tolerate sitting EOB 5+ minutes unsupported with mod A during functional activity to increase independence with self care and mobility  Short term goal 4: Pt will complete functional transfers during self care tasks with mod A and good safety  Short term goal 5: Pt will participate in 30+ minutes of therapeutic exercises/functional activities to increase safety and independence with self care and mobility  Long term goals  Time Frame for Long term goals : By discharge  Long term goal 1: Pt will complete self feeding task with mod A with use of adaptive strategies   Long term goal 2: Pt will complete upper body bathing/dressing with mod A and good safety while maintaining cervical precautions  Long term goal 3: Pt will tolerate sitting EOB 10+ minutes unsupported with CGA and good safety during functional activity of choice  Long term goal 4: Pt will complete functional transfer with min A and good safety during self care tasks  Long term goal 5: Pt will demonstrated increased BUE gross motor/fine motor to increase participation in self care tasks  Long term goals 6: Pt/family with verbalize/demosntrate Good understanding of cervical precautions during self care tasks  Long term goal 7: Pt/family will demonstrate Good understanding of BUE exercises to increase functional use of BUE during self care tasks        04/14/22 0903 04/14/22 1333   OT Individual Minutes   Time In 0903 1333   Time Out 0949 1357   Minutes 46 24   OT Co-Treatment Minutes   Time In 0950  --    Time Out 1000  --    Minutes 10  --      Electronically signed by Lai LOZADA on 4/14/22 at 3:45 PM EDT

## 2022-04-14 NOTE — PROGRESS NOTES
tolerate sitting for ~2-3min. PT request to return to supine. OT:  ADL  Feeding: Dependent/Total  Grooming: Maximum assistance  UE Bathing: Dependent/Total  LE Bathing: Dependent/Total  UE Dressing: Dependent/Total  LE Dressing: Dependent/Total  Toileting: Dependent/Total  Additional Comments: MANUEL facilitated bathing tasks this date, pt dep for all tasks req [place] and assist for completion but gives good efforts to initiate task within tolerance and ability, ADL tasks to increase participation and engagement for improved task performance         Balance  Sitting Balance: Minimal assistance (min-SBA once positioned)  Standing Balance: Dependent/Total (mod A x2 in elbert nguyen)   Standing Balance  Time: AM: 30 sec, 1-2 min  Activity: standing with elbert nguyen to increase tolerance  Comment: Ax2 with AXEL guo to place BUE's on front bar, fair tolerance noted  Functional Mobility  Functional Mobility Comments: LATONYA due to limited ROM in BUE's     Bed mobility  Bridging: Moderate assistance  Rolling to Left: Moderate assistance  Rolling to Right: Moderate assistance  Supine to Sit: Moderate assistance,2 Person assistance (mod A x1, CGA x1 for trunk control)  Sit to Supine: Dependent/Total,2 Person assistance  Scooting: Dependent/Total,2 Person assistance (hips EOB using bed pad)  Comment: PM session to address bed mobility and increase indep, pt req A for knee flexion when rolling, good initiation req, A to lift LUE when reaching for bed rail, fair grasp noted in B hands when grasping bed rails  Transfers  Sit to stand:  Moderate assistance,2 Person assistance  Stand to sit: Moderate assistance,2 Person assistance  Transfer Comments: with elbert nguyen, Ax2   Toilet Transfers  Toilet Transfer: Unable to assess  Toilet Transfers Comments: am, pt on bedpan when OT arrived and was assisted off, larger softer edge bedpan was obtained, during co treat pt unable to evon transfer to toilet (dizzy)  but was assisted to sit on bedpan seated at EOB, which pt appreciated but was unable to go. Wheelchair Bed Transfers  Wheelchair/Bed - Technique:  (utilizing ss )  Equipment Used: Wheelchair,Bed,Other (ss)  Level of Asssistance: Dependent/Total,2 Person assistance  Wheelchair Transfers Comments: completign transfer to and from  with ss       ST:            Objective:  BP (!) 158/88   Pulse 78   Temp 98.4 °F (36.9 °C)   Resp 16   Ht 5' 3\" (1.6 m)   Wt 141 lb 3.2 oz (64 kg)   SpO2 97%   BMI 25.01 kg/m²  I Body mass index is 25.01 kg/m². I   Wt Readings from Last 1 Encounters:   22 141 lb 3.2 oz (64 kg)      Temp (24hrs), Av.6 °F (37 °C), Min:98.4 °F (36.9 °C), Max:98.8 °F (37.1 °C)         GEN: well developed, well nourished, no acute distress  HEENT: Normocephalic atraumatic, EOMI, mucous membranes pink and moist  CV: RRR, no murmurs, rubs or gallops  PULM: CTAB, no rales or rhonchi. Respirations WNL and unlabored  ABD: soft, NT, ND, +BS and equal  NEURO: A&O x3. Sensation intact to light touch. MSK: Decreased range of motion of bilateral shoulder. ,  4+/5 bilateral dorsiflexion plantarflexion able to lift legs antigravity no change in motor exam upper and lower extremities                                       Shoulder elbow flexion elbow extension wrist flexion wrist extension   Left upper extremity         1                  0 -1                 4                        4                   4               4  Right upper extremity      2-3               4                      4                       4                   4                4    EXTREMITIES: No calf tenderness to palpation bilaterally. No edema BLEs  SKIN: warm dry and intact with good turgor, incision clean  PSYCH: appropriately interactive. Affect WNL.   Good spirits        Medications   Scheduled Meds:   hydrALAZINE  25 mg Oral TID    venlafaxine  75 mg Oral TID     divalproex  500 mg Oral Nightly    bisacodyl  10 mg Rectal QPM    lidocaine  2 patch TransDERmal Daily    finasteride  5 mg Oral Daily    carboxymethylcellulose  2 drop Both Eyes TID    enoxaparin  40 mg SubCUTAneous Daily    pantoprazole  40 mg Oral QAM AC    polyethylene glycol  17 g Oral Daily     Continuous Infusions:  PRN Meds:.senna, ondansetron **OR** ondansetron, glucagon (rDNA), glucose, acetaminophen     Diagnostics:     CBC:   Recent Labs     04/13/22  0641   WBC 7.4   RBC 3.97*   HGB 12.9*   HCT 36.2*   MCV 91.2   RDW 12.9        BMP:   Recent Labs     04/13/22  0641   *   K 4.5   CL 89*   CO2 30   BUN 30*   CREATININE 0.66*     BNP: No results for input(s): BNP in the last 72 hours. PT/INR: No results for input(s): PROTIME, INR in the last 72 hours. APTT: No results for input(s): APTT in the last 72 hours. CARDIAC ENZYMES: No results for input(s): CKMB, CKMBINDEX, TROPONINT in the last 72 hours. Invalid input(s): CKTOTAL;3  FASTING LIPID PANEL:  Lab Results   Component Value Date    CHOL 104 04/12/2018    HDL 42 04/12/2018    TRIG 92 04/12/2018     LIVER PROFILE: No results for input(s): AST, ALT, ALB, BILIDIR, BILITOT, ALKPHOS in the last 72 hours. I/O (24Hr): Intake/Output Summary (Last 24 hours) at 4/14/2022 1534  Last data filed at 4/14/2022 1127  Gross per 24 hour   Intake --   Output 400 ml   Net -400 ml       Glu last 24 hour  No results for input(s): POCGLU in the last 72 hours. No results for input(s): CLARITYU, COLORU, PHUR, SPECGRAV, PROTEINU, RBCUA, BLOODU, BACTERIA, NITRU, WBCUA, LEUKOCYTESUR, YEAST, GLUCOSEU, BILIRUBINUR in the last 72 hours. Impression/Plan:    1. Cervical stenosis with myelopathy:  S/p C2-C5 laminectomy and fusion on 3/24. PT/OT  for gait, mobility, strengthening, endurance, ADLs, and self care. C-collar when out of bed. Reviewed cervical collar precautions are as per neurosurgery, discharge plan 4/19? Clarify support, patient considering SNF  2.   Pain control with as-needed tylenol, as-needed flexeril. Added lidocaine patches on 4/4. Avoid narcotics, as he did require narcan in acute care. Staples removed 4/8 per neurosurgery. Follow up with neurosurgery as outpatient. Discharge plan 4/21,  3. Neurogenic bladder:  Has not been requiring intermittent catheterization but can continue this as needed. Continue check PVR-noted 0 on 4/13 and follow-up bladder protocol-discussed with nursing  4. Neurogenic bowel:  Miralax daily, senokot nightly prn. Bowel program with suppository nightly started 4/7.  5. Dizziness, nausea with sitting upright:  Likely related to cervical spinal cord injury (orthostatic hypotension, possible autonomic dysfunction). NERY hose and abdominal binder when sitting or out of bed. Dr. Jennifer Martinez on 4/10 Discussed orthostatic vital signs in the setting of intermittent supine hypertension with IM -internal medicine -discontinued Lasix and losartan, continue to monitor orthostatics, will also DC Flexeril-minimal use by patient, still orthostatic but improved  6. Subjective dysphagia:  Chronic. SLP did not recommend any change in diet-on regular diet  7. Poor oral intake:  Discussed with dietician. On oral nutrition supplements. Will hold off on appetite stimulant for now due to potential side effects. Patient asks to be switched to regular solids so he can have more meal/food choices. Change 4/10 noted as noted on regular diet  8. Hospital-acquired pneumonia:  Completed course of cefepime and doxycycline. 9. Bradycardia and orthostatic: Improved after discontinuation of beta-blockers. 10. CHF: Off lasix  11. Hyponatremia-sodium 131-stable  12. HTN:   Hydralazine  13. HLD:  Not currently on medication  14. Diabetes:  Not currently on medication  15. GIA  16. Depression, PTSD:  On effexor, depakote.   Psychiatry consulted per patient request - recommended holding morning depakote, changing effexor to 225mg extended release dose in the morning - patient was more fatigued on 4/10 after changing effexor formulation, changed effexor dose back to 75mg TID with meals for 4/10. Psychiatry recommending continued weaning of depakote due to ataxia. -Depakote down to 500 mg per psychiatry  17. DVT prophylaxis:  Lovenox  18. Internal Medicine for medical management        Geraldine Dodson. Vera Suarez MD       This note is created with the assistance of a speech recognition program.  While intending to generate a document that actually reflects the content of the visit, the document can still have some errors including those of syntax and sound a like substitutions which may escape proof reading.   In such instances, actual meaning can be extrapolated by contextual diversion

## 2022-04-14 NOTE — PROGRESS NOTES
Physical Therapy     04/13/22 7097   Restrictions/Precautions   Restrictions/Precautions Fall Risk;General Precautions   Required Braces or Orthoses? Yes   Implants present? Metal implants   Required Braces or Orthoses   Cervical c-collar   Position Activity Restriction   Spinal Precautions No Twisting; No Lifting; No Bending   Spinal Precautions Poor Sitting balance, Bilateral Upper  and Lower Body muscle weakness, Assist with standing in GERA Stedy   Other position/activity restrictions up with assist; s/p C2-C5 laminectomy and fixation 3/24, Collar on while out of bed. Ok to remove while resting In bed eating and drinking in bed   General   Additional Pertinent Hx Greer Gong is a 70 y.o. male with history of HTN, HLD, diabetes, GIA, migraines, PTSD, and depression admitted to [facility] on 3/23/2022. He initially presented to [address] [place] [address] after a fall with subsequent worsening limb weakness and additional falls. MRI brain showed no acute intracranial abnormality. MRI cervical spine showed severe spinal stenosis at C2-C3 and C3-C4 with complete effacement of the CSF. MRI thoracic and lumbar spine were relatively unremarkable except for moderate spinal stenosis from L2-L3 to L4-L5. He was transferred to [facility] for neurosurgical evaluation. He underwent laminectomy and fusion at C2-C5 on 3/24/22 (Dr. Kiersten Coyne). Hospital course has been complicated by urinary retention. He reports continued neck pain with pain in the limbs as well. He also notes numbness/tingling and weakness in all limbs. Pt admitted to rehab unit on 3/31/22   Response To Previous Treatment Patient with no complaints from previous session. Family / Caregiver Present No   Referring Practitioner Dr Winifred Bradley is in bed working with OT upon arrival. PT is agreeable to PT/OT tx. Pt continues to report dizziness and chest pain with static stands in SS.  PT states his chest pain feels \"heavy\" and rates it as a \"6/10\" PM: Pt is in bed upon arrival, agreeable to PT. Pain Screening   Patient Currently in Pain Yes   Pain Assessment   Pain Assessment 0-10   Pain Level 6   Pain Location Neck; Shoulder; Chest   Pain Orientation Right   Pain Descriptors   (\"dull constant\" over occassional \"sharp R shoulder pain\". )   Response to Pain Intervention Patient Satisfied   Vital Signs   BP Location Left upper arm   Oxygen Therapy   O2 Device None (Room air)   Pre Treatment Pain Screening   Intervention List Patient able to continue with treatment   Orientation   Overall Orientation Status WFL   Bed Mobility   Bridging Moderate assistance   Rolling Moderate assistance;Rolling Left;Maximal assistance;Rolling Right   Supine to Sit Moderate assistance  (x2)   Sit to Supine Moderate assistance  (2 person)   Scooting Dependent/Total;2 Person assistance   Comment Hospital bed, HOB elevated to assist. Completed in AM/PM. Pt continues to require 2 person assist. Pt requires SBA to Min A for sitting balance this date. Pt sits EOB~30 min in AM and ~10min total in PM.    Transfers   Sit to Stand 2 Person Assistance;Maximum Assistance  ([place] Spare)   Stand to sit Dependent/Total  [employer])   Bed to Chair Dependent/Total  [employer].)   Comment Initial  SS ~20 sec. Pt reports feeling \"light headed\" returned to sitting on EOB. PT requested to use bed pan while sitting on EOB. PT's sitting balance ranges from SBA to CGA with BUE support. Pt requires Min A without UE support. Pt agreeable to attempt STS again. Pt continues to report feeling \"light headed\" and reports \"chest pain\" Pt states this occured yesterday with stands. Pt states his chest feels \"heavy\" and rates it \"6/10\". Pt also notes to have anxiety. PM: Pt is agreeable to attempt STS in SS. Pt's BP sitting is 119/84 ans standing in SS is 89/64. Pt requested to return to sitting, however only able to tolerate sitting for ~2-3min. Pt request to return to supine. Ambulation   Ambulation? No   Stairs/Curb   Stairs? No   Balance   Posture Poor   Sitting - Static Fair;Poor   Sitting - Dynamic Fair;Poor   Standing - Static Poor;+  (Elbert nguyen)   Standing - Dynamic Poor;+   Comments sitting balance fluxuates between CGA and progresses to modA with fatigue   Exercises   Quad Sets 10x bilat supine   Heelslides 10x bilat supine AAROM  (AM/PM)   Gluteal Sets 10x bilat supine  (AM/PM)   Hip Abduction 10x bilat supine AAROM  (AM/PM)   Knee Long Arc Quad 10x bilat seated EOB AROM   Knee Short Arc Quad 10x bilat supine   Ankle Pumps 10x bilat supine/sitting  (AM/PM)   Core Strengthening EOB: AM: ~30min total. PM: ~15min   Other exercises   Other exercises? Yes   Other exercises 3 Sit to stand with elbert nguyen, mod A x 2 -stood    Other exercises 5 Static stands 1x20 sec and 1x~3min in AM. 1x2min in PM and . in [place]. (PM)   Other exercises 6 sidelying: clamshells. Reps: 20 each. Other exercises 8 Seated bilat. LE 2x10 reps seated at EOB   Other Activities   Comment rest breaks PRN. Patient Goals    Patient goals  Move better   Short term goals   Time Frame for Short term goals 10 visits   Short term goal 1 Perform rolling in bed min/mod A    Short term goal 2 Perform supine to sit min/mod A    Short term goal 3 Perform sit to supine max A    Short term goal 4 Perform sit<> stand mod A , and pivot transfers at mod A   Short term goal 5 Progress to ambulation HHA/Gait belt assist or appropriate device, mod A x 2 20 to 40 ft   Short term goal 6 Propel w/c with B LE, distance of 50 ft or > , min/mod A   Short term goal 7 Demo Fair- dynamic standing balance to decrease risk of falls   Long term goals   Time Frame for Long term goals  By [place]   Long term goal 1 Pt able to perform bed mobility at [address] [place] [address] term goal 2 Pt able to perform transfers at min A    Long term goal 3 Pt able to ambulate with or without device, distance of 100 ft atleast, mod A, level surface.    Long term goal 4 Pt able to perform 2 to 4 steps at [place] x 2   Long term goal 5 Pt able to propel w/c on level surface,  distance of 150 ft, SBA, level surfaces   Long term goal 6 Family training for safe mobility to return home   Long term goal 7 Improve sitting/standing balance to good/fair to reduce fall risk. Long term goal 8 Pt able to ambulate 60 to 80 ft for 2MWT,to improve overall fucntion. Conditions Requiring Skilled Therapeutic Intervention   Body structures, Functions, Activity limitations Decreased functional mobility ; Decreased strength;Decreased safe awareness;Decreased endurance;Decreased balance; Increased pain;Decreased posture;Decreased coordination;Decreased fine motor control;Decreased sensation;Decreased ROM   Assessment Pt presents with quadraparesis, B UE > B LE weakness, decreasd core strength. Pain, dizzyness and nausea limiting activity at this time. Pt with poor tolerance to upright psoiton yet, unabel to assisst pt to transfer to recliner today, due to orthostaic BP and pain. Treatment Diagnosis Impaired function. Prognosis Fair   Decision Making Medium Complexity   Barriers to Learning Pain   REQUIRES PT FOLLOW UP Yes   Discharge Recommendations Patient would benefit from continued therapy after discharge;Home with assist PRN   Activity Tolerance   Activity Tolerance Patient limited by fatigue;Patient limited by pain; Patient limited by endurance; Other  (Orthostatic BP)   Activity Tolerance pt very lethargic in AM/PM and c/o nausea in AM   PT Equipment Recommendations   Equipment Needed No   Other TBD   Other Comments   Comments Discussed withpt's SERENA [place] and pt, regarding recommendation of ramp at entrance. Bridgette [place] needed education in rehab progression/anticiapted goals at discharge, seemed to have fair understanding after conversation. Pt's brother present and seems understanding of the recommendations.    Plan   Times per week 900 minutes/week for combined therapy of PT/OT due to decreaed tolerance to

## 2022-04-14 NOTE — PLAN OF CARE
Problem: Skin Integrity:  Goal: Absence of new skin breakdown  Description: Absence of new skin breakdown  Outcome: Ongoing  Note: Skin assessment completed this shift. Nutrition and Hydration status assessed with adequate intake. Ry Score as charted. Skin integrity maintained. No new skin breakdown noted. Skin to high risk pressure areas including coccyx and heels are clear. Nereida care provided as needed throughout the shift. Zinc Oxide paste applied to buttocks with brief changes. Problem: Falls - Risk of:  Goal: Will remain free from falls  Description: Will remain free from falls  Outcome: Ongoing  Note: No falls or injuries sustained at this time. No attempts to get out of bed without nursing assistance. Call light within reach and pt. uses appropriately for assistance. Siderails up x 2. Nonskid footwear remains on. Bed in low and locked position. Hourly nursing rounds made. Pt. Alert and oriented, aware of limitations, and exhibits good safety judgement. Pt. uses assistive devices appropriately. Pt. understands individual fall risk factors. Problem: Falls - Risk of:  Goal: Absence of physical injury  Description: Absence of physical injury  Outcome: Ongoing  Note: No falls or injuries sustained at this time. No attempts to get out of bed without nursing assistance. Call light within reach and pt. uses appropriately for assistance. Problem: Pain:  Goal: Pain level will decrease  Description: Pain level will decrease  Outcome: Ongoing  Note: Pain assessment completed. Pt. able to rest.  Patient medicated with PRN acetaminophen for pain this shift as ordered. Patient relates a tolerable level of discomfort with the current medication. Pt. Repositions with assistance OR per self for comfort. Nonverbal cues indicate pain relief. Pt. Rests quietly with eyes closed after pain medication administration. Respirations easy and unlabored. Appears free from distress.        Problem: Pain:  Goal: Control of acute pain  Description: Control of acute pain  Outcome: Ongoing  Note: Pain assessment completed. Pt. able to rest.  Patient medicated with PRN acetaminophen for pain this shift as ordered. Patient relates a tolerable level of discomfort with the current medication. Pt. Repositions with assistance OR per self for comfort. Nonverbal cues indicate pain relief. Pt. Rests quietly with eyes closed after pain medication administration. Respirations easy and unlabored. Appears free from distress. Problem: Pain:  Goal: Control of chronic pain  Description: Control of chronic pain  Outcome: Ongoing  Note: Pain assessment completed. Pt. able to rest.  Patient medicated with PRN acetaminophen for pain this shift as ordered. Patient relates a tolerable level of discomfort with the current medication. Pt. Repositions with assistance OR per self for comfort. Nonverbal cues indicate pain relief. Pt. Rests quietly with eyes closed after pain medication administration. Respirations easy and unlabored. Appears free from distress.        Problem: Nutrition  Goal: Optimal nutrition therapy  Outcome: Ongoing

## 2022-04-15 ENCOUNTER — HOSPITAL ENCOUNTER (INPATIENT)
Age: 72
LOS: 4 days | Discharge: NURSING, REHAB OR LONG-TERM CARE | DRG: 246 | End: 2022-04-20
Attending: INTERNAL MEDICINE | Admitting: INTERNAL MEDICINE
Payer: MEDICARE

## 2022-04-15 VITALS
DIASTOLIC BLOOD PRESSURE: 93 MMHG | HEIGHT: 63 IN | SYSTOLIC BLOOD PRESSURE: 142 MMHG | HEART RATE: 98 BPM | BODY MASS INDEX: 25.02 KG/M2 | OXYGEN SATURATION: 97 % | RESPIRATION RATE: 20 BRPM | TEMPERATURE: 98.1 F | WEIGHT: 141.2 LBS

## 2022-04-15 LAB
ABSOLUTE EOS #: 0.1 K/UL (ref 0–0.4)
ABSOLUTE LYMPH #: 1.7 K/UL (ref 1–4.8)
ABSOLUTE MONO #: 0.7 K/UL (ref 0.1–1.3)
ANION GAP SERPL CALCULATED.3IONS-SCNC: 12 MMOL/L (ref 9–17)
BASOPHILS # BLD: 1 % (ref 0–2)
BASOPHILS ABSOLUTE: 0.1 K/UL (ref 0–0.2)
BUN BLDV-MCNC: 26 MG/DL (ref 8–23)
CALCIUM SERPL-MCNC: 9.3 MG/DL (ref 8.6–10.4)
CHLORIDE BLD-SCNC: 90 MMOL/L (ref 98–107)
CO2: 27 MMOL/L (ref 20–31)
CORTISOL: 22.9 UG/DL (ref 2.7–18.4)
CREAT SERPL-MCNC: 0.7 MG/DL (ref 0.7–1.2)
EOSINOPHILS RELATIVE PERCENT: 2 % (ref 0–4)
GFR AFRICAN AMERICAN: >60 ML/MIN
GFR NON-AFRICAN AMERICAN: >60 ML/MIN
GFR SERPL CREATININE-BSD FRML MDRD: ABNORMAL ML/MIN/{1.73_M2}
GLUCOSE BLD-MCNC: 154 MG/DL (ref 70–99)
HCT VFR BLD CALC: 35.5 % (ref 41–53)
HEMOGLOBIN: 12.4 G/DL (ref 13.5–17.5)
LYMPHOCYTES # BLD: 21 % (ref 24–44)
MCH RBC QN AUTO: 32 PG (ref 26–34)
MCHC RBC AUTO-ENTMCNC: 35 G/DL (ref 31–37)
MCV RBC AUTO: 91.4 FL (ref 80–100)
MONOCYTES # BLD: 9 % (ref 1–7)
PDW BLD-RTO: 13 % (ref 11.5–14.9)
PLATELET # BLD: 366 K/UL (ref 150–450)
PMV BLD AUTO: 7.4 FL (ref 6–12)
POTASSIUM SERPL-SCNC: 4.1 MMOL/L (ref 3.7–5.3)
RBC # BLD: 3.88 M/UL (ref 4.5–5.9)
REASON FOR REJECTION: NORMAL
SEG NEUTROPHILS: 67 % (ref 36–66)
SEGMENTED NEUTROPHILS ABSOLUTE COUNT: 5.3 K/UL (ref 1.3–9.1)
SODIUM BLD-SCNC: 129 MMOL/L (ref 135–144)
TROPONIN, HIGH SENSITIVITY: 67 NG/L (ref 0–22)
TROPONIN, HIGH SENSITIVITY: 67 NG/L (ref 0–22)
TROPONIN, HIGH SENSITIVITY: 70 NG/L (ref 0–22)
WBC # BLD: 7.9 K/UL (ref 3.5–11)
ZZ NTE CLEAN UP: ORDERED TEST: NORMAL
ZZ NTE WITH NAME CLEAN UP: SPECIMEN SOURCE: NORMAL

## 2022-04-15 PROCEDURE — 6360000002 HC RX W HCPCS: Performed by: NURSE PRACTITIONER

## 2022-04-15 PROCEDURE — 82533 TOTAL CORTISOL: CPT

## 2022-04-15 PROCEDURE — 99232 SBSQ HOSP IP/OBS MODERATE 35: CPT | Performed by: INTERNAL MEDICINE

## 2022-04-15 PROCEDURE — 93005 ELECTROCARDIOGRAM TRACING: CPT | Performed by: NURSE PRACTITIONER

## 2022-04-15 PROCEDURE — 6370000000 HC RX 637 (ALT 250 FOR IP): Performed by: INTERNAL MEDICINE

## 2022-04-15 PROCEDURE — 6370000000 HC RX 637 (ALT 250 FOR IP): Performed by: NURSE PRACTITIONER

## 2022-04-15 PROCEDURE — G0378 HOSPITAL OBSERVATION PER HR: HCPCS

## 2022-04-15 PROCEDURE — 6370000000 HC RX 637 (ALT 250 FOR IP): Performed by: PSYCHIATRY & NEUROLOGY

## 2022-04-15 PROCEDURE — 93005 ELECTROCARDIOGRAM TRACING: CPT | Performed by: INTERNAL MEDICINE

## 2022-04-15 PROCEDURE — 6370000000 HC RX 637 (ALT 250 FOR IP): Performed by: PHYSICAL MEDICINE & REHABILITATION

## 2022-04-15 PROCEDURE — 80048 BASIC METABOLIC PNL TOTAL CA: CPT

## 2022-04-15 PROCEDURE — 6370000000 HC RX 637 (ALT 250 FOR IP): Performed by: STUDENT IN AN ORGANIZED HEALTH CARE EDUCATION/TRAINING PROGRAM

## 2022-04-15 PROCEDURE — G0379 DIRECT REFER HOSPITAL OBSERV: HCPCS

## 2022-04-15 PROCEDURE — 99232 SBSQ HOSP IP/OBS MODERATE 35: CPT | Performed by: PHYSICAL MEDICINE & REHABILITATION

## 2022-04-15 PROCEDURE — 85025 COMPLETE CBC W/AUTO DIFF WBC: CPT

## 2022-04-15 PROCEDURE — 84484 ASSAY OF TROPONIN QUANT: CPT

## 2022-04-15 PROCEDURE — 36415 COLL VENOUS BLD VENIPUNCTURE: CPT

## 2022-04-15 RX ORDER — NITROGLYCERIN 0.4 MG/1
0.4 TABLET SUBLINGUAL EVERY 5 MIN PRN
Status: DISCONTINUED | OUTPATIENT
Start: 2022-04-15 | End: 2022-04-15 | Stop reason: HOSPADM

## 2022-04-15 RX ORDER — ATORVASTATIN CALCIUM 40 MG/1
40 TABLET, FILM COATED ORAL NIGHTLY
Status: DISCONTINUED | OUTPATIENT
Start: 2022-04-15 | End: 2022-04-15 | Stop reason: HOSPADM

## 2022-04-15 RX ORDER — MAGNESIUM HYDROXIDE/ALUMINUM HYDROXICE/SIMETHICONE 120; 1200; 1200 MG/30ML; MG/30ML; MG/30ML
30 SUSPENSION ORAL EVERY 6 HOURS PRN
Status: DISCONTINUED | OUTPATIENT
Start: 2022-04-15 | End: 2022-04-15

## 2022-04-15 RX ORDER — CARVEDILOL 3.12 MG/1
3.12 TABLET ORAL 2 TIMES DAILY
Status: DISCONTINUED | OUTPATIENT
Start: 2022-04-15 | End: 2022-04-15 | Stop reason: HOSPADM

## 2022-04-15 RX ORDER — MAGNESIUM HYDROXIDE/ALUMINUM HYDROXICE/SIMETHICONE 120; 1200; 1200 MG/30ML; MG/30ML; MG/30ML
30 SUSPENSION ORAL EVERY 6 HOURS PRN
Status: DISCONTINUED | OUTPATIENT
Start: 2022-04-15 | End: 2022-04-15 | Stop reason: HOSPADM

## 2022-04-15 RX ADMIN — ASPIRIN 325 MG: 325 TABLET, COATED ORAL at 07:54

## 2022-04-15 RX ADMIN — DIVALPROEX SODIUM 500 MG: 500 TABLET, EXTENDED RELEASE ORAL at 21:04

## 2022-04-15 RX ADMIN — CARBOXYMETHYLCELLULOSE SODIUM 2 DROP: 10 GEL OPHTHALMIC at 14:56

## 2022-04-15 RX ADMIN — HYDRALAZINE HYDROCHLORIDE 25 MG: 25 TABLET, FILM COATED ORAL at 07:55

## 2022-04-15 RX ADMIN — VENLAFAXINE 75 MG: 75 TABLET ORAL at 16:41

## 2022-04-15 RX ADMIN — VENLAFAXINE 75 MG: 75 TABLET ORAL at 07:59

## 2022-04-15 RX ADMIN — PANTOPRAZOLE SODIUM 40 MG: 40 TABLET, DELAYED RELEASE ORAL at 05:46

## 2022-04-15 RX ADMIN — NITROGLYCERIN 0.4 MG: 0.4 TABLET SUBLINGUAL at 07:54

## 2022-04-15 RX ADMIN — BISACODYL 10 MG: 10 SUPPOSITORY RECTAL at 17:22

## 2022-04-15 RX ADMIN — HYDRALAZINE HYDROCHLORIDE 25 MG: 25 TABLET, FILM COATED ORAL at 21:04

## 2022-04-15 RX ADMIN — ALUMINUM HYDROXIDE, MAGNESIUM HYDROXIDE, AND SIMETHICONE 30 ML: 200; 200; 20 SUSPENSION ORAL at 14:43

## 2022-04-15 RX ADMIN — ACETAMINOPHEN 650 MG: 325 TABLET ORAL at 21:04

## 2022-04-15 RX ADMIN — ENOXAPARIN SODIUM 40 MG: 100 INJECTION SUBCUTANEOUS at 07:56

## 2022-04-15 RX ADMIN — CARBOXYMETHYLCELLULOSE SODIUM 2 DROP: 10 GEL OPHTHALMIC at 07:56

## 2022-04-15 RX ADMIN — VENLAFAXINE 75 MG: 75 TABLET ORAL at 11:54

## 2022-04-15 RX ADMIN — FINASTERIDE 5 MG: 5 TABLET, FILM COATED ORAL at 07:55

## 2022-04-15 RX ADMIN — ATORVASTATIN CALCIUM 40 MG: 40 TABLET, FILM COATED ORAL at 21:04

## 2022-04-15 RX ADMIN — CARVEDILOL 3.12 MG: 3.12 TABLET, FILM COATED ORAL at 22:22

## 2022-04-15 RX ADMIN — CARBOXYMETHYLCELLULOSE SODIUM 2 DROP: 10 GEL OPHTHALMIC at 21:05

## 2022-04-15 RX ADMIN — NITROGLYCERIN 0.4 MG: 0.4 TABLET SUBLINGUAL at 08:10

## 2022-04-15 ASSESSMENT — PAIN DESCRIPTION - PAIN TYPE
TYPE: ACUTE PAIN

## 2022-04-15 ASSESSMENT — PAIN SCALES - GENERAL
PAINLEVEL_OUTOF10: 5
PAINLEVEL_OUTOF10: 5
PAINLEVEL_OUTOF10: 6
PAINLEVEL_OUTOF10: 3
PAINLEVEL_OUTOF10: 3

## 2022-04-15 ASSESSMENT — PAIN DESCRIPTION - LOCATION
LOCATION: CHEST

## 2022-04-15 NOTE — PROGRESS NOTES
Pt. States chest pain feels better doesn't feel sharp as before. Pt. Noted dozing off while talking to writer. Wife Adia Montoya updated.

## 2022-04-15 NOTE — PROGRESS NOTES
2nd EKG completed showed normal sinus. Resident doctor with Dr. Joana Arauz evaluated pt. Awaiting 2nd Troponin results. At this time pt. Resting in bed in no distress.

## 2022-04-15 NOTE — PLAN OF CARE
Problem: Skin Integrity:  Goal: Will show no infection signs and symptoms  Description: Will show no infection signs and symptoms  4/15/2022 1534 by David Zuñiga II, LPN  Outcome: Ongoing  4/15/2022 0517 by Liat Rowland RN  Outcome: Ongoing  Goal: Absence of new skin breakdown  Description: Absence of new skin breakdown  4/15/2022 1534 by David Zuñiga II, LPN  Outcome: Ongoing  4/15/2022 0517 by Liat Rowland RN  Outcome: Ongoing     Problem: Falls - Risk of:  Goal: Will remain free from falls  Description: Will remain free from falls  4/15/2022 1534 by David Zuñiga II, LPN  Outcome: Ongoing  4/15/2022 0517 by Liat Rowland RN  Outcome: Ongoing  Goal: Absence of physical injury  Description: Absence of physical injury  4/15/2022 1534 by David Zuñiga II, LPN  Outcome: Ongoing  4/15/2022 0517 by Liat Rowland RN  Outcome: Ongoing     Problem: Pain:  Goal: Pain level will decrease  Description: Pain level will decrease  4/15/2022 1534 by David Zuñiga II, LPN  Outcome: Ongoing  4/15/2022 0517 by Liat Rowland RN  Outcome: Ongoing  Goal: Control of acute pain  Description: Control of acute pain  4/15/2022 1534 by David Zuñiga II, LPN  Outcome: Ongoing  4/15/2022 0517 by Liat Rowland RN  Outcome: Ongoing  Goal: Control of chronic pain  Description: Control of chronic pain  4/15/2022 1534 by David Zuñiga II, LPN  Outcome: Ongoing  4/15/2022 0517 by Liat Rowland RN  Outcome: Ongoing     Problem: Musculor/Skeletal Functional Status  Goal: Highest potential functional level  4/15/2022 1534 by Ольга Kellogg LPN  Outcome: Ongoing  4/15/2022 0517 by Lait Rowland RN  Outcome: Ongoing  Goal: Absence of falls  4/15/2022 1534 by Ольга Kellogg LPN  Outcome: Ongoing  4/15/2022 0517 by Liat Rowland RN  Outcome: Ongoing     Problem: Nutrition  Goal: Optimal nutrition therapy  4/15/2022 1534 by Ольга Kellogg LPN  Outcome: Ongoing  4/15/2022 0517 by Liat Rowland RN  Outcome: Ongoing

## 2022-04-15 NOTE — PROGRESS NOTES
Physical Therapy        Physical Therapy Cancel Note      DATE: 4/15/2022    NAME: Priscilla Nicholson. MRN: [id number]   : 1950      Patient not seen this date for Physical Therapy due to: Other: Patient on Medical Hold per nursing due to chest pain and having cardiac testing.       Electronically signed by Lisa Hill PTA on 4/15/2022 at 1:13 PM

## 2022-04-15 NOTE — PROGRESS NOTES
Physical Medicine & Rehabilitation  Progress Note    4/15/2022 1:44 PM     CC: Ambulatory and ADL dysfunction due to cervical stenosis with myelopathy status post C2-C5 laminectomy and fusion    Subjective:   Noted some chest discomfort today. Internal medicine ordered labs/EKG and cardiology consult    ROS:  Denies fevers, chills, sweats. No chest pain, palpitations, lightheadedness. Denies coughing, wheezing or shortness of breath. Denies abdominal pain, nausea, diarrhea or constipation. No new areas of joint pain. Denies new areas of numbness or weakness. Denies new anxiety or depression issues. No new skin problems. Rehabilitation:   PT:  Restrictions/Precautions: Fall Risk,General Precautions  Implants present? : Metal implants  Spinal Precautions: Poor Sitting balance, Bilateral Upper  and Lower Body muscle weakness, Assist with standing in GERA Stedy  Other position/activity restrictions: up with assist; s/p C2-C5 laminectomy and fixation 3/24, Collar on while out of bed. Ok to remove while resting In bed eating and drinking in bed  Required Braces or Orthoses  Cervical: c-collar  Other: Abdominal Binder   Transfers  Sit to Stand: 2 Person [facility] ([place])  Stand to sit: Dependent/Total Rosita Laughter)  Bed to Chair: Dependent/Total [place].)  Comment: Initial  SS ~20 sec. Pt reports feeling \"light headed\" returned to sitting on EOB. PT requested to use bed pan while sitting on EOB. PT's sitting balance ranges from SBA to CGA with BUE support. PT requires Min A without UE support. PT agreeable to attempt STS again. Pt continues to report feeling \"light headed\" and reports \"chest pain\" PT states this occured yesterday with stands. Pt states his chest feels \"heavy\" and rates it \"6/10\". PT also notes to have anxiety. PM: Pt is agreeable to attempt STS in SS. Pt's BP sitting is 119/84 ans standing in SS is 89/64.  Pt requested to return to sitting, however only able to tolerate sitting for ~2-3min. PT request to return to supine. OT:  ADL  Feeding: Dependent/Total  Grooming: Maximum assistance  UE Bathing: Dependent/Total  LE Bathing: Dependent/Total  UE Dressing: Dependent/Total  LE Dressing: Dependent/Total  Toileting: Dependent/Total (with elbert nguyen in PM session, A x2, TA for hygiene post BM)  Additional Comments: MANUEL facilitated bathing tasks this date, pt dep for all tasks req [place] and assist for completion but gives good efforts to initiate task within tolerance and ability, ADL tasks to increase participation and engagement for improved task performance         Balance  Sitting Balance: Minimal assistance (min-SBA once positioned)  Standing Balance: Dependent/Total (mod A x2 in elbert steisabella)   Standing Balance  Time: AM: 30 sec, 1-2 min; PM: <30 sec 4, <1min  Activity: AM: standing with elbert nguyen to increase tolerance; PM: toilet transfer/toileting tasks using elbert nguyen, transfers with elbert nguyen  Comment: Ax2 with elbert nguyen, AXEL to place BUE's on front bar, fair tolerance noted  Functional Mobility  Functional Mobility Comments: LATONYA due to limited ROM in BUE's     Bed mobility  Bridging: Moderate assistance  Rolling to Left: Moderate assistance  Rolling to Right: Moderate assistance  Supine to Sit: Moderate assistance,2 Person assistance (mod A x1, CGA x1 for trunk control)  Sit to Supine: Dependent/Total,2 Person assistance  Scooting: Dependent/Total,2 Person assistance  Comment: PM session to address bed mobility and increase indep, pt req A for knee flexion when rolling, good initiation req, A to lift LUE when reaching for bed rail, fair grasp noted in B hands when grasping bed rails  Transfers  Sit to stand:  Moderate assistance,2 Person assistance  Stand to sit: Moderate assistance,2 Person assistance  Transfer Comments: with elbert nguyen, Ax2   Toilet Transfers  Toilet - Technique:  (with elbert nguyen)  Equipment Used:  (elbert nguyen)  Toilet Transfer: spirits        Medications   Scheduled Meds:   hydrALAZINE  25 mg Oral TID    venlafaxine  75 mg Oral TID WC    divalproex  500 mg Oral Nightly    bisacodyl  10 mg Rectal QPM    lidocaine  2 patch TransDERmal Daily    finasteride  5 mg Oral Daily    carboxymethylcellulose  2 drop Both Eyes TID    enoxaparin  40 mg SubCUTAneous Daily    pantoprazole  40 mg Oral QAM AC    polyethylene glycol  17 g Oral Daily     Continuous Infusions:  PRN Meds:.nitroGLYCERIN, senna, ondansetron **OR** ondansetron, glucagon (rDNA), glucose, acetaminophen     Diagnostics:     CBC:   Recent Labs     04/13/22  0641 04/15/22  0820   WBC 7.4 7.9   RBC 3.97* 3.88*   HGB 12.9* 12.4*   HCT 36.2* 35.5*   MCV 91.2 91.4   RDW 12.9 13.0    366     BMP:   Recent Labs     04/13/22  0641 04/15/22  0820   * 129*   K 4.5 4.1   CL 89* 90*   CO2 30 27   BUN 30* 26*   CREATININE 0.66* 0.70     BNP: No results for input(s): BNP in the last 72 hours. PT/INR: No results for input(s): PROTIME, INR in the last 72 hours. APTT: No results for input(s): APTT in the last 72 hours. CARDIAC ENZYMES: No results for input(s): CKMB, CKMBINDEX, TROPONINT in the last 72 hours. Invalid input(s): CKTOTAL;3  FASTING LIPID PANEL:  Lab Results   Component Value Date    CHOL 104 04/12/2018    HDL 42 04/12/2018    TRIG 92 04/12/2018     LIVER PROFILE: No results for input(s): AST, ALT, ALB, BILIDIR, BILITOT, ALKPHOS in the last 72 hours. I/O (24Hr): Intake/Output Summary (Last 24 hours) at 4/15/2022 1344  Last data filed at 4/15/2022 0948  Gross per 24 hour   Intake 600 ml   Output 350 ml   Net 250 ml       Glu last 24 hour  No results for input(s): POCGLU in the last 72 hours. No results for input(s): CLARITYU, COLORU, PHUR, SPECGRAV, PROTEINU, RBCUA, BLOODU, BACTERIA, NITRU, WBCUA, LEUKOCYTESUR, YEAST, GLUCOSEU, BILIRUBINUR in the last 72 hours. Impression/Plan:    1.  Cervical stenosis with myelopathy:  S/p C2-C5 laminectomy and stable, EKG seen by internal medicine, appreciate internal medicine follow-up, noted cardiology consulted  11. CHF: Off lasix  12. Hyponatremia-sodium 129-recheck in a.m.  13. HTN:   Hydralazine  14. HLD:  Not currently on medication  15. Diabetes:  Not currently on medication  16. GIA  17. Depression, PTSD:  On effexor, depakote. Psychiatry consulted per patient request - recommended holding morning depakote, changing effexor to 225mg extended release dose in the morning - patient was more fatigued on 4/10 after changing effexor formulation, changed effexor dose back to 75mg TID with meals for 4/10. Psychiatry recommending continued weaning of depakote due to ataxia. -Depakote down to 500 mg per psychiatry-last seen 4/10  18. DVT prophylaxis:  Lovenox  19. Internal Medicine for medical management        Eb Granger MD       This note is created with the assistance of a speech recognition program.  While intending to generate a document that actually reflects the content of the visit, the document can still have some errors including those of syntax and sound a like substitutions which may escape proof reading.   In such instances, actual meaning can be extrapolated by contextual diversion

## 2022-04-15 NOTE — CONSULTS
Date:   4/15/2022  Patient name: Hank Chiang. Date of admission:  3/31/2022  8:38 PM  MRN:   [id number]  YOB: 1950  PCP: Tona Nichols    Reason for Admission: Cervical stenosis of spine [M48.02]    Cardiology consult: Abnormal troponin       Referring physician: Dr Trell Jacobs    Abnormal troponin 67, high-sensitivity troponin was elevated at 53 and 53 on 3/31/2022  Chest pain  Hypertension  Hyperlipidemia  Cervical spinal cord compression, myelopathy, radiculopathy, status post surgery fusion C4-C7  Encephalopathy/CT brain 3/26/2022 no acute process  Hospital-acquired pneumonia  Hyponatremia    2D echo 3/31/2022  Normal LV size, mild to moderate LVH normal wall motion  Ejection fraction more than 55%, RVSP 30  Mildly dilated LA   Normal IVC size and respiratory variation  No significant valvular abnormality    History of present illness    72-year-old male who got admitted at acute rehab unit on 3/31/2020 with the problem of frequent falls. He has past medical history of cervical stenosis and C-spine surgery few years ago. He was doing well up to 6 weeks ago and then he started deteriorating neurologically and has had frequent falls. CT brain on 3/26/2022 did not show any acute process. 2D echo on 3/31/2022 showed normal LV size normal LV function ejection fraction more than 55%, normal wall motion, mild to moderate LVH. Patient had chest pain earlier this morning left-sided 6 x 10 like a pressure sensation, patient received nitroglycerin x2 no relief. Patient has constant chest pain since then more than 7 hours. At present pain is 4 x 10. No palpitation no diaphoresis no nausea no vomiting. He does have a history of acid reflux. No nausea no vomiting no change in pain by deep breathing or cough.   Electrocardiogram showed normal sinus rhythm, no ischemic changes, moderate criteria for LVH  High-sensitivity troponin 67  Previous high-sensitivity troponin on 3-31-22 was 53 and 48    Lab work 4/15/2022  Sodium 129, potassium 4.1, BUN 26, creatinine 0.70, glucose 154  High-sensitivity troponin 67  Serum cortisol 22.9  WBC 7.9, hemoglobin 12.4, platelets 962    Current evaluation  Patient seen and examined with patient nurse  He was resting in upright position  He still has chest pain followed by 10  Tachypnea no tachycardia no diaphoresis  Dynamically stable no sign of cardiopulmonary decompensation  No chest wall tenderness no pericardial rub  Heart sounds are normal    Medications:   Scheduled Meds:   hydrALAZINE  25 mg Oral TID    venlafaxine  75 mg Oral TID WC    divalproex  500 mg Oral Nightly    bisacodyl  10 mg Rectal QPM    lidocaine  2 patch TransDERmal Daily    finasteride  5 mg Oral Daily    carboxymethylcellulose  2 drop Both Eyes TID    enoxaparin  40 mg SubCUTAneous Daily    pantoprazole  40 mg Oral QAM AC    polyethylene glycol  17 g Oral Daily     Continuous Infusions:  CBC:   Recent Labs     04/13/22  0641 04/15/22  0820   WBC 7.4 7.9   HGB 12.9* 12.4*    366     BMP:    Recent Labs     04/13/22  0641 04/15/22  0820   * 129*   K 4.5 4.1   CL 89* 90*   CO2 30 27   BUN 30* 26*   CREATININE 0.66* 0.70   GLUCOSE 95 154*     Hepatic: No results for input(s): AST, ALT, ALB, BILITOT, ALKPHOS in the last 72 hours. Troponin: No results for input(s): TROPONINI in the last 72 hours. BNP: No results for input(s): BNP in the last 72 hours. Lipids: No results for input(s): CHOL, HDL in the last 72 hours. Invalid input(s): LDLCALCU  INR: No results for input(s): INR in the last 72 hours. Objective:   Vitals: BP (!) 147/97   Pulse 94   Temp 98.1 °F (36.7 °C)   Resp 18   Ht 5' 3\" (1.6 m)   Wt 141 lb 3.2 oz (64 kg)   SpO2 96%   BMI 25.01 kg/m²   General appearance: alert and cooperative with exam  HEENT: Head: Normal, normocephalic, atraumatic.   Neck: no JVD and supple, symmetrical, trachea midline  Lungs: diminished breath sounds bibasilar  Heart: regular rate and rhythm  Abdomen: soft, non-tender; bowel sounds normal; no masses,  no organomegaly  Extremities: Homans sign is negative, no sign of DVT  Neurologic: Mental status: Alert, oriented, thought content appropriate    EKG: normal sinus rhythm, LVH, unchanged from previous tracings. ECHO: reviewed. Ejection fraction: 55% mild to moderate LVH, normal wall motion    Assessment / Acute Cardiac Problems:   Chest pain like a pressure going for more than 7 hours  No relief with the nitroglycerin x2  No ischemic ECG changes  Abnormal high-sensitivity troponin 67  Elevated high-sensitivity troponin on 3/31/2000 2253 and 53  No sign of cardiopulmonary decompensation        Patient Active Problem List:     Hypertension     Hyperlipidemia     Diabetes mellitus ([address]     Contusion of right shoulder     Bipolar disorder, mixed ([address]     First known suicide attempt [facility])     Erectile dysfunction     Cervical stenosis of spinal canal     Incomplete spinal cord lesion at C5-C7 level ([address]     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)     Cervical spondylosis with radiculopathy     Acute blood loss anemia     Chest pain     Depression with suicidal ideation     Severe recurrent major depression without psychotic features ([address]     Frequent falls     Hyponatremia     Cervical spinal cord compression (HCC)     Cord compression (HCC)     Quadriplegia, C1-C4 incomplete (HCC)     Encephalopathy     Hospital-acquired pneumonia     Atelectasis     Cervical stenosis of spine     Moderate malnutrition ([address]      Plan of Treatment:   Medications reviewed  Add Lipitor 40 mg a day  Give Mylanta 30 mL now  Repeat troponin 4 PM and tomorrow a.m. Repeat ECG tomorrow a.m.     Patient continues to have chest pain and ECG shows new changes he will need cardiac cath  At present etiology of chest pain is not clear    Electronically signed by Lexi Abarca MD on 4/15/2022 at 1:46 PM

## 2022-04-15 NOTE — PROGRESS NOTES
[facility]    CONSULTATION / HISTORY AND PHYSICAL EXAMINATION            Date:   4/15/2022  Patient name:  Demetrius Epps. Date of admission:  3/31/2022  8:38 PM  MRN:   [id number]  Account:  [de-identified]  YOB: 1950  PCP:    Toyin Kohler  Room:   0182/9169-14  Code Status:    DNR-CCA    Physician Requesting Consult: Bess Ng MD    Reason for Consult:  medical management    Chief Complaint:     No chief complaint on file.       History Obtained From:     Patient medical record nursing staff    History of Present Illness:   Patient past medical multiple medical problems include hypertension, heart failure with preserved ejection fraction, diabetes, bipolar disorder,   glaucoma, patient was originally admitted at [facility] where he was transferred from outlying facility, he developed, generalized weakness after a fall, patient underwent MRI of cervical spine suggestive of severe spinal canal stenosis at the level of C2-C4 level, patient underwent C2-C5 posterior decompression surgery, he feels that weakness in his extremities is slightly better  Patient during his hospital stay, developed bradycardia which improved after discontinuation of beta-blocker, cardiology was also consulted  He developed hospital-acquired pneumonia, getting treated with cefepime and doxycycline  Patient denying any complaints of cough, shortness of breath, chest pain,    Past Medical History:     Past Medical History:   Diagnosis Date    Depression 2017    ON RX    Diabetes mellitus ([facility] 2013    NIDDM    Difficult intravenous access     Hyperlipidemia 2008    ON RX    Hypertension 2008    ON RX    Migraines     PTSD (post-traumatic stress disorder) 01/2018    ON RX    Sleep apnea 01/2018    UNALBE TO USE TO CLEARED BY ENT (CPAP)    Teeth missing     BACK TEETH UPPER AND LOWER TO BE FITTED FOR PARTIALS AT LATER DATE    Wears glasses         Past Surgical History:     Past Surgical History:   Procedure Laterality Date    CARDIAC CATHETERIZATION  02/2010    no stents     CARPAL TUNNEL RELEASE Left 01/09/2002    CATARACT REMOVAL      CERVICAL FUSION  04/19/2018    anterior cervical fusion C5-6    CERVICAL FUSION N/A 3/24/2022    C2-5 FUSION, C2-4 LAMINECTOMY performed by Jonathan Gilbert DO at [address] [facility] [address] Left 2018    CATARACT EXTRACTION WITH IOL    HYDROCELE EXCISION  1951    LAMINECTOMY  03/24/2022    C2-5 FUSION, C2-4 LAMINECTOMY    MIDDLE EAR SURGERY  01/11/2018    MIDDLE EAR REBUILT AND EAR DRUM REPLACED    MOUTH SURGERY  04/16/2018    5 TEETH REMOVED    OTHER SURGICAL HISTORY  04/19/2018    : ANTERIOR CERVICAL CORRPECTOMY C5-6, SYNTHES, DEPUY, REG TABLE, SUPINE,     ID OFFICE/OUTPT VISIT,PROCEDURE ONLY N/A 4/19/2018    ANTERIOR CERVICAL CORRPECTOMY AND FUSION C5-6 performed by Jonathan Gilbert DO at [address] [facility]  12/1983        Medications Prior to Admission:     Prior to Admission medications    Medication Sig Start Date End Date Taking?  Authorizing Provider   finasteride (PROSCAR) 5 MG tablet Take 5 mg by mouth daily   Yes Historical Provider, MD   carboxymethylcellulose 1 % ophthalmic solution Place 2 drops into both eyes 3 times daily Pt states \"2 drops in both eyes three times a day\"    Historical Provider, MD   divalproex (DEPAKOTE ER) 250 MG extended release tablet Take 750 mg by mouth at bedtime    Historical Provider, MD   furosemide (LASIX) 20 MG tablet Take 20 mg by mouth daily    Historical Provider, MD   venlafaxine (EFFEXOR XR) 150 MG extended release capsule Take 1 capsule by mouth daily  Patient taking differently: Take 225 mg by mouth daily  6/23/20   Claudette Saleem MD   simethicone (MYLICON) 80 MG chewable tablet Take 1 tablet by mouth every 6 hours as needed for Flatulence  Patient taking differently: Take 80 mg by mouth every 8 hours as needed for Flatulence 6/22/20   Henrene Kanner, MD   lidocaine (XYLOCAINE) 5 % ointment Apply topically daily as needed for Pain Apply topically as needed. LF 4/20/20 (30 day supply)  Patient not taking: Reported on 3/23/2022    Historical Provider, MD   metFORMIN (GLUCOPHAGE) 1000 MG tablet Take 1,000 mg by mouth 2 times daily (with meals) LF 4/27/20 (90 day supply)  Patient not taking: Reported on 3/21/2022    Historical Provider, MD   losartan (COZAAR) 25 MG tablet Take 25 mg by mouth daily     Historical Provider, MD   aspirin 81 MG chewable tablet Take 81 mg by mouth daily  Patient not taking: Reported on 3/21/2022    Historical Provider, MD   divalproex (DEPAKOTE ER) 500 MG extended release tablet Take 500 mg by mouth every morning     Historical Provider, MD   traZODone (DESYREL) 100 MG tablet Take 150 mg by mouth nightly as needed LF 5/1/20 (30 day supply)    Historical Provider, MD   cetirizine (ZYRTEC) 10 MG tablet Take 10 mg by mouth daily LF 4/6/20 (90 day supply)  Patient not taking: Reported on 3/21/2022    Historical Provider, MD   atorvastatin (LIPITOR) 80 MG tablet Take 40 mg by mouth daily Take 1/2 tablet (40 mg) daily  LF 4/22/20 (90 day supply)  Patient not taking: Reported on 3/21/2022    Historical Provider, MD   amLODIPine (NORVASC) 10 MG tablet Take 7.5 mg by mouth daily     Historical Provider, MD   sildenafil (VIAGRA) 100 MG tablet Take 100 mg by mouth as needed for Erectile Dysfunction LF 5/12/20 (30 day supply)    Historical Provider, MD   omeprazole (PRILOSEC) 20 MG delayed release capsule Take 20 mg by mouth every evening LF 4/21/20 (90 day supply)    Historical Provider, MD        Allergies:     Latex, Bee venom, Lisinopril, Niacin and related, Sulfa antibiotics, and Terazosin    Social History:     Tobacco:    reports that he quit smoking about 50 years ago. His smoking use included cigarettes. He has a 2.00 pack-year smoking history.  He has never used smokeless tobacco.  Alcohol:      reports current alcohol use. Drug Use:  reports current drug use. Drug: Marijuana Li Darvin). Family History:     Family History   Problem Relation Age of Onset   Unk Robert Dementia Mother     Coronary Art Dis Mother     Stroke Mother     Diabetes Mother     Asthma Mother     Other Mother         BRAIN ANEURISM    High Blood Pressure Mother     Heart Disease Father     Diabetes Sister     Diabetes Brother     High Blood Pressure Brother     High Cholesterol Brother     Heart Disease Brother     Diabetes Sister     Other Sister         NEUROPATHY       Review of Systems:     Positive and Negative as described in HPI. CONSTITUTIONAL:  negative for fevers, chills, sweats, fatigue, weight loss  HEENT:  negative for vision, hearing changes, runny nose, throat pain  RESPIRATORY:  negative for shortness of breath, cough, congestion, wheezing. CARDIOVASCULAR:  negative for chest pain, palpitations. GASTROINTESTINAL:  negative for nausea, vomiting, diarrhea, constipation, change in bowel habits, abdominal pain   GENITOURINARY:  negative for difficulty of urination, burning with urination, frequency   INTEGUMENT:  negative for rash, skin lesions, easy bruising   HEMATOLOGIC/LYMPHATIC:  negative for swelling/edema   ALLERGIC/IMMUNOLOGIC:  negative for urticaria , itching  ENDOCRINE:  negative increase in drinking, increase in urination, hot or cold intolerance  MUSCULOSKELETAL:  negative joint pains, muscle aches, swelling of joints  NEUROLOGICAL: Weakness in all 4 extremities  BEHAVIOR/PSYCH:      Physical Exam:     BP (!) 147/97   Pulse 94   Temp 98.1 °F (36.7 °C)   Resp 18   Ht 5' 3\" (1.6 m)   Wt 141 lb 3.2 oz (64 kg)   SpO2 96%   BMI 25.01 kg/m²   Temp (24hrs), Av.1 °F (36.7 °C), Min:98 °F (36.7 °C), Max:98.1 °F (36.7 °C)    No results for input(s): POCGLU in the last 72 hours.     Intake/Output Summary (Last 24 hours) at 4/15/2022 1428  Last data filed at 4/15/2022 0948  Gross per 24 hour   Intake 600 ml   Output 350 ml   Net 250 ml       General Appearance:  alert, well appearing, and in no acute distress  Mental status: oriented to person, place, and time with normal affect  Head:  normocephalic, atraumatic. Eye: no icterus, redness, pupils equal and reactive, extraocular eye movements intact, conjunctiva clear  Ear: normal external ear, no discharge, hearing intact  Nose:  no drainage noted  Mouth: mucous membranes moist  Neck: supple, no carotid bruits, thyroid not palpable , staples present posterior neck, healing  Lungs: Bilateral equal air entry, clear to ausculation, no wheezing, rales or rhonchi, normal effort  Cardiovascular: normal rate, regular rhythm, no murmur, gallop, rub. Abdomen: Soft, nontender, nondistended, normal bowel sounds, no hepatomegaly or splenomegaly  Neurologic: Weakness in all 4 extremities, power in all 4 extremities 4/5 skin: No gross lesions, rashes, bruising or bleeding on exposed skin area  Extremities:  peripheral pulses palpable, no pedal edema or calf pain with palpation  Psych:      Investigations:      Laboratory Testing:  Recent Results (from the past 24 hour(s))   EKG 12 Lead    Collection Time: 04/15/22  6:56 AM   Result Value Ref Range    Ventricular Rate 90 BPM    Atrial Rate 90 BPM    P-R Interval 182 ms    QRS Duration 88 ms    Q-T Interval 380 ms    QTc Calculation (Bazett) 464 ms    P Axis 60 degrees    R Axis -20 degrees    T Axis 13 degrees   Cortisol Total    Collection Time: 04/15/22  8:20 AM   Result Value Ref Range    Cortisol 22.9 (H) 2.7 - 18.4 ug/dL    Cortisol Collection Info PENDING    CBC with Auto Differential    Collection Time: 04/15/22  8:20 AM   Result Value Ref Range    WBC 7.9 3.5 - 11.0 k/uL    RBC 3.88 (L) 4.5 - 5.9 m/uL    Hemoglobin 12.4 (L) 13.5 - 17.5 g/dL    Hematocrit 35.5 (L) 41 - 53 %    MCV 91.4 80 - 100 fL    MCH 32.0 26 - 34 pg    MCHC 35.0 31 - 37 g/dL    RDW 13.0 11.5 - 14.9 %    Platelets 199 247 - 970 k/uL    MPV 7.4 6.0 - 12.0 fL    Seg Neutrophils 67 (H) 36 - 66 %    Lymphocytes 21 (L) 24 - 44 %    Monocytes 9 (H) 1 - 7 %    Eosinophils % 2 0 - 4 %    Basophils 1 0 - 2 %    Segs Absolute 5.30 1.3 - 9.1 k/uL    Absolute Lymph # 1.70 1.0 - 4.8 k/uL    Absolute Mono # 0.70 0.1 - 1.3 k/uL    Absolute Eos # 0.10 0.0 - 0.4 k/uL    Basophils Absolute 0.10 0.0 - 0.2 k/uL   Basic Metabolic Panel w/ Reflex to MG    Collection Time: 04/15/22  8:20 AM   Result Value Ref Range    Glucose 154 (H) 70 - 99 mg/dL    BUN 26 (H) 8 - 23 mg/dL    CREATININE 0.70 0.70 - 1.20 mg/dL    Calcium 9.3 8.6 - 10.4 mg/dL    Sodium 129 (L) 135 - 144 mmol/L    Potassium 4.1 3.7 - 5.3 mmol/L    Chloride 90 (L) 98 - 107 mmol/L    CO2 27 20 - 31 mmol/L    Anion Gap 12 9 - 17 mmol/L    GFR Non-African American >60 >60 mL/min    GFR African American >60 >60 mL/min    GFR Comment         Troponin    Collection Time: 04/15/22  8:20 AM   Result Value Ref Range    Troponin, High Sensitivity 67 (HH) 0 - 22 ng/L   Troponin    Collection Time: 04/15/22 11:12 AM   Result Value Ref Range    Troponin, High Sensitivity 67 (HH) 0 - 22 ng/L           Consultations:   IP CONSULT TO DIETITIAN  IP CONSULT TO SOCIAL WORK  IP CONSULT TO INTERNAL MEDICINE  IP CONSULT TO DIETITIAN  IP CONSULT TO PSYCHIATRY  IP CONSULT TO PSYCHIATRY  IP CONSULT TO CARDIOLOGY  Assessment :      Primary Problem  Cervical stenosis of spine    Active Hospital Problems    Diagnosis Date Noted    Moderate malnutrition ([facility] [address] [E44.0] 04/14/2022    Cervical stenosis of spine [M48.02] 03/31/2022       Plan:     1. Quadriparesis, due to cervical cord compression after fall s/p C2-C5 decompression surgery. 2. Hypertension, controlled restart home dose of Norvasc, losartan, will avoid beta-blockers since patient developed bradycardia in [place]  3. Heart failure preserved ejection fraction, compensated , on Lasix  4. GERD, restarted Protonix  5. On DVT prophylaxis Lovenox  6. BPH, started on finasteride  7.  History of glaucoma, restarted home medications of eyedrop  8. Patient has neurological better, will have intermittent catheterization  9. Diabetes, controlled      Dc hctz  Change all bp meds for night time  Day time hypotension with supine hypertension      4/12/22    Medications: Allergies: Allergies   Allergen Reactions    Latex Itching    Bee Venom Swelling    Lisinopril Other (See Comments)     Cough      Niacin And Related Rash    Sulfa Antibiotics Rash    Terazosin Rash       Current Meds:   Scheduled Meds:    atorvastatin  40 mg Oral Nightly    hydrALAZINE  25 mg Oral TID    venlafaxine  75 mg Oral TID WC    divalproex  500 mg Oral Nightly    bisacodyl  10 mg Rectal QPM    lidocaine  2 patch TransDERmal Daily    finasteride  5 mg Oral Daily    carboxymethylcellulose  2 drop Both Eyes TID    enoxaparin  40 mg SubCUTAneous Daily    pantoprazole  40 mg Oral QAM AC    polyethylene glycol  17 g Oral Daily     Continuous Infusions:   PRN Meds: nitroGLYCERIN, senna, ondansetron **OR** ondansetron, glucagon (rDNA), glucose, acetaminophen    BP Readings from Last 3 Encounters:   04/15/22 (!) 147/97 03/31/22 (!) 143/87   03/24/22 100/79     · On standing BP goes down to as low as 70 and patient feels nauseos and dizzy . 1. Reviewed ekg , echo , chest xray   2. Has borderline LVH  3. Systolic and diastolic fx wnl   4. Will discontinue lasix and losartan . 5. Treat HTN only if systolic BP above 060  6. And monitor . 7. Check orthostatic bp bid   8.         4/13/22   BP Readings from Last 3 Encounters:   04/15/22 (!) 147/97   03/31/22 (!) 143/87   03/24/22 100/79     Improved . orthostasis resolved        4/14/22    · orthostasis resolved with stopping diuretic and losartan 9. Windy Patel MD  4/15/2022  2:28 PM    Copy sent to Dr. Matthew Falk    Please note that this chart was generated using voice recognition Dragon dictation software.   Although every effort was made to ensure the accuracy of this automated transcription, some errors in transcription may have occurred.

## 2022-04-15 NOTE — PROGRESS NOTES
[place] [id number] Internal Medicine    Progress Note  Patient assessed for rehabilitation services?: Yes  4/15/2022    11:46 AM    Name:   Navjot Garcia. MRN:     [id number]     Acct:      [de-identified]   Room:   [address] [place] [address] Day:  13  Admit Date:  3/31/2022  8:38 PM    PCP:   Yaniv Cotton  Code Status:  DNR-CCA    Subjective:     C/C: Quadriparesis  Principal Problem:    Cervical stenosis of spine  Active Problems: Moderate malnutrition ([address]  Resolved Problems:    * No resolved hospital problems. *    4/15/22  With an episode of chest pain earlier this morning. Left-sided, 6/10 in intensity, states that he felt like a pressure sensation on his chest that lasted for many minutes associated with some sweating. Currently staying at is having some residual chest pain. Troponins elevated to 67. EKG without any clear ST segment elevation although some artifact. Recent echocardiogram with EF of 55%. No past history of CAD. 4/14/22  No acute events overnight. Continues to feel better. Weakness is improving. Working with physical therapy. Getting out of bed to the chair.    Orthostatics improved   Blood pressure elevated but acceptable, currently receiving hydralazine only for systolic blood pressure greater than 160     Significant last 24 hr data reviewed ;   Vitals:    04/14/22 2052 04/15/22 0730 04/15/22 0800 04/15/22 0815   BP: (!) 137/92 (!) 147/96 (!) 141/107 125/87   Pulse: 100 88 88 90   Resp: 16 18     Temp: 98 °F (36.7 °C) 98.1 °F (36.7 °C)     TempSrc: Oral      SpO2: 96% 98% 96%    Weight:       Height:          Recent Results (from the past 24 hour(s))   EKG 12 Lead    Collection Time: 04/15/22  6:56 AM   Result Value Ref Range    Ventricular Rate 90 BPM    Atrial Rate 90 BPM    P-R Interval 182 ms    QRS Duration 88 ms    Q-T Interval 380 ms    QTc Calculation (Bazett) 464 ms    P Axis 60 degrees    R Axis -20 degrees    T Axis 13 degrees   CBC with Auto Differential    Collection Time: 04/15/22  8:20 AM   Result Value Ref Range    WBC 7.9 3.5 - 11.0 k/uL    RBC 3.88 (L) 4.5 - 5.9 m/uL    Hemoglobin 12.4 (L) 13.5 - 17.5 g/dL    Hematocrit 35.5 (L) 41 - 53 %    MCV 91.4 80 - 100 fL    MCH 32.0 26 - 34 pg    MCHC 35.0 31 - 37 g/dL    RDW 13.0 11.5 - 14.9 %    Platelets 499 630 - 253 k/uL    MPV 7.4 6.0 - 12.0 fL    Seg Neutrophils 67 (H) 36 - 66 %    Lymphocytes 21 (L) 24 - 44 %    Monocytes 9 (H) 1 - 7 %    Eosinophils % 2 0 - 4 %    Basophils 1 0 - 2 %    Segs Absolute 5.30 1.3 - 9.1 k/uL    Absolute Lymph # 1.70 1.0 - 4.8 k/uL    Absolute Mono # 0.70 0.1 - 1.3 k/uL    Absolute Eos # 0.10 0.0 - 0.4 k/uL    Basophils Absolute 0.10 0.0 - 0.2 k/uL   Basic Metabolic Panel w/ Reflex to MG    Collection Time: 04/15/22  8:20 AM   Result Value Ref Range    Glucose 154 (H) 70 - 99 mg/dL    BUN 26 (H) 8 - 23 mg/dL    CREATININE 0.70 0.70 - 1.20 mg/dL    Calcium 9.3 8.6 - 10.4 mg/dL    Sodium 129 (L) 135 - 144 mmol/L    Potassium 4.1 3.7 - 5.3 mmol/L    Chloride 90 (L) 98 - 107 mmol/L    CO2 27 20 - 31 mmol/L    Anion Gap 12 9 - 17 mmol/L    GFR Non-African American >60 >60 mL/min    GFR African American >60 >60 mL/min    GFR Comment         Troponin    Collection Time: 04/15/22  8:20 AM   Result Value Ref Range    Troponin, High Sensitivity 67 (HH) 0 - 22 ng/L     No results for input(s): POCGLU in the last 72 hours. No results found.           On admission         Review of Systems:     Constitutional: Weakness  Respiratory:  negative for cough, dyspnea on exertion, hemoptysis, shortness of breath, wheezing  Cardiovascular:  negative for chest pain, chest pressure/discomfort, lower extremity edema, palpitations  Gastrointestinal:  negative for abdominal pain, constipation, diarrhea, nausea, vomiting  Neurological:  negative for dizziness, headache  Data:     Past Medical History:  no change     Social History:  no change    Family History: @no change    Vitals:      I/O (24Hr): Intake/Output Summary (Last 24 hours) at 4/15/2022 1146  Last data filed at 4/15/2022 0948  Gross per 24 hour   Intake 600 ml   Output 350 ml   Net 250 ml       Labs:    Radiology:    Medications: Allergies:      Current Meds:   Scheduled Meds:    hydrALAZINE  25 mg Oral TID    venlafaxine  75 mg Oral TID WC    divalproex  500 mg Oral Nightly    bisacodyl  10 mg Rectal QPM    lidocaine  2 patch TransDERmal Daily    finasteride  5 mg Oral Daily    carboxymethylcellulose  2 drop Both Eyes TID    enoxaparin  40 mg SubCUTAneous Daily    pantoprazole  40 mg Oral QAM AC    polyethylene glycol  17 g Oral Daily     Continuous Infusions:   PRN Meds: nitroGLYCERIN, senna, ondansetron **OR** ondansetron, glucagon (rDNA), glucose, acetaminophen      Physical Examination:        /87   Pulse 90   Temp 98.1 °F (36.7 °C)   Resp 18   Ht 5' 3\" (1.6 m)   Wt 141 lb 3.2 oz (64 kg)   SpO2 96%   BMI 25.01 kg/m²   Temp (24hrs), Av.1 °F (36.7 °C), Min:98 °F (36.7 °C), Max:98.1 °F (36.7 °C)    No results for input(s): POCGLU in the last 72 hours. Intake/Output Summary (Last 24 hours) at 4/15/2022 1146  Last data filed at 4/15/2022 0948  Gross per 24 hour   Intake 600 ml   Output 350 ml   Net 250 ml       General Appearance:  alert, well appearing, and in no acute distress  Mental status:   Head:  normocephalic, atraumatic. Eye: no icterus, redness, pupils equal and reactive, extraocular eye movements intact, conjunctiva clear  Ear: normal external ear, no discharge, hearing intact  Nose:  no drainage noted  Mouth: mucous membranes moist  Neck: supple, no carotid bruits, thyroid not palpable  Lungs: Bilateral equal air entry, clear to ausculation, no wheezing, rales or rhonchi, normal effort  Cardiovascular: normal rate, regular rhythm, no murmur, gallop, rub.   Abdomen: Soft, nontender, nondistended, normal bowel sounds, no hepatomegaly or splenomegaly  Neurologic: There are no new focal motor or sensory deficits,   Skin: No gross lesions, rashes, bruising or bleeding on exposed skin area  Extremities:  peripheral pulses palpable, no pedal edema or calf pain with palpation  Psych:             Assessment:        Primary Problem  Cervical stenosis of spine    Principal Problem:    Cervical stenosis of spine  Active Problems: Moderate malnutrition ([address]  Resolved Problems:    * No resolved hospital problems. *       Plan:          4/14/22    · Orthostasis has improved. Continue hydralazine with parameters. Continue to check orthostatic vitals twice daily   Check cortisol level tomorrow a.m.       4/15/22  Trend troponins. Given aspirin 325 mg. Consider cardiology evaluation if uptrending troponins. Patient will benefit from getting a stress test.  Fu cortisol levels      Hospital Problems           Last Modified POA    * (Principal) Cervical stenosis of spine 3/31/2022 Yes    Moderate malnutrition ([address] 4/14/2022 Yes                         Thanks for consulting us . Will monitor vitals and clinical course , and  Optimize therapy  as needed . Attestation and add on       I have discussed the care of Jami Blake. , including pertinent history and exam findings,      4/15/22    with the resident. I have seen and examined the patient and the key elements of all parts of the encounter have been performed by me . I agree with the assessment, plan and orders as documented by the resident. Principal Problem:    Cervical stenosis of spine  Active Problems: Moderate malnutrition ([address]  Resolved Problems:    * No resolved hospital problems. *        ''''''''''       MD KRIS Lloyd [address], [address] [place] [address].    Phone [phone]   Fax: [phone]  Answering Service: [phone]              Patricia Myles MD

## 2022-04-15 NOTE — PLAN OF CARE
Problem: Skin Integrity:  Goal: Will show no infection signs and symptoms  Description: Will show no infection signs and symptoms  Outcome: Ongoing  Goal: Absence of new skin breakdown  Description: Absence of new skin breakdown  Outcome: Ongoing     Problem: Falls - Risk of:  Goal: Will remain free from falls  Description: Will remain free from falls  Outcome: Ongoing  Goal: Absence of physical injury  Description: Absence of physical injury  Outcome: Ongoing     Problem: Pain:  Goal: Pain level will decrease  Description: Pain level will decrease  Outcome: Ongoing  Goal: Control of acute pain  Description: Control of acute pain  Outcome: Ongoing  Goal: Control of chronic pain  Description: Control of chronic pain  Outcome: Ongoing     Problem: Musculor/Skeletal Functional Status  Goal: Highest potential functional level  Outcome: Ongoing  Goal: Absence of falls  Outcome: Ongoing     Problem: Nutrition  Goal: Optimal nutrition therapy  4/15/2022 0517 by Isreal Beckett RN  Outcome: Ongoing  4/14/2022 1700 by David Miranda RD, LD  Outcome: Ongoing

## 2022-04-15 NOTE — PROGRESS NOTES
[facility]   OCCUPATIONAL THERAPY MISSED TREATMENT NOTE   ACUTE REHAB  Date: 4/15/22  Patient Name: Carla Teixeira. Room: [facility]-  MRN: [id number]   Account #: [de-identified]    : 1950  (75 y.o.)  Gender: male   Referring Practitioner: Feliberto Gutierrez MD  Diagnosis: Cervical stenosis with myelopathy s/p C2-C5 laminectomy and fusion              REASON FOR MISSED TREATMENT:  Hold treatment per nursing request   - Per RN Kia Sahu pt is on hold this date.        ASHLEY Edwards

## 2022-04-16 PROBLEM — R07.9 CHEST PAIN: Status: ACTIVE | Noted: 2022-04-16

## 2022-04-16 LAB — TROPONIN, HIGH SENSITIVITY: 73 NG/L (ref 0–22)

## 2022-04-16 PROCEDURE — G0378 HOSPITAL OBSERVATION PER HR: HCPCS

## 2022-04-16 PROCEDURE — 51798 US URINE CAPACITY MEASURE: CPT

## 2022-04-16 PROCEDURE — 1200000000 HC SEMI PRIVATE

## 2022-04-16 PROCEDURE — 6370000000 HC RX 637 (ALT 250 FOR IP): Performed by: INTERNAL MEDICINE

## 2022-04-16 PROCEDURE — 36415 COLL VENOUS BLD VENIPUNCTURE: CPT

## 2022-04-16 PROCEDURE — 99221 1ST HOSP IP/OBS SF/LOW 40: CPT | Performed by: PHYSICAL MEDICINE & REHABILITATION

## 2022-04-16 PROCEDURE — 96372 THER/PROPH/DIAG INJ SC/IM: CPT

## 2022-04-16 PROCEDURE — 99220 PR INITIAL OBSERVATION CARE/DAY 70 MINUTES: CPT | Performed by: INTERNAL MEDICINE

## 2022-04-16 PROCEDURE — 05H933Z INSERTION OF INFUSION DEVICE INTO RIGHT BRACHIAL VEIN, PERCUTANEOUS APPROACH: ICD-10-PCS | Performed by: INTERNAL MEDICINE

## 2022-04-16 PROCEDURE — 6360000002 HC RX W HCPCS: Performed by: INTERNAL MEDICINE

## 2022-04-16 PROCEDURE — 84484 ASSAY OF TROPONIN QUANT: CPT

## 2022-04-16 PROCEDURE — 93005 ELECTROCARDIOGRAM TRACING: CPT | Performed by: INTERNAL MEDICINE

## 2022-04-16 RX ORDER — ONDANSETRON 4 MG/1
4 TABLET, ORALLY DISINTEGRATING ORAL EVERY 8 HOURS PRN
Status: DISCONTINUED | OUTPATIENT
Start: 2022-04-16 | End: 2022-04-20 | Stop reason: HOSPADM

## 2022-04-16 RX ORDER — NICOTINE POLACRILEX 4 MG
15 LOZENGE BUCCAL PRN
Status: DISCONTINUED | OUTPATIENT
Start: 2022-04-16 | End: 2022-04-20 | Stop reason: HOSPADM

## 2022-04-16 RX ORDER — MAGNESIUM HYDROXIDE/ALUMINUM HYDROXICE/SIMETHICONE 120; 1200; 1200 MG/30ML; MG/30ML; MG/30ML
30 SUSPENSION ORAL EVERY 6 HOURS PRN
Status: DISCONTINUED | OUTPATIENT
Start: 2022-04-16 | End: 2022-04-20 | Stop reason: HOSPADM

## 2022-04-16 RX ORDER — ONDANSETRON 2 MG/ML
4 INJECTION INTRAMUSCULAR; INTRAVENOUS EVERY 6 HOURS PRN
Status: DISCONTINUED | OUTPATIENT
Start: 2022-04-16 | End: 2022-04-20 | Stop reason: HOSPADM

## 2022-04-16 RX ORDER — CARBOXYMETHYLCELLULOSE SODIUM 10 MG/ML
2 GEL OPHTHALMIC 3 TIMES DAILY
Status: DISCONTINUED | OUTPATIENT
Start: 2022-04-16 | End: 2022-04-20 | Stop reason: HOSPADM

## 2022-04-16 RX ORDER — HYDRALAZINE HYDROCHLORIDE 25 MG/1
25 TABLET, FILM COATED ORAL 3 TIMES DAILY
Status: DISCONTINUED | OUTPATIENT
Start: 2022-04-16 | End: 2022-04-16

## 2022-04-16 RX ORDER — FINASTERIDE 5 MG/1
5 TABLET, FILM COATED ORAL DAILY
Status: DISCONTINUED | OUTPATIENT
Start: 2022-04-16 | End: 2022-04-20 | Stop reason: HOSPADM

## 2022-04-16 RX ORDER — CARVEDILOL 3.12 MG/1
3.12 TABLET ORAL 2 TIMES DAILY
Status: DISCONTINUED | OUTPATIENT
Start: 2022-04-16 | End: 2022-04-17

## 2022-04-16 RX ORDER — FINASTERIDE 5 MG/1
5 TABLET, FILM COATED ORAL DAILY
Status: DISCONTINUED | OUTPATIENT
Start: 2022-04-16 | End: 2022-04-16

## 2022-04-16 RX ORDER — VENLAFAXINE 75 MG/1
75 TABLET ORAL
Status: DISCONTINUED | OUTPATIENT
Start: 2022-04-16 | End: 2022-04-20 | Stop reason: HOSPADM

## 2022-04-16 RX ORDER — BISACODYL 10 MG
10 SUPPOSITORY, RECTAL RECTAL EVERY EVENING
Status: DISCONTINUED | OUTPATIENT
Start: 2022-04-16 | End: 2022-04-20 | Stop reason: HOSPADM

## 2022-04-16 RX ORDER — SENNA PLUS 8.6 MG/1
2 TABLET ORAL NIGHTLY PRN
Status: DISCONTINUED | OUTPATIENT
Start: 2022-04-16 | End: 2022-04-20 | Stop reason: HOSPADM

## 2022-04-16 RX ORDER — NITROGLYCERIN 0.4 MG/1
0.4 TABLET SUBLINGUAL EVERY 5 MIN PRN
Status: DISCONTINUED | OUTPATIENT
Start: 2022-04-16 | End: 2022-04-20 | Stop reason: HOSPADM

## 2022-04-16 RX ORDER — PANTOPRAZOLE SODIUM 40 MG/1
40 TABLET, DELAYED RELEASE ORAL
Status: DISCONTINUED | OUTPATIENT
Start: 2022-04-16 | End: 2022-04-20 | Stop reason: HOSPADM

## 2022-04-16 RX ORDER — ACETAMINOPHEN 325 MG/1
650 TABLET ORAL EVERY 4 HOURS PRN
Status: DISCONTINUED | OUTPATIENT
Start: 2022-04-16 | End: 2022-04-20 | Stop reason: HOSPADM

## 2022-04-16 RX ORDER — ONDANSETRON 2 MG/ML
4 INJECTION INTRAMUSCULAR; INTRAVENOUS EVERY 6 HOURS PRN
Status: DISCONTINUED | OUTPATIENT
Start: 2022-04-16 | End: 2022-04-16

## 2022-04-16 RX ORDER — ATORVASTATIN CALCIUM 40 MG/1
40 TABLET, FILM COATED ORAL NIGHTLY
Status: DISCONTINUED | OUTPATIENT
Start: 2022-04-16 | End: 2022-04-20 | Stop reason: HOSPADM

## 2022-04-16 RX ORDER — DIVALPROEX SODIUM 500 MG/1
500 TABLET, EXTENDED RELEASE ORAL NIGHTLY
Status: DISCONTINUED | OUTPATIENT
Start: 2022-04-16 | End: 2022-04-20 | Stop reason: HOSPADM

## 2022-04-16 RX ORDER — LIDOCAINE 4 G/G
2 PATCH TOPICAL DAILY
Status: DISCONTINUED | OUTPATIENT
Start: 2022-04-16 | End: 2022-04-20 | Stop reason: HOSPADM

## 2022-04-16 RX ORDER — POLYETHYLENE GLYCOL 3350 17 G/17G
17 POWDER, FOR SOLUTION ORAL DAILY
Status: DISCONTINUED | OUTPATIENT
Start: 2022-04-16 | End: 2022-04-20 | Stop reason: HOSPADM

## 2022-04-16 RX ORDER — HYDRALAZINE HYDROCHLORIDE 25 MG/1
25 TABLET, FILM COATED ORAL 3 TIMES DAILY
Status: DISCONTINUED | OUTPATIENT
Start: 2022-04-16 | End: 2022-04-20 | Stop reason: HOSPADM

## 2022-04-16 RX ORDER — PANTOPRAZOLE SODIUM 40 MG/1
40 TABLET, DELAYED RELEASE ORAL
Status: DISCONTINUED | OUTPATIENT
Start: 2022-04-17 | End: 2022-04-16

## 2022-04-16 RX ADMIN — ONDANSETRON 4 MG: 4 TABLET, ORALLY DISINTEGRATING ORAL at 18:11

## 2022-04-16 RX ADMIN — HYDRALAZINE HYDROCHLORIDE 25 MG: 25 TABLET, FILM COATED ORAL at 07:50

## 2022-04-16 RX ADMIN — VENLAFAXINE 75 MG: 75 TABLET ORAL at 17:01

## 2022-04-16 RX ADMIN — ONDANSETRON 4 MG: 4 TABLET, ORALLY DISINTEGRATING ORAL at 10:16

## 2022-04-16 RX ADMIN — PANTOPRAZOLE SODIUM 40 MG: 40 TABLET, DELAYED RELEASE ORAL at 05:57

## 2022-04-16 RX ADMIN — CARBOXYMETHYLCELLULOSE SODIUM 2 DROP: 10 GEL OPHTHALMIC at 11:28

## 2022-04-16 RX ADMIN — CARVEDILOL 3.12 MG: 3.12 TABLET, FILM COATED ORAL at 20:29

## 2022-04-16 RX ADMIN — DIVALPROEX SODIUM 500 MG: 500 TABLET, EXTENDED RELEASE ORAL at 20:29

## 2022-04-16 RX ADMIN — ACETAMINOPHEN 650 MG: 325 TABLET ORAL at 14:26

## 2022-04-16 RX ADMIN — CARVEDILOL 3.12 MG: 3.12 TABLET, FILM COATED ORAL at 10:15

## 2022-04-16 RX ADMIN — BISACODYL 10 MG: 10 SUPPOSITORY RECTAL at 17:01

## 2022-04-16 RX ADMIN — ENOXAPARIN SODIUM 40 MG: 100 INJECTION SUBCUTANEOUS at 10:18

## 2022-04-16 RX ADMIN — ATORVASTATIN CALCIUM 40 MG: 40 TABLET, FILM COATED ORAL at 20:30

## 2022-04-16 RX ADMIN — CARBOXYMETHYLCELLULOSE SODIUM 2 DROP: 10 GEL OPHTHALMIC at 20:30

## 2022-04-16 RX ADMIN — VENLAFAXINE 75 MG: 75 TABLET ORAL at 11:28

## 2022-04-16 RX ADMIN — FINASTERIDE 5 MG: 5 TABLET, FILM COATED ORAL at 07:50

## 2022-04-16 RX ADMIN — POLYETHYLENE GLYCOL 3350 17 G: 17 POWDER, FOR SOLUTION ORAL at 10:15

## 2022-04-16 RX ADMIN — CARBOXYMETHYLCELLULOSE SODIUM 2 DROP: 10 GEL OPHTHALMIC at 14:26

## 2022-04-16 RX ADMIN — HYDRALAZINE HYDROCHLORIDE 25 MG: 25 TABLET, FILM COATED ORAL at 20:29

## 2022-04-16 ASSESSMENT — PAIN DESCRIPTION - PAIN TYPE
TYPE: ACUTE PAIN
TYPE: ACUTE PAIN

## 2022-04-16 ASSESSMENT — PAIN DESCRIPTION - ORIENTATION: ORIENTATION: RIGHT;LEFT

## 2022-04-16 ASSESSMENT — PAIN DESCRIPTION - LOCATION
LOCATION: ARM
LOCATION: ARM

## 2022-04-16 ASSESSMENT — PAIN SCALES - GENERAL
PAINLEVEL_OUTOF10: 6
PAINLEVEL_OUTOF10: 0
PAINLEVEL_OUTOF10: 0
PAINLEVEL_OUTOF10: 7
PAINLEVEL_OUTOF10: 2
PAINLEVEL_OUTOF10: 0

## 2022-04-16 ASSESSMENT — PAIN SCALES - WONG BAKER: WONGBAKER_NUMERICALRESPONSE: 0

## 2022-04-16 NOTE — ACP (ADVANCE CARE PLANNING)
Advance Care Planning     Advance Care Planning Activator (Inpatient)  Conversation Note      Date of ACP Conversation: 4/16/2022     Conversation Conducted with: Patient with Decision Making Capacity    ACP Activator: Diego Mckee RN    Health Care Decision Maker:     Current Designated Health Care Decision Maker:     Primary Decision Maker: Steph Romero - Unknown - [phone]    Care Preferences    Ventilation: \"If you were in your present state of health and suddenly became very ill and were unable to breathe on your own, what would your preference be about the use of a ventilator (breathing machine) if it were available to you? \"      Would the patient desire the use of ventilator (breathing machine)?: no, DNR-CCA    \"If your health worsens and it becomes clear that your chance of recovery is unlikely, what would your preference be about the use of a ventilator (breathing machine) if it were available to you? \"     Would the patient desire the use of ventilator (breathing machine)?: No      Resuscitation  \"CPR works best to restart the heart when there is a sudden event, like a heart attack, in someone who is otherwise healthy. Unfortunately, CPR does not typically restart the heart for people who have serious health conditions or who are very sick. \"    \"In the event your heart stopped as a result of an underlying serious health condition, would you want attempts to be made to restart your heart (answer \"yes\" for attempt to resuscitate) or would you prefer a natural death (answer \"no\" for do not attempt to resuscitate)? \" no, DNR-CCA       [] Yes   [] No   Educated Patient / Decision Maker regarding differences between Advance Directives and portable DNR orders.     Length of ACP Conversation in minutes:      Conversation Outcomes:  [] ACP discussion completed  [] Existing advance directive reviewed with patient; no changes to patient's previously recorded wishes  [] New Advance Directive completed  [] Portable Do Not Rescitate prepared for Provider review and signature  [] POLST/POST/MOLST/MOST prepared for Provider review and signature      Follow-up plan:    [] Schedule follow-up conversation to continue planning  [] Referred individual to Provider for additional questions/concerns   [] Advised patient/agent/surrogate to review completed ACP document and update if needed with changes in condition, patient preferences or care setting    [] This note routed to one or more involved healthcare providers

## 2022-04-16 NOTE — PROGRESS NOTES
Per Dr. Ani Kenyon if the patient is ready to return to [facility] tomorrow he can come back under continuation of stay since it will still be less than 72 hours.

## 2022-04-16 NOTE — PROGRESS NOTES
[facility]  [address]  [place] [address]  Phone: [phone]  Primary Provider: Curt Schneider        PREOPERATIVE EVALUATION:  Today's date: 7/6/2021    Felipe Campbell is a 69 year old male who presents for a preoperative evaluation.    Surgical Information:  Surgery/Procedure: Removal of Prostate tissue   Surgery Location: [facility]    Surgeon: Dr. Steiner  Surgery Date: 07/12/2021  Time of Surgery: 8:00am  Where patient plans to recover: At home with family  Fax number for surgical facility:     Type of Anesthesia Anticipated: General    Assessment & Plan     The proposed surgical procedure is considered INTERMEDIATE risk.    Preop general physical exam  Today we reviewed this patients upcoming procedure , a Green laser transurethral resection of the prostate and completed the preoperative history and physical examination . He's a fully this is a suitable operative candidate and approved to proceed as planned. See patient after-visit summary for specific preoperative instructions with medications  - EKG 12-lead complete w/read - Clinics  - Basic metabolic panel  - Hemoglobin    Type 2 diabetes mellitus with diabetic polyneuropathy, with long-term current use of insulin (H)  We are taking this opportunity to check the hemoglobin a1c  [ diabetes test ].   - Hemoglobin A1c  - Basic metabolic panel    Abnormal CT scan  During this appointment I was made aware of the CT Urogram wo & w Contrast that was ordered by Dr. Steiner actually about 4 months ago. There were 2 abnormalities noted with that study and patient had been advised to follow up with me on these points. There's a suspicious nodule in the chest and also a suspicious nodule in the liver. It was recommended for patient to have both a dedicated chest CT scan as well as a liver MRI study and these haven't been obtained yet. I spoke with patient about these findings and he agreed with my suggestion that I would  Date:   4/16/2022  Patient name: Orion Melgar. Date of admission:  4/15/2022 11:01 PM  MRN:   [id number]  YOB: 1950  PCP: Lennox Hench    Reason for Admission: Chest pain [R07.9]      Cardiology follow up: Abnormal troponin, CP          Referring physician: Dr Samantha Jacobs     Impression     Abnormal troponin 67, high-sensitivity troponin was elevated at 53 and 53 on 3/31/2022  Chest pain  Hypertension  Hyperlipidemia  Cervical spinal cord compression, myelopathy, radiculopathy, status post surgery fusion C4-C7  Encephalopathy/CT brain 3/26/2022 no acute process  Hospital-acquired pneumonia  Hyponatremia     2D echo 3/31/2022  Normal LV size, mild to moderate LVH normal wall motion  Ejection fraction more than 55%, RVSP 30  Mildly dilated LA   Normal IVC size and respiratory variation  No significant valvular abnormality     History of present illness     80-year-old male who got admitted at acute rehab unit on 3/31/2020 with the problem of frequent falls. He has past medical history of cervical stenosis and C-spine surgery few years ago. He was doing well up to 6 weeks ago and then he started deteriorating neurologically and has had frequent falls. CT brain on 3/26/2022 did not show any acute process. 2D echo on 3/31/2022 showed normal LV size normal LV function ejection fraction more than 55%, normal wall motion, mild to moderate LVH.     Patient had chest pain earlier this morning left-sided 6 x 10 like a pressure sensation, patient received nitroglycerin x2 no relief. Patient has constant chest pain since then more than 7 hours. At present pain is 4 x 10. No palpitation no diaphoresis no nausea no vomiting. He does have a history of acid reflux. No nausea no vomiting no change in pain by deep breathing or cough.   Electrocardiogram showed normal sinus rhythm, no ischemic changes, moderate criteria for LVH  High-sensitivity troponin 67  Previous high-sensitivity troponin on 3-31-22 was 53 and 48    Lab work 4/15/2022  Sodium 129, potassium 4.1, BUN 26, creatinine 0.70, glucose 154  High-sensitivity troponin 67  Serum cortisol 22.9  WBC 7.9, hemoglobin 12.4, platelets 035     Current evaluation  Patient seen and examined   He had chest pain last night and he got transferred to stepdown  Patient said chest pain subsided overnight no pain  No significant change in his troponin level  No ischemic ECG changes  At present he is resting with 1 pillow  No obvious sign of cardiac decompensation    Blood pressure 150/110, heart rate 82, temperature 36.5, oxygen saturation 95%    Today's lab work high-sensitivity troponin 70, 73  Hemoglobin 12.4, platelets 181  Sodium 129, BUN 26, potassium 4.1, creatinine 0.70, glucose 150    Today's ECG showed sinus rhythm heart rate 85, moderate voltage criteria for LVH      Medications:   Scheduled Meds:   pantoprazole  40 mg Oral QAM AC    atorvastatin  40 mg Oral Nightly    bisacodyl  10 mg Rectal QPM    carboxymethylcellulose  2 drop Both Eyes TID    carvedilol  3.125 mg Oral BID    divalproex  500 mg Oral Nightly    enoxaparin  40 mg SubCUTAneous Daily    finasteride  5 mg Oral Daily    hydrALAZINE  25 mg Oral TID    lidocaine  2 patch TransDERmal Daily    polyethylene glycol  17 g Oral Daily    venlafaxine  75 mg Oral TID WC     Continuous Infusions:  CBC:   Recent Labs     04/15/22  0820   WBC 7.9   HGB 12.4*        BMP:    Recent Labs     04/15/22  0820   *   K 4.1   CL 90*   CO2 27   BUN 26*   CREATININE 0.70   GLUCOSE 154*     Hepatic: No results for input(s): AST, ALT, ALB, BILITOT, ALKPHOS in the last 72 hours. Troponin: No results for input(s): TROPONINI in the last 72 hours. BNP: No results for input(s): BNP in the last 72 hours. Lipids: No results for input(s): CHOL, HDL in the last 72 hours. Invalid input(s): LDLCALCU  INR: No results for input(s): INR in the last 72 hours.     Objective:   Vitals: BP (!) 150/111   Pulse 82 first speak with the radiologist  Dr. Meraz who made the official overread of the film and then we will follow up on his recommendations. We will definitely want the chest CT scan first and then probably the liver MRI study as well.           Risks and Recommendations:  The patient has the following additional risks and recommendations for perioperative complications:  Diabetes:  - Patient is on insulin therapy; diabetic NPO guidelines provided and discussed.    Medication Instructions:  see AVS    RECOMMENDATION:  APPROVAL GIVEN to proceed with proposed procedure, without further diagnostic evaluation.    Review of the result(s) of each unique test - review of CT urogram from 3/2021      29 minutes spent on the date of the encounter doing chart review, history and exam, documentation and further activities per the note      Subjective     HPI related to upcoming procedure: progressive prostate symptoms for about 7 months     Preop Questions 7/4/2021   1. Have you ever had a heart attack or stroke? No   2. Have you ever had surgery on your heart or blood vessels, such as a stent placement, a coronary artery bypass, or surgery on an artery in your head, neck, heart, or legs? No   3. Do you have chest pain with activity? No   4. Do you have a history of  heart failure? No   5. Do you currently have a cold, bronchitis or symptoms of other infection? No   6. Do you have a cough, shortness of breath, or wheezing? No   7. Do you or anyone in your family have previous history of blood clots? No   8. Do you or does anyone in your family have a serious bleeding problem such as prolonged bleeding following surgeries or cuts? No   9. Have you ever had problems with anemia or been told to take iron pills? No   10. Have you had any abnormal blood loss such as black, tarry or bloody stools? No   11. Have you ever had a blood transfusion? No   12. Are you willing to have a blood transfusion if it is medically needed before, during,  Temp 97.7 °F (36.5 °C) (Oral)   Resp 16   SpO2 94%   General appearance: alert and cooperative with exam  HEENT: Head: Normal, normocephalic, atraumatic. Neck: no JVD and supple, symmetrical, trachea midline  Lungs: diminished breath sounds bibasilar  Heart: Cardiac apical impulse not palpable heart sounds normal  Abdomen: soft, non-tender; bowel sounds normal; no masses,  no organomegaly  Extremities: Homans sign is negative, no sign of DVT normal peripheral  Neurologic: Mental status: Alert, oriented, thought content appropriate    EKG: normal sinus rhythm. Voltage criteria for LVH  ECHO: reviewed.    Ejection fraction: 55%, mild to moderate LVH      Assessment / Acute Cardiac Problems:     Chest pain like a pressure going for more than 7 hours  No relief with the nitroglycerin x2  No ischemic ECG changes  Abnormal high-sensitivity troponin 67, 70, 73  Elevated high-sensitivity troponin on 3/31/2000 2253 and 53  No sign of cardiopulmonary decompensation      Patient Active Problem List:     Hypertension     Hyperlipidemia     Diabetes mellitus ([address]     Contusion of right shoulder     Bipolar disorder, mixed ([address]     First known suicide attempt [facility])     Erectile dysfunction     Cervical stenosis of spinal canal     Incomplete spinal cord lesion at C5-C7 level ([address]     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)     Cervical spondylosis with radiculopathy     Acute blood loss anemia     Precordial pain     Depression with suicidal ideation     Severe recurrent major depression without psychotic features ([address]     Frequent falls     Hyponatremia     Cervical spinal cord compression (HCC)     Cord compression (HCC)     Quadriplegia, C1-C4 incomplete (HCC)     Encephalopathy     Hospital-acquired pneumonia     Atelectasis     Cervical stenosis of spine     Moderate malnutrition ([address]      Plan of Treatment:   Medications reviewed  Increase carvedilol to 6.25 mg twice daily  Continue ECG monitoring  If or after your surgery? Yes   13. Have you or any of your relatives ever had problems with anesthesia? No   14. Do you have sleep apnea, excessive snoring or daytime drowsiness? No   15. Do you have any artifical heart valves or other implanted medical devices like a pacemaker, defibrillator, or continuous glucose monitor? No   16. Do you have artificial joints? No   17. Are you allergic to latex? No       Health Care Directive:  Patient does not have a Health Care Directive or Living Will: Advance Directive received and scanned. Click on Code in the patient header to view.    Preoperative Review of :   reviewed - no record of controlled substances prescribed.      Status of Chronic Conditions:  See problem list for active medical problems.  Problems all longstanding and stable, except as noted/documented.  See ROS for pertinent symptoms related to these conditions.    DIABETES - Patient has a longstanding history of DiabetesType Type II . Patient is being treated with insulin injections and denies significant side effects. Control has been good. Complicating factors include but are not limited to: neuropathy.       Review of Systems  CONSTITUTIONAL: NEGATIVE for fever, chills, change in weight  ENT/MOUTH: NEGATIVE for ear, mouth and throat problems  RESP: NEGATIVE for significant cough or SOB  CV: NEGATIVE for chest pain, palpitations or peripheral edema    Patient Active Problem List    Diagnosis Date Noted     Combined forms of age-related cataract, mild-mod, of both eyes 10/28/2019     Priority: Medium     Posterior vitreous detachment of both eyes 10/24/2018     Priority: Medium     CKD (chronic kidney disease) stage 2, GFR 60-89 ml/min 09/11/2012     Priority: Medium     Based on 2 elevated urine microalbumin levels albeit trivial , and a gfr [ glomerular filtration rate ] in the 80's , see laboratory section of Epic electronic medical records  , laboratory studies from 2012       Type 2 diabetes mellitus  patient gets chest pain again he may need a cardiac cath    Electronically signed by Tia Flor MD on 4/16/2022 at 10:33 AM with diabetic polyneuropathy, with long-term current use of insulin (H) 12/15/2011     Priority: Medium     Advanced directives, counseling/discussion 06/23/2011     Priority: Medium     Advance Directive Problem List Overview:   Name Relationship Phone    Primary Health Care Agent            Alternative Health Care Agent          Discussed advance care planning with patient; information given to patient to review. 6/23/2011          Hayfever      Priority: Medium     summer and fall       Microalbuminuria 06/02/2011     Priority: Medium     HYPERLIPIDEMIA LDL GOAL <100 10/31/2010     Priority: Medium     Erectile dysfunction of organic origin      Priority: Medium     dm related        Past Medical History:   Diagnosis Date     BPH      Diabetes (H) 2008     Erectile dysfunction of organic origin     dm related     Hayfever     summer and fall     Hyperlipidemia LDL goal <100 10/31/2010     Microalbuminuria 6/2/2011     Nonsenile cataract      Past Surgical History:   Procedure Laterality Date     C APPENDECTOMY  07/20/69     COLONOSCOPY WITH CO2 INSUFFLATION N/A 11/8/2016    Procedure: COLONOSCOPY WITH CO2 INSUFFLATION;  Surgeon: Torey Almazan MD;  Location: [facility] OR     HERNIA REPAIR, INGUINAL RT/LT  age 3    left     TUNA       Current Outpatient Medications   Medication Sig Dispense Refill     acetaminophen (TYLENOL) 500 MG tablet Take 500-1,000 mg by mouth every 6 hours as needed for mild pain       aspirin 81 MG tablet Take 1 tablet by mouth daily.  3     blood glucose (ONETOUCH VERIO IQ) test strip USE TO TEST FOUR TIMES DAILY OR AS DIRECTED+++NEEDS APPOINTMENT FOR FURTHER REFILLS+++ 400 strip 0     cetirizine (ZYRTEC) 10 MG tablet Take 10 mg by mouth daily as needed for allergies       insulin aspart (NOVOLOG FLEXPEN) 100 UNIT/ML pen INJECT 4 UNITS UNDER THE SKIN THREE TIMES DAILY BEFORE MEAL PLUS CORRECTION FACTOR OF 1/80/80. MAX 25 UNITS PER DAY. 30 mL 5     insulin glargine (LANTUS SOLOSTAR) 100  UNIT/ML pen Inject 6 units under the skin daily or as directed, prime with 2 units each time (8 units/day). Please fill for 90 days. 15 mL 5     insulin pen needle (B-D U/F) 31G X 5 MM miscellaneous Use 4 pen needles daily or as directed. 400 each 0     lisinopril (ZESTRIL) 2.5 MG tablet TAKE 2 TABLETS BY MOUTH DAILY 180 tablet 2     melatonin 1 MG TABS Take 2 mg by mouth nightly as needed for sleep       Menthol, Topical Analgesic, (BIOFREEZE EX) Apply topically as needed to shoulders/knees.       metFORMIN (GLUCOPHAGE) 500 MG tablet TAKE 2 TABLETS BY MOUTH TWICE DAILY WITH MEALS 360 tablet 1     pravastatin (PRAVACHOL) 80 MG tablet TAKE 1 TABLET BY MOUTH EVERY DAY 90 tablet 0     sildenafil (REVATIO) 20 MG tablet Take between 2-4 tablets at a time for planned sexual activity, max 5 pills per day. Patient will be paying out of pocket for this medication 30 tablet 5     tamsulosin (FLOMAX) 0.4 MG capsule Take 1 capsule (0.4 mg) by mouth At Bedtime 90 capsule 3       Allergies   Allergen Reactions     Lipitor [Atorvastatin Calcium]      Rash from lipitor     Hay Fever & [A.R.M.]         Social History     Tobacco Use     Smoking status: Former Smoker     Packs/day: 0.50     Years: 7.00     Pack years: 3.50     Types: Cigarettes     Start date: 1972     Quit date: 1979     Years since quittin.5     Smokeless tobacco: Never Used   Substance Use Topics     Alcohol use: Yes     Comment: occasionally     Family History   Problem Relation Age of Onset     Breast Cancer Mother      Diabetes Father      Diabetes Maternal Grandmother      Congenital Anomalies Sister      Psychotic Disorder Daughter      Glaucoma No family hx of      Macular Degeneration No family hx of      History   Drug Use No         Objective     /72   Pulse 64   Temp 98.4  F (36.9  C) (Oral)   Resp 16   Wt 93.6 kg (206 lb 6.4 oz)   SpO2 99%   BMI 25.46 kg/m      Physical Exam  GENERAL APPEARANCE: healthy, alert and no  distress  HENT: ear canals and TM's normal and nose and mouth without ulcers or lesions  NECK: no adenopathy, no asymmetry, masses, or scars and thyroid normal to palpation  RESP: lungs clear to auscultation - no rales, rhonchi or wheezes  CV: regular rate and rhythm, normal S1 S2, no S3 or S4 and no murmur, click or rub   ABDOMEN: soft, nontender, no HSM or masses and bowel sounds normal  NEURO: Normal strength and tone, sensory exam grossly normal, mentation intact and speech normal    Recent Labs   Lab Test 02/15/21  0732 07/29/20  0745 01/13/20  0802     --  140   POTASSIUM 4.2  --  4.4   CR 1.13 1.08 1.01   A1C 7.1* 6.3* 6.7*        Diagnostics:  Orders Placed This Encounter   Procedures     Hemoglobin A1c     Basic metabolic panel     Hemoglobin     EKG 12-lead complete w/read - Clinics          Revised Cardiac Risk Index (RCRI):  The patient has the following serious cardiovascular risks for perioperative complications:   - No serious cardiac risks = 0 points     RCRI Interpretation: 0 points: Class I (very low risk - 0.4% complication rate)           Signed Electronically by: Curt Schneider MD  Copy of this evaluation report is provided to requesting physician.

## 2022-04-16 NOTE — CONSULTS
Physical Medicine & Rehabilitation  Consult Note      Admitting Physician:   Henry Sauer MD    Primary Care Provider:   Willis Dorsey     Reason for Consult:  Acute Inpatient Rehabilitation    Chief Complaint: Chest Pain    History of Present Illness:  Referring Provider is requesting an evaluation for appropriate placement upon discharge from acute care. History from chart review and patient. Mary Ann Rosales is a 70 y.o. RHD male admitted to [facility] on 4/15/2022. Patient with history of worsening limb weakness and falls. He was treated for cervical stenosis with laminectomy and fusion C2-5 performed by Dr. Rosalba Marquez on 3/24/22 with post-op complication of urine retention. He was admitted to acute rehabilitation at [facility] 3/31/2022 then discharged 4/15 late in the evening for persistent chest pain with no relief after 2 doses nitroglycerin. Cardiology notes elevated but stable troponin with no acute EKG changes. Chest pain subsided overnight. Carvedilol dose has been increased. Continuing ECG monitoring. Recommending cardiac cath if chest pain returns. Patient reports chest pain resolved overnight and has not returned. He has some mild stable anxiety.      Review of Systems:  Constitutional: negative for anorexia, chills, fatigue, fevers, sweats and weight loss  Eyes: negative for redness and visual disturbance  Ears, nose, mouth, throat, and face: negative for earaches, sore throat and tinnitus  Respiratory: negative for cough and shortness of breath  Cardiovascular: negative for chest pain, dyspnea and palpitations  Gastrointestinal: negative for abdominal pain, change in bowel habits, constipation, nausea and vomiting  Genitourinary:negative for dysuria, frequency, hesitancy and urinary incontinence  Integument/breast: negative for pruritus and rash  Musculoskeletal: positive for stiff joints  Neurological: negative for dizziness, headaches   Behavioral/Psych: negative for decreased appetite, depression        Premorbid function:  Modified Independent    Current function:    PT:                                   OT:                                               SLP:      Past Medical History:        Diagnosis Date    Depression 2017    ON RX    Diabetes mellitus ([address] 2013    NIDDM    Difficult intravenous access     Hyperlipidemia 2008    ON RX    Hypertension 2008    ON RX    Migraines     PTSD (post-traumatic stress disorder) 01/2018    ON RX    Sleep apnea 01/2018    UNALBE TO USE TO CLEARED BY ENT (CPAP)    Teeth missing     BACK TEETH UPPER AND LOWER TO BE FITTED FOR PARTIALS AT LATER DATE    Wears glasses        Past Surgical History:        Procedure Laterality Date    CARDIAC CATHETERIZATION  02/2010    no stents     CARPAL TUNNEL RELEASE Left 01/09/2002    CATARACT REMOVAL      CERVICAL FUSION  04/19/2018    anterior cervical fusion C5-6    CERVICAL FUSION N/A 3/24/2022    C2-5 FUSION, C2-4 LAMINECTOMY performed by Zaida Morales DO at [address] Left 2018    CATARACT EXTRACTION WITH IOL   [facility]    LAMINECTOMY  03/24/2022    C2-5 FUSION, C2-4 LAMINECTOMY    MIDDLE EAR SURGERY  01/11/2018    MIDDLE EAR REBUILT AND EAR DRUM REPLACED    MOUTH SURGERY  04/16/2018    5 TEETH REMOVED    OTHER SURGICAL HISTORY  04/19/2018    : ANTERIOR CERVICAL CORRPECTOMY C5-6, SYNTHES, DEPUY, REG TABLE, SUPINE,     PA OFFICE/OUTPT VISIT,PROCEDURE ONLY N/A 4/19/2018    ANTERIOR CERVICAL CORRPECTOMY AND FUSION C5-6 performed by Zaida Morales DO at [address] [place] [address]  12/1983       Allergies:     Allergies   Allergen Reactions    Latex Itching    Bee Venom Swelling    Lisinopril Other (See Comments)     Cough      Niacin And Related Rash    Sulfa Antibiotics Rash    Terazosin Rash        Current Medications:   Current Facility-Administered Medications: acetaminophen (TYLENOL) tablet 650 mg, 650 mg, Oral, Q4H PRN  pantoprazole (PROTONIX) tablet 40 mg, 40 mg, Oral, QAM AC  atorvastatin (LIPITOR) tablet 40 mg, 40 mg, Oral, Nightly  bisacodyl (DULCOLAX) suppository 10 mg, 10 mg, Rectal, QPM  carboxymethylcellulose (THERATEARS) 1 % ophthalmic gel 2 drop, 2 drop, Both Eyes, TID  carvedilol (COREG) tablet 3.125 mg, 3.125 mg, Oral, BID  divalproex (DEPAKOTE ER) extended release tablet 500 mg, 500 mg, Oral, Nightly  enoxaparin (LOVENOX) injection 40 mg, 40 mg, SubCUTAneous, Daily  finasteride (PROSCAR) tablet 5 mg, 5 mg, Oral, Daily  hydrALAZINE (APRESOLINE) tablet 25 mg, 25 mg, Oral, TID  lidocaine 4 % external patch 2 patch, 2 patch, TransDERmal, Daily  polyethylene glycol (GLYCOLAX) packet 17 g, 17 g, Oral, Daily  venlafaxine (EFFEXOR) tablet 75 mg, 75 mg, Oral, TID WC  aluminum & magnesium hydroxide-simethicone (MAALOX) 200-200-20 MG/5ML suspension 30 mL, 30 mL, Oral, Q6H PRN  glucagon (rDNA) injection 1 mg, 1 mg, IntraMUSCular, PRN  glucose (GLUTOSE) 40 % oral gel 15 g, 15 g, Oral, PRN  nitroGLYCERIN (NITROSTAT) SL tablet 0.4 mg, 0.4 mg, SubLINGual, Q5 Min PRN  ondansetron (ZOFRAN-ODT) disintegrating tablet 4 mg, 4 mg, Oral, Q8H PRN **OR** ondansetron (ZOFRAN) injection 4 mg, 4 mg, IntraVENous, Q6H PRN  senna (SENOKOT) tablet 17.2 mg, 2 tablet, Oral, Nightly PRN    Social History:  Lives With: Significant other  Type of Home: House  Home Layout: One level  Home Access: Stairs to enter without rails  Entrance Stairs - Number of Steps: one step at entrance  Bathroom Shower/Tub: Tub/Shower unit,Shower chair with back,Curtain  Bathroom Toilet: Handicap height  Bathroom Equipment: Hand-held shower  Bathroom Accessibility: Walker accessible  Home Equipment: 4 wheeled walker,Reacher,Cane  Receives Help From: Family  ADL Assistance: Independent (Prior to Jan 2022 was Ind with ADLs / IADLs.  Progressive weakness / function since, most recently (per Pt report) required Max to TD for all tasks from wife.)  Homemaking Assistance: Independent (Prior to 2022 was Ind with ADLs / IADLs.  Progressive weakness / function since, most recently (per Pt report) required Max to TD for all tasks from wife.)  Homemaking Responsibilities: Yes (Prior to 2022)  Ambulation Assistance: Independent (No DME (prior to 2022))  Transfer Assistance: Independent  Active : Yes  Patient's  Info: Family is currently assisting with transportation - Was driving until end of 2022  Mode of Transportation: Car  Occupation: Retired  Leisure & Hobbies: Maintenance  Additional Comments: Information obtained from prior PT evaluation and confirmed with pt.  Pt reports requiring assistance for IADLs, ADLs, ambulation, transfers and driving beginning in 2022. Social History     Socioeconomic History    Marital status:      Spouse name: Not on file    Number of children: Not on file    Years of education: Not on file    Highest education level: Not on file   Occupational History    Not on file   Tobacco Use    Smoking status: Former Smoker     Packs/day: 0.50     Years: 4.00     Pack years: 2.00     Types: Cigarettes     Quit date: 1972     Years since quittin.0    Smokeless tobacco: Never Used    Tobacco comment: quit    Vaping Use    Vaping Use: Never used   Substance and Sexual Activity    Alcohol use: Yes     Comment: MIXED DRINKS- 3 OR 4 A YEAR; last 2018    Drug use: Yes     Types: Marijuana Shirley Cotton)    Sexual activity: Yes     Partners: Female   Other Topics Concern    Not on file   Social History Narrative    Not on file     Social Determinants of Health     Financial Resource Strain:     Difficulty of Paying Living Expenses: Not on file   Food Insecurity:     Worried About [address] in the Last Year: Not on file    Judah of Food in the Last Year: Not on file   Transportation Needs:     Lack of Transportation (Medical): Not on file    Lack of Transportation (Non-Medical):  Not on file Physical Activity:     Days of Exercise per Week: Not on file    Minutes of Exercise per Session: Not on file   Stress:     Feeling of Stress : Not on file   Social Connections:     Frequency of Communication with Friends and Family: Not on file    Frequency of Social Gatherings with Friends and Family: Not on file    Attends Faith Services: Not on file    Active Member of Clubs or Organizations: Not on file    Attends Club or Organization Meetings: Not on file    Marital Status: Not on file   Intimate Partner Violence:     Fear of Current or Ex-Partner: Not on file    Emotionally Abused: Not on file    Physically Abused: Not on file    Sexually Abused: Not on file   Housing Stability:     Unable to Pay for Housing in the Last Year: Not on file    Number of Places Lived in the Last Year: Not on file    Unstable Housing in the Last Year: Not on file       Family History:       Problem Relation Age of Onset    Dementia Mother     Coronary Art Dis Mother     Stroke Mother     Diabetes Mother     Asthma Mother     Other Mother         BRAIN ANEURISM    High Blood Pressure Mother     Heart Disease Father     Diabetes Sister     Diabetes Brother     High Blood Pressure Brother     High Cholesterol Brother     Heart Disease Brother     Diabetes Sister     Other Sister         NEUROPATHY       Diagnostics:    CBC:   Recent Labs     04/15/22  0820   WBC 7.9   RBC 3.88*   HGB 12.4*   HCT 35.5*   MCV 91.4   RDW 13.0        BMP:    Recent Labs     04/15/22  0820   GLUCOSE 154*   BUN 26*   CREATININE 0.70   CALCIUM 9.3   *   K 4.1   CL 90*   CO2 27   ANIONGAP 12   LABGLOM >60   GFRAA >60   GFR          HbA1c:   Lab Results   Component Value Date    LABA1C 5.8 05/17/2021     BNP: No results for input(s): BNP in the last 72 hours. PT/INR: No results for input(s): PROTIME, INR in the last 72 hours. APTT: No results for input(s): APTT in the last 72 hours.   CARDIAC ENZYMES: Recent Labs     04/15/22  1112 04/15/22  1716 04/16/22  0948   TROPHS 67* 70* 73*      FASTING LIPID PANEL:  Lab Results   Component Value Date    CHOL 104 04/12/2018    HDL 42 04/12/2018    TRIG 92 04/12/2018     LIVER PROFILE: No results for input(s): AST, ALT, ALB, BILIDIR, BILITOT, ALKPHOS in the last 72 hours. Radiology:    XR CERVICAL SPINE (2-3 VIEWS)     Result Date: 3/25/2022  Immediate postoperative changes of laminectomy with posterior fusion at C2 through C4.      CT HEAD WO CONTRAST     Result Date: 3/26/2022  No acute findings in the head/brain.      CT HEAD WO CONTRAST     Result Date: 3/20/2022  No acute intracranial abnormality. RECOMMENDATIONS: Unavailable      CT CERVICAL SPINE WO CONTRAST     Result Date: 3/20/2022  Stable cervical spine CT examination status post corpectomy and anterior fusion from C4-C7. No acute fracture or traumatic malalignment. RECOMMENDATIONS: Unavailable      CT THORACIC SPINE WO CONTRAST     Result Date: 3/20/2022  Multilevel degenerative changes with no acute fracture or traumatic malalignment of the thoracolumbar spine. RECOMMENDATIONS: Unavailable      CT LUMBAR SPINE WO CONTRAST     Result Date: 3/20/2022  Multilevel degenerative changes with no acute fracture or traumatic malalignment of the thoracolumbar spine. RECOMMENDATIONS: Unavailable      MRI CERVICAL SPINE WO CONTRAST     Result Date: 3/21/2022  1. Patient motion limits evaluation. 2. There is severe spinal canal stenosis at C2-C3 and C3-C4 with complete effacement of the CSF at these levels. 3. No significant spinal canal stenosis at the remaining levels. 4. Multilevel neural foraminal narrowing as above.    5. There is question of minimal T2 hyperintensity within the cord at C4-C5, which likely represents myelomalacia.      MRI THORACIC SPINE WO CONTRAST     Result Date: 3/22/2022  Mild multilevel degenerative changes of the thoracic spine, as described above with mild spinal canal stenosis from T8-9 to T10-11, most pronounced at T10-11.      MRI LUMBAR SPINE WO CONTRAST     Result Date: 3/22/2022  1. Moderate spinal canal stenosis from L2-L3 to L4-L5, as described above. 2. Mild spinal canal stenosis at L1-L2, as described above. 3. Multilevel neural foraminal narrowing, most severe at L2-L3.      XR CHEST PORTABLE     Result Date: 3/29/2022  1. Left basilar airspace consolidation, representing either aspiration or pneumonia. 2. Mild right basilar atelectasis.      XR CHEST PORTABLE     Result Date: 3/29/2022  Persistent opacity left base, likely infiltrate with bibasilar atelectasis.      MRI BRAIN WO CONTRAST     Result Date: 3/28/2022  No acute intracranial abnormality.      MRI BRAIN WO CONTRAST     Result Date: 3/21/2022  1. No acute intracranial abnormality. No acute infarct. 2. Mild global parenchymal volume loss with minimal chronic microvascular ischemic changes. Physical Exam:  BP (!) 144/97   Pulse 78   Temp 97.9 °F (36.6 °C) (Oral)   Resp 16   Ht 5' 3\" (1.6 m)   Wt 153 lb 3.5 oz (69.5 kg)   SpO2 95%   BMI 27.14 kg/m²     GEN: Well developed, well nourished, no acute distress. HEENT: NCAT, PERRL, EOMI, mucous membranes pink and moist.  RESP: Lungs clear to auscultation. No rales or rhonchi. Respirations WNL and unlabored. CV: Regular rate rhythm. No murmurs, rubs, or gallops. ABD: soft, non-distended, non-tender. BS+ and equal.  NEURO: A&O x 3. Sensation intact to light touch. DTRs 2+  MSK: Functional ROM. Muscle tone and bulk are normal bilaterally. Strength 4/5 key muscles all extremities. EXT: No calf tenderness to palpation or edema BLEs. SKIN: Warm dry and intact with good turgor. PSYCH: Mood WNL. Affect WNL. Appropriately interactive. Impression:  1. Cervical stenosis with myelopathy s/p C2-C5 laminectomy and fusion on 3/24  2. Chest pain: cardiac workup ongoing  3. Tetraparesis  4.  Urinary retention/neurogenic bladder  5. HTN/HLD  6. Diabetes  7. GIA  8. Migraines  9. PTSD/Depression    Recommendations:    1. Diagnosis:  Cervical stenosis with myelopathy and urine retention  2. Therapy: Has PT/OT needs  3. Medical Necessity: As above  4. Support: supportive significant other  5. Rehab Recommendation: will monitor patient - if he is medically stable he can return to acute rehab as continuation of his admission within 72 hours of his discharge from rehab. 6. DVT Prophylaxis: on Lovenox    It was my pleasure to evaluate Oneda Oz. today. Please call with questions. Evie Sampson MD        This note is created with the assistance of a speech recognition program.  While intending to generate a document that actually reflects the content of the visit, the document can still have some errors including those of syntax and sound a like substitutions which may escape proof reading. In such instances, actual meaning can be extrapolated by contextual diversion.

## 2022-04-16 NOTE — PROGRESS NOTES
Called by RN that Pt having chest pain   Dr Keenan Andrade was consulted and EKG and troponin,   Troponins 67->67->70   EKG reviewed and no new changes   Discussed with Dr Keenan Andrade at length and he is willing to transfer the patient to [facility] inpatient .   Chest pain doesn't seems cardiac as per cardiology,   On lovenox 40 mg sc daily , prophylactic dose,   Doesn't need any full dose lovenox as per cardiology   Lab reviewed   Will transfer to PCU , as per cardiologist Dr Asad Rice reviewed   T 98.1  RR 20 ,   HR 98  142/93  97% on RA

## 2022-04-16 NOTE — PROGRESS NOTES
Dr Carolyne Alaniz called writer back and gave order to send patient to PCU. Nurse supervisor also notified.

## 2022-04-16 NOTE — PROGRESS NOTES
This RN called to update Dr. Colton Thorne about direct admission, review case and for placement of orders. Discussed difficulty in peripheral IV placement, patient is DNR-CCA. Physician approval for no peripheral IV placement. Physician order for [facility] code status. Physician orders for CBC and CMP labs in AM, cardiac diet, and to restart morning medications. No further orders received at this time.

## 2022-04-16 NOTE — PROGRESS NOTES
Patient received to room 2085. Patient in bed, bed in lowest position and locked, side rails up x2. Patient provided call light and instructed on its use. Telemetry placed on patient and is on.

## 2022-04-16 NOTE — H&P
History and Physical Service  [facility]    HISTORY AND PHYSICAL EXAMINATION            Date:   4/16/2022  Patient name:  Alli Medina. MRN:   [id number]  Account:  [de-identified]  YOB: 1950  PCP:    Carol Lara  Code Status:    DNR-CCA    Chief Complaint:     No chief complaint on file. Chest pain      History Obtained From:     Patient, EMR, nursing staff  His  HPI   tory Obtained From:  Past [address] History:bobby History: This patient is a 70 y.o. Non- / non  malewho presents with  Chest pain  Left side of chest nonradiating moderate intensity  Associated with anxiety, nausea  Describes himself as anxious  Chest pain gets worse when he is anxious, improves with taking deep breaths    Recent admission to [facility] for generalized weakness and frequent falls underwent MRI brain lumbar C-spine thoracic were performed and showed severe stenosis at C2-C4 level, underwent C2-C5 laminectomy and posterior fusion on 3/24/2022, also was treated for hospital-acquired left lower lobe pneumonia . subsequently admitted to ARU on 4/1. During his stay patient was complaining of orthostasis which improved with stopping diuretic and losartan        Review of Systems:     Complaining of generalized anxiety causing some shortness of breath  Denies any  cough   Denies chest pain or palpitations at the time of examination  Denies abdominal pain, diarrhea vomiting  Denies any new numbness tremors or weakness. A 10 point review of systems was performed and and negative except as mentioned in HPI  Positive and Negative as described in HPI.       Past Medical History:     Past Medical History:   Diagnosis Date    Depression 2017    ON RX    Diabetes mellitus ([facility] [address] 2013    NIDDM    Difficult intravenous access     Hyperlipidemia 2008    ON RX    Hypertension 2008    ON RX    Migraines     PTSD (post-traumatic stress disorder) 01/2018    ON RX    Sleep apnea 01/2018    UNALBE TO USE TO CLEARED BY ENT (CPAP)    Teeth missing     BACK TEETH UPPER AND LOWER TO BE FITTED FOR PARTIALS AT LATER DATE    Wears glasses         Past Surgical History:     Past Surgical History:   Procedure Laterality Date    CARDIAC CATHETERIZATION  02/2010    no stents     CARPAL TUNNEL RELEASE Left 01/09/2002    CATARACT REMOVAL      CERVICAL FUSION  04/19/2018    anterior cervical fusion C5-6    CERVICAL FUSION N/A 3/24/2022    C2-5 FUSION, C2-4 LAMINECTOMY performed by Issa Perez DO at [address] Left 2018    CATARACT EXTRACTION WITH IOL    HYDROCELE EXCISION  1951    LAMINECTOMY  03/24/2022    C2-5 FUSION, C2-4 LAMINECTOMY    MIDDLE EAR SURGERY  01/11/2018    MIDDLE EAR REBUILT AND EAR DRUM REPLACED    MOUTH SURGERY  04/16/2018    5 TEETH REMOVED    OTHER SURGICAL HISTORY  04/19/2018    : ANTERIOR CERVICAL CORRPECTOMY C5-6, SYNTHES, DEPUY, REG TABLE, SUPINE,     MA OFFICE/OUTPT VISIT,PROCEDURE ONLY N/A 4/19/2018    ANTERIOR CERVICAL CORRPECTOMY AND FUSION C5-6 performed by Issa Perez DO at [address] [place] [address]  12/1983        Medications Prior to Admission:     Prior to Admission medications    Medication Sig Start Date End Date Taking?  Authorizing Provider   finasteride (PROSCAR) 5 MG tablet Take 5 mg by mouth daily    Historical Provider, MD   carboxymethylcellulose 1 % ophthalmic solution Place 2 drops into both eyes 3 times daily Pt states \"2 drops in both eyes three times a day\"    Historical Provider, MD   divalproex (DEPAKOTE ER) 250 MG extended release tablet Take 750 mg by mouth at bedtime    Historical Provider, MD   furosemide (LASIX) 20 MG tablet Take 20 mg by mouth daily    Historical Provider, MD   venlafaxine (EFFEXOR XR) 150 MG extended release capsule Take 1 capsule by mouth daily  Patient taking differently: Take 225 mg by mouth daily  6/23/20   Santo Fofana MD   [facility]) 80 MG chewable tablet Take 1 tablet by mouth every 6 hours as needed for Flatulence  Patient taking differently: Take 80 mg by mouth every 8 hours as needed for Flatulence  6/22/20   Emily Cristobal MD   lidocaine (XYLOCAINE) 5 % ointment Apply topically daily as needed for Pain Apply topically as needed.   LF 4/20/20 (30 day supply)  Patient not taking: Reported on 3/23/2022    Historical Provider, MD   metFORMIN (GLUCOPHAGE) 1000 MG tablet Take 1,000 mg by mouth 2 times daily (with meals) LF 4/27/20 (90 day supply)  Patient not taking: Reported on 3/21/2022    Historical Provider, MD   losartan (COZAAR) 25 MG tablet Take 25 mg by mouth daily     Historical Provider, MD   aspirin 81 MG chewable tablet Take 81 mg by mouth daily  Patient not taking: Reported on 3/21/2022    Historical Provider, MD   divalproex (DEPAKOTE ER) 500 MG extended release tablet Take 500 mg by mouth every morning     Historical Provider, MD   traZODone (DESYREL) 100 MG tablet Take 150 mg by mouth nightly as needed LF 5/1/20 (30 day supply)    Historical Provider, MD   cetirizine (ZYRTEC) 10 MG tablet Take 10 mg by mouth daily LF 4/6/20 (90 day supply)  Patient not taking: Reported on 3/21/2022    Historical Provider, MD   atorvastatin (LIPITOR) 80 MG tablet Take 40 mg by mouth daily Take 1/2 tablet (40 mg) daily  LF 4/22/20 (90 day supply)  Patient not taking: Reported on 3/21/2022    Historical Provider, MD   amLODIPine (NORVASC) 10 MG tablet Take 7.5 mg by mouth daily     Historical Provider, MD   sildenafil (VIAGRA) 100 MG tablet Take 100 mg by mouth as needed for Erectile Dysfunction LF 5/12/20 (30 day supply)    Historical Provider, MD   omeprazole (PRILOSEC) 20 MG delayed release capsule Take 20 mg by mouth every evening LF 4/21/20 (90 day supply)    Historical Provider, MD        Allergies:     Latex, Bee venom, Lisinopril, Niacin and related, Sulfa antibiotics, and Terazosin    Social History:     Tobacco:    reports that he quit smoking about 50 years ago. His smoking use included cigarettes. He has a 2.00 pack-year smoking history. He has never used smokeless tobacco.  Alcohol:      reports current alcohol use. Drug Use:  reports current drug use. Drug: Marijuana Shirley Cotton). Family History:     Family History   Problem Relation Age of Onset   Shay Gomez Dementia Mother     Coronary Art Dis Mother     Stroke Mother     Diabetes Mother     Asthma Mother     Other Mother         BRAIN ANEURISM    High Blood Pressure Mother     Heart Disease Father     Diabetes Sister     Diabetes Brother     High Blood Pressure Brother     High Cholesterol Brother     Heart Disease Brother     Diabetes Sister     Other Sister         NEUROPATHY           Physical Exam:   BP (!) 150/111   Pulse 82   Temp 97.7 °F (36.5 °C) (Oral)   Resp 16   SpO2 94%   No results for input(s): POCGLU in the last 72 hours. General Appearance: Anxious appearing, breathing fast reports feeling anxious currently  Mental status: oriented to person, place, and time with normal affect  Head:  normocephalic, atraumatic. Eye: no icterus, redness, pupils equal and reactive, extraocular eye movements intact, conjunctiva clear  Ear: normal external ear, no discharge, hearing intact  Nose:  no drainage noted  Mouth: mucous membranes moist  Neck: supple, no carotid bruits, thyroid not palpable  Lungs: Bilateral equal air entry, clear to ausculation, no wheezing, rales or rhonchi, normal effort  Cardiovascular: normal rate, regular rhythm, no murmur, gallop, rub.   Abdomen: Soft, nontender, nondistended, normal bowel sounds, no hepatomegaly or splenomegaly  Neurologic: some Weakness of all 4 extremities noted, normal muscle tone reduced bulk, no abnormal sensation, normal speech, cranial nerves II through XII grossly intact  Skin: No gross lesions, rashes, bruising or bleeding on exposed skin area  Extremities:  peripheral pulses palpable, no pedal edema or calf pain with palpation  Psych: Anxious appearing    Investigations:      Laboratory Testing:  Recent Results (from the past 24 hour(s))   Troponin    Collection Time: 04/15/22 11:12 AM   Result Value Ref Range    Troponin, High Sensitivity 67 (HH) 0 - 22 ng/L   SPECIMEN REJECTION    Collection Time: 04/15/22  4:12 PM   Result Value Ref Range    Specimen Source . BLOOD     Ordered Test TROPI     Reason for Rejection       Unable to perform testing: Specimen collected in wrong container. Troponin    Collection Time: 04/15/22  5:16 PM   Result Value Ref Range    Troponin, High Sensitivity 70 (HH) 0 - 22 ng/L       Recent Labs     04/15/22  0820 03/30/22  0525 03/29/22  0623 03/26/22  0932 03/24/22  0157 03/20/22  1902 03/20/22 1902   HGB 12.4*   < > 10.2*   < >  --   --  12.1*   HCT 35.5*   < > 29.4*   < >  --   --  34.3*   WBC 7.9   < > 8.0   < >  --   --  6.3   MCV 91.4   < > 94.2   < >  --   --  92.0   *   < > 141   < > 136   < > 134*   K 4.1   < > 3.7   < > 4.1  4.1   < > 4.4   CL 90*   < > 104   < > 100   < > 100   CO2 27   < > 24   < > 21   < > 24   BUN 26*   < > 30*   < > 29*   < > 28*   CREATININE 0.70   < > 0.59*   < > 0.84   < > 0.91   GLUCOSE 154*   < > 150*   < > 107*   < > 151*   INR  --   --   --   --  1.0   < > 1.0   PROTIME  --   --   --   --  10.9   < > 13.1   APTT  --   --   --   --   --   --  31.3   AST  --   --  22   < >  --   --   --    ALT  --   --  21   < >  --   --   --    LABALBU  --   --  2.5*   < >  --   --   --     < > = values in this interval not displayed.        Hematology:  Recent Labs     04/15/22  0820   WBC 7.9   RBC 3.88*   HGB 12.4*   HCT 35.5*   MCV 91.4   MCH 32.0   MCHC 35.0   RDW 13.0      MPV 7.4     Chemistry:  Recent Labs     04/15/22  0820   *   K 4.1   CL 90*   CO2 27   GLUCOSE 154*   BUN 26*   CREATININE 0.70   ANIONGAP 12   LABGLOM >60   GFRAA >60   CALCIUM 9.3     No results for input(s): PROT, LABALBU, LABA1C, L7EZNNE, J4ZKICE, FT4, TSH, AST, ALT, LDH, GGT, ALKPHOS, LABGGT, BILITOT, BILIDIR, AMMONIA, AMYLASE, LIPASE, LACTATE, CHOL, HDL, LDLCHOLESTEROL, CHOLHDLRATIO, TRIG, VLDL, TMO16XW, PHENYTOIN, PHENYF, URICACID, POCGLU in the last 72 hours. Imaging/Diagnostics:       ECHO Complete 2D W Doppler W Color    Result Date: 3/31/2022  Transthoracic Echocardiography Report (TTE)  Patient Name Saintclair Senters Date of Study               03/31/2022               T   Date of      1950  Gender                      Male  Birth   Age          70 year(s)  Race                           Room Number  145         Height:                     63 inch, 160.02 cm   Corporate ID [id number]    Weight:                     153 pounds, 69.4 kg  #   Patient Acct [de-identified]   BSA:          1.73 m^2      BMI:      27.1 kg/m^2  #   MR #         [id number]     Sonographer                 Lazara Amaral   Accession #  [id number]  Interpreting Physician      Fay Castro   Fellow                   Referring Nurse                           Practitioner   Interpreting             Referring Physician         Jesus Rodriguez MD  Fellow  Type of Study   TTE procedure:2D Echocardiogram, M-Mode, Doppler, Color Doppler. Procedure Date Date: 03/31/2022 Start: 03:49 PM Study Location: [facility] Indications:Elevated troponin. History / Tech. Comments: S/P Fall Patient Status: Inpatient Height: 63 inches Weight: 153 pounds BSA: 1.73 m^2 BMI: 27.1 kg/m^2 Allergies   - Sulfa. - Stings. CONCLUSIONS Summary Normal LV size , mild to moderately increased LV wall thickness . No obvious wall motion abnormality seen. Normal LV systolic function with LVEF >55%. Normal RV size and function. RV systolic pressure 30 mmHg LA appears mildly dilated and RA appears normal in size. No obvious significant structural valvular abnormality noted. Mild AR Normal aortic root dimension. No significant pericardial effusion noted. No obvious intra-cardiac mass or shunt noted.  IVC normal diameter and inspiratory variation indicating normal RA filling pressure. Signature ----------------------------------------------------------------------------  Electronically signed by Rossana Watson(Sonographer) on 2022 04:41  PM ---------------------------------------------------------------------------- ----------------------------------------------------------------------------  Electronically signed by Fay Castro(Interpreting physician) on  2022 12:17 PM ---------------------------------------------------------------------------- FINDINGS Left Atrium Left atrium is mildly enlarged. Left Ventricle Left ventricle is normal in size with systolic function within the range of normal. Mild to moderate left ventricular hypertrophy. Calculated ejection fraction by bi-plane Lester's method is 54% Grade II (moderate) left ventricular diastolic dysfunction. Right Atrium Right atrium is normal in size. Right Ventricle Normal right ventricular size and function. Mitral Valve Mitral valve structure is normal. Mild mitral regurgitation. Aortic Valve Aortic valve is trileaflet. Aortic sclerosis without stenosis. Mild aortic insufficiency. Tricuspid Valve Tricuspid valve structure is normal. Mild tricuspid regurgitation. Estimated right ventricular systolic pressure is 30 mmHg. Pulmonic Valve The pulmonic valve is normal in structure. Pericardial Effusion No significant pericardial effusion is seen. Miscellaneous Normal aortic root dimension. E/E' average = 10.  IVC diameter and inspiratory collapse is normal. M-mode / 2D Measurements & Calculations:   LVIDd:5.7 cm(3.7 - 5.6 cm)       Diastolic SMXUIW:08.3 ml  WJFHR:3.65 cm(2.2 - 4.0 cm)      Systolic DGDSVN:34.0 ml  AMJZ:1.4 cm(0.6 - 1.1 cm)        Aortic Root:3.3 cm(2.0 - 3.7 cm)  LVPWd:1.1 cm(0.6 - 1.1 cm)       LA Dimension: 4.2 cm(1.9 - 4.0 cm)  Fractional Shortenin.91 %    LA volume/Index: 69.63 ml /40m^2  Calculated LVEF (%): 54.17 %     LVOT:2.2 cm RVDd:2.8 cm   Mitral:                                 Aortic   Valve Area (P1/2-Time): 3.33 cm^2       Peak Velocity: 1.53 m/s  Peak E-Wave: 0.72 m/s                   Mean Velocity: 0.94 m/s  Peak A-Wave: 0.94 m/s                   Peak Gradient: 9.36 mmHg  E/A Ratio: 0.76                         Mean Gradient: 4 mmHg  Peak Gradient: 2.05 mmHg                AI P1/2t: 543 msec  Mean Gradient: 2 mmHg  Deceleration Time: 218 msec             Area (continuity): 3.58 cm^2  P1/2t: 66 msec                          AV VTI: 24.8 cm   Area (continuity): 3.6 cm^2  Mean Velocity: 0.61 m/s   Tricuspid:                              Pulmonic:   Estimated RVSP: 30 mmHg                 Peak Velocity: 1.41 m/s  Peak TR Velocity: 2.27 m/s              Peak Gradient: 7.95 mmHg  Peak TR Gradient: 20.6116 mmHg  Estimated RA Pressure: 10 mmHg                                           Estimated PASP: 30.61 mmHg  Diastology / Tissue Doppler Septal Wall E' velocity:0.06 m/s Septal Wall E/E':12 Lateral Wall E' velocity:0.09 m/s Lateral Wall E/E':8    XR CERVICAL SPINE (2-3 VIEWS)    Result Date: 3/25/2022  EXAMINATION: 2 XRAY VIEWS OF THE CERVICAL SPINE 3/25/2022 6:05 am COMPARISON: 03/21/2022, 03/20/2022, 09/19/2018 HISTORY: ORDERING SYSTEM PROVIDED HISTORY: s/p PCDF AP and lateral upright TECHNOLOGIST PROVIDED HISTORY: s/p PCDF AP and lateral upright FINDINGS: Immediate postoperative changes of laminectomy with posterior fusion with paraspinal rods and lateral mass screws spanning C2 through C4 with an overlying cervical drain, soft tissue gas, and cutaneous staples. The new hardware appears intact. Prior anterior fusion spanning C4 through C7 with C5-C6 corpectomy. Prevertebral soft tissues are not thickened. Imaged portion of the lung apices are clear.      Immediate postoperative changes of laminectomy with posterior fusion at C2 through C4.     CT HEAD WO CONTRAST    Result Date: 3/26/2022  EXAM: CT Head Without Intravenous Contrast EXAM DATE/TIME: 3/26/2022 3:04 pm CLINICAL HISTORY: ORDERING SYSTEM PROVIDED  R/o intra-cranial pathology  TECHNOLOGIST PROVIDED HISTORY:  R/o intra-cranial pathology TECHNIQUE: Axial computed tomography images of the head/brain without intravenous contrast.  This CT exam was performed using one or more of the following dose reduction techniques:  automated exposure control, adjustment of the mA and/or kV according to patient size, and/or use of iterative reconstruction technique. COMPARISON: MRI of the brain 03/21/2022 and CT scan of the brain 03/20/2022 FINDINGS: Brain:  No acute findings. No hemorrhage. No significant white matter disease. No edema. Ventricles:  No acute findings. No ventriculomegaly. Bones/joints:  No acute findings. No acute fracture. Soft tissues:  No acute findings. Sinuses:  Unremarkable as visualized. No acute sinusitis. Mastoid air cells:  Unremarkable as visualized. No mastoid effusion. No acute findings in the head/brain. CT HEAD WO CONTRAST    Result Date: 3/20/2022  EXAMINATION: CT OF THE HEAD WITHOUT CONTRAST  3/20/2022 7:11 pm TECHNIQUE: CT of the head was performed without the administration of intravenous contrast. Dose modulation, iterative reconstruction, and/or weight based adjustment of the mA/kV was utilized to reduce the radiation dose to as low as reasonably achievable. COMPARISON: 12/05/2018 HISTORY: ORDERING SYSTEM PROVIDED HISTORY: Fall TECHNOLOGIST PROVIDED HISTORY: Fall Decision Support Exception - unselect if not a suspected or confirmed emergency medical condition->Emergency Medical Condition (MA) Reason for Exam: fall, hit head on toilet FINDINGS: BRAIN/VENTRICLES: There is no acute intracranial hemorrhage, mass effect or midline shift. No abnormal extra-axial fluid collection. The gray-white differentiation is maintained without evidence of an acute infarct. There is no evidence of hydrocephalus.  Unchanged mild prominence of Multiplanar reformatted images are provided for review. Dose modulation, iterative reconstruction, and/or weight based adjustment of the mA/kV was utilized to reduce the radiation dose to as low as reasonably achievable.; CT of the lumbar spine was performed without the administration of intravenous contrast. Multiplanar reformatted images are provided for review. Adjustment of mA and/or kV according to patient size was utilized. Dose modulation, iterative reconstruction, and/or weight based adjustment of the mA/kV was utilized to reduce the radiation dose to as low as reasonably achievable. COMPARISON: CT chest 03/13/2018 HISTORY: ORDERING SYSTEM PROVIDED HISTORY: Fall TECHNOLOGIST PROVIDED HISTORY: Fall Reason for Exam: fall, back pain; ORDERING SYSTEM PROVIDED HISTORY: Fall TECHNOLOGIST PROVIDED HISTORY: Fall Decision Support Exception - unselect if not a suspected or confirmed emergency medical condition->Emergency Medical Condition (MA) Reason for Exam: fall, back pain FINDINGS: BONES/ALIGNMENT: Thoracic and lumbar vertebral body heights and alignment are normal.  There is no acute fracture or traumatic malalignment. DEGENERATIVE CHANGES: There are multilevel degenerative changes throughout the thoracic and lumbar spine. SOFT TISSUES: No paraspinal mass is seen. Lower cervical corpectomy and fusion. Large left renal cyst incompletely visualized on this study. Multilevel degenerative changes with no acute fracture or traumatic malalignment of the thoracolumbar spine. RECOMMENDATIONS: Unavailable     CT LUMBAR SPINE WO CONTRAST    Result Date: 3/20/2022  EXAMINATION: CT OF THE THORACIC SPINE WITHOUT CONTRAST; CT OF THE LUMBAR SPINE WITHOUT CONTRAST 3/20/2022 TECHNIQUE: CT of the thoracic spine was performed without the administration of intravenous contrast. Multiplanar reformatted images are provided for review.  Dose modulation, iterative reconstruction, and/or weight based adjustment of the mA/kV was utilized to reduce the radiation dose to as low as reasonably achievable.; CT of the lumbar spine was performed without the administration of intravenous contrast. Multiplanar reformatted images are provided for review. Adjustment of mA and/or kV according to patient size was utilized. Dose modulation, iterative reconstruction, and/or weight based adjustment of the mA/kV was utilized to reduce the radiation dose to as low as reasonably achievable. COMPARISON: CT chest 03/13/2018 HISTORY: ORDERING SYSTEM PROVIDED HISTORY: Fall TECHNOLOGIST PROVIDED HISTORY: Fall Reason for Exam: fall, back pain; ORDERING SYSTEM PROVIDED HISTORY: Fall TECHNOLOGIST PROVIDED HISTORY: Fall Decision Support Exception - unselect if not a suspected or confirmed emergency medical condition->Emergency Medical Condition (MA) Reason for Exam: fall, back pain FINDINGS: BONES/ALIGNMENT: Thoracic and lumbar vertebral body heights and alignment are normal.  There is no acute fracture or traumatic malalignment. DEGENERATIVE CHANGES: There are multilevel degenerative changes throughout the thoracic and lumbar spine. SOFT TISSUES: No paraspinal mass is seen. Lower cervical corpectomy and fusion. Large left renal cyst incompletely visualized on this study. Multilevel degenerative changes with no acute fracture or traumatic malalignment of the thoracolumbar spine. RECOMMENDATIONS: Unavailable     MRI CERVICAL SPINE WO CONTRAST    Result Date: 3/21/2022  EXAMINATION: MRI OF THE CERVICAL SPINE WITHOUT CONTRAST 3/21/2022 3:38 pm TECHNIQUE: Multiplanar multisequence MRI of the cervical spine was performed without the administration of intravenous contrast. COMPARISON: None. HISTORY: ORDERING SYSTEM PROVIDED HISTORY: spinal stenosis TECHNOLOGIST PROVIDED HISTORY: spinal stenosis Reason for Exam: Pt having several recent falls due to weakness. Subsequent evaluation.  FINDINGS: Is a motion BONES/ALIGNMENT: Postsurgical changes are seen with anterior fusion hardware involving the C4 through C7 levels with partial corpectomy of C5 through C6. The remaining vertebral body heights appear grossly maintained. No significant spondylolisthesis seen. SPINAL CORD: There is question of minimal T2 hyperintensity within the cord at C4-C5 (sagittal T2 series 5, image 7). No abnormal cord signal otherwise seen. SOFT TISSUES: No gross evidence of a paraspinal mass. C2-C3: There is a disc bulge, which appears scratch head there is a disc bulge with a central disc protrusion, which appears to indent on the ventral cord as well as buckling of the ligamentum flavum. There appears to be complete effacement of the CSF. There appears to be severe spinal canal stenosis. Uncovertebral joint and facet arthrosis contributes to moderate right neural foraminal narrowing. No significant left neural foraminal narrowing. C3-C4: There is a disc bulge with a central disc protrusion, which indents on the ventral aspect of the cervical cord. There is buckling of the ligamentum flavum. There appears to be complete effacement of the CSF. Severe spinal canal stenosis. Uncovertebral joint and facet arthrosis contributes to moderate to severe right and moderate left neural foraminal narrowing. C4-C5: The spinal canal appears surgically decompressed. Uncovertebral joint and facet arthrosis appears to contribute to mild right and moderate left neural foraminal narrowing. C5-C6: There is a right paracentral posterior osteophyte, which appears to contact the right ventral cervical cord. The spinal canal appears surgically decompressed. Uncovertebral joint and facet arthrosis contributes to mild right and moderate left neural foraminal narrowing. C6-C7: There is a posterior osteophyte which contacts the ventral cervical cord. No significant spinal canal stenosis. Uncovertebral joint and facet arthrosis contributes to moderate right and mild left neural foraminal narrowing.  C7-T1: There is a posterior disc osteophyte complex indenting on the ventral thecal sac. No significant spinal canal stenosis. Uncovertebral joint and facet arthrosis appears to contribute to severe bilateral neural foraminal narrowing. 1. Patient motion limits evaluation. 2. There is severe spinal canal stenosis at C2-C3 and C3-C4 with complete effacement of the CSF at these levels. 3. No significant spinal canal stenosis at the remaining levels. 4. Multilevel neural foraminal narrowing as above. 5. There is question of minimal T2 hyperintensity within the cord at C4-C5, which likely represents myelomalacia. MRI THORACIC SPINE WO CONTRAST    Result Date: 3/22/2022  EXAMINATION: MRI OF THE THORACIC SPINE WITHOUT CONTRAST  3/22/2022 9:37 am TECHNIQUE: Multiplanar multisequence MRI of the thoracic spine was performed without the administration of intravenous contrast. COMPARISON: CT thoracic spine 03/20/2022 HISTORY: ORDERING SYSTEM PROVIDED HISTORY: spinal stenosis TECHNOLOGIST PROVIDED HISTORY: spinal stenosis Reason for Exam: Frequent falls FINDINGS: BONES/ALIGNMENT: There is normal alignment of the thoracic spine. There is no acute fracture or listhesis. Bone marrow signal intensity is normal. There is mild multilevel disc desiccation and disc space narrowing within the midthoracic spine. Multilevel anterior osteophyte formation is present. SPINAL CORD: The thoracic spinal cord is normal in size and signal intensities. SOFT TISSUES: There is no paraspinal mass identified. DEGENERATIVE CHANGES: T2-T3: There is a disc bulge present. No significant spinal canal stenosis or neural foraminal narrowing is present. T4-T5: There is a disc bulge present. There is no significant spinal canal stenosis or neural foraminal narrowing. T7-T8: There is a 1 mm right paracentral disc protrusion. There is no significant spinal canal stenosis or neural foraminal narrowing.  T8-T9: There is a right paracentral disc protrusion and thickening of the ligamentum flavum mildly narrowing the spinal canal.  There is no neural foraminal narrowing. T9-T10: There is a 2 mm central disc protrusion mildly narrowing the spinal canal.  There is no neural foraminal narrowing. T10-T11: There is a 3 mm left paracentral disc protrusion mildly narrowing the spinal canal.  There is flattening of the ventral surface of the spinal cord. Mild multilevel degenerative changes of the thoracic spine, as described above with mild spinal canal stenosis from T8-9 to T10-11, most pronounced at T10-11. MRI LUMBAR SPINE WO CONTRAST    Result Date: 3/22/2022  EXAMINATION: MRI OF THE LUMBAR SPINE WITHOUT CONTRAST, 3/22/2022 9:41 am TECHNIQUE: Multiplanar multisequence MRI of the lumbar spine was performed without the administration of intravenous contrast. COMPARISON: 03/20/2022 HISTORY: ORDERING SYSTEM PROVIDED HISTORY: spinal stenosis TECHNOLOGIST PROVIDED HISTORY: spinal stenosis Reason for Exam: Frequent falls FINDINGS: BONES/ALIGNMENT: There is a levoconvex lumbar scoliosis. There is no acute fracture. Bone marrow signal intensity is normal.  There is multilevel disc desiccation. There is disc space narrowing at L2-L3 and L4-L5 There is no spondylolysis. SPINAL CORD: The conus medullaris is normal in size and signal intensities and terminates normally. SOFT TISSUES: There is no paraspinal mass identified. T12-L1: There is a disc bulge present. There is no significant spinal canal stenosis or neural foraminal narrowing. L1-L2: There is a disc bulge, facet arthropathy and thickening of the ligamentum flavum. There is mild spinal canal stenosis. No significant neural foraminal narrowing is present. L2-L3: There is a disc bulge, facet arthropathy and thickening of the ligamentum flavum. There is moderate spinal canal stenosis. Severe bilateral neural foraminal narrowing is present.  L3-L4: There is a disc bulge, facet arthropathy and thickening of the ligamentum flavum. There is moderate spinal canal stenosis and moderate bilateral neural foraminal narrowing. L4-L5: There is a disc bulge, facet arthropathy and thickening of ligamentum flavum. There is moderate spinal canal stenosis. Severe left and moderate right neural foraminal narrowing is present. L5-S1: There is no disc bulge or protrusion present. There is no significant spinal canal stenosis or neural foraminal narrowing present. There is bilateral facet arthropathy. 1. Moderate spinal canal stenosis from L2-L3 to L4-L5, as described above. 2. Mild spinal canal stenosis at L1-L2, as described above. 3. Multilevel neural foraminal narrowing, most severe at L2-L3. XR CHEST PORTABLE    Result Date: 3/29/2022  EXAMINATION: ONE XRAY VIEW OF THE CHEST 3/27/2022 7:40 am COMPARISON: 04/24/2018 HISTORY: ORDERING SYSTEM PROVIDED HISTORY: fever TECHNOLOGIST PROVIDED HISTORY: fever FINDINGS: A single frontal view of the chest was performed. There is no acute skeletal abnormality. Cervical fusion hardware is noted. The heart size is stable, and at the upper limits of normal.  The mediastinal contours are within normal limits, with stable tortuosity of the intrathoracic aorta. There is left basilar airspace consolidation. There is mild right basilar atelectasis. The lungs are otherwise clear. There is no evidence of a pneumothorax. 1. Left basilar airspace consolidation, representing either aspiration or pneumonia. 2. Mild right basilar atelectasis. XR CHEST PORTABLE    Result Date: 3/29/2022  EXAMINATION: ONE XRAY VIEW OF THE CHEST 3/29/2022 6:05 am COMPARISON: Chest from 03/27/2020 HISTORY: ORDERING SYSTEM PROVIDED HISTORY: Atelectasis, infiltrate TECHNOLOGIST PROVIDED HISTORY: Atelectasis, infiltrate Reason for Exam: uprt port FINDINGS: Cervical hardware, and overlying ECG monitor leads, respiratory tubing and gown snaps again demonstrated. Enlarged but stable appearing cardiac silhouette. Mediastinal structures midline and unchanged. Low lung volumes with bibasilar atelectasis or scarring, and greater opacity medial left base, unchanged or slightly increased. No large pleural effusion or pneumothorax. Bones unchanged. Persistent opacity left base, likely infiltrate with bibasilar atelectasis. VL DUP LOWER EXTREMITY VENOUS BILATERAL    Result Date: 3/28/2022    [facility]  Vascular Lower Extremities DVT Study Procedure   Patient Name   Madisyn Vogel Date of Study           03/28/2022                 T   Date of Birth  1950  Gender                  Male   Age            70 year(s)  Race                       Room Number    0145   Corporate ID # [id number]   Patient Acct # [de-identified]   MR #           [id number]     Elgin Lanes, RVT   Accession #    [id number]  Interpreting Physician  Nory Back   Referring                  Referring Physician     Colton Colmenares DO  Nurse  Practitioner  Procedure Type of Study:   Veins: Lower Extremities DVT Study, Venous Scan Lower Bilateral.  Indications for Study:Fever. Patient Status: In Patient. Technical Quality:Adequate visualization. Conclusions   Summary   Age indeterminate superficial vein thrombosis of the left lower extremity  involving the short saphenous vein. Signature   ----------------------------------------------------------------  Electronically signed by Nicho Beltran RVT(Sonographer) on  03/28/2022 07:27 PM  ----------------------------------------------------------------   ----------------------------------------------------------------  Electronically signed by Delila Pilot Reyes,Arthur(Interpreting  physician) on 03/28/2022 09:31 PM  ----------------------------------------------------------------  Findings:   Right Impression:                    Left Impression:  The common femoral, femoral,         The small saphenous vein is non  popliteal and tibial veins           compressible. demonstrate normal compressibility  and augmentation. The common femoral, femoral,                                       popliteal and tibial veins  Normal compressibility of the great  demonstrate normal compressibility  saphenous vein. and augmentation. Normal compressibility of the small  Normal compressibility of the great  saphenous vein. saphenous vein. Allergies   - Allergy:Sulfa(Drug). - Allergy:Stings(Miscellaneous). Velocities are measured in cm/s ; Diameters are measured in cm Right Lower Extremities DVT Study Measurements Right 2D Measurements +------------------------------------+----------+---------------+----------+ ! Location                            ! Visualized! Compressibility! Thrombosis! +------------------------------------+----------+---------------+----------+ ! Common Femoral                      !Yes       ! Yes            ! None      ! +------------------------------------+----------+---------------+----------+ ! Prox Femoral                        !Yes       ! Yes            ! None      ! +------------------------------------+----------+---------------+----------+ ! Mid Femoral                         !Yes       ! Yes            ! None      ! +------------------------------------+----------+---------------+----------+ ! Dist Femoral                        !Yes       ! Yes            ! None      ! +------------------------------------+----------+---------------+----------+ ! Popliteal                           !Yes       ! Yes            ! None      ! +------------------------------------+----------+---------------+----------+ ! Sapheno Femoral Junction            ! Yes       ! Yes            ! None      ! +------------------------------------+----------+---------------+----------+ ! PTV                                 ! Yes       ! Yes            ! None      ! +------------------------------------+----------+---------------+----------+ ! Peroneal !Yes       !Yes            ! None      ! +------------------------------------+----------+---------------+----------+ ! Gastroc                             ! Yes       ! Yes            ! None      ! +------------------------------------+----------+---------------+----------+ ! GSV Thigh                           ! Yes       ! Yes            ! None      ! +------------------------------------+----------+---------------+----------+ ! GSV Knee                            ! Yes       ! Yes            ! None      ! +------------------------------------+----------+---------------+----------+ ! GSV Ankle                           ! Yes       ! Yes            ! None      ! +------------------------------------+----------+---------------+----------+ ! SSV                                 ! Yes       ! Yes            ! None      ! +------------------------------------+----------+---------------+----------+ Right Doppler Measurements +---------------------------+------+------+--------------------------------+ ! Location                   ! Signal!Reflux! Reflux (msec)                   ! +---------------------------+------+------+--------------------------------+ ! Common Femoral             !Phasic!      !                                ! +---------------------------+------+------+--------------------------------+ ! Prox Femoral               !Phasic!      !                                ! +---------------------------+------+------+--------------------------------+ ! Popliteal                  !Phasic!      !                                ! +---------------------------+------+------+--------------------------------+ Left Lower Extremities DVT Study Measurements Left 2D Measurements +------------------------------------+----------+---------------+----------+ ! Location                            ! Visualized! Compressibility! Thrombosis! +------------------------------------+----------+---------------+----------+ ! Common Femoral !Yes       !Yes            ! None      ! +------------------------------------+----------+---------------+----------+ ! Prox Femoral                        !Yes       ! Yes            ! None      ! +------------------------------------+----------+---------------+----------+ ! Mid Femoral                         !Yes       ! Yes            ! None      ! +------------------------------------+----------+---------------+----------+ ! Dist Femoral                        !Yes       ! Yes            ! None      ! +------------------------------------+----------+---------------+----------+ ! Popliteal                           !Yes       ! Yes            ! None      ! +------------------------------------+----------+---------------+----------+ ! Sapheno Femoral Junction            ! Yes       ! Yes            ! None      ! +------------------------------------+----------+---------------+----------+ ! PTV                                 ! Yes       ! Yes            ! None      ! +------------------------------------+----------+---------------+----------+ ! Peroneal                            !Yes       ! Yes            ! None      ! +------------------------------------+----------+---------------+----------+ ! Gastroc                             ! Yes       ! Yes            ! None      ! +------------------------------------+----------+---------------+----------+ ! GSV Thigh                           ! Yes       ! Yes            ! None      ! +------------------------------------+----------+---------------+----------+ ! GSV Knee                            ! Yes       ! Yes            ! None      ! +------------------------------------+----------+---------------+----------+ ! GSV Ankle                           ! Yes       ! Yes            ! None      ! +------------------------------------+----------+---------------+----------+ ! SSV                                 ! Yes       ! No             !AI        ! +------------------------------------+----------+---------------+----------+ Left Doppler Measurements +---------------------------+------+------+--------------------------------+ ! Location                   ! Signal!Reflux! Reflux (msec)                   ! +---------------------------+------+------+--------------------------------+ ! Common Femoral             !Phasic!      !                                ! +---------------------------+------+------+--------------------------------+ ! Prox Femoral               !Phasic!      !                                ! +---------------------------+------+------+--------------------------------+ ! Popliteal                  !Phasic!      !                                ! +---------------------------+------+------+--------------------------------+    FLUORO FOR SURGICAL PROCEDURES    Result Date: 3/24/2022  Radiology exam is complete. No Radiologist dictation. Please follow up with ordering provider. MRI BRAIN WO CONTRAST    Result Date: 3/28/2022  EXAMINATION: MRI OF THE BRAIN WITHOUT CONTRAST  3/28/2022 1:06 pm TECHNIQUE: Multiplanar multisequence MRI of the brain was performed without the administration of intravenous contrast. COMPARISON: CT brain performed 03/26/2022. HISTORY: ORDERING SYSTEM PROVIDED HISTORY: Rule out encephalitis, meningitis, stroke,. s/p C spine fusion TECHNOLOGIST PROVIDED HISTORY: Rule out encephalitis, meningitis, stroke,. s/p C spine fusion Reason for Exam: Rule out encephalitis, meningitis, stroke,. s/p C spine fusion FINDINGS: INTRACRANIAL STRUCTURES/VENTRICLES: The sellar and suprasellar structures, optic chiasm, corpus callosum, pineal gland, tectum, and midline brainstem structures are unremarkable. The craniocervical junction is unremarkable. There is no acute hemorrhage, mass effect, or midline shift. There is satisfactory overall gray-white matter differentiation. The ventricular structures are symmetric and unremarkable.   The infratentorial structures including the cerebellopontine angles and internal auditory canals are unremarkable. There is no abnormal restricted diffusion. There is no abnormal blooming artifact on susceptibility weighted imaging. ORBITS: The visualized portion of the orbits demonstrate no acute abnormality. SINUSES: The visualized paranasal sinuses and mastoid air cells demonstrate no acute abnormality. BONES/SOFT TISSUES: The bone marrow signal intensity appears normal. The soft tissues demonstrate no acute abnormality. No acute intracranial abnormality. MRI BRAIN WO CONTRAST    Result Date: 3/21/2022  EXAMINATION: MRI OF THE BRAIN WITHOUT CONTRAST  3/21/2022 3:54 pm TECHNIQUE: Multiplanar multisequence MRI of the brain was performed without the administration of intravenous contrast. COMPARISON: None. HISTORY: ORDERING SYSTEM PROVIDED HISTORY: UE/LE weakness TECHNOLOGIST PROVIDED HISTORY: UE/LE weakness Reason for Exam: Pt having several recent falls due to weakness. Initial evaluation. FINDINGS: Motion degrades images limiting evaluation. INTRACRANIAL STRUCTURES/VENTRICLES: There is no acute infarct. No mass effect or midline shift. No evidence of an acute intracranial hemorrhage. Areas of T2 FLAIR hyperintensity are seen in the periventricular and subcortical white matter, which are nonspecific, but may represent chronic microvascular ischemic change. There is mild global parenchymal volume loss. Otherwise, the ventricles and sulci are normal in size and configuration. The sellar/suprasellar regions appear unremarkable. The normal signal voids within the major intracranial vessels appear maintained. ORBITS: The visualized portion of the orbits demonstrate no acute abnormality. SINUSES: The visualized paranasal sinuses and mastoid air cells demonstrate no acute abnormality. BONES/SOFT TISSUES: The bone marrow signal intensity appears normal. The soft tissues demonstrate no acute abnormality.      1. No acute intracranial abnormality. No acute infarct. 2. Mild global parenchymal volume loss with minimal chronic microvascular ischemic changes. Current Facility-Administered Medications   Medication Dose Route Frequency Provider Last Rate Last Admin    acetaminophen (TYLENOL) tablet 650 mg  650 mg Oral Q4H PRN Ulices Leong MD        finasteride (PROSCAR) tablet 5 mg  5 mg Oral Daily Ulices Leong MD   5 mg at 04/16/22 0750    hydrALAZINE (APRESOLINE) tablet 25 mg  25 mg Oral TID Ulices Leong MD   25 mg at 04/16/22 0750    ondansetron (ZOFRAN) injection 4 mg  4 mg IntraVENous Q6H PRN Ulices Leong MD        pantoprazole (PROTONIX) tablet 40 mg  40 mg Oral QAM AC Ulices Leong MD   40 mg at 04/16/22 5441       Impressions :     1. 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 *        2.  has a past medical history of Depression (2017), Diabetes mellitus ([address] (2013), Difficult intravenous access, Hyperlipidemia (2008), Hypertension (2008), Migraines, PTSD (post-traumatic stress disorder) (01/2018), Sleep apnea (01/2018), Teeth missing, and Wears glasses. Plans:     1. Chest pain-elevated but not uptrending EKG to be repeated today, await cardiology recommendations  2. Cervical stenosis with myelopathy status post C2-C5 laminectomy March 2022  3. CHF-currently compensated chronic hyponatremia  4. Hypertension-currently controlled  5. Type 2 diabetes-not currently on medication  6. GIA-home CPAP  7.  Severe depression, anxiety, PTSD-on Effexor, Depakote-psychiatry following        DVT pplx-Lovenox  Antibiotics started/continued-none  Code status-DNR CCA    Rena Lai MD  4/16/2022  9:17 AM

## 2022-04-16 NOTE — PLAN OF CARE
Problem: Skin Integrity:  Goal: Will show no infection signs and symptoms  Description: Will show no infection signs and symptoms  4/16/2022 0949 by Mason Velez RN  Outcome: Ongoing     Problem: Skin Integrity:  Goal: Absence of new skin breakdown  Description: Absence of new skin breakdown  4/16/2022 0949 by Mason Velez RN  Outcome: Ongoing     Problem: Falls - Risk of:  Goal: Will remain free from falls  Description: Will remain free from falls  4/16/2022 0949 by Mason Velez RN  Outcome: Ongoing     Problem: Falls - Risk of:  Goal: Absence of physical injury  Description: Absence of physical injury  4/16/2022 0949 by Mason Velez RN  Outcome: Ongoing pt will remain free of falls this shift

## 2022-04-17 LAB
ALBUMIN SERPL-MCNC: 3.4 G/DL (ref 3.5–5.2)
ALP BLD-CCNC: 130 U/L (ref 40–129)
ALT SERPL-CCNC: 28 U/L (ref 5–41)
ANION GAP SERPL CALCULATED.3IONS-SCNC: 9 MMOL/L (ref 9–17)
AST SERPL-CCNC: 18 U/L
BILIRUB SERPL-MCNC: 0.28 MG/DL (ref 0.3–1.2)
BUN BLDV-MCNC: 20 MG/DL (ref 8–23)
CALCIUM SERPL-MCNC: 9.4 MG/DL (ref 8.6–10.4)
CHLORIDE BLD-SCNC: 92 MMOL/L (ref 98–107)
CO2: 27 MMOL/L (ref 20–31)
CREAT SERPL-MCNC: 0.63 MG/DL (ref 0.7–1.2)
GFR AFRICAN AMERICAN: >60 ML/MIN
GFR NON-AFRICAN AMERICAN: >60 ML/MIN
GFR SERPL CREATININE-BSD FRML MDRD: ABNORMAL ML/MIN/{1.73_M2}
GLUCOSE BLD-MCNC: 92 MG/DL (ref 70–99)
HCT VFR BLD CALC: 34.9 % (ref 41–53)
HEMOGLOBIN: 12.5 G/DL (ref 13.5–17.5)
MCH RBC QN AUTO: 32.3 PG (ref 26–34)
MCHC RBC AUTO-ENTMCNC: 35.8 G/DL (ref 31–37)
MCV RBC AUTO: 90.3 FL (ref 80–100)
PDW BLD-RTO: 13 % (ref 11.5–14.9)
PLATELET # BLD: 280 K/UL (ref 150–450)
PMV BLD AUTO: 7.5 FL (ref 6–12)
POTASSIUM SERPL-SCNC: 4.5 MMOL/L (ref 3.7–5.3)
RBC # BLD: 3.87 M/UL (ref 4.5–5.9)
SODIUM BLD-SCNC: 128 MMOL/L (ref 135–144)
TOTAL PROTEIN: 5.9 G/DL (ref 6.4–8.3)
WBC # BLD: 7.1 K/UL (ref 3.5–11)

## 2022-04-17 PROCEDURE — 6370000000 HC RX 637 (ALT 250 FOR IP): Performed by: INTERNAL MEDICINE

## 2022-04-17 PROCEDURE — 97163 PT EVAL HIGH COMPLEX 45 MIN: CPT

## 2022-04-17 PROCEDURE — 97530 THERAPEUTIC ACTIVITIES: CPT

## 2022-04-17 PROCEDURE — 80053 COMPREHEN METABOLIC PANEL: CPT

## 2022-04-17 PROCEDURE — 6360000002 HC RX W HCPCS: Performed by: INTERNAL MEDICINE

## 2022-04-17 PROCEDURE — 99232 SBSQ HOSP IP/OBS MODERATE 35: CPT | Performed by: INTERNAL MEDICINE

## 2022-04-17 PROCEDURE — 97167 OT EVAL HIGH COMPLEX 60 MIN: CPT

## 2022-04-17 PROCEDURE — 36415 COLL VENOUS BLD VENIPUNCTURE: CPT

## 2022-04-17 PROCEDURE — 85027 COMPLETE CBC AUTOMATED: CPT

## 2022-04-17 PROCEDURE — 1200000000 HC SEMI PRIVATE

## 2022-04-17 RX ORDER — CARVEDILOL 6.25 MG/1
6.25 TABLET ORAL 2 TIMES DAILY
Status: DISCONTINUED | OUTPATIENT
Start: 2022-04-17 | End: 2022-04-20 | Stop reason: HOSPADM

## 2022-04-17 RX ORDER — HYDRALAZINE HYDROCHLORIDE 25 MG/1
25 TABLET, FILM COATED ORAL 3 TIMES DAILY
Status: CANCELLED | OUTPATIENT
Start: 2022-04-17

## 2022-04-17 RX ORDER — SENNA PLUS 8.6 MG/1
2 TABLET ORAL NIGHTLY PRN
Status: CANCELLED | OUTPATIENT
Start: 2022-04-17

## 2022-04-17 RX ORDER — NITROGLYCERIN 0.4 MG/1
0.4 TABLET SUBLINGUAL EVERY 5 MIN PRN
Status: CANCELLED | OUTPATIENT
Start: 2022-04-17

## 2022-04-17 RX ORDER — VENLAFAXINE 75 MG/1
75 TABLET ORAL
Status: CANCELLED | OUTPATIENT
Start: 2022-04-17

## 2022-04-17 RX ORDER — NICOTINE POLACRILEX 4 MG
15 LOZENGE BUCCAL PRN
Status: CANCELLED | OUTPATIENT
Start: 2022-04-17

## 2022-04-17 RX ORDER — ONDANSETRON 2 MG/ML
4 INJECTION INTRAMUSCULAR; INTRAVENOUS EVERY 6 HOURS PRN
Status: CANCELLED | OUTPATIENT
Start: 2022-04-17

## 2022-04-17 RX ORDER — MORPHINE SULFATE 2 MG/ML
2 INJECTION, SOLUTION INTRAMUSCULAR; INTRAVENOUS ONCE
Status: COMPLETED | OUTPATIENT
Start: 2022-04-18 | End: 2022-04-18

## 2022-04-17 RX ORDER — ONDANSETRON 4 MG/1
4 TABLET, ORALLY DISINTEGRATING ORAL EVERY 8 HOURS PRN
Status: CANCELLED | OUTPATIENT
Start: 2022-04-17

## 2022-04-17 RX ORDER — DIVALPROEX SODIUM 500 MG/1
500 TABLET, EXTENDED RELEASE ORAL NIGHTLY
Status: CANCELLED | OUTPATIENT
Start: 2022-04-17

## 2022-04-17 RX ORDER — ACETAMINOPHEN 325 MG/1
650 TABLET ORAL EVERY 4 HOURS PRN
Status: CANCELLED | OUTPATIENT
Start: 2022-04-17

## 2022-04-17 RX ORDER — CARVEDILOL 6.25 MG/1
6.25 TABLET ORAL 2 TIMES DAILY
Status: CANCELLED | OUTPATIENT
Start: 2022-04-17

## 2022-04-17 RX ORDER — ATORVASTATIN CALCIUM 40 MG/1
40 TABLET, FILM COATED ORAL NIGHTLY
Status: CANCELLED | OUTPATIENT
Start: 2022-04-17

## 2022-04-17 RX ORDER — FINASTERIDE 5 MG/1
5 TABLET, FILM COATED ORAL DAILY
Status: CANCELLED | OUTPATIENT
Start: 2022-04-17

## 2022-04-17 RX ORDER — BISACODYL 10 MG
10 SUPPOSITORY, RECTAL RECTAL EVERY EVENING
Status: CANCELLED | OUTPATIENT
Start: 2022-04-17

## 2022-04-17 RX ORDER — MAGNESIUM HYDROXIDE/ALUMINUM HYDROXICE/SIMETHICONE 120; 1200; 1200 MG/30ML; MG/30ML; MG/30ML
30 SUSPENSION ORAL EVERY 6 HOURS PRN
Status: CANCELLED | OUTPATIENT
Start: 2022-04-17

## 2022-04-17 RX ORDER — PANTOPRAZOLE SODIUM 40 MG/1
40 TABLET, DELAYED RELEASE ORAL
Status: CANCELLED | OUTPATIENT
Start: 2022-04-18

## 2022-04-17 RX ORDER — LIDOCAINE 4 G/G
2 PATCH TOPICAL DAILY
Status: CANCELLED | OUTPATIENT
Start: 2022-04-17

## 2022-04-17 RX ORDER — POLYETHYLENE GLYCOL 3350 17 G/17G
17 POWDER, FOR SOLUTION ORAL DAILY
Status: CANCELLED | OUTPATIENT
Start: 2022-04-17

## 2022-04-17 RX ORDER — CARBOXYMETHYLCELLULOSE SODIUM 10 MG/ML
2 GEL OPHTHALMIC 3 TIMES DAILY
Status: CANCELLED | OUTPATIENT
Start: 2022-04-17

## 2022-04-17 RX ADMIN — CARVEDILOL 6.25 MG: 6.25 TABLET, FILM COATED ORAL at 21:32

## 2022-04-17 RX ADMIN — VENLAFAXINE 75 MG: 75 TABLET ORAL at 16:01

## 2022-04-17 RX ADMIN — DIVALPROEX SODIUM 500 MG: 500 TABLET, EXTENDED RELEASE ORAL at 21:32

## 2022-04-17 RX ADMIN — ATORVASTATIN CALCIUM 40 MG: 40 TABLET, FILM COATED ORAL at 21:32

## 2022-04-17 RX ADMIN — POLYETHYLENE GLYCOL 3350 17 G: 17 POWDER, FOR SOLUTION ORAL at 09:52

## 2022-04-17 RX ADMIN — FINASTERIDE 5 MG: 5 TABLET, FILM COATED ORAL at 09:52

## 2022-04-17 RX ADMIN — BISACODYL 10 MG: 10 SUPPOSITORY RECTAL at 16:02

## 2022-04-17 RX ADMIN — ONDANSETRON 4 MG: 4 TABLET, ORALLY DISINTEGRATING ORAL at 12:36

## 2022-04-17 RX ADMIN — HYDRALAZINE HYDROCHLORIDE 25 MG: 25 TABLET, FILM COATED ORAL at 09:57

## 2022-04-17 RX ADMIN — CARBOXYMETHYLCELLULOSE SODIUM 2 DROP: 10 GEL OPHTHALMIC at 10:03

## 2022-04-17 RX ADMIN — CARVEDILOL 3.12 MG: 3.12 TABLET, FILM COATED ORAL at 09:52

## 2022-04-17 RX ADMIN — VENLAFAXINE 75 MG: 75 TABLET ORAL at 12:36

## 2022-04-17 RX ADMIN — ACETAMINOPHEN 650 MG: 325 TABLET ORAL at 03:55

## 2022-04-17 RX ADMIN — ACETAMINOPHEN 650 MG: 325 TABLET ORAL at 21:32

## 2022-04-17 RX ADMIN — CARBOXYMETHYLCELLULOSE SODIUM 2 DROP: 10 GEL OPHTHALMIC at 16:00

## 2022-04-17 RX ADMIN — ENOXAPARIN SODIUM 40 MG: 100 INJECTION SUBCUTANEOUS at 09:52

## 2022-04-17 RX ADMIN — ALUMINUM HYDROXIDE, MAGNESIUM HYDROXIDE, AND SIMETHICONE 30 ML: 200; 200; 20 SUSPENSION ORAL at 21:32

## 2022-04-17 RX ADMIN — VENLAFAXINE 75 MG: 75 TABLET ORAL at 09:57

## 2022-04-17 RX ADMIN — CARBOXYMETHYLCELLULOSE SODIUM 2 DROP: 10 GEL OPHTHALMIC at 21:38

## 2022-04-17 RX ADMIN — PANTOPRAZOLE SODIUM 40 MG: 40 TABLET, DELAYED RELEASE ORAL at 05:59

## 2022-04-17 RX ADMIN — HYDRALAZINE HYDROCHLORIDE 25 MG: 25 TABLET, FILM COATED ORAL at 21:37

## 2022-04-17 RX ADMIN — HYDRALAZINE HYDROCHLORIDE 25 MG: 25 TABLET, FILM COATED ORAL at 16:00

## 2022-04-17 ASSESSMENT — PAIN SCALES - GENERAL
PAINLEVEL_OUTOF10: 0
PAINLEVEL_OUTOF10: 5
PAINLEVEL_OUTOF10: 5
PAINLEVEL_OUTOF10: 0
PAINLEVEL_OUTOF10: 5
PAINLEVEL_OUTOF10: 3

## 2022-04-17 ASSESSMENT — PAIN DESCRIPTION - FREQUENCY: FREQUENCY: CONTINUOUS

## 2022-04-17 ASSESSMENT — PAIN DESCRIPTION - LOCATION
LOCATION: NECK
LOCATION: NECK

## 2022-04-17 ASSESSMENT — PAIN DESCRIPTION - ONSET: ONSET: ON-GOING

## 2022-04-17 ASSESSMENT — PAIN DESCRIPTION - ORIENTATION
ORIENTATION: RIGHT;LEFT
ORIENTATION: RIGHT;LEFT

## 2022-04-17 ASSESSMENT — PAIN DESCRIPTION - DIRECTION: RADIATING_TOWARDS: NECK/SHOULDER

## 2022-04-17 ASSESSMENT — PAIN DESCRIPTION - PROGRESSION
CLINICAL_PROGRESSION: NOT CHANGED
CLINICAL_PROGRESSION: NOT CHANGED

## 2022-04-17 ASSESSMENT — PAIN DESCRIPTION - PAIN TYPE
TYPE: ACUTE PAIN
TYPE: ACUTE PAIN

## 2022-04-17 NOTE — PROGRESS NOTES
Physical Therapy    Facility/Department: [facility] PROGRESSIVE CARE  Initial Assessment    NAME: Cyndi Moulton : 1950  MRN: [id number]    Date of Service: 2022    Discharge Recommendations:  Patient would benefit from continued therapy after discharge   PT Equipment Recommendations  Other: TBD    Assessment   Body structures, Functions, Activity limitations: Decreased functional mobility ; Decreased strength;Decreased safe awareness;Decreased endurance;Decreased balance; Increased pain;Decreased posture;Decreased coordination;Decreased fine motor control;Decreased sensation;Decreased ROM  Assessment: Pt presents with quadraparesis, B UE > B LE weakness, decreasd core strength. Pain, dizzyness limiting activity at this time. Pt with poor tolerance to upright positon yet, unable to assisst pt to transfer to recliner today, due to orthostaic BP and pain. Pt was in acute rehab unit for ~ week prior to this. Noticed improved strength in B Le, but unabel to achieve much fucntional gain towards goals yet. Pt needs continued physical therapy at discharge from acute care. Treatment Diagnosis: Impaired function. Specific instructions for Next Treatment: co-tx with OTR/MANUEL  Prognosis: Fair  Decision Making: Medium Complexity  PT Education: Goals;PT Role;Plan of Care; Functional Mobility Training;Transfer Training;General Safety;Precautions; Energy Conservation  Patient Education: Educated in importance of having HOB up to acclamate upright position. Barriers to Learning: Pain  REQUIRES PT FOLLOW UP: Yes  Activity Tolerance  Activity Tolerance: Patient limited by fatigue;Patient limited by pain; Patient limited by endurance; Other (Orthostatic BP, dizzyness)  Other Comments  Comments: Discussed withpt's SERENA covington and pt, regarding recommendation of ramp at entrance. No Barajas needed education in rehab progression/anticiapted goals at discharge, seemed to have fair understanding after conversation.  Pt's brother present and seems understanding of the recommendations. Patient Diagnosis(es): There were no encounter diagnoses. has a past medical history of Depression, Diabetes mellitus ([facility] [address] Difficult intravenous access, Hyperlipidemia, Hypertension, Migraines, PTSD (post-traumatic stress disorder), Sleep apnea, Teeth missing, and Wears glasses. has a past surgical history that includes Cardiac catheterization (02/2010); Carpal tunnel release (Left, 01/09/2002); Vasectomy (12/1983); Tonsillectomy and adenoidectomy (1960); Hydrocele surgery (1951); Middle ear surgery (01/11/2018); eye surgery (Left, 2018); Mouth surgery (04/16/2018); other surgical history (04/19/2018); cervical fusion (04/19/2018); pr office/outpt visit,procedure only (N/A, 4/19/2018); Cataract removal; laminectomy (03/24/2022); and cervical fusion (N/A, 3/24/2022). Restrictions  Restrictions/Precautions  Restrictions/Precautions: Fall Risk,General Precautions  Required Braces or Orthoses?: Yes  Implants present? : Metal implants  Required Braces or Orthoses  Cervical: c-collar  Other: Abdominal Binder  Position Activity Restriction  Spinal Precautions: No Twisting,No Lifting,No Bending  Other position/activity restrictions: up with assist; s/p C2-C5 laminectomy and fixation 3/24, Collar on while out of bed. Ok to remove while resting In bed eating and drinking in bed  Vision/Hearing  Vision: Impaired  Vision Exceptions: Wears glasses at all times  Hearing: Exceptions to [facility]  Hearing Exceptions: Hard of hearing/hearing concerns     Subjective  General  Patient assessed for rehabilitation services?: Yes  Additional Pertinent Hx: Patient with history of worsening limb weakness and falls. He was treated for cervical stenosis with laminectomy and fusion C2-5 performed by Dr. Tammi Andrea on 3/24/22 with post-op complication of urine retention.  He was admitted to acute rehabilitation at [facility] 3/31/2022 then discharged 4/15 late in the evening for persistent chest pain with no relief after 2 doses nitroglycerin. Cardiology notes elevated but stable troponin with no acute EKG changes. Chest pain subsided overnight. Carvedilol dose has been increased. Continuing ECG monitoring. Recommending cardiac cath if chest pain returns. Family / Caregiver Present: Yes (Swathi Giang other)  Referral Date : 22  Diagnosis: Chest pain, Cervical stenosis, with myelopathy s/p C2-C5 laminectomy and fusion   Follows Commands: Within Functional Limits  General Comment  Comments: AM session Cotx with Radha Manzo. Subjective  Subjective: Pt reports no chest pain at this time. agreeable for therapy, wants to come back to ARU. Pain Screening  Patient Currently in Pain: Yes  Pain Assessment  Pain Assessment: 0-10  Pain Level: 5  Pain Type: Acute pain  Pain Location: Neck  Pain Orientation: Right;Left  Pain Radiating Towards: upper back/shoulder  Pain Frequency: Continuous  Pain Onset: On-going  Clinical Progression: Not changed  Response to Pain Intervention: Patient Satisfied  Vital Signs  BP Location: Left upper arm  Blood Pressure Lyin/95  Pulse Lyin PER MINUTE  Blood Pressure Sittin/84  Pulse Sittin PER MINUTE  Blood Pressure Standin/73 (Partial standing on elbert stedy flaps.  unable to stand fully )  Level of Consciousness: Alert (0)  Patient Currently in Pain: Yes  Oxygen Therapy  O2 Device: None (Room air)       Orientation  Orientation  Overall Orientation Status: Within Functional Limits  Social/Functional History  Social/Functional History  Lives With: Significant other  Type of Home: House  Home Layout: One level  Home Access: Stairs to enter without rails  Entrance Stairs - Number of Steps: one step at entrance  Bathroom Shower/Tub: Tub/Shower unit,Shower chair with back,Curtain  Bathroom Toilet: Handicap height  Bathroom Equipment: Hand-held shower  Bathroom Accessibility: Walker accessible  Home Equipment: 4 wheeled walker,Reacher,Cane  Receives Help From: Family  ADL Assistance: Independent (Lately needed Nadia Nova in Jan 2020)  Homemaking Assistance: Independent (Prior to Jan 2022 was Ind with ADLs / IADLs. Progressive weakness / function since, most recently (per Pt report) required Max to TD for all tasks from wife.)  Homemaking Responsibilities: Yes (Prior to January 2022)  Ambulation Assistance: Independent (Jan\"22 was walking st cane, lately difficult)  Transfer Assistance: Independent  Active : Yes  Patient's  Info: Family is currently assisting with transportation - Was driving until end of Jan 2022  Mode of Transportation: Car,SUV  Occupation: Retired  Leisure & Hobbies: Maintenance  Additional Comments: Significant other able to assist as needed at discharge. Cognition        Objective          AROM RLE (degrees)  RLE General AROM: AAROM WFL  AROM LLE (degrees)  LLE General AROM: AAROM-WFL  AROM RUE (degrees)  RUE General AROM: See OT eval.  AROM LUE (degrees)  LUE General AROM: See OT eval  Strength RLE  Strength RLE: Exception  Comment: Hip flexion 3-/5, knee flexion/extension 3/5, DF 3/5, PF 3+/5  Strength LLE  Strength LLE: Exception  Comment: Hip flexion 2+/5, knee flexion/extensin 3-/5, DF 3/5, PF 3+/5  Strength RUE  Comment: See OT Eval  Strength LUE  Comment: ee OT eval.     Sensation  Overall Sensation Status: Impaired  Additional Comments: Numbness all the way from neck to fingers, back to toes, bilaterally. describes fingers R hand feel wet, slippery. Bed mobility  Rolling to Right: Moderate assistance  Supine to Sit: Moderate assistance;2 Person assistance  Sit to Supine: 2 Person assistance;Maximum assistance  Scooting: Dependent/Total;2 Person assistance  Comment: Pt needs assist to initiate rolling on his side, pt felix to assist to move B LE towards EOB, mod A x 2 to progress trunk from supine>sit. Pt does reports increased dizzyness with activity, increased pain.  Pt also gets anxious with anticipation of feeling worse with mobility. Pt needed min A for staic balance at EOB, with using B UE on his side to stability. Pt tolerates sitting at EOB for 4 minutes, Orthostatic BP take and documented in flow sheet. Transfers  Sit to Stand: 2 Person Assistance; Moderate Assistance (Renetta Trinh)  Stand to sit: Moderate Assistance;2 Person Assistance Renetta Trinh)  Bed to Chair:  (Deferred.)  Comment: C-collar donned prior to dangling at EOB. Abdominal binder donned sitting at EOB. Pt stood in [address] stedy , attempted to take standing blood pressure, pt stood ~ 40 secs, unabelto stand longer to take a Standing BP. pt sat on elbert stedy flats(partial standing)- tooke BP. Pt reprots dizzy and feeling weak, requesting to go back to bed. Ambulation  Ambulation?: No  Stairs/Curb  Stairs?: No     Balance  Posture: Poor  Sitting - Static: Fair;- (min A for static balance)  Sitting - Dynamic: Poor;+  Standing - Static: Poor;+ (Elbert stedy)  Standing - Dynamic:  (NA)  Comments: Sitting at EOB with B UE support, stnading with norman stedy.         Plan   Plan  Times per week: 5 to 7 x/week  Times per day: Daily  Specific instructions for Next Treatment: co-tx with OTR/MANUEL  Current Treatment Recommendations: Ana Villanueva Mobility Training,Transfer Bal Cedeño Re-education,Safety Education & Training,Patient/Caregiver Education & Training,Equipment Evaluation, Education, & procurement,Positioning  Safety Devices  Type of devices: Gait belt,Left in bed,Call light within reach,Nurse notified  Restraints  Initially in place: No    G-Code       OutComes Score                                                  AM-PAC Score  AM-PAC Inpatient Mobility Raw Score : 6 (04/17/22 1339)  AM-PAC Inpatient T-Scale Score : 23.55 (04/17/22 1339)  Mobility Inpatient CMS 0-100% Score: 100 (04/17/22 1339)  Mobility Inpatient CMS G-Code Modifier : CN (04/17/22 1339) Goals  Short term goals  Time Frame for Short term goals: 10 visits  Short term goal 1: Perform rolling in bed min/mod A   Short term goal 2: Perform supine to sit max A  Short term goal 3: Perform sit to supine mod a x 2  Short term goal 4: Perform sit<> stand mod A  with sraroxy ortiz from bed height  Short term goal 5: Pt able to tolerate sitting at EOB for 10 to 12 minutes atleast, CGA/min a for balance, and  perform core strengthening and LE ex's to improve fucntion. Short term goal 6: Pt able to  norman stedy for 2 to 3 minutes to improve LE strength/increased tolerance to upright postion. Short term goal 7: Pt to tolerate up in a recliner chair via lift system use, and up for 1 to 2 hours for meals. Long term goals  Time Frame for Long term goals : -  Long term goal 1: -  Long term goal 2: -  Long term goal 3: -  Long term goal 4: -  Long term goal 5: -  Long term goal 6: -  Long term goal 7: -  Long term goal 8: -  Patient Goals   Patient goals : Move better, go to acute rehab.        Therapy Time   Individual Concurrent Group Co-treatment   Time In 1314         Time Out 1339         Minutes 25         Timed Code Treatment Minutes: 540 Jose Roberto Torres PT

## 2022-04-17 NOTE — CARE COORDINATION
ONGOING DISCHARGE PLAN:    Patient is alert and oriented x4. Spoke with patient regarding discharge plan and patient confirms that plan is still to return to ARU. Per Notes, Dr. Joshua Perez states \"Pt. To return to ARU, under continuation of stay since it will be less than 72 hours\". Awaiting Cardio to see. May need Cath. Meds adjusted. Will continue to follow for additional discharge needs.     Electronically signed by Ava Kruger RN on 4/17/2022 at 11:49 AM

## 2022-04-17 NOTE — CARE COORDINATION
Writer was informed By [employer] Nurse, [place], that pt. Is able to return to [facility] today. Writer spoke to Staff, from [facility] & they stated, that per Dr. Sherrell Ellis,  he CAN'T return to ARU today for she was notified, by the Head PT, that pt, has not been meeting, his required minutes, of therapy at [facility], therefore, he CAN NOT return. Also, she was informed that Bette Graham, was working on a SNF, however, she is unaware of which one. Writer did speak to Pt & Pt's Lydia Karimi, in regards to above & she states Maeve Martinez has been doing his therapy there & she is unsure why they are saying this & that she was not aware of SNF option/choices. Last Note, from Esteban Manzano, was from 4/14 in regards to this. Writer informed Charge Nurse, [place], & [address], that LSW will need to f/u w/ this mary, for it is Sunday & they are not available.

## 2022-04-17 NOTE — PLAN OF CARE
Problem: Skin Integrity:  Goal: Will show no infection signs and symptoms  Description: Will show no infection signs and symptoms  4/17/2022 1453 by Nai Posadas RN  Outcome: Ongoing  4/17/2022 0529 by Satish España RN  Outcome: Ongoing  Goal: Absence of new skin breakdown  Description: Absence of new skin breakdown  4/17/2022 1453 by Nai Posadas RN  Outcome: Ongoing  4/17/2022 0529 by Satish España RN  Outcome: Ongoing     Problem: Falls - Risk of:  Goal: Will remain free from falls  Description: Will remain free from falls  4/17/2022 1453 by Nai Posadas RN  Outcome: Ongoing  4/17/2022 0529 by Satish España RN  Outcome: Ongoing  Goal: Absence of physical injury  Description: Absence of physical injury  4/17/2022 1453 by Nai Posadas RN  Outcome: Ongoing  4/17/2022 0529 by Satish España RN  Outcome: Ongoing     Problem: Pain:  Goal: Pain level will decrease  Description: Pain level will decrease  4/17/2022 1453 by Nai Posadas RN  Outcome: Ongoing  4/17/2022 0529 by Satish España RN  Outcome: Ongoing  Goal: Control of acute pain  Description: Control of acute pain  4/17/2022 1453 by Nai Posadas RN  Outcome: Ongoing  4/17/2022 0529 by Satish España RN  Outcome: Ongoing  Goal: Control of chronic pain  Description: Control of chronic pain  4/17/2022 1453 by Nai Posadas RN  Outcome: Ongoing  4/17/2022 0529 by Satish España RN  Outcome: Ongoing

## 2022-04-17 NOTE — PROGRESS NOTES
Date:   4/17/2022  Patient name: Karan Feliciano. Date of admission:  4/15/2022 11:01 PM  MRN:   [id number]  YOB: 1950  PCP: Shane Armstrong    Reason for Admission: Chest pain [R07.9]    Cardiology follow-up: Chest pain, abnormal troponin       Referring physician: Dr Annie Medina     Impression     Abnormal troponin 67, high-sensitivity troponin was elevated at 53 and 53 on 3/31/2022  Chest pain  Hypertension  Mild to moderate LVH  Hyperlipidemia  Cervical spinal cord compression, myelopathy, radiculopathy, status post surgery fusion C4-C7  Encephalopathy/CT brain 3/26/2022 no acute process  Hospital-acquired pneumonia  Hyponatremia     2D echo 3/31/2022  Normal LV size, mild to moderate LVH normal wall motion  Ejection fraction more than 55%, RVSP 30  Mildly dilated LA   Normal IVC size and respiratory variation  No significant valvular abnormality    History of present illness     51-year-old male who got admitted at acute rehab unit on 3/31/2020 with the problem of frequent falls.  He has past medical history of cervical stenosis and C-spine surgery few years ago.  He was doing well up to 6 weeks ago and then he started deteriorating neurologically and has had frequent falls.  CT brain on 3/26/2022 did not show any acute process.  2D echo on 3/31/2022 showed normal LV size normal LV function ejection fraction more than 55%, normal wall motion, mild to moderate LVH.     Patient had chest pain earlier this morning left-sided 6 x 10 like a pressure sensation, patient received nitroglycerin x2 no relief.  Patient has constant chest pain since then more than 7 hours.  At present pain is 4 x 10.  No palpitation no diaphoresis no nausea no vomiting.  He does have a history of acid reflux.  No nausea no vomiting no change in pain by deep breathing or cough.   Electrocardiogram showed normal sinus rhythm, no ischemic changes, moderate criteria for LVH  High-sensitivity troponin 67  Previous high-sensitivity troponin on 3-31-22 was 53 and 53     Lab work 4/15/2022  Sodium 129, potassium 4.1, BUN 26, creatinine 0.70, glucose 154  High-sensitivity troponin 67  Serum cortisol 22.9  WBC 7.9, hemoglobin 12.4, platelets 972    Current evaluation  Patient seen and examined   He had chest pain 4/15/2022 and he got transferred to stepdown  Patient said chest pain subsided overnight no pain at present  No significant change in his troponin level  No ischemic ECG changes  No further episode of chest pain  Hemodynamically stable    Blood pressure 166/99, heart rate 80, temperature 97.9, oxygen saturation 95%    Sodium 128, potassium 4.5, BUN 20, creatinine 0.63, albumin 3.4  Hemoglobin 12.5, platelets 570    Medications:   Scheduled Meds:   pantoprazole  40 mg Oral QAM AC    atorvastatin  40 mg Oral Nightly    bisacodyl  10 mg Rectal QPM    carboxymethylcellulose  2 drop Both Eyes TID    carvedilol  3.125 mg Oral BID    divalproex  500 mg Oral Nightly    enoxaparin  40 mg SubCUTAneous Daily    finasteride  5 mg Oral Daily    hydrALAZINE  25 mg Oral TID    lidocaine  2 patch TransDERmal Daily    polyethylene glycol  17 g Oral Daily    venlafaxine  75 mg Oral TID WC     Continuous Infusions:  CBC:   Recent Labs     04/15/22  0820 04/17/22  0643   WBC 7.9 7.1   HGB 12.4* 12.5*    280     BMP:    Recent Labs     04/15/22  0820 04/17/22  0643   * 128*   K 4.1 4.5   CL 90* 92*   CO2 27 27   BUN 26* 20   CREATININE 0.70 0.63*   GLUCOSE 154* 92     Hepatic:   Recent Labs     04/17/22  0643   AST 18   ALT 28   BILITOT 0.28*   ALKPHOS 130*     Troponin: No results for input(s): TROPONINI in the last 72 hours. BNP: No results for input(s): BNP in the last 72 hours. Lipids: No results for input(s): CHOL, HDL in the last 72 hours. Invalid input(s): LDLCALCU  INR: No results for input(s): INR in the last 72 hours.     Objective:   Vitals: BP (!) 166/99   Pulse 79   Temp 97.9 °F (36.6 °C) (Oral)   Resp 18   Ht 5' 3\" (1.6 m)   Wt 148 lb 2.4 oz (67.2 kg)   SpO2 95%   BMI 26.24 kg/m²   General appearance: alert and cooperative with exam  HEENT: Head: Normal, normocephalic, atraumatic. Neck: no JVD and supple, symmetrical, trachea midline  Lungs: diminished breath sounds bibasilar  Heart: regular rate and rhythm  Abdomen: soft, non-tender; bowel sounds normal; no masses,  no organomegaly  Extremities: Homans sign is negative, no sign of DVT  Neurologic: Mental status: Alert, oriented, thought content appropriate    EKG: normal sinus rhythm. Voltage criteria for LVH  ECHO: reviewed.    Ejection fraction: 55%, mild to moderate LVH    Assessment / Acute Cardiac Problems:     Chest pain like a pressure ,  for more than 7 hours  No relief with the nitroglycerin x2  No ischemic ECG changes  Abnormal high-sensitivity troponin 67, 70, 73  Elevated high-sensitivity troponin on 3/31/2000 2253 and 53  No sign of cardiopulmonary decompensation    4/17/2022 no further episode of chest pain hemodynamically stable    Patient Active Problem List:     Hypertension     Hyperlipidemia     Diabetes mellitus ([address]     Contusion of right shoulder     Bipolar disorder, mixed ([address]     First known suicide attempt [facility])     Erectile dysfunction     Cervical stenosis of spinal canal     Incomplete spinal cord lesion at C5-C7 level ([address]     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)     Cervical spondylosis with radiculopathy     Acute blood loss anemia     Precordial pain     Depression with suicidal ideation     Severe recurrent major depression without psychotic features ([address]     Frequent falls     Hyponatremia     Cervical spinal cord compression (HCC)     Cord compression (HCC)     Quadriplegia, C1-C4 incomplete (HCC)     Encephalopathy     Hospital-acquired pneumonia     Atelectasis     Cervical stenosis of spine     Moderate malnutrition (HCC)     Chest pain      Plan of Treatment:   Medications reviewed  Increase carvedilol to 6.25 mg twice daily  Add aspirin 81 mg a day  Resume physical therapy  If no further chest pain okay to transfer back to rehab unit      Electronically signed by Joan Hill MD on 4/17/2022 at 11:29 AM

## 2022-04-17 NOTE — PROGRESS NOTES
History and Physical Service  [facility] Internal Medicine  Progress note          Date:   4/17/2022  Patient name:  Jenny Quinteros MRN:   [id number]  Account:  [de-identified]  YOB: 1950  PCP:    Edson Cooley  Code Status:    DNR-CCA    Chief Complaint:     No chief complaint on file. Chest pain      History Obtained From:     Patient, EMR, nursing staff  His  HPI   tory Obtained From:  Past [address] History:bobby History: This patient is a 70 y.o. Non- / non  malewho presents with  Chest pain  Left side of chest nonradiating moderate intensity  Associated with anxiety, nausea  Describes himself as anxious  Chest pain gets worse when he is anxious, improves with taking deep breaths    Recent admission to [facility] for generalized weakness and frequent falls underwent MRI brain lumbar C-spine thoracic were performed and showed severe stenosis at C2-C4 level, underwent C2-C5 laminectomy and posterior fusion on 3/24/2022, also was treated for hospital-acquired left lower lobe pneumonia . subsequently admitted to ARU on 4/1. During his stay patient was complaining of orthostasis which improved with stopping diuretic and losartan        Review of Systems:     Complaining of generalized anxiety causing some shortness of breath  Denies any  cough   Denies chest pain or palpitations at the time of examination  Denies abdominal pain, diarrhea vomiting  Denies any new numbness tremors or weakness. A 10 point review of systems was performed and and negative except as mentioned in HPI  Positive and Negative as described in HPI.       Past Medical History:     Past Medical History:   Diagnosis Date    Depression 2017    ON RX    Diabetes mellitus ([address] 2013    NIDDM    Difficult intravenous access     Hyperlipidemia 2008    ON RX    Hypertension 2008    ON RX    Migraines     PTSD (post-traumatic stress disorder) 01/2018    ON RX    Sleep apnea 01/2018    UNALBE TO USE TO CLEARED BY ENT (CPAP)    Teeth missing     BACK TEETH UPPER AND LOWER TO BE FITTED FOR PARTIALS AT LATER DATE    Wears glasses         Past Surgical History:     Past Surgical History:   Procedure Laterality Date    CARDIAC CATHETERIZATION  02/2010    no stents     CARPAL TUNNEL RELEASE Left 01/09/2002    CATARACT REMOVAL      CERVICAL FUSION  04/19/2018    anterior cervical fusion C5-6    CERVICAL FUSION N/A 3/24/2022    C2-5 FUSION, C2-4 LAMINECTOMY performed by Keysha Mishra DO at [address] Left 2018    CATARACT EXTRACTION WITH IOL    HYDROCELE EXCISION  1951    LAMINECTOMY  03/24/2022    C2-5 FUSION, C2-4 LAMINECTOMY    MIDDLE EAR SURGERY  01/11/2018    MIDDLE EAR REBUILT AND EAR DRUM REPLACED    MOUTH SURGERY  04/16/2018    5 TEETH REMOVED    OTHER SURGICAL HISTORY  04/19/2018    : ANTERIOR CERVICAL CORRPECTOMY C5-6, SYNTHES, DEPUY, REG TABLE, SUPINE,     [place] OFFICE/OUTPT VISIT,PROCEDURE ONLY N/A 4/19/2018    ANTERIOR CERVICAL CORRPECTOMY AND FUSION C5-6 performed by Keysha Mishra DO at [facility]  12/1983        Medications Prior to Admission:     Prior to Admission medications    Medication Sig Start Date End Date Taking?  Authorizing Provider   finasteride (PROSCAR) 5 MG tablet Take 5 mg by mouth daily    Historical Provider, MD   carboxymethylcellulose 1 % ophthalmic solution Place 2 drops into both eyes 3 times daily Pt states \"2 drops in both eyes three times a day\"    Historical Provider, MD   divalproex (DEPAKOTE ER) 250 MG extended release tablet Take 750 mg by mouth at bedtime    Historical Provider, MD   furosemide (LASIX) 20 MG tablet Take 20 mg by mouth daily    Historical Provider, MD   venlafaxine (EFFEXOR XR) 150 MG extended release capsule Take 1 capsule by mouth daily  Patient taking differently: Take 225 mg by mouth daily  6/23/20   Marcelle Parrish MD   simethicone (MYLICON) 80 MG chewable tablet Take 1 tablet by mouth every 6 hours as needed for Flatulence  Patient taking differently: Take 80 mg by mouth every 8 hours as needed for Flatulence  6/22/20   Rian Hemphill MD   lidocaine (XYLOCAINE) 5 % ointment Apply topically daily as needed for Pain Apply topically as needed.   LF 4/20/20 (30 day supply)  Patient not taking: Reported on 3/23/2022    Historical Provider, MD   metFORMIN (GLUCOPHAGE) 1000 MG tablet Take 1,000 mg by mouth 2 times daily (with meals) LF 4/27/20 (90 day supply)  Patient not taking: Reported on 3/21/2022    Historical Provider, MD   losartan (COZAAR) 25 MG tablet Take 25 mg by mouth daily     Historical Provider, MD   aspirin 81 MG chewable tablet Take 81 mg by mouth daily  Patient not taking: Reported on 3/21/2022    Historical Provider, MD   divalproex (DEPAKOTE ER) 500 MG extended release tablet Take 500 mg by mouth every morning     Historical Provider, MD   traZODone (DESYREL) 100 MG tablet Take 150 mg by mouth nightly as needed LF 5/1/20 (30 day supply)    Historical Provider, MD   cetirizine (ZYRTEC) 10 MG tablet Take 10 mg by mouth daily LF 4/6/20 (90 day supply)  Patient not taking: Reported on 3/21/2022    Historical Provider, MD   atorvastatin (LIPITOR) 80 MG tablet Take 40 mg by mouth daily Take 1/2 tablet (40 mg) daily  LF 4/22/20 (90 day supply)  Patient not taking: Reported on 3/21/2022    Historical Provider, MD   amLODIPine (NORVASC) 10 MG tablet Take 7.5 mg by mouth daily     Historical Provider, MD   sildenafil (VIAGRA) 100 MG tablet Take 100 mg by mouth as needed for Erectile Dysfunction LF 5/12/20 (30 day supply)    Historical Provider, MD   omeprazole (PRILOSEC) 20 MG delayed release capsule Take 20 mg by mouth every evening LF 4/21/20 (90 day supply)    Historical Provider, MD        Allergies:     Latex, Bee venom, Lisinopril, Niacin and related, Sulfa antibiotics, and Terazosin    Social History:     Tobacco:    reports that he quit smoking about 50 years ago. His smoking use included cigarettes. He has a 2.00 pack-year smoking history. He has never used smokeless tobacco.  Alcohol:      reports current alcohol use. Drug Use:  reports current drug use. Drug: Marijuana Erich Haney). Family History:     Family History   Problem Relation Age of Onset   Ritta Bring Dementia Mother     Coronary Art Dis Mother     Stroke Mother     Diabetes Mother     Asthma Mother     Other Mother         BRAIN ANEURISM    High Blood Pressure Mother     Heart Disease Father     Diabetes Sister     Diabetes Brother     High Blood Pressure Brother     High Cholesterol Brother     Heart Disease Brother     Diabetes Sister     Other Sister         NEUROPATHY           Physical Exam:   BP (!) 166/99   Pulse 79   Temp 97.9 °F (36.6 °C) (Oral)   Resp 18   Ht 5' 3\" (1.6 m)   Wt 148 lb 2.4 oz (67.2 kg)   SpO2 95%   BMI 26.24 kg/m²   No results for input(s): POCGLU in the last 72 hours. General Appearance: Anxious appearing, breathing fast reports feeling anxious currently  Mental status: oriented to person, place, and time with normal affect  Head:  normocephalic, atraumatic. Eye: no icterus, redness, pupils equal and reactive, extraocular eye movements intact, conjunctiva clear  Ear: normal external ear, no discharge, hearing intact  Nose:  no drainage noted  Mouth: mucous membranes moist  Neck: supple, no carotid bruits, thyroid not palpable  Lungs: Bilateral equal air entry, clear to ausculation, no wheezing, rales or rhonchi, normal effort  Cardiovascular: normal rate, regular rhythm, no murmur, gallop, rub.   Abdomen: Soft, nontender, nondistended, normal bowel sounds, no hepatomegaly or splenomegaly  Neurologic: some Weakness of all 4 extremities noted, normal muscle tone reduced bulk, no abnormal sensation, normal speech, cranial nerves II through XII grossly intact  Skin: No gross lesions, rashes, bruising or bleeding on exposed skin area  Extremities:  peripheral pulses palpable, no pedal edema or calf pain with palpation  Psych: Anxious appearing    Investigations:      Laboratory Testing:  Recent Results (from the past 24 hour(s))   CBC    Collection Time: 04/17/22  6:43 AM   Result Value Ref Range    WBC 7.1 3.5 - 11.0 k/uL    RBC 3.87 (L) 4.5 - 5.9 m/uL    Hemoglobin 12.5 (L) 13.5 - 17.5 g/dL    Hematocrit 34.9 (L) 41 - 53 %    MCV 90.3 80 - 100 fL    MCH 32.3 26 - 34 pg    MCHC 35.8 31 - 37 g/dL    RDW 13.0 11.5 - 14.9 %    Platelets 467 195 - 206 k/uL    MPV 7.5 6.0 - 12.0 fL   Comprehensive Metabolic Panel w/ Reflex to MG    Collection Time: 04/17/22  6:43 AM   Result Value Ref Range    Glucose 92 70 - 99 mg/dL    BUN 20 8 - 23 mg/dL    CREATININE 0.63 (L) 0.70 - 1.20 mg/dL    Calcium 9.4 8.6 - 10.4 mg/dL    Sodium 128 (L) 135 - 144 mmol/L    Potassium 4.5 3.7 - 5.3 mmol/L    Chloride 92 (L) 98 - 107 mmol/L    CO2 27 20 - 31 mmol/L    Anion Gap 9 9 - 17 mmol/L    Alkaline Phosphatase 130 (H) 40 - 129 U/L    ALT 28 5 - 41 U/L    AST 18 <40 U/L    Total Bilirubin 0.28 (L) 0.3 - 1.2 mg/dL    Total Protein 5.9 (L) 6.4 - 8.3 g/dL    Albumin 3.4 (L) 3.5 - 5.2 g/dL    GFR Non-African American >60 >60 mL/min    GFR African American >60 >60 mL/min    GFR Comment             Recent Labs     04/17/22  0643 03/26/22  0932 03/24/22  0157 03/20/22  1902 03/20/22  1902   HGB 12.5*   < >  --   --  12.1*   HCT 34.9*   < >  --   --  34.3*   WBC 7.1   < >  --   --  6.3   MCV 90.3   < >  --   --  92.0   *   < > 136   < > 134*   K 4.5   < > 4.1  4.1   < > 4.4   CL 92*   < > 100   < > 100   CO2 27   < > 21   < > 24   BUN 20   < > 29*   < > 28*   CREATININE 0.63*   < > 0.84   < > 0.91   GLUCOSE 92   < > 107*   < > 151*   INR  --   --  1.0   < > 1.0   PROTIME  --   --  10.9   < > 13.1   APTT  --   --   --   --  31.3   AST 18   < >  --   --   --    ALT 28   < >  --   --   --    LABALBU 3.4*   < >  --   --   --     < > = values in this interval not displayed.        Hematology:  Recent Labs 04/15/22  0820 04/17/22  0643   WBC 7.9 7.1   RBC 3.88* 3.87*   HGB 12.4* 12.5*   HCT 35.5* 34.9*   MCV 91.4 90.3   MCH 32.0 32.3   MCHC 35.0 35.8   RDW 13.0 13.0    280   MPV 7.4 7.5     Chemistry:  Recent Labs     04/15/22  0820 04/17/22  0643   * 128*   K 4.1 4.5   CL 90* 92*   CO2 27 27   GLUCOSE 154* 92   BUN 26* 20   CREATININE 0.70 0.63*   ANIONGAP 12 9   LABGLOM >60 >60   GFRAA >60 >60   CALCIUM 9.3 9.4     Recent Labs     04/17/22  0643   PROT 5.9*   LABALBU 3.4*   AST 18   ALT 28   ALKPHOS 130*   BILITOT 0.28*       Imaging/Diagnostics:       ECHO Complete 2D W Doppler W Color    Result Date: 3/31/2022  Transthoracic Echocardiography Report (TTE)  Patient Name [address] [place] [address] Date of Study               03/31/2022               T   Date of      1950  Gender                      Male  Birth   Age          70 year(s)  Race                           Room Number  145         Height:                     63 inch, 160.02 cm   Corporate ID [id number]    Weight:                     153 pounds, 69.4 kg  #   Patient Acct [de-identified]   BSA:          1.73 m^2      BMI:      27.1 kg/m^2  #   MR #         [id number]     Sonographer                 Steffi Shelbie   Accession #  [id number]  Interpreting Physician      Vickey Goff   Fellow                   Referring Nurse                           Practitioner   Interpreting             Referring Physician         Gini Enriquez MD  Fellow  Type of Study   TTE procedure:2D Echocardiogram, M-Mode, Doppler, Color Doppler. Procedure Date Date: 03/31/2022 Start: 03:49 PM Study Location: [facility] Indications:Elevated troponin. History / Tech. Comments: S/P Fall Patient Status: Inpatient Height: 63 inches Weight: 153 pounds BSA: 1.73 m^2 BMI: 27.1 kg/m^2 Allergies   - Sulfa. - Stings. CONCLUSIONS Summary Normal LV size , mild to moderately increased LV wall thickness . No obvious wall motion abnormality seen.  Normal LV systolic function with LVEF >55%. Normal RV size and function. RV systolic pressure 30 mmHg LA appears mildly dilated and RA appears normal in size. No obvious significant structural valvular abnormality noted. Mild AR Normal aortic root dimension. No significant pericardial effusion noted. No obvious intra-cardiac mass or shunt noted. IVC normal diameter and inspiratory variation indicating normal RA filling pressure. Signature ----------------------------------------------------------------------------  Electronically signed by Rossana Zamora(Sonographer) on 03/31/2022 04:41  PM ---------------------------------------------------------------------------- ----------------------------------------------------------------------------  Electronically signed by Fay Castro(Interpreting physician) on  04/01/2022 12:17 PM ---------------------------------------------------------------------------- FINDINGS Left Atrium Left atrium is mildly enlarged. Left Ventricle Left ventricle is normal in size with systolic function within the range of normal. Mild to moderate left ventricular hypertrophy. Calculated ejection fraction by bi-plane Lester's method is 54% Grade II (moderate) left ventricular diastolic dysfunction. Right Atrium Right atrium is normal in size. Right Ventricle Normal right ventricular size and function. Mitral Valve Mitral valve structure is normal. Mild mitral regurgitation. Aortic Valve Aortic valve is trileaflet. Aortic sclerosis without stenosis. Mild aortic insufficiency. Tricuspid Valve Tricuspid valve structure is normal. Mild tricuspid regurgitation. Estimated right ventricular systolic pressure is 30 mmHg. Pulmonic Valve The pulmonic valve is normal in structure. Pericardial Effusion No significant pericardial effusion is seen. Miscellaneous Normal aortic root dimension. E/E' average = 10.  IVC diameter and inspiratory collapse is normal. M-mode / 2D Measurements & Calculations:   LVIDd:5.7 cm(3.7 - 5.6 cm) Diastolic EBYQVO:94.6 ml  EINUU:6.73 cm(2.2 - 4.0 cm)      Systolic GYXUQS:78.5 ml  NAGF:5.9 cm(0.6 - 1.1 cm)        Aortic Root:3.3 cm(2.0 - 3.7 cm)  LVPWd:1.1 cm(0.6 - 1.1 cm)       LA Dimension: 4.2 cm(1.9 - 4.0 cm)  Fractional Shortenin.91 %    LA volume/Index: 69.63 ml /40m^2  Calculated LVEF (%): 54.17 %     LVOT:2.2 cm                                   RVDd:2.8 cm   Mitral:                                 Aortic   Valve Area (P1/2-Time): 3.33 cm^2       Peak Velocity: 1.53 m/s  Peak E-Wave: 0.72 m/s                   Mean Velocity: 0.94 m/s  Peak A-Wave: 0.94 m/s                   Peak Gradient: 9.36 mmHg  E/A Ratio: 0.76                         Mean Gradient: 4 mmHg  Peak Gradient: 2.05 mmHg                AI P1/2t: 543 msec  Mean Gradient: 2 mmHg  Deceleration Time: 218 msec             Area (continuity): 3.58 cm^2  P1/2t: 66 msec                          AV VTI: 24.8 cm   Area (continuity): 3.6 cm^2  Mean Velocity: 0.61 m/s   Tricuspid:                              Pulmonic:   Estimated RVSP: 30 mmHg                 Peak Velocity: 1.41 m/s  Peak TR Velocity: 2.27 m/s              Peak Gradient: 7.95 mmHg  Peak TR Gradient: 20.6116 mmHg  Estimated RA Pressure: 10 mmHg                                           Estimated PASP: 30.61 mmHg  Diastology / Tissue Doppler Septal Wall E' velocity:0.06 m/s Septal Wall E/E':12 Lateral Wall E' velocity:0.09 m/s Lateral Wall E/E':8    XR CERVICAL SPINE (2-3 VIEWS)    Result Date: 3/25/2022  EXAMINATION: 2 XRAY VIEWS OF THE CERVICAL SPINE 3/25/2022 6:05 am COMPARISON: 2022, 2022, 2018 HISTORY: ORDERING SYSTEM PROVIDED HISTORY: s/p PCDF AP and lateral upright TECHNOLOGIST PROVIDED HISTORY: s/p PCDF AP and lateral upright FINDINGS: Immediate postoperative changes of laminectomy with posterior fusion with paraspinal rods and lateral mass screws spanning C2 through C4 with an overlying cervical drain, soft tissue gas, and cutaneous staples.   The new Condition (MA) Reason for Exam: fall, hit head on toilet FINDINGS: BRAIN/VENTRICLES: There is no acute intracranial hemorrhage, mass effect or midline shift. No abnormal extra-axial fluid collection. The gray-white differentiation is maintained without evidence of an acute infarct. There is no evidence of hydrocephalus. Unchanged mild prominence of pituitary gland. ORBITS: The visualized portion of the orbits demonstrate no acute abnormality. SINUSES: The visualized paranasal sinuses and mastoid air cells demonstrate no acute abnormality. SOFT TISSUES/SKULL:  No acute abnormality of the visualized skull or soft tissues. No acute intracranial abnormality. RECOMMENDATIONS: Unavailable     CT CERVICAL SPINE WO CONTRAST    Result Date: 3/20/2022  EXAMINATION: CT OF THE CERVICAL SPINE WITHOUT CONTRAST 3/20/2022 7:14 pm TECHNIQUE: CT of the cervical spine was performed without the administration of intravenous contrast. Multiplanar reformatted images are provided for review. Dose modulation, iterative reconstruction, and/or weight based adjustment of the mA/kV was utilized to reduce the radiation dose to as low as reasonably achievable. COMPARISON: 12/05/2018 HISTORY: ORDERING SYSTEM PROVIDED HISTORY: Fall TECHNOLOGIST PROVIDED HISTORY: Fall Decision Support Exception - unselect if not a suspected or confirmed emergency medical condition->Emergency Medical Condition (MA) Reason for Exam: fall, back pain FINDINGS: BONES/ALIGNMENT: Stable corpectomy and anterior fusion from C4-C7. Vertebral body alignment is normal.  Facet joints are normally aligned. Ankylosis of the left facet at C4-C5. There is no acute fracture or traumatic malalignment. DEGENERATIVE CHANGES: Prominent residual cervical spondylosis projecting into the spinal canal on the right at C5-C6, unchanged. SOFT TISSUES: There is no prevertebral soft tissue swelling.      Stable cervical spine CT examination status post corpectomy and anterior fusion from C4-C7. No acute fracture or traumatic malalignment. RECOMMENDATIONS: Unavailable     CT THORACIC SPINE WO CONTRAST    Result Date: 3/20/2022  EXAMINATION: CT OF THE THORACIC SPINE WITHOUT CONTRAST; CT OF THE LUMBAR SPINE WITHOUT CONTRAST 3/20/2022 TECHNIQUE: CT of the thoracic spine was performed without the administration of intravenous contrast. Multiplanar reformatted images are provided for review. Dose modulation, iterative reconstruction, and/or weight based adjustment of the mA/kV was utilized to reduce the radiation dose to as low as reasonably achievable.; CT of the lumbar spine was performed without the administration of intravenous contrast. Multiplanar reformatted images are provided for review. Adjustment of mA and/or kV according to patient size was utilized. Dose modulation, iterative reconstruction, and/or weight based adjustment of the mA/kV was utilized to reduce the radiation dose to as low as reasonably achievable. COMPARISON: CT chest 03/13/2018 HISTORY: ORDERING SYSTEM PROVIDED HISTORY: Fall TECHNOLOGIST PROVIDED HISTORY: Fall Reason for Exam: fall, back pain; ORDERING SYSTEM PROVIDED HISTORY: Fall TECHNOLOGIST PROVIDED HISTORY: Fall Decision Support Exception - unselect if not a suspected or confirmed emergency medical condition->Emergency Medical Condition (MA) Reason for Exam: fall, back pain FINDINGS: BONES/ALIGNMENT: Thoracic and lumbar vertebral body heights and alignment are normal.  There is no acute fracture or traumatic malalignment. DEGENERATIVE CHANGES: There are multilevel degenerative changes throughout the thoracic and lumbar spine. SOFT TISSUES: No paraspinal mass is seen. Lower cervical corpectomy and fusion. Large left renal cyst incompletely visualized on this study. Multilevel degenerative changes with no acute fracture or traumatic malalignment of the thoracolumbar spine.  RECOMMENDATIONS: Unavailable     CT LUMBAR SPINE WO CONTRAST    Result Date: 3/20/2022  EXAMINATION: CT OF THE THORACIC SPINE WITHOUT CONTRAST; CT OF THE LUMBAR SPINE WITHOUT CONTRAST 3/20/2022 TECHNIQUE: CT of the thoracic spine was performed without the administration of intravenous contrast. Multiplanar reformatted images are provided for review. Dose modulation, iterative reconstruction, and/or weight based adjustment of the mA/kV was utilized to reduce the radiation dose to as low as reasonably achievable.; CT of the lumbar spine was performed without the administration of intravenous contrast. Multiplanar reformatted images are provided for review. Adjustment of mA and/or kV according to patient size was utilized. Dose modulation, iterative reconstruction, and/or weight based adjustment of the mA/kV was utilized to reduce the radiation dose to as low as reasonably achievable. COMPARISON: CT chest 03/13/2018 HISTORY: ORDERING SYSTEM PROVIDED HISTORY: Fall TECHNOLOGIST PROVIDED HISTORY: Fall Reason for Exam: fall, back pain; ORDERING SYSTEM PROVIDED HISTORY: Fall TECHNOLOGIST PROVIDED HISTORY: Fall Decision Support Exception - unselect if not a suspected or confirmed emergency medical condition->Emergency Medical Condition (MA) Reason for Exam: fall, back pain FINDINGS: BONES/ALIGNMENT: Thoracic and lumbar vertebral body heights and alignment are normal.  There is no acute fracture or traumatic malalignment. DEGENERATIVE CHANGES: There are multilevel degenerative changes throughout the thoracic and lumbar spine. SOFT TISSUES: No paraspinal mass is seen. Lower cervical corpectomy and fusion. Large left renal cyst incompletely visualized on this study. Multilevel degenerative changes with no acute fracture or traumatic malalignment of the thoracolumbar spine.  RECOMMENDATIONS: Unavailable     MRI CERVICAL SPINE WO CONTRAST    Result Date: 3/21/2022  EXAMINATION: MRI OF THE CERVICAL SPINE WITHOUT CONTRAST 3/21/2022 3:38 pm TECHNIQUE: Multiplanar multisequence MRI of the cervical spine was performed without the administration of intravenous contrast. COMPARISON: None. HISTORY: ORDERING SYSTEM PROVIDED HISTORY: spinal stenosis TECHNOLOGIST PROVIDED HISTORY: spinal stenosis Reason for Exam: Pt having several recent falls due to weakness. Subsequent evaluation. FINDINGS: Is a motion BONES/ALIGNMENT: Postsurgical changes are seen with anterior fusion hardware involving the C4 through C7 levels with partial corpectomy of C5 through C6. The remaining vertebral body heights appear grossly maintained. No significant spondylolisthesis seen. SPINAL CORD: There is question of minimal T2 hyperintensity within the cord at C4-C5 (sagittal T2 series 5, image 7). No abnormal cord signal otherwise seen. SOFT TISSUES: No gross evidence of a paraspinal mass. C2-C3: There is a disc bulge, which appears scratch head there is a disc bulge with a central disc protrusion, which appears to indent on the ventral cord as well as buckling of the ligamentum flavum. There appears to be complete effacement of the CSF. There appears to be severe spinal canal stenosis. Uncovertebral joint and facet arthrosis contributes to moderate right neural foraminal narrowing. No significant left neural foraminal narrowing. C3-C4: There is a disc bulge with a central disc protrusion, which indents on the ventral aspect of the cervical cord. There is buckling of the ligamentum flavum. There appears to be complete effacement of the CSF. Severe spinal canal stenosis. Uncovertebral joint and facet arthrosis contributes to moderate to severe right and moderate left neural foraminal narrowing. C4-C5: The spinal canal appears surgically decompressed. Uncovertebral joint and facet arthrosis appears to contribute to mild right and moderate left neural foraminal narrowing. C5-C6: There is a right paracentral posterior osteophyte, which appears to contact the right ventral cervical cord. The spinal canal appears surgically decompressed. Uncovertebral joint and facet arthrosis contributes to mild right and moderate left neural foraminal narrowing. C6-C7: There is a posterior osteophyte which contacts the ventral cervical cord. No significant spinal canal stenosis. Uncovertebral joint and facet arthrosis contributes to moderate right and mild left neural foraminal narrowing. C7-T1: There is a posterior disc osteophyte complex indenting on the ventral thecal sac. No significant spinal canal stenosis. Uncovertebral joint and facet arthrosis appears to contribute to severe bilateral neural foraminal narrowing. 1. Patient motion limits evaluation. 2. There is severe spinal canal stenosis at C2-C3 and C3-C4 with complete effacement of the CSF at these levels. 3. No significant spinal canal stenosis at the remaining levels. 4. Multilevel neural foraminal narrowing as above. 5. There is question of minimal T2 hyperintensity within the cord at C4-C5, which likely represents myelomalacia. MRI THORACIC SPINE WO CONTRAST    Result Date: 3/22/2022  EXAMINATION: MRI OF THE THORACIC SPINE WITHOUT CONTRAST  3/22/2022 9:37 am TECHNIQUE: Multiplanar multisequence MRI of the thoracic spine was performed without the administration of intravenous contrast. COMPARISON: CT thoracic spine 03/20/2022 HISTORY: ORDERING SYSTEM PROVIDED HISTORY: spinal stenosis TECHNOLOGIST PROVIDED HISTORY: spinal stenosis Reason for Exam: Frequent falls FINDINGS: BONES/ALIGNMENT: There is normal alignment of the thoracic spine. There is no acute fracture or listhesis. Bone marrow signal intensity is normal. There is mild multilevel disc desiccation and disc space narrowing within the midthoracic spine. Multilevel anterior osteophyte formation is present. SPINAL CORD: The thoracic spinal cord is normal in size and signal intensities. SOFT TISSUES: There is no paraspinal mass identified. DEGENERATIVE CHANGES: T2-T3: There is a disc bulge present.   No significant spinal canal stenosis or neural foraminal narrowing is present. T4-T5: There is a disc bulge present. There is no significant spinal canal stenosis or neural foraminal narrowing. T7-T8: There is a 1 mm right paracentral disc protrusion. There is no significant spinal canal stenosis or neural foraminal narrowing. T8-T9: There is a right paracentral disc protrusion and thickening of the ligamentum flavum mildly narrowing the spinal canal.  There is no neural foraminal narrowing. T9-T10: There is a 2 mm central disc protrusion mildly narrowing the spinal canal.  There is no neural foraminal narrowing. T10-T11: There is a 3 mm left paracentral disc protrusion mildly narrowing the spinal canal.  There is flattening of the ventral surface of the spinal cord. Mild multilevel degenerative changes of the thoracic spine, as described above with mild spinal canal stenosis from T8-9 to T10-11, most pronounced at T10-11. MRI LUMBAR SPINE WO CONTRAST    Result Date: 3/22/2022  EXAMINATION: MRI OF THE LUMBAR SPINE WITHOUT CONTRAST, 3/22/2022 9:41 am TECHNIQUE: Multiplanar multisequence MRI of the lumbar spine was performed without the administration of intravenous contrast. COMPARISON: 03/20/2022 HISTORY: ORDERING SYSTEM PROVIDED HISTORY: spinal stenosis TECHNOLOGIST PROVIDED HISTORY: spinal stenosis Reason for Exam: Frequent falls FINDINGS: BONES/ALIGNMENT: There is a levoconvex lumbar scoliosis. There is no acute fracture. Bone marrow signal intensity is normal.  There is multilevel disc desiccation. There is disc space narrowing at L2-L3 and L4-L5 There is no spondylolysis. SPINAL CORD: The conus medullaris is normal in size and signal intensities and terminates normally. SOFT TISSUES: There is no paraspinal mass identified. T12-L1: There is a disc bulge present. There is no significant spinal canal stenosis or neural foraminal narrowing. L1-L2: There is a disc bulge, facet arthropathy and thickening of the ligamentum flavum. There is mild spinal canal stenosis. No significant neural foraminal narrowing is present. L2-L3: There is a disc bulge, facet arthropathy and thickening of the ligamentum flavum. There is moderate spinal canal stenosis. Severe bilateral neural foraminal narrowing is present. L3-L4: There is a disc bulge, facet arthropathy and thickening of the ligamentum flavum. There is moderate spinal canal stenosis and moderate bilateral neural foraminal narrowing. L4-L5: There is a disc bulge, facet arthropathy and thickening of ligamentum flavum. There is moderate spinal canal stenosis. Severe left and moderate right neural foraminal narrowing is present. L5-S1: There is no disc bulge or protrusion present. There is no significant spinal canal stenosis or neural foraminal narrowing present. There is bilateral facet arthropathy. 1. Moderate spinal canal stenosis from L2-L3 to L4-L5, as described above. 2. Mild spinal canal stenosis at L1-L2, as described above. 3. Multilevel neural foraminal narrowing, most severe at L2-L3. XR CHEST PORTABLE    Result Date: 3/29/2022  EXAMINATION: ONE XRAY VIEW OF THE CHEST 3/27/2022 7:40 am COMPARISON: 04/24/2018 HISTORY: ORDERING SYSTEM PROVIDED HISTORY: fever TECHNOLOGIST PROVIDED HISTORY: fever FINDINGS: A single frontal view of the chest was performed. There is no acute skeletal abnormality. Cervical fusion hardware is noted. The heart size is stable, and at the upper limits of normal.  The mediastinal contours are within normal limits, with stable tortuosity of the intrathoracic aorta. There is left basilar airspace consolidation. There is mild right basilar atelectasis. The lungs are otherwise clear. There is no evidence of a pneumothorax. 1. Left basilar airspace consolidation, representing either aspiration or pneumonia. 2. Mild right basilar atelectasis.      XR CHEST PORTABLE    Result Date: 3/29/2022  EXAMINATION: ONE XRAY VIEW OF THE CHEST 3/29/2022 6:05 am COMPARISON: Chest from 03/27/2020 HISTORY: ORDERING SYSTEM PROVIDED HISTORY: Atelectasis, infiltrate TECHNOLOGIST PROVIDED HISTORY: Atelectasis, infiltrate Reason for Exam: uprt port FINDINGS: Cervical hardware, and overlying ECG monitor leads, respiratory tubing and gown snaps again demonstrated. Enlarged but stable appearing cardiac silhouette. Mediastinal structures midline and unchanged. Low lung volumes with bibasilar atelectasis or scarring, and greater opacity medial left base, unchanged or slightly increased. No large pleural effusion or pneumothorax. Bones unchanged. Persistent opacity left base, likely infiltrate with bibasilar atelectasis. VL DUP LOWER EXTREMITY VENOUS BILATERAL    Result Date: 3/28/2022    [facility]  Vascular Lower Extremities DVT Study Procedure   Patient Name   Clarissa Pagan Date of Study           03/28/2022                 T   Date of Birth  1950  Gender                  Male   Age            70 year(s)  Race                       Room Number    0145   Corporate ID # [id number]   Patient Acct # [de-identified]   MR #           [id number]     Anatoliy James RVT   Accession #    [id number]  Interpreting Physician  Juan M Schaffer   Referring                  Referring Physician     Michelle Boone DO  Nurse  Practitioner  Procedure Type of Study:   Veins: Lower Extremities DVT Study, Venous Scan Lower Bilateral.  Indications for Study:Fever. Patient Status: In Patient. Technical Quality:Adequate visualization. Conclusions   Summary   Age indeterminate superficial vein thrombosis of the left lower extremity  involving the short saphenous vein.    Signature   ----------------------------------------------------------------  Electronically signed by Shawn Andres RVT(Sonographer) on  03/28/2022 07:27 PM  ---------------------------------------------------------------- ----------------------------------------------------------------  Electronically signed by Parke Leiter Reyes,Arthur(Interpreting  physician) on 03/28/2022 09:31 PM  ----------------------------------------------------------------  Findings:   Right Impression:                    Left Impression:  The common femoral, femoral,         The small saphenous vein is non  popliteal and tibial veins           compressible. demonstrate normal compressibility  and augmentation. The common femoral, femoral,                                       popliteal and tibial veins  Normal compressibility of the great  demonstrate normal compressibility  saphenous vein. and augmentation. Normal compressibility of the small  Normal compressibility of the great  saphenous vein. saphenous vein. Allergies   - Allergy:Sulfa(Drug). - Allergy:Stings(Miscellaneous). Velocities are measured in cm/s ; Diameters are measured in cm Right Lower Extremities DVT Study Measurements Right 2D Measurements +------------------------------------+----------+---------------+----------+ ! Location                            ! Visualized! Compressibility! Thrombosis! +------------------------------------+----------+---------------+----------+ ! Common Femoral                      !Yes       ! Yes            ! None      ! +------------------------------------+----------+---------------+----------+ ! Prox Femoral                        !Yes       ! Yes            ! None      ! +------------------------------------+----------+---------------+----------+ ! Mid Femoral                         !Yes       ! Yes            ! None      ! +------------------------------------+----------+---------------+----------+ ! Dist Femoral                        !Yes       ! Yes            ! None      ! +------------------------------------+----------+---------------+----------+ ! Popliteal                           !Yes       ! Yes !None      ! +------------------------------------+----------+---------------+----------+ ! Sapheno Femoral Junction            ! Yes       ! Yes            ! None      ! +------------------------------------+----------+---------------+----------+ ! PTV                                 ! Yes       ! Yes            ! None      ! +------------------------------------+----------+---------------+----------+ ! Peroneal                            !Yes       ! Yes            ! None      ! +------------------------------------+----------+---------------+----------+ ! Gastroc                             ! Yes       ! Yes            ! None      ! +------------------------------------+----------+---------------+----------+ ! GSV Thigh                           ! Yes       ! Yes            ! None      ! +------------------------------------+----------+---------------+----------+ ! GSV Knee                            ! Yes       ! Yes            ! None      ! +------------------------------------+----------+---------------+----------+ ! GSV Ankle                           ! Yes       ! Yes            ! None      ! +------------------------------------+----------+---------------+----------+ ! SSV                                 ! Yes       ! Yes            ! None      ! +------------------------------------+----------+---------------+----------+ Right Doppler Measurements +---------------------------+------+------+--------------------------------+ ! Location                   ! Signal!Reflux! Reflux (msec)                   ! +---------------------------+------+------+--------------------------------+ ! Common Femoral             !Phasic!      !                                ! +---------------------------+------+------+--------------------------------+ ! Prox Femoral               !Phasic!      !                                ! +---------------------------+------+------+--------------------------------+ ! Popliteal                  !Phasic!      ! ! +---------------------------+------+------+--------------------------------+ Left Lower Extremities DVT Study Measurements Left 2D Measurements +------------------------------------+----------+---------------+----------+ ! Location                            ! Visualized! Compressibility! Thrombosis! +------------------------------------+----------+---------------+----------+ ! Common Femoral                      !Yes       ! Yes            ! None      ! +------------------------------------+----------+---------------+----------+ ! Prox Femoral                        !Yes       ! Yes            ! None      ! +------------------------------------+----------+---------------+----------+ ! Mid Femoral                         !Yes       ! Yes            ! None      ! +------------------------------------+----------+---------------+----------+ ! Dist Femoral                        !Yes       ! Yes            ! None      ! +------------------------------------+----------+---------------+----------+ ! Popliteal                           !Yes       ! Yes            ! None      ! +------------------------------------+----------+---------------+----------+ ! Sapheno Femoral Junction            ! Yes       ! Yes            ! None      ! +------------------------------------+----------+---------------+----------+ ! PTV                                 ! Yes       ! Yes            ! None      ! +------------------------------------+----------+---------------+----------+ ! Peroneal                            !Yes       ! Yes            ! None      ! +------------------------------------+----------+---------------+----------+ ! Gastroc                             ! Yes       ! Yes            ! None      ! +------------------------------------+----------+---------------+----------+ ! GSV Thigh                           ! Yes       ! Yes            ! None      ! +------------------------------------+----------+---------------+----------+ ! GSV Knee !Yes       !Yes            ! None      ! +------------------------------------+----------+---------------+----------+ ! GSV Ankle                           ! Yes       ! Yes            ! None      ! +------------------------------------+----------+---------------+----------+ ! SSV                                 ! Yes       ! No             !AI        ! +------------------------------------+----------+---------------+----------+ Left Doppler Measurements +---------------------------+------+------+--------------------------------+ ! Location                   ! Signal!Reflux! Reflux (msec)                   ! +---------------------------+------+------+--------------------------------+ ! Common Femoral             !Phasic!      !                                ! +---------------------------+------+------+--------------------------------+ ! Prox Femoral               !Phasic!      !                                ! +---------------------------+------+------+--------------------------------+ ! Popliteal                  !Phasic!      !                                ! +---------------------------+------+------+--------------------------------+    FLUORO FOR SURGICAL PROCEDURES    Result Date: 3/24/2022  Radiology exam is complete. No Radiologist dictation. Please follow up with ordering provider. MRI BRAIN WO CONTRAST    Result Date: 3/28/2022  EXAMINATION: MRI OF THE BRAIN WITHOUT CONTRAST  3/28/2022 1:06 pm TECHNIQUE: Multiplanar multisequence MRI of the brain was performed without the administration of intravenous contrast. COMPARISON: CT brain performed 03/26/2022.  HISTORY: ORDERING SYSTEM PROVIDED HISTORY: Rule out encephalitis, meningitis, stroke,. s/p C spine fusion TECHNOLOGIST PROVIDED HISTORY: Rule out encephalitis, meningitis, stroke,. s/p C spine fusion Reason for Exam: Rule out encephalitis, meningitis, stroke,. s/p C spine fusion FINDINGS: INTRACRANIAL STRUCTURES/VENTRICLES: The sellar and suprasellar structures, optic chiasm, corpus callosum, pineal gland, tectum, and midline brainstem structures are unremarkable. The craniocervical junction is unremarkable. There is no acute hemorrhage, mass effect, or midline shift. There is satisfactory overall gray-white matter differentiation. The ventricular structures are symmetric and unremarkable. The infratentorial structures including the cerebellopontine angles and internal auditory canals are unremarkable. There is no abnormal restricted diffusion. There is no abnormal blooming artifact on susceptibility weighted imaging. ORBITS: The visualized portion of the orbits demonstrate no acute abnormality. SINUSES: The visualized paranasal sinuses and mastoid air cells demonstrate no acute abnormality. BONES/SOFT TISSUES: The bone marrow signal intensity appears normal. The soft tissues demonstrate no acute abnormality. No acute intracranial abnormality. MRI BRAIN WO CONTRAST    Result Date: 3/21/2022  EXAMINATION: MRI OF THE BRAIN WITHOUT CONTRAST  3/21/2022 3:54 pm TECHNIQUE: Multiplanar multisequence MRI of the brain was performed without the administration of intravenous contrast. COMPARISON: None. HISTORY: ORDERING SYSTEM PROVIDED HISTORY: UE/LE weakness TECHNOLOGIST PROVIDED HISTORY: UE/LE weakness Reason for Exam: Pt having several recent falls due to weakness. Initial evaluation. FINDINGS: Motion degrades images limiting evaluation. INTRACRANIAL STRUCTURES/VENTRICLES: There is no acute infarct. No mass effect or midline shift. No evidence of an acute intracranial hemorrhage. Areas of T2 FLAIR hyperintensity are seen in the periventricular and subcortical white matter, which are nonspecific, but may represent chronic microvascular ischemic change. There is mild global parenchymal volume loss. Otherwise, the ventricles and sulci are normal in size and configuration. The sellar/suprasellar regions appear unremarkable.   The normal signal voids within the major intracranial vessels appear maintained. ORBITS: The visualized portion of the orbits demonstrate no acute abnormality. SINUSES: The visualized paranasal sinuses and mastoid air cells demonstrate no acute abnormality. BONES/SOFT TISSUES: The bone marrow signal intensity appears normal. The soft tissues demonstrate no acute abnormality. 1. No acute intracranial abnormality. No acute infarct. 2. Mild global parenchymal volume loss with minimal chronic microvascular ischemic changes.         Current Facility-Administered Medications   Medication Dose Route Frequency Provider Last Rate Last Admin    carvedilol (COREG) tablet 6.25 mg  6.25 mg Oral BID Tia Flor MD        acetaminophen (TYLENOL) tablet 650 mg  650 mg Oral Q4H PRN Margaret Nguyen MD   650 mg at 04/17/22 0355    pantoprazole (PROTONIX) tablet 40 mg  40 mg Oral QAM AC Margaret Nguyen MD   40 mg at 04/17/22 0559    atorvastatin (LIPITOR) tablet 40 mg  40 mg Oral Nightly Berry Lugo MD   40 mg at 04/16/22 2030    bisacodyl (DULCOLAX) suppository 10 mg  10 mg Rectal QPM Berry Lugo MD   10 mg at 04/16/22 1701    carboxymethylcellulose (THERATEARS) 1 % ophthalmic gel 2 drop  2 drop Both Eyes TID Berry Lugo MD   2 drop at 04/17/22 1003    divalproex (DEPAKOTE ER) extended release tablet 500 mg  500 mg Oral Nightly Berry Lugo MD   500 mg at 04/16/22 2029    enoxaparin (LOVENOX) injection 40 mg  40 mg SubCUTAneous Daily Berry Lugo MD   40 mg at 04/17/22 3042    finasteride (PROSCAR) tablet 5 mg  5 mg Oral Daily Berry Lugo MD   5 mg at 04/17/22 7299    hydrALAZINE (APRESOLINE) tablet 25 mg  25 mg Oral TID Berry Lugo MD   25 mg at 04/17/22 0957    lidocaine 4 % external patch 2 patch  2 patch TransDERmal Daily Berry Lugo MD   2 patch at 04/17/22 0952    polyethylene glycol (GLYCOLAX) packet 17 g  17 g Oral Daily Berry Lugo MD   17 g at 04/17/22 0952    venlafaxine [facility]) tablet 75 mg  75 mg Oral TID  Dawn Roldan MD   75 mg at 04/17/22 0957    aluminum & magnesium hydroxide-simethicone (MAALOX) 200-200-20 MG/5ML suspension 30 mL  30 mL Oral Q6H PRN Dawn Roldan MD        glucagon (rDNA) injection 1 mg  1 mg IntraMUSCular PRN Dawn Roldan MD        glucose (GLUTOSE) 40 % oral gel 15 g  15 g Oral PRN Dawn Roldan MD        nitroGLYCERIN (NITROSTAT) SL tablet 0.4 mg  0.4 mg SubLINGual Q5 Min PRN Dawn Roldan MD        ondansetron (ZOFRAN-ODT) disintegrating tablet 4 mg  4 mg Oral Q8H PRN Dawn Roldan MD   4 mg at 04/16/22 1811    Or    ondansetron (ZOFRAN) injection 4 mg  4 mg IntraVENous Q6H PRN Dawn Roldan MD        senna (SENOKOT) tablet 17.2 mg  2 tablet Oral Nightly PRN Dawn Roldan MD           Impressions :     1. 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 *        2.  has a past medical history of Depression (2017), Diabetes mellitus ([address] (2013), Difficult intravenous access, Hyperlipidemia (2008), Hypertension (2008), Migraines, PTSD (post-traumatic stress disorder) (01/2018), Sleep apnea (01/2018), Teeth missing, and Wears glasses. Plans:     1. Chest pain-elevated but not uptrending EKG to be repeated today, await cardiology recommendations  2. Cervical stenosis with myelopathy status post C2-C5 laminectomy March 2022  3. CHF-currently compensated chronic hyponatremia  4. Hypertension-currently controlled  5. Type 2 diabetes-not currently on medication  6. GIA-home CPAP  7.  Severe depression, anxiety, PTSD-on Effexor, Depakote-psychiatry following    4/17  No further chest pain since yesterday  Per cardiology recommendations we will observe him do some physical therapy today if no chest pain with PT he can go back to [facility]      DVT pplx-Lovenox  Antibiotics started/continued-none  Code status-DNR CCA    Kylah Paul MD  4/17/2022  12:22 PM

## 2022-04-17 NOTE — PROGRESS NOTES
[address]   Occupational Therapy Evaluation  Date: 22  Patient Name: Jay Jay Macdonald. Room: 5-  MRN: [id number]  Account: [de-identified]   : 1950  (75 y.o.) Gender: male     Discharge Recommendations:  Further Occupational Therapy is recommended upon facility discharge. Equipment Needed:  (TBD)    Referring Practitioner: Tyson Cooley MD          Treatment Diagnosis: Impaired self care status  Past Medical History:  has a past medical history of Depression, Diabetes mellitus ([address] Difficult intravenous access, Hyperlipidemia, Hypertension, Migraines, PTSD (post-traumatic stress disorder), Sleep apnea, Teeth missing, and Wears glasses. Past Surgical History:   has a past surgical history that includes Cardiac catheterization (2010); Carpal tunnel release (Left, 2002); Vasectomy (1983); Tonsillectomy and adenoidectomy (); Hydrocele surgery (); Middle ear surgery (2018); eye surgery (Left, ); Mouth surgery (2018); other surgical history (2018); cervical fusion (2018); pr office/outpt visit,procedure only (N/A, 2018); Cataract removal; laminectomy (2022); and cervical fusion (N/A, 3/24/2022). Restrictions  Restrictions/Precautions: Fall Risk,General Precautions  Implants present? : Metal implants  Other position/activity restrictions: up with assist; s/p C2-C5 laminectomy and fixation 3/24, Collar on while out of bed.  Ok to remove while resting In bed eating and drinking in bed  Required Braces or Orthoses  Cervical: c-collar  Other: Abdominal Binder  Required Braces or Orthoses?: Yes     Vitals  Temp: 97.7 °F (36.5 °C)  Pulse: 83  Resp: 18  BP: (!) 142/106  Height: 5' 3\" (160 cm)  Weight: 148 lb 2.4 oz (67.2 kg)  BMI (Calculated): 26.3  Oxygen Therapy  SpO2: 96 %  Pulse Oximeter Device Mode: Intermittent  Pulse Oximeter Device Location: Finger  O2 Device: None (Room air)  Blood Pressure Lying: 171/95  Pulse Lyin PER MINUTE  Blood Pressure Sittin/84  Pulse Sittin PER MINUTE  Blood Pressure Standin/73 (Partial stand on elbert stedy flaps. Unable to stand fully)  Level of Consciousness: Alert (0)    Subjective  Subjective: Pt resting in bed upon arrival. Pt was agreeable to OT/PT eval  Comments: Ok per RN for OT/PT eval  Overall Orientation Status: Within Functional Limits  Vision  Vision: Impaired  Vision Exceptions: Wears glasses at all times  Hearing  [facility]  Hearing Exceptions: Hard of hearing/hearing concerns  Social/Functional History  Lives With: Significant other  Type of Home: House  Home Layout: One level  Home Access: Stairs to enter without rails  Entrance Stairs - Number of Steps: one step at entrance  Bathroom Shower/Tub: Tub/Shower unit,Shower chair with back,Curtain  Bathroom Toilet: Handicap height  Bathroom Equipment: Hand-held shower  Bathroom Accessibility: Walker accessible  Home Equipment: 4 wheeled [address] Help From: Family  ADL Assistance: Independent (Lately needed [place] in 2020)  [address] (Prior to 2022 was Ind with ADLs / IADLs. Progressive weakness / function since, most recently (per Pt report) required Max to TD for all tasks from wife.)  Homemaking Responsibilities: Yes (Prior to 2022)  Ambulation Assistance: Independent ( was walking st cane, lately difficult)  Transfer Assistance: Independent  Active : Yes  Patient's  Info: Family is currently assisting with transportation - Was driving until end of 2022  Mode of Transportation: Car,SUV  Occupation: Retired  Leisure & Hobbies: Maintenance  Additional Comments: Significant other able to assist as needed at discharge.   Pain Assessment  Pain Assessment: 0-10  Pain Level: 5  Pain Type: Acute pain  Pain Location: Neck  Pain Orientation: Right,Left  Pain Radiating Towards: neck/shoulder  Clinical Progression: Not changed  Response to Pain Intervention: Patient Satisfied    Objective      Cognition  Overall Cognitive Status: WFL   Perception  Overall Perceptual Status: WFL  Sensation  Overall Sensation Status: Impaired  Additional Comments: Numbness all the way from neck to fingers, back to toes, bilaterally. describes fingers R hand feel wet, slippery. ADL  Feeding: Maximum assistance  Grooming: Maximum assistance  UE Bathing: Dependent/Total  LE Bathing: Dependent/Total  UE Dressing: Dependent/Total  LE Dressing: Dependent/Total  Toileting: Dependent/Total  Additional Comments: ADL scores based on clinical reasoning and skilled observation unless otherwise noted.      UE Function  Hand Dominance  Hand Dominance: Left        LUE Strength  Gross LUE Strength: Exceptions to [facility]  L Hand General: 3-/5     LUE Tone: Hypotonic  LUE PROM (degrees)  LUE General PROM: ~90 degrees shoulder flexion due to pain; Elbow WFL  LUE AROM (degrees)  LUE AROM : Exceptions  LUE General AROM: ~20 degrees shoulder flexion;  ~10 degrees elbow; able to shoulder shrug  Left Hand PROM (degrees)  Left Hand PROM: WFL  Left Hand AROM (degrees)  Left Hand General AROM: Pt able to complete grasp/release; Limited finger opposition  RUE Strength  Gross RUE Strength: Exceptions to [facility]  R Hand General: 3-/5      RUE Tone: Hypotonic  RUE PROM (degrees)  RUE General PROM: ~90 degrees shoulder flexion; elbow WFL  RUE AROM (degrees)  RUE AROM : Exceptions  RUE General AROM: ~35 degrees shoulder flexion; elbow WFL with increased time  Right Hand PROM (degrees)  Right Hand PROM: WFL  Right Hand AROM (degrees)  Right Hand General AROM: Pt able to complete grasp/release; finger oppositino with icnreased time and difficutly    Fine Motor Skills  Coordination  Movements Are Fluid And Coordinated: No  Coordination and Movement description: Fine motor impairments,Gross motor impairments,Right UE,Left UE,Decreased speed,Decreased accuracy                           Mobility  Supine to Sit: 2 Person assistance,Moderate assistance  Sit to Supine: 2 Person assistance,Maximum assistance     Balance  Sitting Balance: Minimal assistance  Standing Balance: Dependent/Total (Mod A x 2 with elbertdenise stuartdy)  Standing Balance  Time: 30 seconds x 2 in elbert nguyen  Activity: functional transfers in elbert nguyen  Comment: 2 person A with use of elbert stedy; A to place BUE on front bar     Bed mobility  Rolling to Left: Maximum assistance  Supine to Sit: 2 Person assistance; Moderate assistance  Sit to Supine: 2 Person assistance;Maximum assistance  Scooting: Dependent/Total;2 Person assistance  Comment: A with trunk and BLE to compelte bed mobility. Pt reports increased dizziness sitting EOB and increased pain. Verbal cues for pursed lip breathing, Min A for sittting balance for ~4 minutes. Orthostatic BP obtained and documented. Transfers  Sit to stand: 2 Person assistance,Moderate assistance  Stand to sit: 2 Person assistance,Moderate assistance  Transfer Comments: with elbert nguyen. 2 person A. Functional Activity Tolerance  Functional Activity Tolerance:  Tolerates 10 - 20 min exercise with multiple rests     Assessment  Assessment  Performance deficits / Impairments: Decreased ADL status,Decreased functional mobility ,Decreased ROM,Decreased strength,Decreased endurance,Decreased sensation,Decreased balance,Decreased high-level IADLs,Decreased fine motor control,Decreased coordination,Decreased posture  Treatment Diagnosis: Impaired self care status  Prognosis: Fair  Decision Making: High Complexity  REQUIRES OT FOLLOW UP: Yes  Discharge Recommendations: Patient would benefit from continued therapy after discharge  Activity Tolerance: Patient limited by fatigue,Patient limited by pain (Limited due to BP )         Functional Outcome Measures  AM-PAC Daily Activity Inpatient   How much help for putting on and taking off regular lower body clothing?: Total  How much help for Bathing?: Total  How much help for Toileting?: Total  How much help for putting on and taking off regular upper body clothing?: Total  How much help for taking care of personal grooming?: A Lot  How much help for eating meals?: A Lot  AM-PAC Inpatient Daily Activity Raw Score: 8  AM-PAC Inpatient ADL T-Scale Score : 22.86  ADL Inpatient CMS 0-100% Score: 85.69  ADL Inpatient CMS G-Code Modifier : CM       Goals  Patient Goals   Patient goals : To go back to rehab  Short term goals  Time Frame for Short term goals: By discharge  Short term goal 1: Pt will complete upper body dressing/bathing with max A with use of AE as needed while maintaining cervical precautions  Short term goal 2: Pt will complete self feeding task with mod A with adaptive strategies and good safety  Short term goal 3: Pt will tolerate sitting EOB 5+ minutes unsupported with CGA during functional activity to increase independence with self care and mobility  Short term goal 4: Pt will complete functional transfers during self care tasks with mod A and good safety  Short term goal 5: Pt will participate in 15+ minutes of therapeutic exercises/functional activities to increase safety and independence with self care and mobility    Plan  Safety Devices  Safety Devices in place: Yes  Type of devices:  All fall risk precautions in place,Call light within reach,Bed alarm in place,Gait belt,Patient at risk for falls,Left in bed,Nurse notified     Plan  Times per week: 4-6  Times per day: Daily  Current Treatment Recommendations: Self-Care / ADL,Strengthening,ROM,Balance Training,Functional Mobility Training,Endurance Training,Pain Management,Safety Education & Training,Patient/Caregiver Education & Training,Equipment Evaluation, Education, & procurement,Positioning       Equipment Recommendations  Equipment Needed:  (TBD)  OT Individual Minutes  Time In: 2321  Time Out: 3781  Minutes: 25    Electronically signed by [employer]ruth [place]

## 2022-04-18 PROBLEM — F05 DELIRIUM DUE TO MULTIPLE ETIOLOGIES: Status: ACTIVE | Noted: 2022-04-18

## 2022-04-18 LAB
ALBUMIN SERPL-MCNC: 3.7 G/DL (ref 3.5–5.2)
ALP BLD-CCNC: 136 U/L (ref 40–129)
ALT SERPL-CCNC: 26 U/L (ref 5–41)
ANION GAP SERPL CALCULATED.3IONS-SCNC: 9 MMOL/L (ref 9–17)
AST SERPL-CCNC: 16 U/L
BILIRUB SERPL-MCNC: 0.3 MG/DL (ref 0.3–1.2)
BUN BLDV-MCNC: 18 MG/DL (ref 8–23)
CALCIUM SERPL-MCNC: 9.3 MG/DL (ref 8.6–10.4)
CHLORIDE BLD-SCNC: 92 MMOL/L (ref 98–107)
CO2: 27 MMOL/L (ref 20–31)
CREAT SERPL-MCNC: 0.56 MG/DL (ref 0.7–1.2)
EKG ATRIAL RATE: 85 BPM
EKG ATRIAL RATE: 88 BPM
EKG ATRIAL RATE: 90 BPM
EKG P AXIS: 31 DEGREES
EKG P AXIS: 60 DEGREES
EKG P AXIS: 79 DEGREES
EKG P-R INTERVAL: 182 MS
EKG P-R INTERVAL: 184 MS
EKG P-R INTERVAL: 196 MS
EKG Q-T INTERVAL: 368 MS
EKG Q-T INTERVAL: 376 MS
EKG Q-T INTERVAL: 380 MS
EKG QRS DURATION: 86 MS
EKG QRS DURATION: 88 MS
EKG QRS DURATION: 92 MS
EKG QTC CALCULATION (BAZETT): 445 MS
EKG QTC CALCULATION (BAZETT): 447 MS
EKG QTC CALCULATION (BAZETT): 464 MS
EKG R AXIS: -16 DEGREES
EKG R AXIS: -20 DEGREES
EKG R AXIS: -22 DEGREES
EKG T AXIS: 13 DEGREES
EKG T AXIS: 14 DEGREES
EKG T AXIS: 22 DEGREES
EKG VENTRICULAR RATE: 85 BPM
EKG VENTRICULAR RATE: 88 BPM
EKG VENTRICULAR RATE: 90 BPM
GFR AFRICAN AMERICAN: >60 ML/MIN
GFR NON-AFRICAN AMERICAN: >60 ML/MIN
GFR SERPL CREATININE-BSD FRML MDRD: ABNORMAL ML/MIN/{1.73_M2}
GLUCOSE BLD-MCNC: 92 MG/DL (ref 70–99)
HCT VFR BLD CALC: 34.1 % (ref 41–53)
HEMOGLOBIN: 12.1 G/DL (ref 13.5–17.5)
MCH RBC QN AUTO: 32.1 PG (ref 26–34)
MCHC RBC AUTO-ENTMCNC: 35.6 G/DL (ref 31–37)
MCV RBC AUTO: 90.3 FL (ref 80–100)
PDW BLD-RTO: 12.7 % (ref 11.5–14.9)
PLATELET # BLD: 288 K/UL (ref 150–450)
PMV BLD AUTO: 7.5 FL (ref 6–12)
POTASSIUM SERPL-SCNC: 4.5 MMOL/L (ref 3.7–5.3)
RBC # BLD: 3.78 M/UL (ref 4.5–5.9)
SODIUM BLD-SCNC: 128 MMOL/L (ref 135–144)
TOTAL PROTEIN: 6.1 G/DL (ref 6.4–8.3)
TROPONIN, HIGH SENSITIVITY: 72 NG/L (ref 0–22)
WBC # BLD: 7.6 K/UL (ref 3.5–11)

## 2022-04-18 PROCEDURE — 80053 COMPREHEN METABOLIC PANEL: CPT

## 2022-04-18 PROCEDURE — 99239 HOSP IP/OBS DSCHRG MGMT >30: CPT | Performed by: INTERNAL MEDICINE

## 2022-04-18 PROCEDURE — 1200000000 HC SEMI PRIVATE

## 2022-04-18 PROCEDURE — 93010 ELECTROCARDIOGRAM REPORT: CPT | Performed by: INTERNAL MEDICINE

## 2022-04-18 PROCEDURE — 6360000002 HC RX W HCPCS: Performed by: NURSE PRACTITIONER

## 2022-04-18 PROCEDURE — 6360000002 HC RX W HCPCS: Performed by: INTERNAL MEDICINE

## 2022-04-18 PROCEDURE — 6370000000 HC RX 637 (ALT 250 FOR IP): Performed by: INTERNAL MEDICINE

## 2022-04-18 PROCEDURE — 99221 1ST HOSP IP/OBS SF/LOW 40: CPT | Performed by: PSYCHIATRY & NEUROLOGY

## 2022-04-18 PROCEDURE — 85027 COMPLETE CBC AUTOMATED: CPT

## 2022-04-18 PROCEDURE — 84484 ASSAY OF TROPONIN QUANT: CPT

## 2022-04-18 PROCEDURE — 36415 COLL VENOUS BLD VENIPUNCTURE: CPT

## 2022-04-18 RX ORDER — VENLAFAXINE 75 MG/1
75 TABLET ORAL
Qty: 90 TABLET | Refills: 3 | Status: SHIPPED | OUTPATIENT
Start: 2022-04-18 | End: 2022-08-01

## 2022-04-18 RX ORDER — HYDRALAZINE HYDROCHLORIDE 25 MG/1
25 TABLET, FILM COATED ORAL 3 TIMES DAILY
Qty: 90 TABLET | Refills: 3 | Status: SHIPPED | OUTPATIENT
Start: 2022-04-18 | End: 2022-08-01 | Stop reason: ALTCHOICE

## 2022-04-18 RX ORDER — CARVEDILOL 6.25 MG/1
6.25 TABLET ORAL 2 TIMES DAILY
Qty: 60 TABLET | Refills: 3 | Status: ON HOLD | OUTPATIENT
Start: 2022-04-18 | End: 2022-04-25 | Stop reason: HOSPADM

## 2022-04-18 RX ORDER — DIVALPROEX SODIUM 500 MG/1
500 TABLET, EXTENDED RELEASE ORAL NIGHTLY
Qty: 30 TABLET | Refills: 3 | Status: ON HOLD | OUTPATIENT
Start: 2022-04-18 | End: 2022-08-03 | Stop reason: HOSPADM

## 2022-04-18 RX ORDER — NITROGLYCERIN 0.4 MG/1
TABLET SUBLINGUAL
Qty: 25 TABLET | Refills: 3 | Status: SHIPPED | OUTPATIENT
Start: 2022-04-18

## 2022-04-18 RX ADMIN — HYDRALAZINE HYDROCHLORIDE 25 MG: 25 TABLET, FILM COATED ORAL at 15:55

## 2022-04-18 RX ADMIN — VENLAFAXINE 75 MG: 75 TABLET ORAL at 15:55

## 2022-04-18 RX ADMIN — FINASTERIDE 5 MG: 5 TABLET, FILM COATED ORAL at 08:54

## 2022-04-18 RX ADMIN — DIVALPROEX SODIUM 500 MG: 500 TABLET, EXTENDED RELEASE ORAL at 20:54

## 2022-04-18 RX ADMIN — CARBOXYMETHYLCELLULOSE SODIUM 2 DROP: 10 GEL OPHTHALMIC at 15:38

## 2022-04-18 RX ADMIN — HYDRALAZINE HYDROCHLORIDE 25 MG: 25 TABLET, FILM COATED ORAL at 20:55

## 2022-04-18 RX ADMIN — MORPHINE SULFATE 2 MG: 2 INJECTION, SOLUTION INTRAMUSCULAR; INTRAVENOUS at 00:00

## 2022-04-18 RX ADMIN — ATORVASTATIN CALCIUM 40 MG: 40 TABLET, FILM COATED ORAL at 20:54

## 2022-04-18 RX ADMIN — ACETAMINOPHEN 650 MG: 325 TABLET ORAL at 20:55

## 2022-04-18 RX ADMIN — PANTOPRAZOLE SODIUM 40 MG: 40 TABLET, DELAYED RELEASE ORAL at 05:43

## 2022-04-18 RX ADMIN — ONDANSETRON 4 MG: 4 TABLET, ORALLY DISINTEGRATING ORAL at 08:54

## 2022-04-18 RX ADMIN — CARBOXYMETHYLCELLULOSE SODIUM 2 DROP: 10 GEL OPHTHALMIC at 20:57

## 2022-04-18 RX ADMIN — VENLAFAXINE 75 MG: 75 TABLET ORAL at 12:57

## 2022-04-18 RX ADMIN — ENOXAPARIN SODIUM 40 MG: 100 INJECTION SUBCUTANEOUS at 08:54

## 2022-04-18 RX ADMIN — CARVEDILOL 6.25 MG: 6.25 TABLET, FILM COATED ORAL at 20:54

## 2022-04-18 RX ADMIN — CARVEDILOL 6.25 MG: 6.25 TABLET, FILM COATED ORAL at 08:54

## 2022-04-18 RX ADMIN — CARBOXYMETHYLCELLULOSE SODIUM 2 DROP: 10 GEL OPHTHALMIC at 08:57

## 2022-04-18 RX ADMIN — ACETAMINOPHEN 650 MG: 325 TABLET ORAL at 05:48

## 2022-04-18 RX ADMIN — VENLAFAXINE 75 MG: 75 TABLET ORAL at 08:54

## 2022-04-18 RX ADMIN — HYDRALAZINE HYDROCHLORIDE 25 MG: 25 TABLET, FILM COATED ORAL at 08:54

## 2022-04-18 ASSESSMENT — PAIN SCALES - GENERAL
PAINLEVEL_OUTOF10: 4
PAINLEVEL_OUTOF10: 4
PAINLEVEL_OUTOF10: 6
PAINLEVEL_OUTOF10: 4
PAINLEVEL_OUTOF10: 0
PAINLEVEL_OUTOF10: 3
PAINLEVEL_OUTOF10: 0

## 2022-04-18 ASSESSMENT — PAIN SCALES - PAIN ASSESSMENT IN ADVANCED DEMENTIA (PAINAD)
CONSOLABILITY: 0
BREATHING: 0
FACIALEXPRESSION: 0
BODYLANGUAGE: 0
FACIALEXPRESSION: 0
CONSOLABILITY: 0
BREATHING: 0
NEGVOCALIZATION: 0
BODYLANGUAGE: 0
TOTALSCORE: 0
TOTALSCORE: 0
NEGVOCALIZATION: 0

## 2022-04-18 ASSESSMENT — PAIN DESCRIPTION - ORIENTATION: ORIENTATION: RIGHT;LEFT

## 2022-04-18 ASSESSMENT — PAIN DESCRIPTION - DESCRIPTORS: DESCRIPTORS: SORE

## 2022-04-18 ASSESSMENT — PAIN DESCRIPTION - LOCATION: LOCATION: NECK

## 2022-04-18 ASSESSMENT — PAIN SCALES - WONG BAKER: WONGBAKER_NUMERICALRESPONSE: 0

## 2022-04-18 NOTE — PROGRESS NOTES
Physical Therapy  [address]     Date: 22  Patient Name: Jay Jay Macdonald. Room:   MRN: [id number]  Account: [de-identified]   : 1950  (75 y.o.) Gender: male      Pt is scheduled for cardiac cath tomorrow. Deferred PT today 2* cardiac issues and cardiac cath scheduled. SW leaving the room as therapist entering the room. Per SW, pt and adria (significant other) wants to consider DC to home if another IRF can not accept the patient. This therapist explained and educated pt and Luis Alberto Warner his level of care and amount of assist needed at this time to safely mobilize him. Guy Casey tearful and says \" You are saying I can not take care of him\". Therapist spend time to explain different level of therapies possible at SNF Vs Home, and the level of care pt needs at this time and benefits of therapies at each level. Therapist also discussed different equipment pt will need if discharges home. Pt calm during conversation, verbalizes understanding, both thanks therapist for explaining and educating. Guy Casey requesting to talk to  Genet Shah again. SW notified. Discussed with pt/Sallie DME recommendation if DC home as below. Hospital bed,  Transfers lift system, power wheelchair. Also pt has 1 step at entrance of home, will need ramp at entrance. 14:30 to 15:00P:M.

## 2022-04-18 NOTE — FLOWSHEET NOTE
04/18/22 1952   Encounter Summary   Services provided to: Patient and family together   Referral/Consult From: [facility] Significant other   Continue Visiting   (4-18-22)   Complexity of Encounter Moderate   Length of Encounter 15 minutes   Spiritual/Orthodox   Type Spiritual support   Assessment Anxious   Intervention Active listening;Explored feelings, thoughts, concerns;Sustaining presence/ Ministry of presence; Discussed illness/injury and it's impact   Outcome Expressed gratitude;Engaged in conversation;Expressed feelings/needs/concerns;Venting emotion

## 2022-04-18 NOTE — PROGRESS NOTES
Date:   4/18/2022  Patient name: Greer Clements. Date of admission:  4/15/2022 11:01 PM  MRN:   [id number]  YOB: 1950  PCP: Hank Johnson    Reason for Admission: Chest pain [R07.9]    Cardiology follow-up: Chest pain, abnormal troponin       Referring physician: Dr Laurent Beam     Impression     Abnormal troponin 67, high-sensitivity troponin was elevated at 53 and 53 on 3/31/2022  Chest pain  Hypertension  Mild to moderate LVH  Hyperlipidemia  Cervical spinal cord compression, myelopathy, radiculopathy, status post surgery fusion C4-C7  Encephalopathy/CT brain 3/26/2022 no acute process  Hospital-acquired pneumonia  Hyponatremia     2D echo 3/31/2022  Normal LV size, mild to moderate LVH normal wall motion  Ejection fraction more than 55%, RVSP 30  Mildly dilated LA   Normal IVC size and respiratory variation  No significant valvular abnormality    History of present illness     77-year-old male who got admitted at acute rehab unit on 3/31/2020 with the problem of frequent falls.  He has past medical history of cervical stenosis and C-spine surgery few years ago.  He was doing well up to 6 weeks ago and then he started deteriorating neurologically and has had frequent falls.  CT brain on 3/26/2022 did not show any acute process.  2D echo on 3/31/2022 showed normal LV size normal LV function ejection fraction more than 55%, normal wall motion, mild to moderate LVH.     Patient had chest pain earlier this morning left-sided 6 x 10 like a pressure sensation, patient received nitroglycerin x2 no relief.  Patient has constant chest pain since then more than 7 hours.  At present pain is 4 x 10.  No palpitation no diaphoresis no nausea no vomiting.  He does have a history of acid reflux.  No nausea no vomiting no change in pain by deep breathing or cough.   Electrocardiogram showed normal sinus rhythm, no ischemic changes, moderate criteria for LVH  High-sensitivity troponin 67  Previous high-sensitivity troponin on 3-31-22 was 53 and 53    Lab work 4/15/2022  Sodium 129, potassium 4.1, BUN 26, creatinine 0.70, glucose 154  High-sensitivity troponin 67  Serum cortisol 22.9  WBC 7.9, hemoglobin 12.4, platelets 750     Current evaluation  Patient seen and examined   Frail looking male resting with 1 pillow  Complaining of mild chest pain today's high-sensitivity troponin 72  No diaphoresis or palpitation  Patient said he wants heart catheterization    Afebrile  CV monitor sinus rhythm  Sign of cardiac decompensation    Sodium 128, potassium 4.5, creatinine 0.56, hemoglobin 12.1, platelets 354, albumin 3.7      Medications:   Scheduled Meds:   carvedilol  6.25 mg Oral BID    pantoprazole  40 mg Oral QAM AC    atorvastatin  40 mg Oral Nightly    bisacodyl  10 mg Rectal QPM    carboxymethylcellulose  2 drop Both Eyes TID    divalproex  500 mg Oral Nightly    enoxaparin  40 mg SubCUTAneous Daily    finasteride  5 mg Oral Daily    hydrALAZINE  25 mg Oral TID    lidocaine  2 patch TransDERmal Daily    polyethylene glycol  17 g Oral Daily    venlafaxine  75 mg Oral TID WC     Continuous Infusions:  CBC:   Recent Labs     04/17/22  0643 04/18/22  0637   WBC 7.1 7.6   HGB 12.5* 12.1*    288     BMP:    Recent Labs     04/17/22  0643 04/18/22  0637   * 128*   K 4.5 4.5   CL 92* 92*   CO2 27 27   BUN 20 18   CREATININE 0.63* 0.56*   GLUCOSE 92 92     Hepatic:   Recent Labs     04/17/22  0643 04/18/22  0637   AST 18 16   ALT 28 26   BILITOT 0.28* 0.30   ALKPHOS 130* 136*     Troponin: No results for input(s): TROPONINI in the last 72 hours. BNP: No results for input(s): BNP in the last 72 hours. Lipids: No results for input(s): CHOL, HDL in the last 72 hours. Invalid input(s): LDLCALCU  INR: No results for input(s): INR in the last 72 hours.     Objective:   Vitals: BP (!) 178/108   Pulse 74   Temp 97.2 °F (36.2 °C) (Oral)   Resp 18   Ht 5' 3\" (1.6 m)   Wt 149 lb 7.6 oz (67.8 kg)   SpO2 100% BMI 26.48 kg/m²   General appearance: alert and cooperative with exam  HEENT: Head: Normal, normocephalic, atraumatic. Neck: no JVD and supple, symmetrical, trachea midline  Lungs: diminished breath sounds bibasilar  Heart: regular rate and rhythm, no pericardial rub  Abdomen: soft, non-tender; bowel sounds normal; no masses,  no organomegaly  Extremities: Homans sign is negative, no sign of DVT  Neurologic: Mental status: Alert, oriented, thought content appropriate    EKG: normal sinus rhythm.  Voltage criteria for LVH  ECHO: reviewed.    Ejection fraction: 55%, mild to moderate LVH    Assessment / Acute Cardiac Problems:     Chest pain like a pressure ,  for more than 7 hours  No relief with the nitroglycerin x2  No ischemic ECG changes  Abnormal high-sensitivity troponin 67, 70, 73  Elevated high-sensitivity troponin on 3/31/2000 2253 and 53  No sign of cardiopulmonary decompensation    4/18/2022 hemodynamically stable complaining of mild chest discomfort  Patient wants cardiac cath      Patient Active Problem List:     Hypertension     Hyperlipidemia     Diabetes mellitus ([address]     Contusion of right shoulder     Bipolar disorder, mixed ([address]     First known suicide attempt [facility])     Erectile dysfunction     Cervical stenosis of spinal canal     Incomplete spinal cord lesion at C5-C7 level ([address]     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)     Cervical spondylosis with radiculopathy     Acute blood loss anemia     Precordial pain     Depression with suicidal ideation     Severe recurrent major depression without psychotic features ([address]     Frequent falls     Hyponatremia     Cervical spinal cord compression (HCC)     Cord compression (HCC)     Quadriplegia, C1-C4 incomplete (HCC)     Encephalopathy     Hospital-acquired pneumonia     Atelectasis     Cervical stenosis of spine     Moderate malnutrition (HCC)     Chest pain      Plan of Treatment:   Medications reviewed  Continue current dose of carvedilol, Lipitor, hydralazine  Keep patient n.p.o. after midnight  I have called the Cath Lab cardiac catheter tomorrow          Electronically signed by Cali Field MD on 4/18/2022 at 1:11 PM

## 2022-04-18 NOTE — CONSULTS
Department of Psychiatry  Behavioral Health Consult    REASON FOR CONSULT: Lethargy    CONSULTING PHYSICIAN: Dr. Rachel Moncada    History obtained from: Olive Scales and ex-wife    HISTORY OF PRESENT ILLNESS:    The patient is a 70 y.o. male with significant past psychiatric history of depression and suicidal ideation as well as suicide attempts who has been seen by psychiatry  during a recent admission . The patient was admitted to the hospital for chest pain. The patient is now referred for lethargy. The patient was seen in his room. His ex-wife is present at bedside. The patient has increased latency in responses. As per ex-wife \"Morphine makes him loopy\". The patient was oriented to place and situation. He said the date was 25 April 2022. He is reporting some anxiety and depressive symptoms but generally feels okay. He is denying any suicidal ideation. He denies any auditory or visual hallucinations or psychotic phenomena. The patient reports some difficulty in sleeping but is generally able to stay asleep. Hyponatremia noted. .    No diagnosis of bipolar disorder. The patient is currently receiving care for the above psychiatric illness. Psychiatric Review of Systems        ·    Obsessions and Compulsions: Denies    ·    Ruby or Hypomania: Denies  ·    Hallucinations: Denies  ·    Panic Attacks:  Denies  ·    Delusions:  Denies  ·    Phobias:  Denies  ·    Trauma: Denies      Substance Abuse History:  The patient has a history of abusing Flexeril in the past but denies any recent use of recreational substances. Past Psychiatric History:  The patient has a previous diagnosis of depression. He has been admitted to psychiatry several times. He has a history of suicide attempts.   He is currently getting treatment from his PCP         Past Medical History:        Diagnosis Date    Depression 2017    ON RX    Diabetes mellitus ([address] 2013    NIDDM    Difficult intravenous access     Hyperlipidemia 2008    ON RX    Hypertension 2008    ON RX    Migraines     PTSD (post-traumatic stress disorder) 01/2018    ON RX    Sleep apnea 01/2018    UNALBE TO USE TO CLEARED BY ENT (CPAP)    Teeth missing     BACK TEETH UPPER AND LOWER TO BE FITTED FOR PARTIALS AT LATER DATE    Wears glasses        Past Surgical History:        Procedure Laterality Date    CARDIAC CATHETERIZATION  02/2010    no stents     CARPAL TUNNEL RELEASE Left 01/09/2002    CATARACT REMOVAL      CERVICAL FUSION  04/19/2018    anterior cervical fusion C5-6    CERVICAL FUSION N/A 3/24/2022    C2-5 FUSION, C2-4 LAMINECTOMY performed by Issa Perez DO at [facility] Left 2018    CATARACT EXTRACTION WITH IOL    HYDROCELE EXCISION  1951    LAMINECTOMY  03/24/2022    C2-5 FUSION, C2-4 LAMINECTOMY    MIDDLE EAR SURGERY  01/11/2018    MIDDLE EAR REBUILT AND EAR DRUM REPLACED    MOUTH SURGERY  04/16/2018    5 TEETH REMOVED    OTHER SURGICAL HISTORY  04/19/2018    : ANTERIOR CERVICAL CORRPECTOMY C5-6, SYNTHES, DEPUY, REG TABLE, SUPINE,     DC OFFICE/OUTPT VISIT,PROCEDURE ONLY N/A 4/19/2018    ANTERIOR CERVICAL CORRPECTOMY AND FUSION C5-6 performed by Issa Perez DO at [address] [place] [address]  12/1983         Medications Prior to Admission:   Medications Prior to Admission: finasteride (PROSCAR) 5 MG tablet, Take 5 mg by mouth daily  carboxymethylcellulose 1 % ophthalmic solution, Place 2 drops into both eyes 3 times daily Pt states \"2 drops in both eyes three times a day\"  [DISCONTINUED] divalproex (DEPAKOTE ER) 250 MG extended release tablet, Take 750 mg by mouth at bedtime  [DISCONTINUED] furosemide (LASIX) 20 MG tablet, Take 20 mg by mouth daily  simethicone (MYLICON) 80 MG chewable tablet, Take 1 tablet by mouth every 6 hours as needed for Flatulence (Patient taking differently: Take 80 mg by mouth every 8 hours as needed for Flatulence )  [DISCONTINUED] venlafaxine (EFFEXOR XR) 150 MG extended release capsule, Take 1 capsule by mouth daily (Patient taking differently: Take 225 mg by mouth daily )  lidocaine (XYLOCAINE) 5 % ointment, Apply topically daily as needed for Pain Apply topically as needed.  LF 4/20/20 (30 day supply) (Patient not taking: Reported on 3/23/2022)  metFORMIN (GLUCOPHAGE) 1000 MG tablet, Take 1,000 mg by mouth 2 times daily (with meals) LF 4/27/20 (90 day supply) (Patient not taking: Reported on 3/21/2022)  [DISCONTINUED] losartan (COZAAR) 25 MG tablet, Take 25 mg by mouth daily   aspirin 81 MG chewable tablet, Take 81 mg by mouth daily (Patient not taking: Reported on 3/21/2022)  cetirizine (ZYRTEC) 10 MG tablet, Take 10 mg by mouth daily LF 4/6/20 (90 day supply) (Patient not taking: Reported on 3/21/2022)  atorvastatin (LIPITOR) 80 MG tablet, Take 40 mg by mouth daily Take 1/2 tablet (40 mg) daily LF 4/22/20 (90 day supply) (Patient not taking: Reported on 3/21/2022)  omeprazole (PRILOSEC) 20 MG delayed release capsule, Take 20 mg by mouth every evening LF 4/21/20 (90 day supply)  [DISCONTINUED] divalproex (DEPAKOTE ER) 500 MG extended release tablet, Take 500 mg by mouth every morning   [DISCONTINUED] traZODone (DESYREL) 100 MG tablet, Take 150 mg by mouth nightly as needed LF 5/1/20 (30 day supply)  [DISCONTINUED] amLODIPine (NORVASC) 10 MG tablet, Take 7.5 mg by mouth daily   [DISCONTINUED] sildenafil (VIAGRA) 100 MG tablet, Take 100 mg by mouth as needed for Erectile Dysfunction LF 5/12/20 (30 day supply)    Allergies:  Latex, Bee venom, Lisinopril, Niacin and related, Sulfa antibiotics, and Terazosin    FAMILY/SOCIAL HISTORY:  Family History   Problem Relation Age of Onset    Dementia Mother     Coronary Art Dis Mother     Stroke Mother     Diabetes Mother     Asthma Mother     Other Mother         BRAIN ANEURISM    High Blood Pressure Mother     Heart Disease Father     Diabetes Sister     Diabetes Brother     High Blood Pressure Brother     High Cholesterol Brother     Heart Disease Brother     Diabetes Sister     Other Sister         NEUROPATHY     Social History     Socioeconomic History    Marital status:      Spouse name: Not on file    Number of children: Not on file    Years of education: Not on file    Highest education level: Not on file   Occupational History    Not on file   Tobacco Use    Smoking status: Former Smoker     Packs/day: 0.50     Years: 4.00     Pack years: 2.00     Types: Cigarettes     Quit date: 1972     Years since quittin.0    Smokeless tobacco: Never Used    Tobacco comment: quit    Vaping Use    Vaping Use: Never used   Substance and Sexual Activity    Alcohol use: Yes     Comment: MIXED DRINKS- 3 OR 4 A YEAR; last 2018    Drug use: Yes     Types: Marijuana Alanis Ege)    Sexual activity: Yes     Partners: Female   Other Topics Concern    Not on file   Social History Narrative    Not on file     Social Determinants of Health     Financial Resource Strain:     Difficulty of Paying Living Expenses: Not on file   Food Insecurity:     Worried About [address] [employer] in the Last Year: Not on file    Judah of Food in the Last Year: Not on file   Transportation Needs:     Lack of Transportation (Medical): Not on file    Lack of Transportation (Non-Medical):  Not on file   Physical Activity:     Days of Exercise per Week: Not on file    Minutes of Exercise per Session: Not on file   Stress:     Feeling of Stress : Not on file   Social Connections:     Frequency of Communication with Friends and Family: Not on file    Frequency of Social Gatherings with Friends and Family: Not on file    Attends Zoroastrian Services: Not on file    Active Member of Clubs or Organizations: Not on file    Attends Club or Organization Meetings: Not on file    Marital Status: Not on file   Intimate Partner Violence:     Fear of Current or Ex-Partner: Not on file    Emotionally Abused: Not on file    Physically Abused: Not on file    Sexually Abused: Not on file   Housing Stability:     Unable to Pay for Housing in the Last Year: Not on file    Number of Places Lived in the Last Year: Not on file    Unstable Housing in the Last Year: Not on file       REVIEW OF SYSTEMS    Constitutional: [] fever  [] chills  [] weight loss  []weakness [] Other:  Eyes:  [] photophobia  [] discharge [] acuity change   [] Diplopia   [] Other:  HENT:  [] sore throat  [] ear pain [] Tinnitus   [] Other  Respiratory:  [] Cough  [] Shortness of breath   [] Sputum   [] Other:   Cardiac: []Chest pain   []Palpitations []Edema  []PND  [] Other:  GI:  []Abdominal pain   []Nausea  []Vomiting  []Diarrhea  [] Other:  :  [] Dysuria   []Frequency  []Hematuria  []Discharge  [] Other:  Possible Pregnancy: []Yes   []No   LMP:   Musculoskeletal:  []Back pain  []Neck pain  []Recent Injury   Skin:  []Rash  [] Itching  [] Other:  Neurologic:  [] Headache  [] Focal weakness  [] Sensory changes []Other:  Endocrine:  [] Polyuria  [] Polydipsia  [] Hair Loss  [] Other:  Lymphatic:   [] Swollen glands   Psychiatric:  As per HPI      All other systems negative except as marked or mentioned/indicated in the HPI. Divina Alarcon      PHYSICAL EXAM:  Vitals:  BP (!) 178/108   Pulse 74   Temp 97.2 °F (36.2 °C) (Oral)   Resp 18   Ht 5' 3\" (1.6 m)   Wt 149 lb 7.6 oz (67.8 kg)   SpO2 100%   BMI 26.48 kg/m²      Neuro Exam:        Involuntary Movements: No    Mental Status Examination:    Level of consciousness:  within normal limits   Appearance:  hospital attire  Behavior/Motor:  no abnormalities noted  Attitude toward examiner:  cooperative and attentive  Speech:  increased latency   Mood: anxious and constricted  Affect:  mood congruent  Thought processes:  goal directed and coherent   Thought content:  Suicidal Ideation:  denies suicidal ideation  Delusions:  no evidence of delusions  Perceptual Disturbance:  denies any perceptual disturbance  Cognition:  oriented to person, place, and time   Concentration intact  Memory intact  Insight fair   Judgement fair   Fund of Knowledge adequate        LABS: REVIEWED TODAY:  Recent Labs     04/17/22 0643 04/18/22  0637   WBC 7.1 7.6   HGB 12.5* 12.1*    288     Recent Labs     04/17/22  0643 04/18/22  0637   * 128*   K 4.5 4.5   CL 92* 92*   CO2 27 27   BUN 20 18   CREATININE 0.63* 0.56*   GLUCOSE 92 92     Recent Labs     04/17/22  0643 04/18/22  0637   BILITOT 0.28* 0.30   ALKPHOS 130* 136*   AST 18 16   ALT 28 26     Lab Results   Component Value Date    BARBSCNU NEGATIVE 08/16/2017    LABBENZ NEGATIVE 08/16/2017    LABMETH NEGATIVE 08/16/2017    PPXUR NOT REPORTED 08/16/2017     Lab Results   Component Value Date    TSH 0.67 03/27/2022     No results found for: LITHIUM  Lab Results   Component Value Date    VALPROATE 42 (L) 03/26/2022    VALPROATE 5.3 (L) 03/26/2022     Lab Results   Component Value Date    VALPROATE 42 03/26/2022    VALPROATE 5.3 03/26/2022       FURTHER LABS ORDERED :      Radiology   ECHO Complete 2D W Doppler W Color    Result Date: 3/31/2022  Transthoracic Echocardiography Report (TTE)  Patient Name Emilie Tsai Date of Study               03/31/2022               T   Date of      1950  Gender                      Male  Birth   Age          70 year(s)  Race                           Room Number  145         Height:                     63 inch, 160.02 cm   Corporate ID [id number]    Weight:                     153 pounds, 69.4 kg  #   Patient Acct [de-identified]   BSA:          1.73 m^2      BMI:      27.1 kg/m^2  #   MR #         [id number]     Sonographer                 Jignesh Bauer   Accession #  [id number]  Interpreting Physician      Vickey Goff   Fellow                   Referring Nurse                           Practitioner   Interpreting             Referring Physician         Jatinder Bertrand MD  Fellow  Type of Study   TTE procedure:2D Echocardiogram, M-Mode, Doppler, Color Doppler. Procedure Date Date: 03/31/2022 Start: 03:49 PM Study Location: [facility] Indications:Elevated troponin. History / Tech. Comments: S/P Fall Patient Status: Inpatient Height: 63 inches Weight: 153 pounds BSA: 1.73 m^2 BMI: 27.1 kg/m^2 Allergies   - Sulfa. - Stings. CONCLUSIONS Summary Normal LV size , mild to moderately increased LV wall thickness . No obvious wall motion abnormality seen. Normal LV systolic function with LVEF >55%. Normal RV size and function. RV systolic pressure 30 mmHg LA appears mildly dilated and RA appears normal in size. No obvious significant structural valvular abnormality noted. Mild AR Normal aortic root dimension. No significant pericardial effusion noted. No obvious intra-cardiac mass or shunt noted. IVC normal diameter and inspiratory variation indicating normal RA filling pressure. Signature ----------------------------------------------------------------------------  Electronically signed by Rossana Taylor(Sonographer) on 03/31/2022 04:41  PM ---------------------------------------------------------------------------- ----------------------------------------------------------------------------  Electronically signed by Fay Castro(Interpreting physician) on  04/01/2022 12:17 PM ---------------------------------------------------------------------------- FINDINGS Left Atrium Left atrium is mildly enlarged. Left Ventricle Left ventricle is normal in size with systolic function within the range of normal. Mild to moderate left ventricular hypertrophy. Calculated ejection fraction by bi-plane Lester's method is 54% Grade II (moderate) left ventricular diastolic dysfunction. Right Atrium Right atrium is normal in size. Right Ventricle Normal right ventricular size and function. Mitral Valve Mitral valve structure is normal. Mild mitral regurgitation. Aortic Valve Aortic valve is trileaflet.  Aortic sclerosis without stenosis. Mild aortic insufficiency. Tricuspid Valve Tricuspid valve structure is normal. Mild tricuspid regurgitation. Estimated right ventricular systolic pressure is 30 mmHg. Pulmonic Valve The pulmonic valve is normal in structure. Pericardial Effusion No significant pericardial effusion is seen. Miscellaneous Normal aortic root dimension. E/E' average = 10.  IVC diameter and inspiratory collapse is normal. M-mode / 2D Measurements & Calculations:   LVIDd:5.7 cm(3.7 - 5.6 cm)       Diastolic NTRGAV:51.2 ml  XZZVL:6.81 cm(2.2 - 4.0 cm)      Systolic OHWIJN:47.9 ml  HXBA:5.0 cm(0.6 - 1.1 cm)        Aortic Root:3.3 cm(2.0 - 3.7 cm)  LVPWd:1.1 cm(0.6 - 1.1 cm)       LA Dimension: 4.2 cm(1.9 - 4.0 cm)  Fractional Shortenin.91 %    LA volume/Index: 69.63 ml /40m^2  Calculated LVEF (%): 54.17 %     LVOT:2.2 cm                                   RVDd:2.8 cm   Mitral:                                 Aortic   Valve Area (P1/2-Time): 3.33 cm^2       Peak Velocity: 1.53 m/s  Peak E-Wave: 0.72 m/s                   Mean Velocity: 0.94 m/s  Peak A-Wave: 0.94 m/s                   Peak Gradient: 9.36 mmHg  E/A Ratio: 0.76                         Mean Gradient: 4 mmHg  Peak Gradient: 2.05 mmHg                AI P1/2t: 543 msec  Mean Gradient: 2 mmHg  Deceleration Time: 218 msec             Area (continuity): 3.58 cm^2  P1/2t: 66 msec                          AV VTI: 24.8 cm   Area (continuity): 3.6 cm^2  Mean Velocity: 0.61 m/s   Tricuspid:                              Pulmonic:   Estimated RVSP: 30 mmHg                 Peak Velocity: 1.41 m/s  Peak TR Velocity: 2.27 m/s              Peak Gradient: 7.95 mmHg  Peak TR Gradient: 20.6116 mmHg  Estimated RA Pressure: 10 mmHg                                           Estimated PASP: 30.61 mmHg  Diastology / Tissue Doppler Septal Wall E' velocity:0.06 m/s Septal Wall E/E':12 Lateral Wall E' velocity:0.09 m/s Lateral Wall E/E':8    XR CERVICAL SPINE (2-3 VIEWS)    Result Date: 3/25/2022  EXAMINATION: 2 XRAY VIEWS OF THE CERVICAL SPINE 3/25/2022 6:05 am COMPARISON: 03/21/2022, 03/20/2022, 09/19/2018 HISTORY: ORDERING SYSTEM PROVIDED HISTORY: s/p PCDF AP and lateral upright TECHNOLOGIST PROVIDED HISTORY: s/p PCDF AP and lateral upright FINDINGS: Immediate postoperative changes of laminectomy with posterior fusion with paraspinal rods and lateral mass screws spanning C2 through C4 with an overlying cervical drain, soft tissue gas, and cutaneous staples. The new hardware appears intact. Prior anterior fusion spanning C4 through C7 with C5-C6 corpectomy. Prevertebral soft tissues are not thickened. Imaged portion of the lung apices are clear. Immediate postoperative changes of laminectomy with posterior fusion at C2 through C4.     CT HEAD WO CONTRAST    Result Date: 3/26/2022  EXAM: CT Head Without Intravenous Contrast EXAM DATE/TIME: 3/26/2022 3:04 pm CLINICAL HISTORY: ORDERING SYSTEM PROVIDED  R/o intra-cranial pathology  TECHNOLOGIST PROVIDED HISTORY:  R/o intra-cranial pathology TECHNIQUE: Axial computed tomography images of the head/brain without intravenous contrast.  This CT exam was performed using one or more of the following dose reduction techniques:  automated exposure control, adjustment of the mA and/or kV according to patient size, and/or use of iterative reconstruction technique. COMPARISON: MRI of the brain 03/21/2022 and CT scan of the brain 03/20/2022 FINDINGS: Brain:  No acute findings. No hemorrhage. No significant white matter disease. No edema. Ventricles:  No acute findings. No ventriculomegaly. Bones/joints:  No acute findings. No acute fracture. Soft tissues:  No acute findings. Sinuses:  Unremarkable as visualized. No acute sinusitis. Mastoid air cells:  Unremarkable as visualized. No mastoid effusion. No acute findings in the head/brain.      CT HEAD WO CONTRAST    Result Date: 3/20/2022  EXAMINATION: CT OF THE HEAD WITHOUT CONTRAST  3/20/2022 7:11 pm TECHNIQUE: CT of the head was performed without the administration of intravenous contrast. Dose modulation, iterative reconstruction, and/or weight based adjustment of the mA/kV was utilized to reduce the radiation dose to as low as reasonably achievable. COMPARISON: 12/05/2018 HISTORY: ORDERING SYSTEM PROVIDED HISTORY: Fall TECHNOLOGIST PROVIDED HISTORY: Fall Decision Support Exception - unselect if not a suspected or confirmed emergency medical condition->Emergency Medical Condition (MA) Reason for Exam: fall, hit head on toilet FINDINGS: BRAIN/VENTRICLES: There is no acute intracranial hemorrhage, mass effect or midline shift. No abnormal extra-axial fluid collection. The gray-white differentiation is maintained without evidence of an acute infarct. There is no evidence of hydrocephalus. Unchanged mild prominence of pituitary gland. ORBITS: The visualized portion of the orbits demonstrate no acute abnormality. SINUSES: The visualized paranasal sinuses and mastoid air cells demonstrate no acute abnormality. SOFT TISSUES/SKULL:  No acute abnormality of the visualized skull or soft tissues. No acute intracranial abnormality. RECOMMENDATIONS: Unavailable     CT CERVICAL SPINE WO CONTRAST    Result Date: 3/20/2022  EXAMINATION: CT OF THE CERVICAL SPINE WITHOUT CONTRAST 3/20/2022 7:14 pm TECHNIQUE: CT of the cervical spine was performed without the administration of intravenous contrast. Multiplanar reformatted images are provided for review. Dose modulation, iterative reconstruction, and/or weight based adjustment of the mA/kV was utilized to reduce the radiation dose to as low as reasonably achievable.  COMPARISON: 12/05/2018 HISTORY: ORDERING SYSTEM PROVIDED HISTORY: Fall TECHNOLOGIST PROVIDED HISTORY: Fall Decision Support Exception - unselect if not a suspected or confirmed emergency medical condition->Emergency Medical Condition (MA) Reason for Exam: fall, back pain FINDINGS: BONES/ALIGNMENT: Stable corpectomy and anterior fusion from C4-C7. Vertebral body alignment is normal.  Facet joints are normally aligned. Ankylosis of the left facet at C4-C5. There is no acute fracture or traumatic malalignment. DEGENERATIVE CHANGES: Prominent residual cervical spondylosis projecting into the spinal canal on the right at C5-C6, unchanged. SOFT TISSUES: There is no prevertebral soft tissue swelling. Stable cervical spine CT examination status post corpectomy and anterior fusion from C4-C7. No acute fracture or traumatic malalignment. RECOMMENDATIONS: Unavailable     CT THORACIC SPINE WO CONTRAST    Result Date: 3/20/2022  EXAMINATION: CT OF THE THORACIC SPINE WITHOUT CONTRAST; CT OF THE LUMBAR SPINE WITHOUT CONTRAST 3/20/2022 TECHNIQUE: CT of the thoracic spine was performed without the administration of intravenous contrast. Multiplanar reformatted images are provided for review. Dose modulation, iterative reconstruction, and/or weight based adjustment of the mA/kV was utilized to reduce the radiation dose to as low as reasonably achievable.; CT of the lumbar spine was performed without the administration of intravenous contrast. Multiplanar reformatted images are provided for review. Adjustment of mA and/or kV according to patient size was utilized. Dose modulation, iterative reconstruction, and/or weight based adjustment of the mA/kV was utilized to reduce the radiation dose to as low as reasonably achievable.  COMPARISON: CT chest 03/13/2018 HISTORY: ORDERING SYSTEM PROVIDED HISTORY: Fall TECHNOLOGIST PROVIDED HISTORY: Fall Reason for Exam: fall, back pain; ORDERING SYSTEM PROVIDED HISTORY: Fall TECHNOLOGIST PROVIDED HISTORY: Fall Decision Support Exception - unselect if not a suspected or confirmed emergency medical condition->Emergency Medical Condition (MA) Reason for Exam: fall, back pain FINDINGS: BONES/ALIGNMENT: Thoracic and lumbar vertebral body heights and alignment are normal.  There is no acute fracture or traumatic malalignment. DEGENERATIVE CHANGES: There are multilevel degenerative changes throughout the thoracic and lumbar spine. SOFT TISSUES: No paraspinal mass is seen. Lower cervical corpectomy and fusion. Large left renal cyst incompletely visualized on this study. Multilevel degenerative changes with no acute fracture or traumatic malalignment of the thoracolumbar spine. RECOMMENDATIONS: Unavailable     CT LUMBAR SPINE WO CONTRAST    Result Date: 3/20/2022  EXAMINATION: CT OF THE THORACIC SPINE WITHOUT CONTRAST; CT OF THE LUMBAR SPINE WITHOUT CONTRAST 3/20/2022 TECHNIQUE: CT of the thoracic spine was performed without the administration of intravenous contrast. Multiplanar reformatted images are provided for review. Dose modulation, iterative reconstruction, and/or weight based adjustment of the mA/kV was utilized to reduce the radiation dose to as low as reasonably achievable.; CT of the lumbar spine was performed without the administration of intravenous contrast. Multiplanar reformatted images are provided for review. Adjustment of mA and/or kV according to patient size was utilized. Dose modulation, iterative reconstruction, and/or weight based adjustment of the mA/kV was utilized to reduce the radiation dose to as low as reasonably achievable. COMPARISON: CT chest 03/13/2018 HISTORY: ORDERING SYSTEM PROVIDED HISTORY: Fall TECHNOLOGIST PROVIDED HISTORY: Fall Reason for Exam: fall, back pain; ORDERING SYSTEM PROVIDED HISTORY: Fall TECHNOLOGIST PROVIDED HISTORY: Fall Decision Support Exception - unselect if not a suspected or confirmed emergency medical condition->Emergency Medical Condition (MA) Reason for Exam: fall, back pain FINDINGS: BONES/ALIGNMENT: Thoracic and lumbar vertebral body heights and alignment are normal.  There is no acute fracture or traumatic malalignment. DEGENERATIVE CHANGES: There are multilevel degenerative changes throughout the thoracic and lumbar spine. SOFT TISSUES: No paraspinal mass is seen. Lower cervical corpectomy and fusion. Large left renal cyst incompletely visualized on this study. Multilevel degenerative changes with no acute fracture or traumatic malalignment of the thoracolumbar spine. RECOMMENDATIONS: Unavailable     MRI CERVICAL SPINE WO CONTRAST    Result Date: 3/21/2022  EXAMINATION: MRI OF THE CERVICAL SPINE WITHOUT CONTRAST 3/21/2022 3:38 pm TECHNIQUE: Multiplanar multisequence MRI of the cervical spine was performed without the administration of intravenous contrast. COMPARISON: None. HISTORY: ORDERING SYSTEM PROVIDED HISTORY: spinal stenosis TECHNOLOGIST PROVIDED HISTORY: spinal stenosis Reason for Exam: Pt having several recent falls due to weakness. Subsequent evaluation. FINDINGS: Is a motion BONES/ALIGNMENT: Postsurgical changes are seen with anterior fusion hardware involving the C4 through C7 levels with partial corpectomy of C5 through C6. The remaining vertebral body heights appear grossly maintained. No significant spondylolisthesis seen. SPINAL CORD: There is question of minimal T2 hyperintensity within the cord at C4-C5 (sagittal T2 series 5, image 7). No abnormal cord signal otherwise seen. SOFT TISSUES: No gross evidence of a paraspinal mass. C2-C3: There is a disc bulge, which appears scratch head there is a disc bulge with a central disc protrusion, which appears to indent on the ventral cord as well as buckling of the ligamentum flavum. There appears to be complete effacement of the CSF. There appears to be severe spinal canal stenosis. Uncovertebral joint and facet arthrosis contributes to moderate right neural foraminal narrowing. No significant left neural foraminal narrowing. C3-C4: There is a disc bulge with a central disc protrusion, which indents on the ventral aspect of the cervical cord. There is buckling of the ligamentum flavum. There appears to be complete effacement of the CSF.   Severe spinal canal stenosis. Uncovertebral joint and facet arthrosis contributes to moderate to severe right and moderate left neural foraminal narrowing. C4-C5: The spinal canal appears surgically decompressed. Uncovertebral joint and facet arthrosis appears to contribute to mild right and moderate left neural foraminal narrowing. C5-C6: There is a right paracentral posterior osteophyte, which appears to contact the right ventral cervical cord. The spinal canal appears surgically decompressed. Uncovertebral joint and facet arthrosis contributes to mild right and moderate left neural foraminal narrowing. C6-C7: There is a posterior osteophyte which contacts the ventral cervical cord. No significant spinal canal stenosis. Uncovertebral joint and facet arthrosis contributes to moderate right and mild left neural foraminal narrowing. C7-T1: There is a posterior disc osteophyte complex indenting on the ventral thecal sac. No significant spinal canal stenosis. Uncovertebral joint and facet arthrosis appears to contribute to severe bilateral neural foraminal narrowing. 1. Patient motion limits evaluation. 2. There is severe spinal canal stenosis at C2-C3 and C3-C4 with complete effacement of the CSF at these levels. 3. No significant spinal canal stenosis at the remaining levels. 4. Multilevel neural foraminal narrowing as above. 5. There is question of minimal T2 hyperintensity within the cord at C4-C5, which likely represents myelomalacia.      MRI THORACIC SPINE WO CONTRAST    Result Date: 3/22/2022  EXAMINATION: MRI OF THE THORACIC SPINE WITHOUT CONTRAST  3/22/2022 9:37 am TECHNIQUE: Multiplanar multisequence MRI of the thoracic spine was performed without the administration of intravenous contrast. COMPARISON: CT thoracic spine 03/20/2022 HISTORY: ORDERING SYSTEM PROVIDED HISTORY: spinal stenosis TECHNOLOGIST PROVIDED HISTORY: spinal stenosis Reason for Exam: Frequent falls FINDINGS: BONES/ALIGNMENT: There is normal alignment of the thoracic spine. There is no acute fracture or listhesis. Bone marrow signal intensity is normal. There is mild multilevel disc desiccation and disc space narrowing within the midthoracic spine. Multilevel anterior osteophyte formation is present. SPINAL CORD: The thoracic spinal cord is normal in size and signal intensities. SOFT TISSUES: There is no paraspinal mass identified. DEGENERATIVE CHANGES: T2-T3: There is a disc bulge present. No significant spinal canal stenosis or neural foraminal narrowing is present. T4-T5: There is a disc bulge present. There is no significant spinal canal stenosis or neural foraminal narrowing. T7-T8: There is a 1 mm right paracentral disc protrusion. There is no significant spinal canal stenosis or neural foraminal narrowing. T8-T9: There is a right paracentral disc protrusion and thickening of the ligamentum flavum mildly narrowing the spinal canal.  There is no neural foraminal narrowing. T9-T10: There is a 2 mm central disc protrusion mildly narrowing the spinal canal.  There is no neural foraminal narrowing. T10-T11: There is a 3 mm left paracentral disc protrusion mildly narrowing the spinal canal.  There is flattening of the ventral surface of the spinal cord. Mild multilevel degenerative changes of the thoracic spine, as described above with mild spinal canal stenosis from T8-9 to T10-11, most pronounced at T10-11. MRI LUMBAR SPINE WO CONTRAST    Result Date: 3/22/2022  EXAMINATION: MRI OF THE LUMBAR SPINE WITHOUT CONTRAST, 3/22/2022 9:41 am TECHNIQUE: Multiplanar multisequence MRI of the lumbar spine was performed without the administration of intravenous contrast. COMPARISON: 03/20/2022 HISTORY: ORDERING SYSTEM PROVIDED HISTORY: spinal stenosis TECHNOLOGIST PROVIDED HISTORY: spinal stenosis Reason for Exam: Frequent falls FINDINGS: BONES/ALIGNMENT: There is a levoconvex lumbar scoliosis. There is no acute fracture.   Bone marrow signal intensity is normal.  There is multilevel disc desiccation. There is disc space narrowing at L2-L3 and L4-L5 There is no spondylolysis. SPINAL CORD: The conus medullaris is normal in size and signal intensities and terminates normally. SOFT TISSUES: There is no paraspinal mass identified. T12-L1: There is a disc bulge present. There is no significant spinal canal stenosis or neural foraminal narrowing. L1-L2: There is a disc bulge, facet arthropathy and thickening of the ligamentum flavum. There is mild spinal canal stenosis. No significant neural foraminal narrowing is present. L2-L3: There is a disc bulge, facet arthropathy and thickening of the ligamentum flavum. There is moderate spinal canal stenosis. Severe bilateral neural foraminal narrowing is present. L3-L4: There is a disc bulge, facet arthropathy and thickening of the ligamentum flavum. There is moderate spinal canal stenosis and moderate bilateral neural foraminal narrowing. L4-L5: There is a disc bulge, facet arthropathy and thickening of ligamentum flavum. There is moderate spinal canal stenosis. Severe left and moderate right neural foraminal narrowing is present. L5-S1: There is no disc bulge or protrusion present. There is no significant spinal canal stenosis or neural foraminal narrowing present. There is bilateral facet arthropathy. 1. Moderate spinal canal stenosis from L2-L3 to L4-L5, as described above. 2. Mild spinal canal stenosis at L1-L2, as described above. 3. Multilevel neural foraminal narrowing, most severe at L2-L3. XR CHEST PORTABLE    Result Date: 3/29/2022  EXAMINATION: ONE XRAY VIEW OF THE CHEST 3/27/2022 7:40 am COMPARISON: 04/24/2018 HISTORY: ORDERING SYSTEM PROVIDED HISTORY: fever TECHNOLOGIST PROVIDED HISTORY: fever FINDINGS: A single frontal view of the chest was performed. There is no acute skeletal abnormality. Cervical fusion hardware is noted.   The heart size is stable, and at the upper limits of normal.  The mediastinal contours are within normal limits, with stable tortuosity of the intrathoracic aorta. There is left basilar airspace consolidation. There is mild right basilar atelectasis. The lungs are otherwise clear. There is no evidence of a pneumothorax. 1. Left basilar airspace consolidation, representing either aspiration or pneumonia. 2. Mild right basilar atelectasis. XR CHEST PORTABLE    Result Date: 3/29/2022  EXAMINATION: ONE XRAY VIEW OF THE CHEST 3/29/2022 6:05 am COMPARISON: Chest from 03/27/2020 HISTORY: ORDERING SYSTEM PROVIDED HISTORY: Atelectasis, infiltrate TECHNOLOGIST PROVIDED HISTORY: Atelectasis, infiltrate Reason for Exam: uprt port FINDINGS: Cervical hardware, and overlying ECG monitor leads, respiratory tubing and gown snaps again demonstrated. Enlarged but stable appearing cardiac silhouette. Mediastinal structures midline and unchanged. Low lung volumes with bibasilar atelectasis or scarring, and greater opacity medial left base, unchanged or slightly increased. No large pleural effusion or pneumothorax. Bones unchanged. Persistent opacity left base, likely infiltrate with bibasilar atelectasis.      VL DUP LOWER EXTREMITY VENOUS BILATERAL    Result Date: 3/28/2022    [facility]  Vascular Lower Extremities DVT Study Procedure   Patient Name   Toni Vidal Date of Study           03/28/2022                 T   Date of Birth  1950  Gender                  Male   Age            70 year(s)  Race                       Room Number    0145   Corporate ID # [id number]   Patient Acct # [de-identified]   MR #           [id number]     Kennedy Grant AUDIE   Accession #    [id number]  Interpreting Physician  Ellen Serra   Referring                  Referring Physician     Shaheen Mehta DO  Nurse  Practitioner  Procedure Type of Study:   Veins: Lower Extremities DVT Study, Venous Scan Lower Bilateral.  Indications for ! +------------------------------------+----------+---------------+----------+ ! Mid Femoral                         !Yes       ! Yes            ! None      ! +------------------------------------+----------+---------------+----------+ ! Dist Femoral                        !Yes       ! Yes            ! None      ! +------------------------------------+----------+---------------+----------+ ! Popliteal                           !Yes       ! Yes            ! None      ! +------------------------------------+----------+---------------+----------+ ! Sapheno Femoral Junction            ! Yes       ! Yes            ! None      ! +------------------------------------+----------+---------------+----------+ ! PTV                                 ! Yes       ! Yes            ! None      ! +------------------------------------+----------+---------------+----------+ ! Peroneal                            !Yes       ! Yes            ! None      ! +------------------------------------+----------+---------------+----------+ ! Gastroc                             ! Yes       ! Yes            ! None      ! +------------------------------------+----------+---------------+----------+ ! GSV Thigh                           ! Yes       ! Yes            ! None      ! +------------------------------------+----------+---------------+----------+ ! GSV Knee                            ! Yes       ! Yes            ! None      ! +------------------------------------+----------+---------------+----------+ ! GSV Ankle                           ! Yes       ! Yes            ! None      ! +------------------------------------+----------+---------------+----------+ ! SSV                                 ! Yes       ! Yes            ! None      ! +------------------------------------+----------+---------------+----------+ Right Doppler Measurements +---------------------------+------+------+--------------------------------+ ! Location                   ! Signal!Reflux! Reflux (msec) ! +---------------------------+------+------+--------------------------------+ ! Common Femoral             !Phasic!      !                                ! +---------------------------+------+------+--------------------------------+ ! Prox Femoral               !Phasic!      !                                ! +---------------------------+------+------+--------------------------------+ ! Popliteal                  !Phasic!      !                                ! +---------------------------+------+------+--------------------------------+ Left Lower Extremities DVT Study Measurements Left 2D Measurements +------------------------------------+----------+---------------+----------+ ! Location                            ! Visualized! Compressibility! Thrombosis! +------------------------------------+----------+---------------+----------+ ! Common Femoral                      !Yes       ! Yes            ! None      ! +------------------------------------+----------+---------------+----------+ ! Prox Femoral                        !Yes       ! Yes            ! None      ! +------------------------------------+----------+---------------+----------+ ! Mid Femoral                         !Yes       ! Yes            ! None      ! +------------------------------------+----------+---------------+----------+ ! Dist Femoral                        !Yes       ! Yes            ! None      ! +------------------------------------+----------+---------------+----------+ ! Popliteal                           !Yes       ! Yes            ! None      ! +------------------------------------+----------+---------------+----------+ ! Sapheno Femoral Junction            ! Yes       ! Yes            ! None      ! +------------------------------------+----------+---------------+----------+ ! PTV                                 ! Yes       ! Yes            ! None      ! +------------------------------------+----------+---------------+----------+ ! Gabi !Yes       !Yes            ! None      ! +------------------------------------+----------+---------------+----------+ ! Gastroc                             ! Yes       ! Yes            ! None      ! +------------------------------------+----------+---------------+----------+ ! GSV Thigh                           ! Yes       ! Yes            ! None      ! +------------------------------------+----------+---------------+----------+ ! GSV Knee                            ! Yes       ! Yes            ! None      ! +------------------------------------+----------+---------------+----------+ ! GSV Ankle                           ! Yes       ! Yes            ! None      ! +------------------------------------+----------+---------------+----------+ ! SSV                                 ! Yes       ! No             !AI        ! +------------------------------------+----------+---------------+----------+ Left Doppler Measurements +---------------------------+------+------+--------------------------------+ ! Location                   ! Signal!Reflux! Reflux (msec)                   ! +---------------------------+------+------+--------------------------------+ ! Common Femoral             !Phasic!      !                                ! +---------------------------+------+------+--------------------------------+ ! Prox Femoral               !Phasic!      !                                ! +---------------------------+------+------+--------------------------------+ ! Popliteal                  !Phasic!      !                                ! +---------------------------+------+------+--------------------------------+    FLUORO FOR SURGICAL PROCEDURES    Result Date: 3/24/2022  Radiology exam is complete. No Radiologist dictation. Please follow up with ordering provider.      MRI BRAIN WO CONTRAST    Result Date: 3/28/2022  EXAMINATION: MRI OF THE BRAIN WITHOUT CONTRAST  3/28/2022 1:06 pm TECHNIQUE: Multiplanar multisequence MRI of the brain was performed without the administration of intravenous contrast. COMPARISON: CT brain performed 03/26/2022. HISTORY: ORDERING SYSTEM PROVIDED HISTORY: Rule out encephalitis, meningitis, stroke,. s/p C spine fusion TECHNOLOGIST PROVIDED HISTORY: Rule out encephalitis, meningitis, stroke,. s/p C spine fusion Reason for Exam: Rule out encephalitis, meningitis, stroke,. s/p C spine fusion FINDINGS: INTRACRANIAL STRUCTURES/VENTRICLES: The sellar and suprasellar structures, optic chiasm, corpus callosum, pineal gland, tectum, and midline brainstem structures are unremarkable. The craniocervical junction is unremarkable. There is no acute hemorrhage, mass effect, or midline shift. There is satisfactory overall gray-white matter differentiation. The ventricular structures are symmetric and unremarkable. The infratentorial structures including the cerebellopontine angles and internal auditory canals are unremarkable. There is no abnormal restricted diffusion. There is no abnormal blooming artifact on susceptibility weighted imaging. ORBITS: The visualized portion of the orbits demonstrate no acute abnormality. SINUSES: The visualized paranasal sinuses and mastoid air cells demonstrate no acute abnormality. BONES/SOFT TISSUES: The bone marrow signal intensity appears normal. The soft tissues demonstrate no acute abnormality. No acute intracranial abnormality. MRI BRAIN WO CONTRAST    Result Date: 3/21/2022  EXAMINATION: MRI OF THE BRAIN WITHOUT CONTRAST  3/21/2022 3:54 pm TECHNIQUE: Multiplanar multisequence MRI of the brain was performed without the administration of intravenous contrast. COMPARISON: None. HISTORY: ORDERING SYSTEM PROVIDED HISTORY: UE/LE weakness TECHNOLOGIST PROVIDED HISTORY: UE/LE weakness Reason for Exam: Pt having several recent falls due to weakness. Initial evaluation. FINDINGS: Motion degrades images limiting evaluation. INTRACRANIAL STRUCTURES/VENTRICLES: There is no acute infarct.  No mass effect or midline shift. No evidence of an acute intracranial hemorrhage. Areas of T2 FLAIR hyperintensity are seen in the periventricular and subcortical white matter, which are nonspecific, but may represent chronic microvascular ischemic change. There is mild global parenchymal volume loss. Otherwise, the ventricles and sulci are normal in size and configuration. The sellar/suprasellar regions appear unremarkable. The normal signal voids within the major intracranial vessels appear maintained. ORBITS: The visualized portion of the orbits demonstrate no acute abnormality. SINUSES: The visualized paranasal sinuses and mastoid air cells demonstrate no acute abnormality. BONES/SOFT TISSUES: The bone marrow signal intensity appears normal. The soft tissues demonstrate no acute abnormality. 1. No acute intracranial abnormality. No acute infarct. 2. Mild global parenchymal volume loss with minimal chronic microvascular ischemic changes.               DIAGNOSIS:     MDD, recurrent, moderate  Delirium due to multiple etiologies      RISK ASSESSMENT: low risk of suicide or harm to others        RECOMMENDATIONS-no plan for admission to psychiatry or sitter      -Keep lights off at night and minimize nighttime awakening  -Patient should face the window during the day, with blinds open where possible  -Ambulate patient where possible, encourage patient to sit out of bed if unable to ambulate, encourage patient to sit up in bed if unable to sit out of bed  -Patient should have glasses and hearing aids, if they use them regularly  -Keep potassium between 4 and 5 and magnesium above 2  -Avoid H2 blockers, antihistamines, morphine and other non-synthetic opiates and benzodiazepines were possible          Risk Management:  routine:  no special precautions necessary    Medications: Continue current dose of Depakote and Effexor  Discussed with the treating physician/ team about the patient and treatment plan  Reviewed the chart    Discussed with the patient risk, benefit, alternative and common side effects for the  proposed medication treatment. Patient is consenting to the treatment. Thanks for the consult. Please call me if needed. Electronically signed by Zaheer Flood MD on 4/18/2022 at 2:35 PM    Please note that this chart was generated using voice recognition Dragon dictation software. Although every effort was made to ensure the accuracy of this automated transcription, some errors in transcription may have occurred.

## 2022-04-18 NOTE — CARE COORDINATION
EDGARD spoke with pt's Law Bacon regarding discharge plans. Jon Bell was tearful during conversation, stating she is worried about the overnight decline and is upset that pt team believes he is ready for discharge. Jon Bell stated she has nothing in place for him to go home and she was upset that [facility] would not take him back. Overall, Jon Bell does not think pt is ready to go anywhere in the condition that he is in.    SW informed Briellehaylie Arabella that pt will not discharge unless their is a safe plan in place. SW informed Jon Smalltasha that if she was pt to get the intensive therapy like [facility], [facility] and [facility] are options. SNF is always an option, as well. Jon Bell did not want to talk about placement until after she spoke with Cardiology about possible heart cath. SW will follow up with pt and pt finance this afternoon. Addendum: Jon Bell explained to SW she would prefer to take pt home. She has concerns that if pt was not doing the full 3 hours at [facility] that he would not do the full 3 hours at [facility]. Pt and Jon Bell are hoping home is the best plan. Myrna spoke with pt and Jon Bell and now they are agreeable to looking into [facility]. EDGARD contacted Satish Knight and she will be completing on-site today to speak with pt and family. SW following for possible [facility].

## 2022-04-18 NOTE — DISCHARGE INSTR - COC
Continuity of Care Form    Patient Name: Mary Ann Rosales    :  1950  MRN:  [id number]    Admit date:  4/15/2022  Discharge date:  22    Code Status Order: [id number]   Advance Directives:      Admitting Physician:  Henry Sauer MD  PCP: Willis Dorsey    Discharging Nurse:   [address] Unit/Room#: 2085/2085-01  Discharging Unit Phone Number: [phone]    Emergency Contact:   Extended Emergency Contact Information  Primary Emergency Contact: [facility] Phone: [phone]  Mobile Phone: [phone]  Relation: Unknown  Secondary Emergency Contact: [address] [place] [address] Phone: [phone]  Relation: Child    Past Surgical History:  Past Surgical History:   Procedure Laterality Date    CARDIAC CATHETERIZATION  2010    no stents     CARPAL TUNNEL RELEASE Left 2002    CATARACT REMOVAL      CERVICAL FUSION  2018    anterior cervical fusion C5-6    CERVICAL FUSION N/A 3/24/2022    C2-5 FUSION, C2-4 LAMINECTOMY performed by Keysha Mishra DO at [address] Left 2018    CATARACT EXTRACTION [address]  2022    C2-5 FUSION, C2-4 LAMINECTOMY    MIDDLE EAR SURGERY  2018    MIDDLE EAR REBUILT AND EAR DRUM REPLACED    MOUTH SURGERY  2018    5 TEETH REMOVED    OTHER SURGICAL HISTORY  2018    : ANTERIOR CERVICAL CORRPECTOMY C5-6, SYNTHES, DEPUY, REG TABLE, SUPINE,     FL OFFICE/OUTPT VISIT,PROCEDURE ONLY N/A 2018    ANTERIOR CERVICAL CORRPECTOMY AND FUSION C5-6 performed by Keysha Mishra DO at [facility]. 72  1983       Immunization History:   Immunization History   Administered Date(s) Administered    Influenza Vaccine, unspecified formulation 10/26/2011, 2012, 2014, 2016    Tdap (Boostrix, Adacel) 2007, 2017    Zoster Live (Zostavax) 2016       Active Problems:  Patient Active Problem List   Diagnosis Code 04/18/22 0830   Dressing Status Other (Comment) 04/09/22 0800   Dressing/Treatment Open to air 04/18/22 0830   Wound Assessment Pink/red 04/18/22 0830   Drainage Amount None 04/18/22 0830   Odor None 04/18/22 0830   Nereida-wound Assessment Fragile 04/18/22 0830   Number of days: 28       Wound Knee Anterior; Left (Active)   Wound Etiology Skin Tear 04/18/22 0830   Dressing Status Other (Comment) 04/18/22 0830   Wound Cleansed Soap and water 04/11/22 0942   Dressing/Treatment Open to air 04/16/22 0400   Wound Assessment Dry 04/18/22 0830   Drainage Amount None 04/18/22 0830   Odor None 04/18/22 0830   Nereida-wound Assessment Dry/flaky 04/18/22 0830   Margins Attached edges 04/18/22 0830   Number of days:        Wound 04/06/22 Toe (Comment  which one) Anterior; Left scabbed over (Active)   Wound Etiology Skin Tear 04/18/22 0830   Dressing Status Other (Comment) 04/18/22 0830   Dressing/Treatment Open to air 04/18/22 0830   Wound Assessment Dry 04/18/22 0830   Drainage Amount None 04/11/22 0942   Odor None 04/11/22 0942   Margins Attached edges 04/18/22 0830   Number of days: 11        Elimination:  Continence: Bowel: Yes  Bladder: Yes  Urinary Catheter: None   Colostomy/Ileostomy/Ileal Conduit: No       Date of Last BM: 4/20/22    Intake/Output Summary (Last 24 hours) at 4/18/2022 0933  Last data filed at 4/18/2022 0915  Gross per 24 hour   Intake 440 ml   Output 880 ml   Net -440 ml     I/O last 3 completed shifts: In: 905 [P. O.:905]  Out: 1630 [Urine:1630]    Safety Concerns:      At Risk for Falls    Impairments/Disabilities:      Generalized weakness    Nutrition Therapy:  Current Nutrition Therapy:   - Oral Diet:  Dental Soft    Routes of Feeding: Oral  Liquids: No Restrictions  Daily Fluid Restriction: no  Last Modified Barium Swallow with Video (Video Swallowing Test): not done    Treatments at the Time of Hospital Discharge:   Respiratory Treatments: n/a  Oxygen Therapy:  is on oxygen at 1 L/min per nasal cannula. Ventilator:    - has sleep apnea but refuses CPAP    Rehab Therapies: Physical Therapy and Occupational Therapy, cardiac rehab as outpatient once discharged  Weight Bearing Status/Restrictions: No weight bearing restrictions  Other Medical Equipment (for information only, NOT a DME order):  walker  Other Treatments: n/a    Patient's personal belongings (please select all that are sent with patient):  None    RN SIGNATURE:  Electronically signed by Winnie Villagran RN on 4/20/22 at 9:19 AM EDT    CASE MANAGEMENT/SOCIAL WORK SECTION    Inpatient Status Date:     Readmission Risk Assessment Score:  Readmission Risk              Risk of Unplanned Readmission:  25         [facility]  [address] [facility] [address] [phone]    Dialysis Facility (if applicable)   Name:  Address:  Dialysis Schedule:  Phone:  Fax:    / signature: Electronically signed by Jimenez Dickson RN on 4/20/22 at 10:38 AM EDT    PHYSICIAN SECTION    Prognosis: Fair    Condition at Discharge: Stable    Rehab Potential (if transferring to Rehab): Fair    Recommended Labs or Other Treatments After Discharge:     Physician Certification: I certify the above information and transfer of Cyndi Moulton  is necessary for the continuing treatment of the diagnosis listed and that he requires Acute Rehab for less 30 days.      Update Admission H&P: No change in H&P    PHYSICIAN SIGNATURE:  Electronically signed by Ada Castro MD on 4/18/22 at 9:44 AM EDT

## 2022-04-18 NOTE — PLAN OF CARE
Problem: Skin Integrity:  Goal: Will show no infection signs and symptoms  Description: Will show no infection signs and symptoms  4/18/2022 1602 by Yeny Case RN  Outcome: Ongoing  4/18/2022 0441 by Juhi Spencer RN  Outcome: Ongoing  Goal: Absence of new skin breakdown  Description: Absence of new skin breakdown  4/18/2022 1602 by Yeny Case RN  Outcome: Ongoing  4/18/2022 0441 by Juhi Spencer RN  Outcome: Ongoing     Problem: Falls - Risk of:  Goal: Will remain free from falls  Description: Will remain free from falls  4/18/2022 1602 by Yeny Case RN  Outcome: Ongoing  4/18/2022 0441 by Juhi Spencer RN  Outcome: Ongoing  Goal: Absence of physical injury  Description: Absence of physical injury  4/18/2022 1602 by Yeny Case RN  Outcome: Ongoing  4/18/2022 0441 by Juhi Spencer RN  Outcome: Ongoing     Problem: Pain:  Goal: Pain level will decrease  Description: Pain level will decrease  4/18/2022 1602 by Yeny Case RN  Outcome: Ongoing  4/18/2022 0441 by Juhi Spencer RN  Outcome: Ongoing  Goal: Control of acute pain  Description: Control of acute pain  4/18/2022 1602 by Yeny Case RN  Outcome: Ongoing  4/18/2022 0441 by Juhi Spencer RN  Outcome: Ongoing  Goal: Control of chronic pain  Description: Control of chronic pain  4/18/2022 1602 by Yeny Case RN  Outcome: Ongoing  4/18/2022 0441 by Juhi Spencer RN  Outcome: Ongoing     Problem: Musculor/Skeletal Functional Status  Goal: Highest potential functional level  4/18/2022 1602 by Yeny Case RN  Outcome: Ongoing  4/18/2022 0441 by Juhi Spencer RN  Outcome: Ongoing  Goal: Absence of falls  4/18/2022 1602 by Yeny Case RN  Outcome: Ongoing  4/18/2022 0441 by Juhi Spencer RN  Outcome: Ongoing

## 2022-04-18 NOTE — PROGRESS NOTES
History and Physical Service  [facility] Internal Medicine  Progress note          Date:   4/18/2022  Patient name:  Orion Melgar. MRN:   [id number]  Account:  [de-identified]  YOB: 1950  PCP:    Lennox Hench  Code Status:    DNR-CCA    Chief Complaint:     No chief complaint on file. Chest pain      History Obtained From:     Patient, EMR, nursing staff  His  HPI   tory Obtained From:  Past [address] History:bobby History: This patient is a 70 y.o. Non- / non  malewho presents with  Chest pain  Left side of chest nonradiating moderate intensity  Associated with anxiety, nausea  Describes himself as anxious  Chest pain gets worse when he is anxious, improves with taking deep breaths    Recent admission to [facility] for generalized weakness and frequent falls underwent MRI brain lumbar C-spine thoracic were performed and showed severe stenosis at C2-C4 level, underwent C2-C5 laminectomy and posterior fusion on 3/24/2022, also was treated for hospital-acquired left lower lobe pneumonia . subsequently admitted to ARU on 4/1. During his stay patient was complaining of orthostasis which improved with stopping diuretic and losartan    4/18   Patient, clinically doing better  He mentioned to me he has 1 episode of chest tightness in the morning, understand, that his symptoms are due to anxiety recent echo was okay      Review of Systems:     Complaining of generalized anxiety causing some shortness of breath  Denies any  cough   Denies chest pain or palpitations at the time of examination  Denies abdominal pain, diarrhea vomiting  Denies any new numbness tremors or weakness. A 10 point review of systems was performed and and negative except as mentioned in HPI  Positive and Negative as described in HPI.       Past Medical History:     Past Medical History:   Diagnosis Date    Depression 2017    ON RX    Diabetes mellitus ([facility] [address] 2013    NIDDM    Difficult intravenous access     Hyperlipidemia 2008    ON RX    Hypertension 2008    ON RX    Migraines     PTSD (post-traumatic stress disorder) 01/2018    ON RX    Sleep apnea 01/2018    UNALBE TO USE TO CLEARED BY ENT (CPAP)    Teeth missing     BACK TEETH UPPER AND LOWER TO BE FITTED FOR PARTIALS AT LATER DATE    Wears glasses         Past Surgical History:     Past Surgical History:   Procedure Laterality Date    CARDIAC CATHETERIZATION  02/2010    no stents     CARPAL TUNNEL RELEASE Left 01/09/2002    CATARACT REMOVAL      CERVICAL FUSION  04/19/2018    anterior cervical fusion C5-6    CERVICAL FUSION N/A 3/24/2022    C2-5 FUSION, C2-4 LAMINECTOMY performed by Shannan Schmitt DO at [address] Left 2018    CATARACT EXTRACTION WITH IOL    HYDROCELE EXCISION  1951    LAMINECTOMY  03/24/2022    C2-5 FUSION, C2-4 LAMINECTOMY    MIDDLE EAR SURGERY  01/11/2018    MIDDLE EAR REBUILT AND EAR DRUM REPLACED    MOUTH SURGERY  04/16/2018    5 TEETH REMOVED    OTHER SURGICAL HISTORY  04/19/2018    : ANTERIOR CERVICAL CORRPECTOMY C5-6, SYNTHES, Five BelowUY, REG TABLE, SUPINE,     MI OFFICE/OUTPT VISIT,PROCEDURE ONLY N/A 4/19/2018    ANTERIOR CERVICAL CORRPECTOMY AND FUSION C5-6 performed by Shannan Schmitt DO at [address] [place] [address]  12/1983        Medications Prior to Admission:     Prior to Admission medications    Medication Sig Start Date End Date Taking?  Authorizing Provider   finasteride (PROSCAR) 5 MG tablet Take 5 mg by mouth daily    Historical Provider, MD   carboxymethylcellulose 1 % ophthalmic solution Place 2 drops into both eyes 3 times daily Pt states \"2 drops in both eyes three times a day\"    Historical Provider, MD   divalproex (DEPAKOTE ER) 250 MG extended release tablet Take 750 mg by mouth at bedtime    Historical Provider, MD   furosemide (LASIX) 20 MG tablet Take 20 mg by mouth daily    Historical Provider, MD   venlafaxine (EFFEXOR XR) 150 MG extended release capsule Take 1 capsule by mouth daily  Patient taking differently: Take 225 mg by mouth daily  6/23/20   Marcelle Parrish MD   simethicone (MYLICON) 80 MG chewable tablet Take 1 tablet by mouth every 6 hours as needed for Flatulence  Patient taking differently: Take 80 mg by mouth every 8 hours as needed for Flatulence  6/22/20   Marcelle Parrish MD   lidocaine (XYLOCAINE) 5 % ointment Apply topically daily as needed for Pain Apply topically as needed.   LF 4/20/20 (30 day supply)  Patient not taking: Reported on 3/23/2022    Historical Provider, MD   metFORMIN (GLUCOPHAGE) 1000 MG tablet Take 1,000 mg by mouth 2 times daily (with meals) LF 4/27/20 (90 day supply)  Patient not taking: Reported on 3/21/2022    Historical Provider, MD   losartan (COZAAR) 25 MG tablet Take 25 mg by mouth daily     Historical Provider, MD   aspirin 81 MG chewable tablet Take 81 mg by mouth daily  Patient not taking: Reported on 3/21/2022    Historical Provider, MD   divalproex (DEPAKOTE ER) 500 MG extended release tablet Take 500 mg by mouth every morning     Historical Provider, MD   traZODone (DESYREL) 100 MG tablet Take 150 mg by mouth nightly as needed LF 5/1/20 (30 day supply)    Historical Provider, MD   cetirizine (ZYRTEC) 10 MG tablet Take 10 mg by mouth daily LF 4/6/20 (90 day supply)  Patient not taking: Reported on 3/21/2022    Historical Provider, MD   atorvastatin (LIPITOR) 80 MG tablet Take 40 mg by mouth daily Take 1/2 tablet (40 mg) daily  LF 4/22/20 (90 day supply)  Patient not taking: Reported on 3/21/2022    Historical Provider, MD   amLODIPine (NORVASC) 10 MG tablet Take 7.5 mg by mouth daily     Historical Provider, MD   sildenafil (VIAGRA) 100 MG tablet Take 100 mg by mouth as needed for Erectile Dysfunction LF 5/12/20 (30 day supply)    Historical Provider, MD   omeprazole (PRILOSEC) 20 MG delayed release capsule Take 20 mg by mouth every evening LF 4/21/20 (90 day supply)    Historical Provider, MD        Allergies:     Latex, Bee venom, Lisinopril, Niacin and related, Sulfa antibiotics, and Terazosin    Social History:     Tobacco:    reports that he quit smoking about 50 years ago. His smoking use included cigarettes. He has a 2.00 pack-year smoking history. He has never used smokeless tobacco.  Alcohol:      reports current alcohol use. Drug Use:  reports current drug use. Drug: Marijuana [place]). Family History:     Family History   Problem Relation Age of Onset   Sherwin Eastman Dementia Mother     Coronary Art Dis Mother     Stroke Mother     Diabetes Mother     Asthma Mother     Other Mother         BRAIN ANEURISM    High Blood Pressure Mother     Heart Disease Father     Diabetes Sister     Diabetes Brother     High Blood Pressure Brother     High Cholesterol Brother     Heart Disease Brother     Diabetes Sister     Other Sister         NEUROPATHY           Physical Exam:   BP (!) 177/103   Pulse 73   Temp 97 °F (36.1 °C) (Oral)   Resp 18   Ht 5' 3\" (1.6 m)   Wt 149 lb 7.6 oz (67.8 kg)   SpO2 100%   BMI 26.48 kg/m²   No results for input(s): POCGLU in the last 72 hours. General Appearance: Anxious appearing, breathing fast reports feeling anxious currently  Mental status: oriented to person, place, and time with normal affect  Head:  normocephalic, atraumatic. Eye: no icterus, redness, pupils equal and reactive, extraocular eye movements intact, conjunctiva clear  Ear: normal external ear, no discharge, hearing intact  Nose:  no drainage noted  Mouth: mucous membranes moist  Neck: supple, no carotid bruits, thyroid not palpable  Lungs: Bilateral equal air entry, clear to ausculation, no wheezing, rales or rhonchi, normal effort  Cardiovascular: normal rate, regular rhythm, no murmur, gallop, rub.   Abdomen: Soft, nontender, nondistended, normal bowel sounds, no hepatomegaly or splenomegaly  Neurologic: some Weakness of all 4 extremities noted, normal muscle tone reduced bulk, no abnormal sensation, normal speech, cranial nerves II through XII grossly intact  Skin: No gross lesions, rashes, bruising or bleeding on exposed skin area  Extremities:  peripheral pulses palpable, no pedal edema or calf pain with palpation  Psych: Anxious appearing    Investigations:      Laboratory Testing:  Recent Results (from the past 24 hour(s))   Troponin    Collection Time: 04/18/22 12:07 AM   Result Value Ref Range    Troponin, High Sensitivity 72 (HH) 0 - 22 ng/L   CBC    Collection Time: 04/18/22  6:37 AM   Result Value Ref Range    WBC 7.6 3.5 - 11.0 k/uL    RBC 3.78 (L) 4.5 - 5.9 m/uL    Hemoglobin 12.1 (L) 13.5 - 17.5 g/dL    Hematocrit 34.1 (L) 41 - 53 %    MCV 90.3 80 - 100 fL    MCH 32.1 26 - 34 pg    MCHC 35.6 31 - 37 g/dL    RDW 12.7 11.5 - 14.9 %    Platelets 347 058 - 789 k/uL    MPV 7.5 6.0 - 12.0 fL   Comprehensive Metabolic Panel w/ Reflex to MG    Collection Time: 04/18/22  6:37 AM   Result Value Ref Range    Glucose 92 70 - 99 mg/dL    BUN 18 8 - 23 mg/dL    CREATININE 0.56 (L) 0.70 - 1.20 mg/dL    Calcium 9.3 8.6 - 10.4 mg/dL    Sodium 128 (L) 135 - 144 mmol/L    Potassium 4.5 3.7 - 5.3 mmol/L    Chloride 92 (L) 98 - 107 mmol/L    CO2 27 20 - 31 mmol/L    Anion Gap 9 9 - 17 mmol/L    Alkaline Phosphatase 136 (H) 40 - 129 U/L    ALT 26 5 - 41 U/L    AST 16 <40 U/L    Total Bilirubin 0.30 0.3 - 1.2 mg/dL    Total Protein 6.1 (L) 6.4 - 8.3 g/dL    Albumin 3.7 3.5 - 5.2 g/dL    GFR Non-African American >60 >60 mL/min    GFR African American >60 >60 mL/min    GFR Comment             Recent Labs     04/18/22  0637 03/26/22  0932 03/24/22  0157 03/20/22  1902 03/20/22  1902   HGB 12.1*   < >  --   --  12.1*   HCT 34.1*   < >  --   --  34.3*   WBC 7.6   < >  --   --  6.3   MCV 90.3   < >  --   --  92.0   *   < > 136   < > 134*   K 4.5   < > 4.1  4.1   < > 4.4   CL 92*   < > 100   < > 100   CO2 27   < > 21   < > 24   BUN 18   < > 29*   < > 28*   CREATININE 0.56*   < > 0.84   < > 0.91 GLUCOSE 92   < > 107*   < > 151*   INR  --   --  1.0   < > 1.0   PROTIME  --   --  10.9   < > 13.1   APTT  --   --   --   --  31.3   AST 16   < >  --   --   --    ALT 26   < >  --   --   --    LABALBU 3.7   < >  --   --   --     < > = values in this interval not displayed. Hematology:  Recent Labs     04/17/22  0643 04/18/22  0637   WBC 7.1 7.6   RBC 3.87* 3.78*   HGB 12.5* 12.1*   HCT 34.9* 34.1*   MCV 90.3 90.3   MCH 32.3 32.1   MCHC 35.8 35.6   RDW 13.0 12.7    288   MPV 7.5 7.5     Chemistry:  Recent Labs     04/17/22 0643 04/18/22  0637   * 128*   K 4.5 4.5   CL 92* 92*   CO2 27 27   GLUCOSE 92 92   BUN 20 18   CREATININE 0.63* 0.56*   ANIONGAP 9 9   LABGLOM >60 >60   GFRAA >60 >60   CALCIUM 9.4 9.3     Recent Labs     04/17/22 0643 04/18/22  0637   PROT 5.9* 6.1*   LABALBU 3.4* 3.7   AST 18 16   ALT 28 26   ALKPHOS 130* 136*   BILITOT 0.28* 0.30       Imaging/Diagnostics:       ECHO Complete 2D W Doppler W Color    Result Date: 3/31/2022  Transthoracic Echocardiography Report (TTE)  Patient Name [address] [place] [address] Date of Study               03/31/2022               T   Date of      1950  Gender                      Male  Birth   Age          70 year(s)  Race                           Room Number  145         Height:                     63 inch, 160.02 cm   Corporate ID [id number]    Weight:                     153 pounds, 69.4 kg  #   Patient Acct [de-identified]   BSA:          1.73 m^2      BMI:      27.1 kg/m^2  #   MR #         [id number]     Sonographer                 Nickolas Oar   Accession #  [id number]  Interpreting Physician      Vickey Plummer 61   Fellow                   Referring Nurse                           Practitioner   Interpreting             Referring Physician         Leatha Nguyen MD  Fellow  Type of Study   TTE procedure:2D Echocardiogram, M-Mode, Doppler, Color Doppler.   Procedure Date Date: 03/31/2022 Start: 03:49 PM Study Location: [facility] Indications:Elevated troponin. History / Tech. Comments: S/P Fall Patient Status: Inpatient Height: 63 inches Weight: 153 pounds BSA: 1.73 m^2 BMI: 27.1 kg/m^2 Allergies   - Sulfa. - Stings. CONCLUSIONS Summary Normal LV size , mild to moderately increased LV wall thickness . No obvious wall motion abnormality seen. Normal LV systolic function with LVEF >55%. Normal RV size and function. RV systolic pressure 30 mmHg LA appears mildly dilated and RA appears normal in size. No obvious significant structural valvular abnormality noted. Mild AR Normal aortic root dimension. No significant pericardial effusion noted. No obvious intra-cardiac mass or shunt noted. IVC normal diameter and inspiratory variation indicating normal RA filling pressure. Signature ----------------------------------------------------------------------------  Electronically signed by Rossana Clark(Sonographer) on 03/31/2022 04:41  PM ---------------------------------------------------------------------------- ----------------------------------------------------------------------------  Electronically signed by Fay Castro(Interpreting physician) on  04/01/2022 12:17 PM ---------------------------------------------------------------------------- FINDINGS Left Atrium Left atrium is mildly enlarged. Left Ventricle Left ventricle is normal in size with systolic function within the range of normal. Mild to moderate left ventricular hypertrophy. Calculated ejection fraction by bi-plane Lester's method is 54% Grade II (moderate) left ventricular diastolic dysfunction. Right Atrium Right atrium is normal in size. Right Ventricle Normal right ventricular size and function. Mitral Valve Mitral valve structure is normal. Mild mitral regurgitation. Aortic Valve Aortic valve is trileaflet. Aortic sclerosis without stenosis. Mild aortic insufficiency. Tricuspid Valve Tricuspid valve structure is normal. Mild tricuspid regurgitation.  Estimated right SYSTEM PROVIDED HISTORY: s/p PCDF AP and lateral upright TECHNOLOGIST PROVIDED HISTORY: s/p PCDF AP and lateral upright FINDINGS: Immediate postoperative changes of laminectomy with posterior fusion with paraspinal rods and lateral mass screws spanning C2 through C4 with an overlying cervical drain, soft tissue gas, and cutaneous staples. The new hardware appears intact. Prior anterior fusion spanning C4 through C7 with C5-C6 corpectomy. Prevertebral soft tissues are not thickened. Imaged portion of the lung apices are clear. Immediate postoperative changes of laminectomy with posterior fusion at C2 through C4.     CT HEAD WO CONTRAST    Result Date: 3/26/2022  EXAM: CT Head Without Intravenous Contrast EXAM DATE/TIME: 3/26/2022 3:04 pm CLINICAL HISTORY: ORDERING SYSTEM PROVIDED  R/o intra-cranial pathology  TECHNOLOGIST PROVIDED HISTORY:  R/o intra-cranial pathology TECHNIQUE: Axial computed tomography images of the head/brain without intravenous contrast.  This CT exam was performed using one or more of the following dose reduction techniques:  automated exposure control, adjustment of the mA and/or kV according to patient size, and/or use of iterative reconstruction technique. COMPARISON: MRI of the brain 03/21/2022 and CT scan of the brain 03/20/2022 FINDINGS: Brain:  No acute findings. No hemorrhage. No significant white matter disease. No edema. Ventricles:  No acute findings. No ventriculomegaly. Bones/joints:  No acute findings. No acute fracture. Soft tissues:  No acute findings. Sinuses:  Unremarkable as visualized. No acute sinusitis. Mastoid air cells:  Unremarkable as visualized. No mastoid effusion. No acute findings in the head/brain.      CT HEAD WO CONTRAST    Result Date: 3/20/2022  EXAMINATION: CT OF THE HEAD WITHOUT CONTRAST  3/20/2022 7:11 pm TECHNIQUE: CT of the head was performed without the administration of intravenous contrast. Dose modulation, iterative reconstruction, and/or weight based adjustment of the mA/kV was utilized to reduce the radiation dose to as low as reasonably achievable. COMPARISON: 12/05/2018 HISTORY: ORDERING SYSTEM PROVIDED HISTORY: Fall TECHNOLOGIST PROVIDED HISTORY: Fall Decision Support Exception - unselect if not a suspected or confirmed emergency medical condition->Emergency Medical Condition (MA) Reason for Exam: fall, hit head on toilet FINDINGS: BRAIN/VENTRICLES: There is no acute intracranial hemorrhage, mass effect or midline shift. No abnormal extra-axial fluid collection. The gray-white differentiation is maintained without evidence of an acute infarct. There is no evidence of hydrocephalus. Unchanged mild prominence of pituitary gland. ORBITS: The visualized portion of the orbits demonstrate no acute abnormality. SINUSES: The visualized paranasal sinuses and mastoid air cells demonstrate no acute abnormality. SOFT TISSUES/SKULL:  No acute abnormality of the visualized skull or soft tissues. No acute intracranial abnormality. RECOMMENDATIONS: Unavailable     CT CERVICAL SPINE WO CONTRAST    Result Date: 3/20/2022  EXAMINATION: CT OF THE CERVICAL SPINE WITHOUT CONTRAST 3/20/2022 7:14 pm TECHNIQUE: CT of the cervical spine was performed without the administration of intravenous contrast. Multiplanar reformatted images are provided for review. Dose modulation, iterative reconstruction, and/or weight based adjustment of the mA/kV was utilized to reduce the radiation dose to as low as reasonably achievable. COMPARISON: 12/05/2018 HISTORY: ORDERING SYSTEM PROVIDED HISTORY: Fall TECHNOLOGIST PROVIDED HISTORY: Fall Decision Support Exception - unselect if not a suspected or confirmed emergency medical condition->Emergency Medical Condition (MA) Reason for Exam: fall, back pain FINDINGS: BONES/ALIGNMENT: Stable corpectomy and anterior fusion from C4-C7. Vertebral body alignment is normal.  Facet joints are normally aligned.   Ankylosis of the left SOFT TISSUES: No paraspinal mass is seen. Lower cervical corpectomy and fusion. Large left renal cyst incompletely visualized on this study. Multilevel degenerative changes with no acute fracture or traumatic malalignment of the thoracolumbar spine. RECOMMENDATIONS: Unavailable     CT LUMBAR SPINE WO CONTRAST    Result Date: 3/20/2022  EXAMINATION: CT OF THE THORACIC SPINE WITHOUT CONTRAST; CT OF THE LUMBAR SPINE WITHOUT CONTRAST 3/20/2022 TECHNIQUE: CT of the thoracic spine was performed without the administration of intravenous contrast. Multiplanar reformatted images are provided for review. Dose modulation, iterative reconstruction, and/or weight based adjustment of the mA/kV was utilized to reduce the radiation dose to as low as reasonably achievable.; CT of the lumbar spine was performed without the administration of intravenous contrast. Multiplanar reformatted images are provided for review. Adjustment of mA and/or kV according to patient size was utilized. Dose modulation, iterative reconstruction, and/or weight based adjustment of the mA/kV was utilized to reduce the radiation dose to as low as reasonably achievable. COMPARISON: CT chest 03/13/2018 HISTORY: ORDERING SYSTEM PROVIDED HISTORY: Fall TECHNOLOGIST PROVIDED HISTORY: Fall Reason for Exam: fall, back pain; ORDERING SYSTEM PROVIDED HISTORY: Fall TECHNOLOGIST PROVIDED HISTORY: Fall Decision Support Exception - unselect if not a suspected or confirmed emergency medical condition->Emergency Medical Condition (MA) Reason for Exam: fall, back pain FINDINGS: BONES/ALIGNMENT: Thoracic and lumbar vertebral body heights and alignment are normal.  There is no acute fracture or traumatic malalignment. DEGENERATIVE CHANGES: There are multilevel degenerative changes throughout the thoracic and lumbar spine. SOFT TISSUES: No paraspinal mass is seen. Lower cervical corpectomy and fusion. Large left renal cyst incompletely visualized on this study. Multilevel degenerative changes with no acute fracture or traumatic malalignment of the thoracolumbar spine. RECOMMENDATIONS: Unavailable     MRI CERVICAL SPINE WO CONTRAST    Result Date: 3/21/2022  EXAMINATION: MRI OF THE CERVICAL SPINE WITHOUT CONTRAST 3/21/2022 3:38 pm TECHNIQUE: Multiplanar multisequence MRI of the cervical spine was performed without the administration of intravenous contrast. COMPARISON: None. HISTORY: ORDERING SYSTEM PROVIDED HISTORY: spinal stenosis TECHNOLOGIST PROVIDED HISTORY: spinal stenosis Reason for Exam: Pt having several recent falls due to weakness. Subsequent evaluation. FINDINGS: Is a motion BONES/ALIGNMENT: Postsurgical changes are seen with anterior fusion hardware involving the C4 through C7 levels with partial corpectomy of C5 through C6. The remaining vertebral body heights appear grossly maintained. No significant spondylolisthesis seen. SPINAL CORD: There is question of minimal T2 hyperintensity within the cord at C4-C5 (sagittal T2 series 5, image 7). No abnormal cord signal otherwise seen. SOFT TISSUES: No gross evidence of a paraspinal mass. C2-C3: There is a disc bulge, which appears scratch head there is a disc bulge with a central disc protrusion, which appears to indent on the ventral cord as well as buckling of the ligamentum flavum. There appears to be complete effacement of the CSF. There appears to be severe spinal canal stenosis. Uncovertebral joint and facet arthrosis contributes to moderate right neural foraminal narrowing. No significant left neural foraminal narrowing. C3-C4: There is a disc bulge with a central disc protrusion, which indents on the ventral aspect of the cervical cord. There is buckling of the ligamentum flavum. There appears to be complete effacement of the CSF. Severe spinal canal stenosis. Uncovertebral joint and facet arthrosis contributes to moderate to severe right and moderate left neural foraminal narrowing.  C4-C5: The spinal canal appears surgically decompressed. Uncovertebral joint and facet arthrosis appears to contribute to mild right and moderate left neural foraminal narrowing. C5-C6: There is a right paracentral posterior osteophyte, which appears to contact the right ventral cervical cord. The spinal canal appears surgically decompressed. Uncovertebral joint and facet arthrosis contributes to mild right and moderate left neural foraminal narrowing. C6-C7: There is a posterior osteophyte which contacts the ventral cervical cord. No significant spinal canal stenosis. Uncovertebral joint and facet arthrosis contributes to moderate right and mild left neural foraminal narrowing. C7-T1: There is a posterior disc osteophyte complex indenting on the ventral thecal sac. No significant spinal canal stenosis. Uncovertebral joint and facet arthrosis appears to contribute to severe bilateral neural foraminal narrowing. 1. Patient motion limits evaluation. 2. There is severe spinal canal stenosis at C2-C3 and C3-C4 with complete effacement of the CSF at these levels. 3. No significant spinal canal stenosis at the remaining levels. 4. Multilevel neural foraminal narrowing as above. 5. There is question of minimal T2 hyperintensity within the cord at C4-C5, which likely represents myelomalacia. MRI THORACIC SPINE WO CONTRAST    Result Date: 3/22/2022  EXAMINATION: MRI OF THE THORACIC SPINE WITHOUT CONTRAST  3/22/2022 9:37 am TECHNIQUE: Multiplanar multisequence MRI of the thoracic spine was performed without the administration of intravenous contrast. COMPARISON: CT thoracic spine 03/20/2022 HISTORY: ORDERING SYSTEM PROVIDED HISTORY: spinal stenosis TECHNOLOGIST PROVIDED HISTORY: spinal stenosis Reason for Exam: Frequent falls FINDINGS: BONES/ALIGNMENT: There is normal alignment of the thoracic spine. There is no acute fracture or listhesis.   Bone marrow signal intensity is normal. There is mild multilevel disc desiccation and disc space narrowing within the midthoracic spine. Multilevel anterior osteophyte formation is present. SPINAL CORD: The thoracic spinal cord is normal in size and signal intensities. SOFT TISSUES: There is no paraspinal mass identified. DEGENERATIVE CHANGES: T2-T3: There is a disc bulge present. No significant spinal canal stenosis or neural foraminal narrowing is present. T4-T5: There is a disc bulge present. There is no significant spinal canal stenosis or neural foraminal narrowing. T7-T8: There is a 1 mm right paracentral disc protrusion. There is no significant spinal canal stenosis or neural foraminal narrowing. T8-T9: There is a right paracentral disc protrusion and thickening of the ligamentum flavum mildly narrowing the spinal canal.  There is no neural foraminal narrowing. T9-T10: There is a 2 mm central disc protrusion mildly narrowing the spinal canal.  There is no neural foraminal narrowing. T10-T11: There is a 3 mm left paracentral disc protrusion mildly narrowing the spinal canal.  There is flattening of the ventral surface of the spinal cord. Mild multilevel degenerative changes of the thoracic spine, as described above with mild spinal canal stenosis from T8-9 to T10-11, most pronounced at T10-11. MRI LUMBAR SPINE WO CONTRAST    Result Date: 3/22/2022  EXAMINATION: MRI OF THE LUMBAR SPINE WITHOUT CONTRAST, 3/22/2022 9:41 am TECHNIQUE: Multiplanar multisequence MRI of the lumbar spine was performed without the administration of intravenous contrast. COMPARISON: 03/20/2022 HISTORY: ORDERING SYSTEM PROVIDED HISTORY: spinal stenosis TECHNOLOGIST PROVIDED HISTORY: spinal stenosis Reason for Exam: Frequent falls FINDINGS: BONES/ALIGNMENT: There is a levoconvex lumbar scoliosis. There is no acute fracture. Bone marrow signal intensity is normal.  There is multilevel disc desiccation. There is disc space narrowing at L2-L3 and L4-L5 There is no spondylolysis.  SPINAL CORD: The conus medullaris is normal in size and signal intensities and terminates normally. SOFT TISSUES: There is no paraspinal mass identified. T12-L1: There is a disc bulge present. There is no significant spinal canal stenosis or neural foraminal narrowing. L1-L2: There is a disc bulge, facet arthropathy and thickening of the ligamentum flavum. There is mild spinal canal stenosis. No significant neural foraminal narrowing is present. L2-L3: There is a disc bulge, facet arthropathy and thickening of the ligamentum flavum. There is moderate spinal canal stenosis. Severe bilateral neural foraminal narrowing is present. L3-L4: There is a disc bulge, facet arthropathy and thickening of the ligamentum flavum. There is moderate spinal canal stenosis and moderate bilateral neural foraminal narrowing. L4-L5: There is a disc bulge, facet arthropathy and thickening of ligamentum flavum. There is moderate spinal canal stenosis. Severe left and moderate right neural foraminal narrowing is present. L5-S1: There is no disc bulge or protrusion present. There is no significant spinal canal stenosis or neural foraminal narrowing present. There is bilateral facet arthropathy. 1. Moderate spinal canal stenosis from L2-L3 to L4-L5, as described above. 2. Mild spinal canal stenosis at L1-L2, as described above. 3. Multilevel neural foraminal narrowing, most severe at L2-L3. XR CHEST PORTABLE    Result Date: 3/29/2022  EXAMINATION: ONE XRAY VIEW OF THE CHEST 3/27/2022 7:40 am COMPARISON: 04/24/2018 HISTORY: ORDERING SYSTEM PROVIDED HISTORY: fever TECHNOLOGIST PROVIDED HISTORY: fever FINDINGS: A single frontal view of the chest was performed. There is no acute skeletal abnormality. Cervical fusion hardware is noted. The heart size is stable, and at the upper limits of normal.  The mediastinal contours are within normal limits, with stable tortuosity of the intrathoracic aorta. There is left basilar airspace consolidation. There is mild right basilar atelectasis. The lungs are otherwise clear. There is no evidence of a pneumothorax. 1. Left basilar airspace consolidation, representing either aspiration or pneumonia. 2. Mild right basilar atelectasis. XR CHEST PORTABLE    Result Date: 3/29/2022  EXAMINATION: ONE XRAY VIEW OF THE CHEST 3/29/2022 6:05 am COMPARISON: Chest from 03/27/2020 HISTORY: ORDERING SYSTEM PROVIDED HISTORY: Atelectasis, infiltrate TECHNOLOGIST PROVIDED HISTORY: Atelectasis, infiltrate Reason for Exam: uprt port FINDINGS: Cervical hardware, and overlying ECG monitor leads, respiratory tubing and gown snaps again demonstrated. Enlarged but stable appearing cardiac silhouette. Mediastinal structures midline and unchanged. Low lung volumes with bibasilar atelectasis or scarring, and greater opacity medial left base, unchanged or slightly increased. No large pleural effusion or pneumothorax. Bones unchanged. Persistent opacity left base, likely infiltrate with bibasilar atelectasis. VL DUP LOWER EXTREMITY VENOUS BILATERAL    Result Date: 3/28/2022    [facility]  Vascular Lower Extremities DVT Study Procedure   Patient Name   Connie Roldan Date of Study           03/28/2022                 T   Date of Birth  1950  Gender                  Male   Age            70 year(s)  Race                       Room Number    0145   Corporate ID # [id number]   Patient Acct # [de-identified]   MR #           [id number]     Emily Stoll, ROSALINE   Accession #    [id number]  Interpreting Physician  [address] [place] [address]   Referring                  Referring Physician     George Orozco DO  Nurse  Practitioner  Procedure Type of Study:   Veins: Lower Extremities DVT Study, Venous Scan Lower Bilateral.  Indications for Study:Fever. Patient Status: In Patient. Technical Quality:Adequate visualization.   Conclusions   Summary   Age indeterminate superficial vein thrombosis of the left lower extremity  involving the short saphenous vein. Signature   ----------------------------------------------------------------  Electronically signed by Jennifer Tsai RVT(Sonographer) on  03/28/2022 07:27 PM  ----------------------------------------------------------------   ----------------------------------------------------------------  Electronically signed by Dorla Lapine Reyes,Arthur(Interpreting  physician) on 03/28/2022 09:31 PM  ----------------------------------------------------------------  Findings:   Right Impression:                    Left Impression:  The common femoral, femoral,         The small saphenous vein is non  popliteal and tibial veins           compressible. demonstrate normal compressibility  and augmentation. The common femoral, femoral,                                       popliteal and tibial veins  Normal compressibility of the great  demonstrate normal compressibility  saphenous vein. and augmentation. Normal compressibility of the small  Normal compressibility of the great  saphenous vein. saphenous vein. Allergies   - Allergy:Sulfa(Drug). - Allergy:Stings(Miscellaneous). Velocities are measured in cm/s ; Diameters are measured in cm Right Lower Extremities DVT Study Measurements Right 2D Measurements +------------------------------------+----------+---------------+----------+ ! Location                            ! Visualized! Compressibility! Thrombosis! +------------------------------------+----------+---------------+----------+ ! Common Femoral                      !Yes       ! Yes            ! None      ! +------------------------------------+----------+---------------+----------+ ! Prox Femoral                        !Yes       ! Yes            ! None      ! +------------------------------------+----------+---------------+----------+ ! Mid Femoral                         !Yes       ! Yes            ! None      ! +------------------------------------+----------+---------------+----------+ ! Dist Femoral                        !Yes       ! Yes            ! None      ! +------------------------------------+----------+---------------+----------+ ! Popliteal                           !Yes       ! Yes            ! None      ! +------------------------------------+----------+---------------+----------+ ! Sapheno Femoral Junction            ! Yes       ! Yes            ! None      ! +------------------------------------+----------+---------------+----------+ ! PTV                                 ! Yes       ! Yes            ! None      ! +------------------------------------+----------+---------------+----------+ ! Peroneal                            !Yes       ! Yes            ! None      ! +------------------------------------+----------+---------------+----------+ ! Gastroc                             ! Yes       ! Yes            ! None      ! +------------------------------------+----------+---------------+----------+ ! GSV Thigh                           ! Yes       ! Yes            ! None      ! +------------------------------------+----------+---------------+----------+ ! GSV Knee                            ! Yes       ! Yes            ! None      ! +------------------------------------+----------+---------------+----------+ ! GSV Ankle                           ! Yes       ! Yes            ! None      ! +------------------------------------+----------+---------------+----------+ ! SSV                                 ! Yes       ! Yes            ! None      ! +------------------------------------+----------+---------------+----------+ Right Doppler Measurements +---------------------------+------+------+--------------------------------+ ! Location                   ! Signal!Reflux! Reflux (msec)                   ! +---------------------------+------+------+--------------------------------+ ! Common Femoral             !Phasic!      ! ! +---------------------------+------+------+--------------------------------+ ! Prox Femoral               !Phasic!      !                                ! +---------------------------+------+------+--------------------------------+ ! Popliteal                  !Phasic!      !                                ! +---------------------------+------+------+--------------------------------+ Left Lower Extremities DVT Study Measurements Left 2D Measurements +------------------------------------+----------+---------------+----------+ ! Location                            ! Visualized! Compressibility! Thrombosis! +------------------------------------+----------+---------------+----------+ ! Common Femoral                      !Yes       ! Yes            ! None      ! +------------------------------------+----------+---------------+----------+ ! Prox Femoral                        !Yes       ! Yes            ! None      ! +------------------------------------+----------+---------------+----------+ ! Mid Femoral                         !Yes       ! Yes            ! None      ! +------------------------------------+----------+---------------+----------+ ! Dist Femoral                        !Yes       ! Yes            ! None      ! +------------------------------------+----------+---------------+----------+ ! Popliteal                           !Yes       ! Yes            ! None      ! +------------------------------------+----------+---------------+----------+ ! Sapheno Femoral Junction            ! Yes       ! Yes            ! None      ! +------------------------------------+----------+---------------+----------+ ! PTV                                 ! Yes       ! Yes            ! None      ! +------------------------------------+----------+---------------+----------+ ! Perjamil                            !Yes       ! Yes            ! None      ! +------------------------------------+----------+---------------+----------+ ! Gastroc !Yes       !Yes            ! None      ! +------------------------------------+----------+---------------+----------+ ! GSV Thigh                           ! Yes       ! Yes            ! None      ! +------------------------------------+----------+---------------+----------+ ! GSV Knee                            ! Yes       ! Yes            ! None      ! +------------------------------------+----------+---------------+----------+ ! GSV Ankle                           ! Yes       ! Yes            ! None      ! +------------------------------------+----------+---------------+----------+ ! SSV                                 ! Yes       ! No             !AI        ! +------------------------------------+----------+---------------+----------+ Left Doppler Measurements +---------------------------+------+------+--------------------------------+ ! Location                   ! Signal!Reflux! Reflux (msec)                   ! +---------------------------+------+------+--------------------------------+ ! Common Femoral             !Phasic!      !                                ! +---------------------------+------+------+--------------------------------+ ! Prox Femoral               !Phasic!      !                                ! +---------------------------+------+------+--------------------------------+ ! Popliteal                  !Phasic!      !                                ! +---------------------------+------+------+--------------------------------+    FLUORO FOR SURGICAL PROCEDURES    Result Date: 3/24/2022  Radiology exam is complete. No Radiologist dictation. Please follow up with ordering provider. MRI BRAIN WO CONTRAST    Result Date: 3/28/2022  EXAMINATION: MRI OF THE BRAIN WITHOUT CONTRAST  3/28/2022 1:06 pm TECHNIQUE: Multiplanar multisequence MRI of the brain was performed without the administration of intravenous contrast. COMPARISON: CT brain performed 03/26/2022.  HISTORY: ORDERING SYSTEM PROVIDED HISTORY: Rule out encephalitis, meningitis, stroke,. s/p C spine fusion TECHNOLOGIST PROVIDED HISTORY: Rule out encephalitis, meningitis, stroke,. s/p C spine fusion Reason for Exam: Rule out encephalitis, meningitis, stroke,. s/p C spine fusion FINDINGS: INTRACRANIAL STRUCTURES/VENTRICLES: The sellar and suprasellar structures, optic chiasm, corpus callosum, pineal gland, tectum, and midline brainstem structures are unremarkable. The craniocervical junction is unremarkable. There is no acute hemorrhage, mass effect, or midline shift. There is satisfactory overall gray-white matter differentiation. The ventricular structures are symmetric and unremarkable. The infratentorial structures including the cerebellopontine angles and internal auditory canals are unremarkable. There is no abnormal restricted diffusion. There is no abnormal blooming artifact on susceptibility weighted imaging. ORBITS: The visualized portion of the orbits demonstrate no acute abnormality. SINUSES: The visualized paranasal sinuses and mastoid air cells demonstrate no acute abnormality. BONES/SOFT TISSUES: The bone marrow signal intensity appears normal. The soft tissues demonstrate no acute abnormality. No acute intracranial abnormality. MRI BRAIN WO CONTRAST    Result Date: 3/21/2022  EXAMINATION: MRI OF THE BRAIN WITHOUT CONTRAST  3/21/2022 3:54 pm TECHNIQUE: Multiplanar multisequence MRI of the brain was performed without the administration of intravenous contrast. COMPARISON: None. HISTORY: ORDERING SYSTEM PROVIDED HISTORY: UE/LE weakness TECHNOLOGIST PROVIDED HISTORY: UE/LE weakness Reason for Exam: Pt having several recent falls due to weakness. Initial evaluation. FINDINGS: Motion degrades images limiting evaluation. INTRACRANIAL STRUCTURES/VENTRICLES: There is no acute infarct. No mass effect or midline shift. No evidence of an acute intracranial hemorrhage.   Areas of T2 FLAIR hyperintensity are seen in the periventricular and subcortical white matter, which are nonspecific, but may represent chronic microvascular ischemic change. There is mild global parenchymal volume loss. Otherwise, the ventricles and sulci are normal in size and configuration. The sellar/suprasellar regions appear unremarkable. The normal signal voids within the major intracranial vessels appear maintained. ORBITS: The visualized portion of the orbits demonstrate no acute abnormality. SINUSES: The visualized paranasal sinuses and mastoid air cells demonstrate no acute abnormality. BONES/SOFT TISSUES: The bone marrow signal intensity appears normal. The soft tissues demonstrate no acute abnormality. 1. No acute intracranial abnormality. No acute infarct. 2. Mild global parenchymal volume loss with minimal chronic microvascular ischemic changes.         Current Facility-Administered Medications   Medication Dose Route Frequency Provider Last Rate Last Admin    carvedilol (COREG) tablet 6.25 mg  6.25 mg Oral BID Marty Lind MD   6.25 mg at 04/18/22 0854    acetaminophen (TYLENOL) tablet 650 mg  650 mg Oral Q4H PRN Yaakov Lynn MD   650 mg at 04/18/22 0548    pantoprazole (PROTONIX) tablet 40 mg  40 mg Oral QAM AC Yaakov Lynn MD   40 mg at 04/18/22 0543    atorvastatin (LIPITOR) tablet 40 mg  40 mg Oral Nightly Zain Garcia MD   40 mg at 04/17/22 2132    bisacodyl (DULCOLAX) suppository 10 mg  10 mg Rectal QPM Zain Garcia MD   10 mg at 04/17/22 1602    carboxymethylcellulose (THERATEARS) 1 % ophthalmic gel 2 drop  2 drop Both Eyes TID Zain Garcia MD   2 drop at 04/18/22 0857    divalproex (DEPAKOTE ER) extended release tablet 500 mg  500 mg Oral Nightly Zain Garcia MD   500 mg at 04/17/22 2132    enoxaparin (LOVENOX) injection 40 mg  40 mg SubCUTAneous Daily Zain Garcia MD   40 mg at 04/18/22 0854    finasteride (PROSCAR) tablet 5 mg  5 mg Oral Daily Zain Garcia MD   5 mg at 04/18/22 0854    hydrALAZINE (APRESOLINE) tablet 25 mg 25 mg Oral TID Gila Campbell MD   25 mg at 04/18/22 0854    lidocaine 4 % external patch 2 patch  2 patch TransDERmal Daily Gila Campbell MD   2 patch at 04/18/22 0855    polyethylene glycol (GLYCOLAX) packet 17 g  17 g Oral Daily Gila Campbell MD   17 g at 04/17/22 0952    venlafaxine (EFFEXOR) tablet 75 mg  75 mg Oral TID WC Gila Campbell MD   75 mg at 04/18/22 0854    aluminum & magnesium hydroxide-simethicone (MAALOX) 200-200-20 MG/5ML suspension 30 mL  30 mL Oral Q6H PRN Gila Campbell MD   30 mL at 04/17/22 2132    glucagon (rDNA) injection 1 mg  1 mg IntraMUSCular PRN Gila Campbell MD        glucose (GLUTOSE) 40 % oral gel 15 g  15 g Oral PRN Gila Campbell MD        nitroGLYCERIN (NITROSTAT) SL tablet 0.4 mg  0.4 mg SubLINGual Q5 Min PRN Gila Campbell MD        ondansetron (ZOFRAN-ODT) disintegrating tablet 4 mg  4 mg Oral Q8H PRN Gila Campbell MD   4 mg at 04/18/22 8801    Or    ondansetron (ZOFRAN) injection 4 mg  4 mg IntraVENous Q6H PRN Gila Campbell MD        senna (SENOKOT) tablet 17.2 mg  2 tablet Oral Nightly PRN Gila Campbell MD           Impressions :     1. 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 *        2.  has a past medical history of Depression (2017), Diabetes mellitus ([address] (2013), Difficult intravenous access, Hyperlipidemia (2008), Hypertension (2008), Migraines, PTSD (post-traumatic stress disorder) (01/2018), Sleep apnea (01/2018), Teeth missing, and Wears glasses. Plans:     1.  Chest pain-elevated but not uptrending EKG to be repeated today, await cardiology recommendations  2. Cervical stenosis with myelopathy status post C2-C5 laminectomy March 2022  3. CHF-currently compensated chronic hyponatremia  4. Hypertension-currently controlled  5. Type 2 diabetes-not currently on medication  6. GIA-home CPAP, not willing to use CPAP  7.  Severe depression, anxiety, PTSD-on Effexor, Depakote-psychiatry following    DVT pplx-Lovenox  Antibiotics started/continued-none  Code status-DNR CCA    4/18   No new complaints  Patient clinically doing better  Uses CPAP machine  Antihypertensive just adjusted yesterday by cardiologist,  On reduced dose of Depakote  Likely discharge to SNF, patient is refused by acute rehab, explained to patient  Patient refused CPAP machine, he told me he will never wear CPAP machine    Renae Griffiths MD  4/18/2022  9:37 AM

## 2022-04-18 NOTE — PLAN OF CARE
Problem: Skin Integrity:  Goal: Will show no infection signs and symptoms  Description: Will show no infection signs and symptoms  4/18/2022 0441 by Airam Escobar RN  Outcome: Ongoing  4/17/2022 1453 by Zafar Cuenca RN  Outcome: Ongoing  Goal: Absence of new skin breakdown  Description: Absence of new skin breakdown  4/18/2022 0441 by Airam Escobar RN  Outcome: Ongoing  4/17/2022 1453 by Zafar Cuenca RN  Outcome: Ongoing     Problem: Falls - Risk of:  Goal: Will remain free from falls  Description: Will remain free from falls  4/18/2022 0441 by Airam Escobar RN  Outcome: Ongoing  4/17/2022 1453 by Zafar Cuenca RN  Outcome: Ongoing  Goal: Absence of physical injury  Description: Absence of physical injury  4/18/2022 0441 by Airam Escobar RN  Outcome: Ongoing  4/17/2022 1453 by Zafar Cuenca RN  Outcome: Ongoing     Problem: Pain:  Goal: Pain level will decrease  Description: Pain level will decrease  4/18/2022 0441 by Airam Escobar RN  Outcome: Ongoing  4/17/2022 1453 by Zafar Cuenca RN  Outcome: Ongoing  Goal: Control of acute pain  Description: Control of acute pain  4/18/2022 0441 by Airam Escobar RN  Outcome: Ongoing  4/17/2022 1453 by Zafar Cuenca RN  Outcome: Ongoing  Goal: Control of chronic pain  Description: Control of chronic pain  4/18/2022 0441 by Airam Escobar RN  Outcome: Ongoing  4/17/2022 1453 by Zafar Cuenca RN  Outcome: Ongoing     Problem: Musculor/Skeletal Functional Status  Goal: Highest potential functional level  Outcome: Ongoing  Goal: Absence of falls  Outcome: Ongoing

## 2022-04-18 NOTE — PROGRESS NOTES
Reviewed patient progress with therapy. He was unable to meet minimum required therapy minutes for past 2 weeks. Discharge planning was already arranging for SNF placement prior to his discharge to acute hospital. Recommend discharge to SNF. Patient is not eligible for return to acute rehab.

## 2022-04-18 NOTE — DISCHARGE SUMMARY
[place] [address] Internal Medicine    Discharge Summary     Patient ID: Haydee Martinez :  1950   MRN: [id number]     ACCOUNT:  [de-identified]   Patient's PCP: Lis Pérez Date: 4/15/2022   Discharge Date: 2022    Length of Stay: 2  Code Status:  DNR-CCA  Admitting Physician: Carl Roy MD  Discharge Physician: Carl Roy MD     Active Discharge Diagnoses:     Primary Problem  Precordial pain      [place] Problems    Diagnosis Date Noted    Chest pain [R07.9] 2022    Moderate malnutrition ([address] [E44.0] 2022    Quadriplegia, C1-C4 incomplete ([address] [G82.52]     Hyponatremia [E87.1] 2022    Frequent falls [R29.6] 2022    Severe recurrent major depression without psychotic features ([address] [F33.2] 2020    Precordial pain [R07.2]     Stenosis of cervical spine with myelopathy ([address] [M48.02, G99.2] 2018    Hypertension [I10]     Hyperlipidemia [E78.5]     Diabetes mellitus ([address] [E11.9]        Admission Condition:  poor     Discharged Condition: fair    Hospital Stay:     Hospital Course:  Haydee Martinez is a 70 y.o. male who was admitted for the management of Precordial pain , presented to ER with No chief complaint on file.     Patient past medical history multiple medical problem which include diabetes, hypertension, obstructive sleep apnea on CPAP, he was originally admitted to [facility] acute rehab after cervical cord decompressive surgery, patient while in acute rehab, developed chest pain, he was transferred to progressive floor  Patient was evaluated by cardiologist, his last echocardiogram was done on , patient's symptoms improved  Patient was denied to get readmitted to acute rehab  His antihypertensive was adjusted  Getting discharged to SNF      Significant therapeutic interventions:     Significant Diagnostic Studies:   Labs / Micro:        ,     Radiology:    ECHO Complete 2D W Doppler W Color    Result Date: 3/31/2022  Transthoracic Echocardiography Report (TTE)  Patient Name Connie Roldan Date of Study               03/31/2022               T   Date of      1950  Gender                      Male  Birth   Age          70 year(s)  Race                           Room Number  145         Height:                     63 inch, 160.02 cm   Corporate ID [id number]    Weight:                     153 pounds, 69.4 kg  #   Patient Acct [de-identified]   BSA:          1.73 m^2      BMI:      27.1 kg/m^2  #   MR #         [id number]     Sonographer                 [place]    Accession #  [id number]  Interpreting Physician      Vickey Plummer 61   Fellow                   Referring Nurse                           Practitioner   Interpreting             Referring Physician         Jude Thomas MD  Fellow  Type of Study   TTE procedure:2D Echocardiogram, M-Mode, Doppler, Color Doppler. Procedure Date Date: 03/31/2022 Start: 03:49 PM Study Location: [facility] Indications:Elevated troponin. History / Tech. Comments: S/P Fall Patient Status: Inpatient Height: 63 inches Weight: 153 pounds BSA: 1.73 m^2 BMI: 27.1 kg/m^2 Allergies   - Sulfa. - Stings. CONCLUSIONS Summary Normal LV size , mild to moderately increased LV wall thickness . No obvious wall motion abnormality seen. Normal LV systolic function with LVEF >55%. Normal RV size and function. RV systolic pressure 30 mmHg LA appears mildly dilated and RA appears normal in size. No obvious significant structural valvular abnormality noted. Mild AR Normal aortic root dimension. No significant pericardial effusion noted. No obvious intra-cardiac mass or shunt noted. IVC normal diameter and inspiratory variation indicating normal RA filling pressure.  Signature ----------------------------------------------------------------------------  Electronically signed by Rossana Booth(Sonographer) on 03/31/2022 04:41  PM ---------------------------------------------------------------------------- ----------------------------------------------------------------------------  Electronically signed by Jie CastroInterpreting physician) on  2022 12:17 PM ---------------------------------------------------------------------------- FINDINGS Left Atrium Left atrium is mildly enlarged. Left Ventricle Left ventricle is normal in size with systolic function within the range of normal. Mild to moderate left ventricular hypertrophy. Calculated ejection fraction by bi-plane Lester's method is 54% Grade II (moderate) left ventricular diastolic dysfunction. Right Atrium Right atrium is normal in size. Right Ventricle Normal right ventricular size and function. Mitral Valve Mitral valve structure is normal. Mild mitral regurgitation. Aortic Valve Aortic valve is trileaflet. Aortic sclerosis without stenosis. Mild aortic insufficiency. Tricuspid Valve Tricuspid valve structure is normal. Mild tricuspid regurgitation. Estimated right ventricular systolic pressure is 30 mmHg. Pulmonic Valve The pulmonic valve is normal in structure. Pericardial Effusion No significant pericardial effusion is seen. Miscellaneous Normal aortic root dimension. E/E' average = 10.  IVC diameter and inspiratory collapse is normal. M-mode / 2D Measurements & Calculations:   LVIDd:5.7 cm(3.7 - 5.6 cm)       Diastolic GTDJEO:73.9 ml  VSFTQ:0.62 cm(2.2 - 4.0 cm)      Systolic NXEHIU:95.9 ml  CVKT:9.1 cm(0.6 - 1.1 cm)        Aortic Root:3.3 cm(2.0 - 3.7 cm)  LVPWd:1.1 cm(0.6 - 1.1 cm)       LA Dimension: 4.2 cm(1.9 - 4.0 cm)  Fractional Shortenin.91 %    LA volume/Index: 69.63 ml /40m^2  Calculated LVEF (%): 54.17 %     LVOT:2.2 cm                                   RVDd:2.8 cm   Mitral:                                 Aortic   Valve Area (P1/2-Time): 3.33 cm^2       Peak Velocity: 1.53 m/s  Peak E-Wave: 0.72 m/s                   Mean Velocity: 0.94 m/s  Peak A-Wave: 0.94 m/s                   Peak Gradient: 9.36 mmHg  E/A Ratio: 0.76                         Mean Gradient: 4 mmHg  Peak Gradient: 2.05 mmHg                AI P1/2t: 543 msec  Mean Gradient: 2 mmHg  Deceleration Time: 218 msec             Area (continuity): 3.58 cm^2  P1/2t: 66 msec                          AV VTI: 24.8 cm   Area (continuity): 3.6 cm^2  Mean Velocity: 0.61 m/s   Tricuspid:                              Pulmonic:   Estimated RVSP: 30 mmHg                 Peak Velocity: 1.41 m/s  Peak TR Velocity: 2.27 m/s              Peak Gradient: 7.95 mmHg  Peak TR Gradient: 20.6116 mmHg  Estimated RA Pressure: 10 mmHg                                           Estimated PASP: 30.61 mmHg  Diastology / Tissue Doppler Septal Wall E' velocity:0.06 m/s Septal Wall E/E':12 Lateral Wall E' velocity:0.09 m/s Lateral Wall E/E':8    XR CERVICAL SPINE (2-3 VIEWS)    Result Date: 3/25/2022  EXAMINATION: 2 XRAY VIEWS OF THE CERVICAL SPINE 3/25/2022 6:05 am COMPARISON: 03/21/2022, 03/20/2022, 09/19/2018 HISTORY: ORDERING SYSTEM PROVIDED HISTORY: s/p PCDF AP and lateral upright TECHNOLOGIST PROVIDED HISTORY: s/p PCDF AP and lateral upright FINDINGS: Immediate postoperative changes of laminectomy with posterior fusion with paraspinal rods and lateral mass screws spanning C2 through C4 with an overlying cervical drain, soft tissue gas, and cutaneous staples. The new hardware appears intact. Prior anterior fusion spanning C4 through C7 with C5-C6 corpectomy. Prevertebral soft tissues are not thickened. Imaged portion of the lung apices are clear.      Immediate postoperative changes of laminectomy with posterior fusion at C2 through C4.     CT HEAD WO CONTRAST    Result Date: 3/26/2022  EXAM: CT Head Without Intravenous Contrast EXAM DATE/TIME: 3/26/2022 3:04 pm CLINICAL HISTORY: ORDERING SYSTEM PROVIDED  R/o intra-cranial pathology  TECHNOLOGIST PROVIDED HISTORY:  R/o intra-cranial pathology TECHNIQUE: Axial computed tomography images of the head/brain without intravenous contrast.  This CT exam was performed using one or more of the following dose reduction techniques:  automated exposure control, adjustment of the mA and/or kV according to patient size, and/or use of iterative reconstruction technique. COMPARISON: MRI of the brain 03/21/2022 and CT scan of the brain 03/20/2022 FINDINGS: Brain:  No acute findings. No hemorrhage. No significant white matter disease. No edema. Ventricles:  No acute findings. No ventriculomegaly. Bones/joints:  No acute findings. No acute fracture. Soft tissues:  No acute findings. Sinuses:  Unremarkable as visualized. No acute sinusitis. Mastoid air cells:  Unremarkable as visualized. No mastoid effusion. No acute findings in the head/brain. CT HEAD WO CONTRAST    Result Date: 3/20/2022  EXAMINATION: CT OF THE HEAD WITHOUT CONTRAST  3/20/2022 7:11 pm TECHNIQUE: CT of the head was performed without the administration of intravenous contrast. Dose modulation, iterative reconstruction, and/or weight based adjustment of the mA/kV was utilized to reduce the radiation dose to as low as reasonably achievable. COMPARISON: 12/05/2018 HISTORY: ORDERING SYSTEM PROVIDED HISTORY: Fall TECHNOLOGIST PROVIDED HISTORY: Fall Decision Support Exception - unselect if not a suspected or confirmed emergency medical condition->Emergency Medical Condition (MA) Reason for Exam: fall, hit head on toilet FINDINGS: BRAIN/VENTRICLES: There is no acute intracranial hemorrhage, mass effect or midline shift. No abnormal extra-axial fluid collection. The gray-white differentiation is maintained without evidence of an acute infarct. There is no evidence of hydrocephalus. Unchanged mild prominence of pituitary gland. ORBITS: The visualized portion of the orbits demonstrate no acute abnormality. SINUSES: The visualized paranasal sinuses and mastoid air cells demonstrate no acute abnormality.  SOFT TISSUES/SKULL:  No acute abnormality of the visualized skull or soft tissues. No acute intracranial abnormality. RECOMMENDATIONS: Unavailable     CT CERVICAL SPINE WO CONTRAST    Result Date: 3/20/2022  EXAMINATION: CT OF THE CERVICAL SPINE WITHOUT CONTRAST 3/20/2022 7:14 pm TECHNIQUE: CT of the cervical spine was performed without the administration of intravenous contrast. Multiplanar reformatted images are provided for review. Dose modulation, iterative reconstruction, and/or weight based adjustment of the mA/kV was utilized to reduce the radiation dose to as low as reasonably achievable. COMPARISON: 12/05/2018 HISTORY: ORDERING SYSTEM PROVIDED HISTORY: Fall TECHNOLOGIST PROVIDED HISTORY: Fall Decision Support Exception - unselect if not a suspected or confirmed emergency medical condition->Emergency Medical Condition (MA) Reason for Exam: fall, back pain FINDINGS: BONES/ALIGNMENT: Stable corpectomy and anterior fusion from C4-C7. Vertebral body alignment is normal.  Facet joints are normally aligned. Ankylosis of the left facet at C4-C5. There is no acute fracture or traumatic malalignment. DEGENERATIVE CHANGES: Prominent residual cervical spondylosis projecting into the spinal canal on the right at C5-C6, unchanged. SOFT TISSUES: There is no prevertebral soft tissue swelling. Stable cervical spine CT examination status post corpectomy and anterior fusion from C4-C7. No acute fracture or traumatic malalignment. RECOMMENDATIONS: Unavailable     CT THORACIC SPINE WO CONTRAST    Result Date: 3/20/2022  EXAMINATION: CT OF THE THORACIC SPINE WITHOUT CONTRAST; CT OF THE LUMBAR SPINE WITHOUT CONTRAST 3/20/2022 TECHNIQUE: CT of the thoracic spine was performed without the administration of intravenous contrast. Multiplanar reformatted images are provided for review.  Dose modulation, iterative reconstruction, and/or weight based adjustment of the mA/kV was utilized to reduce the radiation dose to as low as reasonably achievable.; CT of the lumbar spine was performed without the administration of intravenous contrast. Multiplanar reformatted images are provided for review. Adjustment of mA and/or kV according to patient size was utilized. Dose modulation, iterative reconstruction, and/or weight based adjustment of the mA/kV was utilized to reduce the radiation dose to as low as reasonably achievable. COMPARISON: CT chest 03/13/2018 HISTORY: ORDERING SYSTEM PROVIDED HISTORY: Fall TECHNOLOGIST PROVIDED HISTORY: Fall Reason for Exam: fall, back pain; ORDERING SYSTEM PROVIDED HISTORY: Fall TECHNOLOGIST PROVIDED HISTORY: Fall Decision Support Exception - unselect if not a suspected or confirmed emergency medical condition->Emergency Medical Condition (MA) Reason for Exam: fall, back pain FINDINGS: BONES/ALIGNMENT: Thoracic and lumbar vertebral body heights and alignment are normal.  There is no acute fracture or traumatic malalignment. DEGENERATIVE CHANGES: There are multilevel degenerative changes throughout the thoracic and lumbar spine. SOFT TISSUES: No paraspinal mass is seen. Lower cervical corpectomy and fusion. Large left renal cyst incompletely visualized on this study. Multilevel degenerative changes with no acute fracture or traumatic malalignment of the thoracolumbar spine. RECOMMENDATIONS: Unavailable     CT LUMBAR SPINE WO CONTRAST    Result Date: 3/20/2022  EXAMINATION: CT OF THE THORACIC SPINE WITHOUT CONTRAST; CT OF THE LUMBAR SPINE WITHOUT CONTRAST 3/20/2022 TECHNIQUE: CT of the thoracic spine was performed without the administration of intravenous contrast. Multiplanar reformatted images are provided for review. Dose modulation, iterative reconstruction, and/or weight based adjustment of the mA/kV was utilized to reduce the radiation dose to as low as reasonably achievable.; CT of the lumbar spine was performed without the administration of intravenous contrast. Multiplanar reformatted images are provided for review. Adjustment of mA and/or kV according to patient size was utilized. Dose modulation, iterative reconstruction, and/or weight based adjustment of the mA/kV was utilized to reduce the radiation dose to as low as reasonably achievable. COMPARISON: CT chest 03/13/2018 HISTORY: ORDERING SYSTEM PROVIDED HISTORY: Fall TECHNOLOGIST PROVIDED HISTORY: Fall Reason for Exam: fall, back pain; ORDERING SYSTEM PROVIDED HISTORY: Fall TECHNOLOGIST PROVIDED HISTORY: Fall Decision Support Exception - unselect if not a suspected or confirmed emergency medical condition->Emergency Medical Condition (MA) Reason for Exam: fall, back pain FINDINGS: BONES/ALIGNMENT: Thoracic and lumbar vertebral body heights and alignment are normal.  There is no acute fracture or traumatic malalignment. DEGENERATIVE CHANGES: There are multilevel degenerative changes throughout the thoracic and lumbar spine. SOFT TISSUES: No paraspinal mass is seen. Lower cervical corpectomy and fusion. Large left renal cyst incompletely visualized on this study. Multilevel degenerative changes with no acute fracture or traumatic malalignment of the thoracolumbar spine. RECOMMENDATIONS: Unavailable     MRI CERVICAL SPINE WO CONTRAST    Result Date: 3/21/2022  EXAMINATION: MRI OF THE CERVICAL SPINE WITHOUT CONTRAST 3/21/2022 3:38 pm TECHNIQUE: Multiplanar multisequence MRI of the cervical spine was performed without the administration of intravenous contrast. COMPARISON: None. HISTORY: ORDERING SYSTEM PROVIDED HISTORY: spinal stenosis TECHNOLOGIST PROVIDED HISTORY: spinal stenosis Reason for Exam: Pt having several recent falls due to weakness. Subsequent evaluation. FINDINGS: Is a motion BONES/ALIGNMENT: Postsurgical changes are seen with anterior fusion hardware involving the C4 through C7 levels with partial corpectomy of C5 through C6. The remaining vertebral body heights appear grossly maintained. No significant spondylolisthesis seen.  SPINAL CORD: There is question of minimal T2 hyperintensity within the cord at C4-C5 (sagittal T2 series 5, image 7). No abnormal cord signal otherwise seen. SOFT TISSUES: No gross evidence of a paraspinal mass. C2-C3: There is a disc bulge, which appears scratch head there is a disc bulge with a central disc protrusion, which appears to indent on the ventral cord as well as buckling of the ligamentum flavum. There appears to be complete effacement of the CSF. There appears to be severe spinal canal stenosis. Uncovertebral joint and facet arthrosis contributes to moderate right neural foraminal narrowing. No significant left neural foraminal narrowing. C3-C4: There is a disc bulge with a central disc protrusion, which indents on the ventral aspect of the cervical cord. There is buckling of the ligamentum flavum. There appears to be complete effacement of the CSF. Severe spinal canal stenosis. Uncovertebral joint and facet arthrosis contributes to moderate to severe right and moderate left neural foraminal narrowing. C4-C5: The spinal canal appears surgically decompressed. Uncovertebral joint and facet arthrosis appears to contribute to mild right and moderate left neural foraminal narrowing. C5-C6: There is a right paracentral posterior osteophyte, which appears to contact the right ventral cervical cord. The spinal canal appears surgically decompressed. Uncovertebral joint and facet arthrosis contributes to mild right and moderate left neural foraminal narrowing. C6-C7: There is a posterior osteophyte which contacts the ventral cervical cord. No significant spinal canal stenosis. Uncovertebral joint and facet arthrosis contributes to moderate right and mild left neural foraminal narrowing. C7-T1: There is a posterior disc osteophyte complex indenting on the ventral thecal sac. No significant spinal canal stenosis.   Uncovertebral joint and facet arthrosis appears to contribute to severe bilateral neural foraminal narrowing. 1. Patient motion limits evaluation. 2. There is severe spinal canal stenosis at C2-C3 and C3-C4 with complete effacement of the CSF at these levels. 3. No significant spinal canal stenosis at the remaining levels. 4. Multilevel neural foraminal narrowing as above. 5. There is question of minimal T2 hyperintensity within the cord at C4-C5, which likely represents myelomalacia. MRI THORACIC SPINE WO CONTRAST    Result Date: 3/22/2022  EXAMINATION: MRI OF THE THORACIC SPINE WITHOUT CONTRAST  3/22/2022 9:37 am TECHNIQUE: Multiplanar multisequence MRI of the thoracic spine was performed without the administration of intravenous contrast. COMPARISON: CT thoracic spine 03/20/2022 HISTORY: ORDERING SYSTEM PROVIDED HISTORY: spinal stenosis TECHNOLOGIST PROVIDED HISTORY: spinal stenosis Reason for Exam: Frequent falls FINDINGS: BONES/ALIGNMENT: There is normal alignment of the thoracic spine. There is no acute fracture or listhesis. Bone marrow signal intensity is normal. There is mild multilevel disc desiccation and disc space narrowing within the midthoracic spine. Multilevel anterior osteophyte formation is present. SPINAL CORD: The thoracic spinal cord is normal in size and signal intensities. SOFT TISSUES: There is no paraspinal mass identified. DEGENERATIVE CHANGES: T2-T3: There is a disc bulge present. No significant spinal canal stenosis or neural foraminal narrowing is present. T4-T5: There is a disc bulge present. There is no significant spinal canal stenosis or neural foraminal narrowing. T7-T8: There is a 1 mm right paracentral disc protrusion. There is no significant spinal canal stenosis or neural foraminal narrowing. T8-T9: There is a right paracentral disc protrusion and thickening of the ligamentum flavum mildly narrowing the spinal canal.  There is no neural foraminal narrowing. T9-T10:  There is a 2 mm central disc protrusion mildly narrowing the spinal canal.  There is no neural foraminal narrowing. T10-T11: There is a 3 mm left paracentral disc protrusion mildly narrowing the spinal canal.  There is flattening of the ventral surface of the spinal cord. Mild multilevel degenerative changes of the thoracic spine, as described above with mild spinal canal stenosis from T8-9 to T10-11, most pronounced at T10-11. MRI LUMBAR SPINE WO CONTRAST    Result Date: 3/22/2022  EXAMINATION: MRI OF THE LUMBAR SPINE WITHOUT CONTRAST, 3/22/2022 9:41 am TECHNIQUE: Multiplanar multisequence MRI of the lumbar spine was performed without the administration of intravenous contrast. COMPARISON: 03/20/2022 HISTORY: ORDERING SYSTEM PROVIDED HISTORY: spinal stenosis TECHNOLOGIST PROVIDED HISTORY: spinal stenosis Reason for Exam: Frequent falls FINDINGS: BONES/ALIGNMENT: There is a levoconvex lumbar scoliosis. There is no acute fracture. Bone marrow signal intensity is normal.  There is multilevel disc desiccation. There is disc space narrowing at L2-L3 and L4-L5 There is no spondylolysis. SPINAL CORD: The conus medullaris is normal in size and signal intensities and terminates normally. SOFT TISSUES: There is no paraspinal mass identified. T12-L1: There is a disc bulge present. There is no significant spinal canal stenosis or neural foraminal narrowing. L1-L2: There is a disc bulge, facet arthropathy and thickening of the ligamentum flavum. There is mild spinal canal stenosis. No significant neural foraminal narrowing is present. L2-L3: There is a disc bulge, facet arthropathy and thickening of the ligamentum flavum. There is moderate spinal canal stenosis. Severe bilateral neural foraminal narrowing is present. L3-L4: There is a disc bulge, facet arthropathy and thickening of the ligamentum flavum. There is moderate spinal canal stenosis and moderate bilateral neural foraminal narrowing. L4-L5: There is a disc bulge, facet arthropathy and thickening of ligamentum flavum.   There is moderate spinal canal stenosis. Severe left and moderate right neural foraminal narrowing is present. L5-S1: There is no disc bulge or protrusion present. There is no significant spinal canal stenosis or neural foraminal narrowing present. There is bilateral facet arthropathy. 1. Moderate spinal canal stenosis from L2-L3 to L4-L5, as described above. 2. Mild spinal canal stenosis at L1-L2, as described above. 3. Multilevel neural foraminal narrowing, most severe at L2-L3. XR CHEST PORTABLE    Result Date: 3/29/2022  EXAMINATION: ONE XRAY VIEW OF THE CHEST 3/27/2022 7:40 am COMPARISON: 04/24/2018 HISTORY: ORDERING SYSTEM PROVIDED HISTORY: fever TECHNOLOGIST PROVIDED HISTORY: fever FINDINGS: A single frontal view of the chest was performed. There is no acute skeletal abnormality. Cervical fusion hardware is noted. The heart size is stable, and at the upper limits of normal.  The mediastinal contours are within normal limits, with stable tortuosity of the intrathoracic aorta. There is left basilar airspace consolidation. There is mild right basilar atelectasis. The lungs are otherwise clear. There is no evidence of a pneumothorax. 1. Left basilar airspace consolidation, representing either aspiration or pneumonia. 2. Mild right basilar atelectasis. XR CHEST PORTABLE    Result Date: 3/29/2022  EXAMINATION: ONE XRAY VIEW OF THE CHEST 3/29/2022 6:05 am COMPARISON: Chest from 03/27/2020 HISTORY: ORDERING SYSTEM PROVIDED HISTORY: Atelectasis, infiltrate TECHNOLOGIST PROVIDED HISTORY: Atelectasis, infiltrate Reason for Exam: uprt port FINDINGS: Cervical hardware, and overlying ECG monitor leads, respiratory tubing and gown snaps again demonstrated. Enlarged but stable appearing cardiac silhouette. Mediastinal structures midline and unchanged. Low lung volumes with bibasilar atelectasis or scarring, and greater opacity medial left base, unchanged or slightly increased. No large pleural effusion or pneumothorax. Bones unchanged. Persistent opacity left base, likely infiltrate with bibasilar atelectasis. VL DUP LOWER EXTREMITY VENOUS BILATERAL    Result Date: 3/28/2022    [facility]  Vascular Lower Extremities DVT Study Procedure   Patient Name   Aldair Lopez Date of Study           03/28/2022                 T   Date of Birth  1950  Gender                  Male   Age            70 year(s)  Race                       Room Number    0145   Corporate ID # [id number]   Patient Acct # [de-identified]   MR #           [id number]     Yaya Cobb, T   Accession #    [id number]  Interpreting Physician  Heather Jha   Referring                  Referring Physician     Kim Mackay,   Nurse  Practitioner  Procedure Type of Study:   Veins: Lower Extremities DVT Study, Venous Scan Lower Bilateral.  Indications for Study:Fever. Patient Status: In Patient. Technical Quality:Adequate visualization. Conclusions   Summary   Age indeterminate superficial vein thrombosis of the left lower extremity  involving the short saphenous vein. Signature   ----------------------------------------------------------------  Electronically signed by Zaheer Colvin RVT(Sonographer) on  03/28/2022 07:27 PM  ----------------------------------------------------------------   ----------------------------------------------------------------  Electronically signed by Wyona Beards Reyes,Arthur(Interpreting  physician) on 03/28/2022 09:31 PM  ----------------------------------------------------------------  Findings:   Right Impression:                    Left Impression:  The common femoral, femoral,         The small saphenous vein is non  popliteal and tibial veins           compressible. demonstrate normal compressibility  and augmentation.                     The common femoral, femoral,                                       popliteal and tibial veins  Normal compressibility of the great  demonstrate normal compressibility  saphenous vein. and augmentation. Normal compressibility of the small  Normal compressibility of the great  saphenous vein. saphenous vein. Allergies   - Allergy:Sulfa(Drug). - Allergy:Stings(Miscellaneous). Velocities are measured in cm/s ; Diameters are measured in cm Right Lower Extremities DVT Study Measurements Right 2D Measurements +------------------------------------+----------+---------------+----------+ ! Location                            ! Visualized! Compressibility! Thrombosis! +------------------------------------+----------+---------------+----------+ ! Common Femoral                      !Yes       ! Yes            ! None      ! +------------------------------------+----------+---------------+----------+ ! Prox Femoral                        !Yes       ! Yes            ! None      ! +------------------------------------+----------+---------------+----------+ ! Mid Femoral                         !Yes       ! Yes            ! None      ! +------------------------------------+----------+---------------+----------+ ! Dist Femoral                        !Yes       ! Yes            ! None      ! +------------------------------------+----------+---------------+----------+ ! Popliteal                           !Yes       ! Yes            ! None      ! +------------------------------------+----------+---------------+----------+ ! Sapheno Femoral Junction            ! Yes       ! Yes            ! None      ! +------------------------------------+----------+---------------+----------+ ! PTV                                 ! Yes       ! Yes            ! None      ! +------------------------------------+----------+---------------+----------+ ! Peroneal                            !Yes       ! Yes            ! None      ! +------------------------------------+----------+---------------+----------+ ! Gastroc                             ! Yes       ! Yes            ! None ! +------------------------------------+----------+---------------+----------+ ! GSV Thigh                           ! Yes       ! Yes            ! None      ! +------------------------------------+----------+---------------+----------+ ! GSV Knee                            ! Yes       ! Yes            ! None      ! +------------------------------------+----------+---------------+----------+ ! GSV Ankle                           ! Yes       ! Yes            ! None      ! +------------------------------------+----------+---------------+----------+ ! SSV                                 ! Yes       ! Yes            ! None      ! +------------------------------------+----------+---------------+----------+ Right Doppler Measurements +---------------------------+------+------+--------------------------------+ ! Location                   ! Signal!Reflux! Reflux (msec)                   ! +---------------------------+------+------+--------------------------------+ ! Common Femoral             !Phasic!      !                                ! +---------------------------+------+------+--------------------------------+ ! Prox Femoral               !Phasic!      !                                ! +---------------------------+------+------+--------------------------------+ ! Popliteal                  !Phasic!      !                                ! +---------------------------+------+------+--------------------------------+ Left Lower Extremities DVT Study Measurements Left 2D Measurements +------------------------------------+----------+---------------+----------+ ! Location                            ! Visualized! Compressibility! Thrombosis! +------------------------------------+----------+---------------+----------+ ! Common Femoral                      !Yes       ! Yes            ! None      ! +------------------------------------+----------+---------------+----------+ ! Prox Femoral                        !Yes       ! Yes            ! None      ! +------------------------------------+----------+---------------+----------+ ! Mid Femoral                         !Yes       ! Yes            ! None      ! +------------------------------------+----------+---------------+----------+ ! Dist Femoral                        !Yes       ! Yes            ! None      ! +------------------------------------+----------+---------------+----------+ ! Popliteal                           !Yes       ! Yes            ! None      ! +------------------------------------+----------+---------------+----------+ ! Sapheno Femoral Junction            ! Yes       ! Yes            ! None      ! +------------------------------------+----------+---------------+----------+ ! PTV                                 ! Yes       ! Yes            ! None      ! +------------------------------------+----------+---------------+----------+ ! Peroneal                            !Yes       ! Yes            ! None      ! +------------------------------------+----------+---------------+----------+ ! Gastroc                             ! Yes       ! Yes            ! None      ! +------------------------------------+----------+---------------+----------+ ! GSV Thigh                           ! Yes       ! Yes            ! None      ! +------------------------------------+----------+---------------+----------+ ! GSV Knee                            ! Yes       ! Yes            ! None      ! +------------------------------------+----------+---------------+----------+ ! GSV Ankle                           ! Yes       ! Yes            ! None      ! +------------------------------------+----------+---------------+----------+ ! SSV                                 ! Yes       ! No             !AI        ! +------------------------------------+----------+---------------+----------+ Left Doppler Measurements +---------------------------+------+------+--------------------------------+ ! Location                   ! Signal!Reflux! Reflux (msec) ! +---------------------------+------+------+--------------------------------+ ! Common Femoral             !Phasic!      !                                ! +---------------------------+------+------+--------------------------------+ ! Prox Femoral               !Phasic!      !                                ! +---------------------------+------+------+--------------------------------+ ! Popliteal                  !Phasic!      !                                ! +---------------------------+------+------+--------------------------------+    FLUORO FOR SURGICAL PROCEDURES    Result Date: 3/24/2022  Radiology exam is complete. No Radiologist dictation. Please follow up with ordering provider. MRI BRAIN WO CONTRAST    Result Date: 3/28/2022  EXAMINATION: MRI OF THE BRAIN WITHOUT CONTRAST  3/28/2022 1:06 pm TECHNIQUE: Multiplanar multisequence MRI of the brain was performed without the administration of intravenous contrast. COMPARISON: CT brain performed 03/26/2022. HISTORY: ORDERING SYSTEM PROVIDED HISTORY: Rule out encephalitis, meningitis, stroke,. s/p C spine fusion TECHNOLOGIST PROVIDED HISTORY: Rule out encephalitis, meningitis, stroke,. s/p C spine fusion Reason for Exam: Rule out encephalitis, meningitis, stroke,. s/p C spine fusion FINDINGS: INTRACRANIAL STRUCTURES/VENTRICLES: The sellar and suprasellar structures, optic chiasm, corpus callosum, pineal gland, tectum, and midline brainstem structures are unremarkable. The craniocervical junction is unremarkable. There is no acute hemorrhage, mass effect, or midline shift. There is satisfactory overall gray-white matter differentiation. The ventricular structures are symmetric and unremarkable. The infratentorial structures including the cerebellopontine angles and internal auditory canals are unremarkable. There is no abnormal restricted diffusion. There is no abnormal blooming artifact on susceptibility weighted imaging.  ORBITS: The visualized portion of the orbits demonstrate no acute abnormality. SINUSES: The visualized paranasal sinuses and mastoid air cells demonstrate no acute abnormality. BONES/SOFT TISSUES: The bone marrow signal intensity appears normal. The soft tissues demonstrate no acute abnormality. No acute intracranial abnormality. MRI BRAIN WO CONTRAST    Result Date: 3/21/2022  EXAMINATION: MRI OF THE BRAIN WITHOUT CONTRAST  3/21/2022 3:54 pm TECHNIQUE: Multiplanar multisequence MRI of the brain was performed without the administration of intravenous contrast. COMPARISON: None. HISTORY: ORDERING SYSTEM PROVIDED HISTORY: UE/LE weakness TECHNOLOGIST PROVIDED HISTORY: UE/LE weakness Reason for Exam: Pt having several recent falls due to weakness. Initial evaluation. FINDINGS: Motion degrades images limiting evaluation. INTRACRANIAL STRUCTURES/VENTRICLES: There is no acute infarct. No mass effect or midline shift. No evidence of an acute intracranial hemorrhage. Areas of T2 FLAIR hyperintensity are seen in the periventricular and subcortical white matter, which are nonspecific, but may represent chronic microvascular ischemic change. There is mild global parenchymal volume loss. Otherwise, the ventricles and sulci are normal in size and configuration. The sellar/suprasellar regions appear unremarkable. The normal signal voids within the major intracranial vessels appear maintained. ORBITS: The visualized portion of the orbits demonstrate no acute abnormality. SINUSES: The visualized paranasal sinuses and mastoid air cells demonstrate no acute abnormality. BONES/SOFT TISSUES: The bone marrow signal intensity appears normal. The soft tissues demonstrate no acute abnormality. 1. No acute intracranial abnormality. No acute infarct. 2. Mild global parenchymal volume loss with minimal chronic microvascular ischemic changes.          Consultations:    Consults:     Final Specialist Recommendations/Findings:   IP CONSULT TO CARDIOLOGY  IP CONSULT TO CARDIOLOGY  IP CONSULT TO PSYCHIATRY  IP CONSULT TO PHYSICAL MEDICINE REHAB  IP CONSULT TO CARDIOLOGY  IP CONSULT TO PSYCHIATRY  IP CONSULT TO CARDIOLOGY      The patient was seen and examined on day of discharge and this discharge summary is in conjunction with any daily progress note from day of discharge. Discharge plan:     Disposition: Home    Physician Follow Up:     No follow-up provider specified. Requiring Further Evaluation/Follow Up POST HOSPITALIZATION/Incidental Findings:    Diet: cardiac diet    Activity: As tolerated    Instructions to Patient:     Discharge Medications:      Medication List      START taking these medications    carvedilol 6.25 MG tablet  Commonly known as: COREG  Take 1 tablet by mouth 2 times daily     hydrALAZINE 25 MG tablet  Commonly known as: APRESOLINE  Take 1 tablet by mouth three times daily     nitroGLYCERIN 0.4 MG SL tablet  Commonly known as: NITROSTAT  up to max of 3 total doses. If no relief after 1 dose, call 911. venlafaxine 75 MG tablet  Commonly known as: EFFEXOR  Take 1 tablet by mouth 3 times daily (with meals)  Replaces: venlafaxine 150 MG extended release capsule        CHANGE how you take these medications    divalproex 500 MG extended release tablet  Commonly known as: DEPAKOTE ER  Take 1 tablet by mouth at bedtime  What changed:   · medication strength  · how much to take  · Another medication with the same name was removed. Continue taking this medication, and follow the directions you see here.      simethicone 80 MG chewable tablet  Commonly known as: MYLICON  Take 1 tablet by mouth every 6 hours as needed for Flatulence  What changed: when to take this        CONTINUE taking these medications    aspirin 81 MG chewable tablet     atorvastatin 80 MG tablet  Commonly known as: LIPITOR     carboxymethylcellulose 1 % ophthalmic solution     cetirizine 10 MG tablet  Commonly known as: ZYRTEC     finasteride 5 MG tablet  Commonly known as:

## 2022-04-18 NOTE — PROGRESS NOTES
Patient Susan Hallman, at bedside and informed that patient is discharged. She is very upset that [facility] has said he cannot come back. I told her that I could have a LSW come discuss possible discharge placement. LSW notified about discharge and that Sushma Florian is upset that patient cannot return to [facility].  Electronically signed by Bailey Ballesteros RN on 4/18/2022 at 9:58 AM

## 2022-04-18 NOTE — FLOWSHEET NOTE
Spoke with Dr Bia Ryan, patient is asking to have the heart cath procedure. Dr Bia Ryan will be by to see the patient.

## 2022-04-18 NOTE — DISCHARGE SUMMARY
[facility]  Bonita Jacques MD; Ita Olivia MD; Gildardo Stevens MD; MD Melisa De Oliveira MD; Allyssa You MD      [facility]  [facility]    Discharge Summary     Patient ID: Demetrius Rios :  1950   MRN: [id number]     ACCOUNT:  [de-identified]   Patient's PCP: Adriana Alexander Date: 3/31/2022   Discharge Date: 4/15/22    Length of Stay: 15  Code Status:  DNR-CCA  Admitting Physician: Hernán Kiser MD  Discharge Physician: Allyssa You MD     Active Discharge Diagnoses:     Hospital Problem Lists:  Principal Problem:    Cervical stenosis of spine  Active Problems: Moderate malnutrition ([address]  Resolved Problems:    * No resolved hospital problems. *      Admission Condition:  serious     Discharged Condition: serious    Hospital Stay:     Hospital Course:  Demetrius Rios is a 70 y.o. male who was admitted for the management of   Cervical stenosis of spine , presented to ER with No chief complaint on file.     Patient past medical multiple medical problems include hypertension, heart failure with preserved ejection fraction, diabetes, bipolar disorder,   glaucoma, patient was originally admitted at [facility] where he was transferred from outlying facility, he developed, generalized weakness after a fall, patient underwent MRI of cervical spine suggestive of severe spinal canal stenosis at the level of C2-C4 level, patient underwent C2-C5 posterior decompression surgery, he feels that weakness in his extremities is slightly better  Patient during his hospital stay, developed bradycardia which improved after discontinuation of beta-blocker, cardiology was also consulted  He developed hospital-acquired pneumonia, getting treated with cefepime and doxycycline    Called by RN that Pt having chest pain   Dr Montgomery Halsted was consulted and EKG and troponin,   Troponins 67->67->70   EKG reviewed and no new changes   Discussed with Dr Flori Prasad at length and he is willing to transfer the patient to [facility] inpatient .   Chest pain doesn't seems cardiac as per cardiology,   On lovenox 40 mg sc daily , prophylactic dose,   Doesn't need any full dose lovenox as per cardiology   Lab reviewed   Transferred to PCU , as per cardiologist Dr Remedios Diaz reviewed   T 98.1  RR 20 ,   HR 98  142/93  97% on RA      Significant therapeutic interventions:     Significant Diagnostic Studies:   Labs / Micro:  CBC:   Lab Results   Component Value Date    WBC 7.1 04/17/2022    RBC 3.87 04/17/2022    HGB 12.5 04/17/2022    HCT 34.9 04/17/2022    MCV 90.3 04/17/2022    MCH 32.3 04/17/2022    MCHC 35.8 04/17/2022    RDW 13.0 04/17/2022     04/17/2022     BMP:    Lab Results   Component Value Date    GLUCOSE 92 04/17/2022     04/17/2022    K 4.5 04/17/2022    CL 92 04/17/2022    CO2 27 04/17/2022    ANIONGAP 9 04/17/2022    BUN 20 04/17/2022    CREATININE 0.63 04/17/2022    BUNCRER NOT REPORTED 06/20/2020    CALCIUM 9.4 04/17/2022    LABGLOM >60 04/17/2022    GFRAA >60 04/17/2022    GFR      04/17/2022     HFP:    Lab Results   Component Value Date    PROT 5.9 04/17/2022     CMP:    Lab Results   Component Value Date    GLUCOSE 92 04/17/2022     04/17/2022    K 4.5 04/17/2022    CL 92 04/17/2022    CO2 27 04/17/2022    BUN 20 04/17/2022    CREATININE 0.63 04/17/2022    ANIONGAP 9 04/17/2022    ALKPHOS 130 04/17/2022    ALT 28 04/17/2022    AST 18 04/17/2022    BILITOT 0.28 04/17/2022    LABALBU 3.4 04/17/2022    ALBUMIN 0.8 03/29/2022    LABGLOM >60 04/17/2022    GFRAA >60 04/17/2022    GFR      04/17/2022    PROT 5.9 04/17/2022    CALCIUM 9.4 04/17/2022     PT/INR:    Lab Results   Component Value Date    PROTIME 10.9 03/24/2022    INR 1.0 03/24/2022     PTT:   Lab Results   Component Value Date    APTT 31.3 03/20/2022     FLP:    Lab Results   Component Value Date    CHOL 104 04/12/2018 TRIG 92 04/12/2018    HDL 42 04/12/2018     U/A:    Lab Results   Component Value Date    COLORU Yellow 03/26/2022    TURBIDITY Clear 03/26/2022    SPECGRAV 1.019 03/26/2022    HGBUR NEGATIVE 03/26/2022    PHUR 6.0 03/26/2022    PROTEINU 3+ 03/26/2022    GLUCOSEU NEGATIVE 03/26/2022    KETUA SMALL 03/26/2022    BILIRUBINUR NEGATIVE 03/26/2022    UROBILINOGEN Normal 03/26/2022    NITRU NEGATIVE 03/26/2022    LEUKOCYTESUR NEGATIVE 03/26/2022     TSH:    Lab Results   Component Value Date    TSH 0.67 03/27/2022          Radiology:  No results found. Consultations:    Consults:     Final Specialist Recommendations/Findings:   IP CONSULT TO DIETITIAN  IP CONSULT TO SOCIAL WORK  IP CONSULT TO INTERNAL MEDICINE  IP CONSULT TO DIETITIAN  IP CONSULT TO PSYCHIATRY  IP CONSULT TO PSYCHIATRY  IP CONSULT TO CARDIOLOGY      The patient was seen and examined on day of discharge and this discharge summary is in conjunction with any daily progress note from day of discharge. Discharge plan:     Disposition: [facility] / [facility]     Physician Follow Up:     No follow-up provider specified.      Requiring Further Evaluation/Follow Up POST HOSPITALIZATION/Incidental Findings:     Diet: cardiac diet    Activity: As tolerated    Instructions to Patient:     Discharge Medications:      Medication List      ASK your doctor about these medications    amLODIPine 10 MG tablet  Commonly known as: NORVASC     aspirin 81 MG chewable tablet     atorvastatin 80 MG tablet  Commonly known as: LIPITOR     carboxymethylcellulose 1 % ophthalmic solution     cetirizine 10 MG tablet  Commonly known as: ZYRTEC     * divalproex 250 MG extended release tablet  Commonly known as: DEPAKOTE ER     * divalproex 500 MG extended release tablet  Commonly known as: DEPAKOTE ER     finasteride 5 MG tablet  Commonly known as: PROSCAR     furosemide 20 MG tablet  Commonly known as: LASIX     lidocaine 5 % ointment  Commonly known as: XYLOCAINE     losartan 25 MG tablet  Commonly known as: COZAAR     metFORMIN 1000 MG tablet  Commonly known as: GLUCOPHAGE     omeprazole 20 MG delayed release capsule  Commonly known as: PRILOSEC     simethicone 80 MG chewable tablet  Commonly known as: MYLICON  Take 1 tablet by mouth every 6 hours as needed for Flatulence     traZODone 100 MG tablet  Commonly known as: DESYREL     venlafaxine 150 MG extended release capsule  Commonly known as: EFFEXOR XR  Take 1 capsule by mouth daily     Viagra 100 MG tablet  Generic drug: sildenafil         * This list has 2 medication(s) that are the same as other medications prescribed for you. Read the directions carefully, and ask your doctor or other care provider to review them with you. No discharge procedures on file. Time Spent on discharge is  32 mins in patient examination, evaluation, counseling as well as medication reconciliation, prescriptions for required medications, discharge plan and follow up. Electronically signed by   Rosetta Hernandez MD  4/17/2022  9:49 PM      Thank you Dr. Anshu Baig for the opportunity to be involved in this patient's care. Please note that this chart was generated using voice recognition Dragon dictation software. Although every effort was made to ensure the accuracy of this automated transcription, some errors in transcription may have occurred.

## 2022-04-19 ENCOUNTER — HOSPITAL ENCOUNTER (OUTPATIENT)
Dept: CARDIAC CATH/INVASIVE PROCEDURES | Age: 72
Discharge: HOME | DRG: 246 | End: 2022-04-19
Payer: MEDICARE

## 2022-04-19 VITALS
DIASTOLIC BLOOD PRESSURE: 91 MMHG | SYSTOLIC BLOOD PRESSURE: 149 MMHG | RESPIRATION RATE: 18 BRPM | HEART RATE: 74 BPM | OXYGEN SATURATION: 99 % | TEMPERATURE: 97.8 F

## 2022-04-19 LAB
ACTIVATED CLOTTING TIME: 335 SEC (ref 79–149)
ALBUMIN SERPL-MCNC: 3.4 G/DL (ref 3.5–5.2)
ALP BLD-CCNC: 140 U/L (ref 40–129)
ALT SERPL-CCNC: 26 U/L (ref 5–41)
ANION GAP SERPL CALCULATED.3IONS-SCNC: 9 MMOL/L (ref 9–17)
AST SERPL-CCNC: 17 U/L
BILIRUB SERPL-MCNC: 0.3 MG/DL (ref 0.3–1.2)
BUN BLDV-MCNC: 18 MG/DL (ref 8–23)
CALCIUM SERPL-MCNC: 9.6 MG/DL (ref 8.6–10.4)
CHLORIDE BLD-SCNC: 92 MMOL/L (ref 98–107)
CO2: 28 MMOL/L (ref 20–31)
CREAT SERPL-MCNC: 0.54 MG/DL (ref 0.7–1.2)
GFR AFRICAN AMERICAN: >60 ML/MIN
GFR NON-AFRICAN AMERICAN: >60 ML/MIN
GFR SERPL CREATININE-BSD FRML MDRD: ABNORMAL ML/MIN/{1.73_M2}
GLUCOSE BLD-MCNC: 94 MG/DL (ref 70–99)
HCT VFR BLD CALC: 34.9 % (ref 41–53)
HEMOGLOBIN: 12.3 G/DL (ref 13.5–17.5)
MCH RBC QN AUTO: 31.7 PG (ref 26–34)
MCHC RBC AUTO-ENTMCNC: 35.4 G/DL (ref 31–37)
MCV RBC AUTO: 89.5 FL (ref 80–100)
PDW BLD-RTO: 13 % (ref 11.5–14.9)
PLATELET # BLD: 279 K/UL (ref 150–450)
PMV BLD AUTO: 7.2 FL (ref 6–12)
POTASSIUM SERPL-SCNC: 4.5 MMOL/L (ref 3.7–5.3)
RBC # BLD: 3.9 M/UL (ref 4.5–5.9)
SODIUM BLD-SCNC: 129 MMOL/L (ref 135–144)
TOTAL PROTEIN: 6 G/DL (ref 6.4–8.3)
WBC # BLD: 6.9 K/UL (ref 3.5–11)

## 2022-04-19 PROCEDURE — 4A023N7 MEASUREMENT OF CARDIAC SAMPLING AND PRESSURE, LEFT HEART, PERCUTANEOUS APPROACH: ICD-10-PCS | Performed by: INTERNAL MEDICINE

## 2022-04-19 PROCEDURE — 85347 COAGULATION TIME ACTIVATED: CPT

## 2022-04-19 PROCEDURE — 6360000004 HC RX CONTRAST MEDICATION

## 2022-04-19 PROCEDURE — 36415 COLL VENOUS BLD VENIPUNCTURE: CPT

## 2022-04-19 PROCEDURE — C1894 INTRO/SHEATH, NON-LASER: HCPCS

## 2022-04-19 PROCEDURE — B2151ZZ FLUOROSCOPY OF LEFT HEART USING LOW OSMOLAR CONTRAST: ICD-10-PCS | Performed by: INTERNAL MEDICINE

## 2022-04-19 PROCEDURE — 2709999900 HC NON-CHARGEABLE SUPPLY

## 2022-04-19 PROCEDURE — 85027 COMPLETE CBC AUTOMATED: CPT

## 2022-04-19 PROCEDURE — 6360000002 HC RX W HCPCS: Performed by: INTERNAL MEDICINE

## 2022-04-19 PROCEDURE — 6360000002 HC RX W HCPCS

## 2022-04-19 PROCEDURE — 027034Z DILATION OF CORONARY ARTERY, ONE ARTERY WITH DRUG-ELUTING INTRALUMINAL DEVICE, PERCUTANEOUS APPROACH: ICD-10-PCS | Performed by: INTERNAL MEDICINE

## 2022-04-19 PROCEDURE — C9600 PERC DRUG-EL COR STENT SING: HCPCS

## 2022-04-19 PROCEDURE — 6370000000 HC RX 637 (ALT 250 FOR IP): Performed by: INTERNAL MEDICINE

## 2022-04-19 PROCEDURE — 7100000001 HC PACU RECOVERY - ADDTL 15 MIN

## 2022-04-19 PROCEDURE — C1887 CATHETER, GUIDING: HCPCS

## 2022-04-19 PROCEDURE — 2500000003 HC RX 250 WO HCPCS

## 2022-04-19 PROCEDURE — 80053 COMPREHEN METABOLIC PANEL: CPT

## 2022-04-19 PROCEDURE — 7100000000 HC PACU RECOVERY - FIRST 15 MIN

## 2022-04-19 PROCEDURE — 1200000000 HC SEMI PRIVATE

## 2022-04-19 PROCEDURE — 6370000000 HC RX 637 (ALT 250 FOR IP)

## 2022-04-19 PROCEDURE — 99232 SBSQ HOSP IP/OBS MODERATE 35: CPT | Performed by: PSYCHIATRY & NEUROLOGY

## 2022-04-19 PROCEDURE — C1769 GUIDE WIRE: HCPCS

## 2022-04-19 PROCEDURE — 93458 L HRT ARTERY/VENTRICLE ANGIO: CPT

## 2022-04-19 PROCEDURE — C1874 STENT, COATED/COV W/DEL SYS: HCPCS

## 2022-04-19 RX ORDER — SODIUM CHLORIDE 9 MG/ML
25 INJECTION, SOLUTION INTRAVENOUS PRN
Status: CANCELLED | OUTPATIENT
Start: 2022-04-19

## 2022-04-19 RX ORDER — CLOPIDOGREL BISULFATE 75 MG/1
75 TABLET ORAL DAILY
Qty: 30 TABLET | Refills: 3 | Status: SHIPPED | OUTPATIENT
Start: 2022-04-19 | End: 2022-06-14 | Stop reason: SDUPTHER

## 2022-04-19 RX ORDER — SODIUM CHLORIDE 0.9 % (FLUSH) 0.9 %
5-40 SYRINGE (ML) INJECTION EVERY 12 HOURS SCHEDULED
Status: CANCELLED | OUTPATIENT
Start: 2022-04-19

## 2022-04-19 RX ORDER — ACETAMINOPHEN 325 MG/1
650 TABLET ORAL EVERY 4 HOURS PRN
Status: CANCELLED | OUTPATIENT
Start: 2022-04-19

## 2022-04-19 RX ORDER — CLOPIDOGREL BISULFATE 75 MG/1
75 TABLET ORAL DAILY
Status: DISCONTINUED | OUTPATIENT
Start: 2022-04-20 | End: 2022-04-20 | Stop reason: HOSPADM

## 2022-04-19 RX ORDER — ASPIRIN 81 MG/1
81 TABLET, CHEWABLE ORAL DAILY
Status: DISCONTINUED | OUTPATIENT
Start: 2022-04-20 | End: 2022-04-20 | Stop reason: HOSPADM

## 2022-04-19 RX ORDER — SODIUM CHLORIDE 0.9 % (FLUSH) 0.9 %
5-40 SYRINGE (ML) INJECTION PRN
Status: CANCELLED | OUTPATIENT
Start: 2022-04-19

## 2022-04-19 RX ADMIN — CARVEDILOL 6.25 MG: 6.25 TABLET, FILM COATED ORAL at 08:45

## 2022-04-19 RX ADMIN — DIVALPROEX SODIUM 500 MG: 500 TABLET, EXTENDED RELEASE ORAL at 20:32

## 2022-04-19 RX ADMIN — VENLAFAXINE 75 MG: 75 TABLET ORAL at 08:45

## 2022-04-19 RX ADMIN — ACETAMINOPHEN 650 MG: 325 TABLET ORAL at 08:45

## 2022-04-19 RX ADMIN — FINASTERIDE 5 MG: 5 TABLET, FILM COATED ORAL at 08:45

## 2022-04-19 RX ADMIN — CARBOXYMETHYLCELLULOSE SODIUM 2 DROP: 10 GEL OPHTHALMIC at 20:33

## 2022-04-19 RX ADMIN — ONDANSETRON 4 MG: 2 INJECTION INTRAMUSCULAR; INTRAVENOUS at 23:56

## 2022-04-19 RX ADMIN — HYDRALAZINE HYDROCHLORIDE 25 MG: 25 TABLET, FILM COATED ORAL at 20:32

## 2022-04-19 RX ADMIN — CARVEDILOL 6.25 MG: 6.25 TABLET, FILM COATED ORAL at 20:33

## 2022-04-19 RX ADMIN — ATORVASTATIN CALCIUM 40 MG: 40 TABLET, FILM COATED ORAL at 20:33

## 2022-04-19 RX ADMIN — CARBOXYMETHYLCELLULOSE SODIUM 2 DROP: 10 GEL OPHTHALMIC at 08:47

## 2022-04-19 RX ADMIN — ACETAMINOPHEN 650 MG: 325 TABLET ORAL at 23:56

## 2022-04-19 RX ADMIN — VENLAFAXINE 75 MG: 75 TABLET ORAL at 19:06

## 2022-04-19 RX ADMIN — BISACODYL 10 MG: 10 SUPPOSITORY RECTAL at 19:06

## 2022-04-19 ASSESSMENT — PAIN - FUNCTIONAL ASSESSMENT: PAIN_FUNCTIONAL_ASSESSMENT: PREVENTS OR INTERFERES SOME ACTIVE ACTIVITIES AND ADLS

## 2022-04-19 ASSESSMENT — PAIN DESCRIPTION - DESCRIPTORS: DESCRIPTORS: SORE

## 2022-04-19 ASSESSMENT — PAIN DESCRIPTION - LOCATION
LOCATION: ARM
LOCATION: NECK

## 2022-04-19 ASSESSMENT — PAIN DESCRIPTION - FREQUENCY: FREQUENCY: CONTINUOUS

## 2022-04-19 ASSESSMENT — PAIN SCALES - PAIN ASSESSMENT IN ADVANCED DEMENTIA (PAINAD)
TOTALSCORE: 0
BREATHING: 0
FACIALEXPRESSION: 0
NEGVOCALIZATION: 0
CONSOLABILITY: 0
BODYLANGUAGE: 0

## 2022-04-19 ASSESSMENT — PAIN DESCRIPTION - ONSET: ONSET: ON-GOING

## 2022-04-19 ASSESSMENT — PAIN SCALES - GENERAL
PAINLEVEL_OUTOF10: 3
PAINLEVEL_OUTOF10: 4
PAINLEVEL_OUTOF10: 0

## 2022-04-19 ASSESSMENT — PAIN DESCRIPTION - ORIENTATION
ORIENTATION: RIGHT;LEFT
ORIENTATION: RIGHT

## 2022-04-19 ASSESSMENT — PAIN DESCRIPTION - PROGRESSION: CLINICAL_PROGRESSION: NOT CHANGED

## 2022-04-19 ASSESSMENT — PAIN DESCRIPTION - PAIN TYPE
TYPE: ACUTE PAIN
TYPE: ACUTE PAIN

## 2022-04-19 ASSESSMENT — PAIN SCALES - WONG BAKER: WONGBAKER_NUMERICALRESPONSE: 0

## 2022-04-19 NOTE — PROGRESS NOTES
Date:   4/19/2022  Patient name: Mirian Mcdonnell. Date of admission:  4/15/2022 11:01 PM  MRN:   [id number]  YOB: 1950  PCP: Jessica Borrego    Reason for Admission: Chest pain [R07.9]    Cardiology follow up: Chest pain ,abnormal cardiac markers       Referring physician: Dr Farris Score     Impression     Abnormal troponin 67, high-sensitivity troponin was elevated at 53 and 53 on 3/31/2022  Chest pain  Hypertension  Mild to moderate LVH  Hyperlipidemia  Cervical spinal cord compression, myelopathy, radiculopathy, status post surgery fusion C4-C7  Encephalopathy/CT brain 3/26/2022 no acute process  Hospital-acquired pneumonia  Hyponatremia     2D echo 3/31/2022  Normal LV size, mild to moderate LVH normal wall motion  Ejection fraction more than 55%, RVSP 30  Mildly dilated LA   Normal IVC size and respiratory variation  No significant valvular abnormality    ECG 4/15/2022  Sinus rhythm, moderate criteria for LVH, no ischemic change      History of present illness     70-year-old male who got admitted at acute rehab unit on 3/31/2020 with the problem of frequent falls.  He has past medical history of cervical stenosis and C-spine surgery few years ago.  He was doing well up to 6 weeks ago and then he started deteriorating neurologically and has had frequent falls.  CT brain on 3/26/2022 did not show any acute process.  2D echo on 3/31/2022 showed normal LV size normal LV function ejection fraction more than 55%, normal wall motion, mild to moderate LVH.     Patient had chest pain earlier this morning left-sided 6 x 10 like a pressure sensation, patient received nitroglycerin x2 no relief.  Patient has constant chest pain since then more than 7 hours.  At present pain is 4 x 10.  No palpitation no diaphoresis no nausea no vomiting.  He does have a history of acid reflux.  No nausea no vomiting no change in pain by deep breathing or cough.   Electrocardiogram showed normal sinus rhythm, no ischemic changes, moderate criteria for LVH  High-sensitivity troponin 67  Previous high-sensitivity troponin on 3-31-22 was 53 and 53    Lab work 4/15/2022  Sodium 129, potassium 4.1, BUN 26, creatinine 0.70, glucose 154  High-sensitivity troponin 67  Serum cortisol 22.9  WBC 7.9, hemoglobin 12.4, platelets 143    Current evaluation  Patient seen and examined   Frail looking male he was resting with a couple of pillow  He denied any chest pain or shortness of breath  Afebrile hemodynamically stable, oxygen saturation 97 pulse  No clinical sign of cardiac decompensation  Patient has been n.p.o. for cardiac cath today  ECG monitor sinus rhythm heart rate 72    Sodium 129, potassium 4.5, BUN 18, creatinine 0.54  Hemoglobin 12.3, platelets 692, WBC 6.9      Medications:   Scheduled Meds:   carvedilol  6.25 mg Oral BID    pantoprazole  40 mg Oral QAM AC    atorvastatin  40 mg Oral Nightly    bisacodyl  10 mg Rectal QPM    carboxymethylcellulose  2 drop Both Eyes TID    divalproex  500 mg Oral Nightly    enoxaparin  40 mg SubCUTAneous Daily    finasteride  5 mg Oral Daily    hydrALAZINE  25 mg Oral TID    lidocaine  2 patch TransDERmal Daily    polyethylene glycol  17 g Oral Daily    venlafaxine  75 mg Oral TID WC     Continuous Infusions:  CBC:   Recent Labs     04/17/22  0643 04/18/22  0637 04/19/22  0606   WBC 7.1 7.6 6.9   HGB 12.5* 12.1* 12.3*    288 279     BMP:    Recent Labs     04/17/22  0643 04/18/22  0637 04/19/22  0606   * 128* 129*   K 4.5 4.5 4.5   CL 92* 92* 92*   CO2 27 27 28   BUN 20 18 18   CREATININE 0.63* 0.56* 0.54*   GLUCOSE 92 92 94     Hepatic:   Recent Labs     04/17/22  0643 04/18/22  0637 04/19/22  0606   AST 18 16 17   ALT 28 26 26   BILITOT 0.28* 0.30 0.30   ALKPHOS 130* 136* 140*     Troponin: No results for input(s): TROPONINI in the last 72 hours. BNP: No results for input(s): BNP in the last 72 hours.   Lipids: No results for input(s): CHOL, HDL in the last 72 hours.    Invalid input(s): LDLCALCU  INR: No results for input(s): INR in the last 72 hours. Objective:   Vitals: BP (!) 154/78   Pulse 71   Temp 98 °F (36.7 °C) (Oral)   Resp 18   Ht 5' 3\" (1.6 m)   Wt 145 lb 15.1 oz (66.2 kg)   SpO2 97%   BMI 25.85 kg/m²   General appearance: alert and cooperative with exam  HEENT: Head: Normal, normocephalic, atraumatic. Neck: no JVD and supple, symmetrical, trachea midline  Lungs: diminished breath sounds bibasilar  Heart: regular rate and rhythm  Abdomen: soft, non-tender; bowel sounds normal; no masses,  no organomegaly  Extremities: Homans sign is negative, no sign of DVT  Neurologic: Mental status: Alert, oriented, thought content appropriate    EKG: normal sinus rhythm.  Voltage criteria for LVH  ECHO: reviewed.    Ejection fraction: 55%, mild to moderate LVH    Assessment / Acute Cardiac Problems:   Chest pain like a pressure ,  for more than 7 hours  No relief with the nitroglycerin x2  No ischemic ECG changes  Abnormal high-sensitivity troponin 67, 70, 73  Elevated high-sensitivity troponin on 3/31/2000 2253 and 53  No sign of cardiopulmonary decompensation     4/19/2022 hemodynamically stable no chest pain last night  Cardiac cath today    Patient Active Problem List:     Hypertension     Hyperlipidemia     Diabetes mellitus ([address]     Contusion of right shoulder     Bipolar disorder, mixed ([address]     First known suicide attempt [facility])     Erectile dysfunction     Cervical stenosis of spinal canal     Incomplete spinal cord lesion at C5-C7 level ([address]     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)     Cervical spondylosis with radiculopathy     Acute blood loss anemia     Precordial pain     Depression with suicidal ideation     Severe recurrent major depression without psychotic features ([address]     Frequent falls     Hyponatremia     Cervical spinal cord compression (HCC)     Cord compression (HCC)     Quadriplegia, C1-C4 incomplete ([address] Encephalopathy     Hospital-acquired pneumonia     Atelectasis     Cervical stenosis of spine     Moderate malnutrition (HCC)     Chest pain     Delirium due to multiple etiologies      Plan of Treatment:   Medications reviewed  Patient is n.p.o. for cardiac catheter today  Continue current medication carvedilol, Lipitor, hydralazine      Electronically signed by Kostas Zheng MD on 4/19/2022 at 9:57 AM

## 2022-04-19 NOTE — H&P
[facility]  Procedure History and Physical Update          Patient Name: Marce Sanon. MRN:    [id number]  YOB: 1950  Date of evaluation:  4/19/2022    Procedure:    Cardiac cath +/- PCI    Indication for procedure:  NSTEMi    Please refer to the note completed by Dr. Amanda March on 04/19 in the medical record and note that:    [x] I have examined the patient and reviewed the H&P/Consult and there are no changes to be made to the assessment or plan.     [] I have examined the patient and reviewed the H&P/Consult and have noted the following changes:    Past Medical History:   Diagnosis Date    Depression 2017    ON RX    Diabetes mellitus [address] [address] 2013    NIDDM    Difficult intravenous access     Hyperlipidemia 2008    ON RX    Hypertension 2008    ON RX    Migraines     PTSD (post-traumatic stress disorder) 01/2018    ON RX    Sleep apnea 01/2018    UNALBE TO USE TO CLEARED BY ENT (CPAP)    Teeth missing     BACK TEETH UPPER AND LOWER TO BE FITTED FOR PARTIALS AT LATER DATE    Wears glasses        Past Surgical History:   Procedure Laterality Date    CARDIAC CATHETERIZATION  02/2010    no stents     CARPAL TUNNEL RELEASE Left 01/09/2002    CATARACT REMOVAL      CERVICAL FUSION  04/19/2018    anterior cervical fusion C5-6    CERVICAL FUSION N/A 3/24/2022    C2-5 FUSION, C2-4 LAMINECTOMY performed by Mary Lee DO at [facility] Left 2018    CATARACT EXTRACTION WITH IOL   [facility]    LAMINECTOMY  03/24/2022    C2-5 FUSION, C2-4 LAMINECTOMY    MIDDLE EAR SURGERY  01/11/2018    MIDDLE EAR REBUILT AND EAR DRUM REPLACED    MOUTH SURGERY  04/16/2018    5 TEETH REMOVED    OTHER SURGICAL HISTORY  04/19/2018    : ANTERIOR CERVICAL CORRPECTOMY C5-6, SYNTHES, DEPUY, REG TABLE, SUPINE,     [place] OFFICE/OUTPT VISIT,PROCEDURE ONLY N/A 4/19/2018    ANTERIOR CERVICAL CORRPECTOMY AND FUSION C5-6 performed by Mary Lee DO at [address] [place] [address] TONSILLECTOMY AND ADENOIDECTOMY  1960    VASECTOMY  12/1983       Family History   Problem Relation Age of Onset   [address] [facility] [address] Dementia Mother     Coronary Art Dis Mother     Stroke Mother     Diabetes Mother     Asthma Mother     Other Mother         BRAIN ANEURISM    High Blood Pressure Mother     Heart Disease Father     Diabetes Sister     Diabetes Brother     High Blood Pressure Brother     High Cholesterol Brother     Heart Disease Brother     Diabetes Sister     Other Sister         NEUROPATHY       Allergies   Allergen Reactions    Latex Itching    Bee Venom Swelling    Lisinopril Other (See Comments)     Cough      Niacin And Related Rash    Sulfa Antibiotics Rash    Terazosin Rash       Prior to Admission medications    Medication Sig Start Date End Date Taking? Authorizing Provider   nitroGLYCERIN (NITROSTAT) 0.4 MG SL tablet up to max of 3 total doses.  If no relief after 1 dose, call 911. 4/18/22   Issa Vides MD   divalproex (DEPAKOTE ER) 500 MG extended release tablet Take 1 tablet by mouth at bedtime 4/18/22   Issa Vides MD   venlafaxine (EFFEXOR) 75 MG tablet Take 1 tablet by mouth 3 times daily (with meals) 4/18/22   Issa Vides MD   hydrALAZINE (APRESOLINE) 25 MG tablet Take 1 tablet by mouth three times daily 4/18/22   Issa Vides MD   carvedilol (COREG) 6.25 MG tablet Take 1 tablet by mouth 2 times daily 4/18/22   Issa Vides MD   finasteride (PROSCAR) 5 MG tablet Take 5 mg by mouth daily    Historical Provider, MD   carboxymethylcellulose 1 % ophthalmic solution Place 2 drops into both eyes 3 times daily Pt states \"2 drops in both eyes three times a day\"    Historical Provider, MD   simethicone (MYLICON) 80 MG chewable tablet Take 1 tablet by mouth every 6 hours as needed for Flatulence  Patient taking differently: Take 80 mg by mouth every 8 hours as needed for Flatulence  6/22/20   Yas Hannah MD   lidocaine (XYLOCAINE) 5 % ointment Apply topically daily as needed for Pain Apply topically as needed. LF 4/20/20 (30 day supply)  Patient not taking: Reported on 3/23/2022    Historical Provider, MD   metFORMIN (GLUCOPHAGE) 1000 MG tablet Take 1,000 mg by mouth 2 times daily (with meals)  4/27/20 (90 day supply)  Patient not taking: Reported on 3/21/2022    Historical Provider, MD   aspirin 81 MG chewable tablet Take 81 mg by mouth daily  Patient not taking: Reported on 3/21/2022    Historical Provider, MD   cetirizine (ZYRTEC) 10 MG tablet Take 10 mg by mouth daily LF 4/6/20 (90 day supply)  Patient not taking: Reported on 3/21/2022    Historical Provider, MD   atorvastatin (LIPITOR) 80 MG tablet Take 40 mg by mouth daily Take 1/2 tablet (40 mg) daily  LF 4/22/20 (90 day supply)  Patient not taking: Reported on 3/21/2022    Historical Provider, MD   omeprazole (PRILOSEC) 20 MG delayed release capsule Take 20 mg by mouth every evening LF 4/21/20 (90 day supply)    Historical Provider, MD         There were no vitals filed for this visit. Constitutional and General Appearance:   alert, cooperative, no distress and appears stated age  [de-identified]:  · PERRL, EOMI  Respiratory:  · Normal excursion and expansion without use of accessory muscles  · Resp Auscultation:  Good respiratory effort. No for increased work of breathing. On auscultation: clear to auscultation bilaterally  Cardiovascular:  · Regular rate and rhythm  · S1/S2  · No murmurs. · The apical impulse is not displaced  Abdomen:  · Soft  · Bowel sounds present  · Non-tender to palpation  Extremities:  · No cyanosis or clubbing  · Lower extremity edema:   Skin:  · Warm and dry  Neurological:  · Alert and oriented. · Moves all extremities well      Plan:  · Proceed with planned procedure. · Further orders to follow. Risks, benefits, and alternatives of cardiac catheterization were discussed, in detail, with patient.  Risks include, but not limited to, bleeding, requiring blood transfusion, vascular complication requiring surgery, renal failure with need of dialysis, CVA, MI, death and anesthesia complications including intubation were discussed. Patient verbalized understanding and agreed to proceed with the procedure understanding the above risks and alternatives to the procedure.     Sydnee Villegas MD       Cardiovascular Fellow PGY-4  4/19/2022, 11:35 AM

## 2022-04-19 NOTE — PROGRESS NOTES
Returned to room 5 on [facility], awake alert no c/o offered, rt. Radial compression device in  Place hand warm to touch, sao2 98, taking fluids well, vitals stable.

## 2022-04-19 NOTE — PROGRESS NOTES
Physical Therapy        Physical Therapy Cancel Note      DATE: 2022    NAME: Orion Melgar. MRN: [id number]   : 1950      Patient not seen this date for Physical Therapy due to: Other: @7737 - transport present, pt leaving facility for heart cath. Will follow.        Electronically signed by Sebastian Conley PTA on 2022 at 10:50 AM

## 2022-04-19 NOTE — CARE COORDINATION
DISCHARGE PLANNING NOTE:    Pt off floor for heart cath today.    Electronically signed by Obi Ivey RN on 4/19/2022 at 12:12 PM

## 2022-04-19 NOTE — PROGRESS NOTES
Patient admitted, consent signed, all questions answered. Pt ready for procedure. Bed in low position, call light to reach with side rails up 2 of 2.  luis groins clipped. family at bedside with patient.

## 2022-04-19 NOTE — PROGRESS NOTES
Report was called to nurse in Cath Lab at [facility] at [phone].

## 2022-04-19 NOTE — CARE COORDINATION
SW spoke with Zana from [facility], states pt is still \"being considered\" for admissio to [facility], states their medical director will make decision when pt has cardiac cath and the results are ready for review.

## 2022-04-19 NOTE — CARE COORDINATION
Pt accepted for potential admission tomorrow at [facility] after heart cath procedure update reviewed by facility.

## 2022-04-19 NOTE — PROGRESS NOTES
Remaining air removed from vascband no bleeding noted, pressure dressing applied, report given to [employer].

## 2022-04-19 NOTE — PROGRESS NOTES
Report called to maría elena VENTURA at [facility],  Informed RN to notify Dr. Montgomery Halsted for Plavix order.

## 2022-04-19 NOTE — PROGRESS NOTES
Writer called to notify Dr. Susy Christensen of the patient being done and coming back to the unit. Patient received a stent and Dr. Susy Christensen ordered plavix and aspirin to start tomorrow.

## 2022-04-19 NOTE — PROGRESS NOTES
Patient back at [facility] in his room. Vitals taken and post cath assessment done. Bed in lowest position, call light in reach, and family at bedside. Dietary notified of new diet order and dinner tray needed.

## 2022-04-19 NOTE — PROGRESS NOTES
Patient transferred to [facility] per [facility] [place]

## 2022-04-19 NOTE — CONSULTS
Department of Psychiatry  Behavioral Health Consult    REASON FOR CONSULT: Lethargy    CONSULTING PHYSICIAN: Dr. Fidencio Villanueva    History obtained from: Tyson Franco and ex-wife    Interim history. The patient was seen at bedside. He reports that his mood is okay. He denies any suicidal thoughts. He is constricted in affect and admits to feeling fatigued. The patient denies anxiety symptoms. Noted that the patient's sodium is 149. It has remained in the high 120s for the last 4 days. We discussed that Depakote and Effexor can both lower sodium. The patient states he has taken these medications for years. He generally finds it helpful. He is going for a heart cath today. We will hold his medications as they are today and continue to follow    HISTORY OF PRESENT ILLNESS:    The patient is a 70 y.o. male with significant past psychiatric history of depression and suicidal ideation as well as suicide attempts who has been seen by psychiatry  during a recent admission . The patient was admitted to the hospital for chest pain. The patient is now referred for lethargy. The patient was seen in his room. His ex-wife is present at bedside. The patient has increased latency in responses. As per ex-wife \"Morphine makes him loopy\". The patient was oriented to place and situation. He said the date was 25 April 2022. He is reporting some anxiety and depressive symptoms but generally feels okay. He is denying any suicidal ideation. He denies any auditory or visual hallucinations or psychotic phenomena. The patient reports some difficulty in sleeping but is generally able to stay asleep. Hyponatremia noted. .    No diagnosis of bipolar disorder. The patient is currently receiving care for the above psychiatric illness.       Psychiatric Review of Systems        ·    Obsessions and Compulsions: Denies    ·    Ruby or Hypomania: Denies  ·    Hallucinations: Denies  ·    Panic Attacks:  Denies  · Delusions:  Denies  ·    Phobias:  Denies  ·    Trauma: Denies      Substance Abuse History:  The patient has a history of abusing Flexeril in the past but denies any recent use of recreational substances. Past Psychiatric History:  The patient has a previous diagnosis of depression. He has been admitted to psychiatry several times. He has a history of suicide attempts.   He is currently getting treatment from his PCP         Past Medical History:        Diagnosis Date    Depression 2017    ON RX    Diabetes mellitus ([address] 2013    NIDDM    Difficult intravenous access     Hyperlipidemia 2008    ON RX    Hypertension 2008    ON RX    Migraines     PTSD (post-traumatic stress disorder) 01/2018    ON RX    Sleep apnea 01/2018    UNALBE TO USE TO CLEARED BY ENT (CPAP)    Teeth missing     BACK TEETH UPPER AND LOWER TO BE FITTED FOR PARTIALS AT LATER DATE    Wears glasses        Past Surgical History:        Procedure Laterality Date    CARDIAC CATHETERIZATION  02/2010    no stents     CARPAL TUNNEL RELEASE Left 01/09/2002    CATARACT REMOVAL      CERVICAL FUSION  04/19/2018    anterior cervical fusion C5-6    CERVICAL FUSION N/A 3/24/2022    C2-5 FUSION, C2-4 LAMINECTOMY performed by Cris Correa DO at [address] Left 2018    CATARACT EXTRACTION WITH IOL   [facility]    LAMINECTOMY  03/24/2022    C2-5 FUSION, C2-4 LAMINECTOMY    MIDDLE EAR SURGERY  01/11/2018    MIDDLE EAR REBUILT AND EAR DRUM REPLACED    MOUTH SURGERY  04/16/2018    5 TEETH REMOVED    OTHER SURGICAL HISTORY  04/19/2018    : ANTERIOR CERVICAL CORRPECTOMY C5-6, SYNTHES, DEPUY, REG TABLE, SUPINE,     OR OFFICE/OUTPT VISIT,PROCEDURE ONLY N/A 4/19/2018    ANTERIOR CERVICAL CORRPECTOMY AND FUSION C5-6 performed by Cris Correa DO at [address] [place] [address]  12/1983         Medications Prior to Admission:   Medications Prior to Admission: finasteride (PROSCAR) 5 MG tablet, Take 5 mg by mouth daily  carboxymethylcellulose 1 % ophthalmic solution, Place 2 drops into both eyes 3 times daily Pt states \"2 drops in both eyes three times a day\"  [DISCONTINUED] divalproex (DEPAKOTE ER) 250 MG extended release tablet, Take 750 mg by mouth at bedtime  [DISCONTINUED] furosemide (LASIX) 20 MG tablet, Take 20 mg by mouth daily  simethicone (MYLICON) 80 MG chewable tablet, Take 1 tablet by mouth every 6 hours as needed for Flatulence (Patient taking differently: Take 80 mg by mouth every 8 hours as needed for Flatulence )  [DISCONTINUED] venlafaxine (EFFEXOR XR) 150 MG extended release capsule, Take 1 capsule by mouth daily (Patient taking differently: Take 225 mg by mouth daily )  lidocaine (XYLOCAINE) 5 % ointment, Apply topically daily as needed for Pain Apply topically as needed.  LF 4/20/20 (30 day supply) (Patient not taking: Reported on 3/23/2022)  metFORMIN (GLUCOPHAGE) 1000 MG tablet, Take 1,000 mg by mouth 2 times daily (with meals) LF 4/27/20 (90 day supply) (Patient not taking: Reported on 3/21/2022)  [DISCONTINUED] losartan (COZAAR) 25 MG tablet, Take 25 mg by mouth daily   aspirin 81 MG chewable tablet, Take 81 mg by mouth daily (Patient not taking: Reported on 3/21/2022)  cetirizine (ZYRTEC) 10 MG tablet, Take 10 mg by mouth daily LF 4/6/20 (90 day supply) (Patient not taking: Reported on 3/21/2022)  atorvastatin (LIPITOR) 80 MG tablet, Take 40 mg by mouth daily Take 1/2 tablet (40 mg) daily LF 4/22/20 (90 day supply) (Patient not taking: Reported on 3/21/2022)  omeprazole (PRILOSEC) 20 MG delayed release capsule, Take 20 mg by mouth every evening LF 4/21/20 (90 day supply)  [DISCONTINUED] divalproex (DEPAKOTE ER) 500 MG extended release tablet, Take 500 mg by mouth every morning   [DISCONTINUED] traZODone (DESYREL) 100 MG tablet, Take 150 mg by mouth nightly as needed LF 5/1/20 (30 day supply)  [DISCONTINUED] amLODIPine (NORVASC) 10 MG tablet, Take 7.5 mg by mouth daily [DISCONTINUED] sildenafil (VIAGRA) 100 MG tablet, Take 100 mg by mouth as needed for Erectile Dysfunction LF 20 (30 day supply)    Allergies:  Latex, Bee venom, Lisinopril, Niacin and related, Sulfa antibiotics, and Terazosin    FAMILY/SOCIAL HISTORY:  Family History   Problem Relation Age of Onset    Dementia Mother     Coronary Art Dis Mother     Stroke Mother     Diabetes Mother     Asthma Mother     Other Mother         BRAIN ANEURISM    High Blood Pressure Mother     Heart Disease Father     Diabetes Sister     Diabetes Brother     High Blood Pressure Brother     High Cholesterol Brother     Heart Disease Brother     Diabetes Sister     Other Sister         NEUROPATHY     Social History     Socioeconomic History    Marital status:      Spouse name: Not on file    Number of children: Not on file    Years of education: Not on file    Highest education level: Not on file   Occupational History    Not on file   Tobacco Use    Smoking status: Former Smoker     Packs/day: 0.50     Years: 4.00     Pack years: 2.00     Types: Cigarettes     Quit date: 1972     Years since quittin.0    Smokeless tobacco: Never Used    Tobacco comment: quit    Vaping Use    Vaping Use: Never used   Substance and Sexual Activity    Alcohol use: Yes     Comment: MIXED DRINKS- 3 OR 4 A YEAR; last 2018    Drug use: Yes     Types: Marijuana Shellye Burkitt)    Sexual activity: Yes     Partners: Female   Other Topics Concern    Not on file   Social History Narrative    Not on file     Social Determinants of Health     Financial Resource Strain:     Difficulty of Paying Living Expenses: Not on file   Food Insecurity:     Worried About Running Out of Food in the Last Year: Not on file    [facility] of Food in the Last Year: Not on file   Transportation Needs:     Lack of Transportation (Medical): Not on file    Lack of Transportation (Non-Medical):  Not on file   Physical Activity:     Days of Exercise per Week: Not on file    Minutes of Exercise per Session: Not on file   Stress:     Feeling of Stress : Not on file   Social Connections:     Frequency of Communication with Friends and Family: Not on file    Frequency of Social Gatherings with Friends and Family: Not on file    Attends Oriental orthodox Services: Not on file    Active Member of Clubs or Organizations: Not on file    Attends Club or Organization Meetings: Not on file    Marital Status: Not on file   Intimate Partner Violence:     Fear of Current or Ex-Partner: Not on file    Emotionally Abused: Not on file    Physically Abused: Not on file    Sexually Abused: Not on file   Housing Stability:     Unable to Pay for Housing in the Last Year: Not on file    Number of [place] in the Last Year: Not on file    Unstable Housing in the Last Year: Not on file       REVIEW OF SYSTEMS    Constitutional: [] fever  [] chills  [] weight loss  []weakness [] Other:  Eyes:  [] photophobia  [] discharge [] acuity change   [] Diplopia   [] Other:  HENT:  [] sore throat  [] ear pain [] Tinnitus   [] Other  Respiratory:  [] Cough  [] Shortness of breath   [] Sputum   [] Other:   Cardiac: []Chest pain   []Palpitations []Edema  []PND  [] Other:  GI:  []Abdominal pain   []Nausea  []Vomiting  []Diarrhea  [] Other:  :  [] Dysuria   []Frequency  []Hematuria  []Discharge  [] Other:  Possible Pregnancy: []Yes   []No   LMP:   Musculoskeletal:  []Back pain  []Neck pain  []Recent Injury   Skin:  []Rash  [] Itching  [] Other:  Neurologic:  [] Headache  [] Focal weakness  [] Sensory changes []Other:  Endocrine:  [] Polyuria  [] Polydipsia  [] Hair Loss  [] Other:  Lymphatic:   [] Swollen glands   Psychiatric:  As per HPI      All other systems negative except as marked or mentioned/indicated in the HPI. Michele Gonzalez      PHYSICAL EXAM:  Vitals:  BP (!) 154/78   Pulse 71   Temp 98 °F (36.7 °C) (Oral)   Resp 18   Ht 5' 3\" (1.6 m)   Wt 145 lb 15.1 oz (66.2 kg)   SpO2 97%   BMI 25.85 kg/m²      Neuro Exam:        Involuntary Movements: No    Mental Status Examination:    Level of consciousness:  within normal limits   Appearance:  hospital attire  Behavior/Motor:  no abnormalities noted  Attitude toward examiner:  cooperative and attentive  Speech: Low volume  Mood:constricted  affect: Blunted  Thought processes:  goal directed and coherent   Thought content:  Suicidal Ideation:  denies suicidal ideation  Delusions:  no evidence of delusions  Perceptual Disturbance:  denies any perceptual disturbance  Cognition:  oriented to person, place, and time   Concentration intact  Memory intact  Insight fair   Judgement fair   Fund of Knowledge adequate        LABS: REVIEWED TODAY:  Recent Labs     04/17/22  0643 04/18/22  0637 04/19/22  0606   WBC 7.1 7.6 6.9   HGB 12.5* 12.1* 12.3*    288 279     Recent Labs     04/17/22 0643 04/18/22  0637 04/19/22  0606   * 128* 129*   K 4.5 4.5 4.5   CL 92* 92* 92*   CO2 27 27 28   BUN 20 18 18   CREATININE 0.63* 0.56* 0.54*   GLUCOSE 92 92 94     Recent Labs     04/17/22  0643 04/18/22  0637 04/19/22  0606   BILITOT 0.28* 0.30 0.30   ALKPHOS 130* 136* 140*   AST 18 16 17   ALT 28 26 26     Lab Results   Component Value Date    BARBSCNU NEGATIVE 08/16/2017    LABBENZ NEGATIVE 08/16/2017    LABMETH NEGATIVE 08/16/2017    PPXUR NOT REPORTED 08/16/2017     Lab Results   Component Value Date    TSH 0.67 03/27/2022     No results found for: LITHIUM  Lab Results   Component Value Date    VALPROATE 42 (L) 03/26/2022    VALPROATE 5.3 (L) 03/26/2022     Lab Results   Component Value Date    VALPROATE 42 03/26/2022    VALPROATE 5.3 03/26/2022       FURTHER LABS ORDERED :      Radiology   ECHO Complete 2D W Doppler W Color    Result Date: 3/31/2022  Transthoracic Echocardiography Report (TTE)  Patient Name Gerald Garza Date of Study               03/31/2022               T   Date of      1950  Gender                      Male  Birth   Age 71 year(s)  Race                           Room Number  145         Height:                     63 inch, 160.02 cm   Corporate ID [id number]    Weight:                     153 pounds, 69.4 kg  #   Patient Acct [de-identified]   BSA:          1.73 m^2      BMI:      27.1 kg/m^2  #   MR #         [id number]     Sonographer                 Radha Li   Accession #  [id number]  Interpreting Physician      Vickey Plummer 61   Fellow                   Referring Nurse                           Practitioner   Interpreting             Referring Physician         Nico Oviedo MD  Fellow  Type of Study   TTE procedure:2D Echocardiogram, M-Mode, Doppler, Color Doppler. Procedure Date Date: 03/31/2022 Start: 03:49 PM Study Location: [facility] Indications:Elevated troponin. History / Tech. Comments: S/P Fall Patient Status: Inpatient Height: 63 inches Weight: 153 pounds BSA: 1.73 m^2 BMI: 27.1 kg/m^2 Allergies   - Sulfa. - Stings. CONCLUSIONS Summary Normal LV size , mild to moderately increased LV wall thickness . No obvious wall motion abnormality seen. Normal LV systolic function with LVEF >55%. Normal RV size and function. RV systolic pressure 30 mmHg LA appears mildly dilated and RA appears normal in size. No obvious significant structural valvular abnormality noted. Mild AR Normal aortic root dimension. No significant pericardial effusion noted. No obvious intra-cardiac mass or shunt noted. IVC normal diameter and inspiratory variation indicating normal RA filling pressure.  Signature ----------------------------------------------------------------------------  Electronically signed by Rossana Hall(Sonographer) on 03/31/2022 04:41  PM ---------------------------------------------------------------------------- ----------------------------------------------------------------------------  Electronically signed by Jie CastroInterpreting physician) on  04/01/2022 12:17 PM ---------------------------------------------------------------------------- FINDINGS Left Atrium Left atrium is mildly enlarged. Left Ventricle Left ventricle is normal in size with systolic function within the range of normal. Mild to moderate left ventricular hypertrophy. Calculated ejection fraction by bi-plane Lester's method is 54% Grade II (moderate) left ventricular diastolic dysfunction. Right Atrium Right atrium is normal in size. Right Ventricle Normal right ventricular size and function. Mitral Valve Mitral valve structure is normal. Mild mitral regurgitation. Aortic Valve Aortic valve is trileaflet. Aortic sclerosis without stenosis. Mild aortic insufficiency. Tricuspid Valve Tricuspid valve structure is normal. Mild tricuspid regurgitation. Estimated right ventricular systolic pressure is 30 mmHg. Pulmonic Valve The pulmonic valve is normal in structure. Pericardial Effusion No significant pericardial effusion is seen. Miscellaneous Normal aortic root dimension. E/E' average = 10.  IVC diameter and inspiratory collapse is normal. M-mode / 2D Measurements & Calculations:   LVIDd:5.7 cm(3.7 - 5.6 cm)       Diastolic VAXKRY:39.3 ml  IPXWU:0.79 cm(2.2 - 4.0 cm)      Systolic HQFFRI:07.1 ml  YIFZ:9.2 cm(0.6 - 1.1 cm)        Aortic Root:3.3 cm(2.0 - 3.7 cm)  LVPWd:1.1 cm(0.6 - 1.1 cm)       LA Dimension: 4.2 cm(1.9 - 4.0 cm)  Fractional Shortenin.91 %    LA volume/Index: 69.63 ml /40m^2  Calculated LVEF (%): 54.17 %     LVOT:2.2 cm                                   RVDd:2.8 cm   Mitral:                                 Aortic   Valve Area (P1/2-Time): 3.33 cm^2       Peak Velocity: 1.53 m/s  Peak E-Wave: 0.72 m/s                   Mean Velocity: 0.94 m/s  Peak A-Wave: 0.94 m/s                   Peak Gradient: 9.36 mmHg  E/A Ratio: 0.76                         Mean Gradient: 4 mmHg  Peak Gradient: 2.05 mmHg                AI P1/2t: 543 msec  Mean Gradient: 2 mmHg  Deceleration Time: 218 msec Area (continuity): 3.58 cm^2  P1/2t: 66 msec                          AV VTI: 24.8 cm   Area (continuity): 3.6 cm^2  Mean Velocity: 0.61 m/s   Tricuspid:                              Pulmonic:   Estimated RVSP: 30 mmHg                 Peak Velocity: 1.41 m/s  Peak TR Velocity: 2.27 m/s              Peak Gradient: 7.95 mmHg  Peak TR Gradient: 20.6116 mmHg  Estimated RA Pressure: 10 mmHg                                           Estimated PASP: 30.61 mmHg  Diastology / Tissue Doppler Septal Wall E' velocity:0.06 m/s Septal Wall E/E':12 Lateral Wall E' velocity:0.09 m/s Lateral Wall E/E':8    XR CERVICAL SPINE (2-3 VIEWS)    Result Date: 3/25/2022  EXAMINATION: 2 XRAY VIEWS OF THE CERVICAL SPINE 3/25/2022 6:05 am COMPARISON: 03/21/2022, 03/20/2022, 09/19/2018 HISTORY: ORDERING SYSTEM PROVIDED HISTORY: s/p PCDF AP and lateral upright TECHNOLOGIST PROVIDED HISTORY: s/p PCDF AP and lateral upright FINDINGS: Immediate postoperative changes of laminectomy with posterior fusion with paraspinal rods and lateral mass screws spanning C2 through C4 with an overlying cervical drain, soft tissue gas, and cutaneous staples. The new hardware appears intact. Prior anterior fusion spanning C4 through C7 with C5-C6 corpectomy. Prevertebral soft tissues are not thickened. Imaged portion of the lung apices are clear.      Immediate postoperative changes of laminectomy with posterior fusion at C2 through C4.     CT HEAD WO CONTRAST    Result Date: 3/26/2022  EXAM: CT Head Without Intravenous Contrast EXAM DATE/TIME: 3/26/2022 3:04 pm CLINICAL HISTORY: ORDERING SYSTEM PROVIDED  R/o intra-cranial pathology  TECHNOLOGIST PROVIDED HISTORY:  R/o intra-cranial pathology TECHNIQUE: Axial computed tomography images of the head/brain without intravenous contrast.  This CT exam was performed using one or more of the following dose reduction techniques:  automated exposure control, adjustment of the mA and/or kV according to patient size, and/or use of iterative reconstruction technique. COMPARISON: MRI of the brain 03/21/2022 and CT scan of the brain 03/20/2022 FINDINGS: Brain:  No acute findings. No hemorrhage. No significant white matter disease. No edema. Ventricles:  No acute findings. No ventriculomegaly. Bones/joints:  No acute findings. No acute fracture. Soft tissues:  No acute findings. Sinuses:  Unremarkable as visualized. No acute sinusitis. Mastoid air cells:  Unremarkable as visualized. No mastoid effusion. No acute findings in the head/brain. CT HEAD WO CONTRAST    Result Date: 3/20/2022  EXAMINATION: CT OF THE HEAD WITHOUT CONTRAST  3/20/2022 7:11 pm TECHNIQUE: CT of the head was performed without the administration of intravenous contrast. Dose modulation, iterative reconstruction, and/or weight based adjustment of the mA/kV was utilized to reduce the radiation dose to as low as reasonably achievable. COMPARISON: 12/05/2018 HISTORY: ORDERING SYSTEM PROVIDED HISTORY: Fall TECHNOLOGIST PROVIDED HISTORY: Fall Decision Support Exception - unselect if not a suspected or confirmed emergency medical condition->Emergency Medical Condition (MA) Reason for Exam: fall, hit head on toilet FINDINGS: BRAIN/VENTRICLES: There is no acute intracranial hemorrhage, mass effect or midline shift. No abnormal extra-axial fluid collection. The gray-white differentiation is maintained without evidence of an acute infarct. There is no evidence of hydrocephalus. Unchanged mild prominence of pituitary gland. ORBITS: The visualized portion of the orbits demonstrate no acute abnormality. SINUSES: The visualized paranasal sinuses and mastoid air cells demonstrate no acute abnormality. SOFT TISSUES/SKULL:  No acute abnormality of the visualized skull or soft tissues. No acute intracranial abnormality.  RECOMMENDATIONS: Unavailable     CT CERVICAL SPINE WO CONTRAST    Result Date: 3/20/2022  EXAMINATION: CT OF THE CERVICAL SPINE WITHOUT CONTRAST 3/20/2022 7:14 pm TECHNIQUE: CT of the cervical spine was performed without the administration of intravenous contrast. Multiplanar reformatted images are provided for review. Dose modulation, iterative reconstruction, and/or weight based adjustment of the mA/kV was utilized to reduce the radiation dose to as low as reasonably achievable. COMPARISON: 12/05/2018 HISTORY: ORDERING SYSTEM PROVIDED HISTORY: Fall TECHNOLOGIST PROVIDED HISTORY: Fall Decision Support Exception - unselect if not a suspected or confirmed emergency medical condition->Emergency Medical Condition (MA) Reason for Exam: fall, back pain FINDINGS: BONES/ALIGNMENT: Stable corpectomy and anterior fusion from C4-C7. Vertebral body alignment is normal.  Facet joints are normally aligned. Ankylosis of the left facet at C4-C5. There is no acute fracture or traumatic malalignment. DEGENERATIVE CHANGES: Prominent residual cervical spondylosis projecting into the spinal canal on the right at C5-C6, unchanged. SOFT TISSUES: There is no prevertebral soft tissue swelling. Stable cervical spine CT examination status post corpectomy and anterior fusion from C4-C7. No acute fracture or traumatic malalignment. RECOMMENDATIONS: Unavailable     CT THORACIC SPINE WO CONTRAST    Result Date: 3/20/2022  EXAMINATION: CT OF THE THORACIC SPINE WITHOUT CONTRAST; CT OF THE LUMBAR SPINE WITHOUT CONTRAST 3/20/2022 TECHNIQUE: CT of the thoracic spine was performed without the administration of intravenous contrast. Multiplanar reformatted images are provided for review. Dose modulation, iterative reconstruction, and/or weight based adjustment of the mA/kV was utilized to reduce the radiation dose to as low as reasonably achievable.; CT of the lumbar spine was performed without the administration of intravenous contrast. Multiplanar reformatted images are provided for review. Adjustment of mA and/or kV according to patient size was utilized.  Dose modulation, iterative reconstruction, and/or weight based adjustment of the mA/kV was utilized to reduce the radiation dose to as low as reasonably achievable. COMPARISON: CT chest 03/13/2018 HISTORY: ORDERING SYSTEM PROVIDED HISTORY: Fall TECHNOLOGIST PROVIDED HISTORY: Fall Reason for Exam: fall, back pain; ORDERING SYSTEM PROVIDED HISTORY: Fall TECHNOLOGIST PROVIDED HISTORY: Fall Decision Support Exception - unselect if not a suspected or confirmed emergency medical condition->Emergency Medical Condition (MA) Reason for Exam: fall, back pain FINDINGS: BONES/ALIGNMENT: Thoracic and lumbar vertebral body heights and alignment are normal.  There is no acute fracture or traumatic malalignment. DEGENERATIVE CHANGES: There are multilevel degenerative changes throughout the thoracic and lumbar spine. SOFT TISSUES: No paraspinal mass is seen. Lower cervical corpectomy and fusion. Large left renal cyst incompletely visualized on this study. Multilevel degenerative changes with no acute fracture or traumatic malalignment of the thoracolumbar spine. RECOMMENDATIONS: Unavailable     CT LUMBAR SPINE WO CONTRAST    Result Date: 3/20/2022  EXAMINATION: CT OF THE THORACIC SPINE WITHOUT CONTRAST; CT OF THE LUMBAR SPINE WITHOUT CONTRAST 3/20/2022 TECHNIQUE: CT of the thoracic spine was performed without the administration of intravenous contrast. Multiplanar reformatted images are provided for review. Dose modulation, iterative reconstruction, and/or weight based adjustment of the mA/kV was utilized to reduce the radiation dose to as low as reasonably achievable.; CT of the lumbar spine was performed without the administration of intravenous contrast. Multiplanar reformatted images are provided for review. Adjustment of mA and/or kV according to patient size was utilized. Dose modulation, iterative reconstruction, and/or weight based adjustment of the mA/kV was utilized to reduce the radiation dose to as low as reasonably achievable.  COMPARISON: CT chest 03/13/2018 HISTORY: ORDERING SYSTEM PROVIDED HISTORY: Fall TECHNOLOGIST PROVIDED HISTORY: Fall Reason for Exam: fall, back pain; ORDERING SYSTEM PROVIDED HISTORY: Fall TECHNOLOGIST PROVIDED HISTORY: Fall Decision Support Exception - unselect if not a suspected or confirmed emergency medical condition->Emergency Medical Condition (MA) Reason for Exam: fall, back pain FINDINGS: BONES/ALIGNMENT: Thoracic and lumbar vertebral body heights and alignment are normal.  There is no acute fracture or traumatic malalignment. DEGENERATIVE CHANGES: There are multilevel degenerative changes throughout the thoracic and lumbar spine. SOFT TISSUES: No paraspinal mass is seen. Lower cervical corpectomy and fusion. Large left renal cyst incompletely visualized on this study. Multilevel degenerative changes with no acute fracture or traumatic malalignment of the thoracolumbar spine. RECOMMENDATIONS: Unavailable     MRI CERVICAL SPINE WO CONTRAST    Result Date: 3/21/2022  EXAMINATION: MRI OF THE CERVICAL SPINE WITHOUT CONTRAST 3/21/2022 3:38 pm TECHNIQUE: Multiplanar multisequence MRI of the cervical spine was performed without the administration of intravenous contrast. COMPARISON: None. HISTORY: ORDERING SYSTEM PROVIDED HISTORY: spinal stenosis TECHNOLOGIST PROVIDED HISTORY: spinal stenosis Reason for Exam: Pt having several recent falls due to weakness. Subsequent evaluation. FINDINGS: Is a motion BONES/ALIGNMENT: Postsurgical changes are seen with anterior fusion hardware involving the C4 through C7 levels with partial corpectomy of C5 through C6. The remaining vertebral body heights appear grossly maintained. No significant spondylolisthesis seen. SPINAL CORD: There is question of minimal T2 hyperintensity within the cord at C4-C5 (sagittal T2 series 5, image 7). No abnormal cord signal otherwise seen. SOFT TISSUES: No gross evidence of a paraspinal mass.  C2-C3: There is a disc bulge, which appears scratch head there is a disc bulge with a central disc protrusion, which appears to indent on the ventral cord as well as buckling of the ligamentum flavum. There appears to be complete effacement of the CSF. There appears to be severe spinal canal stenosis. Uncovertebral joint and facet arthrosis contributes to moderate right neural foraminal narrowing. No significant left neural foraminal narrowing. C3-C4: There is a disc bulge with a central disc protrusion, which indents on the ventral aspect of the cervical cord. There is buckling of the ligamentum flavum. There appears to be complete effacement of the CSF. Severe spinal canal stenosis. Uncovertebral joint and facet arthrosis contributes to moderate to severe right and moderate left neural foraminal narrowing. C4-C5: The spinal canal appears surgically decompressed. Uncovertebral joint and facet arthrosis appears to contribute to mild right and moderate left neural foraminal narrowing. C5-C6: There is a right paracentral posterior osteophyte, which appears to contact the right ventral cervical cord. The spinal canal appears surgically decompressed. Uncovertebral joint and facet arthrosis contributes to mild right and moderate left neural foraminal narrowing. C6-C7: There is a posterior osteophyte which contacts the ventral cervical cord. No significant spinal canal stenosis. Uncovertebral joint and facet arthrosis contributes to moderate right and mild left neural foraminal narrowing. C7-T1: There is a posterior disc osteophyte complex indenting on the ventral thecal sac. No significant spinal canal stenosis. Uncovertebral joint and facet arthrosis appears to contribute to severe bilateral neural foraminal narrowing. 1. Patient motion limits evaluation. 2. There is severe spinal canal stenosis at C2-C3 and C3-C4 with complete effacement of the CSF at these levels. 3. No significant spinal canal stenosis at the remaining levels.  4. Multilevel neural foraminal narrowing as above. 5. There is question of minimal T2 hyperintensity within the cord at C4-C5, which likely represents myelomalacia. MRI THORACIC SPINE WO CONTRAST    Result Date: 3/22/2022  EXAMINATION: MRI OF THE THORACIC SPINE WITHOUT CONTRAST  3/22/2022 9:37 am TECHNIQUE: Multiplanar multisequence MRI of the thoracic spine was performed without the administration of intravenous contrast. COMPARISON: CT thoracic spine 03/20/2022 HISTORY: ORDERING SYSTEM PROVIDED HISTORY: spinal stenosis TECHNOLOGIST PROVIDED HISTORY: spinal stenosis Reason for Exam: Frequent falls FINDINGS: BONES/ALIGNMENT: There is normal alignment of the thoracic spine. There is no acute fracture or listhesis. Bone marrow signal intensity is normal. There is mild multilevel disc desiccation and disc space narrowing within the midthoracic spine. Multilevel anterior osteophyte formation is present. SPINAL CORD: The thoracic spinal cord is normal in size and signal intensities. SOFT TISSUES: There is no paraspinal mass identified. DEGENERATIVE CHANGES: T2-T3: There is a disc bulge present. No significant spinal canal stenosis or neural foraminal narrowing is present. T4-T5: There is a disc bulge present. There is no significant spinal canal stenosis or neural foraminal narrowing. T7-T8: There is a 1 mm right paracentral disc protrusion. There is no significant spinal canal stenosis or neural foraminal narrowing. T8-T9: There is a right paracentral disc protrusion and thickening of the ligamentum flavum mildly narrowing the spinal canal.  There is no neural foraminal narrowing. T9-T10: There is a 2 mm central disc protrusion mildly narrowing the spinal canal.  There is no neural foraminal narrowing. T10-T11: There is a 3 mm left paracentral disc protrusion mildly narrowing the spinal canal.  There is flattening of the ventral surface of the spinal cord.      Mild multilevel degenerative changes of the thoracic spine, as described above with mild spinal canal stenosis from T8-9 to T10-11, most pronounced at T10-11. MRI LUMBAR SPINE WO CONTRAST    Result Date: 3/22/2022  EXAMINATION: MRI OF THE LUMBAR SPINE WITHOUT CONTRAST, 3/22/2022 9:41 am TECHNIQUE: Multiplanar multisequence MRI of the lumbar spine was performed without the administration of intravenous contrast. COMPARISON: 03/20/2022 HISTORY: ORDERING SYSTEM PROVIDED HISTORY: spinal stenosis TECHNOLOGIST PROVIDED HISTORY: spinal stenosis Reason for Exam: Frequent falls FINDINGS: BONES/ALIGNMENT: There is a levoconvex lumbar scoliosis. There is no acute fracture. Bone marrow signal intensity is normal.  There is multilevel disc desiccation. There is disc space narrowing at L2-L3 and L4-L5 There is no spondylolysis. SPINAL CORD: The conus medullaris is normal in size and signal intensities and terminates normally. SOFT TISSUES: There is no paraspinal mass identified. T12-L1: There is a disc bulge present. There is no significant spinal canal stenosis or neural foraminal narrowing. L1-L2: There is a disc bulge, facet arthropathy and thickening of the ligamentum flavum. There is mild spinal canal stenosis. No significant neural foraminal narrowing is present. L2-L3: There is a disc bulge, facet arthropathy and thickening of the ligamentum flavum. There is moderate spinal canal stenosis. Severe bilateral neural foraminal narrowing is present. L3-L4: There is a disc bulge, facet arthropathy and thickening of the ligamentum flavum. There is moderate spinal canal stenosis and moderate bilateral neural foraminal narrowing. L4-L5: There is a disc bulge, facet arthropathy and thickening of ligamentum flavum. There is moderate spinal canal stenosis. Severe left and moderate right neural foraminal narrowing is present. L5-S1: There is no disc bulge or protrusion present. There is no significant spinal canal stenosis or neural foraminal narrowing present.   There is bilateral facet arthropathy. 1. Moderate spinal canal stenosis from L2-L3 to L4-L5, as described above. 2. Mild spinal canal stenosis at L1-L2, as described above. 3. Multilevel neural foraminal narrowing, most severe at L2-L3. XR CHEST PORTABLE    Result Date: 3/29/2022  EXAMINATION: ONE XRAY VIEW OF THE CHEST 3/27/2022 7:40 am COMPARISON: 04/24/2018 HISTORY: ORDERING SYSTEM PROVIDED HISTORY: fever TECHNOLOGIST PROVIDED HISTORY: fever FINDINGS: A single frontal view of the chest was performed. There is no acute skeletal abnormality. Cervical fusion hardware is noted. The heart size is stable, and at the upper limits of normal.  The mediastinal contours are within normal limits, with stable tortuosity of the intrathoracic aorta. There is left basilar airspace consolidation. There is mild right basilar atelectasis. The lungs are otherwise clear. There is no evidence of a pneumothorax. 1. Left basilar airspace consolidation, representing either aspiration or pneumonia. 2. Mild right basilar atelectasis. XR CHEST PORTABLE    Result Date: 3/29/2022  EXAMINATION: ONE XRAY VIEW OF THE CHEST 3/29/2022 6:05 am COMPARISON: Chest from 03/27/2020 HISTORY: ORDERING SYSTEM PROVIDED HISTORY: Atelectasis, infiltrate TECHNOLOGIST PROVIDED HISTORY: Atelectasis, infiltrate Reason for Exam: uprt port FINDINGS: Cervical hardware, and overlying ECG monitor leads, respiratory tubing and gown snaps again demonstrated. Enlarged but stable appearing cardiac silhouette. Mediastinal structures midline and unchanged. Low lung volumes with bibasilar atelectasis or scarring, and greater opacity medial left base, unchanged or slightly increased. No large pleural effusion or pneumothorax. Bones unchanged. Persistent opacity left base, likely infiltrate with bibasilar atelectasis.      VL DUP LOWER EXTREMITY VENOUS BILATERAL    Result Date: 3/28/2022    [facility]  Vascular Lower Extremities DVT Study Procedure Patient Name   Medina Pulido Date of Study           03/28/2022                 T   Date of Birth  1950  Gender                  Male   Age            70 year(s)  Race                       Room Number    0145   Corporate ID # [id number]   Patient Acct # [de-identified]   MR #           [id number]     Veronica Samuels, T   Accession #    [id number]  Interpreting Physician  Colin Alfaro   Referring                  Referring Physician     Oly Rich DO  Nurse  Practitioner  Procedure Type of Study:   Veins: Lower Extremities DVT Study, Venous Scan Lower Bilateral.  Indications for Study:Fever. Patient Status: In Patient. Technical Quality:Adequate visualization. Conclusions   Summary   Age indeterminate superficial vein thrombosis of the left lower extremity  involving the short saphenous vein. Signature   ----------------------------------------------------------------  Electronically signed by Rolan Castleman, RVT(Sonographer) on  03/28/2022 07:27 PM  ----------------------------------------------------------------   ----------------------------------------------------------------  Electronically signed by Rickey Fus Reyes,Arthur(Interpreting  physician) on 03/28/2022 09:31 PM  ----------------------------------------------------------------  Findings:   Right Impression:                    Left Impression:  The common femoral, femoral,         The small saphenous vein is non  popliteal and tibial veins           compressible. demonstrate normal compressibility  and augmentation. The common femoral, femoral,                                       popliteal and tibial veins  Normal compressibility of the great  demonstrate normal compressibility  saphenous vein. and augmentation. Normal compressibility of the small  Normal compressibility of the great  saphenous vein. saphenous vein. Allergies   - Allergy:Sulfa(Drug).    - Allergy:Stings(Miscellaneous). Velocities are measured in cm/s ; Diameters are measured in cm Right Lower Extremities DVT Study Measurements Right 2D Measurements +------------------------------------+----------+---------------+----------+ ! Location                            ! Visualized! Compressibility! Thrombosis! +------------------------------------+----------+---------------+----------+ ! Common Femoral                      !Yes       ! Yes            ! None      ! +------------------------------------+----------+---------------+----------+ ! Prox Femoral                        !Yes       ! Yes            ! None      ! +------------------------------------+----------+---------------+----------+ ! Mid Femoral                         !Yes       ! Yes            ! None      ! +------------------------------------+----------+---------------+----------+ ! Dist Femoral                        !Yes       ! Yes            ! None      ! +------------------------------------+----------+---------------+----------+ ! Popliteal                           !Yes       ! Yes            ! None      ! +------------------------------------+----------+---------------+----------+ ! Sapheno Femoral Junction            ! Yes       ! Yes            ! None      ! +------------------------------------+----------+---------------+----------+ ! PTV                                 ! Yes       ! Yes            ! None      ! +------------------------------------+----------+---------------+----------+ ! Peroneal                            !Yes       ! Yes            ! None      ! +------------------------------------+----------+---------------+----------+ ! Gastroc                             ! Yes       ! Yes            ! None      ! +------------------------------------+----------+---------------+----------+ ! GSV Thigh                           ! Yes       ! Yes            ! None      ! +------------------------------------+----------+---------------+----------+ ! ABBY Knee !Yes       !Yes            ! None      ! +------------------------------------+----------+---------------+----------+ ! GSV Ankle                           ! Yes       ! Yes            ! None      ! +------------------------------------+----------+---------------+----------+ ! SSV                                 ! Yes       ! Yes            ! None      ! +------------------------------------+----------+---------------+----------+ Right Doppler Measurements +---------------------------+------+------+--------------------------------+ ! Location                   ! Signal!Reflux! Reflux (msec)                   ! +---------------------------+------+------+--------------------------------+ ! Common Femoral             !Phasic!      !                                ! +---------------------------+------+------+--------------------------------+ ! Prox Femoral               !Phasic!      !                                ! +---------------------------+------+------+--------------------------------+ ! Popliteal                  !Phasic!      !                                ! +---------------------------+------+------+--------------------------------+ Left Lower Extremities DVT Study Measurements Left 2D Measurements +------------------------------------+----------+---------------+----------+ ! Location                            ! Visualized! Compressibility! Thrombosis! +------------------------------------+----------+---------------+----------+ ! Common Femoral                      !Yes       ! Yes            ! None      ! +------------------------------------+----------+---------------+----------+ ! Prox Femoral                        !Yes       ! Yes            ! None      ! +------------------------------------+----------+---------------+----------+ ! Mid Femoral                         !Yes       ! Yes            ! None      ! +------------------------------------+----------+---------------+----------+ ! Dist Femoral !Yes       !Yes            ! None      ! +------------------------------------+----------+---------------+----------+ ! Popliteal                           !Yes       ! Yes            ! None      ! +------------------------------------+----------+---------------+----------+ ! Sapheno Femoral Junction            ! Yes       ! Yes            ! None      ! +------------------------------------+----------+---------------+----------+ ! PTV                                 ! Yes       ! Yes            ! None      ! +------------------------------------+----------+---------------+----------+ ! Peroneal                            !Yes       ! Yes            ! None      ! +------------------------------------+----------+---------------+----------+ ! Gastroc                             ! Yes       ! Yes            ! None      ! +------------------------------------+----------+---------------+----------+ ! GSV Thigh                           ! Yes       ! Yes            ! None      ! +------------------------------------+----------+---------------+----------+ ! GSV Knee                            ! Yes       ! Yes            ! None      ! +------------------------------------+----------+---------------+----------+ ! GSV Ankle                           ! Yes       ! Yes            ! None      ! +------------------------------------+----------+---------------+----------+ ! SSV                                 ! Yes       ! No             !AI        ! +------------------------------------+----------+---------------+----------+ Left Doppler Measurements +---------------------------+------+------+--------------------------------+ ! Location                   ! Signal!Reflux! Reflux (msec)                   ! +---------------------------+------+------+--------------------------------+ ! Common Femoral             !Phasic!      !                                ! +---------------------------+------+------+--------------------------------+ ! Prox Femoral !Phasic!      !                                ! +---------------------------+------+------+--------------------------------+ ! Popliteal                  !Phasic!      !                                ! +---------------------------+------+------+--------------------------------+    FLUORO FOR SURGICAL PROCEDURES    Result Date: 3/24/2022  Radiology exam is complete. No Radiologist dictation. Please follow up with ordering provider. MRI BRAIN WO CONTRAST    Result Date: 3/28/2022  EXAMINATION: MRI OF THE BRAIN WITHOUT CONTRAST  3/28/2022 1:06 pm TECHNIQUE: Multiplanar multisequence MRI of the brain was performed without the administration of intravenous contrast. COMPARISON: CT brain performed 03/26/2022. HISTORY: ORDERING SYSTEM PROVIDED HISTORY: Rule out encephalitis, meningitis, stroke,. s/p C spine fusion TECHNOLOGIST PROVIDED HISTORY: Rule out encephalitis, meningitis, stroke,. s/p C spine fusion Reason for Exam: Rule out encephalitis, meningitis, stroke,. s/p C spine fusion FINDINGS: INTRACRANIAL STRUCTURES/VENTRICLES: The sellar and suprasellar structures, optic chiasm, corpus callosum, pineal gland, tectum, and midline brainstem structures are unremarkable. The craniocervical junction is unremarkable. There is no acute hemorrhage, mass effect, or midline shift. There is satisfactory overall gray-white matter differentiation. The ventricular structures are symmetric and unremarkable. The infratentorial structures including the cerebellopontine angles and internal auditory canals are unremarkable. There is no abnormal restricted diffusion. There is no abnormal blooming artifact on susceptibility weighted imaging. ORBITS: The visualized portion of the orbits demonstrate no acute abnormality. SINUSES: The visualized paranasal sinuses and mastoid air cells demonstrate no acute abnormality.  BONES/SOFT TISSUES: The bone marrow signal intensity appears normal. The soft tissues demonstrate no acute abnormality. No acute intracranial abnormality. MRI BRAIN WO CONTRAST    Result Date: 3/21/2022  EXAMINATION: MRI OF THE BRAIN WITHOUT CONTRAST  3/21/2022 3:54 pm TECHNIQUE: Multiplanar multisequence MRI of the brain was performed without the administration of intravenous contrast. COMPARISON: None. HISTORY: ORDERING SYSTEM PROVIDED HISTORY: UE/LE weakness TECHNOLOGIST PROVIDED HISTORY: UE/LE weakness Reason for Exam: Pt having several recent falls due to weakness. Initial evaluation. FINDINGS: Motion degrades images limiting evaluation. INTRACRANIAL STRUCTURES/VENTRICLES: There is no acute infarct. No mass effect or midline shift. No evidence of an acute intracranial hemorrhage. Areas of T2 FLAIR hyperintensity are seen in the periventricular and subcortical white matter, which are nonspecific, but may represent chronic microvascular ischemic change. There is mild global parenchymal volume loss. Otherwise, the ventricles and sulci are normal in size and configuration. The sellar/suprasellar regions appear unremarkable. The normal signal voids within the major intracranial vessels appear maintained. ORBITS: The visualized portion of the orbits demonstrate no acute abnormality. SINUSES: The visualized paranasal sinuses and mastoid air cells demonstrate no acute abnormality. BONES/SOFT TISSUES: The bone marrow signal intensity appears normal. The soft tissues demonstrate no acute abnormality. 1. No acute intracranial abnormality. No acute infarct. 2. Mild global parenchymal volume loss with minimal chronic microvascular ischemic changes.               DIAGNOSIS:     MDD, recurrent, moderate  Delirium due to multiple etiologies      RISK ASSESSMENT: low risk of suicide or harm to others        RECOMMENDATIONS-no plan for admission to psychiatry or sitter      -Keep lights off at night and minimize nighttime awakening  -Patient should face the window during the day, with blinds open where possible  -Ambulate patient where possible, encourage patient to sit out of bed if unable to ambulate, encourage patient to sit up in bed if unable to sit out of bed  -Patient should have glasses and hearing aids, if they use them regularly  -Keep potassium between 4 and 5 and magnesium above 2  -Avoid H2 blockers, antihistamines, morphine and other non-synthetic opiates and benzodiazepines were possible          Risk Management:  routine:  no special precautions necessary    Medications: Continue current dose of Depakote and Effexor. Will follow. Discussed with the treating physician/ team about the patient and treatment plan  Reviewed the chart    Discussed with the patient risk, benefit, alternative and common side effects for the  proposed medication treatment. Patient is consenting to the treatment. Thanks for the consult. Please call me if needed. Electronically signed by Jaylan Uriarte MD on 4/19/2022 at 10:59 AM    Please note that this chart was generated using voice recognition Dragon dictation software. Although every effort was made to ensure the accuracy of this automated transcription, some errors in transcription may have occurred.

## 2022-04-19 NOTE — OP NOTE
[facility]    CARDIAC CATHETERIZATION    Date:   4/19/2022  Patient name:  Deon Starr. Date of admission:  4/19/2022 11:00 AM  MRN:   [id number]  YOB: 1950    Operators:  Primary:   Mary Ellen Mccrary MD (Attending Physician)      Procedure performed:     [x] Left Heart Catheterization. [] Graft Angiography. [x] Left Ventriculography. [] Right Heart Catheterization. [x] Coronary Angiography. [] Aortic Valve Studies. [x] PCI:      [] Other:       Pre Procedure Conscious Sedation Data:  ASA Class:    [] I [x] II [] III [] IV    Mallampati Class:  [] I [x] II [] III [] IV      Indication:  [] STEMI      [] + Stress test  [] ACS      [] + EKG Changes  [] Non Q MI       [] Significant Risk Factors  [] Recurrent Angina             [] Diabetes Mellitus    [] New LBBB      [] Other.  [] CHF / Low EF changes     [] Abnormal CTA / Ca Score      Procedure:  Access:  [] Femoral  [] Radial  artery       [x] Right  [] Left    Procedure: After informed consent was obtained with explanation of the risks and benefits, patient was brought to the cath lab. The access area was prepped and draped in sterile fashion. 1% lidocaine was used for local block. The artery was cannulated with 6  Fr sheath with brisk arterial blood return. The side port was frequently flushed and aspirated with normal saline. Estimated Blood Loss:  [x] Minimal < 25 cc [] Moderate 25-50 cc  [] >50 cc    Findings:          LMCA: Normal 0% stenosis. LAD: Mid area 80% stenosis         Lesion on Mid LAD: Mid subsection. 80% stenosis 8 mm length reduced to 0%. Pre procedure MELISSA III flow was noted. Post Procedure MELISSA III flow was     present. Good runoff was present. The lesion was diagnosed as Moderate     Risk (B). Devices used         - Luge Wire 182 cm. Number of passes: 1.         - Resolute Prospect 3.0 x 15 SHY. 1 inflation(s) to a max pressure of: 12     alen. LCx: Mild irregularities 10-20%. RCA: Distal 40% stenosis     The LV gram was performed in the VINCENT 30 position. LVEF: 40 %. Conclusions:     Two vessel CAD    40% distal RCA    80% mid LAD, required PTCA -SHY       Recommendations:  Post-cath protocol  Patient should be on aspirin and plavix  Continue optimal medical therapy  Risk factor modification          Electronically signed on 4/19/2022 at 1:50 PM by:    Puma Viera MD  Fellow, [facility]      I have reviewed the case / procedure with resident / fellow  I have examined the patient personally  Patient agree with treatment plan as discussed before, final arrangement based on my evaluation and exam.    Risk and benefit of procedure planned were explained in details. Procedure was performed by me personally, with all aspect of the procedure being done using standard protocol. Note was modified based on my own assessment and treatment.     Deangelo Rico MD  [facility]

## 2022-04-19 NOTE — PROGRESS NOTES
Remains resting quietly, no swelling or bleeding noted rt. Wrist, vitals remain stable, taking fluids  Well.

## 2022-04-20 VITALS
DIASTOLIC BLOOD PRESSURE: 90 MMHG | RESPIRATION RATE: 18 BRPM | HEART RATE: 81 BPM | SYSTOLIC BLOOD PRESSURE: 148 MMHG | WEIGHT: 144.4 LBS | OXYGEN SATURATION: 98 % | BODY MASS INDEX: 25.59 KG/M2 | HEIGHT: 63 IN | TEMPERATURE: 97.4 F

## 2022-04-20 LAB
ALBUMIN SERPL-MCNC: 3.9 G/DL (ref 3.5–5.2)
ALP BLD-CCNC: 140 U/L (ref 40–129)
ALT SERPL-CCNC: 26 U/L (ref 5–41)
ANION GAP SERPL CALCULATED.3IONS-SCNC: 8 MMOL/L (ref 9–17)
AST SERPL-CCNC: 18 U/L
BILIRUB SERPL-MCNC: 0.31 MG/DL (ref 0.3–1.2)
BUN BLDV-MCNC: 24 MG/DL (ref 8–23)
CALCIUM SERPL-MCNC: 9.6 MG/DL (ref 8.6–10.4)
CHLORIDE BLD-SCNC: 91 MMOL/L (ref 98–107)
CO2: 29 MMOL/L (ref 20–31)
CREAT SERPL-MCNC: 0.73 MG/DL (ref 0.7–1.2)
GFR AFRICAN AMERICAN: >60 ML/MIN
GFR NON-AFRICAN AMERICAN: >60 ML/MIN
GFR SERPL CREATININE-BSD FRML MDRD: ABNORMAL ML/MIN/{1.73_M2}
GLUCOSE BLD-MCNC: 98 MG/DL (ref 70–99)
HCT VFR BLD CALC: 34.9 % (ref 41–53)
HEMOGLOBIN: 12.4 G/DL (ref 13.5–17.5)
MCH RBC QN AUTO: 32.1 PG (ref 26–34)
MCHC RBC AUTO-ENTMCNC: 35.5 G/DL (ref 31–37)
MCV RBC AUTO: 90.5 FL (ref 80–100)
PDW BLD-RTO: 13.2 % (ref 11.5–14.9)
PLATELET # BLD: 296 K/UL (ref 150–450)
PMV BLD AUTO: 7.3 FL (ref 6–12)
POTASSIUM SERPL-SCNC: 4.5 MMOL/L (ref 3.7–5.3)
RBC # BLD: 3.86 M/UL (ref 4.5–5.9)
SODIUM BLD-SCNC: 128 MMOL/L (ref 135–144)
TOTAL PROTEIN: 6.3 G/DL (ref 6.4–8.3)
WBC # BLD: 6.8 K/UL (ref 3.5–11)

## 2022-04-20 PROCEDURE — 85027 COMPLETE CBC AUTOMATED: CPT

## 2022-04-20 PROCEDURE — 99239 HOSP IP/OBS DSCHRG MGMT >30: CPT | Performed by: INTERNAL MEDICINE

## 2022-04-20 PROCEDURE — 80053 COMPREHEN METABOLIC PANEL: CPT

## 2022-04-20 PROCEDURE — 6360000002 HC RX W HCPCS: Performed by: INTERNAL MEDICINE

## 2022-04-20 PROCEDURE — 36415 COLL VENOUS BLD VENIPUNCTURE: CPT

## 2022-04-20 PROCEDURE — 6370000000 HC RX 637 (ALT 250 FOR IP): Performed by: INTERNAL MEDICINE

## 2022-04-20 RX ADMIN — CARBOXYMETHYLCELLULOSE SODIUM 2 DROP: 10 GEL OPHTHALMIC at 09:20

## 2022-04-20 RX ADMIN — POLYETHYLENE GLYCOL 3350 17 G: 17 POWDER, FOR SOLUTION ORAL at 09:10

## 2022-04-20 RX ADMIN — PANTOPRAZOLE SODIUM 40 MG: 40 TABLET, DELAYED RELEASE ORAL at 05:02

## 2022-04-20 RX ADMIN — CARVEDILOL 6.25 MG: 6.25 TABLET, FILM COATED ORAL at 09:08

## 2022-04-20 RX ADMIN — CLOPIDOGREL BISULFATE 75 MG: 75 TABLET ORAL at 09:10

## 2022-04-20 RX ADMIN — ENOXAPARIN SODIUM 40 MG: 100 INJECTION SUBCUTANEOUS at 09:10

## 2022-04-20 RX ADMIN — FINASTERIDE 5 MG: 5 TABLET, FILM COATED ORAL at 09:08

## 2022-04-20 RX ADMIN — ASPIRIN 81 MG: 81 TABLET, CHEWABLE ORAL at 09:09

## 2022-04-20 RX ADMIN — VENLAFAXINE 75 MG: 75 TABLET ORAL at 09:09

## 2022-04-20 RX ADMIN — ACETAMINOPHEN 650 MG: 325 TABLET ORAL at 09:09

## 2022-04-20 ASSESSMENT — PAIN SCALES - PAIN ASSESSMENT IN ADVANCED DEMENTIA (PAINAD)
BODYLANGUAGE: 0
FACIALEXPRESSION: 0
TOTALSCORE: 0
CONSOLABILITY: 0
NEGVOCALIZATION: 0
BREATHING: 0

## 2022-04-20 ASSESSMENT — PAIN DESCRIPTION - ORIENTATION: ORIENTATION: OTHER (COMMENT)

## 2022-04-20 ASSESSMENT — PAIN - FUNCTIONAL ASSESSMENT: PAIN_FUNCTIONAL_ASSESSMENT: PREVENTS OR INTERFERES SOME ACTIVE ACTIVITIES AND ADLS

## 2022-04-20 ASSESSMENT — PAIN SCALES - GENERAL
PAINLEVEL_OUTOF10: 0
PAINLEVEL_OUTOF10: 4

## 2022-04-20 ASSESSMENT — PAIN DESCRIPTION - LOCATION: LOCATION: HEAD

## 2022-04-20 ASSESSMENT — PAIN DESCRIPTION - DESCRIPTORS: DESCRIPTORS: ACHING

## 2022-04-20 NOTE — CARE COORDINATION
DISCHARGE PLANNING NOTE:      Writer was notified [facility] is to [facility] today at 1100. Verified YANA was completed by all parties and med rec is done.     Electronically signed by Tu Osborne RN on 4/20/2022 at 10:45 AM

## 2022-04-20 NOTE — PROGRESS NOTES
Date:   4/20/2022  Patient name: Dick Mixon. Date of admission:  4/15/2022 11:01 PM  MRN:   [id number]  YOB: 1950  PCP: Delories Lefort    Reason for Admission: Chest pain [R07.9]    Cardiology follow up: Chest pain ,abnormal cardiac markers       Referring physician: Dr Michele Sahu     Impression     Abnormal troponin 67, high-sensitivity troponin was elevated at 53 and 53 on 3/31/2022  Chest pain  Hypertension  Mild to moderate LVH  Hyperlipidemia  Cervical spinal cord compression, myelopathy, radiculopathy, status post surgery fusion C4-C7  Encephalopathy/CT brain 3/26/2022 no acute process  Hospital-acquired pneumonia  Hyponatremia query inappropriate ADH     2D echo 3/31/2022  Normal LV size, mild to moderate LVH normal wall motion  Ejection fraction more than 55%, RVSP 30  Mildly dilated LA   Normal IVC size and respiratory variation  No significant valvular abnormality     ECG 4/15/2022  Sinus rhythm, moderate criteria for LVH, no ischemic change    History of present illness     59-year-old male who got admitted at acute rehab unit on 3/31/2020 with the problem of frequent falls.  He has past medical history of cervical stenosis and C-spine surgery few years ago.  He was doing well up to 6 weeks ago and then he started deteriorating neurologically and has had frequent falls.  CT brain on 3/26/2022 did not show any acute process.  2D echo on 3/31/2022 showed normal LV size normal LV function ejection fraction more than 55%, normal wall motion, mild to moderate LVH.     Patient had chest pain earlier this morning left-sided 6 x 10 like a pressure sensation, patient received nitroglycerin x2 no relief.  Patient has constant chest pain since then more than 7 hours.  At present pain is 4 x 10.  No palpitation no diaphoresis no nausea no vomiting.  He does have a history of acid reflux.  No nausea no vomiting no change in pain by deep breathing or cough.   Electrocardiogram showed normal sinus input(s): BNP in the last 72 hours. Lipids: No results for input(s): CHOL, HDL in the last 72 hours. Invalid input(s): LDLCALCU  INR: No results for input(s): INR in the last 72 hours. Objective:   Vitals: BP (!) 148/90   Pulse 81   Temp 97.4 °F (36.3 °C) (Oral)   Resp 18   Ht 5' 3\" (1.6 m)   Wt 144 lb 6.4 oz (65.5 kg)   SpO2 98%   BMI 25.58 kg/m²   General appearance: alert and cooperative with exam  HEENT: Head: Normal, normocephalic, atraumatic. Neck: no JVD and supple, symmetrical, trachea midline  Lungs: diminished breath sounds bibasilar  Heart: regular rate and rhythm  Abdomen: soft, non-tender; bowel sounds normal; no masses,  no organomegaly  Extremities: Homans sign is negative, no sign of DVT  Neurologic: Mental status: Alert, oriented, thought content appropriate    EKG: normal sinus rhythm.  Voltage criteria for LVH  ECHO: reviewed. Ejection fraction: 55%, mild to moderate LVH. Cardiac Angiography: reviewed.   LAD proximal more than 55%, mild irregularities RCA and circumflex, normally cardiac cath 4/19/2022 at [facility] / Acute Cardiac Problems:   Chest pain like a pressure ,  for more than 7 hours  No relief with the nitroglycerin x2  No ischemic ECG changes  Abnormal high-sensitivity troponin 67, 70, 73  Elevated high-sensitivity troponin on 3/31/2000 2253 and 53  Cardiac cath 4/19/2022 proximal LAD more than 50% restenosis, drug-eluting stent placement  Mild irregularities other arteries  Hyperlipidemia, hypertension  No sign of cardiopulmonary decompensation    Patient Active Problem List:     Hypertension     Hyperlipidemia     Diabetes mellitus ([address]     Contusion of right shoulder     Bipolar disorder, mixed ([address]     First known suicide attempt [facility])     Erectile dysfunction     Cervical stenosis of spinal canal     Incomplete spinal cord lesion at C5-C7 level ([address]     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)     Cervical spondylosis with radiculopathy     Acute blood loss anemia     Precordial pain     Depression with suicidal ideation     Severe recurrent major depression without psychotic features (HCC)     Frequent falls     Hyponatremia     Cervical spinal cord compression (HCC)     Cord compression (HCC)     Quadriplegia, C1-C4 incomplete (HCC)     Encephalopathy     Hospital-acquired pneumonia     Atelectasis     Cervical stenosis of spine     Moderate malnutrition (HCC)     Chest pain     Delirium due to multiple etiologies      Plan of Treatment:   Medications reviewed  Continue current dose of aspirin, Plavix, carvedilol, Lipitor, hydralazine  Continue with lidocaine patch  OK to discharge to rehab unit  Follow-up in 6 to 8-week    Electronically signed by Abbe Chacon MD on 4/20/2022 at 11:30 AM

## 2022-04-20 NOTE — CARE COORDINATION
DISCHARGE PLANNING NOTE:      Writer notified patients wife has concerns about patients IMM letter causing patient to be discharge. Writer in to patients room to notify of purpose of IMM letter. Patient wife notified writer that she received certified mail in regards to this and was very upset. Notified patient and wife that IMM letter goes over your rights with admission and discharge and that it does not cause discharge. Patient verbilized he signed it and they were just confused. Denied needing an addition copy of the IMM. Transport came to  patient with writer in the room and patient and wife agrees patient is to be discharged to [facility] at this time.     Electronically signed by Danial Saravia RN on 4/20/2022 at 11:32 AM

## 2022-04-20 NOTE — PLAN OF CARE
Problem: Skin Integrity:  Goal: Will show no infection signs and symptoms  Description: Will show no infection signs and symptoms  Outcome: Ongoing  Note: Butt slightly red. Cream applied. Assisted with turning. Goal: Absence of new skin breakdown  Description: Absence of new skin breakdown  Outcome: Ongoing     Problem: Falls - Risk of:  Goal: Will remain free from falls  Description: Will remain free from falls  Outcome: Ongoing  Note: Patient weak. Alert and oriented. Bed alarm on. Goal: Absence of physical injury  Description: Absence of physical injury  Outcome: Ongoing     Problem: Pain:  Goal: Pain level will decrease  Description: Pain level will decrease  Outcome: Ongoing  Note: Patient with slight pain in right arm  Medicated with tylenol with relief.   Goal: Control of acute pain  Description: Control of acute pain  Outcome: Ongoing  Goal: Control of chronic pain  Description: Control of chronic pain  Outcome: Ongoing     Problem: Musculor/Skeletal Functional Status  Goal: Highest potential functional level  Outcome: Ongoing  Goal: Absence of falls  Outcome: Ongoing

## 2022-04-20 NOTE — CARE COORDINATION
Sw informed of pt discharge today, advised [facility] of transport, SW set up [employer] for 2p pickup (soonest available).

## 2022-04-20 NOTE — PROGRESS NOTES
History and Physical Service  [facility] Internal Medicine  Progress note          Date:   4/20/2022  Patient name:  Clay Bee. MRN:   [id number]  Account:  [de-identified]  YOB: 1950  PCP:    Chapo Coello  Code Status:    DNR-CCA    Chief Complaint:     No chief complaint on file. Chest pain      History Obtained From:     Patient, EMR, nursing staff  His  HPI   tory Obtained From:  Past [address] History:bobby History: This patient is a 70 y.o. Non- / non  malewho presents with  Chest pain  Left side of chest nonradiating moderate intensity  Associated with anxiety, nausea  Describes himself as anxious  Chest pain gets worse when he is anxious, improves with taking deep breaths    Recent admission to [facility] for generalized weakness and frequent falls underwent MRI brain lumbar C-spine thoracic were performed and showed severe stenosis at C2-C4 level, underwent C2-C5 laminectomy and posterior fusion on 3/24/2022, also was treated for hospital-acquired left lower lobe pneumonia . subsequently admitted to ARU on 4/1. During his stay patient was complaining of orthostasis which improved with stopping diuretic and losartan    4/18   Patient, clinically doing better  He mentioned to me he has 1 episode of chest tightness in the morning, understand, that his symptoms are due to anxiety recent echo was okay    4/20   Patient clinically doing better  Still little anxious  No more chest pain  Patient had cardiac cath done yesterday drug-eluting stent was placed, started on dual antiplatelet    Review of Systems:     Complaining of generalized anxiety causing some shortness of breath  Denies any  cough   Denies chest pain or palpitations at the time of examination  Denies abdominal pain, diarrhea vomiting  Denies any new numbness tremors or weakness.     A 10 point review of systems was performed and and negative except as mentioned in HPI  Positive and Negative as described in HPI. Past Medical History:     Past Medical History:   Diagnosis Date    Depression 2017    ON RX    Diabetes mellitus ([address] 2013    NIDDM    Difficult intravenous access     Hyperlipidemia 2008    ON RX    Hypertension 2008    ON RX    Migraines     PTSD (post-traumatic stress disorder) 01/2018    ON RX    Sleep apnea 01/2018    UNALBE TO USE TO CLEARED BY ENT (CPAP)    Teeth missing     BACK TEETH UPPER AND LOWER TO BE FITTED FOR PARTIALS AT LATER DATE    Wears glasses         Past Surgical History:     Past Surgical History:   Procedure Laterality Date    CARDIAC CATHETERIZATION  02/2010    no stents     CARPAL TUNNEL RELEASE Left 01/09/2002    CATARACT REMOVAL      CERVICAL FUSION  04/19/2018    anterior cervical fusion C5-6    CERVICAL FUSION N/A 3/24/2022    C2-5 FUSION, C2-4 LAMINECTOMY performed by Yolie Martinez DO at [address] Left 2018    CATARACT EXTRACTION WITH IOL   [facility]    LAMINECTOMY  03/24/2022    C2-5 FUSION, C2-4 LAMINECTOMY    MIDDLE EAR SURGERY  01/11/2018    MIDDLE EAR REBUILT AND EAR DRUM REPLACED    MOUTH SURGERY  04/16/2018    5 TEETH REMOVED    OTHER SURGICAL HISTORY  04/19/2018    : ANTERIOR CERVICAL CORRPECTOMY C5-6, SYNTHES, DEPUY, REG TABLE, SUPINE,     [place] OFFICE/OUTPT VISIT,PROCEDURE ONLY N/A 4/19/2018    ANTERIOR CERVICAL CORRPECTOMY AND FUSION C5-6 performed by Yolie Martinez DO at [address] [place] [address]  12/1983        Medications Prior to Admission:     Prior to Admission medications    Medication Sig Start Date End Date Taking? Authorizing Provider   nitroGLYCERIN (NITROSTAT) 0.4 MG SL tablet up to max of 3 total doses.  If no relief after 1 dose, call 911. 4/18/22  Yes Erika Hughes MD   divalproex (DEPAKOTE ER) 500 MG extended release tablet Take 1 tablet by mouth at bedtime 4/18/22  Yes Erika Hughes MD   venlafaxine (EFFEXOR) 75 MG tablet Take 1 tablet by mouth 3 times daily (with meals) 4/18/22  Yes Margaret Breen MD   hydrALAZINE (APRESOLINE) 25 MG tablet Take 1 tablet by mouth three times daily 4/18/22  Yes Margaret Breen MD   carvedilol (COREG) 6.25 MG tablet Take 1 tablet by mouth 2 times daily 4/18/22  Yes Margaret Breen MD   clopidogrel (PLAVIX) 75 MG tablet Take 1 tablet by mouth daily 4/19/22   Sabrina Riggs MD   finasteride (PROSCAR) 5 MG tablet Take 5 mg by mouth daily    Historical Provider, MD   carboxymethylcellulose 1 % ophthalmic solution Place 2 drops into both eyes 3 times daily Pt states \"2 drops in both eyes three times a day\"    Historical Provider, MD   simethicone (MYLICON) 80 MG chewable tablet Take 1 tablet by mouth every 6 hours as needed for Flatulence  Patient taking differently: Take 80 mg by mouth every 8 hours as needed for Flatulence  6/22/20   Nikos rConin MD   lidocaine (XYLOCAINE) 5 % ointment Apply topically daily as needed for Pain Apply topically as needed.   LF 4/20/20 (30 day supply)  Patient not taking: Reported on 3/23/2022    Historical Provider, MD   metFORMIN (GLUCOPHAGE) 1000 MG tablet Take 1,000 mg by mouth 2 times daily (with meals) LF 4/27/20 (90 day supply)  Patient not taking: Reported on 3/21/2022    Historical Provider, MD   aspirin 81 MG chewable tablet Take 81 mg by mouth daily  Patient not taking: Reported on 3/21/2022    Historical Provider, MD   cetirizine (ZYRTEC) 10 MG tablet Take 10 mg by mouth daily LF 4/6/20 (90 day supply)  Patient not taking: Reported on 3/21/2022    Historical Provider, MD   atorvastatin (LIPITOR) 80 MG tablet Take 40 mg by mouth daily Take 1/2 tablet (40 mg) daily  LF 4/22/20 (90 day supply)  Patient not taking: Reported on 3/21/2022    Historical Provider, MD   omeprazole (PRILOSEC) 20 MG delayed release capsule Take 20 mg by mouth every evening LF 4/21/20 (90 day supply)    Historical Provider, MD        Allergies:     Latex, Bee venom, Lisinopril, Niacin and related, Sulfa antibiotics, and Terazosin    Social History:     Tobacco:    reports that he quit smoking about 50 years ago. His smoking use included cigarettes. He has a 2.00 pack-year smoking history. He has never used smokeless tobacco.  Alcohol:      reports current alcohol use. Drug Use:  reports current drug use. Drug: Marijuana [place]). Family History:     Family History   Problem Relation Age of Onset   Atlee Evaristo Dementia Mother     Coronary Art Dis Mother     Stroke Mother     Diabetes Mother     Asthma Mother     Other Mother         BRAIN ANEURISM    High Blood Pressure Mother     Heart Disease Father     Diabetes Sister     Diabetes Brother     High Blood Pressure Brother     High Cholesterol Brother     Heart Disease Brother     Diabetes Sister     Other Sister         NEUROPATHY           Physical Exam:   BP (!) 148/90   Pulse 81   Temp 97.4 °F (36.3 °C) (Oral)   Resp 18   Ht 5' 3\" (1.6 m)   Wt 144 lb 6.4 oz (65.5 kg)   SpO2 98%   BMI 25.58 kg/m²   No results for input(s): POCGLU in the last 72 hours. General Appearance: Anxious appearing, breathing fast reports feeling anxious currently  Mental status: oriented to person, place, and time with normal affect  Head:  normocephalic, atraumatic. Eye: no icterus, redness, pupils equal and reactive, extraocular eye movements intact, conjunctiva clear  Ear: normal external ear, no discharge, hearing intact  Nose:  no drainage noted  Mouth: mucous membranes moist  Neck: supple, no carotid bruits, thyroid not palpable  Lungs: Bilateral equal air entry, clear to ausculation, no wheezing, rales or rhonchi, normal effort  Cardiovascular: normal rate, regular rhythm, no murmur, gallop, rub.   Abdomen: Soft, nontender, nondistended, normal bowel sounds, no hepatomegaly or splenomegaly  Neurologic: some Weakness of all 4 extremities noted, normal muscle tone reduced bulk, no abnormal sensation, normal speech, cranial nerves II through XII grossly intact  Skin: No gross lesions, rashes, bruising or bleeding on exposed skin area  Extremities:  peripheral pulses palpable, no pedal edema or calf pain with palpation  Psych: Anxious appearing    Investigations:      Laboratory Testing:  Recent Results (from the past 24 hour(s))   Activated clotting time    Collection Time: 04/19/22  1:46 PM   Result Value Ref Range    Activated Clotting Time 335 (H) 79 - 149 sec   CBC    Collection Time: 04/20/22  6:28 AM   Result Value Ref Range    WBC 6.8 3.5 - 11.0 k/uL    RBC 3.86 (L) 4.5 - 5.9 m/uL    Hemoglobin 12.4 (L) 13.5 - 17.5 g/dL    Hematocrit 34.9 (L) 41 - 53 %    MCV 90.5 80 - 100 fL    MCH 32.1 26 - 34 pg    MCHC 35.5 31 - 37 g/dL    RDW 13.2 11.5 - 14.9 %    Platelets 695 027 - 596 k/uL    MPV 7.3 6.0 - 12.0 fL   Comprehensive Metabolic Panel w/ Reflex to MG    Collection Time: 04/20/22  6:28 AM   Result Value Ref Range    Glucose 98 70 - 99 mg/dL    BUN 24 (H) 8 - 23 mg/dL    CREATININE 0.73 0.70 - 1.20 mg/dL    Calcium 9.6 8.6 - 10.4 mg/dL    Sodium 128 (L) 135 - 144 mmol/L    Potassium 4.5 3.7 - 5.3 mmol/L    Chloride 91 (L) 98 - 107 mmol/L    CO2 29 20 - 31 mmol/L    Anion Gap 8 (L) 9 - 17 mmol/L    Alkaline Phosphatase 140 (H) 40 - 129 U/L    ALT 26 5 - 41 U/L    AST 18 <40 U/L    Total Bilirubin 0.31 0.3 - 1.2 mg/dL    Total Protein 6.3 (L) 6.4 - 8.3 g/dL    Albumin 3.9 3.5 - 5.2 g/dL    GFR Non-African American >60 >60 mL/min    GFR African American >60 >60 mL/min    GFR Comment             Recent Labs     04/20/22  0628 03/26/22  0932 03/24/22  0157   HGB 12.4*   < >  --    HCT 34.9*   < >  --    WBC 6.8   < >  --    MCV 90.5   < >  --    *   < > 136   K 4.5   < > 4.1  4.1   CL 91*   < > 100   CO2 29   < > 21   BUN 24*   < > 29*   CREATININE 0.73   < > 0.84   GLUCOSE 98   < > 107*   INR  --   --  1.0   PROTIME  --   --  10.9   AST 18   < >  --    ALT 26   < >  --    LABALBU 3.9   < >  --     < > = values in this interval not displayed.        Hematology:  Recent Labs     04/18/22  0637 04/19/22  0606 04/20/22  0628   WBC 7.6 6.9 6.8   RBC 3.78* 3.90* 3.86*   HGB 12.1* 12.3* 12.4*   HCT 34.1* 34.9* 34.9*   MCV 90.3 89.5 90.5   MCH 32.1 31.7 32.1   MCHC 35.6 35.4 35.5   RDW 12.7 13.0 13.2    279 296   MPV 7.5 7.2 7.3     Chemistry:  Recent Labs     04/18/22  0637 04/19/22  0606 04/20/22  0628   * 129* 128*   K 4.5 4.5 4.5   CL 92* 92* 91*   CO2 27 28 29   GLUCOSE 92 94 98   BUN 18 18 24*   CREATININE 0.56* 0.54* 0.73   ANIONGAP 9 9 8*   LABGLOM >60 >60 >60   GFRAA >60 >60 >60   CALCIUM 9.3 9.6 9.6     Recent Labs     04/18/22 0637 04/19/22  0606 04/20/22  0628   PROT 6.1* 6.0* 6.3*   LABALBU 3.7 3.4* 3.9   AST 16 17 18   ALT 26 26 26   ALKPHOS 136* 140* 140*   BILITOT 0.30 0.30 0.31       Imaging/Diagnostics:       ECHO Complete 2D W Doppler W Color    Result Date: 3/31/2022  Transthoracic Echocardiography Report (TTE)  Patient Name [address] [place] [address] Date of Study               03/31/2022               T   Date of      1950  Gender                      Male  Birth   Age          70 year(s)  Race                           Room Number  145         Height:                     63 inch, 160.02 cm   Corporate ID [id number]    Weight:                     153 pounds, 69.4 kg  #   Patient Acct [de-identified]   BSA:          1.73 m^2      BMI:      27.1 kg/m^2  #   MR #         [id number]     Sonographer                 Radha Li   Accession #  [id number]  Interpreting Physician      Vickey Plummer 61   Fellow                   Referring Nurse                           Practitioner   Interpreting             Referring Physician         Nico Oviedo MD  Fellow  Type of Study   TTE procedure:2D Echocardiogram, M-Mode, Doppler, Color Doppler. Procedure Date Date: 03/31/2022 Start: 03:49 PM Study Location: [facility] Indications:Elevated troponin. History / Tech.  Comments: S/P Fall Patient Status: Inpatient Height: 63 inches Weight: 153 pounds BSA: 1.73 m^2 BMI: 27.1 kg/m^2 Allergies   - Sulfa. - Stings. CONCLUSIONS Summary Normal LV size , mild to moderately increased LV wall thickness . No obvious wall motion abnormality seen. Normal LV systolic function with LVEF >55%. Normal RV size and function. RV systolic pressure 30 mmHg LA appears mildly dilated and RA appears normal in size. No obvious significant structural valvular abnormality noted. Mild AR Normal aortic root dimension. No significant pericardial effusion noted. No obvious intra-cardiac mass or shunt noted. IVC normal diameter and inspiratory variation indicating normal RA filling pressure. Signature ----------------------------------------------------------------------------  Electronically signed by Rossana Mcgregor(Sonographer) on 03/31/2022 04:41  PM ---------------------------------------------------------------------------- ----------------------------------------------------------------------------  Electronically signed by Fay Castro(Interpreting physician) on  04/01/2022 12:17 PM ---------------------------------------------------------------------------- FINDINGS Left Atrium Left atrium is mildly enlarged. Left Ventricle Left ventricle is normal in size with systolic function within the range of normal. Mild to moderate left ventricular hypertrophy. Calculated ejection fraction by bi-plane Lester's method is 54% Grade II (moderate) left ventricular diastolic dysfunction. Right Atrium Right atrium is normal in size. Right Ventricle Normal right ventricular size and function. Mitral Valve Mitral valve structure is normal. Mild mitral regurgitation. Aortic Valve Aortic valve is trileaflet. Aortic sclerosis without stenosis. Mild aortic insufficiency. Tricuspid Valve Tricuspid valve structure is normal. Mild tricuspid regurgitation. Estimated right ventricular systolic pressure is 30 mmHg. Pulmonic Valve The pulmonic valve is normal in structure.  Pericardial Effusion No significant pericardial effusion is seen. Miscellaneous Normal aortic root dimension. E/E' average = 10.  IVC diameter and inspiratory collapse is normal. M-mode / 2D Measurements & Calculations:   LVIDd:5.7 cm(3.7 - 5.6 cm)       Diastolic IBSIVQ:58.7 ml  HVTQJ:3.63 cm(2.2 - 4.0 cm)      Systolic VMUNWH:71.4 ml  KVEQ:0.2 cm(0.6 - 1.1 cm)        Aortic Root:3.3 cm(2.0 - 3.7 cm)  LVPWd:1.1 cm(0.6 - 1.1 cm)       LA Dimension: 4.2 cm(1.9 - 4.0 cm)  Fractional Shortenin.91 %    LA volume/Index: 69.63 ml /40m^2  Calculated LVEF (%): 54.17 %     LVOT:2.2 cm                                   RVDd:2.8 cm   Mitral:                                 Aortic   Valve Area (P1/2-Time): 3.33 cm^2       Peak Velocity: 1.53 m/s  Peak E-Wave: 0.72 m/s                   Mean Velocity: 0.94 m/s  Peak A-Wave: 0.94 m/s                   Peak Gradient: 9.36 mmHg  E/A Ratio: 0.76                         Mean Gradient: 4 mmHg  Peak Gradient: 2.05 mmHg                AI P1/2t: 543 msec  Mean Gradient: 2 mmHg  Deceleration Time: 218 msec             Area (continuity): 3.58 cm^2  P1/2t: 66 msec                          AV VTI: 24.8 cm   Area (continuity): 3.6 cm^2  Mean Velocity: 0.61 m/s   Tricuspid:                              Pulmonic:   Estimated RVSP: 30 mmHg                 Peak Velocity: 1.41 m/s  Peak TR Velocity: 2.27 m/s              Peak Gradient: 7.95 mmHg  Peak TR Gradient: 20.6116 mmHg  Estimated RA Pressure: 10 mmHg                                           Estimated PASP: 30.61 mmHg  Diastology / Tissue Doppler Septal Wall E' velocity:0.06 m/s Septal Wall E/E':12 Lateral Wall E' velocity:0.09 m/s Lateral Wall E/E':8    XR CERVICAL SPINE (2-3 VIEWS)    Result Date: 3/25/2022  EXAMINATION: 2 XRAY VIEWS OF THE CERVICAL SPINE 3/25/2022 6:05 am COMPARISON: 2022, 2022, 2018 HISTORY: ORDERING SYSTEM PROVIDED HISTORY: s/p PCDF AP and lateral upright TECHNOLOGIST PROVIDED HISTORY: s/p PCDF AP and lateral upright FINDINGS: Immediate postoperative changes of laminectomy with posterior fusion with paraspinal rods and lateral mass screws spanning C2 through C4 with an overlying cervical drain, soft tissue gas, and cutaneous staples. The new hardware appears intact. Prior anterior fusion spanning C4 through C7 with C5-C6 corpectomy. Prevertebral soft tissues are not thickened. Imaged portion of the lung apices are clear. Immediate postoperative changes of laminectomy with posterior fusion at C2 through C4.     CT HEAD WO CONTRAST    Result Date: 3/26/2022  EXAM: CT Head Without Intravenous Contrast EXAM DATE/TIME: 3/26/2022 3:04 pm CLINICAL HISTORY: ORDERING SYSTEM PROVIDED  R/o intra-cranial pathology  TECHNOLOGIST PROVIDED HISTORY:  R/o intra-cranial pathology TECHNIQUE: Axial computed tomography images of the head/brain without intravenous contrast.  This CT exam was performed using one or more of the following dose reduction techniques:  automated exposure control, adjustment of the mA and/or kV according to patient size, and/or use of iterative reconstruction technique. COMPARISON: MRI of the brain 03/21/2022 and CT scan of the brain 03/20/2022 FINDINGS: Brain:  No acute findings. No hemorrhage. No significant white matter disease. No edema. Ventricles:  No acute findings. No ventriculomegaly. Bones/joints:  No acute findings. No acute fracture. Soft tissues:  No acute findings. Sinuses:  Unremarkable as visualized. No acute sinusitis. Mastoid air cells:  Unremarkable as visualized. No mastoid effusion. No acute findings in the head/brain. CT HEAD WO CONTRAST    Result Date: 3/20/2022  EXAMINATION: CT OF THE HEAD WITHOUT CONTRAST  3/20/2022 7:11 pm TECHNIQUE: CT of the head was performed without the administration of intravenous contrast. Dose modulation, iterative reconstruction, and/or weight based adjustment of the mA/kV was utilized to reduce the radiation dose to as low as reasonably achievable.  COMPARISON: 12/05/2018 HISTORY: ORDERING SYSTEM PROVIDED HISTORY: Fall TECHNOLOGIST PROVIDED HISTORY: Fall Decision Support Exception - unselect if not a suspected or confirmed emergency medical condition->Emergency Medical Condition (MA) Reason for Exam: fall, hit head on toilet FINDINGS: BRAIN/VENTRICLES: There is no acute intracranial hemorrhage, mass effect or midline shift. No abnormal extra-axial fluid collection. The gray-white differentiation is maintained without evidence of an acute infarct. There is no evidence of hydrocephalus. Unchanged mild prominence of pituitary gland. ORBITS: The visualized portion of the orbits demonstrate no acute abnormality. SINUSES: The visualized paranasal sinuses and mastoid air cells demonstrate no acute abnormality. SOFT TISSUES/SKULL:  No acute abnormality of the visualized skull or soft tissues. No acute intracranial abnormality. RECOMMENDATIONS: Unavailable     CT CERVICAL SPINE WO CONTRAST    Result Date: 3/20/2022  EXAMINATION: CT OF THE CERVICAL SPINE WITHOUT CONTRAST 3/20/2022 7:14 pm TECHNIQUE: CT of the cervical spine was performed without the administration of intravenous contrast. Multiplanar reformatted images are provided for review. Dose modulation, iterative reconstruction, and/or weight based adjustment of the mA/kV was utilized to reduce the radiation dose to as low as reasonably achievable. COMPARISON: 12/05/2018 HISTORY: ORDERING SYSTEM PROVIDED HISTORY: Fall TECHNOLOGIST PROVIDED HISTORY: Fall Decision Support Exception - unselect if not a suspected or confirmed emergency medical condition->Emergency Medical Condition (MA) Reason for Exam: fall, back pain FINDINGS: BONES/ALIGNMENT: Stable corpectomy and anterior fusion from C4-C7. Vertebral body alignment is normal.  Facet joints are normally aligned. Ankylosis of the left facet at C4-C5. There is no acute fracture or traumatic malalignment.  DEGENERATIVE CHANGES: Prominent residual cervical spondylosis projecting into the spinal canal on the right at C5-C6, unchanged. SOFT TISSUES: There is no prevertebral soft tissue swelling. Stable cervical spine CT examination status post corpectomy and anterior fusion from C4-C7. No acute fracture or traumatic malalignment. RECOMMENDATIONS: Unavailable     CT THORACIC SPINE WO CONTRAST    Result Date: 3/20/2022  EXAMINATION: CT OF THE THORACIC SPINE WITHOUT CONTRAST; CT OF THE LUMBAR SPINE WITHOUT CONTRAST 3/20/2022 TECHNIQUE: CT of the thoracic spine was performed without the administration of intravenous contrast. Multiplanar reformatted images are provided for review. Dose modulation, iterative reconstruction, and/or weight based adjustment of the mA/kV was utilized to reduce the radiation dose to as low as reasonably achievable.; CT of the lumbar spine was performed without the administration of intravenous contrast. Multiplanar reformatted images are provided for review. Adjustment of mA and/or kV according to patient size was utilized. Dose modulation, iterative reconstruction, and/or weight based adjustment of the mA/kV was utilized to reduce the radiation dose to as low as reasonably achievable. COMPARISON: CT chest 03/13/2018 HISTORY: ORDERING SYSTEM PROVIDED HISTORY: Fall TECHNOLOGIST PROVIDED HISTORY: Fall Reason for Exam: fall, back pain; ORDERING SYSTEM PROVIDED HISTORY: Fall TECHNOLOGIST PROVIDED HISTORY: Fall Decision Support Exception - unselect if not a suspected or confirmed emergency medical condition->Emergency Medical Condition (MA) Reason for Exam: fall, back pain FINDINGS: BONES/ALIGNMENT: Thoracic and lumbar vertebral body heights and alignment are normal.  There is no acute fracture or traumatic malalignment. DEGENERATIVE CHANGES: There are multilevel degenerative changes throughout the thoracic and lumbar spine. SOFT TISSUES: No paraspinal mass is seen. Lower cervical corpectomy and fusion.   Large left renal cyst incompletely visualized on this study.     Multilevel degenerative changes with no acute fracture or traumatic malalignment of the thoracolumbar spine. RECOMMENDATIONS: Unavailable     CT LUMBAR SPINE WO CONTRAST    Result Date: 3/20/2022  EXAMINATION: CT OF THE THORACIC SPINE WITHOUT CONTRAST; CT OF THE LUMBAR SPINE WITHOUT CONTRAST 3/20/2022 TECHNIQUE: CT of the thoracic spine was performed without the administration of intravenous contrast. Multiplanar reformatted images are provided for review. Dose modulation, iterative reconstruction, and/or weight based adjustment of the mA/kV was utilized to reduce the radiation dose to as low as reasonably achievable.; CT of the lumbar spine was performed without the administration of intravenous contrast. Multiplanar reformatted images are provided for review. Adjustment of mA and/or kV according to patient size was utilized. Dose modulation, iterative reconstruction, and/or weight based adjustment of the mA/kV was utilized to reduce the radiation dose to as low as reasonably achievable. COMPARISON: CT chest 03/13/2018 HISTORY: ORDERING SYSTEM PROVIDED HISTORY: Fall TECHNOLOGIST PROVIDED HISTORY: Fall Reason for Exam: fall, back pain; ORDERING SYSTEM PROVIDED HISTORY: Fall TECHNOLOGIST PROVIDED HISTORY: Fall Decision Support Exception - unselect if not a suspected or confirmed emergency medical condition->Emergency Medical Condition (MA) Reason for Exam: fall, back pain FINDINGS: BONES/ALIGNMENT: Thoracic and lumbar vertebral body heights and alignment are normal.  There is no acute fracture or traumatic malalignment. DEGENERATIVE CHANGES: There are multilevel degenerative changes throughout the thoracic and lumbar spine. SOFT TISSUES: No paraspinal mass is seen. Lower cervical corpectomy and fusion. Large left renal cyst incompletely visualized on this study. Multilevel degenerative changes with no acute fracture or traumatic malalignment of the thoracolumbar spine.  RECOMMENDATIONS: Unavailable MRI CERVICAL SPINE WO CONTRAST    Result Date: 3/21/2022  EXAMINATION: MRI OF THE CERVICAL SPINE WITHOUT CONTRAST 3/21/2022 3:38 pm TECHNIQUE: Multiplanar multisequence MRI of the cervical spine was performed without the administration of intravenous contrast. COMPARISON: None. HISTORY: ORDERING SYSTEM PROVIDED HISTORY: spinal stenosis TECHNOLOGIST PROVIDED HISTORY: spinal stenosis Reason for Exam: Pt having several recent falls due to weakness. Subsequent evaluation. FINDINGS: Is a motion BONES/ALIGNMENT: Postsurgical changes are seen with anterior fusion hardware involving the C4 through C7 levels with partial corpectomy of C5 through C6. The remaining vertebral body heights appear grossly maintained. No significant spondylolisthesis seen. SPINAL CORD: There is question of minimal T2 hyperintensity within the cord at C4-C5 (sagittal T2 series 5, image 7). No abnormal cord signal otherwise seen. SOFT TISSUES: No gross evidence of a paraspinal mass. C2-C3: There is a disc bulge, which appears scratch head there is a disc bulge with a central disc protrusion, which appears to indent on the ventral cord as well as buckling of the ligamentum flavum. There appears to be complete effacement of the CSF. There appears to be severe spinal canal stenosis. Uncovertebral joint and facet arthrosis contributes to moderate right neural foraminal narrowing. No significant left neural foraminal narrowing. C3-C4: There is a disc bulge with a central disc protrusion, which indents on the ventral aspect of the cervical cord. There is buckling of the ligamentum flavum. There appears to be complete effacement of the CSF. Severe spinal canal stenosis. Uncovertebral joint and facet arthrosis contributes to moderate to severe right and moderate left neural foraminal narrowing. C4-C5: The spinal canal appears surgically decompressed.   Uncovertebral joint and facet arthrosis appears to contribute to mild right and moderate left neural foraminal narrowing. C5-C6: There is a right paracentral posterior osteophyte, which appears to contact the right ventral cervical cord. The spinal canal appears surgically decompressed. Uncovertebral joint and facet arthrosis contributes to mild right and moderate left neural foraminal narrowing. C6-C7: There is a posterior osteophyte which contacts the ventral cervical cord. No significant spinal canal stenosis. Uncovertebral joint and facet arthrosis contributes to moderate right and mild left neural foraminal narrowing. C7-T1: There is a posterior disc osteophyte complex indenting on the ventral thecal sac. No significant spinal canal stenosis. Uncovertebral joint and facet arthrosis appears to contribute to severe bilateral neural foraminal narrowing. 1. Patient motion limits evaluation. 2. There is severe spinal canal stenosis at C2-C3 and C3-C4 with complete effacement of the CSF at these levels. 3. No significant spinal canal stenosis at the remaining levels. 4. Multilevel neural foraminal narrowing as above. 5. There is question of minimal T2 hyperintensity within the cord at C4-C5, which likely represents myelomalacia. MRI THORACIC SPINE WO CONTRAST    Result Date: 3/22/2022  EXAMINATION: MRI OF THE THORACIC SPINE WITHOUT CONTRAST  3/22/2022 9:37 am TECHNIQUE: Multiplanar multisequence MRI of the thoracic spine was performed without the administration of intravenous contrast. COMPARISON: CT thoracic spine 03/20/2022 HISTORY: ORDERING SYSTEM PROVIDED HISTORY: spinal stenosis TECHNOLOGIST PROVIDED HISTORY: spinal stenosis Reason for Exam: Frequent falls FINDINGS: BONES/ALIGNMENT: There is normal alignment of the thoracic spine. There is no acute fracture or listhesis. Bone marrow signal intensity is normal. There is mild multilevel disc desiccation and disc space narrowing within the midthoracic spine. Multilevel anterior osteophyte formation is present.  SPINAL CORD: The thoracic There is a disc bulge present. There is no significant spinal canal stenosis or neural foraminal narrowing. L1-L2: There is a disc bulge, facet arthropathy and thickening of the ligamentum flavum. There is mild spinal canal stenosis. No significant neural foraminal narrowing is present. L2-L3: There is a disc bulge, facet arthropathy and thickening of the ligamentum flavum. There is moderate spinal canal stenosis. Severe bilateral neural foraminal narrowing is present. L3-L4: There is a disc bulge, facet arthropathy and thickening of the ligamentum flavum. There is moderate spinal canal stenosis and moderate bilateral neural foraminal narrowing. L4-L5: There is a disc bulge, facet arthropathy and thickening of ligamentum flavum. There is moderate spinal canal stenosis. Severe left and moderate right neural foraminal narrowing is present. L5-S1: There is no disc bulge or protrusion present. There is no significant spinal canal stenosis or neural foraminal narrowing present. There is bilateral facet arthropathy. 1. Moderate spinal canal stenosis from L2-L3 to L4-L5, as described above. 2. Mild spinal canal stenosis at L1-L2, as described above. 3. Multilevel neural foraminal narrowing, most severe at L2-L3. XR CHEST PORTABLE    Result Date: 3/29/2022  EXAMINATION: ONE XRAY VIEW OF THE CHEST 3/27/2022 7:40 am COMPARISON: 04/24/2018 HISTORY: ORDERING SYSTEM PROVIDED HISTORY: fever TECHNOLOGIST PROVIDED HISTORY: fever FINDINGS: A single frontal view of the chest was performed. There is no acute skeletal abnormality. Cervical fusion hardware is noted. The heart size is stable, and at the upper limits of normal.  The mediastinal contours are within normal limits, with stable tortuosity of the intrathoracic aorta. There is left basilar airspace consolidation. There is mild right basilar atelectasis. The lungs are otherwise clear. There is no evidence of a pneumothorax.      1. Left basilar airspace consolidation, representing either aspiration or pneumonia. 2. Mild right basilar atelectasis. XR CHEST PORTABLE    Result Date: 3/29/2022  EXAMINATION: ONE XRAY VIEW OF THE CHEST 3/29/2022 6:05 am COMPARISON: Chest from 03/27/2020 HISTORY: ORDERING SYSTEM PROVIDED HISTORY: Atelectasis, infiltrate TECHNOLOGIST PROVIDED HISTORY: Atelectasis, infiltrate Reason for Exam: uprt port FINDINGS: Cervical hardware, and overlying ECG monitor leads, respiratory tubing and gown snaps again demonstrated. Enlarged but stable appearing cardiac silhouette. Mediastinal structures midline and unchanged. Low lung volumes with bibasilar atelectasis or scarring, and greater opacity medial left base, unchanged or slightly increased. No large pleural effusion or pneumothorax. Bones unchanged. Persistent opacity left base, likely infiltrate with bibasilar atelectasis. VL DUP LOWER EXTREMITY VENOUS BILATERAL    Result Date: 3/28/2022    [facility]  Vascular Lower Extremities DVT Study Procedure   Patient Name   Chase Carlson Date of Study           03/28/2022                 T   Date of Birth  1950  Gender                  Male   Age            70 year(s)  Race                       Room Number    0145   Corporate ID # [id number]   Patient Acct # [de-identified]   MR #           [id number]     Chaparrita Stahl, [facility]   Accession #    [id number]  Interpreting Physician  Trent Nathan   Referring                  Referring Physician     Mina Ryan DO  Nurse  Practitioner  Procedure Type of Study:   Veins: Lower Extremities DVT Study, Venous Scan Lower Bilateral.  Indications for Study:Fever. Patient Status: In Patient. Technical Quality:Adequate visualization. Conclusions   Summary   Age indeterminate superficial vein thrombosis of the left lower extremity  involving the short saphenous vein.    Signature   ---------------------------------------------------------------- Electronically signed by Cris Grove RVT(Sonographer) on  03/28/2022 07:27 PM  ----------------------------------------------------------------   ----------------------------------------------------------------  Electronically signed by Mazie Gentile Reyes,Arthur(Interpreting  physician) on 03/28/2022 09:31 PM  ----------------------------------------------------------------  Findings:   Right Impression:                    Left Impression:  The common femoral, femoral,         The small saphenous vein is non  popliteal and tibial veins           compressible. demonstrate normal compressibility  and augmentation. The common femoral, femoral,                                       popliteal and tibial veins  Normal compressibility of the great  demonstrate normal compressibility  saphenous vein. and augmentation. Normal compressibility of the small  Normal compressibility of the great  saphenous vein. saphenous vein. Allergies   - Allergy:Sulfa(Drug). - Allergy:Stings(Miscellaneous). Velocities are measured in cm/s ; Diameters are measured in cm Right Lower Extremities DVT Study Measurements Right 2D Measurements +------------------------------------+----------+---------------+----------+ ! Location                            ! Visualized! Compressibility! Thrombosis! +------------------------------------+----------+---------------+----------+ ! Common Femoral                      !Yes       ! Yes            ! None      ! +------------------------------------+----------+---------------+----------+ ! Prox Femoral                        !Yes       ! Yes            ! None      ! +------------------------------------+----------+---------------+----------+ ! Mid Femoral                         !Yes       ! Yes            ! None      ! +------------------------------------+----------+---------------+----------+ ! Dist Femoral                        !Yes       ! Yes !None      ! +------------------------------------+----------+---------------+----------+ ! Popliteal                           !Yes       ! Yes            ! None      ! +------------------------------------+----------+---------------+----------+ ! Sapheno Femoral Junction            ! Yes       ! Yes            ! None      ! +------------------------------------+----------+---------------+----------+ ! PTV                                 ! Yes       ! Yes            ! None      ! +------------------------------------+----------+---------------+----------+ ! Peroneal                            !Yes       ! Yes            ! None      ! +------------------------------------+----------+---------------+----------+ ! Gastroc                             ! Yes       ! Yes            ! None      ! +------------------------------------+----------+---------------+----------+ ! GSV Thigh                           ! Yes       ! Yes            ! None      ! +------------------------------------+----------+---------------+----------+ ! GSV Knee                            ! Yes       ! Yes            ! None      ! +------------------------------------+----------+---------------+----------+ ! GSV Ankle                           ! Yes       ! Yes            ! None      ! +------------------------------------+----------+---------------+----------+ ! SSV                                 ! Yes       ! Yes            ! None      ! +------------------------------------+----------+---------------+----------+ Right Doppler Measurements +---------------------------+------+------+--------------------------------+ ! Location                   ! Signal!Reflux! Reflux (msec)                   ! +---------------------------+------+------+--------------------------------+ ! Common Femoral             !Phasic!      !                                ! +---------------------------+------+------+--------------------------------+ ! Prox Femoral               !Phasic!      ! ! +---------------------------+------+------+--------------------------------+ ! Popliteal                  !Phasic!      !                                ! +---------------------------+------+------+--------------------------------+ Left Lower Extremities DVT Study Measurements Left 2D Measurements +------------------------------------+----------+---------------+----------+ ! Location                            ! Visualized! Compressibility! Thrombosis! +------------------------------------+----------+---------------+----------+ ! Common Femoral                      !Yes       ! Yes            ! None      ! +------------------------------------+----------+---------------+----------+ ! Prox Femoral                        !Yes       ! Yes            ! None      ! +------------------------------------+----------+---------------+----------+ ! Mid Femoral                         !Yes       ! Yes            ! None      ! +------------------------------------+----------+---------------+----------+ ! Dist Femoral                        !Yes       ! Yes            ! None      ! +------------------------------------+----------+---------------+----------+ ! Popliteal                           !Yes       ! Yes            ! None      ! +------------------------------------+----------+---------------+----------+ ! Sapheno Femoral Junction            ! Yes       ! Yes            ! None      ! +------------------------------------+----------+---------------+----------+ ! PTV                                 ! Yes       ! Yes            ! None      ! +------------------------------------+----------+---------------+----------+ ! Peroneal                            !Yes       ! Yes            ! None      ! +------------------------------------+----------+---------------+----------+ ! Gastroc                             ! Yes       ! Yes            ! None      ! +------------------------------------+----------+---------------+----------+ ! GSV Thigh !Yes       !Yes            ! None      ! +------------------------------------+----------+---------------+----------+ ! GSV Knee                            ! Yes       ! Yes            ! None      ! +------------------------------------+----------+---------------+----------+ ! GSV Ankle                           ! Yes       ! Yes            ! None      ! +------------------------------------+----------+---------------+----------+ ! SSV                                 ! Yes       ! No             !AI        ! +------------------------------------+----------+---------------+----------+ Left Doppler Measurements +---------------------------+------+------+--------------------------------+ ! Location                   ! Signal!Reflux! Reflux (msec)                   ! +---------------------------+------+------+--------------------------------+ ! Common Femoral             !Phasic!      !                                ! +---------------------------+------+------+--------------------------------+ ! Prox Femoral               !Phasic!      !                                ! +---------------------------+------+------+--------------------------------+ ! Popliteal                  !Phasic!      !                                ! +---------------------------+------+------+--------------------------------+    FLUORO FOR SURGICAL PROCEDURES    Result Date: 3/24/2022  Radiology exam is complete. No Radiologist dictation. Please follow up with ordering provider. MRI BRAIN WO CONTRAST    Result Date: 3/28/2022  EXAMINATION: MRI OF THE BRAIN WITHOUT CONTRAST  3/28/2022 1:06 pm TECHNIQUE: Multiplanar multisequence MRI of the brain was performed without the administration of intravenous contrast. COMPARISON: CT brain performed 03/26/2022.  HISTORY: ORDERING SYSTEM PROVIDED HISTORY: Rule out encephalitis, meningitis, stroke,. s/p C spine fusion TECHNOLOGIST PROVIDED HISTORY: Rule out encephalitis, meningitis, stroke,. s/p C spine fusion Reason for Exam: Rule out encephalitis, meningitis, stroke,. s/p C spine fusion FINDINGS: INTRACRANIAL STRUCTURES/VENTRICLES: The sellar and suprasellar structures, optic chiasm, corpus callosum, pineal gland, tectum, and midline brainstem structures are unremarkable. The craniocervical junction is unremarkable. There is no acute hemorrhage, mass effect, or midline shift. There is satisfactory overall gray-white matter differentiation. The ventricular structures are symmetric and unremarkable. The infratentorial structures including the cerebellopontine angles and internal auditory canals are unremarkable. There is no abnormal restricted diffusion. There is no abnormal blooming artifact on susceptibility weighted imaging. ORBITS: The visualized portion of the orbits demonstrate no acute abnormality. SINUSES: The visualized paranasal sinuses and mastoid air cells demonstrate no acute abnormality. BONES/SOFT TISSUES: The bone marrow signal intensity appears normal. The soft tissues demonstrate no acute abnormality. No acute intracranial abnormality. MRI BRAIN WO CONTRAST    Result Date: 3/21/2022  EXAMINATION: MRI OF THE BRAIN WITHOUT CONTRAST  3/21/2022 3:54 pm TECHNIQUE: Multiplanar multisequence MRI of the brain was performed without the administration of intravenous contrast. COMPARISON: None. HISTORY: ORDERING SYSTEM PROVIDED HISTORY: UE/LE weakness TECHNOLOGIST PROVIDED HISTORY: UE/LE weakness Reason for Exam: Pt having several recent falls due to weakness. Initial evaluation. FINDINGS: Motion degrades images limiting evaluation. INTRACRANIAL STRUCTURES/VENTRICLES: There is no acute infarct. No mass effect or midline shift. No evidence of an acute intracranial hemorrhage. Areas of T2 FLAIR hyperintensity are seen in the periventricular and subcortical white matter, which are nonspecific, but may represent chronic microvascular ischemic change. There is mild global parenchymal volume loss. Otherwise, the ventricles and sulci are normal in size and configuration. The sellar/suprasellar regions appear unremarkable. The normal signal voids within the major intracranial vessels appear maintained. ORBITS: The visualized portion of the orbits demonstrate no acute abnormality. SINUSES: The visualized paranasal sinuses and mastoid air cells demonstrate no acute abnormality. BONES/SOFT TISSUES: The bone marrow signal intensity appears normal. The soft tissues demonstrate no acute abnormality. 1. No acute intracranial abnormality. No acute infarct. 2. Mild global parenchymal volume loss with minimal chronic microvascular ischemic changes.         Current Facility-Administered Medications   Medication Dose Route Frequency Provider Last Rate Last Admin    clopidogrel (PLAVIX) tablet 75 mg  75 mg Oral Daily Lindsay Crandall MD   75 mg at 04/20/22 0910    aspirin chewable tablet 81 mg  81 mg Oral Daily Lindsay Crandall MD   81 mg at 04/20/22 0909    carvedilol (COREG) tablet 6.25 mg  6.25 mg Oral BID Lindsay Crandall MD   6.25 mg at 04/20/22 0908    acetaminophen (TYLENOL) tablet 650 mg  650 mg Oral Q4H PRN Beba Royal MD   650 mg at 04/20/22 0909    pantoprazole (PROTONIX) tablet 40 mg  40 mg Oral QAM AC Beba Royal MD   40 mg at 04/20/22 0502    atorvastatin (LIPITOR) tablet 40 mg  40 mg Oral Nightly Tyson Cooley MD   40 mg at 04/19/22 2033    bisacodyl (DULCOLAX) suppository 10 mg  10 mg Rectal QPM Tyson Cooley MD   10 mg at 04/19/22 1906    carboxymethylcellulose (THERATEARS) 1 % ophthalmic gel 2 drop  2 drop Both Eyes TID Tyson Cooley MD   2 drop at 04/20/22 0920    divalproex (DEPAKOTE ER) extended release tablet 500 mg  500 mg Oral Nightly Tyson Cooley MD   500 mg at 04/19/22 2032    enoxaparin (LOVENOX) injection 40 mg  40 mg SubCUTAneous Daily Tyson Cooley MD   40 mg at 04/20/22 0910    finasteride (PROSCAR) tablet 5 mg  5 mg Oral Daily Tyson Cooley MD   5 mg at 04/20/22 0908    hydrALAZINE (APRESOLINE) tablet 25 mg  25 mg Oral TID Jonel Logan MD   25 mg at 04/19/22 2032    lidocaine 4 % external patch 2 patch  2 patch TransDERmal Daily Jonel Logan MD   2 patch at 04/20/22 0910    polyethylene glycol (GLYCOLAX) packet 17 g  17 g Oral Daily Jonel Logan MD   17 g at 04/20/22 0910    venlafaxine (EFFEXOR) tablet 75 mg  75 mg Oral TID WC Jonel Logan MD   75 mg at 04/20/22 0909    aluminum & magnesium hydroxide-simethicone (MAALOX) 200-200-20 MG/5ML suspension 30 mL  30 mL Oral Q6H PRN Jonel Logan MD   30 mL at 04/17/22 2132    glucagon (rDNA) injection 1 mg  1 mg IntraMUSCular PRN Jonel Logan MD        glucose (GLUTOSE) 40 % oral gel 15 g  15 g Oral PRN Jonel Logan MD        nitroGLYCERIN (NITROSTAT) SL tablet 0.4 mg  0.4 mg SubLINGual Q5 Min PRN Jonel Logan MD        ondansetron (ZOFRAN-ODT) disintegrating tablet 4 mg  4 mg Oral Q8H PRN Jonel Logan MD   4 mg at 04/18/22 1110    Or    ondansetron (ZOFRAN) injection 4 mg  4 mg IntraVENous Q6H PRN Jonel Logan MD   4 mg at 04/19/22 2356    senna (SENOKOT) tablet 17.2 mg  2 tablet Oral Nightly PRN Jonel Logan MD           Impressions :     1. 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 *        2.  has a past medical history of Depression (2017), Diabetes mellitus ([address] (2013), Difficult intravenous access, Hyperlipidemia (2008), Hypertension (2008), Migraines, PTSD (post-traumatic stress disorder) (01/2018), Sleep apnea (01/2018), Teeth missing, and Wears glasses.      Plans: 1. Chest pain-elevated but not uptrending EKG to be repeated today, await cardiology recommendations  2. Cervical stenosis with myelopathy status post C2-C5 laminectomy March 2022  3. CHF-currently compensated chronic hyponatremia  4. Hypertension-currently controlled  5. Type 2 diabetes-not currently on medication  6. GIA-home CPAP, not willing to use CPAP  7.  Severe depression, anxiety, PTSD-on Effexor, Depakote-psychiatry following    DVT pplx-Lovenox  Antibiotics started/continued-none  Code status-DNR CCA    4/18   No new complaints  Patient clinically doing better  Uses CPAP machine  Antihypertensive just adjusted yesterday by cardiologist,  On reduced dose of Depakote  Likely discharge to SNF, patient is refused by acute rehab, explained to patient  Patient refused CPAP machine, he told me he will never wear CPAP machine    4/20   Patient had cardiac cath yesterday, drug-eluting stent was placed started on dual antiplatelet  Accepted at acute rehab  Getting discharged on dual antiplatelet  Need to follow-up with cardiologist as outpatient    Ria King MD  4/20/2022  9:50 AM

## 2022-04-21 ENCOUNTER — APPOINTMENT (OUTPATIENT)
Dept: CT IMAGING | Age: 72
DRG: 281 | End: 2022-04-21
Payer: OTHER GOVERNMENT

## 2022-04-21 ENCOUNTER — APPOINTMENT (OUTPATIENT)
Dept: GENERAL RADIOLOGY | Age: 72
DRG: 281 | End: 2022-04-21
Payer: OTHER GOVERNMENT

## 2022-04-21 ENCOUNTER — HOSPITAL ENCOUNTER (INPATIENT)
Age: 72
LOS: 4 days | Discharge: NURSING, REHAB OR LONG-TERM CARE | DRG: 281 | End: 2022-04-25
Attending: EMERGENCY MEDICINE | Admitting: INTERNAL MEDICINE
Payer: OTHER GOVERNMENT

## 2022-04-21 DIAGNOSIS — R07.9 CHEST PAIN, UNSPECIFIED TYPE: Primary | ICD-10-CM

## 2022-04-21 DIAGNOSIS — E87.1 HYPONATREMIA: ICD-10-CM

## 2022-04-21 PROBLEM — I21.4 NSTEMI (NON-ST ELEVATED MYOCARDIAL INFARCTION) (HCC): Status: ACTIVE | Noted: 2022-04-21

## 2022-04-21 LAB
ABSOLUTE EOS #: 0.08 K/UL (ref 0–0.44)
ABSOLUTE IMMATURE GRANULOCYTE: 0.07 K/UL (ref 0–0.3)
ABSOLUTE LYMPH #: 1.4 K/UL (ref 1.1–3.7)
ABSOLUTE MONO #: 0.8 K/UL (ref 0.1–1.2)
ANION GAP SERPL CALCULATED.3IONS-SCNC: 10 MMOL/L (ref 9–17)
BASOPHILS # BLD: 1 % (ref 0–2)
BASOPHILS ABSOLUTE: 0.05 K/UL (ref 0–0.2)
BUN BLDV-MCNC: 17 MG/DL (ref 8–23)
CALCIUM SERPL-MCNC: 9 MG/DL (ref 8.6–10.4)
CHLORIDE BLD-SCNC: 89 MMOL/L (ref 98–107)
CO2: 25 MMOL/L (ref 20–31)
CREAT SERPL-MCNC: 0.59 MG/DL (ref 0.7–1.2)
D-DIMER QUANTITATIVE: 1.04 MG/L FEU
EOSINOPHILS RELATIVE PERCENT: 1 % (ref 1–4)
GFR AFRICAN AMERICAN: >60 ML/MIN
GFR NON-AFRICAN AMERICAN: >60 ML/MIN
GFR SERPL CREATININE-BSD FRML MDRD: ABNORMAL ML/MIN/{1.73_M2}
GLUCOSE BLD-MCNC: 107 MG/DL (ref 75–110)
GLUCOSE BLD-MCNC: 126 MG/DL (ref 70–99)
GLUCOSE BLD-MCNC: 129 MG/DL (ref 75–110)
HCT VFR BLD CALC: 32 % (ref 40.7–50.3)
HCT VFR BLD CALC: 33.3 % (ref 40.7–50.3)
HEMOGLOBIN: 11.4 G/DL (ref 13–17)
HEMOGLOBIN: 11.4 G/DL (ref 13–17)
IMMATURE GRANULOCYTES: 1 %
LACTIC ACID, WHOLE BLOOD: 0.6 MMOL/L (ref 0.7–2.1)
LIPASE: 36 U/L (ref 13–60)
LYMPHOCYTES # BLD: 19 % (ref 24–43)
MCH RBC QN AUTO: 31.9 PG (ref 25.2–33.5)
MCH RBC QN AUTO: 32.2 PG (ref 25.2–33.5)
MCHC RBC AUTO-ENTMCNC: 34.2 G/DL (ref 28.4–34.8)
MCHC RBC AUTO-ENTMCNC: 35.6 G/DL (ref 28.4–34.8)
MCV RBC AUTO: 89.6 FL (ref 82.6–102.9)
MCV RBC AUTO: 94.1 FL (ref 82.6–102.9)
MONOCYTES # BLD: 11 % (ref 3–12)
NRBC AUTOMATED: 0 PER 100 WBC
NRBC AUTOMATED: 0 PER 100 WBC
PARTIAL THROMBOPLASTIN TIME: 25.2 SEC (ref 20.5–30.5)
PDW BLD-RTO: 12.3 % (ref 11.8–14.4)
PDW BLD-RTO: 12.3 % (ref 11.8–14.4)
PLATELET # BLD: 250 K/UL (ref 138–453)
PLATELET # BLD: 268 K/UL (ref 138–453)
PMV BLD AUTO: 9.2 FL (ref 8.1–13.5)
PMV BLD AUTO: 9.5 FL (ref 8.1–13.5)
POTASSIUM SERPL-SCNC: 4.2 MMOL/L (ref 3.7–5.3)
RBC # BLD: 3.54 M/UL (ref 4.21–5.77)
RBC # BLD: 3.57 M/UL (ref 4.21–5.77)
SEG NEUTROPHILS: 67 % (ref 36–65)
SEGMENTED NEUTROPHILS ABSOLUTE COUNT: 5.17 K/UL (ref 1.5–8.1)
SODIUM BLD-SCNC: 124 MMOL/L (ref 135–144)
TROPONIN, HIGH SENSITIVITY: 87 NG/L (ref 0–22)
TROPONIN, HIGH SENSITIVITY: 88 NG/L (ref 0–22)
TROPONIN, HIGH SENSITIVITY: 90 NG/L (ref 0–22)
TROPONIN, HIGH SENSITIVITY: 93 NG/L (ref 0–22)
WBC # BLD: 6.2 K/UL (ref 3.5–11.3)
WBC # BLD: 7.6 K/UL (ref 3.5–11.3)

## 2022-04-21 PROCEDURE — 74177 CT ABD & PELVIS W/CONTRAST: CPT

## 2022-04-21 PROCEDURE — 93005 ELECTROCARDIOGRAM TRACING: CPT | Performed by: STUDENT IN AN ORGANIZED HEALTH CARE EDUCATION/TRAINING PROGRAM

## 2022-04-21 PROCEDURE — 83605 ASSAY OF LACTIC ACID: CPT

## 2022-04-21 PROCEDURE — 99223 1ST HOSP IP/OBS HIGH 75: CPT | Performed by: INTERNAL MEDICINE

## 2022-04-21 PROCEDURE — 6360000002 HC RX W HCPCS: Performed by: STUDENT IN AN ORGANIZED HEALTH CARE EDUCATION/TRAINING PROGRAM

## 2022-04-21 PROCEDURE — 80048 BASIC METABOLIC PNL TOTAL CA: CPT

## 2022-04-21 PROCEDURE — 83690 ASSAY OF LIPASE: CPT

## 2022-04-21 PROCEDURE — 2580000003 HC RX 258: Performed by: STUDENT IN AN ORGANIZED HEALTH CARE EDUCATION/TRAINING PROGRAM

## 2022-04-21 PROCEDURE — 82947 ASSAY GLUCOSE BLOOD QUANT: CPT

## 2022-04-21 PROCEDURE — 99285 EMERGENCY DEPT VISIT HI MDM: CPT

## 2022-04-21 PROCEDURE — 6370000000 HC RX 637 (ALT 250 FOR IP): Performed by: STUDENT IN AN ORGANIZED HEALTH CARE EDUCATION/TRAINING PROGRAM

## 2022-04-21 PROCEDURE — 6360000004 HC RX CONTRAST MEDICATION: Performed by: STUDENT IN AN ORGANIZED HEALTH CARE EDUCATION/TRAINING PROGRAM

## 2022-04-21 PROCEDURE — 71260 CT THORAX DX C+: CPT

## 2022-04-21 PROCEDURE — 76937 US GUIDE VASCULAR ACCESS: CPT

## 2022-04-21 PROCEDURE — 71045 X-RAY EXAM CHEST 1 VIEW: CPT

## 2022-04-21 PROCEDURE — 85379 FIBRIN DEGRADATION QUANT: CPT

## 2022-04-21 PROCEDURE — 1200000000 HC SEMI PRIVATE

## 2022-04-21 PROCEDURE — 84484 ASSAY OF TROPONIN QUANT: CPT

## 2022-04-21 PROCEDURE — 85025 COMPLETE CBC W/AUTO DIFF WBC: CPT

## 2022-04-21 PROCEDURE — 85027 COMPLETE CBC AUTOMATED: CPT

## 2022-04-21 PROCEDURE — 2500000003 HC RX 250 WO HCPCS: Performed by: STUDENT IN AN ORGANIZED HEALTH CARE EDUCATION/TRAINING PROGRAM

## 2022-04-21 PROCEDURE — 36415 COLL VENOUS BLD VENIPUNCTURE: CPT

## 2022-04-21 PROCEDURE — 85730 THROMBOPLASTIN TIME PARTIAL: CPT

## 2022-04-21 RX ORDER — DIVALPROEX SODIUM 500 MG/1
500 TABLET, EXTENDED RELEASE ORAL NIGHTLY
Status: DISCONTINUED | OUTPATIENT
Start: 2022-04-21 | End: 2022-04-25 | Stop reason: HOSPADM

## 2022-04-21 RX ORDER — ACETAMINOPHEN 650 MG/1
650 SUPPOSITORY RECTAL EVERY 6 HOURS PRN
Status: DISCONTINUED | OUTPATIENT
Start: 2022-04-21 | End: 2022-04-25 | Stop reason: HOSPADM

## 2022-04-21 RX ORDER — INSULIN LISPRO 100 [IU]/ML
0-18 INJECTION, SOLUTION INTRAVENOUS; SUBCUTANEOUS
Status: DISCONTINUED | OUTPATIENT
Start: 2022-04-21 | End: 2022-04-25 | Stop reason: HOSPADM

## 2022-04-21 RX ORDER — ASPIRIN 81 MG/1
81 TABLET, CHEWABLE ORAL DAILY
Status: DISCONTINUED | OUTPATIENT
Start: 2022-04-21 | End: 2022-04-25 | Stop reason: HOSPADM

## 2022-04-21 RX ORDER — ENOXAPARIN SODIUM 100 MG/ML
40 INJECTION SUBCUTANEOUS DAILY
Status: DISCONTINUED | OUTPATIENT
Start: 2022-04-21 | End: 2022-04-21

## 2022-04-21 RX ORDER — HEPARIN SODIUM 1000 [USP'U]/ML
30 INJECTION, SOLUTION INTRAVENOUS; SUBCUTANEOUS PRN
Status: DISCONTINUED | OUTPATIENT
Start: 2022-04-21 | End: 2022-04-22

## 2022-04-21 RX ORDER — HYDRALAZINE HYDROCHLORIDE 50 MG/1
25 TABLET, FILM COATED ORAL ONCE
Status: COMPLETED | OUTPATIENT
Start: 2022-04-21 | End: 2022-04-21

## 2022-04-21 RX ORDER — NITROGLYCERIN 20 MG/100ML
5-200 INJECTION INTRAVENOUS CONTINUOUS
Status: DISCONTINUED | OUTPATIENT
Start: 2022-04-21 | End: 2022-04-22

## 2022-04-21 RX ORDER — MORPHINE SULFATE 2 MG/ML
1 INJECTION, SOLUTION INTRAMUSCULAR; INTRAVENOUS EVERY 4 HOURS PRN
Status: DISCONTINUED | OUTPATIENT
Start: 2022-04-21 | End: 2022-04-25 | Stop reason: HOSPADM

## 2022-04-21 RX ORDER — CLOPIDOGREL BISULFATE 75 MG/1
75 TABLET ORAL DAILY
Status: DISCONTINUED | OUTPATIENT
Start: 2022-04-21 | End: 2022-04-25 | Stop reason: HOSPADM

## 2022-04-21 RX ORDER — CARVEDILOL 6.25 MG/1
6.25 TABLET ORAL 2 TIMES DAILY
Status: DISCONTINUED | OUTPATIENT
Start: 2022-04-21 | End: 2022-04-23

## 2022-04-21 RX ORDER — HEPARIN SODIUM 1000 [USP'U]/ML
60 INJECTION, SOLUTION INTRAVENOUS; SUBCUTANEOUS ONCE
Status: COMPLETED | OUTPATIENT
Start: 2022-04-21 | End: 2022-04-21

## 2022-04-21 RX ORDER — ONDANSETRON 2 MG/ML
4 INJECTION INTRAMUSCULAR; INTRAVENOUS EVERY 6 HOURS PRN
Status: DISCONTINUED | OUTPATIENT
Start: 2022-04-21 | End: 2022-04-25 | Stop reason: HOSPADM

## 2022-04-21 RX ORDER — NICOTINE POLACRILEX 4 MG
15 LOZENGE BUCCAL PRN
Status: DISCONTINUED | OUTPATIENT
Start: 2022-04-21 | End: 2022-04-25 | Stop reason: HOSPADM

## 2022-04-21 RX ORDER — CLOPIDOGREL BISULFATE 75 MG/1
75 TABLET ORAL ONCE
Status: COMPLETED | OUTPATIENT
Start: 2022-04-21 | End: 2022-04-21

## 2022-04-21 RX ORDER — SODIUM CHLORIDE 0.9 % (FLUSH) 0.9 %
5-40 SYRINGE (ML) INJECTION PRN
Status: DISCONTINUED | OUTPATIENT
Start: 2022-04-21 | End: 2022-04-25 | Stop reason: HOSPADM

## 2022-04-21 RX ORDER — SODIUM CHLORIDE 0.9 % (FLUSH) 0.9 %
5-40 SYRINGE (ML) INJECTION EVERY 12 HOURS SCHEDULED
Status: DISCONTINUED | OUTPATIENT
Start: 2022-04-21 | End: 2022-04-25 | Stop reason: HOSPADM

## 2022-04-21 RX ORDER — ONDANSETRON 4 MG/1
4 TABLET, ORALLY DISINTEGRATING ORAL EVERY 8 HOURS PRN
Status: DISCONTINUED | OUTPATIENT
Start: 2022-04-21 | End: 2022-04-25 | Stop reason: HOSPADM

## 2022-04-21 RX ORDER — INSULIN LISPRO 100 [IU]/ML
0-9 INJECTION, SOLUTION INTRAVENOUS; SUBCUTANEOUS NIGHTLY
Status: DISCONTINUED | OUTPATIENT
Start: 2022-04-21 | End: 2022-04-25 | Stop reason: HOSPADM

## 2022-04-21 RX ORDER — SODIUM CHLORIDE 9 MG/ML
INJECTION, SOLUTION INTRAVENOUS PRN
Status: DISCONTINUED | OUTPATIENT
Start: 2022-04-21 | End: 2022-04-25 | Stop reason: HOSPADM

## 2022-04-21 RX ORDER — ATORVASTATIN CALCIUM 40 MG/1
40 TABLET, FILM COATED ORAL DAILY
Status: DISCONTINUED | OUTPATIENT
Start: 2022-04-21 | End: 2022-04-25 | Stop reason: HOSPADM

## 2022-04-21 RX ORDER — HEPARIN SODIUM 1000 [USP'U]/ML
60 INJECTION, SOLUTION INTRAVENOUS; SUBCUTANEOUS PRN
Status: DISCONTINUED | OUTPATIENT
Start: 2022-04-21 | End: 2022-04-22

## 2022-04-21 RX ORDER — DEXTROSE MONOHYDRATE 25 G/50ML
12.5 INJECTION, SOLUTION INTRAVENOUS PRN
Status: DISCONTINUED | OUTPATIENT
Start: 2022-04-21 | End: 2022-04-25 | Stop reason: HOSPADM

## 2022-04-21 RX ORDER — ACETAMINOPHEN 325 MG/1
650 TABLET ORAL EVERY 6 HOURS PRN
Status: DISCONTINUED | OUTPATIENT
Start: 2022-04-21 | End: 2022-04-25 | Stop reason: HOSPADM

## 2022-04-21 RX ORDER — ACETAMINOPHEN 500 MG
1000 TABLET ORAL ONCE
Status: COMPLETED | OUTPATIENT
Start: 2022-04-21 | End: 2022-04-21

## 2022-04-21 RX ORDER — HYDRALAZINE HYDROCHLORIDE 25 MG/1
25 TABLET, FILM COATED ORAL 3 TIMES DAILY
Status: DISCONTINUED | OUTPATIENT
Start: 2022-04-21 | End: 2022-04-24

## 2022-04-21 RX ORDER — VENLAFAXINE 75 MG/1
75 TABLET ORAL
Status: CANCELLED | OUTPATIENT
Start: 2022-04-21

## 2022-04-21 RX ORDER — POLYETHYLENE GLYCOL 3350 17 G/17G
17 POWDER, FOR SOLUTION ORAL DAILY PRN
Status: DISCONTINUED | OUTPATIENT
Start: 2022-04-21 | End: 2022-04-25 | Stop reason: HOSPADM

## 2022-04-21 RX ORDER — DEXTROSE MONOHYDRATE 50 MG/ML
100 INJECTION, SOLUTION INTRAVENOUS PRN
Status: DISCONTINUED | OUTPATIENT
Start: 2022-04-21 | End: 2022-04-25 | Stop reason: HOSPADM

## 2022-04-21 RX ORDER — FENTANYL CITRATE 50 UG/ML
25 INJECTION, SOLUTION INTRAMUSCULAR; INTRAVENOUS EVERY 6 HOURS PRN
Status: DISCONTINUED | OUTPATIENT
Start: 2022-04-21 | End: 2022-04-25 | Stop reason: HOSPADM

## 2022-04-21 RX ADMIN — HEPARIN SODIUM 3750 UNITS: 1000 INJECTION INTRAVENOUS; SUBCUTANEOUS at 17:24

## 2022-04-21 RX ADMIN — NITROGLYCERIN 5 MCG/MIN: 20 INJECTION INTRAVENOUS at 16:30

## 2022-04-21 RX ADMIN — HYDRALAZINE HYDROCHLORIDE 25 MG: 50 TABLET, FILM COATED ORAL at 10:32

## 2022-04-21 RX ADMIN — FENTANYL CITRATE 25 MCG: 50 INJECTION, SOLUTION INTRAMUSCULAR; INTRAVENOUS at 15:47

## 2022-04-21 RX ADMIN — DIVALPROEX SODIUM 500 MG: 500 TABLET, EXTENDED RELEASE ORAL at 20:38

## 2022-04-21 RX ADMIN — HEPARIN SODIUM 12 UNITS/KG/HR: 5000 INJECTION, SOLUTION INTRAVENOUS; SUBCUTANEOUS at 17:31

## 2022-04-21 RX ADMIN — IOPAMIDOL 75 ML: 755 INJECTION, SOLUTION INTRAVENOUS at 09:48

## 2022-04-21 RX ADMIN — HYDRALAZINE HYDROCHLORIDE 25 MG: 25 TABLET, FILM COATED ORAL at 15:46

## 2022-04-21 RX ADMIN — DESMOPRESSIN ACETATE 40 MG: 0.2 TABLET ORAL at 15:46

## 2022-04-21 RX ADMIN — HYDRALAZINE HYDROCHLORIDE 25 MG: 25 TABLET, FILM COATED ORAL at 20:39

## 2022-04-21 RX ADMIN — ASPIRIN 81 MG: 81 TABLET, CHEWABLE ORAL at 15:46

## 2022-04-21 RX ADMIN — ACETAMINOPHEN 1000 MG: 500 TABLET ORAL at 08:22

## 2022-04-21 RX ADMIN — CARVEDILOL 6.25 MG: 6.25 TABLET, FILM COATED ORAL at 15:46

## 2022-04-21 RX ADMIN — SODIUM CHLORIDE, PRESERVATIVE FREE 10 ML: 5 INJECTION INTRAVENOUS at 20:39

## 2022-04-21 RX ADMIN — CLOPIDOGREL BISULFATE 75 MG: 75 TABLET ORAL at 10:32

## 2022-04-21 RX ADMIN — ONDANSETRON 4 MG: 2 INJECTION INTRAMUSCULAR; INTRAVENOUS at 17:12

## 2022-04-21 RX ADMIN — CARVEDILOL 6.25 MG: 6.25 TABLET, FILM COATED ORAL at 20:39

## 2022-04-21 RX ADMIN — ACETAMINOPHEN 650 MG: 325 TABLET ORAL at 15:46

## 2022-04-21 RX ADMIN — IOPAMIDOL 75 ML: 755 INJECTION, SOLUTION INTRAVENOUS at 23:15

## 2022-04-21 ASSESSMENT — PAIN DESCRIPTION - FREQUENCY
FREQUENCY: CONTINUOUS
FREQUENCY: CONTINUOUS

## 2022-04-21 ASSESSMENT — PAIN SCALES - GENERAL
PAINLEVEL_OUTOF10: 8
PAINLEVEL_OUTOF10: 8
PAINLEVEL_OUTOF10: 7
PAINLEVEL_OUTOF10: 8
PAINLEVEL_OUTOF10: 0
PAINLEVEL_OUTOF10: 7
PAINLEVEL_OUTOF10: 8

## 2022-04-21 ASSESSMENT — PAIN DESCRIPTION - DESCRIPTORS
DESCRIPTORS: ACHING;NAGGING;SHOOTING
DESCRIPTORS: CRUSHING

## 2022-04-21 ASSESSMENT — ENCOUNTER SYMPTOMS
APNEA: 0
WHEEZING: 0
VOMITING: 0
CHOKING: 0
STRIDOR: 0
COUGH: 0
ABDOMINAL DISTENTION: 0
CONSTIPATION: 0
BLOOD IN STOOL: 0
ABDOMINAL PAIN: 0
CHEST TIGHTNESS: 1
NAUSEA: 0
SHORTNESS OF BREATH: 1
DIARRHEA: 0

## 2022-04-21 ASSESSMENT — PAIN DESCRIPTION - ORIENTATION
ORIENTATION: MID
ORIENTATION: MID;RIGHT;LEFT

## 2022-04-21 ASSESSMENT — PAIN - FUNCTIONAL ASSESSMENT
PAIN_FUNCTIONAL_ASSESSMENT: PREVENTS OR INTERFERES SOME ACTIVE ACTIVITIES AND ADLS
PAIN_FUNCTIONAL_ASSESSMENT: 0-10

## 2022-04-21 ASSESSMENT — PAIN DESCRIPTION - LOCATION
LOCATION: CHEST
LOCATION: ABDOMEN;CHEST

## 2022-04-21 ASSESSMENT — PAIN DESCRIPTION - ONSET: ONSET: SUDDEN

## 2022-04-21 ASSESSMENT — PAIN DESCRIPTION - PAIN TYPE: TYPE: ACUTE PAIN

## 2022-04-21 NOTE — H&P
[address]     Department of Internal Medicine - Staff Internal Medicine Teaching Service          ADMISSION NOTE/HISTORY AND PHYSICAL EXAMINATION   Date: 4/21/2022  Patient Name: Andres Mendiola Date of admission: 4/21/2022  7:35 AM  YOB: 1950  PCP: Denis Holt  History Obtained From:  patient    CHIEF COMPLAINT     Chief complaint: chest pain    HISTORY OF PRESENTING ILLNESS     The patient is a pleasant 70 y.o. male pmh T2DM, HTN, HLD, GIA admitted from [facility],   presents with a chief complaint of chest pain. Had recent stenting on 4/19 with SHY placed in LAD. Last echo with normal EF. Patient reports pain is located in his sternum and radiates to his left shoulder he also reports SOB. Currently on 4L NC.  troponins are elevated at 90, preivous baseline around 70. Sodium is slightly lower than baseline at around 124. The hyponatremia appears chronic  Patient currently still having chest pain. Pain is constant and denies any association with exertion. pain is worsen upon chest wall palpation. Patient denies any recent trauma to the area. Patient reports that he has been taking medications since his DC. Denies any smoking, alcohol or illegal drugs       Vitals:    04/21/22 1032   BP: (!) 167/106   Pulse:    Resp:    Temp:    SpO2:        On my evaluation,  Vitals:    04/21/22 1032   BP: (!) 167/106   Pulse:    Resp:    Temp:    SpO2:          Review of Systems:  Review of Systems   Constitutional: Negative for chills, diaphoresis, fatigue and fever. Respiratory: Positive for chest tightness and shortness of breath. Negative for apnea, cough, choking, wheezing and stridor. Cardiovascular: Positive for chest pain. Negative for leg swelling. Gastrointestinal: Negative for abdominal distention, abdominal pain, blood in stool, constipation, diarrhea, nausea and vomiting. Genitourinary: Negative for decreased urine volume, dysuria and urgency.    Skin: Negative for pallor and wound. Neurological: Negative for syncope, weakness, numbness and headaches. Psychiatric/Behavioral: Negative for agitation, behavioral problems, confusion, decreased concentration and self-injury. PAST MEDICAL HISTORY     Past Medical History:   Diagnosis Date    Depression 2017    ON RX    Diabetes mellitus ([address] 2013    NIDDM    Difficult intravenous access     Hyperlipidemia 2008    ON RX    Hypertension 2008    ON RX    Migraines     PTSD (post-traumatic stress disorder) 01/2018    ON RX    Sleep apnea 01/2018    UNALBE TO USE TO CLEARED BY ENT (CPAP)    Teeth missing     BACK TEETH UPPER AND LOWER TO BE FITTED FOR PARTIALS AT LATER DATE    Wears glasses        PAST SURGICAL HISTORY     Past Surgical History:   Procedure Laterality Date    CARDIAC CATHETERIZATION  02/2010    no stents     CARPAL TUNNEL RELEASE Left 01/09/2002    CATARACT REMOVAL      CERVICAL FUSION  04/19/2018    anterior cervical fusion C5-6    CERVICAL FUSION N/A 3/24/2022    C2-5 FUSION, C2-4 LAMINECTOMY performed by Apuvra Belcher DO at [address] [address] Left 2018    CATARACT EXTRACTION WITH IOL   [facility]    LAMINECTOMY  03/24/2022    C2-5 FUSION, C2-4 LAMINECTOMY    MIDDLE EAR SURGERY  01/11/2018    MIDDLE EAR REBUILT AND EAR DRUM REPLACED    MOUTH SURGERY  04/16/2018    5 TEETH REMOVED    OTHER SURGICAL HISTORY  04/19/2018    : ANTERIOR CERVICAL CORRPECTOMY C5-6, SYNTHES, DEPUY, REG TABLE, SUPINE,     DC OFFICE/OUTPT VISIT,PROCEDURE ONLY N/A 4/19/2018    ANTERIOR CERVICAL CORRPECTOMY AND FUSION C5-6 performed by Apurva Belcher DO at [facility]  12/1983       ALLERGIES     Latex, Bee venom, Lisinopril, Niacin and related, Sulfa antibiotics, and Terazosin    MEDICATIONS PRIOR TO ADMISSION     Prior to Admission medications    Medication Sig Start Date End Date Taking?  Authorizing Provider   clopidogrel (PLAVIX) 75 MG tablet Take 1 tablet by mouth daily 4/19/22   Emiliano Olivares MD   nitroGLYCERIN (NITROSTAT) 0.4 MG SL tablet up to max of 3 total doses. If no relief after 1 dose, call 911. 4/18/22   Shyam Mackenzie MD   divalproex (DEPAKOTE ER) 500 MG extended release tablet Take 1 tablet by mouth at bedtime 4/18/22   Shyam Mackenzie MD   venlafaxine (EFFEXOR) 75 MG tablet Take 1 tablet by mouth 3 times daily (with meals) 4/18/22   Shyam Mackenzie MD   hydrALAZINE (APRESOLINE) 25 MG tablet Take 1 tablet by mouth three times daily 4/18/22   Shyam Mackenzie MD   carvedilol (COREG) 6.25 MG tablet Take 1 tablet by mouth 2 times daily 4/18/22   Shyam Mackenzie MD   finasteride (PROSCAR) 5 MG tablet Take 5 mg by mouth daily    Historical Provider, MD   carboxymethylcellulose 1 % ophthalmic solution Place 2 drops into both eyes 3 times daily Pt states \"2 drops in both eyes three times a day\"    Historical Provider, MD   simethicone (MYLICON) 80 MG chewable tablet Take 1 tablet by mouth every 6 hours as needed for Flatulence  Patient taking differently: Take 80 mg by mouth every 8 hours as needed for Flatulence  6/22/20   Rich Gomez MD   lidocaine (XYLOCAINE) 5 % ointment Apply topically daily as needed for Pain Apply topically as needed.   LF 4/20/20 (30 day supply)  Patient not taking: Reported on 3/23/2022    Historical Provider, MD   metFORMIN (GLUCOPHAGE) 1000 MG tablet Take 1,000 mg by mouth 2 times daily (with meals) LF 4/27/20 (90 day supply)  Patient not taking: Reported on 3/21/2022    Historical Provider, MD   aspirin 81 MG chewable tablet Take 81 mg by mouth daily  Patient not taking: Reported on 3/21/2022    Historical Provider, MD   cetirizine (ZYRTEC) 10 MG tablet Take 10 mg by mouth daily LF 4/6/20 (90 day supply)  Patient not taking: Reported on 3/21/2022    Historical Provider, MD   atorvastatin (LIPITOR) 80 MG tablet Take 40 mg by mouth daily Take 1/2 tablet (40 mg) daily  LF 4/22/20 (90 day supply)  Patient not taking: Reported on 3/21/2022    Historical Provider, MD   omeprazole (PRILOSEC) 20 MG delayed release capsule Take 20 mg by mouth every evening LF 20 (90 day supply)    Historical Provider, MD       SOCIAL HISTORY     Tobacco: no  Alcohol: no  Illicits: no  Occupation: Mobile [employer] living    FAMILY HISTORY     Family History   Problem Relation Age of Onset    Dementia Mother     Coronary Art Dis Mother     Stroke Mother     Diabetes Mother     Asthma Mother     Other Mother         BRAIN ANEURISM    High Blood Pressure Mother     Heart Disease Father     Diabetes Sister     Diabetes Brother     High Blood Pressure Brother     High Cholesterol Brother     Heart Disease Brother     Diabetes Sister     Other Sister         NEUROPATHY       PHYSICAL EXAM     Vitals: BP (!) 167/106   Pulse 88   Temp 99 °F (37.2 °C) (Oral)   Resp 27   SpO2 97%   Tmax: Temp (24hrs), Av °F (37.2 °C), Min:99 °F (37.2 °C), Max:99 °F (37.2 °C)    Last Body weight:   Wt Readings from Last 3 Encounters:   22 144 lb 6.4 oz (65.5 kg)   22 141 lb 3.2 oz (64 kg)   22 153 lb 6.4 oz (69.6 kg)     Body Mass Index : There is no height or weight on file to calculate BMI. PHYSICAL EXAMINATION:  Physical Exam  Constitutional:       General: He is not in acute distress. Appearance: Normal appearance. He is obese. He is not ill-appearing, toxic-appearing or diaphoretic. HENT:      Mouth/Throat:      Mouth: Mucous membranes are moist.      Pharynx: Oropharynx is clear. Eyes:      General: No scleral icterus. Cardiovascular:      Rate and Rhythm: Normal rate and regular rhythm. Pulses: Normal pulses. Heart sounds: Normal heart sounds. No murmur heard. No friction rub. No gallop. Pulmonary:      Effort: Pulmonary effort is normal.      Breath sounds: Normal breath sounds. Abdominal:      General: Abdomen is flat. Bowel sounds are normal. There is no distension.       Palpations: Abdomen is soft. There is no mass. Tenderness: There is no abdominal tenderness. There is no guarding or rebound. Hernia: No hernia is present. Musculoskeletal:         General: No swelling, tenderness, deformity or signs of injury. Right lower leg: No edema. Left lower leg: No edema. Comments: Tenderness upon palpation of chest   Skin:     General: Skin is warm and dry. Coloration: Skin is not jaundiced or pale. Findings: No bruising. Neurological:      General: No focal deficit present. Mental Status: He is alert and oriented to person, place, and time. Cranial Nerves: No cranial nerve deficit. Motor: No weakness. Psychiatric:         Mood and Affect: Mood normal.         Behavior: Behavior normal.         Thought Content:  Thought content normal.         Judgment: Judgment normal.           INVESTIGATIONS     Laboratory Testing:     Recent Results (from the past 24 hour(s))   CBC with Auto Differential    Collection Time: 04/21/22  8:26 AM   Result Value Ref Range    WBC 7.6 3.5 - 11.3 k/uL    RBC 3.54 (L) 4.21 - 5.77 m/uL    Hemoglobin 11.4 (L) 13.0 - 17.0 g/dL    Hematocrit 33.3 (L) 40.7 - 50.3 %    MCV 94.1 82.6 - 102.9 fL    MCH 32.2 25.2 - 33.5 pg    MCHC 34.2 28.4 - 34.8 g/dL    RDW 12.3 11.8 - 14.4 %    Platelets 356 548 - 991 k/uL    MPV 9.5 8.1 - 13.5 fL    NRBC Automated 0.0 0.0 per 100 WBC    Seg Neutrophils 67 (H) 36 - 65 %    Lymphocytes 19 (L) 24 - 43 %    Monocytes 11 3 - 12 %    Eosinophils % 1 1 - 4 %    Basophils 1 0 - 2 %    Immature Granulocytes 1 (H) 0 %    Segs Absolute 5.17 1.50 - 8.10 k/uL    Absolute Lymph # 1.40 1.10 - 3.70 k/uL    Absolute Mono # 0.80 0.10 - 1.20 k/uL    Absolute Eos # 0.08 0.00 - 0.44 k/uL    Basophils Absolute 0.05 0.00 - 0.20 k/uL    Absolute Immature Granulocyte 0.07 0.00 - 0.30 k/uL   Basic Metabolic Panel w/ Reflex to MG    Collection Time: 04/21/22  8:26 AM   Result Value Ref Range    Glucose 126 (H) 70 - 99 mg/dL    BUN 17 8 - 23 mg/dL    CREATININE 0.59 (L) 0.70 - 1.20 mg/dL    Calcium 9.0 8.6 - 10.4 mg/dL    Sodium 124 (L) 135 - 144 mmol/L    Potassium 4.2 3.7 - 5.3 mmol/L    Chloride 89 (L) 98 - 107 mmol/L    CO2 25 20 - 31 mmol/L    Anion Gap 10 9 - 17 mmol/L    GFR Non-African American >60 >60 mL/min    GFR African American >60 >60 mL/min    GFR Comment         Troponin    Collection Time: 04/21/22  8:26 AM   Result Value Ref Range    Troponin, High Sensitivity 90 (HH) 0 - 22 ng/L   D-Dimer, Quantitative    Collection Time: 04/21/22  8:26 AM   Result Value Ref Range    D-Dimer, Quant 1.04 mg/L FEU   Troponin    Collection Time: 04/21/22  9:16 AM   Result Value Ref Range    Troponin, High Sensitivity 93 (HH) 0 - 22 ng/L   Lipase    Collection Time: 04/21/22  9:16 AM   Result Value Ref Range    Lipase 36 13 - 60 U/L       Imaging:   XR CHEST PORTABLE    Result Date: 4/21/2022  Very mild residual bibasilar opacities. CT CHEST PULMONARY EMBOLISM W CONTRAST    Result Date: 4/21/2022  1. No evidence for acute pulmonary embolism. 2. Small bilateral pleural effusion left greater than right along with patchy bibasilar consolidations also more pronounced on the left. Compatible with atelectasis with or without component of pneumonia. 3. Unexplained trace of fluid and fat stranding along anterior inferior aspect of spleen. Dedicated CT abdomen pelvis with contrast recommended to exclude any significant pathology. ASSESSMENT & PLAN     ASSESSMENT:     Primary Problem  NSTEMI (non-ST elevated myocardial infarction) [facility])    Active Hospital Problems    Diagnosis Date Noted    [facility] [I21.4] 04/21/2022     Priority: Medium    Chest pain [R07.9] 04/16/2022    Depression with suicidal ideation [F32. A, R45.851] 06/18/2020    Hypertension [I10]     Hyperlipidemia [E78.5]     Diabetes mellitus ([facility] [E11.9]        PLAN:     IMPRESSION  This is a 70 y.o. male who presented with above mentioned complaints and was admitted to inpatient service for further management as follows:     Principal Problem:    NSTEMI (non-ST elevated myocardial infarction) [facility])  Active Problems:    Hypertension    Hyperlipidemia    Diabetes mellitus ([address]    Depression with suicidal ideation    Chest pain  Resolved Problems:    * No resolved hospital problems. *    Nstemi  - cardio consulted  - heparin gtt pending cardio cs  - pain and nasusea control   - continue to trend trops  - keep NPO     Hyponatremia  - he usually has 128 sodium  - today is 124  - most likely due to psych medications. Will hold effexor due to concerns for SIADH. - continue to monitor     T2DM  - currently takes metformin  - will start him on low dose SSI   - mointor blood glucose    HTN  - continue coreg, hydralazine at home dose    CAD  - s/p SHY x1 to LAD  -continue plavix and ASA    HLD  - continue lipitor           PT/OT/SW-consulted  Discharge Planning:consulted       Danica Flores MD  Internal Medicine Resident  [facility]  4/21/2022 11:28 AM   Attending Physician Statement  I have discussed the care of Eran Cassidy., including pertinent history and exam findings,  with the resident. I have seen and examined the patient and the key elements of all parts of the encounter have been performed by me. I agree with the assessment, plan and orders as documented by the resident with additions . Treatment plan Discussed with nursing staff in detail , all questions answered . Electronically signed by Emmett Frankel, MD on   4/21/22 at 9:09 PM EDT    Please note that this chart was generated using voice recognition Dragon dictation software. Although every effort was made to ensure the accuracy of this automated transcription, some errors in transcription may have occurred.

## 2022-04-21 NOTE — CARE COORDINATION
Case Management Initial Discharge Plan  Jesus Morales.,             Met with:patient to discuss discharge plans. Information verified: address, contacts, phone number, , insurance Yes  Insurance Provider: Medicare [place]    Emergency Contact/Next of Kin name & number: Jerson Daly (s.o., POA) [phone]  Who are involved in patient's support system? family    PCP: Myranda Montano  Date of last visit: 2022      Discharge Planning    Living Arrangements:        Home has 1 stories  2 stairs to climb to get into front door,   Location of bedroom/bathroom in home main    Patient able to perform ADL's:Dependent    Current Services (outpatient & in home) ARU  DME equipment: rollator cane  DME provider:     Is patient receiving oral anticoagulation therapy? No    If indicated:   Physician managing anticoagulation treatment:   Where does patient obtain lab work for ATC treatment? Does patient have any issues/concerns obtaining medications? No  If yes, what are patient's concerns? Is there a preferred Pharmacy after hours or on weekends? Yes    If yes, [facility]    Potential Assistance Needed:       Patient agreeable to home care: No  [place] of choice provided:  no    Prior SNF/Rehab Placement and Facility: [facility]  Agreeable to SNF/Rehab: Yes  [place] of choice provided: yes     Evaluation: no    Expected Discharge date:       Patient expects to be discharged to: If home: is the family and/or caregiver wiling & able to provide support at home? Who will be providing this support? Follow Up Appointment: Best Day/ Time:      Transportation provider: needs  Transportation arrangements needed for discharge: Yes    Readmission Risk              Risk of Unplanned Readmission:  0             Does patient have a readmission risk score greater than 14?:   If yes, follow-up appointment must be made within 7 days of discharge.      Goals of Care: Educated patient on transitional options, provided freedom of choice and are agreeable with plan      Discharge Plan: return to [facility], referral faxed, discussed with Sarah Albert at [facility]          Electronically signed by Meagan Manning RN on 4/21/22 at 10:44 AM EDT

## 2022-04-21 NOTE — PLAN OF CARE
Pt is established with Dr. Russell Doe   Will defer Cardiology consult to Dr. Russell Doe   Please contact [facility] with questions/concerns

## 2022-04-21 NOTE — CARE COORDINATION
Advance Care Planning     Advance Care Planning Clinical Specialist  Conversation Note      Date of ACP Conversation: 4/21/2022    Conversation Conducted with: Patient with Decision Making Capacity    ACP Clinical Specialist: Curt Langford RN        Healthcare Decision Maker:     Current Designated Healthcare Decision Maker:     Primary Decision Maker: Sandra Batres (POA) - Unknown - [phone]    Primary Decision Maker: Helena Mcneill (POA) - [place] - [phone]  Click here to complete Healthcare Decision Makers including section of the Healthcare Decision Maker Relationship (ie \"Primary\")  Today we documented Decision Maker(s). The patient will provide ACP documents.     Care Preferences    Has DNR-CCA document on file

## 2022-04-21 NOTE — ED PROVIDER NOTES
[address] [facility]     Emergency Department     Faculty Attestation    I performed a history and physical examination of the patient and discussed management with the resident. I reviewed the residents note and agree with the documented findings and plan of care. Any areas of disagreement are noted on the chart. I was personally present for the key portions of any procedures. I have documented in the chart those procedures where I was not present during the key portions. I have reviewed the emergency nurses triage note. I agree with the chief complaint, past medical history, past surgical history, allergies, medications, social and family history as documented unless otherwise noted below. For Physician Assistant/ Nurse Practitioner cases/documentation I have personally evaluated this patient and have completed at least one if not all key elements of the E/M (history, physical exam, and MDM). Additional findings are as noted. Patient with history of coronary artery disease with stent placement 2 days ago presents with increasing chest pain and shortness of breath. He says he was feeling okay after the stent placement but the chest pain and shortness of breath worsened a lot this morning. He denies fever or cough. He says he has had some nausea. On exam, patient is lying in the bed and appears ill. Lungs are clear to auscultation bilaterally. Abdomen is soft with mild diffuse tenderness. No rebound or guarding is present. There is mild tenderness to the bilateral calves without swelling, erythema, warmth. Will get EKG, chest x-ray, labs we will plan to speak with cardiology.     EKG Interpretation    Interpreted by emergency department physician    Rhythm: normal sinus   Rate: normal  Axis: left  Ectopy: none  Conduction: normal  ST Segments: nonspecific changes  T Waves: non specific changes  Q Waves: none    Clinical Impression: non-specific EKG    MD Era Jacobson, MD  Attending Emergency  Physician              Williams Bah MD  04/21/22 [id number]

## 2022-04-21 NOTE — PLAN OF CARE
Problem: Discharge Planning  Goal: Discharge to home or other facility with appropriate resources  Outcome: Not Progressing     Problem: Chronic Conditions and Co-morbidities  Goal: Patient's chronic conditions and co-morbidity symptoms are monitored and maintained or improved  Outcome: Not Progressing     Problem: Pain  Goal: Verbalizes/displays adequate comfort level or baseline comfort level  Outcome: Not Progressing     Problem: Skin/Tissue Integrity  Goal: Absence of new skin breakdown  Description: 1. Monitor for areas of redness and/or skin breakdown  2. Assess vascular access sites hourly  3. Every 4-6 hours minimum:  Change oxygen saturation probe site  4. Every 4-6 hours:  If on nasal continuous positive airway pressure, respiratory therapy assess nares and determine need for appliance change or resting period.   Outcome: Not Progressing     Problem: Safety - Adult  Goal: Free from fall injury  Outcome: Not Progressing     Problem: ABCDS Injury Assessment  Goal: Absence of physical injury  Outcome: Not Progressing     Admission assessment

## 2022-04-21 NOTE — ED PROVIDER NOTES
[facility]  ED  Emergency Department Encounter  EmergencyMedicine Resident     Pt Name:Remigio Mckee. MRN: [id number]  Birthdate 1950  Date of evaluation: 4/21/22  PCP:  Ernie Amezquita       Chief Complaint   Patient presents with    Chest Pain       HISTORY OF PRESENT ILLNESS  (Location/Symptom, Timing/Onset, Context/Setting, Quality, Duration, Modifying Factors, Severity.)      Hank Peterson is a 70 y.o. male who presents via EMS from [facility]. Patient was admitted to our hospital on 4/19 had cardiac stenting LAD had 80% occlusion stented reduced to 0% started on aspirin Plavix. Patient on prophylactic Lovenox. Was sent here due to having chest pain 8/10 sternal nonradiating nonexertional constant refractory to nitro. PAST MEDICAL / SURGICAL / SOCIAL / FAMILY HISTORY      has a past medical history of Depression, Diabetes mellitus ([address] Difficult intravenous access, Hyperlipidemia, Hypertension, Migraines, PTSD (post-traumatic stress disorder), Sleep apnea, Teeth missing, and Wears glasses. has a past surgical history that includes Cardiac catheterization (02/2010); Carpal tunnel release (Left, 01/09/2002); Vasectomy (12/1983); Tonsillectomy and adenoidectomy (1960); Hydrocele surgery (1951); Middle ear surgery (01/11/2018); eye surgery (Left, 2018); Mouth surgery (04/16/2018); other surgical history (04/19/2018); cervical fusion (04/19/2018); pr office/outpt visit,procedure only (N/A, 4/19/2018); Cataract removal; laminectomy (03/24/2022); and cervical fusion (N/A, 3/24/2022).     Social History     Socioeconomic History    Marital status:      Spouse name: Not on file    Number of children: Not on file    Years of education: Not on file    Highest education level: Not on file   Occupational History    Not on file   Tobacco Use    Smoking status: Former Smoker     Packs/day: 0.50     Years: 4.00     Pack years: 2.00     Types: Cigarettes Quit date: 1972     Years since quittin.0    Smokeless tobacco: Never Used    Tobacco comment: quit    Vaping Use    Vaping Use: Never used   Substance and Sexual Activity    Alcohol use: Yes     Comment: MIXED DRINKS- 3 OR 4 A YEAR; last 2018    Drug use: Yes     Types: Marijuana Abhi Bilberry)    Sexual activity: Yes     Partners: Female   Other Topics Concern    Not on file   Social History Narrative    Not on file     Social Determinants of Health     Financial Resource Strain:     Difficulty of Paying Living Expenses: Not on file   Food Insecurity:     Worried About Running Out of Food in the Last Year: Not on file    Judah of Food in the Last Year: Not on file   Transportation Needs:     Lack of Transportation (Medical): Not on file    Lack of Transportation (Non-Medical):  Not on file   Physical Activity:     Days of Exercise per Week: Not on file    Minutes of Exercise per Session: Not on file   Stress:     Feeling of Stress : Not on file   Social Connections:     Frequency of Communication with Friends and Family: Not on file    Frequency of Social Gatherings with Friends and Family: Not on file    Attends Restorationism Services: Not on file    Active Member of [address] [place] [address] [employer] or Organizations: Not on file    Attends Club or Organization Meetings: Not on file    Marital Status: Not on file   Intimate Partner Violence:     Fear of Current or Ex-Partner: Not on file    Emotionally Abused: Not on file    Physically Abused: Not on file    Sexually Abused: Not on file   Housing Stability:     Unable to Pay for Housing in the Last Year: Not on file    Number of [place] in the Last Year: Not on file    Unstable Housing in the Last Year: Not on file       Family History   Problem Relation Age of Onset    Dementia Mother     Coronary Art Dis Mother     Stroke Mother     Diabetes Mother     Asthma Mother     Other Mother         BRAIN ANEURISM    High Blood Pressure Mother     Reported on 3/21/2022    Historical Provider, MD   aspirin 81 MG chewable tablet Take 81 mg by mouth daily  Patient not taking: Reported on 3/21/2022    Historical Provider, MD   cetirizine (ZYRTEC) 10 MG tablet Take 10 mg by mouth daily LF 4/6/20 (90 day supply)  Patient not taking: Reported on 3/21/2022    Historical Provider, MD   atorvastatin (LIPITOR) 80 MG tablet Take 40 mg by mouth daily Take 1/2 tablet (40 mg) daily  LF 4/22/20 (90 day supply)  Patient not taking: Reported on 3/21/2022    Historical Provider, MD   omeprazole (PRILOSEC) 20 MG delayed release capsule Take 20 mg by mouth every evening LF 4/21/20 (90 day supply)    Historical Provider, MD       REVIEW OF SYSTEMS    (2-9 systems for level 4, 10 or more for level 5)      Review of Systems   Constitutional: Negative for fever. HENT: Negative for congestion. Eyes: Negative for photophobia. Respiratory: Negative for shortness of breath. Cardiovascular: Positive for chest pain. Gastrointestinal: Negative for abdominal pain and vomiting. Endocrine: Negative for polyuria. Genitourinary: Negative for dysuria. Musculoskeletal: Negative for arthralgias. Skin: Negative for color change. Allergic/Immunologic: Negative for immunocompromised state. Neurological: Negative for dizziness. Hematological: Does not bruise/bleed easily. Psychiatric/Behavioral: Negative for agitation. PHYSICAL EXAM   (up to 7 for level 4, 8 or more for level 5)      INITIAL VITALS:   BP (!) 181/101   Pulse 83   Temp 99 °F (37.2 °C) (Oral)   Resp 24   SpO2 98%     Physical Exam  Constitutional:       General: Not in acute distress. Appearance: Normal appearance. Normal weight. Not toxic-appearing. HENT:      Head: Normocephalic and atraumatic. Nose: Nose normal.      Mouth/Throat: Mucous membranes are moist.  Uvula midline no oropharyngeal edema. Pharynx: Oropharynx is clear.      Eyes:      Extraocular Movements: Extraocular movements intact. Conjunctiva/sclera: Conjunctivae normal.      Pupils: Pupils are equal, round, and reactive to light. Neck:      Musculoskeletal: Normal range of motion and neck supple. No neck rigidity. Cardiovascular:      Rate and Rhythm: Normal rate and regular rhythm. Pulses: Normal pulses. Heart sounds: Normal heart sounds. No murmur. Pulmonary:      Effort: Pulmonary effort is normal.      Breath sounds: Normal breath sounds. No wheezing. Abdominal:      General: Abdomen is flat. Bowel sounds are normal.      Tenderness: There is no abdominal tenderness. Musculoskeletal:     Normal range of motion. General: No swelling or tenderness. No LE edema    Skin:     General: Skin is warm. Capillary Refill: Capillary refill takes less than 2 seconds. Coloration: Skin is not jaundiced. Neurological:      General: No focal deficit present. Mental Status: Alert and oriented to person, place, and time. Mental status is at baseline. Motor: No weakness.        DIFFERENTIAL  DIAGNOSIS     PLAN (LABS / IMAGING / EKG):  Orders Placed This Encounter   Procedures    XR CHEST PORTABLE    CT CHEST PULMONARY EMBOLISM W CONTRAST    CBC with Auto Differential    Basic Metabolic Panel w/ Reflex to MG    Troponin    D-Dimer, Quantitative    Troponin    Lipase    Inpatient consult to Cardiology    Inpatient consult to Internal Medicine    EKG 12 Lead    ADMIT TO INPATIENT       MEDICATIONS ORDERED:  Orders Placed This Encounter   Medications    acetaminophen (TYLENOL) tablet 1,000 mg    iopamidol (ISOVUE-370) 76 % injection 75 mL           DIAGNOSTIC RESULTS / EMERGENCY DEPARTMENT COURSE / MDM     LABS:  Results for orders placed or performed during the hospital encounter of 04/21/22   CBC with Auto Differential   Result Value Ref Range    WBC 7.6 3.5 - 11.3 k/uL    RBC 3.54 (L) 4.21 - 5.77 m/uL    Hemoglobin 11.4 (L) 13.0 - 17.0 g/dL    Hematocrit 33.3 (L) 40.7 - 50.3 %    MCV 94.1 82.6 - 102.9 fL    MCH 32.2 25.2 - 33.5 pg    MCHC 34.2 28.4 - 34.8 g/dL    RDW 12.3 11.8 - 14.4 %    Platelets 154 669 - 321 k/uL    MPV 9.5 8.1 - 13.5 fL    NRBC Automated 0.0 0.0 per 100 WBC    Seg Neutrophils 67 (H) 36 - 65 %    Lymphocytes 19 (L) 24 - 43 %    Monocytes 11 3 - 12 %    Eosinophils % 1 1 - 4 %    Basophils 1 0 - 2 %    Immature Granulocytes 1 (H) 0 %    Segs Absolute 5.17 1.50 - 8.10 k/uL    Absolute Lymph # 1.40 1.10 - 3.70 k/uL    Absolute Mono # 0.80 0.10 - 1.20 k/uL    Absolute Eos # 0.08 0.00 - 0.44 k/uL    Basophils Absolute 0.05 0.00 - 0.20 k/uL    Absolute Immature Granulocyte 0.07 0.00 - 0.30 k/uL   Basic Metabolic Panel w/ Reflex to MG   Result Value Ref Range    Glucose 126 (H) 70 - 99 mg/dL    BUN 17 8 - 23 mg/dL    CREATININE 0.59 (L) 0.70 - 1.20 mg/dL    Calcium 9.0 8.6 - 10.4 mg/dL    Sodium 124 (L) 135 - 144 mmol/L    Potassium 4.2 3.7 - 5.3 mmol/L    Chloride 89 (L) 98 - 107 mmol/L    CO2 25 20 - 31 mmol/L    Anion Gap 10 9 - 17 mmol/L    GFR Non-African American >60 >60 mL/min    GFR African American >60 >60 mL/min    GFR Comment         Troponin   Result Value Ref Range    Troponin, High Sensitivity 90 (HH) 0 - 22 ng/L   D-Dimer, Quantitative   Result Value Ref Range    D-Dimer, Quant 1.04 mg/L FEU   Troponin   Result Value Ref Range    Troponin, High Sensitivity 93 (HH) 0 - 22 ng/L         RADIOLOGY:  XR CHEST PORTABLE    Result Date: 4/21/2022  EXAMINATION: ONE XRAY VIEW OF THE CHEST 4/21/2022 8:41 am COMPARISON: 03/29/2022 HISTORY: ORDERING SYSTEM PROVIDED HISTORY: chest pain TECHNOLOGIST PROVIDED HISTORY: chest pain Reason for Exam: upright port FINDINGS: Monitor leads overlie the chest.  Stable heart size with an ectatic thoracic aorta. No signs of pulmonary vascular congestion. Mild residual streaky bibasilar atelectasis/airspace disease, improved since the prior study and slightly greater on the left. No new focal lung consolidation is seen. Possible tiny left pleural effusion. Mild osteopenia with degenerative changes of the spine and shoulders and partially visualized fusion hardware cervical spine. Very mild residual bibasilar opacities. CT CHEST PULMONARY EMBOLISM W CONTRAST    Result Date: 4/21/2022  EXAMINATION: CTA OF THE CHEST 4/21/2022 9:47 am TECHNIQUE: CTA of the chest was performed after the administration of intravenous contrast.  Multiplanar reformatted images are provided for review. MIP images are provided for review. Dose modulation, iterative reconstruction, and/or weight based adjustment of the mA/kV was utilized to reduce the radiation dose to as low as reasonably achievable. COMPARISON: 10/13/2018 HISTORY: ORDERING SYSTEM PROVIDED HISTORY: chest pain sob elevated d dimer TECHNOLOGIST PROVIDED HISTORY: chest pain sob elevated d dimer Decision Support Exception - unselect if not a suspected or confirmed emergency medical condition->Emergency Medical Condition (MA) Reason for Exam: chest pain sob elevated d dimer FINDINGS: Pulmonary Arteries: Pulmonary arteries show no evidence of intraluminal filling defect to suggest pulmonary embolism. Main pulmonary artery is normal in caliber. Mediastinum: Mild cardiomegaly. No significant lymphadenopathy. Thyroid gland and esophagus grossly normal.  Normal caliber aorta. Lungs/pleura: Small bilateral pleural effusion left greater than right. Patchy bibasilar consolidations more pronounced on the left side representing atelectasis with or without component of pneumonia. No suspicious nodules or masses. No pneumothorax. Upper Abdomen: Partially imaged bilateral simple renal cysts 3.2 cm on the right and 3.3 cm on the left in the midportion. No follow-up recommended. Unexplained fat stranding and traces of fluid along inferior aspect of spleen. Dedicated CT abdomen pelvis with contrast recommended. Soft Tissues/Bones: No acute bony abnormality.      1. No evidence for acute pulmonary embolism. 2. Small bilateral pleural effusion left greater than right along with patchy bibasilar consolidations also more pronounced on the left. Compatible with atelectasis with or without component of pneumonia. 3. Unexplained trace of fluid and fat stranding along anterior inferior aspect of spleen. Dedicated CT abdomen pelvis with contrast recommended to exclude any significant pathology. EKG  EKG Interpretation    Interpreted by me    Rhythm: normal sinus   Rate: normal  Axis: normal  Ectopy: none  Conduction: normal  ST Segments: no acute change  T Waves: no acute change  Q Waves: none    Clinical Impression: no acute changes and normal EKG    All EKG's are interpreted by the Emergency Department Physician who either signs or Co-signs this chart in the absence of a cardiologist.    EMERGENCY DEPARTMENT COURSE:  Patient breathing quietly and unlabored on room air. Speech is normal and speaking in full sentences without requiring to pause to take a breath. Will do ACS work-up patient on aspirin prior to arrival, will give Tylenol. Will do D-dimer to rule out DVT because patient is immobilized due to illness. Troponin elevated at 90 creatinine normal baseline seems to be in the 70s will trend. Will consult cardiology. Cardiology will be at bedside rounds, trend troponins. Ordered dose of patient's home dose hydralazine and Plavix, patient has not received yet today because he came into the ED before morning med administration from his ECF. D-dimer elevated, will do PE rule out study. PE study is negative for PE however incidentally showing some fat stranding under the diaphragm. We will add on lipase. Patient hyponatremic 124 slightly below patient's baseline. Will admit to internal medicine    PROCEDURES:  None    CONSULTS:  IP CONSULT TO CARDIOLOGY  IP CONSULT TO INTERNAL MEDICINE    CRITICAL CARE:  None    FINAL IMPRESSION      1. Chest pain, unspecified type    2.  Hyponatremia DISPOSITION / PLAN     DISPOSITION Admitted 04/21/2022 10:00:54 AM      PATIENT REFERRED TO:  No follow-up provider specified.     DISCHARGE MEDICATIONS:  New Prescriptions    No medications on file       Michelle Blanca MD  Emergency Medicine Resident    (Please note that portions of thisnote were completed with a voice recognition program.  Efforts were made to edit the dictations but occasionally words are mis-transcribed.)       Michelle Blanca MD  Resident  04/21/22 [id number] Kerwin Cleveland MD  Resident  04/21/22 1025

## 2022-04-21 NOTE — ED TRIAGE NOTES
Pt arrives via EMS from rehab facility for chest pain following stent placement 3 days ago. Pt received Asprin and Nitroglycerin before arrival. Pt alert and oriented x4 and laying in bed. Pt on 4L O2, EKG done, IV in right hand placed by EMS before arrival, labs collected and sent to lab and placed on cardiac monitor.

## 2022-04-21 NOTE — PROGRESS NOTES
Presented to bedside to examine patient as he was reporting excruciating 10 out of 10 chest pain. EKG obtained and reviewed with Dr. Sidney Rao. Plan to start heparin drip and nitroglycerin drip titrate till chest pain-free. We will continue to trend troponins. Cath films reviewed with Dr. Sidney Rao at this time no need for urgent or immediate cath.   If continues to have chest pain please contact on-call cardiology fellow    Lincoln Caba MD  Cardiology fellow

## 2022-04-21 NOTE — ED NOTES
Pt back in room from [id number] [address] Cha, [facility]  04/21/22 1204

## 2022-04-21 NOTE — FLOWSHEET NOTE
Updated dr Gael Luong regarding continued chest pain and drop in BPs from nitro gtt. Chest pain actually increased after titrated to 15mcg. Family reports does not tolerate morphine either.  Order to stop nitro gtt and continue to trend troponin

## 2022-04-21 NOTE — ED NOTES
The following labs labeled with pt sticker and tubed to lab:     [x] Blue     [x] Lavender   [] on ice  [x] Green/yellow  [] Green/black [] on ice  [] Yellow  [] Red  [] Pink      [] COVID-19 swab    [] Rapid  [] PCR  [] Flu swab  [] Peds Viral Panel     [] Urine Sample  [] Pelvic Cultures  [] Blood Cultures          Alex Workman RN  04/21/22 [id number]

## 2022-04-22 ENCOUNTER — APPOINTMENT (OUTPATIENT)
Dept: ULTRASOUND IMAGING | Age: 72
DRG: 281 | End: 2022-04-22
Payer: OTHER GOVERNMENT

## 2022-04-22 LAB
ABSOLUTE EOS #: 0.05 K/UL (ref 0–0.44)
ABSOLUTE IMMATURE GRANULOCYTE: 0.05 K/UL (ref 0–0.3)
ABSOLUTE LYMPH #: 1.55 K/UL (ref 1.1–3.7)
ABSOLUTE MONO #: 0.73 K/UL (ref 0.1–1.2)
ANION GAP SERPL CALCULATED.3IONS-SCNC: 12 MMOL/L (ref 9–17)
BASOPHILS # BLD: 1 % (ref 0–2)
BASOPHILS ABSOLUTE: 0.06 K/UL (ref 0–0.2)
BUN BLDV-MCNC: 17 MG/DL (ref 8–23)
CALCIUM SERPL-MCNC: 9.4 MG/DL (ref 8.6–10.4)
CHLORIDE BLD-SCNC: 92 MMOL/L (ref 98–107)
CO2: 26 MMOL/L (ref 20–31)
CREAT SERPL-MCNC: 0.64 MG/DL (ref 0.7–1.2)
EKG ATRIAL RATE: 78 BPM
EKG P AXIS: 16 DEGREES
EKG P-R INTERVAL: 162 MS
EKG Q-T INTERVAL: 388 MS
EKG QRS DURATION: 94 MS
EKG QTC CALCULATION (BAZETT): 442 MS
EKG R AXIS: -17 DEGREES
EKG T AXIS: 31 DEGREES
EKG VENTRICULAR RATE: 78 BPM
EOSINOPHILS RELATIVE PERCENT: 1 % (ref 1–4)
GFR AFRICAN AMERICAN: >60 ML/MIN
GFR NON-AFRICAN AMERICAN: >60 ML/MIN
GFR SERPL CREATININE-BSD FRML MDRD: ABNORMAL ML/MIN/{1.73_M2}
GLUCOSE BLD-MCNC: 88 MG/DL (ref 70–99)
GLUCOSE BLD-MCNC: 93 MG/DL (ref 75–110)
HCT VFR BLD CALC: 32.2 % (ref 40.7–50.3)
HEMOGLOBIN: 11.5 G/DL (ref 13–17)
IMMATURE GRANULOCYTES: 1 %
LV EF: 56 %
LVEF MODALITY: NORMAL
LYMPHOCYTES # BLD: 25 % (ref 24–43)
MCH RBC QN AUTO: 33.1 PG (ref 25.2–33.5)
MCHC RBC AUTO-ENTMCNC: 35.7 G/DL (ref 28.4–34.8)
MCV RBC AUTO: 92.8 FL (ref 82.6–102.9)
MONOCYTES # BLD: 12 % (ref 3–12)
NRBC AUTOMATED: 0 PER 100 WBC
PARTIAL THROMBOPLASTIN TIME: 26.9 SEC (ref 20.5–30.5)
PARTIAL THROMBOPLASTIN TIME: 28.2 SEC (ref 20.5–30.5)
PARTIAL THROMBOPLASTIN TIME: 63.2 SEC (ref 20.5–30.5)
PDW BLD-RTO: 12.4 % (ref 11.8–14.4)
PLATELET # BLD: 250 K/UL (ref 138–453)
PMV BLD AUTO: 9.4 FL (ref 8.1–13.5)
POTASSIUM SERPL-SCNC: 4.3 MMOL/L (ref 3.7–5.3)
RBC # BLD: 3.47 M/UL (ref 4.21–5.77)
SEG NEUTROPHILS: 60 % (ref 36–65)
SEGMENTED NEUTROPHILS ABSOLUTE COUNT: 3.72 K/UL (ref 1.5–8.1)
SODIUM BLD-SCNC: 126 MMOL/L (ref 135–144)
SODIUM BLD-SCNC: 130 MMOL/L (ref 135–144)
SODIUM BLD-SCNC: 134 MMOL/L (ref 135–144)
TROPONIN, HIGH SENSITIVITY: 109 NG/L (ref 0–22)
TROPONIN, HIGH SENSITIVITY: 85 NG/L (ref 0–22)
TROPONIN, HIGH SENSITIVITY: 94 NG/L (ref 0–22)
TROPONIN, HIGH SENSITIVITY: 98 NG/L (ref 0–22)
WBC # BLD: 6.2 K/UL (ref 3.5–11.3)

## 2022-04-22 PROCEDURE — 2580000003 HC RX 258: Performed by: STUDENT IN AN ORGANIZED HEALTH CARE EDUCATION/TRAINING PROGRAM

## 2022-04-22 PROCEDURE — 94761 N-INVAS EAR/PLS OXIMETRY MLT: CPT

## 2022-04-22 PROCEDURE — 2709999900 HC NON-CHARGEABLE SUPPLY

## 2022-04-22 PROCEDURE — 2700000000 HC OXYGEN THERAPY PER DAY

## 2022-04-22 PROCEDURE — 93454 CORONARY ARTERY ANGIO S&I: CPT

## 2022-04-22 PROCEDURE — 6370000000 HC RX 637 (ALT 250 FOR IP): Performed by: STUDENT IN AN ORGANIZED HEALTH CARE EDUCATION/TRAINING PROGRAM

## 2022-04-22 PROCEDURE — 76705 ECHO EXAM OF ABDOMEN: CPT

## 2022-04-22 PROCEDURE — 6360000002 HC RX W HCPCS: Performed by: STUDENT IN AN ORGANIZED HEALTH CARE EDUCATION/TRAINING PROGRAM

## 2022-04-22 PROCEDURE — B2111ZZ FLUOROSCOPY OF MULTIPLE CORONARY ARTERIES USING LOW OSMOLAR CONTRAST: ICD-10-PCS | Performed by: INTERNAL MEDICINE

## 2022-04-22 PROCEDURE — 84484 ASSAY OF TROPONIN QUANT: CPT

## 2022-04-22 PROCEDURE — 4A023N7 MEASUREMENT OF CARDIAC SAMPLING AND PRESSURE, LEFT HEART, PERCUTANEOUS APPROACH: ICD-10-PCS | Performed by: INTERNAL MEDICINE

## 2022-04-22 PROCEDURE — 80048 BASIC METABOLIC PNL TOTAL CA: CPT

## 2022-04-22 PROCEDURE — 6360000004 HC RX CONTRAST MEDICATION

## 2022-04-22 PROCEDURE — 99232 SBSQ HOSP IP/OBS MODERATE 35: CPT | Performed by: INTERNAL MEDICINE

## 2022-04-22 PROCEDURE — 6360000002 HC RX W HCPCS

## 2022-04-22 PROCEDURE — C1894 INTRO/SHEATH, NON-LASER: HCPCS

## 2022-04-22 PROCEDURE — 1200000000 HC SEMI PRIVATE

## 2022-04-22 PROCEDURE — 84295 ASSAY OF SERUM SODIUM: CPT

## 2022-04-22 PROCEDURE — 6370000000 HC RX 637 (ALT 250 FOR IP)

## 2022-04-22 PROCEDURE — 85730 THROMBOPLASTIN TIME PARTIAL: CPT

## 2022-04-22 PROCEDURE — 2500000003 HC RX 250 WO HCPCS

## 2022-04-22 PROCEDURE — C1887 CATHETER, GUIDING: HCPCS

## 2022-04-22 PROCEDURE — 82947 ASSAY GLUCOSE BLOOD QUANT: CPT

## 2022-04-22 PROCEDURE — 85025 COMPLETE CBC W/AUTO DIFF WBC: CPT

## 2022-04-22 PROCEDURE — 36415 COLL VENOUS BLD VENIPUNCTURE: CPT

## 2022-04-22 PROCEDURE — 93306 TTE W/DOPPLER COMPLETE: CPT

## 2022-04-22 RX ORDER — SODIUM CHLORIDE 0.9 % (FLUSH) 0.9 %
5-40 SYRINGE (ML) INJECTION PRN
Status: DISCONTINUED | OUTPATIENT
Start: 2022-04-22 | End: 2022-04-25 | Stop reason: HOSPADM

## 2022-04-22 RX ORDER — VENLAFAXINE 75 MG/1
75 TABLET ORAL
Status: DISCONTINUED | OUTPATIENT
Start: 2022-04-22 | End: 2022-04-25 | Stop reason: HOSPADM

## 2022-04-22 RX ORDER — ACETAMINOPHEN 325 MG/1
650 TABLET ORAL EVERY 4 HOURS PRN
Status: DISCONTINUED | OUTPATIENT
Start: 2022-04-22 | End: 2022-04-23

## 2022-04-22 RX ORDER — COLCHICINE 0.6 MG/1
0.3 TABLET ORAL EVERY OTHER DAY
Status: DISCONTINUED | OUTPATIENT
Start: 2022-04-22 | End: 2022-04-25 | Stop reason: HOSPADM

## 2022-04-22 RX ORDER — BISACODYL 10 MG
10 SUPPOSITORY, RECTAL RECTAL DAILY PRN
Status: DISCONTINUED | OUTPATIENT
Start: 2022-04-22 | End: 2022-04-25 | Stop reason: HOSPADM

## 2022-04-22 RX ORDER — SENNA PLUS 8.6 MG/1
1 TABLET ORAL NIGHTLY
Status: DISCONTINUED | OUTPATIENT
Start: 2022-04-22 | End: 2022-04-25 | Stop reason: HOSPADM

## 2022-04-22 RX ORDER — SODIUM CHLORIDE 9 MG/ML
INJECTION, SOLUTION INTRAVENOUS PRN
Status: DISCONTINUED | OUTPATIENT
Start: 2022-04-22 | End: 2022-04-25 | Stop reason: HOSPADM

## 2022-04-22 RX ORDER — HYDRALAZINE HYDROCHLORIDE 20 MG/ML
10 INJECTION INTRAMUSCULAR; INTRAVENOUS EVERY 10 MIN PRN
Status: COMPLETED | OUTPATIENT
Start: 2022-04-22 | End: 2022-04-23

## 2022-04-22 RX ORDER — SODIUM CHLORIDE 0.9 % (FLUSH) 0.9 %
5-40 SYRINGE (ML) INJECTION EVERY 12 HOURS SCHEDULED
Status: DISCONTINUED | OUTPATIENT
Start: 2022-04-22 | End: 2022-04-25 | Stop reason: HOSPADM

## 2022-04-22 RX ORDER — KETOROLAC TROMETHAMINE 15 MG/ML
30 INJECTION, SOLUTION INTRAMUSCULAR; INTRAVENOUS ONCE
Status: COMPLETED | OUTPATIENT
Start: 2022-04-22 | End: 2022-04-22

## 2022-04-22 RX ORDER — FENTANYL CITRATE 50 UG/ML
25 INJECTION, SOLUTION INTRAMUSCULAR; INTRAVENOUS ONCE
Status: COMPLETED | OUTPATIENT
Start: 2022-04-22 | End: 2022-04-22

## 2022-04-22 RX ADMIN — HYDRALAZINE HYDROCHLORIDE 25 MG: 25 TABLET, FILM COATED ORAL at 09:54

## 2022-04-22 RX ADMIN — ASPIRIN 81 MG: 81 TABLET, CHEWABLE ORAL at 09:54

## 2022-04-22 RX ADMIN — FENTANYL CITRATE 25 MCG: 50 INJECTION, SOLUTION INTRAMUSCULAR; INTRAVENOUS at 12:57

## 2022-04-22 RX ADMIN — SODIUM CHLORIDE, PRESERVATIVE FREE 10 ML: 5 INJECTION INTRAVENOUS at 21:05

## 2022-04-22 RX ADMIN — SODIUM CHLORIDE, PRESERVATIVE FREE 10 ML: 5 INJECTION INTRAVENOUS at 09:55

## 2022-04-22 RX ADMIN — KETOROLAC TROMETHAMINE 30 MG: 15 INJECTION, SOLUTION INTRAMUSCULAR; INTRAVENOUS at 11:11

## 2022-04-22 RX ADMIN — MORPHINE SULFATE 1 MG: 2 INJECTION, SOLUTION INTRAMUSCULAR; INTRAVENOUS at 04:20

## 2022-04-22 RX ADMIN — HEPARIN SODIUM 3750 UNITS: 1000 INJECTION INTRAVENOUS; SUBCUTANEOUS at 04:21

## 2022-04-22 RX ADMIN — HYDRALAZINE HYDROCHLORIDE 25 MG: 25 TABLET, FILM COATED ORAL at 21:05

## 2022-04-22 RX ADMIN — BISACODYL 10 MG: 10 SUPPOSITORY RECTAL at 01:19

## 2022-04-22 RX ADMIN — SODIUM CHLORIDE, PRESERVATIVE FREE 5 ML: 5 INJECTION INTRAVENOUS at 21:06

## 2022-04-22 RX ADMIN — ACETAMINOPHEN 650 MG: 325 TABLET ORAL at 21:37

## 2022-04-22 RX ADMIN — DIVALPROEX SODIUM 500 MG: 500 TABLET, EXTENDED RELEASE ORAL at 21:05

## 2022-04-22 RX ADMIN — VENLAFAXINE 75 MG: 75 TABLET ORAL at 12:53

## 2022-04-22 RX ADMIN — COLCHICINE 0.3 MG: 0.6 TABLET, FILM COATED ORAL at 18:27

## 2022-04-22 RX ADMIN — VENLAFAXINE 75 MG: 75 TABLET ORAL at 18:19

## 2022-04-22 RX ADMIN — SENNOSIDES 8.6 MG: 8.6 TABLET, COATED ORAL at 21:05

## 2022-04-22 RX ADMIN — HEPARIN SODIUM 20 UNITS/KG/HR: 5000 INJECTION, SOLUTION INTRAVENOUS; SUBCUTANEOUS at 12:05

## 2022-04-22 RX ADMIN — HYDRALAZINE HYDROCHLORIDE 10 MG: 20 INJECTION INTRAMUSCULAR; INTRAVENOUS at 18:18

## 2022-04-22 RX ADMIN — CLOPIDOGREL 75 MG: 75 TABLET, FILM COATED ORAL at 09:54

## 2022-04-22 RX ADMIN — ONDANSETRON 4 MG: 4 TABLET, ORALLY DISINTEGRATING ORAL at 22:52

## 2022-04-22 RX ADMIN — DESMOPRESSIN ACETATE 40 MG: 0.2 TABLET ORAL at 09:54

## 2022-04-22 RX ADMIN — CARVEDILOL 6.25 MG: 6.25 TABLET, FILM COATED ORAL at 09:54

## 2022-04-22 RX ADMIN — CARVEDILOL 6.25 MG: 6.25 TABLET, FILM COATED ORAL at 21:05

## 2022-04-22 RX ADMIN — HEPARIN SODIUM 3750 UNITS: 1000 INJECTION INTRAVENOUS; SUBCUTANEOUS at 12:02

## 2022-04-22 ASSESSMENT — PAIN DESCRIPTION - LOCATION
LOCATION: CHEST

## 2022-04-22 ASSESSMENT — PAIN SCALES - GENERAL
PAINLEVEL_OUTOF10: 8
PAINLEVEL_OUTOF10: 9
PAINLEVEL_OUTOF10: 10
PAINLEVEL_OUTOF10: 4
PAINLEVEL_OUTOF10: 8
PAINLEVEL_OUTOF10: 6

## 2022-04-22 ASSESSMENT — PAIN DESCRIPTION - ORIENTATION
ORIENTATION: MID
ORIENTATION: LEFT

## 2022-04-22 ASSESSMENT — PAIN DESCRIPTION - DESCRIPTORS
DESCRIPTORS: ACHING;CRUSHING
DESCRIPTORS: ACHING
DESCRIPTORS: ACHING

## 2022-04-22 NOTE — CARE COORDINATION
Voicemail left for Jamin Hayden at [facility] requesting return call to provide update    [phone] received call from Jamin Hayden. Update given. They are trying to hold a bed but are able to accept him back at discharge.  She can be reached over weekend [phone] sent to medicine resident requesting PT and OT orders

## 2022-04-22 NOTE — PROGRESS NOTES
Comprehensive Nutrition Assessment    Type and Reason for Visit:  Initial,Positive Nutrition Screen (Weight Loss; Poor Intakes/Appetite)    Nutrition Recommendations/Plan:   1. Continue NPO diet. Monitor for start of oral diet and provide Ensure oral supplements with all meals. 2. Monitor labs, weights, and plan of care. Malnutrition Assessment:  Malnutrition Status: Moderate malnutrition  Context:  Acute Illness     Findings of the 6 clinical characteristics of malnutrition:  Energy Intake:  75% or less of estimated energy requirements for 7 or more days - Reported h/o decreased appetite and PO intakes. Weight Loss:  Greater than 5% over 1 month     Body Fat Loss:  Mild body fat loss Orbital   Muscle Mass Loss:  No significant muscle mass loss  Fluid Accumulation:  No significant fluid accumulation   Strength:  Not Performed    Nutrition Assessment:    Pt admitted with c/o chest pain and SOB s/p recent stenting. PMH includes: DM, HTN, HLD. Pt reports having a h/o decreased appetite during previous admission. Pt states his appetite has been \"okay\" recently. Pt also reports having difficulty swallowing foods at times. Reports he does like Ensure supplements and was drinking several daily. Noted pt currently NPO - will monitor plans for start of oral diet. Pt also reports weight loss - per chart review, 10% weight loss x past month. Labs reviewed: Na 130 mmol/L. Meds reviewed. Nutrition Related Findings:    Labs/Meds reviewed. Wound Type: None       Current Nutrition Intake & Therapies:    Average Meal Intake: NPO  Average Supplements Intake: NPO  Diet NPO    Anthropometric Measures:  Height: 5' 3\" (160 cm)  Ideal Body Weight (IBW): 124 lbs (56 kg)    Admission Body Weight: 137 lb 11.2 oz (62.5 kg)  Current Body Weight: 137 lb 11.2 oz (62.5 kg), 111 % IBW.  Weight Source: Bed Scale  Current BMI (kg/m2): 24.4  Usual Body Weight: 153 lb 6.4 oz (69.6 kg) (3/23/22 bed scale per chart review)  % Weight Change (Calculated): -10.2                    BMI Categories: Normal Weight (BMI 18.5-24. 9)    Estimated Daily Nutrient Needs:  Energy Requirements Based On: Kcal/kg  Weight Used for Energy Requirements: Current  Energy (kcal/day): 25-30 kcal/kg = 9979-0057 kcals/day  Weight Used for Protein Requirements: Ideal  Protein (g/day): 85 gm pro/day  Method Used for Fluid Requirements: ml/Kg  Fluid (ml/day): 25 mL/kg = 1600 mL/day or per MD    Nutrition Diagnosis:   · Inadequate oral intake related to  (decreased appetite; current condition) as evidenced by NPO or clear liquid status due to medical condition,poor intake prior to admission,weight loss greater than or equal to 5% in 1 month      Nutrition Interventions:   Food and/or Nutrient Delivery: Continue NPO (Monitor for start of oral diet and provide ONS with meals as able.)  Nutrition Education/Counseling: No recommendation at this time  Coordination of Nutrition Care: Continue to monitor while inpatient       Goals:  Previous Goal Met:  (Goal Set)  Goals: Initiate PO diet,within 2 days       Nutrition Monitoring and Evaluation:   Behavioral-Environmental Outcomes: None Identified  Food/Nutrient Intake Outcomes: Diet Advancement/Tolerance  Physical Signs/Symptoms Outcomes: Biochemical Data,GI Status,Fluid Status or Edema,Hemodynamic Status,Nutrition Focused Physical Findings,Skin,Weight    Discharge Planning:     Too soon to determine     Deniz Treviño, [address] [place] [address],   Contact: [phone]

## 2022-04-22 NOTE — CONSULTS
[facility]    Inpatient Consultation Note               Today's Date: 4/22/2022  Patient Name: Tamiko Mcclure. Date of admission: 4/21/2022  7:35 AM  Patient's age: 70 y.o., 1950  Admission Dx: Hyponatremia [E87.1]  NSTEMI (non-ST elevated myocardial infarction) ([facility] [address] [I21.4]  Chest pain, unspecified type [R07.9]    Reason for  Consult:  NSTEMI    Requesting Physician: Gabriella Evans MD    CHIEF COMPLAINT:     Chief Complaint   Patient presents with    Chest Pain       History Obtained From:  patient, electronic medical record    HISTORY OF PRESENT ILLNESS:    70year old male presented to the ED with chest pain. Reports its a pressure like sensation which slightly subsided with 2 sublingual nitroglycerins however has been persistent. States its the same pain that brought him to the hospital last week when he required a stent to his LAD. Was noted to have up trending troponin. Cardiology consulted in setting of cehst pain with up trending troponin and recent PCI. Was started on heparin gtt and nitroglycerin gtt however stated his chest pain worsened due to the nitroglycerin and bp dropped so nitroglycerin was discontinued. Currently states his chest pain is 8/10. No other complaints. Past Medical History:   has a past medical history of Depression, Diabetes mellitus ([facility] [address] Difficult intravenous access, Hyperlipidemia, Hypertension, Migraines, PTSD (post-traumatic stress disorder), Sleep apnea, Teeth missing, and Wears glasses. Past Surgical History:   has a past surgical history that includes Cardiac catheterization (02/2010); Carpal tunnel release (Left, 01/09/2002); Vasectomy (12/1983); Tonsillectomy and adenoidectomy (1960); Hydrocele surgery (1951); Middle ear surgery (01/11/2018); eye surgery (Left, 2018); Mouth surgery (04/16/2018); other surgical history (04/19/2018); cervical fusion (04/19/2018); pr office/outpt visit,procedure only (N/A, 4/19/2018);  Cataract removal; laminectomy (03/24/2022); and cervical fusion (N/A, 3/24/2022). Home Medications:    Prior to Admission medications    Medication Sig Start Date End Date Taking? Authorizing Provider   clopidogrel (PLAVIX) 75 MG tablet Take 1 tablet by mouth daily 4/19/22   Suri Lee MD   nitroGLYCERIN (NITROSTAT) 0.4 MG SL tablet up to max of 3 total doses. If no relief after 1 dose, call 911. 4/18/22   Vidal Zepeda MD   divalproex (DEPAKOTE ER) 500 MG extended release tablet Take 1 tablet by mouth at bedtime 4/18/22   Vidal Zepeda MD   venlafaxine (EFFEXOR) 75 MG tablet Take 1 tablet by mouth 3 times daily (with meals) 4/18/22   Vidal Zepeda MD   hydrALAZINE (APRESOLINE) 25 MG tablet Take 1 tablet by mouth three times daily 4/18/22   Vidal Zepeda MD   carvedilol (COREG) 6.25 MG tablet Take 1 tablet by mouth 2 times daily 4/18/22   Vidal Zepeda MD   finasteride (PROSCAR) 5 MG tablet Take 5 mg by mouth daily    Historical Provider, MD   carboxymethylcellulose 1 % ophthalmic solution Place 2 drops into both eyes 3 times daily Pt states \"2 drops in both eyes three times a day\"    Historical Provider, MD   simethicone (MYLICON) 80 MG chewable tablet Take 1 tablet by mouth every 6 hours as needed for Flatulence  Patient taking differently: Take 80 mg by mouth every 8 hours as needed for Flatulence  6/22/20   Wm Mcghee MD   lidocaine (XYLOCAINE) 5 % ointment Apply topically daily as needed for Pain Apply topically as needed.   LF 4/20/20 (30 day supply)  Patient not taking: Reported on 3/23/2022    Historical Provider, MD   metFORMIN (GLUCOPHAGE) 1000 MG tablet Take 1,000 mg by mouth 2 times daily (with meals) LF 4/27/20 (90 day supply)  Patient not taking: Reported on 3/21/2022    Historical Provider, MD   aspirin 81 MG chewable tablet Take 81 mg by mouth daily  Patient not taking: Reported on 3/21/2022    Historical Provider, MD   cetirizine (ZYRTEC) 10 MG tablet Take 10 mg by mouth daily LF 4/6/20 (90 day supply)  Patient not taking: Reported on 3/21/2022    Historical Provider, MD   atorvastatin (LIPITOR) 80 MG tablet Take 40 mg by mouth daily Take 1/2 tablet (40 mg) daily  LF 4/22/20 (90 day supply)  Patient not taking: Reported on 3/21/2022    Historical Provider, MD   omeprazole (PRILOSEC) 20 MG delayed release capsule Take 20 mg by mouth every evening LF 4/21/20 (90 day supply)    Historical Provider, MD        Current Facility-Administered Medications: bisacodyl (DULCOLAX) suppository 10 mg, 10 mg, Rectal, Daily PRN  senna (SENOKOT) tablet 8.6 mg, 1 tablet, Oral, Nightly  aspirin chewable tablet 81 mg, 81 mg, Oral, Daily  atorvastatin (LIPITOR) tablet 40 mg, 40 mg, Oral, Daily  carvedilol (COREG) tablet 6.25 mg, 6.25 mg, Oral, BID  clopidogrel (PLAVIX) tablet 75 mg, 75 mg, Oral, Daily  divalproex (DEPAKOTE ER) extended release tablet 500 mg, 500 mg, Oral, Nightly  hydrALAZINE (APRESOLINE) tablet 25 mg, 25 mg, Oral, TID  sodium chloride flush 0.9 % injection 5-40 mL, 5-40 mL, IntraVENous, 2 times per day  sodium chloride flush 0.9 % injection 5-40 mL, 5-40 mL, IntraVENous, PRN  0.9 % sodium chloride infusion, , IntraVENous, PRN  ondansetron (ZOFRAN-ODT) disintegrating tablet 4 mg, 4 mg, Oral, Q8H PRN **OR** ondansetron (ZOFRAN) injection 4 mg, 4 mg, IntraVENous, Q6H PRN  polyethylene glycol (GLYCOLAX) packet 17 g, 17 g, Oral, Daily PRN  acetaminophen (TYLENOL) tablet 650 mg, 650 mg, Oral, Q6H PRN **OR** acetaminophen (TYLENOL) suppository 650 mg, 650 mg, Rectal, Q6H PRN  insulin lispro (HUMALOG) injection vial 0-18 Units, 0-18 Units, SubCUTAneous, TID WC  insulin lispro (HUMALOG) injection vial 0-9 Units, 0-9 Units, SubCUTAneous, Nightly  glucose (GLUTOSE) 40 % oral gel 15 g, 15 g, Oral, PRN  dextrose 50 % IV solution, 12.5 g, IntraVENous, PRN  glucagon (rDNA) injection 1 mg, 1 mg, IntraMUSCular, PRN  dextrose 5 % solution, 100 mL/hr, IntraVENous, PRN  fentaNYL (SUBLIMAZE) injection 25 mcg, 25 mcg, IntraVENous, Q6H PRN  heparin (porcine) injection 3,750 Units, 60 Units/kg, IntraVENous, PRN  heparin (porcine) injection 1,880 Units, 30 Units/kg, IntraVENous, PRN  heparin 25,000 units in 0.9% sodium chloride 250 mL infusion, 5-30 Units/kg/hr, IntraVENous, Continuous  morphine (PF) injection 1 mg, 1 mg, IntraVENous, Q4H PRN    Allergies:  Latex, Bee venom, Lisinopril, Niacin and related, Sulfa antibiotics, and Terazosin    Social History:   reports that he quit smoking about 50 years ago. His smoking use included cigarettes. He has a 2.00 pack-year smoking history. He has never used smokeless tobacco. He reports current alcohol use. He reports current drug use. Drug: Marijuana Myrtis Regulus). Family History: family history includes Asthma in his mother; Coronary Art Dis in his mother; Dementia in his mother; Diabetes in his brother, mother, sister, and sister; Heart Disease in his brother and father; High Blood Pressure in his brother and mother; High Cholesterol in his brother; Other in his mother and sister; Stroke in his mother. REVIEW OF SYSTEMS:      · Constitutional: there has been no unanticipated weight loss. · Eyes: No visual changes or diplopia. · ENT: No Headaches  · Cardiovascular:  Remaining as above  · Respiratory: No cough  · Gastrointestinal: No abdominal pain. No change in bowel or bladder habits. · Genitourinary: No dysuria, trouble voiding, or hematuria. · Musculoskeletal: No joint complaints.   · Neurological: No headache  · Hematologic/Lymphatic: No abnormal bruising or bleeding      PHYSICAL EXAM:      BP (!) 142/79   Pulse 71   Temp 99 °F (37.2 °C) (Oral)   Resp 16   Ht 5' 3\" (1.6 m)   Wt 137 lb 11.2 oz (62.5 kg)   SpO2 98%   BMI 24.39 kg/m²      Intake/Output Summary (Last 24 hours) at 4/22/2022 0927  Last data filed at 4/22/2022 0422  Gross per 24 hour   Intake 71.6 ml   Output 450 ml   Net -378.4 ml         Constitutional and General Appearance:    Alert, cooperative, no distress and appears stated age  Respiratory:  · No for increased work of breathing. · On auscultation: clear to auscultation bilaterally  Cardiovascular:  · Regular S1 and S2.   · No murmurs  Abdomen:   · No masses or tenderness  · Bowel sounds present  Extremities:  ·  No Cyanosis or Clubbing  ·  Lower extremity edema: No  Neurological:  · Alert and oriented. · Moves all extremities well    DATA:    Diagnostics:    EKG: normal sinus rhythm.  Voltage criteria for LVH  ECHO: reviewed. Ejection fraction: 55%, mild to moderate LVH. Cardiac Angiography: reviewed. LAD proximal more than 55%, mild irregularities RCA and circumflex, normally cardiac cath 4/19/[address]22 [address]:     CBC:   Recent Labs     04/21/22  1614 04/22/22  0531   WBC 6.2 6.2   HGB 11.4* 11.5*   HCT 32.0* 32.2*    250     BMP:   Recent Labs     04/21/22  0826 04/21/22  0826 04/22/22  0000 04/22/22  0531   *   < > 126* 130*   K 4.2  --   --  4.3   CO2 25  --   --  26   BUN 17  --   --  17   CREATININE 0.59*  --   --  0.64*   LABGLOM >60  --   --  >60   GLUCOSE 126*  --   --  88    < > = values in this interval not displayed. Pro-BNP:  No results for input(s): PROBNP in the last 72 hours. BNP: No results for input(s): BNP in the last 72 hours. PT/INR: No results for input(s): PROTIME, INR in the last 72 hours. APTT:  Recent Labs     04/22/22  0110 04/22/22  0531   APTT 26.9 63.2*     CARDIAC ENZYMES:No results for input(s): CKTOTAL, CKMB, CKMBINDEX, TROPONINI in the last 72 hours. Invalid input(s):  TROPONINT  No results for input(s): TROPONINT in the last 72 hours.     FASTING LIPID PANEL:  Lab Results   Component Value Date    HDL 42 04/12/2018    TRIG 92 04/12/2018     LIVER PROFILE:  Recent Labs     04/20/22  0628   AST 18   ALT 26   LABALBU 3.9         Patient's Active Problem List  Principal Problem:    NSTEMI (non-ST elevated myocardial infarction) [facility])  Active Problems:    Hypertension Hyperlipidemia    Diabetes mellitus ([facility] [address]    Depression with suicidal ideation    Chest pain  Resolved Problems:    * No resolved hospital problems. *        IMPRESSION:    1. NSTEMI  2. Recent cardiac cath 4/19/22 s/p PCI to proximal LAD  3. HLD  4. HTN  5. Preserved EF with mild to moderate LVH    RECOMMENDATIONS:  1. Continue heparin gtt  2. Continue ASA and plavix  3. Continue lipitor  4. Continue coreg 6.25 mg BID  5. Continue home dose of hydralazine 25 mg TID  6. STAT 2D ECHO ordered. Follow up on final read. 7. Gave 1 dose of toradol 30 mg IV. 8. Will discuss with interventional cardiology regarding possible coronary angiography for further evaluation. 9. Keep NPO. Will consider further ischemic work up  10. K>4, Mg>2    Thank you for allowing us to participate in the care of Newruth Fears. . If you have any questions or concerns, please do not hesitate to contact us. Discussed with patient and Nurse. Roman Alfaro MD  Fellow, [id number] [facility]        Please note that part of this chart were generated using voice recognition  dictation software. Although every effort was made to ensure the accuracy of this automated transcription, some errors in transcription may have occurred. Attestation signed by      Attending Physician Statement:    I have discussed the care of  Newruth Fears. , including pertinent history and exam findings, with the Cardiology fellow/resident. I have seen and examined the patient and the key elements of all parts of the encounter have been performed by me. I agree with the assessment, plan and orders as documented by the fellow/resident, after I modified exam findings and plan of treatments, and the final version is my approved version of the assessment.      Additional Comments:   CP concerning for unstable angina  Just had PCI  Elevated troponin- also concerning  - Stat Echo prelim- no wall motion abnormality, no effusion  - We tried toradol  - we tried fentanyl  - continues to describe significant CP similar to CP he came in for for his PCI  - will plan for cardiac cath to further risk stratify    Chandu Whaley DO, [address], [address], [address], [facility] AT [facility]  [email or web address]  [phone]

## 2022-04-22 NOTE — PROGRESS NOTES
[facility]   Progress Note                   Date:   4/22/2022  Patient name: Greer Pinedo. Date of admission:  4/21/2022  7:35 AM  MRN:   [id number]  YOB: 1950  PCP: Fernadno Mock    Reason for Admission: Hyponatremia [E87.1]  NSTEMI (non-ST elevated myocardial infarction) [facility]) [I21.4]  Chest pain, unspecified type [R07.9]    Subjective:       Clinical Changes / Abnormalities: Pt seen and examiend in the room. Pt denies any sob but states that he continues to have midsternal chest pain despite repeat cath showing nonobstructive CAD. Medications:   Scheduled Meds:   senna  1 tablet Oral Nightly    venlafaxine  75 mg Oral TID WC    aspirin  81 mg Oral Daily    atorvastatin  40 mg Oral Daily    carvedilol  6.25 mg Oral BID    clopidogrel  75 mg Oral Daily    divalproex  500 mg Oral Nightly    hydrALAZINE  25 mg Oral TID    sodium chloride flush  5-40 mL IntraVENous 2 times per day    insulin lispro  0-18 Units SubCUTAneous TID WC    insulin lispro  0-9 Units SubCUTAneous Nightly     Continuous Infusions:   sodium chloride      dextrose      heparin (PORCINE) Infusion 20 Units/kg/hr (04/22/22 1205)     CBC:   Recent Labs     04/21/22  0826 04/21/22  1614 04/22/22  0531   WBC 7.6 6.2 6.2   HGB 11.4* 11.4* 11.5*    250 250     BMP:    Recent Labs     04/20/22  0628 04/20/22  0628 04/21/22  0826 04/21/22  0826 04/22/22  0000 04/22/22  0531 04/22/22  1105   *   < > 124*   < > 126* 130* 134*   K 4.5  --  4.2  --   --  4.3  --    CL 91*  --  89*  --   --  92*  --    CO2 29  --  25  --   --  26  --    BUN 24*  --  17  --   --  17  --    CREATININE 0.73  --  0.59*  --   --  0.64*  --    GLUCOSE 98  --  126*  --   --  88  --     < > = values in this interval not displayed.      Hepatic:   Recent Labs     04/20/22  0628   AST 18   ALT 26   BILITOT 0.31   ALKPHOS 140*     Troponin:   Recent Labs     04/22/22  0020 04/22/22  0531 04/22/22  1105   TROPHS 94* 109* 98*     BNP: No results for input(s): BNP in the last 72 hours. Lipids: No results for input(s): CHOL, HDL in the last 72 hours. Invalid input(s): LDLCALCU  INR: No results for input(s): INR in the last 72 hours. ECHO 4/22/22    Summary   Left ventricle is normal in size. Global left ventricular systolic function   is normal. Calculated ejection fraction 56% by Lester's method. Mild to moderate concentric left ventricular hypertrophy. Aortic valve is trileaflet and opens adequately. Mild aortic insufficiency. CARDIAC CATH 4/22/22    Findings:     LMCA: Normal 0% stenosis. LAD: Mild irregularities 10-20%. Patent stent in mid segment     LCx: Large with mid aneurysmal segment and 30-40% stenosis   Om3 is large, patent, 10-20% stenosis     RCA: Mild irregularities 30-40%. RPDA is large, 20-30% diffuse stenosis         Conclusions:      1. Widely patent stent in mid LAD    2. Mild Nonobstructive disease otherwise         Recommendation:      Routine Post Diagnostic Cath orders.    Medical therapy as needed. Start colchicine.    Risk factor modification. DATA:    Diagnostics:    EKG: normal sinus rhythm.  Voltage criteria for LVH  ECHO: reviewed. Ejection fraction: 55%, mild to moderate LVH. Cardiac Angiography: reviewed.  LAD proximal more than 55%, mild irregularities RCA and circumflex, normally cardiac cath [facility]/19/2022 at [facility]  Objective:   Vitals: /72   Pulse 74   Temp 99.2 °F (37.3 °C) (Oral)   Resp 19   Ht 5' 3\" (1.6 m)   Wt 137 lb 11.2 oz (62.5 kg)   SpO2 98%   BMI 24.39 kg/m²   General appearance: alert and cooperative with exam  HEENT: Head: Normocephalic, no lesions, without obvious abnormality.   Neck: no JVD, trachea midline, no adenopathy  Lungs: Clear to auscultation  Heart: Regular rate and rhythm, s1/s2 auscultated, no murmurs  Abdomen: soft, non-tender, bowel sounds active  Extremities: no edema  Neurologic: not done        Assessment / Acute Cardiac Problems:   1. NSTEMI  2. Recent cardiac cath 4/19/22 s/p PCI to proximal LAD  3. HLD  4. HTN  5. 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)     Cervical spondylosis with radiculopathy     Acute blood loss anemia     Precordial pain     Depression with suicidal ideation     Severe recurrent major depression without psychotic features ([facility] [address]     Frequent falls     Hyponatremia     Cervical spinal cord compression (HCC)     Cord compression (HCC)     Quadriplegia, C1-C4 incomplete (HCC)     Encephalopathy     Hospital-acquired pneumonia     Atelectasis     Cervical stenosis of spine     Moderate malnutrition (HCC)     Chest pain     Delirium due to multiple etiologies     NSTEMI (non-ST elevated myocardial infarction) ([facility] [address]      Plan of Treatment:   1. NSTEMI s/p repeat Cardiac cath. Remains nonobstructive CAD. Continue ASA, statin, coreg, plavix. Will add imdur. 2. ECHO reviewed   3. Will add PPI to rule out GERD  4. No objection to discharge from cardio standpoint.   Will follow up in 2 weeks     Electronically signed by KASSIDY Anne CNP on 4/22/2022 at 3:17 PM  [address]  [phone]

## 2022-04-22 NOTE — FLOWSHEET NOTE
Assessment: Patient is a 70 y.o. male who was in CT Scan, when  was rounding on unit. Patient's spouse was pacing the unit and  introduced herself to her in patient's room. Intervention:  visited patient per initial rounding visits.  met with patient's spouse, who was sitting in recliner at time of visit. Patient's spouse shared about patient's initial \"neck surgery,\" a few years ago, a second follow-up surgery in March, and also a heart catheterization recently, due to complaints of chest pain. Patient's spouse expressed feelings of overwhelm regarding patient's diagnoses, lack of mobility, and chest pain. Patient's spouse expressed feelings of hopefulness regarding patient's CT Scan, indicating that reasons for his symptoms may be discovered. Patient's spouse was open and receptive to 's visit.  provided an active listening presence, hospitality and support to family. Patient's family thanked  for care and support. Outcome: Patient's family member was receptive of 's visit and support. Plan: Chaplains can make follow-up visit, per request. Thomas Brenner can be reached 24/7 via [employer]. Lauren Miller     04/21/22 5396   Encounter Summary   Encounter Overview/Reason  Initial Encounter   Service Provided For: Family   Referral/Consult From: [facility]   Support System Spouse   Last Encounter    (4/20/2022)   Complexity of Encounter Low   Begin Time 2349   End Time  0012   Total Time Calculated 23 min   Encounter    Type Initial Screen/Assessment   Spiritual/Emotional needs   Type Spiritual Support   Assessment/Intervention/Outcome   Assessment Anxious; Fearful;Powerlessness  (Overwhelm)   Intervention Active listening;Discussed illness injury and its impact; Explored/Affirmed feelings, thoughts, concerns;Nurtured Hope;Sustaining Presence/Ministry of presence   Outcome Acceptance;Comfort;Coping;Encouraged;Receptive   Plan and Referrals

## 2022-04-22 NOTE — PROGRESS NOTES
[facility]  Internal Medicine Teaching Residency Program  Inpatient Daily Progress Note  ______________________________________________________________________________    Patient: Jimena Clement. YOB: 1950   DTL:[id number]    Acct: [de-identified]     Room: 2006/2006-01  Admit date: 4/21/2022  Today's date: 04/22/22  Number of days in the hospital: 1    SUBJECTIVE   Admitting Diagnosis: NSTEMI (non-ST elevated myocardial infarction) ([facility] [address]  CC: chest pain   Continues to have chest pain. Patient is currently on heparin and taken off nitro gtt. troponin are uptrending. Will reach out to cardio for possible stent  ROS:  Constitutional:  negative for chills, fevers, sweats  Respiratory:  negative for cough, dyspnea on exertion, hemoptysis, shortness of breath, wheezing  Cardiovascular:  negative for chest pain, chest pressure/discomfort, lower extremity edema, palpitations  Gastrointestinal:  negative for abdominal pain, constipation, diarrhea, nausea, vomiting  Neurological:  negative for dizziness, headache    BRIEF HISTORY     The patient is a pleasant 70 y.o. male pmh T2DM, HTN, HLD, GIA admitted from [facility],   presents with a chief complaint of chest pain. Had recent stenting on 4/19 with SHY placed in LAD. Last echo with normal EF. Patient reports pain is located in his sternum and radiates to his left shoulder he also reports SOB. Currently on 4L NC.  troponins are elevated at 90, preivous baseline around 70. Sodium is slightly lower than baseline at around 124. The hyponatremia appears chronic  Patient currently still having chest pain. Pain is constant and denies any association with exertion. pain is worsen upon chest wall palpation. Patient denies any recent trauma to the area. Patient reports that he has been taking medications since his DC.  Denies any smoking, alcohol or illegal drugs     OBJECTIVE     Vital Signs:  BP (!) 142/79   Pulse 71   Temp 99 °F (37.2 °C) (Oral)   Resp 16   Ht 5' 3\" (1.6 m)   Wt 137 lb 11.2 oz (62.5 kg)   SpO2 98%   BMI 24.39 kg/m²     Temp (24hrs), Av.6 °F (37 °C), Min:98 °F (36.7 °C), Max:99 °F (37.2 °C)    In: 71.6   Out: 450 [Urine:450]    Physical Exam:  Physical Exam  Constitutional:       General: He is not in acute distress. Appearance: Normal appearance. He is normal weight. He is ill-appearing. He is not toxic-appearing or diaphoretic. HENT:      Mouth/Throat:      Mouth: Mucous membranes are moist.      Pharynx: Oropharynx is clear. Eyes:      General: No scleral icterus. Cardiovascular:      Rate and Rhythm: Normal rate and regular rhythm. Pulses: Normal pulses. Heart sounds: Normal heart sounds. No murmur heard. No friction rub. No gallop. Pulmonary:      Effort: Respiratory distress present. Breath sounds: Normal breath sounds. Abdominal:      General: Abdomen is flat. Bowel sounds are normal. There is no distension. Palpations: Abdomen is soft. There is no mass. Tenderness: There is no abdominal tenderness. There is no guarding or rebound. Hernia: No hernia is present. Musculoskeletal:         General: No swelling, tenderness, deformity or signs of injury. Right lower leg: No edema. Left lower leg: No edema. Skin:     General: Skin is warm and dry. Coloration: Skin is not jaundiced or pale. Findings: No bruising. Neurological:      General: No focal deficit present. Mental Status: He is alert and oriented to person, place, and time. Cranial Nerves: No cranial nerve deficit. Motor: No weakness. Psychiatric:         Mood and Affect: Mood normal.         Behavior: Behavior normal.         Thought Content:  Thought content normal.         Judgment: Judgment normal.           Medications:  Scheduled Medications:    senna  1 tablet Oral Nightly    aspirin  81 mg Oral Daily    atorvastatin  40 mg Oral Daily    carvedilol  6.25 mg Oral BID    clopidogrel  75 mg Oral Daily    divalproex  500 mg Oral Nightly    hydrALAZINE  25 mg Oral TID    sodium chloride flush  5-40 mL IntraVENous 2 times per day    insulin lispro  0-18 Units SubCUTAneous TID WC    insulin lispro  0-9 Units SubCUTAneous Nightly     Continuous Infusions:    sodium chloride      dextrose      heparin (PORCINE) Infusion 16 Units/kg/hr (04/22/22 0422)     PRN Medicationsbisacodyl, 10 mg, Daily PRN  sodium chloride flush, 5-40 mL, PRN  sodium chloride, , PRN  ondansetron, 4 mg, Q8H PRN   Or  ondansetron, 4 mg, Q6H PRN  polyethylene glycol, 17 g, Daily PRN  acetaminophen, 650 mg, Q6H PRN   Or  acetaminophen, 650 mg, Q6H PRN  glucose, 15 g, PRN  dextrose, 12.5 g, PRN  glucagon (rDNA), 1 mg, PRN  dextrose, 100 mL/hr, PRN  fentanNYL, 25 mcg, Q6H PRN  heparin (porcine), 60 Units/kg, PRN  heparin (porcine), 30 Units/kg, PRN  morphine, 1 mg, Q4H PRN        Diagnostic Labs:  CBC:   Recent Labs     04/21/22  0826 04/21/22  1614 04/22/22  0531   WBC 7.6 6.2 6.2   RBC 3.54* 3.57* 3.47*   HGB 11.4* 11.4* 11.5*   HCT 33.3* 32.0* 32.2*   MCV 94.1 89.6 92.8   RDW 12.3 12.3 12.4    250 250     BMP:   Recent Labs     04/20/22  0628 04/20/22  0628 04/21/22  0826 04/22/22  0000 04/22/22  0531   *   < > 124* 126* 130*   K 4.5  --  4.2  --  4.3   CL 91*  --  89*  --  92*   CO2 29  --  25  --  26   BUN 24*  --  17  --  17   CREATININE 0.73  --  0.59*  --  0.64*    < > = values in this interval not displayed. BNP: No results for input(s): BNP in the last 72 hours. PT/INR: No results for input(s): PROTIME, INR in the last 72 hours. APTT:   Recent Labs     04/21/22  1614 04/22/22  0110 04/22/22  0531   APTT 25.2 26.9 63.2*     CARDIAC ENZYMES: No results for input(s): CKMB, CKMBINDEX, TROPONINI in the last 72 hours.     Invalid input(s): CKTOTAL;3  FASTING LIPID PANEL:  Lab Results   Component Value Date    CHOL 104 04/12/2018    HDL 42 04/12/2018    TRIG 92 04/12/2018     LIVER PROFILE:   Recent Labs     04/20/22  0628   AST 18   ALT 26   BILITOT 0.31   ALKPHOS 140*      MICROBIOLOGY:   Lab Results   Component Value Date/Time    CULTURE NO GROWTH 03/28/2022 06:06 PM       Imaging:    CT ABDOMEN PELVIS W IV CONTRAST Additional Contrast? Radiologist Recommendation    Result Date: 4/22/2022  1. No acute findings in the abdomen or pelvis. 2. High attenuation material in the gallbladder lumen which may be related to stones, sludge, versus vicarious excretion of contrast. 3. Cardiomegaly with small pericardial effusion. Trace bilateral pleural effusions with associated areas of atelectasis in the lung bases, left side greater than right. 4. Mildly enlarged prostate gland. US GALLBLADDER RUQ    Result Date: 4/22/2022  Small amount of sludge and/or gravel within the gallbladder lumen, which has the appearance of small stones on recent CT imaging. Otherwise unremarkable ultrasound of the right upper quadrant of the abdomen. XR CHEST PORTABLE    Result Date: 4/21/2022  Very mild residual bibasilar opacities. CT CHEST PULMONARY EMBOLISM W CONTRAST    Result Date: 4/21/2022  1. No evidence for acute pulmonary embolism. 2. Small bilateral pleural effusion left greater than right along with patchy bibasilar consolidations also more pronounced on the left. Compatible with atelectasis with or without component of pneumonia. 3. Unexplained trace of fluid and fat stranding along anterior inferior aspect of spleen. Dedicated CT abdomen pelvis with contrast recommended to exclude any significant pathology. ASSESSMENT & PLAN     Assessment and Plan:    Principal Problem:    NSTEMI (non-ST elevated myocardial infarction) ([address]  Active Problems:    Hypertension    Hyperlipidemia    Diabetes mellitus ([address]    Depression with suicidal ideation    Chest pain  Resolved Problems:    * No resolved hospital problems.  *    Nstemi  - cardio consulted  - heparin gtt   - nitro gtt discotinued   - pain and nasusea control   - continue to trend trops currently uptrenidng  - keep NPO      Hyponatremia  - he usually has 128 sodium  - today is 124  - most likely due to psych medications. Will hold effexor due to concerns for SIADH. - continue to monitor      T2DM  - currently takes metformin  - will start him on low dose SSI   - mointor blood glucose     HTN  - continue coreg, hydralazine at home dose     CAD  - s/p SHY x1 to LAD  -continue plavix and ASA     HLD  - continue lipitor           Jesus Mcgrath MD  Internal Medicine Resident  [id number] [facility]  4/22/2022 9:48 AM Attending Physician Statement  I have discussed the care of Navjot Garcia., including pertinent history and exam findings,  with the resident. I have seen and examined the patient and the key elements of all parts of the encounter have been performed by me. I agree with the assessment, plan and orders as documented by the resident with additions . Treatment plan Discussed with nursing staff in detail , all questions answered . Electronically signed by Khalida Live MD on   4/22/22 at 12:25 PM EDT    Please note that this chart was generated using voice recognition Dragon dictation software. Although every effort was made to ensure the accuracy of this automated transcription, some errors in transcription may have occurred.

## 2022-04-22 NOTE — PROGRESS NOTES
Discussed patient with Dr. Abdi Rachel Medico. In the setting of him having continuing chest pain which he ranks 10 out of 10 and elevated troponins we will plan on coronary angiography to further evaluate patient's symptoms. Risks and benefits discussed. Patient verbalized understanding. Keep NPO.     Aaliyah Wen MD  Cardiology fellow

## 2022-04-23 LAB
ABSOLUTE EOS #: 0.08 K/UL (ref 0–0.44)
ABSOLUTE IMMATURE GRANULOCYTE: 0.04 K/UL (ref 0–0.3)
ABSOLUTE LYMPH #: 1.21 K/UL (ref 1.1–3.7)
ABSOLUTE MONO #: 0.73 K/UL (ref 0.1–1.2)
ALBUMIN SERPL-MCNC: 3.6 G/DL (ref 3.5–5.2)
ALBUMIN/GLOBULIN RATIO: 1.7 (ref 1–2.5)
ALP BLD-CCNC: 115 U/L (ref 40–129)
ALT SERPL-CCNC: 28 U/L (ref 5–41)
ANION GAP SERPL CALCULATED.3IONS-SCNC: 12 MMOL/L (ref 9–17)
AST SERPL-CCNC: 21 U/L
BASOPHILS # BLD: 1 % (ref 0–2)
BASOPHILS ABSOLUTE: 0.03 K/UL (ref 0–0.2)
BILIRUB SERPL-MCNC: 0.41 MG/DL (ref 0.3–1.2)
BUN BLDV-MCNC: 25 MG/DL (ref 8–23)
CALCIUM SERPL-MCNC: 9.3 MG/DL (ref 8.6–10.4)
CHLORIDE BLD-SCNC: 94 MMOL/L (ref 98–107)
CO2: 25 MMOL/L (ref 20–31)
CREAT SERPL-MCNC: 0.6 MG/DL (ref 0.7–1.2)
EOSINOPHILS RELATIVE PERCENT: 1 % (ref 1–4)
GFR AFRICAN AMERICAN: >60 ML/MIN
GFR NON-AFRICAN AMERICAN: >60 ML/MIN
GFR SERPL CREATININE-BSD FRML MDRD: ABNORMAL ML/MIN/{1.73_M2}
GLUCOSE BLD-MCNC: 100 MG/DL (ref 75–110)
GLUCOSE BLD-MCNC: 79 MG/DL (ref 70–99)
HCT VFR BLD CALC: 31 % (ref 40.7–50.3)
HEMOGLOBIN: 11.4 G/DL (ref 13–17)
IMMATURE GRANULOCYTES: 1 %
LYMPHOCYTES # BLD: 20 % (ref 24–43)
MCH RBC QN AUTO: 33.2 PG (ref 25.2–33.5)
MCHC RBC AUTO-ENTMCNC: 36.8 G/DL (ref 28.4–34.8)
MCV RBC AUTO: 90.4 FL (ref 82.6–102.9)
MONOCYTES # BLD: 12 % (ref 3–12)
NRBC AUTOMATED: 0 PER 100 WBC
PARTIAL THROMBOPLASTIN TIME: 25 SEC (ref 20.5–30.5)
PARTIAL THROMBOPLASTIN TIME: 25.1 SEC (ref 20.5–30.5)
PDW BLD-RTO: 12.4 % (ref 11.8–14.4)
PLATELET # BLD: 236 K/UL (ref 138–453)
PMV BLD AUTO: 9.4 FL (ref 8.1–13.5)
POTASSIUM SERPL-SCNC: 4.1 MMOL/L (ref 3.7–5.3)
RBC # BLD: 3.43 M/UL (ref 4.21–5.77)
SEG NEUTROPHILS: 65 % (ref 36–65)
SEGMENTED NEUTROPHILS ABSOLUTE COUNT: 4.13 K/UL (ref 1.5–8.1)
SODIUM BLD-SCNC: 130 MMOL/L (ref 135–144)
SODIUM BLD-SCNC: 131 MMOL/L (ref 135–144)
TOTAL PROTEIN: 5.7 G/DL (ref 6.4–8.3)
TROPONIN, HIGH SENSITIVITY: 102 NG/L (ref 0–22)
TROPONIN, HIGH SENSITIVITY: 94 NG/L (ref 0–22)
TROPONIN, HIGH SENSITIVITY: 95 NG/L (ref 0–22)
WBC # BLD: 6.2 K/UL (ref 3.5–11.3)

## 2022-04-23 PROCEDURE — 80053 COMPREHEN METABOLIC PANEL: CPT

## 2022-04-23 PROCEDURE — 6360000002 HC RX W HCPCS: Performed by: STUDENT IN AN ORGANIZED HEALTH CARE EDUCATION/TRAINING PROGRAM

## 2022-04-23 PROCEDURE — 84295 ASSAY OF SERUM SODIUM: CPT

## 2022-04-23 PROCEDURE — 99232 SBSQ HOSP IP/OBS MODERATE 35: CPT | Performed by: INTERNAL MEDICINE

## 2022-04-23 PROCEDURE — 97530 THERAPEUTIC ACTIVITIES: CPT

## 2022-04-23 PROCEDURE — 84484 ASSAY OF TROPONIN QUANT: CPT

## 2022-04-23 PROCEDURE — 1200000000 HC SEMI PRIVATE

## 2022-04-23 PROCEDURE — 97163 PT EVAL HIGH COMPLEX 45 MIN: CPT

## 2022-04-23 PROCEDURE — 94761 N-INVAS EAR/PLS OXIMETRY MLT: CPT

## 2022-04-23 PROCEDURE — 36415 COLL VENOUS BLD VENIPUNCTURE: CPT

## 2022-04-23 PROCEDURE — 85025 COMPLETE CBC W/AUTO DIFF WBC: CPT

## 2022-04-23 PROCEDURE — 2580000003 HC RX 258: Performed by: STUDENT IN AN ORGANIZED HEALTH CARE EDUCATION/TRAINING PROGRAM

## 2022-04-23 PROCEDURE — 2700000000 HC OXYGEN THERAPY PER DAY

## 2022-04-23 PROCEDURE — 99222 1ST HOSP IP/OBS MODERATE 55: CPT | Performed by: NEUROLOGICAL SURGERY

## 2022-04-23 PROCEDURE — 82947 ASSAY GLUCOSE BLOOD QUANT: CPT

## 2022-04-23 PROCEDURE — 85730 THROMBOPLASTIN TIME PARTIAL: CPT

## 2022-04-23 PROCEDURE — 97167 OT EVAL HIGH COMPLEX 60 MIN: CPT

## 2022-04-23 PROCEDURE — 6370000000 HC RX 637 (ALT 250 FOR IP): Performed by: STUDENT IN AN ORGANIZED HEALTH CARE EDUCATION/TRAINING PROGRAM

## 2022-04-23 PROCEDURE — 97535 SELF CARE MNGMENT TRAINING: CPT

## 2022-04-23 PROCEDURE — 6360000002 HC RX W HCPCS

## 2022-04-23 PROCEDURE — APPSS45 APP SPLIT SHARED TIME 31-45 MINUTES: Performed by: REGISTERED NURSE

## 2022-04-23 PROCEDURE — 6370000000 HC RX 637 (ALT 250 FOR IP): Performed by: NURSE PRACTITIONER

## 2022-04-23 RX ORDER — LOSARTAN POTASSIUM 25 MG/1
25 TABLET ORAL DAILY
Status: DISCONTINUED | OUTPATIENT
Start: 2022-04-23 | End: 2022-04-24

## 2022-04-23 RX ORDER — CARVEDILOL 12.5 MG/1
12.5 TABLET ORAL 2 TIMES DAILY
Status: DISCONTINUED | OUTPATIENT
Start: 2022-04-23 | End: 2022-04-25 | Stop reason: HOSPADM

## 2022-04-23 RX ORDER — MINERAL OIL AND WHITE PETROLATUM 150; 830 MG/G; MG/G
OINTMENT OPHTHALMIC PRN
Status: DISCONTINUED | OUTPATIENT
Start: 2022-04-23 | End: 2022-04-25 | Stop reason: HOSPADM

## 2022-04-23 RX ORDER — GABAPENTIN 100 MG/1
100 CAPSULE ORAL 2 TIMES DAILY
Status: DISCONTINUED | OUTPATIENT
Start: 2022-04-23 | End: 2022-04-25 | Stop reason: HOSPADM

## 2022-04-23 RX ORDER — PROCHLORPERAZINE EDISYLATE 5 MG/ML
5 INJECTION INTRAMUSCULAR; INTRAVENOUS ONCE
Status: COMPLETED | OUTPATIENT
Start: 2022-04-23 | End: 2022-04-23

## 2022-04-23 RX ORDER — LIDOCAINE 4 G/G
1 PATCH TOPICAL DAILY
Status: DISCONTINUED | OUTPATIENT
Start: 2022-04-23 | End: 2022-04-23

## 2022-04-23 RX ORDER — ISOSORBIDE MONONITRATE 30 MG/1
30 TABLET, EXTENDED RELEASE ORAL NIGHTLY
Status: DISCONTINUED | OUTPATIENT
Start: 2022-04-23 | End: 2022-04-25 | Stop reason: HOSPADM

## 2022-04-23 RX ORDER — PANTOPRAZOLE SODIUM 40 MG/1
40 TABLET, DELAYED RELEASE ORAL
Status: DISCONTINUED | OUTPATIENT
Start: 2022-04-24 | End: 2022-04-25 | Stop reason: HOSPADM

## 2022-04-23 RX ORDER — LIDOCAINE 4 G/G
1 PATCH TOPICAL DAILY
Status: DISCONTINUED | OUTPATIENT
Start: 2022-04-23 | End: 2022-04-24

## 2022-04-23 RX ADMIN — DESMOPRESSIN ACETATE 40 MG: 0.2 TABLET ORAL at 09:35

## 2022-04-23 RX ADMIN — CARVEDILOL 12.5 MG: 12.5 TABLET, FILM COATED ORAL at 12:50

## 2022-04-23 RX ADMIN — VENLAFAXINE 75 MG: 75 TABLET ORAL at 09:35

## 2022-04-23 RX ADMIN — SODIUM CHLORIDE, PRESERVATIVE FREE 5 ML: 5 INJECTION INTRAVENOUS at 21:30

## 2022-04-23 RX ADMIN — HYDRALAZINE HYDROCHLORIDE 25 MG: 25 TABLET, FILM COATED ORAL at 15:40

## 2022-04-23 RX ADMIN — SODIUM CHLORIDE, PRESERVATIVE FREE 10 ML: 5 INJECTION INTRAVENOUS at 14:53

## 2022-04-23 RX ADMIN — VENLAFAXINE 75 MG: 75 TABLET ORAL at 19:00

## 2022-04-23 RX ADMIN — CLOPIDOGREL 75 MG: 75 TABLET, FILM COATED ORAL at 09:35

## 2022-04-23 RX ADMIN — PROCHLORPERAZINE EDISYLATE 5 MG: 5 INJECTION INTRAMUSCULAR; INTRAVENOUS at 03:43

## 2022-04-23 RX ADMIN — HYDRALAZINE HYDROCHLORIDE 10 MG: 20 INJECTION INTRAMUSCULAR; INTRAVENOUS at 03:45

## 2022-04-23 RX ADMIN — ACETAMINOPHEN 650 MG: 325 TABLET ORAL at 03:42

## 2022-04-23 RX ADMIN — HYDRALAZINE HYDROCHLORIDE 25 MG: 25 TABLET, FILM COATED ORAL at 21:02

## 2022-04-23 RX ADMIN — DIVALPROEX SODIUM 500 MG: 500 TABLET, EXTENDED RELEASE ORAL at 21:02

## 2022-04-23 RX ADMIN — HYDRALAZINE HYDROCHLORIDE 25 MG: 25 TABLET, FILM COATED ORAL at 09:35

## 2022-04-23 RX ADMIN — VENLAFAXINE 75 MG: 75 TABLET ORAL at 15:40

## 2022-04-23 RX ADMIN — GABAPENTIN 100 MG: 100 CAPSULE ORAL at 21:02

## 2022-04-23 RX ADMIN — GABAPENTIN 100 MG: 100 CAPSULE ORAL at 12:50

## 2022-04-23 RX ADMIN — SENNOSIDES 8.6 MG: 8.6 TABLET, COATED ORAL at 21:03

## 2022-04-23 RX ADMIN — LOSARTAN POTASSIUM 25 MG: 25 TABLET, FILM COATED ORAL at 12:50

## 2022-04-23 RX ADMIN — CARVEDILOL 12.5 MG: 12.5 TABLET, FILM COATED ORAL at 21:02

## 2022-04-23 RX ADMIN — ASPIRIN 81 MG: 81 TABLET, CHEWABLE ORAL at 09:35

## 2022-04-23 RX ADMIN — ISOSORBIDE MONONITRATE 30 MG: 30 TABLET ORAL at 21:03

## 2022-04-23 ASSESSMENT — PAIN DESCRIPTION - LOCATION: LOCATION: CHEST

## 2022-04-23 ASSESSMENT — PAIN SCALES - GENERAL: PAINLEVEL_OUTOF10: 5

## 2022-04-23 NOTE — PROGRESS NOTES
0302: MD notified of multiple tylenol orders and pt request for nausea medication. New orders noted. 200: MD notified of pt's different code status. DNR-CCA paper work in file and full code in chart.  Primary to address in the AM.

## 2022-04-23 NOTE — PROGRESS NOTES
Physical Therapy  Facility/Department: [facility]  Physical Therapy Initial Assessment    Name: Natalya Dover. : 1950  MRN: [id number]  Date of Service: 2022   Pt underwent laminectomy and fusion at C2-C5 on 3/24/22 (Dr. Roe Urrutia). Pt was admitted to acute rehab unit at [facility] on 3/31/22. Pt transferred to acute care due to chest pain 4/15/22. Pt underwent cardiac cath with stents placement on , DC to [facility], readmitted on 22 for chest pain again, underwent cardiac cath again on 22. Discharge Recommendations:  Patient would benefit from continued therapy after discharge          Patient Diagnosis(es): The primary encounter diagnosis was Chest pain, unspecified type. A diagnosis of Hyponatremia was also pertinent to this visit. Past Medical History:  has a past medical history of Depression, Diabetes mellitus ([address] Difficult intravenous access, Hyperlipidemia, Hypertension, Migraines, PTSD (post-traumatic stress disorder), Sleep apnea, Teeth missing, and Wears glasses. Past Surgical History:  has a past surgical history that includes Cardiac catheterization (2010); Carpal tunnel release (Left, 2002); Vasectomy (1983); Tonsillectomy and adenoidectomy (); Hydrocele surgery (); Middle ear surgery (2018); eye surgery (Left, ); Mouth surgery (2018); other surgical history (2018); cervical fusion (2018); pr office/outpt visit,procedure only (N/A, 2018); Cataract removal; laminectomy (2022); and cervical fusion (N/A, 3/24/2022). Assessment   Assessment: Pt underwent laminectomy and fusion at C2-C5 on 3/24/22. Pt presents with quadraparesis, B UE > B LE weakness, decreased core strength. Pain, and weakness limiting activity at this time. Pt requires mod ax 2 to dangle at EOB at this time. Pt with poor tolerance to upright positon.   Treatment Diagnosis: Impiared mobility  Specific Instructions for Next Treatment: Hermelindo Dickson c-collar for mobility/dangling  Therapy Prognosis: Fair  Decision Making: High Complexity  Requires PT Follow-Up: Yes  Activity Tolerance  Activity Tolerance: Patient limited by pain; Patient limited by fatigue;Patient limited by endurance (Pt has anxiety too)     Plan   Plan  Plan: 5-7 times per week  Specific Instructions for Next Treatment: Serjio Birmingham c-collar for mobility/dangling  Current Treatment Recommendations: Strengthening,Balance training,Functional mobility training,Transfer training,Endurance training,Safety education & training,Patient/Caregiver education & training  Safety Devices  Type of Devices: Left in bed,Gait belt,Call light within reach,Nurse notified     Restrictions  Restrictions/Precautions  Restrictions/Precautions: Surgical Protocols,Fall Risk  Required Braces or Orthoses?:  (C-collar while out of bed. Ok to remove while resting In bed eating and drinking in bed)  Implants present? :  (C-spine surgery)  Required Braces or Orthoses  Cervical: c-collar (when out of bed, can remove for eating/drinking/in bed)  Spinal Other: C-collar while out of bed. Ok to remove while resting In bed eating and drinking in bed  Position Activity Restriction  Other position/activity restrictions: s/p C2-C5 laminectomy and fixation 3/24, Collar on while out of bed. Ok to remove while resting In bed eating and drinking in bed. Pt has neuro surgery consult this admsiion, RN to primitivo again if same restricitions needs to continued at this time. Subjective   General  Patient assessed for rehabilitation services?: Yes  Additional Pertinent Hx: Pt underwent laminectomy and fusion at C2-C5 on 3/24/22 (Dr. Chris Mejias). Pt was admitted to acute rehab unit at [facility] on 3/31/22. Pt transferred to acute care due to chest pain 4/15/22. Pt underwent cardiac cath with stents placement on 4/19, DC to [facility], readmitted on 4/21/22 for chest pain again, underwent cardiac cath again on 4/22/22.    Referral Date : 04/22/22  Diagnosis: NSTEMI, cervical myelopathy with recent C2-C5 laminectomy/fusion. Follows Commands: Within Functional Limits  Subjective  Subjective: Pt conintues to report chest soreness, abdominal pain and shoulder pain. Vital Signs  Pulse: 91  BP: (!) 151/95 (Seated /91, HR 89)  BP Location: Left upper arm  MAP (Calculated): 113.67  Patient Position: Supine  Oxygen Therapy  O2 Device: Nasal cannula (O2 turned down to \"0\" prior to therapy session.)      Social/Functional History  Social/Functional History  Lives With: Other (comment) (Significant other)  Type of Home: House  Home Layout: One level  Home Access: Stairs to enter without rails  Entrance Stairs - Number of Steps: one step at [employer]  Bathroom Shower/Tub: Tub/Shower unit,Shower chair with back,Curtain  Bathroom Toilet: Handicap height  Bathroom Equipment: Hand-held shower  Bathroom Accessibility: Walker accessible  Home Equipment: Ebony Bile, 4 wheeled,Reacher,Cane  Has the patient had two or more falls in the past year or any fall with injury in the past year?: Yes  Receives Help From: Family  ADL Assistance:  (Lately needed assist, independnet prior to Jan 2022)  Ambulation Assistance:  (Jan\"22 was walking st cane, lately difficult))  Transfer Assistance:  (Jan\"22 was walking st cane, lately difficult))  Active : No  Patient's  Info: Family is currently assisting with transportation - Was driving until end of Jan 2022  Mode of Transportation: Car,SUV  Occupation: Retired  Additional Comments: Lately significant assiting pt withADL/mobility, as is primary person for home making chores.    Vision/Hearing  Vision Exceptions: Wears glasses at all times  Hearing: Exceptions to [facility]  Hearing Exceptions: Hard of hearing/hearing concerns    Cognition   Orientation  Overall Orientation Status: Within Functional Limits     Objective               AROM RLE (degrees)  RLE General AROM: AAROM to AROM WFL  AROM LLE (degrees)  LLE General AROM: AAROM to AROM WFL  AROM RUE (degrees)  RUE General AROM: ee OT eval  AROM LUE (degrees)  LUE General AROM: See OT eval  Strength RLE  Comment: Grossly 3-/5 at Hip, Knee 3/5, DF/PF 3 to 3+/5  Strength LLE  Comment: Hip 2+/5, Knee 3-/5, DF 3/5     Sensation  Overall Sensation Status: Impaired  Additional Comments: Numbness all the way from neck to fingers, back to toes. Bed mobility  Bridging: Moderate assistance  Rolling to Left: Maximum assistance  Supine to Sit: 2 Person assistance; Moderate assistance  Sit to Supine: 2 Person assistance; Moderate assistance  Scooting: Dependent/Total  Comment: Pt needed assist to progress at trunk as well as legs, pt seen with OTR, OTR assisted pt from posterior side, PT assisted from front/anterior, OTR assisted and supported pt's neck/shoulder at all times during mobility. Pt dangles at EOB for ~3 minutes, no dizzyness, reports generalized pain all over, feeling weak. Pt required min a for static balance. Declines to attempt for standing at EOB. Transfers  Sit to Stand: Unable to assess (Pt declines to attempt.)        Balance  Sitting - Static: Fair;-           OutComes Score                                                  AM-PAC Score             Goals  Short Term Goals  Time Frame for Short term goals: 5 to 7 visits  Short term goal 1: Rolling in bed at mod A  Short term goal 2: Pt able to perform supine >sit at mod A   Short term goal 3: Pt able to perform sit ot supien max a  Short term goal 4:  Pt able to tolerate sitting at EOB for 10 to 12 minutes atleast, CGA/min a for balance, and  perform core strengthening and LE ex's to improve fucntion.   Short term goal 5:  Perform sit<> stand mod A/max a  with elbert stedy from bed height  Short Term Goal 6: Pt able to  elbert stedy for 2 to 3 minutes to improve LE strength/increased tolerance to upright postion  Short Term Goal 7: Pt to tolerate up in a recliner chair via lift system use, and up for 1 to 2 hours for meals. Patient Goals   Patient goals : Go back to rehab.        Therapy Time   Individual Concurrent Group Co-treatment   Time In 4323         Time Out 1203         Minutes 25         Timed Code Treatment Minutes: 10 Minutes       Mor Cameron PT

## 2022-04-23 NOTE — PROGRESS NOTES
Physician Progress Note      PATIENT:               Kam Barker  CSN #:                  [id number]  :                       1950  ADMIT DATE:       2022 7:35 AM  DISCH DATE:  RESPONDING  PROVIDER #:        Kimberly Osorio          QUERY TEXT:    Pt admitted with NSTEMI and has moderate malnutrition documented per Nutrition   consult. Please document if you agree with the Dietician's diagnosis of   Moderate Malnutrition. The medical record reflects the following:  Risk Factors: DM, Hyponatremia  Clinical Indicators: Pt reports having a h/o decreased appetite during   previous admission for stenting. Pt also reports having difficulty swallowing   foods at times. Pt also reports weight loss - per chart review, 10% weight loss   x past month. Na 130. BMI 24  Treatment: Ensure supplements with meals once no longer NPO, Monitoring of   Intake/output. Options provided:  -- Moderate Malnutrition  -- Moderate Protein calorie malnutrition  -- No Malnutrition  -- Other - I will add my own diagnosis  -- Disagree - Not applicable / Not valid  -- Disagree - Clinically unable to determine / Unknown  -- Refer to Clinical Documentation Reviewer    PROVIDER RESPONSE TEXT:    Malnutrition was ruled out for this patient.     Query created by: Yue Lopez on 2022 1:22 PM      Electronically signed by:  Kimberly Osorio 2022 7:11 AM

## 2022-04-23 NOTE — PLAN OF CARE
Problem: Discharge Planning  Goal: Discharge to home or other facility with appropriate resources  4/23/2022 0356 by Guru Puentes RN  Outcome: Progressing  4/22/2022 1813 by Jennifer Doe RN  Outcome: Progressing     Problem: Chronic Conditions and Co-morbidities  Goal: Patient's chronic conditions and co-morbidity symptoms are monitored and maintained or improved  4/23/2022 0356 by Guru Puentes RN  Outcome: Progressing  4/22/2022 1813 by Jennifer Doe RN  Outcome: Progressing     Problem: Pain  Goal: Verbalizes/displays adequate comfort level or baseline comfort level  4/23/2022 0356 by Guru Puentes RN  Outcome: Progressing  4/22/2022 1813 by Jennifer Doe RN  Outcome: Progressing     Problem: Skin/Tissue Integrity  Goal: Absence of new skin breakdown  Description: 1. Monitor for areas of redness and/or skin breakdown  2. Assess vascular access sites hourly  3. Every 4-6 hours minimum:  Change oxygen saturation probe site  4. Every 4-6 hours:  If on nasal continuous positive airway pressure, respiratory therapy assess nares and determine need for appliance change or resting period.   4/23/2022 0356 by Guru Puentes RN  Outcome: Progressing  4/22/2022 1813 by Jennifer Doe RN  Outcome: Progressing     Problem: Safety - Adult  Goal: Free from fall injury  4/23/2022 0356 by Guru Puentes RN  Outcome: Progressing  4/22/2022 1813 by Jennifer Doe RN  Outcome: Progressing     Problem: ABCDS Injury Assessment  Goal: Absence of physical injury  4/22/2022 1813 by Jennifer Doe RN  Outcome: Progressing

## 2022-04-23 NOTE — CONSULTS
Department of Neurosurgery                                            Nurse Practitioner Consult Note      Reason for Consult:  S/p C2-5 lami and fixation  Requesting Physician:  Dr Fortino Thapa  Neurosurgeon:   [] Dr. Poonam Rodriguez  [] Dr. Leim Kawasaki  [] Dr. Victorino Escudero  [x] Dr. Colton De Jesus      History Obtained From:  patient, electronic medical record    CHIEF COMPLAINT:         Chief Complaint   Patient presents with    Chest Pain       HISTORY OF PRESENT ILLNESS:       The patient is a 70 y.o. male with history of C2-5 laminectomy and fusion on 3/24/2022 by Dr Poonam Rodriguez and recent stenting on 4/19 with SHY placed in LAD presented on 4/21 with chest pain from nursing home. Cath showed nonobstructive CAD. On aspirin and plavix. States he went to rehab after C2-5 lami and fusion but was not able to do much therapy because of his chest pain and BP. He feels numbness, tingling and weakness has not improved since surgery because he has been unable to do therapy. He did not have his 2 week follow up since he was in rehab. Incision site well healed. Continues to have chest pain.      PAST MEDICAL HISTORY :       Past Medical History:        Diagnosis Date    Depression 2017    ON RX    Diabetes mellitus ([address] 2013    NIDDM    Difficult intravenous access     Hyperlipidemia 2008    ON RX    Hypertension 2008    ON RX    Migraines     PTSD (post-traumatic stress disorder) 01/2018    ON RX    Sleep apnea 01/2018    UNALBE TO USE TO CLEARED BY ENT (CPAP)    Teeth missing     BACK TEETH UPPER AND LOWER TO BE FITTED FOR PARTIALS AT LATER DATE    Wears glasses        Past Surgical History:        Procedure Laterality Date    CARDIAC CATHETERIZATION  02/2010    no stents     CARPAL TUNNEL RELEASE Left 01/09/2002    CATARACT REMOVAL      CERVICAL FUSION  04/19/2018    anterior cervical fusion C5-6    CERVICAL FUSION N/A 3/24/2022    C2-5 FUSION, C2-4 LAMINECTOMY performed by Nas Bergman DO at [address] Left 2018 CATARACT EXTRACTION WITH IOL    HYDROCELE EXCISION      LAMINECTOMY  2022    C2-5 FUSION, C2-4 LAMINECTOMY    MIDDLE EAR SURGERY  2018    MIDDLE EAR REBUILT AND EAR DRUM REPLACED    MOUTH SURGERY  2018    5 TEETH REMOVED    OTHER SURGICAL HISTORY  2018    : ANTERIOR CERVICAL CORRPECTOMY C5-6, SYNTHES, DEPUY, REG TABLE, SUPINE,     AR OFFICE/OUTPT VISIT,PROCEDURE ONLY N/A 2018    ANTERIOR CERVICAL CORRPECTOMY AND FUSION C5-6 performed by Marbin Crump DO at [address] [place] [address]  1983       Social History:   Social History     Socioeconomic History    Marital status:      Spouse name: Not on file    Number of children: Not on file    Years of education: Not on file    Highest education level: Not on file   Occupational History    Not on file   Tobacco Use    Smoking status: Former Smoker     Packs/day: 0.50     Years: 4.00     Pack years: 2.00     Types: Cigarettes     Quit date: 1972     Years since quittin.0    Smokeless tobacco: Never Used    Tobacco comment: quit    Vaping Use    Vaping Use: Never used   Substance and Sexual Activity    Alcohol use: Yes     Comment: MIXED DRINKS- 3 OR 4 A YEAR; last 2018    Drug use: Yes     Types: Marijuana Dusty Divine)    Sexual activity: Yes     Partners: Female   Other Topics Concern    Not on file   Social History Narrative    Not on file     Social Determinants of Health     Financial Resource Strain:     Difficulty of Paying Living Expenses: Not on file   Food Insecurity:     Worried About [address] in the Last Year: Not on file    Judah of Food in the Last Year: Not on file   Transportation Needs:     Lack of Transportation (Medical): Not on file    Lack of Transportation (Non-Medical):  Not on file   Physical Activity:     Days of Exercise per Week: Not on file    Minutes of Exercise per Session: Not on file   Stress:     Feeling of Stress : Not on file   Social Connections:     Frequency of Communication with Friends and Family: Not on file    Frequency of Social Gatherings with Friends and Family: Not on file    Attends Latter day Services: Not on file    Active Member of Clubs or Organizations: Not on file    Attends Club or Organization Meetings: Not on file    Marital Status: Not on file   Intimate Partner Violence:     Fear of Current or Ex-Partner: Not on file    Emotionally Abused: Not on file    Physically Abused: Not on file    Sexually Abused: Not on file   Housing Stability:     Unable to Pay for Housing in the Last Year: Not on file    Number of [place] in the Last Year: Not on file    Unstable Housing in the Last Year: Not on file       Family History:       Problem Relation Age of Onset    Dementia Mother     Coronary Art Dis Mother     Stroke Mother     Diabetes Mother     Asthma Mother     Other Mother         BRAIN ANEURISM    High Blood Pressure Mother     Heart Disease Father     Diabetes Sister     Diabetes Brother     High Blood Pressure Brother     High Cholesterol Brother     Heart Disease Brother     Diabetes Sister     Other Sister         NEUROPATHY       Allergies:  Latex, Bee venom, Lisinopril, Niacin and related, Sulfa antibiotics, and Terazosin    Home Medications:  Prior to Admission medications    Medication Sig Start Date End Date Taking? Authorizing Provider   clopidogrel (PLAVIX) 75 MG tablet Take 1 tablet by mouth daily 4/19/22   Ricardo Gonzalez MD   nitroGLYCERIN (NITROSTAT) 0.4 MG SL tablet up to max of 3 total doses.  If no relief after 1 dose, call 911. 4/18/22   Valerie Burk MD   divalproex (DEPAKOTE ER) 500 MG extended release tablet Take 1 tablet by mouth at bedtime 4/18/22   Valerie Burk MD   venlafaxine (EFFEXOR) 75 MG tablet Take 1 tablet by mouth 3 times daily (with meals) 4/18/22   Valerie Burk MD   hydrALAZINE (APRESOLINE) 25 MG tablet Take 1 tablet by mouth three times daily 4/18/22   Stan Mayberry MD   carvedilol (COREG) 6.25 MG tablet Take 1 tablet by mouth 2 times daily 4/18/22   Stan Mayberry MD   finasteride (PROSCAR) 5 MG tablet Take 5 mg by mouth daily    Historical Provider, MD   carboxymethylcellulose 1 % ophthalmic solution Place 2 drops into both eyes 3 times daily Pt states \"2 drops in both eyes three times a day\"    Historical Provider, MD   simethicone (MYLICON) 80 MG chewable tablet Take 1 tablet by mouth every 6 hours as needed for Flatulence  Patient taking differently: Take 80 mg by mouth every 8 hours as needed for Flatulence  6/22/20   Wyatt Garcia MD   lidocaine (XYLOCAINE) 5 % ointment Apply topically daily as needed for Pain Apply topically as needed.   LF 4/20/20 (30 day supply)  Patient not taking: Reported on 3/23/2022    Historical Provider, MD   metFORMIN (GLUCOPHAGE) 1000 MG tablet Take 1,000 mg by mouth 2 times daily (with meals) LF 4/27/20 (90 day supply)  Patient not taking: Reported on 3/21/2022    Historical Provider, MD   aspirin 81 MG chewable tablet Take 81 mg by mouth daily  Patient not taking: Reported on 3/21/2022    Historical Provider, MD   cetirizine (ZYRTEC) 10 MG tablet Take 10 mg by mouth daily LF 4/6/20 (90 day supply)  Patient not taking: Reported on 3/21/2022    Historical Provider, MD   atorvastatin (LIPITOR) 80 MG tablet Take 40 mg by mouth daily Take 1/2 tablet (40 mg) daily  LF 4/22/20 (90 day supply)  Patient not taking: Reported on 3/21/2022    Historical Provider, MD   omeprazole (PRILOSEC) 20 MG delayed release capsule Take 20 mg by mouth every evening LF 4/21/20 (90 day supply)    Historical Provider, MD       Current Medications:   Current Facility-Administered Medications: isosorbide mononitrate (IMDUR) extended release tablet 30 mg, 30 mg, Oral, Nightly  [START ON 4/24/2022] pantoprazole (PROTONIX) tablet 40 mg, 40 mg, Oral, QAM AC  lidocaine 4 % external patch 1 patch, 1 patch, TransDERmal, Daily  gabapentin (NEURONTIN) capsule 100 mg, 100 mg, Oral, BID  carvedilol (COREG) tablet 12.5 mg, 12.5 mg, Oral, BID  losartan (COZAAR) tablet 25 mg, 25 mg, Oral, Daily  bisacodyl (DULCOLAX) suppository 10 mg, 10 mg, Rectal, Daily PRN  senna (SENOKOT) tablet 8.6 mg, 1 tablet, Oral, Nightly  venlafaxine (EFFEXOR) tablet 75 mg, 75 mg, Oral, TID WC  sodium chloride flush 0.9 % injection 5-40 mL, 5-40 mL, IntraVENous, 2 times per day  sodium chloride flush 0.9 % injection 5-40 mL, 5-40 mL, IntraVENous, PRN  0.9 % sodium chloride infusion, , IntraVENous, PRN  colchicine (COLCRYS) tablet 0.3 mg, 0.3 mg, Oral, Every Other Day  aspirin chewable tablet 81 mg, 81 mg, Oral, Daily  atorvastatin (LIPITOR) tablet 40 mg, 40 mg, Oral, Daily  clopidogrel (PLAVIX) tablet 75 mg, 75 mg, Oral, Daily  divalproex (DEPAKOTE ER) extended release tablet 500 mg, 500 mg, Oral, Nightly  hydrALAZINE (APRESOLINE) tablet 25 mg, 25 mg, Oral, TID  sodium chloride flush 0.9 % injection 5-40 mL, 5-40 mL, IntraVENous, 2 times per day  sodium chloride flush 0.9 % injection 5-40 mL, 5-40 mL, IntraVENous, PRN  0.9 % sodium chloride infusion, , IntraVENous, PRN  ondansetron (ZOFRAN-ODT) disintegrating tablet 4 mg, 4 mg, Oral, Q8H PRN **OR** ondansetron (ZOFRAN) injection 4 mg, 4 mg, IntraVENous, Q6H PRN  polyethylene glycol (GLYCOLAX) packet 17 g, 17 g, Oral, Daily PRN  acetaminophen (TYLENOL) tablet 650 mg, 650 mg, Oral, Q6H PRN **OR** acetaminophen (TYLENOL) suppository 650 mg, 650 mg, Rectal, Q6H PRN  insulin lispro (HUMALOG) injection vial 0-18 Units, 0-18 Units, SubCUTAneous, TID WC  insulin lispro (HUMALOG) injection vial 0-9 Units, 0-9 Units, SubCUTAneous, Nightly  glucose (GLUTOSE) 40 % oral gel 15 g, 15 g, Oral, PRN  dextrose 50 % IV solution, 12.5 g, IntraVENous, PRN  glucagon (rDNA) injection 1 mg, 1 mg, IntraMUSCular, PRN  dextrose 5 % solution, 100 mL/hr, IntraVENous, PRN  fentaNYL (SUBLIMAZE) injection 25 mcg, 25 mcg, IntraVENous, Q6H PRN  morphine (PF) injection 1 mg, 1 mg, IntraVENous, Q4H PRN    REVIEW OF SYSTEMS:       CONSTITUTIONAL: negative for fatigue and malaise   RESPIRATORY: negative for cough, shortness of breath   CARDIOVASCULAR: Positive for chest pain   GASTROINTESTINAL: negative for nausea, vomiting   GENITOURINARY: negative for incontinence   MUSCULOSKELETAL: negative for neck or back pain     Review of systems otherwise negative.     PHYSICAL EXAM:       BP (!) 168/93   Pulse 91   Temp 98.8 °F (37.1 °C) (Oral)   Resp 30   Ht 5' 3\" (1.6 m)   Wt 148 lb 8 oz (67.4 kg)   SpO2 96%   BMI 26.31 kg/m²       CONSTITUTIONAL: no apparent distress, appears stated age   ENT: moist mucous membranes, uvula midline   NECK: supple, symmetric, posterior cervical site well healed   NEUROLOGIC:  EYE OPENING     Spontaneous - 4 [x]       To voice - 3 []       To pain - 2 []       None - 1 []    VERBAL RESPONSE     Appropriate, oriented - 5 [x]       Dazed or confused - 4 []       Syllables, expletives - 3 []       Grunts - 2 []       None - 1 []    MOTOR RESPONSE     Spontaneous, command - 6 [x]       Localizes pain - 5 []       Withdraws pain - 4 []       Abnormal flexion - 3 []       Abnormal extension - 2 []       None - 1 []            Total GCS: 15    Mental Status:  Alert, oriented x3, follows all commands, speech clear               Cranial Nerves:    cranial nerves II-XII are grossly intact    Motor Exam:    Tone:  abnormal - increased tone all extremities  Motor  L deltoid 2/5; R deltoid 4/5  L biceps 3/5; R biceps 4+/5  L triceps 3/5; R triceps 4+/5  L wrist extension 4/5; R wrist extension 4+/5  L HG 4/5; R HG 4/5      L iliopsoas 4/5 , R iliopsoas 4+/5  L quadriceps 4/5; R quadriceps 5/5  L Dorsiflexion 5/5; R dorsiflexion 5/5  L Plantarflexion 5/5; R plantarflexion 5/5    Sensory:    Right Upper Extremity:  normal  Left Upper Extremity:  normal  Right Lower Extremity:  abnormal - decreased to light touch  Left Lower Extremity: abnormal - decreased to light touch    Deep Tendon Reflexes:    Right Bicep:  4+  Left Bicep:  4+  Right Knee:  4+  Left Knee:  4+    Plantar Response:    Right:  downgoing  Left:  downgoing    Clonus:  present  Cho's:  present       LABS AND IMAGING:     CBC with Differential:    Lab Results   Component Value Date    WBC 6.2 04/23/2022    RBC 3.43 04/23/2022    HGB 11.4 04/23/2022    HCT 31.0 04/23/2022     04/23/2022    MCV 90.4 04/23/2022    MCH 33.2 04/23/2022    MCHC 36.8 04/23/2022    RDW 12.4 04/23/2022    METASPCT 1 04/08/2022    LYMPHOPCT 20 04/23/2022    MONOPCT 12 04/23/2022    BASOPCT 1 04/23/2022    MONOSABS 0.73 04/23/2022    LYMPHSABS 1.21 04/23/2022    EOSABS 0.08 04/23/2022    BASOSABS 0.03 04/23/2022    DIFFTYPE NOT REPORTED 06/20/2020     BMP:    Lab Results   Component Value Date     04/23/2022    K 4.1 04/23/2022    CL 94 04/23/2022    CO2 25 04/23/2022    BUN 25 04/23/2022    LABALBU 3.6 04/23/2022    CREATININE 0.60 04/23/2022    CALCIUM 9.3 04/23/2022    GFRAA >60 04/23/2022    LABGLOM >60 04/23/2022    GLUCOSE 79 04/23/2022         ASSESSMENT AND PLAN:       Patient Active Problem List   Diagnosis    Hypertension    Hyperlipidemia    Diabetes mellitus ([address]    Contusion of right shoulder    Bipolar disorder, mixed ([address]    First known suicide attempt ([address]    Erectile dysfunction    Cervical stenosis of spinal canal    Incomplete spinal cord lesion at C5-C7 level ([address]    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)    Cervical spondylosis with radiculopathy    Acute blood loss anemia    Precordial pain    Depression with suicidal ideation    Severe recurrent major depression without psychotic features ([address]    Frequent falls    Hyponatremia    Cervical spinal cord compression (HCC)    Cord compression (HCC)    Quadriplegia, C1-C4 incomplete ([address]    Encephalopathy    Hospital-acquired pneumonia    Atelectasis    Cervical stenosis of spine    Moderate malnutrition ([address]    Chest pain    Delirium due to multiple etiologies    NSTEMI (non-ST elevated myocardial infarction) [facility])         A/P:  This is a 70 y.o. male with history of cervical myelopathy s/p C2-5 laminectomy and fusion    Patient care will be discussed with attending, will reevaluated patient along with attending.     - Dr Horn Medicine to evaluate  - follow up with Dr Natalya Fung as scheduled on 5/18      Additional recommendations may follow    Please contact neurosurgery with any changes in patients neurologic status. Thank you for your consult.        KASSIDY Velasco - CNP   NS pager [phone]  4/23/2022  3:34 PM

## 2022-04-23 NOTE — PROGRESS NOTES
Occupational Therapy  Facility/Department: [facility]  Occupational Therapy Initial Assessment    Name: Singh Lantigua. : 1950  MRN: [id number]  Date of Service: 2022    Discharge Recommendations:  Patient would benefit from continued therapy after discharge  OT Equipment Recommendations  Equipment Needed:  (Continue to assess pending progress)     Chief Complaint   Patient presents with    Chest Pain     Patient Diagnosis(es): The primary encounter diagnosis was Chest pain, unspecified type. A diagnosis of Hyponatremia was also pertinent to this visit. Past Medical History:  has a past medical history of Depression, Diabetes mellitus [address] Difficult intravenous access, Hyperlipidemia, Hypertension, Migraines, PTSD (post-traumatic stress disorder), Sleep apnea, Teeth missing, and Wears glasses. Past Surgical History:  has a past surgical history that includes Cardiac catheterization (2010); Carpal tunnel release (Left, 2002); Vasectomy (1983); Tonsillectomy and adenoidectomy (); Hydrocele surgery (); Middle ear surgery (2018); eye surgery (Left, ); Mouth surgery (2018); other surgical history (2018); cervical fusion (2018); pr office/outpt visit,procedure only (N/A, 2018); Cataract removal; laminectomy (2022); and cervical fusion (N/A, 3/24/2022). Assessment    Pt completed initial OT evaluation this date, limited by noted deficits. Pt recommended to continue acute OT to maximize safety and independence throughout functional tasks. Pt expected to require 24/7 assist upon discharge due to limited functional activity and activity tolerance unless progresses toward goals. Performance deficits / Impairments: Decreased functional mobility ; Decreased safe awareness;Decreased balance;Decreased ADL status; Decreased endurance;Decreased high-level IADLs;Decreased strength  Prognosis: Fair  Decision Making: High Complexity  Activity Tolerance  Activity Tolerance: Patient limited by pain; Patient limited by fatigue        Plan   Plan  Times per Week: 3-5x/wk  Times per Day: Daily  Current Treatment Recommendations: Strengthening,Functional mobility training,Endurance training,Pain management,Safety education & training,Patient/Caregiver education & training,Equipment evaluation, education, & procurement,Self-Care / ADL     Restrictions  Restrictions/Precautions  Restrictions/Precautions: Surgical Protocols,Fall Risk  Required Braces or Orthoses?:  (C-collar while out of bed. Ok to remove while resting In bed eating and drinking in bed)  Implants present? :  (C-spine surgery)  Required Braces or Orthoses  Cervical: c-collar (when out of bed, can remove for eating/drinking/in bed)  Spinal Other: C-collar while out of bed. Ok to remove while resting In bed eating and drinking in bed  Position Activity Restriction  Other position/activity restrictions: s/p C2-C5 laminectomy and fixation 3/24, Collar on while out of bed. Ok to remove while resting In bed eating and drinking in bed. Pt has neuro surgery consult this admission, RN to clarify again if same restricitions needs to be continued at this time. Subjective   General  Patient assessed for rehabilitation services?: Yes  Diagnosis: Pt underwent laminectomy and fusion at C2-C5 on 3/24/22 (Dr. Noble Toro). Pt was admitted to acute rehab unit at [facility] on 3/31/22. Pt transferred to acute care due to chest pain 4/15/22. Pt underwent cardiac cath with stent placement on 4/19/22, d/c to [facility], readmitted on 4/21/22 for chest pain again, underwent cardiac cath again on 4/22/22. General Comment  Comments: RN ok'd pt for therapy this date. Pt agreeable to session and pleasant/cooperative throughout.   Pre Treatment Pain Screening  Pain at present:  (Pt reports soreness to chest, abdomen, and R shoulder but did not rate.)  Intervention List: Patient able to continue with treatment;Nurse/Physician notified  Non-Pharmaceutical Pain Intervention(s): Repositioned; Therapeutic presence;Distraction; Ambulation/Increased Activity    Social/Functional History  Social/Functional History  Lives With: Other (comment) (Significant other)  Type of Home: House  Home Layout: One level  Home Access: Stairs to enter without rails  Entrance Stairs - Number of Steps: 1  Bathroom Shower/Tub: Tub/Shower unit,Shower chair with back,Curtain  Bathroom Toilet: Handicap height  Bathroom Equipment: Hand-held shower  Bathroom Accessibility: Walker accessible  Home Equipment: [employer], 4 wheeled,Reacher,Cane  Has the patient had two or more falls in the past year or any fall with injury in the past year?: Yes  Receives Help From: Family  ADL Assistance: Needs assistance (Lately needed assist, independent prior to Jan 2022)  Ambulation Assistance: Needs assistance (Cloyde Banana was walking st cane, lately difficult)  Transfer Assistance: Needs assistance (Cloyde Banana was walking st cane, lately difficult)  Active : No  Patient's  Info: Family is currently assisting with transportation, was driving until end of Jan 2022  Mode of Transportation: Car,SUV  Occupation: Retired  Additional Comments: Lately significant assisting pt with ADLs/mobility, and is primary person for homemaking chores. Objective   Vision Exceptions: Wears glasses at all times  Hearing: Exceptions to [facility]  Hearing Exceptions: Hard of hearing/hearing concerns       Safety Devices  Type of Devices: Left in bed;Gait belt;Call light within reach;Nurse notified; Patient at risk for falls  Restraints  Restraints Initially in Place: No  Balance  Sitting Balance: Minimal assistance (Static sitting EOB ~3 min with bilat UE support)  Standing Balance: Unable to assess(comment) (Not assessed.  Pt declined all further functional activity due to fatigue/pain despite maximum encouragement/education.)  Functional Mobility  Functional Mobility Comments: Not (degrees)  LUE PROM: Exceptions  L Shoulder Flex  0-180: 0-90 (Pt requesting to stop due to pain)  LUE AROM (degrees)  LUE AROM : Exceptions  LUE General AROM: Limited AROM of L shoulder, elbow, wrist. Pt is L hand dominant, but has been using R hand as dominant due to deficits. Left Hand AROM (degrees)  Left Hand AROM: WFL  RUE PROM (degrees)  RUE PROM: Exceptions  R Shoulder Flex  0-180: 0-30 (Pt requesting to stop due to pain)  RUE AROM (degrees)  RUE AROM : Exceptions  RUE General AROM: Limited AROM of R shoulder, elbow. WFL for wrist.  Right Hand AROM (degrees)  Right Hand AROM: WFL   LUE Strength  Gross LUE Strength: Exceptions  LUE Strength Comment: 2-/5 overall muscle strength  RUE Strength  Gross RUE Strength: Exceptions  RUE Strength Comment: 2-/5 overall muscle strength  Hand Dominance  Hand Dominance: Left ( Pt has had to recently become R hand dominant)    AM-PAC Score  AM-[facility] Inpatient Daily Activity Raw Score: 12 (04/23/22 1543)  AM-PAC Inpatient ADL T-Scale Score : 30.6 (04/23/22 1543)  ADL Inpatient CMS 0-100% Score: 66.57 (04/23/22 1543)  ADL Inpatient CMS G-Code Modifier : CL (04/23/22 1543)    Goals  Short Term Goals  Time Frame for Short term goals: Upon discharge, pt will  Short Term Goal 1: demo good safety awareness throughout functional tasks with min assist.  Short Term Goal 2: perform bed mobilty/functional transfers with min assist and use of LRD. Short Term Goal 3: perform self-feeding/grooming tasks with min assist and use of AE/DME PRN.   Short Term Goal 4: demo 15+ minutes activity tolerance with use of ECWS techniques PRN for increased ADL participation     Therapy Time   Individual Concurrent Group Co-treatment   Time In 1138         Time Out 1200         Minutes 22      Co-eval with PT   Timed Code Treatment Minutes: 8 Minutes     BAY Tirado/DINORAH

## 2022-04-23 NOTE — PROGRESS NOTES
[facility]  Internal Medicine Teaching Residency Program  Inpatient Daily Progress Note  ______________________________________________________________________________    Patient: Deon Starr. YOB: 1950   FXA:[id number]    Acct: [de-identified]     Room: 2006/2006-01  Admit date: 4/21/2022  Today's date: 04/23/22  Number of days in the hospital: 2    SUBJECTIVE   Admitting Diagnosis: NSTEMI (non-ST elevated myocardial infarction) [facility])  CC: chest pain     Cardiac cath yesterday showing nonobstructive CAD. Still having 8/10 chest pain. Suspect that this is msk related. Due to bilateral UE numbness and recent neck surgery will consult NSG.   ROS:  Constitutional:  negative for chills, fevers, sweats  Respiratory:  negative for cough, dyspnea on exertion, hemoptysis, shortness of breath, wheezing  Cardiovascular:  negative for chest pain, chest pressure/discomfort, lower extremity edema, palpitations  Gastrointestinal:  negative for abdominal pain, constipation, diarrhea, nausea, vomiting  Neurological:  negative for dizziness, headache    BRIEF HISTORY     The patient is a pleasant 70 y.o. male pmh T2DM, HTN, HLD, GIA admitted from [facility],   presents with a chief complaint of chest pain. Had recent stenting on 4/19 with SHY placed in LAD. Last echo with normal EF. Patient reports pain is located in his sternum and radiates to his left shoulder he also reports SOB. Currently on 4L NC.  troponins are elevated at 90, preivous baseline around 70. Sodium is slightly lower than baseline at around 124. The hyponatremia appears chronic  Patient currently still having chest pain. Pain is constant and denies any association with exertion. pain is worsen upon chest wall palpation. Patient denies any recent trauma to the area. Patient reports that he has been taking medications since his DC.  Denies any smoking, alcohol or illegal drugs     OBJECTIVE     Vital Signs:  BP (!) 149/93   Pulse 75   Temp 97.9 °F (36.6 °C) (Oral)   Resp 20   Ht 5' 3\" (1.6 m)   Wt 148 lb 8 oz (67.4 kg)   SpO2 97%   BMI 26.31 kg/m²     Temp (24hrs), Av.7 °F (36.5 °C), Min:97.4 °F (36.3 °C), Max:98.5 °F (36.9 °C)    In: -   Out: 715 [Urine:715]    Physical Exam:  Physical Exam  Constitutional:       General: He is not in acute distress. Appearance: Normal appearance. He is normal weight. He is ill-appearing. He is not toxic-appearing or diaphoretic. HENT:      Mouth/Throat:      Mouth: Mucous membranes are moist.      Pharynx: Oropharynx is clear. Eyes:      General: No scleral icterus. Cardiovascular:      Rate and Rhythm: Normal rate and regular rhythm. Pulses: Normal pulses. Heart sounds: Normal heart sounds. No murmur heard. No friction rub. No gallop. Pulmonary:      Effort: Respiratory distress present. Breath sounds: Normal breath sounds. Abdominal:      General: Abdomen is flat. Bowel sounds are normal. There is no distension. Palpations: Abdomen is soft. There is no mass. Tenderness: There is no abdominal tenderness. There is no guarding or rebound. Hernia: No hernia is present. Musculoskeletal:         General: No swelling, tenderness, deformity or signs of injury. Right lower leg: No edema. Left lower leg: No edema. Skin:     General: Skin is warm and dry. Coloration: Skin is not jaundiced or pale. Findings: No bruising. Neurological:      General: No focal deficit present. Mental Status: He is alert and oriented to person, place, and time. Cranial Nerves: No cranial nerve deficit. Motor: No weakness. Psychiatric:         Mood and Affect: Mood normal.         Behavior: Behavior normal.         Thought Content:  Thought content normal.         Judgment: Judgment normal.           Medications:  Scheduled Medications:    isosorbide mononitrate  30 mg Oral Nightly    [START ON 2022] pantoprazole  40 mg Oral QAM AC    lidocaine  1 patch TransDERmal Daily    gabapentin  100 mg Oral BID    carvedilol  12.5 mg Oral BID    losartan  25 mg Oral Daily    senna  1 tablet Oral Nightly    venlafaxine  75 mg Oral TID WC    sodium chloride flush  5-40 mL IntraVENous 2 times per day    colchicine  0.3 mg Oral Every Other Day    aspirin  81 mg Oral Daily    atorvastatin  40 mg Oral Daily    clopidogrel  75 mg Oral Daily    divalproex  500 mg Oral Nightly    hydrALAZINE  25 mg Oral TID    sodium chloride flush  5-40 mL IntraVENous 2 times per day    insulin lispro  0-18 Units SubCUTAneous TID WC    insulin lispro  0-9 Units SubCUTAneous Nightly     Continuous Infusions:    sodium chloride      sodium chloride      dextrose       PRN Medicationsbisacodyl, 10 mg, Daily PRN  sodium chloride flush, 5-40 mL, PRN  sodium chloride, , PRN  sodium chloride flush, 5-40 mL, PRN  sodium chloride, , PRN  ondansetron, 4 mg, Q8H PRN   Or  ondansetron, 4 mg, Q6H PRN  polyethylene glycol, 17 g, Daily PRN  acetaminophen, 650 mg, Q6H PRN   Or  acetaminophen, 650 mg, Q6H PRN  glucose, 15 g, PRN  dextrose, 12.5 g, PRN  glucagon (rDNA), 1 mg, PRN  dextrose, 100 mL/hr, PRN  fentanNYL, 25 mcg, Q6H PRN  morphine, 1 mg, Q4H PRN        Diagnostic Labs:  CBC:   Recent Labs     04/21/22  1614 04/22/22  0531 04/23/22  0624   WBC 6.2 6.2 6.2   RBC 3.57* 3.47* 3.43*   HGB 11.4* 11.5* 11.4*   HCT 32.0* 32.2* 31.0*   MCV 89.6 92.8 90.4   RDW 12.3 12.4 12.4    250 236     BMP:   Recent Labs     04/21/22  0826 04/22/22  0000 04/22/22  0531 04/22/22  0531 04/22/22  1105 04/22/22  2327 04/23/22  0624   *   < > 130*   < > 134* 130* 131*   K 4.2  --  4.3  --   --   --  4.1   CL 89*  --  92*  --   --   --  94*   CO2 25  --  26  --   --   --  25   BUN 17  --  17  --   --   --  25*   CREATININE 0.59*  --  0.64*  --   --   --  0.60*    < > = values in this interval not displayed.      BNP: No results for input(s): BNP in the last 72 hours.  PT/INR: No results for input(s): PROTIME, INR in the last 72 hours. APTT:   Recent Labs     04/22/22  1105 04/22/22  2328 04/23/22  0624   APTT 28.2 25.1 25.0     CARDIAC ENZYMES: No results for input(s): CKMB, CKMBINDEX, TROPONINI in the last 72 hours. Invalid input(s): CKTOTAL;3  FASTING LIPID PANEL:  Lab Results   Component Value Date    CHOL 104 04/12/2018    HDL 42 04/12/2018    TRIG 92 04/12/2018     LIVER PROFILE:   Recent Labs     04/23/22  0624   AST 21   ALT 28   BILITOT 0.41   ALKPHOS 115      MICROBIOLOGY:   Lab Results   Component Value Date/Time    CULTURE NO GROWTH 03/28/2022 06:06 PM       Imaging:    CT ABDOMEN PELVIS W IV CONTRAST Additional Contrast? Radiologist Recommendation    Result Date: 4/22/2022  1. No acute findings in the abdomen or pelvis. 2. High attenuation material in the gallbladder lumen which may be related to stones, sludge, versus vicarious excretion of contrast. 3. Cardiomegaly with small pericardial effusion. Trace bilateral pleural effusions with associated areas of atelectasis in the lung bases, left side greater than right. 4. Mildly enlarged prostate gland. US GALLBLADDER RUQ    Result Date: 4/22/2022  Small amount of sludge and/or gravel within the gallbladder lumen, which has the appearance of small stones on recent CT imaging. Otherwise unremarkable ultrasound of the right upper quadrant of the abdomen. XR CHEST PORTABLE    Result Date: 4/21/2022  Very mild residual bibasilar opacities. CT CHEST PULMONARY EMBOLISM W CONTRAST    Result Date: 4/21/2022  1. No evidence for acute pulmonary embolism. 2. Small bilateral pleural effusion left greater than right along with patchy bibasilar consolidations also more pronounced on the left. Compatible with atelectasis with or without component of pneumonia. 3. Unexplained trace of fluid and fat stranding along anterior inferior aspect of spleen.   Dedicated CT abdomen pelvis with contrast recommended to exclude any significant pathology. ASSESSMENT & PLAN     Assessment and Plan:    Principal Problem:    NSTEMI (non-ST elevated myocardial infarction) ([facility] [address]  Active Problems:    Hypertension    Hyperlipidemia    Diabetes mellitus ([facility] [address]    Depression with suicidal ideation    Chest pain  Resolved Problems:    * No resolved hospital problems. *    Nstemi  Nonobstructive CAD   - cardio consulted  - heparin gtt   - nitro gtt discotinued   - pain and nasusea control   - continue to trend trops currently uptrenidng  - cath showed nonobstructive CAD      Hyponatremia  - he usually has 128 sodium  - today is 124  - most likely due to psych medications. Will hold effexor due to concerns for SIADH. - continue to monitor      T2DM  - currently takes metformin  - will start him on low dose SSI   - mointor blood glucose     HTN  - continue coreg, hydralazine at home dose     CAD  - s/p SHY x1 to LAD  -continue plavix and ASA     HLD  - continue lipitor     Cervical stenosis  - s/p cervical fusion 3/24/22  - still having UE bilateral numbness today  - nsg consulted           Rodo Lerma MD  Internal Medicine Resident  [address] [facility]  4/23/2022 11:19 AM   Attending Physician Statement  I have discussed the care of Teresa West, including pertinent history and exam findings,  with the resident. I have seen and examined the patient and the key elements of all parts of the encounter have been performed by me. I agree with the assessment, plan and orders as documented by the resident with additions . Treatment plan Discussed with nursing staff in detail , all questions answered . Electronically signed by Corina Farnsworth MD on   4/23/22 at 12:29 PM EDT    Please note that this chart was generated using voice recognition Dragon dictation software. Although every effort was made to ensure the accuracy of this automated transcription, some errors in transcription may have occurred.

## 2022-04-24 LAB
ABSOLUTE EOS #: 0.14 K/UL (ref 0–0.44)
ABSOLUTE IMMATURE GRANULOCYTE: 0.06 K/UL (ref 0–0.3)
ABSOLUTE LYMPH #: 1.23 K/UL (ref 1.1–3.7)
ABSOLUTE MONO #: 0.87 K/UL (ref 0.1–1.2)
ANION GAP SERPL CALCULATED.3IONS-SCNC: 13 MMOL/L (ref 9–17)
BASOPHILS # BLD: 0 % (ref 0–2)
BASOPHILS ABSOLUTE: 0.03 K/UL (ref 0–0.2)
BUN BLDV-MCNC: 28 MG/DL (ref 8–23)
CALCIUM SERPL-MCNC: 9.4 MG/DL (ref 8.6–10.4)
CHLORIDE BLD-SCNC: 94 MMOL/L (ref 98–107)
CO2: 25 MMOL/L (ref 20–31)
CREAT SERPL-MCNC: 0.64 MG/DL (ref 0.7–1.2)
EOSINOPHILS RELATIVE PERCENT: 2 % (ref 1–4)
GFR AFRICAN AMERICAN: >60 ML/MIN
GFR NON-AFRICAN AMERICAN: >60 ML/MIN
GFR SERPL CREATININE-BSD FRML MDRD: ABNORMAL ML/MIN/{1.73_M2}
GLUCOSE BLD-MCNC: 100 MG/DL (ref 70–99)
GLUCOSE BLD-MCNC: 103 MG/DL (ref 75–110)
GLUCOSE BLD-MCNC: 108 MG/DL (ref 75–110)
GLUCOSE BLD-MCNC: 113 MG/DL (ref 75–110)
HCT VFR BLD CALC: 31.3 % (ref 40.7–50.3)
HEMOGLOBIN: 10.9 G/DL (ref 13–17)
IMMATURE GRANULOCYTES: 1 %
LYMPHOCYTES # BLD: 15 % (ref 24–43)
MCH RBC QN AUTO: 32 PG (ref 25.2–33.5)
MCHC RBC AUTO-ENTMCNC: 34.8 G/DL (ref 28.4–34.8)
MCV RBC AUTO: 91.8 FL (ref 82.6–102.9)
MONOCYTES # BLD: 11 % (ref 3–12)
NRBC AUTOMATED: 0 PER 100 WBC
PDW BLD-RTO: 12.6 % (ref 11.8–14.4)
PLATELET # BLD: 250 K/UL (ref 138–453)
PMV BLD AUTO: 9.5 FL (ref 8.1–13.5)
POTASSIUM SERPL-SCNC: 4.3 MMOL/L (ref 3.7–5.3)
RBC # BLD: 3.41 M/UL (ref 4.21–5.77)
SEG NEUTROPHILS: 71 % (ref 36–65)
SEGMENTED NEUTROPHILS ABSOLUTE COUNT: 5.82 K/UL (ref 1.5–8.1)
SODIUM BLD-SCNC: 129 MMOL/L (ref 135–144)
SODIUM BLD-SCNC: 130 MMOL/L (ref 135–144)
SODIUM BLD-SCNC: 132 MMOL/L (ref 135–144)
TROPONIN, HIGH SENSITIVITY: 101 NG/L (ref 0–22)
TROPONIN, HIGH SENSITIVITY: 81 NG/L (ref 0–22)
TROPONIN, HIGH SENSITIVITY: 87 NG/L (ref 0–22)
TROPONIN, HIGH SENSITIVITY: 95 NG/L (ref 0–22)
TROPONIN, HIGH SENSITIVITY: 96 NG/L (ref 0–22)
TROPONIN, HIGH SENSITIVITY: 99 NG/L (ref 0–22)
WBC # BLD: 8.2 K/UL (ref 3.5–11.3)

## 2022-04-24 PROCEDURE — 6370000000 HC RX 637 (ALT 250 FOR IP): Performed by: STUDENT IN AN ORGANIZED HEALTH CARE EDUCATION/TRAINING PROGRAM

## 2022-04-24 PROCEDURE — 2580000003 HC RX 258: Performed by: STUDENT IN AN ORGANIZED HEALTH CARE EDUCATION/TRAINING PROGRAM

## 2022-04-24 PROCEDURE — 82947 ASSAY GLUCOSE BLOOD QUANT: CPT

## 2022-04-24 PROCEDURE — 1200000000 HC SEMI PRIVATE

## 2022-04-24 PROCEDURE — 99232 SBSQ HOSP IP/OBS MODERATE 35: CPT | Performed by: INTERNAL MEDICINE

## 2022-04-24 PROCEDURE — 84484 ASSAY OF TROPONIN QUANT: CPT

## 2022-04-24 PROCEDURE — 36415 COLL VENOUS BLD VENIPUNCTURE: CPT

## 2022-04-24 PROCEDURE — 80048 BASIC METABOLIC PNL TOTAL CA: CPT

## 2022-04-24 PROCEDURE — 6370000000 HC RX 637 (ALT 250 FOR IP)

## 2022-04-24 PROCEDURE — 85025 COMPLETE CBC W/AUTO DIFF WBC: CPT

## 2022-04-24 PROCEDURE — 6370000000 HC RX 637 (ALT 250 FOR IP): Performed by: NURSE PRACTITIONER

## 2022-04-24 PROCEDURE — 84295 ASSAY OF SERUM SODIUM: CPT

## 2022-04-24 RX ORDER — HYDRALAZINE HYDROCHLORIDE 50 MG/1
50 TABLET, FILM COATED ORAL 3 TIMES DAILY
Status: DISCONTINUED | OUTPATIENT
Start: 2022-04-24 | End: 2022-04-25 | Stop reason: HOSPADM

## 2022-04-24 RX ORDER — LOSARTAN POTASSIUM 50 MG/1
50 TABLET ORAL DAILY
Status: DISCONTINUED | OUTPATIENT
Start: 2022-04-24 | End: 2022-04-25 | Stop reason: HOSPADM

## 2022-04-24 RX ORDER — LIDOCAINE 4 G/G
2 PATCH TOPICAL DAILY
Status: DISCONTINUED | OUTPATIENT
Start: 2022-04-24 | End: 2022-04-25 | Stop reason: HOSPADM

## 2022-04-24 RX ADMIN — CLOPIDOGREL 75 MG: 75 TABLET, FILM COATED ORAL at 09:05

## 2022-04-24 RX ADMIN — SENNOSIDES 8.6 MG: 8.6 TABLET, COATED ORAL at 21:17

## 2022-04-24 RX ADMIN — COLCHICINE 0.3 MG: 0.6 TABLET, FILM COATED ORAL at 09:05

## 2022-04-24 RX ADMIN — HYDRALAZINE HYDROCHLORIDE 50 MG: 50 TABLET, FILM COATED ORAL at 15:39

## 2022-04-24 RX ADMIN — HYDRALAZINE HYDROCHLORIDE 50 MG: 50 TABLET, FILM COATED ORAL at 21:17

## 2022-04-24 RX ADMIN — SODIUM CHLORIDE, PRESERVATIVE FREE 10 ML: 5 INJECTION INTRAVENOUS at 09:05

## 2022-04-24 RX ADMIN — VENLAFAXINE 75 MG: 75 TABLET ORAL at 09:06

## 2022-04-24 RX ADMIN — ACETAMINOPHEN 650 MG: 325 TABLET ORAL at 03:14

## 2022-04-24 RX ADMIN — CARVEDILOL 12.5 MG: 12.5 TABLET, FILM COATED ORAL at 21:17

## 2022-04-24 RX ADMIN — GABAPENTIN 100 MG: 100 CAPSULE ORAL at 21:17

## 2022-04-24 RX ADMIN — LOSARTAN POTASSIUM 50 MG: 50 TABLET, FILM COATED ORAL at 10:45

## 2022-04-24 RX ADMIN — CARVEDILOL 12.5 MG: 12.5 TABLET, FILM COATED ORAL at 09:05

## 2022-04-24 RX ADMIN — HYDRALAZINE HYDROCHLORIDE 50 MG: 50 TABLET, FILM COATED ORAL at 09:05

## 2022-04-24 RX ADMIN — GABAPENTIN 100 MG: 100 CAPSULE ORAL at 09:05

## 2022-04-24 RX ADMIN — DIVALPROEX SODIUM 500 MG: 500 TABLET, EXTENDED RELEASE ORAL at 21:17

## 2022-04-24 RX ADMIN — VENLAFAXINE 75 MG: 75 TABLET ORAL at 18:55

## 2022-04-24 RX ADMIN — DESMOPRESSIN ACETATE 40 MG: 0.2 TABLET ORAL at 09:05

## 2022-04-24 RX ADMIN — PANTOPRAZOLE SODIUM 40 MG: 40 TABLET, DELAYED RELEASE ORAL at 06:00

## 2022-04-24 RX ADMIN — ASPIRIN 81 MG: 81 TABLET, CHEWABLE ORAL at 09:05

## 2022-04-24 RX ADMIN — ONDANSETRON 4 MG: 4 TABLET, ORALLY DISINTEGRATING ORAL at 03:15

## 2022-04-24 RX ADMIN — VENLAFAXINE 75 MG: 75 TABLET ORAL at 14:13

## 2022-04-24 RX ADMIN — ISOSORBIDE MONONITRATE 30 MG: 30 TABLET ORAL at 21:17

## 2022-04-24 ASSESSMENT — PAIN SCALES - GENERAL
PAINLEVEL_OUTOF10: 4
PAINLEVEL_OUTOF10: 0

## 2022-04-24 ASSESSMENT — PAIN DESCRIPTION - DESCRIPTORS: DESCRIPTORS: ACHING;SHARP

## 2022-04-24 ASSESSMENT — PAIN DESCRIPTION - ORIENTATION: ORIENTATION: LEFT;RIGHT

## 2022-04-24 ASSESSMENT — PAIN SCALES - WONG BAKER: WONGBAKER_NUMERICALRESPONSE: 0

## 2022-04-24 NOTE — PLAN OF CARE
Problem: Discharge Planning  Goal: Discharge to home or other facility with appropriate resources  4/24/2022 1015 by Nohemy Brown RN  Outcome: Progressing  4/24/2022 0200 by Rodger Manjarrez RN  Outcome: Progressing     Problem: Chronic Conditions and Co-morbidities  Goal: Patient's chronic conditions and co-morbidity symptoms are monitored and maintained or improved  4/24/2022 1015 by Nohemy Brown RN  Outcome: Progressing  4/24/2022 0200 by Rodger Manjarrez RN  Outcome: Progressing     Problem: Pain  Goal: Verbalizes/displays adequate comfort level or baseline comfort level  4/24/2022 1015 by Nohemy Brown RN  Outcome: [place] [facility]  4/24/2022 0200 by Rodger Manjarrez RN  Outcome: Progressing     Problem: Skin/Tissue Integrity  Goal: Absence of new skin breakdown  Description: 1. Monitor for areas of redness and/or skin breakdown  2. Assess vascular access sites hourly  3. Every 4-6 hours minimum:  Change oxygen saturation probe site  4. Every 4-6 hours:  If on nasal continuous positive airway pressure, respiratory therapy assess nares and determine need for appliance change or resting period.   4/24/2022 1015 by Nohemy Brown RN  Outcome: Progressing  4/24/2022 0200 by Rodger Manjarrez RN  Outcome: Progressing     Problem: Safety - Adult  Goal: Free from fall injury  4/24/2022 1015 by Nohemy Brown RN  Outcome: Progressing  4/24/2022 0200 by Rodger Manjarrez RN  Outcome: Progressing     Problem: ABCDS Injury Assessment  Goal: Absence of physical injury  Outcome: Progressing

## 2022-04-24 NOTE — PROGRESS NOTES
SPIRITUAL CARE DEPARTMENT - [address]  PROGRESS NOTE    Shift date: 4/23/2022  Shift day: Saturday    Shift # 2     Room # 2006/2006-01   Name: Andres Mendiola Age: 70 y.o. Gender: male          Yazidism: [address] [employer]:      Referral: Routine Visit    Admit Date & Time: 4/21/2022  7:35 AM    PATIENT/EVENT DESCRIPTION:  Andres Mendiola is a 70 y.o. 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 SPIRITUAL ASSESSMENT/INTERVENTION:   provided space for patient to share concerns and recent challenges.  engaged in compassionate\active listening and facilitated emotionalrelease. Patient appeared burdened by the many tragic things in his life at present.  inquired of pt's support network and spiritual\relgious beliefs.  offered a prayer for his well being and peace. SPIRITUAL CARE FOLLOW-UP PLAN:  Chaplains will remain available to offer spiritual and emotional support as needed.          Electronically signed by Chaplain Resident Rosina Agustin, [address] 4/23/2022 at 8:04 PM   [address] [place] [address]  [phone]

## 2022-04-24 NOTE — PROGRESS NOTES
[facility]  Internal Medicine Teaching Residency Program  Inpatient Daily Progress Note  ______________________________________________________________________________    Patient: Hank Chiang. YOB: 1950   OPK:[id number]    Acct: [de-identified]     Room: 2006/2006-01  Admit date: 4/21/2022  Today's date: 04/24/22  Number of days in the hospital: 3    SUBJECTIVE   Admitting Diagnosis: NSTEMI (non-ST elevated myocardial infarction) ([facility] [address]  CC: chest pain   No significant events over night  Vitals are stable  Pain has improved    ROS:  Constitutional:  negative for chills, fevers, sweats  Respiratory:  negative for cough, dyspnea on exertion, hemoptysis, shortness of breath, wheezing  Cardiovascular:  negative for chest pain, chest pressure/discomfort, lower extremity edema, palpitations  Gastrointestinal:  negative for abdominal pain, constipation, diarrhea, nausea, vomiting  Neurological:  negative for dizziness, headache    BRIEF HISTORY     The patient is a pleasant 70 y.o. male pmh T2DM, HTN, HLD, GIA admitted from [facility],   presents with a chief complaint of chest pain. Had recent stenting on 4/19 with SHY placed in LAD. Last echo with normal EF. Patient reports pain is located in his sternum and radiates to his left shoulder he also reports SOB. Currently on 4L NC.  troponins are elevated at 90, preivous baseline around 70. Sodium is slightly lower than baseline at around 124. The hyponatremia appears chronic  Patient currently still having chest pain. Pain is constant and denies any association with exertion. pain is worsen upon chest wall palpation. Patient denies any recent trauma to the area. Patient reports that he has been taking medications since his DC.  Denies any smoking, alcohol or illegal drugs     OBJECTIVE     Vital Signs:  BP (!) 160/88   Pulse 74   Temp 97.8 °F (36.6 °C) (Oral)   Resp 25   Ht 5' 3\" (1.6 m)   Wt 151 lb 3.2 oz (68.6 kg) SpO2 97%   BMI 26.78 kg/m²     Temp (24hrs), Av °F (36.7 °C), Min:97.5 °F (36.4 °C), Max:98.8 °F (37.1 °C)    In: -   Out: 100 [Urine:100]    Physical Exam:  Physical Exam  Constitutional:       General: He is in acute distress (in mild distress due to pain). Appearance: Normal appearance. He is normal weight. He is not ill-appearing, toxic-appearing or diaphoretic. HENT:      Head: Normocephalic and atraumatic. Mouth/Throat:      Mouth: Mucous membranes are moist.      Pharynx: Oropharynx is clear. Eyes:      General: No scleral icterus. Extraocular Movements: Extraocular movements intact. Pupils: Pupils are equal, round, and reactive to light. Cardiovascular:      Rate and Rhythm: Normal rate and regular rhythm. Pulses: Normal pulses. Heart sounds: Normal heart sounds. No murmur heard. No friction rub. No gallop. Pulmonary:      Effort: Pulmonary effort is normal. No respiratory distress. Breath sounds: Normal breath sounds. No stridor. Abdominal:      General: Abdomen is flat. Bowel sounds are normal. There is no distension. Palpations: Abdomen is soft. There is no mass. Tenderness: There is no abdominal tenderness. There is no guarding or rebound. Hernia: No hernia is present. Musculoskeletal:         General: No swelling, tenderness, deformity or signs of injury. Cervical back: Rigidity present. Right lower leg: No edema. Left lower leg: No edema. Skin:     General: Skin is warm and dry. Capillary Refill: Capillary refill takes less than 2 seconds. Coloration: Skin is not jaundiced or pale. Findings: No bruising. Neurological:      General: No focal deficit present. Mental Status: He is alert and oriented to person, place, and time. Mental status is at baseline. Cranial Nerves: No cranial nerve deficit. Sensory: No sensory deficit. Motor: No weakness.       Coordination: Coordination normal. Psychiatric:         Mood and Affect: Mood normal.         Behavior: Behavior normal.       Medications:  Scheduled Medications:    lidocaine  2 patch TransDERmal Daily    hydrALAZINE  50 mg Oral TID    losartan  50 mg Oral Daily    isosorbide mononitrate  30 mg Oral Nightly    pantoprazole  40 mg Oral QAM AC    gabapentin  100 mg Oral BID    carvedilol  12.5 mg Oral BID    senna  1 tablet Oral Nightly    venlafaxine  75 mg Oral TID WC    sodium chloride flush  5-40 mL IntraVENous 2 times per day    colchicine  0.3 mg Oral Every Other Day    aspirin  81 mg Oral Daily    atorvastatin  40 mg Oral Daily    clopidogrel  75 mg Oral Daily    divalproex  500 mg Oral Nightly    sodium chloride flush  5-40 mL IntraVENous 2 times per day    insulin lispro  0-18 Units SubCUTAneous TID WC    insulin lispro  0-9 Units SubCUTAneous Nightly     Continuous Infusions:    sodium chloride      sodium chloride      dextrose       PRN Medicationsartificial tears, , PRN  bisacodyl, 10 mg, Daily PRN  sodium chloride flush, 5-40 mL, PRN  sodium chloride, , PRN  sodium chloride flush, 5-40 mL, PRN  sodium chloride, , PRN  ondansetron, 4 mg, Q8H PRN   Or  ondansetron, 4 mg, Q6H PRN  polyethylene glycol, 17 g, Daily PRN  acetaminophen, 650 mg, Q6H PRN   Or  acetaminophen, 650 mg, Q6H PRN  glucose, 15 g, PRN  dextrose, 12.5 g, PRN  glucagon (rDNA), 1 mg, PRN  dextrose, 100 mL/hr, PRN  fentanNYL, 25 mcg, Q6H PRN  morphine, 1 mg, Q4H PRN        Diagnostic Labs:  CBC:   Recent Labs     04/22/22  0531 04/23/22  0624 04/24/22  0544   WBC 6.2 6.2 8.2   RBC 3.47* 3.43* 3.41*   HGB 11.5* 11.4* 10.9*   HCT 32.2* 31.0* 31.3*   MCV 92.8 90.4 91.8   RDW 12.4 12.4 12.6    236 250     BMP:   Recent Labs     04/22/22  0531 04/22/22  1105 04/23/22  0624 04/23/22  0624 04/23/22  1047 04/23/22  2149 04/24/22  0544   *   < > 131*   < > 130* 130* 132*   K 4.3  --  4.1  --   --   --  4.3   CL 92*  --  94*  --   --   --  94* CO2 26  --  25  --   --   --  25   BUN 17  --  25*  --   --   --  28*   CREATININE 0.64*  --  0.60*  --   --   --  0.64*    < > = values in this interval not displayed. BNP: No results for input(s): BNP in the last 72 hours. PT/INR: No results for input(s): PROTIME, INR in the last 72 hours. APTT:   Recent Labs     04/22/22  1105 04/22/22  2328 04/23/22  0624   APTT 28.2 25.1 25.0     CARDIAC ENZYMES: No results for input(s): CKMB, CKMBINDEX, TROPONINI in the last 72 hours. Invalid input(s): CKTOTAL;3  FASTING LIPID PANEL:  Lab Results   Component Value Date    CHOL 104 04/12/2018    HDL 42 04/12/2018    TRIG 92 04/12/2018     LIVER PROFILE:   Recent Labs     04/23/22  0624   AST 21   ALT 28   BILITOT 0.41   ALKPHOS 115      MICROBIOLOGY:   Lab Results   Component Value Date/Time    CULTURE NO GROWTH 03/28/2022 06:06 PM       Imaging:    CT ABDOMEN PELVIS W IV CONTRAST Additional Contrast? Radiologist Recommendation    Result Date: 4/22/2022  1. No acute findings in the abdomen or pelvis. 2. High attenuation material in the gallbladder lumen which may be related to stones, sludge, versus vicarious excretion of contrast. 3. Cardiomegaly with small pericardial effusion. Trace bilateral pleural effusions with associated areas of atelectasis in the lung bases, left side greater than right. 4. Mildly enlarged prostate gland. US GALLBLADDER RUQ    Result Date: 4/22/2022  Small amount of sludge and/or gravel within the gallbladder lumen, which has the appearance of small stones on recent CT imaging. Otherwise unremarkable ultrasound of the right upper quadrant of the abdomen. XR CHEST PORTABLE    Result Date: 4/21/2022  Very mild residual bibasilar opacities. CT CHEST PULMONARY EMBOLISM W CONTRAST    Result Date: 4/21/2022  1. No evidence for acute pulmonary embolism.  2. Small bilateral pleural effusion left greater than right along with patchy bibasilar consolidations also more pronounced on the left.  Compatible with atelectasis with or without component of pneumonia. 3. Unexplained trace of fluid and fat stranding along anterior inferior aspect of spleen. Dedicated CT abdomen pelvis with contrast recommended to exclude any significant pathology. ASSESSMENT & PLAN     Assessment and Plan:    Principal Problem:    NSTEMI (non-ST elevated myocardial infarction) ([facility] [address]  Active Problems:    Hypertension    Hyperlipidemia    Diabetes mellitus ([facility] [address]    Depression with suicidal ideation    Chest pain  Resolved Problems:    * No resolved hospital problems. *    Atypical Chest pain:  -Patient got heart cath  -Non obstructive CAD on heart cath  -Continue aspirin 81 mg daily, Lipitor 40 mg daily, Coreg 12.5 mg BID, Plavix 75 mg daily  And Imdur 30 mg daily  -Out patient follow up with cardiology  -Patient is off of the nitro and heparin drips     Hyponatremia:  - he usually has 128 sodium  - today is 132  -Continue to monitor    T2DM  - currently takes metformin  - will start him on low dose SSI   - mointor blood glucose     HTN  - continue coreg, hydralazine at home dose     CAD  - s/p SHY x1 to LAD  -continue plavix and ASA     HLD  - continue lipitor     Cervical stenosis  - s/p cervical fusion 3/24/22  - still having UE bilateral numbness today  - nsg consulted         Joey Choi MD  Internal Medicine Resident, PGY-2  [id number] [facility].  4/24/2022 9:02 AM   Attending Physician Statement  I have discussed the care of Liliana Dean., including pertinent history and exam findings,  with the resident. I have seen and examined the patient and the key elements of all parts of the encounter have been performed by me. I agree with the assessment, plan and orders as documented by the resident with additions . Treatment plan Discussed with nursing staff in detail , all questions answered .    Electronically signed by Sebastian Horvath MD on   4/24/22 at 11:10 AM EDT    Please note that this chart was generated using voice recognition Dragon dictation software. Although every effort was made to ensure the accuracy of this automated transcription, some errors in transcription may have occurred.

## 2022-04-24 NOTE — PLAN OF CARE

## 2022-04-24 NOTE — PROGRESS NOTES
Pt requesting lubricant eye gtts and lidocaine patches for shoulders per home routine. MD notified of requests, new orders noted.

## 2022-04-24 NOTE — DISCHARGE INSTR - COC
Continuity of Care Form    Patient Name: Teresa West    :  1950  MRN:  [id number]    Admit date:  2022  Discharge date:  2022    Code Status Order: DNR-CCA   Advance Directives:      Admitting Physician:  Corina Farnsworth MD  PCP: Cleveland Montaño    Discharging Nurse: Ayleen Kingsley Unit/Room#:   Discharging Unit Phone Number: [phone]    Emergency Contact:   Extended Emergency Contact Information  Primary Emergency Contact: Roney Horvath [employer] [facility])  Home Phone: [phone]  Mobile Phone: [phone]  Relation: Unknown  Secondary Emergency Contact: Abbi Vee ([facility])  Home Phone: [phone]  Relation: Child    Past Surgical History:  Past Surgical History:   Procedure Laterality Date    CARDIAC CATHETERIZATION  2010    no stents     CARPAL TUNNEL RELEASE Left 2002    CATARACT REMOVAL      CERVICAL FUSION  2018    anterior cervical fusion C5-6    CERVICAL FUSION N/A 3/24/2022    C2-5 FUSION, C2-4 LAMINECTOMY performed by Madeline Villasenor DO at [address] [place] [address] Left     CATARACT EXTRACTION [address]  2022    C2-5 FUSION, C2-4 LAMINECTOMY    MIDDLE EAR SURGERY  2018    MIDDLE EAR REBUILT AND EAR DRUM REPLACED    MOUTH SURGERY  2018    5 TEETH REMOVED    OTHER SURGICAL HISTORY  2018    : ANTERIOR CERVICAL CORRPECTOMY C5-6, SYNTHES, DEPUY, REG TABLE, SUPINE,     AK OFFICE/OUTPT VISIT,PROCEDURE ONLY N/A 2018    ANTERIOR CERVICAL CORRPECTOMY AND FUSION C5-6 performed by Madeline Villasenor DO at [place] [address]  1983       Immunization History:   Immunization History   Administered Date(s) Administered    Influenza Vaccine, unspecified formulation 10/26/2011, 2012, 2014, 2016    Tdap (Boostrix, Adacel) 2007, 2017    Zoster Live (Zostavax) 2016       Active Problems:  Patient Active Problem List Diagnosis Code    Hypertension I10    Hyperlipidemia E78.5    Diabetes mellitus ([facility] E11.9    Contusion of right shoulder S40.011A    Bipolar disorder, mixed ([facility] F31.60    First known suicide attempt ([facility] T14.91XA    Erectile dysfunction N52.9    Cervical stenosis of spinal canal M48.02    Incomplete spinal cord lesion at C5-C7 level ([facility] S14.155A    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) M48.02, G99.2    Cervical spondylosis with radiculopathy M47.22    Acute blood loss anemia D62    Precordial pain R07.2    Depression with suicidal ideation F32. A, R45.851    Severe recurrent major depression without psychotic features ([facility]) F33.2    Frequent falls R29.6    Hyponatremia E87.1    Cervical spinal cord compression (HCC) G95.20    Cord compression ([facility]) G95.20    Quadriplegia, C1-C4 incomplete ([facility]) G82.52    Encephalopathy G93.40    Hospital-acquired pneumonia J18.9, Y95    Atelectasis J98.11    Cervical stenosis of spine M48.02    Moderate malnutrition ([facility]) E44.0    Chest pain R07.9    Delirium due to multiple etiologies F05    NSTEMI (non-ST elevated myocardial infarction) ([facility]) I21.4       Isolation/Infection:   Isolation            No Isolation          Patient Infection Status       None to display            Nurse Assessment:  Last Vital Signs: /68   Pulse 62   Temp 97.6 °F (36.4 °C) (Oral)   Resp 18   Ht 5' 3\" (1.6 m)   Wt 151 lb 3.2 oz (68.6 kg)   SpO2 98%   BMI 26.78 kg/m²     Last documented pain score (0-10 scale): Pain Level: 4  Last Weight:   Wt Readings from Last 1 Encounters:   04/24/22 151 lb 3.2 oz (68.6 kg)     Mental Status:  oriented and alert    IV Access:  - None    Nursing Mobility/ADLs:  Walking   Dependent  Transfer  Assisted  Bathing  Dependent  Dressing  Dependent  Toileting  Dependent  Feeding  Dependent  Med Admin  Dependent  Med Delivery   whole    Wound Care Documentation and Therapy:  Incision 04/19/18 Neck (Active)   Number of days: 1466       Incision 04/29/18 Back Mid;Lower (Active)   Number of days: 1456       Wound 03/20/22 Arm Left (Active)   Wound Etiology Other 04/19/22 0822   Dressing Status Other (Comment) 04/19/22 0822   Dressing/Treatment Open to air 04/19/22 0822   Wound Assessment Pink/red 04/19/22 0822   Drainage Amount None 04/19/22 0822   Odor None 04/18/22 0830   Nereida-wound Assessment Fragile 04/18/22 0830   Number of days: 34       Wound Knee Anterior; Left (Active)   Wound Etiology Other 04/19/22 0822   Dressing Status Other (Comment) 04/19/22 0822   Wound Cleansed Not Cleansed 04/19/22 0822   Dressing/Treatment Open to air 04/19/22 0822   Wound Assessment Dry 04/19/22 0822   Drainage Amount None 04/19/22 0822   Odor None 04/18/22 0830   Nereida-wound Assessment Dry/flaky 04/18/22 0830   Margins Attached edges 04/18/22 0830   Number of days:        Wound 04/06/22 Toe (Comment  which one) Anterior; Left scabbed over (Active)   Wound Etiology Other 04/19/22 0822   Dressing Status Other (Comment) 04/18/22 0830   Dressing/Treatment Open to air 04/19/22 0822   Wound Assessment Dry 04/19/22 0822   Drainage Amount None 04/11/22 0942   Odor None 04/11/22 0942   Margins Attached edges 04/18/22 0830   Number of days: 18       Incision 03/24/22 Neck Posterior (Active)   Dressing Status Clean;Dry; Intact 04/24/22 0800   Incision Cleansed Not Cleansed 04/24/22 0800   Dressing/Treatment Steri-strips 04/22/22 1920   Closure Steri-Strips 04/22/22 1920   Margins Approximated 04/22/22 1600   Incision Assessment Dry 04/24/22 0800   Drainage Amount None 04/24/22 0800   Drainage Description Serosanguinous 03/30/22 1145   Odor None 04/24/22 0800   Nereida-incision Assessment Intact 04/24/22 0800   Number of days: 31        Elimination:  Continence:    Bowel: Yes  Bladder: Yes  Urinary Catheter: None   Colostomy/Ileostomy/Ileal Conduit: No       Date of Last BM:04/25/22    Intake/Output Summary (Last 24 hours) at 4/24/2022 1637  Last data filed at 4/24/2022 0300  Gross per 24 hour   Intake --   Output 100 ml   Net -100 ml     I/O last 3 completed shifts:  In: -   Out: 425 [Urine:425]    Safety Concerns: At Risk for Falls    Impairments/Disabilities:      Paralysis - imcomplete quad, gross motor BLE, RUE > LUE    Nutrition Therapy:  Current Nutrition Therapy: Reg diet    Routes of Feeding: Oral  Liquids: Thin Liquids  Daily Fluid Restriction: no  Last Modified Barium Swallow with Video (Video Swallowing Test): not done    Treatments at the Time of Hospital Discharge:   Respiratory Treatments: N/A  Oxygen Therapy:  is on oxygen at 2 L/min per nasal cannula.   Ventilator:    - No ventilator support    Rehab Therapies: Physical Therapy and Occupational Therapy  Weight Bearing Status/Restrictions: No weight bearing restrictions  Other Medical Equipment (for information only, NOT a DME order):  hospital bed  Other Treatments: ***    Patient's personal belongings (please select all that are sent with patient):  None    RN SIGNATURE:  Electronically signed by Felix Del Rio RN on 4/25/22 at 11:38 AM EDT    CASE MANAGEMENT/SOCIAL WORK SECTION    Inpatient Status Date: ***    Readmission Risk Assessment Score:  Readmission Risk              Risk of Unplanned Readmission:  26           Discharging to Facility/ Agency   Name: [facility]  Address:   [address] [place] [address]         Phone: [phone]        Phone:  Fax:    Dialysis Facility (if applicable)   Name:  Address:  Dialysis Schedule:  Phone:  Fax:    / signature: Electronically signed by Hema Haley RN on 4/25/22 at 11:05 AM EDT    PHYSICIAN SECTION    Prognosis: Good    Condition at Discharge: Stable    Rehab Potential (if transferring to Rehab): Good    Recommended Labs or Other Treatments After Discharge: none    Physician Certification: I certify the above information and transfer of Srinivasan Montalvo  is necessary for the continuing treatment of the diagnosis listed and that he requires Assisted Living for greater 30 days.      Update Admission H&P: No change in H&P    PHYSICIAN SIGNATURE:  Electronically signed by Chance Gilman MD on 4/25/22 at 11:14 AM EDT

## 2022-04-24 NOTE — PLAN OF CARE
--    NEUROSURGERY TO SIGN OFF     Please contact Neurosurgery with any questions. PATIENT TO FOLLOW UP IN CLINIC:  Aspen cervical collar when out of bed  Will get routine post op upright XR cervical  Follow up with Dr Christy Cho as scheduled   ---  Follow-up with Neurosurgery  [address]/[facility] (Medical Office Building 2)  [address]  Call [phone] for an appointment.   --

## 2022-04-25 VITALS
TEMPERATURE: 97.7 F | DIASTOLIC BLOOD PRESSURE: 84 MMHG | SYSTOLIC BLOOD PRESSURE: 155 MMHG | OXYGEN SATURATION: 98 % | HEART RATE: 68 BPM | RESPIRATION RATE: 21 BRPM | BODY MASS INDEX: 27.05 KG/M2 | HEIGHT: 63 IN | WEIGHT: 152.7 LBS

## 2022-04-25 LAB
ABSOLUTE EOS #: 0.22 K/UL (ref 0–0.44)
ABSOLUTE IMMATURE GRANULOCYTE: 0.05 K/UL (ref 0–0.3)
ABSOLUTE LYMPH #: 1.33 K/UL (ref 1.1–3.7)
ABSOLUTE MONO #: 0.8 K/UL (ref 0.1–1.2)
ANION GAP SERPL CALCULATED.3IONS-SCNC: 11 MMOL/L (ref 9–17)
BASOPHILS # BLD: 0 % (ref 0–2)
BASOPHILS ABSOLUTE: 0.03 K/UL (ref 0–0.2)
BUN BLDV-MCNC: 23 MG/DL (ref 8–23)
CALCIUM SERPL-MCNC: 9.2 MG/DL (ref 8.6–10.4)
CHLORIDE BLD-SCNC: 95 MMOL/L (ref 98–107)
CO2: 23 MMOL/L (ref 20–31)
CREAT SERPL-MCNC: 0.55 MG/DL (ref 0.7–1.2)
EOSINOPHILS RELATIVE PERCENT: 3 % (ref 1–4)
GFR AFRICAN AMERICAN: >60 ML/MIN
GFR NON-AFRICAN AMERICAN: >60 ML/MIN
GFR SERPL CREATININE-BSD FRML MDRD: ABNORMAL ML/MIN/{1.73_M2}
GLUCOSE BLD-MCNC: 89 MG/DL (ref 75–110)
GLUCOSE BLD-MCNC: 96 MG/DL (ref 70–99)
HCT VFR BLD CALC: 29 % (ref 40.7–50.3)
HEMOGLOBIN: 10.8 G/DL (ref 13–17)
IMMATURE GRANULOCYTES: 1 %
LYMPHOCYTES # BLD: 17 % (ref 24–43)
MCH RBC QN AUTO: 33.1 PG (ref 25.2–33.5)
MCHC RBC AUTO-ENTMCNC: 37.2 G/DL (ref 28.4–34.8)
MCV RBC AUTO: 89 FL (ref 82.6–102.9)
MONOCYTES # BLD: 10 % (ref 3–12)
NRBC AUTOMATED: 0 PER 100 WBC
PDW BLD-RTO: 12.5 % (ref 11.8–14.4)
PLATELET # BLD: 250 K/UL (ref 138–453)
PMV BLD AUTO: 9.2 FL (ref 8.1–13.5)
POTASSIUM SERPL-SCNC: 4.3 MMOL/L (ref 3.7–5.3)
RBC # BLD: 3.26 M/UL (ref 4.21–5.77)
SEG NEUTROPHILS: 69 % (ref 36–65)
SEGMENTED NEUTROPHILS ABSOLUTE COUNT: 5.32 K/UL (ref 1.5–8.1)
SODIUM BLD-SCNC: 129 MMOL/L (ref 135–144)
TROPONIN, HIGH SENSITIVITY: 84 NG/L (ref 0–22)
TROPONIN, HIGH SENSITIVITY: 86 NG/L (ref 0–22)
TROPONIN, HIGH SENSITIVITY: 92 NG/L (ref 0–22)
TROPONIN, HIGH SENSITIVITY: 99 NG/L (ref 0–22)
WBC # BLD: 7.8 K/UL (ref 3.5–11.3)

## 2022-04-25 PROCEDURE — 84484 ASSAY OF TROPONIN QUANT: CPT

## 2022-04-25 PROCEDURE — 6370000000 HC RX 637 (ALT 250 FOR IP): Performed by: STUDENT IN AN ORGANIZED HEALTH CARE EDUCATION/TRAINING PROGRAM

## 2022-04-25 PROCEDURE — 80048 BASIC METABOLIC PNL TOTAL CA: CPT

## 2022-04-25 PROCEDURE — 85025 COMPLETE CBC W/AUTO DIFF WBC: CPT

## 2022-04-25 PROCEDURE — 82947 ASSAY GLUCOSE BLOOD QUANT: CPT

## 2022-04-25 PROCEDURE — 6370000000 HC RX 637 (ALT 250 FOR IP): Performed by: NURSE PRACTITIONER

## 2022-04-25 PROCEDURE — 36415 COLL VENOUS BLD VENIPUNCTURE: CPT

## 2022-04-25 PROCEDURE — 2580000003 HC RX 258: Performed by: STUDENT IN AN ORGANIZED HEALTH CARE EDUCATION/TRAINING PROGRAM

## 2022-04-25 PROCEDURE — 6370000000 HC RX 637 (ALT 250 FOR IP)

## 2022-04-25 RX ORDER — OMEPRAZOLE 20 MG/1
40 CAPSULE, DELAYED RELEASE ORAL DAILY
Qty: 30 CAPSULE | Refills: 3 | Status: SHIPPED | OUTPATIENT
Start: 2022-04-25

## 2022-04-25 RX ORDER — LOSARTAN POTASSIUM 50 MG/1
50 TABLET ORAL DAILY
Qty: 30 TABLET | Refills: 3 | Status: ON HOLD | OUTPATIENT
Start: 2022-04-26 | End: 2022-05-15 | Stop reason: HOSPADM

## 2022-04-25 RX ORDER — CARVEDILOL 12.5 MG/1
12.5 TABLET ORAL 2 TIMES DAILY
Qty: 60 TABLET | Refills: 3 | Status: SHIPPED | OUTPATIENT
Start: 2022-04-25

## 2022-04-25 RX ORDER — GABAPENTIN 100 MG/1
100 CAPSULE ORAL 2 TIMES DAILY
Qty: 60 CAPSULE | Refills: 1 | Status: ON HOLD | OUTPATIENT
Start: 2022-04-25 | End: 2022-08-03

## 2022-04-25 RX ADMIN — HYDRALAZINE HYDROCHLORIDE 50 MG: 50 TABLET, FILM COATED ORAL at 09:22

## 2022-04-25 RX ADMIN — LOSARTAN POTASSIUM 50 MG: 50 TABLET, FILM COATED ORAL at 09:22

## 2022-04-25 RX ADMIN — GABAPENTIN 100 MG: 100 CAPSULE ORAL at 09:22

## 2022-04-25 RX ADMIN — VENLAFAXINE 75 MG: 75 TABLET ORAL at 09:22

## 2022-04-25 RX ADMIN — DESMOPRESSIN ACETATE 40 MG: 0.2 TABLET ORAL at 09:22

## 2022-04-25 RX ADMIN — CLOPIDOGREL 75 MG: 75 TABLET, FILM COATED ORAL at 09:25

## 2022-04-25 RX ADMIN — SODIUM CHLORIDE, PRESERVATIVE FREE 10 ML: 5 INJECTION INTRAVENOUS at 00:07

## 2022-04-25 RX ADMIN — ASPIRIN 81 MG: 81 TABLET, CHEWABLE ORAL at 09:23

## 2022-04-25 RX ADMIN — PANTOPRAZOLE SODIUM 40 MG: 40 TABLET, DELAYED RELEASE ORAL at 06:20

## 2022-04-25 RX ADMIN — ACETAMINOPHEN 650 MG: 325 TABLET ORAL at 06:20

## 2022-04-25 RX ADMIN — SODIUM CHLORIDE, PRESERVATIVE FREE 10 ML: 5 INJECTION INTRAVENOUS at 09:24

## 2022-04-25 RX ADMIN — CARVEDILOL 12.5 MG: 12.5 TABLET, FILM COATED ORAL at 09:22

## 2022-04-25 RX ADMIN — SODIUM CHLORIDE, PRESERVATIVE FREE 10 ML: 5 INJECTION INTRAVENOUS at 00:06

## 2022-04-25 ASSESSMENT — PAIN SCALES - GENERAL: PAINLEVEL_OUTOF10: 5

## 2022-04-25 ASSESSMENT — PAIN DESCRIPTION - LOCATION: LOCATION: HEAD

## 2022-04-25 NOTE — PLAN OF CARE
Problem: Discharge Planning  Goal: Discharge to home or other facility with appropriate resources  4/24/2022 2311 by Steve Garcia RN  Outcome: Progressing  4/24/2022 1015 by Nohemy Brown RN  Outcome: Progressing     Problem: Chronic Conditions and Co-morbidities  Goal: Patient's chronic conditions and co-morbidity symptoms are monitored and maintained or improved  4/24/2022 2311 by Steve Garcia RN  Outcome: Progressing  4/24/2022 1015 by Nohemy Brown RN  Outcome: Progressing     Problem: Pain  Goal: Verbalizes/displays adequate comfort level or baseline comfort level  4/24/2022 2311 by Steve Garcia RN  Outcome: Progressing  4/24/2022 1015 by Nohemy Brown RN  Outcome: Progressing     Problem: Skin/Tissue Integrity  Goal: Absence of new skin breakdown  Description: 1. Monitor for areas of redness and/or skin breakdown  2. Assess vascular access sites hourly  3. Every 4-6 hours minimum:  Change oxygen saturation probe site  4. Every 4-6 hours:  If on nasal continuous positive airway pressure, respiratory therapy assess nares and determine need for appliance change or resting period.   4/24/2022 2311 by Steve Garcia RN  Outcome: Progressing  4/24/2022 1015 by Nohemy Brown RN  Outcome: Progressing     Problem: Safety - Adult  Goal: Free from fall injury  4/24/2022 2311 by Steve Garcia RN  Outcome: Progressing  4/24/2022 1015 by Nohemy Brown RN  Outcome: Progressing     Problem: ABCDS Injury Assessment  Goal: Absence of physical injury  4/24/2022 2311 by Steve Garcia RN  Outcome: Progressing  4/24/2022 1015 by Nohemy Brown RN  Outcome: Progressing

## 2022-04-25 NOTE — PROGRESS NOTES
[facility]  Internal Medicine Teaching Residency Program  Inpatient Daily Progress Note  ______________________________________________________________________________    Patient: Hank Chiang. YOB: 1950   DGJ:[id number]    Acct: [de-identified]     Room: 2006/2006-01  Admit date: 4/21/2022  Today's date: 04/25/22  Number of days in the hospital: 4    SUBJECTIVE   Admitting Diagnosis: NSTEMI (non-ST elevated myocardial infarction) ([facility]  CC: chest pain   No significant events over night  Pain is now at a 5/10. Improving  Will discuss possible DC today     ROS:  Constitutional:  negative for chills, fevers, sweats  Respiratory:  negative for cough, dyspnea on exertion, hemoptysis, shortness of breath, wheezing  Cardiovascular:  negative for chest pain, chest pressure/discomfort, lower extremity edema, palpitations  Gastrointestinal:  negative for abdominal pain, constipation, diarrhea, nausea, vomiting  Neurological:  negative for dizziness, headache    BRIEF HISTORY     The patient is a pleasant 70 y.o. male pmh T2DM, HTN, HLD, GIA admitted from [facility],   presents with a chief complaint of chest pain. Had recent stenting on 4/19 with SHY placed in LAD. Last echo with normal EF. Patient reports pain is located in his sternum and radiates to his left shoulder he also reports SOB. Currently on 4L NC.  troponins are elevated at 90, preivous baseline around 70. Sodium is slightly lower than baseline at around 124. The hyponatremia appears chronic  Patient currently still having chest pain. Pain is constant and denies any association with exertion. pain is worsen upon chest wall palpation. Patient denies any recent trauma to the area. Patient reports that he has been taking medications since his DC.  Denies any smoking, alcohol or illegal drugs     OBJECTIVE     Vital Signs:  BP (!) 155/84   Pulse 68   Temp 97.7 °F (36.5 °C) (Oral)   Resp 21   Ht 5' 3\" (1.6 m)   Wt 152 lb 11.2 oz (69.3 kg)   SpO2 98%   BMI 27.05 kg/m²     Temp (24hrs), Av.7 °F (36.5 °C), Min:97.3 °F (36.3 °C), Max:97.9 °F (36.6 °C)    In: -   Out: 200 [Urine:200]    Physical Exam:  Physical Exam  Constitutional:       General: He is in acute distress (in mild distress due to pain). Appearance: Normal appearance. He is normal weight. He is not ill-appearing, toxic-appearing or diaphoretic. HENT:      Head: Normocephalic and atraumatic. Mouth/Throat:      Mouth: Mucous membranes are moist.      Pharynx: Oropharynx is clear. Eyes:      General: No scleral icterus. Extraocular Movements: Extraocular movements intact. Pupils: Pupils are equal, round, and reactive to light. Cardiovascular:      Rate and Rhythm: Normal rate and regular rhythm. Pulses: Normal pulses. Heart sounds: Normal heart sounds. No murmur heard. No friction rub. No gallop. Pulmonary:      Effort: Pulmonary effort is normal. No respiratory distress. Breath sounds: Normal breath sounds. No stridor. Abdominal:      General: Abdomen is flat. Bowel sounds are normal. There is no distension. Palpations: Abdomen is soft. There is no mass. Tenderness: There is no abdominal tenderness. There is no guarding or rebound. Hernia: No hernia is present. Musculoskeletal:         General: No swelling, tenderness, deformity or signs of injury. Cervical back: Rigidity present. Right lower leg: No edema. Left lower leg: No edema. Skin:     General: Skin is warm and dry. Capillary Refill: Capillary refill takes less than 2 seconds. Coloration: Skin is not jaundiced or pale. Findings: No bruising. Neurological:      General: No focal deficit present. Mental Status: He is alert and oriented to person, place, and time. Mental status is at baseline. Cranial Nerves: No cranial nerve deficit. Sensory: No sensory deficit. Motor: No weakness. Coordination: Coordination normal.   Psychiatric:         Mood and Affect: Mood normal.         Behavior: Behavior normal.       Medications:  Scheduled Medications:    lidocaine  2 patch TransDERmal Daily    hydrALAZINE  50 mg Oral TID    losartan  50 mg Oral Daily    isosorbide mononitrate  30 mg Oral Nightly    pantoprazole  40 mg Oral QAM AC    gabapentin  100 mg Oral BID    carvedilol  12.5 mg Oral BID    senna  1 tablet Oral Nightly    venlafaxine  75 mg Oral TID WC    sodium chloride flush  5-40 mL IntraVENous 2 times per day    colchicine  0.3 mg Oral Every Other Day    aspirin  81 mg Oral Daily    atorvastatin  40 mg Oral Daily    clopidogrel  75 mg Oral Daily    divalproex  500 mg Oral Nightly    sodium chloride flush  5-40 mL IntraVENous 2 times per day    insulin lispro  0-18 Units SubCUTAneous TID WC    insulin lispro  0-9 Units SubCUTAneous Nightly     Continuous Infusions:    sodium chloride      sodium chloride      dextrose       PRN Medicationsartificial tears, , PRN  bisacodyl, 10 mg, Daily PRN  sodium chloride flush, 5-40 mL, PRN  sodium chloride, , PRN  sodium chloride flush, 5-40 mL, PRN  sodium chloride, , PRN  ondansetron, 4 mg, Q8H PRN   Or  ondansetron, 4 mg, Q6H PRN  polyethylene glycol, 17 g, Daily PRN  acetaminophen, 650 mg, Q6H PRN   Or  acetaminophen, 650 mg, Q6H PRN  glucose, 15 g, PRN  dextrose, 12.5 g, PRN  glucagon (rDNA), 1 mg, PRN  dextrose, 100 mL/hr, PRN  fentanNYL, 25 mcg, Q6H PRN  morphine, 1 mg, Q4H PRN        Diagnostic Labs:  CBC:   Recent Labs     04/23/22  0624 04/24/22  0544 04/25/22  0425   WBC 6.2 8.2 7.8   RBC 3.43* 3.41* 3.26*   HGB 11.4* 10.9* 10.8*   HCT 31.0* 31.3* 29.0*   MCV 90.4 91.8 89.0   RDW 12.4 12.6 12.5    250 250     BMP:   Recent Labs     04/23/22  0624 04/23/22  1047 04/24/22  0544 04/24/22  0544 04/24/22  1019 04/24/22  2139 04/25/22  0425   *   < > 132*   < > 129* 130* 129*   K 4.1  --  4.3  --   --   --  4.3   CL 94*  --  94*  --   --   --  95*   CO2 25  --  25  --   --   --  23   BUN 25*  --  28*  --   --   --  23   CREATININE 0.60*  --  0.64*  --   --   --  0.55*    < > = values in this interval not displayed. BNP: No results for input(s): BNP in the last 72 hours. PT/INR: No results for input(s): PROTIME, INR in the last 72 hours. APTT:   Recent Labs     04/22/22  1105 04/22/22  2328 04/23/22  0624   APTT 28.2 25.1 25.0     CARDIAC ENZYMES: No results for input(s): CKMB, CKMBINDEX, TROPONINI in the last 72 hours. Invalid input(s): CKTOTAL;3  FASTING LIPID PANEL:  Lab Results   Component Value Date    CHOL 104 04/12/2018    HDL 42 04/12/2018    TRIG 92 04/12/2018     LIVER PROFILE:   Recent Labs     04/23/22  0624   AST 21   ALT 28   BILITOT 0.41   ALKPHOS 115      MICROBIOLOGY:   Lab Results   Component Value Date/Time    CULTURE NO GROWTH 03/28/2022 06:06 PM       Imaging:    CT ABDOMEN PELVIS W IV CONTRAST Additional Contrast? Radiologist Recommendation    Result Date: 4/22/2022  1. No acute findings in the abdomen or pelvis. 2. High attenuation material in the gallbladder lumen which may be related to stones, sludge, versus vicarious excretion of contrast. 3. Cardiomegaly with small pericardial effusion. Trace bilateral pleural effusions with associated areas of atelectasis in the lung bases, left side greater than right. 4. Mildly enlarged prostate gland. US GALLBLADDER RUQ    Result Date: 4/22/2022  Small amount of sludge and/or gravel within the gallbladder lumen, which has the appearance of small stones on recent CT imaging. Otherwise unremarkable ultrasound of the right upper quadrant of the abdomen. XR CHEST PORTABLE    Result Date: 4/21/2022  Very mild residual bibasilar opacities. CT CHEST PULMONARY EMBOLISM W CONTRAST    Result Date: 4/21/2022  1. No evidence for acute pulmonary embolism.  2. Small bilateral pleural effusion left greater than right along with patchy bibasilar consolidations also more pronounced on the left. Compatible with atelectasis with or without component of pneumonia. 3. Unexplained trace of fluid and fat stranding along anterior inferior aspect of spleen. Dedicated CT abdomen pelvis with contrast recommended to exclude any significant pathology. ASSESSMENT & PLAN     Assessment and Plan:    Principal Problem:    NSTEMI (non-ST elevated myocardial infarction) ([facility]  Active Problems:    Hypertension    Hyperlipidemia    Diabetes mellitus ([facility]    Depression with suicidal ideation    Chest pain  Resolved Problems:    * No resolved hospital problems. *    Atypical Chest pain:  -Patient got heart cath  -Non obstructive CAD on heart cath  -Continue aspirin 81 mg daily, Lipitor 40 mg daily, Coreg 12.5 mg BID, Plavix 75 mg daily  And Imdur 30 mg daily  -Out patient follow up with cardiology  -Patient is off of the nitro and heparin drips  - suspect this MSK related due to improvedment with gabapentin  - continue gabapentin     Hyponatremia:  - he usually has 128 sodium  -Continue to monitor    T2DM  - currently takes metformin  - will start him on low dose SSI   - mointor blood glucose     HTN  - continue coreg, hydralazine at home dose     CAD  - s/p SHY x1 to LAD  -continue plavix and ASA     HLD  - continue lipitor     Cervical stenosis  - s/p cervical fusion 3/24/22  - imprved today   - nsg consulted, recommended follow up         Chase Fuentes MD  Internal Medicine Resident, PGY-1  [facility].   4/25/2022 10:37 AM

## 2022-04-25 NOTE — PROGRESS NOTES
--Pt DC'd [facility]   --Discharge instructions explained to pt, verbalized understanding. --Opiod education explained to pt, handout given, pt expressed understanding. Form signed by.  -- No Personal belongings  --IV removed by RN  --VSS no acute distress  --Pt left by  EMS transport   _ Report called to [facility].

## 2022-04-26 ENCOUNTER — HOSPITAL ENCOUNTER (OUTPATIENT)
Age: 72
Setting detail: SPECIMEN
Discharge: HOME | End: 2022-04-26

## 2022-04-26 LAB
ABSOLUTE EOS #: 0.22 K/UL (ref 0–0.44)
ABSOLUTE IMMATURE GRANULOCYTE: 0.05 K/UL (ref 0–0.3)
ABSOLUTE LYMPH #: 1.13 K/UL (ref 1.1–3.7)
ABSOLUTE MONO #: 0.83 K/UL (ref 0.1–1.2)
ALBUMIN SERPL-MCNC: 3.6 G/DL (ref 3.5–5.2)
ALBUMIN/GLOBULIN RATIO: 1.6 (ref 1–2.5)
ALP BLD-CCNC: 124 U/L (ref 40–129)
ALT SERPL-CCNC: 23 U/L (ref 5–41)
ANION GAP SERPL CALCULATED.3IONS-SCNC: 10 MMOL/L (ref 9–17)
AST SERPL-CCNC: 15 U/L
BASOPHILS # BLD: 0 % (ref 0–2)
BASOPHILS ABSOLUTE: 0.03 K/UL (ref 0–0.2)
BILIRUB SERPL-MCNC: 0.35 MG/DL (ref 0.3–1.2)
BUN BLDV-MCNC: 17 MG/DL (ref 8–23)
CALCIUM SERPL-MCNC: 9.2 MG/DL (ref 8.6–10.4)
CHLORIDE BLD-SCNC: 94 MMOL/L (ref 98–107)
CO2: 27 MMOL/L (ref 20–31)
CREAT SERPL-MCNC: 0.47 MG/DL (ref 0.7–1.2)
EOSINOPHILS RELATIVE PERCENT: 3 % (ref 1–4)
GFR AFRICAN AMERICAN: >60 ML/MIN
GFR NON-AFRICAN AMERICAN: >60 ML/MIN
GFR SERPL CREATININE-BSD FRML MDRD: ABNORMAL ML/MIN/{1.73_M2}
GLUCOSE BLD-MCNC: 86 MG/DL (ref 70–99)
HCT VFR BLD CALC: 31.8 % (ref 40.7–50.3)
HEMOGLOBIN: 11.2 G/DL (ref 13–17)
IMMATURE GRANULOCYTES: 1 %
LYMPHOCYTES # BLD: 13 % (ref 24–43)
MCH RBC QN AUTO: 32.1 PG (ref 25.2–33.5)
MCHC RBC AUTO-ENTMCNC: 35.2 G/DL (ref 28.4–34.8)
MCV RBC AUTO: 91.1 FL (ref 82.6–102.9)
MONOCYTES # BLD: 10 % (ref 3–12)
NRBC AUTOMATED: 0 PER 100 WBC
PDW BLD-RTO: 12.7 % (ref 11.8–14.4)
PLATELET # BLD: 252 K/UL (ref 138–453)
PMV BLD AUTO: 9.4 FL (ref 8.1–13.5)
POTASSIUM SERPL-SCNC: 4.1 MMOL/L (ref 3.7–5.3)
RBC # BLD: 3.49 M/UL (ref 4.21–5.77)
SEG NEUTROPHILS: 73 % (ref 36–65)
SEGMENTED NEUTROPHILS ABSOLUTE COUNT: 6.23 K/UL (ref 1.5–8.1)
SODIUM BLD-SCNC: 131 MMOL/L (ref 135–144)
TOTAL PROTEIN: 5.9 G/DL (ref 6.4–8.3)
WBC # BLD: 8.5 K/UL (ref 3.5–11.3)

## 2022-04-26 PROCEDURE — P9603 ONE-WAY ALLOW PRORATED MILES: HCPCS

## 2022-04-26 PROCEDURE — 36415 COLL VENOUS BLD VENIPUNCTURE: CPT

## 2022-04-26 PROCEDURE — 80053 COMPREHEN METABOLIC PANEL: CPT

## 2022-04-26 PROCEDURE — 85025 COMPLETE CBC W/AUTO DIFF WBC: CPT

## 2022-04-29 NOTE — DISCHARGE SUMMARY
[address]     Department of Internal Medicine - Staff Internal Medicine Teaching Service    INPATIENT DISCHARGE SUMMARY      Patient Identification:  Keanu Sierra is a 70 y.o. male. :  1950  MRN: [id number]     Acct: [de-identified]   PCP: Francisco Jalloh Date:  2022  Discharge date and time: 2022  Attending Provider: No att. providers found                                     [address] Problem Lists:  Principal Problem:    NSTEMI (non-ST elevated myocardial infarction) [facility])  Active Problems:    Hypertension    Hyperlipidemia    Diabetes mellitus ([address]    Depression with suicidal ideation    Chest pain  Resolved Problems:    * No resolved hospital problems. [facility] STAY     Brief Inpatient course:   Keanu Sierra is a 70 y.o. male who was admitted for the management of NSTEMI (non-ST elevated myocardial infarction) [facility]), presented to the emergency department with chest pain after getting stents 3 days PTA. Patient's troponins are elevated and patient was taken for cath the next day. Cath was nonobstructive. Patient also complaining of bilateral UE numbness had cervical fusion 1 month prior to admission. nsg was consulted and recommended f/u OP. Pain improved with gababpentin and cp eitology was suspected to be MSK related. Patient DC on 22      Procedures/ Significant Interventions:      No results found.         Consults:     Consults:     Final Specialist Recommendations/Findings:   IP CONSULT TO CARDIOLOGY  IP CONSULT TO INTERNAL MEDICINE  IP CONSULT TO CARDIOLOGY  IP CONSULT TO IV TEAM  IP CONSULT TO SOCIAL WORK  IP CONSULT TO NEUROSURGERY      Any Hospital Acquired Infections: none    Discharge Functional Status:  stable    DISCHARGE PLAN     Disposition: home    Patient Instructions:   Discharge Medication List as of 2022 11:39 AM      START taking these medications    Details   gabapentin (NEURONTIN) 100 MG capsule Take 1 capsule by mouth 2 times daily for 30 days. , Disp-60 capsule, R-1Normal      losartan (COZAAR) 50 MG tablet Take 1 tablet by mouth daily, Disp-30 tablet, R-3Normal         CONTINUE these medications which have CHANGED    Details   carvedilol (COREG) 12.5 MG tablet Take 1 tablet by mouth 2 times daily, Disp-60 tablet, R-3Normal      omeprazole (PRILOSEC) 20 MG delayed release capsule Take 2 capsules by mouth Daily LF 4/21/20 (90 day supply), Disp-30 capsule, R-3Normal         CONTINUE these medications which have NOT CHANGED    Details   clopidogrel (PLAVIX) 75 MG tablet Take 1 tablet by mouth daily, Disp-30 tablet, R-3Normal      nitroGLYCERIN (NITROSTAT) 0.4 MG SL tablet up to max of 3 total doses. If no relief after 1 dose, call 911., Disp-25 tablet, R-3Normal      divalproex (DEPAKOTE ER) 500 MG extended release tablet Take 1 tablet by mouth at bedtime, Disp-30 tablet, R-3Normal      venlafaxine (EFFEXOR) 75 MG tablet Take 1 tablet by mouth 3 times daily (with meals), Disp-90 tablet, R-3Normal      hydrALAZINE (APRESOLINE) 25 MG tablet Take 1 tablet by mouth three times daily, Disp-90 tablet, R-3Normal      finasteride (PROSCAR) 5 MG tablet Take 5 mg by mouth dailyHistorical Med      carboxymethylcellulose 1 % ophthalmic solution Place 2 drops into both eyes 3 times daily Pt states \"2 drops in both eyes three times a day\"Historical Med      simethicone (MYLICON) 80 MG chewable tablet Take 1 tablet by mouth every 6 hours as needed for Flatulence, Disp-30 tablet, R-0Normal      lidocaine (XYLOCAINE) 5 % ointment Apply topically daily as needed for Pain Apply topically as needed.   LF 4/20/20 (30 day supply), Topical, DAILY PRN, Historical Med      metFORMIN (GLUCOPHAGE) 1000 MG tablet Take 1,000 mg by mouth 2 times daily (with meals) LF 4/27/20 (90 day supply)Historical Med      aspirin 81 MG chewable tablet Take 81 mg by mouth dailyHistorical Med      cetirizine (ZYRTEC) 10 MG tablet Take 10 mg by mouth daily LF 4/6/20 (90 day supply)Historical Med      atorvastatin (LIPITOR) 80 MG tablet Take 40 mg by mouth daily Take 1/2 tablet (40 mg) daily  LF 4/22/20 (90 day supply)Historical Med             Activity: activity as tolerated    Diet: cardiac diet    Follow-up:    [facility]  [address] [place] [address]  [phone]  In 2 weeks      Prashant Sheridan [address],  [address] [facility] [place] [address]  [phone]    On 5/18/2022  Please follow up with neurosurgery as scheduled     [facility] Andre Diaz [facility] Mathias [address] [phone]    Schedule an appointment as soon as possible for a visit  your PCP      Patient Instructions:    You were admitted for chest pain  - underwent cardiac cath which showed nonobstructive CAD with patent stents  - please take meds as directed  - please follow up with cardiology and neurosurgery  - please wear your aspen collar when out of bed  - please follow up with PCP  - please come to ED if symptoms worsen     Note that over 30 minutes was spent in preparing discharge papers, discussing discharge with patient, medication review, etc.      Deloris Estrada MD  Internal Medicine Resident  [facility]  4/29/2022 9:56 AM

## 2022-05-03 ENCOUNTER — HOSPITAL ENCOUNTER (OUTPATIENT)
Age: 72
Setting detail: SPECIMEN
Discharge: HOME | End: 2022-05-03

## 2022-05-03 LAB
-: ABNORMAL
AMORPHOUS: ABNORMAL
ANION GAP SERPL CALCULATED.3IONS-SCNC: 12 MMOL/L (ref 9–17)
BILIRUBIN URINE: NEGATIVE
BUN BLDV-MCNC: 13 MG/DL (ref 8–23)
BUN/CREAT BLD: 27 (ref 9–20)
CALCIUM SERPL-MCNC: 9 MG/DL (ref 8.6–10.4)
CASTS UA: ABNORMAL /LPF (ref 0–2)
CASTS UA: ABNORMAL /LPF (ref 0–2)
CHLORIDE BLD-SCNC: 89 MMOL/L (ref 98–107)
CO2: 26 MMOL/L (ref 20–31)
COLOR: ABNORMAL
CREAT SERPL-MCNC: 0.48 MG/DL (ref 0.7–1.2)
EPITHELIAL CELLS UA: ABNORMAL /HPF (ref 0–5)
GFR AFRICAN AMERICAN: >60 ML/MIN
GFR NON-AFRICAN AMERICAN: >60 ML/MIN
GFR SERPL CREATININE-BSD FRML MDRD: ABNORMAL ML/MIN/{1.73_M2}
GLUCOSE BLD-MCNC: 82 MG/DL (ref 70–99)
GLUCOSE URINE: NEGATIVE
HCT VFR BLD CALC: 33.1 % (ref 40.7–50.3)
HEMOGLOBIN: 11.6 G/DL (ref 13–17)
KETONES, URINE: ABNORMAL
LEUKOCYTE ESTERASE, URINE: ABNORMAL
MCH RBC QN AUTO: 31.7 PG (ref 25.2–33.5)
MCHC RBC AUTO-ENTMCNC: 35 G/DL (ref 28.4–34.8)
MCV RBC AUTO: 90.4 FL (ref 82.6–102.9)
MUCUS: ABNORMAL
NITRITE, URINE: NEGATIVE
NRBC AUTOMATED: 0 PER 100 WBC
PDW BLD-RTO: 12.5 % (ref 11.8–14.4)
PH UA: 5.5 (ref 5–8)
PLATELET # BLD: 318 K/UL (ref 138–453)
PMV BLD AUTO: 9.1 FL (ref 8.1–13.5)
POTASSIUM SERPL-SCNC: 4.1 MMOL/L (ref 3.7–5.3)
PROTEIN UA: ABNORMAL
RBC # BLD: 3.66 M/UL (ref 4.21–5.77)
RBC UA: ABNORMAL /HPF (ref 0–2)
SODIUM BLD-SCNC: 127 MMOL/L (ref 135–144)
SPECIFIC GRAVITY UA: 1.02 (ref 1–1.03)
TURBIDITY: ABNORMAL
URINE HGB: NEGATIVE
UROBILINOGEN, URINE: NORMAL
WBC # BLD: 8 K/UL (ref 3.5–11.3)
WBC UA: ABNORMAL /HPF (ref 0–5)

## 2022-05-03 PROCEDURE — 80048 BASIC METABOLIC PNL TOTAL CA: CPT

## 2022-05-03 PROCEDURE — P9603 ONE-WAY ALLOW PRORATED MILES: HCPCS

## 2022-05-03 PROCEDURE — 81001 URINALYSIS AUTO W/SCOPE: CPT

## 2022-05-03 PROCEDURE — 36415 COLL VENOUS BLD VENIPUNCTURE: CPT

## 2022-05-03 PROCEDURE — 85027 COMPLETE CBC AUTOMATED: CPT

## 2022-05-03 PROCEDURE — 87086 URINE CULTURE/COLONY COUNT: CPT

## 2022-05-04 LAB
CULTURE: NORMAL
Lab: NORMAL
SPECIMEN DESCRIPTION: NORMAL

## 2022-05-05 DIAGNOSIS — M48.02 STENOSIS OF CERVICAL SPINE WITH MYELOPATHY (HCC): Primary | ICD-10-CM

## 2022-05-05 DIAGNOSIS — G99.2 STENOSIS OF CERVICAL SPINE WITH MYELOPATHY (HCC): Primary | ICD-10-CM

## 2022-05-10 ENCOUNTER — HOSPITAL ENCOUNTER (OUTPATIENT)
Age: 72
Setting detail: SPECIMEN
Discharge: HOME | End: 2022-05-10

## 2022-05-10 LAB
ANION GAP SERPL CALCULATED.3IONS-SCNC: 11 MMOL/L (ref 9–17)
BUN BLDV-MCNC: 12 MG/DL (ref 8–23)
BUN/CREAT BLD: 29 (ref 9–20)
CALCIUM SERPL-MCNC: 9.2 MG/DL (ref 8.6–10.4)
CHLORIDE BLD-SCNC: 90 MMOL/L (ref 98–107)
CO2: 29 MMOL/L (ref 20–31)
CREAT SERPL-MCNC: 0.42 MG/DL (ref 0.7–1.2)
GFR AFRICAN AMERICAN: >60 ML/MIN
GFR NON-AFRICAN AMERICAN: >60 ML/MIN
GFR SERPL CREATININE-BSD FRML MDRD: ABNORMAL ML/MIN/{1.73_M2}
GLUCOSE BLD-MCNC: 81 MG/DL (ref 70–99)
HCT VFR BLD CALC: 36.3 % (ref 40.7–50.3)
HEMOGLOBIN: 12.5 G/DL (ref 13–17)
MCH RBC QN AUTO: 31.9 PG (ref 25.2–33.5)
MCHC RBC AUTO-ENTMCNC: 34.4 G/DL (ref 28.4–34.8)
MCV RBC AUTO: 92.6 FL (ref 82.6–102.9)
NRBC AUTOMATED: 0 PER 100 WBC
PDW BLD-RTO: 12.8 % (ref 11.8–14.4)
PLATELET # BLD: 285 K/UL (ref 138–453)
PMV BLD AUTO: 9.1 FL (ref 8.1–13.5)
POTASSIUM SERPL-SCNC: 3.9 MMOL/L (ref 3.7–5.3)
RBC # BLD: 3.92 M/UL (ref 4.21–5.77)
SODIUM BLD-SCNC: 130 MMOL/L (ref 135–144)
WBC # BLD: 6.6 K/UL (ref 3.5–11.3)

## 2022-05-10 PROCEDURE — 85027 COMPLETE CBC AUTOMATED: CPT

## 2022-05-10 PROCEDURE — 36415 COLL VENOUS BLD VENIPUNCTURE: CPT

## 2022-05-10 PROCEDURE — 80048 BASIC METABOLIC PNL TOTAL CA: CPT

## 2022-05-10 PROCEDURE — P9603 ONE-WAY ALLOW PRORATED MILES: HCPCS

## 2022-05-12 ENCOUNTER — APPOINTMENT (OUTPATIENT)
Dept: GENERAL RADIOLOGY | Age: 72
DRG: 313 | End: 2022-05-12
Payer: OTHER GOVERNMENT

## 2022-05-12 ENCOUNTER — APPOINTMENT (OUTPATIENT)
Dept: CT IMAGING | Age: 72
DRG: 313 | End: 2022-05-12
Payer: OTHER GOVERNMENT

## 2022-05-12 ENCOUNTER — HOSPITAL ENCOUNTER (INPATIENT)
Age: 72
LOS: 3 days | Discharge: NURSING, REHAB OR LONG-TERM CARE | DRG: 313 | End: 2022-05-17
Attending: EMERGENCY MEDICINE | Admitting: INTERNAL MEDICINE
Payer: OTHER GOVERNMENT

## 2022-05-12 DIAGNOSIS — G96.08 SUBDURAL HYGROMA: ICD-10-CM

## 2022-05-12 DIAGNOSIS — R07.9 CHEST PAIN, UNSPECIFIED TYPE: Primary | ICD-10-CM

## 2022-05-12 PROBLEM — R07.89 ATYPICAL CHEST PAIN: Status: ACTIVE | Noted: 2022-05-12

## 2022-05-12 LAB
-: ABNORMAL
ABSOLUTE EOS #: 0.11 K/UL (ref 0–0.44)
ABSOLUTE IMMATURE GRANULOCYTE: 0.03 K/UL (ref 0–0.3)
ABSOLUTE LYMPH #: 1.11 K/UL (ref 1.1–3.7)
ABSOLUTE MONO #: 0.44 K/UL (ref 0.1–1.2)
ALBUMIN SERPL-MCNC: 3.2 G/DL (ref 3.5–5.2)
ALP BLD-CCNC: 119 U/L (ref 40–129)
ALT SERPL-CCNC: 25 U/L (ref 5–41)
AMMONIA: 12 UMOL/L (ref 16–60)
ANION GAP SERPL CALCULATED.3IONS-SCNC: 8 MMOL/L (ref 9–17)
AST SERPL-CCNC: 16 U/L
BASOPHILS # BLD: 1 % (ref 0–2)
BASOPHILS ABSOLUTE: 0.04 K/UL (ref 0–0.2)
BILIRUB SERPL-MCNC: 0.54 MG/DL (ref 0.3–1.2)
BILIRUBIN URINE: NEGATIVE
BUN BLDV-MCNC: 17 MG/DL (ref 8–23)
BUN/CREAT BLD: 28 (ref 9–20)
CALCIUM SERPL-MCNC: 8.7 MG/DL (ref 8.6–10.4)
CASTS UA: ABNORMAL /LPF
CASTS UA: ABNORMAL /LPF
CHLORIDE BLD-SCNC: 88 MMOL/L (ref 98–107)
CO2: 28 MMOL/L (ref 20–31)
COLOR: YELLOW
CREAT SERPL-MCNC: 0.61 MG/DL (ref 0.7–1.2)
EOSINOPHILS RELATIVE PERCENT: 2 % (ref 1–4)
EPITHELIAL CELLS UA: ABNORMAL /HPF (ref 0–5)
GFR AFRICAN AMERICAN: >60 ML/MIN
GFR NON-AFRICAN AMERICAN: >60 ML/MIN
GFR SERPL CREATININE-BSD FRML MDRD: ABNORMAL ML/MIN/{1.73_M2}
GLUCOSE BLD-MCNC: 110 MG/DL (ref 70–99)
GLUCOSE BLD-MCNC: 78 MG/DL (ref 75–110)
GLUCOSE URINE: NEGATIVE
HCT VFR BLD CALC: 32.8 % (ref 40.7–50.3)
HEMOGLOBIN: 11.3 G/DL (ref 13–17)
IMMATURE GRANULOCYTES: 1 %
INR BLD: 1.1
KETONES, URINE: NEGATIVE
LACTIC ACID, SEPSIS: 1 MMOL/L (ref 0.5–1.9)
LEUKOCYTE ESTERASE, URINE: NEGATIVE
LIPASE: 24 U/L (ref 13–60)
LYMPHOCYTES # BLD: 18 % (ref 24–43)
MAGNESIUM: 1.7 MG/DL (ref 1.6–2.6)
MCH RBC QN AUTO: 32.4 PG (ref 25.2–33.5)
MCHC RBC AUTO-ENTMCNC: 34.5 G/DL (ref 28.4–34.8)
MCV RBC AUTO: 94 FL (ref 82.6–102.9)
MONOCYTES # BLD: 7 % (ref 3–12)
MUCUS: ABNORMAL
NITRITE, URINE: NEGATIVE
NRBC AUTOMATED: 0 PER 100 WBC
OSMOLALITY URINE: 446 MOSM/KG (ref 300–1170)
PARTIAL THROMBOPLASTIN TIME: 27.9 SEC (ref 23.9–33.8)
PDW BLD-RTO: 12.8 % (ref 11.8–14.4)
PH UA: 6.5 (ref 5–8)
PLATELET # BLD: 228 K/UL (ref 138–453)
PMV BLD AUTO: 8.9 FL (ref 8.1–13.5)
POTASSIUM SERPL-SCNC: 4.3 MMOL/L (ref 3.7–5.3)
PRO-BNP: 922 PG/ML
PROTEIN UA: ABNORMAL
PROTHROMBIN TIME: 14.1 SEC (ref 11.5–14.2)
RBC # BLD: 3.49 M/UL (ref 4.21–5.77)
RBC UA: ABNORMAL /HPF (ref 0–2)
SEG NEUTROPHILS: 71 % (ref 36–65)
SEGMENTED NEUTROPHILS ABSOLUTE COUNT: 4.54 K/UL (ref 1.5–8.1)
SERUM OSMOLALITY: 270 MOSM/KG (ref 282–298)
SODIUM BLD-SCNC: 124 MMOL/L (ref 135–144)
SPECIFIC GRAVITY UA: 1.02 (ref 1–1.03)
TOTAL PROTEIN: 5 G/DL (ref 6.4–8.3)
TROPONIN, HIGH SENSITIVITY: 79 NG/L (ref 0–22)
TROPONIN, HIGH SENSITIVITY: 82 NG/L (ref 0–22)
TROPONIN, HIGH SENSITIVITY: 83 NG/L (ref 0–22)
TURBIDITY: CLEAR
URINE HGB: NEGATIVE
UROBILINOGEN, URINE: ABNORMAL
WBC # BLD: 6.3 K/UL (ref 3.5–11.3)
WBC UA: ABNORMAL /HPF (ref 0–5)

## 2022-05-12 PROCEDURE — 99285 EMERGENCY DEPT VISIT HI MDM: CPT

## 2022-05-12 PROCEDURE — 83930 ASSAY OF BLOOD OSMOLALITY: CPT

## 2022-05-12 PROCEDURE — 83735 ASSAY OF MAGNESIUM: CPT

## 2022-05-12 PROCEDURE — 84300 ASSAY OF URINE SODIUM: CPT

## 2022-05-12 PROCEDURE — 2580000003 HC RX 258: Performed by: NURSE PRACTITIONER

## 2022-05-12 PROCEDURE — 81001 URINALYSIS AUTO W/SCOPE: CPT

## 2022-05-12 PROCEDURE — 82140 ASSAY OF AMMONIA: CPT

## 2022-05-12 PROCEDURE — 96372 THER/PROPH/DIAG INJ SC/IM: CPT

## 2022-05-12 PROCEDURE — 80053 COMPREHEN METABOLIC PANEL: CPT

## 2022-05-12 PROCEDURE — 85610 PROTHROMBIN TIME: CPT

## 2022-05-12 PROCEDURE — 87040 BLOOD CULTURE FOR BACTERIA: CPT

## 2022-05-12 PROCEDURE — 6360000002 HC RX W HCPCS: Performed by: NURSE PRACTITIONER

## 2022-05-12 PROCEDURE — G0378 HOSPITAL OBSERVATION PER HR: HCPCS

## 2022-05-12 PROCEDURE — APPSS45 APP SPLIT SHARED TIME 31-45 MINUTES: Performed by: NURSE PRACTITIONER

## 2022-05-12 PROCEDURE — 36415 COLL VENOUS BLD VENIPUNCTURE: CPT

## 2022-05-12 PROCEDURE — 93005 ELECTROCARDIOGRAM TRACING: CPT | Performed by: NURSE PRACTITIONER

## 2022-05-12 PROCEDURE — 6370000000 HC RX 637 (ALT 250 FOR IP): Performed by: NURSE PRACTITIONER

## 2022-05-12 PROCEDURE — 85025 COMPLETE CBC W/AUTO DIFF WBC: CPT

## 2022-05-12 PROCEDURE — 85730 THROMBOPLASTIN TIME PARTIAL: CPT

## 2022-05-12 PROCEDURE — 83880 ASSAY OF NATRIURETIC PEPTIDE: CPT

## 2022-05-12 PROCEDURE — 82947 ASSAY GLUCOSE BLOOD QUANT: CPT

## 2022-05-12 PROCEDURE — 70450 CT HEAD/BRAIN W/O DYE: CPT

## 2022-05-12 PROCEDURE — 83690 ASSAY OF LIPASE: CPT

## 2022-05-12 PROCEDURE — 83935 ASSAY OF URINE OSMOLALITY: CPT

## 2022-05-12 PROCEDURE — 99222 1ST HOSP IP/OBS MODERATE 55: CPT | Performed by: NURSE PRACTITIONER

## 2022-05-12 PROCEDURE — 71045 X-RAY EXAM CHEST 1 VIEW: CPT

## 2022-05-12 PROCEDURE — 96360 HYDRATION IV INFUSION INIT: CPT

## 2022-05-12 PROCEDURE — 84484 ASSAY OF TROPONIN QUANT: CPT

## 2022-05-12 PROCEDURE — 96361 HYDRATE IV INFUSION ADD-ON: CPT

## 2022-05-12 PROCEDURE — 83605 ASSAY OF LACTIC ACID: CPT

## 2022-05-12 RX ORDER — ONDANSETRON 4 MG/1
4 TABLET, ORALLY DISINTEGRATING ORAL EVERY 8 HOURS PRN
Status: DISCONTINUED | OUTPATIENT
Start: 2022-05-12 | End: 2022-05-17 | Stop reason: HOSPADM

## 2022-05-12 RX ORDER — POTASSIUM CHLORIDE 20 MEQ/1
40 TABLET, EXTENDED RELEASE ORAL PRN
Status: DISCONTINUED | OUTPATIENT
Start: 2022-05-12 | End: 2022-05-17 | Stop reason: HOSPADM

## 2022-05-12 RX ORDER — SODIUM CHLORIDE 9 MG/ML
INJECTION, SOLUTION INTRAVENOUS PRN
Status: DISCONTINUED | OUTPATIENT
Start: 2022-05-12 | End: 2022-05-17 | Stop reason: HOSPADM

## 2022-05-12 RX ORDER — FINASTERIDE 5 MG/1
5 TABLET, FILM COATED ORAL DAILY
Status: DISCONTINUED | OUTPATIENT
Start: 2022-05-12 | End: 2022-05-17 | Stop reason: HOSPADM

## 2022-05-12 RX ORDER — ISOSORBIDE MONONITRATE 30 MG/1
30 TABLET, EXTENDED RELEASE ORAL DAILY
Status: DISCONTINUED | OUTPATIENT
Start: 2022-05-12 | End: 2022-05-17 | Stop reason: HOSPADM

## 2022-05-12 RX ORDER — SODIUM CHLORIDE 9 MG/ML
INJECTION, SOLUTION INTRAVENOUS CONTINUOUS
Status: DISCONTINUED | OUTPATIENT
Start: 2022-05-12 | End: 2022-05-12

## 2022-05-12 RX ORDER — SIMETHICONE 80 MG
80 TABLET,CHEWABLE ORAL EVERY 6 HOURS PRN
Status: DISCONTINUED | OUTPATIENT
Start: 2022-05-12 | End: 2022-05-17 | Stop reason: HOSPADM

## 2022-05-12 RX ORDER — SODIUM CHLORIDE 0.9 % (FLUSH) 0.9 %
10 SYRINGE (ML) INJECTION PRN
Status: DISCONTINUED | OUTPATIENT
Start: 2022-05-12 | End: 2022-05-17 | Stop reason: HOSPADM

## 2022-05-12 RX ORDER — ATORVASTATIN CALCIUM 40 MG/1
40 TABLET, FILM COATED ORAL DAILY
Status: DISCONTINUED | OUTPATIENT
Start: 2022-05-12 | End: 2022-05-17 | Stop reason: HOSPADM

## 2022-05-12 RX ORDER — ACETAMINOPHEN 325 MG/1
650 TABLET ORAL EVERY 6 HOURS PRN
Status: DISCONTINUED | OUTPATIENT
Start: 2022-05-12 | End: 2022-05-17 | Stop reason: HOSPADM

## 2022-05-12 RX ORDER — POTASSIUM CHLORIDE 7.45 MG/ML
10 INJECTION INTRAVENOUS PRN
Status: DISCONTINUED | OUTPATIENT
Start: 2022-05-12 | End: 2022-05-17 | Stop reason: HOSPADM

## 2022-05-12 RX ORDER — GABAPENTIN 100 MG/1
100 CAPSULE ORAL 2 TIMES DAILY
Status: DISCONTINUED | OUTPATIENT
Start: 2022-05-12 | End: 2022-05-17 | Stop reason: HOSPADM

## 2022-05-12 RX ORDER — ENOXAPARIN SODIUM 100 MG/ML
1 INJECTION SUBCUTANEOUS 2 TIMES DAILY
Status: DISCONTINUED | OUTPATIENT
Start: 2022-05-12 | End: 2022-05-15

## 2022-05-12 RX ORDER — DIVALPROEX SODIUM 500 MG/1
500 TABLET, EXTENDED RELEASE ORAL NIGHTLY
Status: DISCONTINUED | OUTPATIENT
Start: 2022-05-12 | End: 2022-05-17 | Stop reason: HOSPADM

## 2022-05-12 RX ORDER — ACETAMINOPHEN 650 MG/1
650 SUPPOSITORY RECTAL EVERY 6 HOURS PRN
Status: DISCONTINUED | OUTPATIENT
Start: 2022-05-12 | End: 2022-05-17 | Stop reason: HOSPADM

## 2022-05-12 RX ORDER — ASPIRIN 81 MG/1
81 TABLET, CHEWABLE ORAL DAILY
Status: DISCONTINUED | OUTPATIENT
Start: 2022-05-12 | End: 2022-05-17 | Stop reason: HOSPADM

## 2022-05-12 RX ORDER — POLYVINYL ALCOHOL 14 MG/ML
2 SOLUTION/ DROPS OPHTHALMIC 3 TIMES DAILY
Status: DISCONTINUED | OUTPATIENT
Start: 2022-05-12 | End: 2022-05-17 | Stop reason: HOSPADM

## 2022-05-12 RX ORDER — ENOXAPARIN SODIUM 100 MG/ML
40 INJECTION SUBCUTANEOUS DAILY
Status: DISCONTINUED | OUTPATIENT
Start: 2022-05-12 | End: 2022-05-12

## 2022-05-12 RX ORDER — MAGNESIUM SULFATE 1 G/100ML
1000 INJECTION INTRAVENOUS PRN
Status: DISCONTINUED | OUTPATIENT
Start: 2022-05-12 | End: 2022-05-17 | Stop reason: HOSPADM

## 2022-05-12 RX ORDER — NITROGLYCERIN 0.4 MG/1
0.4 TABLET SUBLINGUAL EVERY 5 MIN PRN
Status: DISCONTINUED | OUTPATIENT
Start: 2022-05-12 | End: 2022-05-17 | Stop reason: HOSPADM

## 2022-05-12 RX ORDER — SODIUM CHLORIDE 0.9 % (FLUSH) 0.9 %
5-40 SYRINGE (ML) INJECTION EVERY 12 HOURS SCHEDULED
Status: DISCONTINUED | OUTPATIENT
Start: 2022-05-12 | End: 2022-05-17 | Stop reason: HOSPADM

## 2022-05-12 RX ORDER — CLOPIDOGREL BISULFATE 75 MG/1
75 TABLET ORAL DAILY
Status: DISCONTINUED | OUTPATIENT
Start: 2022-05-12 | End: 2022-05-17 | Stop reason: HOSPADM

## 2022-05-12 RX ORDER — PANTOPRAZOLE SODIUM 40 MG/1
40 TABLET, DELAYED RELEASE ORAL
Status: DISCONTINUED | OUTPATIENT
Start: 2022-05-13 | End: 2022-05-17 | Stop reason: HOSPADM

## 2022-05-12 RX ORDER — CARVEDILOL 12.5 MG/1
12.5 TABLET ORAL 2 TIMES DAILY
Status: DISCONTINUED | OUTPATIENT
Start: 2022-05-12 | End: 2022-05-17 | Stop reason: HOSPADM

## 2022-05-12 RX ORDER — SODIUM CHLORIDE 9 MG/ML
INJECTION, SOLUTION INTRAVENOUS CONTINUOUS
Status: DISCONTINUED | OUTPATIENT
Start: 2022-05-12 | End: 2022-05-13

## 2022-05-12 RX ORDER — ONDANSETRON 2 MG/ML
4 INJECTION INTRAMUSCULAR; INTRAVENOUS EVERY 6 HOURS PRN
Status: DISCONTINUED | OUTPATIENT
Start: 2022-05-12 | End: 2022-05-17 | Stop reason: HOSPADM

## 2022-05-12 RX ORDER — HYDRALAZINE HYDROCHLORIDE 25 MG/1
25 TABLET, FILM COATED ORAL 3 TIMES DAILY
Status: DISCONTINUED | OUTPATIENT
Start: 2022-05-12 | End: 2022-05-17 | Stop reason: HOSPADM

## 2022-05-12 RX ORDER — VENLAFAXINE 75 MG/1
75 TABLET ORAL
Status: DISCONTINUED | OUTPATIENT
Start: 2022-05-13 | End: 2022-05-17 | Stop reason: HOSPADM

## 2022-05-12 RX ADMIN — ISOSORBIDE MONONITRATE 30 MG: 30 TABLET, EXTENDED RELEASE ORAL at 22:10

## 2022-05-12 RX ADMIN — GABAPENTIN 100 MG: 100 CAPSULE ORAL at 22:59

## 2022-05-12 RX ADMIN — SODIUM CHLORIDE, PRESERVATIVE FREE 10 ML: 5 INJECTION INTRAVENOUS at 22:16

## 2022-05-12 RX ADMIN — ENOXAPARIN SODIUM 60 MG: 100 INJECTION SUBCUTANEOUS at 22:10

## 2022-05-12 RX ADMIN — SODIUM CHLORIDE: 9 INJECTION, SOLUTION INTRAVENOUS at 22:10

## 2022-05-12 RX ADMIN — POLYVINYL ALCOHOL 2 DROP: 14 SOLUTION/ DROPS OPHTHALMIC at 22:59

## 2022-05-12 ASSESSMENT — ENCOUNTER SYMPTOMS
COUGH: 0
VOMITING: 0
SHORTNESS OF BREATH: 1
ABDOMINAL PAIN: 0
NAUSEA: 0

## 2022-05-12 ASSESSMENT — PAIN - FUNCTIONAL ASSESSMENT: PAIN_FUNCTIONAL_ASSESSMENT: NONE - DENIES PAIN

## 2022-05-12 ASSESSMENT — VISUAL ACUITY: OU: 1

## 2022-05-12 NOTE — ED PROVIDER NOTES
eMERGENCY dEPARTMENT eNCOUnter   [address] Name: Nehemias Nugent. MRN: [id number]  Birthdate 1950  Date of evaluation: 5/12/22     Nehemias Nugent is a 70 y.o. male with CC: Shortness of Breath and Chest Pain        This visit was performed by both a physician and an APC. I performed all aspects of the MDM as documented.       The care is provided during an unprecedented national emergency due to the novel coronavirus, Sara Arellano MD  Attending Emergency Physician           Daniel Benavidez MD  05/12/22 0079

## 2022-05-12 NOTE — ED PROVIDER NOTES
[facility]      Pt Name: Deon Beck MRN: [id number]  Birthdate 1950  Date of evaluation: 5/12/2022  Provider: KASSIDY Tidwell CNP    CHIEF COMPLAINT       Chief Complaint   Patient presents with    Shortness of Breath    Chest Pain         HISTORY OFPRESENT ILLNESS  (Location/Symptom, Timing/Onset, Context/Setting, Quality, Duration, Modifying Factors, Severity.)   Deon Beck is a 70 y.o. male who presents to the emergency department by EMS for evaluation of chest pain and shortness of breath that started today. Describes the pain as a pressure. Pain 7 out of 10. Patient got aspirin and nitro by EMS in route to the ED. No nausea or vomiting. Also complains of lightheadedness and dizziness. No headache. He has a history of diabetes, hyperlipidemia, hypertension, and CAD. He had a cardiac cath on 4/22/2022 at [facility]. Which showed nonobstructive CAD and patent stents. Nursing Notes were reviewed.     PASTMEDICAL HISTORY     Past Medical History:   Diagnosis Date    Depression 2017    ON RX    Diabetes mellitus ([address] 2013    NIDDM    Difficult intravenous access     Hyperlipidemia 2008    ON RX    Hypertension 2008    ON RX    Migraines     PTSD (post-traumatic stress disorder) 01/2018    ON RX    Sleep apnea 01/2018    UNALBE TO USE TO CLEARED BY ENT (CPAP)    Teeth missing     BACK TEETH UPPER AND LOWER TO BE FITTED FOR PARTIALS AT LATER DATE    Wears glasses          SURGICAL HISTORY       Past Surgical History:   Procedure Laterality Date    CARDIAC CATHETERIZATION  02/2010    no stents     CARPAL TUNNEL RELEASE Left 01/09/2002    CATARACT REMOVAL      CERVICAL FUSION  04/19/2018    anterior cervical fusion C5-6    CERVICAL FUSION N/A 3/24/2022    C2-5 FUSION, C2-4 LAMINECTOMY performed by Arnold Riggs DO at [address] Left 2018    [place]    LAMINECTOMY  03/24/2022 C2-5 FUSION, C2-4 LAMINECTOMY    MIDDLE EAR SURGERY  01/11/2018    MIDDLE EAR REBUILT AND EAR DRUM REPLACED    MOUTH SURGERY  04/16/2018    5 TEETH REMOVED    OTHER SURGICAL HISTORY  04/19/2018    : ANTERIOR CERVICAL CORRPECTOMY C5-6, SYNTHES, DEPUY, REG TABLE, SUPINE,     DE OFFICE/OUTPT VISIT,PROCEDURE ONLY N/A 4/19/2018    ANTERIOR CERVICAL CORRPECTOMY AND FUSION C5-6 performed by Kalyn Benson DO at [facility]  12/1983         CURRENT MEDICATIONS     Current Discharge Medication List      CONTINUE these medications which have NOT CHANGED    Details   gabapentin (NEURONTIN) 100 MG capsule Take 1 capsule by mouth 2 times daily for 30 days. Qty: 60 capsule, Refills: 1      carvedilol (COREG) 12.5 MG tablet Take 1 tablet by mouth 2 times daily  Qty: 60 tablet, Refills: 3      losartan (COZAAR) 50 MG tablet Take 1 tablet by mouth daily  Qty: 30 tablet, Refills: 3      omeprazole (PRILOSEC) 20 MG delayed release capsule Take 2 capsules by mouth Daily  4/21/20 (90 day supply)  Qty: 30 capsule, Refills: 3      clopidogrel (PLAVIX) 75 MG tablet Take 1 tablet by mouth daily  Qty: 30 tablet, Refills: 3      nitroGLYCERIN (NITROSTAT) 0.4 MG SL tablet up to max of 3 total doses. If no relief after 1 dose, call 911.   Qty: 25 tablet, Refills: 3      divalproex (DEPAKOTE ER) 500 MG extended release tablet Take 1 tablet by mouth at bedtime  Qty: 30 tablet, Refills: 3      venlafaxine (EFFEXOR) 75 MG tablet Take 1 tablet by mouth 3 times daily (with meals)  Qty: 90 tablet, Refills: 3      hydrALAZINE (APRESOLINE) 25 MG tablet Take 1 tablet by mouth three times daily  Qty: 90 tablet, Refills: 3      finasteride (PROSCAR) 5 MG tablet Take 5 mg by mouth daily      carboxymethylcellulose 1 % ophthalmic solution Place 2 drops into both eyes 3 times daily Pt states \"2 drops in both eyes three times a day\"      simethicone (MYLICON) 80 MG chewable tablet Take 1 tablet by mouth every 6 hours as needed for Flatulence  Qty: 30 tablet, Refills: 0      lidocaine (XYLOCAINE) 5 % ointment Apply topically daily as needed for Pain Apply topically as needed.    20 (30 day supply)       metFORMIN (GLUCOPHAGE) 1000 MG tablet Take 1,000 mg by mouth 2 times daily (with meals)  20 (90 day supply)      aspirin 81 MG chewable tablet Take 81 mg by mouth daily       cetirizine (ZYRTEC) 10 MG tablet Take 10 mg by mouth daily LF 20 (90 day supply)      atorvastatin (LIPITOR) 80 MG tablet Take 40 mg by mouth daily Take 1/2 tablet (40 mg) daily  LF 20 (90 day supply)             ALLERGIES     Latex, Bee venom, Lisinopril, Niacin and related, Sulfa antibiotics, and Terazosin    FAMILY HISTORY       Family History   Problem Relation Age of Onset    Dementia Mother     Coronary Art Dis Mother     Stroke Mother     Diabetes Mother     Asthma Mother     Other Mother         BRAIN ANEURISM    High Blood Pressure Mother     Heart Disease Father     Diabetes Sister     Diabetes Brother     High Blood Pressure Brother     High Cholesterol Brother     Heart Disease Brother     Diabetes Sister     Other Sister         NEUROPATHY          SOCIAL HISTORY       Social History     Socioeconomic History    Marital status:      Spouse name: None    Number of children: None    Years of education: None    Highest education level: None   Occupational History    None   Tobacco Use    Smoking status: Former Smoker     Packs/day: 0.50     Years: 4.00     Pack years: 2.00     Types: Cigarettes     Quit date: 1972     Years since quittin.1    Smokeless tobacco: Never Used    Tobacco comment: quit    Vaping Use    Vaping Use: Never used   Substance and Sexual Activity    Alcohol use: Yes     Comment: MIXED DRINKS- 3 OR 4 A YEAR; last 2018    Drug use: Yes     Types: Marijuana Gaynelle Onaway)    Sexual activity: Yes     Partners: Female   Other Topics Concern    None Social History Narrative    None     Social Determinants of Health     Financial Resource Strain:     Difficulty of Paying Living Expenses: Not on file   Food Insecurity:     Worried About Running Out of Food in the Last Year: Not on file    Judah of Food in the Last Year: Not on file   Transportation Needs:     Lack of Transportation (Medical): Not on file    Lack of Transportation (Non-Medical): Not on file   Physical Activity:     Days of Exercise per Week: Not on file    Minutes of Exercise per Session: Not on file   Stress:     Feeling of Stress : Not on file   Social Connections:     Frequency of Communication with Friends and Family: Not on file    Frequency of Social Gatherings with Friends and Family: Not on file    Attends Yarsanism Services: Not on file    Active Member of [address] [place] [address] [employer] or Organizations: Not on file    Attends Club or Organization Meetings: Not on file    Marital Status: Not on file   Intimate Partner Violence:     Fear of Current or Ex-Partner: Not on file    Emotionally Abused: Not on file    Physically Abused: Not on file    Sexually Abused: Not on file   Housing Stability:     Unable to Pay for Housing in the Last Year: Not on file    Number of [place] in the Last Year: Not on file    Unstable Housing in the Last Year: Not on file         REVIEW OF SYSTEMS    (2-9 systems for level 4, 10 or more for level 5)     Review of Systems   Constitutional: Positive for activity change. Respiratory: Positive for shortness of breath. Negative for cough. Cardiovascular: Positive for chest pain. Negative for leg swelling. Gastrointestinal: Negative for abdominal pain, nausea and vomiting. Neurological: Positive for dizziness, weakness and light-headedness. Negative for facial asymmetry, speech difficulty, numbness and headaches. Generalized weakness   All other systems reviewed and are negative.     Except as noted above the remainder of the review of systems was reviewed and negative. PHYSICAL EXAM    (up to 7 for level 4, 8 or more for level 5)     ED Triage Vitals [05/12/22 1202]   BP Temp Temp Source Pulse Resp SpO2 Height Weight   87/63 95 °F (35 °C) Oral 70 17 -- 5' 3\" (1.6 m) 130 lb (59 kg)       Physical Exam  Constitutional:       Appearance: Normal appearance. He is well-developed, well-groomed and normal weight. HENT:      Head: Normocephalic. Right Ear: External ear normal.      Left Ear: External ear normal.      Nose: Nose normal.      Mouth/Throat:      Mouth: Mucous membranes are moist.   Eyes:      General: Lids are normal. Vision grossly intact. Extraocular Movements: Extraocular movements intact. Conjunctiva/sclera: Conjunctivae normal.      Pupils: Pupils are equal, round, and reactive to light. Comments: Pupils are 2 mm equal reactive to light. Cardiovascular:      Rate and Rhythm: Normal rate and regular rhythm. Pulses: Normal pulses. Heart sounds: Normal heart sounds. Pulmonary:      Effort: Tachypnea present. Breath sounds: Normal breath sounds. No wheezing, rhonchi or rales. Abdominal:      General: Bowel sounds are normal.      Palpations: Abdomen is soft. Tenderness: There is no abdominal tenderness. Musculoskeletal:         General: Normal range of motion. Skin:     General: Skin is warm and dry. Capillary Refill: Capillary refill takes less than 2 seconds. Coloration: Skin is pale. Neurological:      Mental Status: He is alert and oriented to person, place, and time. GCS: GCS eye subscore is 4. GCS verbal subscore is 5. GCS motor subscore is 6. Cranial Nerves: Cranial nerves are intact. Sensory: Sensation is intact. Motor: Motor function is intact. Psychiatric:         Mood and Affect: Mood normal.         Behavior: Behavior normal.         Thought Content:  Thought content normal.         Judgment: Judgment normal.           DIAGNOSTIC RESULTS EKG:All EKG's are interpreted by the Emergency Department Physician who either signs or Co-signs this chart in the absence of a cardiologist.    EKG interpreted by attending physician    RADIOLOGY:   Non-plain film images such as CT, Ultrasound and MRI are read by theradiologist. Plain radiographic images are visualized and preliminarily interpreted by the emergency physician with the below findings:    ECHO Complete 2D W Doppler W Color    Result Date: 4/22/2022  Transthoracic Echocardiography Report (TTE)  Patient Name Chris Mahmood Date of Study               04/22/2022               Tyrel Silva. Date of      1950  Gender                      Male  Birth   Age          70 year(s)  Race                           Room Number  2006        Height:                     63 inch, 160.02 cm   Corporate ID [id number]    Weight:                     137 pounds, 62.1 kg  #   Patient Acct [de-identified]   BSA:          1.65 m^2      BMI:     24.27 kg/m^2  #   MR #         [id number]     Sonographer                 Irvin Lacey   Accession #  [id number]  Interpreting Physician      [address] [place] [address]   Fellow                   Referring Nurse                           Practitioner   Interpreting             Referring Physician         Jolly Mena  Fellow  Type of Study   TTE procedure:2D Echocardiogram, M-Mode, Doppler, Color Doppler. Procedure Date Date: 04/22/2022 Start: 11:10 AM Study Location: [facility] Technical Quality: Adequate visualization Indications:Chest pain and Elevated troponin. Critical Result: Prelim. findings reported to Tete Whaley @ 11:35am History / Tech. Comments: Echo done at patient bedside. Procedure explained to patient. HTN, NSTEMI Patient Status: STAT Height: 63 inches Weight: 137 pounds BSA: 1.65 m^2 BMI: 24.27 kg/m^2 Allergies   - Sulfa. - Stings. - *Unlisted:(latix/lisinopril/niacin/sulfa/terazosin). CONCLUSIONS Summary Left ventricle is normal in size.  Global left ventricular systolic function is normal. Calculated ejection fraction 56% by Lester's method. Mild to moderate concentric left ventricular hypertrophy. Aortic valve is trileaflet and opens adequately. Mild aortic insufficiency. Signature ----------------------------------------------------------------------------  Electronically signed by Sondra Mancera(Sonographer) on 04/22/2022  11:44 AM ---------------------------------------------------------------------------- ----------------------------------------------------------------------------  Electronically signed by Chandu Whaley(Interpreting physician) on 04/22/2022  02:20 PM ---------------------------------------------------------------------------- FINDINGS Left Atrium Left atrium is normal in size. Left Ventricle Left ventricle is normal in size. Global left ventricular systolic function is normal. Calculated ejection fraction 56% by Lester's method. Mild to moderate concentric left ventricular hypertrophy. Right Atrium Right atrium is normal in size. Right Ventricle Normal right ventricular size and function. TAPSE value of 1.56cm noted. Mitral Valve Normal mitral valve structure and function. No mitral regurgitation. Aortic Valve Aortic valve is trileaflet and opens adequately. Mild aortic insufficiency. Tricuspid Valve Normal tricuspid valve structure and function. No tricuspid regurgitation. Pulmonic Valve The pulmonic valve is normal in structure. No pulmonic insufficiency. Pericardial Effusion No pericardial effusion seen. Miscellaneous E/E' average = 6.6. IVC normal diameter & inspiratory collapse indicating normal RA filling pressure .  M-mode / 2D Measurements & Calculations:   LVIDd:4.2 cm(3.7 - 5.6 cm)       Diastolic SQPXEB:09.7 ml  MUYRM:8.3 cm(2.2 - 4.0 cm)       Systolic FRQJZQ:43.3 ml  SRIY:3.8 cm(0.6 - 1.1 cm)        Aortic Root:3.1 cm(2.0 - 3.7 cm)  LVPWd:1.3 cm(0.6 - 1.1 cm)       LA Dimension: 3.7 cm(1.9 - 4.0 cm)  Fractional Shortenin.95 %    LA volume/Index: 39.9 ml /24m^2  Calculated LVEF (%): 56.87 %     LVOT:2.4 cm                                   RVDd:3.2 cm   Mitral:                               Aortic   Valve Area (P1/2-Time): 5 cm^2        Peak Velocity: 1.12 m/s  Peak E-Wave: 0.45 m/s                 Mean Velocity: 0.70 m/s  Peak A-Wave: 0.84 m/s                 Peak Gradient: 5.02 mmHg  E/A Ratio: 0.53                       Mean Gradient: 2 mmHg  Peak Gradient: 0.8 mmHg  Mean Gradient: 1 mmHg  Deceleration Time: 152 msec           Area (continuity): 3.47 cm^2  P1/2t: 44 msec                        AV VTI: 18.1 cm   Area (continuity): 3.47 cm^2  Mean Velocity: 0.50 m/s                                         Pulmonic:                                         Peak Velocity: 0.84 m/s                                        Peak Gradient: 2.84 mmHg  Diastology / Tissue Doppler Septal Wall E' velocity:0.07 m/s Septal Wall E/E':6.8 Lateral Wall E' velocity:0.07 m/s Lateral Wall E/E':6.4    CT Head WO Contrast    Result Date: 2022  EXAMINATION: CT OF THE HEAD WITHOUT CONTRAST  2022 1:35 pm TECHNIQUE: CT of the head was performed without the administration of intravenous contrast. Automated exposure control, iterative reconstruction, and/or weight based adjustment of the mA/kV was utilized to reduce the radiation dose to as low as reasonably achievable. COMPARISON: 2022 HISTORY: ORDERING SYSTEM PROVIDED HISTORY: ams TECHNOLOGIST PROVIDED HISTORY: ams Decision Support Exception - unselect if not a suspected or confirmed emergency medical condition->Emergency Medical Condition (MA) Reason for Exam: AMS FINDINGS: BRAIN/VENTRICLES: There are cysts in the bilateral frontal subdural hygromas, not present on the prior examination. They measure 4 mm in thickness. There is no acute intracranial hemorrhage, mass effect or midline shift. No abnormal extra-axial fluid collection.   The gray-white differentiation is METABOLIC PANEL W/ REFLEX TO MG FOR LOW K - Abnormal; Notable for the following components:    Glucose 110 (*)     CREATININE 0.61 (*)     Bun/Cre Ratio 28 (*)     Sodium 124 (*)     Chloride 88 (*)     Anion Gap 8 (*)     Total Protein 5.0 (*)     Albumin 3.2 (*)     All other components within normal limits   TROPONIN - Abnormal; Notable for the following components:    Troponin, High Sensitivity 79 (*)     All other components within normal limits   TROPONIN - Abnormal; Notable for the following components:    Troponin, High Sensitivity 83 (*)     All other components within normal limits   AMMONIA - Abnormal; Notable for the following components:    Ammonia 12 (*)     All other components within normal limits   BRAIN NATRIURETIC PEPTIDE - Abnormal; Notable for the following components:    Pro- (*)     All other components within normal limits   URINALYSIS WITH REFLEX TO CULTURE - Abnormal; Notable for the following components:    Protein, UA 2+ (*)     Urobilinogen, Urine ELEVATED (*)     All other components within normal limits   MICROSCOPIC URINALYSIS - Abnormal; Notable for the following components:    Mucus, UA 1+ (*)     All other components within normal limits   TROPONIN - Abnormal; Notable for the following components:    Troponin, High Sensitivity 82 (*)     All other components within normal limits   TROPONIN - Abnormal; Notable for the following components:    Troponin, High Sensitivity 91 (*)     All other components within normal limits   COMPREHENSIVE METABOLIC PANEL W/ REFLEX TO MG FOR LOW K - Abnormal; Notable for the following components:    Glucose 109 (*)     CREATININE 0.65 (*)     Bun/Cre Ratio 31 (*)     Sodium 127 (*)     Chloride 90 (*)     Total Protein 5.0 (*)     Albumin 3.2 (*)     All other components within normal limits   CBC - Abnormal; Notable for the following components:    RBC 3.38 (*)     Hemoglobin 10.8 (*)     Hematocrit 32.0 (*)     All other components within normal limits   OSMOLALITY - Abnormal; Notable for the following components:    Serum Osmolality 270 (*)     All other components within normal limits   SODIUM - Abnormal; Notable for the following components:    Sodium 127 (*)     All other components within normal limits   SODIUM - Abnormal; Notable for the following components:    Sodium 125 (*)     All other components within normal limits   CULTURE, BLOOD 1   CULTURE, BLOOD 1   LIPASE   MAGNESIUM   LACTATE, SEPSIS   APTT   PROTIME-INR   MAGNESIUM   OSMOLALITY, URINE   SODIUM, URINE, RANDOM   SODIUM   SODIUM   D-DIMER, QUANTITATIVE   POC GLUCOSE FINGERSTICK   POC GLUCOSE FINGERSTICK       All other labs were within normal range or not returned as of this dictation. EMERGENCY DEPARTMENT COURSE andDIFFERENTIAL DIAGNOSIS/MDM:   Patient evaluated in conjunction with attending physician. Patient presented with chest pressure shortness of breath. Was given aspirin in route to the ED by EMS also given sublingual nitro which relieved his pressure little bit. While patient was in the ER the nurse thought the patient was becoming disoriented. I evaluated the patient myself he seemed little sleepy but woke easily to command. He was alert and oriented x3. No cranial nerve deficit. Exhibited equal strength upper and lower extremities. I did not suspect a stroke but did order CT scan of the head which showed New bilateral frontal subdural hygromas, measuring 4 mm in thickness, without any mass effect. No evidence for acute hemorrhage. Family member thought that maybe the patient's medication was affecting his mental status at times. I discussed CT results the results with the patient. Labs reviewed. Troponin 79 and 83. EKG showed normal sinus rhythm no acute ST segment changes. proBNP 922. Chest x-ray no acute cardiopulmonary disease. Awaiting urinalysis. Afebrile. Test results and admission to hospital discussed with the patient and family. Cardiology consult-I spoke with Sandro Hernandez NP. Patient may need to have  medications adjusted. Will evaluate tomorrow. Internal medicine consult-I spoke with [facility] from [facility]. Vitals:    Vitals:    05/13/22 0800 05/13/22 1152 05/13/22 1200 05/13/22 1404   BP:   136/75 101/64   Pulse:   87    Resp:  19 25    Temp: 97.6 °F (36.4 °C)  97.7 °F (36.5 °C)    TempSrc: Temporal  Temporal    SpO2:  98% 98%    Weight:       Height:             CONSULTS:  IP CONSULT TO CARDIOLOGY  IP CONSULT TO INTERNAL MEDICINE  IP CONSULT TO DIETITIAN    RES:  Procedures    FINAL IMPRESSION      1. Chest pain, unspecified type    2. Subdural hygroma          DISPOSITION/PLAN   DISPOSITION Admitted 05/12/2022 05:44:18 PM      PATIENT REFERRED TO:   No follow-up provider specified.     DISCHARGE MEDICATIONS:     Current Discharge Medication List        Electronically signed by KASSIDY Bautista 5/13/2022 at 2:13 PM            KASSIDY Bautista CNP  05/13/22 1413

## 2022-05-12 NOTE — ED NOTES
Per Sung Salas NP, second lactic does not have to be drawn but will draw second troponin when it is due.      [facility]  05/12/22 7189

## 2022-05-13 ENCOUNTER — APPOINTMENT (OUTPATIENT)
Dept: MRI IMAGING | Age: 72
DRG: 313 | End: 2022-05-13
Payer: OTHER GOVERNMENT

## 2022-05-13 ENCOUNTER — APPOINTMENT (OUTPATIENT)
Dept: GENERAL RADIOLOGY | Age: 72
DRG: 313 | End: 2022-05-13
Payer: OTHER GOVERNMENT

## 2022-05-13 PROBLEM — M25.511 BILATERAL SHOULDER PAIN: Status: ACTIVE | Noted: 2022-05-13

## 2022-05-13 PROBLEM — M25.512 BILATERAL SHOULDER PAIN: Status: ACTIVE | Noted: 2022-05-13

## 2022-05-13 PROBLEM — R06.02 SHORTNESS OF BREATH: Status: ACTIVE | Noted: 2022-05-13

## 2022-05-13 LAB
ALBUMIN SERPL-MCNC: 3.2 G/DL (ref 3.5–5.2)
ALP BLD-CCNC: 114 U/L (ref 40–129)
ALT SERPL-CCNC: 23 U/L (ref 5–41)
ANION GAP SERPL CALCULATED.3IONS-SCNC: 9 MMOL/L (ref 9–17)
AST SERPL-CCNC: 14 U/L
BILIRUB SERPL-MCNC: 0.33 MG/DL (ref 0.3–1.2)
BUN BLDV-MCNC: 20 MG/DL (ref 8–23)
BUN/CREAT BLD: 31 (ref 9–20)
CALCIUM SERPL-MCNC: 8.8 MG/DL (ref 8.6–10.4)
CHLORIDE BLD-SCNC: 90 MMOL/L (ref 98–107)
CO2: 28 MMOL/L (ref 20–31)
CREAT SERPL-MCNC: 0.65 MG/DL (ref 0.7–1.2)
D-DIMER QUANTITATIVE: 0.63 MG/L FEU (ref 0–0.59)
EKG ATRIAL RATE: 64 BPM
EKG ATRIAL RATE: 83 BPM
EKG P AXIS: 39 DEGREES
EKG P AXIS: 59 DEGREES
EKG P-R INTERVAL: 184 MS
EKG P-R INTERVAL: 184 MS
EKG Q-T INTERVAL: 368 MS
EKG Q-T INTERVAL: 414 MS
EKG QRS DURATION: 86 MS
EKG QRS DURATION: 88 MS
EKG QTC CALCULATION (BAZETT): 427 MS
EKG QTC CALCULATION (BAZETT): 432 MS
EKG R AXIS: -16 DEGREES
EKG R AXIS: -20 DEGREES
EKG T AXIS: 3 DEGREES
EKG T AXIS: 6 DEGREES
EKG VENTRICULAR RATE: 64 BPM
EKG VENTRICULAR RATE: 83 BPM
GFR AFRICAN AMERICAN: >60 ML/MIN
GFR NON-AFRICAN AMERICAN: >60 ML/MIN
GFR SERPL CREATININE-BSD FRML MDRD: ABNORMAL ML/MIN/{1.73_M2}
GLUCOSE BLD-MCNC: 109 MG/DL (ref 70–99)
GLUCOSE BLD-MCNC: 98 MG/DL (ref 75–110)
HCT VFR BLD CALC: 32 % (ref 40.7–50.3)
HEMOGLOBIN: 10.8 G/DL (ref 13–17)
MAGNESIUM: 1.7 MG/DL (ref 1.6–2.6)
MCH RBC QN AUTO: 32 PG (ref 25.2–33.5)
MCHC RBC AUTO-ENTMCNC: 33.8 G/DL (ref 28.4–34.8)
MCV RBC AUTO: 94.7 FL (ref 82.6–102.9)
NRBC AUTOMATED: 0 PER 100 WBC
PDW BLD-RTO: 12.8 % (ref 11.8–14.4)
PLATELET # BLD: 228 K/UL (ref 138–453)
PMV BLD AUTO: 8.9 FL (ref 8.1–13.5)
POTASSIUM SERPL-SCNC: 4.3 MMOL/L (ref 3.7–5.3)
RBC # BLD: 3.38 M/UL (ref 4.21–5.77)
SODIUM BLD-SCNC: 125 MMOL/L (ref 135–144)
SODIUM BLD-SCNC: 127 MMOL/L (ref 135–144)
SODIUM BLD-SCNC: 127 MMOL/L (ref 135–144)
SODIUM BLD-SCNC: 128 MMOL/L (ref 135–144)
SODIUM,UR: 45 MMOL/L
TOTAL PROTEIN: 5 G/DL (ref 6.4–8.3)
TROPONIN, HIGH SENSITIVITY: 91 NG/L (ref 0–22)
WBC # BLD: 7 K/UL (ref 3.5–11.3)

## 2022-05-13 PROCEDURE — 99233 SBSQ HOSP IP/OBS HIGH 50: CPT | Performed by: INTERNAL MEDICINE

## 2022-05-13 PROCEDURE — 6360000002 HC RX W HCPCS: Performed by: NURSE PRACTITIONER

## 2022-05-13 PROCEDURE — 6370000000 HC RX 637 (ALT 250 FOR IP): Performed by: NURSE PRACTITIONER

## 2022-05-13 PROCEDURE — 96372 THER/PROPH/DIAG INJ SC/IM: CPT

## 2022-05-13 PROCEDURE — 82947 ASSAY GLUCOSE BLOOD QUANT: CPT

## 2022-05-13 PROCEDURE — G0378 HOSPITAL OBSERVATION PER HR: HCPCS

## 2022-05-13 PROCEDURE — 73030 X-RAY EXAM OF SHOULDER: CPT

## 2022-05-13 PROCEDURE — 84295 ASSAY OF SERUM SODIUM: CPT

## 2022-05-13 PROCEDURE — 80053 COMPREHEN METABOLIC PANEL: CPT

## 2022-05-13 PROCEDURE — 2580000003 HC RX 258: Performed by: NURSE PRACTITIONER

## 2022-05-13 PROCEDURE — 84484 ASSAY OF TROPONIN QUANT: CPT

## 2022-05-13 PROCEDURE — 85027 COMPLETE CBC AUTOMATED: CPT

## 2022-05-13 PROCEDURE — 85379 FIBRIN DEGRADATION QUANT: CPT

## 2022-05-13 PROCEDURE — 70551 MRI BRAIN STEM W/O DYE: CPT

## 2022-05-13 PROCEDURE — 93005 ELECTROCARDIOGRAM TRACING: CPT | Performed by: NURSE PRACTITIONER

## 2022-05-13 PROCEDURE — 96361 HYDRATE IV INFUSION ADD-ON: CPT

## 2022-05-13 PROCEDURE — 94761 N-INVAS EAR/PLS OXIMETRY MLT: CPT

## 2022-05-13 PROCEDURE — 36415 COLL VENOUS BLD VENIPUNCTURE: CPT

## 2022-05-13 PROCEDURE — 83735 ASSAY OF MAGNESIUM: CPT

## 2022-05-13 RX ADMIN — GABAPENTIN 100 MG: 100 CAPSULE ORAL at 21:07

## 2022-05-13 RX ADMIN — CARVEDILOL 12.5 MG: 12.5 TABLET, FILM COATED ORAL at 21:07

## 2022-05-13 RX ADMIN — POLYVINYL ALCOHOL 2 DROP: 14 SOLUTION/ DROPS OPHTHALMIC at 12:26

## 2022-05-13 RX ADMIN — POLYVINYL ALCOHOL 2 DROP: 14 SOLUTION/ DROPS OPHTHALMIC at 14:04

## 2022-05-13 RX ADMIN — POLYVINYL ALCOHOL 2 DROP: 14 SOLUTION/ DROPS OPHTHALMIC at 21:06

## 2022-05-13 RX ADMIN — ASPIRIN 81 MG CHEWABLE TABLET 81 MG: 81 TABLET CHEWABLE at 12:05

## 2022-05-13 RX ADMIN — HYDRALAZINE HYDROCHLORIDE 25 MG: 25 TABLET, FILM COATED ORAL at 21:39

## 2022-05-13 RX ADMIN — VENLAFAXINE HYDROCHLORIDE 75 MG: 75 TABLET ORAL at 12:04

## 2022-05-13 RX ADMIN — DIVALPROEX SODIUM 500 MG: 500 TABLET, EXTENDED RELEASE ORAL at 21:07

## 2022-05-13 RX ADMIN — VENLAFAXINE HYDROCHLORIDE 75 MG: 75 TABLET ORAL at 17:53

## 2022-05-13 RX ADMIN — ENOXAPARIN SODIUM 60 MG: 100 INJECTION SUBCUTANEOUS at 21:07

## 2022-05-13 RX ADMIN — ATORVASTATIN CALCIUM 40 MG: 40 TABLET, FILM COATED ORAL at 12:05

## 2022-05-13 RX ADMIN — ISOSORBIDE MONONITRATE 30 MG: 30 TABLET, EXTENDED RELEASE ORAL at 12:05

## 2022-05-13 RX ADMIN — GABAPENTIN 100 MG: 100 CAPSULE ORAL at 12:05

## 2022-05-13 RX ADMIN — FINASTERIDE 5 MG: 5 TABLET, FILM COATED ORAL at 12:05

## 2022-05-13 RX ADMIN — SODIUM CHLORIDE, PRESERVATIVE FREE 10 ML: 5 INJECTION INTRAVENOUS at 21:07

## 2022-05-13 RX ADMIN — CARVEDILOL 12.5 MG: 12.5 TABLET, FILM COATED ORAL at 12:05

## 2022-05-13 RX ADMIN — CLOPIDOGREL BISULFATE 75 MG: 75 TABLET ORAL at 12:05

## 2022-05-13 RX ADMIN — SODIUM CHLORIDE, PRESERVATIVE FREE 5 ML: 5 INJECTION INTRAVENOUS at 08:30

## 2022-05-13 NOTE — PROGRESS NOTES
Comprehensive Nutrition Assessment    Type and Reason for Visit:  Initial,Consult (Unintentional weight loss)    Nutrition Recommendations/Plan:   1. Continue Adult Diet; Regular; Low Fat/ Low Chol/ High Fiber/ CATRACHITA ; 1500 ml ; No caffeine  -- monitor need for chopped/minced meats   2. Continue Ensure Enlive TID (include in fluid restriction) -- monitor FR and modify to Magic Cup BID as needed/tolerated  3. Monitor po intakes, ONS acceptance, weights and labs      Malnutrition Assessment:  Malnutrition Status: Moderate malnutrition (05/13/22 1513)    Context:  Chronic Illness     Findings of the 6 clinical characteristics of malnutrition:  Energy Intake:  75% or less estimated energy requirements for 1 month or longer  Weight Loss:  Greater than 5% over 1 month     Body Fat Loss:  Mild body fat loss Orbital   Muscle Mass Loss:  Unable to assess    Fluid Accumulation:  No significant fluid accumulation     Strength:  Not Performed    Nutrition Assessment:    Patient admitted with complaints of chest pain and shortness of breath. Admit dx: atypical chest pain. Hx: cardiac cath, DM, HTN, HLD. Patient reports poor appetite/ unintentional weight loss. He reports being too fatigued to work with therapy. He likes Ensure supplements and tries to drink a few of them per day (he is currently on 1500 mL fluid restriction). He has had significant weight loss. He meets criteria for at least moderate malnutrition. He has missing teeth -- monitor need for chopped/minced meats. Continue current diet and ONS. Monitor po intakes and labs. Nutrition Related Findings:    GI: WDL. Edema: generalized non-pitting. Missing teeth.  Wound Type: Stage II,Pressure Injury (buttocks)       Current Nutrition Intake & Therapies:    Average Meal Intake: Unable to assess  Average Supplements Intake: Unable to assess  ADULT ORAL NUTRITION SUPPLEMENT; Breakfast, Lunch, Dinner; Standard High Calorie/High Protein Oral Supplement  ADULT DIET; Regular; Low Fat/Low Chol/High Fiber/CATRACHITA; 1500 ml; No Caffeine    Anthropometric Measures:  Height: 5' 3\" (160 cm)  Ideal Body Weight (IBW): 124 lbs (56 kg)    Admission Body Weight: 131 lb (59.4 kg)  Current Body Weight: 131 lb (59.4 kg), 105.6 % IBW.  Weight Source:  (Bedside scale)  Current BMI (kg/m2): 23.2  Usual Body Weight: 153 lb 6.4 oz (69.6 kg) (3/23/22 bed scale weight)  % Weight Change (Calculated): -14.6  Weight Adjustment For: No Adjustment                 BMI Categories: Normal Weight (BMI 22.0 to 24.9) age over 72    Estimated Daily Nutrient Needs:  Energy Requirements Based On: Kcal/kg  Weight Used for Energy Requirements: Current  Energy (kcal/day): 1780 kcal (30 kcal/kg)  Weight Used for Protein Requirements: Current  Protein (g/day): 83-95 gm protein (1.4-1.6 g/kg) r/t stage II wounds  Method Used for Fluid Requirements: Other (Comment)  Fluid (ml/day): 1500 mL fluid restriction per physician    Nutrition Diagnosis:   · Moderate malnutrition related to inadequate protein-energy intake as evidenced by poor intake prior to admission,weight loss greater than or equal to 5% in 1 month,mild muscle loss,weight loss    · Increased nutrient needs related to increase demand for energy/nutrients as evidenced by wounds      Nutrition Interventions:   Food and/or Nutrient Delivery: Continue Current Diet,Continue Oral Nutrition Supplement  Nutrition Education/Counseling: Education not indicated  Coordination of Nutrition Care: Continue to monitor while inpatient       Goals:     Goals: Meet at least 75% of estimated needs       Nutrition Monitoring and Evaluation:   Behavioral-Environmental Outcomes: None Identified  Food/Nutrient Intake Outcomes: Food and Nutrient Intake,Supplement Intake  Physical Signs/Symptoms Outcomes: Biochemical Data,Chewing or Swallowing,Fluid Status or Edema,Skin,Weight    Discharge Planning:    Continue current diet,Continue Oral Nutrition Supplement     Brian Manzano RD,   Office Number: [phone]

## 2022-05-13 NOTE — FLOWSHEET NOTE
Patient appears to be asleep but opens his eyes. Writer speaks to patient but patient seems confused. Patient is very drowsy. Patient states that he has children for support. Patient states that he is confused about his treatment. Patient requests a prayer. Writer provides prayer for healing, peace, and rest along with clarity about his treatment. Patient expresses appreciation and falls asleep.       05/13/22 1643   Encounter Summary   Service Provided For: Patient   Referral/Consult From: [address] [place] [address]; Other (Comment)   Last Encounter  05/13/22   Complexity of Encounter Moderate   Begin Time 1630   End Time  1643   Total Time Calculated 13 min   Encounter    Type Initial Screen/Assessment   Assessment/Intervention/Outcome   Assessment Other (Comment)  (confusion)   Intervention Active listening;Explored/Affirmed feelings, thoughts, concerns;Explored Coping Skills/Resources;Nurtured Hope;Prayer (assurance of)/[place];Sustaining Presence/Ministry of presence   Outcome Expressed Gratitude

## 2022-05-13 NOTE — CARE COORDINATION
Case Management Initial Discharge Plan  Lizett Cerna,             Met with:patient to discuss discharge plans. Information verified: address, contacts, phone number, , insurance Yes  Insurance Provider: Medicare and [facility]    Emergency Contact/Next of Kin name & number: Gopi Del Real other   [phone]  Who are involved in patient's support system? family    PCP: Noelle Foster MD  Date of last visit: In March      Discharge Planning    Living Arrangements:        Home has 1 stories  1 stairs to climb to get into front door, 0stairs to climb to reach second floor  Location of bedroom/bathroom in home main    Patient able to perform ADL's:Dependent    Current Services (outpatient & in home) [facility]  DME equipment: Rollator, shower chair, and walker  DME provider:     Is patient receiving oral anticoagulation therapy? Yes    If indicated:   Physician managing anticoagulation treatment: PCP  Where does patient obtain lab work for ATC treatment? Does patient have any issues/concerns obtaining medications? No  If yes, what are patient's concerns? Is there a preferred Pharmacy after hours or on weekends? Yes    If yes, [facility]    Potential Assistance Needed:       Patient agreeable to home care: Yes  Freedom of choice provided:  yes    Prior SNF/Rehab Placement and Facility: [facility]  Agreeable to SNF/Rehab: Yes  [place] of choice provided: yes     Evaluation: yes    Expected Discharge date:       Patient expects to be discharged to:        If home: is the family and/or caregiver wiling & able to provide support at home? yes  Who will be providing this support? family    Follow Up Appointment: Best Day/ Time:      Transportation provider: clare  Transportation arrangements needed for discharge: Yes    Readmission Risk              Risk of Unplanned Readmission:  0             Does patient have a readmission risk score greater than 14?: No  If yes, follow-up appointment must be made within 7 days of discharge. Goals of Care:       Educated patient on transitional options, provided freedom of choice and are agreeable with plan      Discharge Plan: atypical chest pain  Patient lives with sig other in a 1 story home with 1 step to enter. [facility]. PT/OT to eval.     cardiology consulted.            Electronically signed by Ji Hester RN on 5/13/22 at 3:40 PM EDT

## 2022-05-13 NOTE — CARE COORDINATION
paigeail work; pt is from [facility]. They are following and a bed is still there for this patient at this time. Advised that pt is admitting. They will follow to see if pt still needs ARU at discharge. Will need alaina at discharge.  Juana Felty lsw

## 2022-05-13 NOTE — CONSULTS
[facility]    Consult                        Today's Date: 5/13/2022  Patient Name: Sadie Gambino. Date of admission: 5/12/2022 11:45 AM  Patient's age: 70 y.o., 1950  Admission Dx: Atypical chest pain [R07.89]  Subdural hygroma [G96.08]  Chest pain, unspecified type [R07.9]    Reason for Consult:  Cardiac evaluation    Requesting Physician: Carl Schmitt MD    CHIEF COMPLAINT:  Chest pain    History Obtained From:  patient, electronic medical record    HISTORY OF PRESENT ILLNESS:      The patient is a 70 y.o.  male who is admitted to the hospital for chest pain. He had recent cardiac cath- results are noted below. Reports some dyspnea. He denies any PND, orthopnea and syncope      Past Medical History:   has a past medical history of Depression, Diabetes mellitus ([address] Difficult intravenous access, Hyperlipidemia, Hypertension, Migraines, PTSD (post-traumatic stress disorder), Sleep apnea, Teeth missing, and Wears glasses. Past Surgical History:   has a past surgical history that includes Cardiac catheterization (02/2010); Carpal tunnel release (Left, 01/09/2002); Vasectomy (12/1983); Tonsillectomy and adenoidectomy (1960); Hydrocele surgery (1951); Middle ear surgery (01/11/2018); eye surgery (Left, 2018); Mouth surgery (04/16/2018); other surgical history (04/19/2018); cervical fusion (04/19/2018); pr office/outpt visit,procedure only (N/A, 4/19/2018); Cataract removal; laminectomy (03/24/2022); and cervical fusion (N/A, 3/24/2022). Home Medications:    Prior to Admission medications    Medication Sig Start Date End Date Taking? Authorizing Provider   gabapentin (NEURONTIN) 100 MG capsule Take 1 capsule by mouth 2 times daily for 30 days.  4/25/22 5/25/22  Indigo Santo MD   carvedilol (COREG) 12.5 MG tablet Take 1 tablet by mouth 2 times daily 4/25/22   Indigo Santo MD   losartan (COZAAR) 50 MG tablet Take 1 tablet by mouth daily 4/26/22   Indigo Santo MD   omeprazole (PRILOSEC) 20 MG delayed release capsule Take 2 capsules by mouth Daily  4/21/20 (90 day supply) 4/25/22   Cat Lai MD   clopidogrel (PLAVIX) 75 MG tablet Take 1 tablet by mouth daily 4/19/22   Deya Madrigal MD   nitroGLYCERIN (NITROSTAT) 0.4 MG SL tablet up to max of 3 total doses. If no relief after 1 dose, call 911. 4/18/22   Jam Leyva MD   divalproex (DEPAKOTE ER) 500 MG extended release tablet Take 1 tablet by mouth at bedtime 4/18/22   Jam Leyva MD   venlafaxine (EFFEXOR) 75 MG tablet Take 1 tablet by mouth 3 times daily (with meals) 4/18/22   Jam Leyva MD   hydrALAZINE (APRESOLINE) 25 MG tablet Take 1 tablet by mouth three times daily 4/18/22   Jam Leyva MD   finasteride (PROSCAR) 5 MG tablet Take 5 mg by mouth daily    Historical Provider, MD   carboxymethylcellulose 1 % ophthalmic solution Place 2 drops into both eyes 3 times daily Pt states \"2 drops in both eyes three times a day\"    Historical Provider, MD   simethicone (MYLICON) 80 MG chewable tablet Take 1 tablet by mouth every 6 hours as needed for Flatulence  Patient taking differently: Take 80 mg by mouth every 8 hours as needed for Flatulence  6/22/20   Kisha Zamudio MD   lidocaine (XYLOCAINE) 5 % ointment Apply topically daily as needed for Pain Apply topically as needed.    4/20/20 (30 day supply)     Historical Provider, MD   metFORMIN (GLUCOPHAGE) 1000 MG tablet Take 1,000 mg by mouth 2 times daily (with meals)  4/27/20 (90 day supply)  Patient not taking: Reported on 3/21/2022    Historical Provider, MD   aspirin 81 MG chewable tablet Take 81 mg by mouth daily     Historical Provider, MD   cetirizine (ZYRTEC) 10 MG tablet Take 10 mg by mouth daily  4/6/20 (90 day supply)  Patient not taking: Reported on 3/21/2022    Historical Provider, MD   atorvastatin (LIPITOR) 80 MG tablet Take 40 mg by mouth daily Take 1/2 tablet (40 mg) daily   4/22/20 (90 day supply)    Historical Provider, MD       Allergies: Latex, Bee venom, Lisinopril, Niacin and related, Sulfa antibiotics, and Terazosin    Social History:   reports that he quit smoking about 50 years ago. His smoking use included cigarettes. He has a 2.00 pack-year smoking history. He has never used smokeless tobacco. He reports current alcohol use. He reports current drug use. Drug: Marijuana Safia Ing). Family History: family history includes Asthma in his mother; Coronary Art Dis in his mother; Dementia in his mother; Diabetes in his brother, mother, sister, and sister; Heart Disease in his brother and father; High Blood Pressure in his brother and mother; High Cholesterol in his brother; Other in his mother and sister; Stroke in his mother. No h/o sudden cardiac death. No for premature CAD    REVIEW OF SYSTEMS:    · Constitutional: there has been no unanticipated weight loss. There's been No change in energy level, No change in activity level. · Eyes: No visual changes or diplopia. No scleral icterus. · ENT: No Headaches, hearing loss or vertigo. No mouth sores or sore throat. · Cardiovascular: see above  · Respiratory: see above  · Gastrointestinal: No abdominal pain, appetite loss, blood in stools. · Genitourinary: No dysuria, trouble voiding, or hematuria. · Musculoskeletal:  No gait disturbance, No weakness or joint complaints. · Integumentary: No rash or pruritis. · Neurological: No headache or diplopia. No tingling  · Psychiatric: No anxiety, or depression. · Endocrine: No temperature intolerance. · Hematologic/Lymphatic: No abnormal bruising or bleeding, blood clots or swollen lymph nodes. · Allergic/Immunologic: No nasal congestion or hives.       PHYSICAL EXAM:      /72   Pulse 79   Temp 98.7 °F (37.1 °C) (Temporal)   Resp 29   Ht 5' 3\" (1.6 m)   Wt 131 lb (59.4 kg)   SpO2 98%   BMI 23.21 kg/m²    Constitutional and General Appearance: alert, cooperative, no distress and appears stated age  [de-identified]: PERRL, no cervical lymphadenopathy. No masses palpable. Normal oral mucosa  Respiratory:  · Normal excursion and expansion without use of accessory muscles  · Resp Auscultation: Good respiratory effort. No for increased work of breathing. On auscultation: clear to auscultation bilaterally  Cardiovascular:  · Heart tones are crisp and normal. regular S1 and S2.  · Jugular venous pulsation Normal  · The carotid upstroke is normal in amplitude and contour without delay or bruit   · Reproducible chest pain  Abdomen:   · soft  · Bowel sounds present  Extremities:  ·  No edema  Neurological:  · Alert and oriented. DATA:    Diagnostics:    EKG: NSR, NS ST abnormality    TTE 4/22/22  Summary  Left ventricle is normal in size. Global left ventricular systolic function  is normal. Calculated ejection fraction 56% by Lester's method. Mild to moderate concentric left ventricular hypertrophy. Aortic valve is trileaflet and opens adequately. Mild aortic insufficiency. Cath 4/22/22     Procedure Summary      1. Widely patent stent in mid LAD   2. Mild Nonobstructive disease otherwise      Recommendations      Routine Post Diagnostic Cath orders. Medical therapy as needed. Start colchicine. Risk factor modification. Labs:     CBC:   Recent Labs     05/12/22  1228 05/13/22  0350   WBC 6.3 7.0   HGB 11.3* 10.8*   HCT 32.8* 32.0*    228     BMP:   Recent Labs     05/12/22  1228 05/13/22  0023 05/13/22  0350 05/13/22  0350 05/13/22  0939 05/13/22  1809   *   < > 127*   < > 127* 128*   K 4.3  --  4.3  --   --   --    CO2 28  --  28  --   --   --    BUN 17  --  20  --   --   --    CREATININE 0.61*  --  0.65*  --   --   --    LABGLOM >60  --  >60  --   --   --    GLUCOSE 110*  --  109*  --   --   --     < > = values in this interval not displayed. BNP: No results for input(s): BNP in the last 72 hours.   PT/INR:   Recent Labs     05/12/22  1228   PROTIME 14.1   INR 1.1     APTT:  Recent Labs     05/12/22  1228   APTT 27.9 CARDIAC ENZYMES:No results for input(s): CKTOTAL, CKMB, CKMBINDEX, TROPONINI in the last 72 hours. FASTING LIPID PANEL:  Lab Results   Component Value Date    HDL 42 04/12/2018    TRIG 92 04/12/2018     LIVER PROFILE:  Recent Labs     05/12/22  1228 05/13/22  0350   AST 16 14   ALT 25 23   LABALBU 3.2* 3.2*       IMPRESSION:    Patient Active Problem List   Diagnosis    Hypertension    Hyperlipidemia    Diabetes mellitus ([address]    Contusion of right shoulder    Bipolar disorder, mixed ([address]   [facility] First known suicide attempt ([address]    Erectile dysfunction    Cervical stenosis of spinal canal    Incomplete spinal cord lesion at C5-C7 level ([address]    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)    Cervical spondylosis with radiculopathy    Acute blood loss anemia    Precordial pain    Depression with suicidal ideation    Severe recurrent major depression without psychotic features ([address]    Frequent falls    Hyponatremia-possibly SIADH     Cervical spinal cord compression (HCC)    Cord compression (HCC)    Quadriplegia, C1-C4 incomplete ([address]    Encephalopathy    Hospital-acquired pneumonia    Atelectasis    Cervical stenosis of spine    Moderate malnutrition (HCC)    Chest pain    Delirium due to multiple etiologies    NSTEMI (non-ST elevated myocardial infarction) (HCC)    Cervical myelopathy (HCC)    Atypical chest pain    Bilateral shoulder pain    Shortness of breath     Musculoskeletal chest pain  Cardiac cath 4/22/22- Patent LAD stent, mild non-obstructive disease otherwise  Chronic HS trop elevation  Hyponatremia    RECOMMENDATIONS:  1. Continue ASA and plavix  2. Continue statin  3. Continue Imdur, hydralazine  4. Nephrology consult for hyponatremia      Discussed with patient and Nurse.     Tony Wilder [address] Cardiology Consult           [phone]

## 2022-05-13 NOTE — H&P
[facility]  Office: [address], DO, Minda Smoker, DO, [facility], DO, Lucia Smith Blood, DO, Colonel Melinda MD, Kristina Garcia MD, Franci Falk MD, Susanna Harris MD, Gaby Sampson MD, Jeff Farooq MD, Mariaelena Bernabe MD, Kiersten Jett, DO, Shiva Orourke, DO, Sandrine Damon MD,  Magen Carlson, DO, Apoorva Agustin MD, Keya Ambriz MD, Maria Luisa Brice MD, Rox Maher, DO, Stuart Cueto MD, Shaista Gifford MD, Lupe Swan MD, [facility], [facility], CNP, Melissa Solomon, CNP, Chacho Jennings, CNP, Moe Shepard, CNP, Adina Hobbs, CNP, Yuni Medeiros PALindaC, Dorene Murguia, [facility], Corine Leon, CNP, Lashonda Grande, CNP, Doug Martinez, CNP, [place] Banuelos, CNS, Adrienne Avila, [facility], Philippe Dockery, CNP, Ning Carrillo, CNP, Regan Porter, [facility]    HISTORY AND PHYSICAL EXAMINATION            Date:   5/12/2022  Patient name:  Mariann Portillo Date of admission:  5/12/2022 11:45 AM  MRN:   [id number]  Account:  [de-identified]  YOB: 1950  PCP:    Nafisa Arshad MD  Room:   2036/2036-01  Code Status:    Full Code    Chief Complaint:     Chief Complaint   Patient presents with    Shortness of Breath    Chest Pain       History Obtained From:     patient    History of Present Illness:     Mariann Portillo is a 70 y.o. Non- / non  male who presents with Shortness of Breath and Chest Pain   and is admitted to the hospital for the management of Atypical chest pain. Patient was brought to the emergency department with chest pain. Patient has had a complicated 3 months. Patient had a surgical fusion completed due to upper extremity neurological deficits. Since that time patient has been having difficulty with ambulation and functional mobility. Patient has been been in acute rehab at [facility] facilities.   Patient was [facility] approximately 3 weeks [facility].  At that time patient had a cardiac cath completed and his prior coronary artery stents were found to be patent. Patient was discharged with instructions to follow-up with cardiology and neurosurgery as an outpatient. Patient return to the emergency department today with severely worsened chest pain. He describes it as a severe pressure rated at a 6/10 that waxes and wanes in intensity. Emergency department evaluation has found a mildly elevated troponin but this is at a baseline for the patient who is troponins are chronically in the 80s and 90s. At the time my exam patient is resting comfortably and states that his chest tightness is minimal at best.  Patient is also been found to be hyponatremic and he reports that he has been losing weight unintentionally and has been increasingly too fatigued to work with therapy.     Past Medical History:     Past Medical History:   Diagnosis Date    Depression 2017    ON RX    Diabetes mellitus ([address] 2013    NIDDM    Difficult intravenous access     Hyperlipidemia 2008    ON RX    Hypertension 2008    ON RX    Migraines     PTSD (post-traumatic stress disorder) 01/2018    ON RX    Sleep apnea 01/2018    UNALBE TO USE TO CLEARED BY ENT (CPAP)    Teeth missing     BACK TEETH UPPER AND LOWER TO BE FITTED FOR PARTIALS AT LATER DATE    Wears glasses         Past Surgical History:     Past Surgical History:   Procedure Laterality Date    CARDIAC CATHETERIZATION  02/2010    no stents     CARPAL TUNNEL RELEASE Left 01/09/2002    CATARACT REMOVAL      CERVICAL FUSION  04/19/2018    anterior cervical fusion C5-6    CERVICAL FUSION N/A 3/24/2022    C2-5 FUSION, C2-4 LAMINECTOMY performed by Taylor Dooley DO at [address] [facility] Left 2018    CATARACT EXTRACTION WITH IOL   [facility]    LAMINECTOMY  03/24/2022    C2-5 FUSION, C2-4 LAMINECTOMY    MIDDLE EAR SURGERY  01/11/2018    MIDDLE EAR REBUILT AND EAR DRUM REPLACED    MOUTH SURGERY 04/16/2018    5 TEETH REMOVED    OTHER SURGICAL HISTORY  04/19/2018    : ANTERIOR CERVICAL CORRPECTOMY C5-6, SYNTHES, DEPUY, REG TABLE, SUPINE,     [place] OFFICE/OUTPT VISIT,PROCEDURE ONLY N/A 4/19/2018    ANTERIOR CERVICAL CORRPECTOMY AND FUSION C5-6 performed by Shane King DO at [facility]  12/1983        Medications Prior to Admission:     Prior to Admission medications    Medication Sig Start Date End Date Taking? Authorizing Provider   gabapentin (NEURONTIN) 100 MG capsule Take 1 capsule by mouth 2 times daily for 30 days. 4/25/22 5/25/22  Ryan Ford MD   carvedilol (COREG) 12.5 MG tablet Take 1 tablet by mouth 2 times daily 4/25/22   Ryan Ford MD   losartan (COZAAR) 50 MG tablet Take 1 tablet by mouth daily 4/26/22   Ryan Ford MD   omeprazole (PRILOSEC) 20 MG delayed release capsule Take 2 capsules by mouth Daily LF 4/21/20 (90 day supply) 4/25/22   Ryan Ford MD   clopidogrel (PLAVIX) 75 MG tablet Take 1 tablet by mouth daily 4/19/22   Ania Pop MD   nitroGLYCERIN (NITROSTAT) 0.4 MG SL tablet up to max of 3 total doses.  If no relief after 1 dose, call 911. 4/18/22   Kevin Bradley MD   divalproex (DEPAKOTE ER) 500 MG extended release tablet Take 1 tablet by mouth at bedtime 4/18/22   Kevin Bradley MD   venlafaxine (EFFEXOR) 75 MG tablet Take 1 tablet by mouth 3 times daily (with meals) 4/18/22   Kevin Bradley MD   hydrALAZINE (APRESOLINE) 25 MG tablet Take 1 tablet by mouth three times daily 4/18/22   Kevin Bradley MD   finasteride (PROSCAR) 5 MG tablet Take 5 mg by mouth daily    Historical Provider, MD   carboxymethylcellulose 1 % ophthalmic solution Place 2 drops into both eyes 3 times daily Pt states \"2 drops in both eyes three times a day\"    Historical Provider, MD   simethicone (MYLICON) 80 MG chewable tablet Take 1 tablet by mouth every 6 hours as needed for Flatulence  Patient taking differently: Take 80 mg by mouth every 8 hours as needed for Flatulence  6/22/20   Kisha Zamudio MD   lidocaine (XYLOCAINE) 5 % ointment Apply topically daily as needed for Pain Apply topically as needed.  4/20/20 (30 day supply)  Patient not taking: Reported on 3/23/2022    Historical Provider, MD   metFORMIN (GLUCOPHAGE) 1000 MG tablet Take 1,000 mg by mouth 2 times daily (with meals)  4/27/20 (90 day supply)  Patient not taking: Reported on 3/21/2022    Historical Provider, MD   aspirin 81 MG chewable tablet Take 81 mg by mouth daily  Patient not taking: Reported on 3/21/2022    Historical Provider, MD   cetirizine (ZYRTEC) 10 MG tablet Take 10 mg by mouth daily LF 4/6/20 (90 day supply)  Patient not taking: Reported on 3/21/2022    Historical Provider, MD   atorvastatin (LIPITOR) 80 MG tablet Take 40 mg by mouth daily Take 1/2 tablet (40 mg) daily  LF 4/22/20 (90 day supply)  Patient not taking: Reported on 3/21/2022    Historical Provider, MD        Allergies:     Latex, Bee venom, Lisinopril, Niacin and related, Sulfa antibiotics, and Terazosin    Social History:     Tobacco:    reports that he quit smoking about 50 years ago. His smoking use included cigarettes. He has a 2.00 pack-year smoking history. He has never used smokeless tobacco.  Alcohol:      reports current alcohol use. Drug Use:  reports current drug use. Drug: Marijuana Obi Manning). Family History:     Family History   Problem Relation Age of Onset   Bonilla Dementia Mother     Coronary Art Dis Mother     Stroke Mother     Diabetes Mother     Asthma Mother     Other Mother         BRAIN ANEURISM    High Blood Pressure Mother     Heart Disease Father     Diabetes Sister     Diabetes Brother     High Blood Pressure Brother     High Cholesterol Brother     Heart Disease Brother     Diabetes Sister     Other Sister         NEUROPATHY       Review of Systems:     Positive and Negative as described in HPI.     CONSTITUTIONAL:  negative for fevers, chills, sweats, fatigue, weight loss  HEENT:  negative for vision, hearing changes, runny nose, throat pain  RESPIRATORY:  negative for shortness of breath, cough, congestion, wheezing  CARDIOVASCULAR: Denies palpitations, reports midsternal chest pressure that radiates outward intermittently. GASTROINTESTINAL:  negative for nausea, vomiting, diarrhea, constipation, change in bowel habits, abdominal pain   GENITOURINARY:  negative for difficulty of urination, burning with urination, frequency   INTEGUMENT:  negative for rash, skin lesions, easy bruising   HEMATOLOGIC/LYMPHATIC:  negative for swelling/edema   ALLERGIC/IMMUNOLOGIC:  negative for urticaria , itching  ENDOCRINE:  negative increase in drinking, increase in urination, hot or cold intolerance  MUSCULOSKELETAL:  negative joint pains, muscle aches, swelling of joints  NEUROLOGICAL:  negative for headaches, dizziness, lightheadedness, numbness, pain, tingling extremities  BEHAVIOR/PSYCH:  negative for depression, anxiety    Physical Exam:   BP (!) 135/90   Pulse 95   Temp 95 °F (35 °C) (Oral)   Resp 19   Ht 5' 3\" (1.6 m)   Wt 130 lb (59 kg)   SpO2 98%   BMI 23.03 kg/m²   Temp (24hrs), Av °F (35 °C), Min:95 °F (35 °C), Max:95 °F (35 °C)    Recent Labs     22   POCGLU 78     No intake or output data in the 24 hours ending 22    General Appearance:  alert, and in no acute distress. Patient is cachectic  Mental status: oriented to person, place, and time  Head:  normocephalic, atraumatic  Eye: no icterus, redness, pupils equal and reactive, extraocular eye movements intact, conjunctiva clear  Ear: normal external ear, no discharge, hearing intact  Nose:  no drainage noted  Mouth: mucous membranes moist  Neck: supple, no carotid bruits, thyroid not palpable  Lungs: Bilateral equal air entry, clear to auscultation, no wheezing, rales or rhonchi, normal effort  Cardiovascular: normal rate, regular rhythm, no murmur, gallop, rub.   Abdomen: Soft, nontender, nondistended, normal bowel sounds, no hepatomegaly or splenomegaly  Neurologic: There are no new focal motor or sensory deficits, normal muscle tone and bulk, no abnormal sensation, normal speech, cranial nerves II through XII grossly intact  Skin: No gross lesions, rashes, bruising or bleeding on exposed skin area. Extremities:  peripheral pulses palpable, no pedal edema or calf pain with palpation. Patient's lower extremities are cool to the touch however they are equal bilaterally. Psych: normal affect     Investigations:      Laboratory Testing:  Recent Results (from the past 24 hour(s))   EKG 12 Lead    Collection Time: 05/12/22 11:44 AM   Result Value Ref Range    Ventricular Rate 64 BPM    Atrial Rate 64 BPM    P-R Interval 184 ms    QRS Duration 86 ms    Q-T Interval 414 ms    QTc Calculation (Bazett) 427 ms    P Axis 59 degrees    R Axis -20 degrees    T Axis 3 degrees   Culture, Blood 1    Collection Time: 05/12/22 12:26 PM    Specimen: Blood   Result Value Ref Range    Specimen Description . BLOOD     Special Requests RIGHT AC     Culture NO GROWTH <24 HRS    CBC with Auto Differential    Collection Time: 05/12/22 12:28 PM   Result Value Ref Range    WBC 6.3 3.5 - 11.3 k/uL    RBC 3.49 (L) 4.21 - 5.77 m/uL    Hemoglobin 11.3 (L) 13.0 - 17.0 g/dL    Hematocrit 32.8 (L) 40.7 - 50.3 %    MCV 94.0 82.6 - 102.9 fL    MCH 32.4 25.2 - 33.5 pg    MCHC 34.5 28.4 - 34.8 g/dL    RDW 12.8 11.8 - 14.4 %    Platelets 144 812 - 783 k/uL    MPV 8.9 8.1 - 13.5 fL    NRBC Automated 0.0 0.0 per 100 WBC    Seg Neutrophils 71 (H) 36 - 65 %    Lymphocytes 18 (L) 24 - 43 %    Monocytes 7 3 - 12 %    Eosinophils % 2 1 - 4 %    Basophils 1 0 - 2 %    Immature Granulocytes 1 (H) 0 %    Segs Absolute 4.54 1.50 - 8.10 k/uL    Absolute Lymph # 1.11 1.10 - 3.70 k/uL    Absolute Mono # 0.44 0.10 - 1.20 k/uL    Absolute Eos # 0.11 0.00 - 0.44 k/uL    Basophils Absolute 0.04 0.00 - 0.20 k/uL    Absolute Immature Granulocyte 0.03 0.00 - 0.30 k/uL   Comprehensive Metabolic Panel w/ Reflex to MG    Collection Time: 05/12/22 12:28 PM   Result Value Ref Range    Glucose 110 (H) 70 - 99 mg/dL    BUN 17 8 - 23 mg/dL    CREATININE 0.61 (L) 0.70 - 1.20 mg/dL    Bun/Cre Ratio 28 (H) 9 - 20    Calcium 8.7 8.6 - 10.4 mg/dL    Sodium 124 (L) 135 - 144 mmol/L    Potassium 4.3 3.7 - 5.3 mmol/L    Chloride 88 (L) 98 - 107 mmol/L    CO2 28 20 - 31 mmol/L    Anion Gap 8 (L) 9 - 17 mmol/L    Alkaline Phosphatase 119 40 - 129 U/L    ALT 25 5 - 41 U/L    AST 16 <40 U/L    Total Bilirubin 0.54 0.3 - 1.2 mg/dL    Total Protein 5.0 (L) 6.4 - 8.3 g/dL    Albumin 3.2 (L) 3.5 - 5.2 g/dL    GFR Non-African American >60 >60 mL/min    GFR African American >60 >60 mL/min    GFR Comment         Lipase    Collection Time: 05/12/22 12:28 PM   Result Value Ref Range    Lipase 24 13 - 60 U/L   Troponin    Collection Time: 05/12/22 12:28 PM   Result Value Ref Range    Troponin, High Sensitivity 79 (HH) 0 - 22 ng/L   Ammonia    Collection Time: 05/12/22 12:28 PM   Result Value Ref Range    Ammonia 12 (L) 16 - 60 umol/L   Brain Natriuretic Peptide    Collection Time: 05/12/22 12:28 PM   Result Value Ref Range    Pro- (H) <300 pg/mL   Magnesium    Collection Time: 05/12/22 12:28 PM   Result Value Ref Range    Magnesium 1.7 1.6 - 2.6 mg/dL   Lactate, Sepsis    Collection Time: 05/12/22 12:28 PM   Result Value Ref Range    Lactic Acid, Sepsis 1.0 0.5 - 1.9 mmol/L   APTT    Collection Time: 05/12/22 12:28 PM   Result Value Ref Range    PTT 27.9 23.9 - 33.8 sec   Protime-INR    Collection Time: 05/12/22 12:28 PM   Result Value Ref Range    Protime 14.1 11.5 - 14.2 sec    INR 1.1    Culture, Blood 1    Collection Time: 05/12/22 12:45 PM    Specimen: Blood   Result Value Ref Range    Specimen Description . BLOOD     Special Requests LEFT FOREARM,6ML     Culture NO GROWTH <24 HRS    Troponin    Collection Time: 05/12/22  2:42 PM   Result Value Ref Range    Troponin, High Sensitivity 83 (HH) 0 - 22 ng/L   Urinalysis with Reflex to Culture    Collection Time: 05/12/22  4:41 PM    Specimen: Urine   Result Value Ref Range    Color, UA Yellow Yellow    Turbidity UA Clear Clear    Glucose, Ur NEGATIVE NEGATIVE    Bilirubin Urine NEGATIVE NEGATIVE    Ketones, Urine NEGATIVE NEGATIVE    Specific Gravity, UA 1.025 1.005 - 1.030    Urine Hgb NEGATIVE NEGATIVE    pH, UA 6.5 5.0 - 8.0    Protein, UA 2+ (A) NEGATIVE    Urobilinogen, Urine ELEVATED (A) Normal    Nitrite, Urine NEGATIVE NEGATIVE    Leukocyte Esterase, Urine NEGATIVE NEGATIVE   Microscopic Urinalysis    Collection Time: 05/12/22  4:41 PM   Result Value Ref Range    -          WBC, UA 0 TO 2 0 - 5 /HPF    RBC, UA 0 TO 2 0 - 2 /HPF    Casts UA 5 TO 10 /LPF    Casts UA HYALINE /LPF    Epithelial Cells UA 2 TO 5 0 - 5 /HPF    Mucus, UA 1+ (A) None   Osmolality    Collection Time: 05/12/22  8:24 PM   Result Value Ref Range    Serum Osmolality 270 (L) 282 - 298 mOsm/kg   POC Glucose Fingerstick    Collection Time: 05/12/22  8:30 PM   Result Value Ref Range    POC Glucose 78 75 - 110 mg/dL       Imaging/Diagnostics:  CT Head WO Contrast    Result Date: 5/12/2022  New bilateral frontal subdural hygromas, measuring 4 mm in thickness, without any mass effect. No evidence for acute hemorrhage. XR CHEST PORTABLE    Result Date: 5/12/2022  No acute cardiopulmonary disease. Assessment :      Hospital Problems           Last Modified POA    * (Principal) Atypical chest pain 5/12/2022 Yes    Hypertension 5/12/2022 Yes    Hyperlipidemia 5/12/2022 Yes    Diabetes mellitus ([address] 5/12/2022 Yes    Bipolar disorder, mixed ([address] 5/12/2022 Yes    Hyponatremia 5/12/2022 Yes    Moderate malnutrition ([address] 5/12/2022 Yes    Chest pain 5/12/2022 Yes          Plan:     Patient status inpatient in the Progressive Unit/Step down    1. Atypical chest pain with chronically elevated troponin  1. Continue home medication regiment  2. Add Imdur  3.  Cardiology consultation pending  4. Lovenox one-to-one twice daily pending cardiology review  2. Hyponatremia  1. Avoid diuretics  2. Oral fluid restriction  3. Gentle IV fluids of 0.9% normal saline at 50 an hour overnight. 4. If hyponatremia is not self corrected by morning recommend nephrology consultation  3. Personal history of hypertension with hyperlipidemia  1. Continue home medication regimen of  1. Daily aspirin and Plavix  2. Lipitor 40 mg  3. Coreg at 12.5  4. Hydralazine 25 mg 3 times daily  4. Diabetes mellitus   1. Stable off therapy  5. Ambulatory dysfunction with low protein, weight loss, and moderate malnutrition status post fall  1. PT/OT once cleared by cardiology  2. Maintain cervical collar during ambulation  3. Anticipate discharge back to [facility]  4. Encourage dietary supplements with Ensure for now, appreciate dietary consultation input      Consultations:   IP CONSULT TO CARDIOLOGY  IP CONSULT TO INTERNAL MEDICINE    Patient is admitted as inpatient status because of co-morbidities listed above, severity of signs and symptoms as outlined, requirement for current medical therapies and most importantly because of direct risk to patient if care not provided in a hospital setting. Expected length of stay > 48 hours.     KASSIDY Larry NP  5/12/2022  8:38 PM    Copy sent to Dr. Maritza Calero MD

## 2022-05-13 NOTE — PROGRESS NOTES
[facility]  Office: [address], DO, Eliseoalexei Ricardo, DO, Lisandra Fer, DO, Gayatrijocelin Middleton, DO, Caesar Hernandez MD, Jocelyn García MD, Sanket Saldana MD, Yuniel Mchugh MD, Kory Shukla MD, Jacqueline Lion MD, Leeanne Gregg MD, Ann Fu, DO, Yogesh Cortez, DO, Lynda Rinaldi MD,  Karen Lala, DO, Riaz Dodd MD, Valeri Escalante MD, Aditi Zabala MD, Wallace Delgado DO, Hector Nguyen MD, Joy Feldman MD, Josefina Lee MD, Xavier Ballard [facility], [facility], CNP, Analy To, CNP, Emily Escudero, CNP, Kevan Villanueva, [facility], Manisha Barillas, CNP, Beckie Bee PA-C, Efrain Ferrer, DNP, Ronnie Thorpe, CNP, Grant Montoya, CNP, Mame Carlson, CNP, Merlyn Wise, CNS, Loida Palacios, DNP, Melvin Moreno, CNP, Adriana Foote, CNP, Ling Genao, [facility]    Progress Note    5/13/2022    2:05 PM    Name:   Camila Rodriguez. MRN:     [id number]     Acct:      [de-identified]   Room:   2036/2036-01   Day:  0  Admit Date:  5/12/2022 11:45 AM    PCP:   Eileen Waldrop MD  Code Status:  DNR-CCA    Subjective:     C/C:   Chief Complaint   Patient presents with    Shortness of Breath    Chest Pain     Interval History Status:   Still complains of chest pain in the middle of chest radiating to both shoulders  Troponins are chronically elevated and are essentially flat, cardiology will be evaluating  Has been full ROM of both shoulders,L > R  Sodium 127, lab result suggestive of SIADH which is multifactorial including pain  Patient is short of breath while talking, states this shortness of breath is there for many months    Brief History:     Camila Gracia is a 70 y.o. Non- / non  male who presents with Shortness of Breath and Chest Pain   and is admitted to the hospital for the management of Atypical chest pain.     Patient was brought to the emergency department with chest pain.   Patient has had a complicated 3 months. Patient had a surgical fusion completed due to upper extremity neurological deficits. Since that time patient has been having difficulty with ambulation and functional mobility. Patient has been been in acute rehab at [facility] facilities. Patient was seen approximately 3 weeks ago at [facility] for chest pain. At that time patient had a cardiac cath completed and his prior coronary artery stents were found to be patent. Patient was discharged with instructions to follow-up with cardiology and neurosurgery as an outpatient. Patient return to the emergency department today with severely worsened chest pain. He describes it as a severe pressure rated at a 6/10 that waxes and wanes in intensity. Emergency department evaluation has found a mildly elevated troponin but this is at a baseline for the patient who is troponins are chronically in the 80s and 90s. At the time my exam patient is resting comfortably and states that his chest tightness is minimal at best.  Patient is also been found to be hyponatremic and he reports that he has been losing weight unintentionally and has been increasingly too fatigued to work with therapy. Review of Systems:     Constitutional:  negative for chills, fevers, sweats  Respiratory:  negative for cough, + shortness of breath while talking, denies wheezing  Cardiovascular:  + for midsternal chest pain radiating to both shoulders, chest pressure/discomfort, lower extremity edema,   Gastrointestinal:  negative for abdominal pain, constipation, diarrhea, nausea, vomiting  Neurological:  negative for dizziness, headache    Medications: Allergies:     Allergies   Allergen Reactions    Latex Itching    Bee Venom Swelling    Lisinopril Other (See Comments)     Cough      Niacin And Related Rash    Sulfa Antibiotics Rash    Terazosin Rash       Current Meds:   Scheduled Meds:    aspirin  81 mg Oral Daily    atorvastatin  40 mg Oral Daily    carvedilol  12.5 mg Oral BID    clopidogrel  75 mg Oral Daily    divalproex  500 mg Oral Nightly    finasteride  5 mg Oral Daily    gabapentin  100 mg Oral BID    hydrALAZINE  25 mg Oral TID    pantoprazole  40 mg Oral QAM AC    venlafaxine  75 mg Oral TID WC    sodium chloride flush  5-40 mL IntraVENous 2 times per day    isosorbide mononitrate  30 mg Oral Daily    enoxaparin  1 mg/kg SubCUTAneous BID    polyvinyl alcohol  2 drop Both Eyes TID     Continuous Infusions:    sodium chloride       PRN Meds: simethicone, sodium chloride flush, sodium chloride, ondansetron **OR** ondansetron, acetaminophen **OR** acetaminophen, magnesium hydroxide, potassium chloride **OR** potassium alternative oral replacement **OR** potassium chloride, potassium chloride, magnesium sulfate, nitroGLYCERIN    Data:     Past Medical History:   has a past medical history of Depression, Diabetes mellitus ([address] Difficult intravenous access, Hyperlipidemia, Hypertension, Migraines, PTSD (post-traumatic stress disorder), Sleep apnea, Teeth missing, and Wears glasses. Social History:   reports that he quit smoking about 50 years ago. His smoking use included cigarettes. He has a 2.00 pack-year smoking history. He has never used smokeless tobacco. He reports current alcohol use. He reports current drug use. Drug: Marijuana Dung [facility]).      Family History:   Family History   Problem Relation Age of Onset   Theron Rudder Dementia Mother     Coronary Art Dis Mother     Stroke Mother     Diabetes Mother     Asthma Mother     Other Mother         BRAIN ANEURISM    High Blood Pressure Mother     Heart Disease Father     Diabetes Sister     Diabetes Brother     High Blood Pressure Brother     High Cholesterol Brother     Heart Disease Brother     Diabetes Sister     Other Sister         NEUROPATHY       Vitals:  /64   Pulse 87   Temp 97.7 °F (36.5 °C) (Temporal)   Resp 25   Ht 5' 3\" (1.6 m)   Wt 131 lb (59.4 kg)   SpO2 98% BMI 23.21 kg/m²   Temp (24hrs), Av.9 °F (36.6 °C), Min:97.2 °F (36.2 °C), Max:98.6 °F (37 °C)    Recent Labs     22  0758   POCGLU 78 98       I/O (24Hr): Intake/Output Summary (Last 24 hours) at 2022 1405  Last data filed at 2022 0616  Gross per 24 hour   Intake 10 ml   Output 220 ml   Net -210 ml       Labs:  Hematology:  Recent Labs     22  0350   WBC 6.3 7.0   RBC 3.49* 3.38*   HGB 11.3* 10.8*   HCT 32.8* 32.0*   MCV 94.0 94.7   MCH 32.4 32.0   MCHC 34.5 33.8   RDW 12.8 12.8    228   MPV 8.9 8.9   INR 1.1  --      Chemistry:  Recent Labs     22  1442 22  0023 22  0350 22  0939   *   < >  --   --  125* 127* 127*   K 4.3  --   --   --   --  4.3  --    CL 88*  --   --   --   --  90*  --    CO2 28  --   --   --   --  28  --    GLUCOSE 110*  --   --   --   --  109*  --    BUN 17  --   --   --   --  20  --    CREATININE 0.61*  --   --   --   --  0.65*  --    MG 1.7  --   --   --   --  1.7  --    ANIONGAP 8*  --   --   --   --  9  --    LABGLOM >60  --   --   --   --  >60  --    GFRAA >60  --   --   --   --  >60  --    CALCIUM 8.7  --   --   --   --  8.8  --    PROBNP 922*  --   --   --   --   --   --    TROPHS 79*   < > 83* 82* 91*  --   --     < > = values in this interval not displayed.      Recent Labs     22  1228 22  2030 22  0350 22  0758   PROT 5.0*  --  5.0*  --    LABALBU 3.2*  --  3.2*  --    AST 16  --  14  --    ALT 25  --  23  --    ALKPHOS 119  --  114  --    BILITOT 0.54  --  0.33  --    AMMONIA 12*  --   --   --    LIPASE 24  --   --   --    POCGLU  --  78  --  98     ABG:  Lab Results   Component Value Date    PHART 7.445 2018    SRB4UJD 39.1 2018    PO2ART 252.0 2018    IVY1VJQ 26.5 2018    NBEA NOT REPORTED 2018    PBEA 2.7 2018    L0JXERWJ 99.5 2018    FIO2 97% 2018     Lab Results Component Value Date/Time    [facility] 05/12/2022 12:45 PM     Lab Results   Component Value Date/Time    CULTURE NO GROWTH 12 HOURS 05/12/2022 12:45 PM       Radiology:  CT Head WO Contrast    Result Date: 5/12/2022  New bilateral frontal subdural hygromas, measuring 4 mm in thickness, without any mass effect. No evidence for acute hemorrhage. XR CHEST PORTABLE    Result Date: 5/12/2022  No acute cardiopulmonary disease. MRI BRAIN WO CONTRAST    Result Date: 5/13/2022  Bilateral subdural collections likely representing hygromas. Physical Examination:        General appearance:  alert, cooperative and short of breath while talking  Mental Status:  oriented to person, place and time and anxious affect  Lungs: Prolonged expiratory phase, diminished breath sounds at bases  Heart:  regular rate and rhythm, no murmur  Abdomen:  soft, nontender, nondistended, normal bowel sounds, no masses, hepatomegaly, splenomegaly  Extremities: + Lower extremity edema, no redness, tenderness in the calves,+ painful ROM of both shoulders- L > R    Skin:  no gross lesions, rashes, induration    Assessment:        Hospital Problems           Last Modified POA    * (Principal) Atypical chest pain 5/12/2022 Yes    Bilateral shoulder pain 5/13/2022 Yes    Shortness of breath 5/13/2022 Yes    Hypertension 5/12/2022 Yes    Hyperlipidemia 5/12/2022 Yes    Diabetes mellitus ([address] 5/12/2022 Yes    Bipolar disorder, mixed ([address] 5/12/2022 Yes    Hyponatremia-possibly SIADH  5/13/2022 Yes    Moderate malnutrition ([address] 5/12/2022 Yes    Chest pain 5/12/2022 Yes          Plan:        1. Discontinue IV fluids and continue fluid restriction  2. Continue cardiac medicines and full therapeutic dose of Lovenox which he is already getting  3. Check D-dimer and compared with previously elevated D-dimer result, if elevated more then will get CTA chest to rule out PE  4. X-ray both shoulders due to painful ROM  5.  Cardiology will be evaluating today  6. MRI brain confirms findings of bilateral subdural collections likely representing hygromas  7.  PT OT    Time spent more than 40 minutes on talking to patient, reviewing chart and formulating further treatment plan    Vernell Robert MD  5/13/2022  2:05 PM

## 2022-05-14 ENCOUNTER — APPOINTMENT (OUTPATIENT)
Dept: CT IMAGING | Age: 72
DRG: 313 | End: 2022-05-14
Payer: OTHER GOVERNMENT

## 2022-05-14 LAB
ANION GAP SERPL CALCULATED.3IONS-SCNC: 6 MMOL/L (ref 9–17)
BUN BLDV-MCNC: 16 MG/DL (ref 8–23)
BUN/CREAT BLD: 37 (ref 9–20)
CALCIUM SERPL-MCNC: 8.8 MG/DL (ref 8.6–10.4)
CHLORIDE BLD-SCNC: 94 MMOL/L (ref 98–107)
CO2: 31 MMOL/L (ref 20–31)
CREAT SERPL-MCNC: 0.43 MG/DL (ref 0.7–1.2)
FIO2: 28
GFR AFRICAN AMERICAN: >60 ML/MIN
GFR NON-AFRICAN AMERICAN: >60 ML/MIN
GFR SERPL CREATININE-BSD FRML MDRD: ABNORMAL ML/MIN/{1.73_M2}
GLUCOSE BLD-MCNC: 97 MG/DL (ref 70–99)
POC HCO3: 32.4 MMOL/L (ref 21–28)
POC O2 SATURATION: 98 % (ref 94–98)
POC PCO2: 55.3 MM HG (ref 35–48)
POC PH: 7.38 (ref 7.35–7.45)
POC PO2: 110.5 MM HG (ref 83–108)
POSITIVE BASE EXCESS, ART: 6 (ref 0–3)
POTASSIUM SERPL-SCNC: 4.7 MMOL/L (ref 3.7–5.3)
SODIUM BLD-SCNC: 127 MMOL/L (ref 135–144)
SODIUM BLD-SCNC: 131 MMOL/L (ref 135–144)

## 2022-05-14 PROCEDURE — 82803 BLOOD GASES ANY COMBINATION: CPT

## 2022-05-14 PROCEDURE — 6370000000 HC RX 637 (ALT 250 FOR IP): Performed by: NURSE PRACTITIONER

## 2022-05-14 PROCEDURE — 97163 PT EVAL HIGH COMPLEX 45 MIN: CPT

## 2022-05-14 PROCEDURE — 96372 THER/PROPH/DIAG INJ SC/IM: CPT

## 2022-05-14 PROCEDURE — 2060000000 HC ICU INTERMEDIATE R&B

## 2022-05-14 PROCEDURE — 99232 SBSQ HOSP IP/OBS MODERATE 35: CPT | Performed by: INTERNAL MEDICINE

## 2022-05-14 PROCEDURE — 2580000003 HC RX 258: Performed by: INTERNAL MEDICINE

## 2022-05-14 PROCEDURE — 97530 THERAPEUTIC ACTIVITIES: CPT

## 2022-05-14 PROCEDURE — 97535 SELF CARE MNGMENT TRAINING: CPT

## 2022-05-14 PROCEDURE — 36600 WITHDRAWAL OF ARTERIAL BLOOD: CPT

## 2022-05-14 PROCEDURE — 97167 OT EVAL HIGH COMPLEX 60 MIN: CPT

## 2022-05-14 PROCEDURE — 71260 CT THORAX DX C+: CPT

## 2022-05-14 PROCEDURE — 84295 ASSAY OF SERUM SODIUM: CPT

## 2022-05-14 PROCEDURE — 36415 COLL VENOUS BLD VENIPUNCTURE: CPT

## 2022-05-14 PROCEDURE — 6360000004 HC RX CONTRAST MEDICATION: Performed by: INTERNAL MEDICINE

## 2022-05-14 PROCEDURE — 2580000003 HC RX 258: Performed by: NURSE PRACTITIONER

## 2022-05-14 PROCEDURE — 2700000000 HC OXYGEN THERAPY PER DAY

## 2022-05-14 PROCEDURE — 80048 BASIC METABOLIC PNL TOTAL CA: CPT

## 2022-05-14 PROCEDURE — 6360000002 HC RX W HCPCS: Performed by: NURSE PRACTITIONER

## 2022-05-14 PROCEDURE — 6370000000 HC RX 637 (ALT 250 FOR IP): Performed by: INTERNAL MEDICINE

## 2022-05-14 PROCEDURE — 96361 HYDRATE IV INFUSION ADD-ON: CPT

## 2022-05-14 RX ORDER — SODIUM CHLORIDE 0.9 % (FLUSH) 0.9 %
10 SYRINGE (ML) INJECTION PRN
Status: DISCONTINUED | OUTPATIENT
Start: 2022-05-14 | End: 2022-05-17 | Stop reason: HOSPADM

## 2022-05-14 RX ORDER — 0.9 % SODIUM CHLORIDE 0.9 %
80 INTRAVENOUS SOLUTION INTRAVENOUS ONCE
Status: COMPLETED | OUTPATIENT
Start: 2022-05-14 | End: 2022-05-14

## 2022-05-14 RX ADMIN — HYDRALAZINE HYDROCHLORIDE 25 MG: 25 TABLET, FILM COATED ORAL at 11:04

## 2022-05-14 RX ADMIN — SODIUM CHLORIDE, PRESERVATIVE FREE 10 ML: 5 INJECTION INTRAVENOUS at 11:09

## 2022-05-14 RX ADMIN — CARVEDILOL 12.5 MG: 12.5 TABLET, FILM COATED ORAL at 20:05

## 2022-05-14 RX ADMIN — ASPIRIN 81 MG CHEWABLE TABLET 81 MG: 81 TABLET CHEWABLE at 11:03

## 2022-05-14 RX ADMIN — POLYVINYL ALCOHOL 2 DROP: 14 SOLUTION/ DROPS OPHTHALMIC at 11:04

## 2022-05-14 RX ADMIN — CARVEDILOL 12.5 MG: 12.5 TABLET, FILM COATED ORAL at 11:03

## 2022-05-14 RX ADMIN — GABAPENTIN 100 MG: 100 CAPSULE ORAL at 20:05

## 2022-05-14 RX ADMIN — POLYVINYL ALCOHOL 2 DROP: 14 SOLUTION/ DROPS OPHTHALMIC at 17:44

## 2022-05-14 RX ADMIN — ENOXAPARIN SODIUM 60 MG: 100 INJECTION SUBCUTANEOUS at 20:05

## 2022-05-14 RX ADMIN — DICLOFENAC SODIUM 2 G: 10 GEL TOPICAL at 17:44

## 2022-05-14 RX ADMIN — ENOXAPARIN SODIUM 60 MG: 100 INJECTION SUBCUTANEOUS at 09:00

## 2022-05-14 RX ADMIN — GABAPENTIN 100 MG: 100 CAPSULE ORAL at 11:02

## 2022-05-14 RX ADMIN — IOPAMIDOL 75 ML: 755 INJECTION, SOLUTION INTRAVENOUS at 12:03

## 2022-05-14 RX ADMIN — SODIUM CHLORIDE 80 ML: 9 INJECTION, SOLUTION INTRAVENOUS at 12:03

## 2022-05-14 RX ADMIN — FINASTERIDE 5 MG: 5 TABLET, FILM COATED ORAL at 11:02

## 2022-05-14 RX ADMIN — VENLAFAXINE HYDROCHLORIDE 75 MG: 75 TABLET ORAL at 11:03

## 2022-05-14 RX ADMIN — ATORVASTATIN CALCIUM 40 MG: 40 TABLET, FILM COATED ORAL at 11:04

## 2022-05-14 RX ADMIN — DIVALPROEX SODIUM 500 MG: 500 TABLET, EXTENDED RELEASE ORAL at 20:06

## 2022-05-14 RX ADMIN — SODIUM CHLORIDE, PRESERVATIVE FREE 10 ML: 5 INJECTION INTRAVENOUS at 12:04

## 2022-05-14 RX ADMIN — CLOPIDOGREL BISULFATE 75 MG: 75 TABLET ORAL at 11:02

## 2022-05-14 RX ADMIN — PANTOPRAZOLE SODIUM 40 MG: 40 TABLET, DELAYED RELEASE ORAL at 06:11

## 2022-05-14 RX ADMIN — POLYVINYL ALCOHOL 2 DROP: 14 SOLUTION/ DROPS OPHTHALMIC at 20:06

## 2022-05-14 RX ADMIN — SODIUM CHLORIDE, PRESERVATIVE FREE 10 ML: 5 INJECTION INTRAVENOUS at 20:06

## 2022-05-14 RX ADMIN — ISOSORBIDE MONONITRATE 30 MG: 30 TABLET, EXTENDED RELEASE ORAL at 11:03

## 2022-05-14 RX ADMIN — VENLAFAXINE HYDROCHLORIDE 75 MG: 75 TABLET ORAL at 17:44

## 2022-05-14 NOTE — CARE COORDINATION
Social work: spoke to [employer] at [facility] beds tight. If pt wishes to return Will advise [facility] when ready. Will need alaina.    Phone for report : [phone] or [phone]  Fax: [phone]  Germán perry

## 2022-05-14 NOTE — PROGRESS NOTES
Physical Therapy  Facility/Department: [facility]  Physical Therapy Initial Assessment    Name: Robinson Epps. : 1950  MRN: [id number]  Date of Service: 2022    Discharge Recommendations:  Patient would benefit from continued therapy after discharge     Pt is currently functional below baseline and would suggest intense therapy post acute care. Would expect patient to be able to tolerate 3 hours of therapy per day and able to tolerate at least one hour up in chair. Please refer to AM-PAC score for current mobility/adl level. HPI (chart review): Jose Benitez Jr. is a 70 y.o. Non- / non  male who presents with Shortness of Breath and Chest Pain and is admitted to the hospital for the management of Atypical chest pain.  Patient was brought to the emergency department with chest pain.  Patient has had a complicated 3 months. Jennifer Allison had a surgical fusion completed due to upper extremity neurological deficits.  Since that time patient has been having difficulty with ambulation and functional mobility.  Patient has been been in acute rehab at [facility].  Ave was seen approximately 3 weeks ago at [facility] for chest pain.  At that time patient had a cardiac cath completed and his prior coronary artery stents were found to be patent.  Patient was discharged with instructions to follow-up with cardiology and neurosurgery as an outpatient.  Patient return to the emergency department today with severely worsened chest pain.  He describes it as a severe pressure rated at a 6/10 that waxes and wanes in intensity.  Emergency department evaluation has found a mildly elevated troponin but this is at a baseline for the patient who is troponins are chronically in the 80s and 90s.  At the time my exam patient is resting comfortably and states that his chest tightness is minimal at best.  Patient is also been found to be hyponatremic and he reports that he has been losing weight unintentionally and has been increasingly too fatigued to work with therapy. Patient Diagnosis(es): The primary encounter diagnosis was Chest pain, unspecified type. A diagnosis of Subdural hygroma was also pertinent to this visit. Past Medical History:  has a past medical history of Depression, Diabetes mellitus ([address] Difficult intravenous access, Hyperlipidemia, Hypertension, Migraines, PTSD (post-traumatic stress disorder), Sleep apnea, Teeth missing, and Wears glasses. Past Surgical History:  has a past surgical history that includes Cardiac catheterization (02/2010); Carpal tunnel release (Left, 01/09/2002); Vasectomy (12/1983); Tonsillectomy and adenoidectomy (1960); Hydrocele surgery (1951); Middle ear surgery (01/11/2018); eye surgery (Left, 2018); Mouth surgery (04/16/2018); other surgical history (04/19/2018); cervical fusion (04/19/2018); pr office/outpt visit,procedure only (N/A, 4/19/2018); Cataract removal; laminectomy (03/24/2022); and cervical fusion (N/A, 3/24/2022). Assessment   Body Structures, Functions, Activity Limitations Requiring Skilled Therapeutic Intervention: Decreased functional mobility ; Decreased ADL status; Decreased strength;Decreased endurance;Decreased balance;Decreased posture; Increased pain  Assessment: Patient very limited by orthostatic hypotension, patient max x 2 for bed mobility. Patient will benefit from skilled PT to improve strength and bed mobility. Patient will be reassessed for transfers as appropriate. Therapy Prognosis: Good  Decision Making: High Complexity  Requires PT Follow-Up: Yes  Activity Tolerance  Activity Tolerance: Treatment limited secondary to medical complications  Activity Tolerance Comments: Attempted to sit EOB 2 times and each time patient became dizzy almost immediately and began to pass out and had to lay back down. BP noted in vitals section of chart.      Plan   Plan  Plan: 5-7 times per week  Current Treatment Recommendations: Strengthening,Balance training,Endurance training,Therapeutic activities,Safety education & training,Patient/Caregiver education & training,Home exercise program  Safety Devices  Type of Devices: All tashia prominences offloaded,All fall risk precautions in place,Bed alarm in place,Call light within reach,Left in bed,Nurse notified     Restrictions  Restrictions/Precautions  Restrictions/Precautions: Fall Risk,General Precautions,Up as Tolerated  Required Braces or Orthoses?: Yes  Required Braces or Orthoses  Cervical: c-collar  Position Activity Restriction  Other position/activity restrictions: Telemetry, Per patient: abdominal binder when getting up to help prevent orthostatic BP, c-collar when up     Subjective   General  Chart Reviewed: Yes  Patient assessed for rehabilitation services?: Yes  Additional Pertinent Hx: DM, depression, Bipolar, cervical fusion a few months ago: since tat time pain and weakness in BUE, inability to ambulate, fluctuating BP, orthostatic BP  Response To Previous Treatment: Not applicable  Family / Caregiver Present: Yes (wife and son present and attentive)  Diagnosis: atypical chest pain  Follows Commands: Within Functional Limits  General Comment  Comments: Debra Kahn RN reports patient medically appropriate for PT. Subjective  Subjective: Patient very pleasant and agreeable to PT. Patient verblizes need for c-collar and abdominal binder whe sitting up.          Social/Functional History  Social/Functional History  Lives With: Significant other  Type of Home: House  Home Layout: One level  Home Access: Stairs to enter without rails  Entrance Stairs - Number of Steps: 1  Bathroom Shower/Tub: Tub/Shower unit  Bathroom Toilet: Standard  Bathroom Equipment: Shower chair (Shower chair is unsteady)  Home Equipment: [employer], 4 wheeled  Has the patient had two or more falls in the past year or any fall with injury in the past year?: No  ADL Assistance: Independent (Beginning of 3/2022)  Homemaking Assistance: Independent (Beginning of 3/2022)  Active : Yes  Occupation: Retired  Type of Occupation: maintenance,   Additional Comments: Pt was independent with ADL and mobility using a 4 WW  the beginning of March 2022. Pt had neck surgery 3/24 [facility]. Since then pt [facility] and then most recently to [facility] for rehab. Vision/Hearing       Cognition   Orientation  Overall Orientation Status: Within Normal Limits  Orientation Level: Oriented X4  Cognition  Overall Cognitive Status: WFL     Objective      Observation/Palpation  Posture: Poor (flexed posture, forward head)  Observation: Patient appears to have lost muscle mass in both UEs and LEs, very weak voice (patient states he feels like he cannot get enough air to talk louder). AROM RLE (degrees)  RLE AROM: WFL  AROM LLE (degrees)  LLE AROM : WFL  Strength RLE  Comment: hip flexion / abduction / adduction 3-/5, knee flexion and extension 3+/5, ankle 4-/5  Strength LLE  Comment: hip flexion / abduction / adduction 3-/5, knee flexion and extension 3+/5, ankle 4-/5           Bed mobility  Bridging:  (Patient able to bridge 50% with feet blocked)  Rolling to Left: Moderate assistance;2 Person assistance  Rolling to Right: Moderate assistance;2 Person assistance  Supine to Sit: Maximum assistance;2 Person assistance  Sit to Supine: Maximum assistance;2 Person assistance  Transfers  Comment: Not appropriate for transfers.         Balance  Sitting - Static: Fair;-  Sitting - Dynamic: Poor           OutComes Score    AM-PAC Score  AM-PAC Inpatient Mobility Raw Score : 8 (05/14/22 1640)  AM-PAC Inpatient T-Scale Score : 28.52 (05/14/22 1640)  Mobility Inpatient CMS 0-100% Score: 86.62 (05/14/22 1640)  Mobility Inpatient CMS G-Code Modifier : CM (05/14/22 1640)          Goals  Long Term Goals  Time Frame for Long term goals : 12 visits  Long term goal 1: Patient will be mod assist with bed mobility. Long term goal 2: Patient will tolerate 30 minutes of ther-ex and ther-act. Long term goal 3: Patient will be indep with HEP. Long term goal 4: Patient will be assessed for transfers as appropriate. Patient Goals   Patient goals : Improve mobility       Education  Patient Education  Education Given To: Patient; Family  Education Provided: Role of Therapy;Plan of Care  Education Method: Verbal  Barriers to Learning: None  Education Outcome: Verbalized understanding    Co-treatment with OT warranted secondary to decreased safety and independence requiring 2 skilled therapy professionals to address individual discipline's goals. PT addressing pre gait trunk strengthening and postural control in sitting.   Therapy Time   Individual Concurrent Group Co-treatment   Time In 8577         Time Out 1403         Minutes 58         Timed Code Treatment Minutes: Nurme 2, PT

## 2022-05-14 NOTE — PROGRESS NOTES
[facility]  Office: [address], DO, Savannah Rakesh, DO, Jeremy Javier, DO, Ej Catching Blood, DO, Yakelin Lee MD, Andrae Carroll MD, Tae Loaiza MD, Colin Gonzalez MD, Leatha Sneed MD, Piedad Fernández MD, Marybeth Busby MD, Hema Kim, DO, Dionne Patton, DO, Jem Glover MD,  Val Ayala, DO, Vivek Wilcox MD, Nova Mora MD, Chelsea Gong MD, Mike Renteria, DO, Nathaniel Alcala MD, Leslie Myers MD, Rafael Tyler MD, Jarrett Richter, [facility], [facility], [facility], Rodri Perry, CNP, Joycelyn Kohler, CNP, Prosper Wang, CNP, Toribio Calixto, CNP, Catheryn Leventhal, PA-C, Velia Olea, [facility], Keena Hodge, CNP, Vane Riley, CNP, Keron Bradshaw, CNP, Ledy Rouse, CNS, Katherine Bazan, [facility], Aniyah Singh, CNP, Jay Bahena, CNP, Chay Villafuerte, [facility]    Progress Note    5/14/2022    12:04 PM    Name:   Jesus Morales. MRN:     [id number]     Acct:      [de-identified]   Room:   2036/2036-01   Day:  0  Admit Date:  5/12/2022 11:45 AM    PCP:   Myranda Montano MD  Code Status:  DNR-CCA    Subjective:     C/C:   Chief Complaint   Patient presents with    Shortness of Breath    Chest Pain     Interval History Status:   Still complains of chest pain in the middle of chest radiating to both shoulders  Troponins are chronically elevated and are essentially flat, cardiology has evaluated  Has painful ROM of both shoulders,L > R, x-rays show osteoarthritic changes  Sodium improved to 131, lab result suggestive of SIADH which is multifactorial including pain  Patient is short of breath while talking, states this shortness of breath is there for many months, D-dimer 0.63    Brief History:     Jesus Morales. is a 70 y.o.  Non- / non  male who presents with Shortness of Breath and Chest Pain   and is admitted to the hospital for the management of Atypical chest pain.     Patient was brought to the emergency department with chest pain. Patient has had a complicated 3 months. Patient had a surgical fusion completed due to upper extremity neurological deficits. Since that time patient has been having difficulty with ambulation and functional mobility. Patient has been been in acute rehab at [facility] facilities. Patient was seen approximately 3 weeks ago at [facility] for chest pain. At that time patient had a cardiac cath completed and his prior coronary artery stents were found to be patent. Patient was discharged with instructions to follow-up with cardiology and neurosurgery as an outpatient. Patient return to the emergency department today with severely worsened chest pain. He describes it as a severe pressure rated at a 6/10 that waxes and wanes in intensity. Emergency department evaluation has found a mildly elevated troponin but this is at a baseline for the patient who is troponins are chronically in the 80s and 90s. At the time my exam patient is resting comfortably and states that his chest tightness is minimal at best.  Patient is also been found to be hyponatremic and he reports that he has been losing weight unintentionally and has been increasingly too fatigued to work with therapy. Review of Systems:     Constitutional:  negative for chills, fevers, sweats  Respiratory:  negative for cough, + shortness of breath while talking, denies wheezing  Cardiovascular:  + for midsternal chest pain radiating to both shoulders, chest pressure/discomfort, lower extremity edema,   Gastrointestinal:  negative for abdominal pain, constipation, diarrhea, nausea, vomiting  Neurological:  negative for dizziness, headache    Medications: Allergies:     Allergies   Allergen Reactions    Latex Itching    Bee Venom Swelling    Lisinopril Other (See Comments)     Cough      Niacin And Related Rash    Sulfa Antibiotics Rash    Terazosin Rash       Current Meds:   Scheduled Meds:    sodium chloride Pulse 67   Temp 97.7 °F (36.5 °C) (Temporal)   Resp 20   Ht 5' 3\" (1.6 m)   Wt 128 lb 4.8 oz (58.2 kg)   SpO2 99%   BMI 22.73 kg/m²   Temp (24hrs), Av.2 °F (36.8 °C), Min:97.7 °F (36.5 °C), Max:98.7 °F (37.1 °C)    Recent Labs     22  0758   POCGLU 78 98       I/O (24Hr): Intake/Output Summary (Last 24 hours) at 2022 1204  Last data filed at 2022 2200  Gross per 24 hour   Intake --   Output 60 ml   Net -60 ml       Labs:  Hematology:  Recent Labs     22  0350 22  1414   WBC 6.3 7.0  --    RBC 3.49* 3.38*  --    HGB 11.3* 10.8*  --    HCT 32.8* 32.0*  --    MCV 94.0 94.7  --    MCH 32.4 32.0  --    MCHC 34.5 33.8  --    RDW 12.8 12.8  --     228  --    MPV 8.9 8.9  --    INR 1.1  --   --    DDIMER  --   --  0.63*     Chemistry:  Recent Labs     22  1228 22  1442 22  0023 22  0023 22  0350 22  0939 22  1809 22  0205 22  0944   *   < >  --   --  125*   < > 127*   < > 128* 127* 131*   K 4.3  --   --   --   --   --  4.3  --   --   --  4.7   CL 88*  --   --   --   --   --  90*  --   --   --  94*   CO2 28  --   --   --   --   --  28  --   --   --  31   GLUCOSE 110*  --   --   --   --   --  109*  --   --   --  97   BUN 17  --   --   --   --   --  20  --   --   --  16   CREATININE 0.61*  --   --   --   --   --  0.65*  --   --   --  0.43*   MG 1.7  --   --   --   --   --  1.7  --   --   --   --    ANIONGAP 8*  --   --   --   --   --  9  --   --   --  6*   LABGLOM >60  --   --   --   --   --  >60  --   --   --  >60   GFRAA >60  --   --   --   --   --  >60  --   --   --  >60   CALCIUM 8.7  --   --   --   --   --  8.8  --   --   --  8.8   PROBNP 922*  --   --   --   --   --   --   --   --   --   --    TROPHS 79*   < > 83* 82* 91*  --   --   --   --   --   --     < > = values in this interval not displayed.      Recent Labs     22  1228 22  [facility] 05/13/22  0350 05/13/22  0758   PROT 5.0*  --  5.0*  --    LABALBU 3.2*  --  3.2*  --    AST 16  --  14  --    ALT 25  --  23  --    ALKPHOS 119  --  114  --    BILITOT 0.54  --  0.33  --    AMMONIA 12*  --   --   --    LIPASE 24  --   --   --    POCGLU  --  78  --  98     ABG:  Lab Results   Component Value Date    POCPH 7.376 05/14/2022    PHART 7.445 04/19/2018    POCPCO2 55.3 05/14/2022    QQL5VHH 39.1 04/19/2018    POCPO2 110.5 05/14/2022    PO2ART 252.0 04/19/2018    POCHCO3 32.4 05/14/2022    UGR6KQN 26.5 04/19/2018    NBEA NOT REPORTED 04/19/2018    PBEA 6 05/14/2022    YFFY9FBT 98 05/14/2022    U0UCHBOU 99.5 04/19/2018    FIO2 28.0 05/14/2022     Lab Results   Component Value Date/Time    SPECIAL LEFT FOREARM,6ML 05/12/2022 12:45 PM     Lab Results   Component Value Date/Time    CULTURE NO GROWTH 2 DAYS 05/12/2022 12:45 PM       Radiology:  CT Head WO Contrast    Result Date: 5/12/2022  New bilateral frontal subdural hygromas, measuring 4 mm in thickness, without any mass effect. No evidence for acute hemorrhage. XR CHEST PORTABLE    Result Date: 5/12/2022  No acute cardiopulmonary disease. MRI BRAIN WO CONTRAST    Result Date: 5/13/2022  Bilateral subdural collections likely representing hygromas.        Physical Examination:        General appearance:  alert, cooperative and short of breath while talking  Mental Status:  oriented to person, place and time and anxious affect  Lungs: Prolonged expiratory phase, diminished breath sounds at bases  Heart:  regular rate and rhythm, no murmur  Abdomen:  soft, nontender, nondistended, normal bowel sounds, no masses, hepatomegaly, splenomegaly  Extremities: + Lower extremity edema, no redness, tenderness in the calves,+ painful ROM of both shoulders- L > R    Skin:  no gross lesions, rashes, induration    Assessment:        Hospital Problems           Last Modified POA    * (Principal) Atypical chest pain 5/12/2022 Yes    Bilateral shoulder pain 5/13/2022 Yes    Shortness of breath 5/13/2022 Yes    Hypertension 5/12/2022 Yes    Hyperlipidemia 5/12/2022 Yes    Diabetes mellitus ([address] 5/12/2022 Yes    Bipolar disorder, mixed ([facility] 5/12/2022 Yes    Hyponatremia-possibly SIADH  5/13/2022 Yes    Moderate malnutrition ([facility] [address] 5/12/2022 Yes    Chest pain 5/12/2022 Yes          Plan:        1. Continue fluid restriction  2. Continue cardiac medicines and full therapeutic dose of Lovenox which he is already getting  3. CT chest PE  4. Consult orthopedics for painful ROM both shoulders  5. Cardiology evaluation appreciated  6. MRI brain confirms findings of bilateral subdural collections likely representing hygromas  7.  PT OT        Vivian Adames MD  5/14/2022  12:04 PM

## 2022-05-14 NOTE — PLAN OF CARE
Problem: Chronic Conditions and Co-morbidities  Goal: Patient's chronic conditions and co-morbidity symptoms are monitored and maintained or improved  Outcome: Progressing     Problem: Discharge Planning  Goal: Discharge to home or other facility with appropriate resources  Outcome: Progressing     Problem: Safety - Adult  Goal: Free from fall injury  Outcome: Progressing     Problem: ABCDS Injury Assessment  Goal: Absence of physical injury  Outcome: Progressing     Problem: Skin/Tissue Integrity  Goal: Absence of new skin breakdown  Description: 1. Monitor for areas of redness and/or skin breakdown  2. Assess vascular access sites hourly  3. Every 4-6 hours minimum:  Change oxygen saturation probe site  4. Every 4-6 hours:  If on nasal continuous positive airway pressure, respiratory therapy assess nares and determine need for appliance change or resting period.   Outcome: Progressing     Problem: Nutrition Deficit:  Goal: Optimize nutritional status  Outcome: Progressing

## 2022-05-14 NOTE — PROGRESS NOTES
Occupational Therapy  Facility[facility]  Occupational Therapy Initial Assessment    Name: Francheska Louis : 1950  MRN: [id number]  Date of Service: 2022    Discharge Recommendations:  Patient would benefit from continued therapy after discharge      Max A X2 for all mobility     RN reports patient is medically stable for therapy treatment this date. Chart reviewed prior to treatment and patient is agreeable for therapy. All lines intact and patient positioned comfortably at end of treatment. All patient needs addressed prior to ending therapy session. Patient Diagnosis(es): The primary encounter diagnosis was Chest pain, unspecified type. A diagnosis of Subdural hygroma was also pertinent to this visit. Past Medical History:  has a past medical history of Depression, Diabetes mellitus ([facility] [address] Difficult intravenous access, Hyperlipidemia, Hypertension, Migraines, PTSD (post-traumatic stress disorder), Sleep apnea, Teeth missing, and Wears glasses. Past Surgical History:  has a past surgical history that includes Cardiac catheterization (2010); Carpal tunnel release (Left, 2002); Vasectomy (1983); Tonsillectomy and adenoidectomy (); Hydrocele surgery (); Middle ear surgery (2018); eye surgery (Left, ); Mouth surgery (2018); other surgical history (2018); cervical fusion (2018); pr office/outpt visit,procedure only (N/A, 2018); Cataract removal; laminectomy (2022); and cervical fusion (N/A, 3/24/2022). Assessment   Performance deficits / Impairments: Decreased functional mobility ; Decreased ROM; Decreased ADL status; Decreased strength;Decreased endurance;Decreased balance;Decreased high-level IADLs  Prognosis: Good  Decision Making: High Complexity  REQUIRES OT FOLLOW-UP: Yes  Activity Tolerance  Activity Tolerance: Treatment limited secondary to medical complications (free text)  Activity Tolerance Comments: Decreae BP with sitting on EOB and increae pain with activity use of B UE        Plan   Plan  Times per Week: 4-5 X per week  Current Treatment Recommendations: Strengthening,ROM,Balance training,Functional mobility training,Endurance training,Safety education & training,Patient/Caregiver education & training,Equipment evaluation, education, & procurement,Self-Care / ADL     Restrictions  Restrictions/Precautions  Restrictions/Precautions: Fall Risk,General Precautions,Up as Tolerated ( BP dropped with sitting: c/o of dizziness)  Required Braces or Orthoses?: Yes (neck collar)  Required Braces or Orthoses  Cervical: c-collar    Subjective   General  Chart Reviewed: Yes  Patient assessed for rehabilitation services?: Yes  Family / Caregiver Present: Yes  Subjective  Subjective: C/O of pain between shoulders at rest and with movement. Stated he has constant B arm numbness. Social/Functional History  Social/Functional History  Lives With: Significant other  Type of Home: House  Home Layout: One level  Home Access: Stairs to enter without rails  Entrance Stairs - Number of Steps: 1  Bathroom Shower/Tub: Tub/Shower unit  Bathroom Toilet: Standard  Bathroom Equipment: Shower chair (Shower chair is unsteady)  Home Equipment: Ronnald Real, 4 wheeled  Has the patient had two or more falls in the past year or any fall with injury in the past year?: No  ADL Assistance: Independent (Beginning of 3/2022)  Homemaking Assistance: Independent (Beginning of 3/2022)  Active : Yes  Occupation: Retired  Type of Occupation: maintenance,   Additional Comments: Pt was independent with ADL and mobility using a 4 WW  the beginning of March 2022. Pt had neck surgery 3/24 at [facility]. Since then pt has been in and out of [address] [place] [address] and [facility] and then most recently to [facility] for rehab.        Objective   Pulse: 82  BP: (!) 87/42  BP Location: Left upper arm  BP Method: Automatic  Patient Position: Sitting  MAP (Calculated): 57  Resp: 20  SpO2: 97 %  O2 Device: Nasal cannula  Comment: 2L  Vision Exceptions: Wears glasses at all times       Observation/Palpation  Posture: Poor (sitting on EOB for few minutes at a time)  Safety Devices  Type of Devices: All tashia prominences offloaded; All fall risk precautions in place; Bed alarm in place;Call light within reach; Left in bed;Nurse notified     Toilet Transfers  Toilet Transfer: Dependent/Total     ADL  Feeding: Dependent/Total  Grooming: Dependent/Total  UE Bathing: Dependent/Total  LE Bathing: Dependent/Total  UE Dressing: Dependent/Total  LE Dressing: Dependent/Total  Toileting: Dependent/Total  Tone RUE  RUE Tone: Not tested  Tone LUE  LUE Tone: Not tested  Coordination  Movements Are Fluid And Coordinated: No  Coordination and Movement Description: Fine motor impairments;Gross motor impairments;Decreased speed;Decreased accuracy; Right UE;Left UE     Bed mobility  Rolling to Left: Moderate assistance;2 Person assistance  Rolling to Right: Moderate assistance;2 Person assistance  Supine to Sit: Maximum assistance;2 Person assistance  Sit to Supine: Maximum assistance;2 Person assistance  Bed Mobility Comments: Increase pain noted B shoulders with bed mobility 8/10 per facial expression  Transfers  Sit to stand: Dependent/Total  Stand to sit: Dependent/Total     Cognition  Overall Cognitive Status: WFL        Sensation  Overall Sensation Status: Impaired  Additional Comments: numbness in B arms         Education Given To: Patient; Family  Education Provided: Role of Therapy;Plan of Care;Transfer Training  LUE AROM (degrees)  LUE AROM : Exceptions (Pt unable to move shoulders due to increase pain bt shlders)  Left Hand AROM (degrees)  Left Hand AROM: WNL  RUE PROM (degrees)  RUE PROM:  (Pt unable to tolerate PROM B shlders due to increase in pain)  RUE AROM (degrees)  RUE AROM : Exceptions  R Shoulder Flexion 0-180: 30 (Pt has limited AROM due to pain bt shlders with movement)  LUE Strength  Gross LUE Strength: Exceptions to [facility]  L Shoulder Flex: 2+/5  L Elbow Flex: 4-/5  L Hand General: 4/5  RUE Strength  Gross RUE Strength: Exceptions to [facility]  R Shoulder Flex: 2-/5  R Elbow Flex: 3+/5  R Hand General: 3+/5     Hand Dominance  Hand Dominance: Left            G-Code     OutComes Score                                                  AM-PAC Score        AM-[facility] Inpatient Daily Activity Raw Score: 6 (05/14/22 1558)  AM-PAC Inpatient ADL T-Scale Score : 17.07 (05/14/22 1558)  ADL Inpatient CMS 0-100% Score: 100 (05/14/22 1558)  ADL Inpatient CMS G-Code Modifier : CN (05/14/22 1558)    Goals  Short Term Goals  Time Frame for Short term goals: LOS  Short Term Goal 1: Pt will participate B UE ROM/ strengthening program as tolerate. Short Term Goal 2: Pt will be able to compelte self grooming  with min assist, AE, PRN. Short Term Goal 3: Pt will be able to complete self feedingin  with min assist , AE PRN. Short Term Goal 4: Pt will be able to compelte bed mobility with min assist.  Short Term Goal 5: Pt will be able to tolerate sitting on EOB for ADL  for up to 10 minutes. Patient Goals   Patient goals : To get better       Therapy Time   Individual Concurrent Group Co-treatment   Time In 1250         Time Out 1405         Minutes 75   60 minutes treatment             Co-tx with PT needed due to increase in medical needs and a decrease in safety and independence due to a decrease in functional status.     Darrius Guy, OT

## 2022-05-14 NOTE — PROGRESS NOTES
Dr. Brad Aguillon notified of consult he said he is out of town and to notify the person on call. Dr. Akira Hayes notified of consult.

## 2022-05-15 LAB
ANION GAP SERPL CALCULATED.3IONS-SCNC: 5 MMOL/L (ref 9–17)
BUN BLDV-MCNC: 15 MG/DL (ref 8–23)
BUN/CREAT BLD: 32 (ref 9–20)
CALCIUM SERPL-MCNC: 8.9 MG/DL (ref 8.6–10.4)
CHLORIDE BLD-SCNC: 94 MMOL/L (ref 98–107)
CO2: 31 MMOL/L (ref 20–31)
CREAT SERPL-MCNC: 0.47 MG/DL (ref 0.7–1.2)
GFR AFRICAN AMERICAN: >60 ML/MIN
GFR NON-AFRICAN AMERICAN: >60 ML/MIN
GFR SERPL CREATININE-BSD FRML MDRD: ABNORMAL ML/MIN/{1.73_M2}
GLUCOSE BLD-MCNC: 76 MG/DL (ref 70–99)
POTASSIUM SERPL-SCNC: 4.1 MMOL/L (ref 3.7–5.3)
SODIUM BLD-SCNC: 130 MMOL/L (ref 135–144)

## 2022-05-15 PROCEDURE — 36415 COLL VENOUS BLD VENIPUNCTURE: CPT

## 2022-05-15 PROCEDURE — 6360000002 HC RX W HCPCS: Performed by: NURSE PRACTITIONER

## 2022-05-15 PROCEDURE — 99239 HOSP IP/OBS DSCHRG MGMT >30: CPT | Performed by: INTERNAL MEDICINE

## 2022-05-15 PROCEDURE — 2580000003 HC RX 258: Performed by: NURSE PRACTITIONER

## 2022-05-15 PROCEDURE — 2060000000 HC ICU INTERMEDIATE R&B

## 2022-05-15 PROCEDURE — 6370000000 HC RX 637 (ALT 250 FOR IP): Performed by: NURSE PRACTITIONER

## 2022-05-15 PROCEDURE — 80048 BASIC METABOLIC PNL TOTAL CA: CPT

## 2022-05-15 PROCEDURE — 96372 THER/PROPH/DIAG INJ SC/IM: CPT

## 2022-05-15 RX ORDER — ENOXAPARIN SODIUM 100 MG/ML
40 INJECTION SUBCUTANEOUS DAILY
Qty: 2 ML | Refills: 0 | Status: SHIPPED | OUTPATIENT
Start: 2022-05-15 | End: 2022-05-20

## 2022-05-15 RX ORDER — ISOSORBIDE MONONITRATE 30 MG/1
30 TABLET, EXTENDED RELEASE ORAL DAILY
Qty: 30 TABLET | Refills: 3 | Status: SHIPPED | OUTPATIENT
Start: 2022-05-16 | End: 2022-08-01 | Stop reason: ALTCHOICE

## 2022-05-15 RX ORDER — ENOXAPARIN SODIUM 100 MG/ML
40 INJECTION SUBCUTANEOUS DAILY
Status: DISCONTINUED | OUTPATIENT
Start: 2022-05-16 | End: 2022-05-17 | Stop reason: HOSPADM

## 2022-05-15 RX ADMIN — ENOXAPARIN SODIUM 60 MG: 100 INJECTION SUBCUTANEOUS at 10:00

## 2022-05-15 RX ADMIN — VENLAFAXINE HYDROCHLORIDE 75 MG: 75 TABLET ORAL at 18:00

## 2022-05-15 RX ADMIN — SODIUM CHLORIDE, PRESERVATIVE FREE 10 ML: 5 INJECTION INTRAVENOUS at 10:00

## 2022-05-15 RX ADMIN — POLYVINYL ALCOHOL 2 DROP: 14 SOLUTION/ DROPS OPHTHALMIC at 14:30

## 2022-05-15 RX ADMIN — POLYVINYL ALCOHOL 2 DROP: 14 SOLUTION/ DROPS OPHTHALMIC at 20:52

## 2022-05-15 RX ADMIN — GABAPENTIN 100 MG: 100 CAPSULE ORAL at 10:00

## 2022-05-15 RX ADMIN — ISOSORBIDE MONONITRATE 30 MG: 30 TABLET, EXTENDED RELEASE ORAL at 10:00

## 2022-05-15 RX ADMIN — CLOPIDOGREL BISULFATE 75 MG: 75 TABLET ORAL at 10:00

## 2022-05-15 RX ADMIN — FINASTERIDE 5 MG: 5 TABLET, FILM COATED ORAL at 10:00

## 2022-05-15 RX ADMIN — DICLOFENAC SODIUM 2 G: 10 GEL TOPICAL at 20:52

## 2022-05-15 RX ADMIN — PANTOPRAZOLE SODIUM 40 MG: 40 TABLET, DELAYED RELEASE ORAL at 05:51

## 2022-05-15 RX ADMIN — ATORVASTATIN CALCIUM 40 MG: 40 TABLET, FILM COATED ORAL at 10:00

## 2022-05-15 RX ADMIN — POLYVINYL ALCOHOL 2 DROP: 14 SOLUTION/ DROPS OPHTHALMIC at 10:00

## 2022-05-15 RX ADMIN — DICLOFENAC SODIUM 2 G: 10 GEL TOPICAL at 10:00

## 2022-05-15 RX ADMIN — CARVEDILOL 12.5 MG: 12.5 TABLET, FILM COATED ORAL at 10:00

## 2022-05-15 RX ADMIN — SODIUM CHLORIDE, PRESERVATIVE FREE 10 ML: 5 INJECTION INTRAVENOUS at 20:50

## 2022-05-15 RX ADMIN — VENLAFAXINE HYDROCHLORIDE 75 MG: 75 TABLET ORAL at 10:08

## 2022-05-15 RX ADMIN — CARVEDILOL 12.5 MG: 12.5 TABLET, FILM COATED ORAL at 20:50

## 2022-05-15 RX ADMIN — SODIUM CHLORIDE, PRESERVATIVE FREE 10 ML: 5 INJECTION INTRAVENOUS at 09:59

## 2022-05-15 RX ADMIN — ASPIRIN 81 MG CHEWABLE TABLET 81 MG: 81 TABLET CHEWABLE at 10:00

## 2022-05-15 RX ADMIN — ACETAMINOPHEN 650 MG: 325 TABLET ORAL at 18:25

## 2022-05-15 RX ADMIN — GABAPENTIN 100 MG: 100 CAPSULE ORAL at 20:50

## 2022-05-15 RX ADMIN — DIVALPROEX SODIUM 500 MG: 500 TABLET, EXTENDED RELEASE ORAL at 20:50

## 2022-05-15 RX ADMIN — VENLAFAXINE HYDROCHLORIDE 75 MG: 75 TABLET ORAL at 13:30

## 2022-05-15 ASSESSMENT — PAIN SCALES - GENERAL: PAINLEVEL_OUTOF10: 1

## 2022-05-15 ASSESSMENT — PAIN DESCRIPTION - DESCRIPTORS: DESCRIPTORS: ACHING

## 2022-05-15 ASSESSMENT — PAIN - FUNCTIONAL ASSESSMENT: PAIN_FUNCTIONAL_ASSESSMENT: ACTIVITIES ARE NOT PREVENTED

## 2022-05-15 ASSESSMENT — PAIN DESCRIPTION - LOCATION: LOCATION: HEAD

## 2022-05-15 NOTE — DISCHARGE INSTR - COC
Continuity of Care Form    Patient Name: Marce Sanon.    :  1950  MRN:  [id number]    Admit date:  2022  Discharge date:  22    Code Status Order: DNR-CCA   Advance Directives:      Admitting Physician:  Mary Grace Bryan MD  PCP: Ann Wu MD    Discharging Nurse: [facility] Unit/Room#: 2036/2036-01  Discharging Unit Phone Number: [phone]    Emergency Contact:   Extended Emergency Contact Information  Primary Emergency Contact: [facility])  Home Phone: [phone]  Mobile Phone: [phone]  Relation: Unknown  Secondary Emergency Contact: Jesica Horner (POA)  Home Phone: [phone]  Relation: Child    Past Surgical History:  Past Surgical History:   Procedure Laterality Date    CARDIAC CATHETERIZATION  2010    no stents     CARPAL TUNNEL RELEASE Left 2002    CATARACT REMOVAL      CERVICAL FUSION  2018    anterior cervical fusion C5-6    CERVICAL FUSION N/A 3/24/2022    C2-5 FUSION, C2-4 LAMINECTOMY performed by Mary Lee DO at [facility] 20 Left     CATARACT EXTRACTION [address]  2022    C2-5 FUSION, C2-4 LAMINECTOMY    MIDDLE EAR SURGERY  2018    MIDDLE EAR REBUILT AND EAR DRUM REPLACED    MOUTH SURGERY  2018    5 TEETH REMOVED    OTHER SURGICAL HISTORY  2018    : ANTERIOR CERVICAL CORRPECTOMY C5-6, SYNTHES, DEPUY, REG TABLE, SUPINE,     VA OFFICE/OUTPT VISIT,PROCEDURE ONLY N/A 2018    ANTERIOR CERVICAL CORRPECTOMY AND FUSION C5-6 performed by Mary Lee DO at [facility].   1983       Immunization History:   Immunization History   Administered Date(s) Administered    Influenza Vaccine, unspecified formulation 10/26/2011, 2012, 2014, 2016    Tdap (Boostrix, Adacel) 2007, 2017    Zoster Live (Zostavax) 2016       Active Problems:  Patient Active Problem List Diagnosis Code    Hypertension I10    Hyperlipidemia E78.5    Diabetes mellitus ([facility] E11.9    Contusion of right shoulder S40.011A    Bipolar disorder, mixed ([facility] F31.60    First known suicide attempt ([facility] T14.91XA    Erectile dysfunction N52.9    Cervical stenosis of spinal canal M48.02    Incomplete spinal cord lesion at C5-C7 level ([facility] S14.155A    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) M48.02, G99.2    Cervical spondylosis with radiculopathy M47.22    Acute blood loss anemia D62    Precordial pain R07.2    Depression with suicidal ideation F32. A, R45.851    Severe recurrent major depression without psychotic features (HCC) F33.2    Frequent falls R29.6    Hyponatremia-possibly SIADH  E87.1    Cervical spinal cord compression (HCC) G95.20    Cord compression (HCC) G95.20    Quadriplegia, C1-C4 incomplete (HCC) G82.52    Encephalopathy G93.40    Hospital-acquired pneumonia J18.9, Y95    Atelectasis J98.11    Cervical stenosis of spine M48.02    Moderate malnutrition (HCC) E44.0    Chest pain R07.9    Delirium due to multiple etiologies F05    NSTEMI (non-ST elevated myocardial infarction) (HCC) I21.4    Cervical myelopathy (HCC) G95.9    Atypical chest pain R07.89    Bilateral shoulder pain M25.511, M25.512    Shortness of breath R06.02       Isolation/Infection:   Isolation            No Isolation          Patient Infection Status       None to display            Nurse Assessment:  Last Vital Signs: BP 86/61   Pulse 74   Temp 97.2 °F (36.2 °C) (Temporal)   Resp 21   Ht 5' 3\" (1.6 m)   Wt 132 lb 6.4 oz (60.1 kg)   SpO2 94%   BMI 23.45 kg/m²     Last documented pain score (0-10 scale):    Last Weight:   Wt Readings from Last 1 Encounters:   05/15/22 132 lb 6.4 oz (60.1 kg)     Mental Status:  oriented    IV Access:  - None    Nursing Mobility/ADLs:  Walking   Dependent  Transfer  Dependent  Bathing  Dependent  Dressing  Dependent  Toileting  Dependent  Feeding  Dependent  Med Admin Dependent  Med Delivery   none    Wound Care Documentation and Therapy:  Incision 04/19/18 Neck (Active)   Number of days: 2990       Incision 04/29/18 Back Mid;Lower (Active)   Number of days: 5996       Wound 03/20/22 Arm Left (Active)   Wound Etiology Other 05/13/22 1630   Dressing Status Clean;Dry; Intact 05/13/22 1630   Dressing/Treatment Open to air 05/14/22 1600   Wound Assessment Pink/red 05/14/22 1600   Drainage Amount None 05/14/22 1600   Odor None 05/13/22 1630   Nereida-wound Assessment Fragile 05/13/22 1630   Number of days: 55       Wound Knee Anterior; Left (Active)   Wound Etiology Other 04/24/22 2115   Dressing Status Other (Comment) 04/24/22 2115   Wound Cleansed Not Cleansed 05/13/22 1630   Dressing/Treatment Open to air 05/14/22 1600   Wound Assessment Dry 05/14/22 1600   Drainage Amount None 05/14/22 1600   Odor None 05/14/22 1600   Nereida-wound Assessment Dry/flaky 05/14/22 1600   Margins Attached edges 05/14/22 1600   Number of days:        Wound 04/06/22 Toe (Comment  which one) Anterior; Left scabbed over (Active)   Wound Etiology Other 05/13/22 1630   Dressing Status Other (Comment) 04/24/22 2115   Wound Cleansed Not Cleansed 05/13/22 1630   Dressing/Treatment Open to air 05/14/22 1600   Wound Assessment Dry 05/14/22 1600   Drainage Amount None 05/14/22 1600   Odor None 05/13/22 1630   Margins Attached edges 05/13/22 1630   Number of days: 39       Wound 05/12/22 Buttocks Left small open area (Active)   Wound Etiology Pressure Stage 2 05/15/22 0400   Dressing Status Clean;Dry; Intact 05/15/22 0400   Number of days: 2       Incision 03/24/22 Neck Posterior (Active)   Dressing Status Clean;Dry; Intact 05/13/22 1630   Incision Cleansed Not Cleansed 05/13/22 1630   Dressing/Treatment Steri-strips 05/13/22 1630   Closure Steri-Strips 05/13/22 1630   Margins Approximated 05/13/22 1630   Incision Assessment Dry 05/13/22 1630   Drainage Amount None 05/13/22 1630   Drainage Description Serosanguinous 05/13/22 1630   Odor None 05/13/22 1630   Nereida-incision Assessment Intact 05/13/22 1630   Number of days: 52        Elimination:  Continence: Bowel: No  Bladder: No  Urinary Catheter: None   Colostomy/Ileostomy/Ileal Conduit: No       Date of Last BM: 5/16/22    Intake/Output Summary (Last 24 hours) at 5/15/2022 1451  Last data filed at 5/15/2022 4911  Gross per 24 hour   Intake --   Output 850 ml   Net -850 ml     I/O last 3 completed shifts:  In: -   Out: 910 [Urine:910]    Safety Concerns: At Risk for Falls    Impairments/Disabilities:      None    Nutrition Therapy:  Current Nutrition Therapy:   - Oral Diet:  General    Routes of Feeding: Oral  Liquids: Thin Liquids  Daily Fluid Restriction: no  Last Modified Barium Swallow with Video (Video Swallowing Test): not done    Treatments at the Time of Hospital Discharge:   Respiratory Treatments: ***  Oxygen Therapy:  is not on home oxygen therapy.   Ventilator:    - No ventilator support    Rehab Therapies: Physical Therapy and Occupational Therapy  Weight Bearing Status/Restrictions: No weight bearing restrictions  Other Medical Equipment (for information only, NOT a DME order):  hospital bed  Other Treatments: ***    Patient's personal belongings (please select all that are sent with patient):  None    RN SIGNATURE:  Electronically signed by Yeny Coello RN on 5/17/22 at 12:28 PM EDT    CASE MANAGEMENT/SOCIAL WORK SECTION    Inpatient Status Date: 5/14/2022    Readmission Risk Assessment Score:  Readmission Risk              Risk of Unplanned Readmission:  28           [facility]charg[facility] Facility/ Agency   [facility]  Address: [address] [place] [address]  Phone: [phone]   Fax: [phone]    / signature: Electronically signed by AKIRA Gallo on 5/16/22 at 1:54 PM EDT    PHYSICIAN SECTION    Prognosis: Fair    Condition at Discharge: Stable    Rehab Potential (if transferring to Rehab): Fair    Recommended Labs or Other Treatments After Discharge: PT OT, follow-up with orthopedics Dr. Mitchell Oakley for osteoarthritis and pain both shoulders with limited ROM    Physician Certification: I certify the above information and transfer of Kayla Russell.  is necessary for the continuing treatment of the diagnosis listed and that he requires Merged with [facility] for greater 30 days.      Update Admission H&P: No change in H&P    PHYSICIAN SIGNATURE:  Electronically signed by Bonifacio Bhatti MD on 5/15/22 at 2:52 PM EDT

## 2022-05-15 NOTE — PROGRESS NOTES
[facility]  Office: [address], DO, Tonja Staff, DO, Ta Malavea, DO, Chele Ron Blood, DO, Erum Gordon MD, Reji Jim MD, John Merritt MD, Dick Tavarez MD, Gillian Ball MD, Bc Salter MD, Nimco Sow MD, Elfego Wen, DO, Isidoro Campbell, DO, Susanne Javier MD,  Alka Monreal, DO, Surya Kelly MD, Wilmer Bustillos MD, Hope Opitz, MD, Lazara Montes, DO, Sebastián Koch MD, Teressa Delcid MD, Umair Quintanilla MD, Magda Graham, [facility], [facility], CNP, Alida Heath, CNP, Bharat Canas, CNP, Arianne Inman, CNP, Hortencia Denise, CNP, Lizet Trevino PA-C, Deion Daily, DNP, Ramona Gomes, CNP, Sada Lloyd, CNP, Karlie Castro, CNP, Sidney Mckeon, CNS, Sandra Guerra, [facility], Nydia Omalley, CNP, Daiana Last, CNP, Royal Deleon, [facility]         [address] [facility]    Progress Note    5/15/2022    2:39 PM    Name:   Sofy Hernandez. MRN:     [id number]     Acct:      [de-identified]   Room:   2036/2036-01  IP Day:  1  Admit Date:  5/12/2022 11:45 AM    PCP:   Tino Chris MD  Code Status:  DNR-CCA    Subjective:     C/C:   Chief Complaint   Patient presents with    Shortness of Breath    Chest Pain     Interval History Status:   Still complains of chest pain in the middle of chest radiating to both shoulders  Troponins are chronically elevated and are essentially flat, cardiology has evaluated, no further work-up planned  Has painful ROM of both shoulders,L > R, x-rays show osteoarthritic changes, diclofenac gel was prescribed yesterday  Sodium improved to 130, lab result suggestive of SIADH which is multifactorial including pain  Patient is short of breath while talking, states this shortness of breath is there for many months, D-dimer 0.63, CT chest negative for PE    Brief History:     Sofy Verduzco is a 70 y.o.  Non- / non  male who presents with Shortness of Breath and Chest Pain   and is admitted to the hospital for the management of Atypical chest pain.     Patient was brought to the emergency department with chest pain. Patient has had a complicated 3 months. Patient had a surgical fusion completed due to upper extremity neurological deficits. Since that time patient has been having difficulty with ambulation and functional mobility. Patient has been been in acute rehab at [facility] facilities. Patient was seen approximately 3 weeks ago at [facility] for chest pain. At that time patient had a cardiac cath completed and his prior coronary artery stents were found to be patent. Patient was discharged with instructions to follow-up with cardiology and neurosurgery as an outpatient. Patient return to the emergency department today with severely worsened chest pain. He describes it as a severe pressure rated at a 6/10 that waxes and wanes in intensity. Emergency department evaluation has found a mildly elevated troponin but this is at a baseline for the patient who is troponins are chronically in the 80s and 90s. At the time my exam patient is resting comfortably and states that his chest tightness is minimal at best.  Patient is also been found to be hyponatremic and he reports that he has been losing weight unintentionally and has been increasingly too fatigued to work with therapy. Review of Systems:     Constitutional:  negative for chills, fevers, sweats  Respiratory:  negative for cough, + shortness of breath while talking, denies wheezing  Cardiovascular:  + for midsternal chest pain radiating to both shoulders, chest pressure/discomfort, lower extremity edema,   Gastrointestinal:  negative for abdominal pain, constipation, diarrhea, nausea, vomiting  Neurological:  negative for dizziness, headache    Medications: Allergies:     Allergies   Allergen Reactions    Latex Itching    Bee Venom Swelling    Lisinopril Other (See Comments)     Cough      Niacin And Related Rash    Sulfa Antibiotics Rash    Terazosin Rash       Current Meds:   Scheduled Meds:    diclofenac sodium  2 g Topical BID    aspirin  81 mg Oral Daily    atorvastatin  40 mg Oral Daily    carvedilol  12.5 mg Oral BID    clopidogrel  75 mg Oral Daily    divalproex  500 mg Oral Nightly    finasteride  5 mg Oral Daily    gabapentin  100 mg Oral BID    hydrALAZINE  25 mg Oral TID    pantoprazole  40 mg Oral QAM AC    venlafaxine  75 mg Oral TID WC    sodium chloride flush  5-40 mL IntraVENous 2 times per day    isosorbide mononitrate  30 mg Oral Daily    enoxaparin  1 mg/kg SubCUTAneous BID    polyvinyl alcohol  2 drop Both Eyes TID     Continuous Infusions:    sodium chloride       PRN Meds: sodium chloride flush, simethicone, sodium chloride flush, sodium chloride, ondansetron **OR** ondansetron, acetaminophen **OR** acetaminophen, magnesium hydroxide, potassium chloride **OR** potassium alternative oral replacement **OR** potassium chloride, potassium chloride, magnesium sulfate, nitroGLYCERIN    Data:     Past Medical History:   has a past medical history of Depression, Diabetes mellitus ([address] Difficult intravenous access, Hyperlipidemia, Hypertension, Migraines, PTSD (post-traumatic stress disorder), Sleep apnea, Teeth missing, and Wears glasses. Social History:   reports that he quit smoking about 50 years ago. His smoking use included cigarettes. He has a 2.00 pack-year smoking history. He has never used smokeless tobacco. He reports current alcohol use. He reports current drug use. Drug: Marijuana Dung Mack[place]).      Family History:   Family History   Problem Relation Age of Onset   Bonilla Dementia Mother     Coronary Art Dis Mother     Stroke Mother     Diabetes Mother     Asthma Mother     Other Mother         BRAIN ANEURISM    High Blood Pressure Mother     Heart Disease Father     Diabetes Sister     Diabetes Brother     High Blood Pressure Brother     High Cholesterol Brother     Heart Disease Brother     Diabetes Sister     Other Sister         NEUROPATHY       Vitals:  BP 86/61   Pulse 74   Temp 97.2 °F (36.2 °C) (Temporal)   Resp 21   Ht 5' 3\" (1.6 m)   Wt 132 lb 6.4 oz (60.1 kg)   SpO2 94%   BMI 23.45 kg/m²   Temp (24hrs), Av.9 °F (36.6 °C), Min:97.2 °F (36.2 °C), Max:98.6 °F (37 °C)    Recent Labs     22  0758   POCGLU 78 98       I/O (24Hr): Intake/Output Summary (Last 24 hours) at 5/15/2022 1439  Last data filed at 5/15/2022 9922  Gross per 24 hour   Intake --   Output 850 ml   Net -850 ml       Labs:  Hematology:  Recent Labs     22  1414   WBC 7.0  --    RBC 3.38*  --    HGB 10.8*  --    HCT 32.0*  --    MCV 94.7  --    MCH 32.0  --    MCHC 33.8  --    RDW 12.8  --      --    MPV 8.9  --    DDIMER  --  0.63*     Chemistry:  Recent Labs     22  1442 22  0023 22  0023 22  0350 22  9093 22  0205 22  0944 05/15/22  0352   NA  --   --  125*   < > 127*   < > 127* 131* 130*   K  --   --   --   --  4.3  --   --  4.7 4.1   CL  --   --   --   --  90*  --   --  94* 94*   CO2  --   --   --   --  28  --   --  31 31   GLUCOSE  --   --   --   --  109*  --   --  97 76   BUN  --   --   --   --  20  --   --  16 15   CREATININE  --   --   --   --  0.65*  --   --  0.43* 0.47*   MG  --   --   --   --  1.7  --   --   --   --    ANIONGAP  --   --   --   --  9  --   --  6* 5*   LABGLOM  --   --   --   --  >60  --   --  >60 >60   GFRAA  --   --   --   --  >60  --   --  >60 >60   CALCIUM  --   --   --   --  8.8  --   --  8.8 8.9   TROPHS 83* 82* 91*  --   --   --   --   --   --     < > = values in this interval not displayed.      Recent Labs     22  2030 22  0350 22  0758   PROT  --  5.0*  --    LABALBU  --  3.2*  --    AST  --  14  --    ALT  --  23  --    ALKPHOS  --  114  --    BILITOT  --  0.33  --    POCGLU 78  --  98     ABG:  Lab Results   Component Value [facility] 7.376 05/14/2022    PHART 7.445 04/19/2018    POCPCO2 55.3 05/14/2022    QGD6DCJ 39.1 04/19/2018    POCPO2 110.5 05/14/2022    PO2ART 252.0 04/19/2018    POCHCO3 32.4 05/14/2022    YPH5DLU 26.5 04/19/2018    NBEA NOT REPORTED 04/19/2018    PBEA 6 05/14/2022    IXGT1AJR 98 05/14/2022    B9JBUJJB 99.5 04/19/2018    FIO2 28.0 05/14/2022     Lab Results   Component Value Date/Time    SPECIAL LEFT FOREARM,6ML 05/12/2022 12:45 PM     Lab Results   Component Value Date/Time    CULTURE NO GROWTH 3 DAYS 05/12/2022 12:45 PM       Radiology:  CT Head WO Contrast    Result Date: 5/12/2022  New bilateral frontal subdural hygromas, measuring 4 mm in thickness, without any mass effect. No evidence for acute hemorrhage. XR CHEST PORTABLE    Result Date: 5/12/2022  No acute cardiopulmonary disease. MRI BRAIN WO CONTRAST    Result Date: 5/13/2022  Bilateral subdural collections likely representing hygromas.        Physical Examination:        General appearance:  alert, cooperative and short of breath while talking  Mental Status:  oriented to person, place and time and anxious affect  Lungs: Prolonged expiratory phase, diminished breath sounds at bases  Heart:  regular rate and rhythm, no murmur  Abdomen:  soft, nontender, nondistended, normal bowel sounds, no masses, hepatomegaly, splenomegaly  Extremities: + Lower extremity edema, no redness, tenderness in the calves,+ painful ROM of both shoulders- L > R    Skin:  no gross lesions, rashes, induration    Assessment:        Hospital Problems           Last Modified POA    * (Principal) Atypical chest pain 5/12/2022 Yes    Bilateral shoulder pain 5/13/2022 Yes    Shortness of breath 5/13/2022 Yes    Hypertension 5/12/2022 Yes    Hyperlipidemia 5/12/2022 Yes    Diabetes mellitus ([address] 5/12/2022 Yes    Bipolar disorder, mixed ([address] 5/12/2022 Yes    Hyponatremia-possibly SIADH  5/13/2022 Yes    Moderate malnutrition ([address] 5/12/2022 Yes    Chest pain 5/12/2022 Yes          Plan: 1. Continue fluid restriction  2. Change Lovenox to DVT prophylaxis dose  3. Consult orthopedics as outpatient for painful ROM both shoulders, they do not plan to see him while in hospital  4. Cardiology evaluation appreciated  5. MRI brain confirms findings of bilateral subdural collections likely representing hygromas  6. PT OT  7.  Discharge back to [facility] if bed available        Mary Grace Bryan MD  5/15/2022  2:39 PM

## 2022-05-15 NOTE — CARE COORDINATION
Social work: referral is now requested by pt to be sent to [facility] in [place]. Will send referral and start hens.  Paramjit Conception lsw

## 2022-05-16 LAB
ANION GAP SERPL CALCULATED.3IONS-SCNC: 6 MMOL/L (ref 9–17)
BUN BLDV-MCNC: 17 MG/DL (ref 8–23)
BUN/CREAT BLD: 33 (ref 9–20)
CALCIUM SERPL-MCNC: 8.9 MG/DL (ref 8.6–10.4)
CHLORIDE BLD-SCNC: 94 MMOL/L (ref 98–107)
CO2: 33 MMOL/L (ref 20–31)
CREAT SERPL-MCNC: 0.52 MG/DL (ref 0.7–1.2)
GFR AFRICAN AMERICAN: >60 ML/MIN
GFR NON-AFRICAN AMERICAN: >60 ML/MIN
GFR SERPL CREATININE-BSD FRML MDRD: ABNORMAL ML/MIN/{1.73_M2}
GLUCOSE BLD-MCNC: 86 MG/DL (ref 70–99)
POTASSIUM SERPL-SCNC: 4.9 MMOL/L (ref 3.7–5.3)
SODIUM BLD-SCNC: 133 MMOL/L (ref 135–144)

## 2022-05-16 PROCEDURE — 97530 THERAPEUTIC ACTIVITIES: CPT

## 2022-05-16 PROCEDURE — 97110 THERAPEUTIC EXERCISES: CPT

## 2022-05-16 PROCEDURE — 6370000000 HC RX 637 (ALT 250 FOR IP): Performed by: ORTHOPAEDIC SURGERY

## 2022-05-16 PROCEDURE — 99232 SBSQ HOSP IP/OBS MODERATE 35: CPT | Performed by: INTERNAL MEDICINE

## 2022-05-16 PROCEDURE — 6370000000 HC RX 637 (ALT 250 FOR IP): Performed by: NURSE PRACTITIONER

## 2022-05-16 PROCEDURE — 36415 COLL VENOUS BLD VENIPUNCTURE: CPT

## 2022-05-16 PROCEDURE — 80048 BASIC METABOLIC PNL TOTAL CA: CPT

## 2022-05-16 PROCEDURE — 6360000002 HC RX W HCPCS: Performed by: INTERNAL MEDICINE

## 2022-05-16 PROCEDURE — 2060000000 HC ICU INTERMEDIATE R&B

## 2022-05-16 PROCEDURE — 97535 SELF CARE MNGMENT TRAINING: CPT

## 2022-05-16 PROCEDURE — 96372 THER/PROPH/DIAG INJ SC/IM: CPT

## 2022-05-16 PROCEDURE — 2580000003 HC RX 258: Performed by: NURSE PRACTITIONER

## 2022-05-16 RX ORDER — MEGESTROL ACETATE 40 MG/ML
200 SUSPENSION ORAL DAILY
Status: DISCONTINUED | OUTPATIENT
Start: 2022-05-16 | End: 2022-05-17 | Stop reason: HOSPADM

## 2022-05-16 RX ORDER — LIDOCAINE 4 G/G
1 PATCH TOPICAL DAILY
Status: DISCONTINUED | OUTPATIENT
Start: 2022-05-16 | End: 2022-05-17 | Stop reason: HOSPADM

## 2022-05-16 RX ADMIN — FINASTERIDE 5 MG: 5 TABLET, FILM COATED ORAL at 10:00

## 2022-05-16 RX ADMIN — VENLAFAXINE HYDROCHLORIDE 75 MG: 75 TABLET ORAL at 17:57

## 2022-05-16 RX ADMIN — CLOPIDOGREL BISULFATE 75 MG: 75 TABLET ORAL at 10:01

## 2022-05-16 RX ADMIN — GABAPENTIN 100 MG: 100 CAPSULE ORAL at 10:01

## 2022-05-16 RX ADMIN — POLYVINYL ALCOHOL 2 DROP: 14 SOLUTION/ DROPS OPHTHALMIC at 10:00

## 2022-05-16 RX ADMIN — HYDRALAZINE HYDROCHLORIDE 25 MG: 25 TABLET, FILM COATED ORAL at 06:28

## 2022-05-16 RX ADMIN — PANTOPRAZOLE SODIUM 40 MG: 40 TABLET, DELAYED RELEASE ORAL at 05:51

## 2022-05-16 RX ADMIN — GABAPENTIN 100 MG: 100 CAPSULE ORAL at 22:00

## 2022-05-16 RX ADMIN — VENLAFAXINE HYDROCHLORIDE 75 MG: 75 TABLET ORAL at 10:00

## 2022-05-16 RX ADMIN — ASPIRIN 81 MG CHEWABLE TABLET 81 MG: 81 TABLET CHEWABLE at 10:51

## 2022-05-16 RX ADMIN — ATORVASTATIN CALCIUM 40 MG: 40 TABLET, FILM COATED ORAL at 10:00

## 2022-05-16 RX ADMIN — DICLOFENAC SODIUM 2 G: 10 GEL TOPICAL at 10:00

## 2022-05-16 RX ADMIN — ENOXAPARIN SODIUM 40 MG: 100 INJECTION SUBCUTANEOUS at 10:00

## 2022-05-16 RX ADMIN — DICLOFENAC SODIUM 2 G: 10 GEL TOPICAL at 22:01

## 2022-05-16 RX ADMIN — CARVEDILOL 12.5 MG: 12.5 TABLET, FILM COATED ORAL at 22:00

## 2022-05-16 RX ADMIN — POLYVINYL ALCOHOL 2 DROP: 14 SOLUTION/ DROPS OPHTHALMIC at 22:00

## 2022-05-16 RX ADMIN — CARVEDILOL 12.5 MG: 12.5 TABLET, FILM COATED ORAL at 10:00

## 2022-05-16 RX ADMIN — POLYVINYL ALCOHOL 2 DROP: 14 SOLUTION/ DROPS OPHTHALMIC at 14:30

## 2022-05-16 RX ADMIN — SODIUM CHLORIDE, PRESERVATIVE FREE 10 ML: 5 INJECTION INTRAVENOUS at 21:59

## 2022-05-16 RX ADMIN — ACETAMINOPHEN 650 MG: 325 TABLET ORAL at 22:21

## 2022-05-16 RX ADMIN — SODIUM CHLORIDE, PRESERVATIVE FREE 10 ML: 5 INJECTION INTRAVENOUS at 10:01

## 2022-05-16 RX ADMIN — ISOSORBIDE MONONITRATE 30 MG: 30 TABLET, EXTENDED RELEASE ORAL at 10:00

## 2022-05-16 RX ADMIN — DIVALPROEX SODIUM 500 MG: 500 TABLET, EXTENDED RELEASE ORAL at 22:00

## 2022-05-16 ASSESSMENT — PAIN SCALES - GENERAL: PAINLEVEL_OUTOF10: 3

## 2022-05-16 NOTE — CARE COORDINATION
received a call from Javier at [facility] requesting information on patients insurance provider.  faxed patients VA insurance card and Medicare card located in patients chart. Hanna checking into insurance benefits and whether or not they are in network.

## 2022-05-16 NOTE — CONSULTS
Orthopedic Consult    Requesting Physician: Dr. Pasha Bell: Bilateral right greater than left shoulder pain    HISTORY OF PRESENT ILLNESS:      The patient is a 70 y.o. male  who presents with hospital admission for cardiac chest pain. Orthopedically he notes bilateral shoulder symptoms which have been evaluated with x-rays. Prior to hospitalization his orthopedic care was at the [facility] where he had Voltaren gel, physical therapy, no injections. He is right-hand dominant. Shoulder pain is sharp aggravated by the overhead position. Associated problems include cervical myopathy, cervical radiculopathy, incomplete spinal cord injury, cervical spondylosis which have left him with neurological deficits with his left shoulder and elbow. Past orthopedic history includes a wrestling injury in his high school years. [facility]. No surgery. He denies rheumatoid arthritis, lupus, gout.     Past Medical History:    Past Medical History:   Diagnosis Date    Depression 2017    ON RX    Diabetes mellitus ([address] 2013    NIDDM    Difficult intravenous access     Hyperlipidemia 2008    ON RX    Hypertension 2008    ON RX    Migraines     PTSD (post-traumatic stress disorder) 01/2018    ON RX    Sleep apnea 01/2018    UNALBE TO USE TO CLEARED BY ENT (CPAP)    Teeth missing     BACK TEETH UPPER AND LOWER TO BE FITTED FOR PARTIALS AT LATER DATE    Wears glasses        Past Surgical History:    Past Surgical History:   Procedure Laterality Date    CARDIAC CATHETERIZATION  02/2010    no stents     CARPAL TUNNEL RELEASE Left 01/09/2002    CATARACT REMOVAL      CERVICAL FUSION  04/19/2018    anterior cervical fusion C5-6    CERVICAL FUSION N/A 3/24/2022    C2-5 FUSION, C2-4 LAMINECTOMY performed by Spencer Delvalle DO at [facility] Left 2018    CATARACT EXTRACTION WITH IOL   [facility]    LAMINECTOMY  03/24/2022    C2-5 FUSION, C2-4 LAMINECTOMY    MIDDLE EAR SURGERY  01/11/2018    MIDDLE EAR REBUILT AND EAR DRUM REPLACED    MOUTH SURGERY  04/16/2018    5 TEETH REMOVED    OTHER SURGICAL HISTORY  04/19/2018    : ANTERIOR CERVICAL CORRPECTOMY C5-6, SYNTHES, DEPUY, REG TABLE, SUPINE,     NH OFFICE/OUTPT VISIT,PROCEDURE ONLY N/A 4/19/2018    ANTERIOR CERVICAL CORRPECTOMY AND FUSION C5-6 performed by Volodymyr Haas DO at [address]  12/1983       Medications Prior to Admission:   Current Facility-Administered Medications   Medication Dose Route Frequency Provider Last Rate Last Admin    megestrol (MEGACE) 40 MG/ML suspension 200 mg  200 mg Oral Daily Lex Alvarez MD        enoxaparin (LOVENOX) injection 40 mg  40 mg SubCUTAneous Daily Lex Alvarez MD   40 mg at 05/16/22 1000    sodium chloride flush 0.9 % injection 10 mL  10 mL IntraVENous PRN Lex Alvarez MD   10 mL at 05/14/22 1204    diclofenac sodium (VOLTAREN) 1 % gel 2 g  2 g Topical BID Bonifacio Bhatti MD   2 g at 05/16/22 1000    aspirin chewable tablet 81 mg  81 mg Oral Daily Heather Basil, APRN - CNP   81 mg at 05/16/22 1051    atorvastatin (LIPITOR) tablet 40 mg  40 mg Oral Daily Heather Basil, APRN - CNP   40 mg at 05/16/22 1000    carvedilol (COREG) tablet 12.5 mg  12.5 mg Oral BID Heather Basil, APRN - CNP   12.5 mg at 05/16/22 1000    clopidogrel (PLAVIX) tablet 75 mg  75 mg Oral Daily Heather Basil, APRN - CNP   75 mg at 05/16/22 1001    divalproex (DEPAKOTE ER) extended release tablet 500 mg  500 mg Oral Nightly Heather Basil, APRN - CNP   500 mg at 05/15/22 2050    finasteride (PROSCAR) tablet 5 mg  5 mg Oral Daily Heather Basil, APRN - CNP   5 mg at 05/16/22 1000    gabapentin (NEURONTIN) capsule 100 mg  100 mg Oral BID Heather Basil, APRN - CNP   100 mg at 05/16/22 1001    hydrALAZINE (APRESOLINE) tablet 25 mg  25 mg Oral TID KASSIDY Farias NP   25 mg at 05/16/22 9634    pantoprazole (PROTONIX) tablet 40 mg  40 mg Oral QAM AC Ilean Payal, APRN - CNP   40 mg at 05/16/22 0551    simethicone (MYLICON) chewable tablet 80 mg  80 mg Oral Q6H PRN Ilean Payal, APRN - CNP        venlafaxine [facility]) tablet 75 mg  75 mg Oral TID WC Ilean Payal, APRN - CNP   75 mg at 05/16/22 1757    sodium chloride flush 0.9 % injection 5-40 mL  5-40 mL IntraVENous 2 times per day Ilean Payal, APRN - CNP   10 mL at 05/16/22 1001    sodium chloride flush 0.9 % injection 10 mL  10 mL IntraVENous PRN Ilean Payal, APRN - CNP        0.9 % sodium chloride infusion   IntraVENous PRN Ilean Payal, APRN - CNP        ondansetron (ZOFRAN-ODT) disintegrating tablet 4 mg  4 mg Oral Q8H PRN Ilean Payal, APRN - CNP        Or    ondansetron [facility]) injection 4 mg  4 mg IntraVENous Q6H PRN Ilean Payal, APRN - CNP        acetaminophen (TYLENOL) tablet 650 mg  650 mg Oral Q6H PRN Ilean Payal, APRN - CNP   650 mg at 05/15/22 1825    Or    acetaminophen (TYLENOL) suppository 650 mg  650 mg Rectal Q6H PRN Ilean Payal, APRN - CNP        magnesium hydroxide (MILK OF MAGNESIA) 400 MG/5ML suspension 30 mL  30 mL Oral Daily PRN Ilean Payal, APRN - CNP        potassium chloride (KLOR-CON M) extended release tablet 40 mEq  40 mEq Oral PRN Ilean Payal, APRN - CNP        Or    potassium bicarb-citric acid (EFFER-K) effervescent tablet 40 mEq  40 mEq Oral PRN Ilean Payal, APRN - CNP        Or    potassium chloride 10 mEq/100 mL IVPB (Peripheral Line)  10 mEq IntraVENous PRN Ilean Payal, APRN - CNP        potassium chloride 10 mEq/100 mL IVPB (Peripheral Line)  10 mEq IntraVENous PRN Ilean Payal, APRN - CNP        magnesium sulfate 1000 mg in dextrose 5% 100 mL IVPB  1,000 mg IntraVENous PRN Ilean Payal, APRN - CNP        nitroGLYCERIN (NITROSTAT) SL tablet 0.4 mg  0.4 mg SubLINGual Q5 Min PRN Carlita Moe APRN - JINA        isosorbide mononitrate (IMDUR) extended release tablet 30 mg 30 mg Oral Daily Naomy Santizo KASSIDY Mckeon NP   30 mg at 22 1000    polyvinyl alcohol (LIQUIFILM TEARS) 1.4 % ophthalmic solution 2 drop  2 drop Both Eyes TID Sue Lyons KASSIDY Mckeon CNP   2 drop at 22 1430         Allergies:  Latex, Bee venom, Lisinopril, Niacin and related, Sulfa antibiotics, and Terazosin    Social History:   Social History     Tobacco Use   Smoking Status Former Smoker    Packs/day: 0.50    Years: 4.00    Pack years: 2.00    Types: Cigarettes    Quit date: 1972    Years since quittin.1   Smokeless Tobacco Never Used   Tobacco Comment    quit      Social History     Substance and Sexual Activity   Alcohol Use Yes    Comment: MIXED DRINKS- 3 OR 4 A YEAR; last 2018     Social History     Substance and Sexual Activity   Drug Use Yes    Types: Marijuana (Erick Bile)       Family History:  Family History   Problem Relation Age of Onset    Dementia Mother     Coronary Art Dis Mother     Stroke Mother     Diabetes Mother     Asthma Mother     Other Mother         BRAIN ANEURISM    High Blood Pressure Mother     Heart Disease Father     Diabetes Sister     Diabetes Brother     High Blood Pressure Brother     High Cholesterol Brother     Heart Disease Brother     Diabetes Sister     Other Sister         NEUROPATHY         REVIEW OF SYSTEMS:  Gen: Negative for nausea, vomiting, diarrhea, fever, chills, night sweats  Heart: Negative for HTN, palpitations, chest pain  Lungs: Negative for wheezes, asthma or SOB  GI: Negative for nausea, vomiting  Endo: Negative for diabetes  Heme: Negative for DVT       PHYSICAL EXAM:  Patient Vitals for the past 24 hrs:   BP Temp Temp src Pulse Resp SpO2   22 1700 109/73 -- -- 65 18 --   22 1600 128/73 98.4 °F (36.9 °C) Temporal 65 22 --   22 1500 110/65 -- -- 69 21 --   22 1400 100/64 -- -- 76 20 --   22 1300 97/62 -- -- 72 21 --   22 1200 98/66 97.8 °F (36.6 °C) Temporal 84 22 --   22 1000 116/79 -- -- 83 23 --   05/16/22 0900 138/79 -- -- 75 20 --   05/16/22 0800 123/79 98 °F (36.7 °C) Temporal 69 18 --   05/16/22 0700 115/66 -- -- 76 24 --   05/16/22 0400 132/85 97.9 °F (36.6 °C) -- 77 25 97 %   05/16/22 0021 -- 98 °F (36.7 °C) -- -- -- --   05/16/22 0000 127/80 -- -- 80 23 97 %   05/15/22 2000 136/78 -- -- 83 28 98 %   05/15/22 1825 -- 98.1 °F (36.7 °C) -- -- -- --     Gen: alert and oriented  Head: normorcephalic, atraumatic  Neck: supple  Heart: RRR  Lungs: No audible wheezes  Abdomen: soft  Pelvis: stable  Extremity he has short painful arc syndrome with assisted elevation only to 90 degrees. Weak abductor function. Positive impingement sign. Pain at the acromioclavicular joint with prominent osteophytes. No erythema or edema or ecchymosis. The right biceps muscle appears distally positioned consistent with long head rupture. Unremarkable right elbow, wrist, hand examination    Left shoulder and upper extremity note myopathy, radiculopathy, and complete spinal cord injury neurological findings. Also restricted elevation positive impingement sign    DATA:  CBC:   Lab Results   Component Value Date    WBC 7.0 05/13/2022    HGB 10.8 05/13/2022     05/13/2022     BMP:    Lab Results   Component Value Date     05/16/2022    K 4.9 05/16/2022    CL 94 05/16/2022    CO2 33 05/16/2022    BUN 17 05/16/2022    CREATININE 0.52 05/16/2022    CALCIUM 8.9 05/16/2022    GLUCOSE 86 05/16/2022     PT/INR:    Lab Results   Component Value Date    PROTIME 14.1 05/12/2022    INR 1.1 05/12/2022     Troponin:  No results found for: [address]    Radiology:     Radiographs right and left shoulder reviewed. They show right greater than left acromioclavicular arthritis. Complete loss of articular cartilage, prominent superior, inferior osteophytes. Glenohumeral joint more normal.  No proximal subluxation.   No oncology, no fracture  ASSESSMENT:  Principal Problem:    Atypical chest pain  Active Problems:    Cervical myelopathy (HCC)    Bilateral shoulder pain    Shortness of breath    Hypertension    Hyperlipidemia    Diabetes mellitus ([address]    Bipolar disorder, mixed ([address]    Cervical stenosis of spinal canal    Incomplete spinal cord lesion at C5-C7 level (HCC)    Cervical spondylosis with radiculopathy    Hyponatremia-possibly SIADH     Cervical stenosis of spine    Moderate malnutrition (HCC)    Chest pain  Resolved Problems:    * No resolved hospital problems. *       PLAN:  1) Lidoderm patch    2.  Voltaren gel    3. Physical therapy    4. Holding on intra-articular corticosteroid injection. If symptoms continue after the above as an outpatient would be glad to help him with 16 mg dexamethasone injection. 5.  Thank you very much for the consultation. We will follow him closely with you.         Kylah Kilgore MD

## 2022-05-16 NOTE — CARE COORDINATION
Social work spoke with patients daughter who verified Medicare number. Provided to PAUL who indicated that they could not verify Medicare benefits and are not a [facility] provider.  contacted Shaw Foley at [facility] and l/m requesting information on patients VA benefits. Also left a message for [facility] as they recently verified patients insurance for an admission.  got additional SNF choices: [facility] of [address] [place] [address], [facility] and [facility]. [facility] and [facility] are in network with VA. Social work to follow up.

## 2022-05-16 NOTE — PROGRESS NOTES
Physical Therapy  Facility/Department: [facility]  Rehabilitation Physical Therapy Treatment Note    NAME: Marce Sanon. : 1950 (75 y.o.)  MRN: [id number]  CODE STATUS: DNR-CCA    Date of Service: 22    Pt currently functioning below baseline. Would suggest additional therapy at time of discharge to maximize long term outcomes and prevent re-admission. Please refer to AM-PAC score for current level of function. Restrictions:  Restrictions/Precautions: Fall Risk;General Precautions; Up as Tolerated  Required Braces or Orthoses  Cervical: c-collar     SUBJECTIVE  Subjective  Subjective: Patient agreeable to PT  treatment. LORENZO Gamboa patient is appropriate for PT treatment. OBJECTIVE  Cognition  Overall Cognitive Status: WFL  Orientation  Overall Orientation Status: Within Normal Limits  Orientation Level: Oriented X4    Functional Mobility  Bed Mobility  Overall Assistance Level: Maximum Assistance; Requires x 2 Assistance (Patient with very little motion initiation skills. Patient requires complete bed change and [place] care upon therapists arrival.)  Roll Left  Assistance Level: Maximum assistance; Requires x 2 assistance (Patient with very little motion in UE due to deficits in WILLIAM shoulder girdle noted)  Roll Right  Assistance Level: Maximum assistance; Requires x 2 assistance  Supine to Sit  Assistance Level: Maximum assistance; Requires x 2 assistance  Scooting  Assistance Level: Maximum assistance; Requires x 2 assistance (Patient with very limited motions requires a MAX x2 to EOB seated posture with MAX x2 to scoot all the way out and place feet flat on floor for increased stability)  Balance  Sitting Balance: Maximum assistance  Standing Balance  Time: Seated EOB approxamately 1 minutes  Increased time spent trouble shooting and educating patient on motions and solutions to maximize and facilitate motions throughout rolling and bed mobility tasks.  Patient with little return on demo and Time In       0946   Time Out       1050   Minutes       59   Co-treatment with OT warranted secondary to decreased safety and independence requiring 2 skilled therapy professionals to address individual discipline's goals. PT addressing pre gait trunk strengthening, weight shifting prior to transfers, transfer training and postural control in sitting.   Katty Jones PTA, 05/16/22 at 12:37 PM

## 2022-05-16 NOTE — PROGRESS NOTES
Occupational Therapy  Facility/Department: [facility]  Rehabilitation Occupational Therapy Daily Treatment Note    Date: 22  Patient Name: Nehemias Brown Room:   MRN: [id number]  Account: [de-identified]   : 1950  (75 y.o.) Gender: male       RN Nicolas Gold reports patient is medically stable for therapy treatment this date. Chart reviewed prior to treatment and patient is agreeable for therapy. All lines intact and patient positioned comfortably at end of treatment. All patient needs addressed prior to ending therapy session. Pt currently functioning below baseline. Would suggest additional therapy at time of discharge to maximize long term outcomes and prevent re-admission. Please refer to AM-PAC score for current level of function. AM-PAC: 7                Past Medical History:  has a past medical history of Depression, Diabetes mellitus ([address] Difficult intravenous access, Hyperlipidemia, Hypertension, Migraines, PTSD (post-traumatic stress disorder), Sleep apnea, Teeth missing, and Wears glasses. Past Surgical History:   has a past surgical history that includes Cardiac catheterization (2010); Carpal tunnel release (Left, 2002); Vasectomy (1983); Tonsillectomy and adenoidectomy (); Hydrocele surgery (); Middle ear surgery (2018); eye surgery (Left, ); Mouth surgery (2018); other surgical history (2018); cervical fusion (2018); pr office/outpt visit,procedure only (N/A, 2018); Cataract removal; laminectomy (2022); and cervical fusion (N/A, 3/24/2022). Restrictions  Restrictions/Precautions: Fall Risk;General Precautions; Up as Tolerated  Other position/activity restrictions: Telemetry, Per patient: abdominal binder when getting up to help prevent orthostatic BP, c-collar when up  Required Braces or Orthoses  Cervical: c-collar  Required Braces or Orthoses?: Yes    Subjective  Subjective: \"I haven't had a new gown for assistance  Skilled Clinical Factors: Pt requires Max A x2 for initiation/progression from sit <> supine and scooting fully up in bed. Once positioned back in bed pt's BP taken and read 122/79 (93). Supine to Sit  Assistance Level: Maximum assistance; Requires x 2 assistance  Skilled Clinical Factors: Pt requires Max A x2 for initiation/progression to fully seated EOB with BLEs flat on floor. Pt's BP taken while seated and it decreased to 86/65 (73) with pt sitting EOB for approx 1 min. Scooting  Assistance Level: Maximum assistance; Requires x 2 assistance  Sit to Stand  Skilled Clinical Factors: N/T not appopriate to attempt standing at this time. Assessment  Assessment  Assessment: Pt limited by decreased endurance, pain, orthostatic hypotension, pain, and decreased ROM. Pt still requires x2 staff during therapy treatments for increased safety. Pt recommended to continue acute OT to maximize safety and independence throughout functional tasks. Pt expected to require 24/7 assist upon discharge due to limited functional activity and activity tolerance unless progresses toward goals. Activity Tolerance: Treatment limited secondary to medical complications  Discharge Recommendations: Patient would benefit from continued therapy after discharge  Safety Devices  Safety Devices in place: Yes  Type of devices: Nurse notified;Call light within reach; Left in bed;Patient at risk for falls    Patient Education  Education  Education Given To: Patient  Education Provided: Role of Therapy;Plan of Care;ADL Function; Safety; Energy Conservation  Education Provided Comments: pursed lip breathing, pressure relief, benefits of participation in therapy, AROM on own in room within limits, adaptive eating utensils  Education Method: Verbal;Printed Information/Hand-outs  Barriers to Learning: None  Education Outcome: Verbalized understanding;Continued education needed    Plan  Plan  Times per Week: 4-5 X per week  Current Treatment Recommendations: Strengthening;ROM;Balance training;Functional mobility training; Endurance training; Safety education & training;Patient/Caregiver education & training;Equipment evaluation, education, & procurement;Self-Care / ADL    Goals  Patient Goals   Patient goals : To get better  Short Term Goals  Time Frame for Short term goals: LOS  Short Term Goal 1: Pt will participate B UE ROM/ strengthening program as tolerate. Short Term Goal 2: Pt will be able to compelte self grooming  with min assist, AE, PRN. Short Term Goal 3: Pt will be able to complete self feedingin  with min assist , AE PRN. Short Term Goal 4: Pt will be able to compelte bed mobility with min assist.  Short Term Goal 5: Pt will be able to tolerate sitting on EOB for ADL  for up to 10 minutes. Therapy Time   Co-Treatment Concurrent Group Co-treatment   Time In 6504         Time Out 2525         Minutes 79               Co-treatment with PT warranted secondary to decreased safety and independence requiring 2 skilled therapy professionals to address individual discipline's goals. OT addressing preparation for ADL transfer, sitting balance for increased ADL performance, sitting/activity tolerance, functional reaching, environmental safety/scanning, fall prevention, functional mobility for ADL transfers, ability to sequence and follow directions and functional UE strength.     ASHLEY Desir

## 2022-05-16 NOTE — CARE COORDINATION
called Leeann Alford at [facility] and left a message inquiring about patients referral.   Latrelle Bourbon called back and indicated that she is checking to be sure they can take patients insurance.

## 2022-05-16 NOTE — PROGRESS NOTES
[facility]  Office: [address], DO, Adanbreanna Shadow, DO, Sana Guidry, DO, Celestinehalima Hardinghaylie Blood, DO, Ann Ortiz MD, Yessy Nathan MD, Gwen Zeng MD, Ritu Diaz MD, Issa Ji MD, Christin Colbert MD, Gretta Alvarez MD, Rakesh Colon, DO, Abraham Vazquez, DO, Alhaji Briscoe MD,  Abhi Yun, DO, Josie Belcher MD, Carol Kelley MD, Sherri Melgar MD, Shola Muller DO, Shekhar Manzo MD, Miley Salinas MD, Krystyna Schultz MD, Brittnee Mcdaniel, [facility], [facility], [facility], Mj Pastchelsi, CNP, Rhonda Reagan, CNP, Gerard Luna, CNP, Carmenza Rand, CNP, Paras Sahu, PA-C, Zion Ibrahim, [facility], Dimitri Shafer, CNP, Fer Mejia, CNP, Greyson Fuentes, CNP, Arvind Jackson, CNS, Adamaris Cadet, [facility], Climmijocelin Points, CNP, Antonina Boehringer, CNP, Dillon Wilson, [facility]    Progress Note    5/16/2022    2:55 PM    Name:   Teresa West MRN:     [id number]     Acct:      [de-identified]   Room:   2036/2036-01  IP Day:  2  Admit Date:  5/12/2022 11:45 AM    PCP:   Cleveland Montaño MD  Code Status:  DNR-CCA    Subjective:     C/C:   Chief Complaint   Patient presents with    Shortness of Breath    Chest Pain     Interval History Status:   Denies chest pain today  Troponins are chronically elevated and are essentially flat, cardiology has evaluated, no further work-up planned  Has painful ROM of both shoulders,L > R, x-rays show osteoarthritic changes, diclofenac gel was prescribed yesterday  Sodium improved to 133, lab result suggestive of SIADH which is multifactorial including pain  Patient is short of breath while talking, states this shortness of breath is there for many months, D-dimer 0.63, CT chest negative for PE    Brief History:     Teresa West is a 70 y.o.  Non- / non  male who presents with Shortness of Breath and Chest Pain   and is admitted to the hospital for the management of Atypical chest pain.     Patient was brought to the emergency department with chest pain. Patient has had a complicated 3 months. Patient had a surgical fusion completed due to upper extremity neurological deficits. Since that time patient has been having difficulty with ambulation and functional mobility. Patient has been been in acute rehab at [facility] facilities. Patient was seen approximately 3 weeks ago at [facility] for chest pain. At that time patient had a cardiac cath completed and his prior coronary artery stents were found to be patent. Patient was discharged with instructions to follow-up with cardiology and neurosurgery as an outpatient. Patient return to the emergency department today with severely worsened chest pain. He describes it as a severe pressure rated at a 6/10 that waxes and wanes in intensity. Emergency department evaluation has found a mildly elevated troponin but this is at a baseline for the patient who is troponins are chronically in the 80s and 90s. At the time my exam patient is resting comfortably and states that his chest tightness is minimal at best.  Patient is also been found to be hyponatremic and he reports that he has been losing weight unintentionally and has been increasingly too fatigued to work with therapy. Review of Systems:     Constitutional:  negative for chills, fevers, sweats  Respiratory:  negative for cough, + shortness of breath while talking, denies wheezing  Cardiovascular: Denies chest pain r, chest pressure/discomfort, lower extremity edema,   Gastrointestinal:  negative for abdominal pain, constipation, diarrhea, nausea, vomiting  Neurological:  negative for dizziness, headache  + Bilateral shoulder pain with painful ROM  Medications: Allergies:     Allergies   Allergen Reactions    Latex Itching    Bee Venom Swelling    Lisinopril Other (See Comments)     Cough      Niacin And Related Rash    Sulfa Antibiotics Rash    Terazosin Rash       Current Meds:   Scheduled Meds:    megestrol  200 mg Oral Daily    enoxaparin  40 mg SubCUTAneous Daily    diclofenac sodium  2 g Topical BID    aspirin  81 mg Oral Daily    atorvastatin  40 mg Oral Daily    carvedilol  12.5 mg Oral BID    clopidogrel  75 mg Oral Daily    divalproex  500 mg Oral Nightly    finasteride  5 mg Oral Daily    gabapentin  100 mg Oral BID    hydrALAZINE  25 mg Oral TID    pantoprazole  40 mg Oral QAM AC    venlafaxine  75 mg Oral TID WC    sodium chloride flush  5-40 mL IntraVENous 2 times per day    isosorbide mononitrate  30 mg Oral Daily    polyvinyl alcohol  2 drop Both Eyes TID     Continuous Infusions:    sodium chloride       PRN Meds: sodium chloride flush, simethicone, sodium chloride flush, sodium chloride, ondansetron **OR** ondansetron, acetaminophen **OR** acetaminophen, magnesium hydroxide, potassium chloride **OR** potassium alternative oral replacement **OR** potassium chloride, potassium chloride, magnesium sulfate, nitroGLYCERIN    Data:     Past Medical History:   has a past medical history of Depression, Diabetes mellitus ([address] Difficult intravenous access, Hyperlipidemia, Hypertension, Migraines, PTSD (post-traumatic stress disorder), Sleep apnea, Teeth missing, and Wears glasses. Social History:   reports that he quit smoking about 50 years ago. His smoking use included cigarettes. He has a 2.00 pack-year smoking history. He has never used smokeless tobacco. He reports current alcohol use. He reports current drug use. Drug: Marijuana Magdalena Aver).      Family History:   Family History   Problem Relation Age of Onset   Bonilla Dementia Mother     Coronary Art Dis Mother     Stroke Mother     Diabetes Mother     Asthma Mother     Other Mother         BRAIN ANEURISM    High Blood Pressure Mother     Heart Disease Father     Diabetes Sister     Diabetes Brother     High Blood Pressure Brother     High Cholesterol Brother     Heart Disease Brother     Diabetes Sister     Other Sister         NEUROPATHY       Vitals:  BP 97/62   Pulse 72   Temp 97.8 °F (36.6 °C) (Temporal)   Resp 21   Ht 5' 3\" (1.6 m)   Wt 132 lb 6.4 oz (60.1 kg)   SpO2 97%   BMI 23.45 kg/m²   Temp (24hrs), Av.9 °F (36.6 °C), Min:97.3 °F (36.3 °C), Max:98.1 °F (36.7 °C)    No results for input(s): POCGLU in the last 72 hours. I/O (24Hr): Intake/Output Summary (Last 24 hours) at 2022 1455  Last data filed at 2022 0400  Gross per 24 hour   Intake 200 ml   Output 700 ml   Net -500 ml       Labs:  Hematology:  No results for input(s): WBC, RBC, HGB, HCT, MCV, MCH, MCHC, RDW, PLT, MPV, SEDRATE, CRP, INR, DDIMER, SP8TBPAK, LABABSO in the last 72 hours. Invalid input(s): PT  Chemistry:  Recent Labs     22  0944 05/15/22  0352 22  0513   * 130* 133*   K 4.7 4.1 4.9   CL 94* 94* 94*   CO2 31 31 33*   GLUCOSE 97 76 86   BUN 16 15 17   CREATININE 0.43* 0.47* 0.52*   ANIONGAP 6* 5* 6*   LABGLOM >60 >60 >60   GFRAA >60 >60 >60   CALCIUM 8.8 8.9 8.9     No results for input(s): PROT, LABALBU, LABA1C, D4NUDSR, A5SHHSF, FT4, TSH, AST, ALT, LDH, GGT, ALKPHOS, LABGGT, BILITOT, BILIDIR, AMMONIA, AMYLASE, LIPASE, LACTATE, CHOL, HDL, LDLCHOLESTEROL, CHOLHDLRATIO, TRIG, VLDL, KNP71ZP, PHENYTOIN, PHENYF, URICACID, POCGLU in the last 72 hours.   ABG:  Lab Results   Component Value Date    POCPH 7.376 2022    PHART 7.445 2018    POCPCO2 55.3 2022    MDU4TUY 39.1 2018    POCPO2 110.5 2022    PO2ART 252.0 2018    POCHCO3 32.4 2022    GKE3SIT 26.5 2018    NBEA NOT REPORTED 2018    PBEA 6 2022    VKXY2YJU 98 2022    T3KACUBT 99.5 2018    FIO2 28.0 2022     Lab Results   Component Value Date/Time    SPECIAL LEFT FOREARM,6ML 2022 12:45 PM     Lab Results   Component Value Date/Time    CULTURE NO GROWTH 4 DAYS 2022 12:45 PM       Radiology:  CT Head WO Contrast    Result Date: 2022  New bilateral frontal subdural hygromas, measuring 4 mm in thickness, without any mass effect. No evidence for acute hemorrhage. XR CHEST PORTABLE    Result Date: 5/12/2022  No acute cardiopulmonary disease. MRI BRAIN WO CONTRAST    Result Date: 5/13/2022  Bilateral subdural collections likely representing hygromas. Physical Examination:        General appearance:  alert, cooperative and short of breath while talking  Mental Status:  oriented to person, place and time and anxious affect  Lungs: Prolonged expiratory phase, diminished breath sounds at bases  Heart:  regular rate and rhythm, no murmur  Abdomen:  soft, nontender, nondistended, normal bowel sounds, no masses, hepatomegaly, splenomegaly  Extremities: + Lower extremity edema, no redness, tenderness in the calves,+ painful ROM of both shoulders- L > R    Skin:  no gross lesions, rashes, induration    Assessment:        Hospital Problems           Last Modified POA    * (Principal) Atypical chest pain 5/12/2022 Yes    Bilateral shoulder pain 5/13/2022 Yes    Shortness of breath 5/13/2022 Yes    Hypertension 5/12/2022 Yes    Hyperlipidemia 5/12/2022 Yes    Diabetes mellitus ([address] 5/12/2022 Yes    Bipolar disorder, mixed ([address] 5/12/2022 Yes    Hyponatremia-possibly SIADH  5/13/2022 Yes    Moderate malnutrition ([address] 5/12/2022 Yes    Chest pain 5/12/2022 Yes          Plan:        1. Continue fluid restriction  2. Changed Lovenox to DVT prophylaxis dose  3. Consult orthopedics Dr. Simba Nguyen  for painful ROM both shoulders,  4. Cardiology evaluation appreciated  5. MRI brain confirms findings of bilateral subdural collections likely representing hygromas  6. PT OT  7.  Discharge back to [facility] if bed available        Albertina Osman MD  5/16/2022  2:55 PM

## 2022-05-17 VITALS
TEMPERATURE: 97.3 F | SYSTOLIC BLOOD PRESSURE: 95 MMHG | HEART RATE: 76 BPM | HEIGHT: 63 IN | BODY MASS INDEX: 23.39 KG/M2 | DIASTOLIC BLOOD PRESSURE: 67 MMHG | RESPIRATION RATE: 25 BRPM | OXYGEN SATURATION: 99 % | WEIGHT: 132 LBS

## 2022-05-17 LAB
ANION GAP SERPL CALCULATED.3IONS-SCNC: 8 MMOL/L (ref 9–17)
BUN BLDV-MCNC: 16 MG/DL (ref 8–23)
BUN/CREAT BLD: 33 (ref 9–20)
CALCIUM SERPL-MCNC: 9.2 MG/DL (ref 8.6–10.4)
CHLORIDE BLD-SCNC: 93 MMOL/L (ref 98–107)
CO2: 32 MMOL/L (ref 20–31)
CREAT SERPL-MCNC: 0.49 MG/DL (ref 0.7–1.2)
CULTURE: NORMAL
CULTURE: NORMAL
GFR AFRICAN AMERICAN: >60 ML/MIN
GFR NON-AFRICAN AMERICAN: >60 ML/MIN
GFR SERPL CREATININE-BSD FRML MDRD: ABNORMAL ML/MIN/{1.73_M2}
GLUCOSE BLD-MCNC: 90 MG/DL (ref 70–99)
Lab: NORMAL
Lab: NORMAL
POTASSIUM SERPL-SCNC: 4.5 MMOL/L (ref 3.7–5.3)
SODIUM BLD-SCNC: 133 MMOL/L (ref 135–144)
SPECIMEN DESCRIPTION: NORMAL
SPECIMEN DESCRIPTION: NORMAL

## 2022-05-17 PROCEDURE — 6360000002 HC RX W HCPCS: Performed by: INTERNAL MEDICINE

## 2022-05-17 PROCEDURE — 2580000003 HC RX 258: Performed by: NURSE PRACTITIONER

## 2022-05-17 PROCEDURE — 80048 BASIC METABOLIC PNL TOTAL CA: CPT

## 2022-05-17 PROCEDURE — 96372 THER/PROPH/DIAG INJ SC/IM: CPT

## 2022-05-17 PROCEDURE — 6370000000 HC RX 637 (ALT 250 FOR IP): Performed by: ORTHOPAEDIC SURGERY

## 2022-05-17 PROCEDURE — 36415 COLL VENOUS BLD VENIPUNCTURE: CPT

## 2022-05-17 PROCEDURE — 99239 HOSP IP/OBS DSCHRG MGMT >30: CPT | Performed by: FAMILY MEDICINE

## 2022-05-17 PROCEDURE — 6370000000 HC RX 637 (ALT 250 FOR IP): Performed by: NURSE PRACTITIONER

## 2022-05-17 RX ADMIN — HYDRALAZINE HYDROCHLORIDE 25 MG: 25 TABLET, FILM COATED ORAL at 08:36

## 2022-05-17 RX ADMIN — ASPIRIN 81 MG CHEWABLE TABLET 81 MG: 81 TABLET CHEWABLE at 08:37

## 2022-05-17 RX ADMIN — ACETAMINOPHEN 650 MG: 325 TABLET ORAL at 10:07

## 2022-05-17 RX ADMIN — ISOSORBIDE MONONITRATE 30 MG: 30 TABLET, EXTENDED RELEASE ORAL at 08:36

## 2022-05-17 RX ADMIN — CARVEDILOL 12.5 MG: 12.5 TABLET, FILM COATED ORAL at 08:38

## 2022-05-17 RX ADMIN — FINASTERIDE 5 MG: 5 TABLET, FILM COATED ORAL at 08:37

## 2022-05-17 RX ADMIN — CLOPIDOGREL BISULFATE 75 MG: 75 TABLET ORAL at 08:36

## 2022-05-17 RX ADMIN — VENLAFAXINE HYDROCHLORIDE 75 MG: 75 TABLET ORAL at 08:37

## 2022-05-17 RX ADMIN — PANTOPRAZOLE SODIUM 40 MG: 40 TABLET, DELAYED RELEASE ORAL at 06:09

## 2022-05-17 RX ADMIN — SODIUM CHLORIDE, PRESERVATIVE FREE 10 ML: 5 INJECTION INTRAVENOUS at 08:41

## 2022-05-17 RX ADMIN — ENOXAPARIN SODIUM 40 MG: 100 INJECTION SUBCUTANEOUS at 08:36

## 2022-05-17 RX ADMIN — GABAPENTIN 100 MG: 100 CAPSULE ORAL at 08:38

## 2022-05-17 RX ADMIN — VENLAFAXINE HYDROCHLORIDE 75 MG: 75 TABLET ORAL at 12:11

## 2022-05-17 RX ADMIN — POLYVINYL ALCOHOL 2 DROP: 14 SOLUTION/ DROPS OPHTHALMIC at 08:39

## 2022-05-17 RX ADMIN — ATORVASTATIN CALCIUM 40 MG: 40 TABLET, FILM COATED ORAL at 08:37

## 2022-05-17 RX ADMIN — DICLOFENAC SODIUM 2 G: 10 GEL TOPICAL at 08:33

## 2022-05-17 ASSESSMENT — ENCOUNTER SYMPTOMS
VOICE CHANGE: 0
CONSTIPATION: 0
BACK PAIN: 0
ABDOMINAL PAIN: 0
SHORTNESS OF BREATH: 0
RHINORRHEA: 0
CHOKING: 0
SINUS PRESSURE: 0
DIARRHEA: 0
WHEEZING: 0
VOMITING: 0
NAUSEA: 0
CHEST TIGHTNESS: 0
COUGH: 0

## 2022-05-17 ASSESSMENT — PAIN DESCRIPTION - LOCATION
LOCATION: HEAD
LOCATION: HEAD
LOCATION: SHOULDER

## 2022-05-17 ASSESSMENT — PAIN DESCRIPTION - DESCRIPTORS
DESCRIPTORS: ACHING
DESCRIPTORS: ACHING

## 2022-05-17 ASSESSMENT — PAIN SCALES - GENERAL
PAINLEVEL_OUTOF10: 3

## 2022-05-17 ASSESSMENT — PAIN DESCRIPTION - PAIN TYPE
TYPE: ACUTE PAIN
TYPE: CHRONIC PAIN

## 2022-05-17 ASSESSMENT — PAIN DESCRIPTION - ORIENTATION
ORIENTATION: OTHER (COMMENT)
ORIENTATION: RIGHT;LEFT;ANTERIOR;POSTERIOR

## 2022-05-17 ASSESSMENT — PAIN DESCRIPTION - FREQUENCY: FREQUENCY: INTERMITTENT

## 2022-05-17 ASSESSMENT — PAIN DESCRIPTION - ONSET: ONSET: GRADUAL

## 2022-05-17 ASSESSMENT — PAIN - FUNCTIONAL ASSESSMENT: PAIN_FUNCTIONAL_ASSESSMENT: ACTIVITIES ARE NOT PREVENTED

## 2022-05-17 NOTE — PROGRESS NOTES
Discharged with belongings vis [facility] transport. Report called to [facility] @ [phone].

## 2022-05-17 NOTE — CARE COORDINATION
spoke with patients sister who stated that she would like to try and get him into [address] [place] [address].  called and they do not have beds. Patient will be going to [facility]. Transport scheduled for today at 2pm.   Confirmed transport with patients sister.

## 2022-05-17 NOTE — PROGRESS NOTES
[facility]  Office: [address], DO, Stefani Juarez, DO, Ulysses Canchola, DO, Sam joaquin Emi, DO, Sanjuana Dunlap MD, Swathi Paz MD, Cassandra Mora MD, Shawnee Braun MD, Alexandra Marcus MD, Yan Biggs MD, Kingston Posey MD, Juni eDl Real, DO, Adamaris Allison, DO, Amos Moore MD,  Hortencia Hannah DO, Jesica Boyd MD, Reagan Guo MD, Marie Lopez MD, Perez Churchill DO, Beverley Padgett MD, Joli Angelucci, MD, Dustin Ya MD, Jeff Henderson, [facility], [facility] [place], CNP, Sumit Vincent, CNP, Tigist High, CNP, Umu Trotter, CNP, Aldair Raygoza, CNP, Iker Ybarra, PA-C, Mi Rodriguez, DNP, Ruth Ann Seamehul, CNP, Jhoan Rich, CNP, Charmaine Faria, CNP, Jean Dove, CNS, Marleenolypeyman Nails, DNP, Luís Clapper, CNP, Juan Alberto Proper, CNP, Jesika Serum, CNP       [facility]      Daily Progress Note     Admit Date: 5/12/2022  Bed/Room No.  2036/2036-01  Admitting Physician : Cassandra Mora MD  Code Status :[facility] Day:  LOS: 3 days   Chief Complaint:     Chief Complaint   Patient presents with    Shortness of Breath    Chest Pain     Principal Problem:    Atypical chest pain  Active Problems:    Cervical myelopathy 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 *    Subjective : Interval History/Significant events :  05/17/22    Patient continues to c/o pain in bilateral shoulders. He is alert and oriented. Patient denies any breathing difficulty. Denies any nausea or vomiting. Patient says that he has advanced arthrtitis.    Vitals - Stable afebrile  Labs - normal creatinine . Hyponatremia     Nursing notes , Consults notes reviewed. Overnight events and updates discussed with Nursing staff . Background History:         Demetrius Rios is 70 y.o. male  Who was admitted to the hospital on 5/12/2022 for treatment of Atypical chest pain. Patient came to emergency room with chest pain, shortness of breath. Patient was recently admitted in hospital for similar symptoms and underwent cardiac cath and was found to have patent stents. Patient has been having difficulty in ambulation and has been in acute rehab. He has history of cervical myopathy, cervical radiculopathy, incomplete spinal cord injury, cervical spondylosis and has neurological deficits. Patient was having difficulty using his bilateral arms due to shoulder pain. Orthopedic surgery was consulted and recommended outpatient steroid injection and physical therapy. PMH:  Past Medical History:   Diagnosis Date    Depression 2017    ON RX    Diabetes mellitus ([facility] 2013    NIDDM    Difficult intravenous access     Hyperlipidemia 2008    ON RX    Hypertension 2008    ON RX    Migraines     PTSD (post-traumatic stress disorder) 01/2018    ON RX    Sleep apnea 01/2018    UNALBE TO USE TO CLEARED BY ENT (CPAP)    Teeth missing     BACK TEETH UPPER AND LOWER TO BE FITTED FOR PARTIALS AT LATER DATE    Wears glasses       Allergies:    Allergies   Allergen Reactions    Latex Itching    Bee Venom Swelling    Lisinopril Other (See Comments)     Cough      Niacin And Related Rash    Sulfa Antibiotics Rash    Terazosin Rash      Medications :  megestrol, 200 mg, Oral, Daily  lidocaine, 1 patch, TransDERmal, Daily  enoxaparin, 40 mg, SubCUTAneous, Daily  diclofenac sodium, 2 g, Topical, BID  aspirin, 81 mg, Oral, Daily  atorvastatin, 40 mg, Oral, Daily  carvedilol, 12.5 mg, Oral, BID  clopidogrel, 75 mg, Oral, Daily  divalproex, 500 mg, Oral, Nightly  finasteride, 5 mg, Oral, Daily  gabapentin, 100 mg, Oral, BID  hydrALAZINE, 25 mg, Oral, TID  pantoprazole, 40 mg, Oral, QAM AC  venlafaxine, 75 mg, Oral, TID WC  sodium chloride flush, 5-40 mL, IntraVENous, 2 times per day  isosorbide mononitrate, 30 mg, Oral, Daily  polyvinyl alcohol, 2 drop, Both Eyes, TID        Review of Systems   Review of Systems   Constitutional: Negative for activity change, appetite change, fatigue, fever and unexpected weight change. HENT: Negative for congestion, nosebleeds, rhinorrhea, sinus pressure, sneezing and voice change. Respiratory: Negative for cough, choking, chest tightness, shortness of breath and wheezing. Cardiovascular: Negative for chest pain, palpitations and leg swelling. Gastrointestinal: Negative for abdominal pain, constipation, diarrhea, nausea and vomiting. Genitourinary: Negative for difficulty urinating, dysuria, frequency, penile discharge and testicular pain. Musculoskeletal: Negative for back pain. Skin: Negative for rash. Neurological: Negative for dizziness, weakness, light-headedness and headaches. Hematological: Does not bruise/bleed easily. Psychiatric/Behavioral: Negative for agitation, behavioral problems, confusion, self-injury, sleep disturbance and suicidal ideas.      Objective :      Current Vitals : Temp: 97.7 °F (36.5 °C),  Pulse: 78, Resp: 26, BP: (!) 156/93, SpO2: 96 %  Last 24 Hrs Vitals   Patient Vitals for the past 24 hrs:   BP Temp Temp src Pulse Resp SpO2 Weight   05/17/22 0836 (!) 156/93 -- -- -- -- -- --   05/17/22 0800 -- 97.7 °F (36.5 °C) Temporal -- -- -- --   05/17/22 0625 (!) 143/90 -- -- 78 26 -- --   05/17/22 0600 -- -- -- 57 21 -- --   05/17/22 0500 -- -- -- 81 21 96 % --   05/17/22 0400 (!) 146/78 96.6 °F (35.9 °C) Temporal 73 26 95 % 132 lb (59.9 kg)   05/17/22 0300 -- -- -- 67 22 96 % --   05/17/22 0200 -- -- -- 66 21 95 % --   05/17/22 0100 110/68 -- -- 64 26 95 % --   05/17/22 0000 97/66 97.8 °F (36.6 °C) Temporal 65 24 96 % -- 05/16/22 2300 108/68 -- -- 73 27 96 % --   05/16/22 2200 121/76 -- -- 84 23 95 % --   05/16/22 2159 121/76 -- -- 81 -- -- --   05/16/22 2000 -- 97.8 °F (36.6 °C) Temporal -- -- -- --   05/16/22 1800 103/71 -- -- 73 20 97 % --   05/16/22 1700 109/73 -- -- 65 18 97 % --   05/16/22 1600 128/73 98.4 °F (36.9 °C) Temporal 65 22 98 % --   05/16/22 1500 110/65 -- -- 69 21 97 % --   05/16/22 1400 100/64 -- -- 76 20 96 % --   05/16/22 1300 97/62 -- -- 72 21 -- --   05/16/22 1200 98/66 97.8 °F (36.6 °C) Temporal 84 22 98 % --   05/16/22 1100 128/75 -- -- 87 20 98 % --   05/16/22 1000 116/79 -- -- 83 23 95 % --   05/16/22 0900 138/79 -- -- 75 20 96 % --     Intake / output   05/15 1901 - 05/17 0700  In: 550 [P.O.:550]  Out: 1150 [Urine:1150]  Physical Exam:  Physical Exam  Vitals and nursing note reviewed. Constitutional:       General: He is not in acute distress. Appearance: He is not diaphoretic. HENT:      Head: Normocephalic and atraumatic. Nose:      Right Sinus: No maxillary sinus tenderness or frontal sinus tenderness. Left Sinus: No maxillary sinus tenderness or frontal sinus tenderness. Mouth/Throat:      Pharynx: No oropharyngeal exudate. Eyes:      General: No scleral icterus. Conjunctiva/sclera: Conjunctivae normal.      Pupils: Pupils are equal, round, and reactive to light. Neck:      Thyroid: No thyromegaly. Vascular: No JVD. Cardiovascular:      Rate and Rhythm: Normal rate and regular rhythm. Pulses:           Dorsalis pedis pulses are 2+ on the right side and 2+ on the left side. Heart sounds: Normal heart sounds. No murmur heard. Pulmonary:      Effort: Pulmonary effort is normal.      Breath sounds: Normal breath sounds. No wheezing or rales. Abdominal:      Palpations: Abdomen is soft. There is no mass. Tenderness: There is no abdominal tenderness. Musculoskeletal:      Right shoulder: Decreased range of motion. Decreased strength.       Left shoulder: Decreased range of motion. Decreased strength. Cervical back: Full passive range of motion without pain and neck supple. Lymphadenopathy:      Head:      Right side of head: No submandibular adenopathy. Left side of head: No submandibular adenopathy. Cervical: No cervical adenopathy. Skin:     General: Skin is warm. Neurological:      Mental Status: He is alert and oriented to person, place, and time. Motor: No tremor. Psychiatric:         Behavior: Behavior is cooperative. Laboratory findings:    No results for input(s): WBC, HGB, HCT, PLT, SEDRATE, INR in the last 72 hours. Invalid input(s): PT  Recent Labs     05/15/22  0352 05/16/22  0513 05/17/22  0504   * 133* 133*   K 4.1 4.9 4.5   CL 94* 94* 93*   CO2 31 33* 32*   GLUCOSE 76 86 90   BUN 15 17 16   CREATININE 0.47* 0.52* 0.49*   CALCIUM 8.9 8.9 9.2     No results for input(s): PROT, LABALBU, LABA1C, J2XSDSP, G5XSINE, FT4, TSH, AST, ALT, LDH, GGT, ALKPHOS, BILITOT, BILIDIR, AMMONIA, AMYLASE, LIPASE, LACTATE, CHOL, HDL, LDLCHOLESTEROL, CHOLHDLRATIO, TRIG, VLDL, BNP, TROPONINI, CKTOTAL, CKMB, CKMBINDEX, RF, ERNESTO in the last 72 hours. Specific Gravity, UA   Date Value Ref Range Status   05/12/2022 1.025 1.005 - 1.030 Final     Protein, UA   Date Value Ref Range Status   05/12/2022 2+ (A) NEGATIVE Final     RBC, UA   Date Value Ref Range Status   05/12/2022 0 TO 2 0 - 2 /HPF Final     Bacteria, UA   Date Value Ref Range Status   12/05/2018 FEW (A) NONE Final     Nitrite, Urine   Date Value Ref Range Status   05/12/2022 NEGATIVE NEGATIVE Final     WBC, UA   Date Value Ref Range Status   05/12/2022 0 TO 2 0 - 5 /HPF Final     Leukocyte Esterase, Urine   Date Value Ref Range Status   05/12/2022 NEGATIVE NEGATIVE Final       Imaging / Clinical Data :-   XR SHOULDER RIGHT (MIN 2 VIEWS)    Result Date: 5/13/2022  No fracture or dislocation within the shoulders.   Moderate degenerative changes of bilateral acromioclavicular joints. CT Head WO Contrast    Result Date: 5/12/2022  New bilateral frontal subdural hygromas, measuring 4 mm in thickness, without any mass effect. No evidence for acute hemorrhage. XR SHOULDER LEFT (MIN 2 VIEWS)    Result Date: 5/13/2022  No fracture or dislocation within the shoulders. Moderate degenerative changes of bilateral acromioclavicular joints. XR CHEST PORTABLE    Result Date: 5/12/2022  No acute cardiopulmonary disease. CT CHEST PULMONARY EMBOLISM W CONTRAST    Result Date: 5/15/2022  1. No evidence of pulmonary embolism. 2.  Tiny bilateral pleural effusions and mild bibasilar atelectasis. MRI BRAIN WO CONTRAST    Result Date: 5/13/2022  Bilateral subdural collections likely representing hygromas. Clinical Course : stable  Assessment and Plan  :        1. Stable CAD recent cardiac cath showed patent stents. Continue aspirin, Plavix, statin. Continue Imdur, hydralazine. 2. Hyponatremia - pre renal . Improved. 3. Depression disorder - stable. On Effexor. 4. Diabetes mellitus - on metformin  , Humalog as needed  5. PTSD -     Discharge to SNF . Continue to monitor vitals , Intake / output ,  Cell count , HGB , Kidney function, oxygenation  as indicated . Plan and updates discussed with patient ,  answers  explained to satisfaction.    Plan discussed with Staff Julian Ross RN     (Please note that portions of this note were completed with a voice recognition program. Efforts were made to edit the dictations but occasionally words are mis-transcribed.)      Franci Falk MD  5/17/2022

## 2022-05-17 NOTE — PROGRESS NOTES
[facility]   Progress Note                   Date:   5/17/2022  Patient name: Demetrius Epps. Date of admission:  5/12/2022 11:45 AM  MRN:   [id number]  YOB: 1950  PCP: Toyin Kohler MD    Reason for Admission: Atypical chest pain [R07.89]  Subdural hygroma [G96.08]  Chest pain, unspecified type [R07.9]    Subjective:       Clinical Changes / Abnormalities: Pt seen and examined in the room. Stable. NSR       Medications:   Scheduled Meds:   megestrol  200 mg Oral Daily    lidocaine  1 patch TransDERmal Daily    enoxaparin  40 mg SubCUTAneous Daily    diclofenac sodium  2 g Topical BID    aspirin  81 mg Oral Daily    atorvastatin  40 mg Oral Daily    carvedilol  12.5 mg Oral BID    clopidogrel  75 mg Oral Daily    divalproex  500 mg Oral Nightly    finasteride  5 mg Oral Daily    gabapentin  100 mg Oral BID    hydrALAZINE  25 mg Oral TID    pantoprazole  40 mg Oral QAM AC    venlafaxine  75 mg Oral TID WC    sodium chloride flush  5-40 mL IntraVENous 2 times per day    isosorbide mononitrate  30 mg Oral Daily    polyvinyl alcohol  2 drop Both Eyes TID     Continuous Infusions:   sodium chloride       CBC: No results for input(s): WBC, HGB, PLT in the last 72 hours. BMP:    Recent Labs     05/15/22  0352 05/16/22  0513 05/17/22  0504   * 133* 133*   K 4.1 4.9 4.5   CL 94* 94* 93*   CO2 31 33* 32*   BUN 15 17 16   CREATININE 0.47* 0.52* 0.49*   GLUCOSE 76 86 90     Hepatic: No results for input(s): AST, ALT, ALB, BILITOT, ALKPHOS in the last 72 hours. Troponin: No results for input(s): TROPHS in the last 72 hours. BNP: No results for input(s): BNP in the last 72 hours. Lipids: No results for input(s): CHOL, HDL in the last 72 hours. Invalid input(s): LDLCALCU  INR: No results for input(s): INR in the last 72 hours. EKG: NSR, NS ST abnormality     TTE 4/22/22  Summary  Left ventricle is normal in size.  Global left ventricular systolic function  is normal. Calculated ejection fraction 56% by Lester's method. Mild to moderate concentric left ventricular hypertrophy. Aortic valve is trileaflet and opens adequately. Mild aortic insufficiency.     Cath 4/22/22      Procedure Summary      1. Widely patent stent in mid LAD   2. Mild Nonobstructive disease otherwise      Recommendations      Routine Post Diagnostic Cath orders.   Medical therapy as needed. Start colchicine.   Risk factor modification    Objective:   Vitals: BP (!) 156/93   Pulse 78   Temp 97.7 °F (36.5 °C) (Temporal)   Resp 26   Ht 5' 3\" (1.6 m)   Wt 132 lb (59.9 kg)   SpO2 96%   BMI 23.38 kg/m²   General appearance: alert and cooperative with exam  HEENT: Head: Normocephalic, no lesions, without obvious abnormality.   Neck: no JVD, trachea midline, no adenopathy  Lungs: Clear to auscultation  Heart: Regular rate and rhythm, s1/s2 auscultated, no murmurs  Abdomen: soft, non-tender, bowel sounds active  Extremities: no edema  Neurologic: not done        Assessment / Acute Cardiac Problems:     Musculoskeletal chest pain  Cardiac cath 4/22/22- Patent LAD stent, mild non-obstructive disease otherwise  Chronic HS trop elevation  Hyponatremia    Patient Active Problem List:     Hypertension     Hyperlipidemia     Diabetes mellitus ([address]     Contusion of right shoulder     Bipolar disorder, mixed ([facility]     First known suicide attempt [facility])     Erectile dysfunction     Cervical stenosis of spinal canal     Incomplete spinal cord lesion at C5-C7 level (HCC)     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)     Cervical spondylosis with radiculopathy     Acute blood loss anemia     Precordial pain     Depression with suicidal ideation     Severe recurrent major depression without psychotic features (HCC)     Frequent falls     Hyponatremia-possibly SIADH      Cervical spinal cord compression (HCC)     Cord compression (HCC)     Quadriplegia, C1-C4 incomplete (HCC)     Encephalopathy Hospital-acquired pneumonia     Atelectasis     Cervical stenosis of spine     Moderate malnutrition (HCC)     Chest pain     Delirium due to multiple etiologies     NSTEMI (non-ST elevated myocardial infarction) (HCC)     Cervical myelopathy (HCC)     Atypical chest pain     Bilateral shoulder pain     Shortness of breath      Plan of Treatment:   1. Continue ASA, plavix, statin, imdru and hydralazine   2. No further cardiac workup at this time. Follow up with ortho  3.  Will follow up as outpt     Electronically signed by KASSIDY Anne CNP on 5/17/2022 at 10:02 [address] [place] [address].  [phone]

## 2022-05-17 NOTE — CARE COORDINATION
IMM letter provided to patient. Patient offered four hours to make informed decision regarding appeal process; patient agreeable to discharge.

## 2022-05-17 NOTE — CARE COORDINATION
met with patient at bedside to discuss discharge plan. Patient confirmed that he is going to [facility] ([facility]) today and agreeable to plan.  discussed transport and informed that he would be picked up at 6pm. Also explained that there may be a cost associated with transport and that patient could contact [place] [phone] with questions.  informed patient family of transport time and confirmed that they will meet patient at the SNF.  faxed [address], med rec and facesheet to [facility] ([facility]). Nurse can call report to [phone].

## 2022-05-18 NOTE — DISCHARGE SUMMARY
[facility]  Office: [address] [facility] Blood DO, Edel Alfaro MD, Charmaine Levine MD, Regan Foster MD, Brenda Charlton MD, Miguel Shields MD, Renu Carvalho MD, Marian Mello MD, Pelon Cornelius MD, Mimi Terrazas MD, Jeannie Mcgregor DO, Ronal Hong MD, Chon Medina MD, Media Sensor DO, Dottie Kerns MD,  Chriss Mas DO, Vik Calero MD, Ida Devries MD, Chidi Freeman CNP, [facility] CNP, Dave Gustafson CNP, Carmen Stinson CNS, Kimberli Gold CNP, Mariluz Merlos CNP, Moraima Turcios CNP, Mckenna Lemus CNP, Herman Wylie CNP, [place] PA-C, Syeda Byers CNP, Dima Santizo CNP, Kailyn Oakley CNP, Julissa Bennett CNP, Elena Sidhu CNP, [address]      Discharge Summary     Patient ID: Leyla Tate.   :  1950   MRN: [id number]     ACCOUNT:  [de-identified]   Patient Location :   Patient's PCP: Jae Felipe MD  Admit Date: 2022   Discharge Date: 2022     Length of Stay: 3  Code Status:  Prior  Admitting Physician: Taz Chacon MD  Discharge Physician: Regan Foster MD     Active Discharge Diagnosis :     Primary Problem  Atypical chest pain      [facility] Problems    Diagnosis Date Noted    Bilateral shoulder pain [M25.511, M25.512] 2022     Priority: Medium    Shortness of breath [R06.02] 2022     Priority: Medium    Atypical chest pain [R07.89] 2022     Priority: Medium    Cervical myelopathy ([address] [G95.9]      Priority: Medium    Chest pain [R07.9] 2022    Moderate malnutrition ([facility] [E44.0] 2022    Cervical stenosis of spine [M48.02] 2022    Hyponatremia-possibly SIADH  [E87.1] 2022    Cervical spondylosis with radiculopathy [M47.22]     Cervical stenosis of spinal canal [M48.02] 2018    Incomplete spinal cord lesion at C5-C7 level ([facility] [S14.155A]     Bipolar disorder, mixed ([facility] [F31.60] 01/18/2017    Diabetes mellitus ([facility] [E11.9]     Hypertension [I10]     Hyperlipidemia [E78.5]        Admission Condition:  fair     Discharged Condition: stable    Hospital Stay:     Hospital Course:  Jami Servin is a 70 y.o. male who was admitted for the management of   Atypical chest pain , presented to ER with Shortness of Breath and Chest Pain  Patient came to emergency room with chest pain, shortness of breath. Patient was recently admitted in hospital for similar symptoms and underwent cardiac cath and was found to have patent stents. Patient has been having difficulty in ambulation and has been in acute rehab. He has history of cervical myopathy, cervical radiculopathy, incomplete spinal cord injury, cervical spondylosis and has neurological deficits. Patient was having difficulty using his bilateral arms due to shoulder pain. Orthopedic surgery was consulted and recommended outpatient steroid injection and physical therapy.           Significant therapeutic interventions:   Stable CAD recent cardiac cath showed patent stents. Continue aspirin, Plavix, statin. Continue Imdur, hydralazine. Cardiology evaluated , no further ischemic work up recommended. Hyponatremia - pre renal . Improved. Depression disorder - stable. On Effexor.    Diabetes mellitus - on metformin  , Humalog as needed  PTSD -       Significant Diagnostic Studies:   Labs / Micro:/Radiology  Recent Labs     05/13/22  0350 05/12/22  1228 05/10/22  0731   WBC 7.0 6.3 6.6   HGB 10.8* 11.3* 12.5*   HCT 32.0* 32.8* 36.3*   MCV 94.7 94.0 92.6    228 285     Labs Renal Latest Ref Rng & Units 5/17/2022 5/16/2022 5/15/2022 5/14/2022 5/14/2022   BUN 8 - 23 mg/dL 16 17 15 16 -   Cr 0.70 - 1.20 mg/dL 0.49(L) 0.52(L) 0.47(L) 0.43(L) -   K 3.7 - 5.3 mmol/L 4.5 4.9 4.1 4.7 -   Na 135 - 144 mmol/L 133(L) 133(L) 130(L) 131(L) 127(L)     Lab Results   Component Value Date    ALT 23 05/13/2022    AST 14 05/13/2022 conjunction with any daily progress note from day of discharge. Discharge plan:     Disposition: SNF    Physician Follow Up:   F/u   [place] [address]  [phone]    Skilled nursing/rehab       Requiring Further Evaluation/Follow Up POST HOSPITALIZATION/Incidental Findings: medication as advised. Diet: cardiac diet    Activity: As tolerated. Instructions to Patient: f/u with cardiology. Discharge Medications:      Medication List      START taking these medications    diclofenac sodium 1 % Gel  Commonly known as: VOLTAREN  Apply 2 g topically 2 times daily     enoxaparin 40 MG/0.4ML  Commonly known as: LOVENOX  Inject 0.4 mLs into the skin daily for 5 days     isosorbide mononitrate 30 MG extended release tablet  Commonly known as: IMDUR  Take 1 tablet by mouth daily        CHANGE how you take these medications    simethicone 80 MG chewable tablet  Commonly known as: MYLICON  Take 1 tablet by mouth every 6 hours as needed for Flatulence  What changed: when to take this        CONTINUE taking these medications    aspirin 81 MG chewable tablet     atorvastatin 80 MG tablet  Commonly known as: LIPITOR     carboxymethylcellulose 1 % ophthalmic solution     carvedilol 12.5 MG tablet  Commonly known as: COREG  Take 1 tablet by mouth 2 times daily     clopidogrel 75 MG tablet  Commonly known as: Plavix  Take 1 tablet by mouth daily     divalproex 500 MG extended release tablet  Commonly known as: DEPAKOTE ER  Take 1 tablet by mouth at bedtime     finasteride 5 MG tablet  Commonly known as: PROSCAR     gabapentin 100 MG capsule  Commonly known as: NEURONTIN  Take 1 capsule by mouth 2 times daily for 30 days. hydrALAZINE 25 MG tablet  Commonly known as: APRESOLINE  Take 1 tablet by mouth three times daily     nitroGLYCERIN 0.4 MG SL tablet  Commonly known as: NITROSTAT  up to max of 3 total doses. If no relief after 1 dose, call 911.      omeprazole 20 MG delayed release capsule  Commonly known as: PRILOSEC  Take 2 capsules by mouth Daily LF 4/21/20 (90 day supply)     venlafaxine 75 MG tablet  Commonly known as: EFFEXOR  Take 1 tablet by mouth 3 times daily (with meals)        STOP taking these medications    cetirizine 10 MG tablet  Commonly known as: ZYRTEC     lidocaine 5 % ointment  Commonly known as: XYLOCAINE     losartan 50 MG tablet  Commonly known as: COZAAR     metFORMIN 1000 MG tablet  Commonly known as: GLUCOPHAGE           Where to Get Your Medications      These medications were sent to [address] [place] [address] , [address]  [address]    Phone: [phone]   diclofenac sodium 1 % Gel  enoxaparin 40 MG/0.4ML  isosorbide mononitrate 30 MG extended release tablet         Time Spent on discharge is  33 mins in patient examination, evaluation, counseling as well as medication reconciliation, prescriptions for required medications, discharge plan and follow up. Electronically signed by   Cyndi Mcmullen MD  5/18/2022        Thank you Dr. Maritza Calero MD for the opportunity to be involved in this patient's care.

## 2022-05-22 ENCOUNTER — TELEPHONE (OUTPATIENT)
Dept: PRIMARY CARE CLINIC | Age: 72
End: 2022-05-22
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
## 2022-06-14 ENCOUNTER — TELEPHONE (OUTPATIENT)
Dept: PRIMARY CARE CLINIC | Age: 72
End: 2022-06-14

## 2022-06-14 RX ORDER — ASPIRIN 81 MG/1
81 TABLET, CHEWABLE ORAL DAILY
Qty: 30 TABLET | Refills: 1 | Status: SHIPPED | OUTPATIENT
Start: 2022-06-14

## 2022-06-14 RX ORDER — ATORVASTATIN CALCIUM 80 MG/1
80 TABLET, FILM COATED ORAL DAILY
Qty: 30 TABLET | Refills: 1 | Status: SHIPPED | OUTPATIENT
Start: 2022-06-14 | End: 2022-06-16

## 2022-06-14 RX ORDER — CLOPIDOGREL BISULFATE 75 MG/1
75 TABLET ORAL DAILY
Qty: 30 TABLET | Refills: 3 | Status: ON HOLD | OUTPATIENT
Start: 2022-06-14 | End: 2022-08-03 | Stop reason: HOSPADM

## 2022-06-14 RX ORDER — LANOLIN ALCOHOL/MO/W.PET/CERES
400 CREAM (GRAM) TOPICAL DAILY
COMMUNITY
Start: 2022-05-21 | End: 2022-06-14 | Stop reason: SDUPTHER

## 2022-06-14 RX ORDER — LANOLIN ALCOHOL/MO/W.PET/CERES
400 CREAM (GRAM) TOPICAL DAILY
Qty: 30 TABLET | Refills: 1 | Status: SHIPPED | OUTPATIENT
Start: 2022-06-14

## 2022-06-14 NOTE — TELEPHONE ENCOUNTER
Patients wife called asking for [employer] lift and wound care supplies. Also needs some medications refilled. (Pending)    Patient see's Dr Marcelle Erickson [facility] & [facility] doctor) but they are asking that you take care of these since you just saw patient at [facility].  Please advise

## 2022-06-15 NOTE — TELEPHONE ENCOUNTER
Spoke with Lorrie Taylor. She was informed that medications were sent to pharmacy. Verbal understanding per Lorrie Taylor. She stated that order for garrick life and wound care supplies need to go to [facility] on [address]. Scripts were written and given to Yan Chin to have Dr Kendal Simental sign.

## 2022-06-16 ENCOUNTER — TELEPHONE (OUTPATIENT)
Dept: PRIMARY CARE CLINIC | Age: 72
End: 2022-06-16

## 2022-06-16 RX ORDER — ATORVASTATIN CALCIUM 80 MG/1
80 TABLET, FILM COATED ORAL DAILY
Qty: 30 TABLET | Refills: 1 | Status: SHIPPED | OUTPATIENT
Start: 2022-06-16

## 2022-06-16 NOTE — TELEPHONE ENCOUNTER
Received fax from [employer] asking for clarification on atorvastatin sig d/t having 2 sets of directions.

## 2022-06-17 ENCOUNTER — TELEPHONE (OUTPATIENT)
Dept: PRIMARY CARE CLINIC | Age: 72
End: 2022-06-17

## 2022-06-17 NOTE — TELEPHONE ENCOUNTER
Received fax from [address] asking for RX specify which wound supplies pt is in need of. The office visit and progress note and wound evaluation along with a singed and dated RX. The previous one was not signed.

## 2022-06-20 ENCOUNTER — TELEPHONE (OUTPATIENT)
Dept: PRIMARY CARE CLINIC | Age: 72
End: 2022-06-20

## 2022-06-20 NOTE — TELEPHONE ENCOUNTER
Spoke with Wife he is using Aniyah Marinelli is his Nurse. PH: [phone] called and spoke to Yomi Flores, she will have Skinny Carbone call with what supplies are needed for his wound care. Will also need to rewrite an order to go along with this for a garrick lift. Neither order was signed before it was faxed to [address] [place] [address] in [place].

## 2022-06-20 NOTE — TELEPHONE ENCOUNTER
Spoke to Mae We do not need to do any wound care supply order. Just the garrick lift. They are taking care of the wound and said nothing is needed at this time. Told them any home health orders or new orders will need to come from patients PCP.

## 2022-06-20 NOTE — TELEPHONE ENCOUNTER
Left message with Ney Boyle at [facility] for Karyn Quick. Need to know what wound care supplies are needed for Mr Feng Jeff. Fax to [phone] in [place]. Please route message back to me if you get the call so I can write the order there are other orders to go with it that I have.

## 2022-06-29 ENCOUNTER — TELEPHONE (OUTPATIENT)
Dept: PRIMARY CARE CLINIC | Age: 72
End: 2022-06-29

## 2022-06-29 NOTE — TELEPHONE ENCOUNTER
Diana Bonilla faxed over order garrick lift for pt on 6/20. [facility] is requiring notes stating that the patient is in need of the garrick lift and why. The notes they received from letty and [address] do not state that. [facility] states it needs to be addended notes can not use a letter of medical necessity. Are you able to addend notes from [facility]? Please advise.

## 2022-06-29 NOTE — TELEPHONE ENCOUNTER
Emily Fuchs, Patients POA, calling regarding order for [employer], looks like this information was faxed over to [facility] 6/20/22. Emily Fuchs called office back stating [facility] faxed over forms Friday 6/24/22 needing more information. Emily Fuchs states patient has been home for 3 weeks and still have not received order for [employer]. Patient is needing Renelda Lorida lift due to being Bed ridden, patient has catheter, Patient is not able to feed himself and Patient is unable to walk.      Please follow up on [facility] Fax ( Received per ABELINO Moseley)    Emily Fuchs would like call back once completed

## 2022-06-30 NOTE — TELEPHONE ENCOUNTER
Called patient. [facility] unavailable per Letty Ordonez. Patient states he can't get out of bed, unable to walk. Unable to come in for appt.      Please advise

## 2022-07-01 NOTE — TELEPHONE ENCOUNTER
Spoke with Sharlot Osgood and explained to her that since we don't have anything on file for POA or HIPAA we should not even be speaking with her about anything. I explained to her that since he was discharged from [facility] that we are no longer responsible and that Dr. Svetlana Oscar ordered the garrick lift because it should have done when he was discharged from [facility]. I let her know that PHM stated that the notes they received did not show the need for the lift. Sharlot Osgood did state that [address] [place] [address] nurse told her that they could have there NP come out and do a note, Nallely Quinn MA said PHM said they could accept that to get the lift covered. Ruth Ann Lemons know the note needed to state Venu Bauman is in need of the lift and why he needs it and to have them fax it to their [employer] company. She verbalized understanding.

## 2022-08-01 ENCOUNTER — HOSPITAL ENCOUNTER (OUTPATIENT)
Age: 72
Setting detail: OBSERVATION
Discharge: NURSING, REHAB OR LONG-TERM CARE | End: 2022-08-09
Attending: EMERGENCY MEDICINE | Admitting: INTERNAL MEDICINE
Payer: OTHER GOVERNMENT

## 2022-08-01 ENCOUNTER — APPOINTMENT (OUTPATIENT)
Dept: GENERAL RADIOLOGY | Age: 72
End: 2022-08-01
Payer: OTHER GOVERNMENT

## 2022-08-01 ENCOUNTER — APPOINTMENT (OUTPATIENT)
Dept: CT IMAGING | Age: 72
End: 2022-08-01
Payer: OTHER GOVERNMENT

## 2022-08-01 DIAGNOSIS — R06.00 DYSPNEA, UNSPECIFIED TYPE: ICD-10-CM

## 2022-08-01 DIAGNOSIS — R07.9 CHEST PAIN, UNSPECIFIED TYPE: Primary | ICD-10-CM
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
## 2022-08-01 DIAGNOSIS — G82.52 QUADRIPLEGIA, C1-C4 INCOMPLETE (HCC): ICD-10-CM

## 2022-08-01 LAB
ABSOLUTE EOS #: 0.2 K/UL (ref 0–0.4)
ABSOLUTE LYMPH #: 1.8 K/UL (ref 1–4.8)
ABSOLUTE MONO #: 0.7 K/UL (ref 0.1–1.3)
ANION GAP SERPL CALCULATED.3IONS-SCNC: 8 MMOL/L (ref 9–17)
BASOPHILS # BLD: 0 % (ref 0–2)
BASOPHILS ABSOLUTE: 0 K/UL (ref 0–0.2)
BUN BLDV-MCNC: 14 MG/DL (ref 8–23)
CALCIUM SERPL-MCNC: 9.7 MG/DL (ref 8.6–10.4)
CHLORIDE BLD-SCNC: 96 MMOL/L (ref 98–107)
CO2: 31 MMOL/L (ref 20–31)
CREAT SERPL-MCNC: <0.4 MG/DL (ref 0.7–1.2)
EOSINOPHILS RELATIVE PERCENT: 2 % (ref 0–4)
GFR AFRICAN AMERICAN: ABNORMAL ML/MIN
GFR NON-AFRICAN AMERICAN: ABNORMAL ML/MIN
GFR SERPL CREATININE-BSD FRML MDRD: ABNORMAL ML/MIN/{1.73_M2}
GLUCOSE BLD-MCNC: 114 MG/DL (ref 70–99)
HCT VFR BLD CALC: 36.8 % (ref 41–53)
HEMOGLOBIN: 12.3 G/DL (ref 13.5–17.5)
INR BLD: 1
LYMPHOCYTES # BLD: 18 % (ref 24–44)
MAGNESIUM: 1.8 MG/DL (ref 1.6–2.6)
MCH RBC QN AUTO: 31.1 PG (ref 26–34)
MCHC RBC AUTO-ENTMCNC: 33.4 G/DL (ref 31–37)
MCV RBC AUTO: 93.2 FL (ref 80–100)
MONOCYTES # BLD: 8 % (ref 1–7)
PARTIAL THROMBOPLASTIN TIME: 30.3 SEC (ref 24–36)
PDW BLD-RTO: 14.1 % (ref 11.5–14.9)
PHOSPHORUS: 3.5 MG/DL (ref 2.5–4.5)
PLATELET # BLD: 327 K/UL (ref 150–450)
PMV BLD AUTO: 7.1 FL (ref 6–12)
POTASSIUM SERPL-SCNC: 4.1 MMOL/L (ref 3.7–5.3)
PROTHROMBIN TIME: 12.9 SEC (ref 11.8–14.6)
RBC # BLD: 3.95 M/UL (ref 4.5–5.9)
SEG NEUTROPHILS: 72 % (ref 36–66)
SEGMENTED NEUTROPHILS ABSOLUTE COUNT: 6.9 K/UL (ref 1.3–9.1)
SODIUM BLD-SCNC: 135 MMOL/L (ref 135–144)
TROPONIN, HIGH SENSITIVITY: 73 NG/L (ref 0–22)
TROPONIN, HIGH SENSITIVITY: 78 NG/L (ref 0–22)
WBC # BLD: 9.6 K/UL (ref 3.5–11)

## 2022-08-01 PROCEDURE — 85025 COMPLETE CBC W/AUTO DIFF WBC: CPT

## 2022-08-01 PROCEDURE — 73030 X-RAY EXAM OF SHOULDER: CPT

## 2022-08-01 PROCEDURE — 99285 EMERGENCY DEPT VISIT HI MDM: CPT

## 2022-08-01 PROCEDURE — 93005 ELECTROCARDIOGRAM TRACING: CPT | Performed by: STUDENT IN AN ORGANIZED HEALTH CARE EDUCATION/TRAINING PROGRAM

## 2022-08-01 PROCEDURE — 6370000000 HC RX 637 (ALT 250 FOR IP): Performed by: STUDENT IN AN ORGANIZED HEALTH CARE EDUCATION/TRAINING PROGRAM

## 2022-08-01 PROCEDURE — 73502 X-RAY EXAM HIP UNI 2-3 VIEWS: CPT

## 2022-08-01 PROCEDURE — G0378 HOSPITAL OBSERVATION PER HR: HCPCS

## 2022-08-01 PROCEDURE — 84100 ASSAY OF PHOSPHORUS: CPT

## 2022-08-01 PROCEDURE — 84484 ASSAY OF TROPONIN QUANT: CPT

## 2022-08-01 PROCEDURE — 99220 PR INITIAL OBSERVATION CARE/DAY 70 MINUTES: CPT | Performed by: INTERNAL MEDICINE

## 2022-08-01 PROCEDURE — 6360000004 HC RX CONTRAST MEDICATION: Performed by: STUDENT IN AN ORGANIZED HEALTH CARE EDUCATION/TRAINING PROGRAM

## 2022-08-01 PROCEDURE — 80048 BASIC METABOLIC PNL TOTAL CA: CPT

## 2022-08-01 PROCEDURE — 6360000002 HC RX W HCPCS: Performed by: STUDENT IN AN ORGANIZED HEALTH CARE EDUCATION/TRAINING PROGRAM

## 2022-08-01 PROCEDURE — 85610 PROTHROMBIN TIME: CPT

## 2022-08-01 PROCEDURE — 85730 THROMBOPLASTIN TIME PARTIAL: CPT

## 2022-08-01 PROCEDURE — 96367 TX/PROPH/DG ADDL SEQ IV INF: CPT

## 2022-08-01 PROCEDURE — 96365 THER/PROPH/DIAG IV INF INIT: CPT

## 2022-08-01 PROCEDURE — 83735 ASSAY OF MAGNESIUM: CPT

## 2022-08-01 PROCEDURE — 2580000003 HC RX 258: Performed by: STUDENT IN AN ORGANIZED HEALTH CARE EDUCATION/TRAINING PROGRAM

## 2022-08-01 PROCEDURE — 36415 COLL VENOUS BLD VENIPUNCTURE: CPT

## 2022-08-01 PROCEDURE — 70496 CT ANGIOGRAPHY HEAD: CPT

## 2022-08-01 PROCEDURE — 93005 ELECTROCARDIOGRAM TRACING: CPT | Performed by: EMERGENCY MEDICINE

## 2022-08-01 PROCEDURE — 71045 X-RAY EXAM CHEST 1 VIEW: CPT

## 2022-08-01 PROCEDURE — 73080 X-RAY EXAM OF ELBOW: CPT

## 2022-08-01 RX ORDER — SODIUM CHLORIDE 0.9 % (FLUSH) 0.9 %
10 SYRINGE (ML) INJECTION PRN
Status: DISCONTINUED | OUTPATIENT
Start: 2022-08-01 | End: 2022-08-09 | Stop reason: HOSPADM

## 2022-08-01 RX ORDER — FERROUS SULFATE 325(65) MG
325 TABLET ORAL
COMMUNITY

## 2022-08-01 RX ORDER — NITROGLYCERIN 0.4 MG/1
0.4 TABLET SUBLINGUAL EVERY 5 MIN PRN
Status: DISPENSED | OUTPATIENT
Start: 2022-08-01 | End: 2022-08-01

## 2022-08-01 RX ORDER — TRAMADOL HYDROCHLORIDE 50 MG/1
50 TABLET ORAL 2 TIMES DAILY PRN
Status: ON HOLD | COMMUNITY
End: 2022-08-03 | Stop reason: SDUPTHER

## 2022-08-01 RX ORDER — CYCLOBENZAPRINE HCL 10 MG
10 TABLET ORAL 3 TIMES DAILY PRN
COMMUNITY

## 2022-08-01 RX ORDER — TAMSULOSIN HYDROCHLORIDE 0.4 MG/1
0.4 CAPSULE ORAL NIGHTLY
COMMUNITY

## 2022-08-01 RX ORDER — POTASSIUM CHLORIDE 7.45 MG/ML
10 INJECTION INTRAVENOUS PRN
Status: DISCONTINUED | OUTPATIENT
Start: 2022-08-01 | End: 2022-08-09 | Stop reason: HOSPADM

## 2022-08-01 RX ORDER — MAGNESIUM SULFATE 1 G/100ML
1000 INJECTION INTRAVENOUS PRN
Status: DISCONTINUED | OUTPATIENT
Start: 2022-08-01 | End: 2022-08-09 | Stop reason: HOSPADM

## 2022-08-01 RX ORDER — ASPIRIN 81 MG/1
81 TABLET, CHEWABLE ORAL DAILY
Status: DISCONTINUED | OUTPATIENT
Start: 2022-08-02 | End: 2022-08-02

## 2022-08-01 RX ORDER — POTASSIUM CHLORIDE 20 MEQ/1
40 TABLET, EXTENDED RELEASE ORAL PRN
Status: DISCONTINUED | OUTPATIENT
Start: 2022-08-01 | End: 2022-08-09 | Stop reason: HOSPADM

## 2022-08-01 RX ORDER — ONDANSETRON 2 MG/ML
4 INJECTION INTRAMUSCULAR; INTRAVENOUS EVERY 6 HOURS PRN
Status: DISCONTINUED | OUTPATIENT
Start: 2022-08-01 | End: 2022-08-09 | Stop reason: HOSPADM

## 2022-08-01 RX ORDER — AMLODIPINE BESYLATE 10 MG/1
10 TABLET ORAL
COMMUNITY
Start: 2022-03-31

## 2022-08-01 RX ORDER — POTASSIUM CHLORIDE 7.45 MG/ML
10 INJECTION INTRAVENOUS PRN
Status: DISCONTINUED | OUTPATIENT
Start: 2022-08-01 | End: 2022-08-02 | Stop reason: SDUPTHER

## 2022-08-01 RX ORDER — 0.9 % SODIUM CHLORIDE 0.9 %
80 INTRAVENOUS SOLUTION INTRAVENOUS ONCE
Status: COMPLETED | OUTPATIENT
Start: 2022-08-01 | End: 2022-08-02

## 2022-08-01 RX ORDER — TRAZODONE HYDROCHLORIDE 150 MG/1
150 TABLET ORAL NIGHTLY PRN
COMMUNITY

## 2022-08-01 RX ORDER — ACETAMINOPHEN 325 MG/1
650 TABLET ORAL EVERY 6 HOURS PRN
Status: DISCONTINUED | OUTPATIENT
Start: 2022-08-01 | End: 2022-08-09 | Stop reason: HOSPADM

## 2022-08-01 RX ORDER — SODIUM CHLORIDE 9 MG/ML
INJECTION, SOLUTION INTRAVENOUS PRN
Status: DISCONTINUED | OUTPATIENT
Start: 2022-08-01 | End: 2022-08-09 | Stop reason: HOSPADM

## 2022-08-01 RX ORDER — ACETAMINOPHEN 650 MG/1
650 SUPPOSITORY RECTAL EVERY 6 HOURS PRN
Status: DISCONTINUED | OUTPATIENT
Start: 2022-08-01 | End: 2022-08-09 | Stop reason: HOSPADM

## 2022-08-01 RX ORDER — ENOXAPARIN SODIUM 100 MG/ML
40 INJECTION SUBCUTANEOUS DAILY
Status: DISCONTINUED | OUTPATIENT
Start: 2022-08-02 | End: 2022-08-09 | Stop reason: HOSPADM

## 2022-08-01 RX ORDER — LIDOCAINE 50 MG/G
2 PATCH TOPICAL DAILY
COMMUNITY

## 2022-08-01 RX ORDER — SODIUM CHLORIDE 0.9 % (FLUSH) 0.9 %
10 SYRINGE (ML) INJECTION AS NEEDED
Status: DISCONTINUED | OUTPATIENT
Start: 2022-08-01 | End: 2022-08-02 | Stop reason: SDUPTHER

## 2022-08-01 RX ORDER — SODIUM CHLORIDE 0.9 % (FLUSH) 0.9 %
5-40 SYRINGE (ML) INJECTION EVERY 12 HOURS SCHEDULED
Status: DISCONTINUED | OUTPATIENT
Start: 2022-08-01 | End: 2022-08-09 | Stop reason: HOSPADM

## 2022-08-01 RX ORDER — ATORVASTATIN CALCIUM 40 MG/1
40 TABLET, FILM COATED ORAL NIGHTLY
Status: DISCONTINUED | OUTPATIENT
Start: 2022-08-01 | End: 2022-08-02

## 2022-08-01 RX ORDER — ONDANSETRON 4 MG/1
4 TABLET, ORALLY DISINTEGRATING ORAL EVERY 8 HOURS PRN
Status: DISCONTINUED | OUTPATIENT
Start: 2022-08-01 | End: 2022-08-09 | Stop reason: HOSPADM

## 2022-08-01 RX ORDER — ASPIRIN 81 MG/1
324 TABLET, CHEWABLE ORAL ONCE
Status: COMPLETED | OUTPATIENT
Start: 2022-08-01 | End: 2022-08-01

## 2022-08-01 RX ADMIN — AZITHROMYCIN MONOHYDRATE 500 MG: 500 INJECTION, POWDER, LYOPHILIZED, FOR SOLUTION INTRAVENOUS at 23:19

## 2022-08-01 RX ADMIN — IOPAMIDOL 75 ML: 755 INJECTION, SOLUTION INTRAVENOUS at 21:28

## 2022-08-01 RX ADMIN — ASPIRIN 324 MG: 81 TABLET, CHEWABLE ORAL at 20:21

## 2022-08-01 RX ADMIN — SODIUM CHLORIDE, PRESERVATIVE FREE 10 ML: 5 INJECTION INTRAVENOUS at 21:28

## 2022-08-01 RX ADMIN — SODIUM CHLORIDE 80 ML: 9 INJECTION, SOLUTION INTRAVENOUS at 21:28

## 2022-08-01 RX ADMIN — CEFTRIAXONE SODIUM 1000 MG: 1 INJECTION, POWDER, FOR SOLUTION INTRAMUSCULAR; INTRAVENOUS at 22:18

## 2022-08-01 ASSESSMENT — PAIN DESCRIPTION - ORIENTATION
ORIENTATION: MID
ORIENTATION: MID

## 2022-08-01 ASSESSMENT — PAIN DESCRIPTION - DESCRIPTORS
DESCRIPTORS: ACHING
DESCRIPTORS: ACHING

## 2022-08-01 ASSESSMENT — PAIN - FUNCTIONAL ASSESSMENT: PAIN_FUNCTIONAL_ASSESSMENT: 0-10

## 2022-08-01 ASSESSMENT — ENCOUNTER SYMPTOMS
COUGH: 0
BACK PAIN: 0
CHEST TIGHTNESS: 1
NAUSEA: 0
COLOR CHANGE: 0
ABDOMINAL PAIN: 0
SHORTNESS OF BREATH: 1
WHEEZING: 0
VOMITING: 0
DIARRHEA: 0
TROUBLE SWALLOWING: 0

## 2022-08-01 ASSESSMENT — PAIN SCALES - GENERAL
PAINLEVEL_OUTOF10: 3
PAINLEVEL_OUTOF10: 4
PAINLEVEL_OUTOF10: 8

## 2022-08-01 ASSESSMENT — PAIN DESCRIPTION - ONSET: ONSET: SUDDEN

## 2022-08-01 ASSESSMENT — PAIN DESCRIPTION - LOCATION
LOCATION: CHEST

## 2022-08-01 ASSESSMENT — PAIN DESCRIPTION - PAIN TYPE
TYPE: ACUTE PAIN
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
## 2022-08-02 ENCOUNTER — APPOINTMENT (OUTPATIENT)
Dept: NUCLEAR MEDICINE | Age: 72
End: 2022-08-02
Payer: OTHER GOVERNMENT

## 2022-08-02 ENCOUNTER — APPOINTMENT (OUTPATIENT)
Dept: NON INVASIVE DIAGNOSTICS | Age: 72
End: 2022-08-02
Payer: OTHER GOVERNMENT

## 2022-08-02 ENCOUNTER — APPOINTMENT (OUTPATIENT)
Dept: GENERAL RADIOLOGY | Age: 72
End: 2022-08-02
Payer: OTHER GOVERNMENT

## 2022-08-02 LAB
ANION GAP SERPL CALCULATED.3IONS-SCNC: 6 MMOL/L (ref 9–17)
BUN BLDV-MCNC: 12 MG/DL (ref 8–23)
CALCIUM SERPL-MCNC: 8.9 MG/DL (ref 8.6–10.4)
CHLORIDE BLD-SCNC: 98 MMOL/L (ref 98–107)
CHOLESTEROL/HDL RATIO: 3.6
CHOLESTEROL: 108 MG/DL
CO2: 32 MMOL/L (ref 20–31)
CREAT SERPL-MCNC: 0.44 MG/DL (ref 0.7–1.2)
EKG ATRIAL RATE: 85 BPM
EKG ATRIAL RATE: 92 BPM
EKG P AXIS: 24 DEGREES
EKG P AXIS: 28 DEGREES
EKG P-R INTERVAL: 162 MS
EKG P-R INTERVAL: 192 MS
EKG Q-T INTERVAL: 362 MS
EKG Q-T INTERVAL: 392 MS
EKG QRS DURATION: 88 MS
EKG QRS DURATION: 90 MS
EKG QTC CALCULATION (BAZETT): 447 MS
EKG QTC CALCULATION (BAZETT): 466 MS
EKG R AXIS: -24 DEGREES
EKG R AXIS: -24 DEGREES
EKG T AXIS: -3 DEGREES
EKG VENTRICULAR RATE: 85 BPM
EKG VENTRICULAR RATE: 92 BPM
GFR AFRICAN AMERICAN: >60 ML/MIN
GFR NON-AFRICAN AMERICAN: >60 ML/MIN
GFR SERPL CREATININE-BSD FRML MDRD: ABNORMAL ML/MIN/{1.73_M2}
GLUCOSE BLD-MCNC: 201 MG/DL (ref 70–99)
HCT VFR BLD CALC: 32.2 % (ref 41–53)
HDLC SERPL-MCNC: 30 MG/DL
HEMOGLOBIN: 10.7 G/DL (ref 13.5–17.5)
LDL CHOLESTEROL: 54 MG/DL (ref 0–130)
LV EF: 56 %
LVEF MODALITY: NORMAL
MAGNESIUM: 1.8 MG/DL (ref 1.6–2.6)
MCH RBC QN AUTO: 30.9 PG (ref 26–34)
MCHC RBC AUTO-ENTMCNC: 33.3 G/DL (ref 31–37)
MCV RBC AUTO: 92.6 FL (ref 80–100)
PDW BLD-RTO: 14.5 % (ref 11.5–14.9)
PLATELET # BLD: 288 K/UL (ref 150–450)
PMV BLD AUTO: 7 FL (ref 6–12)
POTASSIUM SERPL-SCNC: 3.8 MMOL/L (ref 3.7–5.3)
RBC # BLD: 3.47 M/UL (ref 4.5–5.9)
SODIUM BLD-SCNC: 136 MMOL/L (ref 135–144)
TRIGL SERPL-MCNC: 119 MG/DL
WBC # BLD: 7.5 K/UL (ref 3.5–11)

## 2022-08-02 PROCEDURE — G0378 HOSPITAL OBSERVATION PER HR: HCPCS

## 2022-08-02 PROCEDURE — 99226 PR SBSQ OBSERVATION CARE/DAY 35 MINUTES: CPT | Performed by: INTERNAL MEDICINE

## 2022-08-02 PROCEDURE — 83735 ASSAY OF MAGNESIUM: CPT

## 2022-08-02 PROCEDURE — 2580000003 HC RX 258: Performed by: NURSE PRACTITIONER

## 2022-08-02 PROCEDURE — 99213 OFFICE O/P EST LOW 20 MIN: CPT

## 2022-08-02 PROCEDURE — 6360000002 HC RX W HCPCS: Performed by: NURSE PRACTITIONER

## 2022-08-02 PROCEDURE — 80061 LIPID PANEL: CPT

## 2022-08-02 PROCEDURE — 93017 CV STRESS TEST TRACING ONLY: CPT

## 2022-08-02 PROCEDURE — 36415 COLL VENOUS BLD VENIPUNCTURE: CPT

## 2022-08-02 PROCEDURE — 85027 COMPLETE CBC AUTOMATED: CPT

## 2022-08-02 PROCEDURE — 71045 X-RAY EXAM CHEST 1 VIEW: CPT

## 2022-08-02 PROCEDURE — 80048 BASIC METABOLIC PNL TOTAL CA: CPT

## 2022-08-02 PROCEDURE — 96366 THER/PROPH/DIAG IV INF ADDON: CPT

## 2022-08-02 PROCEDURE — A9500 TC99M SESTAMIBI: HCPCS | Performed by: NURSE PRACTITIONER

## 2022-08-02 PROCEDURE — 97162 PT EVAL MOD COMPLEX 30 MIN: CPT

## 2022-08-02 PROCEDURE — 93010 ELECTROCARDIOGRAM REPORT: CPT | Performed by: INTERNAL MEDICINE

## 2022-08-02 PROCEDURE — 97530 THERAPEUTIC ACTIVITIES: CPT

## 2022-08-02 PROCEDURE — 3430000000 HC RX DIAGNOSTIC RADIOPHARMACEUTICAL: Performed by: NURSE PRACTITIONER

## 2022-08-02 PROCEDURE — 78452 HT MUSCLE IMAGE SPECT MULT: CPT

## 2022-08-02 PROCEDURE — 6370000000 HC RX 637 (ALT 250 FOR IP): Performed by: NURSE PRACTITIONER

## 2022-08-02 PROCEDURE — 97167 OT EVAL HIGH COMPLEX 60 MIN: CPT

## 2022-08-02 RX ORDER — NITROGLYCERIN 0.4 MG/1
0.4 TABLET SUBLINGUAL EVERY 5 MIN PRN
Status: DISCONTINUED | OUTPATIENT
Start: 2022-08-02 | End: 2022-08-09 | Stop reason: HOSPADM

## 2022-08-02 RX ORDER — CYCLOBENZAPRINE HCL 10 MG
10 TABLET ORAL 3 TIMES DAILY PRN
Status: DISCONTINUED | OUTPATIENT
Start: 2022-08-02 | End: 2022-08-09 | Stop reason: HOSPADM

## 2022-08-02 RX ORDER — LANOLIN ALCOHOL/MO/W.PET/CERES
400 CREAM (GRAM) TOPICAL DAILY
Status: DISCONTINUED | OUTPATIENT
Start: 2022-08-02 | End: 2022-08-09 | Stop reason: HOSPADM

## 2022-08-02 RX ORDER — DIVALPROEX SODIUM 250 MG/1
500 TABLET, DELAYED RELEASE ORAL DAILY
Status: DISCONTINUED | OUTPATIENT
Start: 2022-08-02 | End: 2022-08-09 | Stop reason: HOSPADM

## 2022-08-02 RX ORDER — FERROUS SULFATE 325(65) MG
325 TABLET ORAL
Status: DISCONTINUED | OUTPATIENT
Start: 2022-08-02 | End: 2022-08-09 | Stop reason: HOSPADM

## 2022-08-02 RX ORDER — ASPIRIN 81 MG/1
81 TABLET, CHEWABLE ORAL DAILY
Status: DISCONTINUED | OUTPATIENT
Start: 2022-08-02 | End: 2022-08-09 | Stop reason: HOSPADM

## 2022-08-02 RX ORDER — GABAPENTIN 100 MG/1
100 CAPSULE ORAL 2 TIMES DAILY
Status: DISCONTINUED | OUTPATIENT
Start: 2022-08-02 | End: 2022-08-09 | Stop reason: HOSPADM

## 2022-08-02 RX ORDER — OMEPRAZOLE 20 MG/1
20 CAPSULE, DELAYED RELEASE ORAL DAILY
Status: DISCONTINUED | OUTPATIENT
Start: 2022-08-02 | End: 2022-08-09 | Stop reason: HOSPADM

## 2022-08-02 RX ORDER — TRAZODONE HYDROCHLORIDE 100 MG/1
150 TABLET ORAL NIGHTLY PRN
Status: DISCONTINUED | OUTPATIENT
Start: 2022-08-02 | End: 2022-08-03

## 2022-08-02 RX ORDER — ATROPINE SULFATE 0.1 MG/ML
0.5 INJECTION INTRAVENOUS EVERY 5 MIN PRN
Status: ACTIVE | OUTPATIENT
Start: 2022-08-02 | End: 2022-08-02

## 2022-08-02 RX ORDER — DIVALPROEX SODIUM 250 MG/1
750 TABLET, EXTENDED RELEASE ORAL NIGHTLY
Status: DISCONTINUED | OUTPATIENT
Start: 2022-08-02 | End: 2022-08-09 | Stop reason: HOSPADM

## 2022-08-02 RX ORDER — TAMSULOSIN HYDROCHLORIDE 0.4 MG/1
0.4 CAPSULE ORAL NIGHTLY
Status: DISCONTINUED | OUTPATIENT
Start: 2022-08-02 | End: 2022-08-09 | Stop reason: HOSPADM

## 2022-08-02 RX ORDER — ALBUTEROL SULFATE 90 UG/1
2 AEROSOL, METERED RESPIRATORY (INHALATION) PRN
Status: ACTIVE | OUTPATIENT
Start: 2022-08-02 | End: 2022-08-02

## 2022-08-02 RX ORDER — METOPROLOL TARTRATE 5 MG/5ML
5 INJECTION INTRAVENOUS EVERY 5 MIN PRN
Status: ACTIVE | OUTPATIENT
Start: 2022-08-02 | End: 2022-08-02

## 2022-08-02 RX ORDER — SODIUM CHLORIDE 9 MG/ML
INJECTION, SOLUTION INTRAVENOUS CONTINUOUS
Status: DISCONTINUED | OUTPATIENT
Start: 2022-08-02 | End: 2022-08-09 | Stop reason: HOSPADM

## 2022-08-02 RX ORDER — CARVEDILOL 12.5 MG/1
12.5 TABLET ORAL 2 TIMES DAILY
Status: DISCONTINUED | OUTPATIENT
Start: 2022-08-02 | End: 2022-08-09 | Stop reason: HOSPADM

## 2022-08-02 RX ORDER — AMINOPHYLLINE DIHYDRATE 25 MG/ML
50 INJECTION, SOLUTION INTRAVENOUS PRN
Status: ACTIVE | OUTPATIENT
Start: 2022-08-02 | End: 2022-08-02

## 2022-08-02 RX ORDER — LIDOCAINE 4 G/G
1 PATCH TOPICAL DAILY
Status: DISCONTINUED | OUTPATIENT
Start: 2022-08-02 | End: 2022-08-09 | Stop reason: HOSPADM

## 2022-08-02 RX ORDER — CLOPIDOGREL BISULFATE 75 MG/1
75 TABLET ORAL DAILY
Status: DISCONTINUED | OUTPATIENT
Start: 2022-08-02 | End: 2022-08-09 | Stop reason: HOSPADM

## 2022-08-02 RX ORDER — SODIUM CHLORIDE 9 MG/ML
500 INJECTION, SOLUTION INTRAVENOUS CONTINUOUS PRN
Status: ACTIVE | OUTPATIENT
Start: 2022-08-02 | End: 2022-08-02

## 2022-08-02 RX ORDER — SODIUM CHLORIDE 0.9 % (FLUSH) 0.9 %
5-40 SYRINGE (ML) INJECTION PRN
Status: ACTIVE | OUTPATIENT
Start: 2022-08-02 | End: 2022-08-02

## 2022-08-02 RX ORDER — ATORVASTATIN CALCIUM 80 MG/1
80 TABLET, FILM COATED ORAL DAILY
Status: DISCONTINUED | OUTPATIENT
Start: 2022-08-02 | End: 2022-08-09 | Stop reason: HOSPADM

## 2022-08-02 RX ORDER — DIVALPROEX SODIUM 500 MG/1
500 TABLET, EXTENDED RELEASE ORAL 3 TIMES DAILY
Status: DISCONTINUED | OUTPATIENT
Start: 2022-08-02 | End: 2022-08-02 | Stop reason: DRUGHIGH

## 2022-08-02 RX ORDER — SODIUM CHLORIDE 0.9 % (FLUSH) 0.9 %
10 SYRINGE (ML) INJECTION PRN
Status: DISCONTINUED | OUTPATIENT
Start: 2022-08-02 | End: 2022-08-09

## 2022-08-02 RX ORDER — AMLODIPINE BESYLATE 5 MG/1
10 TABLET ORAL DAILY
Status: DISCONTINUED | OUTPATIENT
Start: 2022-08-02 | End: 2022-08-09 | Stop reason: HOSPADM

## 2022-08-02 RX ORDER — TRAMADOL HYDROCHLORIDE 50 MG/1
50 TABLET ORAL 2 TIMES DAILY PRN
Status: DISCONTINUED | OUTPATIENT
Start: 2022-08-02 | End: 2022-08-09 | Stop reason: HOSPADM

## 2022-08-02 RX ORDER — CARBOXYMETHYLCELLULOSE SODIUM 10 MG/ML
2 GEL OPHTHALMIC 3 TIMES DAILY
Status: DISCONTINUED | OUTPATIENT
Start: 2022-08-02 | End: 2022-08-09 | Stop reason: HOSPADM

## 2022-08-02 RX ORDER — PANTOPRAZOLE SODIUM 40 MG/1
40 TABLET, DELAYED RELEASE ORAL
Refills: 3 | Status: DISCONTINUED | OUTPATIENT
Start: 2022-08-02 | End: 2022-08-02 | Stop reason: ALTCHOICE

## 2022-08-02 RX ORDER — NITROGLYCERIN 0.4 MG/1
0.4 TABLET SUBLINGUAL EVERY 5 MIN PRN
Status: ACTIVE | OUTPATIENT
Start: 2022-08-02 | End: 2022-08-02

## 2022-08-02 RX ADMIN — GABAPENTIN 100 MG: 100 CAPSULE ORAL at 21:50

## 2022-08-02 RX ADMIN — CEFTRIAXONE SODIUM 1000 MG: 1 INJECTION, POWDER, FOR SOLUTION INTRAMUSCULAR; INTRAVENOUS at 20:54

## 2022-08-02 RX ADMIN — CARVEDILOL 12.5 MG: 12.5 TABLET ORAL at 20:59

## 2022-08-02 RX ADMIN — SODIUM CHLORIDE: 9 INJECTION, SOLUTION INTRAVENOUS at 03:11

## 2022-08-02 RX ADMIN — TAMSULOSIN HYDROCHLORIDE 0.4 MG: 0.4 CAPSULE ORAL at 20:59

## 2022-08-02 RX ADMIN — SODIUM CHLORIDE: 9 INJECTION, SOLUTION INTRAVENOUS at 17:49

## 2022-08-02 RX ADMIN — Medication 325 MG: at 10:45

## 2022-08-02 RX ADMIN — ASPIRIN 81 MG: 81 TABLET, CHEWABLE ORAL at 10:44

## 2022-08-02 RX ADMIN — REGADENOSON 0.4 MG: 0.08 INJECTION, SOLUTION INTRAVENOUS at 09:26

## 2022-08-02 RX ADMIN — CLOPIDOGREL BISULFATE 75 MG: 75 TABLET ORAL at 10:46

## 2022-08-02 RX ADMIN — CARVEDILOL 12.5 MG: 12.5 TABLET ORAL at 10:41

## 2022-08-02 RX ADMIN — AMLODIPINE BESYLATE 10 MG: 5 TABLET ORAL at 10:43

## 2022-08-02 RX ADMIN — DIVALPROEX SODIUM 750 MG: 250 TABLET, EXTENDED RELEASE ORAL at 21:00

## 2022-08-02 RX ADMIN — CARBOXYMETHYLCELLULOSE SODIUM 2 DROP: 10 GEL OPHTHALMIC at 20:59

## 2022-08-02 RX ADMIN — TETRAKIS(2-METHOXYISOBUTYLISOCYANIDE)COPPER(I) TETRAFLUOROBORATE 19 MILLICURIE: 1 INJECTION, POWDER, LYOPHILIZED, FOR SOLUTION INTRAVENOUS at 09:25

## 2022-08-02 RX ADMIN — TETRAKIS(2-METHOXYISOBUTYLISOCYANIDE)COPPER(I) TETRAFLUOROBORATE 37.2 MILLICURIE: 1 INJECTION, POWDER, LYOPHILIZED, FOR SOLUTION INTRAVENOUS at 13:24

## 2022-08-02 RX ADMIN — Medication 400 MG: at 10:45

## 2022-08-02 RX ADMIN — ATORVASTATIN CALCIUM 80 MG: 80 TABLET, FILM COATED ORAL at 10:47

## 2022-08-02 RX ADMIN — AZITHROMYCIN MONOHYDRATE 500 MG: 500 INJECTION, POWDER, LYOPHILIZED, FOR SOLUTION INTRAVENOUS at 21:53

## 2022-08-02 RX ADMIN — CARBOXYMETHYLCELLULOSE SODIUM 2 DROP: 10 GEL OPHTHALMIC at 10:57

## 2022-08-02 RX ADMIN — GABAPENTIN 100 MG: 100 CAPSULE ORAL at 11:41

## 2022-08-02 RX ADMIN — DIVALPROEX SODIUM 500 MG: 250 TABLET, DELAYED RELEASE ORAL at 10:48

## 2022-08-02 RX ADMIN — Medication 10 MG: at 10:47

## 2022-08-02 RX ADMIN — MAGNESIUM HYDROXIDE 30 ML: 400 SUSPENSION ORAL at 18:25

## 2022-08-02 ASSESSMENT — ENCOUNTER SYMPTOMS
CONSTIPATION: 0
NAUSEA: 0
BACK PAIN: 0
COUGH: 0
VOMITING: 0
ABDOMINAL PAIN: 0
SORE THROAT: 0
DIARRHEA: 0
SHORTNESS OF BREATH: 1
WHEEZING: 0

## 2022-08-02 ASSESSMENT — PAIN SCALES - GENERAL: PAINLEVEL_OUTOF10: 5

## 2022-08-02 NOTE — PROGRESS NOTES
Dr. Ashlee Nascimento notified of consult. Dr. Darryle Columbus notified of consult and of stress results.  Will see pt in AM

## 2022-08-02 NOTE — PROGRESS NOTES
RN notified Stefania Hammer that pt has signed DNR-CCA paperwork in chart and that this is in line with pts wishes.

## 2022-08-02 NOTE — CONSULTS
EAR REBUILT AND EAR DRUM REPLACED    MOUTH SURGERY  2018    5 TEETH REMOVED    OTHER SURGICAL HISTORY  2018    : ANTERIOR CERVICAL CORRPECTOMY C5-6, SYNTHES, DEPUY, REG TABLE, SUPINE,     OK OFFICE/OUTPT VISIT,PROCEDURE ONLY N/A 2018    ANTERIOR CERVICAL CORRPECTOMY AND FUSION C5-6 performed by Cesar Shah DO at [facility]. 72  1983       FAMILY HISTORY    Family History   Problem Relation Age of Onset    Dementia Mother     Coronary Art Dis Mother     Stroke Mother     Diabetes Mother     Asthma Mother     Other Mother         BRAIN ANEURISM    High Blood Pressure Mother     Heart Disease Father     Diabetes Sister     Diabetes Brother     High Blood Pressure Brother     High Cholesterol Brother     Heart Disease Brother     Diabetes Sister     Other Sister         NEUROPATHY       SOCIAL HISTORY    Social History     Tobacco Use    Smoking status: Former     Packs/day: 0.50     Years: 4.00     Pack years: 2.00     Types: Cigarettes     Quit date: 1972     Years since quittin.3    Smokeless tobacco: Never    Tobacco comments:     quit    Vaping Use    Vaping Use: Never used   Substance Use Topics    Alcohol use: Yes     Comment: MIXED DRINKS- 3 OR 4 A YEAR; last 2018    Drug use: Yes     Types: Marijuana (Weed)         ALLERGIES    Allergies   Allergen Reactions    Latex Itching    Bee Venom Swelling    Lisinopril Other (See Comments)     Cough      Niacin And Related Rash    Sulfa Antibiotics Rash    Terazosin Rash       HOME MEDICATIONS  Prior to Admission medications    Medication Sig Start Date End Date Taking? Authorizing Provider   amLODIPine (NORVASC) 10 MG tablet Take 10 mg by mouth Hold for SBP < 110 3/31/22  Yes Historical Provider, MD   lidocaine (LIDODERM) 5 % Place 2 patches onto the skin in the morning. Apply to both shoulders  12 hours on, 12 hours off. Dina Santiago Historical Provider, MD   cyclobenzaprine (FLEXERIL) 10 MG tablet Take 10 mg by mouth 3 times daily as needed for Muscle spasms   Yes Historical Provider, MD   ferrous sulfate (IRON 325) 325 (65 Fe) MG tablet Take 325 mg by mouth daily (with breakfast)   Yes Historical Provider, MD   traMADol (ULTRAM) 50 MG tablet Take 50 mg by mouth 2 times daily as needed for Pain. Yes Historical Provider, MD   atorvastatin (LIPITOR) 80 MG tablet Take 1 tablet by mouth daily 6/16/22  Yes Kalpana Hutchison MD   clopidogrel (PLAVIX) 75 MG tablet Take 1 tablet by mouth daily 6/14/22  Yes Kalpana Hutchison MD   aspirin 81 MG chewable tablet Take 1 tablet by mouth daily 6/14/22  Yes Kalpana Hutchison MD   magnesium oxide (MAG-OX) 400 (240 Mg) MG tablet Take 1 tablet by mouth daily 6/14/22  Yes Kalpana Hutchison MD   gabapentin (NEURONTIN) 100 MG capsule Take 1 capsule by mouth 2 times daily for 30 days. 4/25/22 8/1/22 Yes Robinson Gaming MD   carvedilol (COREG) 12.5 MG tablet Take 1 tablet by mouth 2 times daily 4/25/22  Yes Robinson Gaming MD   omeprazole (PRILOSEC) 20 MG delayed release capsule Take 2 capsules by mouth Daily LF 4/21/20 (90 day supply)  Patient taking differently: Take 20 mg by mouth in the morning. LF 4/21/20 (90 day supply). 4/25/22  Yes Robinson Gaming MD   divalproex (DEPAKOTE ER) 500 MG extended release tablet Take 1 tablet by mouth at bedtime  Patient taking differently: Take 500 mg by mouth in the morning, at noon, and at bedtime 4/18/22  Yes Elwin Aase, MD   carboxymethylcellulose 1 % ophthalmic solution Place 2 drops into both eyes 3 times daily Pt states \"2 drops in both eyes three times a day\"   Yes Historical Provider, MD   traZODone (DESYREL) 150 MG tablet Take 150 mg by mouth nightly as needed for Sleep    Historical Provider, MD   tamsulosin (FLOMAX) 0.4 MG capsule Take 0.4 mg by mouth at bedtime    Historical Provider, MD   nitroGLYCERIN (NITROSTAT) 0.4 MG SL tablet up to max of 3 total doses.  If no relief after 1 dose, call 911. 4/18/22   Adithya Smith Casillas MD   simethicone (MYLICON) 80 MG chewable tablet Take 1 tablet by mouth every 6 hours as needed for Flatulence  Patient taking differently: Take 80 mg by mouth every 8 hours as needed for Flatulence 6/22/20   uGillermo Duarte MD       CURRENT MEDICATIONS:  Current Facility-Administered Medications   Medication Dose Route Frequency Provider Last Rate Last Admin    amLODIPine (NORVASC) tablet 10 mg (Patient Supplied)  10 mg Oral Daily Odessa Melania, APRN - CNP   10 mg at 08/02/22 1043    aspirin chewable tablet 81 mg (Patient Supplied)  81 mg Oral Daily Odessa Melania, APRN - CNP   81 mg at 08/02/22 1044    atorvastatin (LIPITOR) tablet 80 mg (Patient Supplied)  80 mg Oral Daily Odessa Melania, APRN - CNP   80 mg at 08/02/22 1047    carboxymethylcellulose (THERATEARS) 1 % ophthalmic gel 2 drop  2 drop Both Eyes TID Odessa Melania, APRN - CNP   2 drop at 08/02/22 1057    carvedilol (COREG) tablet 12.5 mg (Patient Supplied)  12.5 mg Oral BID Odessa Melania, APRN - CNP   12.5 mg at 08/02/22 1041    clopidogrel (PLAVIX) tablet 75 mg (Patient Supplied)  75 mg Oral Daily Odessa Melania, APRN - CNP   75 mg at 08/02/22 1046    cyclobenzaprine (FLEXERIL) tablet 10 mg (Patient Supplied)  10 mg Oral TID PRN Odessa Melania, APRN - CNP   10 mg at 08/02/22 1047    ferrous sulfate (IRON 325) tablet 325 mg (Patient Supplied)  325 mg Oral Daily with breakfast Odessa Melania, APRN - CNP   325 mg at 08/02/22 1045    gabapentin (NEURONTIN) capsule 100 mg (Patient Supplied)  100 mg Oral BID Odessa Melania, APRN - CNP   100 mg at 08/02/22 1141    lidocaine 4 % external patch 1 patch  1 patch TransDERmal Daily Odessa Melania, APRN - CNP   1 patch at 08/02/22 1113    magnesium oxide (MAG-OX) tablet 400 mg (Patient Supplied)  400 mg Oral Daily Odessa Melania, APRN - CNP   400 mg at 08/02/22 1045    nitroGLYCERIN (NITROSTAT) SL tablet 0.4 mg  0.4 mg SubLINGual Q5 Min PRN Odessa Velázquez, APRN - CNP        tamsulosin [facility]) capsule 0.4 mg  0.4 mg Oral Nightly Yinka Martin, APRN - CNP        traMADol Lupe Edwards) tablet 50 mg (Patient Supplied)  50 mg Oral BID PRN Yinka Martin, APRN - CNP        traZODone (DESYREL) tablet 150 mg (Patient Supplied)  150 mg Oral Nightly PRN [place], APRN - CNP        azithromycin (ZITHROMAX) 500 mg in D5W 250ml addavial  500 mg IntraVENous Q24H Yinka Salazar, APRN - CNP        cefTRIAXone (ROCEPHIN) 1,000 mg in dextrose 5 % 50 mL IVPB mini-bag  1,000 mg IntraVENous Q24H [place] Martin, APRN - CNP        0.9 % sodium chloride infusion   IntraVENous Continuous Yinka Salazar APRN - CNP 75 mL/hr at 08/02/22 2502 New Bag at 08/02/22 0311    omeprazole (PRILOSEC) delayed release capsule 20 mg (Patient Supplied)  20 mg Oral Daily Yinka Salazar APRN - CNP        divalproex (DEPAKOTE ER) extended release tablet 750 mg (Patient Supplied)  750 mg Oral Nightly [place] Martin, APRN - CNP        divalproex (DEPAKOTE) DR tablet 500 mg (Patient Supplied)  500 mg Oral Daily [place] Martin, APRN - CNP   500 mg at 08/02/22 1048    technetium sestamibi (CARDIOLITE) injection 63.7 millicurie  60.1 millicurie IntraVENous ONCE PRN [place] Martin, APRN - CNP        sodium chloride flush 0.9 % injection 10 mL  10 mL IntraVENous PRN [place] Martin, APRN - CNP        sodium chloride flush 0.9 % injection 5-40 mL  5-40 mL IntraVENous 2 times per day Yinka Martin, APRN - CNP        sodium chloride flush 0.9 % injection 10 mL  10 mL IntraVENous PRN [place] Sharp, APRN - CNP        0.9 % sodium chloride infusion   IntraVENous PRN Yinka Martin, APRN - CNP        ondansetron (ZOFRAN-ODT) disintegrating tablet 4 mg  4 mg Oral Q8H PRN Yinka Sharp, APRN - CNP        Or    ondansetron (ZOFRAN) injection 4 mg  4 mg IntraVENous Q6H PRN Yinka Martin, APRN - CNP        acetaminophen (TYLENOL) tablet 650 mg  650 mg Oral Q6H PRN [place] Sharp, APRN - CNP        Or    acetaminophen (TYLENOL) suppository 650 mg  650 mg Rectal Q6H PRN [place] Sharp, APRN - CNP        magnesium hydroxide (MILK OF MAGNESIA) 400 MG/5ML suspension 30 mL  30 mL Oral Daily PRN Veria Proud, APRN - CNP        potassium chloride (KLOR-CON M) extended release tablet 40 mEq  40 mEq Oral PRN Veria Proud, APRN - CNP        Or    potassium bicarb-citric acid (EFFER-K) effervescent tablet 40 mEq  40 mEq Oral PRN Veria Proud, APRN - CNP        Or    potassium chloride 10 mEq/100 mL IVPB (Peripheral Line)  10 mEq IntraVENous PRN Veria Proud, APRN - CNP        magnesium sulfate 1000 mg in dextrose 5% 100 mL IVPB  1,000 mg IntraVENous PRN Veria Proud, APRN - CNP        enoxaparin (LOVENOX) injection 40 mg  40 mg SubCUTAneous Daily Veria Proud, APRN - CNP             Objective:      BP (!) 145/88   Pulse 90   Temp 98.4 °F (36.9 °C) (Oral)   Resp 20   Ht 5' 3\" (1.6 m)   Wt 132 lb 7.9 oz (60.1 kg)   SpO2 95%   BMI 23.47 kg/m²       LABS    CBC:   Lab Results   Component Value Date/Time    WBC 7.5 08/02/2022 04:17 AM    RBC 3.47 08/02/2022 04:17 AM    HGB 10.7 08/02/2022 04:17 AM     SED RATE: No results found for: SEDRATE    CMP:  Albumin:    Lab Results   Component Value Date/Time    LABALBU 3.2 05/13/2022 03:50 AM     PT/INR:    Lab Results   Component Value Date/Time    PROTIME 12.9 08/01/2022 06:48 PM    INR 1.0 08/01/2022 06:48 PM     HgBA1c:    Lab Results   Component Value Date/Time    LABA1C 5.8 05/17/2021 12:00 AM     PTT: No components found for: LABPTT      Assessment:       Ry Risk Score: Ry Scale Score: 13    Patient Active Problem List   Diagnosis Code    Hypertension I10    Hyperlipidemia E78.5    Diabetes mellitus (HCC) E11.9    Contusion of right shoulder S40.011A    Bipolar disorder, mixed (HCC) F31.60    First known suicide attempt ([facility] [address] T14.91XA    Erectile dysfunction N52.9    Cervical stenosis of spinal canal M48.02    Incomplete spinal cord lesion at C5-C7 level ([address] S14.155A    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 A, R45.851    Severe recurrent major depression without psychotic features ([facility]) F33.2    Frequent falls R29.6    Hyponatremia-possibly SIADH  E87.1    Cervical spinal cord compression (HCC) G95.20    Cord compression (HCC) G95.20    Quadriplegia, C1-C4 incomplete ([address] G82.52    Encephalopathy G93.40    Hospital-acquired pneumonia J18.9, Y95    Atelectasis J98.11    Cervical stenosis of spine M48.02    Moderate malnutrition ([facility]) E44.0    Chest pain R07.9    Delirium due to multiple etiologies F05    NSTEMI (non-ST elevated myocardial infarction) ([facility]) I21.4    Cervical myelopathy ([facility]) G95.9    Atypical chest pain R07.89    Bilateral shoulder pain M25.511, M25.512    Shortness of breath R06.02         Measurements:  Wound 08/01/22 Sacrum (Active)   Wound Image   08/02/22 1218   Wound Etiology Pressure Unstageable 08/02/22 1218   Dressing Status Old drainage noted;New dressing applied 08/02/22 1218   Wound Cleansed Cleansed with saline 08/02/22 1218   Dressing/Treatment Moist to moist;Foam 08/02/22 1218   Wound Length (cm) 2.7 cm 08/02/22 1218   Wound Width (cm) 2 cm 08/02/22 1218   Wound Depth (cm) 0.4 cm 08/02/22 1218   Wound Surface Area (cm^2) 5.4 cm^2 08/02/22 1218   Wound Volume (cm^3) 2.16 cm^3 08/02/22 1218   Undermining Starts ___ O'Clock 1 08/02/22 1218   Undermining Ends___ O'Clock 3 08/02/22 1218   Undermining Maxium Distance (cm) 1 08/02/22 1218   Wound Assessment [place]/red;Slough 08/02/22 1218   Drainage Amount Moderate 08/02/22 1218   Drainage Description Serosanguinous 08/02/22 1218   Odor None 08/02/22 1218   [place]-wound Assessment Blanchable erythema;Denuded;Erosion 08/02/22 1218   Margins Defined edges;Epibole (rolled edges) 08/02/22 1218   Number of days: 0           WOUND DESCRIPTION:   [address]  nurse consult for coccyx wound.  Pt states he has had the wound for a few months and it got worse while he was at a nursing home. The wound was debrided a few weeks ago when pt was at [facility]. He states that he has used dakins in the past, and something else that he can't remember, possibly santyl. The wound has some red tissue in the base, as well as some slough. No bone exposure. There is some undermining present from 1 to 3 o'clock. Surrounding skin denuded and red. Pt states that he is bedbound at home and does not get up to chair during the day. He has a brewster catheter for urinary retention and was supposed to follow up with urology but did not have transportation to get there. He is incontinent of stool. Discussed follow up for wound care after discharge, as pt will likely need outpatient treatment of the wound. Pt stated that he doesn't know where he is going to go when he leaves for sure. Will continue to follow discharge plans and get referral closer to discharge. Pt has been seen at [facility] [address] [facility] wound care in the past.     Response to treatment:  Well tolerated by patient. Plan:     Plan of Care:     Coccyx: Cleanse with foam cleanser, pat dry. Apply santyl (nickel thickness) to wound, cover with saline moistened gauze and sacral foam dressing. Change daily and as needed if loose or soiled. Pt will need to follow up with wound care as outpatient- await further discharge planning and can refer pt to clinic close to him    -Turn every 2 hours    -Float heels off of bed with pillows under calves. If needed- use offloading boots.    -Sacral foam dressing to sacrococcygeal area. Apply skin barrier wipe to skin first to help adherence. Peel back dressing to inspect skin beneath qshift, re-secure. Change every 72 hours and prn wrinkles, soilage. Discontinue if repeatedly soiled by incontinence.     -Routine incontinence care with foaming cleanser and zinc oxide cream. Apply zinc oxide cream twice daily and prn incontinence. Use moisture wicking under pad (one layer only). -Waffle mattress overlay.  Check inflation each shift by sliding hand under the air overlay. Feel for the patient's heaviest/ most dependant body part. Ideally 1/2 to 1\" of air will be between your hand and the patient's body. Add air prn. Specialty Bed Required : Yes   [] Low Air Loss   [x] Pressure Redistribution  [] Fluid Immersion  [] Bariatric  [] Total Pressure Relief  [] Other:     Current Diet: Diet NPO  ADULT DIET;  Regular; 4 carb choices (60 gm/meal)  Dietician consult:  Yes    Discharge Plan:  Placement for patient upon discharge: TBD  Patient appropriate for Outpatient [address]: Yes    Patient/Caregiver Teaching:  Level of patientunderstanding able to:     [x] Indicates understanding       [] Needs reinforcement  [] Unsuccessful      [x] Verbal Understanding  [] Demonstrated understanding       [] No evidence of learning  [] Refused teaching         [] N/A       Electronically signed by Jeremy Cole RN on  8/2/2022 at 2:08 PM

## 2022-08-02 NOTE — PROGRESS NOTES
(04/16/2018); other surgical history (04/19/2018); cervical fusion (04/19/2018); pr office/outpt visit,procedure only (N/A, 04/19/2018); Cataract removal; laminectomy (03/24/2022); cervical fusion (N/A, 03/24/2022); and back surgery. Home Medications:    Prior to Admission medications    Medication Sig Start Date End Date Taking? Authorizing Provider   amLODIPine (NORVASC) 10 MG tablet Take 10 mg by mouth Hold for SBP < 110 3/31/22  Yes Historical Provider, MD   lidocaine (LIDODERM) 5 % Place 2 patches onto the skin in the morning. Apply to both shoulders  12 hours on, 12 hours off. .   Yes Historical Provider, MD   cyclobenzaprine (FLEXERIL) 10 MG tablet Take 10 mg by mouth 3 times daily as needed for Muscle spasms   Yes Historical Provider, MD   ferrous sulfate (IRON 325) 325 (65 Fe) MG tablet Take 325 mg by mouth daily (with breakfast)   Yes Historical Provider, MD   traMADol (ULTRAM) 50 MG tablet Take 50 mg by mouth 2 times daily as needed for Pain. Yes Historical Provider, MD   atorvastatin (LIPITOR) 80 MG tablet Take 1 tablet by mouth daily 6/16/22  Yes Flash Christensen MD   clopidogrel (PLAVIX) 75 MG tablet Take 1 tablet by mouth daily 6/14/22  Yes Flash Christensen MD   aspirin 81 MG chewable tablet Take 1 tablet by mouth daily 6/14/22  Yes Flash Christensen MD   magnesium oxide (MAG-OX) 400 (240 Mg) MG tablet Take 1 tablet by mouth daily 6/14/22  Yes Flash Christensen MD   gabapentin (NEURONTIN) 100 MG capsule Take 1 capsule by mouth 2 times daily for 30 days. 4/25/22 8/1/22 Yes Abdulkadir Reid MD   carvedilol (COREG) 12.5 MG tablet Take 1 tablet by mouth 2 times daily 4/25/22  Yes Abdulkadir Reid MD   omeprazole (PRILOSEC) 20 MG delayed release capsule Take 2 capsules by mouth Daily LF 4/21/20 (90 day supply)  Patient taking differently: Take 20 mg by mouth in the morning. LF 4/21/20 (90 day supply).  4/25/22  Yes Abdulkadir Reid MD   divalproex (DEPAKOTE ER) 500 MG extended release tablet Take 1 tablet by mouth at bedtime  Patient taking differently: Take 500 mg by mouth in the morning, at noon, and at bedtime 4/18/22  Yes Dodie Escalante MD   carboxymethylcellulose 1 % ophthalmic solution Place 2 drops into both eyes 3 times daily Pt states \"2 drops in both eyes three times a day\"   Yes Historical Provider, MD   traZODone (DESYREL) 150 MG tablet Take 150 mg by mouth nightly as needed for Sleep    Historical Provider, MD   tamsulosin (FLOMAX) 0.4 MG capsule Take 0.4 mg by mouth at bedtime    Historical Provider, MD   nitroGLYCERIN (NITROSTAT) 0.4 MG SL tablet up to max of 3 total doses. If no relief after 1 dose, call 911. 4/18/22   Dodie Escalante MD   simethicone (MYLICON) 80 MG chewable tablet Take 1 tablet by mouth every 6 hours as needed for Flatulence  Patient taking differently: Take 80 mg by mouth every 8 hours as needed for Flatulence 6/22/20   Db Gómez MD       Allergies:  Latex, Bee venom, Lisinopril, Niacin and related, Sulfa antibiotics, and Terazosin    Social History:   reports that he quit smoking about 50 years ago. His smoking use included cigarettes. He has a 2.00 pack-year smoking history. He has never used smokeless tobacco. He reports current alcohol use. He reports current drug use. Drug: Marijuana Renata Postin). Family History: family history includes Asthma in his mother; Coronary Art Dis in his mother; Dementia in his mother; Diabetes in his brother, mother, sister, and sister; Heart Disease in his brother and father; High Blood Pressure in his brother and mother; High Cholesterol in his brother; Other in his mother and sister; Stroke in his mother. Laboratory Testing:  CBC:   Recent Labs     08/02/22 0417   WBC 7.5   HGB 10.7*        BMP:    Recent Labs     08/01/22  1848 08/02/22 0417    136   K 4.1 3.8   CL 96* 98   CO2 31 32*   BUN 14 12   CREATININE <0.40* 0.44*   GLUCOSE 114* 201*     S. Calcium:  Recent Labs     08/02/22 0417   CALCIUM 8.9     S.  Ionized Calcium:No results for input(s): IONCA in the last 72 hours. S. Magnesium:  Recent Labs     08/02/22  0417   MG 1.8     S. Phosphorus:  Recent Labs     08/01/22 1848   PHOS 3.5     S. Glucose:No results for input(s): POCGLU in the last 72 hours. Glycosylated hemoglobin A1C: No results for input(s): LABA1C in the last 72 hours. INR:   Recent Labs     08/01/22 1848   INR 1.0     Hepatic functions: No results for input(s): ALKPHOS, ALT, AST, PROT, BILITOT, BILIDIR, LABALBU in the last 72 hours. Pancreatic functions:No results for input(s): LACTA, AMYLASE in the last 72 hours. S. Lactic Acid: No results for input(s): LACTA in the last 72 hours. Cardiac enzymes:No results for input(s): CKTOTAL, CKMB, CKMBINDEX, TROPONINI in the last 72 hours. BNP:No results for input(s): BNP in the last 72 hours. Lipid profile:   Recent Labs     08/02/22 0417   CHOL 108   TRIG 119   HDL 30*     Blood Gases: No results found for: PH, PCO2, PO2, HCO3, O2SAT  Thyroid functions:   Lab Results   Component Value Date/Time    TSH 0.67 03/27/2022 05:07 PM        Imaging/Diagonstics:  EKG: Normal sinus rhythm    CXR: No acute cardiopulmonary findings.           ASSESSMENT:    Patient Active Problem List   Diagnosis    Hypertension    Hyperlipidemia    Diabetes mellitus ([address]    Contusion of right shoulder    Bipolar disorder, mixed ([facility] [address]    First known suicide attempt [facility])    Erectile dysfunction    Cervical stenosis of spinal canal    Incomplete spinal cord lesion at C5-C7 level ([facility] [address]    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)    Cervical spondylosis with radiculopathy    Acute blood loss anemia    Precordial pain    Depression with suicidal ideation    Severe recurrent major depression without psychotic features ([address]    Frequent falls    Hyponatremia-possibly SIADH     Cervical spinal cord compression (HCC)    Cord compression (HCC)    Quadriplegia, C1-C4 incomplete ([address]    Encephalopathy    Hospital-acquired pneumonia

## 2022-08-02 NOTE — PLAN OF CARE
Problem: Discharge Planning  Goal: Discharge to home or other facility with appropriate resources  8/2/2022 1704 by Fabrizio Ho RN  Outcome: Progressing  Note: Pt given list of facilities this shift for discharge. SW following, PT/OT on.      Problem: Pain  Goal: Verbalizes/displays adequate comfort level or baseline comfort level  8/2/2022 1703 by Fabrizio Ho RN  Outcome: Progressing  Note: Pt able to verbalize discomfort. Repositioning utilized this shift for pain relief. Problem: Skin/Tissue Integrity  Goal: Absence of new skin breakdown  Description: 1. Monitor for areas of redness and/or skin breakdown  2. Assess vascular access sites hourly  3. Every 4-6 hours minimum:  Change oxygen saturation probe site  4. Every 4-6 hours:  If on nasal continuous positive airway pressure, respiratory therapy assess nares and determine need for appliance change or resting period. 8/2/2022 1704 by Fabrizio Ho RN  Outcome: Progressing  Note: Skin assessment complete. Waffle mattress in place. Pt turned and repositioned every two hours with assistance from staff. Area kept free from moisture. Proper nourishment and fluids encouraged, as appropriate. Will continue to monitor for additional needs and changes in skin breakdown. Problem: Skin/Tissue Integrity  Goal: Absence of new skin breakdown  Description: 1. Monitor for areas of redness and/or skin breakdown  2. Assess vascular access sites hourly  3. Every 4-6 hours minimum:  Change oxygen saturation probe site  4. Every 4-6 hours:  If on nasal continuous positive airway pressure, respiratory therapy assess nares and determine need for appliance change or resting period. 8/2/2022 1703 by Fabrizio Ho RN  Outcome: Progressing  Note: Wound care RN saw pts coccyx this shift, orders entered, see Melinda's note.

## 2022-08-02 NOTE — PROGRESS NOTES
RN notified Cydney Champion rounding with Dr. Danette Baig of stress test being intermediate risk. Awaiting rounds.

## 2022-08-02 NOTE — PROGRESS NOTES
Security informed RN that pt requested protection order from pts ex-wife. Security gave RN the number for [address] [facility] which was passed along to pt.

## 2022-08-02 NOTE — PROCEDURES
[address] [place], [place] [address]                              CARDIAC STRESS TEST    PATIENT NAME: Neil Mohs                      :        1950  MED REC NO:   [id number]                              ROOM:         ACCOUNT NO:   [de-identified]                           ADMIT DATE: 2022  PROVIDER:     Lilia Elder    DATE OF STUDY:  2022    ORDERING PROVIDER:  ALEXUS Bedolla    PRIMARY CARE PROVIDER:  Jim Vizcaino MD    INTERPRETING PHYSICIAN:  Adrian Rosado MD    _____ STRESS TESTING    TEST TYPE: LEXISCAN CARDIOLYTE STRESS TEST  INDICATION: CHEST PAIN  REFERRING PHYSICIAN: ALEXUS Bedolla    RESTING HEART RATE: 99 BEATS PER MINUTE  RESTING BLOOD PRESSURE: 153/103    MEDICATION(S) GIVEN: 0.4MG IV LEXISCAN  REASON FOR TERMINATION: MEDICATION INFUSION COMPLETE    RESTING EKG: NORMAL SR HEART RATE 99 BEATS PER MINUTE  STRESS HEART RESPONSE: NORMAL RESPONSE  BLOOD PRESSURE RESPONSE: APPROPRIATE  STRESS EKGs: NO CHANGES SEEN, HEART RATE INCREASED  BEATS PER  MINUTE  CHEST DISCOMFORT: NO PAIN DURING STRESS  ISCHEMIC EKG CHANGES: NONE    EKG IMPRESSION: ELECTROCARDIOGRAPHICALLY NEGATIVE LEXISCAN STRESS TEST. RADIOISOTOPE RESULTS TO FOLLOW FROM THE DEPARTMENT OF NUCLEAR MEDICINE. COMMENTS:  PATIENT HAD CHEST PAIN/TIGHTNESS 2/10 BEFORE TEST, INCREASED  TO 4/10.  NO ISCHEMIC EKG CHANGES        NORTH KRAUS    D: 2022 10:28:49       T: 2022 10:29:53     AS/AYLIN  Job#: [id number]     Doc#: Unknown    CC:    (Retain this field even if not dictated or not decipherable)
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
## 2022-08-02 NOTE — CARE COORDINATION
follow for SNF. Pt has equipment from the [place] and Loyda Said is helping him with his needs. Called the [address], Left  for toño to call  back . IV zithromax, rocephin.  NPO , PT/OT eval.  YANA NEEDS TO BE SIGNED/COMPLETED Following for needs//JF     [facility] notified [facility] :        Your notification ID [id number]    Electronically signed by: Teetee Ortiz RN on 8/2/2022 at 11:45 AM      [id number] and Chelsea Jones notified that the patient was in the hospital .  Electronically signed by Teetee Ortiz RN on 8/2/2022 at 1:09 PM

## 2022-08-02 NOTE — ACP (ADVANCE CARE PLANNING)
Advance Care Planning     Advance Care Planning Inpatient Note  [facility] Department    Today's Date: 8/2/2022  [address]: [address]    Received request from patient. Upon review of chart and communication with care team, patient's decision making abilities are not in question. . Patient was/were present in the room during visit. Goals of ACP Conversation:  Discuss advance care planning documents    Health Care Decision Makers:       Primary Decision Maker: Donell Stephanie - Child - [phone]    Secondary Decision Maker: Aba Tristan - Child - [phone]  Summary:  Completed New Documents  Updated Healthcare Decision Maker  Advanced Care Documents (Patient Wishes):  Healthcare Power of /Advance Directive Appointment of [address]  Living Will/Advance Directive     Assessment:  Completed and witnessed ACP documents with patient; these documents were filled in by Fatoumata Fairchild prior to [address] coming into patient's room; during ACP discussion, patient stated he wanted to be a DNR/CC/A, talked to both Rashida Watkins RN and Gennaro Azar RN in this regard. Interventions:  Assisted in the completion of documents according to patient's wishes at this time    Care Preferences Communicated:   No.   See ACP note this date of Bebeto Marcus in this regard    Outcomes/Plan:  New advance directive completed. Returned original document(s) to patient, as well as copies for distribution to appointed agents  Copy of advance directive given to staff to scan into medical record. Electronically signed by Leah Ramirez,  on 8/2/2022 at 2:54 PM
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
## 2022-08-02 NOTE — ACP (ADVANCE CARE PLANNING)
Advance Care Planning     Advance Care Planning Activator (Inpatient)  Conversation Note      Date of ACP Conversation: 8/2/2022     Conversation Conducted with: Patient with Decision Making Capacity    ACP Activator: Jina Brantley RN        Health Care Decision Maker:     Current Designated Health Care Decision Maker:     Primary Decision Maker: Yoli Smith (MILLER) - Child - [phone]  Click here to complete Healthcare Decision Makers including section of the Healthcare Decision Maker Relationship (ie \"Primary\")  Today we documented Decision Maker(s) consistent with Legal Next of Kin hierarchy. Care Preferences    Ventilation: \"If you were in your present state of health and suddenly became very ill and were unable to breathe on your own, what would your preference be about the use of a ventilator (breathing machine) if it were available to you? \"      Would the patient desire the use of ventilator (breathing machine)?: no    \"If your health worsens and it becomes clear that your chance of recovery is unlikely, what would your preference be about the use of a ventilator (breathing machine) if it were available to you? \"     Would the patient desire the use of ventilator (breathing machine)?: No      Resuscitation  \"CPR works best to restart the heart when there is a sudden event, like a heart attack, in someone who is otherwise healthy. Unfortunately, CPR does not typically restart the heart for people who have serious health conditions or who are very sick. \"    \"In the event your heart stopped as a result of an underlying serious health condition, would you want attempts to be made to restart your heart (answer \"yes\" for attempt to resuscitate) or would you prefer a natural death (answer \"no\" for do not attempt to resuscitate)? \" no  PT HAS SIGNED DNRCC-A fromh previous admit that he confirms is still his wishes. RN notified.       [x] Yes   [] No   Educated Patient / Maida Tyler regarding differences between Advance Directives and portable DNR orders.     Length of ACP Conversation in minutes:      Conversation Outcomes:  [x] ACP discussion completed  [] Existing advance directive reviewed with patient; no changes to patient's previously recorded wishes  [] New Advance Directive completed  [] Portable Do Not Rescitate prepared for Provider review and signature  [] POLST/POST/MOLST/MOST prepared for Provider review and signature      Follow-up plan:    [] Schedule follow-up conversation to continue planning  [] Referred individual to Provider for additional questions/concerns   [] Advised patient/agent/surrogate to review completed ACP document and update if needed with changes in condition, patient preferences or care setting    [] This note routed to one or more involved healthcare providers
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
## 2022-08-02 NOTE — DISCHARGE INSTR - COC
Diagnosis Code    Hypertension I10    Hyperlipidemia E78.5    Diabetes mellitus ([facility] E11.9    Contusion of right shoulder S40.011A    Bipolar disorder, mixed ([facility] F31.60    First known suicide attempt ([facility] T14.91XA    Erectile dysfunction N52.9    Cervical stenosis of spinal canal M48.02    Incomplete spinal cord lesion at C5-C7 level ([facility] S14.155A    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) M48.02, G99.2    Cervical spondylosis with radiculopathy M47.22    Acute blood loss anemia D62    Precordial pain R07.2    Depression with suicidal ideation F32. A, R45.851    Severe recurrent major depression without psychotic features (HCC) F33.2    Frequent falls R29.6    Hyponatremia-possibly SIADH  E87.1    Cervical spinal cord compression (HCC) G95.20    Cord compression (HCC) G95.20    Quadriplegia, C1-C4 incomplete (HCC) G82.52    Encephalopathy G93.40    Hospital-acquired pneumonia J18.9, Y95    Atelectasis J98.11    Cervical stenosis of spine M48.02    Moderate malnutrition (HCC) E44.0    Chest pain R07.9    Delirium due to multiple etiologies F05    NSTEMI (non-ST elevated myocardial infarction) (HCC) I21.4    Cervical myelopathy (HCC) G95.9    Atypical chest pain R07.89    Bilateral shoulder pain M25.511, M25.512    Shortness of breath R06.02       Isolation/Infection:   Isolation            No Isolation          Patient Infection Status       None to display            Nurse Assessment:  Last Vital Signs: BP (!) 145/88   Pulse 90   Temp 98.4 °F (36.9 °C) (Oral)   Resp 20   Ht 5' 3\" (1.6 m)   Wt 132 lb 7.9 oz (60.1 kg)   SpO2 95%   BMI 23.47 kg/m²     Last documented pain score (0-10 scale): Pain Level: 5  Last Weight:   Wt Readings from Last 1 Encounters:   08/02/22 132 lb 7.9 oz (60.1 kg)     Mental Status:  oriented and alert    IV Access:  {[facility] IV ACCESS:947050042}    Nursing Mobility/ADLs:  Walking   {[facility] DME AGBZ:129176155}  Transfer  {[facility] DME VYVK:705409542}  Bathing  {NUZHAT ANDERSON KFKC:474153461}  Dressing  {[facility] DME WMYX:718352389}  Toileting  {[facility] DME TDAX:094914643}  Feeding  {[facility] DME FDHU:291062321}  Med Admin  {[facility] DME PLVX:201492257}  Med Delivery   { YANA MED Delivery:850427814}    Wound Care Documentation and Therapy:  Wound 08/01/22 Sacrum (Active)   Wound Image   08/02/22 1218   Wound Etiology Pressure Unstageable 08/02/22 1218   Dressing Status Old drainage noted;New dressing applied 08/02/22 1218   Wound Cleansed Cleansed with saline 08/02/22 1218   Dressing/Treatment Moist to moist;Foam 08/02/22 1218   Wound Length (cm) 2.7 cm 08/02/22 1218   Wound Width (cm) 2 cm 08/02/22 1218   Wound Depth (cm) 0.4 cm 08/02/22 1218   Wound Surface Area (cm^2) 5.4 cm^2 08/02/22 1218   Wound Volume (cm^3) 2.16 cm^3 08/02/22 1218   Undermining Starts ___ O'Clock 1 08/02/22 1218   Undermining Ends___ O'Clock 3 08/02/22 1218   Undermining Maxium Distance (cm) 1 08/02/22 1218   Wound Assessment [place]/red;Slough 08/02/22 1218   Drainage Amount Moderate 08/02/22 1218   Drainage Description Serosanguinous 08/02/22 1218   Odor None 08/02/22 1218   [place]-wound Assessment Blanchable erythema;Denuded;Erosion 08/02/22 1218   Margins Defined edges;Epibole (rolled edges) 08/02/22 1218   Number of days: 0       Wound 08/01/22 Leg Left;Upper (Active)   Wound Image   08/01/22 2130   Wound Etiology Other 08/02/22 1030   Dressing Status Other (Comment) 08/02/22 1030   Dressing/Treatment Open to air 08/01/22 2130   Number of days: 0     Coccyx: Cleanse with foam cleanser, pat dry. Apply thin layer of triad cream to periwound. Apply santyl (nickel thickness) to wound, cover with saline moistened gauze and sacral foam dressing. Change daily and as needed if loose or soiled. Elimination:  Continence:    Bowel: {YES / NY:30194}  Bladder: {YES / RY:41631}  Urinary Catheter: {Urinary Catheter:588639792}   Colostomy/Ileostomy/Ileal Conduit: {YES / HP:35465}       Date of Last BM: ***    Intake/Output Summary (Last 24 hours) at 8/2/2022 1448  Last data filed at 8/2/2022 1223  Gross per 24 hour   Intake 535 ml   Output 2025 ml   Net -1490 ml     I/O last 3 completed shifts: In: 535 [P.O.:360; I.V.:125; IV Piggyback:50]  Out: 1150 [Urine:1150]    Safety Concerns:     None    Impairments/Disabilities:      Vision and Paralysis - quadriplegic     Nutrition Therapy:  Current Nutrition Therapy:   - Oral Diet:  General    Routes of Feeding: Oral  Liquids: Thin Liquids  Daily Fluid Restriction: no  Last Modified Barium Swallow with Video (Video Swallowing Test): not done    Treatments at the Time of Hospital Discharge:   Respiratory Treatments: ***  Oxygen Therapy:  is not on home oxygen therapy. Ventilator:    - No ventilator support    Rehab Therapies: Physical Therapy and Occupational Therapy  Weight Bearing Status/Restrictions: No weight bearing restrictions  Other Medical Equipment (for information only, NOT a DME order):  ***  Other Treatments: ***    Patient's personal belongings (please select all that are sent with patient):  Glasses    RN SIGNATURE:  Electronically signed by Jerad Huff RN on 8/9/22 at 4:11 PM EDT    CASE MANAGEMENT/SOCIAL WORK SECTION    Inpatient Status Date: 8/1/2022    Readmission Risk Assessment Score:  Readmission Risk              Risk of Unplanned Readmission:  0           Discharging to Facility/ Agency   [address]   [place]  P: [phone]  F:     Dialysis Facility (if applicable)   Name:  Address:  Dialysis Schedule:  Phone:  Fax:    / signature: Electronically signed by AKIRA Patel on 8/9/22 at 2:50 PM EDT    PHYSICIAN SECTION    Prognosis: Fair    Condition at Discharge: Stable    Rehab Potential (if transferring to Rehab):  Fair    Recommended Labs or Other Treatments After Discharge: ***    Physician Certification: I certify the above information and transfer of Ian Sam  is necessary for the continuing treatment of the diagnosis listed and that he requires [place] for less 30 days.      Update Admission H&P: No change in H&P    PHYSICIAN SIGNATURE:  Electronically signed by Jael Santizo MD on 8/3/22 at 11:04 AM EDT

## 2022-08-02 NOTE — PROGRESS NOTES
Home meds of neurontin and tramadol locked in main ICU narcotic lock box. Other home med placed in patients nurse . All home meds that were ordered were reviewed by pharmacy.
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
## 2022-08-02 NOTE — PLAN OF CARE
Problem: Discharge Planning  Goal: Discharge to home or other facility with appropriate resources  Outcome: Progressing     Problem: Pain  Goal: Verbalizes/displays adequate comfort level or baseline comfort level  Outcome: Progressing  Note: Patient denies chest pain. Positioned comfortably. Problem: Skin/Tissue Integrity  Goal: Absence of new skin breakdown  Description: 1. Monitor for areas of redness and/or skin breakdown  2. Assess vascular access sites hourly  3. Every 4-6 hours minimum:  Change oxygen saturation probe site  4. Every 4-6 hours:  If on nasal continuous positive airway pressure, respiratory therapy assess nares and determine need for appliance change or resting period. Outcome: Progressing  Note: Patient with limited mobility. Waffle mattress applied. Mepelix on coccyx wound Turned and repositioned. Problem: Cardiovascular - Adult  Goal: Maintains optimal cardiac output and hemodynamic stability  Outcome: Progressing  Note: Denies chest pain. VS stable. Telemetry NSR.   Goal: Absence of cardiac dysrhythmias or at baseline  Outcome: Progressing

## 2022-08-03 LAB
ANION GAP SERPL CALCULATED.3IONS-SCNC: 6 MMOL/L (ref 9–17)
BUN BLDV-MCNC: 13 MG/DL (ref 8–23)
CALCIUM SERPL-MCNC: 8.8 MG/DL (ref 8.6–10.4)
CHLORIDE BLD-SCNC: 101 MMOL/L (ref 98–107)
CO2: 30 MMOL/L (ref 20–31)
CREAT SERPL-MCNC: <0.4 MG/DL (ref 0.7–1.2)
GFR AFRICAN AMERICAN: ABNORMAL ML/MIN
GFR NON-AFRICAN AMERICAN: ABNORMAL ML/MIN
GFR SERPL CREATININE-BSD FRML MDRD: ABNORMAL ML/MIN/{1.73_M2}
GLUCOSE BLD-MCNC: 113 MG/DL (ref 70–99)
POTASSIUM SERPL-SCNC: 4 MMOL/L (ref 3.7–5.3)
SODIUM BLD-SCNC: 137 MMOL/L (ref 135–144)

## 2022-08-03 PROCEDURE — 97110 THERAPEUTIC EXERCISES: CPT

## 2022-08-03 PROCEDURE — 90792 PSYCH DIAG EVAL W/MED SRVCS: CPT | Performed by: PSYCHIATRY & NEUROLOGY

## 2022-08-03 PROCEDURE — 6360000002 HC RX W HCPCS: Performed by: NURSE PRACTITIONER

## 2022-08-03 PROCEDURE — 2580000003 HC RX 258: Performed by: NURSE PRACTITIONER

## 2022-08-03 PROCEDURE — G0378 HOSPITAL OBSERVATION PER HR: HCPCS

## 2022-08-03 PROCEDURE — 80048 BASIC METABOLIC PNL TOTAL CA: CPT

## 2022-08-03 PROCEDURE — 6370000000 HC RX 637 (ALT 250 FOR IP): Performed by: INTERNAL MEDICINE

## 2022-08-03 PROCEDURE — 6370000000 HC RX 637 (ALT 250 FOR IP): Performed by: NURSE PRACTITIONER

## 2022-08-03 PROCEDURE — 97535 SELF CARE MNGMENT TRAINING: CPT

## 2022-08-03 PROCEDURE — 36415 COLL VENOUS BLD VENIPUNCTURE: CPT

## 2022-08-03 PROCEDURE — 6370000000 HC RX 637 (ALT 250 FOR IP)

## 2022-08-03 PROCEDURE — 97530 THERAPEUTIC ACTIVITIES: CPT

## 2022-08-03 PROCEDURE — 96368 THER/DIAG CONCURRENT INF: CPT

## 2022-08-03 PROCEDURE — 96366 THER/PROPH/DIAG IV INF ADDON: CPT

## 2022-08-03 PROCEDURE — 96372 THER/PROPH/DIAG INJ SC/IM: CPT

## 2022-08-03 PROCEDURE — 99225 PR SBSQ OBSERVATION CARE/DAY 25 MINUTES: CPT | Performed by: INTERNAL MEDICINE

## 2022-08-03 RX ORDER — LANOLIN ALCOHOL/MO/W.PET/CERES
400 CREAM (GRAM) TOPICAL DAILY
Status: CANCELLED | OUTPATIENT
Start: 2022-08-04

## 2022-08-03 RX ORDER — CLOPIDOGREL BISULFATE 75 MG/1
75 TABLET ORAL DAILY
Status: CANCELLED | OUTPATIENT
Start: 2022-08-04

## 2022-08-03 RX ORDER — CARVEDILOL 12.5 MG/1
12.5 TABLET ORAL 2 TIMES DAILY
Status: CANCELLED | OUTPATIENT
Start: 2022-08-03

## 2022-08-03 RX ORDER — FERROUS SULFATE 325(65) MG
325 TABLET ORAL
Status: CANCELLED | OUTPATIENT
Start: 2022-08-04

## 2022-08-03 RX ORDER — ASPIRIN 81 MG/1
81 TABLET, CHEWABLE ORAL DAILY
Status: CANCELLED | OUTPATIENT
Start: 2022-08-04

## 2022-08-03 RX ORDER — GABAPENTIN 100 MG/1
100 CAPSULE ORAL 2 TIMES DAILY
Qty: 60 CAPSULE | Refills: 1 | Status: SHIPPED | OUTPATIENT
Start: 2022-08-03 | End: 2022-09-02

## 2022-08-03 RX ORDER — SODIUM CHLORIDE 9 MG/ML
INJECTION, SOLUTION INTRAVENOUS PRN
Status: CANCELLED | OUTPATIENT
Start: 2022-08-03

## 2022-08-03 RX ORDER — CARBOXYMETHYLCELLULOSE SODIUM 10 MG/ML
2 GEL OPHTHALMIC 3 TIMES DAILY
Status: CANCELLED | OUTPATIENT
Start: 2022-08-03

## 2022-08-03 RX ORDER — TRAZODONE HYDROCHLORIDE 150 MG/1
150 TABLET ORAL NIGHTLY
Qty: 90 TABLET | Refills: 1 | Status: SHIPPED | OUTPATIENT
Start: 2022-08-03 | End: 2022-08-04 | Stop reason: SDUPTHER

## 2022-08-03 RX ORDER — TAMSULOSIN HYDROCHLORIDE 0.4 MG/1
0.4 CAPSULE ORAL NIGHTLY
Status: CANCELLED | OUTPATIENT
Start: 2022-08-03

## 2022-08-03 RX ORDER — CYCLOBENZAPRINE HCL 10 MG
10 TABLET ORAL 3 TIMES DAILY PRN
Status: CANCELLED | OUTPATIENT
Start: 2022-08-03

## 2022-08-03 RX ORDER — AMLODIPINE BESYLATE 10 MG/1
10 TABLET ORAL DAILY
Status: CANCELLED | OUTPATIENT
Start: 2022-08-04

## 2022-08-03 RX ORDER — LIDOCAINE 4 G/G
1 PATCH TOPICAL DAILY
Status: CANCELLED | OUTPATIENT
Start: 2022-08-04

## 2022-08-03 RX ORDER — ATORVASTATIN CALCIUM 80 MG/1
80 TABLET, FILM COATED ORAL DAILY
Status: CANCELLED | OUTPATIENT
Start: 2022-08-04

## 2022-08-03 RX ORDER — TRAMADOL HYDROCHLORIDE 50 MG/1
50 TABLET ORAL 2 TIMES DAILY PRN
Qty: 6 TABLET | Refills: 0 | Status: SHIPPED | OUTPATIENT
Start: 2022-08-03 | End: 2022-08-06

## 2022-08-03 RX ADMIN — DIVALPROEX SODIUM 750 MG: 250 TABLET, EXTENDED RELEASE ORAL at 22:27

## 2022-08-03 RX ADMIN — ASPIRIN 81 MG: 81 TABLET, CHEWABLE ORAL at 10:59

## 2022-08-03 RX ADMIN — AMLODIPINE BESYLATE 10 MG: 5 TABLET ORAL at 11:00

## 2022-08-03 RX ADMIN — CARBOXYMETHYLCELLULOSE SODIUM 2 DROP: 10 GEL OPHTHALMIC at 14:35

## 2022-08-03 RX ADMIN — Medication 10 MG: at 22:30

## 2022-08-03 RX ADMIN — AZITHROMYCIN MONOHYDRATE 500 MG: 500 INJECTION, POWDER, LYOPHILIZED, FOR SOLUTION INTRAVENOUS at 22:38

## 2022-08-03 RX ADMIN — CARVEDILOL 12.5 MG: 12.5 TABLET ORAL at 22:28

## 2022-08-03 RX ADMIN — DIVALPROEX SODIUM 500 MG: 250 TABLET, DELAYED RELEASE ORAL at 10:53

## 2022-08-03 RX ADMIN — GABAPENTIN 100 MG: 100 CAPSULE ORAL at 11:15

## 2022-08-03 RX ADMIN — CARBOXYMETHYLCELLULOSE SODIUM 2 DROP: 10 GEL OPHTHALMIC at 22:29

## 2022-08-03 RX ADMIN — COLLAGENASE SANTYL: 250 OINTMENT TOPICAL at 11:06

## 2022-08-03 RX ADMIN — TRAZODONE HYDROCHLORIDE 150 MG: 100 TABLET ORAL at 22:31

## 2022-08-03 RX ADMIN — OMEPRAZOLE 20 MG: 20 CAPSULE, DELAYED RELEASE ORAL at 03:24

## 2022-08-03 RX ADMIN — CEFTRIAXONE SODIUM 1000 MG: 1 INJECTION, POWDER, FOR SOLUTION INTRAMUSCULAR; INTRAVENOUS at 22:39

## 2022-08-03 RX ADMIN — ACETAMINOPHEN 650 MG: 325 TABLET, FILM COATED ORAL at 22:26

## 2022-08-03 RX ADMIN — TRAZODONE HYDROCHLORIDE 150 MG: 100 TABLET ORAL at 03:16

## 2022-08-03 RX ADMIN — Medication 400 MG: at 11:00

## 2022-08-03 RX ADMIN — ENOXAPARIN SODIUM 40 MG: 100 INJECTION SUBCUTANEOUS at 11:04

## 2022-08-03 RX ADMIN — CARBOXYMETHYLCELLULOSE SODIUM 2 DROP: 10 GEL OPHTHALMIC at 11:02

## 2022-08-03 RX ADMIN — CLOPIDOGREL BISULFATE 75 MG: 75 TABLET ORAL at 11:01

## 2022-08-03 RX ADMIN — CARVEDILOL 12.5 MG: 12.5 TABLET ORAL at 11:03

## 2022-08-03 RX ADMIN — Medication 325 MG: at 10:54

## 2022-08-03 RX ADMIN — TAMSULOSIN HYDROCHLORIDE 0.4 MG: 0.4 CAPSULE ORAL at 22:27

## 2022-08-03 RX ADMIN — Medication 10 MG: at 03:16

## 2022-08-03 RX ADMIN — SODIUM CHLORIDE: 9 INJECTION, SOLUTION INTRAVENOUS at 11:20

## 2022-08-03 RX ADMIN — ATORVASTATIN CALCIUM 80 MG: 80 TABLET, FILM COATED ORAL at 11:00

## 2022-08-03 ASSESSMENT — PAIN DESCRIPTION - LOCATION
LOCATION: COCCYX
LOCATION: COCCYX;SHOULDER

## 2022-08-03 ASSESSMENT — PAIN SCALES - GENERAL
PAINLEVEL_OUTOF10: 3
PAINLEVEL_OUTOF10: 4
PAINLEVEL_OUTOF10: 4

## 2022-08-03 ASSESSMENT — PAIN DESCRIPTION - ORIENTATION: ORIENTATION: RIGHT

## 2022-08-03 ASSESSMENT — PAIN SCALES - WONG BAKER: WONGBAKER_NUMERICALRESPONSE: 6

## 2022-08-03 NOTE — PROGRESS NOTES
Rn called over to [facility] informing of pt being medically cleared an orders to admit, states will call RN back.

## 2022-08-03 NOTE — CARE COORDINATION
Patient provided SW with a list of facilities for skilled nursing. [address] [place] [address] -- does not have contract with [facility] -- not in network, no [place] benefit  [facility] -- no beds available   [employer] of network    SW sent referral to [address] [place] [address] via [address] [place] [address] Rd. Informed someone in admissions at facility. Electronically signed by Nathaly Floyd [facility] on 8/3/2022 at 12:59 PM     Per Craig Luna at [facility] [place] patient will not qualify for skilled nursing placement. The [facility] will not pay for long-term care. Patient will have to do private pay or Medicare. [phone]    Electronically signed by AKIRA Florez on 8/3/2022 at 2:36 PM     Patient is going to [facility] per psychiatrist note.  Electronically signed by AKIRA Florez on 8/3/2022 at 3:13 PM
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
## 2022-08-03 NOTE — PROGRESS NOTES
Rn spoke with Tia hernandez in [facility], states she spoke with Dr. Sara Evans an pt does not meet criteria to be admitted, pt is not homicidal or suicidal. They will not be accepting pt.    [facility] notified Dr. Cedrick Delong, awaiting response. [phone] Dr. Cedrick Delong aware of pt not being admitted to [facility], pt needs placement.

## 2022-08-03 NOTE — FLOWSHEET NOTE
08/03/22 0946   Encounter Summary   Support System [employer]ism/edel community  ([email or web address])   Plan and Referrals   Plan/Referrals Contacted support as requested per patient/family request Plan  ([address])
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
## 2022-08-03 NOTE — PLAN OF CARE
Problem: Discharge Planning  Goal: Discharge to home or other facility with appropriate resources  8/3/2022 0306 by Balwinder Foster RN  Outcome: Progressing  8/2/2022 1704 by Riri Salas RN  Outcome: Progressing  Note: Pt given list of facilities this shift for discharge. SW following, PT/OT on.   8/2/2022 1703 by Riri Salas RN  Outcome: Progressing  Note: Pt and son given list of facilities for SNF. PT/OT on, SW following. Problem: Pain  Goal: Verbalizes/displays adequate comfort level or baseline comfort level  8/3/2022 0306 by Balwinder Foster RN  Outcome: Progressing  Note: Patient denies pain. Positioned comfortably. 8/2/2022 1704 by Riri Salas RN  Outcome: Progressing  Note: Pt able to communicate discomfort, repositioning utilized for relief    8/2/2022 1703 by Riri Salas RN  Outcome: Progressing  Note: Pt able to verbalize discomfort. Repositioning utilized this shift for pain relief. Problem: Skin/Tissue Integrity  Goal: Absence of new skin breakdown  Description: 1. Monitor for areas of redness and/or skin breakdown  2. Assess vascular access sites hourly  3. Every 4-6 hours minimum:  Change oxygen saturation probe site  4. Every 4-6 hours:  If on nasal continuous positive airway pressure, respiratory therapy assess nares and determine need for appliance change or resting period. 8/3/2022 0306 by Balwinder Foster RN  Outcome: Progressing  Note: Buttocks excoriated with ulcer. Mepelix intact over buttocks ulcer. Waffle mattress in place. Assisted with turning and repositioning. 8/2/2022 1704 by Riri Salas RN  Outcome: Progressing  Note: Skin assessment complete. Waffle mattress in place. Pt turned and repositioned every two hours with assistance from staff. Area kept free from moisture. Proper nourishment and fluids encouraged, as appropriate. Will continue to monitor for additional needs and changes in skin breakdown.     8/2/2022 1703 by Riri Salas RN  Outcome: Progressing  Note: Wound care RN saw pts coccyx this shift, orders entered, see Melinda's note. Problem: Cardiovascular - Adult  Goal: Maintains optimal cardiac output and hemodynamic stability  Outcome: Progressing  Goal: Absence of cardiac dysrhythmias or at baseline  Outcome: Progressing     Problem: Chronic Conditions and Co-morbidities  Goal: Patient's chronic conditions and co-morbidity symptoms are monitored and maintained or improved  Outcome: Progressing     Problem: Safety - Adult  Goal: Free from fall injury  Outcome: Progressing  Note: Patient alert and oriented. Very weak. Bed alarm on. Using call light appropriately.
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
## 2022-08-03 NOTE — PLAN OF CARE
Problem: Discharge Planning  Goal: Discharge to home or other facility with appropriate resources  8/3/2022 1627 by Yoselyn Donis RN  Outcome: Progressing     Problem: Skin/Tissue Integrity  Goal: Absence of new skin breakdown  Description: 1. Monitor for areas of redness and/or skin breakdown  2. Assess vascular access sites hourly  3. Every 4-6 hours minimum:  Change oxygen saturation probe site  4. Every 4-6 hours:  If on nasal continuous positive airway pressure, respiratory therapy assess nares and determine need for appliance change or resting period.   8/3/2022 1627 by Yoselyn Donis RN  Outcome: Progressing     Problem: Cardiovascular - Adult  Goal: Maintains optimal cardiac output and hemodynamic stability  8/3/2022 1627 by Yoselyn Donis RN  Outcome: Progressing
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
## 2022-08-03 NOTE — PROGRESS NOTES
Rn rounded with Dr. Jennifer Salazar no further cardiac work-up at  this time, pt may resume medications an diet.

## 2022-08-04 LAB
ANION GAP SERPL CALCULATED.3IONS-SCNC: 7 MMOL/L (ref 9–17)
BUN BLDV-MCNC: 9 MG/DL (ref 8–23)
CALCIUM SERPL-MCNC: 8.6 MG/DL (ref 8.6–10.4)
CHLORIDE BLD-SCNC: 103 MMOL/L (ref 98–107)
CO2: 27 MMOL/L (ref 20–31)
CREAT SERPL-MCNC: <0.4 MG/DL (ref 0.7–1.2)
GFR AFRICAN AMERICAN: ABNORMAL ML/MIN
GFR NON-AFRICAN AMERICAN: ABNORMAL ML/MIN
GFR SERPL CREATININE-BSD FRML MDRD: ABNORMAL ML/MIN/{1.73_M2}
GLUCOSE BLD-MCNC: 94 MG/DL (ref 70–99)
POTASSIUM SERPL-SCNC: 4.2 MMOL/L (ref 3.7–5.3)
SODIUM BLD-SCNC: 137 MMOL/L (ref 135–144)

## 2022-08-04 PROCEDURE — G0378 HOSPITAL OBSERVATION PER HR: HCPCS

## 2022-08-04 PROCEDURE — 97535 SELF CARE MNGMENT TRAINING: CPT

## 2022-08-04 PROCEDURE — 6370000000 HC RX 637 (ALT 250 FOR IP): Performed by: NURSE PRACTITIONER

## 2022-08-04 PROCEDURE — 2580000003 HC RX 258: Performed by: NURSE PRACTITIONER

## 2022-08-04 PROCEDURE — 96376 TX/PRO/DX INJ SAME DRUG ADON: CPT

## 2022-08-04 PROCEDURE — 96372 THER/PROPH/DIAG INJ SC/IM: CPT

## 2022-08-04 PROCEDURE — 6370000000 HC RX 637 (ALT 250 FOR IP): Performed by: INTERNAL MEDICINE

## 2022-08-04 PROCEDURE — 97530 THERAPEUTIC ACTIVITIES: CPT

## 2022-08-04 PROCEDURE — 36415 COLL VENOUS BLD VENIPUNCTURE: CPT

## 2022-08-04 PROCEDURE — 96366 THER/PROPH/DIAG IV INF ADDON: CPT

## 2022-08-04 PROCEDURE — 99225 PR SBSQ OBSERVATION CARE/DAY 25 MINUTES: CPT | Performed by: INTERNAL MEDICINE

## 2022-08-04 PROCEDURE — 80048 BASIC METABOLIC PNL TOTAL CA: CPT

## 2022-08-04 PROCEDURE — 6360000002 HC RX W HCPCS: Performed by: NURSE PRACTITIONER

## 2022-08-04 PROCEDURE — 97110 THERAPEUTIC EXERCISES: CPT

## 2022-08-04 RX ORDER — TRAZODONE HYDROCHLORIDE 150 MG/1
150 TABLET ORAL NIGHTLY
Qty: 90 TABLET | Refills: 1 | Status: SHIPPED | OUTPATIENT
Start: 2022-08-04

## 2022-08-04 RX ORDER — VENLAFAXINE 75 MG/1
75 TABLET ORAL
Qty: 90 TABLET | Refills: 3 | Status: SHIPPED | OUTPATIENT
Start: 2022-08-04

## 2022-08-04 RX ADMIN — GABAPENTIN 100 MG: 100 CAPSULE ORAL at 21:46

## 2022-08-04 RX ADMIN — CARBOXYMETHYLCELLULOSE SODIUM 2 DROP: 10 GEL OPHTHALMIC at 21:48

## 2022-08-04 RX ADMIN — ENOXAPARIN SODIUM 40 MG: 100 INJECTION SUBCUTANEOUS at 09:06

## 2022-08-04 RX ADMIN — CARVEDILOL 12.5 MG: 12.5 TABLET ORAL at 21:49

## 2022-08-04 RX ADMIN — AZITHROMYCIN MONOHYDRATE 500 MG: 500 INJECTION, POWDER, LYOPHILIZED, FOR SOLUTION INTRAVENOUS at 23:06

## 2022-08-04 RX ADMIN — Medication 325 MG: at 09:08

## 2022-08-04 RX ADMIN — TRAZODONE HYDROCHLORIDE 150 MG: 100 TABLET ORAL at 21:45

## 2022-08-04 RX ADMIN — Medication 10 MG: at 21:46

## 2022-08-04 RX ADMIN — SODIUM CHLORIDE: 9 INJECTION, SOLUTION INTRAVENOUS at 16:15

## 2022-08-04 RX ADMIN — ASPIRIN 81 MG: 81 TABLET, CHEWABLE ORAL at 09:07

## 2022-08-04 RX ADMIN — CARBOXYMETHYLCELLULOSE SODIUM 2 DROP: 10 GEL OPHTHALMIC at 09:06

## 2022-08-04 RX ADMIN — ACETAMINOPHEN 650 MG: 325 TABLET, FILM COATED ORAL at 17:03

## 2022-08-04 RX ADMIN — TRAMADOL HYDROCHLORIDE 50 MG: 50 TABLET ORAL at 21:47

## 2022-08-04 RX ADMIN — AMLODIPINE BESYLATE 10 MG: 5 TABLET ORAL at 09:09

## 2022-08-04 RX ADMIN — CARVEDILOL 12.5 MG: 12.5 TABLET ORAL at 09:06

## 2022-08-04 RX ADMIN — CEFTRIAXONE SODIUM 1000 MG: 1 INJECTION, POWDER, FOR SOLUTION INTRAMUSCULAR; INTRAVENOUS at 22:05

## 2022-08-04 RX ADMIN — OMEPRAZOLE 20 MG: 20 CAPSULE, DELAYED RELEASE ORAL at 09:09

## 2022-08-04 RX ADMIN — GABAPENTIN 100 MG: 100 CAPSULE ORAL at 09:37

## 2022-08-04 RX ADMIN — COLLAGENASE SANTYL: 250 OINTMENT TOPICAL at 09:06

## 2022-08-04 RX ADMIN — ATORVASTATIN CALCIUM 80 MG: 80 TABLET, FILM COATED ORAL at 09:08

## 2022-08-04 RX ADMIN — CARBOXYMETHYLCELLULOSE SODIUM 2 DROP: 10 GEL OPHTHALMIC at 16:14

## 2022-08-04 RX ADMIN — TAMSULOSIN HYDROCHLORIDE 0.4 MG: 0.4 CAPSULE ORAL at 21:45

## 2022-08-04 RX ADMIN — DIVALPROEX SODIUM 500 MG: 250 TABLET, DELAYED RELEASE ORAL at 09:11

## 2022-08-04 RX ADMIN — Medication 10 MG: at 09:12

## 2022-08-04 RX ADMIN — DIVALPROEX SODIUM 750 MG: 250 TABLET, EXTENDED RELEASE ORAL at 21:48

## 2022-08-04 RX ADMIN — CLOPIDOGREL BISULFATE 75 MG: 75 TABLET ORAL at 09:07

## 2022-08-04 RX ADMIN — Medication 400 MG: at 09:07

## 2022-08-04 ASSESSMENT — PAIN SCALES - GENERAL
PAINLEVEL_OUTOF10: 7
PAINLEVEL_OUTOF10: 7

## 2022-08-04 ASSESSMENT — PAIN SCALES - WONG BAKER: WONGBAKER_NUMERICALRESPONSE: 6

## 2022-08-04 ASSESSMENT — PAIN DESCRIPTION - LOCATION: LOCATION: COCCYX
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
## 2022-08-04 NOTE — PLAN OF CARE
Problem: Discharge Planning  Goal: Discharge to home or other facility with appropriate resources  8/3/2022 2018 by Cash Mcmillan RN  Outcome: Progressing  8/3/2022 1627 by Yoselyn Donis RN  Outcome: Progressing     Problem: Pain  Goal: Verbalizes/displays adequate comfort level or baseline comfort level  8/3/2022 2018 by Cash Mcmillan RN  Outcome: Progressing  8/3/2022 1627 by Yoselyn Donis RN  Outcome: Progressing  Flowsheets (Taken 8/3/2022 1627)  Verbalizes/displays adequate comfort level or baseline comfort level: Consider cultural and social influences on pain and pain management     Problem: Skin/Tissue Integrity  Goal: Absence of new skin breakdown  Description: 1. Monitor for areas of redness and/or skin breakdown  2. Assess vascular access sites hourly  3. Every 4-6 hours minimum:  Change oxygen saturation probe site  4. Every 4-6 hours:  If on nasal continuous positive airway pressure, respiratory therapy assess nares and determine need for appliance change or resting period.   8/3/2022 2018 by Cash Mcmillan RN  Outcome: Progressing  8/3/2022 1627 by Yoselyn Donis RN  Outcome: Progressing     Problem: Cardiovascular - Adult  Goal: Maintains optimal cardiac output and hemodynamic stability  8/3/2022 2018 by Cash Mcmillan RN  Outcome: Progressing  8/3/2022 1627 by Yoselyn Donis RN  Outcome: Progressing  Goal: Absence of cardiac dysrhythmias or at baseline  Outcome: Progressing     Problem: Chronic Conditions and Co-morbidities  Goal: Patient's chronic conditions and co-morbidity symptoms are monitored and maintained or improved  8/3/2022 2018 by Cash Mcmillan RN  Outcome: Progressing  8/3/2022 1627 by Yoselyn Donis RN  Outcome: Progressing     Problem: Safety - Adult  Goal: Free from fall injury  Outcome: Progressing     Problem: ABCDS Injury Assessment  Goal: Absence of physical injury  Outcome: Progressing
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
## 2022-08-04 NOTE — PLAN OF CARE
Problem: Discharge Planning  Goal: Discharge to home or other facility with appropriate resources  Outcome: Progressing     Problem: Pain  Goal: Verbalizes/displays adequate comfort level or baseline comfort level  Outcome: Progressing     Problem: Skin/Tissue Integrity  Goal: Absence of new skin breakdown  Description: 1. Monitor for areas of redness and/or skin breakdown  2. Assess vascular access sites hourly  3. Every 4-6 hours minimum:  Change oxygen saturation probe site  4. Every 4-6 hours:  If on nasal continuous positive airway pressure, respiratory therapy assess nares and determine need for appliance change or resting period.   Outcome: Progressing     Problem: Cardiovascular - Adult  Goal: Maintains optimal cardiac output and hemodynamic stability  Outcome: Progressing     Problem: Cardiovascular - Adult  Goal: Absence of cardiac dysrhythmias or at baseline  Outcome: Progressing     Problem: Safety - Adult  Goal: Free from fall injury  Outcome: Progressing

## 2022-08-05 PROCEDURE — 2580000003 HC RX 258: Performed by: NURSE PRACTITIONER

## 2022-08-05 PROCEDURE — G0378 HOSPITAL OBSERVATION PER HR: HCPCS

## 2022-08-05 PROCEDURE — 99225 PR SBSQ OBSERVATION CARE/DAY 25 MINUTES: CPT | Performed by: INTERNAL MEDICINE

## 2022-08-05 PROCEDURE — 96376 TX/PRO/DX INJ SAME DRUG ADON: CPT

## 2022-08-05 PROCEDURE — 97530 THERAPEUTIC ACTIVITIES: CPT

## 2022-08-05 PROCEDURE — 97110 THERAPEUTIC EXERCISES: CPT

## 2022-08-05 PROCEDURE — 6360000002 HC RX W HCPCS: Performed by: NURSE PRACTITIONER

## 2022-08-05 PROCEDURE — 96372 THER/PROPH/DIAG INJ SC/IM: CPT

## 2022-08-05 PROCEDURE — 96366 THER/PROPH/DIAG IV INF ADDON: CPT

## 2022-08-05 PROCEDURE — 6370000000 HC RX 637 (ALT 250 FOR IP): Performed by: INTERNAL MEDICINE

## 2022-08-05 PROCEDURE — 6370000000 HC RX 637 (ALT 250 FOR IP): Performed by: NURSE PRACTITIONER

## 2022-08-05 RX ADMIN — DIVALPROEX SODIUM 750 MG: 250 TABLET, EXTENDED RELEASE ORAL at 21:00

## 2022-08-05 RX ADMIN — Medication 400 MG: at 09:23

## 2022-08-05 RX ADMIN — GABAPENTIN 100 MG: 100 CAPSULE ORAL at 09:43

## 2022-08-05 RX ADMIN — TAMSULOSIN HYDROCHLORIDE 0.4 MG: 0.4 CAPSULE ORAL at 21:00

## 2022-08-05 RX ADMIN — TRAMADOL HYDROCHLORIDE 50 MG: 50 TABLET ORAL at 09:43

## 2022-08-05 RX ADMIN — AMLODIPINE BESYLATE 10 MG: 5 TABLET ORAL at 09:25

## 2022-08-05 RX ADMIN — Medication 325 MG: at 09:27

## 2022-08-05 RX ADMIN — SODIUM CHLORIDE, PRESERVATIVE FREE 10 ML: 5 INJECTION INTRAVENOUS at 21:00

## 2022-08-05 RX ADMIN — Medication 10 MG: at 21:23

## 2022-08-05 RX ADMIN — ASPIRIN 81 MG: 81 TABLET, CHEWABLE ORAL at 09:23

## 2022-08-05 RX ADMIN — DIVALPROEX SODIUM 500 MG: 250 TABLET, DELAYED RELEASE ORAL at 09:24

## 2022-08-05 RX ADMIN — SODIUM CHLORIDE, PRESERVATIVE FREE 10 ML: 5 INJECTION INTRAVENOUS at 09:19

## 2022-08-05 RX ADMIN — CEFTRIAXONE SODIUM 1000 MG: 1 INJECTION, POWDER, FOR SOLUTION INTRAMUSCULAR; INTRAVENOUS at 20:26

## 2022-08-05 RX ADMIN — CARBOXYMETHYLCELLULOSE SODIUM 2 DROP: 10 GEL OPHTHALMIC at 21:44

## 2022-08-05 RX ADMIN — TRAMADOL HYDROCHLORIDE 50 MG: 50 TABLET ORAL at 21:23

## 2022-08-05 RX ADMIN — CARVEDILOL 12.5 MG: 12.5 TABLET ORAL at 21:23

## 2022-08-05 RX ADMIN — OMEPRAZOLE 20 MG: 20 CAPSULE, DELAYED RELEASE ORAL at 05:56

## 2022-08-05 RX ADMIN — TRAZODONE HYDROCHLORIDE 150 MG: 100 TABLET ORAL at 20:25

## 2022-08-05 RX ADMIN — SODIUM CHLORIDE: 9 INJECTION, SOLUTION INTRAVENOUS at 04:23

## 2022-08-05 RX ADMIN — GABAPENTIN 100 MG: 100 CAPSULE ORAL at 21:23

## 2022-08-05 RX ADMIN — CLOPIDOGREL BISULFATE 75 MG: 75 TABLET ORAL at 09:23

## 2022-08-05 RX ADMIN — CARVEDILOL 12.5 MG: 12.5 TABLET ORAL at 09:27

## 2022-08-05 RX ADMIN — CARBOXYMETHYLCELLULOSE SODIUM 2 DROP: 10 GEL OPHTHALMIC at 15:08

## 2022-08-05 RX ADMIN — ATORVASTATIN CALCIUM 80 MG: 80 TABLET, FILM COATED ORAL at 09:26

## 2022-08-05 RX ADMIN — ENOXAPARIN SODIUM 40 MG: 100 INJECTION SUBCUTANEOUS at 09:19

## 2022-08-05 RX ADMIN — COLLAGENASE SANTYL: 250 OINTMENT TOPICAL at 09:24

## 2022-08-05 RX ADMIN — ACETAMINOPHEN 650 MG: 325 TABLET, FILM COATED ORAL at 20:26

## 2022-08-05 RX ADMIN — Medication 10 MG: at 09:26

## 2022-08-05 RX ADMIN — CARBOXYMETHYLCELLULOSE SODIUM 2 DROP: 10 GEL OPHTHALMIC at 09:24

## 2022-08-05 RX ADMIN — AZITHROMYCIN MONOHYDRATE 500 MG: 500 INJECTION, POWDER, LYOPHILIZED, FOR SOLUTION INTRAVENOUS at 21:35

## 2022-08-05 ASSESSMENT — PAIN SCALES - WONG BAKER
WONGBAKER_NUMERICALRESPONSE: 6
WONGBAKER_NUMERICALRESPONSE: 6

## 2022-08-05 ASSESSMENT — PAIN DESCRIPTION - LOCATION
LOCATION: GENERALIZED
LOCATION: BUTTOCKS

## 2022-08-05 ASSESSMENT — PAIN SCALES - GENERAL
PAINLEVEL_OUTOF10: 6
PAINLEVEL_OUTOF10: 3
PAINLEVEL_OUTOF10: 6
PAINLEVEL_OUTOF10: 0

## 2022-08-05 ASSESSMENT — PAIN DESCRIPTION - FREQUENCY
FREQUENCY: CONTINUOUS
FREQUENCY: CONTINUOUS

## 2022-08-05 ASSESSMENT — PAIN DESCRIPTION - PAIN TYPE: TYPE: ACUTE PAIN

## 2022-08-05 ASSESSMENT — PAIN DESCRIPTION - ONSET: ONSET: ON-GOING

## 2022-08-05 ASSESSMENT — PAIN DESCRIPTION - DESCRIPTORS: DESCRIPTORS: DISCOMFORT

## 2022-08-05 ASSESSMENT — PAIN - FUNCTIONAL ASSESSMENT: PAIN_FUNCTIONAL_ASSESSMENT: PREVENTS OR INTERFERES WITH MANY ACTIVE NOT PASSIVE ACTIVITIES
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
## 2022-08-05 NOTE — PROGRESS NOTES
Pt arrived to the unit via stretcher from PCU. Pt a/o x4. Patient oriented to unit and educated on using the call light. Bed alarm placed on. Vitals obtained. Admission assessment completed .

## 2022-08-05 NOTE — PROGRESS NOTES
Pt transferred to room 2064 per bed with belongings, meds, chart. Tramadol and Gabapentin amounts verified with Marichuy Torres RN at handoff.

## 2022-08-05 NOTE — CARE COORDINATION
Writer advised we are having a very difficult time getting patient placed into a SNF. Writer called Cleo Solares at [employer] since [place] Trejo is on Vacation. Tr Martin emailed medicaid application to Zolt[facility] Del [facility]le. Writer took paperwork into patient room and spoke to patient about our difficultly having placement for him and that he doesn't have a secondary insurance . Asked patient about applying for medicaid. Patient advised he is willing to do that but needs help. Writer asked if we can call his son Marcelo Mesa and talk about it. Patient was agreeable. Writer called Vik and Explained situation. Vik understands and is agreeable to help father complete paperwork. Marcelo Mesa advised he will be in on Saturday to complete the paperwork with his father so it can be faxed on Monday morning. Medicaid application left on patients bedside night stand. Notified Lauralyn Greenway of Plan to apply for medicaid.     Electronically signed by Martha Craig RN on 8/5/2022 at 3:08 PM

## 2022-08-05 NOTE — PLAN OF CARE
Problem: Discharge Planning  Goal: Discharge to home or other facility with appropriate resources  8/5/2022 0405 by Pat Farmer RN  Outcome: Progressing     Problem: Pain  Goal: Verbalizes/displays adequate comfort level or baseline comfort level  8/5/2022 0405 by Pat Farmer RN  Outcome: Progressing     Problem: Skin/Tissue Integrity  Goal: Absence of new skin breakdown  Description: 1. Monitor for areas of redness and/or skin breakdown  2. Assess vascular access sites hourly  3. Every 4-6 hours minimum:  Change oxygen saturation probe site  4. Every 4-6 hours:  If on nasal continuous positive airway pressure, respiratory therapy assess nares and determine need for appliance change or resting period.   8/5/2022 0405 by Pat Farmer RN  Outcome: Progressing     Problem: Cardiovascular - Adult  Goal: Maintains optimal cardiac output and hemodynamic stability  8/5/2022 0405 by Pat Farmer RN  Outcome: Progressing     Problem: Cardiovascular - Adult  Goal: Absence of cardiac dysrhythmias or at baseline  8/5/2022 0405 by Pat Farmer RN  Outcome: Progressing     Problem: Safety - Adult  Goal: Free from fall injury  8/5/2022 0405 by Pat Farmer RN  Outcome: Progressing

## 2022-08-05 NOTE — FLOWSHEET NOTE
08/05/22 1734   Encounter Summary   Encounter Overview/Reason  Spiritual/Emotional Needs   Service Provided For: Patient   Referral/Consult From: Rounding   Complexity of Encounter Low   Spiritual/Emotional needs   Type Spiritual Support   Assessment/Intervention/Outcome   Assessment Unable to assess  (patient sleeping)   Intervention Prayer (assurance of)/[place]

## 2022-08-06 PROCEDURE — 96366 THER/PROPH/DIAG IV INF ADDON: CPT

## 2022-08-06 PROCEDURE — 99225 PR SBSQ OBSERVATION CARE/DAY 25 MINUTES: CPT | Performed by: INTERNAL MEDICINE

## 2022-08-06 PROCEDURE — G0378 HOSPITAL OBSERVATION PER HR: HCPCS

## 2022-08-06 PROCEDURE — 6370000000 HC RX 637 (ALT 250 FOR IP): Performed by: NURSE PRACTITIONER

## 2022-08-06 PROCEDURE — 96372 THER/PROPH/DIAG INJ SC/IM: CPT

## 2022-08-06 PROCEDURE — 6370000000 HC RX 637 (ALT 250 FOR IP): Performed by: INTERNAL MEDICINE

## 2022-08-06 PROCEDURE — 6360000002 HC RX W HCPCS: Performed by: NURSE PRACTITIONER

## 2022-08-06 PROCEDURE — 2580000003 HC RX 258: Performed by: NURSE PRACTITIONER

## 2022-08-06 RX ADMIN — SODIUM CHLORIDE: 9 INJECTION, SOLUTION INTRAVENOUS at 10:54

## 2022-08-06 RX ADMIN — AMLODIPINE BESYLATE 10 MG: 5 TABLET ORAL at 08:05

## 2022-08-06 RX ADMIN — GABAPENTIN 100 MG: 100 CAPSULE ORAL at 21:12

## 2022-08-06 RX ADMIN — Medication 400 MG: at 08:05

## 2022-08-06 RX ADMIN — CARBOXYMETHYLCELLULOSE SODIUM 2 DROP: 10 GEL OPHTHALMIC at 08:07

## 2022-08-06 RX ADMIN — CARBOXYMETHYLCELLULOSE SODIUM 2 DROP: 10 GEL OPHTHALMIC at 21:11

## 2022-08-06 RX ADMIN — ENOXAPARIN SODIUM 40 MG: 100 INJECTION SUBCUTANEOUS at 08:09

## 2022-08-06 RX ADMIN — COLLAGENASE SANTYL: 250 OINTMENT TOPICAL at 11:17

## 2022-08-06 RX ADMIN — CARVEDILOL 12.5 MG: 12.5 TABLET ORAL at 21:11

## 2022-08-06 RX ADMIN — DIVALPROEX SODIUM 750 MG: 250 TABLET, EXTENDED RELEASE ORAL at 21:14

## 2022-08-06 RX ADMIN — Medication 325 MG: at 08:06

## 2022-08-06 RX ADMIN — TRAZODONE HYDROCHLORIDE 150 MG: 100 TABLET ORAL at 21:16

## 2022-08-06 RX ADMIN — SODIUM CHLORIDE, PRESERVATIVE FREE 10 ML: 5 INJECTION INTRAVENOUS at 21:16

## 2022-08-06 RX ADMIN — CARBOXYMETHYLCELLULOSE SODIUM 2 DROP: 10 GEL OPHTHALMIC at 13:43

## 2022-08-06 RX ADMIN — TRAMADOL HYDROCHLORIDE 50 MG: 50 TABLET ORAL at 08:30

## 2022-08-06 RX ADMIN — ASPIRIN 81 MG: 81 TABLET, CHEWABLE ORAL at 08:02

## 2022-08-06 RX ADMIN — GABAPENTIN 100 MG: 100 CAPSULE ORAL at 08:29

## 2022-08-06 RX ADMIN — CEFTRIAXONE SODIUM 1000 MG: 1 INJECTION, POWDER, FOR SOLUTION INTRAMUSCULAR; INTRAVENOUS at 21:17

## 2022-08-06 RX ADMIN — TAMSULOSIN HYDROCHLORIDE 0.4 MG: 0.4 CAPSULE ORAL at 21:16

## 2022-08-06 RX ADMIN — OMEPRAZOLE 20 MG: 20 CAPSULE, DELAYED RELEASE ORAL at 08:06

## 2022-08-06 RX ADMIN — DIVALPROEX SODIUM 500 MG: 250 TABLET, DELAYED RELEASE ORAL at 08:04

## 2022-08-06 RX ADMIN — CLOPIDOGREL BISULFATE 75 MG: 75 TABLET ORAL at 08:03

## 2022-08-06 RX ADMIN — CARVEDILOL 12.5 MG: 12.5 TABLET ORAL at 08:03

## 2022-08-06 RX ADMIN — ATORVASTATIN CALCIUM 80 MG: 80 TABLET, FILM COATED ORAL at 08:07

## 2022-08-06 RX ADMIN — Medication 10 MG: at 08:10

## 2022-08-06 ASSESSMENT — PAIN SCALES - GENERAL
PAINLEVEL_OUTOF10: 5
PAINLEVEL_OUTOF10: 5

## 2022-08-06 NOTE — CARE COORDINATION
ONGOING DISCHARGE PLANNING NOTE:    Writer reviewed LSW notes, and discharge plan is SNF. Difficult time with placement.     Son to apply for medicaid, awaiting number    Electronically signed by Carlita Connors RN on 8/6/2022 at 1:07 PM
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
## 2022-08-06 NOTE — PLAN OF CARE
Problem: Discharge Planning  Goal: Discharge to home or other facility with appropriate resources  Outcome: Progressing  Flowsheets (Taken 8/6/2022 0941)  Discharge to home or other facility with appropriate resources: Identify barriers to discharge with patient and caregiver     Problem: Pain  Goal: Verbalizes/displays adequate comfort level or baseline comfort level  Outcome: Progressing     Problem: Skin/Tissue Integrity  Goal: Absence of new skin breakdown  Description: 1. Monitor for areas of redness and/or skin breakdown  2. Assess vascular access sites hourly  3. Every 4-6 hours minimum:  Change oxygen saturation probe site  4. Every 4-6 hours:  If on nasal continuous positive airway pressure, respiratory therapy assess nares and determine need for appliance change or resting period.   Outcome: Progressing     Problem: Cardiovascular - Adult  Goal: Maintains optimal cardiac output and hemodynamic stability  Outcome: Progressing  Flowsheets (Taken 8/6/2022 0941)  Maintains optimal cardiac output and hemodynamic stability: Monitor blood pressure and heart rate  Goal: Absence of cardiac dysrhythmias or at baseline  Outcome: Progressing  Flowsheets (Taken 8/6/2022 0941)  Absence of cardiac dysrhythmias or at baseline: Monitor cardiac rate and rhythm     Problem: Chronic Conditions and Co-morbidities  Goal: Patient's chronic conditions and co-morbidity symptoms are monitored and maintained or improved  Outcome: Progressing  Flowsheets (Taken 8/6/2022 0941)  Care Plan - Patient's Chronic Conditions and Co-Morbidity Symptoms are Monitored and Maintained or Improved: Monitor and assess patient's chronic conditions and comorbid symptoms for stability, deterioration, or improvement     Problem: Safety - Adult  Goal: Free from fall injury  Outcome: Progressing     Problem: ABCDS Injury Assessment  Goal: Absence of physical injury  Outcome: Progressing

## 2022-08-07 PROCEDURE — 96372 THER/PROPH/DIAG INJ SC/IM: CPT

## 2022-08-07 PROCEDURE — 6370000000 HC RX 637 (ALT 250 FOR IP): Performed by: NURSE PRACTITIONER

## 2022-08-07 PROCEDURE — 6370000000 HC RX 637 (ALT 250 FOR IP): Performed by: INTERNAL MEDICINE

## 2022-08-07 PROCEDURE — 2500000003 HC RX 250 WO HCPCS: Performed by: INTERNAL MEDICINE

## 2022-08-07 PROCEDURE — 96376 TX/PRO/DX INJ SAME DRUG ADON: CPT

## 2022-08-07 PROCEDURE — 6360000002 HC RX W HCPCS: Performed by: NURSE PRACTITIONER

## 2022-08-07 PROCEDURE — G0378 HOSPITAL OBSERVATION PER HR: HCPCS

## 2022-08-07 PROCEDURE — 2580000003 HC RX 258: Performed by: NURSE PRACTITIONER

## 2022-08-07 PROCEDURE — 99225 PR SBSQ OBSERVATION CARE/DAY 25 MINUTES: CPT | Performed by: INTERNAL MEDICINE

## 2022-08-07 RX ADMIN — ATORVASTATIN CALCIUM 80 MG: 80 TABLET, FILM COATED ORAL at 07:32

## 2022-08-07 RX ADMIN — CARBOXYMETHYLCELLULOSE SODIUM 2 DROP: 10 GEL OPHTHALMIC at 07:37

## 2022-08-07 RX ADMIN — Medication 10 MG: at 00:36

## 2022-08-07 RX ADMIN — ANTI-FUNGAL POWDER MICONAZOLE NITRATE TALC FREE: 1.42 POWDER TOPICAL at 10:56

## 2022-08-07 RX ADMIN — SODIUM CHLORIDE: 9 INJECTION, SOLUTION INTRAVENOUS at 00:41

## 2022-08-07 RX ADMIN — AMLODIPINE BESYLATE 10 MG: 5 TABLET ORAL at 07:32

## 2022-08-07 RX ADMIN — CEFTRIAXONE SODIUM 1000 MG: 1 INJECTION, POWDER, FOR SOLUTION INTRAMUSCULAR; INTRAVENOUS at 21:34

## 2022-08-07 RX ADMIN — SODIUM CHLORIDE: 9 INJECTION, SOLUTION INTRAVENOUS at 13:47

## 2022-08-07 RX ADMIN — CARVEDILOL 12.5 MG: 12.5 TABLET ORAL at 21:30

## 2022-08-07 RX ADMIN — TRAMADOL HYDROCHLORIDE 50 MG: 50 TABLET ORAL at 07:38

## 2022-08-07 RX ADMIN — GABAPENTIN 100 MG: 100 CAPSULE ORAL at 07:38

## 2022-08-07 RX ADMIN — SODIUM CHLORIDE, PRESERVATIVE FREE 10 ML: 5 INJECTION INTRAVENOUS at 21:30

## 2022-08-07 RX ADMIN — ENOXAPARIN SODIUM 40 MG: 100 INJECTION SUBCUTANEOUS at 07:37

## 2022-08-07 RX ADMIN — Medication 10 MG: at 07:37

## 2022-08-07 RX ADMIN — DIVALPROEX SODIUM 500 MG: 250 TABLET, DELAYED RELEASE ORAL at 07:34

## 2022-08-07 RX ADMIN — CARBOXYMETHYLCELLULOSE SODIUM 2 DROP: 10 GEL OPHTHALMIC at 14:20

## 2022-08-07 RX ADMIN — ANTI-FUNGAL POWDER MICONAZOLE NITRATE TALC FREE: 1.42 POWDER TOPICAL at 21:29

## 2022-08-07 RX ADMIN — TRAZODONE HYDROCHLORIDE 150 MG: 100 TABLET ORAL at 21:33

## 2022-08-07 RX ADMIN — CLOPIDOGREL BISULFATE 75 MG: 75 TABLET ORAL at 07:31

## 2022-08-07 RX ADMIN — GABAPENTIN 100 MG: 100 CAPSULE ORAL at 21:32

## 2022-08-07 RX ADMIN — CARVEDILOL 12.5 MG: 12.5 TABLET ORAL at 07:36

## 2022-08-07 RX ADMIN — TAMSULOSIN HYDROCHLORIDE 0.4 MG: 0.4 CAPSULE ORAL at 21:32

## 2022-08-07 RX ADMIN — OMEPRAZOLE 20 MG: 20 CAPSULE, DELAYED RELEASE ORAL at 06:20

## 2022-08-07 RX ADMIN — Medication 325 MG: at 07:35

## 2022-08-07 RX ADMIN — ASPIRIN 81 MG: 81 TABLET, CHEWABLE ORAL at 07:35

## 2022-08-07 RX ADMIN — COLLAGENASE SANTYL: 250 OINTMENT TOPICAL at 09:33

## 2022-08-07 RX ADMIN — CARBOXYMETHYLCELLULOSE SODIUM 2 DROP: 10 GEL OPHTHALMIC at 21:32

## 2022-08-07 RX ADMIN — DIVALPROEX SODIUM 750 MG: 250 TABLET, EXTENDED RELEASE ORAL at 21:30

## 2022-08-07 ASSESSMENT — PAIN DESCRIPTION - ORIENTATION: ORIENTATION: RIGHT

## 2022-08-07 ASSESSMENT — PAIN SCALES - GENERAL
PAINLEVEL_OUTOF10: 5
PAINLEVEL_OUTOF10: 5

## 2022-08-07 ASSESSMENT — PAIN DESCRIPTION - LOCATION: LOCATION: NECK;SHOULDER

## 2022-08-07 NOTE — PLAN OF CARE
Problem: Discharge Planning  Goal: Discharge to home or other facility with appropriate resources  8/7/2022 1532 by Steve Lin RN  Outcome: Progressing  Flowsheets (Taken 8/7/2022 7950)  Discharge to home or other facility with appropriate resources: Identify barriers to discharge with patient and caregiver  8/7/2022 0538 by Lio Allen RN  Outcome: Progressing  Flowsheets (Taken 8/6/2022 2100)  Discharge to home or other facility with appropriate resources: Identify barriers to discharge with patient and caregiver     Problem: Pain  Goal: Verbalizes/displays adequate comfort level or baseline comfort level  8/7/2022 1532 by Steve Lin RN  Outcome: Progressing  8/7/2022 0538 by Lio Allen RN  Outcome: Progressing  Flowsheets (Taken 8/6/2022 2015)  Verbalizes/displays adequate comfort level or baseline comfort level:   Encourage patient to monitor pain and request assistance   Assess pain using appropriate pain scale   Administer analgesics based on type and severity of pain and evaluate response   Implement non-pharmacological measures as appropriate and evaluate response     Problem: Skin/Tissue Integrity  Goal: Absence of new skin breakdown  Description: 1. Monitor for areas of redness and/or skin breakdown  2. Assess vascular access sites hourly  3. Every 4-6 hours minimum:  Change oxygen saturation probe site  4. Every 4-6 hours:  If on nasal continuous positive airway pressure, respiratory therapy assess nares and determine need for appliance change or resting period.   8/7/2022 1532 by Steve Lin RN  Outcome: Progressing  8/7/2022 0538 by Lio Allen RN  Outcome: Progressing     Problem: Cardiovascular - Adult  Goal: Maintains optimal cardiac output and hemodynamic stability  8/7/2022 1532 by Steve Lin RN  Outcome: Progressing  Flowsheets (Taken 8/7/2022 0934)  Maintains optimal cardiac output and hemodynamic stability:   Monitor blood pressure and heart rate Monitor urine output and notify Licensed Independent Practitioner for values outside of normal range  8/7/2022 0538 by Lio Allen RN  Outcome: Progressing  Flowsheets (Taken 8/6/2022 2100)  Maintains optimal cardiac output and hemodynamic stability:   Monitor blood pressure and heart rate   Monitor urine output and notify Licensed Independent Practitioner for values outside of normal range   Assess for signs of decreased cardiac output   Administer fluid and/or volume expanders as ordered  Goal: Absence of cardiac dysrhythmias or at baseline  8/7/2022 1532 by Steve Lin RN  Outcome: Progressing  [address] (Taken 8/7/2022 0934)  Absence of cardiac dysrhythmias or at baseline: Monitor cardiac rate and rhythm  8/7/2022 0538 by Lio Allen RN  Outcome: Progressing  Flowsheets (Taken 8/6/2022 2100)  Absence of cardiac dysrhythmias or at baseline:   Monitor cardiac rate and rhythm   Assess for signs of decreased cardiac output     Problem: Chronic Conditions and Co-morbidities  Goal: Patient's chronic conditions and co-morbidity symptoms are monitored and maintained or improved  8/7/2022 1532 by Steve Lin RN  Outcome: Progressing  [address] (Taken 8/7/2022 0934)  Care Plan - Patient's Chronic Conditions and Co-Morbidity Symptoms are Monitored and Maintained or Improved: Monitor and assess patient's chronic conditions and comorbid symptoms for stability, deterioration, or improvement  8/7/2022 0538 by Lio Allen RN  Outcome: Progressing  Flowsheets (Taken 8/6/2022 2100)  Care Plan - Patient's Chronic Conditions and Co-Morbidity Symptoms are Monitored and Maintained or Improved: Monitor and assess patient's chronic conditions and comorbid symptoms for stability, deterioration, or improvement     Problem: Safety - Adult  Goal: Free from fall injury  8/7/2022 1532 by Steve Lin RN  Outcome: Progressing  Flowsheets (Taken 8/7/2022 0710)  Free From Fall Injury: Instruct family/caregiver on

## 2022-08-07 NOTE — CARE COORDINATION
ONGOING DISCHARGE PLANNING NOTE:    Writer reviewed LSW notes, and discharge plan is SNF. Difficult time with placement.     Son to apply for medicaid, awaiting number    Electronically signed by Efrain Calloway RN on 8/7/2022 at 12:23 PM
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
## 2022-08-07 NOTE — PLAN OF CARE
Problem: Discharge Planning  Goal: Discharge to home or other facility with appropriate resources  Outcome: Progressing  Flowsheets (Taken 8/6/2022 2100)  Discharge to home or other facility with appropriate resources: Identify barriers to discharge with patient and caregiver     Problem: Pain  Goal: Verbalizes/displays adequate comfort level or baseline comfort level  Outcome: Progressing  Flowsheets (Taken 8/6/2022 2015)  Verbalizes/displays adequate comfort level or baseline comfort level:   Encourage patient to monitor pain and request assistance   Assess pain using appropriate pain scale   Administer analgesics based on type and severity of pain and evaluate response   Implement non-pharmacological measures as appropriate and evaluate response     Problem: Skin/Tissue Integrity  Goal: Absence of new skin breakdown  Description: 1. Monitor for areas of redness and/or skin breakdown  2. Assess vascular access sites hourly  3. Every 4-6 hours minimum:  Change oxygen saturation probe site  4. Every 4-6 hours:  If on nasal continuous positive airway pressure, respiratory therapy assess nares and determine need for appliance change or resting period.   Outcome: Progressing     Problem: Cardiovascular - Adult  Goal: Maintains optimal cardiac output and hemodynamic stability  Outcome: Progressing  Flowsheets (Taken 8/6/2022 2100)  Maintains optimal cardiac output and hemodynamic stability:   Monitor blood pressure and heart rate   Monitor urine output and notify Licensed Independent Practitioner for values outside of normal range   Assess for signs of decreased cardiac output   Administer fluid and/or volume expanders as ordered  Goal: Absence of cardiac dysrhythmias or at baseline  Outcome: Progressing  Flowsheets (Taken 8/6/2022 2100)  Absence of cardiac dysrhythmias or at baseline:   Monitor cardiac rate and rhythm   Assess for signs of decreased cardiac output     Problem: Chronic Conditions and Co-morbidities  Goal: Patient's chronic conditions and co-morbidity symptoms are monitored and maintained or improved  Outcome: Progressing  Flowsheets (Taken 8/6/2022 2100)  Care Plan - Patient's Chronic Conditions and Co-Morbidity Symptoms are Monitored and Maintained or Improved: Monitor and assess patient's chronic conditions and comorbid symptoms for stability, deterioration, or improvement     Problem: Safety - Adult  Goal: Free from fall injury  Outcome: Progressing  Flowsheets (Taken 8/7/2022 0359)  Free From Fall Injury: Instruct family/caregiver on patient safety     Problem: ABCDS Injury Assessment  Goal: Absence of physical injury  Outcome: Progressing  Flowsheets (Taken 8/7/2022 0359)  Absence of Physical Injury: Implement safety measures based on patient assessment

## 2022-08-08 PROCEDURE — 99212 OFFICE O/P EST SF 10 MIN: CPT

## 2022-08-08 PROCEDURE — 6370000000 HC RX 637 (ALT 250 FOR IP): Performed by: INTERNAL MEDICINE

## 2022-08-08 PROCEDURE — 6370000000 HC RX 637 (ALT 250 FOR IP): Performed by: NURSE PRACTITIONER

## 2022-08-08 PROCEDURE — 96372 THER/PROPH/DIAG INJ SC/IM: CPT

## 2022-08-08 PROCEDURE — 6360000002 HC RX W HCPCS: Performed by: NURSE PRACTITIONER

## 2022-08-08 PROCEDURE — 99225 PR SBSQ OBSERVATION CARE/DAY 25 MINUTES: CPT | Performed by: INTERNAL MEDICINE

## 2022-08-08 PROCEDURE — 2580000003 HC RX 258: Performed by: NURSE PRACTITIONER

## 2022-08-08 PROCEDURE — G0378 HOSPITAL OBSERVATION PER HR: HCPCS

## 2022-08-08 RX ORDER — HYDROXYZINE HYDROCHLORIDE 10 MG/1
10 TABLET, FILM COATED ORAL 3 TIMES DAILY PRN
Status: DISCONTINUED | OUTPATIENT
Start: 2022-08-08 | End: 2022-08-09 | Stop reason: HOSPADM

## 2022-08-08 RX ADMIN — AMLODIPINE BESYLATE 10 MG: 5 TABLET ORAL at 09:04

## 2022-08-08 RX ADMIN — ANTI-FUNGAL POWDER MICONAZOLE NITRATE TALC FREE: 1.42 POWDER TOPICAL at 21:07

## 2022-08-08 RX ADMIN — CARBOXYMETHYLCELLULOSE SODIUM 2 DROP: 10 GEL OPHTHALMIC at 21:09

## 2022-08-08 RX ADMIN — HYDROXYZINE HYDROCHLORIDE 10 MG: 10 TABLET ORAL at 16:47

## 2022-08-08 RX ADMIN — DIVALPROEX SODIUM 750 MG: 250 TABLET, EXTENDED RELEASE ORAL at 21:09

## 2022-08-08 RX ADMIN — TRAZODONE HYDROCHLORIDE 150 MG: 100 TABLET ORAL at 21:10

## 2022-08-08 RX ADMIN — ANTI-FUNGAL POWDER MICONAZOLE NITRATE TALC FREE: 1.42 POWDER TOPICAL at 07:03

## 2022-08-08 RX ADMIN — CARBOXYMETHYLCELLULOSE SODIUM 2 DROP: 10 GEL OPHTHALMIC at 09:07

## 2022-08-08 RX ADMIN — CLOPIDOGREL BISULFATE 75 MG: 75 TABLET ORAL at 09:03

## 2022-08-08 RX ADMIN — ENOXAPARIN SODIUM 40 MG: 100 INJECTION SUBCUTANEOUS at 09:07

## 2022-08-08 RX ADMIN — COLLAGENASE SANTYL: 250 OINTMENT TOPICAL at 07:04

## 2022-08-08 RX ADMIN — CARVEDILOL 12.5 MG: 12.5 TABLET ORAL at 21:08

## 2022-08-08 RX ADMIN — ASPIRIN 81 MG: 81 TABLET, CHEWABLE ORAL at 09:06

## 2022-08-08 RX ADMIN — Medication 325 MG: at 09:05

## 2022-08-08 RX ADMIN — ATORVASTATIN CALCIUM 80 MG: 80 TABLET, FILM COATED ORAL at 09:04

## 2022-08-08 RX ADMIN — Medication 10 MG: at 09:05

## 2022-08-08 RX ADMIN — CARVEDILOL 12.5 MG: 12.5 TABLET ORAL at 09:03

## 2022-08-08 RX ADMIN — DIVALPROEX SODIUM 500 MG: 250 TABLET, DELAYED RELEASE ORAL at 09:02

## 2022-08-08 RX ADMIN — CARBOXYMETHYLCELLULOSE SODIUM 2 DROP: 10 GEL OPHTHALMIC at 14:07

## 2022-08-08 RX ADMIN — TRAMADOL HYDROCHLORIDE 50 MG: 50 TABLET ORAL at 09:02

## 2022-08-08 RX ADMIN — TAMSULOSIN HYDROCHLORIDE 0.4 MG: 0.4 CAPSULE ORAL at 21:07

## 2022-08-08 RX ADMIN — GABAPENTIN 100 MG: 100 CAPSULE ORAL at 21:10

## 2022-08-08 RX ADMIN — GABAPENTIN 100 MG: 100 CAPSULE ORAL at 09:01

## 2022-08-08 RX ADMIN — SODIUM CHLORIDE: 9 INJECTION, SOLUTION INTRAVENOUS at 17:00

## 2022-08-08 RX ADMIN — SODIUM CHLORIDE, PRESERVATIVE FREE 10 ML: 5 INJECTION INTRAVENOUS at 21:07

## 2022-08-08 RX ADMIN — OMEPRAZOLE 20 MG: 20 CAPSULE, DELAYED RELEASE ORAL at 06:18

## 2022-08-08 ASSESSMENT — PAIN SCALES - GENERAL
PAINLEVEL_OUTOF10: 0
PAINLEVEL_OUTOF10: 0
PAINLEVEL_OUTOF10: 4

## 2022-08-08 NOTE — PLAN OF CARE
Problem: Discharge Planning  Goal: Discharge to home or other facility with appropriate resources  Outcome: Progressing  Flowsheets (Taken 8/7/2022 2030)  Discharge to home or other facility with appropriate resources: Identify barriers to discharge with patient and caregiver     Problem: Pain  Goal: Verbalizes/displays adequate comfort level or baseline comfort level  Outcome: Progressing  Flowsheets (Taken 8/8/2022 0215)  Verbalizes/displays adequate comfort level or baseline comfort level:   Encourage patient to monitor pain and request assistance   Assess pain using appropriate pain scale   Administer analgesics based on type and severity of pain and evaluate response   Implement non-pharmacological measures as appropriate and evaluate response     Problem: Skin/Tissue Integrity  Goal: Absence of new skin breakdown  Description: 1. Monitor for areas of redness and/or skin breakdown  2. Assess vascular access sites hourly  3. Every 4-6 hours minimum:  Change oxygen saturation probe site  4. Every 4-6 hours:  If on nasal continuous positive airway pressure, respiratory therapy assess nares and determine need for appliance change or resting period.   Outcome: Progressing     Problem: Cardiovascular - Adult  Goal: Maintains optimal cardiac output and hemodynamic stability  Outcome: Progressing  Flowsheets (Taken 8/7/2022 2030)  Maintains optimal cardiac output and hemodynamic stability:   Monitor blood pressure and heart rate   Monitor urine output and notify Licensed Independent Practitioner for values outside of normal range   Assess for signs of decreased cardiac output  Goal: Absence of cardiac dysrhythmias or at baseline  Outcome: Progressing  Flowsheets (Taken 8/7/2022 2030)  Absence of cardiac dysrhythmias or at baseline:   Monitor cardiac rate and rhythm   Assess for signs of decreased cardiac output     Problem: Chronic Conditions and Co-morbidities  Goal: Patient's chronic conditions and co-morbidity symptoms are monitored and maintained or improved  Outcome: Progressing  Flowsheets (Taken 8/7/2022 2030)  Care Plan - Patient's Chronic Conditions and Co-Morbidity Symptoms are Monitored and Maintained or Improved: Monitor and assess patient's chronic conditions and comorbid symptoms for stability, deterioration, or improvement     Problem: Safety - Adult  Goal: Free from fall injury  Outcome: Progressing     Problem: ABCDS Injury Assessment  Goal: Absence of physical injury  Outcome: Progressing
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
## 2022-08-08 NOTE — PROGRESS NOTES
[facility]  Follow Up      NAME:  René Sheehan. MEDICAL RECORD NUMBER:  [id number]  AGE: 70 y.o. GENDER: male  : 1950  TODAY'S DATE:  2022    [facility] nurse follow up visit for sacral wound. Wound about the same as on last assessment. Undermining shows some improvement. Wound continues to have slough in the base with red tissue around the edges. Periwound skin has improved some, still red and denuded. Will add thin layer of triad cream to periwound and continue santyl to base of wound, covered with saline moistened gauze and foam dressing. Measurements:  Wound 22 Sacrum (Active)   Wound Image   22 1232   Wound Etiology Pressure Unstageable 22 1232   Dressing Status Old drainage noted;New dressing applied 22 1232   Wound Cleansed Cleansed with saline 22 1232   Dressing/Treatment Triad hydro/zinc oxide-based hydrophilic paste; Hydrofiber Ag; Foam 22 1232   Wound Length (cm) 3 cm 22 1232   Wound Width (cm) 1.6 cm 22 1232   Wound Depth (cm) 0.4 cm 22 1232   Wound Surface Area (cm^2) 4.8 cm^2 22 1232   Change in Wound Size % (l*w) 11.11 22 1232   Wound Volume (cm^3) 1.92 cm^3 22 1232   Wound Healing % 11 22 1232   Undermining Starts ___ O'Clock 1 22 1232   Undermining Ends___ O'Clock 3 22 1232   Undermining Maxium Distance (cm) 0.6 22 1232   Wound Assessment [place]/red;Slough 22 1232   Drainage Amount Moderate 22 1232   Drainage Description Serosanguinous 22 1232   Odor None 22 1232   Nereida-wound Assessment Blanchable erythema;Denuded 22 1232   Margins Defined edges 22 1232   Number of days: [address], BSN, [place], [address], Ostomy, and Continence Nursing  [phone]

## 2022-08-08 NOTE — PLAN OF CARE
Problem: Discharge Planning  Goal: Discharge to home or other facility with appropriate resources  8/8/2022 1604 by Jaime Ayala RN  Outcome: Progressing  Flowsheets (Taken 8/8/2022 0845)  Discharge to home or other facility with appropriate resources: Identify barriers to discharge with patient and caregiver  8/8/2022 0550 by Guilherme Thorpe RN  Outcome: Progressing  Flowsheets (Taken 8/7/2022 2030)  Discharge to home or other facility with appropriate resources: Identify barriers to discharge with patient and caregiver     Problem: Pain  Goal: Verbalizes/displays adequate comfort level or baseline comfort level  8/8/2022 1604 by Jaime Ayala RN  Outcome: Progressing  8/8/2022 0550 by Guilherme Thorpe RN  Outcome: Progressing  Flowsheets (Taken 8/8/2022 0215)  Verbalizes/displays adequate comfort level or baseline comfort level:   Encourage patient to monitor pain and request assistance   Assess pain using appropriate pain scale   Administer analgesics based on type and severity of pain and evaluate response   Implement non-pharmacological measures as appropriate and evaluate response     Problem: Skin/Tissue Integrity  Goal: Absence of new skin breakdown  Description: 1. Monitor for areas of redness and/or skin breakdown  2. Assess vascular access sites hourly  3. Every 4-6 hours minimum:  Change oxygen saturation probe site  4. Every 4-6 hours:  If on nasal continuous positive airway pressure, respiratory therapy assess nares and determine need for appliance change or resting period.   8/8/2022 1604 by Jaime Ayala RN  Outcome: Progressing  8/8/2022 0550 by Guilherme Thorpe RN  Outcome: Progressing     Problem: Cardiovascular - Adult  Goal: Maintains optimal cardiac output and hemodynamic stability  8/8/2022 1604 by Jaime Ayala RN  Outcome: Progressing  Flowsheets (Taken 8/8/2022 0845)  Maintains optimal cardiac output and hemodynamic stability: Monitor blood pressure and heart rate  8/8/2022 0550 by Zuhair Miller RN  Outcome: Progressing  Flowsheets (Taken 8/7/2022 2030)  Maintains optimal cardiac output and hemodynamic stability:   Monitor blood pressure and heart rate   Monitor urine output and notify Licensed Independent Practitioner for values outside of normal range   Assess for signs of decreased cardiac output  Goal: Absence of cardiac dysrhythmias or at baseline  8/8/2022 1604 by Kylie Castañeda RN  Outcome: Progressing  Flowsheets (Taken 8/8/2022 0845)  Absence of cardiac dysrhythmias or at baseline: Monitor cardiac rate and rhythm  8/8/2022 0550 by Zuhair Miller RN  Outcome: Progressing  Flowsheets (Taken 8/7/2022 2030)  Absence of cardiac dysrhythmias or at baseline:   Monitor cardiac rate and rhythm   Assess for signs of decreased cardiac output     Problem: Chronic Conditions and Co-morbidities  Goal: Patient's chronic conditions and co-morbidity symptoms are monitored and maintained or improved  8/8/2022 1604 by Kylie Castañeda RN  Outcome: Progressing  Flowsheets (Taken 8/8/2022 0845)  Care Plan - Patient's Chronic Conditions and Co-Morbidity Symptoms are Monitored and Maintained or Improved: Monitor and assess patient's chronic conditions and comorbid symptoms for stability, deterioration, or improvement  8/8/2022 0550 by Zuhair Miller RN  Outcome: Progressing  Flowsheets (Taken 8/7/2022 2030)  Care Plan - Patient's Chronic Conditions and Co-Morbidity Symptoms are Monitored and Maintained or Improved: Monitor and assess patient's chronic conditions and comorbid symptoms for stability, deterioration, or improvement     Problem: Safety - Adult  Goal: Free from fall injury  8/8/2022 1604 by Kylie Castañeda RN  Outcome: Progressing  Flowsheets (Taken 8/8/2022 0725)  Free From Fall Injury: Instruct family/caregiver on patient safety  8/8/2022 0550 by Zuhair Miller RN  Outcome: Progressing     Problem: ABCDS Injury Assessment  Goal: Absence of physical injury  8/8/2022 1604 by Hoda Concepcion, RN  Outcome: Progressing  Flowsheets (Taken 8/8/2022 3658)  Absence of Physical Injury: Implement safety measures based on patient assessment  8/8/2022 5841 by Solis Ocasio, LORENZO  Outcome: Progressing     Problem: Nutrition Deficit:  Goal: Optimize nutritional status  Outcome: Progressing
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
## 2022-08-09 VITALS
WEIGHT: 138.23 LBS | OXYGEN SATURATION: 98 % | SYSTOLIC BLOOD PRESSURE: 132 MMHG | RESPIRATION RATE: 17 BRPM | DIASTOLIC BLOOD PRESSURE: 79 MMHG | HEIGHT: 63 IN | HEART RATE: 87 BPM | BODY MASS INDEX: 24.49 KG/M2 | TEMPERATURE: 97.6 F

## 2022-08-09 PROCEDURE — 99217 PR OBSERVATION CARE DISCHARGE MANAGEMENT: CPT | Performed by: INTERNAL MEDICINE

## 2022-08-09 PROCEDURE — 97110 THERAPEUTIC EXERCISES: CPT

## 2022-08-09 PROCEDURE — 99224 PR SBSQ OBSERVATION CARE/DAY 15 MINUTES: CPT | Performed by: INTERNAL MEDICINE

## 2022-08-09 PROCEDURE — 97530 THERAPEUTIC ACTIVITIES: CPT

## 2022-08-09 PROCEDURE — 96372 THER/PROPH/DIAG INJ SC/IM: CPT

## 2022-08-09 PROCEDURE — 2580000003 HC RX 258: Performed by: NURSE PRACTITIONER

## 2022-08-09 PROCEDURE — G0378 HOSPITAL OBSERVATION PER HR: HCPCS

## 2022-08-09 PROCEDURE — 6360000002 HC RX W HCPCS: Performed by: NURSE PRACTITIONER

## 2022-08-09 RX ORDER — HYDROXYZINE HYDROCHLORIDE 10 MG/1
10 TABLET, FILM COATED ORAL 3 TIMES DAILY PRN
Qty: 30 TABLET | Refills: 0 | Status: SHIPPED | OUTPATIENT
Start: 2022-08-09 | End: 2022-08-19

## 2022-08-09 RX ADMIN — CARVEDILOL 12.5 MG: 12.5 TABLET ORAL at 10:42

## 2022-08-09 RX ADMIN — SODIUM CHLORIDE, PRESERVATIVE FREE 10 ML: 5 INJECTION INTRAVENOUS at 10:40

## 2022-08-09 RX ADMIN — OMEPRAZOLE 20 MG: 20 CAPSULE, DELAYED RELEASE ORAL at 05:48

## 2022-08-09 RX ADMIN — ASPIRIN 81 MG: 81 TABLET, CHEWABLE ORAL at 10:44

## 2022-08-09 RX ADMIN — CARBOXYMETHYLCELLULOSE SODIUM 2 DROP: 10 GEL OPHTHALMIC at 14:52

## 2022-08-09 RX ADMIN — TRAMADOL HYDROCHLORIDE 50 MG: 50 TABLET ORAL at 11:06

## 2022-08-09 RX ADMIN — DIVALPROEX SODIUM 500 MG: 250 TABLET, DELAYED RELEASE ORAL at 10:40

## 2022-08-09 RX ADMIN — Medication 10 MG: at 11:06

## 2022-08-09 RX ADMIN — ENOXAPARIN SODIUM 40 MG: 100 INJECTION SUBCUTANEOUS at 10:40

## 2022-08-09 RX ADMIN — GABAPENTIN 100 MG: 100 CAPSULE ORAL at 10:46

## 2022-08-09 RX ADMIN — ATORVASTATIN CALCIUM 80 MG: 80 TABLET, FILM COATED ORAL at 10:43

## 2022-08-09 RX ADMIN — Medication 325 MG: at 10:44

## 2022-08-09 RX ADMIN — AMLODIPINE BESYLATE 10 MG: 5 TABLET ORAL at 10:41

## 2022-08-09 RX ADMIN — CLOPIDOGREL BISULFATE 75 MG: 75 TABLET ORAL at 10:42

## 2022-08-09 RX ADMIN — CARBOXYMETHYLCELLULOSE SODIUM 2 DROP: 10 GEL OPHTHALMIC at 10:43

## 2022-08-09 RX ADMIN — SODIUM CHLORIDE: 9 INJECTION, SOLUTION INTRAVENOUS at 05:49

## 2022-08-09 RX ADMIN — COLLAGENASE SANTYL: 250 OINTMENT TOPICAL at 10:47

## 2022-08-09 RX ADMIN — ANTI-FUNGAL POWDER MICONAZOLE NITRATE TALC FREE: 1.42 POWDER TOPICAL at 10:45

## 2022-08-09 ASSESSMENT — ENCOUNTER SYMPTOMS: SHORTNESS OF BREATH: 1

## 2022-08-09 ASSESSMENT — PAIN DESCRIPTION - DESCRIPTORS: DESCRIPTORS: DISCOMFORT

## 2022-08-09 ASSESSMENT — PAIN SCALES - WONG BAKER: WONGBAKER_NUMERICALRESPONSE: 8

## 2022-08-09 ASSESSMENT — PAIN DESCRIPTION - LOCATION: LOCATION: BUTTOCKS;WRIST

## 2022-08-09 ASSESSMENT — PAIN SCALES - GENERAL
PAINLEVEL_OUTOF10: 4
PAINLEVEL_OUTOF10: 8

## 2022-08-09 ASSESSMENT — PAIN DESCRIPTION - ORIENTATION: ORIENTATION: LEFT

## 2022-08-09 NOTE — CARE COORDINATION
EDGARD called [employer] to discuss pt's medicaid pending. [facility] wan informed EDGARD that referral was not received, but they do accept medicaid pending with completion of a financial document. EDGARD is resending referral for review. EDGARD also called Kanika from [address] to update her pt's medicaid pending status. Kanika stated that she will speak with her DON to see if they can reevaluate.
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
## 2022-08-09 NOTE — PLAN OF CARE
Problem: Discharge Planning  Goal: Discharge to home or other facility with appropriate resources  8/8/2022 2248 by Zuhair Miller RN  Outcome: Progressing  Flowsheets (Taken 8/8/2022 2133)  Discharge to home or other facility with appropriate resources:   Identify barriers to discharge with patient and caregiver   Arrange for needed discharge resources and transportation as appropriate     Problem: Pain  Goal: Verbalizes/displays adequate comfort level or baseline comfort level  8/8/2022 2248 by Zuhair Miller RN  Outcome: Progressing  Flowsheets (Taken 8/8/2022 2230)  Verbalizes/displays adequate comfort level or baseline comfort level:   Encourage patient to monitor pain and request assistance   Administer analgesics based on type and severity of pain and evaluate response   Implement non-pharmacological measures as appropriate and evaluate response     Problem: Skin/Tissue Integrity  Goal: Absence of new skin breakdown  Description: 1. Monitor for areas of redness and/or skin breakdown  2. Assess vascular access sites hourly  3. Every 4-6 hours minimum:  Change oxygen saturation probe site  4. Every 4-6 hours:  If on nasal continuous positive airway pressure, respiratory therapy assess nares and determine need for appliance change or resting period.   8/8/2022 2248 by Zuhair Miller RN  Outcome: Progressing     Problem: Cardiovascular - Adult  Goal: Maintains optimal cardiac output and hemodynamic stability  8/8/2022 2248 by Zuhair Miller RN  Outcome: Progressing  Flowsheets (Taken 8/8/2022 2133)  Maintains optimal cardiac output and hemodynamic stability:   Monitor blood pressure and heart rate   Monitor urine output and notify Licensed Independent Practitioner for values outside of normal range   Assess for signs of decreased cardiac output     Problem: Cardiovascular - Adult  Goal: Absence of cardiac dysrhythmias or at baseline  8/8/2022 2248 by Zuhair Miller RN  Outcome: Progressing  Flowsheets (Taken 8/8/2022 2133)  Absence of cardiac dysrhythmias or at baseline:   Monitor cardiac rate and rhythm   Assess for signs of decreased cardiac output     Problem: Chronic Conditions and Co-morbidities  Goal: Patient's chronic conditions and co-morbidity symptoms are monitored and maintained or improved  8/8/2022 2248 by Cora Flores RN  Outcome: Progressing  Flowsheets (Taken 8/8/2022 2133)  Care Plan - Patient's Chronic Conditions and Co-Morbidity Symptoms are Monitored and Maintained or Improved:   Monitor and assess patient's chronic conditions and comorbid symptoms for stability, deterioration, or improvement   Collaborate with multidisciplinary team to address chronic and comorbid conditions and prevent exacerbation or deterioration   Update acute care plan with appropriate goals if chronic or comorbid symptoms are exacerbated and prevent overall improvement and discharge     Problem: Safety - Adult  Goal: Free from fall injury  8/8/2022 2248 by Cora Flores RN  Outcome: Progressing  Flowsheets (Taken 8/8/2022 2243)  Free From Fall Injury: Instruct family/caregiver on patient safety     Problem: ABCDS Injury Assessment  Goal: Absence of physical injury  8/8/2022 2248 by Cora Flores RN  Outcome: Progressing  Flowsheets (Taken 8/8/2022 2243)  Absence of Physical Injury: Implement safety measures based on patient assessment     Problem: Nutrition Deficit:  Goal: Optimize nutritional status  8/8/2022 2248 by Cora Flores RN  Outcome: Progressing
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
## 2022-08-09 NOTE — CARE COORDINATION
Writer received a call fro Boris Nicholas in the HELP program.  She advised patient has a pending medicaid number which is [id number]. Provided this number to [facility] [place] with hopes of finding a SNF Facility that will accept patient.     Electronically signed by Symone Quintero RN on 8/9/2022 at 8:59 AM

## 2022-08-09 NOTE — CARE COORDINATION
PT ACCEPTED AT [facility] AND CAN ADMIT TODAY. [facility] SCHEDULED TRANSPORT WITH [facility] FOR 4:45PM TO [facility]. PLEASE COMPLETE YANA. HENS COMPLETED. SON SALOMÓN WAS CALLED AND UPDATED ON DISCHARGE.  IMM SIGNED 8/9/22 @ 2166    NUMBER FOR REPORT: [phone]
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
## 2023-09-15 NOTE — ACP (ADVANCE CARE PLANNING)
Advance Care Planning     Advance Care Planning Activator (Inpatient)  Conversation Note      Date of ACP Conversation: 3/21/2022     Conversation Conducted with: Patient with Decision Making Capacity    ACP Activator: Keon Dave RN    Health Care Decision Maker:   Chris padgett    Current Designated Health Care Decision Maker:     Click here to complete Healthcare Decision Makers including section of the Healthcare Decision Maker Relationship (ie \"Primary\")  Today we documented Decision Maker(s) consistent with Legal Next of Kin hierarchy. Care Preferences    Ventilation: \"If you were in your present state of health and suddenly became very ill and were unable to breathe on your own, what would your preference be about the use of a ventilator (breathing machine) if it were available to you? \"      Would the patient desire the use of ventilator (breathing machine)?: no    \"If your health worsens and it becomes clear that your chance of recovery is unlikely, what would your preference be about the use of a ventilator (breathing machine) if it were available to you? \"     Would the patient desire the use of ventilator (breathing machine)?: No      Resuscitation  \"CPR works best to restart the heart when there is a sudden event, like a heart attack, in someone who is otherwise healthy. Unfortunately, CPR does not typically restart the heart for people who have serious health conditions or who are very sick. \"    \"In the event your heart stopped as a result of an underlying serious health condition, would you want attempts to be made to restart your heart (answer \"yes\" for attempt to resuscitate) or would you prefer a natural death (answer \"no\" for do not attempt to resuscitate)? \" no       [x] Yes   [] No   Educated Patient / Aline Stack regarding differences between Advance Directives and portable DNR orders.     Length of ACP Conversation in minutes:      Conversation Outcomes:  [x] ACP discussion completed  [] Existing advance directive reviewed with patient; no changes to patient's previously recorded wishes  [] New Advance Directive completed  [] Portable Do Not Rescitate prepared for Provider review and signature  [] POLST/POST/MOLST/MOST prepared for Provider review and signature      Follow-up plan:    [] Schedule follow-up conversation to continue planning  [] Referred individual to Provider for additional questions/concerns   [] Advised patient/agent/surrogate to review completed ACP document and update if needed with changes in condition, patient preferences or care setting    [x] This note routed to one or more involved healthcare providers    Electronically signed by Nichelle Galindo RN on 3/21/2022 at 12:04 PM done n/a done

## 2025-02-24 NOTE — PROGRESS NOTES
0 (no pain/absence of nonverbal indicators of pain) [facility]  Office: [address], DO, Maddy Kenroy, DO, Barbi Monson, DO, Celina Middleton, DO, Mario Winters MD, Lyubov Cali MD, Monica Collins MD, Jatin Flores MD, Mary Agosto MD, Fidelina Farnsworth MD, Marlin Pastor MD, Claudette Martinez, DO, Arian Dozier, DO, Alexsander Oakes MD,  Caroline Mendez, DO, Thomas Sexton MD, Presley Jon MD, Jarrett Fabian MD, Darius Powell DO, Irma Askew MD, Greg Russo MD, Codi Live, CNP, [facility] Corbin, CNP, Lynne Kaplan, CNP, Roberta Lopez, CNS, Cookie Lindsay, CNP, Gerardo Hicks, CNP, Zachary Iraheta, CNP, Galen Marlow, CNP, Osmani Souza, CNP, Silvia Titus PA-C, Milagro Felder, DNP, Hugo Phoenix, DNP, Tracy Hutchison, CNP, Jacklyn Frankel, CNP, Yovana Sullivan, CNP         denise [address] [place] [address]    Progress Note    3/31/2022    5:15 AM    Name:   [facility]  MRN:     [id number]     Acct:      [de-identified]   Room:   9478/8741-29   Day:  8  Admit Date:  3/23/2022  7:06 PM    PCP:   Anibal Edwards  Code Status:  DNR-CCA    Subjective:     C/C: Generalized weakness with frequent falls, found to have cord compromise secondary to high-grade stenosis/incomplete quadriplegia    Interval History Status: Improved    Patient evaluated in room resting in bed in no acute distress. Vital signs stable overnight. No acute events. Stable for discharge after cardiology evaluates him today    Brief History: This is a 49-year-old man who initially presented to [facility] emergency department after a fall. Incidentally found to have cord compromise secondary to high-grade stenosis. Was transferred to [address] [place] [address] for neurosurgical evaluation and intervention. MRI at our facility showed severe stenosis at C2-C5.   Patient underwent C2-5 posterior fixation with fusion and decompression with neurosurgery    Review of Systems:     Constitutional:  negative for chills, fevers, sweats  Respiratory:   +cough/throat clearing, dyspnea on exertion, + improving shortness of breath, wheezing  Cardiovascular:  negative for chest pain, chest pressure/discomfort, lower extremity edema, palpitations  Gastrointestinal:  negative for abdominal pain, constipation, diarrhea, nausea, vomiting  Neurological:  negative for dizziness, headache    Medications: Allergies:     Allergies   Allergen Reactions    Latex Itching    Bee Venom Swelling    Lisinopril Other (See Comments)     Cough      Niacin And Related Rash    Sulfa Antibiotics Rash    Terazosin Rash       Current Meds:   Scheduled Meds:    amLODIPine  10 mg Oral Daily    doxycycline hyclate  100 mg Oral 2 times per day    losartan  50 mg Oral Daily    cefepime  1,000 mg IntraVENous Q12H    divalproex  750 mg Oral Nightly    carboxymethylcellulose PF  2 drop Both Eyes TID    pantoprazole  40 mg Oral QAM AC    furosemide  20 mg Oral Daily    venlafaxine  75 mg Oral TID WC    divalproex  500 mg Oral QAM    sodium chloride flush  5-40 mL IntraVENous 2 times per day    bisacodyl  5 mg Oral Daily    sodium chloride flush  5-40 mL IntraVENous 2 times per day    enoxaparin  40 mg SubCUTAneous Daily    [Held by provider] traZODone  150 mg Oral Nightly     Continuous Infusions:    sodium chloride      sodium chloride      dextrose       PRN Meds: cyclobenzaprine, atropine, ketorolac, [Held by provider] oxyCODONE **OR** [DISCONTINUED] oxyCODONE, naloxone, hydrALAZINE, sodium chloride flush, sodium chloride, senna, sodium chloride flush, sodium chloride, potassium chloride **OR** potassium alternative oral replacement **OR** potassium chloride, magnesium sulfate, ondansetron **OR** ondansetron, polyethylene glycol, acetaminophen **OR** acetaminophen, glucose, dextrose, glucagon (rDNA), dextrose    Data:     Past Medical History:   has a past medical history of Depression, Diabetes mellitus ([facility] [address] Difficult intravenous access, Hyperlipidemia, Hypertension, Migraines, PTSD (post-traumatic stress disorder), Sleep apnea, Teeth missing, and Wears glasses. Social History:   reports that he quit smoking about 49 years ago. His smoking use included cigarettes. He has a 2.00 pack-year smoking history. He has never used smokeless tobacco. He reports current alcohol use. He reports current drug use. Drug: Marijuana Myrtis Regulus). Family History:   Family History   Problem Relation Age of Onset   Bonilla Dementia Mother     Coronary Art Dis Mother     Stroke Mother     Diabetes Mother     Asthma Mother     Other Mother         BRAIN ANEURISM    High Blood Pressure Mother     Heart Disease Father     Diabetes Sister     Diabetes Brother     High Blood Pressure Brother     High Cholesterol Brother     Heart Disease Brother     Diabetes Sister     Other Sister         NEUROPATHY       Vitals:  BP (!) 159/85   Pulse 70   Temp 98.6 °F (37 °C)   Resp 20   Ht 5' 3\" (1.6 m)   Wt 153 lb 6.4 oz (69.6 kg)   SpO2 96%   BMI 27.17 kg/m²   Temp (24hrs), Av.4 °F (36.9 °C), Min:98.2 °F (36.8 °C), Max:98.7 °F (37.1 °C)    Recent Labs     22  1420   POCGLU 160*       I/O (24Hr):   No intake or output data in the 24 hours ending 22 0515    Labs:  Hematology:  Recent Labs     22  0522  0245   WBC 8.0 7.1 7.6   RBC 3.12* 3.18* 3.37*   HGB 10.2* 10.1* 11.0*   HCT 29.4* 29.9* 32.0*   MCV 94.2 94.0 95.0   MCH 32.7 31.8 32.6   MCHC 34.7 33.8 34.4   RDW 12.3 11.9 12.0    328 378   MPV 9.5 9.6 9.2     Chemistry:  Recent Labs     22  0525 22  0245    144 135   K 3.7 3.7 3.8    107 101   CO2 24 26 24   GLUCOSE 150* 129* 108*   BUN 30* 24* 16   CREATININE 0.59* 0.46* 0.40*   MG  --   --  1.9   ANIONGAP 13 11 10   LABGLOM >60 >60 >60   GFRAA >60 >60 >60   CALCIUM 8.9 9.0 9.0   TROPHS  --   --  53*     Recent Labs     22  1420 22  0623   PROT  --  5.8*   LABALBU  -- 2.5*   AST  --  22   ALT  --  21   ALKPHOS  --  95   BILITOT  --  0.41   POCGLU 160*  --      ABG:  Lab Results   Component Value Date    PHART 7.445 04/19/2018    EYR7HVG 39.1 04/19/2018    PO2ART 252.0 04/19/2018    LDA2VQC 26.5 04/19/2018    NBEA NOT REPORTED 04/19/2018    PBEA 2.7 04/19/2018    H6IWYPQB 99.5 04/19/2018    FIO2 97% 04/19/2018     Lab Results   Component Value Date/Time    SPECIAL R HAND 5ML 03/26/2022 06:53 PM     Lab Results   Component Value Date/Time    CULTURE NO GROWTH 03/28/2022 06:06 PM       Radiology:  XR CERVICAL SPINE (2-3 VIEWS)    Result Date: 3/25/2022  Immediate postoperative changes of laminectomy with posterior fusion at C2 through C4.     CT HEAD WO CONTRAST    Result Date: 3/26/2022  No acute findings in the head/brain. MRI THORACIC SPINE WO CONTRAST    Result Date: 3/22/2022  Mild multilevel degenerative changes of the thoracic spine, as described above with mild spinal canal stenosis from T8-9 to T10-11, most pronounced at T10-11. MRI LUMBAR SPINE WO CONTRAST    Result Date: 3/22/2022  1. Moderate spinal canal stenosis from L2-L3 to L4-L5, as described above. 2. Mild spinal canal stenosis at L1-L2, as described above. 3. Multilevel neural foraminal narrowing, most severe at L2-L3. XR CHEST PORTABLE    Result Date: 3/27/2022  1. Left basilar airspace consolidation, representing either aspiration or pneumonia. 2. Mild right basilar atelectasis. MRI BRAIN WO CONTRAST    Result Date: 3/28/2022  No acute intracranial abnormality.        Physical Examination:        General appearance:  alert, cooperative and no distress  Mental Status:  oriented to person, place and time and normal affect  Lungs:  clear to auscultation bilaterally, normal effort  Heart:  regular rate and rhythm, no murmur  Abdomen:  soft, nontender, nondistended, normal bowel sounds  Extremities:  no edema, redness, tenderness in the calves, muscle strength 2/5 LUE, 3/5 RUE, 4/5 LLE, 3/5 RLE Sensation intact in all extremity  Skin: Posterior cervical incision well approximated secured with dressing    Assessment:        Hospital Problems           Last Modified POA    * (Principal) Cord compression ([address] 3/23/2022 Yes    Hypertension 3/23/2022 Yes    Hyperlipidemia 3/23/2022 Yes    Diabetes mellitus ([address] 3/23/2022 Yes    Bipolar disorder, mixed ([address] 3/23/2022 Yes    Stenosis of cervical spine with myelopathy ([address] 3/24/2022 Yes    Severe recurrent major depression without psychotic features ([address] 3/23/2022 Yes    Cervical spinal cord compression ([address] 3/21/2022 Yes    Quadriplegia, C1-C4 incomplete ([address] 3/24/2022 Yes    Encephalopathy 3/27/2022 Yes    Hospital-acquired pneumonia 3/28/2022 Yes    Atelectasis 3/28/2022 Yes          Plan:        1. Cord compression: Postop day 7 from C2-5 laminectomy and fixation. Neurosurgery signed off. Neurology following. Continue PT, OT. Will need IPR at discharge. neurosurgery signed off on 3/28 with plan to follow-up in 2 weeks for postop wound check, okay for discharge from their standpoint    2. Hospital-acquired pneumonia: Left lower lobe infiltrate. Patient started on cefepime day 4/doxy day 3. Continue additional oxygen as needed. follow sputum culture    3. Hypoxia: Improving, continue low-flow O2 as needed    4. Sinus bradycardia: Resolved, beta-blocker discontinued altogether. follow up on elevated trop    5. Primary hypertension: stable on current medications     6. Chronic diastolic CHF: On Lasix    7. DMT2: Diet controlled in the outpatient setting    8. Bipolar disorder: Currently stable on Effexor, Depakote, trazodone discontinued    9. GI/DVT prophylaxis: Protonix, Lovenox     10.  Stable for discharge from medical standpoint, awaiting cardiology eval and placement IPR       On this date 03/31/22 I have spent 20 mins  in direct patient care, reviewing notes, test results and diagnosis and plan of care discussions with the patient, as well as coordination of care.   Plan of care made with MD Nick Rudolph, APRN - NP  3/31/2022  5:15 AM

## 2025-02-28 NOTE — PROGRESS NOTES
IM Dr. Senia Langley notified that Dr Kathya Metz wants patient transferred to PCU. Waiting for Dr Senia Langley to look at chart and give writer a call back. Nurse Supervisor is aware of the situation. 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   -no intervention per NSG   -outpt f/up

## (undated) DEVICE — PATIENT RETURN ELECTRODE, SINGLE-USE, CONTACT QUALITY MONITORING, ADULT, WITH 9FT CORD, FOR PATIENTS WEIGING OVER 33LBS. (15KG): Brand: MEGADYNE

## (undated) DEVICE — C-ARMOR C-ARM EQUIPMENT COVERS CLEAR STERILE UNIVERSAL FIT 12 PER CASE: Brand: C-ARMOR

## (undated) DEVICE — MONITORING NEURO WO INTERPRETATION SYS PRICING

## (undated) DEVICE — SPONGE,PEANUT,XRAY,ST,SM,3/8",5/CARD: Brand: MEDLINE INDUSTRIES, INC.

## (undated) DEVICE — PAD,NON-ADHERENT,3X8,STERILE,LF,1/PK: Brand: MEDLINE

## (undated) DEVICE — SYRINGE IRRIG 60ML SFT PLIABLE BLB EZ TO GRP 1 HND USE W/

## (undated) DEVICE — THE MILL DISPOSABLE - MEDIUM

## (undated) DEVICE — 60 ML SYRINGE LUER-LOCK TIP: Brand: MONOJECT

## (undated) DEVICE — GLOVE ORANGE PI 7 1/2   MSG9075

## (undated) DEVICE — CODMAN® SURGICAL PATTIES 1/2" X 1/2" (1.27CM X 1.27CM): Brand: CODMAN®

## (undated) DEVICE — DRESSING BORDERED ADH GZ UNIV GEN USE 5IN 4IN AND 2 1/2IN

## (undated) DEVICE — 3M™ IOBAN™ 2 ANTIMICROBIAL INCISE DRAPE 6650EZ: Brand: IOBAN™ 2

## (undated) DEVICE — BLADE ES ELASTOMERIC COAT INSUL DURABLE BEND UPTO 90DEG

## (undated) DEVICE — SPONGE,NEURO,0.5"X3",XR,STRL,LF,10/PK: Brand: MEDLINE

## (undated) DEVICE — GOWN,AURORA,NONRNF,XL,30/CS: Brand: MEDLINE

## (undated) DEVICE — AGENT HEMOSTATIC SURGIFLOW MATRIX KIT W/THROMBIN

## (undated) DEVICE — DRAPE MICSCP W117XL305CM DIA65MM LENS W VARI LENS2 FOR LEICA

## (undated) DEVICE — DRAPE,REIN 53X77,STERILE: Brand: MEDLINE

## (undated) DEVICE — ELECTRODE PT RET AD L9FT HI MOIST COND ADH HYDRGEL CORDED

## (undated) DEVICE — DRAPE THYROID

## (undated) DEVICE — GOWN,AURORA,NONREINFORCED,LARGE: Brand: MEDLINE

## (undated) DEVICE — SPONGE LAP W18XL18IN WHT COT 4 PLY FLD STRUNG RADPQ DISP ST

## (undated) DEVICE — PACK PROCEDURE SURG LUMBAR SPINE SVMMC

## (undated) DEVICE — SUTURE MCRYL SZ 4-0 L18IN ABSRB UD L16MM PC-3 3/8 CIR PRIM Y845G

## (undated) DEVICE — CODMAN® SURGICAL PATTIES 1/4" X 1/4" (0.64CM X 0.64CM): Brand: CODMAN®

## (undated) DEVICE — ADHESIVE SKIN CLSR 0.7ML TOP DERMBND ADV

## (undated) DEVICE — STERILE POLYISOPRENE POWDER-FREE SURGICAL GLOVES WITH EMOLLIENT COATING: Brand: PROTEXIS

## (undated) DEVICE — NEEDLE SPINAL 22GA L3.5IN SPINOCAN

## (undated) DEVICE — COVER,MAYO STAND,STERILE: Brand: MEDLINE

## (undated) DEVICE — YANKAUER,FLEXIBLE HANDLE,REGLR CAPACITY: Brand: MEDLINE INDUSTRIES, INC.

## (undated) DEVICE — Device

## (undated) DEVICE — DRAPE C ARM UNIV W41XL74IN CLR PLAS XR VELC CLSR POLY STRP

## (undated) DEVICE — TUBING, SUCTION, 9/32" X 20', STRAIGHT: Brand: MEDLINE INDUSTRIES, INC.

## (undated) DEVICE — CONNECTOR TBNG WHT PLAS SUCT STR 5IN1 LTWT W/ M CONN

## (undated) DEVICE — CONNECTOR,TUBING,5-IN-1,NON-STERILE: Brand: MEDLINE INDUSTRIES, INC.

## (undated) DEVICE — DRESSING TRNSPAR W2XL2.75IN FLM SHT SEMIPERMEABLE WIND

## (undated) DEVICE — KIT DRN FLAT W/ 100CC EVAC 7MM FULL PERF

## (undated) DEVICE — OIL CARTRIDGE: Brand: CORE, MAESTRO

## (undated) DEVICE — 3M™ STERI-STRIP™ COMPOUND BENZOIN TINCTURE 40 BAGS/CARTON 4 CARTONS/CASE C1544: Brand: 3M™ STERI-STRIP™

## (undated) DEVICE — PROTECTOR ULN NRV PUR FOAM HK LOOP STRP ANATOMICALLY

## (undated) DEVICE — TOWEL,OR,DSP,ST,NATURAL,DLX,4/PK,20PK/CS: Brand: MEDLINE

## (undated) DEVICE — STANDARD HYPODERMIC NEEDLE,POLYPROPYLENE HUB: Brand: MONOJECT

## (undated) DEVICE — CONTROL SYRINGE LUER-LOCK TIP: Brand: MONOJECT

## (undated) DEVICE — SYRINGE MED 10ML TRNSLUC BRL PLUNG BLK MRK POLYPR CTRL

## (undated) DEVICE — GLOVE ORANGE PI 8 1/2   MSG9085

## (undated) DEVICE — Z DISCONTINUED APPLICATOR SURG PREP 0.35OZ 2% CHG 70% ISO ALC W/ HI LT

## (undated) DEVICE — GAUZE,SPONGE,FLUFF,6"X6.75",STRL,5/TRAY: Brand: MEDLINE

## (undated) DEVICE — 3.0MM PRECISION NEURO (MATCH HEAD)

## (undated) DEVICE — SUTURE VCRL SZ 0 L18IN ABSRB UD L36MM CT-1 1/2 CIR J840D

## (undated) DEVICE — SUTURE VCRL SZ 2-0 L18IN ABSRB UD CT-1 L36MM 1/2 CIR J839D

## (undated) DEVICE — DIFFUSER: Brand: CORE, MAESTRO

## (undated) DEVICE — HYPODERMIC SAFETY NEEDLE: Brand: MAGELLAN

## (undated) DEVICE — CATHETER IV 14GA L1.25IN TEF STR HUB INTROCAN SFTY

## (undated) DEVICE — MARKER,SKIN,WI/RULER AND LABELS: Brand: MEDLINE

## (undated) DEVICE — SYRINGE, LUER LOCK, 10ML: Brand: MEDLINE

## (undated) DEVICE — EMESIS BASIN: Brand: DEROYAL

## (undated) DEVICE — SYRINGE,CONTROL,LL,FINGER,GRIP: Brand: MEDLINE INDUSTRIES, INC.

## (undated) DEVICE — PEN: MARKING STD 100/CS: Brand: MEDICAL ACTION INDUSTRIES

## (undated) DEVICE — APPLICATOR MEDICATED 10.5 CC SOLUTION HI LT ORNG CHLORAPREP

## (undated) DEVICE — DRAPE IRRIG FLD WRM W44XL66IN W/ AORN STD PRTBL INTRATEMP

## (undated) DEVICE — NEEDLE SPNL 18GA L3.5IN W/ QNCKE SHARPER BVL DURA CLICK

## (undated) DEVICE — SUTURE STRATAFIX SYMMETRIC PDS + SZ 0 L18IN ABSRB L36MM SXPP1A401

## (undated) DEVICE — C-ARM: Brand: UNBRANDED

## (undated) DEVICE — SPONGE,NEURO,0.5"X0.5",XR,STRL,10/PK: Brand: MEDLINE

## (undated) DEVICE — CODMAN® SURGICAL PATTIES 1/2" X 3" (1.27CM X 7.62CM): Brand: CODMAN®

## (undated) DEVICE — INTENDED FOR TISSUE SEPARATION, AND OTHER PROCEDURES THAT REQUIRE A SHARP SURGICAL BLADE TO PUNCTURE OR CUT.: Brand: BARD-PARKER ® CARBON RIB-BACK BLADES